# Patient Record
Sex: FEMALE | Race: WHITE | Employment: OTHER | URBAN - METROPOLITAN AREA
[De-identification: names, ages, dates, MRNs, and addresses within clinical notes are randomized per-mention and may not be internally consistent; named-entity substitution may affect disease eponyms.]

---

## 2017-02-04 ENCOUNTER — HOSPITAL ENCOUNTER (INPATIENT)
Age: 52
LOS: 2 days | Discharge: HOME | DRG: 140 | End: 2017-02-06
Attending: EMERGENCY MEDICINE | Admitting: INTERNAL MEDICINE
Payer: MEDICARE

## 2017-02-04 ENCOUNTER — HOSPITAL ENCOUNTER (EMERGENCY)
Dept: GENERAL RADIOLOGY | Age: 52
Discharge: HOME | DRG: 140 | End: 2017-02-04
Payer: MEDICARE

## 2017-02-04 DIAGNOSIS — J44.1 COPD WITH ACUTE EXACERBATION (HCC): Primary | ICD-10-CM

## 2017-02-04 DIAGNOSIS — E87.6 HYPOKALEMIA: ICD-10-CM

## 2017-02-04 LAB
ABSOLUTE EOS #: 0.1 K/UL (ref 0–0.4)
ABSOLUTE LYMPH #: 1.8 K/UL (ref 1–4.8)
ABSOLUTE MONO #: 0.4 K/UL (ref 0.1–1.2)
ANION GAP SERPL CALCULATED.3IONS-SCNC: 17 MMOL/L (ref 9–17)
BASOPHILS # BLD: 0 % (ref 0–2)
BASOPHILS ABSOLUTE: 0 K/UL (ref 0–0.2)
BUN BLDV-MCNC: 8 MG/DL (ref 6–20)
BUN/CREAT BLD: ABNORMAL (ref 9–20)
CALCIUM SERPL-MCNC: 8.9 MG/DL (ref 8.6–10.4)
CHLORIDE BLD-SCNC: 99 MMOL/L (ref 98–107)
CO2: 25 MMOL/L (ref 20–31)
CREAT SERPL-MCNC: 0.68 MG/DL (ref 0.5–0.9)
DIFFERENTIAL TYPE: NORMAL
EOSINOPHILS RELATIVE PERCENT: 2 % (ref 1–4)
GFR AFRICAN AMERICAN: >60 ML/MIN
GFR NON-AFRICAN AMERICAN: >60 ML/MIN
GFR SERPL CREATININE-BSD FRML MDRD: ABNORMAL ML/MIN/{1.73_M2}
GFR SERPL CREATININE-BSD FRML MDRD: ABNORMAL ML/MIN/{1.73_M2}
GLUCOSE BLD-MCNC: 156 MG/DL (ref 70–99)
HCT VFR BLD CALC: 39 % (ref 36–46)
HEMOGLOBIN: 13.4 G/DL (ref 12–16)
LYMPHOCYTES # BLD: 32 % (ref 24–44)
MCH RBC QN AUTO: 28.8 PG (ref 26–34)
MCHC RBC AUTO-ENTMCNC: 34.5 G/DL (ref 31–37)
MCV RBC AUTO: 83.4 FL (ref 80–100)
MONOCYTES # BLD: 8 % (ref 2–11)
PDW BLD-RTO: 14 % (ref 12.5–15.4)
PLATELET # BLD: 192 K/UL (ref 140–450)
PLATELET ESTIMATE: NORMAL
PMV BLD AUTO: 8.6 FL (ref 6–12)
POTASSIUM SERPL-SCNC: 2.7 MMOL/L (ref 3.7–5.3)
RBC # BLD: 4.68 M/UL (ref 4–5.2)
RBC # BLD: NORMAL 10*6/UL
SEG NEUTROPHILS: 58 % (ref 36–66)
SEGMENTED NEUTROPHILS ABSOLUTE COUNT: 3.3 K/UL (ref 1.8–7.7)
SODIUM BLD-SCNC: 141 MMOL/L (ref 135–144)
TROPONIN INTERP: NORMAL
TROPONIN T: <0.03 NG/ML
WBC # BLD: 5.6 K/UL (ref 3.5–11)
WBC # BLD: NORMAL 10*3/UL

## 2017-02-04 PROCEDURE — 2580000003 HC RX 258: Performed by: INTERNAL MEDICINE

## 2017-02-04 PROCEDURE — 99285 EMERGENCY DEPT VISIT HI MDM: CPT

## 2017-02-04 PROCEDURE — 6370000000 HC RX 637 (ALT 250 FOR IP): Performed by: STUDENT IN AN ORGANIZED HEALTH CARE EDUCATION/TRAINING PROGRAM

## 2017-02-04 PROCEDURE — 94640 AIRWAY INHALATION TREATMENT: CPT

## 2017-02-04 PROCEDURE — 6370000000 HC RX 637 (ALT 250 FOR IP): Performed by: EMERGENCY MEDICINE

## 2017-02-04 PROCEDURE — 87040 BLOOD CULTURE FOR BACTERIA: CPT

## 2017-02-04 PROCEDURE — 6360000002 HC RX W HCPCS: Performed by: STUDENT IN AN ORGANIZED HEALTH CARE EDUCATION/TRAINING PROGRAM

## 2017-02-04 PROCEDURE — 80048 BASIC METABOLIC PNL TOTAL CA: CPT

## 2017-02-04 PROCEDURE — 71010 XR CHEST PORTABLE: CPT

## 2017-02-04 PROCEDURE — 6360000002 HC RX W HCPCS: Performed by: INTERNAL MEDICINE

## 2017-02-04 PROCEDURE — 6370000000 HC RX 637 (ALT 250 FOR IP): Performed by: INTERNAL MEDICINE

## 2017-02-04 PROCEDURE — 84484 ASSAY OF TROPONIN QUANT: CPT

## 2017-02-04 PROCEDURE — 85025 COMPLETE CBC W/AUTO DIFF WBC: CPT

## 2017-02-04 PROCEDURE — 2060000000 HC ICU INTERMEDIATE R&B

## 2017-02-04 PROCEDURE — 36415 COLL VENOUS BLD VENIPUNCTURE: CPT

## 2017-02-04 PROCEDURE — 2580000003 HC RX 258: Performed by: STUDENT IN AN ORGANIZED HEALTH CARE EDUCATION/TRAINING PROGRAM

## 2017-02-04 PROCEDURE — 71010 XR CHEST PORTABLE: CPT | Performed by: RADIOLOGY

## 2017-02-04 PROCEDURE — 93005 ELECTROCARDIOGRAM TRACING: CPT

## 2017-02-04 PROCEDURE — 94645 CONT INHLJ TX EACH ADDL HOUR: CPT

## 2017-02-04 PROCEDURE — 94644 CONT INHLJ TX 1ST HOUR: CPT

## 2017-02-04 PROCEDURE — 94642 AEROSOL INHALATION TREATMENT: CPT

## 2017-02-04 RX ORDER — METHYLPREDNISOLONE SODIUM SUCCINATE 40 MG/ML
40 INJECTION, POWDER, LYOPHILIZED, FOR SOLUTION INTRAMUSCULAR; INTRAVENOUS EVERY 6 HOURS
Status: DISCONTINUED | OUTPATIENT
Start: 2017-02-04 | End: 2017-02-06 | Stop reason: HOSPADM

## 2017-02-04 RX ORDER — PREDNISONE 20 MG/1
40 TABLET ORAL ONCE
Status: COMPLETED | OUTPATIENT
Start: 2017-02-04 | End: 2017-02-04

## 2017-02-04 RX ORDER — PRAVASTATIN SODIUM 20 MG
40 TABLET ORAL DAILY
Status: DISCONTINUED | OUTPATIENT
Start: 2017-02-04 | End: 2017-02-06 | Stop reason: HOSPADM

## 2017-02-04 RX ORDER — LEVOTHYROXINE SODIUM 0.1 MG/1
100 TABLET ORAL DAILY
Status: DISCONTINUED | OUTPATIENT
Start: 2017-02-04 | End: 2017-02-06 | Stop reason: HOSPADM

## 2017-02-04 RX ORDER — LEVOFLOXACIN 5 MG/ML
500 INJECTION, SOLUTION INTRAVENOUS EVERY 24 HOURS
Status: DISCONTINUED | OUTPATIENT
Start: 2017-02-04 | End: 2017-02-04

## 2017-02-04 RX ORDER — POTASSIUM CHLORIDE 20 MEQ/1
40 TABLET, EXTENDED RELEASE ORAL 2 TIMES DAILY
Status: COMPLETED | OUTPATIENT
Start: 2017-02-04 | End: 2017-02-05

## 2017-02-04 RX ORDER — SODIUM CHLORIDE 9 MG/ML
INJECTION, SOLUTION INTRAVENOUS CONTINUOUS
Status: DISCONTINUED | OUTPATIENT
Start: 2017-02-04 | End: 2017-02-06 | Stop reason: HOSPADM

## 2017-02-04 RX ORDER — ATENOLOL 25 MG/1
25 TABLET ORAL DAILY
Status: DISCONTINUED | OUTPATIENT
Start: 2017-02-04 | End: 2017-02-06 | Stop reason: HOSPADM

## 2017-02-04 RX ORDER — IPRATROPIUM BROMIDE AND ALBUTEROL SULFATE 2.5; .5 MG/3ML; MG/3ML
1 SOLUTION RESPIRATORY (INHALATION) 4 TIMES DAILY
Status: DISCONTINUED | OUTPATIENT
Start: 2017-02-04 | End: 2017-02-06 | Stop reason: HOSPADM

## 2017-02-04 RX ORDER — HYDROCHLOROTHIAZIDE 25 MG/1
25 TABLET ORAL DAILY
Status: DISCONTINUED | OUTPATIENT
Start: 2017-02-04 | End: 2017-02-05

## 2017-02-04 RX ORDER — POTASSIUM CHLORIDE 20MEQ/15ML
20 LIQUID (ML) ORAL ONCE
Status: COMPLETED | OUTPATIENT
Start: 2017-02-04 | End: 2017-02-04

## 2017-02-04 RX ORDER — ONDANSETRON 2 MG/ML
4 INJECTION INTRAMUSCULAR; INTRAVENOUS EVERY 6 HOURS PRN
Status: DISCONTINUED | OUTPATIENT
Start: 2017-02-04 | End: 2017-02-06 | Stop reason: HOSPADM

## 2017-02-04 RX ORDER — ACETAMINOPHEN 325 MG/1
650 TABLET ORAL EVERY 4 HOURS PRN
Status: DISCONTINUED | OUTPATIENT
Start: 2017-02-04 | End: 2017-02-06 | Stop reason: HOSPADM

## 2017-02-04 RX ORDER — LEVOFLOXACIN 5 MG/ML
750 INJECTION, SOLUTION INTRAVENOUS EVERY 24 HOURS
Status: DISCONTINUED | OUTPATIENT
Start: 2017-02-04 | End: 2017-02-06 | Stop reason: HOSPADM

## 2017-02-04 RX ORDER — TRAZODONE HYDROCHLORIDE 100 MG/1
200 TABLET ORAL NIGHTLY
Status: DISCONTINUED | OUTPATIENT
Start: 2017-02-04 | End: 2017-02-06 | Stop reason: HOSPADM

## 2017-02-04 RX ORDER — SODIUM CHLORIDE 0.9 % (FLUSH) 0.9 %
10 SYRINGE (ML) INJECTION EVERY 12 HOURS SCHEDULED
Status: DISCONTINUED | OUTPATIENT
Start: 2017-02-04 | End: 2017-02-06 | Stop reason: HOSPADM

## 2017-02-04 RX ORDER — POTASSIUM CHLORIDE 7.45 MG/ML
10 INJECTION INTRAVENOUS ONCE
Status: DISCONTINUED | OUTPATIENT
Start: 2017-02-04 | End: 2017-02-04

## 2017-02-04 RX ORDER — ALLOPURINOL 100 MG/1
100 TABLET ORAL DAILY
Status: DISCONTINUED | OUTPATIENT
Start: 2017-02-04 | End: 2017-02-06 | Stop reason: HOSPADM

## 2017-02-04 RX ORDER — GABAPENTIN 100 MG/1
100 CAPSULE ORAL 3 TIMES DAILY
Status: DISCONTINUED | OUTPATIENT
Start: 2017-02-04 | End: 2017-02-06 | Stop reason: HOSPADM

## 2017-02-04 RX ORDER — OMEPRAZOLE 20 MG/1
40 CAPSULE, DELAYED RELEASE ORAL 2 TIMES DAILY
Status: DISCONTINUED | OUTPATIENT
Start: 2017-02-04 | End: 2017-02-06 | Stop reason: HOSPADM

## 2017-02-04 RX ORDER — ALBUTEROL SULFATE 2.5 MG/3ML
2.5 SOLUTION RESPIRATORY (INHALATION) EVERY 4 HOURS
Status: DISCONTINUED | OUTPATIENT
Start: 2017-02-05 | End: 2017-02-05

## 2017-02-04 RX ORDER — DICYCLOMINE HYDROCHLORIDE 10 MG/1
20 CAPSULE ORAL 3 TIMES DAILY PRN
Status: DISCONTINUED | OUTPATIENT
Start: 2017-02-04 | End: 2017-02-06 | Stop reason: HOSPADM

## 2017-02-04 RX ORDER — POTASSIUM CHLORIDE 40 MEQ/30ML
0.5 LIQUID ORAL ONCE
Status: DISCONTINUED | OUTPATIENT
Start: 2017-02-04 | End: 2017-02-04 | Stop reason: CLARIF

## 2017-02-04 RX ORDER — ALBUTEROL SULFATE 2.5 MG/3ML
5 SOLUTION RESPIRATORY (INHALATION)
Status: DISCONTINUED | OUTPATIENT
Start: 2017-02-04 | End: 2017-02-04

## 2017-02-04 RX ORDER — SODIUM CHLORIDE 0.9 % (FLUSH) 0.9 %
10 SYRINGE (ML) INJECTION PRN
Status: DISCONTINUED | OUTPATIENT
Start: 2017-02-04 | End: 2017-02-06 | Stop reason: HOSPADM

## 2017-02-04 RX ORDER — FLUOXETINE HYDROCHLORIDE 20 MG/1
60 CAPSULE ORAL DAILY
Status: DISCONTINUED | OUTPATIENT
Start: 2017-02-04 | End: 2017-02-06 | Stop reason: HOSPADM

## 2017-02-04 RX ADMIN — GABAPENTIN 100 MG: 100 CAPSULE ORAL at 21:00

## 2017-02-04 RX ADMIN — ATENOLOL 25 MG: 25 TABLET ORAL at 20:59

## 2017-02-04 RX ADMIN — TRAZODONE HYDROCHLORIDE 200 MG: 100 TABLET ORAL at 21:01

## 2017-02-04 RX ADMIN — ALBUTEROL SULFATE 5 MG: 5 SOLUTION RESPIRATORY (INHALATION) at 11:30

## 2017-02-04 RX ADMIN — ENOXAPARIN SODIUM 40 MG: 40 INJECTION SUBCUTANEOUS at 20:57

## 2017-02-04 RX ADMIN — ALBUTEROL SULFATE 15 MG/HR: 5 SOLUTION RESPIRATORY (INHALATION) at 12:34

## 2017-02-04 RX ADMIN — FLUOXETINE 60 MG: 20 CAPSULE ORAL at 21:00

## 2017-02-04 RX ADMIN — ALBUTEROL SULFATE 5 MG: 5 SOLUTION RESPIRATORY (INHALATION) at 11:14

## 2017-02-04 RX ADMIN — HYDROCHLOROTHIAZIDE 25 MG: 25 TABLET ORAL at 20:59

## 2017-02-04 RX ADMIN — SODIUM CHLORIDE: 9 INJECTION, SOLUTION INTRAVENOUS at 19:12

## 2017-02-04 RX ADMIN — METHYLPREDNISOLONE SODIUM SUCCINATE 40 MG: 40 INJECTION, POWDER, FOR SOLUTION INTRAMUSCULAR; INTRAVENOUS at 21:01

## 2017-02-04 RX ADMIN — LEVOFLOXACIN 750 MG: 5 INJECTION, SOLUTION INTRAVENOUS at 20:58

## 2017-02-04 RX ADMIN — PREDNISONE 40 MG: 20 TABLET ORAL at 12:06

## 2017-02-04 RX ADMIN — LEVOTHYROXINE SODIUM 100 MCG: 100 TABLET ORAL at 21:01

## 2017-02-04 RX ADMIN — IPRATROPIUM BROMIDE 0.5 MG: 0.5 SOLUTION RESPIRATORY (INHALATION) at 11:14

## 2017-02-04 RX ADMIN — OMEPRAZOLE 40 MG: 20 CAPSULE, DELAYED RELEASE ORAL at 20:59

## 2017-02-04 RX ADMIN — Medication 10 ML: at 21:12

## 2017-02-04 RX ADMIN — ACETAMINOPHEN 650 MG: 325 TABLET ORAL at 19:03

## 2017-02-04 RX ADMIN — POTASSIUM CHLORIDE 20 MEQ: 40 SOLUTION ORAL at 15:01

## 2017-02-04 RX ADMIN — POTASSIUM CHLORIDE 40 MEQ: 1500 TABLET, EXTENDED RELEASE ORAL at 21:00

## 2017-02-04 RX ADMIN — PRAVASTATIN SODIUM 40 MG: 20 TABLET ORAL at 21:00

## 2017-02-04 RX ADMIN — MAGNESIUM SULFATE HEPTAHYDRATE 2 G: 500 INJECTION, SOLUTION INTRAMUSCULAR; INTRAVENOUS at 14:05

## 2017-02-04 RX ADMIN — IPRATROPIUM BROMIDE AND ALBUTEROL SULFATE 1 AMPULE: .5; 3 SOLUTION RESPIRATORY (INHALATION) at 21:27

## 2017-02-04 ASSESSMENT — ENCOUNTER SYMPTOMS
CHEST TIGHTNESS: 1
COLOR CHANGE: 0
CONSTIPATION: 0
ABDOMINAL PAIN: 0
SHORTNESS OF BREATH: 1
DIARRHEA: 0
SORE THROAT: 0
WHEEZING: 1

## 2017-02-04 ASSESSMENT — PAIN DESCRIPTION - ONSET
ONSET: SUDDEN
ONSET: PROGRESSIVE

## 2017-02-04 ASSESSMENT — PAIN SCALES - GENERAL
PAINLEVEL_OUTOF10: 5

## 2017-02-04 ASSESSMENT — PAIN DESCRIPTION - LOCATION
LOCATION: GENERALIZED;JAW
LOCATION: HEAD

## 2017-02-04 ASSESSMENT — PAIN DESCRIPTION - DESCRIPTORS
DESCRIPTORS: HEADACHE
DESCRIPTORS: ACHING

## 2017-02-04 ASSESSMENT — PAIN DESCRIPTION - PAIN TYPE: TYPE: ACUTE PAIN

## 2017-02-04 ASSESSMENT — PAIN DESCRIPTION - FREQUENCY: FREQUENCY: CONTINUOUS

## 2017-02-04 ASSESSMENT — PAIN DESCRIPTION - PROGRESSION: CLINICAL_PROGRESSION: NOT CHANGED

## 2017-02-05 PROBLEM — E87.6 HYPOKALEMIA: Status: ACTIVE | Noted: 2017-02-05

## 2017-02-05 PROBLEM — J44.1 COPD EXACERBATION (HCC): Status: ACTIVE | Noted: 2017-02-05

## 2017-02-05 PROBLEM — I10 HTN (HYPERTENSION): Status: ACTIVE | Noted: 2017-02-05

## 2017-02-05 LAB
ANION GAP SERPL CALCULATED.3IONS-SCNC: 15 MMOL/L (ref 9–17)
BUN BLDV-MCNC: 10 MG/DL (ref 6–20)
BUN/CREAT BLD: ABNORMAL (ref 9–20)
CALCIUM SERPL-MCNC: 8.7 MG/DL (ref 8.6–10.4)
CHLORIDE BLD-SCNC: 97 MMOL/L (ref 98–107)
CO2: 25 MMOL/L (ref 20–31)
CREAT SERPL-MCNC: 0.62 MG/DL (ref 0.5–0.9)
GFR AFRICAN AMERICAN: >60 ML/MIN
GFR NON-AFRICAN AMERICAN: >60 ML/MIN
GFR SERPL CREATININE-BSD FRML MDRD: ABNORMAL ML/MIN/{1.73_M2}
GFR SERPL CREATININE-BSD FRML MDRD: ABNORMAL ML/MIN/{1.73_M2}
GLUCOSE BLD-MCNC: 195 MG/DL (ref 70–99)
HCT VFR BLD CALC: 36.4 % (ref 36–46)
HEMOGLOBIN: 12.4 G/DL (ref 12–16)
MAGNESIUM: 2.4 MG/DL (ref 1.6–2.6)
MCH RBC QN AUTO: 29 PG (ref 26–34)
MCHC RBC AUTO-ENTMCNC: 34.1 G/DL (ref 31–37)
MCV RBC AUTO: 84.8 FL (ref 80–100)
PDW BLD-RTO: 14.3 % (ref 12.5–15.4)
PLATELET # BLD: 230 K/UL (ref 140–450)
PMV BLD AUTO: 9 FL (ref 6–12)
POTASSIUM SERPL-SCNC: 3.7 MMOL/L (ref 3.7–5.3)
RBC # BLD: 4.29 M/UL (ref 4–5.2)
SODIUM BLD-SCNC: 137 MMOL/L (ref 135–144)
WBC # BLD: 9.6 K/UL (ref 3.5–11)

## 2017-02-05 PROCEDURE — G0008 ADMIN INFLUENZA VIRUS VAC: HCPCS | Performed by: INTERNAL MEDICINE

## 2017-02-05 PROCEDURE — 85027 COMPLETE CBC AUTOMATED: CPT

## 2017-02-05 PROCEDURE — 80048 BASIC METABOLIC PNL TOTAL CA: CPT

## 2017-02-05 PROCEDURE — 6360000002 HC RX W HCPCS: Performed by: INTERNAL MEDICINE

## 2017-02-05 PROCEDURE — 6370000000 HC RX 637 (ALT 250 FOR IP): Performed by: HOSPITALIST

## 2017-02-05 PROCEDURE — G8980 MOBILITY D/C STATUS: HCPCS

## 2017-02-05 PROCEDURE — 97116 GAIT TRAINING THERAPY: CPT

## 2017-02-05 PROCEDURE — 2580000003 HC RX 258: Performed by: INTERNAL MEDICINE

## 2017-02-05 PROCEDURE — 83735 ASSAY OF MAGNESIUM: CPT

## 2017-02-05 PROCEDURE — 2060000000 HC ICU INTERMEDIATE R&B

## 2017-02-05 PROCEDURE — 99222 1ST HOSP IP/OBS MODERATE 55: CPT | Performed by: INTERNAL MEDICINE

## 2017-02-05 PROCEDURE — G8979 MOBILITY GOAL STATUS: HCPCS

## 2017-02-05 PROCEDURE — G8978 MOBILITY CURRENT STATUS: HCPCS

## 2017-02-05 PROCEDURE — 97161 PT EVAL LOW COMPLEX 20 MIN: CPT

## 2017-02-05 PROCEDURE — 97530 THERAPEUTIC ACTIVITIES: CPT

## 2017-02-05 PROCEDURE — 6370000000 HC RX 637 (ALT 250 FOR IP): Performed by: INTERNAL MEDICINE

## 2017-02-05 PROCEDURE — 94640 AIRWAY INHALATION TREATMENT: CPT

## 2017-02-05 PROCEDURE — 94762 N-INVAS EAR/PLS OXIMTRY CONT: CPT

## 2017-02-05 PROCEDURE — 36415 COLL VENOUS BLD VENIPUNCTURE: CPT

## 2017-02-05 PROCEDURE — 90686 IIV4 VACC NO PRSV 0.5 ML IM: CPT | Performed by: INTERNAL MEDICINE

## 2017-02-05 PROCEDURE — 94642 AEROSOL INHALATION TREATMENT: CPT

## 2017-02-05 RX ORDER — NICOTINE 21 MG/24HR
1 PATCH, TRANSDERMAL 24 HOURS TRANSDERMAL DAILY
Status: DISCONTINUED | OUTPATIENT
Start: 2017-02-05 | End: 2017-02-06 | Stop reason: HOSPADM

## 2017-02-05 RX ORDER — DEXTROMETHORPHAN POLISTIREX 30 MG/5ML
30 SUSPENSION ORAL EVERY 12 HOURS SCHEDULED
Status: DISCONTINUED | OUTPATIENT
Start: 2017-02-05 | End: 2017-02-06 | Stop reason: HOSPADM

## 2017-02-05 RX ADMIN — GABAPENTIN 100 MG: 100 CAPSULE ORAL at 14:23

## 2017-02-05 RX ADMIN — ENOXAPARIN SODIUM 40 MG: 40 INJECTION SUBCUTANEOUS at 08:24

## 2017-02-05 RX ADMIN — SODIUM CHLORIDE: 9 INJECTION, SOLUTION INTRAVENOUS at 04:00

## 2017-02-05 RX ADMIN — TRAZODONE HYDROCHLORIDE 200 MG: 100 TABLET ORAL at 21:21

## 2017-02-05 RX ADMIN — ATENOLOL 25 MG: 25 TABLET ORAL at 08:25

## 2017-02-05 RX ADMIN — METHYLPREDNISOLONE SODIUM SUCCINATE 40 MG: 40 INJECTION, POWDER, FOR SOLUTION INTRAMUSCULAR; INTRAVENOUS at 03:59

## 2017-02-05 RX ADMIN — ALLOPURINOL 100 MG: 100 TABLET ORAL at 08:25

## 2017-02-05 RX ADMIN — INFLUENZA A VIRUS A/CALIFORNIA/7/2009 X-179A (H1N1) ANTIGEN (FORMALDEHYDE INACTIVATED), INFLUENZA A VIRUS A/HONG KONG/4801/2014 X-263B (H3N2) ANTIGEN (FORMALDEHYDE INACTIVATED), INFLUENZA B VIRUS B/PHUKET/3073/2013 ANTIGEN (FORMALDEHYDE INACTIVATED), AND INFLUENZA B VIRUS B/BRISBANE/60/2008 ANTIGEN (FORMALDEHYDE INACTIVATED) 0.5 ML: 15; 15; 15; 15 INJECTION, SUSPENSION INTRAMUSCULAR at 11:32

## 2017-02-05 RX ADMIN — METHYLPREDNISOLONE SODIUM SUCCINATE 40 MG: 40 INJECTION, POWDER, FOR SOLUTION INTRAMUSCULAR; INTRAVENOUS at 09:11

## 2017-02-05 RX ADMIN — GABAPENTIN 100 MG: 100 CAPSULE ORAL at 08:25

## 2017-02-05 RX ADMIN — METHYLPREDNISOLONE SODIUM SUCCINATE 40 MG: 40 INJECTION, POWDER, FOR SOLUTION INTRAMUSCULAR; INTRAVENOUS at 23:47

## 2017-02-05 RX ADMIN — ALBUTEROL SULFATE 2.5 MG: 2.5 SOLUTION RESPIRATORY (INHALATION) at 05:05

## 2017-02-05 RX ADMIN — FLUOXETINE 60 MG: 20 CAPSULE ORAL at 08:25

## 2017-02-05 RX ADMIN — HYDROCHLOROTHIAZIDE 25 MG: 25 TABLET ORAL at 08:25

## 2017-02-05 RX ADMIN — MAGNESIUM SULFATE IN DEXTROSE 1 G: 10 INJECTION, SOLUTION INTRAVENOUS at 09:11

## 2017-02-05 RX ADMIN — IPRATROPIUM BROMIDE AND ALBUTEROL SULFATE 1 AMPULE: .5; 3 SOLUTION RESPIRATORY (INHALATION) at 21:56

## 2017-02-05 RX ADMIN — IPRATROPIUM BROMIDE AND ALBUTEROL SULFATE 1 AMPULE: .5; 3 SOLUTION RESPIRATORY (INHALATION) at 12:52

## 2017-02-05 RX ADMIN — POTASSIUM CHLORIDE 40 MEQ: 1500 TABLET, EXTENDED RELEASE ORAL at 08:26

## 2017-02-05 RX ADMIN — LEVOFLOXACIN 750 MG: 5 INJECTION, SOLUTION INTRAVENOUS at 17:51

## 2017-02-05 RX ADMIN — LEVOTHYROXINE SODIUM 100 MCG: 100 TABLET ORAL at 08:25

## 2017-02-05 RX ADMIN — IPRATROPIUM BROMIDE AND ALBUTEROL SULFATE 1 AMPULE: .5; 3 SOLUTION RESPIRATORY (INHALATION) at 09:29

## 2017-02-05 RX ADMIN — PRAVASTATIN SODIUM 40 MG: 20 TABLET ORAL at 08:31

## 2017-02-05 RX ADMIN — GABAPENTIN 100 MG: 100 CAPSULE ORAL at 21:20

## 2017-02-05 RX ADMIN — IPRATROPIUM BROMIDE AND ALBUTEROL SULFATE 1 AMPULE: .5; 3 SOLUTION RESPIRATORY (INHALATION) at 17:23

## 2017-02-05 RX ADMIN — Medication 10 ML: at 21:22

## 2017-02-05 RX ADMIN — OMEPRAZOLE 40 MG: 20 CAPSULE, DELAYED RELEASE ORAL at 21:20

## 2017-02-05 RX ADMIN — METHYLPREDNISOLONE SODIUM SUCCINATE 40 MG: 40 INJECTION, POWDER, FOR SOLUTION INTRAMUSCULAR; INTRAVENOUS at 17:51

## 2017-02-05 RX ADMIN — OMEPRAZOLE 40 MG: 20 CAPSULE, DELAYED RELEASE ORAL at 08:25

## 2017-02-05 RX ADMIN — DICYCLOMINE HYDROCHLORIDE 20 MG: 10 CAPSULE ORAL at 21:21

## 2017-02-05 RX ADMIN — ACETAMINOPHEN 650 MG: 325 TABLET ORAL at 15:41

## 2017-02-05 ASSESSMENT — PAIN SCALES - GENERAL
PAINLEVEL_OUTOF10: 4
PAINLEVEL_OUTOF10: 7
PAINLEVEL_OUTOF10: 8

## 2017-02-06 VITALS
RESPIRATION RATE: 18 BRPM | TEMPERATURE: 97.3 F | DIASTOLIC BLOOD PRESSURE: 81 MMHG | HEART RATE: 68 BPM | WEIGHT: 200 LBS | OXYGEN SATURATION: 95 % | BODY MASS INDEX: 31.39 KG/M2 | HEIGHT: 67 IN | SYSTOLIC BLOOD PRESSURE: 139 MMHG

## 2017-02-06 PROCEDURE — 6360000002 HC RX W HCPCS: Performed by: INTERNAL MEDICINE

## 2017-02-06 PROCEDURE — 94640 AIRWAY INHALATION TREATMENT: CPT

## 2017-02-06 PROCEDURE — 94762 N-INVAS EAR/PLS OXIMTRY CONT: CPT

## 2017-02-06 PROCEDURE — 6370000000 HC RX 637 (ALT 250 FOR IP): Performed by: INTERNAL MEDICINE

## 2017-02-06 PROCEDURE — 96365 THER/PROPH/DIAG IV INF INIT: CPT

## 2017-02-06 PROCEDURE — 2580000003 HC RX 258: Performed by: INTERNAL MEDICINE

## 2017-02-06 PROCEDURE — 99238 HOSP IP/OBS DSCHRG MGMT 30/<: CPT | Performed by: INTERNAL MEDICINE

## 2017-02-06 PROCEDURE — 96366 THER/PROPH/DIAG IV INF ADDON: CPT

## 2017-02-06 PROCEDURE — 6370000000 HC RX 637 (ALT 250 FOR IP): Performed by: HOSPITALIST

## 2017-02-06 PROCEDURE — 6370000000 HC RX 637 (ALT 250 FOR IP): Performed by: STUDENT IN AN ORGANIZED HEALTH CARE EDUCATION/TRAINING PROGRAM

## 2017-02-06 PROCEDURE — 94620 HC 6-MINUTE WALK TEST/PULM STRESS TEST SIMPLE: CPT

## 2017-02-06 PROCEDURE — 99406 BEHAV CHNG SMOKING 3-10 MIN: CPT

## 2017-02-06 PROCEDURE — 6360000002 HC RX W HCPCS: Performed by: STUDENT IN AN ORGANIZED HEALTH CARE EDUCATION/TRAINING PROGRAM

## 2017-02-06 RX ORDER — IPRATROPIUM BROMIDE AND ALBUTEROL SULFATE 2.5; .5 MG/3ML; MG/3ML
3 SOLUTION RESPIRATORY (INHALATION) 4 TIMES DAILY
Qty: 360 ML | Refills: 3 | Status: ON HOLD | OUTPATIENT
Start: 2017-02-06 | End: 2021-09-19 | Stop reason: SDUPTHER

## 2017-02-06 RX ORDER — LEVOFLOXACIN 750 MG/1
750 TABLET ORAL DAILY
Qty: 5 TABLET | Refills: 0 | Status: SHIPPED | OUTPATIENT
Start: 2017-02-06 | End: 2017-02-11

## 2017-02-06 RX ORDER — TIZANIDINE 4 MG/1
4 TABLET ORAL EVERY 6 HOURS PRN
Status: DISCONTINUED | OUTPATIENT
Start: 2017-02-06 | End: 2017-02-06 | Stop reason: HOSPADM

## 2017-02-06 RX ORDER — PREDNISONE 20 MG/1
TABLET ORAL
Qty: 6 TABLET | Refills: 0 | Status: SHIPPED | OUTPATIENT
Start: 2017-02-06 | End: 2017-02-06 | Stop reason: HOSPADM

## 2017-02-06 RX ORDER — PREDNISONE 20 MG/1
40 TABLET ORAL DAILY
Qty: 6 TABLET | Refills: 0 | Status: SHIPPED | OUTPATIENT
Start: 2017-02-06 | End: 2017-02-06

## 2017-02-06 RX ORDER — PREGABALIN 75 MG/1
150 CAPSULE ORAL 2 TIMES DAILY
Status: DISCONTINUED | OUTPATIENT
Start: 2017-02-06 | End: 2017-02-06

## 2017-02-06 RX ORDER — DEXTROMETHORPHAN POLISTIREX 30 MG/5ML
30 SUSPENSION ORAL EVERY 12 HOURS SCHEDULED
Qty: 100 ML | Refills: 0 | Status: SHIPPED | OUTPATIENT
Start: 2017-02-06 | End: 2017-02-16

## 2017-02-06 RX ORDER — PREDNISONE 10 MG/1
TABLET ORAL
Qty: 30 TABLET | Refills: 0 | Status: SHIPPED | OUTPATIENT
Start: 2017-02-06 | End: 2019-02-28

## 2017-02-06 RX ADMIN — IPRATROPIUM BROMIDE AND ALBUTEROL SULFATE 1 AMPULE: .5; 3 SOLUTION RESPIRATORY (INHALATION) at 12:56

## 2017-02-06 RX ADMIN — Medication 10 ML: at 08:26

## 2017-02-06 RX ADMIN — METHYLPREDNISOLONE SODIUM SUCCINATE 40 MG: 40 INJECTION, POWDER, FOR SOLUTION INTRAMUSCULAR; INTRAVENOUS at 06:41

## 2017-02-06 RX ADMIN — ALBUTEROL SULFATE 2.5 MG: 5 SOLUTION RESPIRATORY (INHALATION) at 05:00

## 2017-02-06 RX ADMIN — LEVOTHYROXINE SODIUM 100 MCG: 100 TABLET ORAL at 06:41

## 2017-02-06 RX ADMIN — ACETAMINOPHEN 650 MG: 325 TABLET ORAL at 07:30

## 2017-02-06 RX ADMIN — ATENOLOL 25 MG: 25 TABLET ORAL at 08:25

## 2017-02-06 RX ADMIN — GABAPENTIN 100 MG: 100 CAPSULE ORAL at 08:25

## 2017-02-06 RX ADMIN — DICYCLOMINE HYDROCHLORIDE 20 MG: 10 CAPSULE ORAL at 08:25

## 2017-02-06 RX ADMIN — OMEPRAZOLE 40 MG: 20 CAPSULE, DELAYED RELEASE ORAL at 08:25

## 2017-02-06 RX ADMIN — FLUOXETINE 60 MG: 20 CAPSULE ORAL at 06:41

## 2017-02-06 RX ADMIN — PRAVASTATIN SODIUM 40 MG: 20 TABLET ORAL at 08:25

## 2017-02-06 RX ADMIN — ALLOPURINOL 100 MG: 100 TABLET ORAL at 08:25

## 2017-02-06 RX ADMIN — Medication 30 MG: at 08:25

## 2017-02-06 RX ADMIN — TIZANIDINE 4 MG: 4 TABLET ORAL at 12:09

## 2017-02-06 RX ADMIN — METHYLPREDNISOLONE SODIUM SUCCINATE 40 MG: 40 INJECTION, POWDER, FOR SOLUTION INTRAMUSCULAR; INTRAVENOUS at 12:09

## 2017-02-06 RX ADMIN — IPRATROPIUM BROMIDE AND ALBUTEROL SULFATE 1 AMPULE: .5; 3 SOLUTION RESPIRATORY (INHALATION) at 09:10

## 2017-02-06 RX ADMIN — ENOXAPARIN SODIUM 40 MG: 40 INJECTION SUBCUTANEOUS at 08:26

## 2017-02-06 ASSESSMENT — PAIN SCALES - GENERAL
PAINLEVEL_OUTOF10: 7
PAINLEVEL_OUTOF10: 0

## 2017-02-07 LAB
EKG ATRIAL RATE: 87 BPM
EKG P AXIS: 42 DEGREES
EKG P-R INTERVAL: 146 MS
EKG Q-T INTERVAL: 452 MS
EKG QRS DURATION: 88 MS
EKG QTC CALCULATION (BAZETT): 543 MS
EKG R AXIS: 65 DEGREES
EKG T AXIS: 37 DEGREES
EKG VENTRICULAR RATE: 87 BPM

## 2017-02-10 LAB
CULTURE: NORMAL
Lab: NORMAL
Lab: NORMAL
SPECIMEN DESCRIPTION: NORMAL
SPECIMEN DESCRIPTION: NORMAL
STATUS: NORMAL
STATUS: NORMAL

## 2017-03-08 ENCOUNTER — HOSPITAL ENCOUNTER (EMERGENCY)
Age: 52
Discharge: HOME | End: 2017-03-08
Attending: EMERGENCY MEDICINE
Payer: MEDICARE

## 2017-03-08 ENCOUNTER — APPOINTMENT (OUTPATIENT)
Dept: GENERAL RADIOLOGY | Age: 52
End: 2017-03-08
Payer: MEDICARE

## 2017-03-08 VITALS
RESPIRATION RATE: 18 BRPM | OXYGEN SATURATION: 94 % | WEIGHT: 195 LBS | HEART RATE: 68 BPM | HEIGHT: 66 IN | DIASTOLIC BLOOD PRESSURE: 76 MMHG | TEMPERATURE: 97.5 F | SYSTOLIC BLOOD PRESSURE: 128 MMHG | BODY MASS INDEX: 31.34 KG/M2

## 2017-03-08 DIAGNOSIS — R09.81 NASAL CONGESTION: ICD-10-CM

## 2017-03-08 DIAGNOSIS — R05.9 COUGH: Primary | ICD-10-CM

## 2017-03-08 PROCEDURE — 99285 EMERGENCY DEPT VISIT HI MDM: CPT

## 2017-03-08 PROCEDURE — 94664 DEMO&/EVAL PT USE INHALER: CPT

## 2017-03-08 PROCEDURE — 71020 XR CHEST STANDARD TWO VW: CPT

## 2017-03-08 PROCEDURE — 94640 AIRWAY INHALATION TREATMENT: CPT

## 2017-03-08 PROCEDURE — 6370000000 HC RX 637 (ALT 250 FOR IP): Performed by: EMERGENCY MEDICINE

## 2017-03-08 PROCEDURE — 6360000002 HC RX W HCPCS: Performed by: EMERGENCY MEDICINE

## 2017-03-08 RX ORDER — BENZONATATE 100 MG/1
100 CAPSULE ORAL ONCE
Status: COMPLETED | OUTPATIENT
Start: 2017-03-08 | End: 2017-03-08

## 2017-03-08 RX ORDER — PREDNISONE 20 MG/1
60 TABLET ORAL ONCE
Status: COMPLETED | OUTPATIENT
Start: 2017-03-08 | End: 2017-03-08

## 2017-03-08 RX ORDER — DOXYCYCLINE 100 MG/1
100 TABLET ORAL 2 TIMES DAILY
Qty: 20 TABLET | Refills: 0 | Status: SHIPPED | OUTPATIENT
Start: 2017-03-08 | End: 2017-03-18

## 2017-03-08 RX ORDER — BENZONATATE 100 MG/1
100 CAPSULE ORAL 3 TIMES DAILY PRN
Status: DISCONTINUED | OUTPATIENT
Start: 2017-03-08 | End: 2017-03-08

## 2017-03-08 RX ORDER — ALBUTEROL SULFATE 2.5 MG/3ML
5 SOLUTION RESPIRATORY (INHALATION)
Status: DISCONTINUED | OUTPATIENT
Start: 2017-03-08 | End: 2017-03-08 | Stop reason: HOSPADM

## 2017-03-08 RX ORDER — PREDNISONE 10 MG/1
TABLET ORAL
Qty: 20 TABLET | Refills: 0 | Status: SHIPPED | OUTPATIENT
Start: 2017-03-08 | End: 2017-03-18

## 2017-03-08 RX ADMIN — IPRATROPIUM BROMIDE 0.5 MG: 0.5 SOLUTION RESPIRATORY (INHALATION) at 13:34

## 2017-03-08 RX ADMIN — ALBUTEROL SULFATE 5 MG: 5 SOLUTION RESPIRATORY (INHALATION) at 13:34

## 2017-03-08 RX ADMIN — BENZONATATE 100 MG: 100 CAPSULE ORAL at 13:45

## 2017-03-08 RX ADMIN — PREDNISONE 60 MG: 20 TABLET ORAL at 15:36

## 2017-03-08 ASSESSMENT — ENCOUNTER SYMPTOMS
SINUS PRESSURE: 0
COUGH: 1
ABDOMINAL PAIN: 0
NAUSEA: 0
SHORTNESS OF BREATH: 1
BACK PAIN: 0
RHINORRHEA: 1
PHOTOPHOBIA: 0
TROUBLE SWALLOWING: 0
BLOOD IN STOOL: 0
SORE THROAT: 0
VOMITING: 0
CONSTIPATION: 0
DIARRHEA: 0

## 2017-03-08 ASSESSMENT — PAIN SCALES - GENERAL: PAINLEVEL_OUTOF10: 5

## 2017-03-08 ASSESSMENT — PAIN DESCRIPTION - LOCATION: LOCATION: HEAD

## 2017-03-08 ASSESSMENT — PAIN DESCRIPTION - DESCRIPTORS: DESCRIPTORS: DISCOMFORT;THROBBING;POUNDING

## 2017-05-09 ENCOUNTER — HOSPITAL ENCOUNTER (EMERGENCY)
Age: 52
Discharge: HOME | End: 2017-05-09
Attending: EMERGENCY MEDICINE
Payer: MEDICARE

## 2017-05-09 VITALS
WEIGHT: 182 LBS | HEART RATE: 65 BPM | BODY MASS INDEX: 29.25 KG/M2 | SYSTOLIC BLOOD PRESSURE: 119 MMHG | TEMPERATURE: 97.9 F | HEIGHT: 66 IN | RESPIRATION RATE: 16 BRPM | DIASTOLIC BLOOD PRESSURE: 74 MMHG | OXYGEN SATURATION: 96 %

## 2017-05-09 DIAGNOSIS — G89.29 CHRONIC LEFT HIP PAIN: Primary | ICD-10-CM

## 2017-05-09 DIAGNOSIS — M76.32 IT BAND SYNDROME, LEFT: ICD-10-CM

## 2017-05-09 DIAGNOSIS — M25.552 CHRONIC LEFT HIP PAIN: Primary | ICD-10-CM

## 2017-05-09 PROCEDURE — 6360000002 HC RX W HCPCS: Performed by: PHYSICIAN ASSISTANT

## 2017-05-09 PROCEDURE — G0381 LEV 2 HOSP TYPE B ED VISIT: HCPCS

## 2017-05-09 PROCEDURE — 96372 THER/PROPH/DIAG INJ SC/IM: CPT

## 2017-05-09 RX ORDER — DIAZEPAM 2 MG/1
2 TABLET ORAL EVERY 8 HOURS PRN
Qty: 15 TABLET | Refills: 0 | Status: SHIPPED | OUTPATIENT
Start: 2017-05-09 | End: 2019-02-28

## 2017-05-09 RX ORDER — KETOROLAC TROMETHAMINE 30 MG/ML
30 INJECTION, SOLUTION INTRAMUSCULAR; INTRAVENOUS ONCE
Status: COMPLETED | OUTPATIENT
Start: 2017-05-09 | End: 2017-05-09

## 2017-05-09 RX ADMIN — KETOROLAC TROMETHAMINE 30 MG: 30 INJECTION, SOLUTION INTRAMUSCULAR at 19:58

## 2017-05-09 ASSESSMENT — PAIN DESCRIPTION - LOCATION: LOCATION: HIP

## 2017-05-09 ASSESSMENT — ENCOUNTER SYMPTOMS
GASTROINTESTINAL NEGATIVE: 1
EYES NEGATIVE: 1
RESPIRATORY NEGATIVE: 1
ALLERGIC/IMMUNOLOGIC NEGATIVE: 1

## 2017-05-09 ASSESSMENT — PAIN SCALES - GENERAL
PAINLEVEL_OUTOF10: 8
PAINLEVEL_OUTOF10: 8

## 2017-05-09 ASSESSMENT — PAIN DESCRIPTION - ORIENTATION: ORIENTATION: LEFT

## 2017-06-22 ENCOUNTER — HOSPITAL ENCOUNTER (EMERGENCY)
Age: 52
Discharge: LEFT BEFORE DISCHARGE | End: 2017-06-22
Attending: EMERGENCY MEDICINE
Payer: MEDICARE

## 2017-06-22 VITALS
SYSTOLIC BLOOD PRESSURE: 108 MMHG | DIASTOLIC BLOOD PRESSURE: 71 MMHG | OXYGEN SATURATION: 96 % | TEMPERATURE: 97.3 F | RESPIRATION RATE: 21 BRPM | HEART RATE: 72 BPM

## 2017-06-22 DIAGNOSIS — Z53.20 LEFT BEFORE TREATMENT COMPLETED: Primary | ICD-10-CM

## 2017-06-22 PROCEDURE — 99283 EMERGENCY DEPT VISIT LOW MDM: CPT

## 2017-06-22 ASSESSMENT — PAIN DESCRIPTION - PAIN TYPE: TYPE: ACUTE PAIN

## 2017-06-22 ASSESSMENT — PAIN DESCRIPTION - LOCATION: LOCATION: ABDOMEN

## 2017-06-22 ASSESSMENT — PAIN SCALES - GENERAL: PAINLEVEL_OUTOF10: 7

## 2017-10-18 ENCOUNTER — HOSPITAL ENCOUNTER (EMERGENCY)
Age: 52
Discharge: HOME | End: 2017-10-18
Attending: EMERGENCY MEDICINE
Payer: MEDICARE

## 2017-10-18 VITALS
HEART RATE: 74 BPM | TEMPERATURE: 97.5 F | DIASTOLIC BLOOD PRESSURE: 97 MMHG | BODY MASS INDEX: 32.14 KG/M2 | SYSTOLIC BLOOD PRESSURE: 160 MMHG | HEIGHT: 66 IN | OXYGEN SATURATION: 98 % | RESPIRATION RATE: 18 BRPM | WEIGHT: 200 LBS

## 2017-10-18 DIAGNOSIS — M54.31 SCIATICA OF RIGHT SIDE: Primary | ICD-10-CM

## 2017-10-18 PROCEDURE — G0382 LEV 3 HOSP TYPE B ED VISIT: HCPCS

## 2017-10-18 PROCEDURE — 96372 THER/PROPH/DIAG INJ SC/IM: CPT

## 2017-10-18 PROCEDURE — 6370000000 HC RX 637 (ALT 250 FOR IP): Performed by: EMERGENCY MEDICINE

## 2017-10-18 PROCEDURE — 6360000002 HC RX W HCPCS: Performed by: EMERGENCY MEDICINE

## 2017-10-18 RX ORDER — OXYCODONE HYDROCHLORIDE AND ACETAMINOPHEN 5; 325 MG/1; MG/1
2 TABLET ORAL ONCE
Status: COMPLETED | OUTPATIENT
Start: 2017-10-18 | End: 2017-10-18

## 2017-10-18 RX ORDER — KETOROLAC TROMETHAMINE 30 MG/ML
30 INJECTION, SOLUTION INTRAMUSCULAR; INTRAVENOUS ONCE
Status: COMPLETED | OUTPATIENT
Start: 2017-10-18 | End: 2017-10-18

## 2017-10-18 RX ADMIN — OXYCODONE HYDROCHLORIDE AND ACETAMINOPHEN 2 TABLET: 5; 325 TABLET ORAL at 20:33

## 2017-10-18 RX ADMIN — KETOROLAC TROMETHAMINE 30 MG: 30 INJECTION, SOLUTION INTRAMUSCULAR at 20:59

## 2017-10-18 ASSESSMENT — ENCOUNTER SYMPTOMS
SHORTNESS OF BREATH: 0
RHINORRHEA: 0
BACK PAIN: 1
ABDOMINAL PAIN: 0

## 2017-10-18 ASSESSMENT — PAIN DESCRIPTION - LOCATION: LOCATION: BACK;LEG

## 2017-10-18 ASSESSMENT — PAIN DESCRIPTION - ORIENTATION: ORIENTATION: RIGHT

## 2017-10-18 ASSESSMENT — PAIN DESCRIPTION - DESCRIPTORS: DESCRIPTORS: DISCOMFORT;SHARP

## 2017-10-18 ASSESSMENT — PAIN SCALES - GENERAL
PAINLEVEL_OUTOF10: 9
PAINLEVEL_OUTOF10: 6
PAINLEVEL_OUTOF10: 9

## 2017-10-19 NOTE — ED PROVIDER NOTES
History    Not on file. Social History Main Topics    Smoking status: Current Every Day Smoker     Packs/day: 0.25     Years: 37.00     Types: Cigarettes    Smokeless tobacco: Not on file      Comment: has not smoked in the last 3 days    Alcohol use Yes      Comment: socially    Drug use: No    Sexual activity: Not on file     Other Topics Concern    Not on file     Social History Narrative    No narrative on file       Family History   Problem Relation Age of Onset    Diabetes Father     Hypertension Mother     Cancer Mother        Allergies:  Review of patient's allergies indicates no known allergies. Home Medications:  Prior to Admission medications    Medication Sig Start Date End Date Taking? Authorizing Provider   diazepam (VALIUM) 2 MG tablet Take 1 tablet by mouth every 8 hours as needed (Pain/Spasm) 5/9/17   Yue Starkey PA-C   predniSONE (DELTASONE) 10 MG tablet Taper Dose. Take the following in order until completion   1. 40mg PO qd for 3 days  2. 30mg PO qd for 3 days  3. 20mg PO qd for 3 days  4. 10mg PO qd for 3 days and then stop after thirdl 10mg dose. 2/6/17   Saba Rockwell,    ipratropium-albuterol (DUONEB) 0.5-2.5 (3) MG/3ML SOLN nebulizer solution Inhale 3 mLs into the lungs 4 times daily 2/6/17   Scott Layton MD   mometasone-formoterol [facility]) 200-5 MCG/ACT inhaler Inhale 2 puffs into the lungs every 12 hours 2/6/17   Scott Layton MD   ibuprofen (ADVIL;MOTRIN) 800 MG tablet Take 1 tablet by mouth every 8 hours as needed for Pain 10/2/16   Luis Julianne, DO   TiZANidine HCl (ZANAFLEX PO) Take by mouth    Historical Provider, MD   gabapentin (NEURONTIN) 100 MG capsule Take 100 mg by mouth 3 times daily    Historical Provider, MD   ondansetron (ZOFRAN) 4 MG tablet Take 1 tablet by mouth every 8 hours as needed for Nausea 3/2/16   Linsey Alas,    dicyclomine (BENTYL) 20 MG tablet Take 1 tablet by mouth 3 times daily as needed.  2/9/15   Jim Givens MD

## 2017-10-20 ENCOUNTER — HOSPITAL ENCOUNTER (EMERGENCY)
Age: 52
Discharge: HOME | End: 2017-10-20
Attending: EMERGENCY MEDICINE
Payer: MEDICARE

## 2017-10-20 VITALS
SYSTOLIC BLOOD PRESSURE: 179 MMHG | RESPIRATION RATE: 16 BRPM | HEART RATE: 65 BPM | WEIGHT: 200 LBS | BODY MASS INDEX: 32.14 KG/M2 | OXYGEN SATURATION: 99 % | DIASTOLIC BLOOD PRESSURE: 92 MMHG | TEMPERATURE: 98.4 F | HEIGHT: 66 IN

## 2017-10-20 DIAGNOSIS — M54.31 SCIATICA OF RIGHT SIDE: Primary | ICD-10-CM

## 2017-10-20 PROCEDURE — 6360000002 HC RX W HCPCS: Performed by: EMERGENCY MEDICINE

## 2017-10-20 PROCEDURE — 96372 THER/PROPH/DIAG INJ SC/IM: CPT

## 2017-10-20 PROCEDURE — 6370000000 HC RX 637 (ALT 250 FOR IP): Performed by: EMERGENCY MEDICINE

## 2017-10-20 PROCEDURE — 99283 EMERGENCY DEPT VISIT LOW MDM: CPT

## 2017-10-20 RX ORDER — NAPROXEN 250 MG/1
500 TABLET ORAL ONCE
Status: COMPLETED | OUTPATIENT
Start: 2017-10-20 | End: 2017-10-20

## 2017-10-20 RX ORDER — ORPHENADRINE CITRATE 30 MG/ML
60 INJECTION INTRAMUSCULAR; INTRAVENOUS ONCE
Status: COMPLETED | OUTPATIENT
Start: 2017-10-20 | End: 2017-10-20

## 2017-10-20 RX ADMIN — NAPROXEN 500 MG: 250 TABLET ORAL at 19:15

## 2017-10-20 RX ADMIN — ORPHENADRINE CITRATE 60 MG: 30 INJECTION INTRAMUSCULAR; INTRAVENOUS at 19:15

## 2017-10-20 ASSESSMENT — ENCOUNTER SYMPTOMS
BACK PAIN: 1
ABDOMINAL PAIN: 0
COUGH: 0
VOMITING: 0
NAUSEA: 0
EYE PAIN: 0
COLOR CHANGE: 0

## 2017-10-20 ASSESSMENT — PAIN DESCRIPTION - LOCATION: LOCATION: BACK;LEG

## 2017-10-20 ASSESSMENT — PAIN SCALES - GENERAL
PAINLEVEL_OUTOF10: 9
PAINLEVEL_OUTOF10: 8

## 2017-10-20 ASSESSMENT — PAIN DESCRIPTION - PAIN TYPE: TYPE: ACUTE PAIN
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
## 2017-10-20 NOTE — ED PROVIDER NOTES
[facility]     Emergency Department     Faculty Attestation    I performed a history and physical examination of the patient and discussed management with the resident. I have reviewed and agree with the residents findings including all diagnostic interpretations, and treatment plans as written. Any areas of disagreement are noted on the chart. I was personally present for the key portions of any procedures. I have documented in the chart those procedures where I was not present during the key portions. I have reviewed the emergency nurses triage note. I agree with the chief complaint, past medical history, past surgical history, allergies, medications, social and family history as documented unless otherwise noted below. Documentation of the HPI, Physical Exam and Medical Decision Making performed by roman is based on my personal performance of the HPI, PE and MDM. For Physician Assistant/ Nurse Practitioner cases/documentation I have personally evaluated this patient and have completed at least one if not all key elements of the E/M (history, physical exam, and MDM). Additional findings are as noted. Primary Care Physician: Ira Prince MD    History: This is a 47 y.o. female who presents to the Emergency Department with complaint of Back pain. The patient into the emergency room complaining of back pain. Patient has a history of chronic back pain. The patient denies any bowel or bladder dissection. The patient denies any numbness, tingling or weakness. Physical:   height is 5' 6\" (1.676 m) and weight is 200 lb (90.7 kg). Her oral temperature is 98.4 °F (36.9 °C). Her blood pressure is 179/92 (abnormal) and her pulse is 65. Her respiration is 16 and oxygen saturation is 99%. There is no midline thoracic or lumbar spinal tenderness. The patient has some tenderness to the right paraspinal lumbar sacral area with sciatica.  Patient is able to ambulate without any foot drop noted. Impression: Back pain    Plan: Analgesia, discharged      [address] [facility]  Reji Cohen MD, [facility],[address]  Attending Emergency Medicine Physician           Christina Guerrero MD  10/20/17 [facility]

## 2017-10-20 NOTE — ED PROVIDER NOTES
CONTINUE these medications which have NOT CHANGED    Details   diazepam (VALIUM) 2 MG tablet Take 1 tablet by mouth every 8 hours as needed (Pain/Spasm), Disp-15 tablet, R-0Print      predniSONE (DELTASONE) 10 MG tablet Taper Dose. Take the following in order until completion   1. 40mg PO qd for 3 days  2. 30mg PO qd for 3 days  3. 20mg PO qd for 3 days  4. 10mg PO qd for 3 days and then stop after thirdl 10mg dose., Disp-30 tablet, R-0, [place]      ipratropium-albuterol (DUONEB) 0.5-2.5 (3) MG/3ML SOLN nebulizer solution Inhale 3 mLs into the lungs 4 times daily, Disp-360 mL, R-3      mometasone-formoterol (DULERA) 200-5 MCG/ACT inhaler Inhale 2 puffs into the lungs every 12 hours, Disp-1 Inhaler, R-3      TiZANidine HCl (ZANAFLEX PO) Take by mouth      gabapentin (NEURONTIN) 100 MG capsule Take 100 mg by mouth 3 times daily      ondansetron (ZOFRAN) 4 MG tablet Take 1 tablet by mouth every 8 hours as needed for Nausea, Disp-10 tablet, R-0      dicyclomine (BENTYL) 20 MG tablet Take 1 tablet by mouth 3 times daily as needed. , Disp-20 tablet, R-0      acetaminophen (TYLENOL) 500 MG tablet Take 500 mg by mouth 2 times daily as needed for Pain. traZODone (DESYREL) 100 MG tablet Take 200 mg by mouth nightly. pravastatin (PRAVACHOL) 40 MG tablet Take 40 mg by mouth daily. ALLOPURINOL PO Take  by mouth. HYDROCHLOROTHIAZIDE PO Take 25 mg by mouth daily. FLUoxetine HCl (PROZAC PO) Take 60 mg by mouth Daily. OMEPRAZOLE PO Take 40 mg by mouth 2 times daily. Levothyroxine Sodium (LEVOTHROID PO) Take 112 mcg by mouth. ATENOLOL PO Take 25 mg by mouth daily. ALLERGIES     has No Known Allergies. FAMILY HISTORY     indicated that the status of her mother is unknown. She indicated that the status of her father is unknown.      family history includes Cancer in her mother; Diabetes in her father; Hypertension in her mother.       SOCIAL HISTORY      reports that she has been sciatica. She is treated with Norflex and Naprosyn here in the department. Patient stated Tylenol No. 3 helped her symptoms however I advised her that this is overall unsafe medication for back pain and she would be better served using anti-inflammatory medications such as diclofenac which she is now prescribed. She is instructed to stop all other nonsteroidal medications and use ice and stretching. She'll follow-up with a neurologist in 5 days. DIAGNOSTIC RESULTS     EKG: All EKG's are interpreted by the Emergency Department Physician who either signs or Co-signs this chart in the absence of a cardiologist.        RADIOLOGY:   I directly visualized the following  images and reviewed the radiologist interpretations:  No orders to display           ED BEDSIDE ULTRASOUND:       LABS:  Labs Reviewed - No data to display          EMERGENCY DEPARTMENT COURSE:   Vitals:    Vitals:    10/20/17 1835   BP: (!) 179/92   Pulse: 65   Resp: 16   Temp: 98.4 °F (36.9 °C)   TempSrc: Oral   SpO2: 99%   Weight: 200 lb (90.7 kg)   Height: 5' 6\" (1.676 m)         CRITICAL CARE:      CONSULTS:  None      PROCEDURES:  Procedures      FINAL IMPRESSION      1. Sciatica of right side            DISPOSITION/PLAN   DISPOSITION Decision to Discharge        PATIENT REFERRED TO:  No follow-up provider specified.     DISCHARGE MEDICATIONS:  Discharge Medication List as of 10/20/2017  7:26 PM      START taking these medications    Details   diclofenac (VOLTAREN) 50 MG EC tablet Take 1 tablet by mouth 2 times daily as needed for Pain, Disp-16 tablet, R-0Print             (Please note that portions of this note were completed with a voice recognition program.  Efforts were made to edit the dictations but occasionally words are mis-transcribed.)    Jairo Rabago  Emergency Medicine Resident              Deisi Naqvi MD  10/20/17 1084

## 2017-10-26 ENCOUNTER — HOSPITAL ENCOUNTER (EMERGENCY)
Age: 52
Discharge: HOME | End: 2017-10-26
Attending: EMERGENCY MEDICINE
Payer: MEDICARE

## 2017-10-26 VITALS
WEIGHT: 200 LBS | DIASTOLIC BLOOD PRESSURE: 99 MMHG | HEART RATE: 83 BPM | SYSTOLIC BLOOD PRESSURE: 157 MMHG | OXYGEN SATURATION: 98 % | BODY MASS INDEX: 32.28 KG/M2 | TEMPERATURE: 98.4 F

## 2017-10-26 DIAGNOSIS — M54.41 CHRONIC RIGHT-SIDED LOW BACK PAIN WITH RIGHT-SIDED SCIATICA: Primary | ICD-10-CM

## 2017-10-26 DIAGNOSIS — G89.29 CHRONIC RIGHT-SIDED LOW BACK PAIN WITH RIGHT-SIDED SCIATICA: Primary | ICD-10-CM

## 2017-10-26 PROCEDURE — 6370000000 HC RX 637 (ALT 250 FOR IP): Performed by: PHYSICIAN ASSISTANT

## 2017-10-26 PROCEDURE — 96372 THER/PROPH/DIAG INJ SC/IM: CPT

## 2017-10-26 PROCEDURE — 6360000002 HC RX W HCPCS: Performed by: PHYSICIAN ASSISTANT

## 2017-10-26 PROCEDURE — G0382 LEV 3 HOSP TYPE B ED VISIT: HCPCS

## 2017-10-26 RX ORDER — KETOROLAC TROMETHAMINE 30 MG/ML
30 INJECTION, SOLUTION INTRAMUSCULAR; INTRAVENOUS ONCE
Status: COMPLETED | OUTPATIENT
Start: 2017-10-26 | End: 2017-10-26

## 2017-10-26 RX ORDER — SUMATRIPTAN 100 MG/1
100 TABLET, FILM COATED ORAL
Status: ON HOLD | COMMUNITY
End: 2021-09-19 | Stop reason: HOSPADM

## 2017-10-26 RX ORDER — ACETAMINOPHEN 325 MG/1
325 TABLET ORAL EVERY 6 HOURS PRN
Qty: 20 TABLET | Refills: 3 | Status: SHIPPED | OUTPATIENT
Start: 2017-10-26 | End: 2019-02-28

## 2017-10-26 RX ORDER — FENTANYL CITRATE 50 UG/ML
50 INJECTION, SOLUTION INTRAMUSCULAR; INTRAVENOUS ONCE
Status: COMPLETED | OUTPATIENT
Start: 2017-10-26 | End: 2017-10-26

## 2017-10-26 RX ORDER — OXYCODONE HYDROCHLORIDE AND ACETAMINOPHEN 5; 325 MG/1; MG/1
2 TABLET ORAL ONCE
Status: COMPLETED | OUTPATIENT
Start: 2017-10-26 | End: 2017-10-26

## 2017-10-26 RX ADMIN — KETOROLAC TROMETHAMINE 30 MG: 30 INJECTION, SOLUTION INTRAMUSCULAR at 17:48

## 2017-10-26 RX ADMIN — OXYCODONE HYDROCHLORIDE AND ACETAMINOPHEN 2 TABLET: 5; 325 TABLET ORAL at 15:33

## 2017-10-26 RX ADMIN — FENTANYL CITRATE 50 MCG: 50 INJECTION, SOLUTION INTRAMUSCULAR; INTRAVENOUS at 17:06

## 2017-10-26 ASSESSMENT — ENCOUNTER SYMPTOMS
DIARRHEA: 0
VOMITING: 0
SHORTNESS OF BREATH: 0
ABDOMINAL PAIN: 0
COLOR CHANGE: 0
NAUSEA: 0
BACK PAIN: 1
COUGH: 0

## 2017-10-26 ASSESSMENT — PAIN DESCRIPTION - LOCATION
LOCATION_2: LEG
LOCATION: BACK

## 2017-10-26 ASSESSMENT — PAIN DESCRIPTION - ORIENTATION
ORIENTATION: LOWER
ORIENTATION_2: RIGHT

## 2017-10-26 ASSESSMENT — PAIN DESCRIPTION - INTENSITY: RATING_2: 10

## 2017-10-26 ASSESSMENT — PAIN SCALES - GENERAL
PAINLEVEL_OUTOF10: 9
PAINLEVEL_OUTOF10: 9
PAINLEVEL_OUTOF10: 8
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
## 2017-10-29 ENCOUNTER — HOSPITAL ENCOUNTER (EMERGENCY)
Age: 52
Discharge: HOME | End: 2017-10-29
Attending: EMERGENCY MEDICINE
Payer: MEDICARE

## 2017-10-29 VITALS
OXYGEN SATURATION: 98 % | RESPIRATION RATE: 22 BRPM | HEART RATE: 79 BPM | TEMPERATURE: 97.3 F | DIASTOLIC BLOOD PRESSURE: 94 MMHG | SYSTOLIC BLOOD PRESSURE: 165 MMHG

## 2017-10-29 DIAGNOSIS — M54.31 SCIATICA OF RIGHT SIDE: Primary | ICD-10-CM

## 2017-10-29 PROCEDURE — G0382 LEV 3 HOSP TYPE B ED VISIT: HCPCS

## 2017-10-29 PROCEDURE — 6360000002 HC RX W HCPCS: Performed by: EMERGENCY MEDICINE

## 2017-10-29 PROCEDURE — 96372 THER/PROPH/DIAG INJ SC/IM: CPT

## 2017-10-29 PROCEDURE — 6370000000 HC RX 637 (ALT 250 FOR IP): Performed by: EMERGENCY MEDICINE

## 2017-10-29 RX ORDER — CYCLOBENZAPRINE HCL 10 MG
10 TABLET ORAL ONCE
Status: COMPLETED | OUTPATIENT
Start: 2017-10-29 | End: 2017-10-29

## 2017-10-29 RX ORDER — CYCLOBENZAPRINE HCL 10 MG
10 TABLET ORAL 3 TIMES DAILY PRN
Qty: 10 TABLET | Refills: 0 | Status: SHIPPED | OUTPATIENT
Start: 2017-10-29 | End: 2018-05-09

## 2017-10-29 RX ORDER — KETOROLAC TROMETHAMINE 30 MG/ML
30 INJECTION, SOLUTION INTRAMUSCULAR; INTRAVENOUS ONCE
Status: COMPLETED | OUTPATIENT
Start: 2017-10-29 | End: 2017-10-29

## 2017-10-29 RX ADMIN — KETOROLAC TROMETHAMINE 30 MG: 30 INJECTION, SOLUTION INTRAMUSCULAR at 13:49

## 2017-10-29 RX ADMIN — CYCLOBENZAPRINE HYDROCHLORIDE 10 MG: 10 TABLET, FILM COATED ORAL at 13:49

## 2017-10-29 ASSESSMENT — PAIN SCALES - GENERAL
PAINLEVEL_OUTOF10: 10
PAINLEVEL_OUTOF10: 10

## 2017-10-29 ASSESSMENT — PAIN SCALES - WONG BAKER: WONGBAKER_NUMERICALRESPONSE: 10

## 2017-10-29 ASSESSMENT — ENCOUNTER SYMPTOMS
DIARRHEA: 0
EYE DISCHARGE: 0
NAUSEA: 0
SHORTNESS OF BREATH: 0
VOMITING: 0
SORE THROAT: 0
BACK PAIN: 1
EYE PAIN: 0
COUGH: 0
ABDOMINAL PAIN: 0

## 2017-10-29 ASSESSMENT — PAIN DESCRIPTION - PAIN TYPE: TYPE: ACUTE PAIN;CHRONIC PAIN

## 2017-10-29 ASSESSMENT — PAIN DESCRIPTION - DESCRIPTORS: DESCRIPTORS: CONSTANT;SHARP

## 2017-10-29 NOTE — ED NOTES
Pt observed ambulating to restroom with a steady gait, no distress noted.        Sneha Rudd, RN  10/29/17 7961

## 2017-10-29 NOTE — ED NOTES
Right sided back pain radiates down leg. Pt states sciatic nerve pain for along time and worse over 3 weeks. Pt was cleared at [address] [place] [address]. On arrival to ED pt in gown. Pt tearful at bedside. Pt states constant pain. Pt has been cleared in ED multiple times.       Vi Banks RN  10/29/17 3516

## 2017-10-29 NOTE — ED PROVIDER NOTES
[facility] ED     Emergency Department     Faculty Attestation        I performed a history and physical examination of the patient and discussed management with the resident. I reviewed the residents note and agree with the documented findings and plan of care. Any areas of disagreement are noted on the chart. I was personally present for the key portions of any procedures. I have documented in the chart those procedures where I was not present during the key portions. I have reviewed the emergency nurses triage note. I agree with the chief complaint, past medical history, past surgical history, allergies, medications, social and family history as documented unless otherwise noted below. For Physician Assistant/ Nurse Practitioner cases/documentation I have personally evaluated this patient and have completed at least one if not all key elements of the E/M (history, physical exam, and MDM). Additional findings are as noted. Vital Signs:BP: (!) 165/94  Pulse: 79  Resp: 22  Temp: 97.3 °F (36.3 °C) SpO2: 98 %  PCP:  No primary care provider on file. Pertinent Comments:       Patient presents with acute on chronic back pain without any new injury. Denies Numbness/tingling or weakness. No loss of b/b function. No perianal numbness. Physical Exam:  5/5 strength in Bilateral LE's, sensation to light touch intact, DTR's are 2+ and equal.  Downgoing Babinski's Bilaterally. Abd is soft/nt/nd/+ BS/No pulsatile masses palpated. Plan: Pain control with short course followup. Critical Care  None    Note, if the patient's blood pressure was elevated, and they have no history of hypertension, they were informed of the following: The patient may have Pre-hypertension/Hypertension: The patient has been informed that they may have pre-hypertension or Hypertension based on a blood pressure reading in the emergency department.  I recommend that the

## 2017-10-29 NOTE — ED PROVIDER NOTES
[address]  ED  Emergency Department Encounter  Emergency Medicine Resident     Pt Name: Samantha Thompson  MRN: [id number]  [place] 1965  Date of evaluation: 10/29/17  PCP:  No primary care provider on file. CHIEF COMPLAINT       Chief Complaint   Patient presents with    Back Pain     back pain for about 3 weeks. pt states [place]act[place] pain. radiates down right leg. pain getting worse. HISTORY OF PRESENT ILLNESS  (Location/Symptom, Timing/Onset, Context/Setting, Quality, Duration, Modifying Factors, Severity.)      Samantha Thompson is a 47 y.o. female who presents with Right-sided back pain radiating down right leg ×3 weeks. Patient states she's been seen in ED multiple times, has persistent sciatic nerve pain. Denies any recent trauma to the area, numbness, tingling or weakness to her leg, bowel or bladder incontinence. Patient states that she is taking ibuprofen at home, performing stretches, has follow-up appointment with her neurologist tomorrow. PAST MEDICAL / SURGICAL / SOCIAL / FAMILY HISTORY      has a past medical history of Acid reflux; Anxiety; Asthma; Bipolar 1 disorder ([address] Bowel obstruction; COPD (chronic obstructive pulmonary disease) ([facility] [address] Crohn disease ([facility] [address] Depression; Gout; Hypertension; and Hypothyroidism. has a past surgical history that includes Tonsillectomy and adenoidectomy; Tubal ligation; Abdomen surgery; Cholecystectomy; Bunionectomy (Left); Colonoscopy (04/30/2007); and Colonoscopy (10/12/2017). Social History     Social History    Marital status: Single     Spouse name: N/A    Number of children: N/A    Years of education: N/A     Occupational History    Not on file.      Social History Main Topics    Smoking status: Current Every Day Smoker     Packs/day: 0.50     Years: 37.00     Types: Cigarettes    Smokeless tobacco: Never Used      Comment: has not smoked in the last 3 days    Alcohol use Yes      Comment: socially    Drug Nausea 3/2/16   Linsey Alas DO   dicyclomine (BENTYL) 20 MG tablet Take 1 tablet by mouth 3 times daily as needed. 2/9/15   Yola Guevara MD   traZODone (DESYREL) 100 MG tablet Take 200 mg by mouth nightly. Historical Provider, MD   pravastatin (PRAVACHOL) 40 MG tablet Take 40 mg by mouth daily. Historical Provider, MD   ALLOPURINOL PO Take  by mouth. Historical Provider, MD   HYDROCHLOROTHIAZIDE PO Take 25 mg by mouth daily. Historical Provider, MD   FLUoxetine HCl (PROZAC PO) Take 60 mg by mouth Daily. Historical Provider, MD   OMEPRAZOLE PO Take 40 mg by mouth 2 times daily. Historical Provider, MD   Levothyroxine Sodium (LEVOTHROID PO) Take 112 mcg by mouth. Historical Provider, MD   ATENOLOL PO Take 25 mg by mouth daily. Historical Provider, MD       REVIEW OF SYSTEMS    (2-9 systems for level 4, 10 or more for level 5)      Review of Systems   Constitutional: Negative for chills, diaphoresis and fever. HENT: Negative for congestion and sore throat. Eyes: Negative for pain and discharge. Respiratory: Negative for cough and shortness of breath. Cardiovascular: Negative for chest pain and leg swelling. Gastrointestinal: Negative for abdominal pain, diarrhea, nausea and vomiting. Endocrine: Negative for polydipsia and polyuria. Genitourinary: Negative for dysuria and hematuria. Musculoskeletal: Positive for back pain. Negative for neck pain and neck stiffness. Skin: Negative for pallor and rash. Allergic/Immunologic: Negative for environmental allergies and food allergies. Neurological: Negative for numbness and headaches. Hematological: Negative for adenopathy. Does not bruise/bleed easily. Psychiatric/Behavioral: Negative for hallucinations and suicidal ideas.        PHYSICAL EXAM   (up to 7 for level 4, 8 or more for level 5)      INITIAL VITALS:   BP (!) 165/94   Pulse 79   Temp 97.3 °F (36.3 °C) (Oral)   Resp 22   SpO2 98%     Physical Exam Constitutional: She is oriented to person, place, and time. She appears well-developed and well-nourished. HENT:   Head: Normocephalic and atraumatic. Mouth/Throat: Oropharynx is clear and moist.   Eyes: Conjunctivae are normal. Pupils are equal, round, and reactive to light. Neck: Normal range of motion. Neck supple. Cardiovascular: Normal rate and regular rhythm. Exam reveals no gallop and no friction rub. No murmur heard. Pulmonary/Chest: Effort normal and breath sounds normal. No respiratory distress. She has no wheezes. She has no rales. Abdominal: Soft. Bowel sounds are normal. There is no tenderness. There is no rebound and no guarding. Musculoskeletal: She exhibits no edema or deformity. Patient ambulating with minor difficulty, but stable  Patient with tenderness to palpation of the piriformis muscle on the right, reproduces symptoms that radiate down right leg, no midline thoracic or lumbar tenderness, no step-offs or deformities  Positive straight leg test on the right   Neurological: She is alert and oriented to person, place, and time. She has normal reflexes. 5/5 strength and sensation intact to bilateral lower extremities   Skin: Skin is warm and dry. No rash noted. Psychiatric: She has a normal mood and affect. Thought content normal.   Nursing note and vitals reviewed. DIFFERENTIAL  DIAGNOSIS     PLAN (LABS / IMAGING / EKG):  No orders of the defined types were placed in this encounter.       MEDICATIONS ORDERED:  Orders Placed This Encounter   Medications    cyclobenzaprine (FLEXERIL) tablet 10 mg    ketorolac (TORADOL) injection 30 mg    cyclobenzaprine (FLEXERIL) 10 MG tablet     Sig: Take 1 tablet by mouth 3 times daily as needed for Muscle spasms     Dispense:  10 tablet     Refill:  0       DDX: Sciatic nerve pain, piriformis syndrome, lumbar strain, cord compression, lumbar fracture, herniated disc    DIAGNOSTIC RESULTS / EMERGENCY DEPARTMENT COURSE / MDM LABS:  No results found for this visit on 10/29/17. IMPRESSION: 67-year-old female complaining of right lower back pain that radiates down right leg, persistent ×3 weeks. Seen the ED multiple times for persistent pain, denies any recent trauma, bowel or bladder incontinence, IV drug use, leg weakness or numbness. Patient appears well on exam, positive straight leg test on the right, reproduces symptoms with palpation of the piriformis muscle. Consistent with sciatic nerve pain. RADIOLOGY:  None    EKG  None    All EKG's are interpreted by the Emergency Department Physician who either signs or Co-signs this chart in the absence of a cardiologist.    EMERGENCY DEPARTMENT COURSE:  Discussed with patient that she needs to follow-up with her primary care provider or neurologist in regards to pain management, patient understands  Discussed stretching exercises with patient, which she was not aware of, we'll print off exercises and discharge home with patient  1430: On reassessment, patient states pain is markedly improved, ambulated without difficulty, discussed discharge plan, follow-up plan and treatment plan with patient, she understands and agrees with plan    PROCEDURES:  None    CONSULTS:  None    CRITICAL CARE:  None    FINAL IMPRESSION      1. Sciatica of right side          DISPOSITION / PLAN     DISPOSITION Decision to Discharge    PATIENT REFERRED TO:  No follow-up provider specified.     DISCHARGE MEDICATIONS:  New Prescriptions    CYCLOBENZAPRINE (FLEXERIL) 10 MG TABLET    Take 1 tablet by mouth 3 times daily as needed for Muscle spasms       Gavino Pathak DO  Emergency Medicine Resident    (Please note that portions of this note were completed with a voice recognition program.  Efforts were made to edit the dictations but occasionally words are mis-transcribed.)       Gavino Pathak DO  Resident  10/29/17 1431

## 2017-11-09 ENCOUNTER — TELEPHONE (OUTPATIENT)
Dept: GASTROENTEROLOGY | Age: 52
End: 2017-11-09

## 2018-05-09 ENCOUNTER — HOSPITAL ENCOUNTER (EMERGENCY)
Age: 53
Discharge: HOME | End: 2018-05-09
Attending: EMERGENCY MEDICINE
Payer: MEDICARE

## 2018-05-09 VITALS
RESPIRATION RATE: 16 BRPM | HEART RATE: 78 BPM | WEIGHT: 194 LBS | BODY MASS INDEX: 31.18 KG/M2 | SYSTOLIC BLOOD PRESSURE: 101 MMHG | OXYGEN SATURATION: 97 % | TEMPERATURE: 97.3 F | HEIGHT: 66 IN | DIASTOLIC BLOOD PRESSURE: 62 MMHG

## 2018-05-09 DIAGNOSIS — I95.9 HYPOTENSION, UNSPECIFIED HYPOTENSION TYPE: ICD-10-CM

## 2018-05-09 DIAGNOSIS — M54.41 ACUTE RIGHT-SIDED LOW BACK PAIN WITH RIGHT-SIDED SCIATICA: Primary | ICD-10-CM

## 2018-05-09 PROCEDURE — 96372 THER/PROPH/DIAG INJ SC/IM: CPT

## 2018-05-09 PROCEDURE — 2580000003 HC RX 258: Performed by: EMERGENCY MEDICINE

## 2018-05-09 PROCEDURE — 6370000000 HC RX 637 (ALT 250 FOR IP): Performed by: EMERGENCY MEDICINE

## 2018-05-09 PROCEDURE — 99282 EMERGENCY DEPT VISIT SF MDM: CPT

## 2018-05-09 PROCEDURE — 6360000002 HC RX W HCPCS: Performed by: EMERGENCY MEDICINE

## 2018-05-09 RX ORDER — ORPHENADRINE CITRATE 30 MG/ML
60 INJECTION INTRAMUSCULAR; INTRAVENOUS ONCE
Status: COMPLETED | OUTPATIENT
Start: 2018-05-09 | End: 2018-05-09

## 2018-05-09 RX ORDER — TRAMADOL HYDROCHLORIDE 50 MG/1
50 TABLET ORAL ONCE
Status: COMPLETED | OUTPATIENT
Start: 2018-05-09 | End: 2018-05-09

## 2018-05-09 RX ORDER — 0.9 % SODIUM CHLORIDE 0.9 %
1000 INTRAVENOUS SOLUTION INTRAVENOUS ONCE
Status: COMPLETED | OUTPATIENT
Start: 2018-05-09 | End: 2018-05-09

## 2018-05-09 RX ORDER — IBUPROFEN 800 MG/1
800 TABLET ORAL EVERY 8 HOURS PRN
Qty: 30 TABLET | Refills: 0 | Status: SHIPPED | OUTPATIENT
Start: 2018-05-09 | End: 2019-02-28

## 2018-05-09 RX ORDER — CYCLOBENZAPRINE HCL 10 MG
10 TABLET ORAL 3 TIMES DAILY PRN
Qty: 10 TABLET | Refills: 0 | Status: ON HOLD | OUTPATIENT
Start: 2018-05-09 | End: 2020-12-28

## 2018-05-09 RX ADMIN — TRAMADOL HYDROCHLORIDE 50 MG: 50 TABLET, FILM COATED ORAL at 20:03

## 2018-05-09 RX ADMIN — SODIUM CHLORIDE 1000 ML: 9 INJECTION, SOLUTION INTRAVENOUS at 19:53

## 2018-05-09 RX ADMIN — ORPHENADRINE CITRATE 60 MG: 30 INJECTION INTRAMUSCULAR; INTRAVENOUS at 20:02

## 2018-05-09 ASSESSMENT — ENCOUNTER SYMPTOMS
SHORTNESS OF BREATH: 0
VOMITING: 0
CONSTIPATION: 0
SORE THROAT: 0
NAUSEA: 0
DIARRHEA: 0
CHEST TIGHTNESS: 0
ABDOMINAL PAIN: 0
BACK PAIN: 1

## 2018-05-09 ASSESSMENT — PAIN DESCRIPTION - FREQUENCY: FREQUENCY: CONTINUOUS

## 2018-05-09 ASSESSMENT — PAIN DESCRIPTION - LOCATION: LOCATION: BACK

## 2018-05-09 ASSESSMENT — PAIN DESCRIPTION - ORIENTATION: ORIENTATION: LOWER

## 2018-05-09 ASSESSMENT — PAIN DESCRIPTION - ONSET: ONSET: ON-GOING

## 2018-05-09 ASSESSMENT — PAIN SCALES - GENERAL
PAINLEVEL_OUTOF10: 10
PAINLEVEL_OUTOF10: 10

## 2018-05-09 ASSESSMENT — PAIN DESCRIPTION - DESCRIPTORS: DESCRIPTORS: ACHING;CONSTANT;SHARP;SHOOTING

## 2018-05-09 ASSESSMENT — PAIN DESCRIPTION - PAIN TYPE: TYPE: CHRONIC PAIN

## 2018-05-16 ENCOUNTER — HOSPITAL ENCOUNTER (EMERGENCY)
Age: 53
Discharge: HOME | End: 2018-05-16
Attending: EMERGENCY MEDICINE
Payer: MEDICARE

## 2018-05-16 VITALS
OXYGEN SATURATION: 97 % | HEART RATE: 81 BPM | HEIGHT: 65 IN | DIASTOLIC BLOOD PRESSURE: 76 MMHG | WEIGHT: 190 LBS | BODY MASS INDEX: 31.65 KG/M2 | TEMPERATURE: 98.7 F | RESPIRATION RATE: 16 BRPM | SYSTOLIC BLOOD PRESSURE: 119 MMHG

## 2018-05-16 VITALS
BODY MASS INDEX: 37.3 KG/M2 | SYSTOLIC BLOOD PRESSURE: 111 MMHG | HEIGHT: 60 IN | DIASTOLIC BLOOD PRESSURE: 70 MMHG | RESPIRATION RATE: 17 BRPM | HEART RATE: 66 BPM | OXYGEN SATURATION: 100 % | WEIGHT: 190 LBS | TEMPERATURE: 98.7 F

## 2018-05-16 DIAGNOSIS — G89.29 CHRONIC RIGHT-SIDED LOW BACK PAIN WITH RIGHT-SIDED SCIATICA: Primary | ICD-10-CM

## 2018-05-16 DIAGNOSIS — M54.41 CHRONIC RIGHT-SIDED LOW BACK PAIN WITH RIGHT-SIDED SCIATICA: Primary | ICD-10-CM

## 2018-05-16 DIAGNOSIS — M54.31 SCIATICA OF RIGHT SIDE: Primary | ICD-10-CM

## 2018-05-16 PROCEDURE — G0381 LEV 2 HOSP TYPE B ED VISIT: HCPCS

## 2018-05-16 PROCEDURE — 6370000000 HC RX 637 (ALT 250 FOR IP): Performed by: STUDENT IN AN ORGANIZED HEALTH CARE EDUCATION/TRAINING PROGRAM

## 2018-05-16 PROCEDURE — 96372 THER/PROPH/DIAG INJ SC/IM: CPT

## 2018-05-16 PROCEDURE — 6360000002 HC RX W HCPCS: Performed by: STUDENT IN AN ORGANIZED HEALTH CARE EDUCATION/TRAINING PROGRAM

## 2018-05-16 PROCEDURE — G0382 LEV 3 HOSP TYPE B ED VISIT: HCPCS

## 2018-05-16 RX ORDER — OXYCODONE HYDROCHLORIDE AND ACETAMINOPHEN 5; 325 MG/1; MG/1
2 TABLET ORAL ONCE
Status: COMPLETED | OUTPATIENT
Start: 2018-05-16 | End: 2018-05-16

## 2018-05-16 RX ORDER — ORPHENADRINE CITRATE 30 MG/ML
60 INJECTION INTRAMUSCULAR; INTRAVENOUS ONCE
Status: COMPLETED | OUTPATIENT
Start: 2018-05-16 | End: 2018-05-16

## 2018-05-16 RX ADMIN — OXYCODONE HYDROCHLORIDE AND ACETAMINOPHEN 2 TABLET: 5; 325 TABLET ORAL at 12:45

## 2018-05-16 RX ADMIN — ORPHENADRINE CITRATE 60 MG: 30 INJECTION INTRAMUSCULAR; INTRAVENOUS at 12:45

## 2018-05-16 ASSESSMENT — PAIN DESCRIPTION - LOCATION
LOCATION: BACK
LOCATION: BACK

## 2018-05-16 ASSESSMENT — PAIN DESCRIPTION - ONSET
ONSET: ON-GOING
ONSET: ON-GOING

## 2018-05-16 ASSESSMENT — PAIN DESCRIPTION - PAIN TYPE
TYPE: CHRONIC PAIN
TYPE: CHRONIC PAIN

## 2018-05-16 ASSESSMENT — PAIN SCALES - GENERAL
PAINLEVEL_OUTOF10: 10
PAINLEVEL_OUTOF10: 9
PAINLEVEL_OUTOF10: 10

## 2018-05-16 ASSESSMENT — PAIN DESCRIPTION - DESCRIPTORS
DESCRIPTORS: SHARP;SHOOTING;RADIATING
DESCRIPTORS: CONSTANT;SHARP;SHOOTING;RADIATING

## 2018-05-16 ASSESSMENT — PAIN DESCRIPTION - FREQUENCY
FREQUENCY: CONTINUOUS
FREQUENCY: CONTINUOUS

## 2018-05-16 ASSESSMENT — PAIN DESCRIPTION - ORIENTATION
ORIENTATION: LOWER
ORIENTATION: LOWER

## 2019-02-28 ENCOUNTER — APPOINTMENT (OUTPATIENT)
Dept: GENERAL RADIOLOGY | Age: 54
End: 2019-02-28
Payer: MEDICARE

## 2019-02-28 ENCOUNTER — APPOINTMENT (OUTPATIENT)
Dept: CT IMAGING | Age: 54
End: 2019-02-28
Payer: MEDICARE

## 2019-02-28 ENCOUNTER — HOSPITAL ENCOUNTER (EMERGENCY)
Age: 54
Discharge: HOME | End: 2019-02-28
Attending: EMERGENCY MEDICINE
Payer: MEDICARE

## 2019-02-28 VITALS
DIASTOLIC BLOOD PRESSURE: 92 MMHG | BODY MASS INDEX: 33.28 KG/M2 | RESPIRATION RATE: 17 BRPM | TEMPERATURE: 98.9 F | SYSTOLIC BLOOD PRESSURE: 128 MMHG | OXYGEN SATURATION: 97 % | HEART RATE: 92 BPM | WEIGHT: 200 LBS

## 2019-02-28 DIAGNOSIS — J44.1 COPD EXACERBATION (HCC): ICD-10-CM

## 2019-02-28 DIAGNOSIS — J18.9 PNEUMONIA DUE TO ORGANISM: Primary | ICD-10-CM

## 2019-02-28 LAB
ABSOLUTE EOS #: 0.19 K/UL (ref 0–0.4)
ABSOLUTE IMMATURE GRANULOCYTE: 0 K/UL (ref 0–0.3)
ABSOLUTE LYMPH #: 1.24 K/UL (ref 1–4.8)
ABSOLUTE MONO #: 0.25 K/UL (ref 0.1–0.8)
ANION GAP SERPL CALCULATED.3IONS-SCNC: 11 MMOL/L (ref 9–17)
BASOPHILS # BLD: 2 % (ref 0–2)
BASOPHILS ABSOLUTE: 0.12 K/UL (ref 0–0.2)
BUN BLDV-MCNC: 19 MG/DL (ref 6–20)
BUN/CREAT BLD: ABNORMAL (ref 9–20)
CALCIUM SERPL-MCNC: 8.5 MG/DL (ref 8.6–10.4)
CHLORIDE BLD-SCNC: 104 MMOL/L (ref 98–107)
CO2: 22 MMOL/L (ref 20–31)
CREAT SERPL-MCNC: 0.87 MG/DL (ref 0.5–0.9)
D-DIMER QUANTITATIVE: 0.56 MG/L FEU
DIFFERENTIAL TYPE: ABNORMAL
EOSINOPHILS RELATIVE PERCENT: 3 % (ref 1–4)
GFR AFRICAN AMERICAN: >60 ML/MIN
GFR NON-AFRICAN AMERICAN: >60 ML/MIN
GFR SERPL CREATININE-BSD FRML MDRD: ABNORMAL ML/MIN/{1.73_M2}
GFR SERPL CREATININE-BSD FRML MDRD: ABNORMAL ML/MIN/{1.73_M2}
GLUCOSE BLD-MCNC: 100 MG/DL (ref 70–99)
HCT VFR BLD CALC: 34.2 % (ref 36.3–47.1)
HEMOGLOBIN: 10.9 G/DL (ref 11.9–15.1)
IMMATURE GRANULOCYTES: 0 %
LYMPHOCYTES # BLD: 20 % (ref 24–44)
MCH RBC QN AUTO: 29.1 PG (ref 25.2–33.5)
MCHC RBC AUTO-ENTMCNC: 31.9 G/DL (ref 28.4–34.8)
MCV RBC AUTO: 91.2 FL (ref 82.6–102.9)
MONOCYTES # BLD: 4 % (ref 1–7)
MORPHOLOGY: NORMAL
NRBC AUTOMATED: 0 PER 100 WBC
PDW BLD-RTO: 13.6 % (ref 11.8–14.4)
PLATELET # BLD: 230 K/UL (ref 138–453)
PLATELET ESTIMATE: ABNORMAL
PMV BLD AUTO: 11.8 FL (ref 8.1–13.5)
POTASSIUM SERPL-SCNC: 3.9 MMOL/L (ref 3.7–5.3)
RBC # BLD: 3.75 M/UL (ref 3.95–5.11)
RBC # BLD: ABNORMAL 10*6/UL
SEG NEUTROPHILS: 71 % (ref 36–66)
SEGMENTED NEUTROPHILS ABSOLUTE COUNT: 4.4 K/UL (ref 1.8–7.7)
SODIUM BLD-SCNC: 137 MMOL/L (ref 135–144)
TROPONIN INTERP: NORMAL
TROPONIN T: NORMAL NG/ML
TROPONIN, HIGH SENSITIVITY: 9 NG/L (ref 0–14)
WBC # BLD: 6.2 K/UL (ref 3.5–11.3)
WBC # BLD: ABNORMAL 10*3/UL

## 2019-02-28 PROCEDURE — 6370000000 HC RX 637 (ALT 250 FOR IP): Performed by: EMERGENCY MEDICINE

## 2019-02-28 PROCEDURE — 85025 COMPLETE CBC W/AUTO DIFF WBC: CPT

## 2019-02-28 PROCEDURE — 71260 CT THORAX DX C+: CPT

## 2019-02-28 PROCEDURE — 84484 ASSAY OF TROPONIN QUANT: CPT

## 2019-02-28 PROCEDURE — 6360000004 HC RX CONTRAST MEDICATION: Performed by: EMERGENCY MEDICINE

## 2019-02-28 PROCEDURE — 99285 EMERGENCY DEPT VISIT HI MDM: CPT

## 2019-02-28 PROCEDURE — 71046 X-RAY EXAM CHEST 2 VIEWS: CPT

## 2019-02-28 PROCEDURE — 85379 FIBRIN DEGRADATION QUANT: CPT

## 2019-02-28 PROCEDURE — 93005 ELECTROCARDIOGRAM TRACING: CPT

## 2019-02-28 PROCEDURE — 94640 AIRWAY INHALATION TREATMENT: CPT

## 2019-02-28 PROCEDURE — 80048 BASIC METABOLIC PNL TOTAL CA: CPT

## 2019-02-28 PROCEDURE — 6360000002 HC RX W HCPCS: Performed by: EMERGENCY MEDICINE

## 2019-02-28 RX ORDER — TIZANIDINE 4 MG/1
4 TABLET ORAL EVERY 6 HOURS PRN
Status: ON HOLD | COMMUNITY
End: 2020-02-21 | Stop reason: HOSPADM

## 2019-02-28 RX ORDER — ALBUTEROL SULFATE 2.5 MG/3ML
5 SOLUTION RESPIRATORY (INHALATION)
Status: DISCONTINUED | OUTPATIENT
Start: 2019-02-28 | End: 2019-02-28 | Stop reason: HOSPADM

## 2019-02-28 RX ORDER — ALBUTEROL SULFATE 90 UG/1
2 AEROSOL, METERED RESPIRATORY (INHALATION)
Status: DISCONTINUED | OUTPATIENT
Start: 2019-02-28 | End: 2019-02-28 | Stop reason: HOSPADM

## 2019-02-28 RX ORDER — PREDNISONE 50 MG/1
50 TABLET ORAL DAILY
Qty: 4 TABLET | Refills: 0 | Status: ON HOLD | OUTPATIENT
Start: 2019-02-28 | End: 2020-02-21 | Stop reason: SDUPTHER

## 2019-02-28 RX ORDER — PREDNISONE 20 MG/1
60 TABLET ORAL ONCE
Status: COMPLETED | OUTPATIENT
Start: 2019-02-28 | End: 2019-02-28

## 2019-02-28 RX ORDER — ASPIRIN 81 MG/1
324 TABLET, CHEWABLE ORAL ONCE
Status: COMPLETED | OUTPATIENT
Start: 2019-02-28 | End: 2019-02-28

## 2019-02-28 RX ORDER — AZITHROMYCIN 250 MG/1
500 TABLET, FILM COATED ORAL ONCE
Status: COMPLETED | OUTPATIENT
Start: 2019-02-28 | End: 2019-02-28

## 2019-02-28 RX ORDER — BENZONATATE 100 MG/1
100 CAPSULE ORAL ONCE
Status: COMPLETED | OUTPATIENT
Start: 2019-02-28 | End: 2019-02-28

## 2019-02-28 RX ORDER — BENZONATATE 100 MG/1
100 CAPSULE ORAL 3 TIMES DAILY PRN
Qty: 30 CAPSULE | Refills: 0 | Status: SHIPPED | OUTPATIENT
Start: 2019-02-28 | End: 2019-03-07

## 2019-02-28 RX ORDER — AZITHROMYCIN 250 MG/1
250 TABLET, FILM COATED ORAL DAILY
Qty: 4 TABLET | Refills: 0 | Status: SHIPPED | OUTPATIENT
Start: 2019-02-28 | End: 2019-03-04

## 2019-02-28 RX ORDER — IPRATROPIUM BROMIDE AND ALBUTEROL SULFATE 2.5; .5 MG/3ML; MG/3ML
1 SOLUTION RESPIRATORY (INHALATION)
Status: DISCONTINUED | OUTPATIENT
Start: 2019-02-28 | End: 2019-02-28 | Stop reason: HOSPADM

## 2019-02-28 RX ADMIN — IPRATROPIUM BROMIDE 0.5 MG: 0.5 SOLUTION RESPIRATORY (INHALATION) at 14:28

## 2019-02-28 RX ADMIN — PREDNISONE 60 MG: 20 TABLET ORAL at 14:44

## 2019-02-28 RX ADMIN — ALBUTEROL SULFATE 5 MG: 2.5 SOLUTION RESPIRATORY (INHALATION) at 14:34

## 2019-02-28 RX ADMIN — AZITHROMYCIN 500 MG: 250 TABLET, FILM COATED ORAL at 15:33

## 2019-02-28 RX ADMIN — ASPIRIN 324 MG: 81 TABLET ORAL at 14:44

## 2019-02-28 RX ADMIN — IOVERSOL 75 ML: 741 INJECTION INTRA-ARTERIAL; INTRAVENOUS at 15:20

## 2019-02-28 RX ADMIN — BENZONATATE 100 MG: 100 CAPSULE ORAL at 15:33

## 2019-02-28 RX ADMIN — ALBUTEROL SULFATE 5 MG: 2.5 SOLUTION RESPIRATORY (INHALATION) at 14:27

## 2019-02-28 ASSESSMENT — ENCOUNTER SYMPTOMS
EYE PAIN: 0
RHINORRHEA: 0
COUGH: 1
SHORTNESS OF BREATH: 1
VOMITING: 0
ABDOMINAL PAIN: 0
COLOR CHANGE: 0
SORE THROAT: 0
NAUSEA: 0

## 2019-02-28 ASSESSMENT — PAIN DESCRIPTION - LOCATION: LOCATION: CHEST

## 2019-02-28 ASSESSMENT — PAIN SCALES - GENERAL: PAINLEVEL_OUTOF10: 6

## 2019-02-28 ASSESSMENT — PAIN DESCRIPTION - ORIENTATION: ORIENTATION: LEFT;LOWER

## 2019-03-03 LAB
EKG ATRIAL RATE: 86 BPM
EKG P AXIS: 42 DEGREES
EKG P-R INTERVAL: 150 MS
EKG Q-T INTERVAL: 408 MS
EKG QRS DURATION: 98 MS
EKG QTC CALCULATION (BAZETT): 488 MS
EKG R AXIS: 78 DEGREES
EKG T AXIS: 55 DEGREES
EKG VENTRICULAR RATE: 86 BPM

## 2019-03-30 ENCOUNTER — HOSPITAL ENCOUNTER (EMERGENCY)
Age: 54
Discharge: HOME | End: 2019-03-30
Attending: EMERGENCY MEDICINE
Payer: MEDICARE

## 2019-03-30 ENCOUNTER — APPOINTMENT (OUTPATIENT)
Dept: GENERAL RADIOLOGY | Age: 54
End: 2019-03-30
Payer: MEDICARE

## 2019-03-30 VITALS
DIASTOLIC BLOOD PRESSURE: 68 MMHG | RESPIRATION RATE: 16 BRPM | WEIGHT: 150 LBS | HEIGHT: 63 IN | OXYGEN SATURATION: 96 % | TEMPERATURE: 97.2 F | HEART RATE: 78 BPM | BODY MASS INDEX: 26.58 KG/M2 | SYSTOLIC BLOOD PRESSURE: 113 MMHG

## 2019-03-30 DIAGNOSIS — M25.552 LEFT HIP PAIN: Primary | ICD-10-CM

## 2019-03-30 PROCEDURE — 73502 X-RAY EXAM HIP UNI 2-3 VIEWS: CPT

## 2019-03-30 PROCEDURE — G0382 LEV 3 HOSP TYPE B ED VISIT: HCPCS

## 2019-03-30 PROCEDURE — 72100 X-RAY EXAM L-S SPINE 2/3 VWS: CPT

## 2019-03-30 ASSESSMENT — PAIN DESCRIPTION - DESCRIPTORS: DESCRIPTORS: THROBBING;STABBING;SHOOTING

## 2019-03-30 ASSESSMENT — ENCOUNTER SYMPTOMS
BACK PAIN: 0
SHORTNESS OF BREATH: 0
NAUSEA: 0
COUGH: 0
VOMITING: 0
CHEST TIGHTNESS: 0
DIARRHEA: 0
ABDOMINAL PAIN: 0

## 2019-03-30 ASSESSMENT — PAIN SCALES - GENERAL: PAINLEVEL_OUTOF10: 7

## 2019-03-30 ASSESSMENT — PAIN DESCRIPTION - FREQUENCY: FREQUENCY: CONTINUOUS

## 2019-03-30 ASSESSMENT — PAIN DESCRIPTION - LOCATION: LOCATION: PELVIS;HIP

## 2019-03-30 ASSESSMENT — PAIN DESCRIPTION - ORIENTATION: ORIENTATION: LEFT

## 2019-03-30 ASSESSMENT — PAIN DESCRIPTION - PAIN TYPE: TYPE: ACUTE PAIN

## 2019-03-30 NOTE — ED PROVIDER NOTES
file    Food insecurity:     Worry: Not on file     Inability: Not on file    Transportation needs:     Medical: Not on file     Non-medical: Not on file   Tobacco Use    Smoking status: Current Every Day Smoker     Packs/day: 0.50     Years: 37.00     Pack years: 18.50     Types: Cigarettes    Smokeless tobacco: Never Used    Tobacco comment: has not smoked in the last 3 days   Substance and Sexual Activity    Alcohol use: Yes     Comment: socially    Drug use: Yes     Frequency: 1.0 times per week     Types: Marijuana     Comment: last use 2 weeks ago    Sexual activity: Not on file   Lifestyle    Physical activity:     Days per week: Not on file     Minutes per session: Not on file    Stress: Not on file   Relationships    Social connections:     Talks on phone: Not on file     Gets together: Not on file     Attends Jainism service: Not on file     Active member of club or organization: Not on file     Attends meetings of clubs or organizations: Not on file     Relationship status: Not on file    Intimate partner violence:     Fear of current or ex partner: Not on file     Emotionally abused: Not on file     Physically abused: Not on file     Forced sexual activity: Not on file   Other Topics Concern    Not on file   Social History Narrative    Not on file       Patient advised to stop smoking or to avoid tobacco use. Family History   Problem Relation Age of Onset    Diabetes Father     Hypertension Mother     Cancer Mother         Allergies:  Patient has no known allergies. HomeMedications:  Prior to Admission medications    Medication Sig Start Date End Date Taking?  Authorizing Provider   tiZANidine (ZANAFLEX) 4 MG tablet Take 4 mg by mouth every 6 hours as needed    Historical Provider, MD   predniSONE (DELTASONE) 50 MG tablet Take 1 tablet by mouth daily 2/28/19   Gabriella Ray MD   cyclobenzaprine (FLEXERIL) 10 MG tablet Take 1 tablet by mouth 3 times daily as needed for Muscle spasms 5/9/18   Yaakov Adame MD   Topiramate (TOPAMAX PO) Take by mouth    Historical Provider, MD   SUMAtriptan (IMITREX) 100 MG tablet Take 100 mg by mouth once as needed for Migraine    Historical Provider, MD   ipratropium-albuterol (DUONEB) 0.5-2.5 (3) MG/3ML SOLN nebulizer solution Inhale 3 mLs into the lungs 4 times daily 2/6/17   Dexter Paulson MD   mometasone-formoterol [facility]) 200-5 MCG/ACT inhaler Inhale 2 puffs into the lungs every 12 hours 2/6/17   Dexter Paulson MD   gabapentin (NEURONTIN) 100 MG capsule Take 100 mg by mouth 3 times daily    Historical Provider, MD   traZODone (DESYREL) 100 MG tablet Take 200 mg by mouth nightly. Historical Provider, MD   pravastatin (PRAVACHOL) 40 MG tablet Take 40 mg by mouth daily. Historical Provider, MD   HYDROCHLOROTHIAZIDE PO Take 25 mg by mouth daily. Historical Provider, MD   FLUoxetine HCl (PROZAC PO) Take 60 mg by mouth Daily. Historical Provider, MD   OMEPRAZOLE PO Take 40 mg by mouth 2 times daily. Historical Provider, MD   Levothyroxine Sodium (LEVOTHROID PO) Take 112 mcg by mouth. Historical Provider, MD   ATENOLOL PO Take 25 mg by mouth daily. Historical Provider, MD       REVIEW OF SYSTEMS    (2-9 systems for level 4, 10 or more for level 5)      Review of Systems   Constitutional: Negative for chills and fever. Respiratory: Negative for cough, chest tightness and shortness of breath. Cardiovascular: Negative for chest pain and leg swelling. Gastrointestinal: Negative for abdominal pain, diarrhea, nausea and vomiting. Genitourinary: Negative for dysuria and hematuria. Musculoskeletal: Positive for arthralgias and gait problem. Negative for back pain. Skin: Negative for rash and wound. Allergic/Immunologic: Negative for food allergies and immunocompromised state. Neurological: Negative for dizziness, syncope, weakness and headaches. Psychiatric/Behavioral: Negative for suicidal ideas.  The patient is not encounter. DIAGNOSTIC RESULTS / EMERGENCY DEPARTMENT COURSE / MDM     LABS:  No results found for this visit on 03/30/19. IMPRESSION: 41-year-old female history of arthritis presenting to weeks after mechanical fall of left hip pain. On exam patient has stable vital signs. There is tenderness over the lateral aspect of the hip, tenderness with internal rotation. She also has some midline lumbar spine tenderness to palpation obvious step-offs or deformities no leg shortening or internal rotation. Impression is hip pain, back pain. Plan for plain films and reevaluation. RADIOLOGY:  Xr Lumbar Spine (2-3 Views)    Result Date: 3/30/2019  EXAMINATION: 3 XRAY VIEWS OF THE LUMBAR SPINE 3/30/2019 11:30 am COMPARISON: None. HISTORY: ORDERING SYSTEM PROVIDED HISTORY: back pain, fall TECHNOLOGIST PROVIDED HISTORY: back pain, fall FINDINGS: 5 lumbar type vertebral bodies are present. There is slight lumbar curvature to the right. The vertebral body heights are maintained. Multilevel moderately severe disc disease is present throughout the lumbar spine with advanced facet arthropathy in the lower lumbar spine. No listhesis. No acute osseous abnormality identified. Moderate stool burden throughout the visualized colon. Status post cholecystectomy. Multilevel degenerative change in the spine without acute osseous abnormality appreciated. Xr Hip Left (2-3 Views)    Result Date: 3/30/2019  EXAMINATION: 2 XRAY VIEWS OF THE LEFT HIP 3/30/2019 11:30 am COMPARISON: November 10, 2011 HISTORY: ORDERING SYSTEM PROVIDED HISTORY: left hip pain TECHNOLOGIST PROVIDED HISTORY: left hip pain FINDINGS: Mild joint space narrowing in the left hip. The sacroiliac joint and pubic symphysis appear maintained. No acute osseous abnormality identified. No acute osseous abnormality identified. Mild joint space narrowing in the left hip, progressed since 2011.          EKG  None    All EKG's are interpreted by the EmergencyDepartment Physician who either signs or Co-signs this chart in the absence of a cardiologist.    EMERGENCY DEPARTMENT COURSE:  Patient seen and evaluated by myself and attending. Imaging negative. Patient stated that she has muscle relaxers to take home and did not want any medications here. She was given follow-up information to follow up with Dr. Danica Mejia for orthopedic surgery. Did ask to return for continued or worsening symptoms. Patient was infiltrated with a steady gait with assistance of her cane which is her baseline. Discussed results and plan with patient and patient isagreeable with plan. Patient is requesting discharge. Patient was given written and verbal instructions prior to discharge. Did ask the patient to return to the emergency department if symptoms continue, worsen, or if the patient has any other concerns. Also asked the patient to make an appointment with PCP as soon as possible. Patient understood and agreed. Patient had no further questions. PROCEDURES:  None    CONSULTS:  None      FINAL IMPRESSION      1.  Left hip pain          DISPOSITION / PLAN     DISPOSITION Decision To Discharge 03/30/2019 11:54:48 AM      PATIENT REFERRED TO:  Ora Kay, [address]  [phone]    Schedule an appointment as soon as possible for a visit       [facility] ED  [address] [facility] [address]  [phone]    As needed, If symptoms persist or worsen    Nayan Urias MD  [place] [address]  [address] [phone]    Schedule an appointment as soon as possible for a visit   ORTHOPEDIC SURGERY      DISCHARGE MEDICATIONS:  Discharge Medication List as of 3/30/2019 11:55 AM          Deandra Mckoy DO  Emergency Medicine Resident    (Please note that portions of thisnote were completed with a voice recognition program.  Efforts were made to edit the dictations but occasionally words are mis-transcribed.)     Soheila Pandya, DO  03/30/19 1521

## 2019-06-14 ENCOUNTER — HOSPITAL ENCOUNTER (EMERGENCY)
Age: 54
Discharge: HOME | End: 2019-06-14
Attending: EMERGENCY MEDICINE
Payer: MEDICARE

## 2019-06-14 VITALS
BODY MASS INDEX: 26.57 KG/M2 | HEART RATE: 93 BPM | SYSTOLIC BLOOD PRESSURE: 119 MMHG | DIASTOLIC BLOOD PRESSURE: 87 MMHG | TEMPERATURE: 97.7 F | RESPIRATION RATE: 18 BRPM | OXYGEN SATURATION: 97 % | WEIGHT: 150 LBS

## 2019-06-14 DIAGNOSIS — K08.89 PAIN, DENTAL: Primary | ICD-10-CM

## 2019-06-14 PROCEDURE — 2500000003 HC RX 250 WO HCPCS: Performed by: EMERGENCY MEDICINE

## 2019-06-14 PROCEDURE — 99282 EMERGENCY DEPT VISIT SF MDM: CPT

## 2019-06-14 PROCEDURE — 64400 NJX AA&/STRD TRIGEMINAL NRV: CPT

## 2019-06-14 PROCEDURE — 6370000000 HC RX 637 (ALT 250 FOR IP): Performed by: EMERGENCY MEDICINE

## 2019-06-14 RX ORDER — BUPIVACAINE HYDROCHLORIDE 2.5 MG/ML
25 INJECTION, SOLUTION INFILTRATION; PERINEURAL ONCE
Status: COMPLETED | OUTPATIENT
Start: 2019-06-14 | End: 2019-06-14

## 2019-06-14 RX ORDER — CLINDAMYCIN HYDROCHLORIDE 300 MG/1
300 CAPSULE ORAL 3 TIMES DAILY
Qty: 21 CAPSULE | Refills: 0 | Status: SHIPPED | OUTPATIENT
Start: 2019-06-14 | End: 2019-06-21

## 2019-06-14 RX ORDER — CLINDAMYCIN HYDROCHLORIDE 150 MG/1
300 CAPSULE ORAL ONCE
Status: COMPLETED | OUTPATIENT
Start: 2019-06-14 | End: 2019-06-14

## 2019-06-14 RX ADMIN — CLINDAMYCIN HYDROCHLORIDE 300 MG: 150 CAPSULE ORAL at 13:39

## 2019-06-14 RX ADMIN — BUPIVACAINE HYDROCHLORIDE 62.5 MG: 2.5 INJECTION, SOLUTION INFILTRATION; PERINEURAL at 13:39

## 2019-06-14 ASSESSMENT — PAIN SCALES - GENERAL
PAINLEVEL_OUTOF10: 10
PAINLEVEL_OUTOF10: 10

## 2019-06-14 ASSESSMENT — PAIN DESCRIPTION - ORIENTATION: ORIENTATION: RIGHT;LOWER

## 2019-06-14 ASSESSMENT — ENCOUNTER SYMPTOMS
RHINORRHEA: 0
DIARRHEA: 0
ABDOMINAL PAIN: 0
COUGH: 0
NAUSEA: 0
CONSTIPATION: 0
VOMITING: 0
SHORTNESS OF BREATH: 0

## 2019-06-14 ASSESSMENT — PAIN DESCRIPTION - LOCATION: LOCATION: TEETH

## 2019-06-14 NOTE — ED PROVIDER NOTES
[address] ED  Emergency Department Encounter  EmergencyMedicine Resident     Pt Tonya Reynolds  MRN: [id number]  [place] 1965  Date of evaluation: 6/14/19  PCP:  Luiza Geiger MD    CHIEF COMPLAINT       Chief Complaint   Patient presents with    Dental Pain     riht lower molar, has own dentist with appointment, is here for pain control. tylenol and motrin not working        HISTORY OF PRESENT ILLNESS  (Location/Symptom, Timing/Onset, Context/Setting, Quality, Duration, Modifying Factors, Severity.)      Donna Lantigua is a 53 y.o. female who presents with right lower dental pain. Patient states this has been ongoing for over a month but got worse last few days. States that the pain is sharp and radiates into her jaw. States that she has seen the dentist and recommended she see an oral surgeon which she states she cannot get into until next week. Has been eating and drinking. Denies any fevers. States she has been taking Tylenol at home and has not helped. PAST MEDICAL / SURGICAL / SOCIAL / FAMILY HISTORY      has a past medical history of Acid reflux, Anxiety, Asthma, Bipolar 1 disorder ([address] Bowel obstruction ([address] COPD (chronic obstructive pulmonary disease) ([address] Crohn disease ([address] Depression, Gout, Hypertension, and Hypothyroidism. has a past surgical history that includes Tonsillectomy and adenoidectomy; Tubal ligation; Abdomen surgery; Cholecystectomy; Bunionectomy (Left); Colonoscopy (04/30/2007); and Colonoscopy (10/12/2017).     Social History     Socioeconomic History    Marital status: Single     Spouse name: Not on file    Number of children: Not on file    Years of education: Not on file    Highest education level: Not on file   Occupational History    Not on file   Social Needs    Financial resource strain: Not on file    Food insecurity:     Worry: Not on file     Inability: Not on file    Transportation needs:     Medical: Not on file     Non-medical: Not on file   Tobacco Use    Smoking status: Current Every Day Smoker     Packs/day: 0.50     Years: 37.00     Pack years: 18.50     Types: Cigarettes    Smokeless tobacco: Never Used    Tobacco comment: has not smoked in the last 3 days   Substance and Sexual Activity    Alcohol use: Yes     Comment: socially    Drug use: Yes     Frequency: 1.0 times per week     Types: Marijuana     Comment: last use 2 weeks ago    Sexual activity: Not on file   Lifestyle    Physical activity:     Days per week: Not on file     Minutes per session: Not on file    Stress: Not on file   Relationships    Social connections:     Talks on phone: Not on file     Gets together: Not on file     Attends Judaism service: Not on file     Active member of club or organization: Not on file     Attends meetings of clubs or organizations: Not on file     Relationship status: Not on file    Intimate partner violence:     Fear of current or ex partner: Not on file     Emotionally abused: Not on file     Physically abused: Not on file     Forced sexual activity: Not on file   Other Topics Concern    Not on file   Social History Narrative    Not on file       Family History   Problem Relation Age of Onset    Diabetes Father     Hypertension Mother     Cancer Mother        Allergies:  Patient has no known allergies. Home Medications:  Prior to Admission medications    Medication Sig Start Date End Date Taking?  Authorizing Provider   clindamycin (CLEOCIN) 300 MG capsule Take 1 capsule by mouth 3 times daily for 7 days 6/14/19 6/21/19 Yes Ranjeet Rivera DO   tiZANidine (ZANAFLEX) 4 MG tablet Take 4 mg by mouth every 6 hours as needed    Historical Provider, MD   predniSONE (DELTASONE) 50 MG tablet Take 1 tablet by mouth daily 2/28/19   Hetal Vaughn MD   cyclobenzaprine (FLEXERIL) 10 MG tablet Take 1 tablet by mouth 3 times daily as needed for Muscle spasms 5/9/18   Radha Nolen MD   Topiramate (TOPAMAX PO) Take by mouth    Historical Provider, MD   SUMAtriptan (IMITREX) 100 MG tablet Take 100 mg by mouth once as needed for Migraine    Historical Provider, MD   ipratropium-albuterol (DUONEB) 0.5-2.5 (3) MG/3ML SOLN nebulizer solution Inhale 3 mLs into the lungs 4 times daily 2/6/17   Elijah Kehr, MD   mometasone-formoterol [facility]) 200-5 MCG/ACT inhaler Inhale 2 puffs into the lungs every 12 hours 2/6/17   Elijah Kehr, MD   gabapentin (NEURONTIN) 100 MG capsule Take 100 mg by mouth 3 times daily    Historical Provider, MD   traZODone (DESYREL) 100 MG tablet Take 200 mg by mouth nightly. Historical Provider, MD   pravastatin (PRAVACHOL) 40 MG tablet Take 40 mg by mouth daily. Historical Provider, MD   HYDROCHLOROTHIAZIDE PO Take 25 mg by mouth daily. Historical Provider, MD   FLUoxetine HCl (PROZAC PO) Take 60 mg by mouth Daily. Historical Provider, MD   OMEPRAZOLE PO Take 40 mg by mouth 2 times daily. Historical Provider, MD   Levothyroxine Sodium (LEVOTHROID PO) Take 112 mcg by mouth. Historical Provider, MD   ATENOLOL PO Take 25 mg by mouth daily. Historical Provider, MD       REVIEW OF SYSTEMS    (2-9 systems for level 4, 10 or more for level 5)      Review of Systems   Constitutional: Negative for chills and fever. HENT: Positive for dental problem. Negative for congestion and rhinorrhea. Respiratory: Negative for cough and shortness of breath. Cardiovascular: Negative for chest pain and palpitations. Gastrointestinal: Negative for abdominal pain, constipation, diarrhea, nausea and vomiting. Genitourinary: Negative for difficulty urinating and dysuria. Musculoskeletal: Negative for neck pain and neck stiffness. Skin: Negative for rash and wound. Neurological: Negative for weakness and numbness.        PHYSICAL EXAM   (up to 7 for level 4, 8 or more for level 5)      INITIAL VITALS:   /87   Pulse 93   Temp 97.7 °F (36.5 °C) (Oral)   Resp 18   Wt 150 lb appreciable abscess. No trismus or malocclusion. No facial swelling. Impression is acute on chronic dental pain. I did inform patient that we cannot give her any narcotics. Patient then asked if we could numb her up. I did state that she cannot eat for over 6 hours if we do do a dental block and patient agrees. We will do inferior alveolar block. Patient to start on clindamycin. RADIOLOGY:  None    EKG  None    All EKG's are interpreted by the Emergency Department Physician who either signs or Co-signs this chart in the absence of a cardiologist.    PROCEDURES:  Dental block performed. 2 cc of 0.5% bupivacaine injected inferior alveolar block. Patient tolerated well. No immediate complications. CONSULTS:  None    CRITICAL CARE:  None    FINAL IMPRESSION      1. Pain, dental          DISPOSITION / PLAN     DISPOSITION Discharge - Pending Orders Complete 06/14/2019 01:31:41 PM      PATIENT REFERRED TO:  No follow-up provider specified.     DISCHARGE MEDICATIONS:  New Prescriptions    CLINDAMYCIN (CLEOCIN) 300 MG CAPSULE    Take 1 capsule by mouth 3 times daily for 7 days       Vanda Spencer DO  Emergency Medicine Resident    (Please note that portions of thisnote were completed with a voice recognition program.  Efforts were made to edit the dictations but occasionally words are mis-transcribed.)        Vanda Spencer DO  06/14/19 1553

## 2019-12-17 ENCOUNTER — APPOINTMENT (OUTPATIENT)
Dept: GENERAL RADIOLOGY | Age: 54
End: 2019-12-17
Payer: MEDICARE

## 2019-12-17 ENCOUNTER — HOSPITAL ENCOUNTER (EMERGENCY)
Age: 54
Discharge: HOME | End: 2019-12-17
Attending: EMERGENCY MEDICINE
Payer: MEDICARE

## 2019-12-17 VITALS
BODY MASS INDEX: 28.09 KG/M2 | RESPIRATION RATE: 16 BRPM | DIASTOLIC BLOOD PRESSURE: 85 MMHG | OXYGEN SATURATION: 99 % | SYSTOLIC BLOOD PRESSURE: 119 MMHG | WEIGHT: 179 LBS | TEMPERATURE: 98.1 F | HEART RATE: 68 BPM | HEIGHT: 67 IN

## 2019-12-17 DIAGNOSIS — M25.562 PAIN IN BOTH KNEES, UNSPECIFIED CHRONICITY: Primary | ICD-10-CM

## 2019-12-17 DIAGNOSIS — M25.561 PAIN IN BOTH KNEES, UNSPECIFIED CHRONICITY: Primary | ICD-10-CM

## 2019-12-17 PROCEDURE — 6360000002 HC RX W HCPCS: Performed by: STUDENT IN AN ORGANIZED HEALTH CARE EDUCATION/TRAINING PROGRAM

## 2019-12-17 PROCEDURE — 6370000000 HC RX 637 (ALT 250 FOR IP): Performed by: STUDENT IN AN ORGANIZED HEALTH CARE EDUCATION/TRAINING PROGRAM

## 2019-12-17 PROCEDURE — 73562 X-RAY EXAM OF KNEE 3: CPT

## 2019-12-17 PROCEDURE — 96372 THER/PROPH/DIAG INJ SC/IM: CPT

## 2019-12-17 PROCEDURE — 99284 EMERGENCY DEPT VISIT MOD MDM: CPT

## 2019-12-17 RX ORDER — PROMETHAZINE HYDROCHLORIDE 25 MG/ML
12.5 INJECTION, SOLUTION INTRAMUSCULAR; INTRAVENOUS ONCE
Status: COMPLETED | OUTPATIENT
Start: 2019-12-17 | End: 2019-12-17

## 2019-12-17 RX ORDER — ONDANSETRON 4 MG/1
4 TABLET, FILM COATED ORAL ONCE
Status: COMPLETED | OUTPATIENT
Start: 2019-12-17 | End: 2019-12-17

## 2019-12-17 RX ORDER — ACETAMINOPHEN 500 MG
1000 TABLET ORAL ONCE
Status: DISCONTINUED | OUTPATIENT
Start: 2019-12-17 | End: 2019-12-17 | Stop reason: HOSPADM

## 2019-12-17 RX ADMIN — PROMETHAZINE HYDROCHLORIDE 12.5 MG: 25 INJECTION INTRAMUSCULAR; INTRAVENOUS at 18:28

## 2019-12-17 RX ADMIN — ONDANSETRON HYDROCHLORIDE 4 MG: 4 TABLET, FILM COATED ORAL at 17:28

## 2019-12-17 ASSESSMENT — PAIN DESCRIPTION - DESCRIPTORS: DESCRIPTORS: THROBBING

## 2019-12-17 ASSESSMENT — PAIN DESCRIPTION - ORIENTATION: ORIENTATION: RIGHT

## 2019-12-17 ASSESSMENT — PAIN SCALES - GENERAL: PAINLEVEL_OUTOF10: 8

## 2019-12-17 ASSESSMENT — PAIN DESCRIPTION - LOCATION: LOCATION: HEAD;KNEE

## 2019-12-17 ASSESSMENT — PAIN DESCRIPTION - PAIN TYPE: TYPE: ACUTE PAIN

## 2019-12-18 ASSESSMENT — ENCOUNTER SYMPTOMS
NAUSEA: 1
VOMITING: 1
WHEEZING: 0
COUGH: 0
ABDOMINAL PAIN: 0
DIARRHEA: 0
CONSTIPATION: 0
SHORTNESS OF BREATH: 0

## 2020-02-14 ENCOUNTER — APPOINTMENT (OUTPATIENT)
Dept: GENERAL RADIOLOGY | Age: 55
DRG: 817 | End: 2020-02-14
Payer: MEDICARE

## 2020-02-14 ENCOUNTER — HOSPITAL ENCOUNTER (INPATIENT)
Age: 55
LOS: 7 days | Discharge: PSYCHIATRIC OR SUBSTANCE USE FACILITY | DRG: 817 | End: 2020-02-21
Attending: EMERGENCY MEDICINE | Admitting: INTERNAL MEDICINE
Payer: MEDICARE

## 2020-02-14 ENCOUNTER — APPOINTMENT (OUTPATIENT)
Dept: CT IMAGING | Age: 55
DRG: 817 | End: 2020-02-14
Payer: MEDICARE

## 2020-02-14 LAB
-: ABNORMAL
ABSOLUTE EOS #: 0.13 K/UL (ref 0–0.44)
ABSOLUTE IMMATURE GRANULOCYTE: 0.15 K/UL (ref 0–0.3)
ABSOLUTE LYMPH #: 1.36 K/UL (ref 1.1–3.7)
ABSOLUTE MONO #: 0.64 K/UL (ref 0.1–1.2)
ACETAMINOPHEN LEVEL: <5 UG/ML (ref 10–30)
ALBUMIN SERPL-MCNC: 3.8 G/DL (ref 3.5–5.2)
ALBUMIN/GLOBULIN RATIO: 1.5 (ref 1–2.5)
ALP BLD-CCNC: 103 U/L (ref 35–104)
ALT SERPL-CCNC: 21 U/L (ref 5–33)
AMMONIA: 35 UMOL/L (ref 11–51)
AMORPHOUS: ABNORMAL
AMPHETAMINE SCREEN URINE: NEGATIVE
ANION GAP SERPL CALCULATED.3IONS-SCNC: 18 MMOL/L (ref 9–17)
AST SERPL-CCNC: 28 U/L
BACTERIA: ABNORMAL
BARBITURATE SCREEN URINE: NEGATIVE
BASOPHILS # BLD: 0 % (ref 0–2)
BASOPHILS ABSOLUTE: 0.04 K/UL (ref 0–0.2)
BENZODIAZEPINE SCREEN, URINE: NEGATIVE
BILIRUB SERPL-MCNC: 0.21 MG/DL (ref 0.3–1.2)
BILIRUBIN DIRECT: <0.08 MG/DL
BILIRUBIN URINE: NEGATIVE
BILIRUBIN, INDIRECT: ABNORMAL MG/DL (ref 0–1)
BUN BLDV-MCNC: 7 MG/DL (ref 6–20)
BUN/CREAT BLD: ABNORMAL (ref 9–20)
BUPRENORPHINE URINE: ABNORMAL
CALCIUM SERPL-MCNC: 8.3 MG/DL (ref 8.6–10.4)
CANNABINOID SCREEN URINE: NEGATIVE
CASTS UA: ABNORMAL /LPF (ref 0–2)
CASTS UA: ABNORMAL /LPF (ref 0–2)
CHLORIDE BLD-SCNC: 86 MMOL/L (ref 98–107)
CO2: 26 MMOL/L (ref 20–31)
COCAINE METABOLITE, URINE: POSITIVE
COLOR: YELLOW
COMMENT UA: ABNORMAL
CREAT SERPL-MCNC: 0.7 MG/DL (ref 0.5–0.9)
CRYSTALS, UA: ABNORMAL /HPF
DIFFERENTIAL TYPE: ABNORMAL
EOSINOPHILS RELATIVE PERCENT: 1 % (ref 1–4)
EPITHELIAL CELLS UA: ABNORMAL /HPF (ref 0–5)
ETHANOL PERCENT: 0.05 %
ETHANOL: 54 MG/DL
GFR AFRICAN AMERICAN: >60 ML/MIN
GFR NON-AFRICAN AMERICAN: >60 ML/MIN
GFR SERPL CREATININE-BSD FRML MDRD: ABNORMAL ML/MIN/{1.73_M2}
GFR SERPL CREATININE-BSD FRML MDRD: ABNORMAL ML/MIN/{1.73_M2}
GLOBULIN: ABNORMAL G/DL (ref 1.5–3.8)
GLUCOSE BLD-MCNC: 147 MG/DL (ref 70–99)
GLUCOSE URINE: NEGATIVE
HCT VFR BLD CALC: 37.6 % (ref 36.3–47.1)
HEMOGLOBIN: 12.6 G/DL (ref 11.9–15.1)
IMMATURE GRANULOCYTES: 2 %
INR BLD: 0.9
KETONES, URINE: NEGATIVE
LACTIC ACID, WHOLE BLOOD: 4.4 MMOL/L (ref 0.7–2.1)
LEUKOCYTE ESTERASE, URINE: ABNORMAL
LYMPHOCYTES # BLD: 14 % (ref 24–43)
MCH RBC QN AUTO: 28.9 PG (ref 25.2–33.5)
MCHC RBC AUTO-ENTMCNC: 33.5 G/DL (ref 28.4–34.8)
MCV RBC AUTO: 86.2 FL (ref 82.6–102.9)
MDMA URINE: ABNORMAL
METHADONE SCREEN, URINE: NEGATIVE
METHAMPHETAMINE, URINE: ABNORMAL
MONOCYTES # BLD: 7 % (ref 3–12)
MUCUS: ABNORMAL
NITRITE, URINE: POSITIVE
NRBC AUTOMATED: 0 PER 100 WBC
OPIATES, URINE: NEGATIVE
OTHER OBSERVATIONS UA: ABNORMAL
OXYCODONE SCREEN URINE: NEGATIVE
PDW BLD-RTO: 13.8 % (ref 11.8–14.4)
PH UA: 6 (ref 5–8)
PHENCYCLIDINE, URINE: NEGATIVE
PLATELET # BLD: 284 K/UL (ref 138–453)
PLATELET ESTIMATE: ABNORMAL
PMV BLD AUTO: 10.6 FL (ref 8.1–13.5)
POTASSIUM SERPL-SCNC: 2.5 MMOL/L (ref 3.7–5.3)
PROPOXYPHENE, URINE: ABNORMAL
PROTEIN UA: NEGATIVE
PROTHROMBIN TIME: 9.6 SEC (ref 9–12)
RBC # BLD: 4.36 M/UL (ref 3.95–5.11)
RBC # BLD: ABNORMAL 10*6/UL
RBC UA: ABNORMAL /HPF (ref 0–2)
RENAL EPITHELIAL, UA: ABNORMAL /HPF
SALICYLATE LEVEL: <1 MG/DL (ref 3–10)
SEG NEUTROPHILS: 76 % (ref 36–65)
SEGMENTED NEUTROPHILS ABSOLUTE COUNT: 7.16 K/UL (ref 1.5–8.1)
SODIUM BLD-SCNC: 130 MMOL/L (ref 135–144)
SPECIFIC GRAVITY UA: 1.01 (ref 1–1.03)
TEST INFORMATION: ABNORMAL
TOTAL PROTEIN: 6.4 G/DL (ref 6.4–8.3)
TOXIC TRICYCLIC SC,BLOOD: NEGATIVE
TRICHOMONAS: ABNORMAL
TRICYCLIC ANTIDEPRESSANTS, UR: ABNORMAL
TROPONIN INTERP: ABNORMAL
TROPONIN INTERP: NORMAL
TROPONIN T: ABNORMAL NG/ML
TROPONIN T: NORMAL NG/ML
TROPONIN, HIGH SENSITIVITY: 14 NG/L (ref 0–14)
TROPONIN, HIGH SENSITIVITY: 19 NG/L (ref 0–14)
TURBIDITY: ABNORMAL
URINE HGB: NEGATIVE
UROBILINOGEN, URINE: NORMAL
WBC # BLD: 9.5 K/UL (ref 3.5–11.3)
WBC # BLD: ABNORMAL 10*3/UL
WBC UA: ABNORMAL /HPF (ref 0–5)
YEAST: ABNORMAL

## 2020-02-14 PROCEDURE — 85025 COMPLETE CBC W/AUTO DIFF WBC: CPT

## 2020-02-14 PROCEDURE — 83605 ASSAY OF LACTIC ACID: CPT

## 2020-02-14 PROCEDURE — 80076 HEPATIC FUNCTION PANEL: CPT

## 2020-02-14 PROCEDURE — 83930 ASSAY OF BLOOD OSMOLALITY: CPT

## 2020-02-14 PROCEDURE — 94640 AIRWAY INHALATION TREATMENT: CPT

## 2020-02-14 PROCEDURE — 6370000000 HC RX 637 (ALT 250 FOR IP): Performed by: NURSE PRACTITIONER

## 2020-02-14 PROCEDURE — 93005 ELECTROCARDIOGRAM TRACING: CPT | Performed by: NURSE PRACTITIONER

## 2020-02-14 PROCEDURE — 84484 ASSAY OF TROPONIN QUANT: CPT

## 2020-02-14 PROCEDURE — 6370000000 HC RX 637 (ALT 250 FOR IP): Performed by: STUDENT IN AN ORGANIZED HEALTH CARE EDUCATION/TRAINING PROGRAM

## 2020-02-14 PROCEDURE — 99222 1ST HOSP IP/OBS MODERATE 55: CPT | Performed by: NURSE PRACTITIONER

## 2020-02-14 PROCEDURE — 70450 CT HEAD/BRAIN W/O DYE: CPT

## 2020-02-14 PROCEDURE — 80048 BASIC METABOLIC PNL TOTAL CA: CPT

## 2020-02-14 PROCEDURE — 80307 DRUG TEST PRSMV CHEM ANLYZR: CPT

## 2020-02-14 PROCEDURE — 2700000000 HC OXYGEN THERAPY PER DAY

## 2020-02-14 PROCEDURE — 82140 ASSAY OF AMMONIA: CPT

## 2020-02-14 PROCEDURE — 71045 X-RAY EXAM CHEST 1 VIEW: CPT

## 2020-02-14 PROCEDURE — 2580000003 HC RX 258: Performed by: EMERGENCY MEDICINE

## 2020-02-14 PROCEDURE — 99285 EMERGENCY DEPT VISIT HI MDM: CPT

## 2020-02-14 PROCEDURE — 6360000002 HC RX W HCPCS: Performed by: STUDENT IN AN ORGANIZED HEALTH CARE EDUCATION/TRAINING PROGRAM

## 2020-02-14 PROCEDURE — 96372 THER/PROPH/DIAG INJ SC/IM: CPT

## 2020-02-14 PROCEDURE — 6360000002 HC RX W HCPCS: Performed by: EMERGENCY MEDICINE

## 2020-02-14 PROCEDURE — 2060000000 HC ICU INTERMEDIATE R&B

## 2020-02-14 PROCEDURE — 81001 URINALYSIS AUTO W/SCOPE: CPT

## 2020-02-14 PROCEDURE — G0480 DRUG TEST DEF 1-7 CLASSES: HCPCS

## 2020-02-14 PROCEDURE — 85610 PROTHROMBIN TIME: CPT

## 2020-02-14 PROCEDURE — 94761 N-INVAS EAR/PLS OXIMETRY MLT: CPT

## 2020-02-14 PROCEDURE — 96365 THER/PROPH/DIAG IV INF INIT: CPT

## 2020-02-14 RX ORDER — PRAVASTATIN SODIUM 20 MG
40 TABLET ORAL DAILY
Status: DISCONTINUED | OUTPATIENT
Start: 2020-02-15 | End: 2020-02-21 | Stop reason: HOSPADM

## 2020-02-14 RX ORDER — LIDOCAINE HYDROCHLORIDE 10 MG/ML
5 INJECTION, SOLUTION INFILTRATION; PERINEURAL ONCE
Status: DISCONTINUED | OUTPATIENT
Start: 2020-02-14 | End: 2020-02-16

## 2020-02-14 RX ORDER — BUDESONIDE AND FORMOTEROL FUMARATE DIHYDRATE 160; 4.5 UG/1; UG/1
2 AEROSOL RESPIRATORY (INHALATION) 2 TIMES DAILY
Status: DISCONTINUED | OUTPATIENT
Start: 2020-02-14 | End: 2020-02-21 | Stop reason: HOSPADM

## 2020-02-14 RX ORDER — SODIUM CHLORIDE 9 MG/ML
INJECTION, SOLUTION INTRAVENOUS CONTINUOUS
Status: DISCONTINUED | OUTPATIENT
Start: 2020-02-14 | End: 2020-02-15

## 2020-02-14 RX ORDER — ONDANSETRON 2 MG/ML
4 INJECTION INTRAMUSCULAR; INTRAVENOUS ONCE
Status: DISCONTINUED | OUTPATIENT
Start: 2020-02-14 | End: 2020-02-14

## 2020-02-14 RX ORDER — NICOTINE 21 MG/24HR
1 PATCH, TRANSDERMAL 24 HOURS TRANSDERMAL DAILY PRN
Status: DISCONTINUED | OUTPATIENT
Start: 2020-02-14 | End: 2020-02-21 | Stop reason: HOSPADM

## 2020-02-14 RX ORDER — POTASSIUM CHLORIDE 20 MEQ/1
40 TABLET, EXTENDED RELEASE ORAL ONCE
Status: COMPLETED | OUTPATIENT
Start: 2020-02-14 | End: 2020-02-14

## 2020-02-14 RX ORDER — HYDROCHLOROTHIAZIDE 25 MG/1
25 TABLET ORAL DAILY
Status: DISCONTINUED | OUTPATIENT
Start: 2020-02-15 | End: 2020-02-17

## 2020-02-14 RX ORDER — OMEPRAZOLE 20 MG/1
40 CAPSULE, DELAYED RELEASE ORAL 2 TIMES DAILY
Status: DISCONTINUED | OUTPATIENT
Start: 2020-02-14 | End: 2020-02-21 | Stop reason: HOSPADM

## 2020-02-14 RX ORDER — 0.9 % SODIUM CHLORIDE 0.9 %
1000 INTRAVENOUS SOLUTION INTRAVENOUS ONCE
Status: COMPLETED | OUTPATIENT
Start: 2020-02-14 | End: 2020-02-14

## 2020-02-14 RX ORDER — PROMETHAZINE HYDROCHLORIDE 25 MG/ML
25 INJECTION, SOLUTION INTRAMUSCULAR; INTRAVENOUS ONCE
Status: COMPLETED | OUTPATIENT
Start: 2020-02-14 | End: 2020-02-14

## 2020-02-14 RX ORDER — ONDANSETRON 2 MG/ML
4 INJECTION INTRAMUSCULAR; INTRAVENOUS EVERY 6 HOURS PRN
Status: DISCONTINUED | OUTPATIENT
Start: 2020-02-14 | End: 2020-02-15

## 2020-02-14 RX ORDER — FLUOXETINE HYDROCHLORIDE 20 MG/1
60 CAPSULE ORAL DAILY
Status: DISCONTINUED | OUTPATIENT
Start: 2020-02-15 | End: 2020-02-17

## 2020-02-14 RX ORDER — IPRATROPIUM BROMIDE AND ALBUTEROL SULFATE 2.5; .5 MG/3ML; MG/3ML
3 SOLUTION RESPIRATORY (INHALATION) 4 TIMES DAILY
Status: DISCONTINUED | OUTPATIENT
Start: 2020-02-14 | End: 2020-02-20

## 2020-02-14 RX ORDER — MAGNESIUM SULFATE 1 G/100ML
1 INJECTION INTRAVENOUS PRN
Status: DISCONTINUED | OUTPATIENT
Start: 2020-02-14 | End: 2020-02-21 | Stop reason: HOSPADM

## 2020-02-14 RX ORDER — ACETAMINOPHEN 325 MG/1
650 TABLET ORAL EVERY 4 HOURS PRN
Status: DISCONTINUED | OUTPATIENT
Start: 2020-02-14 | End: 2020-02-21 | Stop reason: HOSPADM

## 2020-02-14 RX ORDER — POTASSIUM CHLORIDE 7.45 MG/ML
40 INJECTION INTRAVENOUS ONCE
Status: COMPLETED | OUTPATIENT
Start: 2020-02-14 | End: 2020-02-15

## 2020-02-14 RX ORDER — SODIUM CHLORIDE 0.9 % (FLUSH) 0.9 %
10 SYRINGE (ML) INJECTION EVERY 12 HOURS SCHEDULED
Status: DISCONTINUED | OUTPATIENT
Start: 2020-02-14 | End: 2020-02-21 | Stop reason: HOSPADM

## 2020-02-14 RX ORDER — SODIUM CHLORIDE 0.9 % (FLUSH) 0.9 %
10 SYRINGE (ML) INJECTION PRN
Status: DISCONTINUED | OUTPATIENT
Start: 2020-02-14 | End: 2020-02-21 | Stop reason: HOSPADM

## 2020-02-14 RX ORDER — MAGNESIUM SULFATE 1 G/100ML
1 INJECTION INTRAVENOUS
Status: COMPLETED | OUTPATIENT
Start: 2020-02-14 | End: 2020-02-14

## 2020-02-14 RX ORDER — POTASSIUM CHLORIDE 20 MEQ/1
40 TABLET, EXTENDED RELEASE ORAL PRN
Status: DISCONTINUED | OUTPATIENT
Start: 2020-02-14 | End: 2020-02-21 | Stop reason: HOSPADM

## 2020-02-14 RX ORDER — POTASSIUM CHLORIDE 7.45 MG/ML
10 INJECTION INTRAVENOUS PRN
Status: DISCONTINUED | OUTPATIENT
Start: 2020-02-14 | End: 2020-02-21 | Stop reason: HOSPADM

## 2020-02-14 RX ORDER — ATENOLOL 25 MG/1
25 TABLET ORAL DAILY
Status: DISCONTINUED | OUTPATIENT
Start: 2020-02-15 | End: 2020-02-15

## 2020-02-14 RX ADMIN — POTASSIUM CHLORIDE 40 MEQ: 7.46 INJECTION, SOLUTION INTRAVENOUS at 20:43

## 2020-02-14 RX ADMIN — SODIUM CHLORIDE 1000 ML: 9 INJECTION, SOLUTION INTRAVENOUS at 18:38

## 2020-02-14 RX ADMIN — MAGNESIUM SULFATE HEPTAHYDRATE 1 G: 1 INJECTION, SOLUTION INTRAVENOUS at 18:35

## 2020-02-14 RX ADMIN — PROMETHAZINE HYDROCHLORIDE 25 MG: 25 INJECTION INTRAMUSCULAR; INTRAVENOUS at 20:43

## 2020-02-14 RX ADMIN — POTASSIUM CHLORIDE 40 MEQ: 1500 TABLET, EXTENDED RELEASE ORAL at 20:11

## 2020-02-14 RX ADMIN — MAGNESIUM SULFATE HEPTAHYDRATE 1 G: 1 INJECTION, SOLUTION INTRAVENOUS at 18:38

## 2020-02-14 RX ADMIN — IPRATROPIUM BROMIDE AND ALBUTEROL SULFATE 3 ML: .5; 3 SOLUTION RESPIRATORY (INHALATION) at 23:13

## 2020-02-14 RX ADMIN — BUDESONIDE AND FORMOTEROL FUMARATE DIHYDRATE 2 PUFF: 160; 4.5 AEROSOL RESPIRATORY (INHALATION) at 23:14

## 2020-02-14 ASSESSMENT — ENCOUNTER SYMPTOMS
ABDOMINAL PAIN: 0
CONSTIPATION: 0
WHEEZING: 0
VOICE CHANGE: 0
SHORTNESS OF BREATH: 0
NAUSEA: 0
SORE THROAT: 1
VOMITING: 0
DIARRHEA: 0
TROUBLE SWALLOWING: 0

## 2020-02-14 ASSESSMENT — PAIN SCALES - GENERAL
PAINLEVEL_OUTOF10: 8
PAINLEVEL_OUTOF10: 0

## 2020-02-14 ASSESSMENT — PAIN DESCRIPTION - DESCRIPTORS: DESCRIPTORS: ACHING

## 2020-02-14 ASSESSMENT — PAIN DESCRIPTION - LOCATION: LOCATION: CHEST

## 2020-02-14 ASSESSMENT — PAIN DESCRIPTION - PAIN TYPE: TYPE: ACUTE PAIN

## 2020-02-14 NOTE — ED PROVIDER NOTES
[facility]     Emergency Department     Faculty Note/ Attestation      Pt Name: Sinan Castañeda                                       MRN: [id number]  [place] 1965  Date of evaluation: 2/14/2020    Patients PCP:    Jillian Barr MD    Attestation  I performed a history and physical examination of the patient and discussed management with the resident. I reviewed the residents note and agree with the documented findings and plan of care. Any areas of disagreement are noted on the chart. I was personally present for the key portions of any procedures. I have documented in the chart those procedures where I was not present during the key portions. I have reviewed the emergency nurses triage note. I agree with the chief complaint, past medical history, past surgical history, allergies, medications, social and family history as documented unless otherwise noted below. For Physician Assistant/ Nurse Practitioner cases/documentation I have personally evaluated this patient and have completed at least one if not all key elements of the E/M (history, physical exam, and MDM). Additional findings are as noted. Initial Screens:        [place] Coma Scale  Eye Opening: Spontaneous  Best Verbal Response: Oriented  Best Motor Response: Obeys commands  [place] Coma Scale Score: 15    Vitals:    Vitals:    02/14/20 1800 02/14/20 1808 02/14/20 1837 02/14/20 1846   BP: 112/70   (!) 106/54   Pulse: 70   67   Resp: 16   16   Temp:  97.6 °F (36.4 °C)     TempSrc:  Axillary     SpO2: 92%   94%   Weight:   150 lb (68 kg)        CHIEF COMPLAINT       Chief Complaint   Patient presents with    Drug Overdose       Patient is a 69-year-old female 911 was called when she was unresponsive EMS arrived perform CPR placed an IO patient was given 6 mg of naloxone in which she regained consciousness.   Patient having chest pain midsternal however denying taking any medications however patient is very Trachea: No tracheal deviation. Pulmonary:      Effort: Pulmonary effort is normal. No respiratory distress. Breath sounds: No stridor. Musculoskeletal: Normal range of motion. Skin:     General: Skin is warm and dry. Neurological:      Coordination: Coordination normal.      Comments: Alert to location and person not situation   Psychiatric:         Behavior: Behavior normal.           Comments  Patient overdose is concerning patient has severely long prolonged QTC will need poison control discussion possible overdose include methadone tramadol other prolonged QT medications magnesium will be given    Does have signs of severe electrolyte disturbances will replace these emergently patient will need admission          Montez Kawasaki,, DO, RDMS.   Attending Emergency Physician         Montez Kawasaki, DO  02/14/20 2018

## 2020-02-14 NOTE — ED PROVIDER NOTES
History     Socioeconomic History    Marital status: Single     Spouse name: Not on file    Number of children: Not on file    Years of education: Not on file    Highest education level: Not on file   Occupational History    Not on file   Social Needs    Financial resource strain: Not on file    Food insecurity:     Worry: Not on file     Inability: Not on file    Transportation needs:     Medical: Not on file     Non-medical: Not on file   Tobacco Use    Smoking status: Current Every Day Smoker     Packs/day: 0.50     Years: 37.00     Pack years: 18.50     Types: Cigarettes    Smokeless tobacco: Never Used    Tobacco comment: has not smoked in the last 3 days   Substance and Sexual Activity    Alcohol use: Yes     Comment: socially    Drug use: Not Currently     Frequency: 1.0 times per week     Types: Marijuana    Sexual activity: Not on file   Lifestyle    Physical activity:     Days per week: Not on file     Minutes per session: Not on file    Stress: Not on file   Relationships    Social connections:     Talks on phone: Not on file     Gets together: Not on file     Attends Voodoo service: Not on file     Active member of club or organization: Not on file     Attends meetings of clubs or organizations: Not on file     Relationship status: Not on file    Intimate partner violence:     Fear of current or ex partner: Not on file     Emotionally abused: Not on file     Physically abused: Not on file     Forced sexual activity: Not on file   Other Topics Concern    Not on file   Social History Narrative    Not on file       Family History   Problem Relation Age of Onset    Diabetes Father     Hypertension Mother     Cancer Mother        Allergies:  Patient has no known allergies. Home Medications:  Prior to Admission medications    Medication Sig Start Date End Date Taking?  Authorizing Provider   tiZANidine (ZANAFLEX) 4 MG tablet Take 4 mg by mouth every 6 hours as needed    Historical myalgias. Left tibial IO pain   Skin: Negative for wound. Neurological: Positive for syncope and weakness. Psychiatric/Behavioral: Positive for confusion. PHYSICAL EXAM   (up to 7 for level 4, 8 or more for level 5)      INITIAL VITALS:   /80   Pulse 73   Temp 98 °F (36.7 °C) (Oral)   Resp 13   Ht 5' 3\" (1.6 m)   Wt 143 lb 4.8 oz (65 kg)   SpO2 97%   BMI 25.38 kg/m²     Physical Exam  Vitals signs and nursing note reviewed. Constitutional:       General: She is not in acute distress. Appearance: She is not toxic-appearing. HENT:      Head: Normocephalic and atraumatic. Right Ear: External ear normal.      Left Ear: External ear normal.      Nose: Nose normal.      Mouth/Throat:      Mouth: Mucous membranes are moist.      Pharynx: Oropharynx is clear. Eyes:      General:         Right eye: No discharge. Left eye: No discharge. Extraocular Movements: Extraocular movements intact. Conjunctiva/sclera: Conjunctivae normal.      Pupils: Pupils are equal, round, and reactive to light. Neck:      Musculoskeletal: Normal range of motion and neck supple. Cardiovascular:      Rate and Rhythm: Normal rate and regular rhythm. Pulses: Normal pulses. Heart sounds: Normal heart sounds. No murmur. Pulmonary:      Effort: Pulmonary effort is normal.      Breath sounds: Normal breath sounds. Chest:      Chest wall: Tenderness present. Abdominal:      General: Abdomen is flat. There is no distension. Tenderness: There is no abdominal tenderness. Musculoskeletal: Normal range of motion. General: Tenderness (Left tibial IO site) present. Skin:     General: Skin is warm. Capillary Refill: Capillary refill takes less than 2 seconds. Coloration: Skin is pale. Neurological:      General: No focal deficit present. Mental Status: She is alert and oriented to person, place, and time.          DIFFERENTIAL  DIAGNOSIS     PLAN tablet 40 mg    sodium chloride flush 0.9 % injection 10 mL    sodium chloride flush 0.9 % injection 10 mL    OR Linked Order Group     potassium chloride (KLOR-CON M) extended release tablet 40 mEq     potassium bicarb-citric acid (EFFER-K) effervescent tablet 40 mEq     potassium chloride 10 mEq/100 mL IVPB (Peripheral Line)    magnesium sulfate 1 g in dextrose 5% 100 mL IVPB    magnesium hydroxide (MILK OF MAGNESIA) 400 MG/5ML suspension 30 mL    ondansetron (ZOFRAN) injection 4 mg    nicotine (NICODERM CQ) 21 MG/24HR 1 patch    acetaminophen (TYLENOL) tablet 650 mg    0.9 % sodium chloride infusion    enoxaparin (LOVENOX) injection 40 mg       DDX: Drug overdose versus electrolyte abnormality versus drug side effect versus toxic ingestion versus underlying cardiac etiology versus other    DIAGNOSTIC RESULTS / EMERGENCY DEPARTMENT COURSE / MDM     LABS:  Results for orders placed or performed during the hospital encounter of 57/50/74   Basic Metabolic Panel   Result Value Ref Range    Glucose 147 (H) 70 - 99 mg/dL    BUN 7 6 - 20 mg/dL    CREATININE 0.70 0.50 - 0.90 mg/dL    Bun/Cre Ratio NOT REPORTED 9 - 20    Calcium 8.3 (L) 8.6 - 10.4 mg/dL    Sodium 130 (L) 135 - 144 mmol/L    Potassium 2.5 (LL) 3.7 - 5.3 mmol/L    Chloride 86 (L) 98 - 107 mmol/L    CO2 26 20 - 31 mmol/L    Anion Gap 18 (H) 9 - 17 mmol/L    GFR Non-African American >60 >60 mL/min    GFR African American >60 >60 mL/min    GFR Comment          GFR Staging NOT REPORTED    CBC Auto Differential   Result Value Ref Range    WBC 9.5 3.5 - 11.3 k/uL    RBC 4.36 3.95 - 5.11 m/uL    Hemoglobin 12.6 11.9 - 15.1 g/dL    Hematocrit 37.6 36.3 - 47.1 %    MCV 86.2 82.6 - 102.9 fL    MCH 28.9 25.2 - 33.5 pg    MCHC 33.5 28.4 - 34.8 g/dL    RDW 13.8 11.8 - 14.4 %    Platelets 549 691 - 657 k/uL    MPV 10.6 8.1 - 13.5 fL    NRBC Automated 0.0 0.0 per 100 WBC    Differential Type NOT REPORTED     Seg Neutrophils 76 (H) 36 - 65 %    Lymphocytes 14 Urine Hgb NEGATIVE NEGATIVE    pH, UA 6.0 5.0 - 8.0    Protein, UA NEGATIVE NEGATIVE    Urobilinogen, Urine Normal Normal    Nitrite, Urine POSITIVE (A) NEGATIVE    Leukocyte Esterase, Urine MODERATE (A) NEGATIVE    Urinalysis Comments NOT REPORTED    Urine Drug Screen   Result Value Ref Range    Amphetamine Screen, Ur NEGATIVE NEGATIVE    Barbiturate Screen, Ur NEGATIVE NEGATIVE    Benzodiazepine Screen, Urine NEGATIVE NEGATIVE    Cocaine Metabolite, Urine POSITIVE (A) NEGATIVE    Methadone Screen, Urine NEGATIVE NEGATIVE    Opiates, Urine NEGATIVE NEGATIVE    Phencyclidine, Urine NEGATIVE NEGATIVE    Propoxyphene, Urine NOT REPORTED NEGATIVE    Cannabinoid Scrn, Ur NEGATIVE NEGATIVE    Oxycodone Screen, Ur NEGATIVE NEGATIVE    Methamphetamine, Urine NOT REPORTED NEGATIVE    Tricyclic Antidepressants, Urine NOT REPORTED NEGATIVE    MDMA, Urine NOT REPORTED NEGATIVE    Buprenorphine Urine NOT REPORTED NEGATIVE    Test Information       Assay provides medical screening only. The absence of expected drug(s) and/or metabolite(s) may indicate diluted or adulterated urine, limitations of testing or timing of collection. Microscopic Urinalysis   Result Value Ref Range    -          WBC, UA 20 TO 50 0 - 5 /HPF    RBC, UA None 0 - 2 /HPF    Casts UA HYALINE 0 - 2 /LPF    Casts UA 5 TO 10 0 - 2 /LPF    Crystals UA NOT REPORTED None /HPF    Epithelial Cells UA 5 TO 10 0 - 5 /HPF    Renal Epithelial, Urine NOT REPORTED 0 /HPF    Bacteria, UA MANY (A) None    Mucus, UA 1+ (A) None    Trichomonas, UA NOT REPORTED None    Amorphous, UA NOT REPORTED None    Other Observations UA NOT REPORTED NOT REQ.     Yeast, UA NOT REPORTED None   EKG 12 Lead   Result Value Ref Range    Ventricular Rate 74 BPM    Atrial Rate 74 BPM    P-R Interval 154 ms    QRS Duration 94 ms    Q-T Interval 466 ms    QTc Calculation (Bazett) 517 ms    P Axis 43 degrees    R Axis 74 degrees    T Axis 60 degrees         RADIOLOGY:  Ct Head Wo Segments: no acute change  T Waves: no acute change  Q Waves: none    Clinical Impression: prolonged QTc interval at [place]      All EKG's are interpreted by the Emergency Department Physician who either signs or Co-signs this chart in the absence of a cardiologist.    EMERGENCY DEPARTMENT COURSE:  Patient found resting comfortably in bed, no acute distress, not ill or toxic appearing. Poor historian and does not participate in exam.  Patient redirects and avoids answering the question as far as what substances she has taken with her only answer being \"they are prescribed to me\". She does not elaborate further and declines taking any medications mentioned by name. Review of the PDMP shows multiple narcotic and sedative prescriptions including a current lorazepam, pregabalin, and gabapentin prescriptions which are filled regularly as well as a remote history of tramadol. High suspicion for ingestion of unreported substance especially given her regain of consciousness after Narcan. Especially high suspicion for methadone or tramadol given QTC of 688. Performed chest x-ray as she is status post compressions, extensive lab work-up including electrolytes, lactate, ammonia, CBC, tox screen, and drugs of abuse for work-up. 2 g magnesium IV for prolonged QTC. Crash cart to bedside for significance and concern given prolonged QTC. Will not obtain cardiology consult at this time as QTC prolongation is likely secondary to drug use, plan to consult if QTC does not improve after interventions and time. Lactic acid of 4.4 however there is no concern for infectious etiology, 1 L fluid bolus running. Labs notable for potassium of 2.5, replenished with 40 mEq p.o. and 40 mEq via IV. Admit to [facility] for hypokalemia and prolonged QTC. While awaiting bed daughter arrived with patient's medications from home. Notable for a 30-day lorazepam prescription that was filled 3 days ago and is now empty.   Daughter notes patient's preferred modality of getting high is a combination of benzos and tizanidine with and without trazodone. Patient remained stable while in the emergency department and subsequently transferred to [facility] stepdown bed. PROCEDURES:  None    CONSULTS:  IP CONSULT TO HOSPITALIST    CRITICAL CARE:  None    FINAL IMPRESSION      1.  Hypokalemia          DISPOSITION / PLAN     DISPOSITION admit to stepdown unit under [facility]      PATIENT REFERRED TO:  Rafa Dobbs, [address]  [phone]            DISCHARGE MEDICATIONS:  Current Discharge Medication List          Lady Barry MD  Emergency Medicine Resident    (Please note that portions of thisnote were completed with a voice recognition program.  Efforts were made to edit the dictations but occasionally words are mis-transcribed.)        Lady Barry MD  Resident  02/15/20 6477

## 2020-02-14 NOTE — ED NOTES
Pt brought in via [facility]. Pt found unresponsive by family member, called 46 and he began going cpr. First responders arrived pt with weak to no pulse, 2mg IN narcan given 1 round of cpr, I0 placed, pt given 2mg narcan via IO. Pt woke up became alert, spontaneous breathing. Upon arrival pt alert, complaints of chest pain, left leg pain, and throat pain. Pt denies taking any extra medications. Pt family member at bedside.       Nicko Herron RN  02/14/20 5908

## 2020-02-14 NOTE — ED NOTES
Bed: 15  Expected date:   Expected time:   Means of arrival:   Comments:  damon [address], RN  02/14/20 1800

## 2020-02-15 ENCOUNTER — APPOINTMENT (OUTPATIENT)
Dept: GENERAL RADIOLOGY | Age: 55
DRG: 817 | End: 2020-02-15
Payer: MEDICARE

## 2020-02-15 PROBLEM — F31.9 BIPOLAR DISORDER, UNSPECIFIED (HCC): Status: ACTIVE | Noted: 2020-02-15

## 2020-02-15 PROBLEM — J96.01 ACUTE RESPIRATORY FAILURE WITH HYPOXIA (HCC): Status: ACTIVE | Noted: 2020-02-15

## 2020-02-15 PROBLEM — T50.901A ACCIDENTAL DRUG OVERDOSE: Status: ACTIVE | Noted: 2020-02-15

## 2020-02-15 PROBLEM — F10.121 ALCOHOL INTOXICATION DELIRIUM (HCC): Status: ACTIVE | Noted: 2020-02-15

## 2020-02-15 PROBLEM — F14.10 COCAINE ABUSE (HCC): Status: ACTIVE | Noted: 2020-02-15

## 2020-02-15 PROBLEM — N30.00 ACUTE CYSTITIS WITHOUT HEMATURIA: Status: ACTIVE | Noted: 2020-02-15

## 2020-02-15 PROBLEM — R94.31 PROLONGED Q-T INTERVAL ON ECG: Status: ACTIVE | Noted: 2020-02-15

## 2020-02-15 PROBLEM — F19.10 POLYSUBSTANCE ABUSE (HCC): Status: ACTIVE | Noted: 2020-02-15

## 2020-02-15 LAB
-: NORMAL
ANION GAP SERPL CALCULATED.3IONS-SCNC: 19 MMOL/L (ref 9–17)
BUN BLDV-MCNC: 5 MG/DL (ref 6–20)
BUN/CREAT BLD: ABNORMAL (ref 9–20)
CALCIUM SERPL-MCNC: 8.1 MG/DL (ref 8.6–10.4)
CHLORIDE BLD-SCNC: 89 MMOL/L (ref 98–107)
CO2: 24 MMOL/L (ref 20–31)
CREAT SERPL-MCNC: 0.59 MG/DL (ref 0.5–0.9)
D-DIMER QUANTITATIVE: 2.63 MG/L FEU
EKG ATRIAL RATE: 70 BPM
EKG ATRIAL RATE: 71 BPM
EKG ATRIAL RATE: 74 BPM
EKG P AXIS: 43 DEGREES
EKG P AXIS: 44 DEGREES
EKG P AXIS: 48 DEGREES
EKG P-R INTERVAL: 154 MS
EKG P-R INTERVAL: 154 MS
EKG P-R INTERVAL: 166 MS
EKG Q-T INTERVAL: 466 MS
EKG Q-T INTERVAL: 486 MS
EKG Q-T INTERVAL: 634 MS
EKG QRS DURATION: 110 MS
EKG QRS DURATION: 94 MS
EKG QRS DURATION: 94 MS
EKG QTC CALCULATION (BAZETT): 517 MS
EKG QTC CALCULATION (BAZETT): 524 MS
EKG QTC CALCULATION (BAZETT): 688 MS
EKG R AXIS: 73 DEGREES
EKG R AXIS: 74 DEGREES
EKG R AXIS: 78 DEGREES
EKG T AXIS: 51 DEGREES
EKG T AXIS: 55 DEGREES
EKG T AXIS: 60 DEGREES
EKG VENTRICULAR RATE: 70 BPM
EKG VENTRICULAR RATE: 71 BPM
EKG VENTRICULAR RATE: 74 BPM
GFR AFRICAN AMERICAN: >60 ML/MIN
GFR NON-AFRICAN AMERICAN: >60 ML/MIN
GFR SERPL CREATININE-BSD FRML MDRD: ABNORMAL ML/MIN/{1.73_M2}
GFR SERPL CREATININE-BSD FRML MDRD: ABNORMAL ML/MIN/{1.73_M2}
GLUCOSE BLD-MCNC: 88 MG/DL (ref 70–99)
GLUCOSE BLD-MCNC: 97 MG/DL (ref 65–105)
HCT VFR BLD CALC: 35 % (ref 36.3–47.1)
HEMOGLOBIN: 11.3 G/DL (ref 11.9–15.1)
INR BLD: 1
LACTIC ACID, SEPSIS WHOLE BLOOD: 2.8 MMOL/L (ref 0.5–1.9)
LACTIC ACID, SEPSIS: ABNORMAL MMOL/L (ref 0.5–1.9)
MAGNESIUM: 2.5 MG/DL (ref 1.6–2.6)
MCH RBC QN AUTO: 28.2 PG (ref 25.2–33.5)
MCHC RBC AUTO-ENTMCNC: 32.3 G/DL (ref 28.4–34.8)
MCV RBC AUTO: 87.3 FL (ref 82.6–102.9)
MYOGLOBIN: 113 NG/ML (ref 25–58)
MYOGLOBIN: 155 NG/ML (ref 25–58)
MYOGLOBIN: 87 NG/ML (ref 25–58)
NRBC AUTOMATED: 0 PER 100 WBC
OSMOLALITY URINE: 415 MOSM/KG (ref 80–1300)
PDW BLD-RTO: 13.8 % (ref 11.8–14.4)
PLATELET # BLD: 267 K/UL (ref 138–453)
PMV BLD AUTO: 10.7 FL (ref 8.1–13.5)
POTASSIUM SERPL-SCNC: 3 MMOL/L (ref 3.7–5.3)
PROTHROMBIN TIME: 10.4 SEC (ref 9–12)
RBC # BLD: 4.01 M/UL (ref 3.95–5.11)
REASON FOR REJECTION: NORMAL
SERUM OSMOLALITY: 276 MOSM/KG (ref 275–295)
SODIUM BLD-SCNC: 132 MMOL/L (ref 135–144)
SODIUM BLD-SCNC: 132 MMOL/L (ref 135–144)
SODIUM BLD-SCNC: 133 MMOL/L (ref 135–144)
SODIUM BLD-SCNC: 133 MMOL/L (ref 135–144)
SODIUM,UR: 141 MMOL/L
TROPONIN INTERP: ABNORMAL
TROPONIN T: ABNORMAL NG/ML
TROPONIN, HIGH SENSITIVITY: 14 NG/L (ref 0–14)
TROPONIN, HIGH SENSITIVITY: 15 NG/L (ref 0–14)
TROPONIN, HIGH SENSITIVITY: 16 NG/L (ref 0–14)
WBC # BLD: 7.2 K/UL (ref 3.5–11.3)
ZZ NTE CLEAN UP: ORDERED TEST: NORMAL
ZZ NTE WITH NAME CLEAN UP: SPECIMEN SOURCE: NORMAL

## 2020-02-15 PROCEDURE — 2060000000 HC ICU INTERMEDIATE R&B

## 2020-02-15 PROCEDURE — 6360000002 HC RX W HCPCS: Performed by: NURSE PRACTITIONER

## 2020-02-15 PROCEDURE — 36415 COLL VENOUS BLD VENIPUNCTURE: CPT

## 2020-02-15 PROCEDURE — 93005 ELECTROCARDIOGRAM TRACING: CPT | Performed by: FAMILY MEDICINE

## 2020-02-15 PROCEDURE — 94640 AIRWAY INHALATION TREATMENT: CPT

## 2020-02-15 PROCEDURE — 2580000003 HC RX 258: Performed by: FAMILY MEDICINE

## 2020-02-15 PROCEDURE — 85610 PROTHROMBIN TIME: CPT

## 2020-02-15 PROCEDURE — 85379 FIBRIN DEGRADATION QUANT: CPT

## 2020-02-15 PROCEDURE — 6360000002 HC RX W HCPCS: Performed by: FAMILY MEDICINE

## 2020-02-15 PROCEDURE — 84484 ASSAY OF TROPONIN QUANT: CPT

## 2020-02-15 PROCEDURE — 97162 PT EVAL MOD COMPLEX 30 MIN: CPT

## 2020-02-15 PROCEDURE — 94760 N-INVAS EAR/PLS OXIMETRY 1: CPT

## 2020-02-15 PROCEDURE — 2700000000 HC OXYGEN THERAPY PER DAY

## 2020-02-15 PROCEDURE — 6370000000 HC RX 637 (ALT 250 FOR IP): Performed by: NURSE PRACTITIONER

## 2020-02-15 PROCEDURE — 80048 BASIC METABOLIC PNL TOTAL CA: CPT

## 2020-02-15 PROCEDURE — 84300 ASSAY OF URINE SODIUM: CPT

## 2020-02-15 PROCEDURE — 83874 ASSAY OF MYOGLOBIN: CPT

## 2020-02-15 PROCEDURE — 97542 WHEELCHAIR MNGMENT TRAINING: CPT

## 2020-02-15 PROCEDURE — 93005 ELECTROCARDIOGRAM TRACING: CPT | Performed by: EMERGENCY MEDICINE

## 2020-02-15 PROCEDURE — 99223 1ST HOSP IP/OBS HIGH 75: CPT | Performed by: FAMILY MEDICINE

## 2020-02-15 PROCEDURE — 83935 ASSAY OF URINE OSMOLALITY: CPT

## 2020-02-15 PROCEDURE — 85027 COMPLETE CBC AUTOMATED: CPT

## 2020-02-15 PROCEDURE — 84295 ASSAY OF SERUM SODIUM: CPT

## 2020-02-15 PROCEDURE — 2580000003 HC RX 258: Performed by: NURSE PRACTITIONER

## 2020-02-15 PROCEDURE — 82947 ASSAY GLUCOSE BLOOD QUANT: CPT

## 2020-02-15 PROCEDURE — 71045 X-RAY EXAM CHEST 1 VIEW: CPT

## 2020-02-15 PROCEDURE — 93005 ELECTROCARDIOGRAM TRACING: CPT | Performed by: NURSE PRACTITIONER

## 2020-02-15 PROCEDURE — 83605 ASSAY OF LACTIC ACID: CPT

## 2020-02-15 PROCEDURE — 83735 ASSAY OF MAGNESIUM: CPT

## 2020-02-15 RX ORDER — 0.9 % SODIUM CHLORIDE 0.9 %
500 INTRAVENOUS SOLUTION INTRAVENOUS ONCE
Status: COMPLETED | OUTPATIENT
Start: 2020-02-15 | End: 2020-02-15

## 2020-02-15 RX ORDER — MAGNESIUM SULFATE 1 G/100ML
1 INJECTION INTRAVENOUS ONCE
Status: COMPLETED | OUTPATIENT
Start: 2020-02-15 | End: 2020-02-15

## 2020-02-15 RX ORDER — KETOROLAC TROMETHAMINE 15 MG/ML
15 INJECTION, SOLUTION INTRAMUSCULAR; INTRAVENOUS ONCE
Status: COMPLETED | OUTPATIENT
Start: 2020-02-15 | End: 2020-02-15

## 2020-02-15 RX ADMIN — CEFTRIAXONE SODIUM 1 G: 1 INJECTION, POWDER, FOR SOLUTION INTRAMUSCULAR; INTRAVENOUS at 05:00

## 2020-02-15 RX ADMIN — IPRATROPIUM BROMIDE AND ALBUTEROL SULFATE 3 ML: .5; 3 SOLUTION RESPIRATORY (INHALATION) at 11:05

## 2020-02-15 RX ADMIN — SODIUM CHLORIDE, PRESERVATIVE FREE 10 ML: 5 INJECTION INTRAVENOUS at 08:27

## 2020-02-15 RX ADMIN — IPRATROPIUM BROMIDE AND ALBUTEROL SULFATE 3 ML: .5; 3 SOLUTION RESPIRATORY (INHALATION) at 20:10

## 2020-02-15 RX ADMIN — AMPICILLIN SODIUM AND SULBACTAM SODIUM 1.5 G: 1; .5 INJECTION, POWDER, FOR SOLUTION INTRAMUSCULAR; INTRAVENOUS at 19:41

## 2020-02-15 RX ADMIN — OMEPRAZOLE 40 MG: 20 CAPSULE, DELAYED RELEASE ORAL at 08:27

## 2020-02-15 RX ADMIN — SODIUM CHLORIDE: 9 INJECTION, SOLUTION INTRAVENOUS at 00:21

## 2020-02-15 RX ADMIN — ENOXAPARIN SODIUM 40 MG: 40 INJECTION SUBCUTANEOUS at 08:27

## 2020-02-15 RX ADMIN — IPRATROPIUM BROMIDE AND ALBUTEROL SULFATE 3 ML: .5; 3 SOLUTION RESPIRATORY (INHALATION) at 15:52

## 2020-02-15 RX ADMIN — POTASSIUM CHLORIDE 10 MEQ: 7.46 INJECTION, SOLUTION INTRAVENOUS at 16:58

## 2020-02-15 RX ADMIN — MAGNESIUM SULFATE HEPTAHYDRATE 1 G: 1 INJECTION, SOLUTION INTRAVENOUS at 16:55

## 2020-02-15 RX ADMIN — BUDESONIDE AND FORMOTEROL FUMARATE DIHYDRATE 2 PUFF: 160; 4.5 AEROSOL RESPIRATORY (INHALATION) at 07:37

## 2020-02-15 RX ADMIN — OMEPRAZOLE 40 MG: 20 CAPSULE, DELAYED RELEASE ORAL at 00:20

## 2020-02-15 RX ADMIN — OMEPRAZOLE 40 MG: 20 CAPSULE, DELAYED RELEASE ORAL at 22:51

## 2020-02-15 RX ADMIN — BUDESONIDE AND FORMOTEROL FUMARATE DIHYDRATE 2 PUFF: 160; 4.5 AEROSOL RESPIRATORY (INHALATION) at 20:10

## 2020-02-15 RX ADMIN — POTASSIUM CHLORIDE 10 MEQ: 7.46 INJECTION, SOLUTION INTRAVENOUS at 12:12

## 2020-02-15 RX ADMIN — POTASSIUM CHLORIDE 10 MEQ: 7.46 INJECTION, SOLUTION INTRAVENOUS at 08:33

## 2020-02-15 RX ADMIN — POTASSIUM CHLORIDE 10 MEQ: 7.46 INJECTION, SOLUTION INTRAVENOUS at 10:34

## 2020-02-15 RX ADMIN — ACETAMINOPHEN 650 MG: 325 TABLET ORAL at 19:52

## 2020-02-15 RX ADMIN — POTASSIUM CHLORIDE 10 MEQ: 7.46 INJECTION, SOLUTION INTRAVENOUS at 15:14

## 2020-02-15 RX ADMIN — KETOROLAC TROMETHAMINE 15 MG: 15 INJECTION, SOLUTION INTRAMUSCULAR; INTRAVENOUS at 22:51

## 2020-02-15 RX ADMIN — ACETAMINOPHEN 650 MG: 325 TABLET ORAL at 13:27

## 2020-02-15 RX ADMIN — PRAVASTATIN SODIUM 40 MG: 20 TABLET ORAL at 08:27

## 2020-02-15 RX ADMIN — SODIUM CHLORIDE 500 ML: 0.9 INJECTION, SOLUTION INTRAVENOUS at 15:30

## 2020-02-15 RX ADMIN — HYDROCHLOROTHIAZIDE 25 MG: 25 TABLET ORAL at 08:27

## 2020-02-15 RX ADMIN — SODIUM CHLORIDE, PRESERVATIVE FREE 10 ML: 5 INJECTION INTRAVENOUS at 00:22

## 2020-02-15 RX ADMIN — IPRATROPIUM BROMIDE AND ALBUTEROL SULFATE 3 ML: .5; 3 SOLUTION RESPIRATORY (INHALATION) at 07:37

## 2020-02-15 RX ADMIN — SODIUM CHLORIDE: 9 INJECTION, SOLUTION INTRAVENOUS at 08:33

## 2020-02-15 ASSESSMENT — PAIN SCALES - GENERAL
PAINLEVEL_OUTOF10: 0
PAINLEVEL_OUTOF10: 7
PAINLEVEL_OUTOF10: 8
PAINLEVEL_OUTOF10: 3
PAINLEVEL_OUTOF10: 4
PAINLEVEL_OUTOF10: 3

## 2020-02-15 ASSESSMENT — ENCOUNTER SYMPTOMS
COUGH: 0
VOMITING: 0
CHEST TIGHTNESS: 0
ABDOMINAL PAIN: 0
SHORTNESS OF BREATH: 0
CONSTIPATION: 0
WHEEZING: 0
DIARRHEA: 0
BLOOD IN STOOL: 0
NAUSEA: 0
RHINORRHEA: 0

## 2020-02-15 ASSESSMENT — PAIN DESCRIPTION - LOCATION: LOCATION: CHEST

## 2020-02-15 NOTE — ED NOTES
Lab called with critical potassium of 2.5  Dr. Venita Guajardo notified     Jonathon Mcdonadl, RN  02/14/20 1942

## 2020-02-15 NOTE — CARE COORDINATION
Met with pt after receiving a social work consult due to drug rehab. Pt was sitting up eating lunch and appeared to be alert and oriented. Pt's brother was in the room as well. Asked pt why she was here in the hospital and she said \"they said I OD'd\". Asked pt what drug she was using. She stated that it was cocaine. Pt states that only uses \"once in a while\". When asked what once in a while meant, she stated maybe once a month. She states that she doesn't remember using yesterday. Pt states that she has never been in drug rehab and states that she doesn't need it. Asked if she OD'd on purpose and she stated  \"I would never do that, I love life\". Pt refused drug/acohol resources. Pt's brother wanted to talk alone and he stated that she was at his house when she passed out. He called 911. He stated that he was not aware of her doing any drugs currently and stated that he knew that she had a problem years ago.

## 2020-02-15 NOTE — PROGRESS NOTES
Physical Therapy    Facility/Department: [address] STEPDOWN  Initial Assessment    NAME: Heaven Jaeger  : 1965  MRN: [id number]    Date of Service: 2/15/2020  Discharge Recommendations:  Patient would benefit from continued therapy after discharge      Assessment   Body structures, Functions, Activity limitations: Decreased functional mobility ; Decreased safe awareness;Decreased balance;Decreased strength  Assessment: The pt ambulated 20 ft with a RW x min assist but became somewhat agitated in the process. She could benefit from a continuation of PT following her DC  Prognosis: Good  Decision Making: Medium Complexity  PT Education: Goals;PT Role;Plan of Care  REQUIRES PT FOLLOW UP: Yes  Activity Tolerance  Activity Tolerance: Patient limited by fatigue   Patient Diagnosis(es): The encounter diagnosis was Hypokalemia. has a past medical history of Acid reflux, Anxiety, Asthma, Bipolar 1 disorder ([address] Bowel obstruction ([address] COPD (chronic obstructive pulmonary disease) ([facility] [address] Crohn disease ([facility] [address] Depression, Gout, Hypertension, and Hypothyroidism. has a past surgical history that includes Tonsillectomy and adenoidectomy; Tubal ligation; Abdomen surgery; Cholecystectomy; Bunionectomy (Left); Colonoscopy (2007); and Colonoscopy (10/12/2017). Restrictions  Position Activity Restriction  Other position/activity restrictions:  Up with assist  Vision/Hearing  Vision: Impaired  Vision Exceptions: Wears glasses for reading  Hearing: Within functional limits     Subjective  General  Patient assessed for rehabilitation services?: Yes  Response To Previous Treatment: Not applicable  Family / Caregiver Present: No  Follows Commands: Within Functional Limits  Pain Screening  Patient Currently in Pain: Yes  Pain Assessment  Pain Assessment: 0-10  Pain Level: 7  Pain Location: Chest  Vital Signs  Patient Currently in Pain: Yes     Orientation  Orientation  Overall Orientation Status: Within Functional Limits  Social/Functional History  Social/Functional History  Lives With: Alone  Type of Home: Apartment(2nd fl apt)  Home Layout: One level  Home Access: Stairs to enter with rails  Entrance Stairs - Number of Steps: 4  Entrance Stairs - Rails: Left  Bathroom Shower/Tub: Tub/Shower unit  Bathroom Toilet: Standard  Home Equipment: [employer] From: (daughter does the cooking, cleaning)  ADL Assistance: Needs assistance  Homemaking Assistance: Needs assistance  Homemaking Responsibilities: No  Ambulation Assistance: Independent  Transfer Assistance: Independent  Active : No  Patient's  Info: Daughter drives  Occupation: On disability  Leisure & Hobbies: Likes to [employer] pool  Cognition      Objective     AROM RLE (degrees)  RLE AROM: WNL  AROM LLE (degrees)  LLE AROM : WNL  AROM RUE (degrees)  RUE AROM : WNL  AROM LUE (degrees)  LUE AROM : WNL  Strength RLE  Strength RLE: WFL  Strength LLE  Strength LLE: WFL  Strength RUE  Strength RUE: WFL  Strength LUE  Strength LUE: WFL     Sensation  Overall Sensation Status: (Reports N/T in hands and feet)  Bed mobility  Supine to Sit: Minimal assistance  Sit to Supine: Minimal assistance  Scooting: Minimal assistance  Transfers  Sit to Stand: Minimal Assistance  Stand to sit: Minimal Assistance  Ambulation  Ambulation?: Yes  Ambulation 1  Surface: level tile  Device: Rolling Walker  Assistance: Minimal assistance  Distance: amb 20 ft with a RW x min assist     Balance  Posture: Good  Sitting - Static: Good  Sitting - Dynamic: Fair  Standing - Static: Poor  Standing - Dynamic: Poor      Plan   Plan  Times per week: 5-6x wk  Current Treatment Recommendations: Strengthening, Balance Training, Endurance Training, Gait Training, Functional Mobility Training, Stair training, Safety Education & Training  Safety Devices  Type of devices: Left in bed, Nurse notified, Call light within reach, Bed alarm in place, Patient at risk for falls    G-Code     OutComes Score AM-PAC Score  AM-PAC Inpatient Mobility Raw Score : 15 (02/15/20 1121)  AM-PAC Inpatient T-Scale Score : 39.45 (02/15/20 1121)  Mobility Inpatient CMS 0-100% Score: 57.7 (02/15/20 1121)  Mobility Inpatient CMS G-Code Modifier : CK (02/15/20 1121)     Goals  Short term goals  Time Frame for Short term goals: 10 visits  Short term goal 1: transfers with SBA  Short term goal 2: amb 150 ft with a RW x SBA  Short term goal 3: ascend/descend 4 steps with SBA  Short term goal 4: exercise program x SBA  Patient Goals   Patient goals : Return home    Therapy Time   Individual Concurrent Group Co-treatment   Time In 0930         Time Out 0955         Minutes 25             1 of 135 [address],

## 2020-02-15 NOTE — CARE COORDINATION
Case Management Initial Discharge Plan  Desmond Becerra,             Met with:patient to discuss discharge plans. Information verified: address, contacts, phone number, , insurance Yes  PCP: José Manuel Machado MD  Date of last visit: < year will call for follow up     Insurance Provider: yovani     Discharge Planning    Living Arrangements:  Alone, Family Members   Support Systems:  Family Members, Mor Oneil has multiple  stories  none stairs to climb to get into front door, elevator stairs to climb to reach second floor  Location of bedroom/bathroom in home main     Patient able to perform ADL's:Assisted    Current Services (outpatient & in home) none  DME equipment: cane  DME provider: na      Potential Assistance Needed:  Home Care    Patient agreeable to home care: Yes  Freedom of choice provided:  yes    Prior SNF/Rehab Placement and Facility: none  Agreeable to SNF/Rehab: Yes  [place] of choice provided: n/a   Evaluation: n/a    Expected Discharge date:     Patient expects to be discharged to:  home  Follow Up Appointment: Best Day/ Time:      Transportation provider: daughter will drive   Transportation arrangements needed for discharge: No   Readmission Risk              Risk of Unplanned Readmission:        23             Does patient have a readmission risk score greater than 14?: Yes  If yes, follow-up appointment must be made within 7 days of discharge. Goals of Care:       Discharge Plan: Plan to discharge to home with home care- [facility]. SW following for other DC options - however, patient refusing detox facility . Tele psych . [place] for  noted.   Established pcp, daughter will TP          Electronically signed by Carlos Luque RN on 2/15/20 at 1:15 PM

## 2020-02-15 NOTE — PROGRESS NOTES
[facility]      Daily Progress Note     Admit Date: 2/14/2020  Bed/Room No.  9861/0488-23  Admitting Physician : Jerri Tejeda MD  Code Status :[address] Day:  LOS: 1 day   Chief Complaint:     Chief Complaint   Patient presents with    Drug Overdose     Principal Problem:    Accidental drug overdose  Active Problems:    Hypothyroidism    Drug abuse ([facility]    Essential hypertension    Hypokalemia    Acute cystitis without hematuria    Prolonged Q-T interval on ECG    Bipolar disorder, unspecified ([facility]  Resolved Problems:    * No resolved hospital problems. *    Subjective : Interval History/Significant events :  02/15/20    Patient is c/o chest wall pain worse on deep breathing , cough and palpation . She had CPR at home . She os lethargic. Patient does not recall taking any extra medication but recalls drinking alcohol. No focal weakness. She is on oxygen by nasal canula . Patient denies any cough . She is thirsty . Vitals - Stable afebrile  Labs - Hyponatremia. Hypokalemia . Negative change in Trops. Nursing notes , Consults notes reviewed. Overnight events and updates discussed with Nursing staff . Background History:         Florencio Seth is 53 y.o. female  Who was admitted to the hospital on 2/14/2020 for treatment of Accidental drug overdose. Patient was found unresponsive by her family and called 911. CPR was given on scene by EMS as patient was not felt to have a pulse . Patient was given 2 mg Narcan, IO line was placed followed by 2 mg IO Narcan injection. Patient had successful RoSC. Patient was transported to emergency room and was alert awake on arrival.  Work-up in emergency room showed hyponatremia 130, hypokalemia 2.5, normal kidney function, lactic acid 4.4. Ethanol level was 0.054%. UA was positive for nitrites moderate leukoesterase.    Patient has underlying history of bipolar 1 disorder with depression, hypertension, Pulse Resp SpO2 Height Weight   02/15/20 0749 -- -- -- -- 15 95 % -- --   02/15/20 0741 -- -- -- -- 24 92 % -- --   02/15/20 0700 135/78 99.3 °F (37.4 °C) Oral 68 17 95 % -- --   02/15/20 0329 (!) 122/95 98.2 °F (36.8 °C) Oral 73 14 -- -- --   02/15/20 0015 118/80 98 °F (36.7 °C) Oral 73 13 -- 5' 3\" (1.6 m) 143 lb 4.8 oz (65 kg)   02/14/20 2314 -- -- -- -- 15 97 % -- --   02/14/20 2159 129/83 98 °F (36.7 °C) Oral 78 19 -- -- --   02/14/20 2047 (!) 123/91 -- -- 68 14 96 % -- --   02/14/20 2002 121/77 -- -- 65 14 95 % -- --   02/14/20 1915 106/72 -- -- 66 15 95 % -- --   02/14/20 1846 (!) 106/54 -- -- 67 16 94 % -- --   02/14/20 1837 -- -- -- -- -- -- -- 150 lb (68 kg)   02/14/20 1808 -- 97.6 °F (36.4 °C) Axillary -- -- -- -- --   02/14/20 1800 112/70 -- -- 70 16 92 % -- --     Intake / output   02/14 0701 - 02/15 0700  In: 2260 [P.O.:150; I.V.:1110]  Out: 700 [Urine:700]  Physical Exam:  Physical Exam  Vitals signs and nursing note reviewed. Constitutional:       General: She is not in acute distress. Appearance: She is ill-appearing. She is not diaphoretic. Interventions: Nasal cannula in place. HENT:      Head: Normocephalic and atraumatic. Nose:      Right Sinus: No maxillary sinus tenderness or frontal sinus tenderness. Left Sinus: No maxillary sinus tenderness or frontal sinus tenderness. Mouth/Throat:      Pharynx: No oropharyngeal exudate. Eyes:      General: No scleral icterus. Conjunctiva/sclera: Conjunctivae normal.      Pupils: Pupils are equal, round, and reactive to light. Neck:      Musculoskeletal: Full passive range of motion without pain and neck supple. Thyroid: No thyromegaly. Vascular: No JVD. Cardiovascular:      Rate and Rhythm: Normal rate and regular rhythm. Pulses:           Dorsalis pedis pulses are 2+ on the right side and 2+ on the left side. Heart sounds: Normal heart sounds. No murmur.    Pulmonary:      Effort: Pulmonary effort is normal.      Breath sounds: Normal breath sounds. No wheezing or rales. Abdominal:      Palpations: Abdomen is soft. There is no mass. Tenderness: There is no abdominal tenderness. Lymphadenopathy:      Head:      Right side of head: No submandibular adenopathy. Left side of head: No submandibular adenopathy. Cervical: No cervical adenopathy. Skin:     General: Skin is warm. Neurological:      Mental Status: She is alert and oriented to person, place, and time. Motor: No tremor. Psychiatric:         Behavior: Behavior is cooperative. Laboratory findings:    Recent Labs     02/14/20  1840 02/15/20  0644   WBC 9.5 7.2   HGB 12.6 11.3*   HCT 37.6 35.0*    267   INR 0.9 1.0     Recent Labs     02/14/20  1840 02/15/20  0006 02/15/20  0556   * 132* 132*   K 2.5*  --  3.0*   CL 86*  --  89*   CO2 26  --  24   GLUCOSE 147*  --  88   BUN 7  --  5*   CREATININE 0.70  --  0.59   MG  --   --  2.5   CALCIUM 8.3*  --  8.1*     Recent Labs     02/14/20  1840   PROT 6.4   LABALBU 3.8   AST 28   ALT 21   ALKPHOS 103   BILITOT 0.21*   BILIDIR <0.08   AMMONIA 35          Specific Gravity, UA   Date Value Ref Range Status   02/14/2020 1.012 1.005 - 1.030 Final     Protein, UA   Date Value Ref Range Status   02/14/2020 NEGATIVE NEGATIVE Final     RBC, UA   Date Value Ref Range Status   02/14/2020 None 0 - 2 /HPF Final     Bacteria, UA   Date Value Ref Range Status   02/14/2020 MANY (A) None Final     Nitrite, Urine   Date Value Ref Range Status   02/14/2020 POSITIVE (A) NEGATIVE Final     WBC, UA   Date Value Ref Range Status   02/14/2020 20 TO 50 0 - 5 /HPF Final     Leukocyte Esterase, Urine   Date Value Ref Range Status   02/14/2020 MODERATE (A) NEGATIVE Final       Imaging / Clinical Data :-   Ct Head Wo Contrast    Result Date: 2/14/2020  No acute intracranial abnormality. Xr Chest Portable    Result Date: 2/14/2020  1. No acute cardiopulmonary process.         Clinical Course : stable  Assessment and Plan  :        1. Drug overdose - unknown - Narcan used , negative Utox for opioids. showing cocaine. 2. Unresponsiveness - from intoxication . Check echo  . 3. Suicide attempt - Psychiatry consultation. 4. Bipolar disorder with depression  5. Hyponatremia - continue IVF. 6. Hypokalemia - replace K   7. Lactic acidosis - continue IV hydration . 8. Acute cystitis - empiric Rocephin. Repeat CXR       Continue to monitor vitals , Intake / output ,  Cell count , HGB , Kidney function, oxygenation  as indicated . Plan and updates discussed with patient ,  answers  explained to satisfaction.    Plan discussed with Staff dolores GUTIERREZ     (Please note that portions of this note were completed with a voice recognition program. Efforts were made to edit the dictations but occasionally words are mis-transcribed.)      Kathia Deleon MD  2/15/2020

## 2020-02-15 NOTE — ED NOTES
Report received from [address] [place] [address] [address]  All questions answered     Berry Morillo, RN  02/14/20 1502

## 2020-02-15 NOTE — PLAN OF CARE
Problem: Pain:  Goal: Pain level will decrease  Description  Pain level will decrease  Outcome: Met This Shift   Pt's pain assessed frequently with hourly rounding; assessed all pain characteristics including level, type, location, frequency, and onset. Pt medicated by RN per PRN orders. Non-pharmacologic interventions offered to pt as well. Pt states pain is tolerable at this time. Will continue to monitor. Problem: Falls - Risk of:  Goal: Will remain free from falls  Description  Will remain free from falls  Outcome: Met This Shift   Pt assessed as a fall risk this shift. Pt remains free from falls at this time. Fall precautions in place, including falling star sign on door and fall risk band on pt. Floor free from obstacles, and bed is locked and in lowest position. Adequate lighting provided. Call light within reach; pt encouraged to call before getting OOB for any need. Bed alarm activated. Will continue to monitor needs during hourly rounding. Problem: Injury - Risk of, Substance Overdose  Goal: Absence of physical injury  Description  Absence of physical injury     Outcome: Met This Shift   Pt. Came in to Ed due to possible overdose, pt. Receptive at this time to social work consult for drug rehab. Daughter also agrees this is a good idea. Daughter also agreed to dispose of unused medications properly while mother is in hospital. Education provided on how and where to do this properly.     Electronically signed by Ubaldo Hatch RN on 2/15/2020 at 3:24 AM

## 2020-02-15 NOTE — ED NOTES
Patient asleep, respirations even and non-labored  No other needs at this time  Will continue to follow plan of care  Remains on monitor     Chon Jones RN  02/14/20 2124

## 2020-02-16 LAB
ANION GAP SERPL CALCULATED.3IONS-SCNC: 11 MMOL/L (ref 9–17)
BUN BLDV-MCNC: 11 MG/DL (ref 6–20)
BUN/CREAT BLD: ABNORMAL (ref 9–20)
CALCIUM SERPL-MCNC: 8.3 MG/DL (ref 8.6–10.4)
CHLORIDE BLD-SCNC: 97 MMOL/L (ref 98–107)
CO2: 29 MMOL/L (ref 20–31)
CREAT SERPL-MCNC: 0.8 MG/DL (ref 0.5–0.9)
D-DIMER QUANTITATIVE: 1.19 MG/L FEU
GFR AFRICAN AMERICAN: >60 ML/MIN
GFR NON-AFRICAN AMERICAN: >60 ML/MIN
GFR SERPL CREATININE-BSD FRML MDRD: ABNORMAL ML/MIN/{1.73_M2}
GFR SERPL CREATININE-BSD FRML MDRD: ABNORMAL ML/MIN/{1.73_M2}
GLUCOSE BLD-MCNC: 114 MG/DL (ref 70–99)
HCT VFR BLD CALC: 34.3 % (ref 36.3–47.1)
HCT VFR BLD CALC: 36.6 % (ref 36.3–47.1)
HEMOGLOBIN: 11.3 G/DL (ref 11.9–15.1)
HEMOGLOBIN: 11.7 G/DL (ref 11.9–15.1)
LACTIC ACID, WHOLE BLOOD: 1.2 MMOL/L (ref 0.7–2.1)
MAGNESIUM: 2.2 MG/DL (ref 1.6–2.6)
MCH RBC QN AUTO: 28.3 PG (ref 25.2–33.5)
MCH RBC QN AUTO: 28.7 PG (ref 25.2–33.5)
MCHC RBC AUTO-ENTMCNC: 32 G/DL (ref 28.4–34.8)
MCHC RBC AUTO-ENTMCNC: 32.9 G/DL (ref 28.4–34.8)
MCV RBC AUTO: 87.1 FL (ref 82.6–102.9)
MCV RBC AUTO: 88.4 FL (ref 82.6–102.9)
NRBC AUTOMATED: 0 PER 100 WBC
NRBC AUTOMATED: 0 PER 100 WBC
PARTIAL THROMBOPLASTIN TIME: 19.1 SEC (ref 20.5–30.5)
PDW BLD-RTO: 14.1 % (ref 11.8–14.4)
PDW BLD-RTO: 14.2 % (ref 11.8–14.4)
PLATELET # BLD: 188 K/UL (ref 138–453)
PLATELET # BLD: 298 K/UL (ref 138–453)
PMV BLD AUTO: 11.1 FL (ref 8.1–13.5)
PMV BLD AUTO: 11.3 FL (ref 8.1–13.5)
POTASSIUM SERPL-SCNC: 3.7 MMOL/L (ref 3.7–5.3)
PROCALCITONIN: 0.09 NG/ML
RBC # BLD: 3.94 M/UL (ref 3.95–5.11)
RBC # BLD: 4.14 M/UL (ref 3.95–5.11)
SODIUM BLD-SCNC: 132 MMOL/L (ref 135–144)
SODIUM BLD-SCNC: 135 MMOL/L (ref 135–144)
SODIUM BLD-SCNC: 136 MMOL/L (ref 135–144)
SODIUM BLD-SCNC: 137 MMOL/L (ref 135–144)
WBC # BLD: 7 K/UL (ref 3.5–11.3)
WBC # BLD: 7.4 K/UL (ref 3.5–11.3)

## 2020-02-16 PROCEDURE — 84145 PROCALCITONIN (PCT): CPT

## 2020-02-16 PROCEDURE — 6370000000 HC RX 637 (ALT 250 FOR IP): Performed by: NURSE PRACTITIONER

## 2020-02-16 PROCEDURE — 85379 FIBRIN DEGRADATION QUANT: CPT

## 2020-02-16 PROCEDURE — 87040 BLOOD CULTURE FOR BACTERIA: CPT

## 2020-02-16 PROCEDURE — 94761 N-INVAS EAR/PLS OXIMETRY MLT: CPT

## 2020-02-16 PROCEDURE — 6360000002 HC RX W HCPCS: Performed by: FAMILY MEDICINE

## 2020-02-16 PROCEDURE — 84295 ASSAY OF SERUM SODIUM: CPT

## 2020-02-16 PROCEDURE — 2700000000 HC OXYGEN THERAPY PER DAY

## 2020-02-16 PROCEDURE — 83735 ASSAY OF MAGNESIUM: CPT

## 2020-02-16 PROCEDURE — 2580000003 HC RX 258: Performed by: FAMILY MEDICINE

## 2020-02-16 PROCEDURE — 94640 AIRWAY INHALATION TREATMENT: CPT

## 2020-02-16 PROCEDURE — 80048 BASIC METABOLIC PNL TOTAL CA: CPT

## 2020-02-16 PROCEDURE — 83605 ASSAY OF LACTIC ACID: CPT

## 2020-02-16 PROCEDURE — 99233 SBSQ HOSP IP/OBS HIGH 50: CPT | Performed by: FAMILY MEDICINE

## 2020-02-16 PROCEDURE — 6360000002 HC RX W HCPCS: Performed by: NURSE PRACTITIONER

## 2020-02-16 PROCEDURE — 36415 COLL VENOUS BLD VENIPUNCTURE: CPT

## 2020-02-16 PROCEDURE — 93005 ELECTROCARDIOGRAM TRACING: CPT | Performed by: FAMILY MEDICINE

## 2020-02-16 PROCEDURE — 6370000000 HC RX 637 (ALT 250 FOR IP): Performed by: FAMILY MEDICINE

## 2020-02-16 PROCEDURE — 2060000000 HC ICU INTERMEDIATE R&B

## 2020-02-16 PROCEDURE — 85027 COMPLETE CBC AUTOMATED: CPT

## 2020-02-16 PROCEDURE — 85730 THROMBOPLASTIN TIME PARTIAL: CPT

## 2020-02-16 RX ORDER — LORAZEPAM 2 MG/ML
0.5 INJECTION INTRAMUSCULAR ONCE
Status: COMPLETED | OUTPATIENT
Start: 2020-02-16 | End: 2020-02-16

## 2020-02-16 RX ORDER — LIDOCAINE 4 G/G
1 PATCH TOPICAL DAILY
Status: DISCONTINUED | OUTPATIENT
Start: 2020-02-16 | End: 2020-02-21 | Stop reason: HOSPADM

## 2020-02-16 RX ORDER — HEPARIN SODIUM 10000 [USP'U]/100ML
18 INJECTION, SOLUTION INTRAVENOUS CONTINUOUS
Status: DISCONTINUED | OUTPATIENT
Start: 2020-02-16 | End: 2020-02-17

## 2020-02-16 RX ORDER — HEPARIN SODIUM 1000 [USP'U]/ML
80 INJECTION, SOLUTION INTRAVENOUS; SUBCUTANEOUS ONCE
Status: COMPLETED | OUTPATIENT
Start: 2020-02-16 | End: 2020-02-16

## 2020-02-16 RX ORDER — TRAMADOL HYDROCHLORIDE 50 MG/1
50 TABLET ORAL EVERY 4 HOURS PRN
Status: DISCONTINUED | OUTPATIENT
Start: 2020-02-16 | End: 2020-02-21 | Stop reason: HOSPADM

## 2020-02-16 RX ORDER — HEPARIN SODIUM 1000 [USP'U]/ML
40 INJECTION, SOLUTION INTRAVENOUS; SUBCUTANEOUS PRN
Status: DISCONTINUED | OUTPATIENT
Start: 2020-02-16 | End: 2020-02-17

## 2020-02-16 RX ORDER — TRAMADOL HYDROCHLORIDE 50 MG/1
50 TABLET ORAL EVERY 4 HOURS PRN
Status: DISCONTINUED | OUTPATIENT
Start: 2020-02-16 | End: 2020-02-16

## 2020-02-16 RX ORDER — KETOROLAC TROMETHAMINE 30 MG/ML
30 INJECTION, SOLUTION INTRAMUSCULAR; INTRAVENOUS ONCE
Status: DISCONTINUED | OUTPATIENT
Start: 2020-02-16 | End: 2020-02-16

## 2020-02-16 RX ORDER — HEPARIN SODIUM 1000 [USP'U]/ML
80 INJECTION, SOLUTION INTRAVENOUS; SUBCUTANEOUS PRN
Status: DISCONTINUED | OUTPATIENT
Start: 2020-02-16 | End: 2020-02-17

## 2020-02-16 RX ADMIN — AMPICILLIN SODIUM AND SULBACTAM SODIUM 1.5 G: 1; .5 INJECTION, POWDER, FOR SOLUTION INTRAMUSCULAR; INTRAVENOUS at 02:22

## 2020-02-16 RX ADMIN — AMPICILLIN SODIUM AND SULBACTAM SODIUM 1.5 G: 1; .5 INJECTION, POWDER, FOR SOLUTION INTRAMUSCULAR; INTRAVENOUS at 09:47

## 2020-02-16 RX ADMIN — IPRATROPIUM BROMIDE AND ALBUTEROL SULFATE 3 ML: .5; 3 SOLUTION RESPIRATORY (INHALATION) at 11:01

## 2020-02-16 RX ADMIN — OMEPRAZOLE 40 MG: 20 CAPSULE, DELAYED RELEASE ORAL at 09:03

## 2020-02-16 RX ADMIN — ACETAMINOPHEN 650 MG: 325 TABLET ORAL at 22:03

## 2020-02-16 RX ADMIN — ENOXAPARIN SODIUM 40 MG: 40 INJECTION SUBCUTANEOUS at 09:03

## 2020-02-16 RX ADMIN — LORAZEPAM 0.5 MG: 2 INJECTION INTRAMUSCULAR; INTRAVENOUS at 20:07

## 2020-02-16 RX ADMIN — TRAMADOL HYDROCHLORIDE 50 MG: 50 TABLET, FILM COATED ORAL at 22:03

## 2020-02-16 RX ADMIN — HEPARIN SODIUM 5200 UNITS: 1000 INJECTION INTRAVENOUS; SUBCUTANEOUS at 19:10

## 2020-02-16 RX ADMIN — ACETAMINOPHEN 650 MG: 325 TABLET ORAL at 05:57

## 2020-02-16 RX ADMIN — AMPICILLIN AND SULBACTAM 1.5 G: 1; .5 INJECTION, POWDER, FOR SOLUTION INTRAMUSCULAR; INTRAVENOUS at 17:01

## 2020-02-16 RX ADMIN — PRAVASTATIN SODIUM 40 MG: 20 TABLET ORAL at 09:03

## 2020-02-16 RX ADMIN — IPRATROPIUM BROMIDE AND ALBUTEROL SULFATE 3 ML: .5; 3 SOLUTION RESPIRATORY (INHALATION) at 15:22

## 2020-02-16 RX ADMIN — AMPICILLIN AND SULBACTAM 1.5 G: 1; .5 INJECTION, POWDER, FOR SOLUTION INTRAMUSCULAR; INTRAVENOUS at 22:24

## 2020-02-16 RX ADMIN — MAGNESIUM HYDROXIDE 30 ML: 400 SUSPENSION ORAL at 09:12

## 2020-02-16 RX ADMIN — HEPARIN SODIUM AND DEXTROSE 18 UNITS/KG/HR: 10000; 5 INJECTION INTRAVENOUS at 19:10

## 2020-02-16 RX ADMIN — IPRATROPIUM BROMIDE AND ALBUTEROL SULFATE 3 ML: .5; 3 SOLUTION RESPIRATORY (INHALATION) at 07:23

## 2020-02-16 RX ADMIN — TRAMADOL HYDROCHLORIDE 50 MG: 50 TABLET, FILM COATED ORAL at 15:41

## 2020-02-16 RX ADMIN — BUDESONIDE AND FORMOTEROL FUMARATE DIHYDRATE 2 PUFF: 160; 4.5 AEROSOL RESPIRATORY (INHALATION) at 07:23

## 2020-02-16 RX ADMIN — TRAMADOL HYDROCHLORIDE 50 MG: 50 TABLET, FILM COATED ORAL at 09:12

## 2020-02-16 ASSESSMENT — ENCOUNTER SYMPTOMS
NAUSEA: 0
DIARRHEA: 0
ABDOMINAL PAIN: 0
CHEST TIGHTNESS: 0
CONSTIPATION: 0
VOMITING: 0
SHORTNESS OF BREATH: 0
COUGH: 0
RHINORRHEA: 0
BLOOD IN STOOL: 0
WHEEZING: 0

## 2020-02-16 ASSESSMENT — PAIN SCALES - GENERAL
PAINLEVEL_OUTOF10: 6
PAINLEVEL_OUTOF10: 6
PAINLEVEL_OUTOF10: 3
PAINLEVEL_OUTOF10: 6
PAINLEVEL_OUTOF10: 6

## 2020-02-16 NOTE — PLAN OF CARE
Problem: Pain:  Goal: Pain level will decrease  Description  Pain level will decrease  Outcome: Ongoing  Note:   Pain level assessment complete. Pt rated pain at 8/10. Pt educated on pain scale and control interventions. PRN pain medication given per pt request. Pt VU to call out c new onset of pain or unrelieved pain. Will continue to monitor. Problem: Falls - Risk of:  Goal: Absence of physical injury  Description  Absence of physical injury     Absence of physical injury  Outcome: Met This Shift  Note:   Fall risk precautions in place. Bed in lowest position with wheels locked, bed alarm in place and activated, orange blanket on bed, non-skid socks on pt, fall risk id on pt, call light in reach, pt encouraged to call before getting out of bed and for any other needs or c/o.

## 2020-02-16 NOTE — PROGRESS NOTES
leukoesterase. Patient has underlying history of bipolar 1 disorder with depression, hypertension, hypothyroidism, COPD, Crohn's disease, anxiety disorder. PMH:  Past Medical History:   Diagnosis Date    Acid reflux     Anxiety     Asthma     Bipolar 1 disorder (HCC)     Bowel obstruction (HCC)     COPD (chronic obstructive pulmonary disease) (HCC)     Crohn disease (HCC)     Depression     Gout     Hypertension     Hypothyroidism       Allergies: No Known Allergies   Medications :  ampicillin-sulbactam, 1.5 g, Intravenous, Q6H  lidocaine, 5 mL, Other, Once  FLUoxetine, 60 mg, Oral, Daily  [Held by provider] hydrochlorothiazide, 25 mg, Oral, Daily  ipratropium-albuterol, 3 mL, Inhalation, 4x daily  budesonide-formoterol, 2 puff, Inhalation, BID  omeprazole, 40 mg, Oral, BID  pravastatin, 40 mg, Oral, Daily  sodium chloride flush, 10 mL, Intravenous, 2 times per day  enoxaparin, 40 mg, Subcutaneous, Daily        Review of Systems   Review of Systems   Constitutional: Positive for activity change and fatigue. Negative for appetite change, fever and unexpected weight change. HENT: Negative for congestion, rhinorrhea and sneezing. Eyes: Negative for visual disturbance. Respiratory: Negative for cough, chest tightness, shortness of breath and wheezing. Cardiovascular: Positive for chest pain. Negative for palpitations. Gastrointestinal: Negative for abdominal pain, blood in stool, constipation, diarrhea, nausea and vomiting. Genitourinary: Negative for dysuria, enuresis, frequency and hematuria. Musculoskeletal: Negative for arthralgias and myalgias. Skin: Negative for rash. Neurological: Negative for dizziness, weakness, light-headedness and headaches. Hematological: Does not bruise/bleed easily. Psychiatric/Behavioral: Negative for decreased concentration, dysphoric mood and sleep disturbance.      Objective :      Current Vitals : Temp: 98.8 °F (37.1 °C),  Pulse: 80, Resp: 14, BP: 119/74, SpO2: 95 %  Last 24 Hrs Vitals   Patient Vitals for the past 24 hrs:   BP Temp Temp src Pulse Resp SpO2   02/16/20 0730 -- -- -- -- 14 95 %   02/16/20 0723 -- -- -- 80 20 94 %   02/16/20 0250 119/74 98.8 °F (37.1 °C) Oral 80 17 91 %   02/15/20 2300 115/81 98.3 °F (36.8 °C) Oral 79 22 (!) 89 %   02/15/20 2013 -- -- -- -- 12 96 %   02/15/20 1918 117/69 98 °F (36.7 °C) Oral 75 18 96 %   02/15/20 1554 -- -- -- -- 15 94 %   02/15/20 1506 (!) 90/53 99.1 °F (37.3 °C) Temporal 77 17 93 %   02/15/20 1239 (!) 95/57 99.2 °F (37.3 °C) Oral 85 18 94 %   02/15/20 1106 -- -- -- -- 17 94 %     Intake / output   02/15 0701 - 02/16 0700  In: 2350 [P.O.:850; I.V.:1500]  Out: 2100 [Urine:2100]  Physical Exam:  Physical Exam  Vitals signs and nursing note reviewed. Constitutional:       General: She is not in acute distress. Appearance: She is ill-appearing. She is not diaphoretic. Interventions: Nasal cannula in place. HENT:      Head: Normocephalic and atraumatic. Nose:      Right Sinus: No maxillary sinus tenderness or frontal sinus tenderness. Left Sinus: No maxillary sinus tenderness or frontal sinus tenderness. Mouth/Throat:      Pharynx: No oropharyngeal exudate. Eyes:      General: No scleral icterus. Conjunctiva/sclera: Conjunctivae normal.      Pupils: Pupils are equal, round, and reactive to light. Neck:      Musculoskeletal: Full passive range of motion without pain and neck supple. Thyroid: No thyromegaly. Vascular: No JVD. Cardiovascular:      Rate and Rhythm: Normal rate and regular rhythm. Pulses:           Dorsalis pedis pulses are 2+ on the right side and 2+ on the left side. Heart sounds: Normal heart sounds. No murmur. Pulmonary:      Effort: Pulmonary effort is normal.      Breath sounds: Normal breath sounds. No wheezing or rales. Abdominal:      Palpations: Abdomen is soft. There is no mass. Tenderness: There is no abdominal tenderness. Lymphadenopathy:      Head:      Right side of head: No submandibular adenopathy. Left side of head: No submandibular adenopathy. Cervical: No cervical adenopathy. Skin:     General: Skin is warm. Neurological:      Mental Status: She is alert and oriented to person, place, and time. Motor: No tremor. Psychiatric:         Behavior: Behavior is cooperative. Laboratory findings:    Recent Labs     02/14/20  1840 02/15/20  0644 02/16/20  0622   WBC 9.5 7.2 7.0   HGB 12.6 11.3* 11.3*   HCT 37.6 35.0* 34.3*    267 298   INR 0.9 1.0  --      Recent Labs     02/14/20  1840  02/15/20  0556  02/15/20  1612 02/16/20  0049 02/16/20  0622   *   < > 132*   < > 133* 132* 137   K 2.5*  --  3.0*  --   --   --  3.7   CL 86*  --  89*  --   --   --  97*   CO2 26  --  24  --   --   --  29   GLUCOSE 147*  --  88  --   --   --  114*   BUN 7  --  5*  --   --   --  11   CREATININE 0.70  --  0.59  --   --   --  0.80   MG  --   --  2.5  --   --   --  2.2   CALCIUM 8.3*  --  8.1*  --   --   --  8.3*    < > = values in this interval not displayed. Recent Labs     02/14/20  1840   PROT 6.4   LABALBU 3.8   AST 28   ALT 21   ALKPHOS 103   BILITOT 0.21*   BILIDIR <0.08   AMMONIA 35          Specific Gravity, UA   Date Value Ref Range Status   02/14/2020 1.012 1.005 - 1.030 Final     Protein, UA   Date Value Ref Range Status   02/14/2020 NEGATIVE NEGATIVE Final     RBC, UA   Date Value Ref Range Status   02/14/2020 None 0 - 2 /HPF Final     Bacteria, UA   Date Value Ref Range Status   02/14/2020 MANY (A) None Final     Nitrite, Urine   Date Value Ref Range Status   02/14/2020 POSITIVE (A) NEGATIVE Final     WBC, UA   Date Value Ref Range Status   02/14/2020 20 TO 50 0 - 5 /HPF Final     Leukocyte Esterase, Urine   Date Value Ref Range Status   02/14/2020 MODERATE (A) NEGATIVE Final       Imaging / Clinical Data :-   Ct Head Wo Contrast    Result Date: 2/14/2020  No acute intracranial abnormality.

## 2020-02-17 ENCOUNTER — APPOINTMENT (OUTPATIENT)
Dept: CT IMAGING | Age: 55
DRG: 817 | End: 2020-02-17
Payer: MEDICARE

## 2020-02-17 ENCOUNTER — APPOINTMENT (OUTPATIENT)
Dept: GENERAL RADIOLOGY | Age: 55
DRG: 817 | End: 2020-02-17
Payer: MEDICARE

## 2020-02-17 LAB
ANION GAP SERPL CALCULATED.3IONS-SCNC: 15 MMOL/L (ref 9–17)
BNP INTERPRETATION: ABNORMAL
BUN BLDV-MCNC: 8 MG/DL (ref 6–20)
BUN/CREAT BLD: ABNORMAL (ref 9–20)
CALCIUM SERPL-MCNC: 8.6 MG/DL (ref 8.6–10.4)
CHLORIDE BLD-SCNC: 98 MMOL/L (ref 98–107)
CO2: 25 MMOL/L (ref 20–31)
CREAT SERPL-MCNC: 0.5 MG/DL (ref 0.5–0.9)
EKG ATRIAL RATE: 74 BPM
EKG ATRIAL RATE: 76 BPM
EKG ATRIAL RATE: 77 BPM
EKG ATRIAL RATE: 82 BPM
EKG ATRIAL RATE: 82 BPM
EKG P AXIS: 14 DEGREES
EKG P AXIS: 43 DEGREES
EKG P AXIS: 51 DEGREES
EKG P AXIS: 55 DEGREES
EKG P AXIS: 57 DEGREES
EKG P-R INTERVAL: 134 MS
EKG P-R INTERVAL: 138 MS
EKG P-R INTERVAL: 140 MS
EKG P-R INTERVAL: 142 MS
EKG P-R INTERVAL: 158 MS
EKG Q-T INTERVAL: 434 MS
EKG Q-T INTERVAL: 446 MS
EKG Q-T INTERVAL: 490 MS
EKG Q-T INTERVAL: 494 MS
EKG Q-T INTERVAL: 532 MS
EKG QRS DURATION: 90 MS
EKG QRS DURATION: 90 MS
EKG QRS DURATION: 92 MS
EKG QRS DURATION: 96 MS
EKG QRS DURATION: 96 MS
EKG QTC CALCULATION (BAZETT): 504 MS
EKG QTC CALCULATION (BAZETT): 507 MS
EKG QTC CALCULATION (BAZETT): 551 MS
EKG QTC CALCULATION (BAZETT): 577 MS
EKG QTC CALCULATION (BAZETT): 590 MS
EKG R AXIS: 66 DEGREES
EKG R AXIS: 70 DEGREES
EKG R AXIS: 73 DEGREES
EKG R AXIS: 74 DEGREES
EKG R AXIS: 79 DEGREES
EKG T AXIS: 48 DEGREES
EKG T AXIS: 51 DEGREES
EKG T AXIS: 52 DEGREES
EKG T AXIS: 52 DEGREES
EKG T AXIS: 57 DEGREES
EKG VENTRICULAR RATE: 74 BPM
EKG VENTRICULAR RATE: 76 BPM
EKG VENTRICULAR RATE: 77 BPM
EKG VENTRICULAR RATE: 82 BPM
EKG VENTRICULAR RATE: 82 BPM
GFR AFRICAN AMERICAN: >60 ML/MIN
GFR NON-AFRICAN AMERICAN: >60 ML/MIN
GFR SERPL CREATININE-BSD FRML MDRD: ABNORMAL ML/MIN/{1.73_M2}
GFR SERPL CREATININE-BSD FRML MDRD: ABNORMAL ML/MIN/{1.73_M2}
GLUCOSE BLD-MCNC: 120 MG/DL (ref 70–99)
HCT VFR BLD CALC: 38.1 % (ref 36.3–47.1)
HEMOGLOBIN: 12.1 G/DL (ref 11.9–15.1)
LV EF: 65 %
LVEF MODALITY: NORMAL
MAGNESIUM: 2.3 MG/DL (ref 1.6–2.6)
MCH RBC QN AUTO: 27.9 PG (ref 25.2–33.5)
MCHC RBC AUTO-ENTMCNC: 31.8 G/DL (ref 28.4–34.8)
MCV RBC AUTO: 88 FL (ref 82.6–102.9)
NRBC AUTOMATED: 0 PER 100 WBC
PARTIAL THROMBOPLASTIN TIME: 35.4 SEC (ref 20.5–30.5)
PARTIAL THROMBOPLASTIN TIME: 40 SEC (ref 20.5–30.5)
PARTIAL THROMBOPLASTIN TIME: 44.5 SEC (ref 20.5–30.5)
PDW BLD-RTO: 14 % (ref 11.8–14.4)
PLATELET # BLD: 294 K/UL (ref 138–453)
PMV BLD AUTO: 10.4 FL (ref 8.1–13.5)
POTASSIUM SERPL-SCNC: 3.4 MMOL/L (ref 3.7–5.3)
PRO-BNP: 496 PG/ML
RBC # BLD: 4.33 M/UL (ref 3.95–5.11)
SODIUM BLD-SCNC: 134 MMOL/L (ref 135–144)
SODIUM BLD-SCNC: 138 MMOL/L (ref 135–144)
SODIUM BLD-SCNC: 140 MMOL/L (ref 135–144)
WBC # BLD: 9.2 K/UL (ref 3.5–11.3)

## 2020-02-17 PROCEDURE — 90792 PSYCH DIAG EVAL W/MED SRVCS: CPT | Performed by: NURSE PRACTITIONER

## 2020-02-17 PROCEDURE — 6360000002 HC RX W HCPCS: Performed by: NURSE PRACTITIONER

## 2020-02-17 PROCEDURE — 93010 ELECTROCARDIOGRAM REPORT: CPT | Performed by: INTERNAL MEDICINE

## 2020-02-17 PROCEDURE — 6360000004 HC RX CONTRAST MEDICATION: Performed by: FAMILY MEDICINE

## 2020-02-17 PROCEDURE — 99232 SBSQ HOSP IP/OBS MODERATE 35: CPT | Performed by: FAMILY MEDICINE

## 2020-02-17 PROCEDURE — 2580000003 HC RX 258: Performed by: FAMILY MEDICINE

## 2020-02-17 PROCEDURE — 83735 ASSAY OF MAGNESIUM: CPT

## 2020-02-17 PROCEDURE — 94761 N-INVAS EAR/PLS OXIMETRY MLT: CPT

## 2020-02-17 PROCEDURE — 93005 ELECTROCARDIOGRAM TRACING: CPT | Performed by: FAMILY MEDICINE

## 2020-02-17 PROCEDURE — 6370000000 HC RX 637 (ALT 250 FOR IP): Performed by: NURSE PRACTITIONER

## 2020-02-17 PROCEDURE — 85730 THROMBOPLASTIN TIME PARTIAL: CPT

## 2020-02-17 PROCEDURE — 97535 SELF CARE MNGMENT TRAINING: CPT

## 2020-02-17 PROCEDURE — 94640 AIRWAY INHALATION TREATMENT: CPT

## 2020-02-17 PROCEDURE — 85027 COMPLETE CBC AUTOMATED: CPT

## 2020-02-17 PROCEDURE — 83880 ASSAY OF NATRIURETIC PEPTIDE: CPT

## 2020-02-17 PROCEDURE — 80048 BASIC METABOLIC PNL TOTAL CA: CPT

## 2020-02-17 PROCEDURE — 97166 OT EVAL MOD COMPLEX 45 MIN: CPT

## 2020-02-17 PROCEDURE — 6360000002 HC RX W HCPCS: Performed by: FAMILY MEDICINE

## 2020-02-17 PROCEDURE — 6370000000 HC RX 637 (ALT 250 FOR IP): Performed by: FAMILY MEDICINE

## 2020-02-17 PROCEDURE — 2700000000 HC OXYGEN THERAPY PER DAY

## 2020-02-17 PROCEDURE — 36415 COLL VENOUS BLD VENIPUNCTURE: CPT

## 2020-02-17 PROCEDURE — 93306 TTE W/DOPPLER COMPLETE: CPT

## 2020-02-17 PROCEDURE — 97530 THERAPEUTIC ACTIVITIES: CPT

## 2020-02-17 PROCEDURE — 2580000003 HC RX 258: Performed by: NURSE PRACTITIONER

## 2020-02-17 PROCEDURE — 84295 ASSAY OF SERUM SODIUM: CPT

## 2020-02-17 PROCEDURE — 71260 CT THORAX DX C+: CPT

## 2020-02-17 PROCEDURE — 71045 X-RAY EXAM CHEST 1 VIEW: CPT

## 2020-02-17 PROCEDURE — 2060000000 HC ICU INTERMEDIATE R&B

## 2020-02-17 RX ORDER — KETOROLAC TROMETHAMINE 15 MG/ML
15 INJECTION, SOLUTION INTRAMUSCULAR; INTRAVENOUS ONCE
Status: COMPLETED | OUTPATIENT
Start: 2020-02-17 | End: 2020-02-17

## 2020-02-17 RX ORDER — LORAZEPAM 2 MG/ML
0.5 INJECTION INTRAMUSCULAR ONCE
Status: COMPLETED | OUTPATIENT
Start: 2020-02-17 | End: 2020-02-17

## 2020-02-17 RX ORDER — AMOXICILLIN AND CLAVULANATE POTASSIUM 875; 125 MG/1; MG/1
1 TABLET, FILM COATED ORAL EVERY 12 HOURS SCHEDULED
Status: DISCONTINUED | OUTPATIENT
Start: 2020-02-17 | End: 2020-02-21

## 2020-02-17 RX ORDER — BUPROPION HYDROCHLORIDE 100 MG/1
100 TABLET ORAL 2 TIMES DAILY
Status: DISCONTINUED | OUTPATIENT
Start: 2020-02-17 | End: 2020-02-21 | Stop reason: HOSPADM

## 2020-02-17 RX ORDER — LORAZEPAM 0.5 MG/1
0.5 TABLET ORAL EVERY 4 HOURS PRN
Status: DISCONTINUED | OUTPATIENT
Start: 2020-02-17 | End: 2020-02-21 | Stop reason: HOSPADM

## 2020-02-17 RX ORDER — LISINOPRIL 10 MG/1
10 TABLET ORAL DAILY
Status: DISCONTINUED | OUTPATIENT
Start: 2020-02-17 | End: 2020-02-21 | Stop reason: HOSPADM

## 2020-02-17 RX ORDER — GUAIFENESIN 600 MG/1
600 TABLET, EXTENDED RELEASE ORAL 2 TIMES DAILY
Status: DISCONTINUED | OUTPATIENT
Start: 2020-02-17 | End: 2020-02-21 | Stop reason: HOSPADM

## 2020-02-17 RX ADMIN — IPRATROPIUM BROMIDE AND ALBUTEROL SULFATE 3 ML: .5; 3 SOLUTION RESPIRATORY (INHALATION) at 09:42

## 2020-02-17 RX ADMIN — TRAMADOL HYDROCHLORIDE 50 MG: 50 TABLET, FILM COATED ORAL at 06:52

## 2020-02-17 RX ADMIN — LISINOPRIL 10 MG: 10 TABLET ORAL at 09:48

## 2020-02-17 RX ADMIN — FLUOXETINE HYDROCHLORIDE 60 MG: 20 CAPSULE ORAL at 06:56

## 2020-02-17 RX ADMIN — PRAVASTATIN SODIUM 40 MG: 20 TABLET ORAL at 09:51

## 2020-02-17 RX ADMIN — BUPROPION HYDROCHLORIDE 100 MG: 100 TABLET, FILM COATED ORAL at 21:04

## 2020-02-17 RX ADMIN — IOVERSOL 75 ML: 741 INJECTION INTRA-ARTERIAL; INTRAVENOUS at 08:31

## 2020-02-17 RX ADMIN — BUDESONIDE AND FORMOTEROL FUMARATE DIHYDRATE 2 PUFF: 160; 4.5 AEROSOL RESPIRATORY (INHALATION) at 20:33

## 2020-02-17 RX ADMIN — TRAMADOL HYDROCHLORIDE 50 MG: 50 TABLET, FILM COATED ORAL at 16:47

## 2020-02-17 RX ADMIN — BUDESONIDE AND FORMOTEROL FUMARATE DIHYDRATE 2 PUFF: 160; 4.5 AEROSOL RESPIRATORY (INHALATION) at 09:43

## 2020-02-17 RX ADMIN — AMOXICILLIN AND CLAVULANATE POTASSIUM 1 TABLET: 875; 125 TABLET, FILM COATED ORAL at 12:00

## 2020-02-17 RX ADMIN — OMEPRAZOLE 40 MG: 20 CAPSULE, DELAYED RELEASE ORAL at 09:48

## 2020-02-17 RX ADMIN — LORAZEPAM 0.5 MG: 2 INJECTION INTRAMUSCULAR; INTRAVENOUS at 01:32

## 2020-02-17 RX ADMIN — HEPARIN SODIUM AND DEXTROSE 20 UNITS/KG/HR: 10000; 5 INJECTION INTRAVENOUS at 05:32

## 2020-02-17 RX ADMIN — LORAZEPAM 0.5 MG: 0.5 TABLET ORAL at 23:53

## 2020-02-17 RX ADMIN — HEPARIN SODIUM 2600 UNITS: 1000 INJECTION INTRAVENOUS; SUBCUTANEOUS at 05:32

## 2020-02-17 RX ADMIN — IPRATROPIUM BROMIDE AND ALBUTEROL SULFATE 3 ML: .5; 3 SOLUTION RESPIRATORY (INHALATION) at 16:41

## 2020-02-17 RX ADMIN — GUAIFENESIN 600 MG: 600 TABLET, EXTENDED RELEASE ORAL at 21:04

## 2020-02-17 RX ADMIN — TRAMADOL HYDROCHLORIDE 50 MG: 50 TABLET, FILM COATED ORAL at 02:50

## 2020-02-17 RX ADMIN — KETOROLAC TROMETHAMINE 15 MG: 15 INJECTION, SOLUTION INTRAMUSCULAR; INTRAVENOUS at 13:13

## 2020-02-17 RX ADMIN — IPRATROPIUM BROMIDE AND ALBUTEROL SULFATE 3 ML: .5; 3 SOLUTION RESPIRATORY (INHALATION) at 12:40

## 2020-02-17 RX ADMIN — AMOXICILLIN AND CLAVULANATE POTASSIUM 1 TABLET: 875; 125 TABLET, FILM COATED ORAL at 21:04

## 2020-02-17 RX ADMIN — GUAIFENESIN 600 MG: 600 TABLET, EXTENDED RELEASE ORAL at 12:00

## 2020-02-17 RX ADMIN — POTASSIUM BICARBONATE 40 MEQ: 782 TABLET, EFFERVESCENT ORAL at 06:52

## 2020-02-17 RX ADMIN — AMPICILLIN AND SULBACTAM 1.5 G: 1; .5 INJECTION, POWDER, FOR SOLUTION INTRAMUSCULAR; INTRAVENOUS at 04:47

## 2020-02-17 RX ADMIN — IPRATROPIUM BROMIDE AND ALBUTEROL SULFATE 3 ML: .5; 3 SOLUTION RESPIRATORY (INHALATION) at 20:32

## 2020-02-17 RX ADMIN — SODIUM CHLORIDE, PRESERVATIVE FREE 10 ML: 5 INJECTION INTRAVENOUS at 21:05

## 2020-02-17 RX ADMIN — SODIUM CHLORIDE, PRESERVATIVE FREE 10 ML: 5 INJECTION INTRAVENOUS at 09:51

## 2020-02-17 RX ADMIN — OMEPRAZOLE 40 MG: 20 CAPSULE, DELAYED RELEASE ORAL at 21:04

## 2020-02-17 RX ADMIN — ENOXAPARIN SODIUM 40 MG: 40 INJECTION SUBCUTANEOUS at 12:00

## 2020-02-17 ASSESSMENT — PAIN DESCRIPTION - DESCRIPTORS
DESCRIPTORS: DISCOMFORT
DESCRIPTORS: DISCOMFORT

## 2020-02-17 ASSESSMENT — ENCOUNTER SYMPTOMS
NAUSEA: 0
DIARRHEA: 0
VOMITING: 0
CONSTIPATION: 0
COUGH: 0
WHEEZING: 0
CHEST TIGHTNESS: 0
RHINORRHEA: 0
BLOOD IN STOOL: 0
SHORTNESS OF BREATH: 0
ABDOMINAL PAIN: 0

## 2020-02-17 ASSESSMENT — PAIN DESCRIPTION - LOCATION
LOCATION: CHEST
LOCATION: CHEST

## 2020-02-17 ASSESSMENT — PAIN SCALES - GENERAL
PAINLEVEL_OUTOF10: 10
PAINLEVEL_OUTOF10: 10
PAINLEVEL_OUTOF10: 9
PAINLEVEL_OUTOF10: 9
PAINLEVEL_OUTOF10: 4
PAINLEVEL_OUTOF10: 9
PAINLEVEL_OUTOF10: 8

## 2020-02-17 ASSESSMENT — PAIN - FUNCTIONAL ASSESSMENT: PAIN_FUNCTIONAL_ASSESSMENT: ACTIVITIES ARE NOT PREVENTED

## 2020-02-17 ASSESSMENT — PAIN DESCRIPTION - PAIN TYPE
TYPE: ACUTE PAIN
TYPE: ACUTE PAIN

## 2020-02-17 NOTE — VIRTUAL HEALTH
Consults     Admission Date: 02/14/2020  Consultation Date: 02/17/2020    Consulting Physician: Franca  Consult Reason: Drug Overdose   Chief Complaint: I have bipolar disorder.       Patient is a 53year old female who presented to the hospital following an overdose. Family found her unresponsive and called 911. She was given naloxone 6mg and regained consciousness. She was described as confused. Methadone and Tramadol were suspected. A friend reported she was prescribed pain medications but patient denied this. ETOH detected, troponins, elevated, prolonged QT of 688 recorded, UTI, and electrolyte imbalances were noted. Urine drug screen negative for opioids however cocaine was present. Patient met with  on the 15th, denied suicidal ideations stating I love life.  Her family reported past substance use but was not aware of recent substance use per brothers report. RN is present in room during assessment and the patients daughter left the room for the interview. On examination today the patient states, You already know, why are you asking me these questions?  Patient is paranoid, and comments on distrust with video interview technology. Patient makes frequent contraindications. She states she has never had formal mental health treatment then states she has been in group therapy recently. She also tells me she hears voices that tell her, inappropriate things. When I ask for more details a few minutes later she states, [facility] do you know I hear voices?  She comments about recent social stressors as well including the loss of her boyfriend to suicide in May in addition to financial challenges. Patient hints at mood fluctuations from radhika to depression with, Well what do you think, you already know.  Patient shifts uncomfortable in bed and the more I press for details the more upset she becomes.      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 The notes indicate some alteration in mentation and delirium cannot be entirely ruled out. The patient is notably concrete as well. Patient is evasive, paranoid, and inconsistent in her reports. She hints at suicidal ideation and clearly reports auditory hallucinations that bother her. Plan:      1.) I recommend guard/patient sitter for 24 hours. Patient was upset following interview and could not contract for safety. Patient may attempt to up the ante. If patient can demonstrate a capacity to cope with unpleasant feelings/thoughts without trying to hurt herself may discontinue this order in 24 hours. 2.) Start Ativan 1mg TID, PRN, for agitation/anxiety. Patient regularly fills Ativan each month and is likely dependent. I am unsure how much the patient has been drinking as well nor can she be trusted with her reports at this time. Ativan will cover for potential withdrawal from alcohol. This will also help her cope with the anxieties of her compromised physical and mental states. 3.) Patient will require admission to psychiatry once medically cleared. Patient with suicidal ideations, AH, and concrete thought processing placing her at high risk of potential harm to self or others. Patient is not agreeable to voluntary admission and the involuntary process should be started.    4.) Will defer start of psychiatric medications to

## 2020-02-17 NOTE — PROGRESS NOTES
Smoking Cessation - topics covered   []  Health Risks  []  Benefits of Quitting   []  Smoking Cessation  []  Patient has no history of tobacco use per note in significant history. []  Patient is former smoker per note in significant history. Patient quit in   []  No need for tobacco cessation education. []  Booklet given  []  Patient verbalizes understanding. []  Patient denies need for tobacco cessation education. [x]  Unable to meet with patient today. Will follow up as able.   Rivera Naylor  2:38 PM

## 2020-02-17 NOTE — PROGRESS NOTES
Occupational Therapy   Occupational Therapy Initial Assessment  Date: 2020   Patient Name: Roger Alexis  MRN: [id number]     : 1965    Date of Service: 2020    Discharge Recommendations:    Further therapy recommended at discharge. Pt to require continued therapy at discharge to increase safety and independence with functional ADLs. Pt would be unsafe to return to living independently at this time d/t functional deficits. Assessment   Performance deficits / Impairments: Decreased functional mobility ; Decreased ADL status; Decreased safe awareness;Decreased cognition;Decreased endurance;Decreased balance;Decreased high-level IADLs;Decreased posture  Assessment: Pt agreeable to OT eval this PM. Pt demo intermittent confusion throughout session. Pt completed rolling in bed for brief change and bottom hygiene this date. Pt completed bed mobility with Min A. Pt completed functional transfers and functional mobility with use of RW and Min A throughout d/t pt unsteadiness. Pt retired to stretcher to be taken to echo. Pt to require continued OT services at this time to increase safety and improve ADL function. Prognosis: Good  Decision Making: Medium Complexity  Patient Education: Pt educated on OT role, OT POC, safety awareness, and importance of participation in therapy. Pt demo good return  REQUIRES OT FOLLOW UP: Yes  Activity Tolerance  Activity Tolerance: Patient Tolerated treatment well;Treatment limited secondary to decreased cognition  Safety Devices  Safety Devices in place: Yes  Type of devices: Gait belt(pt on stretcher heading to echo at end of session)  Restraints  Initially in place: No           Patient Diagnosis(es): The encounter diagnosis was Hypokalemia.      has a past medical history of Acid reflux, Anxiety, Asthma, Bipolar 1 disorder ([facility] Bowel obstruction ([facility] COPD (chronic obstructive pulmonary disease) ([facility] Crohn disease ([facility] Depression, Gout, Hypertension, and Hypothyroidism. has a past surgical history that includes Tonsillectomy and adenoidectomy; Tubal ligation; Abdomen surgery; Cholecystectomy; Bunionectomy (Left); Colonoscopy (04/30/2007); and Colonoscopy (10/12/2017). Restrictions  Restrictions/Precautions  Required Braces or Orthoses?: No  Position Activity Restriction  Other position/activity restrictions: Up with assist    Subjective   General  Patient assessed for rehabilitation services?: Yes  Family / Caregiver Present: No  General Comment  Comments: RN ok'd pt for therapy this PM. Pt agreeable to session with encouragement and cooperative throughout  Patient Currently in Pain: Yes  Pain Assessment  Pain Assessment: 0-10  Pain Level: 9  Pain Type: Acute pain  Pain Location: Chest  Pain Descriptors: Discomfort  Functional Pain Assessment: Activities are not prevented  Non-Pharmaceutical Pain Intervention(s): Ambulation/Increased Activity; Distraction; Therapeutic presence;Repositioned  Response to Pain Intervention: Patient Satisfied  Pre Treatment Pain Screening  Intervention List: Patient able to continue with treatment  O2 Device: Nasal cannula  O2 Flow Rate (L/min): 4 L/min    Social/Functional History  Social/Functional History  Lives With: Alone  Type of Home: Apartment  Home Layout: One level  Home Access: Stairs to enter with rails  Entrance Stairs - Number of Steps: 4  Entrance Stairs - Rails: Left  Bathroom Shower/Tub: Tub/Shower unit  Bathroom Toilet: Standard  Home Equipment: Cane(pt reports has straight cane and quad cane)  Receives Help From: Family(daughter)  ADL Assistance: Needs assistance  Homemaking Assistance: Needs assistance  Homemaking Responsibilities: Yes  Ambulation Assistance: Independent  Transfer Assistance: Independent  Active : No  Patient's  Info: Daughter drives  Occupation: On disability  Leisure & Hobbies: Likes to [employer] pool  Additional Comments: pt reports daughter assists with homemaking tasks and ADLs PRN       Objective   Vision: Impaired  Vision Exceptions: Wears glasses for reading  Hearing: Within functional limits    Orientation  Overall Orientation Status: Impaired(intermittent confusion throughout session)    Balance  Sitting Balance: Stand by assistance  Standing Balance: Contact guard assistance  Functional Mobility  Functional - Mobility Device: Rolling Walker  Activity: Other  Assist Level: Minimal assistance  Functional Mobility Comments: Pt completed functional mobility from bed > transport stretcher with Min A as pt is unsteady when upright     ADL  Feeding: Independent;Setup  Grooming: Independent;Setup  UE Bathing: Minimal assistance;Setup  LE Bathing: Setup; Moderate assistance  UE Dressing: Minimal assistance;Setup  LE Dressing: Setup; Moderate assistance  Toileting: Minimal assistance  Tone RUE  RUE Tone: Normotonic  Tone LUE  LUE Tone: Normotonic  Coordination  Movements Are Fluid And Coordinated: Yes    Bed mobility  Supine to Sit: Minimal assistance  Sit to Supine: Minimal assistance  Scooting: Minimal assistance  Comment: Min A for trunk progression; HOB elevated throughout  Transfers  Sit to stand: Contact guard assistance  Stand to sit: Contact guard assistance  Transfer Comments: with RW    Cognition  Overall Cognitive Status: Exceptions  Following Commands: Follows multistep commands with increased time; Follows multistep commands with repitition  Attention Span: Attends with cues to redirect  Safety Judgement: Decreased awareness of need for assistance;Decreased awareness of need for safety  Problem Solving: Assistance required to identify errors made;Assistance required to correct errors made  Insights: Decreased awareness of deficits  Initiation: Requires cues for some  Sequencing: Requires cues for some       Sensation  Overall Sensation Status: Impaired(pt states numbness/tingling in hands and feet)        LUE AROM (degrees)  LUE AROM : WFL  Left Hand AROM (degrees)  Left Hand AROM:

## 2020-02-17 NOTE — PLAN OF CARE
Problem: Pain:  Goal: Pain level will decrease  Description  Pain level will decrease  2/17/2020 0326 by Maritza Lambert RN  Outcome: Ongoing  2/16/2020 1657 by Lakshmi Elizabeth RN  Outcome: Met This Shift  Goal: Control of acute pain  Description  Control of acute pain  2/17/2020 0326 by Maritza Lambert RN  Outcome: Ongoing  2/16/2020 1657 by Lakshmi Elizabeth RN  Outcome: Met This Shift  Goal: Control of chronic pain  Description  Control of chronic pain  Outcome: Ongoing     Problem: Injury - Risk of, Substance Overdose  Goal: Absence of physical injury  Description  Absence of physical injury     Absence of physical injury  Outcome: Ongoing     Problem: Falls - Risk of:  Goal: Absence of physical injury  Description  Absence of physical injury     Absence of physical injury  Outcome: Ongoing  Goal: Will remain free from falls  Description  Will remain free from falls  Outcome: Ongoing     Problem: Musculor/Skeletal Functional Status  Goal: Highest potential functional level  Outcome: Ongoing

## 2020-02-17 NOTE — VIRTUAL HEALTH
Inpatient consult to Psychiatry  Consult performed by: SHRADDHA Ramirez CNP  Consult ordered by: Satish Bernstein MD  Reason for consult: Drug overdose        Patient Location:  [place] [address]    Provider Location ([facility]):   [place], [place]      Department of Psychiatry  Consult Service  Attending Physician Psychiatric Assessment      Thank you very much for allowing us to participate in the care of this patient. Reason for Consult:  Drug overdose      Unable to complete consult--pt is at testing. Thank you very much for allowing us to participate in the care of this patient. Time spent > 60 min. Physicians Signature:  Electronically signed by SHRADDHA Ramirez CNP on 2/17/2020 at 2:01 PM.    Dragon voice recognition software used in portions of this document. This virtual visit was conducted via interactive/real-time audio/video.

## 2020-02-17 NOTE — PLAN OF CARE
Problem: Pain:  Goal: Pain level will decrease  Description  Pain level will decrease  2/17/2020 1039 by Marlo Palmer RN  Outcome: Ongoing  2/17/2020 0326 by Freddy Rosales RN  Outcome: Ongoing  Goal: Control of acute pain  Description  Control of acute pain  2/17/2020 1039 by Marlo Palmer RN  Outcome: Ongoing  2/17/2020 0326 by Freddy Rosales RN  Outcome: Ongoing  Goal: Control of chronic pain  Description  Control of chronic pain  2/17/2020 1039 by Marlo Palmer RN  Outcome: Ongoing  2/17/2020 0326 by Freddy Rosales RN  Outcome: Ongoing     Problem: Injury - Risk of, Substance Overdose  Goal: Absence of physical injury  Description  Absence of physical injury     Absence of physical injury  2/17/2020 1039 by Marlo Palmer RN  Outcome: Ongoing  2/17/2020 0326 by Freddy Rosales RN  Outcome: Ongoing     Problem: Falls - Risk of:  Goal: Absence of physical injury  Description  Absence of physical injury     Absence of physical injury  2/17/2020 1039 by Marlo Palmer RN  Outcome: Ongoing  2/17/2020 0326 by Freddy Rosales RN  Outcome: Ongoing  Goal: Will remain free from falls  Description  Will remain free from falls  2/17/2020 1039 by Marlo Palmer RN  Outcome: Ongoing  2/17/2020 0326 by Freddy Rosales RN  Outcome: Ongoing     Problem: Musculor/Skeletal Functional Status  Goal: Highest potential functional level  2/17/2020 1039 by Marlo Palmer RN  Outcome: Ongoing  2/17/2020 0326 by Freddy Rosales RN  Outcome: Ongoing

## 2020-02-17 NOTE — CARE COORDINATION
Transitional plannign/ Spoke with pt, daughter and daughters ex boyfriend. Pt didn't seem to remember [address] and neither did daughter but all agreed home care would be a good idea since the daughter works varied hours and is afraid of a fall or pt needing assistance. Home care list given but will follow for possible I admission.  Fei still needs to evaluate

## 2020-02-17 NOTE — PROGRESS NOTES
Patient transported to [address] [place] [address] [address] room 2012. All questions were answered.

## 2020-02-17 NOTE — PROGRESS NOTES
upon PT arrival, cooperative throughout, demonstrating intermittent confusion throughout  Pain Screening  Patient Currently in Pain: Yes  Pain Assessment  Pain Assessment: 0-10  Pain Level: 9  Pain Type: Acute pain  Pain Location: Chest  Pain Descriptors: Discomfort  Non-Pharmaceutical Pain Intervention(s): Ambulation/Increased Activity  Response to Pain Intervention: Patient Satisfied  Multiple Pain Sites: No  Vital Signs  Patient Currently in Pain: Yes       Orientation  Orientation  Overall Orientation Status: Within Functional Limits  Cognition   Cognition  Overall Cognitive Status: Exceptions  Following Commands: Follows multistep commands with increased time; Follows multistep commands with repitition  Attention Span: Attends with cues to redirect  Safety Judgement: Decreased awareness of need for assistance;Decreased awareness of need for safety  Problem Solving: Assistance required to identify errors made;Assistance required to correct errors made  Insights: Decreased awareness of deficits  Initiation: Requires cues for some  Sequencing: Requires cues for some  Objective   Bed mobility  Rolling to Left: Minimal assistance  Rolling to Right: Minimal assistance  Supine to Sit: Minimal assistance  Sit to Supine: Minimal assistance  Scooting: Minimal assistance  Comment: Assistance provided for trunk progression,  Transfers  Sit to Stand: Minimal Assistance  Stand to sit: Minimal Assistance  Comment: STS performed with UE support on RW. Pt required VC's for hand placement throughotu functional mobility with fair return demo   Ambulation  Ambulation?: Yes  Ambulation 1  Surface: level tile  Device: Rolling Walker  Assistance: Contact guard assistance;Minimal assistance  Quality of Gait: Forward flexed posture; high reliance on RW; increased bilateral knee flexion  Gait Deviations: Slow [place];Decreased step length  Distance: 30ft  Comments: Ambulation distance limited due to pt leaving with in hospital transport. Pt reports \"I feel weak\". Stairs/Curb  Stairs?: No     Balance  Posture: Good  Sitting - Static: Good  Sitting - Dynamic: Fair;+  Standing - Static: Fair;-  Standing - Dynamic: Poor;+  Comments: Standing balance assessed w/ RW       Exercises deferred d/t pt leaving with in hospital transport.      Goals  Short term goals  Time Frame for Short term goals: 10 visits  Short term goal 1: transfers with SBA  Short term goal 2: amb 150 ft with a RW x SBA  Short term goal 3: ascend/descend 4 steps with SBA  Short term goal 4: exercise program x SBA  Patient Goals   Patient goals : Return home    Plan    Plan  Times per week: 5-6x wk  Current Treatment Recommendations: Strengthening, Balance Training, Endurance Training, Gait Training, Functional Mobility Training, Stair training, Safety Education & Training  Safety Devices  Type of devices: Left in bed, Patient at risk for falls, Nurse notified(Left with in hospital transport for testing)     Therapy Time   Individual Concurrent Group Co-treatment   Time In 1336         Time Out 1353         Minutes 17         Timed Code Treatment Minutes: 8 Minutes       Kelsie Pollard, PT

## 2020-02-17 NOTE — PROGRESS NOTES
[facility]      Daily Progress Note     Admit Date: 2/14/2020  Bed/Room No.  2012/2012-01  Admitting Physician : Josey Reddy MD  Code Status :[address] Day:  LOS: 3 days   Chief Complaint:     Chief Complaint   Patient presents with    Drug Overdose     Principal Problem:    Accidental drug overdose  Active Problems:    Hypothyroidism    Drug abuse ([address]    Essential hypertension    Hypokalemia    Acute cystitis without hematuria    Prolonged Q-T interval on ECG    Bipolar disorder, unspecified ([address]    Polysubstance abuse ([address]    Alcohol intoxication delirium ([address]    Cocaine abuse ([address]    Acute respiratory failure with hypoxia ([address]  Resolved Problems:    * No resolved hospital problems. *    Subjective : Interval History/Significant events :  02/17/20    Patient continues to c/o chest wall pain , worse on palpation. Tmax 100.1 last night . She continues to have cough with clear sputum. Patient is on supplemental oxygen. She is eating and drinking OK. She c/o chronic back pain. Vitals - Stable afebrile  Labs - Hypokalemia . Negative change in [place]. CT chest negative for PE     Nursing notes , Consults notes reviewed. Overnight events and updates discussed with Nursing staff . Background History:         Han Neves is 53 y.o. female  Who was admitted to the hospital on 2/14/2020 for treatment of Accidental drug overdose. Patient was found unresponsive by her family and called 911. CPR was given on scene by EMS as patient was not felt to have a pulse . Patient was given 2 mg Narcan, IO line was placed followed by 2 mg IO Narcan injection. Patient had successful RoSC. Patient was transported to emergency room and was alert awake on arrival.  Work-up in emergency room showed hyponatremia 130, hypokalemia 2.5, normal kidney function, lactic acid 4.4. Ethanol level was 0.054%. UA was positive for nitrites moderate leukoesterase.    Patient has submandibular adenopathy. Cervical: No cervical adenopathy. Skin:     General: Skin is warm. Neurological:      Mental Status: She is alert and oriented to person, place, and time. Motor: No tremor. Psychiatric:         Behavior: Behavior is cooperative. Laboratory findings:    Recent Labs     02/14/20  1840 02/15/20  0644 02/16/20  0622 02/16/20  1747 02/17/20  0400   WBC 9.5 7.2 7.0 7.4 9.2   HGB 12.6 11.3* 11.3* 11.7* 12.1   HCT 37.6 35.0* 34.3* 36.6 38.1    267 298 188 294   INR 0.9 1.0  --   --   --      Recent Labs     02/15/20  0556  02/16/20  0622  02/16/20 1747 02/17/20  0040 02/17/20  0400   *   < > 137   < > 135 134* 138   K 3.0*  --  3.7  --   --   --  3.4*   CL 89*  --  97*  --   --   --  98   CO2 24  --  29  --   --   --  25   GLUCOSE 88  --  114*  --   --   --  120*   BUN 5*  --  11  --   --   --  8   CREATININE 0.59  --  0.80  --   --   --  0.50   MG 2.5  --  2.2  --   --   --  2.3   CALCIUM 8.1*  --  8.3*  --   --   --  8.6    < > = values in this interval not displayed. Recent Labs     02/14/20  1840   PROT 6.4   LABALBU 3.8   AST 28   ALT 21   ALKPHOS 103   BILITOT 0.21*   BILIDIR <0.08   AMMONIA 35          Specific Gravity, UA   Date Value Ref Range Status   02/14/2020 1.012 1.005 - 1.030 Final     Protein, UA   Date Value Ref Range Status   02/14/2020 NEGATIVE NEGATIVE Final     RBC, UA   Date Value Ref Range Status   02/14/2020 None 0 - 2 /HPF Final     Bacteria, UA   Date Value Ref Range Status   02/14/2020 MANY (A) None Final     Nitrite, Urine   Date Value Ref Range Status   02/14/2020 POSITIVE (A) NEGATIVE Final     WBC, UA   Date Value Ref Range Status   02/14/2020 20 TO 50 0 - 5 /HPF Final     Leukocyte Esterase, Urine   Date Value Ref Range Status   02/14/2020 MODERATE (A) NEGATIVE Final       Imaging / Clinical Data :-   Ct Head Wo Contrast    Result Date: 2/14/2020  No acute intracranial abnormality.      Xr Chest Portable    Result Date: 2/14/2020  1. No acute cardiopulmonary process. Clinical Course : stable  Assessment and Plan  :        1. Drug overdose - unknown - Narcan used , negative Utox for opioids. showing cocaine and  Had alcohol intoxications . 2. Unresponsiveness - from alcohol  intoxication . Check echo and D dimer. 3. Suicide attempt - Psychiatry consultation. Will need inpatient admission to Psych for treatment of depression . 4. Intermittent fevers - secondary to  aspiration pneumonia . Augmentin for 7 days . 5. Bipolar disorder with depression  6. Hyponatremia - continue IVF. 7. Hypokalemia - replace K   8. Lactic acidosis - continue IV hydration . 9. Acute cystitis - continue antibiotics   10. Atelectasis - incentive spirometry   11. COPD - continue Symbicort - Duoneb   12. Smoker -         Monitor overnight and planning discharge next am to psych if stable . Continue to monitor vitals , Intake / output ,  Cell count , HGB , Kidney function, oxygenation  as indicated . Plan and updates discussed with patient ,  answers  explained to satisfaction.    Plan discussed with Staff  RN     (Please note that portions of this note were completed with a voice recognition program. Efforts were made to edit the dictations but occasionally words are mis-transcribed.)      Christina Martinez MD  2/17/2020

## 2020-02-18 LAB
ANION GAP SERPL CALCULATED.3IONS-SCNC: 14 MMOL/L (ref 9–17)
BUN BLDV-MCNC: 11 MG/DL (ref 6–20)
BUN/CREAT BLD: ABNORMAL (ref 9–20)
CALCIUM SERPL-MCNC: 8.7 MG/DL (ref 8.6–10.4)
CHLORIDE BLD-SCNC: 99 MMOL/L (ref 98–107)
CO2: 25 MMOL/L (ref 20–31)
CREAT SERPL-MCNC: 0.52 MG/DL (ref 0.5–0.9)
GFR AFRICAN AMERICAN: >60 ML/MIN
GFR NON-AFRICAN AMERICAN: >60 ML/MIN
GFR SERPL CREATININE-BSD FRML MDRD: ABNORMAL ML/MIN/{1.73_M2}
GFR SERPL CREATININE-BSD FRML MDRD: ABNORMAL ML/MIN/{1.73_M2}
GLUCOSE BLD-MCNC: 103 MG/DL (ref 70–99)
HCT VFR BLD CALC: 36.3 % (ref 36.3–47.1)
HEMOGLOBIN: 11.4 G/DL (ref 11.9–15.1)
MAGNESIUM: 2.4 MG/DL (ref 1.6–2.6)
MCH RBC QN AUTO: 27.9 PG (ref 25.2–33.5)
MCHC RBC AUTO-ENTMCNC: 31.4 G/DL (ref 28.4–34.8)
MCV RBC AUTO: 89 FL (ref 82.6–102.9)
NRBC AUTOMATED: 0 PER 100 WBC
PDW BLD-RTO: 14.1 % (ref 11.8–14.4)
PLATELET # BLD: 310 K/UL (ref 138–453)
PMV BLD AUTO: 10.3 FL (ref 8.1–13.5)
POTASSIUM SERPL-SCNC: 3.4 MMOL/L (ref 3.7–5.3)
RBC # BLD: 4.08 M/UL (ref 3.95–5.11)
SODIUM BLD-SCNC: 136 MMOL/L (ref 135–144)
SODIUM BLD-SCNC: 137 MMOL/L (ref 135–144)
SODIUM BLD-SCNC: 138 MMOL/L (ref 135–144)
SODIUM BLD-SCNC: 138 MMOL/L (ref 135–144)
WBC # BLD: 5.7 K/UL (ref 3.5–11.3)

## 2020-02-18 PROCEDURE — 2700000000 HC OXYGEN THERAPY PER DAY

## 2020-02-18 PROCEDURE — 84295 ASSAY OF SERUM SODIUM: CPT

## 2020-02-18 PROCEDURE — 94761 N-INVAS EAR/PLS OXIMETRY MLT: CPT

## 2020-02-18 PROCEDURE — 6360000002 HC RX W HCPCS: Performed by: FAMILY MEDICINE

## 2020-02-18 PROCEDURE — 36415 COLL VENOUS BLD VENIPUNCTURE: CPT

## 2020-02-18 PROCEDURE — 97110 THERAPEUTIC EXERCISES: CPT

## 2020-02-18 PROCEDURE — 80048 BASIC METABOLIC PNL TOTAL CA: CPT

## 2020-02-18 PROCEDURE — 85027 COMPLETE CBC AUTOMATED: CPT

## 2020-02-18 PROCEDURE — 2580000003 HC RX 258: Performed by: NURSE PRACTITIONER

## 2020-02-18 PROCEDURE — 2060000000 HC ICU INTERMEDIATE R&B

## 2020-02-18 PROCEDURE — 83735 ASSAY OF MAGNESIUM: CPT

## 2020-02-18 PROCEDURE — 6360000002 HC RX W HCPCS: Performed by: NURSE PRACTITIONER

## 2020-02-18 PROCEDURE — 6370000000 HC RX 637 (ALT 250 FOR IP): Performed by: NURSE PRACTITIONER

## 2020-02-18 PROCEDURE — 97535 SELF CARE MNGMENT TRAINING: CPT

## 2020-02-18 PROCEDURE — 94640 AIRWAY INHALATION TREATMENT: CPT

## 2020-02-18 PROCEDURE — 6370000000 HC RX 637 (ALT 250 FOR IP): Performed by: FAMILY MEDICINE

## 2020-02-18 PROCEDURE — 99232 SBSQ HOSP IP/OBS MODERATE 35: CPT | Performed by: INTERNAL MEDICINE

## 2020-02-18 PROCEDURE — 97116 GAIT TRAINING THERAPY: CPT

## 2020-02-18 RX ORDER — KETOROLAC TROMETHAMINE 15 MG/ML
15 INJECTION, SOLUTION INTRAMUSCULAR; INTRAVENOUS ONCE
Status: COMPLETED | OUTPATIENT
Start: 2020-02-18 | End: 2020-02-18

## 2020-02-18 RX ADMIN — SODIUM CHLORIDE, PRESERVATIVE FREE 10 ML: 5 INJECTION INTRAVENOUS at 09:22

## 2020-02-18 RX ADMIN — IPRATROPIUM BROMIDE AND ALBUTEROL SULFATE 3 ML: .5; 3 SOLUTION RESPIRATORY (INHALATION) at 11:13

## 2020-02-18 RX ADMIN — TRAMADOL HYDROCHLORIDE 50 MG: 50 TABLET, FILM COATED ORAL at 18:16

## 2020-02-18 RX ADMIN — LISINOPRIL 10 MG: 10 TABLET ORAL at 09:11

## 2020-02-18 RX ADMIN — ENOXAPARIN SODIUM 40 MG: 40 INJECTION SUBCUTANEOUS at 09:11

## 2020-02-18 RX ADMIN — AMOXICILLIN AND CLAVULANATE POTASSIUM 1 TABLET: 875; 125 TABLET, FILM COATED ORAL at 09:11

## 2020-02-18 RX ADMIN — BUPROPION HYDROCHLORIDE 100 MG: 100 TABLET, FILM COATED ORAL at 09:22

## 2020-02-18 RX ADMIN — IPRATROPIUM BROMIDE AND ALBUTEROL SULFATE 3 ML: .5; 3 SOLUTION RESPIRATORY (INHALATION) at 15:13

## 2020-02-18 RX ADMIN — OMEPRAZOLE 40 MG: 20 CAPSULE, DELAYED RELEASE ORAL at 09:11

## 2020-02-18 RX ADMIN — PRAVASTATIN SODIUM 40 MG: 20 TABLET ORAL at 09:11

## 2020-02-18 RX ADMIN — BUDESONIDE AND FORMOTEROL FUMARATE DIHYDRATE 2 PUFF: 160; 4.5 AEROSOL RESPIRATORY (INHALATION) at 20:15

## 2020-02-18 RX ADMIN — TRAMADOL HYDROCHLORIDE 50 MG: 50 TABLET, FILM COATED ORAL at 13:58

## 2020-02-18 RX ADMIN — GUAIFENESIN 600 MG: 600 TABLET, EXTENDED RELEASE ORAL at 09:11

## 2020-02-18 RX ADMIN — BUDESONIDE AND FORMOTEROL FUMARATE DIHYDRATE 2 PUFF: 160; 4.5 AEROSOL RESPIRATORY (INHALATION) at 07:39

## 2020-02-18 RX ADMIN — POTASSIUM CHLORIDE 40 MEQ: 1500 TABLET, EXTENDED RELEASE ORAL at 12:33

## 2020-02-18 RX ADMIN — SODIUM CHLORIDE, PRESERVATIVE FREE 10 ML: 5 INJECTION INTRAVENOUS at 21:01

## 2020-02-18 RX ADMIN — AMOXICILLIN AND CLAVULANATE POTASSIUM 1 TABLET: 875; 125 TABLET, FILM COATED ORAL at 21:01

## 2020-02-18 RX ADMIN — GUAIFENESIN 600 MG: 600 TABLET, EXTENDED RELEASE ORAL at 21:01

## 2020-02-18 RX ADMIN — Medication 10 ML: at 22:47

## 2020-02-18 RX ADMIN — KETOROLAC TROMETHAMINE 15 MG: 15 INJECTION, SOLUTION INTRAMUSCULAR; INTRAVENOUS at 22:47

## 2020-02-18 RX ADMIN — OMEPRAZOLE 40 MG: 20 CAPSULE, DELAYED RELEASE ORAL at 21:01

## 2020-02-18 RX ADMIN — BUPROPION HYDROCHLORIDE 100 MG: 100 TABLET, FILM COATED ORAL at 21:01

## 2020-02-18 RX ADMIN — TRAMADOL HYDROCHLORIDE 50 MG: 50 TABLET, FILM COATED ORAL at 04:26

## 2020-02-18 RX ADMIN — IPRATROPIUM BROMIDE AND ALBUTEROL SULFATE 3 ML: .5; 3 SOLUTION RESPIRATORY (INHALATION) at 20:15

## 2020-02-18 RX ADMIN — LORAZEPAM 0.5 MG: 0.5 TABLET ORAL at 20:43

## 2020-02-18 ASSESSMENT — PAIN SCALES - GENERAL
PAINLEVEL_OUTOF10: 8
PAINLEVEL_OUTOF10: 10
PAINLEVEL_OUTOF10: 10
PAINLEVEL_OUTOF10: 3
PAINLEVEL_OUTOF10: 3
PAINLEVEL_OUTOF10: 6
PAINLEVEL_OUTOF10: 6

## 2020-02-18 ASSESSMENT — PAIN DESCRIPTION - DESCRIPTORS
DESCRIPTORS: DISCOMFORT
DESCRIPTORS: DISCOMFORT

## 2020-02-18 ASSESSMENT — PAIN DESCRIPTION - LOCATION
LOCATION: CHEST
LOCATION: HIP
LOCATION: HIP

## 2020-02-18 ASSESSMENT — PAIN DESCRIPTION - PAIN TYPE
TYPE: ACUTE PAIN;CHRONIC PAIN
TYPE: CHRONIC PAIN
TYPE: ACUTE PAIN

## 2020-02-18 ASSESSMENT — PAIN DESCRIPTION - ONSET: ONSET: ON-GOING

## 2020-02-18 ASSESSMENT — PAIN - FUNCTIONAL ASSESSMENT: PAIN_FUNCTIONAL_ASSESSMENT: ACTIVITIES ARE NOT PREVENTED

## 2020-02-18 ASSESSMENT — PAIN DESCRIPTION - FREQUENCY
FREQUENCY: CONTINUOUS
FREQUENCY: INTERMITTENT
FREQUENCY: INTERMITTENT

## 2020-02-18 NOTE — PROGRESS NOTES
[facility]      Daily Progress Note     Admit Date: 2/14/2020  Bed/Room No.  2012/2012-01  Admitting Physician : Arnaldo Zamora MD  Code Status :[address] Day:  LOS: 4 days   Chief Complaint:     Chief Complaint   Patient presents with    Drug Overdose     Principal Problem:    Accidental drug overdose  Active Problems:    Hypothyroidism    Drug abuse ([address]    Essential hypertension    Hypokalemia    Acute cystitis without hematuria    Prolonged Q-T interval on ECG    Bipolar disorder, unspecified ([address]    Polysubstance abuse ([address]    Alcohol intoxication delirium ([address]    Cocaine abuse ([address]    Acute respiratory failure with hypoxia ([address]  Resolved Problems:    * No resolved hospital problems. *    Subjective : Interval History/Significant events :  02/18/20    Patient seen and examined  Patient feels weak still on oxygen  Patient denies fever chest pain    I am seeing the patient for aspiration  Background History:         Ayush Luna is 53 y.o. female  Who was admitted to the hospital on 2/14/2020 for treatment of Accidental drug overdose. Patient was found unresponsive by her family and called 911. CPR was given on scene by EMS as patient was not felt to have a pulse . Patient was given 2 mg Narcan, IO line was placed followed by 2 mg IO Narcan injection. Patient had successful RoSC. Patient was transported to emergency room and was alert awake on arrival.  Work-up in emergency room showed hyponatremia 130, hypokalemia 2.5, normal kidney function, lactic acid 4.4. Ethanol level was 0.054%. UA was positive for nitrites moderate leukoesterase. Patient has underlying history of bipolar 1 disorder with depression, hypertension, hypothyroidism, COPD, Crohn's disease, anxiety disorder.      PMH:  Past Medical History:   Diagnosis Date    Acid reflux     Anxiety     Asthma     Bipolar 1 disorder (HCC)     Bowel obstruction (HCC)     [facility]     Crohn disease ([facility]     Depression     Gout     Hypertension     Hypothyroidism       Allergies: No Known Allergies   Medications :  lisinopril, 10 mg, Oral, Daily  buPROPion, 100 mg, Oral, BID  enoxaparin, 40 mg, Subcutaneous, Daily  amoxicillin-clavulanate, 1 tablet, Oral, 2 times per day  guaiFENesin, 600 mg, Oral, BID  lidocaine, 1 patch, Transdermal, Daily  ipratropium-albuterol, 3 mL, Inhalation, 4x daily  budesonide-formoterol, 2 puff, Inhalation, BID  omeprazole, 40 mg, Oral, BID  pravastatin, 40 mg, Oral, Daily  sodium chloride flush, 10 mL, Intravenous, 2 times per day            Current Vitals : Temp: 98 °F (36.7 °C),  Pulse: 78, Resp: 16, BP: (!) 125/59, SpO2: 96 %  Last 24 Hrs Vitals   Patient Vitals for the past 24 hrs:   BP Temp Temp src Pulse Resp SpO2 Weight   02/18/20 1230 (!) 125/59 -- Oral 78 16 96 % --   02/18/20 1113 -- -- -- -- 16 -- --   02/18/20 0800 -- -- -- 72 -- -- --   02/18/20 0743 -- -- -- -- -- 95 % --   02/18/20 0720 (!) 144/74 98 °F (36.7 °C) Oral 70 16 94 % --   02/18/20 0532 -- -- -- -- -- -- 169 lb 1.5 oz (76.7 kg)   02/18/20 0424 135/76 98 °F (36.7 °C) Oral 75 16 96 % --   02/17/20 2312 133/84 98.1 °F (36.7 °C) Oral 77 15 -- --   02/17/20 2037 -- -- -- -- -- 99 % --   02/17/20 2036 -- -- -- -- -- 98 % --   02/17/20 2000 (!) 150/83 98.4 °F (36.9 °C) Oral -- 15 98 % --   02/17/20 1920 (!) 147/83 98.2 °F (36.8 °C) Oral -- 16 98 % --   02/17/20 1632 -- -- -- -- -- 92 % --   02/17/20 1526 (!) 144/80 -- -- 81 18 95 % --     Intake / output   02/17 0701 - 02/18 0700  In: 150 [P.O.:150]  Out: 200 [Urine:200]  Physical Exam:    Physical exam     Alert  Chest: Clear to auscultation bilateral  Abdomen: Nontender nondistended  CVS: S1-S2  Neurology: Moves extremity  Laboratory findings:    Recent Labs     02/16/20  1747 02/17/20  0400 02/18/20  0536   WBC 7.4 9.2 5.7   HGB 11.7* 12.1 11.4*   HCT 36.6 38.1 36.3    294 310     Recent Labs 02/16/20  0622  02/17/20  0400  02/18/20  0012 02/18/20  0536 02/18/20  1154      < > 138   < > 137 138 136   K 3.7  --  3.4*  --   --  3.4*  --    CL 97*  --  98  --   --  99  --    CO2 29  --  25  --   --  25  --    GLUCOSE 114*  --  120*  --   --  103*  --    BUN 11  --  8  --   --  11  --    CREATININE 0.80  --  0.50  --   --  0.52  --    MG 2.2  --  2.3  --   --  2.4  --    CALCIUM 8.3*  --  8.6  --   --  8.7  --     < > = values in this interval not displayed. No results for input(s): PROT, LABALBU, LABA1C, P9LTMME, A0QCNBR, FT4, TSH, AST, ALT, LDH, GGT, ALKPHOS, BILITOT, BILIDIR, AMMONIA, AMYLASE, LIPASE, LACTATE, CHOL, HDL, LDLCHOLESTEROL, CHOLHDLRATIO, TRIG, VLDL, BNP, TROPONINI, CKTOTAL, CKMB, CKMBINDEX, RF, GILBERT in the last 72 hours. Specific Gravity, UA   Date Value Ref Range Status   02/14/2020 1.012 1.005 - 1.030 Final     Protein, UA   Date Value Ref Range Status   02/14/2020 NEGATIVE NEGATIVE Final     RBC, UA   Date Value Ref Range Status   02/14/2020 None 0 - 2 /HPF Final     Bacteria, UA   Date Value Ref Range Status   02/14/2020 MANY (A) None Final     Nitrite, Urine   Date Value Ref Range Status   02/14/2020 POSITIVE (A) NEGATIVE Final     WBC, UA   Date Value Ref Range Status   02/14/2020 20 TO 50 0 - 5 /HPF Final     Leukocyte Esterase, Urine   Date Value Ref Range Status   02/14/2020 MODERATE (A) NEGATIVE Final       Imaging / Clinical Data :-   Ct Head Wo Contrast    Result Date: 2/14/2020  No acute intracranial abnormality. Xr Chest Portable    Result Date: 2/14/2020  1. No acute cardiopulmonary process. Clinical Course : stable  Assessment and Plan  :        1. Drug overdose - unknown - Narcan used , negative Utox for opioids. showing cocaine and  Had alcohol intoxications . 2. Unresponsiveness - from alcohol  intoxication . Santi Finley 3. Suicide attempt - Psychiatry consultation. Will need inpatient admission to Psych for treatment of depression .

## 2020-02-18 NOTE — PLAN OF CARE
Problem: Pain:  Goal: Pain level will decrease  Description  Pain level will decrease  Outcome: Ongoing  Goal: Control of acute pain  Description  Control of acute pain  Outcome: Ongoing  Goal: Control of chronic pain  Description  Control of chronic pain  Outcome: Ongoing     Problem: Injury - Risk of, Substance Overdose  Goal: Absence of physical injury  Description  Absence of physical injury     Absence of physical injury  Outcome: Ongoing     Problem: Falls - Risk of:  Goal: Absence of physical injury  Description  Absence of physical injury     Absence of physical injury  Outcome: Ongoing  Goal: Will remain free from falls  Description  Will remain free from falls  Outcome: Ongoing     Problem: Musculor/Skeletal Functional Status  Goal: Highest potential functional level  Outcome: Ongoing     Problem: Suicide risk  Goal: Provide patient with safe environment  Description  Provide patient with safe environment  Outcome: Ongoing

## 2020-02-18 NOTE — CARE COORDINATION
Transitional planning. Pt to go to [facility] when medically stable. Spoke wioth pt and she does not want to go to [facility]. She says shes fine and sees a psychiatrist here in [facility].  LOPEZ HOLCOMB and let him know

## 2020-02-18 NOTE — PROGRESS NOTES
Physical Therapy  Facility/Department: [facility]  Daily Treatment Note  NAME: Hayley Mendoza  : 1965  MRN: [id number]    Date of Service: 2020    Discharge Recommendations:  Patient would benefit from continued therapy after discharge   PT Equipment Recommendations  Equipment Needed: Yes  Walker: Rolling    Assessment   Body structures, Functions, Activity limitations: Decreased functional mobility ; Decreased safe awareness;Decreased balance;Decreased strength  Assessment: Pt able to amb 60ft with RW , limited by fatigue and decrease endurance. Pt would continue to benefit from PT to address deficit. Prognosis: Good  PT Education: Goals;Plan of Care;General Safety; Energy Conservation; Functional Mobility Training  REQUIRES PT FOLLOW UP: Yes  Activity Tolerance  Activity Tolerance: Patient limited by fatigue;Patient limited by endurance; Patient limited by pain     Patient Diagnosis(es): The encounter diagnosis was Hypokalemia. has a past medical history of Acid reflux, Anxiety, Asthma, Bipolar 1 disorder ([facility] Bowel obstruction ([address] COPD (chronic obstructive pulmonary disease) ([facility] Crohn disease ([facility] Depression, Gout, Hypertension, and Hypothyroidism. has a past surgical history that includes Tonsillectomy and adenoidectomy; Tubal ligation; Abdomen surgery; Cholecystectomy; Bunionectomy (Left); Colonoscopy (2007); and Colonoscopy (10/12/2017). Restrictions  Restrictions/Precautions  Required Braces or Orthoses?: No  Position Activity Restriction  Other position/activity restrictions: Up with assist  Subjective   General  Response To Previous Treatment: Patient with no complaints from previous session. Subjective  Subjective: RN nd pt agreeable to PT. Pt alert in bed upon arrival finishing up with RT. pleasant and cooperative throughout.   Pain Screening  Patient Currently in Pain: Yes  Pain Assessment  Pain Level: 8  Pain Type: Acute pain;Chronic pain  Pain Location: Hip  Pain Descriptors: Discomfort  Pain Frequency: Intermittent  Functional Pain Assessment: Activities are not prevented  Non-Pharmaceutical Pain Intervention(s): Rest;Repositioned; Ambulation/Increased Activity  Response to Pain Intervention: Patient Satisfied  Vital Signs  Patient Currently in Pain: Yes       Orientation  Orientation  Overall Orientation Status: Within Functional Limits  Cognition      Objective   Bed mobility  Rolling to Right: Minimal assistance  Supine to Sit: Contact guard assistance  Sit to Supine: (Pt retired to Chair.)  Scooting: Stand by assistance  Transfers  Sit to Stand: Contact guard assistance  Stand to sit: Contact guard assistance  Comment: Increase time to come to full standing. Pt reported pain in chest wall with push off from EOB  Ambulation  Ambulation?: Yes  Ambulation 1  Surface: level tile  Device: Rolling Walker  Assistance: Contact guard assistance  Quality of Gait: Forward flexed posture; high reliance on RW; increased bilateral knee flexion  Gait Deviations: Slow [place];Decreased step length  Distance: 60ft  Comments: Rest x2 d/t decrease endurance and fatigue     Balance  Sitting - Static: Good  Sitting - Dynamic: Fair;+  Standing - Static: Fair;-  Standing - Dynamic: Poor;+  Comments: Standing balance assessed w/ RW  Exercises  Comments: Seated LE exercise program: Long Arc Quads, hip abduction/adduction, heel/toe raises, and marches.  Reps: 10        Goals  Short term goals  Time Frame for Short term goals: 10 visits  Short term goal 1: transfers with SBA  Short term goal 2: amb 150 ft with a RW x SBA  Short term goal 3: ascend/descend 4 steps with SBA  Short term goal 4: exercise program x SBA  Patient Goals   Patient goals : Return home    Plan    Plan  Times per week: 5-6x wk  Current Treatment Recommendations: Strengthening, Balance Training, Endurance Training, Gait Training, Functional Mobility Training, Stair training, Safety Education & Training  Safety Devices  Type of devices: Left in bed, Patient at risk for falls, Nurse notified     Therapy Time   Individual Concurrent Group Co-treatment   Time In 1010 (Breathing Treatment with RT)       Time Out 1049         Minutes 39         Timed Code Treatment Minutes: Ayden Her PTA

## 2020-02-19 LAB
ANION GAP SERPL CALCULATED.3IONS-SCNC: 14 MMOL/L (ref 9–17)
BUN BLDV-MCNC: 14 MG/DL (ref 6–20)
BUN/CREAT BLD: ABNORMAL (ref 9–20)
CALCIUM SERPL-MCNC: 8.7 MG/DL (ref 8.6–10.4)
CHLORIDE BLD-SCNC: 102 MMOL/L (ref 98–107)
CO2: 24 MMOL/L (ref 20–31)
CREAT SERPL-MCNC: 0.62 MG/DL (ref 0.5–0.9)
DIRECT EXAM: NORMAL
GFR AFRICAN AMERICAN: >60 ML/MIN
GFR NON-AFRICAN AMERICAN: >60 ML/MIN
GFR SERPL CREATININE-BSD FRML MDRD: ABNORMAL ML/MIN/{1.73_M2}
GFR SERPL CREATININE-BSD FRML MDRD: ABNORMAL ML/MIN/{1.73_M2}
GLUCOSE BLD-MCNC: 125 MG/DL (ref 70–99)
HCT VFR BLD CALC: 34.5 % (ref 36.3–47.1)
HEMOGLOBIN: 11 G/DL (ref 11.9–15.1)
LACTOFERRIN, QUAL: ABNORMAL
Lab: NORMAL
Lab: NORMAL
MAGNESIUM: 2.2 MG/DL (ref 1.6–2.6)
MCH RBC QN AUTO: 28.1 PG (ref 25.2–33.5)
MCHC RBC AUTO-ENTMCNC: 31.9 G/DL (ref 28.4–34.8)
MCV RBC AUTO: 88.2 FL (ref 82.6–102.9)
MICRO OVA & PARASITES: NORMAL
NRBC AUTOMATED: 0 PER 100 WBC
PDW BLD-RTO: 14.2 % (ref 11.8–14.4)
PLATELET # BLD: 326 K/UL (ref 138–453)
PMV BLD AUTO: 10.4 FL (ref 8.1–13.5)
POTASSIUM SERPL-SCNC: 3.4 MMOL/L (ref 3.7–5.3)
RBC # BLD: 3.91 M/UL (ref 3.95–5.11)
SODIUM BLD-SCNC: 136 MMOL/L (ref 135–144)
SODIUM BLD-SCNC: 137 MMOL/L (ref 135–144)
SODIUM BLD-SCNC: 140 MMOL/L (ref 135–144)
SODIUM BLD-SCNC: 140 MMOL/L (ref 135–144)
SPECIMEN DESCRIPTION: NORMAL
SPECIMEN DESCRIPTION: NORMAL
WBC # BLD: 6.3 K/UL (ref 3.5–11.3)

## 2020-02-19 PROCEDURE — 85027 COMPLETE CBC AUTOMATED: CPT

## 2020-02-19 PROCEDURE — 87328 CRYPTOSPORIDIUM AG IA: CPT

## 2020-02-19 PROCEDURE — 2060000000 HC ICU INTERMEDIATE R&B

## 2020-02-19 PROCEDURE — 83630 LACTOFERRIN FECAL (QUAL): CPT

## 2020-02-19 PROCEDURE — 94640 AIRWAY INHALATION TREATMENT: CPT

## 2020-02-19 PROCEDURE — 6360000002 HC RX W HCPCS: Performed by: FAMILY MEDICINE

## 2020-02-19 PROCEDURE — 87329 GIARDIA AG IA: CPT

## 2020-02-19 PROCEDURE — 6370000000 HC RX 637 (ALT 250 FOR IP): Performed by: INTERNAL MEDICINE

## 2020-02-19 PROCEDURE — 2700000000 HC OXYGEN THERAPY PER DAY

## 2020-02-19 PROCEDURE — 6370000000 HC RX 637 (ALT 250 FOR IP): Performed by: NURSE PRACTITIONER

## 2020-02-19 PROCEDURE — 6370000000 HC RX 637 (ALT 250 FOR IP): Performed by: FAMILY MEDICINE

## 2020-02-19 PROCEDURE — 2580000003 HC RX 258: Performed by: NURSE PRACTITIONER

## 2020-02-19 PROCEDURE — 80048 BASIC METABOLIC PNL TOTAL CA: CPT

## 2020-02-19 PROCEDURE — 94761 N-INVAS EAR/PLS OXIMETRY MLT: CPT

## 2020-02-19 PROCEDURE — 99232 SBSQ HOSP IP/OBS MODERATE 35: CPT | Performed by: INTERNAL MEDICINE

## 2020-02-19 PROCEDURE — 83735 ASSAY OF MAGNESIUM: CPT

## 2020-02-19 PROCEDURE — 36415 COLL VENOUS BLD VENIPUNCTURE: CPT

## 2020-02-19 PROCEDURE — 84295 ASSAY OF SERUM SODIUM: CPT

## 2020-02-19 RX ORDER — ONDANSETRON 4 MG/1
4 TABLET, FILM COATED ORAL EVERY 6 HOURS PRN
Status: DISCONTINUED | OUTPATIENT
Start: 2020-02-19 | End: 2020-02-19

## 2020-02-19 RX ORDER — PROMETHAZINE HYDROCHLORIDE 25 MG/1
12.5 TABLET ORAL EVERY 6 HOURS PRN
Status: DISCONTINUED | OUTPATIENT
Start: 2020-02-19 | End: 2020-02-21 | Stop reason: HOSPADM

## 2020-02-19 RX ORDER — UREA 10 %
1 LOTION (ML) TOPICAL NIGHTLY PRN
Status: DISCONTINUED | OUTPATIENT
Start: 2020-02-19 | End: 2020-02-21 | Stop reason: HOSPADM

## 2020-02-19 RX ORDER — PREDNISONE 20 MG/1
40 TABLET ORAL ONCE
Status: COMPLETED | OUTPATIENT
Start: 2020-02-19 | End: 2020-02-19

## 2020-02-19 RX ADMIN — LISINOPRIL 10 MG: 10 TABLET ORAL at 08:58

## 2020-02-19 RX ADMIN — IPRATROPIUM BROMIDE AND ALBUTEROL SULFATE 3 ML: .5; 3 SOLUTION RESPIRATORY (INHALATION) at 07:54

## 2020-02-19 RX ADMIN — SODIUM CHLORIDE, PRESERVATIVE FREE 10 ML: 5 INJECTION INTRAVENOUS at 21:31

## 2020-02-19 RX ADMIN — ONDANSETRON HYDROCHLORIDE 4 MG: 4 TABLET, FILM COATED ORAL at 15:11

## 2020-02-19 RX ADMIN — IPRATROPIUM BROMIDE AND ALBUTEROL SULFATE 3 ML: .5; 3 SOLUTION RESPIRATORY (INHALATION) at 21:00

## 2020-02-19 RX ADMIN — TRAMADOL HYDROCHLORIDE 50 MG: 50 TABLET, FILM COATED ORAL at 02:12

## 2020-02-19 RX ADMIN — PREDNISONE 40 MG: 20 TABLET ORAL at 19:09

## 2020-02-19 RX ADMIN — GUAIFENESIN 600 MG: 600 TABLET, EXTENDED RELEASE ORAL at 08:58

## 2020-02-19 RX ADMIN — TRAMADOL HYDROCHLORIDE 50 MG: 50 TABLET, FILM COATED ORAL at 09:37

## 2020-02-19 RX ADMIN — TRAMADOL HYDROCHLORIDE 50 MG: 50 TABLET, FILM COATED ORAL at 15:17

## 2020-02-19 RX ADMIN — POTASSIUM CHLORIDE 40 MEQ: 1500 TABLET, EXTENDED RELEASE ORAL at 06:35

## 2020-02-19 RX ADMIN — PROMETHAZINE HYDROCHLORIDE 12.5 MG: 25 TABLET ORAL at 23:48

## 2020-02-19 RX ADMIN — ENOXAPARIN SODIUM 40 MG: 40 INJECTION SUBCUTANEOUS at 08:58

## 2020-02-19 RX ADMIN — BUDESONIDE AND FORMOTEROL FUMARATE DIHYDRATE 2 PUFF: 160; 4.5 AEROSOL RESPIRATORY (INHALATION) at 21:00

## 2020-02-19 RX ADMIN — BUDESONIDE AND FORMOTEROL FUMARATE DIHYDRATE 2 PUFF: 160; 4.5 AEROSOL RESPIRATORY (INHALATION) at 07:54

## 2020-02-19 RX ADMIN — AMOXICILLIN AND CLAVULANATE POTASSIUM 1 TABLET: 875; 125 TABLET, FILM COATED ORAL at 08:58

## 2020-02-19 RX ADMIN — IPRATROPIUM BROMIDE AND ALBUTEROL SULFATE 3 ML: .5; 3 SOLUTION RESPIRATORY (INHALATION) at 11:07

## 2020-02-19 RX ADMIN — Medication 1 MG: at 02:52

## 2020-02-19 RX ADMIN — TRAMADOL HYDROCHLORIDE 50 MG: 50 TABLET, FILM COATED ORAL at 21:30

## 2020-02-19 RX ADMIN — PRAVASTATIN SODIUM 40 MG: 20 TABLET ORAL at 08:58

## 2020-02-19 RX ADMIN — GUAIFENESIN 600 MG: 600 TABLET, EXTENDED RELEASE ORAL at 21:30

## 2020-02-19 RX ADMIN — OMEPRAZOLE 40 MG: 20 CAPSULE, DELAYED RELEASE ORAL at 08:58

## 2020-02-19 RX ADMIN — BUPROPION HYDROCHLORIDE 100 MG: 100 TABLET, FILM COATED ORAL at 21:32

## 2020-02-19 RX ADMIN — LORAZEPAM 0.5 MG: 0.5 TABLET ORAL at 02:12

## 2020-02-19 RX ADMIN — AMOXICILLIN AND CLAVULANATE POTASSIUM 1 TABLET: 875; 125 TABLET, FILM COATED ORAL at 21:30

## 2020-02-19 RX ADMIN — BUPROPION HYDROCHLORIDE 100 MG: 100 TABLET, FILM COATED ORAL at 08:58

## 2020-02-19 RX ADMIN — SODIUM CHLORIDE, PRESERVATIVE FREE 10 ML: 5 INJECTION INTRAVENOUS at 09:00

## 2020-02-19 RX ADMIN — OMEPRAZOLE 40 MG: 20 CAPSULE, DELAYED RELEASE ORAL at 21:30

## 2020-02-19 ASSESSMENT — PAIN SCALES - GENERAL
PAINLEVEL_OUTOF10: 6
PAINLEVEL_OUTOF10: 10
PAINLEVEL_OUTOF10: 6
PAINLEVEL_OUTOF10: 2
PAINLEVEL_OUTOF10: 4
PAINLEVEL_OUTOF10: 10

## 2020-02-19 NOTE — PLAN OF CARE
Problem: Pain:  Goal: Pain level will decrease  Description  Pain level will decrease  2/18/2020 2222 by Jonna Brown RN  Outcome: Ongoing  2/18/2020 1108 by Nila Fleischer, RN  Outcome: Ongoing  Goal: Control of acute pain  Description  Control of acute pain  2/18/2020 2222 by Jonna Brown RN  Outcome: Ongoing  2/18/2020 1108 by Nila Fleischer, RN  Outcome: Ongoing  Goal: Control of chronic pain  Description  Control of chronic pain  2/18/2020 2222 by Jonna Brown RN  Outcome: Ongoing  2/18/2020 1108 by Nila Fleischer, RN  Outcome: Ongoing     Problem: Injury - Risk of, Substance Overdose  Goal: Absence of physical injury  Description  Absence of physical injury     Absence of physical injury  2/18/2020 2222 by Jonna Brown RN  Outcome: Ongoing  2/18/2020 1108 by Nila Fleischer, RN  Outcome: Ongoing     Problem: Falls - Risk of:  Goal: Absence of physical injury  Description  Absence of physical injury     Absence of physical injury  2/18/2020 2222 by Jonna Brown RN  Outcome: Ongoing  2/18/2020 1108 by Nila Fleischer, RN  Outcome: Ongoing  Goal: Will remain free from falls  Description  Will remain free from falls  2/18/2020 2222 by Jonna Brown RN  Outcome: Ongoing  2/18/2020 1108 by Nila Fleischer, RN  Outcome: Ongoing     Problem: Musculor/Skeletal Functional Status  Goal: Highest potential functional level  2/18/2020 2222 by Jonna Brown RN  Outcome: Ongoing  2/18/2020 1108 by Nila Fleischer, RN  Outcome: Ongoing     Problem: Suicide risk  Goal: Provide patient with safe environment  Description  Provide patient with safe environment  2/18/2020 2222 by Jonna Brown RN  Outcome: Ongoing  2/18/2020 1108 by Nila Fleischer, RN  Outcome: Ongoing

## 2020-02-19 NOTE — PLAN OF CARE
Problem: Pain:  Goal: Pain level will decrease  Description  Pain level will decrease  2/19/2020 1128 by Danita Del Toro RN  Outcome: Ongoing  2/18/2020 2222 by Jonna Brown RN  Outcome: Ongoing  Goal: Control of acute pain  Description  Control of acute pain  2/19/2020 1128 by Danita Del Toro RN  Outcome: Ongoing  2/18/2020 2222 by Jonna Brown RN  Outcome: Ongoing  Goal: Control of chronic pain  Description  Control of chronic pain  2/19/2020 1128 by Danita Del Toro RN  Outcome: Ongoing  2/18/2020 2222 by Jonna Brown RN  Outcome: Ongoing     Problem: Injury - Risk of, Substance Overdose  Goal: Absence of physical injury  Description  Absence of physical injury     Absence of physical injury  2/19/2020 1128 by Danita Del Toro RN  Outcome: Ongoing  2/18/2020 2222 by Jonna Brown RN  Outcome: Ongoing     Problem: Falls - Risk of:  Goal: Absence of physical injury  Description  Absence of physical injury     Absence of physical injury  2/19/2020 1128 by Danita Del Toro RN  Outcome: Ongoing  2/18/2020 2222 by Jonna Brown RN  Outcome: Ongoing  Goal: Will remain free from falls  Description  Will remain free from falls  2/19/2020 1128 by Danita Del Toro RN  Outcome: Ongoing  2/18/2020 2222 by Jonna Brown RN  Outcome: Ongoing     Problem: Musculor/Skeletal Functional Status  Goal: Highest potential functional level  2/19/2020 1128 by Danita Del Toro RN  Outcome: Ongoing  2/18/2020 2222 by Jonna Brown RN  Outcome: Ongoing     Problem: Suicide risk  Goal: Provide patient with safe environment  Description  Provide patient with safe environment  2/19/2020 1128 by Danita Del Toro RN  Outcome: Ongoing  2/18/2020 2222 by Jonna Brown RN  Outcome: Ongoing     Problem: Cardiac:  Goal: Ability to maintain vital signs within normal range will improve  Description  Ability to maintain vital signs within normal range will improve  Outcome: Ongoing  Goal: Cardiovascular alteration will improve  Description  Cardiovascular alteration will improve  Outcome: Ongoing     Problem: Health Behavior:  Goal: Will modify at least one risk factor affecting health status  Description  Will modify at least one risk factor affecting health status  Outcome: Ongoing  Goal: Identification of resources available to assist in meeting health care needs will improve  Description  Identification of resources available to assist in meeting health care needs will improve  Outcome: Ongoing     Problem: Physical Regulation:  Goal: Complications related to the disease process, condition or treatment will be avoided or minimized  Description  Complications related to the disease process, condition or treatment will be avoided or minimized  Outcome: Ongoing  Goal: Ability to maintain clinical measurements within normal limits will improve  Description  Ability to maintain clinical measurements within normal limits will improve  Outcome: Ongoing  Goal: Will show no signs and symptoms of electrolyte imbalance  Description  Will show no signs and symptoms of electrolyte imbalance  Outcome: Ongoing     Problem: Fluid Volume:  Goal: Ability to achieve a balanced intake and output will improve  Description  Ability to achieve a balanced intake and output will improve  Outcome: Ongoing

## 2020-02-19 NOTE — PROGRESS NOTES
[facility]      Daily Progress Note     Admit Date: 2/14/2020  Bed/Room No.  2012/2012-01  Admitting Physician : Brook Alicea MD  Code Status :[address] Day:  LOS: 5 days   Chief Complaint:     Chief Complaint   Patient presents with    Drug Overdose     Principal Problem:    Accidental drug overdose  Active Problems:    Hypothyroidism    Drug abuse ([address]    Essential hypertension    Hypokalemia    Acute cystitis without hematuria    Prolonged Q-T interval on ECG    Bipolar disorder, unspecified ([address]    Polysubstance abuse ([address]    Alcohol intoxication delirium ([address]    Cocaine abuse ([address]    Acute respiratory failure with hypoxia ([address]  Resolved Problems:    * No resolved hospital problems. *    Subjective : Interval History/Significant events :  02/19/20    Patient seen and examined  Patient feels weak still on oxygen  Wean off oxygen  Patient also complained of diarrhea associated with abdominal cramps  Patient denies fever chest pain    I am seeing the patient for aspiration  Background History:         Esan Citizen is 53 y.o. female  Who was admitted to the hospital on 2/14/2020 for treatment of Accidental drug overdose. Patient was found unresponsive by her family and called 911. CPR was given on scene by EMS as patient was not felt to have a pulse . Patient was given 2 mg Narcan, IO line was placed followed by 2 mg IO Narcan injection. Patient had successful RoSC. Patient was transported to emergency room and was alert awake on arrival.  Work-up in emergency room showed hyponatremia 130, hypokalemia 2.5, normal kidney function, lactic acid 4.4. Ethanol level was 0.054%. UA was positive for nitrites moderate leukoesterase. Patient has underlying history of bipolar 1 disorder with depression, hypertension, hypothyroidism, COPD, Crohn's disease, anxiety disorder.      PMH:  Past Medical History:   Diagnosis Date    Acid reflux     Anxiety     Asthma     [facility] ([facility]     Bowel obstruction (HCC)     [facility]     Crohn disease ([facility]     Depression     Gout     Hypertension     Hypothyroidism       Allergies: No Known Allergies   Medications :  lisinopril, 10 mg, Oral, Daily  buPROPion, 100 mg, Oral, BID  enoxaparin, 40 mg, Subcutaneous, Daily  amoxicillin-clavulanate, 1 tablet, Oral, 2 times per day  guaiFENesin, 600 mg, Oral, BID  lidocaine, 1 patch, Transdermal, Daily  ipratropium-albuterol, 3 mL, Inhalation, 4x daily  budesonide-formoterol, 2 puff, Inhalation, BID  omeprazole, 40 mg, Oral, BID  pravastatin, 40 mg, Oral, Daily  sodium chloride flush, 10 mL, Intravenous, 2 times per day            Current Vitals : Temp: 97.6 °F (36.4 °C),  Pulse: 73, Resp: 17, BP: (!) 141/83, SpO2: 98 %  Last 24 Hrs Vitals   Patient Vitals for the past 24 hrs:   BP Temp Temp src Pulse Resp SpO2 Weight   02/19/20 1134 (!) 141/83 97.6 °F (36.4 °C) Oral 73 17 98 % --   02/19/20 1107 -- -- -- -- 14 98 % --   02/19/20 0832 134/81 -- -- 78 16 -- --   02/19/20 0758 -- -- -- -- -- 96 % --   02/19/20 0754 -- -- -- -- 15 97 % --   02/19/20 0615 -- -- -- -- -- -- 168 lb 12.8 oz (76.6 kg)   02/18/20 2350 (!) 142/82 98 °F (36.7 °C) Oral 75 16 98 % --   02/18/20 2016 -- -- -- -- -- 99 % --   02/18/20 2015 -- -- -- -- -- 96 % --   02/18/20 1926 (!) 141/74 97.9 °F (36.6 °C) Oral 86 16 94 % --   02/18/20 1513 -- -- -- -- 16 93 % --     Intake / output   02/18 0701 - 02/19 0700  In: 1240 [P.O.:1230;  I.V.:10]  Out: 125 [Urine:125]  Physical Exam:    Physical exam     Alert  Chest: Clear to auscultation bilateral  Abdomen: Nontender nondistended  CVS: S1-S2  Neurology: Moves extremity  Laboratory findings:    Recent Labs     02/17/20  0400 02/18/20  0536 02/19/20  0439   WBC 9.2 5.7 6.3   HGB 12.1 11.4* 11.0*   HCT 38.1 36.3 34.5*    310 326     Recent Labs     02/17/20  0400  02/18/20  0536  02/19/20  0027 02/19/20  0439 days .   5. Bipolar disorder with depression  6. Hyponatremia - continue IVF. 7. Hypokalemia - replace K   8. Lactic acidosis - continue IV hydration . 9. Acute cystitis - continue antibiotics   10. Atelectasis - incentive spirometry   11. COPD - continue Symbicort - Duoneb   12.  Diarrhea check stool WBC patient have history of Crohn's disease        Paul Noel MD  2/19/2020

## 2020-02-20 LAB
ANION GAP SERPL CALCULATED.3IONS-SCNC: 16 MMOL/L (ref 9–17)
BUN BLDV-MCNC: 9 MG/DL (ref 6–20)
BUN/CREAT BLD: ABNORMAL (ref 9–20)
C-REACTIVE PROTEIN: 41.2 MG/L (ref 0–5)
CALCIUM SERPL-MCNC: 9.2 MG/DL (ref 8.6–10.4)
CHLORIDE BLD-SCNC: 103 MMOL/L (ref 98–107)
CO2: 24 MMOL/L (ref 20–31)
CREAT SERPL-MCNC: 0.6 MG/DL (ref 0.5–0.9)
DIRECT EXAM: NORMAL
DIRECT EXAM: NORMAL
GFR AFRICAN AMERICAN: >60 ML/MIN
GFR NON-AFRICAN AMERICAN: >60 ML/MIN
GFR SERPL CREATININE-BSD FRML MDRD: ABNORMAL ML/MIN/{1.73_M2}
GFR SERPL CREATININE-BSD FRML MDRD: ABNORMAL ML/MIN/{1.73_M2}
GLUCOSE BLD-MCNC: 109 MG/DL (ref 70–99)
HCT VFR BLD CALC: 37.2 % (ref 36.3–47.1)
HEMOGLOBIN: 11.7 G/DL (ref 11.9–15.1)
Lab: NORMAL
MAGNESIUM: 2.1 MG/DL (ref 1.6–2.6)
MCH RBC QN AUTO: 28.1 PG (ref 25.2–33.5)
MCHC RBC AUTO-ENTMCNC: 31.5 G/DL (ref 28.4–34.8)
MCV RBC AUTO: 89.2 FL (ref 82.6–102.9)
NRBC AUTOMATED: 0 PER 100 WBC
PDW BLD-RTO: 14.3 % (ref 11.8–14.4)
PLATELET # BLD: 343 K/UL (ref 138–453)
PMV BLD AUTO: 10.2 FL (ref 8.1–13.5)
POTASSIUM SERPL-SCNC: 3.5 MMOL/L (ref 3.7–5.3)
RBC # BLD: 4.17 M/UL (ref 3.95–5.11)
SODIUM BLD-SCNC: 135 MMOL/L (ref 135–144)
SODIUM BLD-SCNC: 136 MMOL/L (ref 135–144)
SODIUM BLD-SCNC: 141 MMOL/L (ref 135–144)
SODIUM BLD-SCNC: 143 MMOL/L (ref 135–144)
SPECIMEN DESCRIPTION: NORMAL
WBC # BLD: 6.5 K/UL (ref 3.5–11.3)

## 2020-02-20 PROCEDURE — 6370000000 HC RX 637 (ALT 250 FOR IP): Performed by: NURSE PRACTITIONER

## 2020-02-20 PROCEDURE — 86140 C-REACTIVE PROTEIN: CPT

## 2020-02-20 PROCEDURE — 2580000003 HC RX 258: Performed by: NURSE PRACTITIONER

## 2020-02-20 PROCEDURE — 6370000000 HC RX 637 (ALT 250 FOR IP): Performed by: INTERNAL MEDICINE

## 2020-02-20 PROCEDURE — 97116 GAIT TRAINING THERAPY: CPT

## 2020-02-20 PROCEDURE — 94640 AIRWAY INHALATION TREATMENT: CPT

## 2020-02-20 PROCEDURE — 36415 COLL VENOUS BLD VENIPUNCTURE: CPT

## 2020-02-20 PROCEDURE — 80048 BASIC METABOLIC PNL TOTAL CA: CPT

## 2020-02-20 PROCEDURE — 99232 SBSQ HOSP IP/OBS MODERATE 35: CPT | Performed by: INTERNAL MEDICINE

## 2020-02-20 PROCEDURE — 6370000000 HC RX 637 (ALT 250 FOR IP): Performed by: FAMILY MEDICINE

## 2020-02-20 PROCEDURE — 85027 COMPLETE CBC AUTOMATED: CPT

## 2020-02-20 PROCEDURE — 6360000002 HC RX W HCPCS: Performed by: INTERNAL MEDICINE

## 2020-02-20 PROCEDURE — 83735 ASSAY OF MAGNESIUM: CPT

## 2020-02-20 PROCEDURE — 97110 THERAPEUTIC EXERCISES: CPT

## 2020-02-20 PROCEDURE — 2060000000 HC ICU INTERMEDIATE R&B

## 2020-02-20 PROCEDURE — 84295 ASSAY OF SERUM SODIUM: CPT

## 2020-02-20 PROCEDURE — 6360000002 HC RX W HCPCS: Performed by: FAMILY MEDICINE

## 2020-02-20 RX ORDER — METHYLPREDNISOLONE SODIUM SUCCINATE 40 MG/ML
40 INJECTION, POWDER, LYOPHILIZED, FOR SOLUTION INTRAMUSCULAR; INTRAVENOUS DAILY
Status: DISCONTINUED | OUTPATIENT
Start: 2020-02-20 | End: 2020-02-21 | Stop reason: HOSPADM

## 2020-02-20 RX ORDER — LOPERAMIDE HYDROCHLORIDE 2 MG/1
4 CAPSULE ORAL ONCE
Status: COMPLETED | OUTPATIENT
Start: 2020-02-20 | End: 2020-02-20

## 2020-02-20 RX ORDER — LOPERAMIDE HYDROCHLORIDE 2 MG/1
2 CAPSULE ORAL 4 TIMES DAILY PRN
Status: DISCONTINUED | OUTPATIENT
Start: 2020-02-20 | End: 2020-02-21 | Stop reason: HOSPADM

## 2020-02-20 RX ORDER — ALBUTEROL SULFATE 2.5 MG/3ML
2.5 SOLUTION RESPIRATORY (INHALATION)
Status: DISCONTINUED | OUTPATIENT
Start: 2020-02-20 | End: 2020-02-21 | Stop reason: HOSPADM

## 2020-02-20 RX ORDER — METRONIDAZOLE 500 MG/1
500 TABLET ORAL EVERY 8 HOURS SCHEDULED
Status: DISCONTINUED | OUTPATIENT
Start: 2020-02-20 | End: 2020-02-21 | Stop reason: HOSPADM

## 2020-02-20 RX ADMIN — Medication 1 MG: at 00:34

## 2020-02-20 RX ADMIN — TRAMADOL HYDROCHLORIDE 50 MG: 50 TABLET, FILM COATED ORAL at 20:41

## 2020-02-20 RX ADMIN — LORAZEPAM 0.5 MG: 0.5 TABLET ORAL at 20:41

## 2020-02-20 RX ADMIN — OMEPRAZOLE 40 MG: 20 CAPSULE, DELAYED RELEASE ORAL at 20:41

## 2020-02-20 RX ADMIN — GUAIFENESIN 600 MG: 600 TABLET, EXTENDED RELEASE ORAL at 20:40

## 2020-02-20 RX ADMIN — PROMETHAZINE HYDROCHLORIDE 12.5 MG: 25 TABLET ORAL at 16:07

## 2020-02-20 RX ADMIN — SODIUM CHLORIDE, PRESERVATIVE FREE 10 ML: 5 INJECTION INTRAVENOUS at 21:00

## 2020-02-20 RX ADMIN — AMOXICILLIN AND CLAVULANATE POTASSIUM 1 TABLET: 875; 125 TABLET, FILM COATED ORAL at 07:59

## 2020-02-20 RX ADMIN — LOPERAMIDE HYDROCHLORIDE 2 MG: 2 CAPSULE ORAL at 11:28

## 2020-02-20 RX ADMIN — TRAMADOL HYDROCHLORIDE 50 MG: 50 TABLET, FILM COATED ORAL at 10:48

## 2020-02-20 RX ADMIN — ENOXAPARIN SODIUM 40 MG: 40 INJECTION SUBCUTANEOUS at 07:59

## 2020-02-20 RX ADMIN — METHYLPREDNISOLONE SODIUM SUCCINATE 40 MG: 40 INJECTION, POWDER, FOR SOLUTION INTRAMUSCULAR; INTRAVENOUS at 11:29

## 2020-02-20 RX ADMIN — PRAVASTATIN SODIUM 40 MG: 20 TABLET ORAL at 08:00

## 2020-02-20 RX ADMIN — SODIUM CHLORIDE, PRESERVATIVE FREE 10 ML: 5 INJECTION INTRAVENOUS at 08:09

## 2020-02-20 RX ADMIN — METRONIDAZOLE 500 MG: 500 TABLET, FILM COATED ORAL at 16:07

## 2020-02-20 RX ADMIN — IPRATROPIUM BROMIDE AND ALBUTEROL SULFATE 3 ML: .5; 3 SOLUTION RESPIRATORY (INHALATION) at 11:36

## 2020-02-20 RX ADMIN — IPRATROPIUM BROMIDE AND ALBUTEROL SULFATE 3 ML: .5; 3 SOLUTION RESPIRATORY (INHALATION) at 15:37

## 2020-02-20 RX ADMIN — AMOXICILLIN AND CLAVULANATE POTASSIUM 1 TABLET: 875; 125 TABLET, FILM COATED ORAL at 20:40

## 2020-02-20 RX ADMIN — IPRATROPIUM BROMIDE AND ALBUTEROL SULFATE 3 ML: .5; 3 SOLUTION RESPIRATORY (INHALATION) at 07:58

## 2020-02-20 RX ADMIN — LOPERAMIDE HYDROCHLORIDE 4 MG: 2 CAPSULE ORAL at 13:38

## 2020-02-20 RX ADMIN — LISINOPRIL 10 MG: 10 TABLET ORAL at 08:00

## 2020-02-20 RX ADMIN — LORAZEPAM 0.5 MG: 0.5 TABLET ORAL at 13:28

## 2020-02-20 RX ADMIN — PROMETHAZINE HYDROCHLORIDE 12.5 MG: 25 TABLET ORAL at 23:06

## 2020-02-20 RX ADMIN — BUPROPION HYDROCHLORIDE 100 MG: 100 TABLET, FILM COATED ORAL at 20:42

## 2020-02-20 RX ADMIN — BUDESONIDE AND FORMOTEROL FUMARATE DIHYDRATE 2 PUFF: 160; 4.5 AEROSOL RESPIRATORY (INHALATION) at 07:58

## 2020-02-20 RX ADMIN — TRAMADOL HYDROCHLORIDE 50 MG: 50 TABLET, FILM COATED ORAL at 16:09

## 2020-02-20 RX ADMIN — TRAMADOL HYDROCHLORIDE 50 MG: 50 TABLET, FILM COATED ORAL at 04:38

## 2020-02-20 RX ADMIN — OMEPRAZOLE 40 MG: 20 CAPSULE, DELAYED RELEASE ORAL at 08:00

## 2020-02-20 RX ADMIN — POTASSIUM BICARBONATE 40 MEQ: 782 TABLET, EFFERVESCENT ORAL at 11:49

## 2020-02-20 RX ADMIN — METRONIDAZOLE 500 MG: 500 TABLET, FILM COATED ORAL at 23:04

## 2020-02-20 RX ADMIN — BUPROPION HYDROCHLORIDE 100 MG: 100 TABLET, FILM COATED ORAL at 07:58

## 2020-02-20 RX ADMIN — GUAIFENESIN 600 MG: 600 TABLET, EXTENDED RELEASE ORAL at 07:59

## 2020-02-20 ASSESSMENT — PAIN DESCRIPTION - PAIN TYPE: TYPE: ACUTE PAIN

## 2020-02-20 ASSESSMENT — PAIN DESCRIPTION - LOCATION: LOCATION: ABDOMEN

## 2020-02-20 ASSESSMENT — PAIN DESCRIPTION - DESCRIPTORS: DESCRIPTORS: ACHING

## 2020-02-20 ASSESSMENT — PAIN SCALES - GENERAL
PAINLEVEL_OUTOF10: 8
PAINLEVEL_OUTOF10: 6
PAINLEVEL_OUTOF10: 7
PAINLEVEL_OUTOF10: 8
PAINLEVEL_OUTOF10: 6

## 2020-02-20 ASSESSMENT — PAIN - FUNCTIONAL ASSESSMENT: PAIN_FUNCTIONAL_ASSESSMENT: ACTIVITIES ARE NOT PREVENTED

## 2020-02-20 NOTE — PROGRESS NOTES
disorder ([facility]     Bowel obstruction ([facility]     COPD (chronic obstructive pulmonary disease) (HCC)     Crohn disease ([facility]     Depression     Gout     Hypertension     Hypothyroidism       Allergies: No Known Allergies   Medications :  methylPREDNISolone, 40 mg, Intravenous, Daily  metroNIDAZOLE, 500 mg, Oral, 3 times per day  lisinopril, 10 mg, Oral, Daily  buPROPion, 100 mg, Oral, BID  enoxaparin, 40 mg, Subcutaneous, Daily  amoxicillin-clavulanate, 1 tablet, Oral, 2 times per day  guaiFENesin, 600 mg, Oral, BID  lidocaine, 1 patch, Transdermal, Daily  ipratropium-albuterol, 3 mL, Inhalation, 4x daily  budesonide-formoterol, 2 puff, Inhalation, BID  omeprazole, 40 mg, Oral, BID  pravastatin, 40 mg, Oral, Daily  sodium chloride flush, 10 mL, Intravenous, 2 times per day            Current Vitals : Temp: 98.2 °F (36.8 °C),  Pulse: 74, Resp: 18, BP: (!) 161/78, SpO2: 96 %  Last 24 Hrs Vitals   Patient Vitals for the past 24 hrs:   BP Temp Temp src Pulse Resp SpO2   02/20/20 0758 (!) 161/78 98.2 °F (36.8 °C) Oral 74 18 96 %   02/20/20 0417 (!) 146/95 -- -- 73 18 95 %   02/20/20 0011 (!) 166/84 98 °F (36.7 °C) Oral 81 16 98 %   02/19/20 2021 (!) 145/91 98.1 °F (36.7 °C) Oral 84 15 97 %   02/19/20 1520 135/86 98.2 °F (36.8 °C) Oral 68 14 99 %     Intake / output   02/19 0701 - 02/20 0700  In: 400 [P.O.:400]  Out: 450 [Urine:450]  Physical Exam:    Physical exam     Alert  Chest: Clear to auscultation bilateral  Abdomen: Nontender nondistended  CVS: S1-S2  Neurology: Moves extremity  Laboratory findings:    Recent Labs     02/18/20  0536 02/19/20  0439 02/20/20  0504   WBC 5.7 6.3 6.5   HGB 11.4* 11.0* 11.7*   HCT 36.3 34.5* 37.2    326 343     Recent Labs     02/18/20  0536  02/19/20  0439  02/19/20  1805 02/20/20  0135 02/20/20  0504      < > 140   < > 137 135 143   K 3.4*  --  3.4*  --   --   --  3.5*   CL 99  --  102  --   --   --  103   CO2 25  --  24  --   --   --  24   GLUCOSE 103*  --  125*  -- --   --  109*   BUN 11  --  14  --   --   --  9   CREATININE 0.52  --  0.62  --   --   --  0.60   MG 2.4  --  2.2  --   --   --  2.1   CALCIUM 8.7  --  8.7  --   --   --  9.2    < > = values in this interval not displayed. No results for input(s): PROT, LABALBU, LABA1C, F2CKNZY, H3HREAC, FT4, TSH, AST, ALT, LDH, GGT, ALKPHOS, BILITOT, BILIDIR, AMMONIA, AMYLASE, LIPASE, LACTATE, CHOL, HDL, LDLCHOLESTEROL, CHOLHDLRATIO, TRIG, VLDL, BNP, TROPONINI, CKTOTAL, CKMB, CKMBINDEX, RF, GILBERT in the last 72 hours. Specific Gravity, UA   Date Value Ref Range Status   02/14/2020 1.012 1.005 - 1.030 Final     Protein, UA   Date Value Ref Range Status   02/14/2020 NEGATIVE NEGATIVE Final     RBC, UA   Date Value Ref Range Status   02/14/2020 None 0 - 2 /HPF Final     Bacteria, UA   Date Value Ref Range Status   02/14/2020 MANY (A) None Final     Nitrite, Urine   Date Value Ref Range Status   02/14/2020 POSITIVE (A) NEGATIVE Final     WBC, UA   Date Value Ref Range Status   02/14/2020 20 TO 50 0 - 5 /HPF Final     Leukocyte Esterase, Urine   Date Value Ref Range Status   02/14/2020 MODERATE (A) NEGATIVE Final       Imaging / Clinical Data :-   Ct Head Wo Contrast    Result Date: 2/14/2020  No acute intracranial abnormality. Xr Chest Portable    Result Date: 2/14/2020  1. No acute cardiopulmonary process. Clinical Course : stable  Assessment and Plan  :        1. Drug overdose - unknown - Narcan used , negative Utox for opioids. showing cocaine and  Had alcohol intoxications . 2. Unresponsiveness - from alcohol  intoxication . Елена Harris 3. Suicide attempt - Psychiatry consultation. Will need inpatient admission to Psych for treatment of depression . 4. Intermittent fevers - secondary to  aspiration pneumonia . Augmentin for 7 days . 5. Bipolar disorder with depression  6. Hyponatremia - continue IVF. 7. Hypokalemia - replace K   8. Lactic acidosis - continue IV hydration . 9.  Acute cystitis - continue antibiotics   10. Atelectasis - incentive spirometry   11. COPD - continue Symbicort - Duoneb   12.  Acute Crohn exacerbation added Flagyl and IV steroid also added Imodium no evidence of C. difficile clinically        Forrest Lara MD  2/20/2020

## 2020-02-20 NOTE — PROGRESS NOTES
Physical Therapy  Facility/Department: [facility] 2  Daily Treatment Note  NAME: Americo Fowler  : 1965  MRN: [id number]    Date of Service: 2020    Discharge Recommendations:  Patient would benefit from continued therapy after discharge   PT Equipment Recommendations  Equipment Needed: Yes  Mobility Devices: Waller Form: Rolling    Assessment   Body structures, Functions, Activity limitations: Decreased functional mobility ; Decreased endurance;Decreased strength;Decreased balance  Assessment: Pt required CGA to ambulate with no device; unsteady and would benefit from use of AD to prevent falls. Prognosis: Good  PT Education: General Safety;Gait Training;Home Exercise Program  REQUIRES PT FOLLOW UP: Yes  Activity Tolerance  Activity Tolerance: Patient limited by endurance     Patient Diagnosis(es): The encounter diagnosis was Hypokalemia. has a past medical history of Acid reflux, Anxiety, Asthma, Bipolar 1 disorder ([facility] Bowel obstruction ([facility] COPD (chronic obstructive pulmonary disease) ([facility] Crohn disease ([facility] Depression, Gout, Hypertension, and Hypothyroidism. has a past surgical history that includes Tonsillectomy and adenoidectomy; Tubal ligation; Abdomen surgery; Cholecystectomy; Bunionectomy (Left); Colonoscopy (2007); and Colonoscopy (10/12/2017). Restrictions  Restrictions/Precautions  Required Braces or Orthoses?: No  Position Activity Restriction  Other position/activity restrictions: Up with assist  Subjective   General  Chart Reviewed: Yes  Response To Previous Treatment: Patient with no complaints from previous session. Family / Caregiver Present: Yes(sitter)  Subjective  Subjective: Pt alert in bed; reports abdominal discomfort due to frequent loose stools. Pt wanting to stay in room but agreeable to PT.    General Comment  Comments: Pt left in chair with BLEs elevated; call light in reach  Pain Screening  Patient Currently in Pain: Yes  Pain Assessment  Pain place, Left in chair, Gait belt, Patient at risk for falls, Sitter present, Nurse notified, Call light within reach  Restraints  Initially in place: No     Therapy Time   Individual Concurrent Group Co-treatment   Time In [facility]. 72         Minutes 30          treatment time: 30 minutes       [address], PTA

## 2020-02-21 ENCOUNTER — HOSPITAL ENCOUNTER (INPATIENT)
Age: 55
LOS: 4 days | Discharge: HOME | DRG: 753 | End: 2020-02-25
Attending: PSYCHIATRY & NEUROLOGY | Admitting: PSYCHIATRY & NEUROLOGY
Payer: MEDICARE

## 2020-02-21 VITALS
BODY MASS INDEX: 29.61 KG/M2 | HEART RATE: 76 BPM | DIASTOLIC BLOOD PRESSURE: 74 MMHG | SYSTOLIC BLOOD PRESSURE: 129 MMHG | WEIGHT: 167.11 LBS | OXYGEN SATURATION: 97 % | RESPIRATION RATE: 18 BRPM | HEIGHT: 63 IN | TEMPERATURE: 98.6 F

## 2020-02-21 PROBLEM — F31.9 BIPOLAR 1 DISORDER (HCC): Status: ACTIVE | Noted: 2020-02-21

## 2020-02-21 LAB
ANION GAP SERPL CALCULATED.3IONS-SCNC: 10 MMOL/L (ref 9–17)
BUN BLDV-MCNC: 14 MG/DL (ref 6–20)
BUN/CREAT BLD: ABNORMAL (ref 9–20)
CALCIUM SERPL-MCNC: 8.7 MG/DL (ref 8.6–10.4)
CHLORIDE BLD-SCNC: 103 MMOL/L (ref 98–107)
CO2: 22 MMOL/L (ref 20–31)
CREAT SERPL-MCNC: 0.74 MG/DL (ref 0.5–0.9)
GFR AFRICAN AMERICAN: >60 ML/MIN
GFR NON-AFRICAN AMERICAN: >60 ML/MIN
GFR SERPL CREATININE-BSD FRML MDRD: ABNORMAL ML/MIN/{1.73_M2}
GFR SERPL CREATININE-BSD FRML MDRD: ABNORMAL ML/MIN/{1.73_M2}
GLUCOSE BLD-MCNC: 104 MG/DL (ref 70–99)
HCT VFR BLD CALC: 37.1 % (ref 36.3–47.1)
HEMOGLOBIN: 11.2 G/DL (ref 11.9–15.1)
MAGNESIUM: 2 MG/DL (ref 1.6–2.6)
MCH RBC QN AUTO: 27.5 PG (ref 25.2–33.5)
MCHC RBC AUTO-ENTMCNC: 30.2 G/DL (ref 28.4–34.8)
MCV RBC AUTO: 90.9 FL (ref 82.6–102.9)
NRBC AUTOMATED: 0 PER 100 WBC
PDW BLD-RTO: 14.1 % (ref 11.8–14.4)
PLATELET # BLD: 358 K/UL (ref 138–453)
PMV BLD AUTO: 10.1 FL (ref 8.1–13.5)
POTASSIUM SERPL-SCNC: 3.8 MMOL/L (ref 3.7–5.3)
RBC # BLD: 4.08 M/UL (ref 3.95–5.11)
SODIUM BLD-SCNC: 135 MMOL/L (ref 135–144)
SODIUM BLD-SCNC: 141 MMOL/L (ref 135–144)
SODIUM BLD-SCNC: 141 MMOL/L (ref 135–144)
WBC # BLD: 9.2 K/UL (ref 3.5–11.3)

## 2020-02-21 PROCEDURE — 97116 GAIT TRAINING THERAPY: CPT

## 2020-02-21 PROCEDURE — 6370000000 HC RX 637 (ALT 250 FOR IP): Performed by: INTERNAL MEDICINE

## 2020-02-21 PROCEDURE — 97530 THERAPEUTIC ACTIVITIES: CPT

## 2020-02-21 PROCEDURE — 1240000000 HC EMOTIONAL WELLNESS R&B

## 2020-02-21 PROCEDURE — 36415 COLL VENOUS BLD VENIPUNCTURE: CPT

## 2020-02-21 PROCEDURE — 97535 SELF CARE MNGMENT TRAINING: CPT

## 2020-02-21 PROCEDURE — 99238 HOSP IP/OBS DSCHRG MGMT 30/<: CPT | Performed by: INTERNAL MEDICINE

## 2020-02-21 PROCEDURE — 84295 ASSAY OF SERUM SODIUM: CPT

## 2020-02-21 PROCEDURE — 80048 BASIC METABOLIC PNL TOTAL CA: CPT

## 2020-02-21 PROCEDURE — 6360000002 HC RX W HCPCS: Performed by: INTERNAL MEDICINE

## 2020-02-21 PROCEDURE — 6370000000 HC RX 637 (ALT 250 FOR IP): Performed by: NURSE PRACTITIONER

## 2020-02-21 PROCEDURE — 2580000003 HC RX 258: Performed by: NURSE PRACTITIONER

## 2020-02-21 PROCEDURE — 94640 AIRWAY INHALATION TREATMENT: CPT

## 2020-02-21 PROCEDURE — 85027 COMPLETE CBC AUTOMATED: CPT

## 2020-02-21 PROCEDURE — 6370000000 HC RX 637 (ALT 250 FOR IP): Performed by: FAMILY MEDICINE

## 2020-02-21 PROCEDURE — 6360000002 HC RX W HCPCS: Performed by: FAMILY MEDICINE

## 2020-02-21 PROCEDURE — 6370000000 HC RX 637 (ALT 250 FOR IP): Performed by: PSYCHIATRY & NEUROLOGY

## 2020-02-21 PROCEDURE — 83735 ASSAY OF MAGNESIUM: CPT

## 2020-02-21 RX ORDER — LOPERAMIDE HYDROCHLORIDE 2 MG/1
2 CAPSULE ORAL 3 TIMES DAILY PRN
Status: DISCONTINUED | OUTPATIENT
Start: 2020-02-21 | End: 2020-02-25 | Stop reason: HOSPADM

## 2020-02-21 RX ORDER — HYDROXYZINE HYDROCHLORIDE 25 MG/1
25 TABLET, FILM COATED ORAL 3 TIMES DAILY PRN
Status: DISCONTINUED | OUTPATIENT
Start: 2020-02-21 | End: 2020-02-25 | Stop reason: HOSPADM

## 2020-02-21 RX ORDER — METRONIDAZOLE 500 MG/1
500 TABLET ORAL EVERY 8 HOURS SCHEDULED
Qty: 15 TABLET | Refills: 0 | Status: ON HOLD | OUTPATIENT
Start: 2020-02-21 | End: 2020-02-25 | Stop reason: HOSPADM

## 2020-02-21 RX ORDER — MAGNESIUM HYDROXIDE/ALUMINUM HYDROXICE/SIMETHICONE 120; 1200; 1200 MG/30ML; MG/30ML; MG/30ML
20 SUSPENSION ORAL 3 TIMES DAILY PRN
Status: DISCONTINUED | OUTPATIENT
Start: 2020-02-21 | End: 2020-02-25 | Stop reason: HOSPADM

## 2020-02-21 RX ORDER — TRAZODONE HYDROCHLORIDE 150 MG/1
300 TABLET ORAL NIGHTLY PRN
Status: DISCONTINUED | OUTPATIENT
Start: 2020-02-21 | End: 2020-02-25 | Stop reason: HOSPADM

## 2020-02-21 RX ORDER — LOPERAMIDE HYDROCHLORIDE 2 MG/1
2 CAPSULE ORAL 4 TIMES DAILY PRN
Qty: 30 CAPSULE | Refills: 0 | Status: SHIPPED | OUTPATIENT
Start: 2020-02-21 | End: 2020-03-02

## 2020-02-21 RX ORDER — BENZTROPINE MESYLATE 1 MG/ML
2 INJECTION INTRAMUSCULAR; INTRAVENOUS 2 TIMES DAILY PRN
Status: DISCONTINUED | OUTPATIENT
Start: 2020-02-21 | End: 2020-02-25 | Stop reason: HOSPADM

## 2020-02-21 RX ORDER — LIDOCAINE 4 G/G
1 PATCH TOPICAL DAILY
Qty: 10 PATCH | Refills: 0 | Status: SHIPPED | OUTPATIENT
Start: 2020-02-22 | End: 2020-03-03

## 2020-02-21 RX ORDER — ACETAMINOPHEN 325 MG/1
650 TABLET ORAL EVERY 4 HOURS PRN
Status: DISCONTINUED | OUTPATIENT
Start: 2020-02-21 | End: 2020-02-25 | Stop reason: HOSPADM

## 2020-02-21 RX ORDER — PREDNISONE 50 MG/1
50 TABLET ORAL DAILY
Qty: 4 TABLET | Refills: 0 | Status: ON HOLD | OUTPATIENT
Start: 2020-02-21 | End: 2020-12-28

## 2020-02-21 RX ORDER — NICOTINE 21 MG/24HR
1 PATCH, TRANSDERMAL 24 HOURS TRANSDERMAL DAILY
Status: DISCONTINUED | OUTPATIENT
Start: 2020-02-22 | End: 2020-02-22 | Stop reason: HOSPADM

## 2020-02-21 RX ADMIN — ACETAMINOPHEN 650 MG: 325 TABLET ORAL at 09:45

## 2020-02-21 RX ADMIN — METRONIDAZOLE 500 MG: 500 TABLET, FILM COATED ORAL at 16:36

## 2020-02-21 RX ADMIN — OMEPRAZOLE 40 MG: 20 CAPSULE, DELAYED RELEASE ORAL at 09:47

## 2020-02-21 RX ADMIN — TRAMADOL HYDROCHLORIDE 50 MG: 50 TABLET, FILM COATED ORAL at 12:02

## 2020-02-21 RX ADMIN — Medication 1 MG: at 00:52

## 2020-02-21 RX ADMIN — BUDESONIDE AND FORMOTEROL FUMARATE DIHYDRATE 2 PUFF: 160; 4.5 AEROSOL RESPIRATORY (INHALATION) at 09:25

## 2020-02-21 RX ADMIN — ENOXAPARIN SODIUM 40 MG: 40 INJECTION SUBCUTANEOUS at 09:46

## 2020-02-21 RX ADMIN — LOPERAMIDE HYDROCHLORIDE 2 MG: 2 CAPSULE ORAL at 10:23

## 2020-02-21 RX ADMIN — LOPERAMIDE HYDROCHLORIDE 2 MG: 2 CAPSULE ORAL at 22:22

## 2020-02-21 RX ADMIN — TRAMADOL HYDROCHLORIDE 50 MG: 50 TABLET, FILM COATED ORAL at 16:36

## 2020-02-21 RX ADMIN — LORAZEPAM 0.5 MG: 0.5 TABLET ORAL at 09:45

## 2020-02-21 RX ADMIN — LISINOPRIL 10 MG: 10 TABLET ORAL at 09:45

## 2020-02-21 RX ADMIN — LORAZEPAM 0.5 MG: 0.5 TABLET ORAL at 16:37

## 2020-02-21 RX ADMIN — TRAZODONE HYDROCHLORIDE 300 MG: 150 TABLET ORAL at 22:22

## 2020-02-21 RX ADMIN — PRAVASTATIN SODIUM 40 MG: 20 TABLET ORAL at 09:45

## 2020-02-21 RX ADMIN — LOPERAMIDE HYDROCHLORIDE 2 MG: 2 CAPSULE ORAL at 08:54

## 2020-02-21 RX ADMIN — METRONIDAZOLE 500 MG: 500 TABLET, FILM COATED ORAL at 06:45

## 2020-02-21 RX ADMIN — GUAIFENESIN 600 MG: 600 TABLET, EXTENDED RELEASE ORAL at 09:47

## 2020-02-21 RX ADMIN — METHYLPREDNISOLONE SODIUM SUCCINATE 40 MG: 40 INJECTION, POWDER, FOR SOLUTION INTRAMUSCULAR; INTRAVENOUS at 09:46

## 2020-02-21 RX ADMIN — BUPROPION HYDROCHLORIDE 100 MG: 100 TABLET, FILM COATED ORAL at 09:46

## 2020-02-21 RX ADMIN — SODIUM CHLORIDE, PRESERVATIVE FREE 10 ML: 5 INJECTION INTRAVENOUS at 09:48

## 2020-02-21 RX ADMIN — TRAMADOL HYDROCHLORIDE 50 MG: 50 TABLET, FILM COATED ORAL at 00:52

## 2020-02-21 RX ADMIN — LOPERAMIDE HYDROCHLORIDE 2 MG: 2 CAPSULE ORAL at 18:26

## 2020-02-21 RX ADMIN — LOPERAMIDE HYDROCHLORIDE 2 MG: 2 CAPSULE ORAL at 19:38

## 2020-02-21 RX ADMIN — TRAMADOL HYDROCHLORIDE 50 MG: 50 TABLET, FILM COATED ORAL at 06:55

## 2020-02-21 ASSESSMENT — PAIN SCALES - GENERAL
PAINLEVEL_OUTOF10: 3
PAINLEVEL_OUTOF10: 6
PAINLEVEL_OUTOF10: 7
PAINLEVEL_OUTOF10: 7
PAINLEVEL_OUTOF10: 6

## 2020-02-21 ASSESSMENT — SLEEP AND FATIGUE QUESTIONNAIRES
SLEEP PATTERN: DIFFICULTY FALLING ASLEEP;DISTURBED/INTERRUPTED SLEEP;RESTLESSNESS
DIFFICULTY ARISING: NO
DO YOU USE A SLEEP AID: YES
AVERAGE NUMBER OF SLEEP HOURS: 5
DO YOU HAVE DIFFICULTY SLEEPING: YES
DIFFICULTY STAYING ASLEEP: YES
DIFFICULTY FALLING ASLEEP: YES
RESTFUL SLEEP: NO

## 2020-02-21 ASSESSMENT — LIFESTYLE VARIABLES: HISTORY_ALCOHOL_USE: NO

## 2020-02-21 ASSESSMENT — PAIN - FUNCTIONAL ASSESSMENT: PAIN_FUNCTIONAL_ASSESSMENT: 0-10

## 2020-02-21 NOTE — PROGRESS NOTES
Nutrition Assessment    Type and Reason for Visit: Initial(LOS Day 7)    Nutrition Recommendations:   -Continue general diet as tolerated   -Recommend ensure enlive supplements BID   -Will monitor po intake and weights     Nutrition Assessment:  Pt admitted d/t drug overdose. Pt stated that her appetite has been poor this week d/t diarrhea and having a decreased appetite. Pt stated UBW of 167 lbs and denies any recent wt loss. Will add supplements d/t poor po intake and monitor wt trends. Malnutrition Assessment:  · Malnutrition Status: At risk for malnutrition  · Context: Acute illness or injury  · Findings of the 6 clinical characteristics of malnutrition (Minimum of 2 out of 6 clinical characteristics is required to make the diagnosis of moderate or severe Protein Calorie Malnutrition based on AND/ASPEN Guidelines):  1. Energy Intake-Less than or equal to 50% of estimated energy requirement, Greater than or equal to 7 days    2. Weight Loss-No significant weight loss,    3. Fat Loss-No significant subcutaneous fat loss,    4. Muscle Loss-No significant muscle mass loss,    5. Fluid Accumulation-No significant fluid accumulation,    6.  Strength-Not measured    Nutrition Risk Level:  Moderate    Nutrient Needs:  · Estimated Daily Total Kcal: 1.2-1.3 ~>9397-0702 kcals/d   · Estimated Daily Protein (g): 1.2-1.4 gm/kg ~>62-73 gms/d     Nutrition Diagnosis:   · Problem: Inadequate oral intake  · Etiology: related to Diarrhea     Signs and symptoms:  as evidenced by Diet history of poor intake, Patient report of, Intake 0-25%, Intake 25-50%(Need for ONS )    Objective Information:  · Wound Type: (Abrasion )  · Current Nutrition Therapies:  · Oral Diet Orders: General   · Oral Diet intake: 1-25%, 26-50%  · Oral Nutrition Supplement (ONS) Orders: None  · Anthropometric Measures:  · Ht: 5' 3\" (160 cm)   · Current Body Wt: 167 lb (75.8 kg)  · Admission Body Wt: 168 lb (76.2 kg)  · Usual Body Wt: 167 lb (75.8

## 2020-02-21 NOTE — PLAN OF CARE
Problem: Pain:  Goal: Pain level will decrease  Description  Pain level will decrease  Outcome: Ongoing  Goal: Control of acute pain  Description  Control of acute pain  Outcome: Ongoing  Goal: Control of chronic pain  Description  Control of chronic pain  Outcome: Ongoing     Problem: Falls - Risk of:  Goal: Absence of physical injury  Description  Absence of physical injury     Absence of physical injury  Outcome: Ongoing  Goal: Will remain free from falls  Description  Will remain free from falls  Outcome: Ongoing     Problem: Musculor/Skeletal Functional Status  Goal: Highest potential functional level  Outcome: Ongoing     Problem: Suicide risk  Goal: Provide patient with safe environment  Description  Provide patient with safe environment  Outcome: Ongoing     Problem: Cardiac:  Goal: Ability to maintain vital signs within normal range will improve  Description  Ability to maintain vital signs within normal range will improve  Outcome: Ongoing  Goal: Cardiovascular alteration will improve  Description  Cardiovascular alteration will improve  Outcome: Ongoing     Problem: Health Behavior:  Goal: Will modify at least one risk factor affecting health status  Description  Will modify at least one risk factor affecting health status  Outcome: Ongoing  Goal: Identification of resources available to assist in meeting health care needs will improve  Description  Identification of resources available to assist in meeting health care needs will improve  Outcome: Ongoing     Problem: Physical Regulation:  Goal: Complications related to the disease process, condition or treatment will be avoided or minimized  Description  Complications related to the disease process, condition or treatment will be avoided or minimized  Outcome: Ongoing  Goal: Ability to maintain clinical measurements within normal limits will improve  Description  Ability to maintain clinical measurements within normal limits will improve  Outcome:

## 2020-02-21 NOTE — DISCHARGE SUMMARY
[facility]    Discharge Summary     Patient ID: Luis Forrest  :  1965   MRN: [id number]     ACCOUNT:  [de-identified]   Patient's PCP: Jasbir Sandoval MD  Admit Date: 2020   Discharge Date: 2020     Length of Stay: 7  Code Status:  Full Code  Admitting Physician: Kaitlyn King MD  Discharge Physician: Kaitlyn King MD     Active Discharge Diagnoses:     Hospital Problem Lists:  Principal Problem:    Accidental drug overdose  Active Problems:    Hypothyroidism    Drug abuse ([address]    Essential hypertension    Hypokalemia    Acute cystitis without hematuria    Prolonged Q-T interval on ECG    Bipolar disorder, unspecified ([address]    Polysubstance abuse ([address]    Alcohol intoxication delirium ([address]    Cocaine abuse ([address]    Acute respiratory failure with hypoxia ([address]  Resolved Problems:    * No resolved hospital problems. *      Admission Condition:  poor     Discharged Condition: good    Hospital Stay:     Hospital Course:  Luis Forrest is a 53 y.o. female who was admitted for the management of   Accidental drug overdose , presented to ER with Drug Overdose      1. Drug overdose - unknown - Narcan used , negative Utox for opioids. showing cocaine and  Had alcohol intoxications . 2. Unresponsiveness - from alcohol  intoxication . Jenparas Dedavid 3. Suicide attempt - Psychiatry consultation. Will need inpatient admission to Psych for treatment of depression . Medication to be adjusted by the psychiatrist in the accepting facility  4. Intermittent fevers - secondary to  aspiration pneumonia . DC Augmentin continue Flagyl. 5. Bipolar disorder with depression  6. Hyponatremia -resolved repeat CMP in 1 week  7. Hypokalemia -solved repeat CMP in 1 week  8. Lactic acidosis -resolved  9. Acute cystitis completed course of antibiotics  10. Atelectasis - incentive spirometry   11. COPD - continue Symbicort - Duoneb   12.  Acute Crohn exacerbation Flagyl Imodium steroid    Physical exam     Alert  Chest: Clear to auscultation bilateral  Abdomen: Nontender nondistended  CVS: S1-S2  Neurology: Moves extremity    Significant therapeutic interventions:     Significant Diagnostic Studies:   Labs / Micro:  CBC:   Lab Results   Component Value Date    WBC 9.2 02/21/2020    RBC 4.08 02/21/2020    RBC 4.50 05/28/2012    HGB 11.2 02/21/2020    HCT 37.1 02/21/2020    MCV 90.9 02/21/2020    MCH 27.5 02/21/2020    MCHC 30.2 02/21/2020    RDW 14.1 02/21/2020     02/21/2020     05/28/2012     BMP:    Lab Results   Component Value Date    GLUCOSE 104 02/21/2020    GLUCOSE 125 05/28/2012     02/21/2020    K 3.8 02/21/2020     02/21/2020    CO2 22 02/21/2020    ANIONGAP 10 02/21/2020    BUN 14 02/21/2020    CREATININE 0.74 02/21/2020    BUNCRER NOT REPORTED 02/21/2020    CALCIUM 8.7 02/21/2020    LABGLOM >60 02/21/2020    GFRAA >60 02/21/2020    GFR      02/21/2020    GFR NOT REPORTED 02/21/2020     HFP:    Lab Results   Component Value Date    PROT 6.4 02/14/2020     CMP:    Lab Results   Component Value Date    GLUCOSE 104 02/21/2020    GLUCOSE 125 05/28/2012     02/21/2020    K 3.8 02/21/2020     02/21/2020    CO2 22 02/21/2020    BUN 14 02/21/2020    CREATININE 0.74 02/21/2020    ANIONGAP 10 02/21/2020    ALKPHOS 103 02/14/2020    ALT 21 02/14/2020    AST 28 02/14/2020    BILITOT 0.21 02/14/2020    LABALBU 3.8 02/14/2020    LABALBU 4.4 05/28/2012    ALBUMIN 1.5 02/14/2020    LABGLOM >60 02/21/2020    GFRAA >60 02/21/2020    GFR      02/21/2020    GFR NOT REPORTED 02/21/2020    PROT 6.4 02/14/2020    CALCIUM 8.7 02/21/2020     PT/INR:    Lab Results   Component Value Date    PROTIME 10.4 02/15/2020    INR 1.0 02/15/2020     PTT:   Lab Results   Component Value Date    APTT 44.5 02/17/2020     FLP:  No results found for: CHOL, TRIG, HDL  U/A:    Lab Results   Component Value Date    COLORU YELLOW 02/14/2020 TURBIDITY CLOUDY 02/14/2020    SPECGRAV 1.012 02/14/2020    HGBUR NEGATIVE 02/14/2020    PHUR 6.0 02/14/2020    PROTEINU NEGATIVE 02/14/2020    GLUCOSEU NEGATIVE 02/14/2020    GLUCOSEU NEGATIVE 08/30/2011    KETUA NEGATIVE 02/14/2020    BILIRUBINUR NEGATIVE 02/14/2020    BILIRUBINUR NEGATIVE 08/30/2011    UROBILINOGEN Normal 02/14/2020    NITRU POSITIVE 02/14/2020    LEUKOCYTESUR MODERATE 02/14/2020     TSH:    Lab Results   Component Value Date    TSH 0.98 10/02/2016        Radiology:  Ct Head Wo Contrast    Result Date: 2/14/2020  No acute intracranial abnormality. Xr Chest Portable    Result Date: 2/17/2020  Minimal pulmonary edema. Xr Chest Portable    Result Date: 2/15/2020  1. Pulmonary vascular congestion with questionable perihilar edema, potentially congestive heart failure given suspected mild cardiomegaly. 2. Minimal left basilar airspace opacity likely due to atelectasis, although pneumonia and aspiration could appear similar. Xr Chest Portable    Result Date: 2/14/2020  1. No acute cardiopulmonary process. Ct Chest Pulmonary Embolism W Contrast    Result Date: 2/17/2020  1. No evidence of pulmonary embolism. 2.  Patchy opacities in the right upper lobe suggestive of inflammatory process or infection. 3.  Dependent subsegmental atelectasis and trace right pleural effusion. Consultations:    Consults:     Final Specialist Recommendations/Findings:   IP CONSULT TO HOSPITALIST  IP CONSULT TO SOCIAL WORK  IP CONSULT TO PSYCHIATRY      The patient was seen and examined on day of discharge and this discharge summary is in conjunction with any daily progress note from day of discharge.     Discharge plan:     Disposition: Psych unit    Physician Follow Up:     Madai Judd MD  [address]  [phone]    In 1 week         Requiring Further Evaluation/Follow Up POST HOSPITALIZATION/Incidental Findings:     Diet: cardiac diet    Activity: As

## 2020-02-21 NOTE — PROGRESS NOTES
No Rinse Shower Cap) seated in recliner. Balance  Sitting Balance: Independent  Standing Balance: Supervision  Standing Balance  Time: ~15 min   Activity: pt performed dynamic standing activity standing at recliner with table available for balance. Transfers  Sit to stand: Stand by assistance  Stand to sit: Stand by assistance   Cognition  Overall Cognitive Status: [facility]      Plan   Plan  Times per week: 3-5 x/wk  Current Treatment Recommendations: Balance Training, Functional Mobility Training, Endurance Training, Pain Management, Safety Education & Training, Patient/Caregiver Education & Training, Equipment Evaluation, Education, & procurement, Self-Care / ADL    Goals  Short term goals  Time Frame for Short term goals: By discharge, pt will:  Short term goal 1: Demo Mod I with use of LRD PRN for functional transfers and functional mobility  Short term goal 2: Demo I with setup for UB ADLs and grooming tasks  Short term goal 3: Demo CGA with setup for LB ADLs and toileting tasks  Short term goal 4: Demo I with safety awareness throughout therapy session with <4 VCs  Short term goal 5: Demo 10+ minutes of standing tolerance for increased independence and safety with functional ADLs       Therapy Time   Individual Concurrent Group Co-treatment   Time In 1059         Time Out 1131         Minutes 32          Time coded treatment minutes: 32    Pt in chair upon arrival. Pt pleasant and agreeable to therapy. See above for LOF. Pt retired to chair at end of session with sitter and daughter in room.      SINAN Pak/EM TAVERA/WESLEY

## 2020-02-21 NOTE — FLOWSHEET NOTE
Assessment:   made initial visit with 53 y.o. female admitted for accidental drug overdose. Upon entry, patient was sitting up in bed. Patient seemed hesitant about allowing  entrance, but did consent. Patient was evasive and not willing to open up. Patient was tearful. Patient's daughter came in toward the end of the visit. Intervention:   attempted to engage patient in conversation, but patient was not open. She did answer some of 's questions.  provided compassionate listening to allow patient opportunity to share, but she would not let herself. Finally, when  mentioned arvind, patient said her arvind was far different from his. Patient did share that she is a wick en and that she is happy in her arvind. Outcome:  Patient declined 's offer of prayer. Felix Guardado     02/17/20 1813   Encounter Summary   Services provided to: Patient   Referral/Consult From: [address] [facility] [employer]   Gilmar Roach)   Continue Visiting   (2/17/2020)   Complexity of Encounter Low   Length of Encounter 15 minutes   Spiritual Assessment Completed Yes   Routine   Type Initial   Assessment Approachable;Tearful; Anxious; Resentful   Intervention Active listening;Explored feelings, thoughts, concerns;Explored coping resources   Outcome Tearful;Did not respond
Patient re-scheduled to have psych consult today at [phone]
Connection/belonging;Comfort;Expressed gratitude; Shared life review

## 2020-02-21 NOTE — PROGRESS NOTES
[facility] [E87.6] . Vitals:    02/20/20 1547   BP:    Pulse:    Resp: 18   Temp: 98.5 °F (36.9 °C)   SpO2: 98%   . Patients home meds are   Prior to Admission medications    Medication Sig Start Date End Date Taking? Authorizing Provider   tiZANidine (ZANAFLEX) 4 MG tablet Take 4 mg by mouth every 6 hours as needed    Historical Provider, MD   predniSONE (DELTASONE) 50 MG tablet Take 1 tablet by mouth daily 2/28/19   Bee Jc MD   cyclobenzaprine (FLEXERIL) 10 MG tablet Take 1 tablet by mouth 3 times daily as needed for Muscle spasms 5/9/18   Anna Bhandari MD   Topiramate (TOPAMAX PO) Take by mouth    Historical Provider, MD   SUMAtriptan (IMITREX) 100 MG tablet Take 100 mg by mouth once as needed for Migraine    Historical Provider, MD   ipratropium-albuterol (DUONEB) 0.5-2.5 (3) MG/3ML SOLN nebulizer solution Inhale 3 mLs into the lungs 4 times daily 2/6/17   Devora Grey MD   mometasone-formoterol [facility]) 200-5 MCG/ACT inhaler Inhale 2 puffs into the lungs every 12 hours 2/6/17   Devora Grey MD   gabapentin (NEURONTIN) 100 MG capsule Take 100 mg by mouth 3 times daily    Historical Provider, MD   traZODone (DESYREL) 100 MG tablet Take 200 mg by mouth nightly. Historical Provider, MD   pravastatin (PRAVACHOL) 40 MG tablet Take 40 mg by mouth daily. Historical Provider, MD   HYDROCHLOROTHIAZIDE PO Take 25 mg by mouth daily. Historical Provider, MD   FLUoxetine HCl (PROZAC PO) Take 60 mg by mouth Daily. Historical Provider, MD   OMEPRAZOLE PO Take 40 mg by mouth 2 times daily. Historical Provider, MD   Levothyroxine Sodium (LEVOTHROID PO) Take 112 mcg by mouth. Historical Provider, MD   ATENOLOL PO Take 25 mg by mouth daily.     Historical Provider, MD     Assessment   COPD Asthma CHF  Uses home o2 when needed 3L (92%)  Smoker   No CPAP machine  Patient asking to be placed on PRN txs        RR 18  Breath Sounds: clear & dim      · Bronchodilator assessment at level  1  · Hyperinflation assessment at level   · Secretion Management assessment at level    ·   · [x]    Bronchodilator Assessment  BRONCHODILATOR ASSESSMENT SCORE  Score 0 1 2 3 4 5   Breath Sounds   []  Patient Baseline [x]  No Wheeze good aeration []  Faint, scattered wheezing, good aeration []  Expiratory Wheezing and or moderately diminished []  Insp/Exp wheeze and/or very diminished []  Insp/Exp and/ or marked distress   Respiratory Rate   []  Patient Baseline [x]  Less than 20 []  Less than 20 []  20-25 []  Greater than 25 []  Greater than 25   Peak flow % of Pred or PB [x]  NA   []  Greater than 90%  []  81-90% []  71-80% []  Less than or equal to 70%  or unable to perform []  Unable due to Respiratory Distress   Dyspnea re []  Patient Baseline [x]  No SOB []  No SOB []  SOB on exertion []  SOB min activity []  At rest/acute   e FEV% Predicted       [x]  NA []  Above 69%  []  Unable []  Above 60-69%  []  Unable []  Above 50-59%  []  Unable []  Above 35-49%  []  Unable []  Less than 35%  []  Unable                 []  Hyperinflation Assessment  Score 1 2 3   CXR and Breath Sounds   []  Clear []  No atelectasis  Basilar aeration []  Atelectasis or absent basilar breath sounds   Incentive Spirometry Volume  (Per IBW)   []  Greater than or equal to 15ml/Kg []  less than 15ml/Kg []  less than 15ml/Kg   Surgery within last 2 weeks []  None or general   []  Abdominal or thoracic surgery  []  Abdominal or thoracic   Chronic Pulmonary Historyre []  No []  Yes []  Yes     []  Secretion Management Assessment  Score 1 2 3   Bilateral Breath Sounds   []  Occasional Rhonchi []  Scattered Rhonchi []  Course Rhonchi and/or poor aeration   Sputum    []  Small amount of thin secretions []  Moderate amount of viscous secretions []  Copius, Viscious Yellow/ Secretions   CXR as reported by physician []  clear  []  Unavailable []  Infiltrates and/or consolidation  []  Unavailable 952   19 411 006 630 823 150 293 178 925 61 251 142 096 795 021 857 160 066 226   65 365 252 706 340 711 834 070 039 50 788 976 733 528 828 449 140 486 764   33 194 082 981 340 792 802 950 473 76 238 065 888 576 284 509 694 475 019   79 991 971 494 499 207 941 350 254 97 607 178 217 575 138 914 034 040 640   38 491 785 550 434 563 527 213 571 08 458 658 236 362 538 636 709 891 696   70 269 284 299 314 329 344 359 374 70 353 382 410 439 468 496 525 554 583   75 261 274 289 305 319 334 348 364 75 344 372 400 429 458 487 515 544 573   80 253 266 282 296 312 327 342 356 80 335 362 390 419 448 [phone]

## 2020-02-22 LAB
CULTURE: NORMAL
CULTURE: NORMAL
Lab: NORMAL
Lab: NORMAL
SPECIMEN DESCRIPTION: NORMAL
SPECIMEN DESCRIPTION: NORMAL

## 2020-02-22 PROCEDURE — 6370000000 HC RX 637 (ALT 250 FOR IP): Performed by: PSYCHIATRY & NEUROLOGY

## 2020-02-22 PROCEDURE — 6370000000 HC RX 637 (ALT 250 FOR IP): Performed by: NURSE PRACTITIONER

## 2020-02-22 PROCEDURE — 1240000000 HC EMOTIONAL WELLNESS R&B

## 2020-02-22 PROCEDURE — 90792 PSYCH DIAG EVAL W/MED SRVCS: CPT | Performed by: NURSE PRACTITIONER

## 2020-02-22 RX ORDER — FLUOXETINE HYDROCHLORIDE 20 MG/1
60 CAPSULE ORAL DAILY
Status: DISCONTINUED | OUTPATIENT
Start: 2020-02-22 | End: 2020-02-25 | Stop reason: HOSPADM

## 2020-02-22 RX ORDER — METRONIDAZOLE 500 MG/1
500 TABLET ORAL EVERY 8 HOURS SCHEDULED
Status: DISCONTINUED | OUTPATIENT
Start: 2020-02-22 | End: 2020-02-25 | Stop reason: HOSPADM

## 2020-02-22 RX ORDER — LISINOPRIL 20 MG/1
40 TABLET ORAL DAILY
Status: DISCONTINUED | OUTPATIENT
Start: 2020-02-22 | End: 2020-02-25 | Stop reason: HOSPADM

## 2020-02-22 RX ORDER — PRAVASTATIN SODIUM 40 MG
40 TABLET ORAL DAILY
Status: DISCONTINUED | OUTPATIENT
Start: 2020-02-22 | End: 2020-02-25 | Stop reason: HOSPADM

## 2020-02-22 RX ORDER — GABAPENTIN 400 MG/1
800 CAPSULE ORAL 3 TIMES DAILY
Status: DISCONTINUED | OUTPATIENT
Start: 2020-02-22 | End: 2020-02-25 | Stop reason: HOSPADM

## 2020-02-22 RX ORDER — LOPERAMIDE HYDROCHLORIDE 2 MG/1
2 CAPSULE ORAL 4 TIMES DAILY PRN
Status: DISCONTINUED | OUTPATIENT
Start: 2020-02-22 | End: 2020-02-25 | Stop reason: HOSPADM

## 2020-02-22 RX ORDER — LEVOTHYROXINE SODIUM 0.05 MG/1
50 TABLET ORAL DAILY
Status: DISCONTINUED | OUTPATIENT
Start: 2020-02-22 | End: 2020-02-25 | Stop reason: HOSPADM

## 2020-02-22 RX ORDER — TRAZODONE HYDROCHLORIDE 150 MG/1
300 TABLET ORAL NIGHTLY
Status: DISCONTINUED | OUTPATIENT
Start: 2020-02-22 | End: 2020-02-25 | Stop reason: HOSPADM

## 2020-02-22 RX ORDER — OXCARBAZEPINE 600 MG/1
600 TABLET, FILM COATED ORAL 2 TIMES DAILY
Status: ON HOLD | COMMUNITY
End: 2021-09-18

## 2020-02-22 RX ORDER — OXCARBAZEPINE 600 MG/1
600 TABLET, FILM COATED ORAL 2 TIMES DAILY
Status: DISCONTINUED | OUTPATIENT
Start: 2020-02-22 | End: 2020-02-25 | Stop reason: HOSPADM

## 2020-02-22 RX ORDER — SUMATRIPTAN 100 MG/1
100 TABLET, FILM COATED ORAL
Status: ACTIVE | OUTPATIENT
Start: 2020-02-22 | End: 2020-02-22

## 2020-02-22 RX ORDER — LISINOPRIL 40 MG/1
10 TABLET ORAL DAILY
Status: ON HOLD | COMMUNITY
End: 2021-09-19 | Stop reason: HOSPADM

## 2020-02-22 RX ORDER — PANTOPRAZOLE SODIUM 40 MG/1
40 TABLET, DELAYED RELEASE ORAL
Status: DISCONTINUED | OUTPATIENT
Start: 2020-02-23 | End: 2020-02-25 | Stop reason: HOSPADM

## 2020-02-22 RX ADMIN — PRAVASTATIN SODIUM 40 MG: 40 TABLET ORAL at 17:38

## 2020-02-22 RX ADMIN — FLUOXETINE HYDROCHLORIDE 60 MG: 20 CAPSULE ORAL at 17:38

## 2020-02-22 RX ADMIN — LOPERAMIDE HYDROCHLORIDE 2 MG: 2 CAPSULE ORAL at 08:47

## 2020-02-22 RX ADMIN — METRONIDAZOLE 500 MG: 500 TABLET ORAL at 20:50

## 2020-02-22 RX ADMIN — PREDNISONE 50 MG: 20 TABLET ORAL at 17:38

## 2020-02-22 RX ADMIN — TRAZODONE HYDROCHLORIDE 300 MG: 150 TABLET ORAL at 22:46

## 2020-02-22 RX ADMIN — LEVOTHYROXINE SODIUM 50 MCG: 50 TABLET ORAL at 17:37

## 2020-02-22 RX ADMIN — TOPIRAMATE 150 MG: 100 TABLET, FILM COATED ORAL at 20:50

## 2020-02-22 RX ADMIN — ACETAMINOPHEN 650 MG: 325 TABLET, FILM COATED ORAL at 15:23

## 2020-02-22 RX ADMIN — LOPERAMIDE HYDROCHLORIDE 2 MG: 2 CAPSULE ORAL at 18:59

## 2020-02-22 RX ADMIN — OXCARBAZEPINE 600 MG: 600 TABLET, FILM COATED ORAL at 20:50

## 2020-02-22 RX ADMIN — GABAPENTIN 800 MG: 400 CAPSULE ORAL at 20:50

## 2020-02-22 RX ADMIN — LOPERAMIDE HYDROCHLORIDE 2 MG: 2 CAPSULE ORAL at 21:29

## 2020-02-22 RX ADMIN — LISINOPRIL 40 MG: 20 TABLET ORAL at 17:37

## 2020-02-22 ASSESSMENT — PAIN SCALES - GENERAL
PAINLEVEL_OUTOF10: 2
PAINLEVEL_OUTOF10: 3

## 2020-02-22 ASSESSMENT — LIFESTYLE VARIABLES: HISTORY_ALCOHOL_USE: NO

## 2020-02-22 NOTE — GROUP NOTE
Group Therapy Note    Date: 2/22/2020    Group Start Time: 1600  Group End Time: 5711  Group Topic: Relaxation    [facility]  [facility], RN        Group Therapy Note    Attendees: 17/17           Status After Intervention:  Unchanged    Participation Level:  Active Listener and Interactive    Participation Quality: Appropriate      Speech:  normal      Thought Process/Content: Logical      Affective Functioning: Congruent      Mood: anxious      Level of consciousness:  Alert and Oriented x4      Response to Learning: Progressing to goal      Endings: None Reported    Modes of Intervention: Support and Socialization      Discipline Responsible: Registered Nurse      Signature:  Zandra Crowe RN

## 2020-02-22 NOTE — BH NOTE
Patient given tobacco quitline number 74034784068 at this time, refusing to call at this time, states \" I just dont want to quit now\"- patient given information as to the dangers of long term tobacco use. Continue to reinforce the importance of tobacco cessation.

## 2020-02-22 NOTE — BH NOTE
Date    Acid reflux     Anxiety     Asthma     Bipolar 1 disorder (HCC)     Bowel obstruction (HCC)     COPD (chronic obstructive pulmonary disease) (HCC)     Crohn disease (HCC)     Depression     Gout     Hypertension     Hypothyroidism       Past Surgical History:   Procedure Laterality Date    ABDOMEN SURGERY      BUNIONECTOMY Left     CHOLECYSTECTOMY      COLONOSCOPY  2007    no gross pathology seen in the colon and also 3-4 inches of the ileum    COLONOSCOPY  10/12/2017    normal    TONSILLECTOMY AND ADENOIDECTOMY      TUBAL LIGATION       Neurologic Exam    See H&P for further physical examination. SOCIAL HISTORY   Patient reports that she was born and raised in [place]. She states she graduated high school and worked various jobs in retail. Currently she has been living off of disability income. [facility], though reports she has \"40 fish\". Her significant partner of 15 years  by suicide 1 year ago. She has 1 adult daughter (age 25 and a 10year-old grandson. She reports that they are very close.   Social History     Socioeconomic History    Marital status: Single     Spouse name: Not on file    Number of children: Not on file    Years of education: Not on file    Highest education level: Not on file   Occupational History    Not on file   Social Needs    Financial resource strain: Not on file    Food insecurity:     Worry: Not on file     Inability: Not on file    Transportation needs:     Medical: Not on file     Non-medical: Not on file   Tobacco Use    Smoking status: Current Every Day Smoker     Packs/day: 0.50     Years: 37.00     Pack years: 18.50     Types: Cigarettes    Smokeless tobacco: Never Used    Tobacco comment: has not smoked in the last 3 days   Substance and Sexual Activity    Alcohol use: Yes     Comment: socially    Drug use: Not Currently     Frequency: 1.0 times per week     Types: Marijuana    Sexual activity: Not on file   Lifestyle    Physical activity:     Days per week: Not on file     Minutes per session: Not on file    Stress: Not on file   Relationships    Social connections:     Talks on phone: Not on file     Gets together: Not on file     Attends [employer] service: Not on file     Active member of club or organization: Not on file     Attends meetings of clubs or organizations: Not on file     Relationship status: Not on file    Intimate partner violence:     Fear of current or ex partner: Not on file     Emotionally abused: Not on file     Physically abused: Not on file     Forced sexual activity: Not on file   Other Topics Concern    Not on file   Social History Narrative    Not on file         Mental Status  Pt. was alert, fully oriented, and cooperative. Appearance and hygiene wereHospital attire, fair hygiene . Mood was euthymic. Affect was labile, inappropriately animated Thought process was tangential. Patient denied any hallucinations or paranoia. Patient denied suicidal ideations. Patient denied homicidal ideations . Patient's gross cognitive functions were intact. Insight and judgement were fair. Both recent and remote memory were Impaired; recent memory absent of episode.     Psychomotor status was without abnormality     Labs  Recent Results (from the past 72 hour(s))   SODIUM    Collection Time: 02/19/20  6:05 PM   Result Value Ref Range    Sodium 137 135 - 144 mmol/L   SODIUM    Collection Time: 02/20/20  1:35 AM   Result Value Ref Range    Sodium 135 135 - 144 mmol/L   CBC    Collection Time: 02/20/20  5:04 AM   Result Value Ref Range    WBC 6.5 3.5 - 11.3 k/uL    RBC 4.17 3.95 - 5.11 m/uL    Hemoglobin 11.7 (L) 11.9 - 15.1 g/dL    Hematocrit 37.2 36.3 - 47.1 %    MCV 89.2 82.6 - 102.9 fL    MCH 28.1 25.2 - 33.5 pg    MCHC 31.5 28.4 - 34.8 g/dL    RDW 14.3 11.8 - 14.4 %    Platelets 418 647 - 897 k/uL    MPV 10.2 8.1 - 13.5 fL    NRBC Automated 0.0 0.0 per 100 WBC   Basic Metabolic Panel Collection Time: 02/20/20  5:04 AM   Result Value Ref Range    Glucose 109 (H) 70 - 99 mg/dL    BUN 9 6 - 20 mg/dL    CREATININE 0.60 0.50 - 0.90 mg/dL    Bun/Cre Ratio NOT REPORTED 9 - 20    Calcium 9.2 8.6 - 10.4 mg/dL    Sodium 143 135 - 144 mmol/L    Potassium 3.5 (L) 3.7 - 5.3 mmol/L    Chloride 103 98 - 107 mmol/L    CO2 24 20 - 31 mmol/L    Anion Gap 16 9 - 17 mmol/L    GFR Non-African American >60 >60 mL/min    GFR African American >60 >60 mL/min    GFR Comment          GFR Staging NOT REPORTED    MAGNESIUM    Collection Time: 02/20/20  5:04 AM   Result Value Ref Range    Magnesium 2.1 1.6 - 2.6 mg/dL   C-Reactive Protein    Collection Time: 02/20/20  5:04 AM   Result Value Ref Range    CRP 41.2 (H) 0.0 - 5.0 mg/L   SODIUM    Collection Time: 02/20/20  1:00 PM   Result Value Ref Range    Sodium 136 135 - 144 mmol/L   SODIUM    Collection Time: 02/20/20  5:29 PM   Result Value Ref Range    Sodium 141 135 - 144 mmol/L   SODIUM    Collection Time: 02/21/20 12:17 AM   Result Value Ref Range    Sodium 141 135 - 144 mmol/L   CBC    Collection Time: 02/21/20  3:54 AM   Result Value Ref Range    WBC 9.2 3.5 - 11.3 k/uL    RBC 4.08 3.95 - 5.11 m/uL    Hemoglobin 11.2 (L) 11.9 - 15.1 g/dL    Hematocrit 37.1 36.3 - 47.1 %    MCV 90.9 82.6 - 102.9 fL    MCH 27.5 25.2 - 33.5 pg    MCHC 30.2 28.4 - 34.8 g/dL    RDW 14.1 11.8 - 14.4 %    Platelets 657 191 - 153 k/uL    MPV 10.1 8.1 - 13.5 fL    NRBC Automated 0.0 0.0 per 100 WBC   Basic Metabolic Panel    Collection Time: 02/21/20  3:54 AM   Result Value Ref Range    Glucose 104 (H) 70 - 99 mg/dL    BUN 14 6 - 20 mg/dL    CREATININE 0.74 0.50 - 0.90 mg/dL    Bun/Cre Ratio NOT REPORTED 9 - 20    Calcium 8.7 8.6 - 10.4 mg/dL    Sodium 135 135 - 144 mmol/L    Potassium 3.8 3.7 - 5.3 mmol/L    Chloride 103 98 - 107 mmol/L    CO2 22 20 - 31 mmol/L    Anion Gap 10 9 - 17 mmol/L    GFR Non-African American >60 >60 mL/min    GFR African American >60 >60 mL/min    GFR Comment GFR Staging NOT REPORTED    MAGNESIUM    Collection Time: 02/21/20  3:54 AM   Result Value Ref Range    Magnesium 2.0 1.6 - 2.6 mg/dL   SODIUM    Collection Time: 02/21/20 12:43 PM   Result Value Ref Range    Sodium 141 135 - 144 mmol/L         Diagnostic Impression  Active Problems:    Bipolar 1 disorder (HCC)  Resolved Problems:    * No resolved hospital problems. *          Medications    loperamide, melatonin ER, traZODone, acetaminophen, magnesium hydroxide, benztropine mesylate, hydrOXYzine, aluminum & magnesium hydroxide-simethicone    Treatment Plan:     Admit to inpatient psychiatric treatment   Supportive therapy with medication management. Reviewed risks and benefits as well as potential side effects with patient.  Therapeutic activities and groups   Follow up at [facility] after symptoms stabilized   Restart home medications at this time    Estimated length of stay: 3-5 days    SHRADDHA Nelson NP  Psychiatric Advanced Practice Nurse  [employer] voice recognition software used in portions of this document.  Occasionally words are mis-transcribed

## 2020-02-22 NOTE — BH NOTE
RN questioned patient on the whereabouts of her cell phone, and patient stated \"My daughter took it home. \" RN asked again to clarify if her daughter took her cell phone home and patient stated \"yes. \"

## 2020-02-22 NOTE — H&P
medications on file prior to encounter. Current Outpatient Medications on File Prior to Encounter   Medication Sig Dispense Refill    lisinopril (PRINIVIL;ZESTRIL) 40 MG tablet Take 40 mg by mouth daily      OXcarbazepine (TRILEPTAL) 600 MG tablet Take 600 mg by mouth 2 times daily      lidocaine 4 % external patch Place 1 patch onto the skin daily for 10 days 10 patch 0    loperamide (IMODIUM) 2 MG capsule Take 1 capsule by mouth 4 times daily as needed for Diarrhea 30 capsule 0    metroNIDAZOLE (FLAGYL) 500 MG tablet Take 1 tablet by mouth every 8 hours for 5 days 15 tablet 0    predniSONE (DELTASONE) 50 MG tablet Take 1 tablet by mouth daily 4 tablet 0    Topiramate (TOPAMAX PO) Take 150 mg by mouth 2 times daily       SUMAtriptan (IMITREX) 100 MG tablet Take 100 mg by mouth once as needed for Migraine      gabapentin (NEURONTIN) 100 MG capsule Take 800 mg by mouth 3 times daily.  traZODone (DESYREL) 100 MG tablet Take 150 mg by mouth nightly       pravastatin (PRAVACHOL) 40 MG tablet Take 40 mg by mouth daily.  FLUoxetine HCl (PROZAC PO) Take 20 mg by mouth Daily       Levothyroxine Sodium (LEVOTHROID PO) Take 50 mcg by mouth       cyclobenzaprine (FLEXERIL) 10 MG tablet Take 1 tablet by mouth 3 times daily as needed for Muscle spasms 10 tablet 0    ipratropium-albuterol (DUONEB) 0.5-2.5 (3) MG/3ML SOLN nebulizer solution Inhale 3 mLs into the lungs 4 times daily 360 mL 3    mometasone-formoterol (DULERA) 200-5 MCG/ACT inhaler Inhale 2 puffs into the lungs every 12 hours 1 Inhaler 3    OMEPRAZOLE PO Take 40 mg by mouth 2 times daily.  ATENOLOL PO Take 25 mg by mouth daily. General health:  Fairly good. No fever or chills. Skin:  No itching, redness or rash. Head, eyes, ears, nose, throat:  No headache, epistaxis, rhinorrhea, hearing loss or sore throat. Neck:  No pain, stiffness or masses.      Cardiovascular/Respiratory system:

## 2020-02-22 NOTE — GROUP NOTE
Group Therapy Note    Date: 2/22/2020    Group Start Time: 1330  Group End Time: 1589  Group Topic: Recreational    [facility] CHACHA RIVERA    Sergio Day    Group Therapy Note    Attendees: 6/17         Patient's Goal:  To increase interpersonal interaction    Notes:  Pt attended and participated in group. Status After Intervention:  Improved    Participation Level:  Active Listener and Interactive    Participation Quality: Appropriate, Attentive, Sharing and Supportive      Speech:  normal      Thought Process/Content: Logical  Linear      Affective Functioning: Congruent      Mood: euthymic      Level of consciousness:  Alert, Oriented x4 and Attentive      Response to Learning: Able to verbalize current knowledge/experience, Able to verbalize/acknowledge new learning, Able to retain information and Progressing to goal      Endings: None Reported    Modes of Intervention: Education, Support, Socialization, Exploration, Clarifying, Problem-solving, Activity, Media, Limit-setting and Reality-testing      Discipline Responsible: Psychoeducational Specialist      Signature:  Ney Robertson

## 2020-02-22 NOTE — PLAN OF CARE
[address] [place] [address]  Initial Interdisciplinary Treatment Plan NOTE    Original treatment plan Date & Time: 2/22/2020 0750    Admission Type:  Admission Type: Voluntary    Reason for admission:   Reason for Admission: Recent overdose admitted to [facility], poor sleep, relationship issues increased depression and anxiety    Estimated Length of Stay:  5-7days  Estimated Discharge Date:   to be determined by physician    PATIENT STRENGTHS:  Patient Strengths:Strengths: Communication, Connection to output provider, Positive Support  Patient Strengths and Limitations:Limitations: Difficulty problem solving/relies on others to help solve problems, Inappropriate/potentially harmful leisure interests, Multiple barriers to leisure interests, Lacks leisure interests  Addictive Behavior: Addictive Behavior  In the past 3 months, have you felt or has someone told you that you have a problem with:  : None  Do you have a history of Chemical Use?: No  Do you have a history of Alcohol Use?: No  Do you have a history of Street Drug Abuse?: No  Histroy of Prescripton Drug Abuse?: No  Medical Problems:  Past Medical History:   Diagnosis Date    Acid reflux     Anxiety     Asthma     Bipolar 1 disorder ([facility]     Bowel obstruction (HCC)     COPD (chronic obstructive pulmonary disease) ([facility]     Crohn disease ([facility]     Depression     Gout     Hypertension     Hypothyroidism      Status EXAM:Status and Exam  Normal: No  Facial Expression: Flat  Affect: Appropriate  Level of Consciousness: Alert  Mood:Normal: No  Mood: Anxious, Depressed  Motor Activity:Normal: Yes  Interview Behavior: Cooperative  Preception: [employer] to Person, [employer] to Place, [employer] to Situation, [employer] to Time  Attention:Normal: No  Attention: Distractible  Thought Processes: Circumstantial  Thought Content:Normal: No  Thought Content: Preoccupations, Poverty of Content  Hallucinations: None(denied)  Delusions: No  Memory:Normal: No  Memory: Poor Remote,

## 2020-02-22 NOTE — CARE COORDINATION
BHI Biopsychosocial Assessment     Current Level of Psychosocial Functioning      Independent: x  Dependent:    Minimal Assist:       Comments: Pt states she lives with in her own place and will return upon discharge. Psychosocial High Risk Factors (check all that apply)     Unable to obtain meds:    Chronic illness/pain:     Substance abuse:  x  Lack of Family Support:    Financial stress:  x  Isolation:    Inadequate Community Resources:    Suicide attempt(s):    Not taking medications:     Victim of crime:    Developmental Delay:     Unable to manage personal needs:   Age 72 or older:    Homeless:    No transportation:    Readmission within 30 days:    Unemployment:    Traumatic Event:      Comments:      Psychiatric Advanced Directives: None reported     Family to Involve in Treatment: Daughter and brother, EMILY on file     Sexual Orientation: Heterosexual     Patient Strengths: Legal income, stable housing, [facility] [employer], medication compliant, support system     Patient Barriers: AoD issues, legal issues, poor insight and judgement     Opiate Education: N/A     [facility]/mental health history: [facility]. Pt sees Sagrario Fletcher CNP     Plan of Care   medication management, group/individual therapies, family meetings, psycho -education, treatment team meetings to assist with stabilization     Initial Discharge Plan: To become stable on medications, return home, and follow up with Alvin [facility]oudhary [facility]. Clinical Summary:       Pt is a 53year old  female who presented to the ED at [facility]. V's with a drug overdose. Pt denies using opioids, despite Narcan being used to bring her back, and states she only used Cocaine three days ago. Pt states her appetite has been decreased and she has been sleeping less than normal. Pt states that she is medication compliant at this time. Pt states she is linked to [employer] on file, and does not have a CM.  Pt states that she lives

## 2020-02-22 NOTE — BH NOTE
appetite. Pt denied current/recent drinking, drug use however UA was +cocaine. Patient denied current tobacco use. Patient denied current/recent abuse. Patient stated her and her bf broke up 15 months ago and this has been causing issues for her. Patient is connected to [facility], and sees Tonja David as her . Patient has hx falls but reports steady on her feet (Lt hip fracture). Patient reports chronic loose stools r/t crohn's disease. Imodium was added. Patient reports supportive family (son, daughter, brother). Patient reported using [place], will need meds verified in AM.   Patient was dressed in hospital attire upon admit.       Lilly Araya RN

## 2020-02-22 NOTE — BH NOTE
RN reviewed Pm medications with patient, patient verbalized knowledge of said medications and was accepting of these.

## 2020-02-22 NOTE — GROUP NOTE
Group Therapy Note    Date: 2/22/2020    Group Start Time: 1000  Group End Time: 8615  Group Topic: Psychoeducation    [facility] THAO Mccall        Group Therapy Note    Attendees: 6/17    Patient's Goal:  Increase interpersonal relationships and express answer to questions in word game     Notes:  Patient shared experiences and listened as other's shared their experiences     Status After Intervention: Unchanged     Participation Level:  Active Listener and Interactive     Participation Quality: Appropriate, Attentive and Sharing     Speech:  Normal     Thought Process/Content: Logical     Affective Functioning: Congruent     Mood: Euthymic     Level of consciousness:  Alert, Oriented x4 and Attentive     Response to Learning: Able to verbalize current knowledge/experience, Able to verbalize/acknowledge new learning, Able to retain information and Capable of insight     Endings: None Reported     Modes of Intervention: Education, Socialization and Exploration     Discipline Responsible: /Counselor     Signature:  THAO Salguero

## 2020-02-23 PROCEDURE — 99232 SBSQ HOSP IP/OBS MODERATE 35: CPT | Performed by: NURSE PRACTITIONER

## 2020-02-23 PROCEDURE — 1240000000 HC EMOTIONAL WELLNESS R&B

## 2020-02-23 PROCEDURE — 6370000000 HC RX 637 (ALT 250 FOR IP): Performed by: NURSE PRACTITIONER

## 2020-02-23 PROCEDURE — 6370000000 HC RX 637 (ALT 250 FOR IP): Performed by: PSYCHIATRY & NEUROLOGY

## 2020-02-23 RX ORDER — PREGABALIN 150 MG/1
300 CAPSULE ORAL 2 TIMES DAILY
Status: DISCONTINUED | OUTPATIENT
Start: 2020-02-23 | End: 2020-02-25 | Stop reason: HOSPADM

## 2020-02-23 RX ORDER — TIZANIDINE 4 MG/1
4 TABLET ORAL 3 TIMES DAILY PRN
Status: DISCONTINUED | OUTPATIENT
Start: 2020-02-23 | End: 2020-02-25 | Stop reason: HOSPADM

## 2020-02-23 RX ADMIN — GABAPENTIN 800 MG: 400 CAPSULE ORAL at 15:18

## 2020-02-23 RX ADMIN — HYDROXYZINE HYDROCHLORIDE 25 MG: 25 TABLET, FILM COATED ORAL at 21:19

## 2020-02-23 RX ADMIN — PANTOPRAZOLE SODIUM 40 MG: 40 TABLET, DELAYED RELEASE ORAL at 06:10

## 2020-02-23 RX ADMIN — TOPIRAMATE 150 MG: 100 TABLET, FILM COATED ORAL at 07:42

## 2020-02-23 RX ADMIN — OXCARBAZEPINE 600 MG: 600 TABLET, FILM COATED ORAL at 21:19

## 2020-02-23 RX ADMIN — TRAZODONE HYDROCHLORIDE 300 MG: 150 TABLET ORAL at 22:04

## 2020-02-23 RX ADMIN — OXCARBAZEPINE 600 MG: 600 TABLET, FILM COATED ORAL at 07:42

## 2020-02-23 RX ADMIN — ACETAMINOPHEN 650 MG: 325 TABLET, FILM COATED ORAL at 06:17

## 2020-02-23 RX ADMIN — TOPIRAMATE 150 MG: 100 TABLET, FILM COATED ORAL at 21:20

## 2020-02-23 RX ADMIN — METRONIDAZOLE 500 MG: 500 TABLET ORAL at 06:09

## 2020-02-23 RX ADMIN — HYDROXYZINE HYDROCHLORIDE 25 MG: 25 TABLET, FILM COATED ORAL at 09:38

## 2020-02-23 RX ADMIN — PREGABALIN 300 MG: 150 CAPSULE ORAL at 13:33

## 2020-02-23 RX ADMIN — METRONIDAZOLE 500 MG: 500 TABLET ORAL at 21:18

## 2020-02-23 RX ADMIN — METRONIDAZOLE 500 MG: 500 TABLET ORAL at 15:18

## 2020-02-23 RX ADMIN — GABAPENTIN 800 MG: 400 CAPSULE ORAL at 07:42

## 2020-02-23 RX ADMIN — LEVOTHYROXINE SODIUM 50 MCG: 50 TABLET ORAL at 06:09

## 2020-02-23 RX ADMIN — LOPERAMIDE HYDROCHLORIDE 2 MG: 2 CAPSULE ORAL at 08:35

## 2020-02-23 RX ADMIN — FLUOXETINE HYDROCHLORIDE 60 MG: 20 CAPSULE ORAL at 06:09

## 2020-02-23 RX ADMIN — PRAVASTATIN SODIUM 40 MG: 40 TABLET ORAL at 07:42

## 2020-02-23 RX ADMIN — GABAPENTIN 800 MG: 400 CAPSULE ORAL at 21:17

## 2020-02-23 RX ADMIN — ACETAMINOPHEN 650 MG: 325 TABLET, FILM COATED ORAL at 22:06

## 2020-02-23 RX ADMIN — ACETAMINOPHEN 650 MG: 325 TABLET, FILM COATED ORAL at 11:10

## 2020-02-23 RX ADMIN — LISINOPRIL 40 MG: 20 TABLET ORAL at 07:42

## 2020-02-23 RX ADMIN — PREDNISONE 50 MG: 20 TABLET ORAL at 07:43

## 2020-02-23 RX ADMIN — PREGABALIN 300 MG: 150 CAPSULE ORAL at 17:55

## 2020-02-23 RX ADMIN — LOPERAMIDE HYDROCHLORIDE 2 MG: 2 CAPSULE ORAL at 17:57

## 2020-02-23 ASSESSMENT — PAIN SCALES - GENERAL
PAINLEVEL_OUTOF10: 9
PAINLEVEL_OUTOF10: 0
PAINLEVEL_OUTOF10: 3
PAINLEVEL_OUTOF10: 0
PAINLEVEL_OUTOF10: 0
PAINLEVEL_OUTOF10: 3

## 2020-02-23 NOTE — BH NOTE
Patient refused to attend 2000 wrapup group despite encouragement from staff, 1:1 talk time offered as alternative.

## 2020-02-23 NOTE — GROUP NOTE
Group Therapy Note    Date: 2/23/2020    Group Start Time: 1000  Group End Time: 1130  Group Topic: Psychoeducation    [facility] THAO Retana        Group Therapy Note    Attendees: 6/17    Patient's Goal:  Increase interpersonal relationships and express emotions adequately     Notes:  Patient shared experiences and offered support and insight     Status After Intervention: Unchanged     Participation Level:  Active Listener and Interactive     Participation Quality: Appropriate, Attentive and Sharing     Speech:  Normal     Thought Process/Content: Logical     Affective Functioning: Congruent     Mood: Euthymic     Level of consciousness:  Alert, Oriented x4 and Attentive     Response to Learning: Able to verbalize current knowledge/experience, Able to verbalize/acknowledge new learning, Able to retain information and Capable of insight     Endings: None Reported     Modes of Intervention: Education, Socialization and Exploration     Discipline Responsible: /Counselor     Signature:  THAO Villarreal

## 2020-02-23 NOTE — PLAN OF CARE
Problem: Altered Mood, Depressive Behavior:  Goal: Able to verbalize and/or display a decrease in depressive symptoms  Description  Able to verbalize and/or display a decrease in depressive symptoms  2/23/2020 1117 by Ana Torres  Outcome: Ongoing  Note:   Patient is accepting of 1:1 talk time with staff. Patient denies suicidal and homicidal ideation and auditory and visual hallucinations and verbally agrees to approach staff if thoughts of self harm arises. Patient is out and social in [place]. Patient states she is feeling ready for discharge. Patient is med compliant. Reassurance and support provided. Q15 min checks maintained. Patient remains safe at this time.

## 2020-02-23 NOTE — PLAN OF CARE
Problem: Altered Mood, Depressive Behavior:  Goal: Able to verbalize and/or display a decrease in depressive symptoms  Description  Able to verbalize and/or display a decrease in depressive symptoms  2/22/2020 2214 by Ronnie Butler RN  Outcome: Ongoing     Problem: Depressive Behavior With or Without Suicide Precautions:  Goal: Absence of self-harm  Description  Absence of self-harm  Outcome: Ongoing     Patient denies depression, anxiety, suicidal ideations, homicidal ideations, and auditory/visually hallucinations. Patient is bright and social with peers and staff. Patient denies current thoughts of suicide and agrees to seek staff if this changes. Patient remains free from self harm thus far this shift. Q 15 minute changes maintained for patient safety.

## 2020-02-23 NOTE — PROGRESS NOTES
mmol/L    Chloride 103 98 - 107 mmol/L    CO2 22 20 - 31 mmol/L    Anion Gap 10 9 - 17 mmol/L    GFR Non-African American >60 >60 mL/min    GFR African American >60 >60 mL/min    GFR Comment          GFR Staging NOT REPORTED    MAGNESIUM    Collection Time: 02/21/20  3:54 AM   Result Value Ref Range    Magnesium 2.0 1.6 - 2.6 mg/dL   SODIUM    Collection Time: 02/21/20 12:43 PM   Result Value Ref Range    Sodium 141 135 - 144 mmol/L       Medications  Current Facility-Administered Medications   Medication Dose Route Frequency Provider Last Rate Last Dose    FLUoxetine (PROZAC) capsule 60 mg  60 mg Oral Daily [facility], APRN - NP   60 mg at 02/23/20 8877    levothyroxine (SYNTHROID) tablet 50 mcg  50 mcg Oral Daily [facility], APRN - NP   50 mcg at 02/23/20 1211    lisinopril (PRINIVIL;ZESTRIL) tablet 40 mg  40 mg Oral Daily [facility], APRN - NP   40 mg at 02/23/20 0011    loperamide (IMODIUM) capsule 2 mg  2 mg Oral 4x Daily PRN [facility], APRN - NP        metroNIDAZOLE (FLAGYL) tablet 500 mg  500 mg Oral 3 times per day [facility], APRN - NP   500 mg at 02/23/20 2128    OXcarbazepine (TRILEPTAL) tablet 600 mg  600 mg Oral BID [facility], APRN - NP   600 mg at 02/23/20 9175    pravastatin (PRAVACHOL) tablet 40 mg  40 mg Oral Daily [facility], APRN - NP   40 mg at 02/23/20 0737    predniSONE (DELTASONE) tablet 50 mg  50 mg Oral Daily [facility], APRN - NP   50 mg at 02/23/20 0223    topiramate (TOPAMAX) tablet 150 mg  150 mg Oral BID [facility], APRN - NP   150 mg at 02/23/20 6419    traZODone (DESYREL) tablet 300 mg  300 mg Oral Nightly [facility], APRN - NP   300 mg at 02/22/20 2246    gabapentin (NEURONTIN) capsule 800 mg  800 mg Oral TID [facility], APRN - NP   800 mg at 02/23/20 0742    pantoprazole (PROTONIX) tablet 40 mg  40 mg Oral QAM [facility] Godfrey Sour, APRN - CNP   40 mg at 02/23/20 5920    loperamide (IMODIUM) capsule 2 mg  2 mg Oral TID PRN Joe Otero

## 2020-02-23 NOTE — GROUP NOTE
Group Therapy Note    Date: 2/23/2020    Group Start Time: 3922  Group End Time: 0930  Group Topic: Community Meeting    KATHARINE John Ayden, [address]        Group Therapy Note    Attendees: 6         Patient's Goal:  To increase interpersonal skills     Notes:  Patient attended group and participated     Status After Intervention:  Improved    Participation Level:  Active Listener and Interactive    Participation Quality: Appropriate, Attentive and Sharing      Speech:  normal      Thought Process/Content: Logical      Affective Functioning: Congruent      Mood: euthymic      Level of consciousness:  Alert and Oriented x4      Response to Learning: Progressing to goal      Endings: None Reported    Modes of Intervention: Education, Support and Socialization      Discipline Responsible: Psychoeducational Specialist      Signature:  Efren Cole

## 2020-02-23 NOTE — PLAN OF CARE
[address] [place] [address]  Day 3 Interdisciplinary Treatment Plan NOTE    Review Date & Time: [id number]    Admission Type:   Admission Type: Voluntary    Reason for admission:  Reason for Admission: Recent overdose admitted to [facility], poor sleep, relationship issues increased depression and anxiety  Estimated Length of Stay: 5-7 days  Estimated Discharge Date Update: to be determined by physician    PATIENT STRENGTHS:  Patient Strengths Strengths: Communication, Connection to output provider, Positive Support  Patient Strengths and Limitations:Limitations: Inappropriate/potentially harmful leisure interests, Multiple barriers to leisure interests, Difficulty problem solving/relies on others to help solve problems  Addictive Behavior:Addictive Behavior  In the past 3 months, have you felt or has someone told you that you have a problem with:  : None  Do you have a history of Chemical Use?: No  Do you have a history of Alcohol Use?: No  Do you have a history of Street Drug Abuse?: Yes  Histroy of Prescripton Drug Abuse?: No  Medical Problems:  Past Medical History:   Diagnosis Date    Acid reflux     Anxiety     Asthma     Bipolar 1 disorder (HCC)     Bowel obstruction (HCC)     COPD (chronic obstructive pulmonary disease) ([facility]     Crohn disease ([facility]     Depression     Gout     Hypertension     Hypothyroidism        Risk:  Fall RiskTotal: 71  Quique Scale Quique Scale Score: 21  BVC Total: 0  Change in scores no Changes to plan of Care no    Status EXAM:   Status and Exam  Normal: Yes  Facial Expression: Brightened  Affect: Appropriate  Level of Consciousness: Alert  Mood:Normal: Yes  Mood: Other (Comment)(Pt denies all moods listed, calm)  Motor Activity:Normal: Yes  Interview Behavior: Cooperative  Preception: [place] to Person, [place] to Time, [place] to Place, [place] to Situation  Attention:Normal: Yes  Attention: Others(See comment), Distractible, Unable to Concentrate(no issues with attention)  Thought Processes: Circumstantial  Thought Content:Normal: Yes  Thought Content: Poverty of Content, Preoccupations  Hallucinations: None  Delusions: No  Memory:Normal: No  Memory: Poor Remote  Insight and Judgment: No  Insight and Judgment: Poor Judgment, Poor Insight  Present Suicidal Ideation: No  Present Homicidal Ideation: No    Daily Assessment Last Entry:   Daily Sleep (WDL): Within Defined Limits         Patient Currently in Pain: Yes(migraine pain, rates a 7)  Daily Nutrition (WDL): Within Defined Limits    Patient Monitoring:  Frequency of Checks: 4 times per hour, close    Psychiatric Symptoms:   Depression Symptoms  Depression Symptoms: No problems reported or observed. Anxiety Symptoms  Anxiety Symptoms: No problems reported or observed. Mariam Symptoms  Mariam Symptoms: No problems reported or observed. Psychosis Symptoms  Delusion Type: No problems reported or observed. Suicide Risk CSSR-S:  1) Within the past month, have you wished you were dead or wished you could go to sleep and not wake up? : No(denied upon admit; stated she never was suicidal)  2) Have you actually had any thoughts of killing yourself? : No  6) Have you ever done anything, started to do anything, or prepared to do anything to end your life?: No  Change in Result no Change in Plan of care no      EDUCATION:   EDUCATION:   Learner Progress Toward Treatment Goals: Reviewed results and recommendations of this team, Reviewed group plan and strategies, Reviewed signs, symptoms and risk of self harm and violent behavior, Reviewed goals and plan of care    Method:small group, individual verbal education    Outcome:verbalized by patient, but needs reinforcement to obtain goals    PATIENT GOALS:  Short term: To decrease stomach pain  Long term:  To obtain stable housing and follow up with [facility]    PLAN/TREATMENT RECOMMENDATIONS UPDATE: continue with group therapies, increased socialization, continue planning for after discharge goals, continue with medication compliance    SHORT-TERM GOALS UPDATE:   Time frame for Short-Term Goals: 5-7 days    LONG-TERM GOALS UPDATE:   Time frame for Long-Term Goals: 6 months  Members Present in Team Meeting: See Signature [address], [place]

## 2020-02-24 VITALS
RESPIRATION RATE: 14 BRPM | BODY MASS INDEX: 26.68 KG/M2 | WEIGHT: 166 LBS | TEMPERATURE: 98 F | DIASTOLIC BLOOD PRESSURE: 69 MMHG | HEART RATE: 62 BPM | SYSTOLIC BLOOD PRESSURE: 106 MMHG | HEIGHT: 66 IN | OXYGEN SATURATION: 99 %

## 2020-02-24 PROBLEM — N18.30 CHRONIC RENAL INSUFFICIENCY, STAGE III (MODERATE) (HCC): Status: ACTIVE | Noted: 2020-02-24

## 2020-02-24 PROBLEM — R19.7 DIARRHEA: Status: ACTIVE | Noted: 2020-02-24

## 2020-02-24 PROBLEM — J42 CHRONIC BRONCHITIS (HCC): Status: ACTIVE | Noted: 2017-01-31

## 2020-02-24 LAB
ABSOLUTE EOS #: 0 K/UL (ref 0–0.4)
ABSOLUTE IMMATURE GRANULOCYTE: ABNORMAL K/UL (ref 0–0.3)
ABSOLUTE LYMPH #: 0.9 K/UL (ref 1–4.8)
ABSOLUTE MONO #: 0.4 K/UL (ref 0.1–1.3)
ANION GAP SERPL CALCULATED.3IONS-SCNC: 12 MMOL/L (ref 9–17)
BASOPHILS # BLD: 0 % (ref 0–2)
BASOPHILS ABSOLUTE: 0 K/UL (ref 0–0.2)
BUN BLDV-MCNC: 20 MG/DL (ref 6–20)
BUN/CREAT BLD: ABNORMAL (ref 9–20)
CALCIUM SERPL-MCNC: 8.8 MG/DL (ref 8.6–10.4)
CHLORIDE BLD-SCNC: 101 MMOL/L (ref 98–107)
CO2: 21 MMOL/L (ref 20–31)
CREAT SERPL-MCNC: 0.84 MG/DL (ref 0.5–0.9)
DIFFERENTIAL TYPE: ABNORMAL
EOSINOPHILS RELATIVE PERCENT: 0 % (ref 0–4)
GFR AFRICAN AMERICAN: >60 ML/MIN
GFR NON-AFRICAN AMERICAN: >60 ML/MIN
GFR SERPL CREATININE-BSD FRML MDRD: ABNORMAL ML/MIN/{1.73_M2}
GFR SERPL CREATININE-BSD FRML MDRD: ABNORMAL ML/MIN/{1.73_M2}
GLUCOSE BLD-MCNC: 124 MG/DL (ref 70–99)
HCT VFR BLD CALC: 36.6 % (ref 36–46)
HEMOGLOBIN: 11.8 G/DL (ref 12–16)
IMMATURE GRANULOCYTES: ABNORMAL %
LYMPHOCYTES # BLD: 9 % (ref 24–44)
MAGNESIUM: 1.9 MG/DL (ref 1.6–2.6)
MCH RBC QN AUTO: 27.9 PG (ref 26–34)
MCHC RBC AUTO-ENTMCNC: 32.2 G/DL (ref 31–37)
MCV RBC AUTO: 86.5 FL (ref 80–100)
MONOCYTES # BLD: 4 % (ref 1–7)
NRBC AUTOMATED: ABNORMAL PER 100 WBC
PDW BLD-RTO: 15.4 % (ref 11.5–14.9)
PLATELET # BLD: 312 K/UL (ref 150–450)
PLATELET ESTIMATE: ABNORMAL
PMV BLD AUTO: 8.4 FL (ref 6–12)
POTASSIUM SERPL-SCNC: 3.5 MMOL/L (ref 3.7–5.3)
RBC # BLD: 4.23 M/UL (ref 4–5.2)
RBC # BLD: ABNORMAL 10*6/UL
SEG NEUTROPHILS: 87 % (ref 36–66)
SEGMENTED NEUTROPHILS ABSOLUTE COUNT: 9.1 K/UL (ref 1.3–9.1)
SODIUM BLD-SCNC: 134 MMOL/L (ref 135–144)
WBC # BLD: 10.5 K/UL (ref 3.5–11)
WBC # BLD: ABNORMAL 10*3/UL

## 2020-02-24 PROCEDURE — 6370000000 HC RX 637 (ALT 250 FOR IP): Performed by: FAMILY MEDICINE

## 2020-02-24 PROCEDURE — 6370000000 HC RX 637 (ALT 250 FOR IP): Performed by: NURSE PRACTITIONER

## 2020-02-24 PROCEDURE — 6370000000 HC RX 637 (ALT 250 FOR IP): Performed by: PSYCHIATRY & NEUROLOGY

## 2020-02-24 PROCEDURE — 80048 BASIC METABOLIC PNL TOTAL CA: CPT

## 2020-02-24 PROCEDURE — 1240000000 HC EMOTIONAL WELLNESS R&B

## 2020-02-24 PROCEDURE — 83735 ASSAY OF MAGNESIUM: CPT

## 2020-02-24 PROCEDURE — 85025 COMPLETE CBC W/AUTO DIFF WBC: CPT

## 2020-02-24 PROCEDURE — 99232 SBSQ HOSP IP/OBS MODERATE 35: CPT | Performed by: NURSE PRACTITIONER

## 2020-02-24 PROCEDURE — 36415 COLL VENOUS BLD VENIPUNCTURE: CPT

## 2020-02-24 RX ORDER — CHOLESTYRAMINE LIGHT 4 G/5.7G
4 POWDER, FOR SUSPENSION ORAL 3 TIMES DAILY
Status: DISCONTINUED | OUTPATIENT
Start: 2020-02-24 | End: 2020-02-25 | Stop reason: HOSPADM

## 2020-02-24 RX ADMIN — OXCARBAZEPINE 600 MG: 600 TABLET, FILM COATED ORAL at 20:43

## 2020-02-24 RX ADMIN — GABAPENTIN 800 MG: 400 CAPSULE ORAL at 21:25

## 2020-02-24 RX ADMIN — GABAPENTIN 800 MG: 400 CAPSULE ORAL at 08:04

## 2020-02-24 RX ADMIN — TOPIRAMATE 150 MG: 100 TABLET, FILM COATED ORAL at 20:43

## 2020-02-24 RX ADMIN — HYDROXYZINE HYDROCHLORIDE 25 MG: 25 TABLET, FILM COATED ORAL at 19:57

## 2020-02-24 RX ADMIN — LOPERAMIDE HYDROCHLORIDE 2 MG: 2 CAPSULE ORAL at 19:04

## 2020-02-24 RX ADMIN — PANTOPRAZOLE SODIUM 40 MG: 40 TABLET, DELAYED RELEASE ORAL at 06:12

## 2020-02-24 RX ADMIN — LEVOTHYROXINE SODIUM 50 MCG: 50 TABLET ORAL at 06:12

## 2020-02-24 RX ADMIN — METRONIDAZOLE 500 MG: 500 TABLET ORAL at 06:12

## 2020-02-24 RX ADMIN — TRAZODONE HYDROCHLORIDE 300 MG: 150 TABLET ORAL at 21:51

## 2020-02-24 RX ADMIN — LISINOPRIL 40 MG: 20 TABLET ORAL at 08:05

## 2020-02-24 RX ADMIN — ACETAMINOPHEN 650 MG: 325 TABLET, FILM COATED ORAL at 06:12

## 2020-02-24 RX ADMIN — METRONIDAZOLE 500 MG: 500 TABLET ORAL at 21:25

## 2020-02-24 RX ADMIN — CHOLESTYRAMINE 4 G: 4 POWDER, FOR SUSPENSION ORAL at 15:39

## 2020-02-24 RX ADMIN — CHOLESTYRAMINE 4 G: 4 POWDER, FOR SUSPENSION ORAL at 20:43

## 2020-02-24 RX ADMIN — PREGABALIN 300 MG: 150 CAPSULE ORAL at 11:06

## 2020-02-24 RX ADMIN — METRONIDAZOLE 500 MG: 500 TABLET ORAL at 14:20

## 2020-02-24 RX ADMIN — HYDROXYZINE HYDROCHLORIDE 25 MG: 25 TABLET, FILM COATED ORAL at 09:13

## 2020-02-24 RX ADMIN — PRAVASTATIN SODIUM 40 MG: 40 TABLET ORAL at 08:05

## 2020-02-24 RX ADMIN — PREGABALIN 300 MG: 150 CAPSULE ORAL at 17:09

## 2020-02-24 RX ADMIN — TOPIRAMATE 150 MG: 100 TABLET, FILM COATED ORAL at 08:04

## 2020-02-24 RX ADMIN — PREDNISONE 50 MG: 20 TABLET ORAL at 08:04

## 2020-02-24 RX ADMIN — LOPERAMIDE HYDROCHLORIDE 2 MG: 2 CAPSULE ORAL at 06:42

## 2020-02-24 RX ADMIN — OXCARBAZEPINE 600 MG: 600 TABLET, FILM COATED ORAL at 08:05

## 2020-02-24 RX ADMIN — FLUOXETINE HYDROCHLORIDE 60 MG: 20 CAPSULE ORAL at 06:12

## 2020-02-24 RX ADMIN — GABAPENTIN 800 MG: 400 CAPSULE ORAL at 14:20

## 2020-02-24 ASSESSMENT — ENCOUNTER SYMPTOMS
RECTAL PAIN: 0
ANAL BLEEDING: 0
CHEST TIGHTNESS: 0
EYE DISCHARGE: 0
STRIDOR: 0
DIARRHEA: 1
APNEA: 0
NAUSEA: 0
BLOOD IN STOOL: 0
VOMITING: 0
ABDOMINAL PAIN: 1
SORE THROAT: 0
EYE PAIN: 0

## 2020-02-24 ASSESSMENT — PAIN SCALES - GENERAL
PAINLEVEL_OUTOF10: 7
PAINLEVEL_OUTOF10: 0
PAINLEVEL_OUTOF10: 3

## 2020-02-24 NOTE — CONSULTS
metroNIDAZOLE (FLAGYL) 500 MG tablet Take 1 tablet by mouth every 8 hours for 5 days 2/21/20 2/26/20 Yes Forrest Lara MD   predniSONE (DELTASONE) 50 MG tablet Take 1 tablet by mouth daily 2/21/20  Yes Forrest Lara MD   Topiramate (TOPAMAX PO) Take 150 mg by mouth 2 times daily    Yes Historical Provider, MD   SUMAtriptan (IMITREX) 100 MG tablet Take 100 mg by mouth once as needed for Migraine   Yes Historical Provider, MD   gabapentin (NEURONTIN) 100 MG capsule Take 800 mg by mouth 3 times daily. Yes Historical Provider, MD   traZODone (DESYREL) 100 MG tablet Take 150 mg by mouth nightly    Yes Historical Provider, MD   pravastatin (PRAVACHOL) 40 MG tablet Take 40 mg by mouth daily. Yes Historical Provider, MD   FLUoxetine HCl (PROZAC PO) Take 20 mg by mouth Daily    Yes Historical Provider, MD   Levothyroxine Sodium (LEVOTHROID PO) Take 50 mcg by mouth    Yes Historical Provider, MD   cyclobenzaprine (FLEXERIL) 10 MG tablet Take 1 tablet by mouth 3 times daily as needed for Muscle spasms 5/9/18   Nereydatodonal Calloway MD   ipratropium-albuterol (DUONEB) 0.5-2.5 (3) MG/3ML SOLN nebulizer solution Inhale 3 mLs into the lungs 4 times daily 2/6/17   Christina Hendrix MD   mometasone-formoterol [facility]) 200-5 MCG/ACT inhaler Inhale 2 puffs into the lungs every 12 hours 2/6/17   Christina Hendrix MD   OMEPRAZOLE PO Take 40 mg by mouth 2 times daily. Historical Provider, MD   ATENOLOL PO Take 25 mg by mouth daily. Historical Provider, MD        Allergies:       Tape Ge Black tape]    Social History:     Tobacco:    reports that she has been smoking cigarettes. She has a 18.50 pack-year smoking history. She has never used smokeless tobacco.  Alcohol:      reports current alcohol use. Drug Use:  reports previous drug use. Frequency: 1.00 time per week. Drug: Marijuana.     Family History:     Family History   Problem Relation Age of Onset    Diabetes Father     Hypertension Mother     Cancer Mother Review of Systems:     Positive and Negative as described in HPI     all 10 systems are reviewed and negative except as noted    Review of Systems   Constitutional: Negative for appetite change and fatigue. HENT: Negative for congestion, postnasal drip and sore throat. Eyes: Negative for pain and discharge. Respiratory: Negative for apnea, chest tightness and stridor. Cardiovascular: Negative for palpitations. Gastrointestinal: Positive for abdominal pain and diarrhea. Negative for anal bleeding, blood in stool, nausea, rectal pain and vomiting. Endocrine: Negative for polydipsia and polyphagia. Genitourinary: Negative for decreased urine volume, flank pain and hematuria. Musculoskeletal: Negative for joint swelling and neck stiffness. Skin: Negative for rash. Allergic/Immunologic: Negative for food allergies. Neurological: Negative for seizures, facial asymmetry and speech difficulty. Hematological: Does not bruise/bleed easily. Code Status:  Full Code    Physical Exam:     Vitals:  /71   Pulse 75   Temp 97.7 °F (36.5 °C) (Oral)   Resp 18   Ht 5' 6\" (1.676 m)   Wt 166 lb (75.3 kg)   SpO2 99%   BMI 26.79 kg/m²   Temp (24hrs), Av.9 °F (36.6 °C), Min:97.7 °F (36.5 °C), Max:98 °F (36.7 °C)      Physical Exam  Vitals signs reviewed. Constitutional:       Appearance: She is well-developed. HENT:      Head: Normocephalic and atraumatic. Nose: Nose normal.      Mouth/Throat:      Pharynx: Oropharynx is clear. Eyes:      General: Lids are normal.         Right eye: No discharge. Left eye: No discharge. Neck:      Trachea: No tracheal deviation. Cardiovascular:      Rate and Rhythm: Normal rate and regular rhythm. Heart sounds: Normal heart sounds, S1 normal and S2 normal.   Pulmonary:      Effort: Pulmonary effort is normal. No respiratory distress. Breath sounds: Normal breath sounds.    Abdominal:      General: Bowel sounds are normal. There is no distension. Palpations: Abdomen is soft. Tenderness: There is abdominal tenderness (llq). There is no guarding. Musculoskeletal:         General: No tenderness or deformity. Lymphadenopathy:      Cervical: No cervical adenopathy. Upper Body:      Right upper body: No supraclavicular or epitrochlear adenopathy. Left upper body: No supraclavicular or epitrochlear adenopathy. Skin:     General: Skin is warm and dry. Capillary Refill: Capillary refill takes less than 2 seconds. Neurological:      Mental Status: She is alert. Mental status is at baseline. Motor: No abnormal muscle tone or seizure activity. Psychiatric:         Speech: Speech normal.             Data:     No results found for this or any previous visit (from the past 24 hour(s)). Assesment:     Primary Problem  Bipolar 1 disorder (HCC)    Principal Problem:    Bipolar 1 disorder (HCC)  Active Problems:    Hypothyroidism    Gastroesophageal reflux disease without esophagitis    Essential hypertension    Diarrhea    Chronic bronchitis (HCC)    Chronic renal insufficiency, stage III (moderate) (HCC)  Resolved Problems:    * No resolved hospital problems. *      Plan:     1. Restart home medications  2. H/o crohn's disease per patient  3. Start cholestyramine for diarrhea  4. Continue prednisone  5. PPI  6.  pain control  7. Check cbc/bmp/mg         thanks for the consult.   Electronically signed by Nidhi Nicole MD     Copy sent to Dr. Batsheva Dailey MD

## 2020-02-24 NOTE — GROUP NOTE
Group Therapy Note    Date: 2/24/2020    Group Start Time: 0845  Group End Time: 1439  Group Topic: Community Meeting    [facility]    [facility], [address]    Patient's Goal:  To increase interpersonal interaction. Notes:  Pt attended and participated in group. Status After Intervention:  Improved    Participation Level:  Active Listener and Interactive    Participation Quality: Appropriate, Attentive and Sharing      Speech:  normal      Thought Process/Content: Logical      Affective Functioning: Congruent      Mood: euthymic      Level of consciousness:  Alert and Attentive      Response to Learning: Able to verbalize current knowledge/experience, Able to retain information and Progressing to goal      Endings: None Reported    Modes of Intervention: Education, Support, Socialization, Exploration, Problem-solving and Reality-testing      Discipline Responsible: Psychoeducational Specialist      Signature:  Rosemarie Norton

## 2020-02-24 NOTE — GROUP NOTE
Group Therapy Note    Date: 2/24/2020    Group Start Time: 1000  Group End Time: 5948  Group Topic: Group Therapy    [facility]    CHELSEA Nagy, ADANW        Group Therapy Note    Attendees: 6/17         Patient's Goal:  Increase interpersonal relationship skills    Notes:  Patient was an active participant in group discussion     Status After Intervention:  Unchanged    Participation Level:  Active Listener and Interactive    Participation Quality: Appropriate, Attentive, Sharing and Supportive      Speech:  normal      Thought Process/Content: Logical      Affective Functioning: Congruent      Mood: anxious      Level of consciousness:  Alert, Oriented x4 and Attentive      Response to Learning: Progressing to goal      Endings: None Reported    Modes of Intervention: Support, Socialization, Exploration and Clarifying      Discipline Responsible: /Counselor      Signature:  CHELSEA Nagy, THAO

## 2020-02-24 NOTE — PROGRESS NOTES
Pharmacy Med Education Group Note    Date: 2/24/20  Start Time: 1330  End Time: 1400    Number Participants in Group:  7    Goal:  Patient will demonstrate an understanding of the medications intended purpose and possible adverse effects  Topic: [place] for Pharmacy Med Ed Group    Discipline Responsible:     OT  AT  [facility].  RT     X Other       Participation Level:     None  Minimal      X Active Listener    X Interactive    Monopolizing         Participation Quality:    X Appropriate  Inappropriate     X       Attentive        Intrusive          Sharing        Resistant          Supportive        Lethargic       Affective:     X Congruent  Incongruent  Blunted  Flat    Constricted  Anxious  Elated  Angry    Labile  Depressed  Other         Cognitive:    X Alert  Oriented PPTP     Concentration   X G  F  P   Attention Span   X G  F  P   Short-Term Memory   X G  F  P   Long-Term Memory  G  F  P   ProblemSolving/  Decision Making  G  F  P   Ability to Process  Information   X G  F  P      Contributing Factors             Delusional             Hallucinating             Flight of Ideas             Other:       Modes of Intervention:    X Education   X Support  Exploration    Clarifying  Problem Solving  Confrontation    Socialization  Limit Setting  Reality Testing    Activity  Movement  Media    Other:            Response to Learning:    X Able to verbalize current knowledge/experience    Able to verbalize/acknowledge new learning    Able to retain information    Capable of insight    Able to change behavior    Progressing to goal    Other:        Comments:     Kenna Wei PharmD, BCPS  2/24/2020 2:07 PM

## 2020-02-24 NOTE — GROUP NOTE
Group Therapy Note    Date: 2/24/2020    Group Start Time: 1100  Group End Time: 1130  Group Topic: Psychoeducation    KATHARINE RIVERA    Marylu Syeda [place], [place]    Patient's Goal:  To increase interpersonal interaction. Notes:  Pt attended and participated in group. Status After Intervention:  Improved    Participation Level:  Active Listener and Interactive    Participation Quality: Appropriate, Attentive and Sharing      Speech:  normal      Thought Process/Content: Logical      Affective Functioning: Congruent      Mood: euthymic      Level of consciousness:  Alert and Attentive      Response to Learning: Able to verbalize current knowledge/experience, Able to verbalize/acknowledge new learning and Progressing to goal      Endings: None Reported    Modes of Intervention: Education, Clarifying, Problem-solving, Activity, Media and Reality-testing      Discipline Responsible: Psychoeducational Specialist      Signature:  Andrew Clayton

## 2020-02-24 NOTE — PLAN OF CARE
Problem: Altered Mood, Depressive Behavior:  Goal: Able to verbalize and/or display a decrease in depressive symptoms  Description  Able to verbalize and/or display a decrease in depressive symptoms  2/24/2020 1301 by Duane Brighter, RN  Outcome: Ongoing    Pt is alert and oriented. Pt denies nay depression or anxiety at this time. Pt reports feeling ready for discharge and has a brightened affect while out in the milieu. Attends groups, medication compliant and cooperative with treatment. Q15 minute checks maintained     Problem: Depressive Behavior With or Without Suicide Precautions:  Goal: Ability to disclose and discuss suicidal ideas will improve  Description  Ability to disclose and discuss suicidal ideas will improve  2/24/2020 1301 by Duane Brighter, RN  Outcome: Ongoing    Pt is alert and oriented. Pt currently denies any suicidal or homicidal ideations. Pt is out in the milieu, social with peers and attends groups. Pt has a brightened affect and a good outlook regarding discharge.  Q15 minute checks maintained

## 2020-02-24 NOTE — PLAN OF CARE
Problem: Altered Mood, Depressive Behavior:  Goal: Able to verbalize and/or display a decrease in depressive symptoms  Description  Able to verbalize and/or display a decrease in depressive symptoms  2/23/2020 2334 by Lynsey Keating RN  Outcome: Ongoing     Problem: Depressive Behavior With or Without Suicide Precautions:  Goal: Ability to disclose and discuss suicidal ideas will improve  Description  Ability to disclose and discuss suicidal ideas will improve  Outcome: Ongoing     Patient denies suicidal ideations, homicidal ideations, auditory/visual hallucinations. Patient is bright and social with peers and writing. Patient is hopeful for discharge and looks forward to spending time with family. Q 15 minute checks maintained.

## 2020-02-24 NOTE — PROGRESS NOTES
- 20 mg/dL    CREATININE 0.84 0.50 - 0.90 mg/dL    Bun/Cre Ratio NOT REPORTED 9 - 20    Calcium 8.8 8.6 - 10.4 mg/dL    Sodium 134 (L) 135 - 144 mmol/L    Potassium 3.5 (L) 3.7 - 5.3 mmol/L    Chloride 101 98 - 107 mmol/L    CO2 21 20 - 31 mmol/L    Anion Gap 12 9 - 17 mmol/L    GFR Non-African American >60 >60 mL/min    GFR African American >60 >60 mL/min    GFR Comment          GFR Staging NOT REPORTED    CBC WITH AUTO DIFFERENTIAL    Collection Time: 02/24/20 11:45 AM   Result Value Ref Range    WBC 10.5 3.5 - 11.0 k/uL    RBC 4.23 4.0 - 5.2 m/uL    Hemoglobin 11.8 (L) 12.0 - 16.0 g/dL    Hematocrit 36.6 36 - 46 %    MCV 86.5 80 - 100 fL    MCH 27.9 26 - 34 pg    MCHC 32.2 31 - 37 g/dL    RDW 15.4 (H) 11.5 - 14.9 %    Platelets 040 779 - 419 k/uL    MPV 8.4 6.0 - 12.0 fL    NRBC Automated NOT REPORTED per 100 WBC    Differential Type NOT REPORTED     Seg Neutrophils 87 (H) 36 - 66 %    Lymphocytes 9 (L) 24 - 44 %    Monocytes 4 1 - 7 %    Eosinophils % 0 0 - 4 %    Basophils 0 0 - 2 %    Immature Granulocytes NOT REPORTED 0 %    Segs Absolute 9.10 1.3 - 9.1 k/uL    Absolute Lymph # 0.90 (L) 1.0 - 4.8 k/uL    Absolute Mono # 0.40 0.1 - 1.3 k/uL    Absolute Eos # 0.00 0.0 - 0.4 k/uL    Basophils Absolute 0.00 0.0 - 0.2 k/uL    Absolute Immature Granulocyte NOT REPORTED 0.00 - 0.30 k/uL    WBC Morphology NOT REPORTED     RBC Morphology NOT REPORTED     Platelet Estimate NOT REPORTED    Magnesium    Collection Time: 02/24/20 11:45 AM   Result Value Ref Range    Magnesium 1.9 1.6 - 2.6 mg/dL       Medications  Current Facility-Administered Medications   Medication Dose Route Frequency Provider Last Rate Last Dose    cholestyramine light packet 4 g  4 g Oral TID Barbie Jules MD   4 g at 02/24/20 1539    tiZANidine (ZANAFLEX) tablet 4 mg  4 mg Oral TID PRN Vivek Harness, APRN - NP        pregabalin (LYRICA) capsule 300 mg  300 mg Oral BID Attica Harness, APRN - NP   300 mg at 02/24/20 1709    FLUoxetine (PROZAC) Nyasia Pacheco MD   25 mg at 02/24/20 0913    aluminum & magnesium hydroxide-simethicone (MAALOX) 200-200-20 MG/5ML suspension 20 mL  20 mL Oral TID PRN Nyasia Pacheco MD             cholestyramine light  4 g Oral TID    pregabalin  300 mg Oral BID    FLUoxetine  60 mg Oral Daily    levothyroxine  50 mcg Oral Daily    lisinopril  40 mg Oral Daily    metroNIDAZOLE  500 mg Oral 3 times per day    OXcarbazepine  600 mg Oral BID    pravastatin  40 mg Oral Daily    predniSONE  50 mg Oral Daily    topiramate  150 mg Oral BID    traZODone  300 mg Oral Nightly    gabapentin  800 mg Oral TID    pantoprazole  40 mg Oral QAM AC       ASSESSMENT  Bipolar 1 disorder (HCC)     Patient's Response to Treatment: positive     PLAN    · Admit to inpatient psychiatric treatment  · Supportive therapy with medication management. Reviewed risks and benefits as well as potential side effects with patient. · Therapeutic activities and groups  · Follow up at [facility] after symptoms stabilized  · Restart home medications at this time  · Planned discharge tomorrow if patient continues to improve.      Estimated length of stay: additional 1-2 days    Electronically signed by SHRADDHA Gates CNP on 2/24/2020 at 5:20 PM.    Dragon voice recognition software used in portions of this document.

## 2020-02-25 PROCEDURE — 6370000000 HC RX 637 (ALT 250 FOR IP): Performed by: NURSE PRACTITIONER

## 2020-02-25 PROCEDURE — 6370000000 HC RX 637 (ALT 250 FOR IP): Performed by: PSYCHIATRY & NEUROLOGY

## 2020-02-25 PROCEDURE — 99239 HOSP IP/OBS DSCHRG MGMT >30: CPT | Performed by: NURSE PRACTITIONER

## 2020-02-25 PROCEDURE — 6370000000 HC RX 637 (ALT 250 FOR IP): Performed by: FAMILY MEDICINE

## 2020-02-25 PROCEDURE — 5130000000 HC BRIDGE APPOINTMENT

## 2020-02-25 RX ORDER — CHOLESTYRAMINE LIGHT 4 G/5.7G
4 POWDER, FOR SUSPENSION ORAL 3 TIMES DAILY
Qty: 42 PACKET | Refills: 0 | Status: SHIPPED | OUTPATIENT
Start: 2020-02-25 | End: 2021-09-03

## 2020-02-25 RX ORDER — HYDROXYZINE HYDROCHLORIDE 25 MG/1
25 TABLET, FILM COATED ORAL 3 TIMES DAILY PRN
Qty: 42 TABLET | Refills: 0 | Status: SHIPPED | OUTPATIENT
Start: 2020-02-25 | End: 2020-03-10

## 2020-02-25 RX ORDER — FLUOXETINE HYDROCHLORIDE 20 MG/1
60 CAPSULE ORAL DAILY
Qty: 42 CAPSULE | Refills: 0 | Status: ON HOLD | OUTPATIENT
Start: 2020-02-26 | End: 2021-09-18

## 2020-02-25 RX ORDER — PREGABALIN 300 MG/1
300 CAPSULE ORAL 2 TIMES DAILY
Qty: 28 CAPSULE | Refills: 0 | Status: ON HOLD | OUTPATIENT
Start: 2020-02-25 | End: 2021-09-19 | Stop reason: HOSPADM

## 2020-02-25 RX ORDER — TRAZODONE HYDROCHLORIDE 300 MG/1
300 TABLET ORAL NIGHTLY
Qty: 14 TABLET | Refills: 0 | Status: SHIPPED | OUTPATIENT
Start: 2020-02-25 | End: 2021-09-03

## 2020-02-25 RX ADMIN — LEVOTHYROXINE SODIUM 50 MCG: 50 TABLET ORAL at 05:30

## 2020-02-25 RX ADMIN — GABAPENTIN 800 MG: 400 CAPSULE ORAL at 09:05

## 2020-02-25 RX ADMIN — PREGABALIN 300 MG: 150 CAPSULE ORAL at 11:30

## 2020-02-25 RX ADMIN — FLUOXETINE HYDROCHLORIDE 60 MG: 20 CAPSULE ORAL at 05:30

## 2020-02-25 RX ADMIN — PANTOPRAZOLE SODIUM 40 MG: 40 TABLET, DELAYED RELEASE ORAL at 05:30

## 2020-02-25 RX ADMIN — METRONIDAZOLE 500 MG: 500 TABLET ORAL at 05:30

## 2020-02-25 RX ADMIN — CHOLESTYRAMINE 4 G: 4 POWDER, FOR SUSPENSION ORAL at 09:07

## 2020-02-25 RX ADMIN — ACETAMINOPHEN 650 MG: 325 TABLET, FILM COATED ORAL at 05:27

## 2020-02-25 RX ADMIN — OXCARBAZEPINE 600 MG: 600 TABLET, FILM COATED ORAL at 09:05

## 2020-02-25 RX ADMIN — PRAVASTATIN SODIUM 40 MG: 40 TABLET ORAL at 09:05

## 2020-02-25 RX ADMIN — PREDNISONE 50 MG: 20 TABLET ORAL at 09:05

## 2020-02-25 RX ADMIN — HYDROXYZINE HYDROCHLORIDE 25 MG: 25 TABLET, FILM COATED ORAL at 09:46

## 2020-02-25 RX ADMIN — TOPIRAMATE 150 MG: 100 TABLET, FILM COATED ORAL at 09:04

## 2020-02-25 RX ADMIN — ACETAMINOPHEN 650 MG: 325 TABLET, FILM COATED ORAL at 11:35

## 2020-02-25 ASSESSMENT — ENCOUNTER SYMPTOMS
DIARRHEA: 0
STRIDOR: 0
APNEA: 0
CHEST TIGHTNESS: 0
RECTAL PAIN: 0
EYE DISCHARGE: 0
SORE THROAT: 0
EYE PAIN: 0
ANAL BLEEDING: 0

## 2020-02-25 ASSESSMENT — PAIN SCALES - GENERAL
PAINLEVEL_OUTOF10: 4
PAINLEVEL_OUTOF10: 0
PAINLEVEL_OUTOF10: 0

## 2020-02-25 NOTE — PROGRESS NOTES
Progress Note    2/25/2020   11:17 AM    Name:  Erika Aviles  MRN:    [id number]     Acct:     [de-identified]   Room:  0209/0209[facility] Day: 4     Admit Date: 2/21/2020  8:15 PM  PCP: Ana Springer MD    Subjective:     C/C: diarrhea    Interval History: Status: improved. Diarrhea is better    ROS:   all 10 systems reviewed and are negative except as noted    Review of Systems   Constitutional: Negative for appetite change and fatigue. HENT: Negative for congestion, postnasal drip and sore throat. Eyes: Negative for pain and discharge. Respiratory: Negative for apnea, chest tightness and stridor. Cardiovascular: Negative for palpitations. Gastrointestinal: Negative for anal bleeding, diarrhea and rectal pain. Endocrine: Negative for polydipsia and polyphagia. Genitourinary: Negative for decreased urine volume, flank pain and hematuria. Musculoskeletal: Negative for joint swelling and neck stiffness. Skin: Negative for rash. Allergic/Immunologic: Negative for food allergies. Neurological: Negative for seizures, facial asymmetry and speech difficulty. Hematological: Does not bruise/bleed easily. Medications: Allergies:    Allergies   Allergen Reactions    Tape Sonia Hennessy]        Current Meds: cholestyramine light packet 4 g, TID  tiZANidine (ZANAFLEX) tablet 4 mg, TID PRN  pregabalin (LYRICA) capsule 300 mg, BID  FLUoxetine (PROZAC) capsule 60 mg, Daily  levothyroxine (SYNTHROID) tablet 50 mcg, Daily  lisinopril (PRINIVIL;ZESTRIL) tablet 40 mg, Daily  loperamide (IMODIUM) capsule 2 mg, 4x Daily PRN  metroNIDAZOLE (FLAGYL) tablet 500 mg, 3 times per day  OXcarbazepine (TRILEPTAL) tablet 600 mg, BID  pravastatin (PRAVACHOL) tablet 40 mg, Daily  predniSONE (DELTASONE) tablet 50 mg, Daily  topiramate (TOPAMAX) tablet 150 mg, BID  traZODone (DESYREL) tablet 300 mg, Nightly  gabapentin (NEURONTIN) capsule 800 mg, TID  pantoprazole (PROTONIX) tablet 40 mg, QAM AC  loperamide (IMODIUM) capsule 2 mg, TID PRN  melatonin ER tablet 5 mg, Nightly PRN  traZODone (DESYREL) tablet 300 mg, Nightly PRN  acetaminophen (TYLENOL) tablet 650 mg, Q4H PRN  magnesium hydroxide (MILK OF MAGNESIA) 400 MG/5ML suspension 30 mL, Nightly PRN  benztropine mesylate (COGENTIN) injection 2 mg, BID PRN  hydrOXYzine (ATARAX) tablet 25 mg, TID PRN  aluminum & magnesium hydroxide-simethicone (MAALOX) 200-200-20 MG/5ML suspension 20 mL, TID PRN        Data:     Code Status:  Full Code    Family History   Problem Relation Age of Onset    Diabetes Father     Hypertension Mother     Cancer Mother        Social History     Socioeconomic History    Marital status: Single     Spouse name: Not on file    Number of children: Not on file    Years of education: Not on file    Highest education level: Not on file   Occupational History    Not on file   Social Needs    Financial resource strain: Not on file    Food insecurity:     Worry: Not on file     Inability: Not on file    Transportation needs:     Medical: Not on file     Non-medical: Not on file   Tobacco Use    Smoking status: Current Every Day Smoker     Packs/day: 0.50     Years: 37.00     Pack years: 18.50     Types: Cigarettes    Smokeless tobacco: Never Used    Tobacco comment: has not smoked in the last 3 days   Substance and Sexual Activity    Alcohol use: Yes     Comment: socially    Drug use: Not Currently     Frequency: 1.0 times per week     Types: Marijuana    Sexual activity: Not on file   Lifestyle    Physical activity:     Days per week: Not on file     Minutes per session: Not on file    Stress: Not on file   Relationships    Social connections:     Talks on phone: Not on file     Gets together: Not on file     Attends Congregation service: Not on file     Active member of club or organization: Not on file     Attends meetings of clubs or organizations: Not on file     Relationship status: Not on file    Intimate partner violence: Fear of current or ex partner: Not on file     Emotionally abused: Not on file     Physically abused: Not on file     Forced sexual activity: Not on file   Other Topics Concern    Not on file   Social History Narrative    Not on file       I/O (24Hr): No intake or output data in the 24 hours ending 02/25/20 1117  Radiology:  No results found.     Labs:  Recent Results (from the past 24 hour(s))   Basic Metabolic Panel w/ Reflex to MG    Collection Time: 02/24/20 11:45 AM   Result Value Ref Range    Glucose 124 (H) 70 - 99 mg/dL    BUN 20 6 - 20 mg/dL    CREATININE 0.84 0.50 - 0.90 mg/dL    Bun/Cre Ratio NOT REPORTED 9 - 20    Calcium 8.8 8.6 - 10.4 mg/dL    Sodium 134 (L) 135 - 144 mmol/L    Potassium 3.5 (L) 3.7 - 5.3 mmol/L    Chloride 101 98 - 107 mmol/L    CO2 21 20 - 31 mmol/L    Anion Gap 12 9 - 17 mmol/L    GFR Non-African American >60 >60 mL/min    GFR African American >60 >60 mL/min    GFR Comment          GFR Staging NOT REPORTED    CBC WITH AUTO DIFFERENTIAL    Collection Time: 02/24/20 11:45 AM   Result Value Ref Range    WBC 10.5 3.5 - 11.0 k/uL    RBC 4.23 4.0 - 5.2 m/uL    Hemoglobin 11.8 (L) 12.0 - 16.0 g/dL    Hematocrit 36.6 36 - 46 %    MCV 86.5 80 - 100 fL    MCH 27.9 26 - 34 pg    MCHC 32.2 31 - 37 g/dL    RDW 15.4 (H) 11.5 - 14.9 %    Platelets 987 553 - 730 k/uL    MPV 8.4 6.0 - 12.0 fL    NRBC Automated NOT REPORTED per 100 WBC    Differential Type NOT REPORTED     Seg Neutrophils 87 (H) 36 - 66 %    Lymphocytes 9 (L) 24 - 44 %    Monocytes 4 1 - 7 %    Eosinophils % 0 0 - 4 %    Basophils 0 0 - 2 %    Immature Granulocytes NOT REPORTED 0 %    Segs Absolute 9.10 1.3 - 9.1 k/uL    Absolute Lymph # 0.90 (L) 1.0 - 4.8 k/uL    Absolute Mono # 0.40 0.1 - 1.3 k/uL    Absolute Eos # 0.00 0.0 - 0.4 k/uL    Basophils Absolute 0.00 0.0 - 0.2 k/uL    Absolute Immature Granulocyte NOT REPORTED 0.00 - 0.30 k/uL    WBC Morphology NOT REPORTED     RBC Morphology NOT REPORTED     Platelet Estimate NOT REPORTED    Magnesium    Collection Time: 20 11:45 AM   Result Value Ref Range    Magnesium 1.9 1.6 - 2.6 mg/dL       Physical Examination:        Vitals:  /69   Pulse 62   Temp 98 °F (36.7 °C) (Oral)   Resp 14   Ht 5' 6\" (1.676 m)   Wt 166 lb (75.3 kg)   SpO2 99%   BMI 26.79 kg/m²   Temp (24hrs), Av °F (36.7 °C), Min:98 °F (36.7 °C), Max:98 °F (36.7 °C)    No results for input(s): POCGLU in the last 72 hours. Physical Exam  Vitals signs reviewed. Constitutional:       Appearance: She is well-developed. HENT:      Head: Normocephalic and atraumatic. Right Ear: External ear normal.      Left Ear: External ear normal.      Nose: Nose normal.      Mouth/Throat:      Pharynx: Oropharynx is clear. Eyes:      General: Lids are normal.      Conjunctiva/sclera: Conjunctivae normal.   Neck:      Trachea: No tracheal deviation. Cardiovascular:      Rate and Rhythm: Normal rate and regular rhythm. Heart sounds: Normal heart sounds, S1 normal and S2 normal.   Pulmonary:      Effort: Pulmonary effort is normal. No respiratory distress. Breath sounds: Normal breath sounds. Abdominal:      General: Bowel sounds are normal. There is no distension. Palpations: Abdomen is soft. Tenderness: There is no abdominal tenderness. There is no guarding. Musculoskeletal:         General: No tenderness or deformity. Lymphadenopathy:      Cervical: No cervical adenopathy. Upper Body:      Right upper body: No supraclavicular or epitrochlear adenopathy. Left upper body: No supraclavicular or epitrochlear adenopathy. Skin:     General: Skin is warm and dry. Capillary Refill: Capillary refill takes less than 2 seconds. Neurological:      Mental Status: She is alert. Mental status is at baseline. Motor: No abnormal muscle tone or seizure activity.    Psychiatric:         Speech: Speech normal.         Assessment:        Primary Problem  Bipolar 1 disorder [facility])     Principal Problem:    Bipolar 1 disorder ([facility] [address]  Active Problems:    Hypothyroidism    Gastroesophageal reflux disease without esophagitis    Essential hypertension    Diarrhea    Chronic bronchitis (HCC)    Chronic renal insufficiency, stage III (moderate) ([facility])  Resolved Problems:    * No resolved hospital problems. *      Past Medical History:   Diagnosis Date    Acid reflux     Anxiety     Asthma     Bipolar 1 disorder (HCC)     Bowel obstruction (HCC)     COPD (chronic obstructive pulmonary disease) (HCC)     Crohn disease (HCC)     Depression     Gout     Hypertension     Hypothyroidism         Plan:        1. Continue cholestyramine  2. Continue prednisone  3.  F/U with GI as outpatient      Electronically signed by Yoandy Ramos MD

## 2020-02-25 NOTE — CARE COORDINATION
Name: Richard Dent    : 1965    Discharge Date: 2020    Primary Auth/Cert #: [id number]    Discharge Medications:      Medication List      START taking these medications    cholestyramine light 4 g packet  Take 1 packet by mouth 3 times daily  Notes to patient:  Cholesterol      hydrOXYzine 25 MG tablet  Commonly known as:  ATARAX  Take 1 tablet by mouth 3 times daily as needed for Anxiety  Notes to patient:  Anxiety      pregabalin 300 MG capsule  Commonly known as:  LYRICA  Take 1 capsule by mouth 2 times daily for 14 days.   Notes to patient:  Nerve pain         CHANGE how you take these medications    FLUoxetine 20 MG capsule  Commonly known as:  PROzac  Take 3 capsules by mouth Daily  Start taking on:  2020  What changed:    · medication strength  · how much to take  Notes to patient:  mood     traZODone 300 MG tablet  Commonly known as:  DESYREL  Take 1 tablet by mouth nightly  What changed:    · medication strength  · how much to take  Notes to patient:  sleep        CONTINUE taking these medications    ATENOLOL PO  Notes to patient:  Blood pressure     cyclobenzaprine 10 MG tablet  Commonly known as:  FLEXERIL  Take 1 tablet by mouth 3 times daily as needed for Muscle spasms  Notes to patient:  Muscle relaxant      gabapentin 100 MG capsule  Commonly known as:  NEURONTIN  Notes to patient:  Nerve pain      ipratropium-albuterol 0.5-2.5 (3) MG/3ML Soln nebulizer solution  Commonly known as:  DUONEB  Inhale 3 mLs into the lungs 4 times daily  Notes to patient:  Wheezing      LEVOTHROID PO  Notes to patient:  Thyroid      lidocaine 4 % external patch  Place 1 patch onto the skin daily for 10 days  Notes to patient:  Pain      lisinopril 40 MG tablet  Commonly known as:  PRINIVIL;ZESTRIL  Notes to patient:  Blood pressure      loperamide 2 MG capsule  Commonly known as:  IMODIUM  Take 1 capsule by mouth 4 times daily as needed for Diarrhea  Notes to patient:  Diarrhea mometasone-formoterol 200-5 MCG/ACT inhaler  Commonly known as:  Dulera  Inhale 2 puffs into the lungs every 12 hours  Notes to patient:  Wheezing      OMEPRAZOLE PO  Notes to patient:  Acid reflux      OXcarbazepine 600 MG tablet  Commonly known as:  TRILEPTAL  Notes to patient:  Seizures/thoughts      pravastatin 40 MG tablet  Commonly known as:  PRAVACHOL  Notes to patient:  Cholesterol      predniSONE 50 MG tablet  Commonly known as:  DELTASONE  Take 1 tablet by mouth daily  Notes to patient:  Steroid      SUMAtriptan 100 MG tablet  Commonly known as:  IMITREX  Notes to patient:  Migraine      TOPAMAX PO  Notes to patient:  Seizure/ mood         STOP taking these medications    metroNIDAZOLE 500 MG tablet  Commonly known as:  FLAGYL           Where to Get Your Medications      These medications were sent to [address] [place] [address], [place] [address]    Phone:  [phone]   · cholestyramine light 4 g packet  · FLUoxetine 20 MG capsule  · hydrOXYzine 25 MG tablet  · traZODone 300 MG tablet     You can get these medications from any pharmacy    Bring a paper prescription for each of these medications  · pregabalin 300 MG capsule         Follow Up Appointment: Crow [place], [address]  [phone]      As needed    Enrique Stoll  [address] [facility] [address]  [facility] [address] [phone]    Go on 3/4/2020  10:30am

## 2020-02-25 NOTE — GROUP NOTE
Group Therapy Note    Date: 2/24/2020    Group Start Time: 2045  Group End Time: 2055  Group Topic: Relaxation    [facility]    Sharonda Pathak RN      Group Therapy Note    Attendees: 7/13       Patient's Goal:  To acquire coping skills by developing relaxation techniques    Notes:  Pt attended and actively participated in the Relaxation group this evening.     Status After Intervention:  Improved    Participation Level: Interactive    Participation Quality: Appropriate and Attentive    Speech:  normal    Thought Process/Content: Logical    Affective Functioning: Congruent    Mood: calm and attempting to relax    Level of consciousness:  Alert and Oriented x4    Response to Learning: Progressing to goal    Endings: None Reported    Modes of Intervention: Education, Support, and Exploration    Discipline Responsible: Registered Nurse        Signature:  Sharonda Pathak RN

## 2020-02-25 NOTE — PLAN OF CARE
Problem: Altered Mood, Depressive Behavior:  Goal: Able to verbalize and/or display a decrease in depressive symptoms  Description  Able to verbalize and/or display a decrease in depressive symptoms  2/24/2020 2105 by Emerson Hernandez RN  Outcome: Met This Shift  Note:   Patient pleasant and cooperative, out and social with peers, expressed readiness for discharge, patient is currently medication compliant and behaviorally controlled    Problem: Falls - Risk of:  Goal: Will remain free from falls  Description  Will remain free from falls  Outcome: Ongoing  Note:   Pt remains free of falls and verbalizes understanding of individual fall risks. Pt wearing non skid footwear and encouraged to seek out staff for any assistance needed. Problem: Depressive Behavior With or Without Suicide Precautions:  Goal: Ability to disclose and discuss suicidal ideas will improve  Description  Ability to disclose and discuss suicidal ideas will improve  2/24/2020 2105 by Emerson Hernandez RN  Outcome: Ongoing  Note:    Pt denies suicidal ideations at this time. Pt agreed to seek staff at anytime he/she felt like any urges to harm self would arise. Safety checks maintained [id number].   \

## 2020-02-25 NOTE — BH NOTE
[address] [facility]  Discharge Note    Pt discharged with followings belongings:   Dentures: Uppers  Vision - Corrective Lenses: Glasses  Hearing Aid: None  Jewelry: None  Body Piercings Removed: N/A  Clothing: None  Were All Patient Medications Collected?: Not Applicable  Other Valuables: None   Valuables sent home with patient. Valuables retrieved from safe, Security envelope number:  n/a and returned to patient. Patient education on aftercare instructions: done  Information faxed to shruti by rn Patient verbalize understanding of AVS:  yes.     Patient discharged with daughter to private residence  Status EXAM upon discharge:  Status and Exam  Normal: Yes  Facial Expression: Brightened  Affect: Appropriate  Level of Consciousness: Alert  Mood:Normal: Yes  Mood: Other (Comment)(Pt denies all moods listed, calm)  Motor Activity:Normal: Yes  Interview Behavior: Cooperative  Preception: [place] to Person, [place] to Time, [place] to Place, [place] to Situation  Attention:Normal: Yes  Attention: Others(See comment), Distractible, Unable to Concentrate(no issues with attention)  Thought Processes: Circumstantial  Thought Content:Normal: Yes  Thought Content: Poverty of Content, Preoccupations  Hallucinations: None  Delusions: No  Memory:Normal: Yes  Memory: Poor Recent  Insight and Judgment: No  Insight and Judgment: Poor Judgment  Present Suicidal Ideation: No  Present Homicidal Ideation: No      Metabolic Screening:    No results found for: LABA1C    No results found for: CHOL  No results found for: TRIG  No results found for: HDL  No components found for: LDLCAL  No results found for: Risa Sanchez RN

## 2020-02-25 NOTE — GROUP NOTE
Group Therapy Note    Attendees: 7/13       Patient's Goal:    1. To be able to reflect on daily unit activities/experiences. 2.  To review accomplished daily goals and be encouraged to set new goals for the next day. 3.  To demonstrate increased interpersonal interaction. Notes:  Pt attended and actively participated in Wrap-Up group this evening. Status After Intervention:  Improved     Participation Level:  Active Listener and Interactive     Participation Quality: Appropriate, Attentive and Sharing     Speech:  normal     Thought Process/Content: Logical     Affective Functioning: Congruent     Mood: euthymic     Level of consciousness:  Alert, Oriented x4 and Attentive     Response to Learning: Able to verbalize current knowledge/experience, Able to verbalize/acknowledge new learning, Able to retain information and Capable of insight     Endings: None Reported     Modes of Intervention: Education, Support and Socialization     Discipline Responsible: Registered Nurse        Signature:  Elroy Coffman RN

## 2020-02-25 NOTE — GROUP NOTE
Group Therapy Note    Date: 2/25/2020    Group Start Time: 7009  Group End Time: 0900  Group Topic: Community Meeting    KATHARINE RIVERA    Sandra Barreto        Group Therapy Note    Attendees: 3/14         Patient's Goal:  To increase interpersonal interaction     Notes:      Status After Intervention:  Improved    Participation Level:  Active Listener and Interactive    Participation Quality: Appropriate, Attentive and Sharing      Speech:  normal      Thought Process/Content: Logical  Linear      Affective Functioning: Congruent      Mood: euthymic      Level of consciousness:  Alert, Oriented x4 and Attentive      Response to Learning: Able to verbalize current knowledge/experience, Able to verbalize/acknowledge new learning, Able to retain information and Progressing to goal      Endings: None Reported    Modes of Intervention: Education, Support, Socialization, Exploration, Clarifying and Problem-solving      Discipline Responsible: Psychoeducational Specialist      Signature:  Sandra Barreto

## 2020-02-25 NOTE — DISCHARGE SUMMARY
patch, Refills: 0      loperamide (IMODIUM) 2 MG capsule Take 1 capsule by mouth 4 times daily as needed for Diarrhea  Qty: 30 capsule, Refills: 0      predniSONE (DELTASONE) 50 MG tablet Take 1 tablet by mouth daily  Qty: 4 tablet, Refills: 0      Topiramate (TOPAMAX PO) Take 150 mg by mouth 2 times daily       SUMAtriptan (IMITREX) 100 MG tablet Take 100 mg by mouth once as needed for Migraine      gabapentin (NEURONTIN) 100 MG capsule Take 800 mg by mouth 3 times daily. pravastatin (PRAVACHOL) 40 MG tablet Take 40 mg by mouth daily. Levothyroxine Sodium (LEVOTHROID PO) Take 50 mcg by mouth       cyclobenzaprine (FLEXERIL) 10 MG tablet Take 1 tablet by mouth 3 times daily as needed for Muscle spasms  Qty: 10 tablet, Refills: 0      ipratropium-albuterol (DUONEB) 0.5-2.5 (3) MG/3ML SOLN nebulizer solution Inhale 3 mLs into the lungs 4 times daily  Qty: 360 mL, Refills: 3      mometasone-formoterol (DULERA) 200-5 MCG/ACT inhaler Inhale 2 puffs into the lungs every 12 hours  Qty: 1 Inhaler, Refills: 3      OMEPRAZOLE PO Take 40 mg by mouth 2 times daily. ATENOLOL PO Take 25 mg by mouth daily.          STOP taking these medications       metroNIDAZOLE (FLAGYL) 500 MG tablet Comments:   Reason for Stopping:                Core Measures Statement  Not applicable      Discharge Exam:  Level of consciousness:  Within normal limits  Appearance: Street clothes, seated, with good grooming  Behavior/Motor: No abnormalities noted  Attitude toward examiner:  Cooperative, attentive, good eye contact  Speech:  spontaneous, normal rate, normal volume and well articulated  Mood:  euthymic  Affect:  mood congruent  Thought processes:  linear, goal directed, and coherent  Thought content:  denies homicidal ideation  Suicidal Ideation:  denies suicidal ideation  Delusions:  no evidence of delusions  Perceptual Disturbance:  denies any perceptual disturbance  Cognition:  Intact  Memory: age appropriate  Insight & Judgement: fair  Medication side effects:  denies     Disposition: home    Patient Instructions: Activity: activity as tolerated    Follow-up as scheduled with [facility]. Time Spent: 35 minutes    Engagement: Patient displayed a good level of engagement with the treatments offered during this admission. Discharge planning, findings, and recommendations were discussed with patient and treatment team.     Signed:  Mark Otero   2/25/2020  10:38 AM    Dragon voice recognition software used in portions of this document.

## 2020-08-12 ENCOUNTER — APPOINTMENT (OUTPATIENT)
Dept: GENERAL RADIOLOGY | Age: 55
End: 2020-08-12
Payer: MEDICARE

## 2020-08-12 ENCOUNTER — HOSPITAL ENCOUNTER (EMERGENCY)
Age: 55
Discharge: HOME | End: 2020-08-12
Attending: EMERGENCY MEDICINE
Payer: MEDICARE

## 2020-08-12 VITALS
WEIGHT: 179 LBS | HEART RATE: 74 BPM | RESPIRATION RATE: 16 BRPM | TEMPERATURE: 98.4 F | HEIGHT: 67 IN | BODY MASS INDEX: 28.09 KG/M2 | SYSTOLIC BLOOD PRESSURE: 142 MMHG | DIASTOLIC BLOOD PRESSURE: 78 MMHG | OXYGEN SATURATION: 97 %

## 2020-08-12 LAB
ABSOLUTE EOS #: 0.05 K/UL (ref 0–0.44)
ABSOLUTE IMMATURE GRANULOCYTE: 0.05 K/UL (ref 0–0.3)
ABSOLUTE LYMPH #: 0.81 K/UL (ref 1.1–3.7)
ABSOLUTE MONO #: 0.89 K/UL (ref 0.1–1.2)
ANION GAP SERPL CALCULATED.3IONS-SCNC: 12 MMOL/L (ref 9–17)
BASOPHILS # BLD: 0 % (ref 0–2)
BASOPHILS ABSOLUTE: 0.03 K/UL (ref 0–0.2)
BNP INTERPRETATION: NORMAL
BUN BLDV-MCNC: 16 MG/DL (ref 6–20)
BUN/CREAT BLD: 18 (ref 9–20)
CALCIUM SERPL-MCNC: 9.3 MG/DL (ref 8.6–10.4)
CHLORIDE BLD-SCNC: 104 MMOL/L (ref 98–107)
CO2: 24 MMOL/L (ref 20–31)
CREAT SERPL-MCNC: 0.89 MG/DL (ref 0.5–0.9)
DIFFERENTIAL TYPE: ABNORMAL
EOSINOPHILS RELATIVE PERCENT: 1 % (ref 1–4)
GFR AFRICAN AMERICAN: >60 ML/MIN
GFR NON-AFRICAN AMERICAN: >60 ML/MIN
GFR SERPL CREATININE-BSD FRML MDRD: ABNORMAL ML/MIN/{1.73_M2}
GFR SERPL CREATININE-BSD FRML MDRD: ABNORMAL ML/MIN/{1.73_M2}
GLUCOSE BLD-MCNC: 97 MG/DL (ref 70–99)
HCT VFR BLD CALC: 42.4 % (ref 36.3–47.1)
HEMOGLOBIN: 13.5 G/DL (ref 11.9–15.1)
IMMATURE GRANULOCYTES: 1 %
LACTIC ACID: 1.4 MMOL/L (ref 0.5–2.2)
LYMPHOCYTES # BLD: 9 % (ref 24–43)
MAGNESIUM: 2 MG/DL (ref 1.6–2.6)
MCH RBC QN AUTO: 26.8 PG (ref 25.2–33.5)
MCHC RBC AUTO-ENTMCNC: 31.8 G/DL (ref 28.4–34.8)
MCV RBC AUTO: 84.3 FL (ref 82.6–102.9)
MONOCYTES # BLD: 10 % (ref 3–12)
NRBC AUTOMATED: 0 PER 100 WBC
PDW BLD-RTO: 14.6 % (ref 11.8–14.4)
PLATELET # BLD: 194 K/UL (ref 138–453)
PLATELET ESTIMATE: ABNORMAL
PMV BLD AUTO: 11.2 FL (ref 8.1–13.5)
POTASSIUM SERPL-SCNC: 3.4 MMOL/L (ref 3.7–5.3)
PRO-BNP: 50 PG/ML
RBC # BLD: 5.03 M/UL (ref 3.95–5.11)
RBC # BLD: ABNORMAL 10*6/UL
SEG NEUTROPHILS: 79 % (ref 36–65)
SEGMENTED NEUTROPHILS ABSOLUTE COUNT: 6.87 K/UL (ref 1.5–8.1)
SODIUM BLD-SCNC: 140 MMOL/L (ref 135–144)
TROPONIN INTERP: NORMAL
TROPONIN T: NORMAL NG/ML
TROPONIN, HIGH SENSITIVITY: 11 NG/L (ref 0–14)
WBC # BLD: 8.7 K/UL (ref 3.5–11.3)
WBC # BLD: ABNORMAL 10*3/UL

## 2020-08-12 PROCEDURE — 93005 ELECTROCARDIOGRAM TRACING: CPT | Performed by: EMERGENCY MEDICINE

## 2020-08-12 PROCEDURE — 99283 EMERGENCY DEPT VISIT LOW MDM: CPT

## 2020-08-12 PROCEDURE — 85025 COMPLETE CBC W/AUTO DIFF WBC: CPT

## 2020-08-12 PROCEDURE — 71046 X-RAY EXAM CHEST 2 VIEWS: CPT

## 2020-08-12 PROCEDURE — 83880 ASSAY OF NATRIURETIC PEPTIDE: CPT

## 2020-08-12 PROCEDURE — 80048 BASIC METABOLIC PNL TOTAL CA: CPT

## 2020-08-12 PROCEDURE — 6370000000 HC RX 637 (ALT 250 FOR IP): Performed by: EMERGENCY MEDICINE

## 2020-08-12 PROCEDURE — 83605 ASSAY OF LACTIC ACID: CPT

## 2020-08-12 PROCEDURE — 84484 ASSAY OF TROPONIN QUANT: CPT

## 2020-08-12 PROCEDURE — 83735 ASSAY OF MAGNESIUM: CPT

## 2020-08-12 RX ORDER — BENZONATATE 100 MG/1
100 CAPSULE ORAL 3 TIMES DAILY PRN
Qty: 30 CAPSULE | Refills: 0 | Status: SHIPPED | OUTPATIENT
Start: 2020-08-12 | End: 2020-08-19

## 2020-08-12 RX ORDER — ACETAMINOPHEN 325 MG/1
650 TABLET ORAL ONCE
Status: COMPLETED | OUTPATIENT
Start: 2020-08-12 | End: 2020-08-12

## 2020-08-12 RX ORDER — BENZONATATE 100 MG/1
100 CAPSULE ORAL ONCE
Status: COMPLETED | OUTPATIENT
Start: 2020-08-12 | End: 2020-08-12

## 2020-08-12 RX ORDER — AZITHROMYCIN 250 MG/1
500 TABLET, FILM COATED ORAL ONCE
Status: COMPLETED | OUTPATIENT
Start: 2020-08-12 | End: 2020-08-12

## 2020-08-12 RX ORDER — AZITHROMYCIN 250 MG/1
250 TABLET, FILM COATED ORAL DAILY
Qty: 4 TABLET | Refills: 0 | Status: SHIPPED | OUTPATIENT
Start: 2020-08-12 | End: 2020-08-16

## 2020-08-12 RX ADMIN — ACETAMINOPHEN 650 MG: 325 TABLET ORAL at 15:22

## 2020-08-12 RX ADMIN — AZITHROMYCIN MONOHYDRATE 500 MG: 250 TABLET ORAL at 16:49

## 2020-08-12 RX ADMIN — BENZONATATE 100 MG: 100 CAPSULE ORAL at 15:22

## 2020-08-12 ASSESSMENT — PAIN SCALES - GENERAL
PAINLEVEL_OUTOF10: 5
PAINLEVEL_OUTOF10: 5
PAINLEVEL_OUTOF10: 4

## 2020-08-12 ASSESSMENT — ENCOUNTER SYMPTOMS
SHORTNESS OF BREATH: 1
TROUBLE SWALLOWING: 0
COUGH: 1
ABDOMINAL PAIN: 1
VOMITING: 0

## 2020-08-12 ASSESSMENT — PAIN DESCRIPTION - LOCATION: LOCATION: ABDOMEN

## 2020-08-12 ASSESSMENT — PAIN DESCRIPTION - DESCRIPTORS: DESCRIPTORS: STABBING

## 2020-08-12 NOTE — ED PROVIDER NOTES
EMERGENCY DEPARTMENT ENCOUNTER    Pt Name: Rowdy Bartlett  MRN: [id number]  [place] 1965  Date of evaluation: 8/12/20  CHIEF COMPLAINT       Chief Complaint   Patient presents with    Cough     HISTORY OF PRESENT ILLNESS   Patient is a 59-year-old female here with cough and shortness of breath. She has history of asthma COPD Crohn's disease hypertension. She states she is had a cough for the past 3 days it is now productive of yellow sputum and chills and myalgias. States she has mild headache not sudden onset not the worst of her life no neck stiffness. No fevers no vomiting, states she normally has loose stools no dark or bloody stools. Denies history of PE DVT recent surgeries leg swelling hemoptysis. States she has been eating and drinking well but feels dehydrated and generalized weakness and feels like she has pneumonia like she has had in the past.  Denies chest pain. Complaining of mild upper abdominal pain. REVIEW OF SYSTEMS     Review of Systems   Constitutional: Positive for chills and fatigue. Negative for fever. HENT: Negative for trouble swallowing. Eyes: Negative for visual disturbance. Respiratory: Positive for cough and shortness of breath. Cardiovascular: Negative for chest pain, palpitations and leg swelling. Gastrointestinal: Positive for abdominal pain. Negative for vomiting. Genitourinary: Negative for difficulty urinating. Musculoskeletal: Negative for neck pain. Skin: Negative for rash. Neurological: Positive for weakness. Negative for dizziness and headaches. Psychiatric/Behavioral: Negative for confusion.      PASTMEDICAL HISTORY     Past Medical History:   Diagnosis Date    Acid reflux     Anxiety     Asthma     Bipolar 1 disorder (HCC)     Bowel obstruction (HCC)     COPD (chronic obstructive pulmonary disease) (HCC)     Crohn disease (HCC)     Depression     Gout     Hypertension     Hypothyroidism      SURGICAL HISTORY       Past Surgical History:   Procedure Laterality Date    ABDOMEN SURGERY      BUNIONECTOMY Left     CHOLECYSTECTOMY      COLONOSCOPY  04/30/2007    no gross pathology seen in the colon and also 3-4 inches of the ileum    COLONOSCOPY  10/12/2017    normal    TONSILLECTOMY AND ADENOIDECTOMY      TUBAL LIGATION       CURRENT MEDICATIONS       Discharge Medication List as of 8/12/2020  4:44 PM      CONTINUE these medications which have NOT CHANGED    Details   pregabalin (LYRICA) 300 MG capsule Take 1 capsule by mouth 2 times daily for 14 days. , Disp-28 capsule, R-0Normal      FLUoxetine (PROZAC) 20 MG capsule Take 3 capsules by mouth Daily, Disp-42 capsule, R-0Normal      traZODone (DESYREL) 300 MG tablet Take 1 tablet by mouth nightly, Disp-14 tablet, R-0Normal      cholestyramine light 4 g packet Take 1 packet by mouth 3 times daily, Disp-42 packet, R-0Normal      lisinopril (PRINIVIL;ZESTRIL) 40 MG tablet Take 40 mg by mouth dailyHistorical Med      OXcarbazepine (TRILEPTAL) 600 MG tablet Take 600 mg by mouth 2 times dailyHistorical Med      predniSONE (DELTASONE) 50 MG tablet Take 1 tablet by mouth daily, Disp-4 tablet, R-0Normal      cyclobenzaprine (FLEXERIL) 10 MG tablet Take 1 tablet by mouth 3 times daily as needed for Muscle spasms, Disp-10 tablet, R-0Print      Topiramate (TOPAMAX PO) Take 150 mg by mouth 2 times daily Historical Med      SUMAtriptan (IMITREX) 100 MG tablet Take 100 mg by mouth once as needed for MigraineHistorical Med      ipratropium-albuterol (DUONEB) 0.5-2.5 (3) MG/3ML SOLN nebulizer solution Inhale 3 mLs into the lungs 4 times daily, Disp-360 mL, R-3      mometasone-formoterol (DULERA) 200-5 MCG/ACT inhaler Inhale 2 puffs into the lungs every 12 hours, Disp-1 Inhaler, R-3      gabapentin (NEURONTIN) 100 MG capsule Take 800 mg by mouth 3 times daily. Historical Med      pravastatin (PRAVACHOL) 40 MG tablet Take 40 mg by mouth daily. OMEPRAZOLE PO Take 40 mg by mouth 2 times daily. Levothyroxine Sodium (LEVOTHROID PO) Take 50 mcg by mouth Historical Med      ATENOLOL PO Take 25 mg by mouth daily. ALLERGIES     is allergic to penicillins; seasonal; and tape [adhesive tape]. FAMILY HISTORY     She indicated that her mother is . She indicated that her father is . SOCIAL HISTORY       Social History     Tobacco Use    Smoking status: Current Every Day Smoker     Packs/day: 0.50     Years: 37.00     Pack years: 18.50     Types: Cigarettes    Smokeless tobacco: Never Used    Tobacco comment: has not smoked in the last 3 days   Substance Use Topics    Alcohol use: Yes     Comment: socially    Drug use: Not Currently     Frequency: 1.0 times per week     Types: Marijuana     PHYSICAL EXAM     INITIAL VITALS: BP (!) 142/78   Pulse 74   Temp 98.4 °F (36.9 °C) (Oral)   Resp 16   Ht 5' 7\" (1.702 m)   Wt 179 lb (81.2 kg)   SpO2 97%   BMI 28.04 kg/m²    Physical Exam  Vitals signs and nursing note reviewed. Constitutional:       General: She is not in acute distress. Appearance: She is ill-appearing. She is not toxic-appearing or diaphoretic. HENT:      Head: Normocephalic and atraumatic. Mouth/Throat:      Mouth: Mucous membranes are moist.      Pharynx: Oropharynx is clear. Eyes:      General: No scleral icterus. Extraocular Movements: Extraocular movements intact. Pupils: Pupils are equal, round, and reactive to light. Neck:      Musculoskeletal: Normal range of motion and neck supple. No neck rigidity. Cardiovascular:      Rate and Rhythm: Normal rate and regular rhythm. Pulses: Normal pulses. Heart sounds: No murmur. No friction rub. No gallop. Pulmonary:      Effort: Pulmonary effort is normal. No respiratory distress. Breath sounds: Wheezing (mild scattered) present. No rales. Abdominal:      General: There is no distension. Palpations: Abdomen is soft. There is no mass or pulsatile mass. Tenderness: There is no abdominal tenderness. There is no guarding or rebound. Hernia: No hernia is present. Musculoskeletal: Normal range of motion. General: No deformity. Right lower leg: No edema. Left lower leg: No edema. Comments: No calf tenderness or asymmetry   Skin:     General: Skin is warm and dry. Capillary Refill: Capillary refill takes less than 2 seconds. Findings: No rash. Neurological:      General: No focal deficit present. Mental Status: She is alert and oriented to person, place, and time. Psychiatric:         Thought Content: Thought content normal.         MEDICAL DECISION MAKING:          Please see ED Course below for MDM/ED course. DDx: Pneumonia, viral infection, CHF, PE    All patient's question's and concerns were answered prior to disposition and patient and/or family expressed understanding and agreement of treatment plan. NIH STROKE SCALE:            PROCEDURES:    Procedures    DIAGNOSTIC RESULTS   EKG:All EKG's are interpreted by the Emergency Department Physician who either signs or Co-signs this chart in the absence of a cardiologist.    EKG Interpretation    Interpreted by me    Rhythm: normal sinus   Rate: normal  Axis: normal  Ectopy: none  Conduction:   Nonspecific ST T wave changes  Q Waves: Inferior leads, inferior infarct age undetermined    Clinical Impression: Abnormal EKG; when compared to prior on 2/17/2020 patient did have QTC that was longer than a 504 and did have Q waves in inferior leads, no significant changes    RADIOLOGY:All plain film, CT, MRI, and formal ultrasound images (except ED bedside ultrasound) are read by the radiologist, see reports below, unless otherwisenoted in MDM or here. XR CHEST (2 VW)   Final Result   No acute cardiopulmonary process. LABS: All lab results were reviewed by myself, and all abnormals are listed below.   Labs Reviewed   CBC WITH AUTO DIFFERENTIAL - (TESSALON) capsule 100 mg    azithromycin (ZITHROMAX) tablet 500 mg    benzonatate (TESSALON PERLES) 100 MG capsule     Sig: Take 1 capsule by mouth 3 times daily as needed for Cough     Dispense:  30 capsule     Refill:  0    azithromycin (ZITHROMAX) 250 MG tablet     Sig: Take 1 tablet by mouth daily for 4 days     Dispense:  4 tablet     Refill:  0     CONSULTS:  None    FINAL IMPRESSION      1. Cough          DISPOSITION/PLAN   DISPOSITION  Decision to discharge      PATIENT REFERRED TO:  Radha Springer, [address]  [phone]    Schedule an appointment as soon as possible for a visit       [facility] ED  [address]  [phone]    If symptoms worsen    DISCHARGE MEDICATIONS:  Discharge Medication List as of 8/12/2020  4:44 PM      START taking these medications    Details   benzonatate (TESSALON PERLES) 100 MG capsule Take 1 capsule by mouth 3 times daily as needed for Cough, Disp-30 capsule,R-0Print      azithromycin (ZITHROMAX) 250 MG tablet Take 1 tablet by mouth daily for 4 days, Disp-4 tablet,R-0Print           Kathy Chopra MD  Attending Emergency Physician    This note was created with the assistance of a speech-recognition program. While intending to generate a document that actually reflects the content of the visit, no guarantees can be provided that every mistake has been identified and corrected by editing.                    Kathy Chopra MD  08/12/20 [id number]

## 2020-08-13 ENCOUNTER — CARE COORDINATION (OUTPATIENT)
Dept: CARE COORDINATION | Age: 55
End: 2020-08-13

## 2020-08-13 LAB
EKG ATRIAL RATE: 74 BPM
EKG P AXIS: 62 DEGREES
EKG P-R INTERVAL: 150 MS
EKG Q-T INTERVAL: 426 MS
EKG QRS DURATION: 90 MS
EKG QTC CALCULATION (BAZETT): 472 MS
EKG R AXIS: 78 DEGREES
EKG T AXIS: 58 DEGREES
EKG VENTRICULAR RATE: 74 BPM

## 2020-08-13 PROCEDURE — 93010 ELECTROCARDIOGRAM REPORT: CPT | Performed by: INTERNAL MEDICINE

## 2020-08-13 NOTE — CARE COORDINATION
Patient contacted regarding recent discharge and COVID-19 risk. Discussed COVID-19 related testing which was not done at this time. Test results were not done. Patient informed of results, if available? [facility] Transition Nurse/ Ambulatory Care Manager contacted the patient by telephone to perform post discharge assessment. Verified name and  with patient as identifiers. Patient has following risk factors of: COPD. CTN/ACM reviewed discharge instructions, medical action plan and red flags related to discharge diagnosis. Reviewed and educated them on any new and changed medications related to discharge diagnosis. Advised obtaining a 90-day supply of all daily and as-needed medications. Education provided regarding infection prevention, and signs and symptoms of COVID-19 and when to seek medical attention with patient who verbalized understanding. Discussed exposure protocols and quarantine from [address] you at higher risk for severe illness  and given an opportunity for questions and concerns. The patient agrees to contact the COVID-19 hotline [phone] or PCP office for questions related to their healthcare. CTN/ACM provided contact information for future reference. From CDC: Are you at higher risk for severe illness?  Wash your hands often.  Avoid close contact (6 feet, which is about two arm lengths) with people who are sick.  Put distance between yourself and other people if COVID-19 is spreading in your community.  Clean and disinfect frequently touched surfaces.  Avoid all cruise travel and non-essential air travel.  Call your healthcare professional if you have concerns about COVID-19 and your underlying condition or if you are sick. For more information on steps you can take to protect yourself, see CDC's How to [place] for follow-up call in 7-14 days based on severity of symptoms and risk factors. ED visit for cough.  Also had chills, headache, dyspnea and wheezing. She's feeling a little better today. Taking the Z ramon and Tessalon perles prescribed. She will call her PCP and pulmonologist today to get an appt, declined my offer to call. Next pulm appt 9/2/20. She wears oxygen PRN, has a nebulizer. Tested negative for COVID on 7/25/20 at [facility]. She is taking COVID precautions, knows she's high risk for severe illness if she contracted it.

## 2020-08-27 ENCOUNTER — CARE COORDINATION (OUTPATIENT)
Dept: CARE COORDINATION | Age: 55
End: 2020-08-27

## 2020-08-27 NOTE — CARE COORDINATION
You Patient resolved from the Care Transitions episode on 8/27/20  Discussed COVID-19 related testing which was not done at this time. Test results were not done. Patient informed of results, if available? NA    Patient/family has been provided the following resources and education related to COVID-19:                         Signs, symptoms and red flags related to COVID-19            [facility] exposure and quarantine guidelines            [facility] exposure contact - [phone]            Contact for their local Department of Health                 Patient currently reports that the following symptoms have improved:  wheezing, headache, cough, shortness of breath, chills or shaking and no new/worsening symptoms. She is feeling better, breathing is better, still has a cough but not as much. Has PCP appt today and will see pulmonologist next week. No further outreach scheduled with this CTN/ACM. Episode of Care resolved. Patient has this CTN/ACM contact information if future needs arise.

## 2020-12-27 ENCOUNTER — HOSPITAL ENCOUNTER (INPATIENT)
Age: 55
LOS: 2 days | Discharge: HOME | DRG: 249 | End: 2020-12-29
Attending: EMERGENCY MEDICINE | Admitting: INTERNAL MEDICINE
Payer: MEDICARE

## 2020-12-27 ENCOUNTER — APPOINTMENT (OUTPATIENT)
Dept: CT IMAGING | Age: 55
DRG: 249 | End: 2020-12-27
Payer: MEDICARE

## 2020-12-27 PROBLEM — K52.9 COLITIS: Status: ACTIVE | Noted: 2020-12-27

## 2020-12-27 PROBLEM — J44.9 COPD (CHRONIC OBSTRUCTIVE PULMONARY DISEASE) (HCC): Status: ACTIVE | Noted: 2017-02-05

## 2020-12-27 LAB
ABSOLUTE EOS #: 0.46 K/UL (ref 0–0.44)
ABSOLUTE IMMATURE GRANULOCYTE: 0.05 K/UL (ref 0–0.3)
ABSOLUTE LYMPH #: 2.18 K/UL (ref 1.1–3.7)
ABSOLUTE MONO #: 0.81 K/UL (ref 0.1–1.2)
ALBUMIN SERPL-MCNC: 3.7 G/DL (ref 3.5–5.2)
ALBUMIN/GLOBULIN RATIO: ABNORMAL (ref 1–2.5)
ALP BLD-CCNC: 100 U/L (ref 35–104)
ALT SERPL-CCNC: 9 U/L (ref 5–33)
AMYLASE: 30 U/L (ref 28–100)
ANION GAP SERPL CALCULATED.3IONS-SCNC: 13 MMOL/L (ref 9–17)
AST SERPL-CCNC: 10 U/L
BASOPHILS # BLD: 1 % (ref 0–2)
BASOPHILS ABSOLUTE: 0.07 K/UL (ref 0–0.2)
BILIRUB SERPL-MCNC: 0.52 MG/DL (ref 0.3–1.2)
BILIRUBIN DIRECT: 0.15 MG/DL
BILIRUBIN, INDIRECT: 0.37 MG/DL (ref 0–1)
BILIRUBIN, POC: NEGATIVE
BLOOD URINE, POC: NORMAL
BUN BLDV-MCNC: 7 MG/DL (ref 6–20)
BUN/CREAT BLD: 8 (ref 9–20)
CALCIUM SERPL-MCNC: 8.5 MG/DL (ref 8.6–10.4)
CHLORIDE BLD-SCNC: 102 MMOL/L (ref 98–107)
CHP ED QC CHECK: NORMAL
CLARITY, POC: NORMAL
CO2: 23 MMOL/L (ref 20–31)
COLOR, POC: YELLOW
CREAT SERPL-MCNC: 0.87 MG/DL (ref 0.5–0.9)
DIFFERENTIAL TYPE: ABNORMAL
EOSINOPHILS RELATIVE PERCENT: 4 % (ref 1–4)
GFR AFRICAN AMERICAN: >60 ML/MIN
GFR NON-AFRICAN AMERICAN: >60 ML/MIN
GFR SERPL CREATININE-BSD FRML MDRD: ABNORMAL ML/MIN/{1.73_M2}
GFR SERPL CREATININE-BSD FRML MDRD: ABNORMAL ML/MIN/{1.73_M2}
GLOBULIN: ABNORMAL G/DL (ref 1.5–3.8)
GLUCOSE BLD-MCNC: 133 MG/DL (ref 70–99)
GLUCOSE URINE, POC: NEGATIVE
HCT VFR BLD CALC: 37.7 % (ref 36.3–47.1)
HEMOGLOBIN: 12.1 G/DL (ref 11.9–15.1)
IMMATURE GRANULOCYTES: 0 %
KETONES, POC: NEGATIVE
LEUKOCYTE EST, POC: NEGATIVE
LIPASE: 46 U/L (ref 13–60)
LYMPHOCYTES # BLD: 17 % (ref 24–43)
MCH RBC QN AUTO: 27.8 PG (ref 25.2–33.5)
MCHC RBC AUTO-ENTMCNC: 32.1 G/DL (ref 28.4–34.8)
MCV RBC AUTO: 86.5 FL (ref 82.6–102.9)
MONOCYTES # BLD: 7 % (ref 3–12)
NITRITE, POC: NEGATIVE
NRBC AUTOMATED: 0 PER 100 WBC
PDW BLD-RTO: 15.8 % (ref 11.8–14.4)
PH, POC: 7
PLATELET # BLD: 250 K/UL (ref 138–453)
PLATELET ESTIMATE: ABNORMAL
PMV BLD AUTO: 10.4 FL (ref 8.1–13.5)
POTASSIUM SERPL-SCNC: 3.2 MMOL/L (ref 3.7–5.3)
POTASSIUM SERPL-SCNC: 4 MMOL/L (ref 3.7–5.3)
PROTEIN, POC: NEGATIVE
RBC # BLD: 4.36 M/UL (ref 3.95–5.11)
RBC # BLD: ABNORMAL 10*6/UL
SEG NEUTROPHILS: 71 % (ref 36–65)
SEGMENTED NEUTROPHILS ABSOLUTE COUNT: 8.94 K/UL (ref 1.5–8.1)
SODIUM BLD-SCNC: 138 MMOL/L (ref 135–144)
SPECIFIC GRAVITY, POC: 1.01
TOTAL PROTEIN: 6.3 G/DL (ref 6.4–8.3)
UROBILINOGEN, POC: 0.2
WBC # BLD: 12.5 K/UL (ref 3.5–11.3)
WBC # BLD: ABNORMAL 10*3/UL

## 2020-12-27 PROCEDURE — 6360000002 HC RX W HCPCS: Performed by: NURSE PRACTITIONER

## 2020-12-27 PROCEDURE — 2580000003 HC RX 258: Performed by: EMERGENCY MEDICINE

## 2020-12-27 PROCEDURE — 94640 AIRWAY INHALATION TREATMENT: CPT

## 2020-12-27 PROCEDURE — 99221 1ST HOSP IP/OBS SF/LOW 40: CPT | Performed by: NURSE PRACTITIONER

## 2020-12-27 PROCEDURE — 2580000003 HC RX 258: Performed by: NURSE PRACTITIONER

## 2020-12-27 PROCEDURE — 81003 URINALYSIS AUTO W/O SCOPE: CPT

## 2020-12-27 PROCEDURE — 99283 EMERGENCY DEPT VISIT LOW MDM: CPT

## 2020-12-27 PROCEDURE — 96375 TX/PRO/DX INJ NEW DRUG ADDON: CPT

## 2020-12-27 PROCEDURE — 85025 COMPLETE CBC W/AUTO DIFF WBC: CPT

## 2020-12-27 PROCEDURE — 80076 HEPATIC FUNCTION PANEL: CPT

## 2020-12-27 PROCEDURE — 6370000000 HC RX 637 (ALT 250 FOR IP): Performed by: NURSE PRACTITIONER

## 2020-12-27 PROCEDURE — 96374 THER/PROPH/DIAG INJ IV PUSH: CPT

## 2020-12-27 PROCEDURE — 74177 CT ABD & PELVIS W/CONTRAST: CPT

## 2020-12-27 PROCEDURE — 83690 ASSAY OF LIPASE: CPT

## 2020-12-27 PROCEDURE — 80048 BASIC METABOLIC PNL TOTAL CA: CPT

## 2020-12-27 PROCEDURE — 82150 ASSAY OF AMYLASE: CPT

## 2020-12-27 PROCEDURE — 2500000003 HC RX 250 WO HCPCS: Performed by: EMERGENCY MEDICINE

## 2020-12-27 PROCEDURE — 36415 COLL VENOUS BLD VENIPUNCTURE: CPT

## 2020-12-27 PROCEDURE — 1200000000 HC SEMI PRIVATE

## 2020-12-27 PROCEDURE — 2500000003 HC RX 250 WO HCPCS: Performed by: NURSE PRACTITIONER

## 2020-12-27 PROCEDURE — 6360000002 HC RX W HCPCS: Performed by: EMERGENCY MEDICINE

## 2020-12-27 PROCEDURE — 84132 ASSAY OF SERUM POTASSIUM: CPT

## 2020-12-27 PROCEDURE — 6360000004 HC RX CONTRAST MEDICATION: Performed by: EMERGENCY MEDICINE

## 2020-12-27 RX ORDER — ONDANSETRON 2 MG/ML
4 INJECTION INTRAMUSCULAR; INTRAVENOUS ONCE
Status: COMPLETED | OUTPATIENT
Start: 2020-12-27 | End: 2020-12-27

## 2020-12-27 RX ORDER — ONDANSETRON 2 MG/ML
4 INJECTION INTRAMUSCULAR; INTRAVENOUS EVERY 6 HOURS PRN
Status: DISCONTINUED | OUTPATIENT
Start: 2020-12-27 | End: 2020-12-29 | Stop reason: HOSPADM

## 2020-12-27 RX ORDER — CIPROFLOXACIN 2 MG/ML
400 INJECTION, SOLUTION INTRAVENOUS ONCE
Status: COMPLETED | OUTPATIENT
Start: 2020-12-27 | End: 2020-12-27

## 2020-12-27 RX ORDER — POTASSIUM CHLORIDE 7.45 MG/ML
10 INJECTION INTRAVENOUS PRN
Status: DISCONTINUED | OUTPATIENT
Start: 2020-12-27 | End: 2020-12-29 | Stop reason: HOSPADM

## 2020-12-27 RX ORDER — POLYETHYLENE GLYCOL 3350 17 G/17G
17 POWDER, FOR SOLUTION ORAL DAILY PRN
Status: DISCONTINUED | OUTPATIENT
Start: 2020-12-27 | End: 2020-12-29 | Stop reason: HOSPADM

## 2020-12-27 RX ORDER — PROMETHAZINE HYDROCHLORIDE 12.5 MG/1
12.5 TABLET ORAL EVERY 6 HOURS PRN
Status: DISCONTINUED | OUTPATIENT
Start: 2020-12-27 | End: 2020-12-29 | Stop reason: HOSPADM

## 2020-12-27 RX ORDER — ALBUTEROL SULFATE 90 UG/1
2 AEROSOL, METERED RESPIRATORY (INHALATION) EVERY 6 HOURS PRN
Status: DISCONTINUED | OUTPATIENT
Start: 2020-12-27 | End: 2020-12-29 | Stop reason: HOSPADM

## 2020-12-27 RX ORDER — MORPHINE SULFATE 4 MG/ML
4 INJECTION, SOLUTION INTRAMUSCULAR; INTRAVENOUS ONCE
Status: COMPLETED | OUTPATIENT
Start: 2020-12-27 | End: 2020-12-27

## 2020-12-27 RX ORDER — SODIUM CHLORIDE 9 MG/ML
INJECTION, SOLUTION INTRAVENOUS CONTINUOUS
Status: DISCONTINUED | OUTPATIENT
Start: 2020-12-27 | End: 2020-12-27

## 2020-12-27 RX ORDER — MAGNESIUM SULFATE 1 G/100ML
1 INJECTION INTRAVENOUS PRN
Status: DISCONTINUED | OUTPATIENT
Start: 2020-12-27 | End: 2020-12-29 | Stop reason: HOSPADM

## 2020-12-27 RX ORDER — SODIUM CHLORIDE 0.9 % (FLUSH) 0.9 %
10 SYRINGE (ML) INJECTION PRN
Status: DISCONTINUED | OUTPATIENT
Start: 2020-12-27 | End: 2020-12-27 | Stop reason: SDUPTHER

## 2020-12-27 RX ORDER — DEXTROSE, SODIUM CHLORIDE, AND POTASSIUM CHLORIDE 5; .45; .15 G/100ML; G/100ML; G/100ML
INJECTION INTRAVENOUS CONTINUOUS
Status: DISCONTINUED | OUTPATIENT
Start: 2020-12-27 | End: 2020-12-29 | Stop reason: HOSPADM

## 2020-12-27 RX ORDER — IPRATROPIUM BROMIDE AND ALBUTEROL SULFATE 2.5; .5 MG/3ML; MG/3ML
3 SOLUTION RESPIRATORY (INHALATION) 4 TIMES DAILY
Status: DISCONTINUED | OUTPATIENT
Start: 2020-12-27 | End: 2020-12-27

## 2020-12-27 RX ORDER — SODIUM CHLORIDE 0.9 % (FLUSH) 0.9 %
10 SYRINGE (ML) INJECTION PRN
Status: DISCONTINUED | OUTPATIENT
Start: 2020-12-27 | End: 2020-12-29 | Stop reason: HOSPADM

## 2020-12-27 RX ORDER — ACETAMINOPHEN 650 MG/1
650 SUPPOSITORY RECTAL EVERY 6 HOURS PRN
Status: DISCONTINUED | OUTPATIENT
Start: 2020-12-27 | End: 2020-12-29 | Stop reason: HOSPADM

## 2020-12-27 RX ORDER — MORPHINE SULFATE 4 MG/ML
4 INJECTION, SOLUTION INTRAMUSCULAR; INTRAVENOUS
Status: DISCONTINUED | OUTPATIENT
Start: 2020-12-27 | End: 2020-12-28

## 2020-12-27 RX ORDER — 0.9 % SODIUM CHLORIDE 0.9 %
80 INTRAVENOUS SOLUTION INTRAVENOUS ONCE
Status: COMPLETED | OUTPATIENT
Start: 2020-12-27 | End: 2020-12-27

## 2020-12-27 RX ORDER — ACETAMINOPHEN 325 MG/1
650 TABLET ORAL EVERY 6 HOURS PRN
Status: DISCONTINUED | OUTPATIENT
Start: 2020-12-27 | End: 2020-12-29 | Stop reason: HOSPADM

## 2020-12-27 RX ORDER — BUDESONIDE AND FORMOTEROL FUMARATE DIHYDRATE 160; 4.5 UG/1; UG/1
2 AEROSOL RESPIRATORY (INHALATION) 2 TIMES DAILY
Status: DISCONTINUED | OUTPATIENT
Start: 2020-12-27 | End: 2020-12-29 | Stop reason: HOSPADM

## 2020-12-27 RX ORDER — CIPROFLOXACIN 2 MG/ML
400 INJECTION, SOLUTION INTRAVENOUS EVERY 12 HOURS
Status: DISCONTINUED | OUTPATIENT
Start: 2020-12-27 | End: 2020-12-29 | Stop reason: HOSPADM

## 2020-12-27 RX ORDER — NICOTINE 21 MG/24HR
1 PATCH, TRANSDERMAL 24 HOURS TRANSDERMAL DAILY PRN
Status: DISCONTINUED | OUTPATIENT
Start: 2020-12-27 | End: 2020-12-29 | Stop reason: HOSPADM

## 2020-12-27 RX ORDER — MORPHINE SULFATE 2 MG/ML
2 INJECTION, SOLUTION INTRAMUSCULAR; INTRAVENOUS
Status: DISCONTINUED | OUTPATIENT
Start: 2020-12-27 | End: 2020-12-28

## 2020-12-27 RX ORDER — SODIUM CHLORIDE 0.9 % (FLUSH) 0.9 %
10 SYRINGE (ML) INJECTION EVERY 12 HOURS SCHEDULED
Status: DISCONTINUED | OUTPATIENT
Start: 2020-12-27 | End: 2020-12-29 | Stop reason: HOSPADM

## 2020-12-27 RX ORDER — POTASSIUM CHLORIDE 20 MEQ/1
40 TABLET, EXTENDED RELEASE ORAL PRN
Status: DISCONTINUED | OUTPATIENT
Start: 2020-12-27 | End: 2020-12-29 | Stop reason: HOSPADM

## 2020-12-27 RX ADMIN — Medication 10 ML: at 19:44

## 2020-12-27 RX ADMIN — ENOXAPARIN SODIUM 40 MG: 100 INJECTION SUBCUTANEOUS at 09:06

## 2020-12-27 RX ADMIN — POTASSIUM CHLORIDE 10 MEQ: 7.46 INJECTION, SOLUTION INTRAVENOUS at 10:38

## 2020-12-27 RX ADMIN — MORPHINE SULFATE 4 MG: 4 INJECTION, SOLUTION INTRAMUSCULAR; INTRAVENOUS at 11:13

## 2020-12-27 RX ADMIN — ONDANSETRON 4 MG: 2 INJECTION INTRAMUSCULAR; INTRAVENOUS at 11:06

## 2020-12-27 RX ADMIN — MORPHINE SULFATE 4 MG: 4 INJECTION, SOLUTION INTRAMUSCULAR; INTRAVENOUS at 16:34

## 2020-12-27 RX ADMIN — SODIUM CHLORIDE 80 ML: 9 INJECTION, SOLUTION INTRAVENOUS at 01:46

## 2020-12-27 RX ADMIN — MORPHINE SULFATE 4 MG: 4 INJECTION, SOLUTION INTRAMUSCULAR; INTRAVENOUS at 13:45

## 2020-12-27 RX ADMIN — SODIUM CHLORIDE: 9 INJECTION, SOLUTION INTRAVENOUS at 01:30

## 2020-12-27 RX ADMIN — MORPHINE SULFATE 2 MG: 2 INJECTION, SOLUTION INTRAMUSCULAR; INTRAVENOUS at 09:06

## 2020-12-27 RX ADMIN — BUDESONIDE AND FORMOTEROL FUMARATE DIHYDRATE 2 PUFF: 160; 4.5 AEROSOL RESPIRATORY (INHALATION) at 20:02

## 2020-12-27 RX ADMIN — CIPROFLOXACIN 400 MG: 2 INJECTION, SOLUTION INTRAVENOUS at 05:21

## 2020-12-27 RX ADMIN — FAMOTIDINE 20 MG: 10 INJECTION, SOLUTION INTRAVENOUS at 19:43

## 2020-12-27 RX ADMIN — POTASSIUM CHLORIDE 10 MEQ: 7.46 INJECTION, SOLUTION INTRAVENOUS at 13:45

## 2020-12-27 RX ADMIN — FAMOTIDINE 20 MG: 10 INJECTION, SOLUTION INTRAVENOUS at 09:06

## 2020-12-27 RX ADMIN — ONDANSETRON 4 MG: 2 INJECTION INTRAMUSCULAR; INTRAVENOUS at 01:30

## 2020-12-27 RX ADMIN — MORPHINE SULFATE 4 MG: 4 INJECTION, SOLUTION INTRAMUSCULAR; INTRAVENOUS at 01:30

## 2020-12-27 RX ADMIN — METRONIDAZOLE 500 MG: 500 INJECTION, SOLUTION INTRAVENOUS at 03:17

## 2020-12-27 RX ADMIN — POTASSIUM CHLORIDE 10 MEQ: 7.46 INJECTION, SOLUTION INTRAVENOUS at 12:14

## 2020-12-27 RX ADMIN — BUDESONIDE AND FORMOTEROL FUMARATE DIHYDRATE 2 PUFF: 160; 4.5 AEROSOL RESPIRATORY (INHALATION) at 08:44

## 2020-12-27 RX ADMIN — IOPAMIDOL 75 ML: 755 INJECTION, SOLUTION INTRAVENOUS at 01:46

## 2020-12-27 RX ADMIN — MORPHINE SULFATE 4 MG: 4 INJECTION, SOLUTION INTRAMUSCULAR; INTRAVENOUS at 19:43

## 2020-12-27 RX ADMIN — POTASSIUM CHLORIDE 10 MEQ: 7.46 INJECTION, SOLUTION INTRAVENOUS at 09:06

## 2020-12-27 RX ADMIN — MORPHINE SULFATE 4 MG: 4 INJECTION, SOLUTION INTRAMUSCULAR; INTRAVENOUS at 02:50

## 2020-12-27 RX ADMIN — POTASSIUM CHLORIDE, DEXTROSE MONOHYDRATE AND SODIUM CHLORIDE: 150; 5; 450 INJECTION, SOLUTION INTRAVENOUS at 05:20

## 2020-12-27 RX ADMIN — METRONIDAZOLE 500 MG: 500 INJECTION, SOLUTION INTRAVENOUS at 19:42

## 2020-12-27 RX ADMIN — CIPROFLOXACIN 400 MG: 2 INJECTION, SOLUTION INTRAVENOUS at 16:30

## 2020-12-27 RX ADMIN — METRONIDAZOLE 500 MG: 500 INJECTION, SOLUTION INTRAVENOUS at 11:06

## 2020-12-27 RX ADMIN — POTASSIUM CHLORIDE, DEXTROSE MONOHYDRATE AND SODIUM CHLORIDE: 150; 5; 450 INJECTION, SOLUTION INTRAVENOUS at 22:19

## 2020-12-27 RX ADMIN — Medication 10 ML: at 01:46

## 2020-12-27 RX ADMIN — MORPHINE SULFATE 4 MG: 4 INJECTION, SOLUTION INTRAMUSCULAR; INTRAVENOUS at 22:19

## 2020-12-27 SDOH — HEALTH STABILITY: MENTAL HEALTH: HOW MANY STANDARD DRINKS CONTAINING ALCOHOL DO YOU HAVE ON A TYPICAL DAY?: 3 OR 4

## 2020-12-27 SDOH — HEALTH STABILITY: MENTAL HEALTH: HOW OFTEN DO YOU HAVE A DRINK CONTAINING ALCOHOL?: 2-4 TIMES A MONTH

## 2020-12-27 ASSESSMENT — PAIN DESCRIPTION - PAIN TYPE
TYPE: ACUTE PAIN

## 2020-12-27 ASSESSMENT — ENCOUNTER SYMPTOMS
DIARRHEA: 0
CONSTIPATION: 1
STRIDOR: 0
VOMITING: 1
FACIAL SWELLING: 0
WHEEZING: 0
COUGH: 0
BLOOD IN STOOL: 0
SHORTNESS OF BREATH: 0
VOMITING: 0
CONSTIPATION: 0
EYE REDNESS: 0
COLOR CHANGE: 0
ABDOMINAL PAIN: 1
NAUSEA: 1
NAUSEA: 0
EYE DISCHARGE: 0

## 2020-12-27 ASSESSMENT — PAIN DESCRIPTION - FREQUENCY
FREQUENCY: CONTINUOUS

## 2020-12-27 ASSESSMENT — PAIN SCALES - GENERAL
PAINLEVEL_OUTOF10: 8
PAINLEVEL_OUTOF10: 6
PAINLEVEL_OUTOF10: 7
PAINLEVEL_OUTOF10: 6
PAINLEVEL_OUTOF10: 6
PAINLEVEL_OUTOF10: 8
PAINLEVEL_OUTOF10: 4
PAINLEVEL_OUTOF10: 8
PAINLEVEL_OUTOF10: 7
PAINLEVEL_OUTOF10: 4
PAINLEVEL_OUTOF10: 7
PAINLEVEL_OUTOF10: 6
PAINLEVEL_OUTOF10: 7
PAINLEVEL_OUTOF10: 3
PAINLEVEL_OUTOF10: 8
PAINLEVEL_OUTOF10: 4
PAINLEVEL_OUTOF10: 4

## 2020-12-27 ASSESSMENT — PAIN DESCRIPTION - ORIENTATION
ORIENTATION: RIGHT;LEFT;LOWER;UPPER
ORIENTATION: RIGHT;LEFT;LOWER;UPPER
ORIENTATION: RIGHT;LEFT;ANTERIOR;LOWER;UPPER
ORIENTATION: RIGHT;LEFT;ANTERIOR;LOWER;UPPER
ORIENTATION: RIGHT;LEFT;LOWER;UPPER

## 2020-12-27 ASSESSMENT — PAIN DESCRIPTION - LOCATION
LOCATION: ABDOMEN

## 2020-12-27 ASSESSMENT — PAIN DESCRIPTION - DESCRIPTORS
DESCRIPTORS: ACHING

## 2020-12-27 NOTE — PROGRESS NOTES
Pt admitted to PCU assigned room 1023. A/ox4. Ambulated to BR independently. Tele placed. Database and assessment initiated. Oriented to room and call light.

## 2020-12-27 NOTE — H&P
[facility]  Office: [address], DO, Kristofer Cordoba, DO, Juan Mendoza, DO, Nikki Mahoney Michelle, DO, Pan Meredith MD, Rolando Jackson MD, Mark Syed MD, Alta Gill MD, Walter Morgan MD, Beryle Marseille, MD, Ciro Pallas, MD, Joseph Glez MD, Jeri Gallegos MD, Horacio Springer, DO, Carolyn Francis MD, Mindi Saini MD, Flori Moreland DO, Jose Gomes MD,  Carmen Vick DO, Rob Youssef MD, Matilda Diaz MD, Verenice Huang, [facility], [facility] Dominick, [facility], Belinda Coates, CNP, Bartolo Huerta, CNS, Luis Coe, CNP, Padmini Edward, CNP, Peggy Hughes, CNP, Garrett Alonzo, CNP, Chaz Rand, CNP, Veronica Hoffmann PA-C, Mortimer Guardian, [facility], Sammy Pratt, CNP, Lavonne Frankel, CNP, Lux Urbina, CNP, Meri Gant, CNP, Bess Turner, [address]    HISTORY AND PHYSICAL EXAMINATION            Date:   12/27/2020  Patient name:  Joie Neri  Date of admission:  12/27/2020  1:12 AM  MRN:   [id number]  Account:  [de-identified]  YOB: 1965  PCP:    Shannan Dutta MD  Room:   0004/4578-26  Code Status:    Full Code    Chief Complaint:     Chief Complaint   Patient presents with    Abdominal Pain     started x4 days ago    Nausea     x1 episode of emesis yesterday       History Obtained From:     patient    History of Present Illness:     Joie Neri is a 63 y.o. Non-/non  female who presents with Abdominal Pain (started x4 days ago) and Nausea (x1 episode of emesis yesterday)   and is admitted to the hospital for the management of Colitis. Patient presented to the emergency department with complaints of generalized abdominal pain that she has been having for about 4 days. She describes her pain is generalized across her whole abdomen that is continuous and feels like contractions. She reports a history of' bowel obstruction and this pain seems similar.   She reports one episode of emesis yesterday but nothing today. She also reports a history of Crohn's and normally has very loose stool but over the last few days has not had a stool. CT of the abdomen shows infectious or inflammatory colitis. She is afebrile. White count 12.5. She states after dose or 2 of pain medicine she feels a lot better. Totally to start clear liquids at dinner time if she remains nausea/vomiting free but to take it easy. For now we will continue IV hydration, Cipro and Flagyl. Past Medical History:     Past Medical History:   Diagnosis Date    Acid reflux     Anxiety     Asthma     Bipolar 1 disorder (HCC)     Bowel obstruction (HCC)     [facility])     Crohn disease ([facility] [address]     Depression     Gout     Hypertension     Hypothyroidism         Past Surgical History:     Past Surgical History:   Procedure Laterality Date    ABDOMEN SURGERY      BUNIONECTOMY Left     CHOLECYSTECTOMY      COLONOSCOPY  04/30/2007    no gross pathology seen in the colon and also 3-4 inches of the ileum    COLONOSCOPY  10/12/2017    normal    TONSILLECTOMY AND ADENOIDECTOMY      TUBAL LIGATION          Medications Prior to Admission:     Prior to Admission medications    Medication Sig Start Date End Date Taking? Authorizing Provider   pregabalin (LYRICA) 300 MG capsule Take 1 capsule by mouth 2 times daily for 14 days.  2/25/20 12/27/20 Yes SHRADDHA Trivedi CNP   FLUoxetine (PROZAC) 20 MG capsule Take 3 capsules by mouth Daily 2/26/20  Yes SHRADDHA Trivedi CNP   traZODone (DESYREL) 300 MG tablet Take 1 tablet by mouth nightly 2/25/20  Yes SHRADDHA Trivedi CNP   cholestyramine light 4 g packet Take 1 packet by mouth 3 times daily 2/25/20  Yes SHRADDHA Trivedi CNP   lisinopril (PRINIVIL;ZESTRIL) 40 MG tablet Take 40 mg by mouth daily   Yes Historical Provider, MD   OXcarbazepine (TRILEPTAL) 600 MG tablet Take 600 mg by mouth 2 times daily   Yes Historical Provider, MD   Topiramate (TOPAMAX PO) Take 150 mg by mouth 2 times daily    Yes Historical Provider, MD   ipratropium-albuterol (DUONEB) 0.5-2.5 (3) MG/3ML SOLN nebulizer solution Inhale 3 mLs into the lungs 4 times daily 2/6/17  Yes Aguilar Wallace MD   mometasone-formoterol [facility]) 200-5 MCG/ACT inhaler Inhale 2 puffs into the lungs every 12 hours 2/6/17  Yes Aguilar Wallace MD   gabapentin (NEURONTIN) 100 MG capsule Take 800 mg by mouth 3 times daily. Yes Historical Provider, MD   pravastatin (PRAVACHOL) 40 MG tablet Take 40 mg by mouth daily. Yes Historical Provider, MD   OMEPRAZOLE PO Take 40 mg by mouth 2 times daily. Yes Historical Provider, MD   Levothyroxine Sodium (LEVOTHROID PO) Take 50 mcg by mouth    Yes Historical Provider, MD   ATENOLOL PO Take 25 mg by mouth daily. Yes Historical Provider, MD   predniSONE (DELTASONE) 50 MG tablet Take 1 tablet by mouth daily 2/21/20   Angelina Ford MD   cyclobenzaprine (FLEXERIL) 10 MG tablet Take 1 tablet by mouth 3 times daily as needed for Muscle spasms 5/9/18   John Tee MD   SUMAtriptan (IMITREX) 100 MG tablet Take 100 mg by mouth once as needed for Migraine    Historical Provider, MD        Allergies:     Penicillins, Seasonal, and Tape [adhesive tape]    Social History:     Tobacco:    reports that she has been smoking cigarettes. She has a 18.50 pack-year smoking history. She has never used smokeless tobacco.  Alcohol:      reports current alcohol use. Drug Use:  reports current drug use. Frequency: 1.00 time per week. Drug: Marijuana. Family History:     Family History   Problem Relation Age of Onset    Diabetes Father     Lung Cancer Father     Hypertension Mother     Cancer Mother     Crohn's Disease Brother     Heart Disease Brother        Review of Systems:     Positive and Negative as described in HPI. Review of Systems   Constitutional: Positive for appetite change. Negative for chills, diaphoresis and fever. the past 24 hour(s))   Amylase    Collection Time: 12/27/20  1:28 AM   Result Value Ref Range    Amylase 30 28 - 100 U/L   Basic Metabolic Panel    Collection Time: 12/27/20  1:28 AM   Result Value Ref Range    Glucose 133 (H) 70 - 99 mg/dL    BUN 7 6 - 20 mg/dL    CREATININE 0.87 0.50 - 0.90 mg/dL    Bun/Cre Ratio 8 (L) 9 - 20    Calcium 8.5 (L) 8.6 - 10.4 mg/dL    Sodium 138 135 - 144 mmol/L    Potassium 3.2 (L) 3.7 - 5.3 mmol/L    Chloride 102 98 - 107 mmol/L    CO2 23 20 - 31 mmol/L    Anion Gap 13 9 - 17 mmol/L    GFR Non-African American >60 >60 mL/min    GFR African American >60 >60 mL/min    GFR Comment          GFR Staging NOT REPORTED    CBC Auto Differential    Collection Time: 12/27/20  1:28 AM   Result Value Ref Range    WBC 12.5 (H) 3.5 - 11.3 k/uL    RBC 4.36 3.95 - 5.11 m/uL    Hemoglobin 12.1 11.9 - 15.1 g/dL    Hematocrit 37.7 36.3 - 47.1 %    MCV 86.5 82.6 - 102.9 fL    MCH 27.8 25.2 - 33.5 pg    MCHC 32.1 28.4 - 34.8 g/dL    RDW 15.8 (H) 11.8 - 14.4 %    Platelets 357 192 - 846 k/uL    MPV 10.4 8.1 - 13.5 fL    NRBC Automated 0.0 0.0 per 100 WBC    Differential Type NOT REPORTED     Seg Neutrophils 71 (H) 36 - 65 %    Lymphocytes 17 (L) 24 - 43 %    Monocytes 7 3 - 12 %    Eosinophils % 4 1 - 4 %    Basophils 1 0 - 2 %    Immature Granulocytes 0 0 %    Segs Absolute 8.94 (H) 1.50 - 8.10 k/uL    Absolute Lymph # 2.18 1.10 - 3.70 k/uL    Absolute Mono # 0.81 0.10 - 1.20 k/uL    Absolute Eos # 0.46 (H) 0.00 - 0.44 k/uL    Basophils Absolute 0.07 0.00 - 0.20 k/uL    Absolute Immature Granulocyte 0.05 0.00 - 0.30 k/uL    WBC Morphology NOT REPORTED     RBC Morphology ANISOCYTOSIS PRESENT     Platelet Estimate NOT REPORTED    Hepatic Function Panel    Collection Time: 12/27/20  1:28 AM   Result Value Ref Range    Alb 3.7 3.5 - 5.2 g/dL    Alkaline Phosphatase 100 35 - 104 U/L    ALT 9 5 - 33 U/L    AST 10 <32 U/L    Total Bilirubin 0.52 0.3 - 1.2 mg/dL    Bilirubin, Direct 0.15 <0.31 mg/dL Bilirubin, Indirect 0.37 0.00 - 1.00 mg/dL    Total Protein 6.3 (L) 6.4 - 8.3 g/dL    Globulin NOT REPORTED 1.5 - 3.8 g/dL    Albumin/Globulin Ratio NOT REPORTED 1.0 - 2.5   Lipase    Collection Time: 12/27/20  1:28 AM   Result Value Ref Range    Lipase 46 13 - 60 U/L   POCT Urinalysis no Micro    Collection Time: 12/27/20  2:37 AM   Result Value Ref Range    Color, UA yellow     Clarity, UA 1+     Glucose, UA POC negative     Bilirubin, UA negative     Ketones, UA negative     Spec Grav, UA 1.010     pH, UA 7.0     Protein, UA POC negative     Urobilinogen, UA 0.2     Nitrite, UA negative     Leukocytes, UA negative     Blood, UA POC moderate     QC OK? ok        Imaging/Diagnostics:  Ct Abdomen Pelvis W Iv Contrast    Result Date: 12/27/2020  Wall thickening and surrounding inflammatory changes involving the cecum and ascending colon suggestive of developing infectious or inflammatory colitis. No evidence of bowel obstruction. Unremarkable appearance of the appendix. Assessment :      Hospital Problems           Last Modified POA    * (Principal) Colitis 12/27/2020 Yes    Hypothyroidism 12/27/2020 Yes    Gastroesophageal reflux disease without esophagitis 12/27/2020 Yes    Drug abuse ([facility] [address] (Chronic) 12/27/2020 Yes    COPD (chronic obstructive pulmonary disease) ([facility] [address] 12/27/2020 Yes    Hypertension 12/27/2020 Yes    Hypokalemia 12/27/2020 Yes    Anxiety 12/27/2020 Yes          Plan:     Patient status inpatient in the  Med/Surge    Colitis; n.p.o. for now, advance to clear liquids tonight at dinner. Continue Cipro and Flagyl IV. PPI, IVF. Resume home medications when tolerating p.o. intake.   Pain medications and antiemetics as needed    Encourage smoking cessation and nicotine patch available if needed    Monitor and replace electrolytes as needed    Repeat amylase, lipase CBC and CMP in the morning    DVT prophylaxis    Resume home respiratory medications    Monitor vitals and I&O    Consultations:   MICHELA CONSULT TO HOSPITALIST     Patient is admitted as inpatient status because of co-morbidities listed above, severity of signs and symptoms as outlined, requirement for current medical therapies and most importantly because of direct risk to patient if care not provided in a hospital setting. Expected length of stay > 48 hours.     SHRADDHA Robertson CNP  12/27/2020  12:26 PM    Copy sent to Dr. Carmita Restrepo MD

## 2020-12-27 NOTE — ED PROVIDER NOTES
[address] [place] [address] ED  EMERGENCY DEPARTMENT ENCOUNTER      Pt Name: Dolores Camara  MRN: [id number]  [place] 1965  Date of evaluation: 12/27/2020  Provider: Sylvester Glover MD    CHIEF COMPLAINT       Chief Complaint   Patient presents with    Abdominal Pain     started x4 days ago    Nausea     x1 episode of emesis yesterday         HISTORY OF PRESENT ILLNESS  (Location/Symptom, Timing/Onset, Context/Setting, Quality, Duration, Modifying Factors, Severity.)   Dolores Camara is a 63 y.o. female who presents to the emergency department for abdominal pain. It involves her entire abdomen and it started 4 days ago and its continuous. She rates it as an 8 and she is concerned about a possible bowel blockage. She is had 1 before, about 8 years ago and had to have surgery. She vomited yesterday but not today. She had some very loose stool, minimal amount and she states she is not passing gas. Nursing Notes were reviewed. ALLERGIES     Penicillins, Seasonal, and Tape [adhesive tape]    CURRENT MEDICATIONS       Previous Medications    ATENOLOL PO    Take 25 mg by mouth daily. CHOLESTYRAMINE LIGHT 4 G PACKET    Take 1 packet by mouth 3 times daily    CYCLOBENZAPRINE (FLEXERIL) 10 MG TABLET    Take 1 tablet by mouth 3 times daily as needed for Muscle spasms    FLUOXETINE (PROZAC) 20 MG CAPSULE    Take 3 capsules by mouth Daily    GABAPENTIN (NEURONTIN) 100 MG CAPSULE    Take 800 mg by mouth 3 times daily. IPRATROPIUM-ALBUTEROL (DUONEB) 0.5-2.5 (3) MG/3ML SOLN NEBULIZER SOLUTION    Inhale 3 mLs into the lungs 4 times daily    LEVOTHYROXINE SODIUM (LEVOTHROID PO)    Take 50 mcg by mouth     LISINOPRIL (PRINIVIL;ZESTRIL) 40 MG TABLET    Take 40 mg by mouth daily    MOMETASONE-FORMOTEROL (DULERA) 200-5 MCG/ACT INHALER    Inhale 2 puffs into the lungs every 12 hours    OMEPRAZOLE PO    Take 40 mg by mouth 2 times daily.     OXCARBAZEPINE (TRILEPTAL) 600 MG TABLET    Take 600 mg by mouth 2 times daily    PRAVASTATIN (PRAVACHOL) 40 MG TABLET    Take 40 mg by mouth daily. PREDNISONE (DELTASONE) 50 MG TABLET    Take 1 tablet by mouth daily    PREGABALIN (LYRICA) 300 MG CAPSULE    Take 1 capsule by mouth 2 times daily for 14 days. SUMATRIPTAN (IMITREX) 100 MG TABLET    Take 100 mg by mouth once as needed for Migraine    TOPIRAMATE (TOPAMAX PO)    Take 150 mg by mouth 2 times daily     TRAZODONE (DESYREL) 300 MG TABLET    Take 1 tablet by mouth nightly       PAST MEDICAL HISTORY         Diagnosis Date    Acid reflux     Anxiety     Asthma     Bipolar 1 disorder (HCC)     Bowel obstruction (HCC)     COPD (chronic obstructive pulmonary disease) (HCC)     [facility]hn disease ([facility] [address]     Depression     Gout     Hypertension     Hypothyroidism        SURGICAL HISTORY           Procedure Laterality Date    ABDOMEN SURGERY      BUNIONECTOMY Left     CHOLECYSTECTOMY      COLONOSCOPY  2007    no gross pathology seen in the colon and also 3-4 inches of the ileum    COLONOSCOPY  10/12/2017    normal    TONSILLECTOMY AND ADENOIDECTOMY      TUBAL LIGATION           FAMILY HISTORY           Problem Relation Age of Onset    Diabetes Father     Hypertension Mother     Cancer Mother      Family Status   Relation Name Status    Father      Mother          SOCIAL HISTORY      reports that she has been smoking cigarettes. She has a 18.50 pack-year smoking history. She has never used smokeless tobacco. She reports current alcohol use. She reports previous drug use. Frequency: 1.00 time per week. Drug: Marijuana. REVIEW OF SYSTEMS    (2-9 systems for level 4, 10 or more for level 5)     Review of Systems   Constitutional: Negative for chills, fatigue and fever. HENT: Negative for congestion, ear discharge and facial swelling. Eyes: Negative for discharge and redness. Respiratory: Negative for cough and shortness of breath. Cardiovascular: Negative for chest pain. Gastrointestinal: Positive for abdominal pain, nausea and vomiting. Negative for constipation. Genitourinary: Negative for dysuria and hematuria. Musculoskeletal: Negative for arthralgias. Skin: Negative for color change and rash. Neurological: Negative for syncope, numbness and headaches. Hematological: Negative for adenopathy. Psychiatric/Behavioral: Negative for confusion. The patient is not nervous/anxious. Except as noted above the remainder of the review of systems was reviewed and negative. PHYSICAL EXAM    (up to 7 for level 4, 8 or more for level 5)     Vitals:    12/27/20 0106 12/27/20 0108   BP:  120/68   Pulse: 90    Resp: 16    Temp: 98.3 °F (36.8 °C)    TempSrc: Oral    SpO2: 96%    Weight: 180 lb (81.6 kg)    Height: 5' 6\" (1.676 m)        Physical Exam  Constitutional:       General: She is not in acute distress. Appearance: She is well-developed. She is not diaphoretic. HENT:      Head: Normocephalic and atraumatic. Eyes:      General: No scleral icterus. Right eye: No discharge. Left eye: No discharge. Neck:      Musculoskeletal: Neck supple. Cardiovascular:      Rate and Rhythm: Normal rate and regular rhythm. Pulmonary:      Effort: Pulmonary effort is normal. No respiratory distress. Breath sounds: Normal breath sounds. No stridor. No wheezing or rales. Abdominal:      Palpations: Abdomen is soft. There is no mass. Tenderness: There is abdominal tenderness. Musculoskeletal: Normal range of motion. Lymphadenopathy:      Cervical: No cervical adenopathy. Skin:     General: Skin is warm and dry. Findings: No erythema or rash. Neurological:      Mental Status: She is alert and oriented to person, place, and time.    Psychiatric:         Behavior: Behavior normal.             DIAGNOSTIC RESULTS     EKG: All EKG's are interpreted by the Emergency Department Physician who either signs or Co-signs this chart in the absence of a cardiologist.    RADIOLOGY:   Non-plain film images such as CT, Ultrasound and MRI are read by the radiologist. Isadora Araujo radiographic images are visualized and preliminarily interpreted by the emergency physician with the below findings:    Interpretation per the Radiologist below, if available at the time of this note:    Ct Abdomen Pelvis W Iv Contrast    Result Date: 12/27/2020  EXAMINATION: CT OF THE ABDOMEN AND PELVIS WITH CONTRAST 12/27/2020 1:35 am TECHNIQUE: CT of the abdomen and pelvis was performed with the administration of intravenous contrast. Multiplanar reformatted images are provided for review. Dose modulation, iterative reconstruction, and/or weight based adjustment of the mA/kV was utilized to reduce the radiation dose to as low as reasonably achievable. COMPARISON: 03/02/2016 HISTORY: ORDERING SYSTEM PROVIDED HISTORY: Pain TECHNOLOGIST PROVIDED HISTORY: IV Only Contrast Pain Reason for Exam: Pt c/o all over abdominal pain that started 4 days ago and its continuous She is concerned about a possible bowel blockage. She is had 1 before, about 8 years ago and had to have surgery. She vomited yesterday. Acuity: Acute Type of Exam: Initial Relevant Medical/Surgical History: Hx of Crohn's disease, COPD, bowel obstruction surgery, cholecystectomy, tubal ligation, kidney stones, fatty liver FINDINGS: Lower Chest: Lung bases are clear. Organs: Cholecystectomy. Otherwise the liver, the spleen, adrenal glands, kidneys, and pancreas unremarkable. GI/Bowel: There is no evidence of bowel obstruction. Postsurgical change identified involving bowel loops within the lower midline identified with anastomotic segments noted. There is moderate mural thickening identified involving the cecum and ascending colon. The appendix is normal.  There is mild pericolonic fat stranding identified involving the thickening ascending colon. Colonic diverticulosis noted distally. Pelvis: Bladder is collapsed.   Uterus unremarkable. No suspicious adnexal mass. Peritoneum/Retroperitoneum: No free air or free fluid. Aorta remarkable caliber. Bones/Soft Tissues: Degenerate changes bilateral hips left greater than right. Degenerate changes lumbar spine. Slight dextrocurvature. Wall thickening and surrounding inflammatory changes involving the cecum and ascending colon suggestive of developing infectious or inflammatory colitis. No evidence of bowel obstruction. Unremarkable appearance of the appendix. LABS:  Labs Reviewed   BASIC METABOLIC PANEL - Abnormal; Notable for the following components:       Result Value    Glucose 133 (*)     Bun/Cre Ratio 8 (*)     Calcium 8.5 (*)     Potassium 3.2 (*)     All other components within normal limits   CBC WITH AUTO DIFFERENTIAL - Abnormal; Notable for the following components:    WBC 12.5 (*)     RDW 15.8 (*)     Seg Neutrophils 71 (*)     Lymphocytes 17 (*)     Segs Absolute 8.94 (*)     Absolute Eos # 0.46 (*)     All other components within normal limits   HEPATIC FUNCTION PANEL - Abnormal; Notable for the following components: Total Protein 6.3 (*)     All other components within normal limits   POCT URINALYSIS DIPSTICK - Normal   AMYLASE   LIPASE       All other labs were within normal range or not returned as of this dictation.     EMERGENCY DEPARTMENT COURSE and DIFFERENTIAL DIAGNOSIS/MDM:   Vitals:    Vitals:    12/27/20 0106 12/27/20 0108   BP:  120/68   Pulse: 90    Resp: 16    Temp: 98.3 °F (36.8 °C)    TempSrc: Oral    SpO2: 96%    Weight: 180 lb (81.6 kg)    Height: 5' 6\" (1.676 m)        Orders Placed This Encounter   Medications    morphine sulfate (PF) injection 4 mg    ondansetron (ZOFRAN) injection 4 mg    0.9 % sodium chloride infusion    0.9 % sodium chloride bolus    sodium chloride flush 0.9 % injection 10 mL    iopamidol (ISOVUE-370) 76 % injection 75 mL    ciprofloxacin (CIPRO) IVPB 400 mg     Order Specific Question:   Antimicrobial Indications Answer:   Intra-Abdominal Infection    metronidazole (FLAGYL) 500 mg in NaCl 100 mL IVPB premix     Order Specific Question:   Antimicrobial Indications     Answer:   Intra-Abdominal Infection    morphine sulfate (PF) injection 4 mg       Medical Decision Making: The patient is found to have colitis. She was given Flagyl and Cipro and she is being admitted. She has required 2 doses of pain medication. Treatment diagnosis and disposition were discussed with the patient. CONSULTS:  IP CONSULT TO HOSPITALIST    PROCEDURES:  None    FINAL IMPRESSION      1. Colitis          DISPOSITION/PLAN   DISPOSITION Decision To Admit 12/27/2020 02:41:33 AM      PATIENT REFERRED TO:   No follow-up provider specified.     DISCHARGE MEDICATIONS:     New Prescriptions    No medications on file         (Please note that portions of this note were completed with a voice recognition program.  Efforts were made to edit the dictations but occasionally words are mis-transcribed.)    Junaid Young MD  Attending Emergency Physician           Junaid Young MD  12/27/20 [id number]

## 2020-12-28 ENCOUNTER — APPOINTMENT (OUTPATIENT)
Dept: INTERVENTIONAL RADIOLOGY/VASCULAR | Age: 55
DRG: 249 | End: 2020-12-28
Payer: MEDICARE

## 2020-12-28 ENCOUNTER — APPOINTMENT (OUTPATIENT)
Dept: GENERAL RADIOLOGY | Age: 55
DRG: 249 | End: 2020-12-28
Payer: MEDICARE

## 2020-12-28 LAB
ALBUMIN SERPL-MCNC: 3 G/DL (ref 3.5–5.2)
ALBUMIN/GLOBULIN RATIO: ABNORMAL (ref 1–2.5)
ALP BLD-CCNC: 94 U/L (ref 35–104)
ALT SERPL-CCNC: 8 U/L (ref 5–33)
AMYLASE: 27 U/L (ref 28–100)
ANION GAP SERPL CALCULATED.3IONS-SCNC: 8 MMOL/L (ref 9–17)
AST SERPL-CCNC: 10 U/L
BILIRUB SERPL-MCNC: 0.27 MG/DL (ref 0.3–1.2)
BUN BLDV-MCNC: 3 MG/DL (ref 6–20)
BUN/CREAT BLD: 4 (ref 9–20)
CALCIUM SERPL-MCNC: 8.3 MG/DL (ref 8.6–10.4)
CHLORIDE BLD-SCNC: 107 MMOL/L (ref 98–107)
CO2: 22 MMOL/L (ref 20–31)
CREAT SERPL-MCNC: 0.69 MG/DL (ref 0.5–0.9)
GFR AFRICAN AMERICAN: >60 ML/MIN
GFR NON-AFRICAN AMERICAN: >60 ML/MIN
GFR SERPL CREATININE-BSD FRML MDRD: ABNORMAL ML/MIN/{1.73_M2}
GFR SERPL CREATININE-BSD FRML MDRD: ABNORMAL ML/MIN/{1.73_M2}
GLUCOSE BLD-MCNC: 100 MG/DL (ref 70–99)
HCT VFR BLD CALC: 33.2 % (ref 36.3–47.1)
HEMOGLOBIN: 10.2 G/DL (ref 11.9–15.1)
LIPASE: 53 U/L (ref 13–60)
MAGNESIUM: 1.7 MG/DL (ref 1.6–2.6)
MCH RBC QN AUTO: 27.3 PG (ref 25.2–33.5)
MCHC RBC AUTO-ENTMCNC: 30.7 G/DL (ref 28.4–34.8)
MCV RBC AUTO: 89 FL (ref 82.6–102.9)
NRBC AUTOMATED: 0 PER 100 WBC
PDW BLD-RTO: 16 % (ref 11.8–14.4)
PHOSPHORUS: 3.3 MG/DL (ref 2.6–4.5)
PLATELET # BLD: 218 K/UL (ref 138–453)
PMV BLD AUTO: 10.9 FL (ref 8.1–13.5)
POTASSIUM SERPL-SCNC: 4.2 MMOL/L (ref 3.7–5.3)
RBC # BLD: 3.73 M/UL (ref 3.95–5.11)
SODIUM BLD-SCNC: 137 MMOL/L (ref 135–144)
TOTAL PROTEIN: 5.6 G/DL (ref 6.4–8.3)
WBC # BLD: 9.9 K/UL (ref 3.5–11.3)

## 2020-12-28 PROCEDURE — 36415 COLL VENOUS BLD VENIPUNCTURE: CPT

## 2020-12-28 PROCEDURE — 1200000000 HC SEMI PRIVATE

## 2020-12-28 PROCEDURE — 84100 ASSAY OF PHOSPHORUS: CPT

## 2020-12-28 PROCEDURE — 80053 COMPREHEN METABOLIC PANEL: CPT

## 2020-12-28 PROCEDURE — 6360000002 HC RX W HCPCS: Performed by: INTERNAL MEDICINE

## 2020-12-28 PROCEDURE — 6360000002 HC RX W HCPCS: Performed by: NURSE PRACTITIONER

## 2020-12-28 PROCEDURE — 2500000003 HC RX 250 WO HCPCS: Performed by: NURSE PRACTITIONER

## 2020-12-28 PROCEDURE — 2709999900 IR ULTRASOUND GUIDANCE VASCULAR ACCESS

## 2020-12-28 PROCEDURE — 6370000000 HC RX 637 (ALT 250 FOR IP): Performed by: NURSE PRACTITIONER

## 2020-12-28 PROCEDURE — 85027 COMPLETE CBC AUTOMATED: CPT

## 2020-12-28 PROCEDURE — 82150 ASSAY OF AMYLASE: CPT

## 2020-12-28 PROCEDURE — 83735 ASSAY OF MAGNESIUM: CPT

## 2020-12-28 PROCEDURE — 99232 SBSQ HOSP IP/OBS MODERATE 35: CPT | Performed by: INTERNAL MEDICINE

## 2020-12-28 PROCEDURE — 83690 ASSAY OF LIPASE: CPT

## 2020-12-28 PROCEDURE — 94640 AIRWAY INHALATION TREATMENT: CPT

## 2020-12-28 PROCEDURE — 73502 X-RAY EXAM HIP UNI 2-3 VIEWS: CPT

## 2020-12-28 RX ORDER — MORPHINE SULFATE 4 MG/ML
4 INJECTION, SOLUTION INTRAMUSCULAR; INTRAVENOUS
Status: DISCONTINUED | OUTPATIENT
Start: 2020-12-28 | End: 2020-12-29 | Stop reason: HOSPADM

## 2020-12-28 RX ORDER — MORPHINE SULFATE 2 MG/ML
1 INJECTION, SOLUTION INTRAMUSCULAR; INTRAVENOUS EVERY 4 HOURS PRN
Status: DISCONTINUED | OUTPATIENT
Start: 2020-12-28 | End: 2020-12-29 | Stop reason: HOSPADM

## 2020-12-28 RX ADMIN — METRONIDAZOLE 500 MG: 500 INJECTION, SOLUTION INTRAVENOUS at 04:10

## 2020-12-28 RX ADMIN — MORPHINE SULFATE 4 MG: 4 INJECTION, SOLUTION INTRAMUSCULAR; INTRAVENOUS at 11:09

## 2020-12-28 RX ADMIN — ENOXAPARIN SODIUM 40 MG: 100 INJECTION SUBCUTANEOUS at 07:56

## 2020-12-28 RX ADMIN — BUDESONIDE AND FORMOTEROL FUMARATE DIHYDRATE 2 PUFF: 160; 4.5 AEROSOL RESPIRATORY (INHALATION) at 20:43

## 2020-12-28 RX ADMIN — CIPROFLOXACIN 400 MG: 2 INJECTION, SOLUTION INTRAVENOUS at 16:31

## 2020-12-28 RX ADMIN — MORPHINE SULFATE 4 MG: 4 INJECTION, SOLUTION INTRAMUSCULAR; INTRAVENOUS at 13:55

## 2020-12-28 RX ADMIN — BUDESONIDE AND FORMOTEROL FUMARATE DIHYDRATE 2 PUFF: 160; 4.5 AEROSOL RESPIRATORY (INHALATION) at 09:39

## 2020-12-28 RX ADMIN — METRONIDAZOLE 500 MG: 500 INJECTION, SOLUTION INTRAVENOUS at 17:49

## 2020-12-28 RX ADMIN — MORPHINE SULFATE 4 MG: 4 INJECTION, SOLUTION INTRAMUSCULAR; INTRAVENOUS at 01:36

## 2020-12-28 RX ADMIN — FAMOTIDINE 20 MG: 10 INJECTION, SOLUTION INTRAVENOUS at 19:42

## 2020-12-28 RX ADMIN — Medication 1 MG: at 19:42

## 2020-12-28 RX ADMIN — METRONIDAZOLE 500 MG: 500 INJECTION, SOLUTION INTRAVENOUS at 11:09

## 2020-12-28 RX ADMIN — POTASSIUM CHLORIDE, DEXTROSE MONOHYDRATE AND SODIUM CHLORIDE: 150; 5; 450 INJECTION, SOLUTION INTRAVENOUS at 14:01

## 2020-12-28 ASSESSMENT — PAIN SCALES - GENERAL
PAINLEVEL_OUTOF10: 10
PAINLEVEL_OUTOF10: 5
PAINLEVEL_OUTOF10: 5
PAINLEVEL_OUTOF10: 8
PAINLEVEL_OUTOF10: 8
PAINLEVEL_OUTOF10: 7
PAINLEVEL_OUTOF10: 8
PAINLEVEL_OUTOF10: 6
PAINLEVEL_OUTOF10: 4
PAINLEVEL_OUTOF10: 4

## 2020-12-28 ASSESSMENT — PAIN DESCRIPTION - FREQUENCY: FREQUENCY: INTERMITTENT

## 2020-12-28 ASSESSMENT — PAIN DESCRIPTION - LOCATION: LOCATION: ANKLE

## 2020-12-28 ASSESSMENT — PAIN DESCRIPTION - DESCRIPTORS: DESCRIPTORS: ACHING;DISCOMFORT

## 2020-12-28 ASSESSMENT — PAIN DESCRIPTION - ONSET: ONSET: ON-GOING

## 2020-12-28 ASSESSMENT — PAIN DESCRIPTION - ORIENTATION: ORIENTATION: RIGHT;LEFT;ANTERIOR

## 2020-12-28 ASSESSMENT — PAIN DESCRIPTION - PAIN TYPE: TYPE: ACUTE PAIN

## 2020-12-28 NOTE — FLOWSHEET NOTE
Patient was on phone as Sylvie Bloch entered the room (no family members present). Writer inquired if patient was doing well after falling in the hallway; patient said she was sore but making it. Writer excused himself to allow the patient to finish her phone conversation. Writer will pray for continued healing and comfort during her stay. Spiritual care will follow up as needed or requested. 12/28/20 1604   Encounter Summary   Services provided to: Patient   Referral/Consult From: Erlinda   Continue Visiting   (12/28/2020)   Complexity of Encounter Low   Length of Encounter 15 minutes   Routine   Type Initial   Assessment Calm; Approachable   Intervention Active listening   Outcome Expressed gratitude

## 2020-12-28 NOTE — PROGRESS NOTES
Tele-sitter placed in pt's room because she laughed but stated \"I might try to go outside again and smoke. \" Lito Gomes saw when patient was going through her purse a pill bottle. Writer told patient that any home medications need to be locked up. Patient agreeable. Patient's bottle of Lyirica locked up in nurse  and 28 pills counted. Shashank Salem manager witnessed pills being locked up.

## 2020-12-28 NOTE — PROGRESS NOTES
Patient seems a little \"loopy\" after taking her home dose of Lyrica, Daughter called and states patient gets like that at home too. Will continue to monitor patient. Dr. Lucian Melendez notified.

## 2020-12-28 NOTE — PLAN OF CARE
Problem: Pain:  Goal: Control of acute pain  Description: Control of acute pain. Pain assessment completed. Patient demonstrates understanding of pain rating scale and interventions. At this time patient states pain is controlled    Will continue to monitor and reassess with each rounding and PRN.     Outcome: Ongoing     Problem: Fluid Volume:  Goal: Ability to achieve a balanced intake and output will improve  Description: Ability to achieve a balanced intake and output will improve  Outcome: Ongoing     Problem: Physical Regulation:  Goal: Ability to maintain clinical measurements within normal limits will improve  Description: Ability to maintain clinical measurements within normal limits will improve  Outcome: Ongoing     Problem: Physical Regulation:  Goal: Will show no signs and symptoms of electrolyte imbalance  Description: Will show no signs and symptoms of electrolyte imbalance  Outcome: Ongoing

## 2020-12-28 NOTE — PROGRESS NOTES
Writer was called and notified patient on floor in hallway. Patient was trying to sneak out to \"smoke a cigarette. \" Patient apologized for attempting to leave floor. Patient states she \"tripped\" and fell to her hands. Patient does c/o left hip pain, but that is not new. Patient denies hitting head. Patient assisted from floor to wheelchair with physical therapist and 2 R.N.'s  Full ROM to BUE noted. No bruising or swelling noted anywhere new at this time. Will continue to assess q 2 hours. Neuro checks WNL's. Dr. Luz Rodriguez notified. (see orders) Lead nurse [facility] notified. House supervisor notified. Manager Abraham Bacon notified.

## 2020-12-28 NOTE — PLAN OF CARE
Problem: Falls - Risk of:  Goal: Will remain free from falls  Description: Will remain free from falls  Outcome: Ongoing   Pt fall risk, fall band present, falling star, safety alarm activated and in use as needed. Hourly rounding performed. Pt encouraged to use call light. See Bryn Medel fall risk assessment.

## 2020-12-28 NOTE — PROGRESS NOTES
Diabetes Father     Lung Cancer Father     Hypertension Mother     Cancer Mother     Crohn's Disease Brother     Heart Disease Brother        Vitals:  BP (!) 132/59   Pulse 78   Temp 98.4 °F (36.9 °C) (Oral)   Resp 16   Ht 5' 6\" (1.676 m)   Wt 180 lb (81.6 kg)   SpO2 98%   BMI 29.05 kg/m²   Temp (24hrs), Av °F (36.7 °C), Min:97.3 °F (36.3 °C), Max:98.5 °F (36.9 °C)    No results for input(s): POCGLU in the last 72 hours. I/O (24Hr):     Intake/Output Summary (Last 24 hours) at 2020 1440  Last data filed at 2020 1839  Gross per 24 hour   Intake 2229 ml   Output --   Net 2229 ml       Labs:  Hematology:  Recent Labs     20  0128 20   WBC 12.5* 9.9   RBC 4.36 3.73*   HGB 12.1 10.2*   HCT 37.7 33.2*   MCV 86.5 89.0   MCH 27.8 27.3   MCHC 32.1 30.7   RDW 15.8* 16.0*    218   MPV 10.4 10.9     Chemistry:  Recent Labs     20  0128 20  1656 20  0614     --  137   K 3.2* 4.0 4.2     --  107   CO2 23  --  22   GLUCOSE 133*  --  100*   BUN 7  --  3*   CREATININE 0.87  --  0.69   MG  --   --  1.7   ANIONGAP 13  --  8*   LABGLOM >60  --  >60   GFRAA >60  --  >60   CALCIUM 8.5*  --  8.3*   PHOS  --   --  3.3     Recent Labs     20  0128 20   PROT 6.3* 5.6*   LABALBU 3.7 3.0*   AST 10 10   ALT 9 8   ALKPHOS 100 94   BILITOT 0.52 0.27*   BILIDIR 0.15  --    AMYLASE 30 27*   LIPASE 46 53     ABG:No results found for: POCPH, PHART, PH, POCPCO2, MVU9NNW, PCO2, POCPO2, PO2ART, PO2, POCHCO3, PRG7OKZ, HCO3, NBEA, PBEA, BEART, BE, THGBART, THB, VBH7YQD, MREJ6KGQ, V4PJMOYZ, O2SAT, FIO2  Lab Results   Component Value Date/Time    SPECIAL NOT REPORTED 2020 02:24 PM     Lab Results   Component Value Date/Time    CULTURE NO GROWTH 6 DAYS 2020 06:02 PM       Radiology:  Ct Abdomen Pelvis W Iv Contrast    Result Date: 2020  Wall thickening and surrounding inflammatory changes involving the cecum and ascending colon suggestive of developing infectious or inflammatory colitis. No evidence of bowel obstruction. Unremarkable appearance of the appendix.        Physical Examination:        General appearance:  alert, cooperative and no distress  Mental Status:  oriented to person, place and time and normal affect  Lungs:  clear to auscultation bilaterally, normal effort  Heart:  regular rate and rhythm, no murmur  Abdomen:  soft, nontender, nondistended, normal bowel sounds, no masses, hepatomegaly, splenomegaly  Extremities:  no edema, redness, tenderness in the calves  Skin:  no gross lesions, rashes, induration    Assessment:        Hospital Problems           Last Modified POA    * (Principal) Colitis 12/27/2020 Yes    Hypothyroidism 12/27/2020 Yes    Gastroesophageal reflux disease without esophagitis 12/27/2020 Yes    Drug abuse ([facility] [address] (Chronic) 12/27/2020 Yes    COPD (chronic obstructive pulmonary disease) ([facility] [address] 12/27/2020 Yes    Hypertension 12/27/2020 Yes    Hypokalemia 12/27/2020 Yes    Anxiety 12/27/2020 Yes          Plan:        Acute colitis  -On IV antibiotics and IV fluids  -Advance diet as tolerated    Tobacco abuse  -Counseled regarding smoking cessation    COPD  -Continue Symbicort    GERD  -Continue Pepcid    DVT prophylaxis    Ashvin Wilson MD  12/28/2020  2:40 PM

## 2020-12-29 VITALS
RESPIRATION RATE: 18 BRPM | HEART RATE: 75 BPM | SYSTOLIC BLOOD PRESSURE: 113 MMHG | WEIGHT: 180 LBS | BODY MASS INDEX: 28.93 KG/M2 | OXYGEN SATURATION: 96 % | TEMPERATURE: 98.1 F | DIASTOLIC BLOOD PRESSURE: 73 MMHG | HEIGHT: 66 IN

## 2020-12-29 PROBLEM — E87.6 HYPOKALEMIA: Status: RESOLVED | Noted: 2017-02-05 | Resolved: 2020-12-29

## 2020-12-29 PROCEDURE — 99239 HOSP IP/OBS DSCHRG MGMT >30: CPT | Performed by: NURSE PRACTITIONER

## 2020-12-29 PROCEDURE — 6360000002 HC RX W HCPCS: Performed by: INTERNAL MEDICINE

## 2020-12-29 PROCEDURE — 2580000003 HC RX 258: Performed by: NURSE PRACTITIONER

## 2020-12-29 PROCEDURE — 6360000002 HC RX W HCPCS: Performed by: NURSE PRACTITIONER

## 2020-12-29 PROCEDURE — 94640 AIRWAY INHALATION TREATMENT: CPT

## 2020-12-29 PROCEDURE — APPSS45 APP SPLIT SHARED TIME 31-45 MINUTES: Performed by: NURSE PRACTITIONER

## 2020-12-29 PROCEDURE — 2500000003 HC RX 250 WO HCPCS: Performed by: NURSE PRACTITIONER

## 2020-12-29 RX ORDER — LOPERAMIDE HYDROCHLORIDE 2 MG/1
2 CAPSULE ORAL 4 TIMES DAILY PRN
Status: ON HOLD | COMMUNITY
End: 2021-10-20 | Stop reason: HOSPADM

## 2020-12-29 RX ORDER — BUDESONIDE AND FORMOTEROL FUMARATE DIHYDRATE 160; 4.5 UG/1; UG/1
2 AEROSOL RESPIRATORY (INHALATION) 2 TIMES DAILY
Status: ON HOLD | COMMUNITY
End: 2021-09-19 | Stop reason: SDUPTHER

## 2020-12-29 RX ORDER — OLOPATADINE HYDROCHLORIDE 1 MG/ML
1 SOLUTION/ DROPS OPHTHALMIC 2 TIMES DAILY
Status: ON HOLD | COMMUNITY
End: 2021-09-18

## 2020-12-29 RX ORDER — LIDOCAINE 50 MG/G
1 PATCH TOPICAL DAILY PRN
COMMUNITY

## 2020-12-29 RX ORDER — CIPROFLOXACIN 500 MG/1
500 TABLET, FILM COATED ORAL 2 TIMES DAILY
Qty: 20 TABLET | Refills: 0 | Status: SHIPPED | OUTPATIENT
Start: 2020-12-29 | End: 2021-01-08

## 2020-12-29 RX ORDER — METRONIDAZOLE 500 MG/1
500 TABLET ORAL 2 TIMES DAILY
Qty: 14 TABLET | Refills: 0 | Status: SHIPPED | OUTPATIENT
Start: 2020-12-29 | End: 2021-01-05

## 2020-12-29 RX ORDER — HYDROXYZINE HYDROCHLORIDE 25 MG/1
25-50 TABLET, FILM COATED ORAL DAILY PRN
Status: ON HOLD | COMMUNITY
End: 2021-09-19 | Stop reason: HOSPADM

## 2020-12-29 RX ADMIN — Medication 1 MG: at 11:19

## 2020-12-29 RX ADMIN — Medication 10 ML: at 08:19

## 2020-12-29 RX ADMIN — CIPROFLOXACIN 400 MG: 2 INJECTION, SOLUTION INTRAVENOUS at 06:20

## 2020-12-29 RX ADMIN — Medication 1 MG: at 00:06

## 2020-12-29 RX ADMIN — Medication 1 MG: at 04:33

## 2020-12-29 RX ADMIN — BUDESONIDE AND FORMOTEROL FUMARATE DIHYDRATE 2 PUFF: 160; 4.5 AEROSOL RESPIRATORY (INHALATION) at 08:12

## 2020-12-29 RX ADMIN — FAMOTIDINE 20 MG: 10 INJECTION, SOLUTION INTRAVENOUS at 08:18

## 2020-12-29 RX ADMIN — METRONIDAZOLE 500 MG: 500 INJECTION, SOLUTION INTRAVENOUS at 04:32

## 2020-12-29 RX ADMIN — METRONIDAZOLE 500 MG: 500 INJECTION, SOLUTION INTRAVENOUS at 11:19

## 2020-12-29 RX ADMIN — ENOXAPARIN SODIUM 40 MG: 100 INJECTION SUBCUTANEOUS at 08:18

## 2020-12-29 RX ADMIN — POTASSIUM CHLORIDE, DEXTROSE MONOHYDRATE AND SODIUM CHLORIDE: 150; 5; 450 INJECTION, SOLUTION INTRAVENOUS at 00:10

## 2020-12-29 ASSESSMENT — PAIN SCALES - GENERAL
PAINLEVEL_OUTOF10: 6
PAINLEVEL_OUTOF10: 6
PAINLEVEL_OUTOF10: 10
PAINLEVEL_OUTOF10: 6
PAINLEVEL_OUTOF10: 6
PAINLEVEL_OUTOF10: 10
PAINLEVEL_OUTOF10: 3

## 2020-12-29 ASSESSMENT — PAIN DESCRIPTION - PAIN TYPE: TYPE: ACUTE PAIN

## 2020-12-29 ASSESSMENT — PAIN DESCRIPTION - ORIENTATION: ORIENTATION: RIGHT;LEFT;ANTERIOR

## 2020-12-29 ASSESSMENT — PAIN DESCRIPTION - LOCATION: LOCATION: ANKLE

## 2020-12-29 NOTE — PROGRESS NOTES
Nutrition Assessment     Type and Reason for Visit: Consult(diet education)    Nutrition Recommendations/Plan:   1. Continue Low Fiber diet  2. Provided Low Fiber Nutrition Therapy diet education with handout    Nutrition Assessment:  Patient admission is related to colitis. Patient diet advanced to low fiber and tolerated well. Patient provided Low Fiber Nutrition Therapy handout and education. No weight loss noted. Patient is at low nutrition risk. Continue current diet. Monitor p.o intakes and diet tolerance. Malnutrition Assessment:  Malnutrition Status: No malnutrition    Nutrition Related Findings: NO edema      Current Nutrition Therapies:    DIET LOW FIBER;     Anthropometric Measures:  · Height: 5' 6\" (167.6 cm)  · Current Body Wt: 180 lb (81.6 kg)   · BMI: 29.1    Nutrition Diagnosis:   · Food & Nutrition-related knowledge deficit related to food and nutrition related knowledge deficit as evidenced by (partial knowledge on low fiber diet)      Nutrition Interventions:   Food and/or Nutrient Delivery:  Continue Current Diet  Nutrition Education/Counseling:  Education completed   Coordination of Nutrition Care:  Continue to monitor while inpatient    Nutrition Monitoring and Evaluation:   Food/Nutrient Intake Outcomes:  Food and Nutrient Intake  Physical Signs/Symptoms Outcomes:  Biochemical Data, Weight, GI Status     Discharge Planning:    Continue current diet           Remy PETERSON, RDN, LDN  Lead Clinical Dietitian  RD Office Phone [phone]

## 2020-12-29 NOTE — DISCHARGE INSTR - DIET

## 2020-12-29 NOTE — DISCHARGE SUMMARY
[facility]  Office: Chetna Steven, DO, Nicolás Moore, DO, True Gatica, DO, Jose Martinez, DO, Josué Sahu MD, Simón Cassidy MD, Agnes Raya MD, Frantz Nowak MD, John Dan MD, Angel Chahal MD, Nga Tapia MD, Abel Biswas MD, Mbonu Fleet Cowden, MD, Krysten Bai DO, Prema He MD, Asia Rosas MD, Adriana Ring DO, Osvaldo Byrne MD,  Rashid Cortes DO, Jacinta Pickett MD, Jaren Moeller MD, Jolene Leos, [facility], [facility], CNP, Cynthia Adams, CNP, Nereida Davis, [facility], Philip Pastor, CNP, Ada Duque, CNP, Hugh Ibarra, CNP, Lito Delgado, CNP, Afua Trevino, CNP, Lona Garcia PA-C, Christine Mosley, [facility], Neelima Gaytan, CNP, Jamarcus Shelton, CNP, Peggy Kat, CNP, Zohra Wallace, CNP, [place] July, [facility]    Discharge Summary     Patient ID: Dolores Camara  :  1965   MRN: [id number]     ACCOUNT:  [de-identified]   Patient's PCP: Ruddy Moody MD  Admit Date: 2020   Discharge Date: 2020     Length of Stay: 2  Code Status:  Full Code  Admitting Physician: Jaren Moeller MD  Discharge Physician: SHRADDHA Herzog NP     Active Discharge Diagnoses:     Hospital Problem Lists:  Principal Problem:    Colitis  Active Problems:    Hypothyroidism    Gastroesophageal reflux disease without esophagitis    Drug abuse ([facility]    COPD (chronic obstructive pulmonary disease) ([facility]    Hypertension    Hypokalemia    Anxiety  Resolved Problems:    * No resolved hospital problems.  *      Admission Condition:  fair     Discharged Condition: fair    Hospital Stay:     Hospital Course:  Dolores Camara is a 63 y.o. female who was admitted for the management of  Colitis , presented to ER with Abdominal Pain (started x4 days ago) and Nausea (x1 episode of emesis yesterday)        - This is a 79-year-old female with a history of GERD, anxiety, asthma, bipolar disorder, bowel obstruction, Crohn's disease, hypertension who came in with complaints of abdominal pain has been going on for about 4 days. CT abdomen showed infectious or inflammatory colitis. Her WBC count was 12.5. Patient was started on Cipro Flagyl and IV hydration. 12/28 -patient apparently fell. Assessment negative, no head strike, neurology exam normal    12/29 -patient reports far less abdominal discomfort today for less loose stool and diarrhea. Patient reports she feels significantly better however she does remain with loose stool several times a day. Patient reports that she has been told in the past she has Crohn's but she does not follow with a gastroenterologist.  Patient will be discharged on Cipro and Flagyl and directed to GI for continued evaluation and treatment. Options for care are discussed with patient and she is adamant that she would like to do minimal treatment in the hospital and return to home as soon as medically safe to do so.       Significant therapeutic interventions: IV antibiotics, IV hydration, monitor and replace electrolytes, arrange for follow-up with GI      Significant Diagnostic Studies:   Labs / Micro:  CBC:   Lab Results   Component Value Date    WBC 9.9 12/28/2020    RBC 3.73 12/28/2020    RBC 4.50 05/28/2012    HGB 10.2 12/28/2020    HCT 33.2 12/28/2020    MCV 89.0 12/28/2020    MCH 27.3 12/28/2020    MCHC 30.7 12/28/2020    RDW 16.0 12/28/2020     12/28/2020     05/28/2012     BMP:    Lab Results   Component Value Date    GLUCOSE 100 12/28/2020    GLUCOSE 125 05/28/2012     12/28/2020    K 4.2 12/28/2020     12/28/2020    CO2 22 12/28/2020    ANIONGAP 8 12/28/2020    BUN 3 12/28/2020    CREATININE 0.69 12/28/2020    BUNCRER 4 12/28/2020    CALCIUM 8.3 12/28/2020    LABGLOM >60 12/28/2020    GFRAA >60 12/28/2020    GFR      12/28/2020    GFR NOT REPORTED 12/28/2020     U/A:    Lab Results   Component Value Date    COLORU yellow 12/27/2020 COLORU YELLOW 02/14/2020    TURBIDITY CLOUDY 02/14/2020    SPECGRAV 1.010 12/27/2020    SPECGRAV 1.012 02/14/2020    HGBUR NEGATIVE 02/14/2020    PHUR 7.0 12/27/2020    PHUR 6.0 02/14/2020    PROTEINU negative 12/27/2020    PROTEINU NEGATIVE 02/14/2020    GLUCOSEU negative 12/27/2020    GLUCOSEU NEGATIVE 02/14/2020    GLUCOSEU NEGATIVE 08/30/2011    KETUA negative 12/27/2020    KETUA NEGATIVE 02/14/2020    BILIRUBINUR negative 12/27/2020    BILIRUBINUR NEGATIVE 08/30/2011    UROBILINOGEN Normal 02/14/2020    NITRU POSITIVE 02/14/2020    LEUKOCYTESUR negative 12/27/2020    LEUKOCYTESUR MODERATE 02/14/2020        Radiology:  Xr Hip Left (2-3 Views)    Result Date: 12/28/2020  Progressive osteoarthritis of the left hip No acute bony abnormality     Ct Abdomen Pelvis W Iv Contrast    Result Date: 12/27/2020  Wall thickening and surrounding inflammatory changes involving the cecum and ascending colon suggestive of developing infectious or inflammatory colitis. No evidence of bowel obstruction. Unremarkable appearance of the appendix. Consultations:    Consults:     Final Specialist Recommendations/Findings:   IP CONSULT TO HOSPITALIST  IP CONSULT TO DIETITIAN      The patient was seen and examined on day of discharge and this discharge summary is in conjunction with any daily progress note from day of discharge. Discharge plan:     Disposition: Home    Physician Follow Up:     Car Hadley MD  [address] [place] [id number]  [phone]    Schedule an appointment as soon as possible for a visit in 2 weeks  Crohn's ?         Requiring Further Evaluation/Follow Up POST HOSPITALIZATION/Incidental Findings: Recurrent colitis with multiple evaluations and diagnosis of Crohn's without outpatient management    Diet: As tolerated    Activity: As tolerated    Instructions to Patient: Take the antibiotics all the way through, please stop smoking, follow-up with a gastroenterologist as soon as you can to be fully evaluated and treated for your possible colitis versus Crohn's. Discharge Medications:      Medication List      START taking these medications    ciprofloxacin 500 MG tablet  Commonly known as: CIPRO  Take 1 tablet by mouth 2 times daily for 10 days     metroNIDAZOLE 500 MG tablet  Commonly known as: FLAGYL  Take 1 tablet by mouth 2 times daily for 7 days        CONTINUE taking these medications    ATENOLOL PO     cholestyramine light 4 g packet  Take 1 packet by mouth 3 times daily     FLUoxetine 20 MG capsule  Commonly known as: PROzac  Take 3 capsules by mouth Daily     gabapentin 100 MG capsule  Commonly known as: NEURONTIN     ipratropium-albuterol 0.5-2.5 (3) MG/3ML Soln nebulizer solution  Commonly known as: DUONEB  Inhale 3 mLs into the lungs 4 times daily     LEVOTHROID PO     lisinopril 40 MG tablet  Commonly known as: PRINIVIL;ZESTRIL     mometasone-formoterol 200-5 MCG/ACT inhaler  Commonly known as: Dulera  Inhale 2 puffs into the lungs every 12 hours     OMEPRAZOLE PO     OXcarbazepine 600 MG tablet  Commonly known as: TRILEPTAL     pravastatin 40 MG tablet  Commonly known as: PRAVACHOL     pregabalin 300 MG capsule  Commonly known as: LYRICA  Take 1 capsule by mouth 2 times daily for 14 days. SUMAtriptan 100 MG tablet  Commonly known as: IMITREX     TOPAMAX PO     traZODone 300 MG tablet  Commonly known as: DESYREL  Take 1 tablet by mouth nightly           Where to Get Your Medications      These medications were sent to [address] [place] [address] [address] [place]  [address]    Phone: [phone]   · ciprofloxacin 500 MG tablet  · metroNIDAZOLE 500 MG tablet         No discharge procedures on file. Time Spent on discharge is  39 mins in patient examination, evaluation, counseling as well as medication reconciliation, prescriptions for required medications, discharge plan and follow up.     Electronically signed by SHRADDHA Quick - RAEGAN  12/29/2020  11:26 AM      Thank you Dr. Arnulfo Brown MD for the opportunity to be involved in this patient's care.

## 2020-12-29 NOTE — PLAN OF CARE
Problem: Falls - Risk of:  Goal: Will remain free from falls  Description: Will remain free from falls  Outcome: Ongoing   Pt fall risk, fall band present, falling star, safety alarm activated and in use as needed. Hourly rounding performed. Pt encouraged to use call light. See Kendell Meade fall risk assessment. Tele-sitter in place.

## 2020-12-29 NOTE — PROGRESS NOTES
Discharge teaching and instructions completed with patient using teachback method. AVS reviewed. Patient voiced understanding regarding prescriptions, follow up appointments, and care of self at home. After discharge instructions reviewed, patient's daughter was at bedside and went silent for a few seconds and closed her eyes, slight jerking movement noted to her arms, but remained sitting in chair. Patient's daughter then opened eyes and starting talking. Patient states her daughter does that \"All the time\" and has history of seizures. Writer asked patient's daughter if she would go to E.R. but she adamantly refused, and states \" I'm totally fine. \" Writer offered to call a cab for transportation home atleast and have hospital pay for it but again patient and daughter both refused. Both of them very eager to go. Spoke to [employer] and he states there's nothing I can do to stop them from leaving. Patient's daughter is alert and oriented and ambulating around room with no issues. Both agree they are okay to leave.

## 2020-12-29 NOTE — PROGRESS NOTES
[facility]  Office: [address], DO, Jonna Glover, DO, Usman Mireya, DO, Sherri Valenzuela Blood, DO, Rudy Díaz MD, Andrei Mccord MD, Tabitha Medrano MD, John Kinsey MD, Sushila Thomas MD, Naomi Zhang MD, Charlene Jamison MD, Lizzette Cronin MD, Jeri Boateng MD, Sharad Mendiola, DO, Jaciel Roe MD, Almsa Fischer MD, Korin Clancy, DO, Donna Bo MD,  Sandra Espinal, DO, Layla Olson MD, Barrett Presley MD, Mary Jane Rowe, [facility], [facility], CNP, Sherrill Starkey, CNP, Sparkle Ramirez, CNS, Benigno Betts, CNP, Ramona Sheffield, CNP, Alisson Sweeney, CNP, Kwesi Loving, CNP, Migdalia Steen, CNP, Catracho Geiger PA-C, Hadley Solomon, [facility], Nitin Stuart, CNP, Maribel Beckett, CNP, Juno August, CNP, Sharon Kenyon, [facility], Jeannie Dent, [facility]    Progress Note    12/29/2020    11:12 AM    Name:   Allie Khoury  MRN:     [id number]     Acct:      [de-identified]   Room:   [address] [place] [address] Day:  2  Admit Date:  12/27/2020  1:12 AM    PCP:   Carolene Dubin, MD  Code Status:  Full Code    Subjective:     C/C:   Chief Complaint   Patient presents with    Abdominal Pain     started x4 days ago    Nausea     x1 episode of emesis yesterday     Interval History Status: improved. Patient reports far less abdominal discomfort today for less loose stool and diarrhea. Patient reports she feels significantly better however she does remain with loose stool several times a day. Patient reports that she has been told in the past she has Crohn's but she does not follow with a gastroenterologist.  Patient will be discharged on Cipro and Flagyl and directed to GI for continued evaluation and treatment. Options for care are discussed with patient and she is adamant that she would like to do minimal treatment in the hospital and return to home as soon as medically safe to do so.     Brief History:        12/27 - This is a 62-year-old female with a history of GERD, anxiety, asthma, bipolar disorder, bowel obstruction, Crohn's disease, hypertension who came in with complaints of abdominal pain has been going on for about 4 days. CT abdomen showed infectious or inflammatory colitis. Her WBC count was 12.5. Patient was started on Cipro Flagyl and IV hydration. 12/28 -patient apparently fell. Assessment negative, no head strike, neurology exam normal    12/29 -patient reports far less abdominal discomfort today for less loose stool and diarrhea. Patient reports she feels significantly better however she does remain with loose stool several times a day. Patient reports that she has been told in the past she has Crohn's but she does not follow with a gastroenterologist.  Patient will be discharged on Cipro and Flagyl and directed to GI for continued evaluation and treatment. Options for care are discussed with patient and she is adamant that she would like to do minimal treatment in the hospital and return to home as soon as medically safe to do so. Review of Systems:     Constitutional:  negative for chills, fevers, sweats  Respiratory:  negative for cough, dyspnea on exertion, shortness of breath, wheezing  Cardiovascular:  negative for chest pain, chest pressure/discomfort, lower extremity edema, palpitations  Gastrointestinal:  negative for abdominal pain, constipation, nausea, vomiting. Continues to endorse loose stool several times a day. Dramatic improvement since admission. Neurological:  negative for dizziness, headache    Medications: Allergies:     Allergies   Allergen Reactions    Penicillins     Seasonal     Tape Brien Fried Tape]        Current Meds:   Scheduled Meds:    sodium chloride flush  10 mL Intravenous 2 times per day    famotidine (PEPCID) injection  20 mg Intravenous BID    enoxaparin  40 mg Subcutaneous Daily    ciprofloxacin  400 mg Intravenous Q12H    metroNIDAZOLE  500 mg Intravenous Q8H  budesonide-formoterol  2 puff Inhalation BID    influenza virus vaccine  0.5 mL Intramuscular Prior to discharge     Continuous Infusions:    dextrose 5% and 0.45% NaCl with KCl 20 mEq 125 mL/hr at 20 0010     PRN Meds: morphine **OR** morphine, sodium chloride flush, potassium chloride **OR** potassium alternative oral replacement **OR** potassium chloride, magnesium sulfate, nicotine, promethazine **OR** ondansetron, acetaminophen **OR** acetaminophen, polyethylene glycol, albuterol sulfate HFA    Data:     Past Medical History:   has a past medical history of Acid reflux, Anxiety, Asthma, Bipolar 1 disorder ([facility] [address] [address] Bowel obstruction ([facility] [address] COPD (chronic obstructive pulmonary disease) ([facility] [address] Crohn disease ([facility] Depression, Gout, Hypertension, and Hypothyroidism. Social History:   reports that she has been smoking cigarettes. She has a 18.50 pack-year smoking history. She has never used smokeless tobacco. She reports current alcohol use. She reports current drug use. Frequency: 1.00 time per week. Drug: Marijuana. Family History:   Family History   Problem Relation Age of Onset    Diabetes Father     Lung Cancer Father     Hypertension Mother     Cancer Mother     Crohn's Disease Brother     Heart Disease Brother        Vitals:  BP (!) 119/58   Pulse 80   Temp 98.1 °F (36.7 °C) (Oral)   Resp 18   Ht 5' 6\" (1.676 m)   Wt 180 lb (81.6 kg)   SpO2 96%   BMI 29.05 kg/m²   Temp (24hrs), Av.3 °F (36.8 °C), Min:97.9 °F (36.6 °C), Max:98.8 °F (37.1 °C)    No results for input(s): POCGLU in the last 72 hours. I/O (24Hr):     Intake/Output Summary (Last 24 hours) at 2020 1112  Last data filed at 2020 0948  Gross per 24 hour   Intake 1496 ml   Output --   Net 1496 ml       Labs:  Hematology:  Recent Labs     20  0128 20  0614   WBC 12.5* 9.9   RBC 4.36 3.73*   HGB 12.1 10.2*   HCT 37.7 33.2*   MCV 86.5 89.0   MCH 27.8 27.3   MCHC 32.1 30.7   RDW 15.8* 16.0*   PLT Hypothyroidism 12/27/2020 Yes    Gastroesophageal reflux disease without esophagitis 12/27/2020 Yes    [facility] (Chronic) 12/27/2020 Yes    COPD (chronic obstructive pulmonary disease) ([facility] 12/27/2020 Yes    Hypertension 12/27/2020 Yes    Hypokalemia 12/27/2020 Yes    Anxiety 12/27/2020 Yes          Plan:        1. Transition to oral antibiotics, advance diet, going well  2. Continue abstinence from tobacco  3. Continue scheduled and as needed breathing treatments for COPD  4. Continue PPI  5. Neuro exam normal, no indication for head or neck trauma, options for care discussed and patient is comfortable with no additional imaging.     SHRADDHA Colon NP  12/29/2020  11:12 AM

## 2021-01-07 NOTE — H&P
Spoke with patient she is scheduled to come into the office today 01/07/2021 with doctor Garcia for her possible UTI    prolonged QT interval at 688. Past Medical History:     Past Medical History:   Diagnosis Date    Acid reflux     Anxiety     Asthma     Bipolar 1 disorder (HCC)     Bowel obstruction (HCC)     [facility]) [facility]     Crohn disease ([facility]     Depression     Gout     Hypertension     Hypothyroidism         Past Surgical History:     Past Surgical History:   Procedure Laterality Date    ABDOMEN SURGERY      BUNIONECTOMY Left     CHOLECYSTECTOMY      COLONOSCOPY  04/30/2007    no gross pathology seen in the colon and also 3-4 inches of the ileum    COLONOSCOPY  10/12/2017    normal    TONSILLECTOMY AND ADENOIDECTOMY      TUBAL LIGATION          Medications Prior to Admission:     Prior to Admission medications    Medication Sig Start Date End Date Taking? Authorizing Provider   tiZANidine (ZANAFLEX) 4 MG tablet Take 4 mg by mouth every 6 hours as needed    Historical Provider, MD   predniSONE (DELTASONE) 50 MG tablet Take 1 tablet by mouth daily 2/28/19   Terry Bhatt MD   cyclobenzaprine (FLEXERIL) 10 MG tablet Take 1 tablet by mouth 3 times daily as needed for Muscle spasms 5/9/18   Elizabeth Burger MD   Topiramate (TOPAMAX PO) Take by mouth    Historical Provider, MD   SUMAtriptan (IMITREX) 100 MG tablet Take 100 mg by mouth once as needed for Migraine    Historical Provider, MD   ipratropium-albuterol (DUONEB) 0.5-2.5 (3) MG/3ML SOLN nebulizer solution Inhale 3 mLs into the lungs 4 times daily 2/6/17   Shayy Zhu MD   mometasone-formoterol [facility]) 200-5 MCG/ACT inhaler Inhale 2 puffs into the lungs every 12 hours 2/6/17   Shayy Zhu MD   gabapentin (NEURONTIN) 100 MG capsule Take 100 mg by mouth 3 times daily    Historical Provider, MD   traZODone (DESYREL) 100 MG tablet Take 200 mg by mouth nightly. Historical Provider, MD   pravastatin (PRAVACHOL) 40 MG tablet Take 40 mg by mouth daily.     Historical Provider, MD   HYDROCHLOROTHIAZIDE PO Take 25 mg by Normal extraocular motion. Conjunctiva/sclera: Conjunctivae normal.      Right eye: Right conjunctiva is not injected. Left eye: Left conjunctiva is not injected. Pupils: Pupils are equal, round, and reactive to light. Pupils are equal.      Right eye: Pupil is reactive. Left eye: Pupil is reactive. Comments: Mydriasis    Neck:      Musculoskeletal: Neck supple. Thyroid: No thyromegaly. Vascular: No carotid bruit or JVD. Trachea: Trachea and phonation normal. No tracheal deviation. Cardiovascular:      Rate and Rhythm: Normal rate and regular rhythm. Pulses: Normal pulses. Heart sounds: Normal heart sounds. No murmur. Comments: Prolonged QT  Pulmonary:      Effort: Pulmonary effort is normal. No respiratory distress. Breath sounds: Normal breath sounds. No stridor. No decreased breath sounds. Abdominal:      General: Bowel sounds are normal. There is no distension. Palpations: Abdomen is soft. There is no mass. Tenderness: There is no abdominal tenderness. There is no guarding. Musculoskeletal:         General: No tenderness. Skin:     General: Skin is warm and dry. Findings: No erythema, lesion or rash. Neurological:      Mental Status: She is lethargic, disoriented and confused. GCS: GCS eye subscore is 2. GCS verbal subscore is 3. GCS motor subscore is 6. Cranial Nerves: No cranial nerve deficit. Motor: Motor function is intact.    Psychiatric:      Comments: Unable to assess secondary to mental status         Investigations:      Laboratory Testing:  Recent Results (from the past 24 hour(s))   Basic Metabolic Panel    Collection Time: 02/14/20  6:40 PM   Result Value Ref Range    Glucose 147 (H) 70 - 99 mg/dL    BUN 7 6 - 20 mg/dL    CREATININE 0.70 0.50 - 0.90 mg/dL    Bun/Cre Ratio NOT REPORTED 9 - 20    Calcium 8.3 (L) 8.6 - 10.4 mg/dL    Sodium 130 (L) 135 - 144 mmol/L    Potassium 2.5 (LL) 3.7 - 5.3 mmol/L Chloride 86 (L) 98 - 107 mmol/L    CO2 26 20 - 31 mmol/L    Anion Gap 18 (H) 9 - 17 mmol/L    GFR Non-African American >60 >60 mL/min    GFR African American >60 >60 mL/min    GFR Comment          GFR Staging NOT REPORTED    CBC Auto Differential    Collection Time: 02/14/20  6:40 PM   Result Value Ref Range    WBC 9.5 3.5 - 11.3 k/uL    RBC 4.36 3.95 - 5.11 m/uL    Hemoglobin 12.6 11.9 - 15.1 g/dL    Hematocrit 37.6 36.3 - 47.1 %    MCV 86.2 82.6 - 102.9 fL    MCH 28.9 25.2 - 33.5 pg    MCHC 33.5 28.4 - 34.8 g/dL    RDW 13.8 11.8 - 14.4 %    Platelets 575 890 - 834 k/uL    MPV 10.6 8.1 - 13.5 fL    NRBC Automated 0.0 0.0 per 100 WBC    Differential Type NOT REPORTED     Seg Neutrophils 76 (H) 36 - 65 %    Lymphocytes 14 (L) 24 - 43 %    Monocytes 7 3 - 12 %    Eosinophils % 1 1 - 4 %    Basophils 0 0 - 2 %    Immature Granulocytes 2 (H) 0 %    Segs Absolute 7.16 1.50 - 8.10 k/uL    Absolute Lymph # 1.36 1.10 - 3.70 k/uL    Absolute Mono # 0.64 0.10 - 1.20 k/uL    Absolute Eos # 0.13 0.00 - 0.44 k/uL    Basophils Absolute 0.04 0.00 - 0.20 k/uL    Absolute Immature Granulocyte 0.15 0.00 - 0.30 k/uL    WBC Morphology NOT REPORTED     RBC Morphology NOT REPORTED     Platelet Estimate NOT REPORTED    Lactic Acid, Whole Blood    Collection Time: 02/14/20  6:40 PM   Result Value Ref Range    Lactic Acid, Whole Blood 4.4 (H) 0.7 - 2.1 mmol/L   Hepatic Function Panel    Collection Time: 02/14/20  6:40 PM   Result Value Ref Range    Alb 3.8 3.5 - 5.2 g/dL    Alkaline Phosphatase 103 35 - 104 U/L    ALT 21 5 - 33 U/L    AST 28 <32 U/L    Total Bilirubin 0.21 (L) 0.3 - 1.2 mg/dL    Bilirubin, Direct <0.08 <0.31 mg/dL    Bilirubin, Indirect CANNOT BE CALCULATED 0.00 - 1.00 mg/dL    Total Protein 6.4 6.4 - 8.3 g/dL    Globulin NOT REPORTED 1.5 - 3.8 g/dL    Albumin/Globulin Ratio 1.5 1.0 - 2.5   Protime-INR    Collection Time: 02/14/20  6:40 PM   Result Value Ref Range    Protime 9.6 9.0 - 12.0 sec    INR 0.9    Ammonia

## 2021-07-17 ENCOUNTER — APPOINTMENT (OUTPATIENT)
Dept: GENERAL RADIOLOGY | Age: 56
End: 2021-07-17
Payer: MEDICARE

## 2021-07-17 ENCOUNTER — HOSPITAL ENCOUNTER (EMERGENCY)
Age: 56
Discharge: HOME | End: 2021-07-17
Attending: EMERGENCY MEDICINE
Payer: MEDICARE

## 2021-07-17 VITALS
HEIGHT: 66 IN | OXYGEN SATURATION: 98 % | BODY MASS INDEX: 29.89 KG/M2 | DIASTOLIC BLOOD PRESSURE: 86 MMHG | TEMPERATURE: 96.9 F | SYSTOLIC BLOOD PRESSURE: 158 MMHG | WEIGHT: 186 LBS | HEART RATE: 76 BPM | RESPIRATION RATE: 18 BRPM

## 2021-07-17 DIAGNOSIS — M16.12 PRIMARY OSTEOARTHRITIS OF LEFT HIP: Primary | ICD-10-CM

## 2021-07-17 PROCEDURE — 73502 X-RAY EXAM HIP UNI 2-3 VIEWS: CPT

## 2021-07-17 PROCEDURE — 99283 EMERGENCY DEPT VISIT LOW MDM: CPT

## 2021-07-17 PROCEDURE — 6370000000 HC RX 637 (ALT 250 FOR IP): Performed by: STUDENT IN AN ORGANIZED HEALTH CARE EDUCATION/TRAINING PROGRAM

## 2021-07-17 RX ORDER — FENTANYL CITRATE 50 UG/ML
25 INJECTION, SOLUTION INTRAMUSCULAR; INTRAVENOUS ONCE
Status: DISCONTINUED | OUTPATIENT
Start: 2021-07-17 | End: 2021-07-17

## 2021-07-17 RX ORDER — ONDANSETRON 4 MG/1
4 TABLET, ORALLY DISINTEGRATING ORAL ONCE
Status: COMPLETED | OUTPATIENT
Start: 2021-07-17 | End: 2021-07-17

## 2021-07-17 RX ORDER — ACETAMINOPHEN 500 MG
1000 TABLET ORAL 3 TIMES DAILY
Qty: 15 TABLET | Refills: 1 | Status: ON HOLD | OUTPATIENT
Start: 2021-07-17 | End: 2022-10-25 | Stop reason: HOSPADM

## 2021-07-17 RX ORDER — OXYCODONE HYDROCHLORIDE AND ACETAMINOPHEN 5; 325 MG/1; MG/1
1 TABLET ORAL ONCE
Status: COMPLETED | OUTPATIENT
Start: 2021-07-17 | End: 2021-07-17

## 2021-07-17 RX ORDER — ONDANSETRON 2 MG/ML
4 INJECTION INTRAMUSCULAR; INTRAVENOUS ONCE
Status: DISCONTINUED | OUTPATIENT
Start: 2021-07-17 | End: 2021-07-17

## 2021-07-17 RX ADMIN — OXYCODONE HYDROCHLORIDE AND ACETAMINOPHEN 1 TABLET: 5; 325 TABLET ORAL at 14:50

## 2021-07-17 RX ADMIN — ONDANSETRON 4 MG: 4 TABLET, ORALLY DISINTEGRATING ORAL at 14:50

## 2021-07-17 ASSESSMENT — ENCOUNTER SYMPTOMS
ABDOMINAL PAIN: 0
SHORTNESS OF BREATH: 0
BACK PAIN: 1
COLOR CHANGE: 0

## 2021-07-17 ASSESSMENT — PAIN SCALES - GENERAL
PAINLEVEL_OUTOF10: 8
PAINLEVEL_OUTOF10: 8

## 2021-07-17 ASSESSMENT — PAIN DESCRIPTION - LOCATION: LOCATION: HIP

## 2021-07-17 ASSESSMENT — PAIN DESCRIPTION - ORIENTATION: ORIENTATION: LEFT

## 2021-07-17 ASSESSMENT — PAIN DESCRIPTION - PAIN TYPE: TYPE: CHRONIC PAIN

## 2021-07-17 NOTE — ED PROVIDER NOTES
[facility] ED  Emergency Department Encounter  EmergencyMedicine Resident     Pt Anais Lane  MRN: [id number]  [place] 1965  Date of evaluation: 7/17/21  PCP:  Sandi Hayes MD    This patient was evaluated in the Emergency Department for symptoms described in the history of present illness. The patient was evaluated in the context of the global COVID-19 pandemic, which necessitated consideration that the patient might be at risk for infection with the SARS-CoV-2 virus that causes COVID-19. Institutional protocols and algorithms that pertain to the evaluation of patients at risk for COVID-19 are in a state of rapid change based on information released by regulatory bodies including the CDC and federal and state organizations. These policies and algorithms were followed during the patient's care in the ED. CHIEF COMPLAINT       Chief Complaint   Patient presents with    Hip Pain     left hip pain chronic. has appointment with Dr. Dominik Urena on tuesday but is in pain and unable to sleep     HISTORY OF PRESENT ILLNESS  (Location/Symptom, Timing/Onset, Context/Setting, Quality, Duration, Modifying Factors, Severity.)      Teodoro Hagen is a 61 y.o. female who presents with left hip pain since falling out of bed this morning. Pain starts deep in the left hip and radiates to her groin and leg. It is constant and severe. She reports severe pain after the fall, subjective weakness, patient able to ambulate with cane, and worsening of pain with ambulation. She has a history of progressive osteoarthritis of the left hip, last XR on 12/28/20. She states has been told that she may need a hip replacement. She reports that she has an appointment with orthopedic surgery coming up later next week to discuss surgical options.     PAST MEDICAL / SURGICAL / SOCIAL / FAMILY HISTORY      has a past medical history of Acid reflux, Anxiety, Asthma, Bipolar 1 disorder ([address] Bowel obstruction ([facility] [address] COPD (chronic obstructive pulmonary disease) ([facility] [address] Cro[facility] disease ([facility] Depression, Gout, Hypertension, and Hypothyroidism. has a past surgical history that includes Tonsillectomy and adenoidectomy; Tubal ligation; Abdomen surgery; Cholecystectomy; Bunionectomy (Left); Colonoscopy (04/30/2007); and Colonoscopy (10/12/2017). Social History     Socioeconomic History    Marital status: Single     Spouse name: Not on file    Number of children: Not on file    Years of education: Not on file    Highest education level: Not on file   Occupational History    Not on file   Tobacco Use    Smoking status: Current Every Day Smoker     Packs/day: 0.50     Years: 37.00     Pack years: 18.50     Types: Cigarettes    Smokeless tobacco: Never Used    Tobacco comment: has not smoked in the last 3 days   Vaping Use    Vaping Use: Never used   Substance and Sexual Activity    Alcohol use: Yes     Comment: socially    Drug use: Yes     Frequency: 1.0 times per week     Types: Marijuana     Comment: h/o of substance abuse but denies drug use    Sexual activity: Not on file   Other Topics Concern    Not on file   Social History Narrative    Not on file     Social Determinants of Health     Financial Resource Strain:     Difficulty of Paying Living Expenses:    Food Insecurity:     Worried About Running Out of Food in the Last Year:     Ran Out of Food in the Last Year:    Transportation Needs:     Lack of Transportation (Medical):      Lack of Transportation (Non-Medical):    Physical Activity:     Days of Exercise per Week:     Minutes of Exercise per Session:    Stress:     Feeling of Stress :    Social Connections:     Frequency of Communication with Friends and Family:     Frequency of Social Gatherings with Friends and Family:     Attends Anabaptism Services:     Active Member of Clubs or Organizations:     Attends Club or Organization Meetings:     Marital Status:    Intimate Partner Violence:     Fear of Current or Ex-Partner:     Emotionally Abused:     Physically Abused:     Sexually Abused:        Family History   Problem Relation Age of Onset    Diabetes Father     Lung Cancer Father     Hypertension Mother     Cancer Mother     Crohn's Disease Brother     Heart Disease Brother        Allergies:  Penicillins, Seasonal, and Tape [adhesive tape]    Home Medications:  Prior to Admission medications    Medication Sig Start Date End Date Taking? Authorizing Provider   acetaminophen (TYLENOL) 500 MG tablet Take 2 tablets by mouth 3 times daily 7/17/21  Yes Marce Kim,    budesonide-formoterol (SYMBICORT) 160-4.5 MCG/ACT AERO Inhale 2 puffs into the lungs 2 times daily    Historical Provider, MD   loperamide (IMODIUM) 2 MG capsule Take 2 mg by mouth 4 times daily as needed for Diarrhea    Historical Provider, MD   hydrOXYzine (ATARAX) 25 MG tablet Take 25-50 mg by mouth daily as needed for Anxiety    Historical Provider, MD   olopatadine (PATANOL) 0.1 % ophthalmic solution Place 1 drop into both eyes 2 times daily    Historical Provider, MD   Polyvinyl Alcohol-Povidone (REFRESH OP) Place 1 drop into both eyes 3 times daily    Historical Provider, MD   lidocaine (LIDODERM) 5 % Place 1 patch onto the skin daily 12 hours on, 12 hours off. Historical Provider, MD   pregabalin (LYRICA) 300 MG capsule Take 1 capsule by mouth 2 times daily for 14 days.  2/25/20 12/27/20  Erman Litten, APRN - CNP   FLUoxetine (PROZAC) 20 MG capsule Take 3 capsules by mouth Daily 2/26/20   Erman Litten, APRN - CNP   traZODone (DESYREL) 300 MG tablet Take 1 tablet by mouth nightly 2/25/20   Erman Litten, APRN - CNP   cholestyramine light 4 g packet Take 1 packet by mouth 3 times daily 2/25/20   Erman Litten, APRN - CNP   lisinopril (PRINIVIL;ZESTRIL) 40 MG tablet Take 40 mg by mouth daily    Historical Provider, MD   OXcarbazepine (TRILEPTAL) 600 MG tablet Take 600 mg by mouth 2 times daily    Historical Provider, MD   Topiramate (TOPAMAX PO) Take 150 mg by mouth 2 times daily     Historical Provider, MD   SUMAtriptan (IMITREX) 100 MG tablet Take 100 mg by mouth once as needed for Migraine    Historical Provider, MD   ipratropium-albuterol (DUONEB) 0.5-2.5 (3) MG/3ML SOLN nebulizer solution Inhale 3 mLs into the lungs 4 times daily 2/6/17   Josefina Casillas MD   gabapentin (NEURONTIN) 100 MG capsule Take 800 mg by mouth 3 times daily. Historical Provider, MD   pravastatin (PRAVACHOL) 40 MG tablet Take 40 mg by mouth daily. Historical Provider, MD   OMEPRAZOLE PO Take 40 mg by mouth 2 times daily. Historical Provider, MD   Levothyroxine Sodium (LEVOTHROID PO) Take 50 mcg by mouth     Historical Provider, MD   ATENOLOL PO Take 25 mg by mouth daily. Historical Provider, MD       REVIEW OF SYSTEMS    (2-9 systems for level 4, 10 or more for level 5)      Review of Systems   Constitutional: Negative for fever. Respiratory: Negative for shortness of breath. Cardiovascular: Negative for chest pain. Gastrointestinal: Negative for abdominal pain. Musculoskeletal: Positive for arthralgias, back pain, gait problem and joint swelling. Skin: Negative for color change. Neurological: Negative for headaches. PHYSICAL EXAM   (up to 7 for level 4, 8 or more for level 5)      INITIAL VITALS:   BP (!) 158/86   Pulse 76   Temp 96.9 °F (36.1 °C) (Tympanic)   Resp 18   Ht 5' 6\" (1.676 m)   Wt 186 lb (84.4 kg)   SpO2 98%   BMI 30.02 kg/m²     Physical Exam  Constitutional:       General: She is not in acute distress. HENT:      Head: Normocephalic and atraumatic. Eyes:      General: No scleral icterus. Cardiovascular:      Rate and Rhythm: Normal rate. Pulmonary:      Effort: Pulmonary effort is normal.   Abdominal:      General: Abdomen is flat. There is no distension. Musculoskeletal:      Cervical back: Neck supple.       Comments: Exquisitely tender to palpation over left hip to left greater trochanter. No bruising. No pain with plantar or dorsi flexion, tender to palpation with lateral and medial rotation of lower leg. Ambulatory with cane. Skin:     Capillary Refill: Capillary refill takes less than 2 seconds. Neurological:      Mental Status: She is alert. Sensory: No sensory deficit. Motor: No weakness. Psychiatric:         Mood and Affect: Mood normal.         DIFFERENTIAL  DIAGNOSIS     PLAN (Gianni Hazard / [place] [facility] / EKG):  Orders Placed This Encounter   Procedures    XR HIP 2-3 VW W PELVIS LEFT       MEDICATIONS ORDERED:  Orders Placed This Encounter   Medications    DISCONTD: fentaNYL (SUBLIMAZE) injection 25 mcg    DISCONTD: ondansetron (ZOFRAN) injection 4 mg    oxyCODONE-acetaminophen (PERCOCET) 5-325 MG per tablet 1 tablet    ondansetron (ZOFRAN-ODT) disintegrating tablet 4 mg    acetaminophen (TYLENOL) 500 MG tablet     Sig: Take 2 tablets by mouth 3 times daily     Dispense:  15 tablet     Refill:  1       DDX: osteoarthritis, hip dislocation, pelvis fracture, femur fracture, muscular strain    DIAGNOSTIC RESULTS / EMERGENCY DEPARTMENT COURSE / MDM     RADIOLOGY: XR hip, pelvis 7/17/21  Impression   No acute osseous abnormality.       There are significant degenerative changes, left greater than right, in the   hip joints bilaterally.         EMERGENCY DEPARTMENT COURSE:  Patient presenting with acute on chronic left hip pain after fall from bed this AM. She is tender to palpation over left hip and reports radiation to groin. XR hip and pelvis demonstrated significant degenerative changes. Discussed with patient use of anti-inflammatory medications, heat, and follow up with orthopedic surgeon for more definitive management. Patient is amendable to this plan and discharged. FINAL IMPRESSION      1.  Primary osteoarthritis of left hip          DISPOSITION / PLAN     DISPOSITION  - home      PATIENT REFERRED TO:  Corina Raymundo [facility] [address]  [place] [address]  [phone]    Schedule an appointment as soon as possible for a visit   If symptoms worsen, go to upcoming appointment with orthopedic surgery      DISCHARGE MEDICATIONS:  New Prescriptions    ACETAMINOPHEN (TYLENOL) 500 MG TABLET    Take 2 tablets by mouth 3 times daily       Alber Gallegos DO  Emergency Medicine Resident    (Please note that portions of thisnote were completed with a voice recognition program.  Efforts were made to edit the dictations but occasionally words are mis-transcribed.)       Ruben Manriquez DO  Resident  07/17/21 2045

## 2021-07-17 NOTE — ED PROVIDER NOTES
[facility]     Emergency Department     Faculty Attestation    I performed a history and physical examination of the patient and discussed management with the resident. I reviewed the resident´s note and agree with the documented findings and plan of care. Any areas of disagreement are noted on the chart. I was personally present for the key portions of any procedures. I have documented in the chart those procedures where I was not present during the key portions. I have reviewed the emergency nurses triage note. I agree with the chief complaint, past medical history, past surgical history, allergies, medications, social and family history as documented unless otherwise noted below. For Physician Assistant/ Nurse Practitioner cases/documentation I have personally evaluated this patient and have completed at least one if not all key elements of the E/M (history, physical exam, and MDM). Additional findings are as noted. Patient has osteoarthritis of the left hip, today fell out of bed onto her left hip and now is having increasing pain, patient was able to ambulate to her room, pain lateral left hip, no midline spine pain, peripheral neurovascular intact.      Vladimir Carreon MD  07/17/21 [id number]

## 2021-07-20 ENCOUNTER — OFFICE VISIT (OUTPATIENT)
Dept: ORTHOPEDIC SURGERY | Age: 56
End: 2021-07-20
Payer: MEDICARE

## 2021-07-20 VITALS — HEIGHT: 66 IN | BODY MASS INDEX: 29.89 KG/M2 | WEIGHT: 186 LBS

## 2021-07-20 DIAGNOSIS — M16.32 OSTEOARTHRITIS RESULTING FROM LEFT HIP DYSPLASIA: Primary | ICD-10-CM

## 2021-07-20 PROCEDURE — G8417 CALC BMI ABV UP PARAM F/U: HCPCS | Performed by: ORTHOPAEDIC SURGERY

## 2021-07-20 PROCEDURE — G8427 DOCREV CUR MEDS BY ELIG CLIN: HCPCS | Performed by: ORTHOPAEDIC SURGERY

## 2021-07-20 PROCEDURE — 3017F COLORECTAL CA SCREEN DOC REV: CPT | Performed by: ORTHOPAEDIC SURGERY

## 2021-07-20 PROCEDURE — 4004F PT TOBACCO SCREEN RCVD TLK: CPT | Performed by: ORTHOPAEDIC SURGERY

## 2021-07-20 PROCEDURE — 99203 OFFICE O/P NEW LOW 30 MIN: CPT | Performed by: ORTHOPAEDIC SURGERY

## 2021-07-20 RX ORDER — HYDROCODONE BITARTRATE AND ACETAMINOPHEN 5; 325 MG/1; MG/1
1 TABLET ORAL EVERY 6 HOURS PRN
Qty: 28 TABLET | Refills: 0 | Status: SHIPPED | OUTPATIENT
Start: 2021-07-20 | End: 2021-07-26

## 2021-07-20 NOTE — PROGRESS NOTES
This very pleasant 76-year-old lady is seen here because of pain in her left hip. Patient says that she has seen 2 other orthopedists in the town. The pain she describes is felt in the groin and radiates distally towards the knee. It is also described over the greater trochanter. The pain is worse with ambulation but also she has pain at night. There is no numbness or tingling. Patient uses a cane for ambulation. Examination: She walks with a significant limp. All motions in the hip very painful and there is marked restriction of the motion. She is short on that side. X-rays: I reviewed her x-rays and the CT scans from before and it shows significant osteoarthritis of the hip with dysplasia. This is what I had told the patient and reviewed the x-rays and CT scan with her. However when I was dictating I found she had x-rays carried out in 2011 for pain in the knee and the hip and they were normal.  She had another set of x-rays carried out 2016 which showed some progressive degenerative changes. But the acetabulum looked normal.  The CT scan of the abdomen pelvis does show that there is some erosive changes in the acetabulum. Diagnosis: Severe primary osteoarthritis of the left hip. Treatment we will arrange for her to have corticosteroid injection to help with her pain. And we will also schedule her for hip replacement. Patient has been afraid of asking for any pain medication and has just been taking Tylenol. I have given her a prescription for Norco.    I did call the patient up and explained to her that it is not dysplastic but progressive severe osteoarthritis of the hip.

## 2021-07-22 DIAGNOSIS — M16.32 OSTEOARTHRITIS RESULTING FROM LEFT HIP DYSPLASIA: Primary | ICD-10-CM

## 2021-07-23 ENCOUNTER — HOSPITAL ENCOUNTER (OUTPATIENT)
Dept: GENERAL RADIOLOGY | Age: 56
Discharge: HOME | End: 2021-07-25
Payer: MEDICARE

## 2021-07-23 DIAGNOSIS — M16.32 OSTEOARTHRITIS RESULTING FROM LEFT HIP DYSPLASIA: ICD-10-CM

## 2021-07-23 DIAGNOSIS — M16.12 PRIMARY OSTEOARTHRITIS OF LEFT HIP: ICD-10-CM

## 2021-07-23 PROCEDURE — 77002 NEEDLE LOCALIZATION BY XRAY: CPT | Performed by: ORTHOPAEDIC SURGERY

## 2021-07-23 PROCEDURE — 6360000002 HC RX W HCPCS: Performed by: ORTHOPAEDIC SURGERY

## 2021-07-23 PROCEDURE — 2500000003 HC RX 250 WO HCPCS: Performed by: ORTHOPAEDIC SURGERY

## 2021-07-23 PROCEDURE — 20610 DRAIN/INJ JOINT/BURSA W/O US: CPT | Performed by: ORTHOPAEDIC SURGERY

## 2021-07-23 PROCEDURE — 77002 NEEDLE LOCALIZATION BY XRAY: CPT

## 2021-07-23 PROCEDURE — 20610 DRAIN/INJ JOINT/BURSA W/O US: CPT

## 2021-07-23 RX ORDER — METHYLPREDNISOLONE ACETATE 40 MG/ML
40 INJECTION, SUSPENSION INTRA-ARTICULAR; INTRALESIONAL; INTRAMUSCULAR; SOFT TISSUE ONCE
Status: COMPLETED | OUTPATIENT
Start: 2021-07-23 | End: 2021-07-23

## 2021-07-23 RX ORDER — LIDOCAINE HYDROCHLORIDE 10 MG/ML
20 INJECTION, SOLUTION INFILTRATION; PERINEURAL ONCE
Status: COMPLETED | OUTPATIENT
Start: 2021-07-23 | End: 2021-07-23

## 2021-07-23 RX ADMIN — LIDOCAINE HYDROCHLORIDE 15 ML: 10 INJECTION, SOLUTION INFILTRATION; PERINEURAL at 12:29

## 2021-07-23 RX ADMIN — METHYLPREDNISOLONE ACETATE 40 MG: 40 INJECTION, SUSPENSION INTRA-ARTICULAR; INTRALESIONAL; INTRAMUSCULAR; SOFT TISSUE at 12:31

## 2021-07-23 ASSESSMENT — PAIN SCALES - GENERAL: PAINLEVEL_OUTOF10: 8

## 2021-07-25 NOTE — PROCEDURES
[address]                  [facility] [address]                                 PROCEDURE NOTE    PATIENT NAME: BASIM MCMULLEN                 :        1965  MED REC NO:   [id number]                             ROOM:  ACCOUNT NO:   [de-identified]                           ADMIT DATE: 2021  PROVIDER:     Shai Irby    DATE OF PROCEDURE:  2021    NON-OPERATING ROOM PROCEDURE    SURGEON:  Shai Irby MD    PROCEDURE:  Fluoroscopy. DESCRIPTION OF PROCEDURE:  The patient had been placed on the x-ray  table. Fluoroscopic view was used to identify the femoral neck on the  skin and then further fluoroscopic pictures were taken to identify the  position of the needle as it entered the joint. The needle was right over the femoral neck. Without the use of fluoroscopy, it was again extremely difficult to make  sure that the injection was intra-articular.         Jeyson Khoury    D: 2021 19:40:32       T: 2021 0:46:04     [email or web address]  Job#: [id number]     Doc#: [id number]    CC:

## 2021-07-26 DIAGNOSIS — M16.32 OSTEOARTHRITIS RESULTING FROM LEFT HIP DYSPLASIA: ICD-10-CM

## 2021-07-26 RX ORDER — HYDROCODONE BITARTRATE AND ACETAMINOPHEN 5; 325 MG/1; MG/1
1 TABLET ORAL EVERY 6 HOURS PRN
Qty: 28 TABLET | Refills: 0 | Status: SHIPPED | OUTPATIENT
Start: 2021-07-26 | End: 2021-08-02

## 2021-07-26 NOTE — PROCEDURES
[address] [facility] [address]                                 PROCEDURE NOTE    PATIENT NAME: Melody KAUFFMAN                 :        1965  MED REC NO:   [id number]                             ROOM:  ACCOUNT NO:   [de-identified]                           ADMIT DATE: 2021  PROVIDER:     Bridger Marquez    DATE OF PROCEDURE:  2021    NON-OPERATING ROOM PROCEDURE    SURGEON:  Bridger Marquez MD    PROCEDURE:  Aspiration injection, left hip. HISTORY:  This patient has been followed up in the office with severe  pain from primary osteoarthritis of the left hip and therefore it was  discussed with her, she would require aspiration injection for temporary  relief of pain before scheduling her for hip arthroplasty. DESCRIPTION OF PROCEDURE:  The patient was placed on the x-ray table. The abdomen was taped out of the way. The femoral neck was identified  using a metallic marker. The area was then prepped with Betadine and  then alcohol. A 1% plain lidocaine was injected intracutaneous,  subcutaneous, intramuscular up to the capsule. Then, the needle was  switched over to a gauge-20 spinal needle and was introduced into the  joint. Then, about 7 mL of 1% plain lidocaine and 40 mg of Depo-Medrol  was injected into the joint after the aspiration. The needle was  maintained in position for a short period and it was withdrawn and  manual pressure was maintained while the patient exercised the hip  joint. Pressure dressing was applied. The patient was then asked to walk in the x-ray department and she had  no pain at all and walked even without her cane. She was discharged  home with no complication.         Tom Ruiz    D: 2021 19:37:41       T: 2021 23:52:28     [email or web address]  Job#: [id number]     Doc#: [id number]

## 2021-08-02 DIAGNOSIS — M16.32 OSTEOARTHRITIS RESULTING FROM LEFT HIP DYSPLASIA: ICD-10-CM

## 2021-08-02 RX ORDER — HYDROCODONE BITARTRATE AND ACETAMINOPHEN 5; 325 MG/1; MG/1
1 TABLET ORAL EVERY 6 HOURS PRN
Qty: 28 TABLET | Refills: 0 | Status: SHIPPED | OUTPATIENT
Start: 2021-08-02 | End: 2021-08-10

## 2021-08-10 DIAGNOSIS — M16.32 OSTEOARTHRITIS RESULTING FROM LEFT HIP DYSPLASIA: ICD-10-CM

## 2021-08-10 RX ORDER — HYDROCODONE BITARTRATE AND ACETAMINOPHEN 5; 325 MG/1; MG/1
1 TABLET ORAL EVERY 6 HOURS PRN
Qty: 28 TABLET | Refills: 0 | Status: SHIPPED | OUTPATIENT
Start: 2021-08-10 | End: 2021-08-17

## 2021-08-12 DIAGNOSIS — Z01.818 PRE-OP TESTING: Primary | ICD-10-CM

## 2021-08-17 DIAGNOSIS — M16.32 OSTEOARTHRITIS RESULTING FROM LEFT HIP DYSPLASIA: ICD-10-CM

## 2021-08-17 RX ORDER — HYDROCODONE BITARTRATE AND ACETAMINOPHEN 5; 325 MG/1; MG/1
TABLET ORAL
Qty: 56 TABLET | Refills: 0 | Status: SHIPPED | OUTPATIENT
Start: 2021-08-17 | End: 2021-08-30

## 2021-08-30 DIAGNOSIS — M16.32 OSTEOARTHRITIS RESULTING FROM LEFT HIP DYSPLASIA: ICD-10-CM

## 2021-08-30 RX ORDER — HYDROCODONE BITARTRATE AND ACETAMINOPHEN 5; 325 MG/1; MG/1
1 TABLET ORAL EVERY 6 HOURS PRN
Qty: 56 TABLET | Refills: 0 | Status: SHIPPED | OUTPATIENT
Start: 2021-08-30 | End: 2021-09-13

## 2021-09-01 RX ORDER — SODIUM CHLORIDE, SODIUM LACTATE, POTASSIUM CHLORIDE, CALCIUM CHLORIDE 600; 310; 30; 20 MG/100ML; MG/100ML; MG/100ML; MG/100ML
1000 INJECTION, SOLUTION INTRAVENOUS CONTINUOUS
Status: CANCELLED | OUTPATIENT
Start: 2021-09-01

## 2021-09-03 ENCOUNTER — HOSPITAL ENCOUNTER (OUTPATIENT)
Dept: PREADMISSION TESTING | Age: 56
Discharge: HOME | End: 2021-09-07
Payer: MEDICARE

## 2021-09-03 ENCOUNTER — HOSPITAL ENCOUNTER (OUTPATIENT)
Dept: GENERAL RADIOLOGY | Age: 56
Discharge: HOME | End: 2021-09-05
Payer: MEDICARE

## 2021-09-03 VITALS
DIASTOLIC BLOOD PRESSURE: 58 MMHG | HEIGHT: 67 IN | TEMPERATURE: 98.7 F | SYSTOLIC BLOOD PRESSURE: 97 MMHG | OXYGEN SATURATION: 91 % | RESPIRATION RATE: 20 BRPM | HEART RATE: 75 BPM | BODY MASS INDEX: 26.21 KG/M2 | WEIGHT: 167 LBS

## 2021-09-03 DIAGNOSIS — Z01.818 PRE-OP TESTING: ICD-10-CM

## 2021-09-03 LAB
-: ABNORMAL
ALBUMIN SERPL-MCNC: 4.1 G/DL (ref 3.5–5.2)
ALBUMIN/GLOBULIN RATIO: 1.4 (ref 1–2.5)
ALP BLD-CCNC: 108 U/L (ref 35–104)
ALT SERPL-CCNC: 10 U/L (ref 5–33)
AMORPHOUS: ABNORMAL
ANION GAP SERPL CALCULATED.3IONS-SCNC: 12 MMOL/L (ref 9–17)
AST SERPL-CCNC: 14 U/L
BACTERIA: ABNORMAL
BILIRUB SERPL-MCNC: 0.43 MG/DL (ref 0.3–1.2)
BILIRUBIN URINE: ABNORMAL
BUN BLDV-MCNC: 8 MG/DL (ref 6–20)
BUN/CREAT BLD: ABNORMAL (ref 9–20)
CALCIUM SERPL-MCNC: 9 MG/DL (ref 8.6–10.4)
CASTS UA: ABNORMAL /LPF (ref 0–2)
CASTS UA: ABNORMAL /LPF (ref 0–2)
CHLORIDE BLD-SCNC: 102 MMOL/L (ref 98–107)
CO2: 29 MMOL/L (ref 20–31)
COLOR: ABNORMAL
COMMENT UA: ABNORMAL
CREAT SERPL-MCNC: 0.85 MG/DL (ref 0.5–0.9)
CRYSTALS, UA: ABNORMAL /HPF
EPITHELIAL CELLS UA: ABNORMAL /HPF (ref 0–5)
GFR AFRICAN AMERICAN: >60 ML/MIN
GFR NON-AFRICAN AMERICAN: >60 ML/MIN
GFR SERPL CREATININE-BSD FRML MDRD: ABNORMAL ML/MIN/{1.73_M2}
GFR SERPL CREATININE-BSD FRML MDRD: ABNORMAL ML/MIN/{1.73_M2}
GLUCOSE BLD-MCNC: 110 MG/DL (ref 70–99)
GLUCOSE URINE: NEGATIVE
HCT VFR BLD CALC: 40.4 % (ref 36.3–47.1)
HEMOGLOBIN: 13.1 G/DL (ref 11.9–15.1)
KETONES, URINE: ABNORMAL
LEUKOCYTE ESTERASE, URINE: NEGATIVE
MCH RBC QN AUTO: 27.6 PG (ref 25.2–33.5)
MCHC RBC AUTO-ENTMCNC: 32.4 G/DL (ref 28.4–34.8)
MCV RBC AUTO: 85.2 FL (ref 82.6–102.9)
MUCUS: ABNORMAL
NITRITE, URINE: NEGATIVE
NRBC AUTOMATED: 0 PER 100 WBC
OTHER OBSERVATIONS UA: ABNORMAL
PDW BLD-RTO: 18.2 % (ref 11.8–14.4)
PH UA: 6 (ref 5–8)
PLATELET # BLD: 395 K/UL (ref 138–453)
PMV BLD AUTO: 10.4 FL (ref 8.1–13.5)
POTASSIUM SERPL-SCNC: 2.9 MMOL/L (ref 3.7–5.3)
PROTEIN UA: ABNORMAL
RBC # BLD: 4.74 M/UL (ref 3.95–5.11)
RBC UA: ABNORMAL /HPF (ref 0–2)
RENAL EPITHELIAL, UA: ABNORMAL /HPF
SODIUM BLD-SCNC: 143 MMOL/L (ref 135–144)
SPECIFIC GRAVITY UA: 1.04 (ref 1–1.03)
TOTAL PROTEIN: 7 G/DL (ref 6.4–8.3)
TRICHOMONAS: ABNORMAL
TURBIDITY: CLEAR
URINE HGB: NEGATIVE
UROBILINOGEN, URINE: NORMAL
WBC # BLD: 6.6 K/UL (ref 3.5–11.3)
WBC UA: ABNORMAL /HPF (ref 0–5)
YEAST: ABNORMAL

## 2021-09-03 PROCEDURE — 85027 COMPLETE CBC AUTOMATED: CPT

## 2021-09-03 PROCEDURE — 71046 X-RAY EXAM CHEST 2 VIEWS: CPT

## 2021-09-03 PROCEDURE — 81001 URINALYSIS AUTO W/SCOPE: CPT

## 2021-09-03 PROCEDURE — 87641 MR-STAPH DNA AMP PROBE: CPT

## 2021-09-03 PROCEDURE — 93005 ELECTROCARDIOGRAM TRACING: CPT

## 2021-09-03 PROCEDURE — 36415 COLL VENOUS BLD VENIPUNCTURE: CPT

## 2021-09-03 PROCEDURE — 80053 COMPREHEN METABOLIC PANEL: CPT

## 2021-09-03 RX ORDER — TIZANIDINE 4 MG/1
4 TABLET ORAL 3 TIMES DAILY
Status: ON HOLD | COMMUNITY
End: 2021-09-18

## 2021-09-03 ASSESSMENT — PAIN DESCRIPTION - ORIENTATION: ORIENTATION: LEFT

## 2021-09-03 ASSESSMENT — PAIN DESCRIPTION - LOCATION: LOCATION: HIP;GROIN

## 2021-09-03 ASSESSMENT — PAIN DESCRIPTION - PAIN TYPE: TYPE: CHRONIC PAIN

## 2021-09-03 ASSESSMENT — PAIN DESCRIPTION - FREQUENCY: FREQUENCY: CONTINUOUS

## 2021-09-03 ASSESSMENT — PAIN SCALES - GENERAL: PAINLEVEL_OUTOF10: 7

## 2021-09-03 NOTE — H&P
History and Physical    Pt Name: Paul Reyez  MRN: [id number]  YOB: 1965  Date of evaluation: 9/3/2021  Primary Care Physician: Deondre Turnre MD    SUBJECTIVE:   History of Chief Complaint:    Paul Reyez is a 61 y.o. female who presents for PAT appointment. Patient complains of pain to her left hip for over 3 years. Patient denies any injury to the hip but has a history of osteoarthritis. She states her pain worsens when she walks and in the morning. She states her left hip pain feels like a \"grinding and popping. \" She tried pain management for pain relief without success. Patient states her right hip is asymptomatic. Patient has been scheduled for HIP TOTAL ARTHROPLASTY, DEPUY, FASCIA CASSYIACA, FLAT JULI, SUPINE, C-ARM - Left  Allergies  is allergic to azithromycin, methylprednisolone, penicillins, and tape [adhesive tape]. Medications  Prior to Admission medications    Medication Sig Start Date End Date Taking? Authorizing Provider   tiZANidine (ZANAFLEX) 4 MG tablet Take 4 mg by mouth three times daily   Yes Historical Provider, MD   HYDROcodone-acetaminophen (NORCO) 5-325 MG per tablet Take 1 tablet by mouth every 6 hours as needed for Pain for up to 14 days.  8/30/21 9/13/21 Yes Tia Hughes MD   acetaminophen (TYLENOL) 500 MG tablet Take 2 tablets by mouth 3 times daily  Patient taking differently: Take 1,000 mg by mouth 3 times daily as needed  7/17/21  Yes Marce Kim,    budesonide-formoterol (SYMBICORT) 160-4.5 MCG/ACT AERO Inhale 2 puffs into the lungs 2 times daily   Yes Historical Provider, MD   loperamide (IMODIUM) 2 MG capsule Take 2 mg by mouth 4 times daily as needed for Diarrhea   Yes Historical Provider, MD   hydrOXYzine (ATARAX) 25 MG tablet Take 25-50 mg by mouth daily as needed for Anxiety   Yes Historical Provider, MD   olopatadine (PATANOL) 0.1 % ophthalmic solution Place 1 drop into both eyes 2 times daily   Yes Historical Provider, MD   Polyvinyl Alcohol-Povidone (REFRESH OP) Place 1 drop into both eyes 3 times daily   Yes Historical Provider, MD   lidocaine (LIDODERM) 5 % Place 1 patch onto the skin daily 12 hours on, 12 hours off. Yes Historical Provider, MD   pregabalin (LYRICA) 300 MG capsule Take 1 capsule by mouth 2 times daily for 14 days. 2/25/20 9/3/21 Yes Valri Marker, APRN - CNP   FLUoxetine (PROZAC) 20 MG capsule Take 3 capsules by mouth Daily 2/26/20  Yes Valri Marker, APRN - CNP   lisinopril (PRINIVIL;ZESTRIL) 40 MG tablet Take 40 mg by mouth daily   Yes Historical Provider, MD   OXcarbazepine (TRILEPTAL) 600 MG tablet Take 600 mg by mouth 2 times daily   Yes Historical Provider, MD   Topiramate (TOPAMAX PO) Take 150 mg by mouth 2 times daily    Yes Historical Provider, MD   SUMAtriptan (IMITREX) 100 MG tablet Take 100 mg by mouth once as needed for Migraine   Yes Historical Provider, MD   ipratropium-albuterol (DUONEB) 0.5-2.5 (3) MG/3ML SOLN nebulizer solution Inhale 3 mLs into the lungs 4 times daily  Patient taking differently: Inhale 3 mLs into the lungs every 6 hours as needed  2/6/17  Yes Enrique Ocampo MD   gabapentin (NEURONTIN) 100 MG capsule Take 800 mg by mouth 3 times daily. Yes Historical Provider, MD   pravastatin (PRAVACHOL) 40 MG tablet Take 40 mg by mouth daily. Yes Historical Provider, MD   OMEPRAZOLE PO Take 40 mg by mouth 2 times daily. Yes Historical Provider, MD   Levothyroxine Sodium (LEVOTHROID PO) Take 50 mcg by mouth daily    Yes Historical Provider, MD   ATENOLOL PO Take 25 mg by mouth daily.    Yes Historical Provider, MD     Past Medical History    has a past medical history of Acid reflux, Anxiety, Arthritis, Asthma, Bipolar 1 disorder ([facility] Bowel obstruction ([facility] COPD (chronic obstructive pulmonary disease) ([facility] Crohn disease ([facility] Depression, Dry eye, Fibromyalgia, Gout, Headache, Hyperlipidemia, Hypertension, Hypothyroidism, Kidney stones, Muscle spasm, Neuropathy, Pain, Personal history of other medical treatment, Wears partial dentures, and Wellness examination. Past Surgical History   has a past surgical history that includes Tonsillectomy and adenoidectomy; Tubal ligation; Cholecystectomy; Bunionectomy (Left); Colonoscopy (2007); Colonoscopy (10/12/2017); and Abdomen surgery. Social History   reports that she has been smoking cigarettes. She has a 18.50 pack-year smoking history. She quit smokeless tobacco use about 20 years ago. Her smokeless tobacco use included chew. reports previous alcohol use. reports current drug use. Drug: Marijuana. Marital Status single  Children 1  Occupation does not work  Family History  Family Status   Relation Name Status    Father      Mother     [facility]     family history includes Cancer in her mother; Crohn's Disease in her brother; Diabetes in her father; Heart Disease in her brother; High Blood Pressure in her mother; Hypertension in her mother; Berneda Civatte in her father. Review of Systems:  CONSTITUTIONAL:   negative for fevers, chills, fatigue and malaise    EYES:   negative for double vision, blurred vision and photophobia    HEENT:   negative for tinnitus, epistaxis and sore throat     RESPIRATORY:   negative for cough, shortness of breath, wheezing     CARDIOVASCULAR:   negative for chest pain, palpitations, syncope, edema     GASTROINTESTINAL:   negative for nausea, vomiting     GENITOURINARY:   negative for incontinence    MUSCULOSKELETAL:   negative for neck or back pain, reports left hip pain   NEUROLOGICAL:   Negative for weakness and tingling  negative for headaches and dizziness     PSYCHIATRIC:   negative for anxiety       OBJECTIVE:   VITALS:  height is 5' 6.75\" (1.695 m) and weight is 167 lb (75.8 kg). Her temporal temperature is 98.7 °F (37.1 °C). Her blood pressure is 97/58 (abnormal) and her pulse is 75. Her respiration is 20 and oxygen saturation is 91%.    CONSTITUTIONAL:alert & oriented x 3, no acute distress. Calm and pleasant. SKIN:  Warm and dry, no rashes to exposed areas of skin. HEAD:  Normocephalic, atraumatic. EYES: PERRL. EOMs intact. EARS:  Intact and equal bilaterally. No edema or thickening, without lumps, lesions, or discharge. Hearing grossly WNL. NOSE:  Nares patent. No rhinorrhea   MOUTH/THROAT:  Mucous membranes pink and moist, uvula midline, lower teeth appear to be intact; edentulous upper teeth. NECK:supple, no lymphadenopathy  LUNGS: Respirations even and non-labored. Clear to auscultation bilaterally, no wheezes, rales, or rhonchi. CARDIOVASCULAR: Regular rate and rhythm, no murmurs/rubs/gallops   ABDOMEN: soft, non-tender, non-distended, bowel sounds active x 4   EXTREMITIES: No edema to bilateral lower extremities. No varicosities to bilateral lower extremities. NEUROLOGIC: CN II-XII are grossly intact. Gait is antalgic; walks with a cane. Testing:   EK/3/21  Labs pending: drawn 9/3/2021   Chest XRay:  9/3/21  MRSA nasal swab: 9/3/21  IMPRESSIONS:   Osteoarthritis of Left Hip.   PLANS:   HIP TOTAL ARTHROPLASTY, DEPUY, FASCIA ILLIACA, FLAT JULI, SUPINE, C-ARM - Left    SHRADDHA Castellanos - CNP  Electronically signed 9/3/2021 at 3:49 PM

## 2021-09-04 LAB
EKG ATRIAL RATE: 63 BPM
EKG P AXIS: 72 DEGREES
EKG P-R INTERVAL: 148 MS
EKG Q-T INTERVAL: 610 MS
EKG QRS DURATION: 90 MS
EKG QTC CALCULATION (BAZETT): 624 MS
EKG R AXIS: 82 DEGREES
EKG T AXIS: 67 DEGREES
EKG VENTRICULAR RATE: 63 BPM
MRSA, DNA, NASAL: NORMAL
SPECIMEN DESCRIPTION: NORMAL

## 2021-09-04 PROCEDURE — 93010 ELECTROCARDIOGRAM REPORT: CPT | Performed by: INTERNAL MEDICINE

## 2021-09-06 ENCOUNTER — CLINICAL DOCUMENTATION (OUTPATIENT)
Dept: ORTHOPEDIC SURGERY | Age: 56
End: 2021-09-06

## 2021-09-07 DIAGNOSIS — E87.6 HYPOKALEMIA: Primary | ICD-10-CM

## 2021-09-08 ENCOUNTER — TELEPHONE (OUTPATIENT)
Dept: ORTHOPEDIC SURGERY | Age: 56
End: 2021-09-08

## 2021-09-08 NOTE — TELEPHONE ENCOUNTER
We are canceling surgery for 9/17/21 Left total hip arthroplasty, patient has bed bugs and Dr. Yamilka Youssef wants to wait till she does not have them anymore. Will reschedule when she no longer has them.

## 2021-09-13 DIAGNOSIS — M16.32 OSTEOARTHRITIS RESULTING FROM LEFT HIP DYSPLASIA: ICD-10-CM

## 2021-09-13 RX ORDER — HYDROCODONE BITARTRATE AND ACETAMINOPHEN 5; 325 MG/1; MG/1
TABLET ORAL
Qty: 56 TABLET | Refills: 0 | Status: ON HOLD | OUTPATIENT
Start: 2021-09-13 | End: 2021-09-19 | Stop reason: HOSPADM

## 2021-09-17 ENCOUNTER — APPOINTMENT (OUTPATIENT)
Dept: GENERAL RADIOLOGY | Age: 56
DRG: 133 | End: 2021-09-17
Payer: MEDICARE

## 2021-09-17 ENCOUNTER — HOSPITAL ENCOUNTER (INPATIENT)
Age: 56
LOS: 2 days | Discharge: HOME | DRG: 133 | End: 2021-09-19
Attending: EMERGENCY MEDICINE | Admitting: INTERNAL MEDICINE
Payer: MEDICARE

## 2021-09-17 DIAGNOSIS — J18.9 PNEUMONIA OF BOTH LOWER LOBES DUE TO INFECTIOUS ORGANISM: Primary | ICD-10-CM

## 2021-09-17 DIAGNOSIS — J44.1 CHRONIC OBSTRUCTIVE PULMONARY DISEASE WITH ACUTE EXACERBATION (HCC): ICD-10-CM

## 2021-09-17 PROBLEM — R06.02 SHORTNESS OF BREATH: Status: ACTIVE | Noted: 2021-09-17

## 2021-09-17 PROBLEM — R79.89 ELEVATED BRAIN NATRIURETIC PEPTIDE (BNP) LEVEL: Status: ACTIVE | Noted: 2021-09-17

## 2021-09-17 LAB
-: NORMAL
ABSOLUTE EOS #: 0.11 K/UL (ref 0–0.44)
ABSOLUTE IMMATURE GRANULOCYTE: <0.03 K/UL (ref 0–0.3)
ABSOLUTE LYMPH #: 0.79 K/UL (ref 1.1–3.7)
ABSOLUTE MONO #: 0.5 K/UL (ref 0.1–1.2)
ADENOVIRUS PCR: NOT DETECTED
ALBUMIN SERPL-MCNC: 3.4 G/DL (ref 3.5–5.2)
ALBUMIN/GLOBULIN RATIO: 1.2 (ref 1–2.5)
ALLEN TEST: ABNORMAL
ALP BLD-CCNC: 122 U/L (ref 35–104)
ALT SERPL-CCNC: 61 U/L (ref 5–33)
ANION GAP SERPL CALCULATED.3IONS-SCNC: 11 MMOL/L (ref 9–17)
AST SERPL-CCNC: 81 U/L
BASOPHILS # BLD: 1 % (ref 0–2)
BASOPHILS ABSOLUTE: 0.05 K/UL (ref 0–0.2)
BILIRUB SERPL-MCNC: 0.93 MG/DL (ref 0.3–1.2)
BNP INTERPRETATION: ABNORMAL
BORDETELLA PARAPERTUSSIS: NOT DETECTED
BORDETELLA PERTUSSIS PCR: NOT DETECTED
BUN BLDV-MCNC: 5 MG/DL (ref 6–20)
BUN/CREAT BLD: ABNORMAL (ref 9–20)
C-REACTIVE PROTEIN: 81 MG/L (ref 0–5)
CALCIUM SERPL-MCNC: 8.4 MG/DL (ref 8.6–10.4)
CARBOXYHEMOGLOBIN: 3.4 % (ref 0–5)
CHLAMYDIA PNEUMONIAE BY PCR: NOT DETECTED
CHLORIDE BLD-SCNC: 101 MMOL/L (ref 98–107)
CO2: 26 MMOL/L (ref 20–31)
CORONAVIRUS 229E PCR: NOT DETECTED
CORONAVIRUS HKU1 PCR: NOT DETECTED
CORONAVIRUS NL63 PCR: NOT DETECTED
CORONAVIRUS OC43 PCR: NOT DETECTED
CREAT SERPL-MCNC: 0.45 MG/DL (ref 0.5–0.9)
D-DIMER QUANTITATIVE: 0.71 MG/L FEU
DIFFERENTIAL TYPE: ABNORMAL
EOSINOPHILS RELATIVE PERCENT: 2 % (ref 1–4)
FERRITIN: 155 UG/L (ref 13–150)
FIBRINOGEN: 533 MG/DL (ref 140–420)
FIO2: ABNORMAL
GFR AFRICAN AMERICAN: >60 ML/MIN
GFR NON-AFRICAN AMERICAN: >60 ML/MIN
GFR SERPL CREATININE-BSD FRML MDRD: ABNORMAL ML/MIN/{1.73_M2}
GFR SERPL CREATININE-BSD FRML MDRD: ABNORMAL ML/MIN/{1.73_M2}
GLUCOSE BLD-MCNC: 101 MG/DL (ref 70–99)
HCO3 VENOUS: 27.8 MMOL/L (ref 24–30)
HCT VFR BLD CALC: 37.5 % (ref 36.3–47.1)
HEMOGLOBIN: 11.9 G/DL (ref 11.9–15.1)
HUMAN METAPNEUMOVIRUS PCR: NOT DETECTED
IMMATURE GRANULOCYTES: 0 %
INFLUENZA A BY PCR: NOT DETECTED
INFLUENZA A H1 (2009) PCR: NORMAL
INFLUENZA A H1 PCR: NORMAL
INFLUENZA A H3 PCR: NORMAL
INFLUENZA B BY PCR: NOT DETECTED
LIPASE: 16 U/L (ref 13–60)
LYMPHOCYTES # BLD: 13 % (ref 24–43)
MAGNESIUM: 1.8 MG/DL (ref 1.6–2.6)
MCH RBC QN AUTO: 27.3 PG (ref 25.2–33.5)
MCHC RBC AUTO-ENTMCNC: 31.7 G/DL (ref 28.4–34.8)
MCV RBC AUTO: 86 FL (ref 82.6–102.9)
METHEMOGLOBIN: ABNORMAL % (ref 0–1.5)
MODE: ABNORMAL
MONOCYTES # BLD: 8 % (ref 3–12)
MYCOPLASMA PNEUMONIAE PCR: NOT DETECTED
NEGATIVE BASE EXCESS, VEN: ABNORMAL MMOL/L (ref 0–2)
NOTIFICATION TIME: ABNORMAL
NOTIFICATION: ABNORMAL
NRBC AUTOMATED: 0 PER 100 WBC
O2 DEVICE/FLOW/%: ABNORMAL
O2 SAT, VEN: 98.9 % (ref 60–85)
OXYHEMOGLOBIN: ABNORMAL % (ref 95–98)
PARAINFLUENZA 1 PCR: NOT DETECTED
PARAINFLUENZA 2 PCR: NOT DETECTED
PARAINFLUENZA 3 PCR: NOT DETECTED
PARAINFLUENZA 4 PCR: NOT DETECTED
PATIENT TEMP: 37
PCO2, VEN, TEMP ADJ: ABNORMAL MMHG (ref 39–55)
PCO2, VEN: 40.8 (ref 39–55)
PDW BLD-RTO: 17.5 % (ref 11.8–14.4)
PEEP/CPAP: ABNORMAL
PH VENOUS: 7.45 (ref 7.32–7.42)
PH, VEN, TEMP ADJ: ABNORMAL (ref 7.32–7.42)
PLATELET # BLD: 232 K/UL (ref 138–453)
PLATELET ESTIMATE: ABNORMAL
PMV BLD AUTO: 11.9 FL (ref 8.1–13.5)
PO2, VEN, TEMP ADJ: ABNORMAL MMHG (ref 30–50)
PO2, VEN: 122 (ref 30–50)
POSITIVE BASE EXCESS, VEN: 3.9 MMOL/L (ref 0–2)
POTASSIUM SERPL-SCNC: 3.3 MMOL/L (ref 3.7–5.3)
PRO-BNP: 1871 PG/ML
PSV: ABNORMAL
PT. POSITION: ABNORMAL
RBC # BLD: 4.36 M/UL (ref 3.95–5.11)
RBC # BLD: ABNORMAL 10*6/UL
REASON FOR REJECTION: NORMAL
RESP SYNCYTIAL VIRUS PCR: NOT DETECTED
RESPIRATORY RATE: ABNORMAL
RHINO/ENTEROVIRUS PCR: NOT DETECTED
SAMPLE SITE: ABNORMAL
SARS-COV-2, PCR: NOT DETECTED
SARS-COV-2, RAPID: NOT DETECTED
SEDIMENTATION RATE, ERYTHROCYTE: 30 MM (ref 0–30)
SEG NEUTROPHILS: 76 % (ref 36–65)
SEGMENTED NEUTROPHILS ABSOLUTE COUNT: 4.75 K/UL (ref 1.5–8.1)
SET RATE: ABNORMAL
SODIUM BLD-SCNC: 138 MMOL/L (ref 135–144)
SPECIMEN DESCRIPTION: NORMAL
SPECIMEN DESCRIPTION: NORMAL
TEXT FOR RESPIRATORY: ABNORMAL
TOTAL HB: ABNORMAL G/DL (ref 12–16)
TOTAL PROTEIN: 6.2 G/DL (ref 6.4–8.3)
TOTAL RATE: ABNORMAL
TROPONIN INTERP: NORMAL
TROPONIN T: NORMAL NG/ML
TROPONIN, HIGH SENSITIVITY: 14 NG/L (ref 0–14)
VT: ABNORMAL
WBC # BLD: 6.2 K/UL (ref 3.5–11.3)
WBC # BLD: ABNORMAL 10*3/UL
ZZ NTE CLEAN UP: ORDERED TEST: NORMAL
ZZ NTE WITH NAME CLEAN UP: SPECIMEN SOURCE: NORMAL

## 2021-09-17 PROCEDURE — 71045 X-RAY EXAM CHEST 1 VIEW: CPT

## 2021-09-17 PROCEDURE — 82728 ASSAY OF FERRITIN: CPT

## 2021-09-17 PROCEDURE — 2060000000 HC ICU INTERMEDIATE R&B

## 2021-09-17 PROCEDURE — 85025 COMPLETE CBC W/AUTO DIFF WBC: CPT

## 2021-09-17 PROCEDURE — 99222 1ST HOSP IP/OBS MODERATE 55: CPT | Performed by: NURSE PRACTITIONER

## 2021-09-17 PROCEDURE — 0202U NFCT DS 22 TRGT SARS-COV-2: CPT

## 2021-09-17 PROCEDURE — 85652 RBC SED RATE AUTOMATED: CPT

## 2021-09-17 PROCEDURE — 6370000000 HC RX 637 (ALT 250 FOR IP): Performed by: STUDENT IN AN ORGANIZED HEALTH CARE EDUCATION/TRAINING PROGRAM

## 2021-09-17 PROCEDURE — 83690 ASSAY OF LIPASE: CPT

## 2021-09-17 PROCEDURE — 87635 SARS-COV-2 COVID-19 AMP PRB: CPT

## 2021-09-17 PROCEDURE — 96374 THER/PROPH/DIAG INJ IV PUSH: CPT

## 2021-09-17 PROCEDURE — 87205 SMEAR GRAM STAIN: CPT

## 2021-09-17 PROCEDURE — 85379 FIBRIN DEGRADATION QUANT: CPT

## 2021-09-17 PROCEDURE — 82805 BLOOD GASES W/O2 SATURATION: CPT

## 2021-09-17 PROCEDURE — 80053 COMPREHEN METABOLIC PANEL: CPT

## 2021-09-17 PROCEDURE — 36415 COLL VENOUS BLD VENIPUNCTURE: CPT

## 2021-09-17 PROCEDURE — 87070 CULTURE OTHR SPECIMN AEROBIC: CPT

## 2021-09-17 PROCEDURE — 93005 ELECTROCARDIOGRAM TRACING: CPT | Performed by: STUDENT IN AN ORGANIZED HEALTH CARE EDUCATION/TRAINING PROGRAM

## 2021-09-17 PROCEDURE — 83735 ASSAY OF MAGNESIUM: CPT

## 2021-09-17 PROCEDURE — 86140 C-REACTIVE PROTEIN: CPT

## 2021-09-17 PROCEDURE — 85384 FIBRINOGEN ACTIVITY: CPT

## 2021-09-17 PROCEDURE — 6360000002 HC RX W HCPCS: Performed by: STUDENT IN AN ORGANIZED HEALTH CARE EDUCATION/TRAINING PROGRAM

## 2021-09-17 PROCEDURE — 99284 EMERGENCY DEPT VISIT MOD MDM: CPT

## 2021-09-17 PROCEDURE — 84484 ASSAY OF TROPONIN QUANT: CPT

## 2021-09-17 PROCEDURE — 83880 ASSAY OF NATRIURETIC PEPTIDE: CPT

## 2021-09-17 RX ORDER — LISINOPRIL 20 MG/1
40 TABLET ORAL DAILY
Status: DISCONTINUED | OUTPATIENT
Start: 2021-09-18 | End: 2021-09-18

## 2021-09-17 RX ORDER — FUROSEMIDE 10 MG/ML
20 INJECTION INTRAMUSCULAR; INTRAVENOUS ONCE
Status: COMPLETED | OUTPATIENT
Start: 2021-09-17 | End: 2021-09-18

## 2021-09-17 RX ORDER — SODIUM CHLORIDE 0.9 % (FLUSH) 0.9 %
5-40 SYRINGE (ML) INJECTION EVERY 12 HOURS SCHEDULED
Status: DISCONTINUED | OUTPATIENT
Start: 2021-09-17 | End: 2021-09-19 | Stop reason: HOSPADM

## 2021-09-17 RX ORDER — DOXYCYCLINE HYCLATE 100 MG
100 TABLET ORAL EVERY 12 HOURS
Status: DISCONTINUED | OUTPATIENT
Start: 2021-09-17 | End: 2021-09-19 | Stop reason: HOSPADM

## 2021-09-17 RX ORDER — SODIUM CHLORIDE 9 MG/ML
25 INJECTION, SOLUTION INTRAVENOUS PRN
Status: DISCONTINUED | OUTPATIENT
Start: 2021-09-17 | End: 2021-09-19 | Stop reason: HOSPADM

## 2021-09-17 RX ORDER — PREGABALIN 100 MG/1
300 CAPSULE ORAL 2 TIMES DAILY
Status: DISCONTINUED | OUTPATIENT
Start: 2021-09-17 | End: 2021-09-19

## 2021-09-17 RX ORDER — POTASSIUM CHLORIDE 20 MEQ/1
20 TABLET, EXTENDED RELEASE ORAL 2 TIMES DAILY WITH MEALS
Status: DISCONTINUED | OUTPATIENT
Start: 2021-09-17 | End: 2021-09-18

## 2021-09-17 RX ORDER — FLUOXETINE HYDROCHLORIDE 20 MG/1
60 CAPSULE ORAL DAILY
Status: DISCONTINUED | OUTPATIENT
Start: 2021-09-18 | End: 2021-09-18

## 2021-09-17 RX ORDER — IPRATROPIUM BROMIDE AND ALBUTEROL SULFATE 2.5; .5 MG/3ML; MG/3ML
1 SOLUTION RESPIRATORY (INHALATION)
Status: DISCONTINUED | OUTPATIENT
Start: 2021-09-18 | End: 2021-09-19 | Stop reason: HOSPADM

## 2021-09-17 RX ORDER — HYDROXYZINE HYDROCHLORIDE 25 MG/1
25 TABLET, FILM COATED ORAL DAILY PRN
Status: DISCONTINUED | OUTPATIENT
Start: 2021-09-17 | End: 2021-09-18

## 2021-09-17 RX ORDER — ALBUTEROL SULFATE 2.5 MG/3ML
2.5 SOLUTION RESPIRATORY (INHALATION) EVERY 4 HOURS PRN
Status: DISCONTINUED | OUTPATIENT
Start: 2021-09-17 | End: 2021-09-19 | Stop reason: HOSPADM

## 2021-09-17 RX ORDER — BUDESONIDE AND FORMOTEROL FUMARATE DIHYDRATE 160; 4.5 UG/1; UG/1
2 AEROSOL RESPIRATORY (INHALATION) 2 TIMES DAILY
Status: DISCONTINUED | OUTPATIENT
Start: 2021-09-17 | End: 2021-09-19 | Stop reason: HOSPADM

## 2021-09-17 RX ORDER — ACETAMINOPHEN 325 MG/1
650 TABLET ORAL EVERY 6 HOURS PRN
Status: DISCONTINUED | OUTPATIENT
Start: 2021-09-17 | End: 2021-09-19 | Stop reason: HOSPADM

## 2021-09-17 RX ORDER — SODIUM CHLORIDE 0.9 % (FLUSH) 0.9 %
5-40 SYRINGE (ML) INJECTION PRN
Status: DISCONTINUED | OUTPATIENT
Start: 2021-09-17 | End: 2021-09-19 | Stop reason: HOSPADM

## 2021-09-17 RX ORDER — ONDANSETRON 2 MG/ML
4 INJECTION INTRAMUSCULAR; INTRAVENOUS ONCE
Status: COMPLETED | OUTPATIENT
Start: 2021-09-17 | End: 2021-09-17

## 2021-09-17 RX ORDER — PRAVASTATIN SODIUM 20 MG
40 TABLET ORAL DAILY
Status: DISCONTINUED | OUTPATIENT
Start: 2021-09-18 | End: 2021-09-19 | Stop reason: HOSPADM

## 2021-09-17 RX ORDER — FUROSEMIDE 10 MG/ML
20 INJECTION INTRAMUSCULAR; INTRAVENOUS ONCE
Status: COMPLETED | OUTPATIENT
Start: 2021-09-17 | End: 2021-09-17

## 2021-09-17 RX ORDER — FUROSEMIDE 10 MG/ML
20 INJECTION INTRAMUSCULAR; INTRAVENOUS DAILY
Status: DISCONTINUED | OUTPATIENT
Start: 2021-09-18 | End: 2021-09-18

## 2021-09-17 RX ORDER — OXCARBAZEPINE 300 MG/1
600 TABLET, FILM COATED ORAL 2 TIMES DAILY
Status: DISCONTINUED | OUTPATIENT
Start: 2021-09-17 | End: 2021-09-18

## 2021-09-17 RX ORDER — ACETAMINOPHEN 650 MG/1
650 SUPPOSITORY RECTAL EVERY 6 HOURS PRN
Status: DISCONTINUED | OUTPATIENT
Start: 2021-09-17 | End: 2021-09-19 | Stop reason: HOSPADM

## 2021-09-17 RX ADMIN — ONDANSETRON 4 MG: 2 INJECTION INTRAMUSCULAR; INTRAVENOUS at 14:17

## 2021-09-17 RX ADMIN — FUROSEMIDE 20 MG: 10 INJECTION, SOLUTION INTRAMUSCULAR; INTRAVENOUS at 15:51

## 2021-09-17 RX ADMIN — POTASSIUM CHLORIDE 20 MEQ: 1500 TABLET, EXTENDED RELEASE ORAL at 16:49

## 2021-09-17 ASSESSMENT — ENCOUNTER SYMPTOMS
ABDOMINAL PAIN: 0
RHINORRHEA: 0
WHEEZING: 0
DIARRHEA: 1
SHORTNESS OF BREATH: 1
VOMITING: 0
EYE PAIN: 0
DIARRHEA: 0
BLOOD IN STOOL: 0
CHEST TIGHTNESS: 1
VOMITING: 1
STRIDOR: 0
NAUSEA: 1
COUGH: 1
CONSTIPATION: 0
SORE THROAT: 0

## 2021-09-17 ASSESSMENT — PAIN DESCRIPTION - PAIN TYPE: TYPE: CHRONIC PAIN

## 2021-09-17 ASSESSMENT — PAIN DESCRIPTION - LOCATION: LOCATION: HIP

## 2021-09-17 ASSESSMENT — PAIN SCALES - GENERAL: PAINLEVEL_OUTOF10: 4

## 2021-09-17 ASSESSMENT — PAIN DESCRIPTION - ORIENTATION: ORIENTATION: LEFT

## 2021-09-17 NOTE — ED PROVIDER NOTES
[facility] ED  eMERGENCY dEPARTMENT eNCOUnter   Attending Attestation     Pt Name: Yan Park  MRN: [id number]  [place] 1965  Date of evaluation: 9/17/21       Yan Park is a 61 y.o. female who presents with Cough, Shortness of Breath, and Nausea      History: Patient presents with cough shortness of breath and nausea. Patient has no other complaints this time. Patient is says she is vaccinated for Covid. Exam: Heart rate and rhythm are regular. Patient's lungs are tight with wheezing bilaterally. Abdomen is soft, nontender. Patient is awake, alert, acting appropriately. Patient has bilateral lower extremity edema. Plan for symptomatic treatment, ACS work-up including CHF work-up, Covid test, likely admission. Patient will require respiratory treatment as well. I performed a history and physical examination of the patient and discussed management with the resident. I reviewed the residents note and agree with the documented findings and plan of care. Any areas of disagreement are noted on the chart. I was personally present for the key portions of any procedures. I have documented in the chart those procedures where I was not present during the key portions. I have personally reviewed all images and agree with the resident's interpretation. I have reviewed the emergency nurses triage note. I agree with the chief complaint, past medical history, past surgical history, allergies, medications, social and family history as documented unless otherwise noted below. Documentation of the HPI, Physical Exam and Medical Decision Making performed by medical students or scribes is based on my personal performance of the HPI, PE and MDM. For Phys Assistant/ Nurse Practitioner cases/documentation I have had a face to face evaluation of this patient and have completed at least one if not all key elements of the E/M (history, physical exam, and MDM).  Additional findings are as noted.    For APC cases I have personally evaluated and examined the patient in conjunction with the APC and agree with the treatment plan and disposition of the patient as recorded by the APC.     Keshav Pfeiffer MD  Attending Emergency  Physician       Andrew Grey MD  09/17/21 9539

## 2021-09-17 NOTE — ED NOTES
The following labs were labeled with patient stickers & tubed to lab;    []Lavender   []On Ice  []Blue  []Green/ Yellow  []Green/ Black []On Ice  []Pink  []Red  []Yellow    [x]COVID-19 Swab []Rapid    []Urine Sample  []Pelvic Cultures    []Blood Cultures       Jeniffer Guerrero LPN  99/34/66 [id number]

## 2021-09-17 NOTE — Clinical Note
Patient Class: Inpatient [101]   REQUIRED: Diagnosis: Shortness of breath [786.05. ICD-9-CM]   Estimated Length of Stay: Estimated stay of more than 2 midnights   Admitting Provider: Charles Boyle [[id number]]   Telemetry/Cardiac Monitoring Required?: Yes

## 2021-09-17 NOTE — ED NOTES
Pt incontinent of urine and stool, bed and floor soiled. Pt cleaned up, placed in a new gown and a brief with a full bed change by writer and martha Pandey. Pt A&O x 4, on continuous monitor, does not appear in acute distress, RR even and unlabored, resting comfortably on stretcher with eyes open and call light in reach. Writer will continue to monitor pt closely.        Jeniffer Guerrero, JUAN JOSE  98/83/98 [id number]

## 2021-09-17 NOTE — ED PROVIDER NOTES
[facility] ED  Emergency Department Encounter  Emergency Medicine Resident     Pt Name: Tonia Hernandez  MRN: [id number]  [place] 1965  Date of evaluation: 9/17/21  PCP:  Hank Hernandez MD    [address] [place] [address]       Chief Complaint   Patient presents with    Cough    Shortness of Breath    Nausea       HISTORY OFPRESENT ILLNESS  (Location/Symptom, Timing/Onset, Context/Setting, Quality, Duration, Modifying Heidi Luke.)      Tonia Hernandez is a 61 y.o. female with past medical history significant for CHF, COPD, hypothyroidism, with acute worsening of shortness of breath over the past 1 week. Patient states that this started 1 week ago and has been coming increasingly short of breath over the past couple days. Patient has been using her home oxygen which is supposed to be 2-3 L as needed. Patient states that she has been requiring her home oxygen all the time. States that she has been having a cough which is nonproductive. Denies any current chest pain. Although does continue to feel short of breath. Has noted that she has been increasing in extremity edema over the past few days as well. Patient has had associated nausea and vomiting has not been able to tolerate oral intake. Patient denies any blood in her sputum, vomit or stool. Patient denies any recent antibiotics. Patient is vaccinated against COVID-19. Denies any pain she states that she generally feels unwell. PAST MEDICAL / SURGICAL / SOCIAL / FAMILY HISTORY      has a past medical history of Acid reflux, Anxiety, Arthritis, Asthma, Bipolar 1 disorder ([address] Bowel obstruction ([address] COPD (chronic obstructive pulmonary disease) ([address] Crohn disease ([address] Depression, Dry eye, Fibromyalgia, Gout, Headache, Hyperlipidemia, Hypertension, Hypothyroidism, Kidney stones, Muscle spasm, Neuropathy, Pain, Personal history of other medical treatment, Wears partial dentures, and Wellness examination.      has a past surgical history that includes Tonsillectomy and adenoidectomy; Tubal ligation; Cholecystectomy; Bunionectomy (Left); Colonoscopy (04/30/2007); Colonoscopy (10/12/2017); and Abdomen surgery. Social:  reports that she has been smoking cigarettes. She has a 18.50 pack-year smoking history. She quit smokeless tobacco use about 20 years ago. Her smokeless tobacco use included chew. She reports previous alcohol use. She reports current drug use. Drug: Marijuana. Family Hx:   Family History   Problem Relation Age of Onset    Diabetes Father     Lung Cancer Father     Hypertension Mother     Cancer Mother     High Blood Pressure Mother     Crohn's Disease Brother     Heart Disease Brother         Allergies:  Azithromycin, Methylprednisolone, Penicillins, and Tape [adhesive tape]    Home Medications:  Prior to Admission medications    Medication Sig Start Date End Date Taking? Authorizing Provider   HYDROcodone-acetaminophen (NORCO) 5-325 MG per tablet TAKE 1 TABLET BY MOUTH EVERY 6 HOURS AS NEEDED FOR PAIN FOR UP TO 14 DAYS. ** REDUCE DOSES TAKEN AS PAIN BECOMES MANAGEABLE** 9/13/21 9/27/21  Tia Hughes MD   tiZANidine (ZANAFLEX) 4 MG tablet Take 4 mg by mouth three times daily    Historical Provider, MD   acetaminophen (TYLENOL) 500 MG tablet Take 2 tablets by mouth 3 times daily  Patient taking differently: Take 1,000 mg by mouth 3 times daily as needed  7/17/21   Miguelina Kim,    budesonide-formoterol (SYMBICORT) 160-4.5 MCG/ACT AERO Inhale 2 puffs into the lungs 2 times daily    Historical Provider, MD   loperamide (IMODIUM) 2 MG capsule Take 2 mg by mouth 4 times daily as needed for Diarrhea    Historical Provider, MD   hydrOXYzine (ATARAX) 25 MG tablet Take 25-50 mg by mouth daily as needed for Anxiety    Historical Provider, MD   olopatadine (PATANOL) 0.1 % ophthalmic solution Place 1 drop into both eyes 2 times daily    Historical Provider, MD   Polyvinyl Alcohol-Povidone (REFRESH OP) Place 1 Gastrointestinal: Positive for nausea and vomiting. Negative for abdominal pain, constipation and diarrhea. Genitourinary: Negative for difficulty urinating and frequency. Musculoskeletal: Positive for arthralgias and myalgias. Skin: Negative for rash and wound. Neurological: Positive for headaches. Negative for dizziness, syncope and light-headedness. PHYSICAL EXAM   (up to 7 for level 4, 8 or more forlevel 5)      INITIAL VITALS:   Vitals:    09/17/21 1500   BP: 139/81   Pulse: 71   Resp: 16   Temp:    SpO2: 97%        Physical Exam  Vitals and nursing note reviewed. Constitutional:       General: She is not in acute distress. Appearance: Normal appearance. She is ill-appearing. She is not toxic-appearing or diaphoretic. HENT:      Head: Normocephalic and atraumatic. Nose: Nose normal.      Mouth/Throat:      Mouth: Mucous membranes are moist.      Pharynx: Oropharynx is clear. Eyes:      General:         Right eye: No discharge. Left eye: No discharge. Extraocular Movements: Extraocular movements intact. Pupils: Pupils are equal, round, and reactive to light. Cardiovascular:      Rate and Rhythm: Normal rate and regular rhythm. Pulses: Normal pulses. Heart sounds: Normal heart sounds. Pulmonary:      Effort: Pulmonary effort is normal. No respiratory distress. Breath sounds: Examination of the right-upper field reveals wheezing. Examination of the left-upper field reveals wheezing. Examination of the right-middle field reveals wheezing and rhonchi. Examination of the left-middle field reveals wheezing and rhonchi. Examination of the right-lower field reveals wheezing and rhonchi. Examination of the left-lower field reveals wheezing and rhonchi. Wheezing and rhonchi present. Chest:      Chest wall: No tenderness, crepitus or edema. Abdominal:      General: There is no distension. Palpations: Abdomen is soft. Tenderness:  There is no abdominal tenderness. There is no guarding or rebound. Musculoskeletal:      Right lower leg: Edema (1+ pitting) present. Left lower leg: Edema (1+ pitting) present. Skin:     General: Skin is warm and dry. Neurological:      General: No focal deficit present. Mental Status: She is alert and oriented to person, place, and time. Psychiatric:         Mood and Affect: Mood normal.         Thought Content: Thought content normal.         DIFFERENTIAL  DIAGNOSIS       Initial MDM/Plan: 61 y.o. female who presents with acute worsening shortness of breath and cough. Initial examination patient is resting in the bed is tearful and appears to feel unwell, tachypneic. On supplemental oxygen of 3 L nasal cannula saturating 97%. Vital signs otherwise are within normal limits. Plan for work-up with working diagnosis of COPD versus Covid versus CHF exacerbation. Nellie Grewali work-up remarkable for BNP elevated at 1800 with baseline BNP being in the 400s 1 year ago. Patient does have rales bilaterally. Lasix 20 mg provided. Chest x-ray is reading bilateral infiltrates. Patient is vaccinated and rapid swab is negative although high suspicion for Covid versus other viral infection. Will order respiratory virus panel for PCR testing. Having diffuse watery diarrhea suspected C. difficile per nursing staff. C. difficile toxin ordered. EMERGENCY DEPARTMENT COURSE:  ED Course as of Sep 17 1618   Fri Sep 17, 2021   1518 IMPRESSION:  Interval development of patchy bilateral interstitial and airspace  infiltrates compatible with bilateral pneumonia, possibly related to COVID   XR CHEST PORTABLE [MA]   1518 XR CHEST PORTABLE [MA]   1518 Negative   COVID-19, Rapid:    Specimen Description . NASOPHARYNGEAL SWAB   SARS-CoV-2, Rapid Not Detected [MA]      ED Course User Index  [MA] Katlyn Morrison,         We will provide Decadron IV 6 mg.   Review of medication list patient is allergic to methylprednisolone will not give steroids at that time. Patient admitted to Intermed team pending final respiratory virus panel. Boarding in the emergency department awaiting bed placement, awaiting admission order. Signed out to , admission orders and awaiting bed placement. DIAGNOSTIC RESULTS / EMERGENCYDEPARTMENT COURSE / MDM     LABS:  Labs Reviewed   CBC WITH AUTO DIFFERENTIAL - Abnormal; Notable for the following components:       Result Value    RDW 17.5 (*)     Seg Neutrophils 76 (*)     Lymphocytes 13 (*)     Absolute Lymph # 0.79 (*)     All other components within normal limits   COMPREHENSIVE METABOLIC PANEL W/ REFLEX TO MG FOR LOW K - Abnormal; Notable for the following components:    Glucose 101 (*)     BUN 5 (*)     CREATININE 0.45 (*)     Calcium 8.4 (*)     Potassium 3.3 (*)     Alkaline Phosphatase 122 (*)     ALT 61 (*)     AST 81 (*)     Total Protein 6.2 (*)     Albumin 3.4 (*)     All other components within normal limits   BRAIN NATRIURETIC PEPTIDE - Abnormal; Notable for the following components:    Pro-BNP 1,871 (*)     All other components within normal limits   BLOOD GAS, VENOUS - Abnormal; Notable for the following components:    pH, Jose F 7.448 (*)     pO2, Jose F 122.0 (*)     Positive Base Excess, Jose F 3.9 (*)     O2 Sat, Jose F 98.9 (*)     All other components within normal limits   COVID-19, RAPID   RESPIRATORY PANEL, MOLECULAR, WITH COVID-19   C DIFF TOXIN/ANTIGEN   TROPONIN   LIPASE   MAGNESIUM   FERRITIN   C-REACTIVE PROTEIN   SEDIMENTATION RATE   FIBRINOGEN         RADIOLOGY:  XR CHEST PORTABLE    Result Date: 9/17/2021  EXAMINATION: ONE XRAY VIEW OF THE CHEST 9/17/2021 10:17 am COMPARISON: 09/03/2021 HISTORY: ORDERING SYSTEM PROVIDED HISTORY: chest pain, shortness of breath TECHNOLOGIST PROVIDED HISTORY: chest pain, shortness of breath FINDINGS: Interval development of patchy bilateral interstitial and airspace infiltrates. .The cardiac size is normal. No  pleural effusions are seen.

## 2021-09-17 NOTE — ED NOTES
Pt presents to ED c/o nausea, cough, and SOB. Pt denies CP, abdominal pain, fever or chills. Pt A&O x 4, does not appear in acute distress, RR even and labored with audible wheezes, resting comfortably on stretcher with eyes closed and call light in reach. Pt has a significant medical hx of HTN and emphysema and states she wears O2 at home prn. Pt placed on O2 2L per nasal cannula. Pt placed in a gown, on continuous monitor with alarms set. Dr. Whitley Knapp at bedside to assess pt. Writer will continue to monitor pt closely.  Initial assessment performed by physician, Federico Moses will carry out initial orders/tasks and reassess pt.       Sonam Carrasquillo, PAULN  93/43/05 6692

## 2021-09-17 NOTE — ED NOTES
Pt A&O x 4, on continuous monitor, does not appear in acute distress, RR even and unlabored, resting comfortably on stretcher with eyes closed and call light in reach. Writer will continue to monitor pt closely.        Jorge A Oropeza, PAULN  99/64/44 0935

## 2021-09-17 NOTE — ED NOTES
Pt is a&o x4 in NAD with all vitals stable. Pt has both side rails up and call light within reach. Pt denies any needs at this time.    PT LIGHT TURNED OFF AND HEAD LAID DOWN PER PT REQUEST SO PT COULD REST     Claudia Garcia RN  09/17/21 1936

## 2021-09-17 NOTE — ED NOTES
The following labs were labeled with patient stickers & tubed to lab;    []Lavender   []On Ice  []Blue  []Green/ Yellow  []Green/ Black []On Ice  []Pink  []Red  []Yellow    [x]COVID-19 Swab [x]Rapid    []Urine Sample  []Pelvic Cultures    []Blood Cultures       Dio Price LPN  74/47/81 4018

## 2021-09-17 NOTE — ED PROVIDER NOTES
[facility] ED  Emergency Department  Emergency Medicine Resident Sign-out     Care of Trenton Otoole was assumed from Dr. Jesus Timmons and is being seen for Cough, Shortness of Breath, and Nausea  . The patient's initial evaluation and plan have been discussed with the prior provider who initially evaluated the patient.      EMERGENCY DEPARTMENT COURSE / MEDICAL DECISION MAKING:       MEDICATIONS GIVEN:  Orders Placed This Encounter   Medications    ondansetron (ZOFRAN) injection 4 mg    furosemide (LASIX) injection 20 mg    potassium chloride (KLOR-CON M) extended release tablet 20 mEq       LABS / RADIOLOGY:     Labs Reviewed   CBC WITH AUTO DIFFERENTIAL - Abnormal; Notable for the following components:       Result Value    RDW 17.5 (*)     Seg Neutrophils 76 (*)     Lymphocytes 13 (*)     Absolute Lymph # 0.79 (*)     All other components within normal limits   COMPREHENSIVE METABOLIC PANEL W/ REFLEX TO MG FOR LOW K - Abnormal; Notable for the following components:    Glucose 101 (*)     BUN 5 (*)     CREATININE 0.45 (*)     Calcium 8.4 (*)     Potassium 3.3 (*)     Alkaline Phosphatase 122 (*)     ALT 61 (*)     AST 81 (*)     Total Protein 6.2 (*)     Albumin 3.4 (*)     All other components within normal limits   BRAIN NATRIURETIC PEPTIDE - Abnormal; Notable for the following components:    Pro-BNP 1,871 (*)     All other components within normal limits   BLOOD GAS, VENOUS - Abnormal; Notable for the following components:    pH, Jose F 7.448 (*)     pO2, Jose F 122.0 (*)     Positive Base Excess, Jose F 3.9 (*)     O2 Sat, Jose F 98.9 (*)     All other components within normal limits   COVID-19, RAPID   RESPIRATORY PANEL, MOLECULAR, WITH COVID-19   C DIFF TOXIN/ANTIGEN   TROPONIN   LIPASE   MAGNESIUM   FERRITIN   C-REACTIVE PROTEIN   SEDIMENTATION RATE   FIBRINOGEN       XR CHEST (2 VW)    Result Date: 9/3/2021  EXAMINATION: TWO XRAY VIEWS OF THE CHEST 9/3/2021 3:32 pm COMPARISON: 08/12/2020 DISPOSITION:  []  Discharge   []  Transfer -    [x]  Admission -  Intermed   []  Against Medical Advice   []  Eloped   FOLLOW-UP: No follow-up provider specified.    DISCHARGE MEDICATIONS: New Prescriptions    No medications on file           Meche Lagunas DO  Emergency Medicine Resident  [address], [address] [place] [address]  Resident  09/18/21 0001

## 2021-09-17 NOTE — ED NOTES
Pt A&O x 4, on continuous monitor, does not appear in acute distress, RR even and unlabored, resting comfortably on stretcher with eyes closed and call light in reach. Writer will continue to monitor pt closely.        Ernst Swenson LPN  47/91/86 3527

## 2021-09-18 ENCOUNTER — APPOINTMENT (OUTPATIENT)
Dept: GENERAL RADIOLOGY | Age: 56
DRG: 133 | End: 2021-09-18
Payer: MEDICARE

## 2021-09-18 LAB
AMPHETAMINE SCREEN URINE: NEGATIVE
ANION GAP SERPL CALCULATED.3IONS-SCNC: 10 MMOL/L (ref 9–17)
BARBITURATE SCREEN URINE: NEGATIVE
BENZODIAZEPINE SCREEN, URINE: NEGATIVE
BNP INTERPRETATION: ABNORMAL
BUN BLDV-MCNC: 6 MG/DL (ref 6–20)
BUN/CREAT BLD: ABNORMAL (ref 9–20)
BUPRENORPHINE URINE: ABNORMAL
CALCIUM SERPL-MCNC: 8.1 MG/DL (ref 8.6–10.4)
CANNABINOID SCREEN URINE: NEGATIVE
CHLORIDE BLD-SCNC: 96 MMOL/L (ref 98–107)
CO2: 32 MMOL/L (ref 20–31)
COCAINE METABOLITE, URINE: NEGATIVE
CREAT SERPL-MCNC: 0.48 MG/DL (ref 0.5–0.9)
DIRECT EXAM: NORMAL
EKG ATRIAL RATE: 72 BPM
EKG P AXIS: 55 DEGREES
EKG P-R INTERVAL: 142 MS
EKG Q-T INTERVAL: 482 MS
EKG QRS DURATION: 88 MS
EKG QTC CALCULATION (BAZETT): 527 MS
EKG R AXIS: 81 DEGREES
EKG T AXIS: 62 DEGREES
EKG VENTRICULAR RATE: 72 BPM
ETHANOL PERCENT: <0.01 %
ETHANOL: <10 MG/DL
GFR AFRICAN AMERICAN: >60 ML/MIN
GFR NON-AFRICAN AMERICAN: >60 ML/MIN
GFR SERPL CREATININE-BSD FRML MDRD: ABNORMAL ML/MIN/{1.73_M2}
GFR SERPL CREATININE-BSD FRML MDRD: ABNORMAL ML/MIN/{1.73_M2}
GLUCOSE BLD-MCNC: 85 MG/DL (ref 70–99)
HCT VFR BLD CALC: 35.1 % (ref 36.3–47.1)
HEMOGLOBIN: 11 G/DL (ref 11.9–15.1)
Lab: NORMAL
MAGNESIUM: 1.7 MG/DL (ref 1.6–2.6)
MCH RBC QN AUTO: 27.3 PG (ref 25.2–33.5)
MCHC RBC AUTO-ENTMCNC: 31.3 G/DL (ref 28.4–34.8)
MCV RBC AUTO: 87.1 FL (ref 82.6–102.9)
MDMA URINE: ABNORMAL
METHADONE SCREEN, URINE: NEGATIVE
METHAMPHETAMINE, URINE: ABNORMAL
NRBC AUTOMATED: 0 PER 100 WBC
OPIATES, URINE: POSITIVE
OXYCODONE SCREEN URINE: NEGATIVE
PDW BLD-RTO: 17.5 % (ref 11.8–14.4)
PHENCYCLIDINE, URINE: NEGATIVE
PLATELET # BLD: 226 K/UL (ref 138–453)
PMV BLD AUTO: 11 FL (ref 8.1–13.5)
POTASSIUM SERPL-SCNC: 2.6 MMOL/L (ref 3.7–5.3)
POTASSIUM SERPL-SCNC: 2.7 MMOL/L (ref 3.7–5.3)
POTASSIUM SERPL-SCNC: 3.2 MMOL/L (ref 3.7–5.3)
PRO-BNP: 1462 PG/ML
PROPOXYPHENE, URINE: ABNORMAL
RBC # BLD: 4.03 M/UL (ref 3.95–5.11)
SODIUM BLD-SCNC: 138 MMOL/L (ref 135–144)
SPECIMEN DESCRIPTION: NORMAL
TEST INFORMATION: ABNORMAL
TRICYCLIC ANTIDEPRESSANTS, UR: ABNORMAL
TSH SERPL DL<=0.05 MIU/L-ACNC: 0.3 MIU/L (ref 0.3–5)
WBC # BLD: 5.3 K/UL (ref 3.5–11.3)

## 2021-09-18 PROCEDURE — 84443 ASSAY THYROID STIM HORMONE: CPT

## 2021-09-18 PROCEDURE — 6370000000 HC RX 637 (ALT 250 FOR IP): Performed by: NURSE PRACTITIONER

## 2021-09-18 PROCEDURE — 6360000002 HC RX W HCPCS: Performed by: NURSE PRACTITIONER

## 2021-09-18 PROCEDURE — 97535 SELF CARE MNGMENT TRAINING: CPT

## 2021-09-18 PROCEDURE — 6360000002 HC RX W HCPCS: Performed by: INTERNAL MEDICINE

## 2021-09-18 PROCEDURE — 83880 ASSAY OF NATRIURETIC PEPTIDE: CPT

## 2021-09-18 PROCEDURE — 80307 DRUG TEST PRSMV CHEM ANLYZR: CPT

## 2021-09-18 PROCEDURE — 97530 THERAPEUTIC ACTIVITIES: CPT

## 2021-09-18 PROCEDURE — 2580000003 HC RX 258: Performed by: INTERNAL MEDICINE

## 2021-09-18 PROCEDURE — 6370000000 HC RX 637 (ALT 250 FOR IP): Performed by: INTERNAL MEDICINE

## 2021-09-18 PROCEDURE — 83735 ASSAY OF MAGNESIUM: CPT

## 2021-09-18 PROCEDURE — 99254 IP/OBS CNSLTJ NEW/EST MOD 60: CPT | Performed by: INTERNAL MEDICINE

## 2021-09-18 PROCEDURE — 80048 BASIC METABOLIC PNL TOTAL CA: CPT

## 2021-09-18 PROCEDURE — 2700000000 HC OXYGEN THERAPY PER DAY

## 2021-09-18 PROCEDURE — 94761 N-INVAS EAR/PLS OXIMETRY MLT: CPT

## 2021-09-18 PROCEDURE — 99233 SBSQ HOSP IP/OBS HIGH 50: CPT | Performed by: INTERNAL MEDICINE

## 2021-09-18 PROCEDURE — 2580000003 HC RX 258: Performed by: NURSE PRACTITIONER

## 2021-09-18 PROCEDURE — 93010 ELECTROCARDIOGRAM REPORT: CPT | Performed by: INTERNAL MEDICINE

## 2021-09-18 PROCEDURE — 97166 OT EVAL MOD COMPLEX 45 MIN: CPT

## 2021-09-18 PROCEDURE — 71045 X-RAY EXAM CHEST 1 VIEW: CPT

## 2021-09-18 PROCEDURE — 86317 IMMUNOASSAY INFECTIOUS AGENT: CPT

## 2021-09-18 PROCEDURE — G0480 DRUG TEST DEF 1-7 CLASSES: HCPCS

## 2021-09-18 PROCEDURE — 2060000000 HC ICU INTERMEDIATE R&B

## 2021-09-18 PROCEDURE — 94640 AIRWAY INHALATION TREATMENT: CPT

## 2021-09-18 PROCEDURE — 84132 ASSAY OF SERUM POTASSIUM: CPT

## 2021-09-18 PROCEDURE — 80074 ACUTE HEPATITIS PANEL: CPT

## 2021-09-18 PROCEDURE — 85027 COMPLETE CBC AUTOMATED: CPT

## 2021-09-18 PROCEDURE — 86704 HEP B CORE ANTIBODY TOTAL: CPT

## 2021-09-18 PROCEDURE — 36415 COLL VENOUS BLD VENIPUNCTURE: CPT

## 2021-09-18 RX ORDER — OMEPRAZOLE 20 MG/1
40 CAPSULE, DELAYED RELEASE ORAL 2 TIMES DAILY
Status: DISCONTINUED | OUTPATIENT
Start: 2021-09-18 | End: 2021-09-19 | Stop reason: HOSPADM

## 2021-09-18 RX ORDER — POTASSIUM CHLORIDE 20 MEQ/1
40 TABLET, EXTENDED RELEASE ORAL ONCE
Status: COMPLETED | OUTPATIENT
Start: 2021-09-18 | End: 2021-09-18

## 2021-09-18 RX ORDER — POTASSIUM CHLORIDE 7.45 MG/ML
10 INJECTION INTRAVENOUS PRN
Status: DISCONTINUED | OUTPATIENT
Start: 2021-09-18 | End: 2021-09-19 | Stop reason: HOSPADM

## 2021-09-18 RX ORDER — MAGNESIUM SULFATE 1 G/100ML
1000 INJECTION INTRAVENOUS
Status: COMPLETED | OUTPATIENT
Start: 2021-09-18 | End: 2021-09-18

## 2021-09-18 RX ORDER — HYDROCODONE BITARTRATE AND ACETAMINOPHEN 5; 325 MG/1; MG/1
1 TABLET ORAL EVERY 6 HOURS PRN
Status: DISCONTINUED | OUTPATIENT
Start: 2021-09-18 | End: 2021-09-19 | Stop reason: HOSPADM

## 2021-09-18 RX ORDER — POTASSIUM CHLORIDE 20MEQ/15ML
60 LIQUID (ML) ORAL DAILY
Status: DISCONTINUED | OUTPATIENT
Start: 2021-09-18 | End: 2021-09-19 | Stop reason: HOSPADM

## 2021-09-18 RX ORDER — FUROSEMIDE 20 MG/1
20 TABLET ORAL DAILY
Status: DISCONTINUED | OUTPATIENT
Start: 2021-09-19 | End: 2021-09-19 | Stop reason: HOSPADM

## 2021-09-18 RX ORDER — TRAZODONE HYDROCHLORIDE 150 MG/1
300 TABLET ORAL NIGHTLY
Status: ON HOLD | COMMUNITY
End: 2021-09-19 | Stop reason: HOSPADM

## 2021-09-18 RX ORDER — 0.9 % SODIUM CHLORIDE 0.9 %
250 INTRAVENOUS SOLUTION INTRAVENOUS ONCE
Status: COMPLETED | OUTPATIENT
Start: 2021-09-18 | End: 2021-09-18

## 2021-09-18 RX ORDER — LEVOTHYROXINE SODIUM 0.05 MG/1
50 TABLET ORAL DAILY
Status: DISCONTINUED | OUTPATIENT
Start: 2021-09-18 | End: 2021-09-19 | Stop reason: HOSPADM

## 2021-09-18 RX ORDER — ONDANSETRON 2 MG/ML
4 INJECTION INTRAMUSCULAR; INTRAVENOUS EVERY 6 HOURS PRN
Status: DISCONTINUED | OUTPATIENT
Start: 2021-09-18 | End: 2021-09-19 | Stop reason: HOSPADM

## 2021-09-18 RX ORDER — TOPIRAMATE 50 MG/1
150 TABLET, FILM COATED ORAL 2 TIMES DAILY
Status: DISCONTINUED | OUTPATIENT
Start: 2021-09-18 | End: 2021-09-19 | Stop reason: HOSPADM

## 2021-09-18 RX ORDER — ATENOLOL 25 MG/1
25 TABLET ORAL DAILY
Status: DISCONTINUED | OUTPATIENT
Start: 2021-09-18 | End: 2021-09-18

## 2021-09-18 RX ORDER — POTASSIUM CHLORIDE 20 MEQ/1
40 TABLET, EXTENDED RELEASE ORAL PRN
Status: DISCONTINUED | OUTPATIENT
Start: 2021-09-18 | End: 2021-09-19 | Stop reason: HOSPADM

## 2021-09-18 RX ORDER — POTASSIUM CHLORIDE 20 MEQ/1
40 TABLET, EXTENDED RELEASE ORAL EVERY 4 HOURS
Status: DISCONTINUED | OUTPATIENT
Start: 2021-09-18 | End: 2021-09-18

## 2021-09-18 RX ORDER — FUROSEMIDE 20 MG/1
20 TABLET ORAL DAILY
Status: ON HOLD | COMMUNITY
End: 2021-09-19 | Stop reason: SDUPTHER

## 2021-09-18 RX ORDER — 0.9 % SODIUM CHLORIDE 0.9 %
500 INTRAVENOUS SOLUTION INTRAVENOUS ONCE
Status: COMPLETED | OUTPATIENT
Start: 2021-09-18 | End: 2021-09-18

## 2021-09-18 RX ORDER — MIDODRINE HYDROCHLORIDE 2.5 MG/1
2.5 TABLET ORAL
Status: DISCONTINUED | OUTPATIENT
Start: 2021-09-18 | End: 2021-09-19 | Stop reason: HOSPADM

## 2021-09-18 RX ORDER — NICOTINE 21 MG/24HR
1 PATCH, TRANSDERMAL 24 HOURS TRANSDERMAL DAILY
Status: DISCONTINUED | OUTPATIENT
Start: 2021-09-19 | End: 2021-09-19 | Stop reason: HOSPADM

## 2021-09-18 RX ORDER — LOPERAMIDE HYDROCHLORIDE 2 MG/1
2 CAPSULE ORAL 4 TIMES DAILY PRN
Status: DISCONTINUED | OUTPATIENT
Start: 2021-09-18 | End: 2021-09-19 | Stop reason: HOSPADM

## 2021-09-18 RX ORDER — TIZANIDINE 4 MG/1
4 TABLET ORAL 3 TIMES DAILY
Status: DISCONTINUED | OUTPATIENT
Start: 2021-09-18 | End: 2021-09-18

## 2021-09-18 RX ORDER — SUMATRIPTAN 50 MG/1
100 TABLET, FILM COATED ORAL
Status: DISPENSED | OUTPATIENT
Start: 2021-09-18 | End: 2021-09-18

## 2021-09-18 RX ORDER — LISINOPRIL 10 MG/1
10 TABLET ORAL DAILY
Status: DISCONTINUED | OUTPATIENT
Start: 2021-09-19 | End: 2021-09-19

## 2021-09-18 RX ADMIN — OMEPRAZOLE 40 MG: 20 CAPSULE, DELAYED RELEASE ORAL at 20:43

## 2021-09-18 RX ADMIN — OXCARBAZEPINE 600 MG: 300 TABLET, FILM COATED ORAL at 00:46

## 2021-09-18 RX ADMIN — POTASSIUM CHLORIDE 40 MEQ: 1500 TABLET, EXTENDED RELEASE ORAL at 08:53

## 2021-09-18 RX ADMIN — ONDANSETRON 4 MG: 2 INJECTION INTRAMUSCULAR; INTRAVENOUS at 15:25

## 2021-09-18 RX ADMIN — MIDODRINE HYDROCHLORIDE 2.5 MG: 2.5 TABLET ORAL at 17:56

## 2021-09-18 RX ADMIN — DOXYCYCLINE HYCLATE 100 MG: 100 TABLET, COATED ORAL at 23:32

## 2021-09-18 RX ADMIN — LEVOTHYROXINE SODIUM 50 MCG: 50 TABLET ORAL at 08:43

## 2021-09-18 RX ADMIN — ENOXAPARIN SODIUM 40 MG: 40 INJECTION SUBCUTANEOUS at 08:43

## 2021-09-18 RX ADMIN — MIDODRINE HYDROCHLORIDE 2.5 MG: 2.5 TABLET ORAL at 12:23

## 2021-09-18 RX ADMIN — MAGNESIUM SULFATE HEPTAHYDRATE 1000 MG: 1 INJECTION, SOLUTION INTRAVENOUS at 10:33

## 2021-09-18 RX ADMIN — POTASSIUM CHLORIDE 40 MEQ: 1500 TABLET, EXTENDED RELEASE ORAL at 20:48

## 2021-09-18 RX ADMIN — SODIUM CHLORIDE, PRESERVATIVE FREE 10 ML: 5 INJECTION INTRAVENOUS at 20:44

## 2021-09-18 RX ADMIN — TOPIRAMATE 150 MG: 50 TABLET, FILM COATED ORAL at 20:43

## 2021-09-18 RX ADMIN — SODIUM CHLORIDE 250 ML: 9 INJECTION, SOLUTION INTRAVENOUS at 10:24

## 2021-09-18 RX ADMIN — SODIUM CHLORIDE 500 ML: 9 INJECTION, SOLUTION INTRAVENOUS at 11:22

## 2021-09-18 RX ADMIN — MAGNESIUM SULFATE HEPTAHYDRATE 1000 MG: 1 INJECTION, SOLUTION INTRAVENOUS at 08:44

## 2021-09-18 RX ADMIN — PREGABALIN 300 MG: 100 CAPSULE ORAL at 00:46

## 2021-09-18 RX ADMIN — PRAVASTATIN SODIUM 40 MG: 20 TABLET ORAL at 08:43

## 2021-09-18 RX ADMIN — FUROSEMIDE 20 MG: 10 INJECTION, SOLUTION INTRAMUSCULAR; INTRAVENOUS at 00:47

## 2021-09-18 RX ADMIN — TOPIRAMATE 150 MG: 50 TABLET, FILM COATED ORAL at 12:25

## 2021-09-18 RX ADMIN — ONDANSETRON 4 MG: 2 INJECTION INTRAMUSCULAR; INTRAVENOUS at 01:52

## 2021-09-18 RX ADMIN — POTASSIUM CHLORIDE 60 MEQ: 40 SOLUTION ORAL at 15:25

## 2021-09-18 RX ADMIN — ONDANSETRON 4 MG: 2 INJECTION INTRAMUSCULAR; INTRAVENOUS at 22:48

## 2021-09-18 RX ADMIN — PREGABALIN 300 MG: 100 CAPSULE ORAL at 08:43

## 2021-09-18 RX ADMIN — TIZANIDINE 4 MG: 4 TABLET ORAL at 08:43

## 2021-09-18 RX ADMIN — ONDANSETRON 4 MG: 2 INJECTION INTRAMUSCULAR; INTRAVENOUS at 09:03

## 2021-09-18 RX ADMIN — LISINOPRIL 40 MG: 20 TABLET ORAL at 08:43

## 2021-09-18 RX ADMIN — DOXYCYCLINE HYCLATE 100 MG: 100 TABLET, COATED ORAL at 00:46

## 2021-09-18 RX ADMIN — SODIUM CHLORIDE, PRESERVATIVE FREE 5 ML: 5 INJECTION INTRAVENOUS at 00:48

## 2021-09-18 RX ADMIN — IPRATROPIUM BROMIDE AND ALBUTEROL SULFATE 1 AMPULE: .5; 3 SOLUTION RESPIRATORY (INHALATION) at 16:30

## 2021-09-18 RX ADMIN — OXCARBAZEPINE 600 MG: 300 TABLET, FILM COATED ORAL at 08:43

## 2021-09-18 RX ADMIN — POTASSIUM CHLORIDE 10 MEQ: 7.46 INJECTION, SOLUTION INTRAVENOUS at 07:26

## 2021-09-18 RX ADMIN — IPRATROPIUM BROMIDE AND ALBUTEROL SULFATE 1 AMPULE: .5; 3 SOLUTION RESPIRATORY (INHALATION) at 13:01

## 2021-09-18 RX ADMIN — DOXYCYCLINE HYCLATE 100 MG: 100 TABLET, COATED ORAL at 12:23

## 2021-09-18 RX ADMIN — FLUOXETINE HYDROCHLORIDE 60 MG: 20 CAPSULE ORAL at 06:01

## 2021-09-18 RX ADMIN — IPRATROPIUM BROMIDE AND ALBUTEROL SULFATE 1 AMPULE: .5; 3 SOLUTION RESPIRATORY (INHALATION) at 09:21

## 2021-09-18 RX ADMIN — HYDROCODONE BITARTRATE AND ACETAMINOPHEN 1 TABLET: 5; 325 TABLET ORAL at 20:44

## 2021-09-18 RX ADMIN — HYDROXYZINE HYDROCHLORIDE 25 MG: 25 TABLET ORAL at 01:56

## 2021-09-18 ASSESSMENT — PAIN SCALES - GENERAL
PAINLEVEL_OUTOF10: 5
PAINLEVEL_OUTOF10: 7
PAINLEVEL_OUTOF10: 5
PAINLEVEL_OUTOF10: 2
PAINLEVEL_OUTOF10: 5

## 2021-09-18 ASSESSMENT — PAIN DESCRIPTION - DESCRIPTORS
DESCRIPTORS: ACHING;DISCOMFORT
DESCRIPTORS: ACHING;DISCOMFORT

## 2021-09-18 ASSESSMENT — PAIN - FUNCTIONAL ASSESSMENT
PAIN_FUNCTIONAL_ASSESSMENT: ACTIVITIES ARE NOT PREVENTED
PAIN_FUNCTIONAL_ASSESSMENT: ACTIVITIES ARE NOT PREVENTED

## 2021-09-18 ASSESSMENT — PAIN DESCRIPTION - LOCATION
LOCATION: ABDOMEN
LOCATION: HIP
LOCATION: HIP
LOCATION: ABDOMEN

## 2021-09-18 ASSESSMENT — PAIN DESCRIPTION - PAIN TYPE
TYPE: CHRONIC PAIN
TYPE: ACUTE PAIN
TYPE: ACUTE PAIN
TYPE: CHRONIC PAIN

## 2021-09-18 ASSESSMENT — PAIN DESCRIPTION - ORIENTATION
ORIENTATION: LEFT
ORIENTATION: LEFT

## 2021-09-18 NOTE — PROGRESS NOTES
Physical Therapy         Physical Therapy Cancel Note      DATE: 2021    NAME: Mike Lemon  MRN: [id number]   : 1965      Patient not seen this date for Physical Therapy due to:    Patient is hypotensive 80/50 in supine. Will postpone  mobility for now and recheck at a later time.       Electronically signed by Cleopatra Berry PT on 2021 at 11:16 AM

## 2021-09-18 NOTE — PLAN OF CARE
Problem: Pain:  Goal: Pain level will decrease  Description: Pain level will decrease  Outcome: Met This Shift  Goal: Control of acute pain  Description: Control of acute pain  Outcome: Met This Shift  Goal: Control of chronic pain  Description: Control of chronic pain  Outcome: Met This Shift     Problem: Falls - Risk of:  Goal: Will remain free from falls  Description: Will remain free from falls  Outcome: Met This Shift  Goal: Absence of physical injury  Description: Absence of physical injury  Outcome: Met This Shift     Problem: OXYGENATION/RESPIRATORY FUNCTION  Goal: Patient will maintain patent airway  Outcome: Met This Shift  Goal: Patient will achieve/maintain normal respiratory rate/effort  Description: Respiratory rate and effort will be within normal limits for the patient  Outcome: Met This Shift     Problem: HEMODYNAMIC STATUS  Goal: Patient has stable vital signs and fluid balance  Outcome: Met This Shift     Problem: FLUID AND ELECTROLYTE IMBALANCE  Goal: Fluid and electrolyte balance are achieved/maintained  Outcome: Met This Shift     Problem: ACTIVITY INTOLERANCE/IMPAIRED MOBILITY  Goal: Mobility/activity is maintained at optimum level for patient  Outcome: Met This Shift

## 2021-09-18 NOTE — PROGRESS NOTES
[facility]  Office: [address], DO, Courtney Simple, DO, Ezequiel Hedge, DO, Roxane Bui Blood, DO, Christian Angel MD, Melida Vanessa MD, Reynaldo Conn MD, Lola Issa MD, Geraldo Allison MD, Bridgette Daniels MD, Shannan Murdock MD, Luke Koenig, DO, Mary Davidson, DO, Ye Escobedo MD,  Barb Hughes, DO, Loulou Jasso MD, Ben Camarillo MD, Rudy Salazar MD, Bay Robbins MD, , Cristofer Fuentes MD, Jonny Young MD, Miya Baird MD, Usha Herbert, [facility], [facility], CNP, Rosa Hopkins, CNP, Ned Streeter, CNS, Sadiq Perales, CNP, Karen Duque, CNP, Bradley Marquez, CNP, Sabino Rutherford, CNP, Lloyd Alexis, CNP, Rachna Daniel PA-C, Ethan Mcelroy, [facility], Silvia Alberto, CNP, Krupa Iniguez, CNP, Richard Rush, CNP, Steph Gutierrez, CNP, Soraya Tom, CNP, Pastor Mcfarlane, CNP, Nathalie Hagen, CNP, Shereen Chan, [address] [place] [address]    Progress Note    9/18/2021    7:36 AM    Name:   Odalis Chauhan  MRN:     [id number]     Acct:      [de-identified]   Room:   [address] [place] [address] Day:  1  Admit Date:  9/17/2021 12:05 PM    PCP:   Calixto Calvin MD  Code Status:  Full Code    Subjective:     C/C:   Chief Complaint   Patient presents with    Cough    Shortness of Breath    Nausea     Interval History Status: Worse    Patient seen and examined at bedside today. Patient appears very lethargic/weak. She is hypotensive the blood pressure in the 80s. Patient is able to wake up and respond to commands and is oriented to person place and time. Denies any chest pain, nausea, vomiting, difficulty breathing. Brief History: This is a 70-year-old female who initially presented to our hospital with complaints of shortness of breath. Patient has a history of HFpEF, COPD and chronic respiratory failure on 2 L of oxygen at home.  On exam, patient was found to be tachypneic and chest x-ray showed lateral infiltrates with crackles a bilaterally. BNP was elevated 1871. Covid was negative. Patient was admitted and treated with DuoNebs, doxycycline for the COPD /pneumonia and given 20 of IV Lasix for diuresis with improvement of symptoms. Patient hospital stay was complicated after developing hypotension. Patient does have history of polysubstance abuse. Maps reviewed, patient has multiple prescriptions of Lyrica/gabapentin/other sedatives prescribed to her which is what is most likely contributing to her hypotension/altered mental status. Med rec was done, antihypertensives were discontinued. Patient was given bolus of IV fluid and started on midodrine. Review of Systems:     Constitutional:  negative for chills, fevers, sweats says she feels \" drunk\"  Respiratory:  negative for cough,admits to  dyspnea on exertion, shortness of breath, and wheezing  Cardiovascular:  negative for chest pain, chest pressure/discomfort, lower extremity edema, palpitations  Gastrointestinal:  negative for abdominal pain, constipation, diarrhea, nausea, vomiting  Neurological:  negative for dizziness, headache    Medications: Allergies:     Allergies   Allergen Reactions    Azithromycin Other (See Comments)     'It doesn't work\"    Methylprednisolone      Elevates blood pressure    Penicillins Swelling     throat    Tape [place] Tape] Other (See Comments)     \" Tears my skin off\"       Current Meds:   Scheduled Meds:    potassium chloride  40 mEq Oral Q4H    sodium chloride flush  5-40 mL IntraVENous 2 times per day    enoxaparin  40 mg SubCUTAneous Daily    ipratropium-albuterol  1 ampule Inhalation Q4H WA    doxycycline hyclate  100 mg Oral Q12H    furosemide  20 mg IntraVENous Daily    budesonide-formoterol  2 puff Inhalation BID    FLUoxetine  60 mg Oral Daily    lisinopril  40 mg Oral Daily    OXcarbazepine  600 mg Oral BID    pravastatin  40 mg Oral Daily    pregabalin  300 mg Oral BID     Continuous Infusions:    sodium chloride PRN Meds: ondansetron, potassium chloride, sodium chloride flush, sodium chloride, acetaminophen **OR** acetaminophen, albuterol, hydrOXYzine    Data:     Past Medical History:   has a past medical history of Acid reflux, Anxiety, Arthritis, Asthma, Bipolar 1 disorder ([facility] [facility] COPD (chronic obstructive pulmonary disease) ([facility] Crohn disease ([facility] Depression, Dry eye, Fibromyalgia, Gout, Headache, Hyperlipidemia, Hypertension, Hypothyroidism, Kidney stones, Muscle spasm, Neuropathy, Pain, Personal history of other medical treatment, Wears partial dentures, and Wellness examination. Social History:   reports that she has been smoking cigarettes. She has a 18.50 pack-year smoking history. She quit smokeless tobacco use about 20 years ago. Her smokeless tobacco use included chew. She reports previous alcohol use. She reports current drug use. Drug: Marijuana. Family History:   Family History   Problem Relation Age of Onset    Diabetes Father     Lung Cancer Father     Hypertension Mother     Cancer Mother     High Blood Pressure Mother     Crohn's Disease Brother     Heart Disease Brother        Vitals:  /76   Pulse 60   Temp 98.4 °F (36.9 °C) (Axillary)   Resp 13   Ht 5' 6\" (1.676 m)   Wt 147 lb 4.3 oz (66.8 kg)   SpO2 92%   BMI 23.77 kg/m²   Temp (24hrs), Av.2 °F (36.8 °C), Min:97.5 °F (36.4 °C), Max:98.7 °F (37.1 °C)    No results for input(s): POCGLU in the last 72 hours. I/O (24Hr):     Intake/Output Summary (Last 24 hours) at 2021 0736  Last data filed at 2021 0434  Gross per 24 hour   Intake 455 ml   Output --   Net 455 ml       Labs:  Hematology:  Recent Labs     21  1339 21  1638 21  1732 21  0600   WBC 6.2  --   --  5.3   RBC 4.36  --   --  4.03   HGB 11.9  --   --  11.0*   HCT 37.5  --   --  35.1*   MCV 86.0  --   --  87.1   MCH 27.3  --   --  27.3   MCHC 31.7  --   --  31.3   RDW 17.5*  --   --  17.5*    --   --  226   MPV 11.9  --   --  11.0   SEDRATE  --   --  30  --    CRP  --  81.0*  --   --    DDIMER  --   --  0.71  --      Chemistry:  Recent Labs     09/17/21  1339 09/18/21  0600    138   K 3.3* 2.7*    96*   CO2 26 32*   GLUCOSE 101* 85   BUN 5* 6   CREATININE 0.45* 0.48*   MG 1.8 1.7   ANIONGAP 11 10   LABGLOM >60 >60   GFRAA >60 >60   CALCIUM 8.4* 8.1*   PROBNP 1,871*  --    TROPHS 14  --      Recent Labs     09/17/21  1339   PROT 6.2*   LABALBU 3.4*   AST 81*   ALT 61*   ALKPHOS 122*   BILITOT 0.93   LIPASE 16     ABG:  Lab Results   Component Value Date    FIO2 INFORMATION NOT PROVIDED 09/17/2021     Lab Results   Component Value Date/Time    SPECIAL NOT REPORTED 02/19/2020 02:24 PM     Lab Results   Component Value Date/Time    CULTURE NO GROWTH 6 DAYS 02/16/2020 06:02 PM       Radiology:  XR CHEST PORTABLE    Result Date: 9/17/2021  Interval development of patchy bilateral interstitial and airspace infiltrates compatible with bilateral pneumonia, possibly related to COVID. Bayron Palmer Physical Examination:        General appearance: Lethargic, acutely ill appearing female in bed, hypotensive.    Mental Status:  oriented to person, place and time and depressed affect  Lungs:  course to auscultation bilaterally with inspiratory wheezing, normal effort  Heart:  regular rate and rhythm, no murmur  Abdomen:  soft, nontender, nondistended, normal bowel sounds, no masses, hepatomegaly, splenomegaly  Extremities:  no edema, redness, tenderness in the calves  Skin:  no gross lesions, rashes, induration    Assessment:        Hospital Problems         Last Modified POA    * (Principal) Acute respiratory failure with hypoxia ([address] 9/17/2021 Yes    Chronic obstructive pulmonary disease with acute exacerbation ([address] 9/17/2021 Yes    Hypokalemia 9/17/2021 Yes    Bipolar disorder, unspecified ([address] 9/17/2021 Yes    Diarrhea 9/17/2021 Yes    Chronic renal insufficiency, stage III (moderate) ([address] 9/17/2021 Yes Shortness of breath 9/17/2021 Yes    Elevated brain natriuretic peptide (BNP) level 9/17/2021 Yes          Plan:        1. AMS from Toxic metabolic encephalopathy 2/2 Med effect and hypotension-new: see #2. Care everywhere reviewed, pt hospitalized in 2020 for similar issues. Per Maps pt did have lyrica, gabapentin, and other sedatives prescribed to her. Will hold all medications. Check urine drug screen and ethanol level. Adjust antihypertensive regimen. 2. Acute Hypotension-worse: Likely from med effect. Pt was given Atenolol, 40 mg of lasix, Trileptal, Lyrica and Zanaflex. Pts pharmacy was called, she does not take Trileptal, zanaflex or Atenolol. She is only on 10 of lisinopril. Will hold antihypertensives, adjust medication and give small bolus of normal saline in addition to starting midodrine which can be discontinued after BP improves. 3. Acute on chronic Hypoxic respiratory failure due to Community acquired pnuemonia/CHF exacerbation: CXR showing lateral infiltrates. On admission pt tachypneic/tachycardic with increasing O2 requirements compared to 3L . Start Doxycycline, Duo'nebs. Received one dose of IV lasix with improvement. Will transition to PO. No wheezing on exam, cough or sputum production so will hold steroids. Goal SPO2 88-92%  4. Acute Hypokalemia: replace potassium  5. Acute Hypomagnesemia: replace magnesium  6. Acute/chronic diarrhea with history of Chron's and Cdiff: Given new onset diarrhea, check cdiff. 7. History of cocaine use: check UDS  8. Bipolar disorder: Previously admitted to [facility] in 2/2020. Pt non compliant with home medications. Will benefit from psych consult once mentation improves to get recommendations. 9. Labs, imaging, ECG reviewed  10. PT/OT  11.  Discharge planning     Beula Held, DO  9/18/2021  7:36 AM

## 2021-09-18 NOTE — H&P
[facility]  Office: [address], DO, Arnaldo Dany, DO, Rico Chet, DO, Alfonso Cohen Blood, DO, Keira Díaz MD, Won Watters MD, Dontrell Herrera MD, Jacky Longo MD, Blair Ramos MD, Ricardo Higuera MD, Lorene Dugan MD, Pauline Meade, DO, Isatu Ramos, DO, Onesimo Pappas MD,  Sara Molina DO, Kiara Lincoln MD, Jose Acevedo MD, Bronson Oliveira MD, Robel Berry MD, , Suraj White MD, Nena Meek MD, Barrett Mckeon MD, Shubham Allison, [facility], [facility], CNP, Sherlyn Forrest, CNP, Alexander Acevedo, CNS, Deonna King, CNP, Rosa Mclaughlin, CNP, Abner Barraza, CNP, Solis Comer, CNP, Juno Hyatt, CNP, Catheryn Leventhal, PA-C, Kerri Parham, [facility], Sukumar Cardona, CNP, Emmanuelle Kenyon, CNP, Craig Kimbrough, CNP, Tash Pacheco, CNP, Sadia Poe, CNP, Durga Loving, CNP, Wanda Murphy, CNP, Davis Yoel, CNP         [address]    HISTORY AND PHYSICAL EXAMINATION            Date:   9/17/2021  Patient name:  Mike Lemon  Date of admission:  9/17/2021 12:05 PM  MRN:   [id number]  Account:  [de-identified]  YOB: 1965  PCP:    Jessica Duggan MD  Room:   47PED/PED  Code Status:    Prior    Chief Complaint:     Chief Complaint   Patient presents with    Cough    Shortness of Breath    Nausea       History Obtained From:     patient, electronic medical record    History of Present Illness:     Mike Lemon is a 61 y.o. Non- / non  female for CHF, COPD, hypothyroidism who presents with Cough, Shortness of Breath, and Nausea   and is admitted to the hospital for the management of Acute respiratory failure with hypoxia (HCC)/COPD exacerbation. Patient presents the emergency room for the concern of shortness of breath.   Patient states that the shortness of breath is worsening over the past week and gradually worsening over the past couple days requiring her to increase her supplemental oxygen from her baseline of 2 to 3 L. Patient verbalized a positive cough with out sputum, negative for hemoptysis, fevers or chills. Patient does admit to nausea and diarrhea, but does have underlying Crohn's disease. Upon arrival to the emergency room patient was noted to be tachypneic and saturating 97% on supplemental oxygen at 3 L/min nasal cannula. Chest x-ray did show lateral infiltrates with lung sounds revealing crackles/rales bilaterally. She was given 20 mg of IV Lasix and was worked up for [address] with a negative rapid and PCR. Past Medical History:     Past Medical History:   Diagnosis Date    Acid reflux     Anxiety     Arthritis     Left hip    Asthma     Bipolar 1 disorder (HCC)     Bowel obstruction (HCC)     COPD (chronic obstructive pulmonary disease) ([facility])     O2 3L PRN/ Dr. Aman Wilder [facility]/last seen 2020/appt.9-7-21 for Clearance    Crohn disease ([facility] [address]     Depression     Dry eye     Fibromyalgia     Gout     Headache     Hyperlipidemia     Hypertension     Hypothyroidism     Kidney stones     Muscle spasm     Neuropathy     Pain     left hip    Personal history of other medical treatment     Pt. seen by Dr. Delio Calvillo for clearance for this OR Left hip/ Normally pt. doesn't follow with Cardiology. [facility]    Wears partial dentures     upper    Wellness examination     PCP Tristian Noland MD/ genna/last seen 8-24-21        Past Surgical History:     Past Surgical History:   Procedure Laterality Date    ABDOMEN SURGERY      bowel obstruction OR    BUNIONECTOMY Left     CHOLECYSTECTOMY      COLONOSCOPY  04/30/2007    no gross pathology seen in the colon and also 3-4 inches of the ileum    COLONOSCOPY  10/12/2017    normal    TONSILLECTOMY AND ADENOIDECTOMY      TUBAL LIGATION          Medications Prior to Admission:     Prior to Admission medications    Medication Sig Start Date End Date Taking?  Authorizing Provider HYDROcodone-acetaminophen (NORCO) 5-325 MG per tablet TAKE 1 TABLET BY MOUTH EVERY 6 HOURS AS NEEDED FOR PAIN FOR UP TO 14 DAYS. ** REDUCE DOSES TAKEN AS PAIN BECOMES MANAGEABLE** 9/13/21 9/27/21  Jose G Helms MD   tiZANidine (ZANAFLEX) 4 MG tablet Take 4 mg by mouth three times daily    Historical Provider, MD   acetaminophen (TYLENOL) 500 MG tablet Take 2 tablets by mouth 3 times daily  Patient taking differently: Take 1,000 mg by mouth 3 times daily as needed  7/17/21   Nilda Kim,    budesonide-formoterol (SYMBICORT) 160-4.5 MCG/ACT AERO Inhale 2 puffs into the lungs 2 times daily    Historical Provider, MD   loperamide (IMODIUM) 2 MG capsule Take 2 mg by mouth 4 times daily as needed for Diarrhea    Historical Provider, MD   hydrOXYzine (ATARAX) 25 MG tablet Take 25-50 mg by mouth daily as needed for Anxiety    Historical Provider, MD   olopatadine (PATANOL) 0.1 % ophthalmic solution Place 1 drop into both eyes 2 times daily    Historical Provider, MD   Polyvinyl Alcohol-Povidone (REFRESH OP) Place 1 drop into both eyes 3 times daily    Historical Provider, MD   lidocaine (LIDODERM) 5 % Place 1 patch onto the skin daily 12 hours on, 12 hours off. Historical Provider, MD   pregabalin (LYRICA) 300 MG capsule Take 1 capsule by mouth 2 times daily for 14 days.  2/25/20 9/3/21  SHRADDHA Flores CNP   FLUoxetine (PROZAC) 20 MG capsule Take 3 capsules by mouth Daily 2/26/20   SHRADDHA Flores CNP   lisinopril (PRINIVIL;ZESTRIL) 40 MG tablet Take 40 mg by mouth daily    Historical Provider, MD   OXcarbazepine (TRILEPTAL) 600 MG tablet Take 600 mg by mouth 2 times daily    Historical Provider, MD   Topiramate (TOPAMAX PO) Take 150 mg by mouth 2 times daily     Historical Provider, MD   SUMAtriptan (IMITREX) 100 MG tablet Take 100 mg by mouth once as needed for Migraine    Historical Provider, MD   ipratropium-albuterol (DUONEB) 0.5-2.5 (3) MG/3ML SOLN nebulizer solution Inhale 3 mLs into the lungs 4 times daily  Patient taking differently: Inhale 3 mLs into the lungs every 6 hours as needed  2/6/17   Leonardo Ruiz MD   gabapentin (NEURONTIN) 100 MG capsule Take 800 mg by mouth 3 times daily. Historical Provider, MD   pravastatin (PRAVACHOL) 40 MG tablet Take 40 mg by mouth daily. Historical Provider, MD   OMEPRAZOLE PO Take 40 mg by mouth 2 times daily. Historical Provider, MD   Levothyroxine Sodium (LEVOTHROID PO) Take 50 mcg by mouth daily     Historical Provider, MD   ATENOLOL PO Take 25 mg by mouth daily. Historical Provider, MD        Allergies:     Azithromycin, Methylprednisolone, Penicillins, and Tape [adhesive tape]    Social History:     Tobacco:    reports that she has been smoking cigarettes. She has a 18.50 pack-year smoking history. She quit smokeless tobacco use about 20 years ago. Her smokeless tobacco use included chew. Alcohol:      reports previous alcohol use. Drug Use:  reports current drug use. Drug: Marijuana. Family History:     Family History   Problem Relation Age of Onset    Diabetes Father     Lung Cancer Father     Hypertension Mother     Cancer Mother     High Blood Pressure Mother     Crohn's Disease Brother     Heart Disease Brother        Review of Systems:     Positive and Negative as described in HPI. Review of Systems   Constitutional: Negative for chills, diaphoresis and fever. HENT: Negative for congestion and hearing loss. Respiratory: Positive for cough and shortness of breath. Negative for wheezing and stridor. Cardiovascular: Positive for leg swelling. Negative for chest pain and palpitations. Gastrointestinal: Positive for diarrhea and nausea. Negative for abdominal pain, blood in stool, constipation and vomiting. Genitourinary: Negative for dysuria and frequency. Musculoskeletal: Negative for myalgias. Skin: Negative for rash. Neurological: Negative for dizziness, seizures and headaches.    Psychiatric/Behavioral: The patient is not nervous/anxious. Physical Exam:   /78   Pulse 72   Temp 97.5 °F (36.4 °C) (Oral)   Resp 14   Ht 5' 6\" (1.676 m)   Wt 165 lb (74.8 kg)   SpO2 95%   BMI 26.63 kg/m²   Temp (24hrs), Av.5 °F (36.4 °C), Min:97.5 °F (36.4 °C), Max:97.5 °F (36.4 °C)    No results for input(s): POCGLU in the last 72 hours. No intake or output data in the 24 hours ending 21    Physical Exam  Vitals and nursing note reviewed. Constitutional:       General: She is not in acute distress. Appearance: She is well-developed. She is not ill-appearing or diaphoretic. Comments: Chronically ill appearing    HENT:      Head: Normocephalic and atraumatic. Right Ear: Hearing normal.      Left Ear: Hearing normal.      Nose: Nose normal. No rhinorrhea. Mouth/Throat:      Dentition: Abnormal dentition (eudentous ). Does not have dentures. Eyes:      General: Lids are normal.      Extraocular Movements:      Right eye: Normal extraocular motion. Left eye: Normal extraocular motion. Conjunctiva/sclera: Conjunctivae normal.      Right eye: Right conjunctiva is not injected. Left eye: Left conjunctiva is not injected. Pupils: Pupils are equal, round, and reactive to light. Pupils are equal.      Right eye: Pupil is reactive. Left eye: Pupil is reactive. Neck:      Thyroid: No thyromegaly. Vascular: No carotid bruit. Trachea: Trachea and phonation normal. No tracheal deviation. Cardiovascular:      Rate and Rhythm: Normal rate and regular rhythm. Pulses: Normal pulses. Heart sounds: Normal heart sounds. No murmur heard. Pulmonary:      Effort: Pulmonary effort is normal. No tachypnea or respiratory distress. Breath sounds: No stridor. Rhonchi and rales present. No decreased breath sounds. Comments: Supplemental oxygen 2 lpm nc   Abdominal:      General: Bowel sounds are normal. There is no distension.       Palpations: Abdomen is soft. There is no mass. Tenderness: There is no abdominal tenderness. There is no guarding. Musculoskeletal:         General: No tenderness. Cervical back: Neck supple. Right lower le+ Edema present. Left lower le+ Edema present. Skin:     General: Skin is warm and dry. Findings: No erythema, lesion or rash. Neurological:      General: No focal deficit present. Mental Status: She is alert and oriented to person, place, and time. She is not disoriented. GCS: GCS eye subscore is 4. GCS verbal subscore is 5. GCS motor subscore is 6. Cranial Nerves: No cranial nerve deficit. Sensory: No sensory deficit. Psychiatric:         Speech: Speech normal.         Behavior: Behavior normal. Behavior is cooperative.          Investigations:      Laboratory Testing:  Recent Results (from the past 24 hour(s))   EKG 12 Lead    Collection Time: 21 12:46 PM   Result Value Ref Range    Ventricular Rate 72 BPM    Atrial Rate 72 BPM    P-R Interval 142 ms    QRS Duration 88 ms    Q-T Interval 482 ms    QTc Calculation (Bazett) 527 ms    P Axis 55 degrees    R Axis 81 degrees    T Axis 62 degrees   CBC Auto Differential    Collection Time: 21  1:39 PM   Result Value Ref Range    WBC 6.2 3.5 - 11.3 k/uL    RBC 4.36 3.95 - 5.11 m/uL    Hemoglobin 11.9 11.9 - 15.1 g/dL    Hematocrit 37.5 36.3 - 47.1 %    MCV 86.0 82.6 - 102.9 fL    MCH 27.3 25.2 - 33.5 pg    MCHC 31.7 28.4 - 34.8 g/dL    RDW 17.5 (H) 11.8 - 14.4 %    Platelets 622 911 - 646 k/uL    MPV 11.9 8.1 - 13.5 fL    NRBC Automated 0.0 0.0 per 100 WBC    Differential Type NOT REPORTED     Seg Neutrophils 76 (H) 36 - 65 %    Lymphocytes 13 (L) 24 - 43 %    Monocytes 8 3 - 12 %    Eosinophils % 2 1 - 4 %    Basophils 1 0 - 2 %    Immature Granulocytes 0 0 %    Segs Absolute 4.75 1.50 - 8.10 k/uL    Absolute Lymph # 0.79 (L) 1.10 - 3.70 k/uL    Absolute Mono # 0.50 0.10 - 1.20 k/uL    Absolute Eos # 0.11 0.00 - 0.44 k/uL    Basophils Absolute 0.05 0.00 - 0.20 k/uL    Absolute Immature Granulocyte <0.03 0.00 - 0.30 k/uL    WBC Morphology NOT REPORTED     RBC Morphology ANISOCYTOSIS PRESENT     Platelet Estimate NOT REPORTED    Comprehensive Metabolic Panel w/ Reflex to MG    Collection Time: 09/17/21  1:39 PM   Result Value Ref Range    Glucose 101 (H) 70 - 99 mg/dL    BUN 5 (L) 6 - 20 mg/dL    CREATININE 0.45 (L) 0.50 - 0.90 mg/dL    Bun/Cre Ratio NOT REPORTED 9 - 20    Calcium 8.4 (L) 8.6 - 10.4 mg/dL    Sodium 138 135 - 144 mmol/L    Potassium 3.3 (L) 3.7 - 5.3 mmol/L    Chloride 101 98 - 107 mmol/L    CO2 26 20 - 31 mmol/L    Anion Gap 11 9 - 17 mmol/L    Alkaline Phosphatase 122 (H) 35 - 104 U/L    ALT 61 (H) 5 - 33 U/L    AST 81 (H) <32 U/L    Total Bilirubin 0.93 0.3 - 1.2 mg/dL    Total Protein 6.2 (L) 6.4 - 8.3 g/dL    Albumin 3.4 (L) 3.5 - 5.2 g/dL    Albumin/Globulin Ratio 1.2 1.0 - 2.5    GFR Non-African American >60 >60 mL/min    GFR African American >60 >60 mL/min    GFR Comment          GFR Staging NOT REPORTED    Troponin    Collection Time: 09/17/21  1:39 PM   Result Value Ref Range    Troponin, High Sensitivity 14 0 - 14 ng/L    Troponin T NOT REPORTED <0.03 ng/mL    Troponin Interp NOT REPORTED    Brain Natriuretic Peptide    Collection Time: 09/17/21  1:39 PM   Result Value Ref Range    Pro-BNP 1,871 (H) <300 pg/mL    BNP Interpretation Pro-BNP Reference Range:    Lipase    Collection Time: 09/17/21  1:39 PM   Result Value Ref Range    Lipase 16 13 - 60 U/L   Blood Gas, Venous    Collection Time: 09/17/21  1:39 PM   Result Value Ref Range    pH, Jose F 7.448 (H) 7.320 - 7.420    pCO2, Jose F 40.8 39 - 55    pO2, Jose F 122.0 (H) 30 - 50    HCO3, Venous 27.8 24 - 30 mmol/L    Positive Base Excess, Jose F 3.9 (H) 0.0 - 2.0 mmol/L    Negative Base Excess, Jose F NOT REPORTED 0.0 - 2.0 mmol/L    O2 Sat, Jose F 98.9 (H) 60.0 - 85.0 %    Total Hb NOT REPORTED 12.0 - 16.0 g/dl    Oxyhemoglobin NOT REPORTED 95.0 - 98.0 % Carboxyhemoglobin 3.4 0 - 5 %    Methemoglobin NOT REPORTED 0.0 - 1.5 %    Pt Temp 37.0     pH, Jose F, Temp Adj NOT REPORTED 7.320 - 7.420    pCO2, Jose F, Temp Adj NOT REPORTED 39 - 55 mmHg    pO2, Jose F, Temp Adj NOT REPORTED 30 - 50 mmHg    O2 Device/Flow/% NOT REPORTED     Respiratory Rate NOT REPORTED     Juma Test NOT REPORTED     Sample Site NOT REPORTED     Pt. Position NOT REPORTED     Mode NOT REPORTED     Set Rate NOT REPORTED     Total Rate NOT REPORTED     VT NOT REPORTED     FIO2 INFORMATION NOT PROVIDED     Peep/Cpap NOT REPORTED     PSV NOT REPORTED     Text for Respiratory NOT REPORTED     NOTIFICATION NOT REPORTED     NOTIFICATION TIME NOT REPORTED    Magnesium    Collection Time: 09/17/21  1:39 PM   Result Value Ref Range    Magnesium 1.8 1.6 - 2.6 mg/dL   COVID-19, Rapid    Collection Time: 09/17/21  2:26 PM    Specimen: Nasopharyngeal Swab   Result Value Ref Range    Specimen Description . NASOPHARYNGEAL SWAB     SARS-CoV-2, Rapid Not Detected Not Detected   FERRITIN    Collection Time: 09/17/21  4:38 PM   Result Value Ref Range    Ferritin 155 (H) 13 - 150 ug/L   C-Reactive Protein    Collection Time: 09/17/21  4:38 PM   Result Value Ref Range    CRP 81.0 (H) 0.0 - 5.0 mg/L   SPECIMEN REJECTION    Collection Time: 09/17/21  4:38 PM   Result Value Ref Range    Specimen Source . BLOOD     Ordered Test SED,FIB     Reason for Rejection Unable to perform testing: Specimen clotted. - NOT REPORTED    Fibrinogen    Collection Time: 09/17/21  5:32 PM   Result Value Ref Range    Fibrinogen 533 (H) 140 - 420 mg/dL   Sedimentation Rate    Collection Time: 09/17/21  5:32 PM   Result Value Ref Range    Sed Rate 30 0 - 30 mm   Respiratory Panel, Molecular, with COVID-19 (Restricted: peds pts or suitable admitted adults)    Collection Time: 09/17/21  6:04 PM    Specimen: Nasopharyngeal Swab   Result Value Ref Range    Specimen Description . NASOPHARYNGEAL SWAB     Adenovirus PCR Not Detected Not Detected Coronavirus 229E PCR Not Detected Not Detected    Coronavirus HKU1 PCR Not Detected Not Detected    Coronavirus NL63 PCR Not Detected Not Detected    Coronavirus OC43 PCR Not Detected Not Detected    SARS-CoV-2, PCR Not Detected Not Detected    Human Metapneumovirus PCR Not Detected Not Detected    Rhino/Enterovirus PCR Not Detected Not Detected    Influenza A by PCR Not Detected Not Detected    Influenza A H1 PCR NOT REPORTED Not Detected    Influenza A H1 (2009) PCR NOT REPORTED Not Detected    Influenza A H3 PCR NOT REPORTED Not Detected    Influenza B by PCR Not Detected Not Detected    Parainfluenza 1 PCR Not Detected Not Detected    Parainfluenza 2 PCR Not Detected Not Detected    Parainfluenza 3 PCR Not Detected Not Detected    Parainfluenza 4 PCR Not Detected Not Detected    Resp Syncytial Virus PCR Not Detected Not Detected    Bordetella Parapertussis Not Detected Not Detected    B Pertussis by PCR Not Detected Not Detected    Chlamydia pneumoniae By PCR Not Detected Not Detected    Mycoplasma pneumo by PCR Not Detected Not Detected       Imaging/Diagnostics:  XR CHEST PORTABLE    Result Date: 9/17/2021  Interval development of patchy bilateral interstitial and airspace infiltrates compatible with bilateral pneumonia, possibly related to COVID. Leandra Pierre Assessment :      Hospital Problems         Last Modified POA    * (Principal) Acute respiratory failure with hypoxia ([address] 9/17/2021 Yes    Chronic obstructive pulmonary disease with acute exacerbation ([address] 9/17/2021 Yes    Elevated brain natriuretic peptide (BNP) level 9/17/2021 Yes    Hypokalemia 9/17/2021 Yes    Bipolar disorder, unspecified ([address] 9/17/2021 Yes    Diarrhea 9/17/2021 Yes    Chronic renal insufficiency, stage III (moderate) ([address] 9/17/2021 Yes          Plan:     Patient status inpatient in the Progressive Unit/Step down    1. Supportive treatment with supplemental oxygen and aggressive pulmonary hygiene, check ddimer. cta if elevated. 2. Will initiate doxycycline as she is allergic to azithromycin as well as has prolonged QT in her previous history. 3. Initiate lasix 20 mg IV once now and then daily in the am to review response. Monitor I/o daily weights  4. Replacement of potassium and continue to monitor, utilize the potasium sliding scale. 5. Resume home psych medications  6. Patient has history of chrons disease, diarrhea is common for her. Unclear why cdiff was ordered in [place]   7. Monitor renal function, avoid nephrotoxins   8. GI prophylaxis  9. DVT proph      Consultations:   IP CONSULT TO HOSPITALIST     Patient is admitted as inpatient status because of co-morbidities listed above, severity of signs and symptoms as outlined, requirement for current medical therapies and most importantly because of direct risk to patient if care not provided in a hospital setting. Expected length of stay > 48 hours.     SHRADDHA Castro CNP  9/17/2021  8:26 PM    Copy sent to Dr. Sandi Hayes MD

## 2021-09-18 NOTE — CONSULTS
[facility] CONSULT    Patient:   Roberto Monahan   :    1965   Facility:   [facility]   Date:    2021  Admission Dx:  Shortness of breath [R06.02]  Pneumonia of both lower lobes due to infectious organism [J18.9]  Requesting physician: Katherine Alonso DO  Reason for consult:  Diarrhea, history of Crohn's disease and C. difficile   CC : \"Shortness of breath and diarrhea\"    SUBJECTIVE     HISTORY OF PRESENT ILLNESS  This is a 61 y.o.   female who was admitted 2021 with Shortness of breath [R06.02]  Pneumonia of both lower lobes due to infectious organism [J18.9]. We have been asked to see the patient in consultation by Katherine Alonso DO for diarrhea, history of Crohn's disease and C. Difficile. 48-year-old female with a history of COPD, bipolar disorder, bowel obstruction, anxiety, polysubstance use/abuse, and Crohn's disease, bowel obstruction with partial small intestinal resection , presented to the hospital with reports of shortness of breath and diarrhea. Patient was found to have elevated BNP with negative Covid. GI was asked to see patient for diarrhea and history of Crohn's disease. Patient reports she was diagnosed with Crohn's disease  by colonoscopy. She reports she was diagnosed with C. difficile infection at approximately the same time and treatment for IBD was deferred and she has not been following with GI. Patient reports she has been having approximately 15 stools per day for which she takes Imodium to manage diarrhea. She reports urgency, incontinence as well as nocturnal symptoms. She reports an associated decreased appetite as well as intermittent nausea. She reports associated 15 pound unintentional weight loss over the past 6 months. She denies any hematochezia.     Records show she underwent colonoscopy 10/12/2017 by Dr. Eleuterio Martínez which showed Depression     Dry eye     Fibromyalgia     Gout     Headache     Hyperlipidemia     Hypertension     Hypothyroidism     Kidney stones     Muscle spasm     Neuropathy     Pain     left hip    Personal history of other medical treatment     Pt. seen by Dr. Mia Garcia for clearance for this OR Left hip/ Normally pt. doesn't follow with Cardiology. [facility]    Wears partial dentures     upper    Wellness examination     PCP Roxanne Blancas MD/ genna/last seen 8-24-21     Past Surgical History:   Procedure Laterality Date    ABDOMEN SURGERY      bowel obstruction OR    BUNIONECTOMY Left     CHOLECYSTECTOMY      COLONOSCOPY  04/30/2007    no gross pathology seen in the colon and also 3-4 inches of the ileum    COLONOSCOPY  10/12/2017    normal    TONSILLECTOMY AND ADENOIDECTOMY      TUBAL LIGATION         ALLERGIES:  Allergies   Allergen Reactions    Azithromycin Other (See Comments)     'It doesn't work\"    Methylprednisolone      Elevates blood pressure    Penicillins Swelling     throat    Tape [Adhesive Tape] Other (See Comments)     \" Tears my skin off\"       HOME MEDICATIONS:  Prior to Admission medications    Medication Sig Start Date End Date Taking? Authorizing Provider   traZODone (DESYREL) 150 MG tablet Take 300 mg by mouth nightly   Yes Historical Provider, MD   furosemide (LASIX) 20 MG tablet Take 20 mg by mouth daily   Yes Historical Provider, MD   HYDROcodone-acetaminophen (NORCO) 5-325 MG per tablet TAKE 1 TABLET BY MOUTH EVERY 6 HOURS AS NEEDED FOR PAIN FOR UP TO 14 DAYS. ** REDUCE DOSES TAKEN AS PAIN BECOMES MANAGEABLE** 9/13/21 9/27/21 Yes Vipin Rowell MD   pregabalin (LYRICA) 300 MG capsule Take 1 capsule by mouth 2 times daily for 14 days. 2/25/20 9/18/21 Yes Erman Litten, APRN - CNP   lisinopril (PRINIVIL;ZESTRIL) 40 MG tablet Take 10 mg by mouth daily    Yes Historical Provider, MD   gabapentin (NEURONTIN) 100 MG capsule Take 800 mg by mouth 3 times daily.     Yes Historical Provider, MD   acetaminophen (TYLENOL) 500 MG tablet Take 2 tablets by mouth 3 times daily  Patient taking differently: Take 1,000 mg by mouth 3 times daily as needed  7/17/21   Demar Kim DO   budesonide-formoterol (SYMBICORT) 160-4.5 MCG/ACT AERO Inhale 2 puffs into the lungs 2 times daily    Historical Provider, MD   loperamide (IMODIUM) 2 MG capsule Take 2 mg by mouth 4 times daily as needed for Diarrhea    Historical Provider, MD   hydrOXYzine (ATARAX) 25 MG tablet Take 25-50 mg by mouth daily as needed for Anxiety    Historical Provider, MD   Polyvinyl Alcohol-Povidone (REFRESH OP) Place 1 drop into both eyes 3 times daily    Historical Provider, MD   lidocaine (LIDODERM) 5 % Place 1 patch onto the skin daily 12 hours on, 12 hours off.     Historical Provider, MD   SUMAtriptan (IMITREX) 100 MG tablet Take 100 mg by mouth once as needed for Migraine    Historical Provider, MD   ipratropium-albuterol (DUONEB) 0.5-2.5 (3) MG/3ML SOLN nebulizer solution Inhale 3 mLs into the lungs 4 times daily  Patient taking differently: Inhale 3 mLs into the lungs every 6 hours as needed  2/6/17   [place] Perez MD       CURRENT MEDICATIONS:  Scheduled Meds:   omeprazole  40 mg Oral BID    topiramate  150 mg Oral BID    levothyroxine  50 mcg Oral Daily    [START ON 9/19/2021] furosemide  20 mg Oral Daily    [START ON 9/19/2021] lisinopril  10 mg Oral Daily    midodrine  2.5 mg Oral TID     potassium chloride  60 mEq Oral Daily    sodium chloride flush  5-40 mL IntraVENous 2 times per day    enoxaparin  40 mg SubCUTAneous Daily    ipratropium-albuterol  1 ampule Inhalation Q4H WA    doxycycline hyclate  100 mg Oral Q12H    budesonide-formoterol  2 puff Inhalation BID    pravastatin  40 mg Oral Daily    [Held by provider] pregabalin  300 mg Oral BID     Continuous Infusions:   sodium chloride       PRN Meds:ondansetron, potassium chloride, SUMAtriptan, loperamide, potassium chloride **OR** potassium alternative oral replacement **OR** potassium chloride, sodium chloride flush, sodium chloride, acetaminophen **OR** acetaminophen, albuterol    SOCIAL HISTORY:     Tobacco:   reports that she has been smoking cigarettes. She has a 18.50 pack-year smoking history. She quit smokeless tobacco use about 20 years ago. Her smokeless tobacco use included chew. Alcohol:   reports previous alcohol use. Illicit drugs:  reports current drug use. Drug: Marijuana. FAMILY HISTORY:     Family History   Problem Relation Age of Onset    Diabetes Father     Lung Cancer Father     Hypertension Mother     Cancer Mother     High Blood Pressure Mother     Crohn's Disease Brother     Heart Disease Brother        REVIEW OF SYSTEMS:    Constitutional: No fever, no chills, no lethargy, no weakness. HEENT:  No headache, otalgia, itchy eyes, nasal discharge or sore throat. Cardiac:  No chest pain, + dyspnea  Chest:   No cough, phlegm or wheezing. Abdomen:  No abdominal pain, + nausea, no vomiting, + diarrhea. Neuro:  No focal weakness, abnormal movements or seizure like activity. Skin:   No rashes, no itching. :   No hematuria, no pyuria, no dysuria, no flank pain. Extremities:  No swelling or joint pains. ROS was otherwise negative except as mentioned in the [address] PHYSICAL EXAM:    BP (!) 83/50   Pulse 55   Temp 97.6 °F (36.4 °C) (Oral)   Resp 18   Ht 5' 6\" (1.676 m)   Wt 147 lb 4.3 oz (66.8 kg)   SpO2 94%   BMI 23.77 kg/m²     GENERAL:   Well developed, Well nourished, No apparent distress  HEAD:   Normocephalic, Atraumatic  EENT:   EOMI, Sclera not icteric, Oropharynx moist   NECK:   Supple, Trachea midline  LUNGS:  CTA Bilaterally  HEART:  RRR, No murmur  ABDOMEN:   Soft, Nontender, Nondistended, BS WNL  EXT:   No clubbing. No cyanosis. No edema. SKIN:   No rashes. No jaundice. No stigmata of liver disease.     MUSC/SKEL:   Adequate muscle bulk for patient's age, No significant synovitis, No deformities  NEURO:  A&O x Three, CN II- XII grossly intact      LABS AND IMAGING:     CBC  Recent Labs     09/17/21  1339 09/18/21  0600   WBC 6.2 5.3   HGB 11.9 11.0*   HCT 37.5 35.1*   MCV 86.0 87.1   MCH 27.3 27.3   MCHC 31.7 31.3    226       IMMATURE PLTs  No results found for: PLTFLUORE    BMP  Recent Labs     09/17/21  1339 09/18/21  0600    138   K 3.3* 2.7*    96*   CO2 26 32*   BUN 5* 6   CREATININE 0.45* 0.48*   GLUCOSE 101* 85   CALCIUM 8.4* 8.1*       LFTS  Recent Labs     09/17/21  1339   ALKPHOS 122*   ALT 61*   AST 81*   PROT 6.2*   BILITOT 0.93   LABALBU 3.4*       AMYLASE/LIPASE/AMMONIA  Recent Labs     09/17/21  1339   LIPASE 16       PT/INR  No results for input(s): PROTIME, INR in the last 72 hours. ANEMIA STUDIES  Recent Labs     09/17/21  1638   FERRITIN 155*       IMAGING  XR CHEST (2 VW)    Result Date: 9/3/2021  EXAMINATION: TWO XRAY VIEWS OF THE CHEST 9/3/2021 3:32 pm COMPARISON: 08/12/2020 HISTORY: ORDERING SYSTEM PROVIDED HISTORY: Pre-op testing Reason for Exam: pre  op hip, no chest complaints FINDINGS: The lungs are without acute focal process. No effusion or pneumothorax. The cardiomediastinal silhouette is normal.  The osseous structures are intact without acute process. Stable chest without acute process. XR CHEST PORTABLE    Result Date: 9/18/2021  EXAMINATION: ONE XRAY VIEW OF THE CHEST 9/18/2021 7:19 am COMPARISON: 09/17/2021 HISTORY: ORDERING SYSTEM PROVIDED HISTORY: AECOPD TECHNOLOGIST PROVIDED HISTORY: AECOPD Reason for Exam: port upright FINDINGS: The heart size is upper normal.  There is no pneumothorax. Areas of focal consolidation bilaterally have slightly improved since the previous exam.  A pleural effusion is not identified.      Areas of focal consolidation bilaterally have improved since the previous day's exam.     XR CHEST PORTABLE    Result Date: 9/17/2021  EXAMINATION: ONE XRAY VIEW OF THE CHEST 9/17/2021 10:17 am COMPARISON: 09/03/2021 HISTORY: ORDERING SYSTEM PROVIDED HISTORY: chest pain, shortness of breath TECHNOLOGIST PROVIDED HISTORY: chest pain, shortness of breath FINDINGS: Interval development of patchy bilateral interstitial and airspace infiltrates. .The cardiac size is normal. No  pleural effusions are seen. Pulmonary vascularity appears normal. There is mild ectasia of the thoracic aorta. .No acute bony abnormalities. The hilar structures are normal.     Interval development of patchy bilateral interstitial and airspace infiltrates compatible with bilateral pneumonia, possibly related to COVID. [facility] IM[facility]ION:     1. Crohn's disease -patient reports being diagnosed with Crohn's 2012. Colonoscopy 2017 nondiagnostic for Crohn's disease however, CT imaging 2017 suggestive of small bowel disease and CT abdomen pelvis 12/2020 showing wall thickening and inflammatory changes of the cecum and ascending colon suggestive of infectious or inflammatory colitis. - Sed rate 30, CRP 81  2. Diarrhea -likely secondary to untreated Crohn's disease. Will need to rule out infectious etiology  3. Elevated LFTs -unclear etiology - ? Fatty liver      Old records, labs and imaging reviewed as above. PLAN   1. Patient will need eventual treatment for Crohn's disease. She will need comprehensive work-up prior to starting Biologics in O/P setting. Will start with initial lab work. 2.  Obtain GI molecular panel and Calprotectin   3. Await stool for C. Difficile. 4.  Acute hepatitis panel, Hep B serology  5. Quantiferon gold  6. Liver ultrasound     This plan was formulated in collaboration with Dr. Agnes Carroll   Thank you for allowing us to participate in the care of your patient. Electronically signed by: SHRADDHA Mcgraw CNP  on 9/18/2021 at 1:33 PM     Please note that this note was generated using a voice recognition dictation software.   Although every effort was made to ensure the accuracy of this automated transcription, some errors in transcription may have occurred.

## 2021-09-18 NOTE — PROGRESS NOTES
Occupational Therapy   Occupational Therapy Initial Assessment  Date: 2021   Patient Name: Christal Opitz  MRN: [id number]     : 1965     Chief Complaint   Patient presents with    Cough    Shortness of Breath    Nausea       Date of Service: 2021    Discharge Recommendations:  Patient would benefit from continued therapy after discharge  OT Equipment Recommendations  Equipment Needed: Yes  Mobility Devices: Thang Gambler; ADL Assistive Devices  Walker: Rolling  ADL Assistive Devices: Sock-Aid Hard    Assessment   Performance deficits / Impairments: Decreased functional mobility ; Decreased ADL status; Decreased safe awareness;Decreased cognition;Decreased endurance;Decreased balance;Decreased high-level IADLs  Assessment: Pt agreeable to OT eval this date. Pt completed bed mobility requiring Min A for BLE and trunk progression. Pt completed functional transfers with CGA and functional mobility requiring Min A and cane. Pt demonstrating significant balance deficits this date. Pt requires Min A for LB ADLs at this time d/t decreased sitting balance and limited reach d/t hip pain. Pt will require continued OT services to address functional deficits listed in order to regain functional independence. Prognosis: Good  Decision Making: Medium Complexity  Patient Education: Pt ed on OT role, OT POC, safety awareness, transfer/functional mobility training, DME use, techniques to diminish dizziness, and importance of continued OT. Pt verbalized good understanding  REQUIRES OT FOLLOW UP: Yes  Activity Tolerance  Activity Tolerance: Patient Tolerated treatment well  Safety Devices  Safety Devices in place: Yes  Type of devices: Nurse notified; Left in bed;Gait belt;Call light within reach; Bed alarm in place  Restraints  Initially in place: No           Patient Diagnosis(es): The encounter diagnosis was Pneumonia of both lower lobes due to infectious organism.      has a past medical history of Acid reflux, Anxiety, Arthritis, Asthma, Bipolar 1 disorder ([facility] [address] Bowel obstruction ([facility] [address] COPD (chronic obstructive pulmonary disease) ([facility] [address] Crohn disease ([facility] [address] Depression, Dry eye, Fibromyalgia, Gout, Headache, Hyperlipidemia, Hypertension, Hypothyroidism, Kidney stones, Muscle spasm, Neuropathy, Pain, Personal history of other medical treatment, Wears partial dentures, and Wellness examination. has a past surgical history that includes Tonsillectomy and adenoidectomy; Tubal ligation; Cholecystectomy; Bunionectomy (Left); Colonoscopy (04/30/2007); Colonoscopy (10/12/2017); and Abdomen surgery. Restrictions  Restrictions/Precautions  Restrictions/Precautions: Fall Risk, Up as Tolerated  Required Braces or Orthoses?: No  Position Activity Restriction  Other position/activity restrictions: maintain O2 >92%    Subjective   General  Patient assessed for rehabilitation services?: Yes  Family / Caregiver Present: No  General Comment  Comments: RN ok'd pt for therapy this date. Pt agreeable to session and pleasant/cooperative throughout  Patient Currently in Pain: Yes  Pain Assessment  Pain Assessment: 0-10  Pain Level: 5  Pain Type: Acute pain  Pain Location: Abdomen  Pain Descriptors: Aching;Discomfort  Functional Pain Assessment: Activities are not prevented  Non-Pharmaceutical Pain Intervention(s): Ambulation/Increased Activity; Distraction  Response to Pain Intervention: Patient Satisfied  Pre Treatment Pain Screening  Intervention List: Patient able to continue with treatment  Vital Signs  Patient Currently in Pain: Yes  Oxygen Therapy  O2 Device: Nasal cannula  O2 Flow Rate (L/min): 4 L/min     Social/Functional History  Social/Functional History  Lives With: Other (comment) (Roommate)  Type of Home: Apartment  Home Layout: One level, Laundry in basement  Home Access: Stairs to enter with rails  Entrance Stairs - Number of Steps: 6  Entrance Stairs - Rails: Both  Bathroom Shower/Tub: Tub/Shower unit  Bathroom Toilet: Handicap height  Bathroom Equipment: Grab bars in shower, Shower chair, Grab bars around toilet  Home Equipment: Linzie Lipoma, 4 wheeled walker (typical use of hurricane)  ADL Assistance: [facility] [address]: [address] [facility] Responsibilities: Yes (boyfriend assists with laundry, located in basement)  Ambulation Assistance: Independent  Transfer Assistance: Independent  Active : No  Patient's  Info: roommate  Occupation: On disability  Leisure & Hobbies: watch tv  Additional Comments: Pt reports roommate is supportive and could provide 24/7 assistance if needed       Objective   Vision: Impaired  Vision Exceptions: Wears glasses for reading  Hearing: Within functional limits         Balance  Sitting Balance: Stand by assistance  Standing Balance: Minimal assistance  Standing Balance  Time: ~2 min  Activity: standing EOB and sinkside in room  Comment: mild anterior-posterior swaying noted requiring Min A for balance, with cane  Functional Mobility  Functional - Mobility Device: Cane  Activity: Other  Assist Level: Minimal assistance  Functional Mobility Comments: Pt completed functional mobility from EOB <> sink located within room requiring Min A and personal str cane; significant balance deficits noted. Pt reports feelind lightheaded throughout.  Pt ed on pursed lip breathing, fair return noted     ADL  Feeding: Modified independent ;Setup  Grooming: Modified independent ;Setup  UE Bathing: Contact guard assistance;Setup  LE Bathing: Minimal assistance;Setup  UE Dressing: Contact guard assistance;Setup  LE Dressing: Minimal assistance;Setup (pt requires assistance donning L sock d/t pain in hip and pt attempted figure 4 sitting position however unable to maintain; postural support provided for pt to independently don R sock)  Toileting: Minimal assistance;Setup  Tone RUE  RUE Tone: Normotonic  Tone LUE  LUE Tone: Normotonic  Coordination  Movements Are Fluid And Coordinated: Yes    Bed mobility  Supine to Sit: Minimal assistance  Sit to Supine: Minimal assistance  Scooting: Minimal assistance  Comment: Min A for trunk/BLE progression; HOB elevated  Transfers  Sit to stand: Contact guard assistance  Stand to sit: Contact guard assistance  Transfer Comments: with personal cane     Cognition  Overall Cognitive Status: Exceptions  Following Commands: Follows one step commands with increased time; Follows one step commands with repetition  Attention Span: Attends with cues to redirect  Safety Judgement: Decreased awareness of need for assistance;Decreased awareness of need for safety  Problem Solving: Assistance required to identify errors made;Assistance required to correct errors made  Insights: Decreased awareness of deficits  Initiation: Requires cues for some  Sequencing: Requires cues for some        Sensation  Overall Sensation Status: WFL (pt denies numbness/tingling)        LUE AROM (degrees)  LUE AROM : WFL  Left Hand AROM (degrees)  Left Hand AROM: WFL  RUE AROM (degrees)  RUE AROM : WFL  Right Hand AROM (degrees)  Right Hand AROM: WFL  LUE Strength  Gross LUE Strength: WFL  L Hand General: 4/5  LUE Strength Comment: grossly 4/5  RUE Strength  Gross RUE Strength: WFL  R Hand General: 4/5  RUE Strength Comment: grossly 4/5                   Plan   Plan  Times per week: 3-5 x/wk  Current Treatment Recommendations: Balance Training, Functional Mobility Training, Endurance Training, Cognitive Reorientation, Pain Management, Safety Education & Training, Patient/Caregiver Education & Training, Self-Care / ADL, Equipment Evaluation, Education, & procurement, Home Management Training    AM-PAC Score  AM-[facility] Inpatient Daily Activity Raw Score: 20 (09/18/21 1412)  AM-PAC Inpatient ADL T-Scale Score : 42.03 (09/18/21 1412)  ADL Inpatient CMS 0-100% Score: 38.32 (09/18/21 1412)  ADL Inpatient CMS G-Code Modifier : Leander Norman (09/18/21 1412)    Goals  Short term goals  Time Frame for Short term goals:  By discharge, pt will:  Short term goal 1: Demo SBA for functional transfers and functional mobility with use of LRD  Short term goal 2: Demo I with UB ADLs and grooming tasks  Short term goal 3: Demo Mod I with setup and use of AE PRN for LB ADLs and toileting tasks  Short term goal 4: Demo 10+ min dynamic standing and reaching task to increase standing balance for performance of ADLs/IADLs  Short term goal 5: Demo good safety awareness throughout therapy session with <2 VCs       Therapy Time   Individual Concurrent Group Co-treatment   Time In 0859         Time Out 0927         Minutes 28         Timed Code Treatment Minutes: 25 Minutes       Malathi Espinosa, OTR/L

## 2021-09-19 ENCOUNTER — APPOINTMENT (OUTPATIENT)
Dept: ULTRASOUND IMAGING | Age: 56
DRG: 133 | End: 2021-09-19
Payer: MEDICARE

## 2021-09-19 VITALS
HEIGHT: 66 IN | OXYGEN SATURATION: 95 % | RESPIRATION RATE: 13 BRPM | TEMPERATURE: 98.1 F | HEART RATE: 70 BPM | WEIGHT: 149.91 LBS | SYSTOLIC BLOOD PRESSURE: 107 MMHG | BODY MASS INDEX: 24.09 KG/M2 | DIASTOLIC BLOOD PRESSURE: 75 MMHG

## 2021-09-19 LAB
ABSOLUTE EOS #: 0.16 K/UL (ref 0–0.44)
ABSOLUTE IMMATURE GRANULOCYTE: <0.03 K/UL (ref 0–0.3)
ABSOLUTE LYMPH #: 1.46 K/UL (ref 1.1–3.7)
ABSOLUTE MONO #: 0.57 K/UL (ref 0.1–1.2)
ALBUMIN SERPL-MCNC: 3 G/DL (ref 3.5–5.2)
ANION GAP SERPL CALCULATED.3IONS-SCNC: 8 MMOL/L (ref 9–17)
BASOPHILS # BLD: 0 % (ref 0–2)
BASOPHILS ABSOLUTE: <0.03 K/UL (ref 0–0.2)
BUN BLDV-MCNC: 12 MG/DL (ref 6–20)
BUN/CREAT BLD: ABNORMAL (ref 9–20)
CALCIUM SERPL-MCNC: 8 MG/DL (ref 8.6–10.4)
CHLORIDE BLD-SCNC: 101 MMOL/L (ref 98–107)
CO2: 27 MMOL/L (ref 20–31)
CREAT SERPL-MCNC: 0.7 MG/DL (ref 0.5–0.9)
CULTURE: ABNORMAL
DIFFERENTIAL TYPE: ABNORMAL
DIRECT EXAM: ABNORMAL
EOSINOPHILS RELATIVE PERCENT: 3 % (ref 1–4)
GFR AFRICAN AMERICAN: >60 ML/MIN
GFR NON-AFRICAN AMERICAN: >60 ML/MIN
GFR SERPL CREATININE-BSD FRML MDRD: ABNORMAL ML/MIN/{1.73_M2}
GFR SERPL CREATININE-BSD FRML MDRD: ABNORMAL ML/MIN/{1.73_M2}
GLUCOSE BLD-MCNC: 91 MG/DL (ref 70–99)
HAV IGM SER IA-ACNC: NONREACTIVE
HBV SURFACE AB TITR SER: <3.5 MIU/ML
HCT VFR BLD CALC: 32.2 % (ref 36.3–47.1)
HEMOGLOBIN: 10 G/DL (ref 11.9–15.1)
HEPATITIS B CORE IGM ANTIBODY: NONREACTIVE
HEPATITIS B SURFACE ANTIGEN: NONREACTIVE
HEPATITIS C ANTIBODY: NONREACTIVE
IMMATURE GRANULOCYTES: 0 %
LYMPHOCYTES # BLD: 29 % (ref 24–43)
Lab: ABNORMAL
MAGNESIUM: 2 MG/DL (ref 1.6–2.6)
MCH RBC QN AUTO: 27.3 PG (ref 25.2–33.5)
MCHC RBC AUTO-ENTMCNC: 31.1 G/DL (ref 28.4–34.8)
MCV RBC AUTO: 88 FL (ref 82.6–102.9)
MONOCYTES # BLD: 11 % (ref 3–12)
NRBC AUTOMATED: 0 PER 100 WBC
PDW BLD-RTO: 17.9 % (ref 11.8–14.4)
PHOSPHORUS: 2.6 MG/DL (ref 2.6–4.5)
PLATELET # BLD: 218 K/UL (ref 138–453)
PLATELET ESTIMATE: ABNORMAL
PMV BLD AUTO: 11.5 FL (ref 8.1–13.5)
POTASSIUM SERPL-SCNC: 3.7 MMOL/L (ref 3.7–5.3)
RBC # BLD: 3.66 M/UL (ref 3.95–5.11)
RBC # BLD: ABNORMAL 10*6/UL
SEG NEUTROPHILS: 57 % (ref 36–65)
SEGMENTED NEUTROPHILS ABSOLUTE COUNT: 2.85 K/UL (ref 1.5–8.1)
SODIUM BLD-SCNC: 136 MMOL/L (ref 135–144)
SPECIMEN DESCRIPTION: ABNORMAL
WBC # BLD: 5.1 K/UL (ref 3.5–11.3)
WBC # BLD: ABNORMAL 10*3/UL

## 2021-09-19 PROCEDURE — 6370000000 HC RX 637 (ALT 250 FOR IP): Performed by: INTERNAL MEDICINE

## 2021-09-19 PROCEDURE — 83993 ASSAY FOR CALPROTECTIN FECAL: CPT

## 2021-09-19 PROCEDURE — 97116 GAIT TRAINING THERAPY: CPT

## 2021-09-19 PROCEDURE — 86480 TB TEST CELL IMMUN MEASURE: CPT

## 2021-09-19 PROCEDURE — 6360000002 HC RX W HCPCS: Performed by: NURSE PRACTITIONER

## 2021-09-19 PROCEDURE — 76705 ECHO EXAM OF ABDOMEN: CPT

## 2021-09-19 PROCEDURE — APPSS30 APP SPLIT SHARED TIME 16-30 MINUTES: Performed by: INTERNAL MEDICINE

## 2021-09-19 PROCEDURE — 85025 COMPLETE CBC W/AUTO DIFF WBC: CPT

## 2021-09-19 PROCEDURE — 97162 PT EVAL MOD COMPLEX 30 MIN: CPT

## 2021-09-19 PROCEDURE — 99232 SBSQ HOSP IP/OBS MODERATE 35: CPT | Performed by: INTERNAL MEDICINE

## 2021-09-19 PROCEDURE — 94640 AIRWAY INHALATION TREATMENT: CPT

## 2021-09-19 PROCEDURE — 2700000000 HC OXYGEN THERAPY PER DAY

## 2021-09-19 PROCEDURE — 6370000000 HC RX 637 (ALT 250 FOR IP): Performed by: NURSE PRACTITIONER

## 2021-09-19 PROCEDURE — 2580000003 HC RX 258: Performed by: NURSE PRACTITIONER

## 2021-09-19 PROCEDURE — 94761 N-INVAS EAR/PLS OXIMETRY MLT: CPT

## 2021-09-19 PROCEDURE — 83735 ASSAY OF MAGNESIUM: CPT

## 2021-09-19 PROCEDURE — 36415 COLL VENOUS BLD VENIPUNCTURE: CPT

## 2021-09-19 PROCEDURE — 87506 IADNA-DNA/RNA PROBE TQ 6-11: CPT

## 2021-09-19 PROCEDURE — 99239 HOSP IP/OBS DSCHRG MGMT >30: CPT | Performed by: FAMILY MEDICINE

## 2021-09-19 PROCEDURE — 97530 THERAPEUTIC ACTIVITIES: CPT

## 2021-09-19 PROCEDURE — 80069 RENAL FUNCTION PANEL: CPT

## 2021-09-19 RX ORDER — FUROSEMIDE 20 MG/1
20 TABLET ORAL DAILY
Qty: 60 TABLET | Refills: 3 | Status: ON HOLD | OUTPATIENT
Start: 2021-09-19 | End: 2021-10-24 | Stop reason: HOSPADM

## 2021-09-19 RX ORDER — NICOTINE 21 MG/24HR
1 PATCH, TRANSDERMAL 24 HOURS TRANSDERMAL DAILY
Qty: 30 PATCH | Refills: 3 | Status: ON HOLD | OUTPATIENT
Start: 2021-09-20 | End: 2022-08-23 | Stop reason: HOSPADM

## 2021-09-19 RX ORDER — LISINOPRIL 5 MG/1
5 TABLET ORAL DAILY
Qty: 30 TABLET | Refills: 3 | Status: ON HOLD | OUTPATIENT
Start: 2021-09-20

## 2021-09-19 RX ORDER — DOXYCYCLINE HYCLATE 100 MG
100 TABLET ORAL EVERY 12 HOURS
Qty: 6 TABLET | Refills: 0 | Status: SHIPPED | OUTPATIENT
Start: 2021-09-19 | End: 2021-09-19

## 2021-09-19 RX ORDER — IPRATROPIUM BROMIDE AND ALBUTEROL SULFATE 2.5; .5 MG/3ML; MG/3ML
3 SOLUTION RESPIRATORY (INHALATION) EVERY 6 HOURS PRN
Qty: 1 EACH | Refills: 0 | Status: ON HOLD | OUTPATIENT
Start: 2021-09-19 | End: 2021-10-24 | Stop reason: HOSPADM

## 2021-09-19 RX ORDER — LISINOPRIL 5 MG/1
5 TABLET ORAL DAILY
Status: DISCONTINUED | OUTPATIENT
Start: 2021-09-20 | End: 2021-09-19 | Stop reason: HOSPADM

## 2021-09-19 RX ORDER — BUDESONIDE AND FORMOTEROL FUMARATE DIHYDRATE 160; 4.5 UG/1; UG/1
2 AEROSOL RESPIRATORY (INHALATION) 2 TIMES DAILY
Qty: 10.2 G | Refills: 3 | Status: ON HOLD | OUTPATIENT
Start: 2021-09-19

## 2021-09-19 RX ORDER — DOXYCYCLINE HYCLATE 100 MG
100 TABLET ORAL EVERY 12 HOURS
Qty: 10 TABLET | Refills: 0 | Status: SHIPPED | OUTPATIENT
Start: 2021-09-19 | End: 2021-09-24

## 2021-09-19 RX ADMIN — ONDANSETRON 4 MG: 2 INJECTION INTRAMUSCULAR; INTRAVENOUS at 13:54

## 2021-09-19 RX ADMIN — SODIUM CHLORIDE, PRESERVATIVE FREE 10 ML: 5 INJECTION INTRAVENOUS at 10:17

## 2021-09-19 RX ADMIN — ENOXAPARIN SODIUM 40 MG: 40 INJECTION SUBCUTANEOUS at 10:14

## 2021-09-19 RX ADMIN — HYDROCODONE BITARTRATE AND ACETAMINOPHEN 1 TABLET: 5; 325 TABLET ORAL at 03:16

## 2021-09-19 RX ADMIN — DOXYCYCLINE HYCLATE 100 MG: 100 TABLET, COATED ORAL at 12:31

## 2021-09-19 RX ADMIN — TOPIRAMATE 150 MG: 50 TABLET, FILM COATED ORAL at 10:14

## 2021-09-19 RX ADMIN — LEVOTHYROXINE SODIUM 50 MCG: 50 TABLET ORAL at 06:15

## 2021-09-19 RX ADMIN — HYDROCODONE BITARTRATE AND ACETAMINOPHEN 1 TABLET: 5; 325 TABLET ORAL at 09:06

## 2021-09-19 RX ADMIN — ACETAMINOPHEN 650 MG: 325 TABLET ORAL at 12:32

## 2021-09-19 RX ADMIN — POTASSIUM CHLORIDE 60 MEQ: 40 SOLUTION ORAL at 10:16

## 2021-09-19 RX ADMIN — PRAVASTATIN SODIUM 40 MG: 20 TABLET ORAL at 10:16

## 2021-09-19 RX ADMIN — IPRATROPIUM BROMIDE AND ALBUTEROL SULFATE 1 AMPULE: .5; 3 SOLUTION RESPIRATORY (INHALATION) at 08:35

## 2021-09-19 RX ADMIN — OMEPRAZOLE 40 MG: 20 CAPSULE, DELAYED RELEASE ORAL at 10:15

## 2021-09-19 RX ADMIN — FUROSEMIDE 20 MG: 20 TABLET ORAL at 10:18

## 2021-09-19 RX ADMIN — BUDESONIDE AND FORMOTEROL FUMARATE DIHYDRATE 2 PUFF: 160; 4.5 AEROSOL RESPIRATORY (INHALATION) at 08:36

## 2021-09-19 RX ADMIN — ONDANSETRON 4 MG: 2 INJECTION INTRAMUSCULAR; INTRAVENOUS at 05:13

## 2021-09-19 ASSESSMENT — ENCOUNTER SYMPTOMS
DIARRHEA: 0
RHINORRHEA: 0
WHEEZING: 0
CONSTIPATION: 0
VOMITING: 0
ABDOMINAL PAIN: 0
NAUSEA: 0
COUGH: 0
BLOOD IN STOOL: 0
CHEST TIGHTNESS: 0
SHORTNESS OF BREATH: 1

## 2021-09-19 ASSESSMENT — PAIN DESCRIPTION - ORIENTATION
ORIENTATION: LEFT
ORIENTATION: LEFT

## 2021-09-19 ASSESSMENT — PAIN DESCRIPTION - PAIN TYPE
TYPE: CHRONIC PAIN
TYPE: CHRONIC PAIN

## 2021-09-19 ASSESSMENT — PAIN SCALES - GENERAL
PAINLEVEL_OUTOF10: 7
PAINLEVEL_OUTOF10: 7
PAINLEVEL_OUTOF10: 8

## 2021-09-19 ASSESSMENT — PAIN DESCRIPTION - LOCATION
LOCATION: HIP
LOCATION: HIP

## 2021-09-19 NOTE — PLAN OF CARE
Problem: Pain:  Goal: Pain level will decrease  Description: Pain level will decrease  Outcome: Met This Shift     Problem: Pain:  Goal: Control of acute pain  Description: Control of acute pain  Outcome: Met This Shift     Problem: Pain:  Goal: Control of chronic pain  Description: Control of chronic pain  9/19/2021 1536 by Janki Cespedes RN  Outcome: Met This Shift     Problem: Falls - Risk of:  Goal: Will remain free from falls  Description: Will remain free from falls  9/19/2021 1536 by Janki Cespedes RN  Outcome: Met This Shift     Problem: Falls - Risk of:  Goal: Absence of physical injury  Description: Absence of physical injury  Outcome: Met This Shift     Problem: OXYGENATION/RESPIRATORY FUNCTION  Goal: Patient will maintain patent airway  9/19/2021 1536 by Janki Cespedes RN  Outcome: Met This Shift     Problem: OXYGENATION/RESPIRATORY FUNCTION  Goal: Patient will achieve/maintain normal respiratory rate/effort  Description: Respiratory rate and effort will be within normal limits for the patient  Outcome: Met This Shift     Problem: HEMODYNAMIC STATUS  Goal: Patient has stable vital signs and fluid balance  Outcome: Met This Shift     Problem: FLUID AND ELECTROLYTE IMBALANCE  Goal: Fluid and electrolyte balance are achieved/maintained  Outcome: Met This Shift     Problem: ACTIVITY INTOLERANCE/IMPAIRED MOBILITY  Goal: Mobility/activity is maintained at optimum level for patient  9/19/2021 1536 by Janki Cespedes RN  Outcome: Met This Shift

## 2021-09-19 NOTE — PROGRESS NOTES
Swelling     throat    Tape Sandra Mean Tape] Other (See Comments)     \" Tears my skin off\"      Medications :  omeprazole, 40 mg, Oral, BID  topiramate, 150 mg, Oral, BID  levothyroxine, 50 mcg, Oral, Daily  furosemide, 20 mg, Oral, Daily  lisinopril, 10 mg, Oral, Daily  midodrine, 2.5 mg, Oral, TID WC  potassium chloride, 60 mEq, Oral, Daily  nicotine, 1 patch, TransDERmal, Daily  sodium chloride flush, 5-40 mL, IntraVENous, 2 times per day  enoxaparin, 40 mg, SubCUTAneous, Daily  ipratropium-albuterol, 1 ampule, Inhalation, Q4H WA  doxycycline hyclate, 100 mg, Oral, Q12H  budesonide-formoterol, 2 puff, Inhalation, BID  pravastatin, 40 mg, Oral, Daily  [Held by provider] pregabalin, 300 mg, Oral, BID        Review of Systems   Review of Systems   Constitutional: Negative for appetite change, fatigue, fever and unexpected weight change. HENT: Negative for congestion, rhinorrhea and sneezing. Eyes: Negative for visual disturbance. Respiratory: Positive for shortness of breath. Negative for cough, chest tightness and wheezing. Cardiovascular: Negative for chest pain and palpitations. Gastrointestinal: Negative for abdominal pain, blood in stool, constipation, diarrhea, nausea and vomiting. Genitourinary: Negative for dysuria, enuresis, frequency and hematuria. Musculoskeletal: Negative for arthralgias and myalgias. Skin: Negative for rash. Neurological: Negative for dizziness, weakness, light-headedness and headaches. Hematological: Does not bruise/bleed easily. Psychiatric/Behavioral: Negative for dysphoric mood and sleep disturbance.      Objective :      Current Vitals : Temp: 97.9 °F (36.6 °C),  Pulse: 59, Resp: 13, BP: 108/70, SpO2: 95 %  Last 24 Hrs Vitals   Patient Vitals for the past 24 hrs:   BP Temp Temp src Pulse Resp SpO2 Weight   09/19/21 0726 108/70 97.9 °F (36.6 °C) Axillary 59 13 95 % --   09/19/21 0600 (!) 90/57 -- -- 62 -- -- 149 lb 14.6 oz (68 kg)   09/19/21 0500 103/64 -- -- 59 -- -- --   09/19/21 0400 (!) 97/57 -- -- 56 -- -- --   09/19/21 0330 106/65 -- -- 55 -- -- --   09/19/21 0315 129/73 98.6 °F (37 °C) Oral 67 25 99 % --   09/19/21 0130 90/65 -- -- 56 -- -- --   09/19/21 0030 107/81 -- -- 78 -- -- --   09/18/21 2330 114/64 98.4 °F (36.9 °C) Oral 71 20 94 % --   09/18/21 2230 (!) 90/53 -- -- 63 -- -- --   09/18/21 2200 (!) 89/57 -- -- 64 -- -- --   09/18/21 2130 (!) 93/56 -- -- 62 -- -- --   09/18/21 2100 98/61 -- -- 69 -- -- --   09/18/21 2030 (!) 89/52 -- -- 65 -- -- --   09/18/21 2000 (!) 87/56 98.6 °F (37 °C) Oral 61 23 96 % --   09/18/21 1509 (!) 88/41 98.1 °F (36.7 °C) Oral 61 20 95 % --   09/18/21 1141 (!) 83/50 97.6 °F (36.4 °C) Oral 55 18 94 % --   09/18/21 1126 (!) 82/45 -- -- 56 16 96 % --   09/18/21 1115 (!) 82/45 -- -- -- -- -- --   09/18/21 0736 117/75 98.3 °F (36.8 °C) Oral 62 14 92 % --     Intake / output   09/18 0701 - 09/19 0700  In: 2377 [P.O.:1100; I.V.:1277]  Out: 375 [Urine:375]  Physical Exam:  Physical Exam  Vitals and nursing note reviewed. Constitutional:       General: She is not in acute distress. Appearance: She is not diaphoretic. Interventions: Nasal cannula in place. HENT:      Head: Normocephalic and atraumatic. Nose:      Right Sinus: No maxillary sinus tenderness or frontal sinus tenderness. Left Sinus: No maxillary sinus tenderness or frontal sinus tenderness. Mouth/Throat:      Pharynx: No oropharyngeal exudate. Eyes:      General: No scleral icterus. Conjunctiva/sclera: Conjunctivae normal.      Pupils: Pupils are equal, round, and reactive to light. Neck:      Thyroid: No thyromegaly. Vascular: No JVD. Cardiovascular:      Rate and Rhythm: Normal rate and regular rhythm. Pulses:           Dorsalis pedis pulses are 2+ on the right side and 2+ on the left side. Heart sounds: Normal heart sounds. No murmur heard.      Pulmonary:      Effort: Pulmonary effort is normal.      Breath sounds: Wheezing present. No rales. Abdominal:      Palpations: Abdomen is soft. There is no mass. Tenderness: There is no abdominal tenderness. Musculoskeletal:      Cervical back: Full passive range of motion without pain and neck supple. Lymphadenopathy:      Head:      Right side of head: No submandibular adenopathy. Left side of head: No submandibular adenopathy. Cervical: No cervical adenopathy. Skin:     General: Skin is warm. Neurological:      Mental Status: She is alert and oriented to person, place, and time. Motor: No tremor. Psychiatric:         Behavior: Behavior is cooperative. Laboratory findings:    Recent Labs     09/17/21 1339 09/17/21  1732 09/18/21 0600 09/19/21  0614   WBC 6.2  --  5.3 5.1   HGB 11.9  --  11.0* 10.0*   HCT 37.5  --  35.1* 32.2*     --  226 218   SEDRATE  --  30  --   --      Recent Labs     09/17/21 1339 09/17/21 1339 09/18/21 0600 09/18/21 0600 09/18/21  1411 09/18/21  1922 09/19/21  0614     --  138  --   --   --  136   K 3.3*   < > 2.7*   < > 2.6* 3.2* 3.7     --  96*  --   --   --  101   CO2 26  --  32*  --   --   --  27   GLUCOSE 101*  --  85  --   --   --  91   BUN 5*  --  6  --   --   --  12   CREATININE 0.45*  --  0.48*  --   --   --  0.70   MG 1.8  --  1.7  --   --   --  2.0   CALCIUM 8.4*  --  8.1*  --   --   --  8.0*   PHOS  --   --   --   --   --   --  2.6    < > = values in this interval not displayed.      Recent Labs     09/17/21 1339 09/18/21 0600 09/19/21  0614   PROT 6.2*  --   --    LABALBU 3.4*  --  3.0*   TSH  --  0.30  --    AST 81*  --   --    ALT 61*  --   --    ALKPHOS 122*  --   --    BILITOT 0.93  --   --    LIPASE 16  --   --           Specific Gravity, UA   Date Value Ref Range Status   09/03/2021 1.040 (H) 1.005 - 1.030 Final     Protein, UA   Date Value Ref Range Status   09/03/2021 1+ (A) NEGATIVE Final     RBC, UA   Date Value Ref Range Status   09/03/2021 0 TO 2 0 - 2 /HPF Final Blood, UA POC   Date Value Ref Range Status   12/27/2020 moderate  Final     Bacteria, UA   Date Value Ref Range Status   09/03/2021 FEW (A) None Final     Nitrite, Urine   Date Value Ref Range Status   09/03/2021 NEGATIVE NEGATIVE Final     WBC, UA   Date Value Ref Range Status   09/03/2021 0 TO 2 0 - 5 /HPF Final     Leukocyte Esterase, Urine   Date Value Ref Range Status   09/03/2021 NEGATIVE NEGATIVE Final       Imaging / Clinical Data :-   XR CHEST PORTABLE    Result Date: 9/18/2021  Areas of focal consolidation bilaterally have improved since the previous day's exam.     XR CHEST PORTABLE    Result Date: 9/17/2021  Interval development of patchy bilateral interstitial and airspace infiltrates compatible with bilateral pneumonia, possibly related to LindseyFaith Garrett Clinical Course : gradually improving  Assessment and Plan  :        1. Altered mental status from encephalopathy due to hypotension and toxic metabolic encephalopathy from polypharmacy. Patient's Lyrica, gabapentin, Trileptal, Zanaflex, sedatives held. 2. Hypotension -secondary to medications - medications held. 3. Acute on chronic respiratory failure due to community-acquired pneumonia and acute decompensation HFpEF. - patient on doxycycline. 4. Hypokalemia - secondary to lasix. 5. Diarrhea - imodium   6. Bipolar disorder - follow psychiatry as outpatient . Discharge home and follow outpatient . Recommend complete abstinence of smoking. Plan and updates discussed with patient ,  answers  explained to satisfaction.    Plan discussed with Staff Siddharth Andres RN     (Please note that portions of this note were completed with a voice recognition program. Efforts were made to edit the dictations but occasionally words are mis-transcribed.)      Sanford Gardner MD  9/19/2021

## 2021-09-19 NOTE — PLAN OF CARE
Problem: Pain:  Goal: Control of chronic pain  Description: Control of chronic pain  9/19/2021 0505 by Royal Michael RN  Outcome: Ongoing     Problem: Falls - Risk of:  Goal: Will remain free from falls  Description: Will remain free from falls  9/19/2021 0505 by Royal Michael RN  Outcome: Ongoing     Problem: OXYGENATION/RESPIRATORY FUNCTION  Goal: Patient will maintain patent airway  9/19/2021 0505 by Royal Michael RN  Outcome: Ongoing     Problem: ACTIVITY INTOLERANCE/IMPAIRED MOBILITY  Goal: Mobility/activity is maintained at optimum level for patient  9/19/2021 0505 by Royal Michael RN  Outcome: Ongoing

## 2021-09-19 NOTE — PROGRESS NOTES
[facility] Progress Note    Jose Roberto Medina is a 61 y.o. female patient. Hospitalization Day:2      Chief consult reason: Diarrhea, history of Crohn's disease and C. Difficile. Subjective: Patient seen and examined. Patient reports she has not had any stool yesterday or today,  since admission to the hospital.  Denies any abdominal pain, nausea or vomiting. Shortness of breath improved      Objective:   VITALS:  BP (!) 90/57   Pulse 62   Temp 98.6 °F (37 °C) (Oral)   Resp 25   Ht 5' 6\" (1.676 m)   Wt 149 lb 14.6 oz (68 kg)   SpO2 99%   BMI 24.20 kg/m²   TEMPERATURE:  Current - Temp: 98.6 °F (37 °C);  Max - Temp  Av.3 °F (36.8 °C)  Min: 97.6 °F (36.4 °C)  Max: 98.6 °F (37 °C)    Physical Assessment:  General appearance:  alert, cooperative and no distress  Mental Status:  oriented to person, place and time and normal affect  Lungs:  clear to auscultation bilaterally, normal effort  Heart:  regular rate and rhythm, no murmur  Abdomen:  soft, nontender, nondistended, normal bowel sounds  Extremities:  no edema, no redness, No clubbing  Skin:  warm, dry, no gross lesions or rashes    CURRENT MEDICATIONS:  Scheduled Meds:   omeprazole  40 mg Oral BID    topiramate  150 mg Oral BID    levothyroxine  50 mcg Oral Daily    furosemide  20 mg Oral Daily    lisinopril  10 mg Oral Daily    midodrine  2.5 mg Oral TID WC    potassium chloride  60 mEq Oral Daily    nicotine  1 patch TransDERmal Daily    sodium chloride flush  5-40 mL IntraVENous 2 times per day    enoxaparin  40 mg SubCUTAneous Daily    ipratropium-albuterol  1 ampule Inhalation Q4H WA    doxycycline hyclate  100 mg Oral Q12H    budesonide-formoterol  2 puff Inhalation BID    pravastatin  40 mg Oral Daily    [Held by provider] pregabalin  300 mg Oral BID     Continuous Infusions:   sodium chloride       PRN Meds:ondansetron, potassium chloride, loperamide, potassium chloride **OR** potassium for: LIVER-KIDNEYMICROSOMALAB    GILBERT  No results found for: GILBERT    AMA  No results found for: MITOAB    ASMA  No results found for: SMOOTHMUSCAB    Ceruloplasmin  No results found for: CERULOPLSM    Celiac panel  No results found for: TISSTRNTIIGG, TTGIGA, IGA    IgG  No results found for: IGG    IgM  No results found for: IGM    GGT   No results found for: LABGGT    PT/INR  No results for input(s): PROTIME, INR in the last 72 hours. Cancer Markers:  CEA:  No results for input(s): CEA in the last 72 hours. Ca 125:  No results for input(s):  in the last 72 hours. Ca 19-9:   No results for input(s):  in the last 72 hours. AFP: No results for input(s): AFP in the last 72 hours. Lactic acid:No results for input(s): LACTACIDWB in the last 72 hours. Radiology Review:    XR CHEST (2 VW)    Result Date: 9/3/2021  EXAMINATION: TWO XRAY VIEWS OF THE CHEST 9/3/2021 3:32 pm COMPARISON: 08/12/2020 HISTORY: ORDERING SYSTEM PROVIDED HISTORY: Pre-op testing Reason for Exam: pre  op hip, no chest complaints FINDINGS: The lungs are without acute focal process. No effusion or pneumothorax. The cardiomediastinal silhouette is normal.  The osseous structures are intact without acute process. Stable chest without acute process. XR CHEST PORTABLE    Result Date: 9/18/2021  EXAMINATION: ONE XRAY VIEW OF THE CHEST 9/18/2021 7:19 am COMPARISON: 09/17/2021 HISTORY: ORDERING SYSTEM PROVIDED HISTORY: AECOPD TECHNOLOGIST PROVIDED HISTORY: AECOPD Reason for Exam: port upright FINDINGS: The heart size is upper normal.  There is no pneumothorax. Areas of focal consolidation bilaterally have slightly improved since the previous exam.  A pleural effusion is not identified.      Areas of focal consolidation bilaterally have improved since the previous day's exam.     XR CHEST PORTABLE    Result Date: 9/17/2021  EXAMINATION: ONE XRAY VIEW OF THE CHEST 9/17/2021 10:17 am COMPARISON: 09/03/2021 HISTORY: [address] PROVIDED HISTORY: chest pain, shortness of breath TECHNOLOGIST PROVIDED HISTORY: chest pain, shortness of breath FINDINGS: Interval development of patchy bilateral interstitial and airspace infiltrates. .The cardiac size is normal. No  pleural effusions are seen. Pulmonary vascularity appears normal. There is mild ectasia of the thoracic aorta. .No acute bony abnormalities. The hilar structures are normal.     Interval development of patchy bilateral interstitial and airspace infiltrates compatible with bilateral pneumonia, possibly related to COVID. [facility]     Principal Problem:    Acute respiratory failure with hypoxia (HCC)  Active Problems:    Chronic obstructive pulmonary disease with acute exacerbation (HCC)    Hypokalemia    Bipolar disorder, unspecified (HCC)    Diarrhea    Chronic renal insufficiency, stage III (moderate) (HCC)    Shortness of breath    Elevated brain natriuretic peptide (BNP) level  Resolved Problems:    * No resolved hospital problems. *       GI Assessment:    1. Crohn's disease -patient reports being diagnosed with Crohn's 2012. Colonoscopy 2017 nondiagnostic for Crohn's disease however, CT imaging 2017 suggestive of small bowel disease and CT abdomen pelvis 12/2020 showing wall thickening and inflammatory changes of the cecum and ascending colon suggestive of infectious or inflammatory colitis. - Sed rate 30, CRP 81  2. Diarrhea - likely secondary to untreated Crohn's disease. Will need to rule out infectious etiology -   3. Elevated LFTs -suspect secondary to fatty liver as noted on ultrasound. Acute hepatitis panel nonreactive      Plan of care:  1. Patient will need eventual treatment for Crohn's disease. She will need comprehensive work-up prior to starting Biolog[employer] in O/P setting. Will start with initial lab work. 2.  Obtain stool studies as ordered if patient has any further diarrhea  3. Patient needs to follow-up with GI in outpatient setting.   Patient is aware and agreeable  4. Ok to be discharged from GI perspective. GI will sign off.        Please feel free to contact me with any questions or concerns. Thank you for allowing me to participate in the care of your patient. SHRADDHA Mariee CNP on 9/19/2021 at 6:47 AM   [facility]    Please note that this note was generated using a voice recognition dictation software. Although every effort was made to ensure the accuracy of this automated transcription, some errors in transcription may have occurred.

## 2021-09-19 NOTE — PROGRESS NOTES
Yes  Activity Tolerance  Activity Tolerance: Patient limited by pain; Patient limited by fatigue;Patient limited by endurance       Patient Diagnosis(es): The primary encounter diagnosis was Pneumonia of both lower lobes due to infectious organism. A diagnosis of Chronic obstructive pulmonary disease with acute exacerbation ([facility] [address] was also pertinent to this visit. has a past medical history of Acid reflux, Anxiety, Arthritis, Asthma, Bipolar 1 disorder ([address] Bowel obstruction ([facility] [address] COPD (chronic obstructive pulmonary disease) ([facility] [address] Crohn disease ([facility] [address] Depression, Dry eye, Fibromyalgia, Gout, Headache, Hyperlipidemia, Hypertension, Hypothyroidism, Kidney stones, Muscle spasm, Neuropathy, Pain, Personal history of other medical treatment, Wears partial dentures, and Wellness examination. has a past surgical history that includes Tonsillectomy and adenoidectomy; Tubal ligation; Cholecystectomy; Bunionectomy (Left); Colonoscopy (04/30/2007); Colonoscopy (10/12/2017); and Abdomen surgery.     Restrictions  Restrictions/Precautions  Restrictions/Precautions: Fall Risk, Up as Tolerated  Required Braces or Orthoses?: No  Position Activity Restriction  Other position/activity restrictions: maintain O2 >92%        Subjective  General  Chart Reviewed: Yes  Patient assessed for rehabilitation services?: Yes  Family / Caregiver Present: No  Follows Commands: Within Functional Limits  Pain Screening  Patient Currently in Pain: Yes  Pain Assessment  Pain Assessment: 0-10  Pain Type: Chronic pain  Pain Location: Hip  Pain Orientation: Left  Vital Signs  Pulse: 70  Patient Currently in Pain: Yes  Oxygen Therapy  SpO2: 95 %  O2 Device: Nasal cannula  O2 Flow Rate (L/min): 2 L/min       Orientation  Orientation  Overall Orientation Status: Within Functional Limits  Social/Functional History  Social/Functional History  Lives With: Other (comment) (roommate)  Type of Home: Apartment  Home Layout: One level, Laundry in basement  Home Access: Stairs to enter with rails  Entrance Stairs - Number of Steps: 6  Entrance Stairs - Rails: Both  Bathroom Shower/Tub: Tub/Shower unit  Bathroom Toilet: Handicap height  Bathroom Equipment: Grab bars in shower, Shower chair, Grab bars around toilet  Home Equipment: [address], 4 wheeled walker  ADL Assistance: [address]: Independent  Homemaking Responsibilities: Yes (boyfriend assists with laundry, located in basement)  Ambulation Assistance: Independent  Transfer Assistance: Independent  Active : No  Patient's  Info: roommate  Occupation: On disability  Leisure & Hobbies: watch tv  Additional Comments: Unable to describe how far she normally ambulates. Had difficulty remembering if her walker has wheels. She was able to clarify that it has brakes and a seat which indicates that it is a rollator (with wheels)  Cognition   Cognition  Overall Cognitive Status: Exceptions  Following Commands: Follows one step commands consistently  Attention Span: Appears intact  Memory: Appears intact  Safety Judgement: Decreased awareness of need for assistance;Decreased awareness of need for safety  Problem Solving: Assistance required to identify errors made;Assistance required to correct errors made  Insights: Decreased awareness of deficits  Initiation: Does not require cues  Sequencing: Requires cues for some    Objective     Observation/Palpation  Observation: Poor stability while standing on left LE due to pain and weakness L hip. She reaches out with left hand for support (right hand on small base quad cane) while denying that she needs the walker so she can support with both hands       Strength RLE  Comment: Weak in LEs with quick fatigue. Only able to amb 30'.      Sensation  Overall Sensation Status: WFL  Bed mobility  Supine to Sit: Minimal assistance  Transfers  Sit to Stand: Contact guard assistance  Stand to sit: Minimal Assistance  Ambulation  Ambulation?: Yes  Ambulation 1  Surface: level tile  Device: Small Javan Chanel  Other Apparatus: O2  Assistance: Minimal assistance  Quality of Gait: Left hip is weak and unstable to support her with only her quad cane. Gait Deviations: Slow Meseret;Decreased step height;Decreased step length  Distance: 30'  Comments: Poor safety awareness. Attempted to lean on wheeled oxygen tank carrier while turning around in front of chair. Balance  Sitting - Static: Good  Sitting - Dynamic: Good  Standing - Static: Fair;-  Standing - Dynamic: Poor;+  Comments: Unable to stand with no UE support. Plan   Plan  Times per week: 5-6x/wk  Current Treatment Recommendations: Strengthening, Patient/Caregiver Education & Training, Endurance Training, Functional Mobility Training, Transfer Training, Gait Training, Stair training, Pain Management, Safety Education & Training, Equipment Evaluation, Education, & procurement, Home Exercise Program  Safety Devices  Type of devices: All fall risk precautions in place, Call light within reach, Gait belt, Nurse notified, Left in chair                        AM-PAC Score  AM-PAC Inpatient Mobility Raw Score : 18 (09/19/21 1232)  AM-PAC Inpatient T-Scale Score : 43.63 (09/19/21 1232)  Mobility Inpatient CMS 0-100% Score: 46.58 (09/19/21 1232)  Mobility Inpatient CMS G-Code Modifier : CK (09/19/21 [facility])          Goals  Short term goals  Time Frame for Short term goals: 14 visits  Short term goal 1: Supine to/from sit with SBA. Short term goal 2: Sit to/from stand with SBA. Short term goal 3: Ambulate 150' with wheeled walker SBA. Short term goal 4: Negotiate up and down 6 steps with CGA with one railing.        Therapy Time   Individual Concurrent Group Co-treatment   Time In 1140         Time Out 1215         Minutes 35         Timed Code Treatment Minutes: 22 Minutes       Argenis Rubin PT

## 2021-09-19 NOTE — PLAN OF CARE
BRONCHOSPASM/BRONCHOCONSTRICTION   [x]        IMPROVE AERATION/BREATH SOUNDS  [x]   ADMINISTER BRONCHODILATOR THERAPY AS APPROPRIATE  [x]   ASSESS BREATH SOUNDS  [x]   PATIENT EDUCATION AS NEEDED     PROVIDE ADEQUATE OXYGENATION WITH ACCEPTABLE SP02/ABG'S  [x]  IDENTIFY APPROPRIATE OXYGEN THERAPY  [x]   MONITOR SP02/ABG'S AS NEEDED   [x]   PATIENT EDUCATION AS NEEDED

## 2021-09-19 NOTE — CARE COORDINATION
Case Management Initial Discharge Plan  Cheikh Morales,             Met with:patient to discuss discharge plans. Information verified: address, contacts, phone number, , insurance Yes  Insurance Provider: [employer]    Emergency Contact/Next of Kin name & number: Maikel Espinosa, daughter at [phone]  Who are involved in patient's support system? Ganesh and Nhan Lizama, friend that lives with her    PCP: Rc Maria MD  Date of last visit: couple months ago      Discharge Planning    Living Arrangements:  Friends     Home has 2 stories  5 stairs to climb to get into front door, n/astairs to climb to reach second floor  Location of bedroom/bathroom in home main    Patient able to perform ADL's:Independent    Current Services (outpatient & in home) none  DME equipment: walker with wheels and cane  DME provider: unknown    Is patient receiving oral anticoagulation therapy? No    If indicated:   Physician managing anticoagulation treatment: n/a  Where does patient obtain lab work for ATC treatment? n/a      Potential Assistance Needed:  N/A    Patient agreeable to home care: Yes  Freedom of choice provided:  no    Prior SNF/Rehab Placement and Facility: none  Agreeable to SNF/Rehab: No  Reading of choice provided: n/a     Evaluation: no    Expected Discharge date:  21    Patient expects to be discharged to: If home: is the family and/or caregiver wiling & able to provide support at home? yes  Who will be providing this support? Melida Quintana    Follow Up Appointment: Best Day/ Time: Monday AM    Transportation provider: Nhan Lizama, her friend  Transportation arrangements needed for discharge: No    Readmission Risk              Risk of Unplanned Readmission:  26             Does patient have a readmission risk score greater than 14?: Yes  If yes, follow-up appointment must be made within 7 days of discharge.      Goals of Care:       Educated Pt on transitional options, provided freedom of choice and are agreeable with plan      Discharge Plan: Home independently with friend.            Electronically signed by Monse Sanabria RN on 9/19/21 at 2:28 PM EDT

## 2021-09-19 NOTE — PROGRESS NOTES
Writer went over discharge paperwork with patient, all questions answered. Patient wheeled down to main lobby for discharge home with family.

## 2021-09-20 LAB
CAMPYLOBACTER PCR: NORMAL
E COLI ENTEROTOXIGENIC PCR: NORMAL
PLESIOMONAS SHIGELLOIDES PCR: NORMAL
SALMONELLA PCR: NORMAL
SHIGATOXIN GENE PCR: NORMAL
SHIGELLA SP PCR: NORMAL
SPECIMEN DESCRIPTION: NORMAL
VIBRIO PCR: NORMAL
YERSINIA ENTEROCOLITICA PCR: NORMAL

## 2021-09-21 NOTE — DISCHARGE SUMMARY
[facility]  Office: [phone]  Tabitha Martinez DO, Kathi Farley MD, Adama Pearson MD, Gamaliel Salinas MD, Sav Mora MD, Gina Chamorro MD, Henny Hanson MD, Loni Ortega MD, Emily Todd MD, Mbonu Dusty Babinski MD, Maciej Holly DO, Awilda Herron MD, Prema Fleming MD, Andria Rubi DO, Melany Hernandez MD,  Alden Dyer DO, Jennifer Ramírez MD, Mony Cardoza MD, Rhoda Browne CNP, [facility] DelGrosso CNP, Letty Mary Lou CNP, Carlos Herrera CNS, Kyaw Dupont CNP, Kenton Meade CNP, Adrian Polanco CNP, Nava Bee CNP, Reynaldo Rasheed CNP, Julio Hopper PA-C, Ariella Min CNP, Andrews Ny CNP, Rosas Pal CNP, Klarissa Benson CNP, Varinder Schwab CNP, Ifeoma Olivo, [address] [place] [address]      Discharge Summary     Patient ID: Archana Vega  :  1965   MRN: [id number]     ACCOUNT:  [de-identified]   Patient Location : [facility]  Patient's PCP: Nettie Ryder MD  Admit Date: 2021   Discharge Date: 2021     Length of Stay: 2  Code Status:  Prior  Admitting Physician: Tre Ellington DO  Discharge Physician: Gamaliel Salinas MD     Active Discharge Diagnosis :     Primary Problem  Acute respiratory failure with hypoxia [facility])      [facility] Problems    Diagnosis Date Noted    Pneumonia of both lower lobes due to infectious organism [J18.9]     Elevated brain natriuretic peptide (BNP) level [R79.89] 2021    Shortness of breath [R06.02] 2021    Diarrhea [R19.7] 2020    Chronic renal insufficiency, stage III (moderate) ([facility] [N18.30] 2020    Acute respiratory failure with hypoxia ([facility] [J96.01] 02/15/2020    Bipolar disorder, unspecified ([facility] [F31.9] 02/15/2020    [facility] obstructive pulmonary disease with acute exacerbation [facility] [J44.1] 2017    Hypokalemia [E87.6] 2017       Admission Condition:  fair     Discharged Condition: stable    Hospital Stay:     Hospital Course:  Kelsie Dihel is a 61 y.o. female who was admitted for the management of   Acute respiratory failure with hypoxia ([facility] , presented to ER with Cough, Shortness of Breath, and Nausea  Patient presented to hospital with dyspnea with cough and yellow expectoration. She  has underlying history of chronic respiratory failure on home oxygen 2 L/min. Initial evaluation showed elevated pro BNP 1871, bilateral infiltrates on chest x-ray. Covid test was negative. Patient was given bronchodilators without any improvement. she was treated with antibiotics and  IV Lasix and helped her symptoms. Patient also developed hypotension with treatment with mental status changes. she was given  multiple medications including Lyrica, gabapentin, sedatives that was on her home med list however patient had not taken these medications for longtime. Medications were stopped and Patient also started on midodrine. Significant therapeutic interventions:   1. Altered mental status from encephalopathy due to hypotension and toxic metabolic encephalopathy from polypharmacy. Patient's Lyrica, gabapentin, Trileptal, Zanaflex, sedatives held. 2. Hypotension -secondary to medications - medications held. 3. Acute on chronic respiratory failure due to community-acquired pneumonia and acute decompensation HFpEF. - patient on doxycycline. 4. Hypokalemia - secondary to lasix. 5. Diarrhea - imodium   6. Bipolar disorder - follow psychiatry as outpatient . Significant Diagnostic Studies:   Labs / Micro:/Radiology  Recent Labs     09/19/21  0614 09/18/21  0600 09/17/21  1339   WBC 5.1 5.3 6.2   HGB 10.0* 11.0* 11.9   HCT 32.2* 35.1* 37.5   MCV 88.0 87.1 86.0    226 232     Labs Renal Latest Ref Rng & Units 9/19/2021 9/18/2021 9/18/2021 9/18/2021 9/17/2021   BUN 6 - 20 mg/dL 12 - - 6 5(L)   Cr 0.50 - 0.90 mg/dL 0.70 - - 0.48(L) 0.45(L)   K 3.7 - 5.3 mmol/L 3.7 3. 2(L) 2. 6(LL) 2.7(LL) 3. 3(L)   Na 135 - 144 mmol/L 136 - - 138 138     Lab Results   Component Value Date    ALT 61 (H) 09/17/2021    AST 81 (H) 09/17/2021    ALKPHOS 122 (H) 09/17/2021    BILITOT 0.93 09/17/2021     Lab Results   Component Value Date    TSH 0.30 09/18/2021     Lab Results   Component Value Date    HEPAIGM NONREACTIVE 09/18/2021    HEPBIGM NONREACTIVE 09/18/2021    HEPCAB NONREACTIVE 09/18/2021     Lab Results   Component Value Date    COLORU DARK YELLOW 09/03/2021    NITRU NEGATIVE 09/03/2021    GLUCOSEU NEGATIVE 09/03/2021    GLUCOSEU NEGATIVE 08/30/2011    KETUA TRACE 09/03/2021    UROBILINOGEN Normal 09/03/2021    BILIRUBINUR NEGATIVE  Verified by ictotest. 09/03/2021    BILIRUBINUR negative 12/27/2020    BILIRUBINUR NEGATIVE 08/30/2011     No results found for: LABA1C  No results found for: EAG  Lab Results   Component Value Date    INR 1.0 02/15/2020    INR 0.9 02/14/2020    INR 1.0 02/09/2015    PROTIME 10.4 02/15/2020    PROTIME 9.6 02/14/2020    PROTIME 10.6 02/09/2015       US LIVER    Result Date: 9/20/2021  Mild coarsening of the hepatic echotexture. Mild fatty infiltration of the liver. No focal abnormality identified. Status post cholecystectomy. XR CHEST PORTABLE    Result Date: 9/19/2021  Areas of focal consolidation bilaterally have improved since the previous day's exam.     XR CHEST PORTABLE    Result Date: 9/17/2021  Interval development of patchy bilateral interstitial and airspace infiltrates compatible with bilateral pneumonia, possibly related to COVID. Hector Ra Consultations:    Consults:     Final Specialist Recommendations/Findings:   IP CONSULT TO HOSPITALIST  IP CONSULT TO GI      The patient was seen and examined on day of discharge and this discharge summary is in conjunction with any daily progress note from day of discharge.     Discharge plan:     Disposition: Home    Physician Follow Up:     Cecile Vila MD  [address] [place] [id number]  [phone]    Schedule an appointment as soon as possible for a visit  Hospital follow-up. Crohn's disease management    Allyssa Sampson MD  [facility]  [phone]    In 1 week  post hospital visit       Requiring Further Evaluation/Follow Up POST HOSPITALIZATION/Incidental Findings: follow up with GI and PCP     Diet: cardiac diet    Activity: As tolerated. Instructions to Patient: low salt diet . Complete smoking abstinence .      Discharge Medications:      Medication List      START taking these medications    doxycycline hyclate 100 MG tablet  Commonly known as: VIBRA-TABS  Take 1 tablet by mouth every 12 hours for 10 doses     nicotine 21 MG/24HR  Commonly known as: NICODERM CQ  Place 1 patch onto the skin daily        CHANGE how you take these medications    acetaminophen 500 MG tablet  Commonly known as: TYLENOL  Take 2 tablets by mouth 3 times daily  What changed:   · when to take this  · reasons to take this     ipratropium-albuterol 0.5-2.5 (3) MG/3ML Soln nebulizer solution  Commonly known as: DUONEB  Inhale 3 mLs into the lungs every 6 hours as needed for Shortness of Breath  What changed:   · when to take this  · reasons to take this     lisinopril 5 MG tablet  Commonly known as: PRINIVIL;ZESTRIL  Take 1 tablet by mouth daily  What changed:   · medication strength  · how much to take        CONTINUE taking these medications    budesonide-formoterol 160-4.5 MCG/ACT Aero  Commonly known as: Symbicort  Inhale 2 puffs into the lungs 2 times daily     furosemide 20 MG tablet  Commonly known as: LASIX  Take 1 tablet by mouth daily     lidocaine 5 %  Commonly known as: LIDODERM     loperamide 2 MG capsule  Commonly known as: IMODIUM     REFRESH OP        STOP taking these medications    gabapentin 100 MG capsule  Commonly known as: NEURONTIN     HYDROcodone-acetaminophen 5-325 MG per tablet  Commonly known as: NORCO     hydrOXYzine 25 MG tablet  Commonly known as: ATARAX     olopatadine 0.1 % ophthalmic solution  Commonly known as: PATANOL     pregabalin 300 MG capsule  Commonly known as: LYRICA     SUMAtriptan 100 MG tablet  Commonly known as: IMITREX     traZODone 150 MG tablet  Commonly known as: DESYREL           Where to Get Your Medications      These medications were sent to [address] [place] [address], [address] [address] [address]  [address] [facility] [address]    Phone: [phone]   · budesonide-formoterol 160-4.5 MCG/ACT Aero  · doxycycline hyclate 100 MG tablet  · furosemide 20 MG tablet  · ipratropium-albuterol 0.5-2.5 (3) MG/3ML Soln nebulizer solution  · lisinopril 5 MG tablet  · nicotine 21 MG/24HR         Time Spent on discharge is  31 mins in patient examination, evaluation, counseling as well as medication reconciliation, prescriptions for required medications, discharge plan and follow up. Electronically signed by   Bakari Moser MD  9/21/2021        Thank you Dr. James Ag MD for the opportunity to be involved in this patient's care.

## 2021-09-22 LAB
HEPATITIS B CORE TOTAL ANTIBODY: NONREACTIVE
QUANTI TB GOLD PLUS: NEGATIVE
QUANTI TB1 MINUS NIL: 0 IU/ML (ref 0–0.34)
QUANTI TB2 MINUS NIL: 0.01 IU/ML (ref 0–0.34)
QUANTIFERON MITOGEN: 9.51 IU/ML
QUANTIFERON NIL: 0.01 IU/ML

## 2021-09-23 ENCOUNTER — TELEPHONE (OUTPATIENT)
Dept: GASTROENTEROLOGY | Age: 56
End: 2021-09-23

## 2021-09-23 LAB — CALPROTECTIN, FECAL: 48 UG/G

## 2021-09-23 NOTE — TELEPHONE ENCOUNTER
Called pt to schedule appt per [facility]  request, had to LVM. Pt is established with Dr. Francesco Uriarte and will need to be scheduled with him.

## 2021-09-27 DIAGNOSIS — M16.32 OSTEOARTHRITIS RESULTING FROM LEFT HIP DYSPLASIA: ICD-10-CM

## 2021-09-27 RX ORDER — HYDROCODONE BITARTRATE AND ACETAMINOPHEN 5; 325 MG/1; MG/1
TABLET ORAL
Qty: 56 TABLET | Refills: 0 | OUTPATIENT
Start: 2021-09-27 | End: 2021-10-11

## 2021-09-29 DIAGNOSIS — M16.32 OSTEOARTHRITIS RESULTING FROM LEFT HIP DYSPLASIA: ICD-10-CM

## 2021-09-29 RX ORDER — HYDROCODONE BITARTRATE AND ACETAMINOPHEN 5; 325 MG/1; MG/1
1 TABLET ORAL EVERY 6 HOURS PRN
Qty: 56 TABLET | Refills: 0 | Status: SHIPPED | OUTPATIENT
Start: 2021-09-29 | End: 2021-10-11

## 2021-09-29 NOTE — TELEPHONE ENCOUNTER
Asking for refill on [place]. Scheduled hip surgery for 9/17/2021 was canceled. Patient had pneumonia bilat lower lobes. Last appointment 7/20/2021. No follow up in office has been made. Please Advise. Med pended.

## 2021-10-15 ENCOUNTER — APPOINTMENT (OUTPATIENT)
Dept: CT IMAGING | Age: 56
DRG: 422 | End: 2021-10-15
Payer: MEDICARE

## 2021-10-15 ENCOUNTER — HOSPITAL ENCOUNTER (INPATIENT)
Age: 56
LOS: 4 days | Discharge: HOME | DRG: 422 | End: 2021-10-20
Attending: EMERGENCY MEDICINE | Admitting: EMERGENCY MEDICINE
Payer: MEDICARE

## 2021-10-15 DIAGNOSIS — R11.2 INTRACTABLE VOMITING WITH NAUSEA, UNSPECIFIED VOMITING TYPE: Primary | ICD-10-CM

## 2021-10-15 LAB
-: ABNORMAL
ABSOLUTE EOS #: 0.12 K/UL (ref 0–0.44)
ABSOLUTE IMMATURE GRANULOCYTE: <0.03 K/UL (ref 0–0.3)
ABSOLUTE LYMPH #: 1.36 K/UL (ref 1.1–3.7)
ABSOLUTE MONO #: 0.3 K/UL (ref 0.1–1.2)
ALBUMIN SERPL-MCNC: 4.2 G/DL (ref 3.5–5.2)
ALBUMIN/GLOBULIN RATIO: 1.2 (ref 1–2.5)
ALP BLD-CCNC: 114 U/L (ref 35–104)
ALT SERPL-CCNC: 12 U/L (ref 5–33)
AMORPHOUS: ABNORMAL
ANION GAP SERPL CALCULATED.3IONS-SCNC: 17 MMOL/L (ref 9–17)
AST SERPL-CCNC: 16 U/L
BACTERIA: ABNORMAL
BASOPHILS # BLD: 1 % (ref 0–2)
BASOPHILS ABSOLUTE: 0.05 K/UL (ref 0–0.2)
BILIRUB SERPL-MCNC: 0.94 MG/DL (ref 0.3–1.2)
BILIRUBIN URINE: NEGATIVE
BUN BLDV-MCNC: 7 MG/DL (ref 6–20)
BUN/CREAT BLD: ABNORMAL (ref 9–20)
CALCIUM SERPL-MCNC: 9.6 MG/DL (ref 8.6–10.4)
CASTS UA: ABNORMAL /LPF (ref 0–8)
CHLORIDE BLD-SCNC: 97 MMOL/L (ref 98–107)
CO2: 24 MMOL/L (ref 20–31)
COLOR: YELLOW
CREAT SERPL-MCNC: 0.56 MG/DL (ref 0.5–0.9)
CRYSTALS, UA: ABNORMAL /HPF
DIFFERENTIAL TYPE: ABNORMAL
EOSINOPHILS RELATIVE PERCENT: 2 % (ref 1–4)
EPITHELIAL CELLS UA: ABNORMAL /HPF (ref 0–5)
GFR AFRICAN AMERICAN: >60 ML/MIN
GFR NON-AFRICAN AMERICAN: >60 ML/MIN
GFR SERPL CREATININE-BSD FRML MDRD: ABNORMAL ML/MIN/{1.73_M2}
GFR SERPL CREATININE-BSD FRML MDRD: ABNORMAL ML/MIN/{1.73_M2}
GLUCOSE BLD-MCNC: 127 MG/DL (ref 70–99)
GLUCOSE URINE: NEGATIVE
HCT VFR BLD CALC: 47.6 % (ref 36.3–47.1)
HEMOGLOBIN: 15.7 G/DL (ref 11.9–15.1)
IMMATURE GRANULOCYTES: 0 %
KETONES, URINE: ABNORMAL
LACTIC ACID, SEPSIS WHOLE BLOOD: 2 MMOL/L (ref 0.5–1.9)
LACTIC ACID, SEPSIS WHOLE BLOOD: 2.1 MMOL/L (ref 0.5–1.9)
LACTIC ACID, SEPSIS: ABNORMAL MMOL/L (ref 0.5–1.9)
LACTIC ACID, SEPSIS: ABNORMAL MMOL/L (ref 0.5–1.9)
LEUKOCYTE ESTERASE, URINE: NEGATIVE
LIPASE: 34 U/L (ref 13–60)
LYMPHOCYTES # BLD: 26 % (ref 24–43)
MAGNESIUM: 1.9 MG/DL (ref 1.6–2.6)
MCH RBC QN AUTO: 27.8 PG (ref 25.2–33.5)
MCHC RBC AUTO-ENTMCNC: 33 G/DL (ref 28.4–34.8)
MCV RBC AUTO: 84.4 FL (ref 82.6–102.9)
MONOCYTES # BLD: 6 % (ref 3–12)
MUCUS: ABNORMAL
NITRITE, URINE: NEGATIVE
NRBC AUTOMATED: 0 PER 100 WBC
OTHER OBSERVATIONS UA: ABNORMAL
PDW BLD-RTO: 15.7 % (ref 11.8–14.4)
PH UA: 8 (ref 5–8)
PLATELET # BLD: 280 K/UL (ref 138–453)
PLATELET ESTIMATE: ABNORMAL
PMV BLD AUTO: 11.4 FL (ref 8.1–13.5)
POTASSIUM SERPL-SCNC: 3.1 MMOL/L (ref 3.7–5.3)
PROTEIN UA: NEGATIVE
RBC # BLD: 5.64 M/UL (ref 3.95–5.11)
RBC # BLD: ABNORMAL 10*6/UL
RBC UA: ABNORMAL /HPF (ref 0–4)
RENAL EPITHELIAL, UA: ABNORMAL /HPF
SEG NEUTROPHILS: 65 % (ref 36–65)
SEGMENTED NEUTROPHILS ABSOLUTE COUNT: 3.32 K/UL (ref 1.5–8.1)
SODIUM BLD-SCNC: 138 MMOL/L (ref 135–144)
SPECIFIC GRAVITY UA: 1.01 (ref 1–1.03)
TOTAL PROTEIN: 7.7 G/DL (ref 6.4–8.3)
TRICHOMONAS: ABNORMAL
TROPONIN INTERP: NORMAL
TROPONIN T: NORMAL NG/ML
TROPONIN, HIGH SENSITIVITY: 11 NG/L (ref 0–14)
TURBIDITY: CLEAR
URINE HGB: NEGATIVE
UROBILINOGEN, URINE: NORMAL
WBC # BLD: 5.2 K/UL (ref 3.5–11.3)
WBC # BLD: ABNORMAL 10*3/UL
WBC UA: ABNORMAL /HPF (ref 0–5)
YEAST: ABNORMAL

## 2021-10-15 PROCEDURE — 93005 ELECTROCARDIOGRAM TRACING: CPT | Performed by: STUDENT IN AN ORGANIZED HEALTH CARE EDUCATION/TRAINING PROGRAM

## 2021-10-15 PROCEDURE — 83690 ASSAY OF LIPASE: CPT

## 2021-10-15 PROCEDURE — 96367 TX/PROPH/DG ADDL SEQ IV INF: CPT

## 2021-10-15 PROCEDURE — 84484 ASSAY OF TROPONIN QUANT: CPT

## 2021-10-15 PROCEDURE — 74177 CT ABD & PELVIS W/CONTRAST: CPT

## 2021-10-15 PROCEDURE — 2580000003 HC RX 258: Performed by: EMERGENCY MEDICINE

## 2021-10-15 PROCEDURE — 99283 EMERGENCY DEPT VISIT LOW MDM: CPT

## 2021-10-15 PROCEDURE — 93005 ELECTROCARDIOGRAM TRACING: CPT | Performed by: PEDIATRICS

## 2021-10-15 PROCEDURE — G0378 HOSPITAL OBSERVATION PER HR: HCPCS

## 2021-10-15 PROCEDURE — 83605 ASSAY OF LACTIC ACID: CPT

## 2021-10-15 PROCEDURE — 80053 COMPREHEN METABOLIC PANEL: CPT

## 2021-10-15 PROCEDURE — 83735 ASSAY OF MAGNESIUM: CPT

## 2021-10-15 PROCEDURE — 81001 URINALYSIS AUTO W/SCOPE: CPT

## 2021-10-15 PROCEDURE — 96375 TX/PRO/DX INJ NEW DRUG ADDON: CPT

## 2021-10-15 PROCEDURE — 6360000002 HC RX W HCPCS: Performed by: STUDENT IN AN ORGANIZED HEALTH CARE EDUCATION/TRAINING PROGRAM

## 2021-10-15 PROCEDURE — 85025 COMPLETE CBC W/AUTO DIFF WBC: CPT

## 2021-10-15 PROCEDURE — 96365 THER/PROPH/DIAG IV INF INIT: CPT

## 2021-10-15 PROCEDURE — 6360000002 HC RX W HCPCS: Performed by: EMERGENCY MEDICINE

## 2021-10-15 PROCEDURE — 6360000004 HC RX CONTRAST MEDICATION: Performed by: STUDENT IN AN ORGANIZED HEALTH CARE EDUCATION/TRAINING PROGRAM

## 2021-10-15 RX ORDER — POTASSIUM CHLORIDE 7.45 MG/ML
10 INJECTION INTRAVENOUS ONCE
Status: COMPLETED | OUTPATIENT
Start: 2021-10-15 | End: 2021-10-16

## 2021-10-15 RX ORDER — MAGNESIUM SULFATE IN WATER 40 MG/ML
2000 INJECTION, SOLUTION INTRAVENOUS ONCE
Status: COMPLETED | OUTPATIENT
Start: 2021-10-15 | End: 2021-10-15

## 2021-10-15 RX ORDER — 0.9 % SODIUM CHLORIDE 0.9 %
1000 INTRAVENOUS SOLUTION INTRAVENOUS ONCE
Status: COMPLETED | OUTPATIENT
Start: 2021-10-15 | End: 2021-10-16

## 2021-10-15 RX ORDER — ONDANSETRON 2 MG/ML
4 INJECTION INTRAMUSCULAR; INTRAVENOUS ONCE
Status: COMPLETED | OUTPATIENT
Start: 2021-10-15 | End: 2021-10-15

## 2021-10-15 RX ORDER — PROCHLORPERAZINE EDISYLATE 5 MG/ML
10 INJECTION INTRAMUSCULAR; INTRAVENOUS ONCE
Status: COMPLETED | OUTPATIENT
Start: 2021-10-15 | End: 2021-10-15

## 2021-10-15 RX ADMIN — SODIUM CHLORIDE 1000 ML: 9 INJECTION, SOLUTION INTRAVENOUS at 23:42

## 2021-10-15 RX ADMIN — IOPAMIDOL 75 ML: 755 INJECTION, SOLUTION INTRAVENOUS at 21:30

## 2021-10-15 RX ADMIN — POTASSIUM CHLORIDE 10 MEQ: 7.46 INJECTION, SOLUTION INTRAVENOUS at 23:42

## 2021-10-15 RX ADMIN — PROCHLORPERAZINE EDISYLATE 10 MG: 5 INJECTION INTRAMUSCULAR; INTRAVENOUS at 18:38

## 2021-10-15 RX ADMIN — MAGNESIUM SULFATE 2000 MG: 2 INJECTION INTRAVENOUS at 18:39

## 2021-10-15 RX ADMIN — ONDANSETRON 4 MG: 2 INJECTION INTRAMUSCULAR; INTRAVENOUS at 20:03

## 2021-10-15 ASSESSMENT — ENCOUNTER SYMPTOMS
COUGH: 0
DIARRHEA: 1
SHORTNESS OF BREATH: 0

## 2021-10-15 ASSESSMENT — PAIN SCALES - GENERAL: PAINLEVEL_OUTOF10: 7

## 2021-10-15 NOTE — ED PROVIDER NOTES
[facility] ED  EMERGENCY DEPARTMENT ENCOUNTER      Pt Name: Rodrigue Gomez  MRN: [id number]  [place] 1965  Date of evaluation: 10/15/2021  Provider: Adam Bazzi MD    CHIEF COMPLAINT      nausea/vomiting       HISTORY OF PRESENT ILLNESS   (Location/Symptom, Timing/Onset, Context/Setting, Quality, Duration, Modifying Factors, Severity)  Note limiting factors. Rodrigue Gomez is a 61 y.o. female with past medical history of substance abuse, bowel obstruction/intestinal resection 2011, questionable Crohn, recently admitted for pneumonia. Patient with above past medical history presented with abdominal pain and nausea/vomiting from the morning. Patient was very agitated and difficult to take and said she is in pain. Abdomen was tender on palpation. EKG was showing prolonged QT with monitor showing ventricular bigeminy. Patient denies any fever, shortness of breath, chest pain. Patient does have history of diarrhea during previous admission for which she was prescribed Imodium patient has been taking Imodium. Work-up was negative for QuantiFERON TB, hepatitis B, hypothyroidism, Covid,      CBC unremarkable, CMP showing hypokalemia likely due to vomiting, lactate 2.1 likely due to vomiting/dehydration, tropes and lipase were negative    2 gm of magnesium was replaced. IV COMPAZINE GIVEN   CT imaging ordered. Will follow    REVIEW OF SYSTEMS    (2-9 systems for level 4, 10 or more for level 5)     Review of Systems   Constitutional: Negative for fever. Respiratory: Negative for cough and shortness of breath. Cardiovascular: Negative for chest pain and leg swelling. Gastrointestinal: Positive for diarrhea. Psychiatric/Behavioral: Positive for agitation. Except as noted above the remainder of the review of systems was reviewed and negative.        PAST MEDICAL HISTORY     Past Medical History:   Diagnosis Date    Acid reflux     Anxiety     Arthritis Left hip    Asthma     Bipolar 1 disorder (HCC)     Bowel obstruction (HCC)     COPD (chronic obstructive pulmonary disease) (HCC)     O2 3L PRN/ Dr. Melva Jones [facility]/last seen 2020/appt.9-7-21 for Clearance    Crohn disease ([address]     Depression     Dry eye     Fibromyalgia     Gout     Headache     Hyperlipidemia     Hypertension     Hypothyroidism     Kidney stones     Muscle spasm     Neuropathy     Pain     left hip    Personal history of other medical treatment     Pt. seen by Dr. Dennis Flores for clearance for this OR Left hip/ [facility]    Wears partial dentures     upper    Wellness examination     PCP Issa Mcleod MD/ [place]/last seen 8-24-21         SURGICAL HISTORY       Past Surgical History:   Procedure Laterality Date    ABDOMEN SURGERY      bowel obstruction OR    BUNIONECTOMY Left     CHOLECYSTECTOMY      COLONOSCOPY  04/30/2007    no gross pathology seen in the colon and also 3-4 inches of the ileum    COLONOSCOPY  10/12/2017    normal    TONSILLECTOMY AND ADENOIDECTOMY      TUBAL LIGATION           CURRENT MEDICATIONS       Previous Medications    ACETAMINOPHEN (TYLENOL) 500 MG TABLET    Take 2 tablets by mouth 3 times daily    BUDESONIDE-FORMOTEROL (SYMBICORT) 160-4.5 MCG/ACT AERO    Inhale 2 puffs into the lungs 2 times daily    FUROSEMIDE (LASIX) 20 MG TABLET    Take 1 tablet by mouth daily    HYDROCODONE-ACETAMINOPHEN (NORCO) 5-325 MG PER TABLET    Take 1 tablet by mouth every 6 hours as needed for Pain for up to 14 days. IPRATROPIUM-ALBUTEROL (DUONEB) 0.5-2.5 (3) MG/3ML SOLN NEBULIZER SOLUTION    Inhale 3 mLs into the lungs every 6 hours as needed for Shortness of Breath    LIDOCAINE (LIDODERM) 5 %    Place 1 patch onto the skin daily 12 hours on, 12 hours off.     LISINOPRIL (PRINIVIL;ZESTRIL) 5 MG TABLET    Take 1 tablet by mouth daily    LOPERAMIDE (IMODIUM) 2 MG CAPSULE    Take 2 mg by mouth 4 times daily as needed for Diarrhea    NICOTINE (NICODERM CQ) 21 MG/24HR    Place 1 patch onto the skin daily    POLYVINYL ALCOHOL-POVIDONE (REFRESH OP)    Place 1 drop into both eyes 3 times daily       ALLERGIES     Azithromycin, Methylprednisolone, Penicillins, and Tape [adhesive tape]    FAMILY HISTORY       Family History   Problem Relation Age of Onset    Diabetes Father     Lung Cancer Father     Hypertension Mother     Cancer Mother     High Blood Pressure Mother     Crohn's Disease Brother     Heart Disease Brother           SOCIAL HISTORY       Social History     Socioeconomic History    Marital status: Single     Spouse name: Not on file    Number of children: Not on file    Years of education: Not on file    Highest education level: Not on file   Occupational History    Not on file   Tobacco Use    Smoking status: Current Every Day Smoker     Packs/day: 0.50     Years: 37.00     Pack years: 18.50     Types: Cigarettes    Smokeless tobacco: Former User     Types: Chew     Quit date: 9/3/2001   Vaping Use    Vaping Use: Former    Quit date: 9/3/2019    Substances: Nicotine   Substance and Sexual Activity    Alcohol use: Not Currently    Drug use: Yes     Types: Marijuana     Comment: h/o of substance abuse but denies drug use    Sexual activity: Not on file   Other Topics Concern    Not on file   Social History Narrative    Not on file     Social Determinants of Health     Financial Resource Strain:     Difficulty of Paying Living Expenses:    Food Insecurity:     Worried About [address] [employer] in the Last Year:     [facility] in the Last Year:    Transportation Needs:     Lack of Transportation (Medical):      Lack of Transportation (Non-Medical):    Physical Activity:     Days of Exercise per Week:     Minutes of Exercise per Session:    Stress:     Feeling of Stress :    Social Connections:     Frequency of Communication with Friends and Family:     Frequency of Social Gatherings with Friends and Family:     Attends Caodaism Services:     Active Member of Clubs or Organizations:     Attends Club or Organization Meetings:     Marital Status:    Intimate Partner Violence:     Fear of Current or Ex-Partner:     Emotionally Abused:     Physically Abused:     Sexually Abused:        SCREENINGS                        PHYSICAL EXAM    (up to 7 for level 4, 8 or more for level 5)     ED Triage Vitals [10/15/21 1741]   BP Temp Temp src Pulse Resp SpO2 Height Weight   -- 97.7 °F (36.5 °C) -- 69 22 97 % 5' 6\" (1.676 m) 160 lb (72.6 kg)       Physical Exam  Constitutional:       Appearance: Normal appearance. HENT:      Head: Normocephalic and atraumatic. Eyes:      Extraocular Movements: Extraocular movements intact. Cardiovascular:      Rate and Rhythm: Normal rate. Pulmonary:      Effort: No respiratory distress. Breath sounds: No wheezing. Abdominal:      Tenderness: There is abdominal tenderness. There is guarding. Neurological:      General: No focal deficit present. Mental Status: She is oriented to person, place, and time.    Psychiatric:         Mood and Affect: Mood normal.         Behavior: Behavior normal.         DIAGNOSTIC RESULTS     EKG:   EKG SHOWING PROLONG QT .  VENTRICULAR BIGEMINY     RADIOLOGY:   Ct IMAGING ORDERED     CT ABDOMEN W CONTRAST Additional Contrast? None    (Results Pending)         ED BEDSIDE ULTRASOUND:   Performed by ED Physician - none    LABS:  Labs Reviewed   CBC WITH AUTO DIFFERENTIAL - Abnormal; Notable for the following components:       Result Value    RBC 5.64 (*)     Hemoglobin 15.7 (*)     Hematocrit 47.6 (*)     RDW 15.7 (*)     All other components within normal limits   LACTATE, SEPSIS - Abnormal; Notable for the following components:    Lactic Acid, Sepsis, Whole Blood 2.1 (*)     All other components within normal limits   TROPONIN   COMPREHENSIVE METABOLIC PANEL   LIPASE   LACTATE, SEPSIS   URINALYSIS WITH MICROSCOPIC       All other labs were within normal range or not returned as of this dictation. EMERGENCY DEPARTMENT COURSE and DIFFERENTIAL DIAGNOSIS/MDM:   Vitals:    Vitals:    10/15/21 1741   Pulse: 69   Resp: 22   Temp: 97.7 °F (36.5 °C)   SpO2: 97%   Weight: 160 lb (72.6 kg)   Height: 5' 6\" (1.676 m)           REASSESSMENT          CRITICAL CARE TIME   Total Critical Care time was 10-15 MINUTES  minutes, excluding separately reportable procedures. There was a high probability of clinically significant/life threatening deterioration in the patient's condition which required my urgent intervention. CONSULTS:  None    PROCEDURES:  Unless otherwise noted below, none         FINAL IMPRESSION    No diagnosis found. DISPOSITION/PLAN   DISPOSITION        PATIENT REFERRED TO:  No follow-up provider specified. DISCHARGE MEDICATIONS:  New Prescriptions    No medications on file     Controlled Substances Monitoring:     RX Monitoring 10/18/2017   Attestation The Prescription Monitoring Report for this patient was reviewed today.        (Please note that portions of this note were completed with a voice recognition program.  Efforts were made to edit the dictations but occasionally words are mis-transcribed.)    Edmar Jones MD  Internal Medicine Resident, [address] [place] [address]  10/15/2021,8:06 PM

## 2021-10-15 NOTE — ED PROVIDER NOTES
[facility] [address] ED     Emergency Department     Faculty Attestation    I performed a history and physical examination of the patient and discussed management with the resident. I reviewed the residents note and agree with the documented findings and plan of care. Any areas of disagreement are noted on the chart. I was personally present for the key portions of any procedures. I have documented in the chart those procedures where I was not present during the key portions. I have reviewed the emergency nurses triage note. I agree with the chief complaint, past medical history, past surgical history, allergies, medications, social and family history as documented unless otherwise noted below. For Physician Assistant/ Nurse Practitioner cases/documentation I have personally evaluated this patient and have completed at least one if not all key elements of the E/M (history, physical exam, and MDM). Additional findings are as noted. Patient presents with abdominal pain, nausea and vomiting that she says started today. She says she has been unable to keep anything down all day today. She says the abdominal pain is located in her upper abdomen. Patient says she has had a bowel obstruction in the past.  She denies fever, chest pain, shortness of breath. She denies diarrhea or constipation. On exam, patient is lying in the bed and is actively vomiting. She appears uncomfortable. Lungs are clear to auscultation bilaterally and heart sounds are normal.  Abdomen is soft with moderate epigastric tenderness. No rebound or guarding is present. Patient was recently admitted and treated for pneumonia. She says she has been off of antibiotics. We will get labs and CT scan of the abdomen. Will treat patient's pain and nausea and reassess.     EKG Interpretation    Interpreted by emergency department physician    Rhythm: normal sinus   Rate: normal  Axis: normal  Ectopy: none  Conduction: normal  ST Segments: nonspecific changes  T Waves: non specific changes  Q Waves: nonspecific    Clinical Impression: non-specific EKG, difficult to interpret due to artifact as pt unable to stay still due to active vomiting    MD Olga Pittman MD  Attending Emergency  Physician              January Zhang MD  10/15/21 5348

## 2021-10-15 NOTE — Clinical Note
Patient Class: Observation [104]   REQUIRED: Diagnosis: Nausea & vomiting [430187]   Estimated Length of Stay: Estimated stay of less than 2 midnights   Admitting Provider: Myla Bedoya [[id number]]   Telemetry/Cardiac Monitoring Required?: Yes

## 2021-10-15 NOTE — ED NOTES
Bed: 08  Expected date:   Expected time:   Means of arrival:   Comments:     Ela Corona RN  10/15/21 0922

## 2021-10-16 ENCOUNTER — APPOINTMENT (OUTPATIENT)
Dept: GENERAL RADIOLOGY | Age: 56
DRG: 422 | End: 2021-10-16
Payer: MEDICARE

## 2021-10-16 ENCOUNTER — APPOINTMENT (OUTPATIENT)
Dept: ULTRASOUND IMAGING | Age: 56
DRG: 422 | End: 2021-10-16
Payer: MEDICARE

## 2021-10-16 LAB
-: ABNORMAL
-: NORMAL
ABSOLUTE EOS #: <0.03 K/UL (ref 0–0.44)
ABSOLUTE IMMATURE GRANULOCYTE: 0.04 K/UL (ref 0–0.3)
ABSOLUTE LYMPH #: 1 K/UL (ref 1.1–3.7)
ABSOLUTE MONO #: 1.03 K/UL (ref 0.1–1.2)
AMORPHOUS: ABNORMAL
AMPHETAMINE SCREEN URINE: NEGATIVE
ANION GAP SERPL CALCULATED.3IONS-SCNC: 14 MMOL/L (ref 9–17)
ANION GAP SERPL CALCULATED.3IONS-SCNC: 26 MMOL/L (ref 9–17)
ANION GAP: 14 MMOL/L (ref 7–16)
BACTERIA: ABNORMAL
BARBITURATE SCREEN URINE: NEGATIVE
BASOPHILS # BLD: 0 % (ref 0–2)
BASOPHILS ABSOLUTE: 0.03 K/UL (ref 0–0.2)
BENZODIAZEPINE SCREEN, URINE: NEGATIVE
BILIRUBIN URINE: NEGATIVE
BUN BLDV-MCNC: 6 MG/DL (ref 6–20)
BUN BLDV-MCNC: 6 MG/DL (ref 6–20)
BUN/CREAT BLD: ABNORMAL (ref 9–20)
BUN/CREAT BLD: ABNORMAL (ref 9–20)
BUPRENORPHINE URINE: ABNORMAL
C-REACTIVE PROTEIN: <3 MG/L (ref 0–5)
CALCIUM SERPL-MCNC: 10 MG/DL (ref 8.6–10.4)
CALCIUM SERPL-MCNC: 8 MG/DL (ref 8.6–10.4)
CANNABINOID SCREEN URINE: NEGATIVE
CASTS UA: ABNORMAL /LPF (ref 0–2)
CHLORIDE BLD-SCNC: 100 MMOL/L (ref 98–107)
CHLORIDE BLD-SCNC: 92 MMOL/L (ref 98–107)
CO2: 18 MMOL/L (ref 20–31)
CO2: 20 MMOL/L (ref 20–31)
COCAINE METABOLITE, URINE: POSITIVE
COLOR: YELLOW
COMMENT UA: ABNORMAL
CREAT SERPL-MCNC: 0.43 MG/DL (ref 0.5–0.9)
CREAT SERPL-MCNC: 0.5 MG/DL (ref 0.5–0.9)
CRYSTALS, UA: ABNORMAL /HPF
DIFFERENTIAL TYPE: ABNORMAL
EKG ATRIAL RATE: 69 BPM
EKG ATRIAL RATE: 71 BPM
EKG P AXIS: 56 DEGREES
EKG P AXIS: 60 DEGREES
EKG P-R INTERVAL: 114 MS
EKG P-R INTERVAL: 118 MS
EKG Q-T INTERVAL: 602 MS
EKG Q-T INTERVAL: 648 MS
EKG QRS DURATION: 84 MS
EKG QRS DURATION: 86 MS
EKG QTC CALCULATION (BAZETT): 654 MS
EKG QTC CALCULATION (BAZETT): 694 MS
EKG R AXIS: 79 DEGREES
EKG R AXIS: 86 DEGREES
EKG T AXIS: 43 DEGREES
EKG T AXIS: 68 DEGREES
EKG VENTRICULAR RATE: 69 BPM
EKG VENTRICULAR RATE: 71 BPM
EOSINOPHILS RELATIVE PERCENT: 0 % (ref 1–4)
EPITHELIAL CELLS UA: ABNORMAL /HPF (ref 0–5)
ETHANOL PERCENT: <0.01 %
ETHANOL: <10 MG/DL
GFR AFRICAN AMERICAN: >60 ML/MIN
GFR AFRICAN AMERICAN: >60 ML/MIN
GFR NON-AFRICAN AMERICAN: >60 ML/MIN
GFR SERPL CREATININE-BSD FRML MDRD: >60 ML/MIN
GFR SERPL CREATININE-BSD FRML MDRD: ABNORMAL ML/MIN/{1.73_M2}
GLUCOSE BLD-MCNC: 147 MG/DL (ref 70–99)
GLUCOSE BLD-MCNC: 169 MG/DL (ref 70–99)
GLUCOSE BLD-MCNC: 179 MG/DL (ref 74–100)
GLUCOSE URINE: NEGATIVE
HCT VFR BLD CALC: 46.8 % (ref 36.3–47.1)
HEMOGLOBIN: 15.8 G/DL (ref 11.9–15.1)
IMMATURE GRANULOCYTES: 0 %
KETONES, URINE: ABNORMAL
LEUKOCYTE ESTERASE, URINE: NEGATIVE
LYMPHOCYTES # BLD: 9 % (ref 24–43)
MAGNESIUM: 1.9 MG/DL (ref 1.6–2.6)
MAGNESIUM: 1.9 MG/DL (ref 1.6–2.6)
MAGNESIUM: 2 MG/DL (ref 1.6–2.6)
MCH RBC QN AUTO: 27.7 PG (ref 25.2–33.5)
MCHC RBC AUTO-ENTMCNC: 33.8 G/DL (ref 28.4–34.8)
MCV RBC AUTO: 82.1 FL (ref 82.6–102.9)
MDMA URINE: ABNORMAL
METHADONE SCREEN, URINE: NEGATIVE
METHAMPHETAMINE, URINE: ABNORMAL
MONOCYTES # BLD: 9 % (ref 3–12)
MUCUS: ABNORMAL
NITRITE, URINE: NEGATIVE
NRBC AUTOMATED: 0 PER 100 WBC
OPIATES, URINE: NEGATIVE
OTHER OBSERVATIONS UA: ABNORMAL
OXYCODONE SCREEN URINE: NEGATIVE
PDW BLD-RTO: 16.5 % (ref 11.8–14.4)
PH UA: 7.5 (ref 5–8)
PHENCYCLIDINE, URINE: NEGATIVE
PLATELET # BLD: 349 K/UL (ref 138–453)
PLATELET ESTIMATE: ABNORMAL
PMV BLD AUTO: 12.8 FL (ref 8.1–13.5)
POC BUN: 5 MG/DL (ref 8–26)
POC CHLORIDE: 99 MMOL/L (ref 98–107)
POC CREATININE: 0.4 MG/DL (ref 0.51–1.19)
POC HEMATOCRIT: 48 % (ref 36–46)
POC HEMOGLOBIN: 16.4 G/DL (ref 12–16)
POC IONIZED CALCIUM: 1.1 MMOL/L (ref 1.15–1.33)
POC POTASSIUM: 2.8 MMOL/L (ref 3.5–4.5)
POC SODIUM: 138 MMOL/L (ref 138–146)
POC TCO2: 26 MMOL/L (ref 22–30)
POTASSIUM SERPL-SCNC: 2.8 MMOL/L (ref 3.7–5.3)
POTASSIUM SERPL-SCNC: 3 MMOL/L (ref 3.7–5.3)
POTASSIUM SERPL-SCNC: 3.5 MMOL/L (ref 3.7–5.3)
POTASSIUM SERPL-SCNC: 3.6 MMOL/L (ref 3.7–5.3)
PROPOXYPHENE, URINE: ABNORMAL
PROTEIN UA: ABNORMAL
RBC # BLD: 5.7 M/UL (ref 3.95–5.11)
RBC # BLD: ABNORMAL 10*6/UL
RBC UA: ABNORMAL /HPF (ref 0–2)
REASON FOR REJECTION: NORMAL
RENAL EPITHELIAL, UA: ABNORMAL /HPF
SARS-COV-2, RAPID: NOT DETECTED
SEDIMENTATION RATE, ERYTHROCYTE: 37 MM (ref 0–30)
SEG NEUTROPHILS: 82 % (ref 36–65)
SEGMENTED NEUTROPHILS ABSOLUTE COUNT: 9.66 K/UL (ref 1.5–8.1)
SODIUM BLD-SCNC: 134 MMOL/L (ref 135–144)
SODIUM BLD-SCNC: 136 MMOL/L (ref 135–144)
SPECIFIC GRAVITY UA: 1.02 (ref 1–1.03)
SPECIMEN DESCRIPTION: NORMAL
TEST INFORMATION: ABNORMAL
TRICHOMONAS: ABNORMAL
TRICYCLIC ANTIDEPRESSANTS, UR: ABNORMAL
TSH SERPL DL<=0.05 MIU/L-ACNC: 1.01 MIU/L (ref 0.3–5)
TURBIDITY: CLEAR
URINE HGB: ABNORMAL
UROBILINOGEN, URINE: NORMAL
WBC # BLD: 11.8 K/UL (ref 3.5–11.3)
WBC # BLD: ABNORMAL 10*3/UL
WBC UA: ABNORMAL /HPF (ref 0–5)
YEAST: ABNORMAL
ZZ NTE CLEAN UP: ORDERED TEST: NORMAL
ZZ NTE WITH NAME CLEAN UP: SPECIMEN SOURCE: NORMAL

## 2021-10-16 PROCEDURE — 96368 THER/DIAG CONCURRENT INF: CPT

## 2021-10-16 PROCEDURE — 87040 BLOOD CULTURE FOR BACTERIA: CPT

## 2021-10-16 PROCEDURE — 84443 ASSAY THYROID STIM HORMONE: CPT

## 2021-10-16 PROCEDURE — 99232 SBSQ HOSP IP/OBS MODERATE 35: CPT | Performed by: INTERNAL MEDICINE

## 2021-10-16 PROCEDURE — 82947 ASSAY GLUCOSE BLOOD QUANT: CPT

## 2021-10-16 PROCEDURE — 6360000002 HC RX W HCPCS: Performed by: STUDENT IN AN ORGANIZED HEALTH CARE EDUCATION/TRAINING PROGRAM

## 2021-10-16 PROCEDURE — 36415 COLL VENOUS BLD VENIPUNCTURE: CPT

## 2021-10-16 PROCEDURE — 74018 RADEX ABDOMEN 1 VIEW: CPT

## 2021-10-16 PROCEDURE — 85014 HEMATOCRIT: CPT

## 2021-10-16 PROCEDURE — 80051 ELECTROLYTE PANEL: CPT

## 2021-10-16 PROCEDURE — 85652 RBC SED RATE AUTOMATED: CPT

## 2021-10-16 PROCEDURE — 96372 THER/PROPH/DIAG INJ SC/IM: CPT

## 2021-10-16 PROCEDURE — G0378 HOSPITAL OBSERVATION PER HR: HCPCS

## 2021-10-16 PROCEDURE — 36556 INSERT NON-TUNNEL CV CATH: CPT

## 2021-10-16 PROCEDURE — 96367 TX/PROPH/DG ADDL SEQ IV INF: CPT

## 2021-10-16 PROCEDURE — 6370000000 HC RX 637 (ALT 250 FOR IP): Performed by: PEDIATRICS

## 2021-10-16 PROCEDURE — G0480 DRUG TEST DEF 1-7 CLASSES: HCPCS

## 2021-10-16 PROCEDURE — 2580000003 HC RX 258: Performed by: PEDIATRICS

## 2021-10-16 PROCEDURE — 96376 TX/PRO/DX INJ SAME DRUG ADON: CPT

## 2021-10-16 PROCEDURE — 86140 C-REACTIVE PROTEIN: CPT

## 2021-10-16 PROCEDURE — 99223 1ST HOSP IP/OBS HIGH 75: CPT | Performed by: STUDENT IN AN ORGANIZED HEALTH CARE EDUCATION/TRAINING PROGRAM

## 2021-10-16 PROCEDURE — 2580000003 HC RX 258

## 2021-10-16 PROCEDURE — 6370000000 HC RX 637 (ALT 250 FOR IP): Performed by: STUDENT IN AN ORGANIZED HEALTH CARE EDUCATION/TRAINING PROGRAM

## 2021-10-16 PROCEDURE — 6360000002 HC RX W HCPCS

## 2021-10-16 PROCEDURE — 80307 DRUG TEST PRSMV CHEM ANLYZR: CPT

## 2021-10-16 PROCEDURE — 94761 N-INVAS EAR/PLS OXIMETRY MLT: CPT

## 2021-10-16 PROCEDURE — 80048 BASIC METABOLIC PNL TOTAL CA: CPT

## 2021-10-16 PROCEDURE — 96361 HYDRATE IV INFUSION ADD-ON: CPT

## 2021-10-16 PROCEDURE — 2060000000 HC ICU INTERMEDIATE R&B

## 2021-10-16 PROCEDURE — 85025 COMPLETE CBC W/AUTO DIFF WBC: CPT

## 2021-10-16 PROCEDURE — 96375 TX/PRO/DX INJ NEW DRUG ADDON: CPT

## 2021-10-16 PROCEDURE — 71045 X-RAY EXAM CHEST 1 VIEW: CPT

## 2021-10-16 PROCEDURE — 81001 URINALYSIS AUTO W/SCOPE: CPT

## 2021-10-16 PROCEDURE — 6360000002 HC RX W HCPCS: Performed by: PEDIATRICS

## 2021-10-16 PROCEDURE — 96366 THER/PROPH/DIAG IV INF ADDON: CPT

## 2021-10-16 PROCEDURE — 06HY33Z INSERTION OF INFUSION DEVICE INTO LOWER VEIN, PERCUTANEOUS APPROACH: ICD-10-PCS | Performed by: STUDENT IN AN ORGANIZED HEALTH CARE EDUCATION/TRAINING PROGRAM

## 2021-10-16 PROCEDURE — 2580000003 HC RX 258: Performed by: STUDENT IN AN ORGANIZED HEALTH CARE EDUCATION/TRAINING PROGRAM

## 2021-10-16 PROCEDURE — 87635 SARS-COV-2 COVID-19 AMP PRB: CPT

## 2021-10-16 PROCEDURE — 6370000000 HC RX 637 (ALT 250 FOR IP): Performed by: NURSE PRACTITIONER

## 2021-10-16 PROCEDURE — 83735 ASSAY OF MAGNESIUM: CPT

## 2021-10-16 PROCEDURE — 82565 ASSAY OF CREATININE: CPT

## 2021-10-16 PROCEDURE — 2700000000 HC OXYGEN THERAPY PER DAY

## 2021-10-16 PROCEDURE — 84132 ASSAY OF SERUM POTASSIUM: CPT

## 2021-10-16 PROCEDURE — 84520 ASSAY OF UREA NITROGEN: CPT

## 2021-10-16 PROCEDURE — 76775 US EXAM ABDO BACK WALL LIM: CPT

## 2021-10-16 PROCEDURE — G0378 HOSPITAL OBSERVATION PER HR: HCPCS | Performed by: STUDENT IN AN ORGANIZED HEALTH CARE EDUCATION/TRAINING PROGRAM

## 2021-10-16 PROCEDURE — 2500000003 HC RX 250 WO HCPCS: Performed by: STUDENT IN AN ORGANIZED HEALTH CARE EDUCATION/TRAINING PROGRAM

## 2021-10-16 PROCEDURE — 93005 ELECTROCARDIOGRAM TRACING: CPT | Performed by: STUDENT IN AN ORGANIZED HEALTH CARE EDUCATION/TRAINING PROGRAM

## 2021-10-16 PROCEDURE — 82330 ASSAY OF CALCIUM: CPT

## 2021-10-16 RX ORDER — SODIUM CHLORIDE 0.9 % (FLUSH) 0.9 %
5-40 SYRINGE (ML) INJECTION PRN
Status: DISCONTINUED | OUTPATIENT
Start: 2021-10-16 | End: 2021-10-20 | Stop reason: HOSPADM

## 2021-10-16 RX ORDER — SCOLOPAMINE TRANSDERMAL SYSTEM 1 MG/1
1 PATCH, EXTENDED RELEASE TRANSDERMAL
Status: DISCONTINUED | OUTPATIENT
Start: 2021-10-16 | End: 2021-10-20 | Stop reason: HOSPADM

## 2021-10-16 RX ORDER — SODIUM CHLORIDE 9 MG/ML
25 INJECTION, SOLUTION INTRAVENOUS PRN
Status: DISCONTINUED | OUTPATIENT
Start: 2021-10-16 | End: 2021-10-20 | Stop reason: HOSPADM

## 2021-10-16 RX ORDER — PROCHLORPERAZINE EDISYLATE 5 MG/ML
10 INJECTION INTRAMUSCULAR; INTRAVENOUS EVERY 6 HOURS PRN
Status: DISCONTINUED | OUTPATIENT
Start: 2021-10-16 | End: 2021-10-16

## 2021-10-16 RX ORDER — POTASSIUM CHLORIDE 7.45 MG/ML
10 INJECTION INTRAVENOUS
Status: ACTIVE | OUTPATIENT
Start: 2021-10-16 | End: 2021-10-16

## 2021-10-16 RX ORDER — MORPHINE SULFATE 2 MG/ML
2 INJECTION, SOLUTION INTRAMUSCULAR; INTRAVENOUS
Status: DISCONTINUED | OUTPATIENT
Start: 2021-10-16 | End: 2021-10-18

## 2021-10-16 RX ORDER — CIPROFLOXACIN 2 MG/ML
400 INJECTION, SOLUTION INTRAVENOUS EVERY 12 HOURS
Status: DISCONTINUED | OUTPATIENT
Start: 2021-10-16 | End: 2021-10-16

## 2021-10-16 RX ORDER — POLYETHYLENE GLYCOL 3350 17 G/17G
17 POWDER, FOR SOLUTION ORAL DAILY PRN
Status: DISCONTINUED | OUTPATIENT
Start: 2021-10-16 | End: 2021-10-20 | Stop reason: HOSPADM

## 2021-10-16 RX ORDER — MAGNESIUM SULFATE IN WATER 40 MG/ML
2000 INJECTION, SOLUTION INTRAVENOUS ONCE
Status: COMPLETED | OUTPATIENT
Start: 2021-10-16 | End: 2021-10-16

## 2021-10-16 RX ORDER — MORPHINE SULFATE 2 MG/ML
INJECTION, SOLUTION INTRAMUSCULAR; INTRAVENOUS
Status: COMPLETED
Start: 2021-10-16 | End: 2021-10-16

## 2021-10-16 RX ORDER — MORPHINE SULFATE 2 MG/ML
1 INJECTION, SOLUTION INTRAMUSCULAR; INTRAVENOUS ONCE
Status: COMPLETED | OUTPATIENT
Start: 2021-10-16 | End: 2021-10-16

## 2021-10-16 RX ORDER — MORPHINE SULFATE 4 MG/ML
INJECTION, SOLUTION INTRAMUSCULAR; INTRAVENOUS
Status: COMPLETED
Start: 2021-10-16 | End: 2021-10-16

## 2021-10-16 RX ORDER — POTASSIUM CHLORIDE 7.45 MG/ML
10 INJECTION INTRAVENOUS PRN
Status: DISCONTINUED | OUTPATIENT
Start: 2021-10-16 | End: 2021-10-17

## 2021-10-16 RX ORDER — POTASSIUM CHLORIDE 29.8 MG/ML
20 INJECTION INTRAVENOUS ONCE
Status: COMPLETED | OUTPATIENT
Start: 2021-10-16 | End: 2021-10-16

## 2021-10-16 RX ORDER — POTASSIUM CHLORIDE 29.8 MG/ML
INJECTION INTRAVENOUS
Status: COMPLETED
Start: 2021-10-16 | End: 2021-10-16

## 2021-10-16 RX ORDER — MORPHINE SULFATE 4 MG/ML
4 INJECTION, SOLUTION INTRAMUSCULAR; INTRAVENOUS
Status: DISCONTINUED | OUTPATIENT
Start: 2021-10-16 | End: 2021-10-18

## 2021-10-16 RX ORDER — SODIUM CHLORIDE 9 MG/ML
INJECTION, SOLUTION INTRAVENOUS CONTINUOUS
Status: DISCONTINUED | OUTPATIENT
Start: 2021-10-16 | End: 2021-10-17

## 2021-10-16 RX ORDER — SODIUM CHLORIDE, SODIUM LACTATE, POTASSIUM CHLORIDE, AND CALCIUM CHLORIDE .6; .31; .03; .02 G/100ML; G/100ML; G/100ML; G/100ML
1000 INJECTION, SOLUTION INTRAVENOUS ONCE
Status: COMPLETED | OUTPATIENT
Start: 2021-10-16 | End: 2021-10-16

## 2021-10-16 RX ORDER — SODIUM CHLORIDE 0.9 % (FLUSH) 0.9 %
5-40 SYRINGE (ML) INJECTION EVERY 12 HOURS SCHEDULED
Status: DISCONTINUED | OUTPATIENT
Start: 2021-10-16 | End: 2021-10-20 | Stop reason: HOSPADM

## 2021-10-16 RX ORDER — POTASSIUM CHLORIDE 7.45 MG/ML
40 INJECTION INTRAVENOUS ONCE
Status: COMPLETED | OUTPATIENT
Start: 2021-10-16 | End: 2021-10-16

## 2021-10-16 RX ORDER — ONDANSETRON 2 MG/ML
4 INJECTION INTRAMUSCULAR; INTRAVENOUS EVERY 6 HOURS PRN
Status: DISCONTINUED | OUTPATIENT
Start: 2021-10-16 | End: 2021-10-16

## 2021-10-16 RX ORDER — ACETAMINOPHEN 650 MG/1
650 SUPPOSITORY RECTAL EVERY 6 HOURS PRN
Status: DISCONTINUED | OUTPATIENT
Start: 2021-10-16 | End: 2021-10-20 | Stop reason: HOSPADM

## 2021-10-16 RX ORDER — POTASSIUM CHLORIDE 29.8 MG/ML
20 INJECTION INTRAVENOUS ONCE
Status: DISCONTINUED | OUTPATIENT
Start: 2021-10-16 | End: 2021-10-16

## 2021-10-16 RX ORDER — ACETAMINOPHEN 325 MG/1
650 TABLET ORAL EVERY 6 HOURS PRN
Status: DISCONTINUED | OUTPATIENT
Start: 2021-10-16 | End: 2021-10-20 | Stop reason: HOSPADM

## 2021-10-16 RX ORDER — MORPHINE SULFATE 2 MG/ML
2 INJECTION, SOLUTION INTRAMUSCULAR; INTRAVENOUS ONCE
Status: DISCONTINUED | OUTPATIENT
Start: 2021-10-16 | End: 2021-10-16

## 2021-10-16 RX ORDER — CALCIUM GLUCONATE 20 MG/ML
1000 INJECTION, SOLUTION INTRAVENOUS ONCE
Status: COMPLETED | OUTPATIENT
Start: 2021-10-16 | End: 2021-10-16

## 2021-10-16 RX ORDER — POTASSIUM CHLORIDE 7.45 MG/ML
20 INJECTION INTRAVENOUS ONCE
Status: DISCONTINUED | OUTPATIENT
Start: 2021-10-16 | End: 2021-10-16

## 2021-10-16 RX ORDER — ONDANSETRON 4 MG/1
4 TABLET, ORALLY DISINTEGRATING ORAL EVERY 8 HOURS PRN
Status: DISCONTINUED | OUTPATIENT
Start: 2021-10-16 | End: 2021-10-17

## 2021-10-16 RX ORDER — POTASSIUM CHLORIDE 20 MEQ/1
40 TABLET, EXTENDED RELEASE ORAL PRN
Status: DISCONTINUED | OUTPATIENT
Start: 2021-10-16 | End: 2021-10-20 | Stop reason: HOSPADM

## 2021-10-16 RX ADMIN — MORPHINE SULFATE 1 MG: 2 INJECTION, SOLUTION INTRAMUSCULAR; INTRAVENOUS at 13:05

## 2021-10-16 RX ADMIN — ONDANSETRON 4 MG: 2 INJECTION INTRAMUSCULAR; INTRAVENOUS at 08:21

## 2021-10-16 RX ADMIN — POTASSIUM CHLORIDE 10 MEQ: 10 INJECTION, SOLUTION INTRAVENOUS at 14:10

## 2021-10-16 RX ADMIN — MORPHINE SULFATE 4 MG: 4 INJECTION INTRAVENOUS at 14:08

## 2021-10-16 RX ADMIN — POTASSIUM CHLORIDE 20 MEQ: 29.8 INJECTION, SOLUTION INTRAVENOUS at 18:20

## 2021-10-16 RX ADMIN — CEFEPIME 2000 MG: 2 INJECTION, POWDER, FOR SOLUTION INTRAVENOUS at 17:29

## 2021-10-16 RX ADMIN — ONDANSETRON 4 MG: 4 TABLET, ORALLY DISINTEGRATING ORAL at 00:15

## 2021-10-16 RX ADMIN — MAGNESIUM SULFATE 2000 MG: 2 INJECTION INTRAVENOUS at 11:27

## 2021-10-16 RX ADMIN — CALCIUM GLUCONATE 1000 MG: 20 INJECTION, SOLUTION INTRAVENOUS at 11:20

## 2021-10-16 RX ADMIN — SODIUM CHLORIDE: 9 INJECTION, SOLUTION INTRAVENOUS at 10:46

## 2021-10-16 RX ADMIN — SODIUM CHLORIDE, POTASSIUM CHLORIDE, SODIUM LACTATE AND CALCIUM CHLORIDE 1000 ML: 600; 310; 30; 20 INJECTION, SOLUTION INTRAVENOUS at 13:02

## 2021-10-16 RX ADMIN — POTASSIUM CHLORIDE 40 MEQ: 7.46 INJECTION, SOLUTION INTRAVENOUS at 11:34

## 2021-10-16 RX ADMIN — POTASSIUM CHLORIDE 20 MEQ: 400 INJECTION, SOLUTION INTRAVENOUS at 13:05

## 2021-10-16 RX ADMIN — METRONIDAZOLE 500 MG: 500 INJECTION, SOLUTION INTRAVENOUS at 14:49

## 2021-10-16 RX ADMIN — ONDANSETRON 4 MG: 4 TABLET, ORALLY DISINTEGRATING ORAL at 21:33

## 2021-10-16 RX ADMIN — POTASSIUM CHLORIDE 20 MEQ: 29.8 INJECTION INTRAVENOUS at 13:05

## 2021-10-16 RX ADMIN — CIPROFLOXACIN 400 MG: 2 INJECTION, SOLUTION INTRAVENOUS at 14:49

## 2021-10-16 RX ADMIN — ACETAMINOPHEN 650 MG: 650 SUPPOSITORY RECTAL at 22:50

## 2021-10-16 RX ADMIN — MORPHINE SULFATE 4 MG: 4 INJECTION INTRAVENOUS at 22:39

## 2021-10-16 RX ADMIN — MORPHINE SULFATE 2 MG: 2 INJECTION, SOLUTION INTRAMUSCULAR; INTRAVENOUS at 18:21

## 2021-10-16 ASSESSMENT — PAIN SCALES - GENERAL
PAINLEVEL_OUTOF10: 10
PAINLEVEL_OUTOF10: 10
PAINLEVEL_OUTOF10: 8
PAINLEVEL_OUTOF10: 0
PAINLEVEL_OUTOF10: 8
PAINLEVEL_OUTOF10: 0
PAINLEVEL_OUTOF10: 8

## 2021-10-16 NOTE — FLOWSHEET NOTE
SPIRITUAL CARE DEPARTMENT - [address] [address]  PROGRESS NOTE    Shift date: 10/16/21  Shift day: Saturday   Shift # 1    Room # 0416/8852-18   Name: Roberto Monahan            Age: 61 y.o. Gender: female          Anglican: Unknown  Place of Protestant: Unknown    Referral: Rapid Response    Admit Date & Time: 10/15/2021  5:54 PM    PATIENT/EVENT DESCRIPTION:  Roberto Monahan is a 61 y.o. female in room 349. Responded to a Rapid Response. Pt had nausea and was vomiting. Pt also had Ventricular tachycardia (V Tach). SPIRITUAL ASSESSMENT/INTERVENTION:   responded to perfect serve. Upon arrival was notified by medical that Pt experienced nausea and vomiting. Nurse informed me that Pt had V Tach. Thus Rapid Response was called. Medical was assessing Pt and providing health care to Pt. Mishel Quijano will return to Pt and see if any improvement. SPIRITUAL CARE FOLLOW-UP PLAN:  Chaplains will remain available to offer spiritual and emotional support as needed. Electronically signed by Keyonna Lomeli Resident, on 10/16/2021 at 10:01 AM.  [address] [employer]  [phone]       10/16/21 8269   Encounter Summary   Services provided to: Patient   Referral/Consult From: Multi-disciplinary team   Support System Unknown   Continue Visiting   (10/16/21)   Complexity of Encounter Moderate   Length of Encounter 30 minutes   Routine   Type Initial   Assessment Anxious; Helplessness   Intervention Sustaining presence/ Ministry of presence   Outcome   (Anxious)

## 2021-10-16 NOTE — PROGRESS NOTES
Transfer note:  Patient transferred from obs unit rm 349 to CAR-1 room 1011    Rapid response called at 9:45 due to patient in v-tach, torsades, diaphoretic and emesis noticed on telemetry monitor  RN at bedside getting EKG at time of telemetry alarm  On EKG same rhythm  Pulses do not match EKG rate, patient in bigeminy  Code cart pulled to room by writer  Rapid response called  Life pack placed  RR team to bedside. Patient BP stable, placed on 3L O2 via NC and 97% RA. Labs obtained including cultures now. 2nd IV placed. IV mag sulf hung with second bolus and calcium gluco ordered    Patient here for nausea and vomiting last night seen in the ED. Found to be with prologed QT in ED/vtach/bigeminy. Continued emesis, diaphoresis, ill feeling, and appears ill/toxic. Transfer to IM service now.   Bel Todd MD  10/16/2021

## 2021-10-16 NOTE — PROGRESS NOTES
Patient's latest K 2.8. Has received 40 IV thus far. Per nursing, finishing up a bag of 20 meq now and has 10 meq x 2 also ordered to follow the bag of 20. Nursing to inform primary team when all potassium done infusing for stat K check and further administration PRN.     Tabitha Landry MD  PGY-2, Department of Internal Medicine  [address], [place]

## 2021-10-16 NOTE — CARE COORDINATION
10/16/21 1746   Readmission Assessment   Number of Days since last admission? 8-30 days   Previous Disposition Home with Family   Who is being Interviewed Patient   What was the patient's/caregiver's perception as to why they think they needed to return back to the hospital? Other (Comment)  (abd pain, vomiting)   Did you visit your Primary Care Physician after you left the hospital, before you returned this time? No   Why weren't you able to visit your PCP? Could not get an appointment   Did you see a specialist, such as Cardiac, Pulmonary, Orthopedic Physician, etc. after you left the hospital? No   Who advised the patient to return to the hospital? Self-referral   Does the patient report anything that got in the way of taking their medications? No   In our efforts to provide the best possible care to you and others like you, can you think of anything that we could have done to help you after you left the hospital the first time, so that you might not have needed to return so soon?  Other (Comment)  (nothing)

## 2021-10-16 NOTE — H&P
Attending Physician Statement:    I have discussed the care of  Aurea Sotelo , including pertinent history and exam findings, with the Cardiology fellow/resident. I have seen and examined the patient and the key elements of all parts of the encounter have been performed by me. I agree with the assessment, plan and orders as documented by the fellow/resident, after I modified exam findings and plan of treatments, and the final version is my approved version of the assessment. Additional Comments: [facility] Cardiology    Consult / H&P               Today's Date: 10/16/2021  Patient Name: Aurea Sotelo  Date of admission: 10/15/2021  5:54 PM  Patient's age: 61 y.o., 1965  Admission Dx: Nausea & vomiting [R11.2]  Intractable vomiting with nausea, unspecified vomiting type [R11.2]    Reason for Consult:  Cardiac evaluation    Requesting Physician: Eusebio Souza MD    CHIEF COMPLAINT:  Nausea, vomitting    History Obtained From:  patient, electronic medical record    HISTORY OF PRESENT ILLNESS:      The patient is a 61 y.o. female with a past medical history of hypothyroidism, COPD initially presented with nausea, vomiting and shortness of breath. As per EMR, patient had experiencing worsening shortness of breath requiring increased amount of supplemental oxygen from her baseline also associated with cough. In the ER, patient was tachypneic, chest x-ray showed some infiltrates and she was given IV Lasix 20 mg. Covid was negative. This morning rapid response was called as patient was found to be in V. Tach/torsades. Was diaphoretic. Patient was found to have prolonged QTc in 700s. Pertinent labs K3.1, MG 1.9, troponin 11. EKG consistent with normal sinus rhythm with PVCs and pattern of bigeminy with prolonged QTC in 690s  CT abdomen pelvis suggestive of ileitis/chronic colitis with concerns of pyelonephritis.     Patient has received 2 g of magnesium sulfate since admission and 10 AM EQ potassium chloride. Patient is not able to tolerate IV potassium chloride because of burning at the incision site and neither is able to tolerate p.o. because of nausea/emesis. Also received Zofran which can further prolong the QTC. Past Medical History:   has a past medical history of Acid reflux, Anxiety, Arthritis, Asthma, Bipolar 1 disorder ([address] Bowel obstruction ([address] COPD (chronic obstructive pulmonary disease) ([address] Crohn disease ([address] Depression, Dry eye, Fibromyalgia, Gout, Headache, Hyperlipidemia, Hypertension, Hypothyroidism, Kidney stones, Muscle spasm, Neuropathy, Pain, Personal history of other medical treatment, Wears partial dentures, and Wellness examination. Past Surgical History:   has a past surgical history that includes Tonsillectomy and adenoidectomy; Tubal ligation; Cholecystectomy; Bunionectomy (Left); Colonoscopy (04/30/2007); Colonoscopy (10/12/2017); and Abdomen surgery. Home Medications:    Prior to Admission medications    Medication Sig Start Date End Date Taking? Authorizing Provider   HYDROcodone-acetaminophen (NORCO) 5-325 MG per tablet Take 1 tablet by mouth every 6 hours as needed for Pain for up to 14 days. 10/13/21 10/27/21 Yes Harman Romero MD   furosemide (LASIX) 20 MG tablet Take 1 tablet by mouth daily 9/19/21  Yes Gladys Scales MD   budesonide-formoterol [facility]) 160-4.5 MCG/ACT AERO Inhale 2 puffs into the lungs 2 times daily 9/19/21  Yes Gladys Scales MD   ipratropium-albuterol (DUONEB) 0.5-2.5 (3) MG/3ML SOLN nebulizer solution Inhale 3 mLs into the lungs every 6 hours as needed for Shortness of Breath 9/19/21 10/19/21 Yes Gladys Scales MD   acetaminophen (TYLENOL) 500 MG tablet Take 2 tablets by mouth 3 times daily  Patient taking differently: Take 1,000 mg by mouth 3 times daily as needed  7/17/21  Yes Marce Kim, DO   lidocaine (LIDODERM) 5 % Place 1 patch onto the skin daily 12 hours on, 12 hours off.    Yes Historical Provider, MD   lisinopril (PRINIVIL;ZESTRIL) 5 MG tablet Take 1 tablet by mouth daily 9/20/21   Rere Watts MD   nicotine (NICODERM CQ) 21 MG/24HR Place 1 patch onto the skin daily 9/20/21   Rere Wtats MD   loperamide (IMODIUM) 2 MG capsule Take 2 mg by mouth 4 times daily as needed for Diarrhea    Historical Provider, MD   Polyvinyl Alcohol-Povidone (REFRESH OP) Place 1 drop into both eyes 3 times daily    Historical Provider, MD      Current Facility-Administered Medications: ondansetron (ZOFRAN-ODT) disintegrating tablet 4 mg, 4 mg, Oral, Q8H PRN  0.9 % sodium chloride infusion, , IntraVENous, Continuous  sodium chloride flush 0.9 % injection 5-40 mL, 5-40 mL, IntraVENous, 2 times per day  sodium chloride flush 0.9 % injection 5-40 mL, 5-40 mL, IntraVENous, PRN  0.9 % sodium chloride infusion, 25 mL, IntraVENous, PRN  potassium chloride (KLOR-CON M) extended release tablet 40 mEq, 40 mEq, Oral, PRN **OR** potassium bicarb-citric acid (EFFER-K) effervescent tablet 40 mEq, 40 mEq, Oral, PRN **OR** potassium chloride 10 mEq/100 mL IVPB (Peripheral Line), 10 mEq, IntraVENous, PRN  enoxaparin (LOVENOX) injection 40 mg, 40 mg, SubCUTAneous, Daily  polyethylene glycol (GLYCOLAX) packet 17 g, 17 g, Oral, Daily PRN  famotidine (PEPCID) injection 20 mg, 20 mg, IntraVENous, BID  acetaminophen (TYLENOL) tablet 650 mg, 650 mg, Oral, Q6H PRN **OR** acetaminophen (TYLENOL) suppository 650 mg, 650 mg, Rectal, Q6H PRN  magnesium sulfate 2000 mg in 50 mL IVPB premix, 2,000 mg, IntraVENous, Once  prochlorperazine (COMPAZINE) injection 10 mg, 10 mg, IntraVENous, Q6H PRN  calcium gluconate 1000 mg in sodium chloride 50 mL, 1,000 mg, IntraVENous, Once  potassium chloride 10 mEq/100 mL IVPB (Peripheral Line), 40 mEq, IntraVENous, Once    Allergies:  Azithromycin, Methylprednisolone, Penicillins, and Tape [adhesive tape]    Social History:   reports that she has been smoking cigarettes.  She has a 18.50 pack-year smoking history. She quit smokeless tobacco use about 20 years ago. Her smokeless tobacco use included chew. She reports previous alcohol use. She reports current drug use. Drug: Marijuana. Family History: family history includes Cancer in her mother; Crohn's Disease in her brother; Diabetes in her father; Heart Disease in her brother; High Blood Pressure in her mother; Hypertension in her mother; [place] in her father. No h/o sudden cardiac death. REVIEW OF SYSTEMS:    · Constitutional: there has been no unanticipated weight loss. There's been No change in energy level, No change in activity level. · Eyes: No visual changes or diplopia. No scleral icterus. · ENT: No Headaches  · Cardiovascular: No cardiac history  · Respiratory: No previous pulmonary problems, No cough  · Gastrointestinal: No abdominal pain. No change in bowel or bladder habits. · Genitourinary: No dysuria, trouble voiding, or hematuria. · Musculoskeletal:  No gait disturbance, No weakness or joint complaints. · Integumentary: No rash or pruritis. · Neurological: No headache, diplopia, change in muscle strength, numbness or tingling. No change in gait, balance, coordination, mood, affect, memory, mentation, behavior. · Psychiatric: No anxiety, or depression. · Endocrine: No temperature intolerance. No excessive thirst, fluid intake, or urination. No tremor. · Hematologic/Lymphatic: No abnormal bruising or bleeding, blood clots or swollen lymph nodes. · Allergic/Immunologic: No nasal congestion or hives. PHYSICAL EXAM:      BP (!) 191/87   Pulse 83   Temp 97.2 °F (36.2 °C) (Oral)   Resp 18   Ht 5' 6\" (1.676 m)   Wt 160 lb (72.6 kg)   SpO2 96%   BMI 25.82 kg/m²    Constitutional and General Appearance: alert, cooperative, in distress and appears stated age  HEENT: PERRL, no cervical lymphadenopathy. No masses palpable. Normal oral mucosa  Respiratory:  · Resp Auscultation: Good respiratory effort.  No for increased work of breathing. On auscultation: clear to auscultation bilaterally  Cardiovascular:  ·  regular S1 and S2. Abdomen:   · No masses or tenderness  · Bowel sounds present  Extremities:  ·  No Cyanosis or Clubbing  ·  Lower extremity edema: No  ·  Skin: Warm and dry  Neurological:  · Alert and oriented. · Moves all extremities well  · No abnormalities of mood, affect, memory, mentation, or behavior are noted    DATA:    Diagnostics:      EKG:   normal sinus rhythm, frequent PVC's noted, prolonged QT interval.    ECHO:   not obtained. 2/2020 normal left ventricle size, wall thickness and function with an estimated EF  > 65%. Mild to moderate left ventricular hypertrophy. No significant valvular regurgitation or stenosis seen.       Stress Test:   not obtained. Cardiac Angiography:   not obtained. Labs:     CBC:   Recent Labs     10/15/21  1818   WBC 5.2   HGB 15.7*   HCT 47.6*        BMP:   Recent Labs     10/15/21  1818      K 3.1*   CO2 24   BUN 7   CREATININE 0.56   LABGLOM >60   GLUCOSE 127*     BNP: No results for input(s): BNP in the last 72 hours. PT/INR: No results for input(s): PROTIME, INR in the last 72 hours. APTT:No results for input(s): APTT in the last 72 hours. CARDIAC ENZYMES:No results for input(s): CKTOTAL, CKMB, CKMBINDEX, TROPONINI in the last 72 hours. FASTING LIPID PANEL:No results found for: HDL, LDLDIRECT, LDLCALC, TRIG  LIVER PROFILE:  Recent Labs     10/15/21  1818   AST 16   ALT 12   LABALBU 4.2       IMPRESSION:    1. V. tach/torsade likely secondary to hypokalemia/hypomagnesemia from ongoing emesis  2. Prolonged QTC   3. Dehydration  4. chronic colitis/ileitis  5. Hypothyroidism  6. Hypokalemia  7. COPD  8. Crohn's disease  9.  Lactic acidosis    Patient Active Problem List   Diagnosis    Hypothyroidism    Gastroesophageal reflux disease without esophagitis    Gastroenteritis    [facility]    Pancreatitis, acute    Drug abuse ([facility]    Chronic obstructive pulmonary disease with acute exacerbation (HCC)    Hypertension    Hypokalemia    Hypothyroidism due to acquired atrophy of thyroid    [facility]    Acute cystitis without hematuria    Accidental drug overdose    Prolonged Q-T interval on ECG    Bipolar disorder, unspecified ([facility]    Polysubstance abuse ([facility]    Alcohol intoxication delirium ([facility]    [facility]    Acute respiratory failure with hypoxia (HCC)    Bipolar 1 disorder (HCC)    Diarrhea    Chronic bronchitis (HCC)    Chronic renal insufficiency, stage III (moderate) (HCC)    Colitis    Anxiety    Osteoarthritis resulting from left hip dysplasia    Shortness of breath    Elevated brain natriuretic peptide (BNP) level    Pneumonia of both lower lobes due to infectious organism    Nausea & vomiting       RECOMMENDATIONS:  1. Replace potassium IV to keep K>4 and Mg>2  2. Will repeat EKG  3. Avoid any QTC prolonging medications: compazine, pepcid, zofran and antipsychotics  4. We will aggressively hydrate the patient. 5. Might need GI consult for for chronic colitis/ongoing emesis  6. Check TSH    Will discuss with rounding attending Dr. Ken Olivo for final recommendations.     More Martinez MD  Cardiology Service  Internal Medicine Residency Program, PGY-3  [facility]

## 2021-10-16 NOTE — ED NOTES
PT helped to and from restroom via wheelchair. Positioned for comfort in bed. Pt endorses nausea but no other complaints at this time.       Graham Gutierrez RN  10/16/21 6334

## 2021-10-16 NOTE — CARE COORDINATION
Transition planning   Pt very uncomfortable( vomiting) and unable to answer questions for intake assessment - will check back

## 2021-10-16 NOTE — CONSULTS
[facility] ER CONSULT    Patient:   Florinda Hatchet   :    1965   Facility:   [facility]   Date:    10/16/2021  Admission Dx:  Nausea & vomiting [R11.2]  Intractable vomiting with nausea, unspecified vomiting type [R11.2]  Requesting physician: Paco Evans MD  Reason for Consult: Intractable vomiting, nausea, history of Crohn's and ileitis on CT  Chief Complaint : Nausea, vomiting    SUBJECTIVE     HISTORY OF PRESENT ILLNESS  Florinda Hatchet is a 61 y.o. female with a past medical history of Crohn's disease,  Hypothyroidism, COPD, bipolar disorder, CAD, polysubstance use/abuse who presented with nausea and vomiting and shortness of breath. Patient is a poor historian and all history was obtained from chart review. As per EMR, patient presented with epigastric abdominal pain, nausea and vomiting for the past 1 day. She has been experiencing worsening shortness of breath and has been requiring increased amount of supplemental oxygen from her baseline. She denies fever, cough, diarrhea, chest pain, palpitations. She was treated with 1 L normal saline bolus, Zofran, 10 mEq potassium, prochlorperazine 10 mg and she was admitted. Her symptoms of nausea, vomiting persisted and around 10 AM this morning a rapid response was called as patient was found to be nonsustained V. Tach. Patient was diaphoretic and was found and had prolonged QT in 700s. She was alert and oriented, retching, with intractable vomiting with /98, pulse 79, respiratory 24, strip showed nonsustained SVT. She was administered 2 g of magnesium, 1 g calcium gluconate transferred to stepdown for continuation of care. CT abdomen pelvis showed (10/15/2021)  New terminal ileal wall thickening which may relate to ileitis.  No bowel   obstruction. 2. Stable ascending colonic wall thickening and luminal narrowing which may   relate to sequela of chronic colitis. 3. No evidence of perforation or abscess. 4. Nonspecific bilateral retroperitoneal perinephric stranding more prominent   on the left.  Pyelonephritis cannot be excluded.  No hydronephrosis or   abscess. CT abdomen and pelvis with contrast on 11/5/2017 showed parenchymal abnormalities involving the small bowel with possible stricturing involving loops of small bowel and perirectal fluid collection possible abscess. She has been following with [facility] general surgery for perirectal abscess. She had a fistulotomy on 1/4/2021 with Dr. Hugo Lobo. Previous GI history:       COLONOSCOPY   Attending MD: Savita Flores MD   Procedure:           Colonoscopy   Indications:         Generalized abdominal pain   Providers:           Savita Flores MD      Findings:        The terminal ileum appeared normal. Biopsies were taken with a cold        forceps for histology.        The colon (entire examined portion) appeared normal.   Impression:          - Preparation of the colon was inadequate.                        - The examined portion of the ileum was normal. Biopsied.                        - The entire examined colon is normal.   Recommendation:      - [facility].                        - IV Solumedrol trial.                        -MR enetrography if Sx do not improve on IV steroids.      Final Pathologic Diagnosis   Terminal ileum, biopsy:        Small bowel mucosa with no significant histologic abnormalities.         No chronic or active inflammation or granulomas identified.            **Report Electronically Signed Out**   ko/10/13/2017 Esteban Barrios MD      OBJECTIVE:     PAST MEDICAL/SURGICAL HISTORY  Past Medical History:   Diagnosis Date    Acid reflux     Anxiety     Arthritis     Left hip    Asthma     Bipolar 1 disorder (HCC)     Bowel obstruction (HCC)     COPD (chronic obstructive pulmonary disease) (HCC)     O2 3L PRN/ Dr. Elva Basilio clinic/last seen 2020/appt.9-7-21 for Clearance    Crohn disease ([address]     Depression     Dry eye     Fibromyalgia     Gout     Headache     Hyperlipidemia     Hypertension     Hypothyroidism     Kidney stones     Muscle spasm     Neuropathy     Pain     left hip    Personal history of other medical treatment     Pt. seen by Dr. Ania Koehler for clearance for this OR Left hip/ Normally pt. doesn't follow with Cardiology. [facility]    Wears partial dentures     upper    Wellness examination     PCP Camryn Dennison MD/ [facility]/last seen 8-24-21     Past Surgical History:   Procedure Laterality Date    ABDOMEN SURGERY      bowel obstruction OR    BUNIONECTOMY Left     CHOLECYSTECTOMY      COLONOSCOPY  04/30/2007    no gross pathology seen in the colon and also 3-4 inches of the ileum    COLONOSCOPY  10/12/2017    normal    TONSILLECTOMY AND ADENOIDECTOMY      TUBAL LIGATION         ALLERGIES:  Allergies   Allergen Reactions    Azithromycin Other (See Comments)     'It doesn't work\"    Methylprednisolone      Elevates blood pressure    Penicillins Swelling     throat    Tape [Adhesive Tape] Other (See Comments)     \" Tears my skin off\"       HOME MEDICATIONS:  Prior to Admission medications    Medication Sig Start Date End Date Taking? Authorizing Provider   HYDROcodone-acetaminophen (NORCO) 5-325 MG per tablet Take 1 tablet by mouth every 6 hours as needed for Pain for up to 14 days.  10/13/21 10/27/21 Yes Radha Lewis MD   furosemide (LASIX) 20 MG tablet Take 1 tablet by mouth daily 9/19/21  Yes Chaparro Corley MD   budesonide-formoterol [facility]) 160-4.5 MCG/ACT AERO Inhale 2 puffs into the lungs 2 times daily 9/19/21  Yes Chaparro Corley MD   ipratropium-albuterol (DUONEB) 0.5-2.5 (3) MG/3ML SOLN nebulizer solution Inhale 3 mLs into the lungs every 6 hours as needed for Shortness of Breath 9/19/21 10/19/21 Yes Chaparro Corley MD   acetaminophen (TYLENOL) 500 MG tablet Take 2 tablets by mouth 3 times daily  Patient taking differently: Take 1,000 mg by mouth 3 times daily as needed  7/17/21  Yes Marce Kim,    lidocaine (LIDODERM) 5 % Place 1 patch onto the skin daily 12 hours on, 12 hours off. Yes Historical Provider, MD   lisinopril (PRINIVIL;ZESTRIL) 5 MG tablet Take 1 tablet by mouth daily 9/20/21   Gamaliel Salinas MD   nicotine (NICODERM CQ) 21 MG/24HR Place 1 patch onto the skin daily 9/20/21   Gamaliel Salinas MD   loperamide (IMODIUM) 2 MG capsule Take 2 mg by mouth 4 times daily as needed for Diarrhea    Historical Provider, MD   Polyvinyl Alcohol-Povidone (REFRESH OP) Place 1 drop into both eyes 3 times daily    Historical Provider, MD       CURRENT MEDICATIONS:  Scheduled Meds:   sodium chloride flush  5-40 mL IntraVENous 2 times per day    enoxaparin  40 mg SubCUTAneous Daily    magnesium sulfate  2,000 mg IntraVENous Once    potassium chloride  40 mEq IntraVENous Once    morphine        lactated ringers bolus  1,000 mL IntraVENous Once    potassium chloride  20 mEq IntraVENous Once    potassium chloride         Continuous Infusions:   sodium chloride 125 mL/hr at 10/16/21 1046    sodium chloride       PRN Meds:ondansetron, sodium chloride flush, sodium chloride, potassium chloride **OR** potassium alternative oral replacement **OR** potassium chloride, polyethylene glycol, acetaminophen **OR** acetaminophen    SOCIAL HISTORY:     Tobacco:   reports that she has been smoking cigarettes. She has a 18.50 pack-year smoking history. She quit smokeless tobacco use about 20 years ago. Her smokeless tobacco use included chew. Alcohol:   reports previous alcohol use. Illicit drugs:  reports current drug use. Drug: Marijuana.     FAMILY HISTORY:     Family History   Problem Relation Age of Onset    Diabetes Father     Lung Cancer Father     Hypertension Mother     Cancer Mother     High Blood Pressure Mother     Crohn's Disease Brother     Heart Disease Brother        REVIEW OF SYSTEMS:    Review of Systems: Cannot be obtained because patient was extremely restless and uncooperative. PHYSICAL EXAM:    BP (!) 140/129   Pulse 89   Temp 98.2 °F (36.8 °C)   Resp 22   Ht 5' 6\" (1.676 m)   Wt 160 lb (72.6 kg)   SpO2 100%   BMI 25.82 kg/m²     Physical Exam -  Constitutional:   Patient restless and did not want to answer questions. Lungs:  Bilateral air entry present, lung fields clear. Normal effort. Heart:  Regular rate and rhythm, no murmur. Abdomen:  Soft, tenderness in the epigastric region and diffuse tenderness all over the abdomen. nondistended, normal bowel sounds. Extremities:  No edema, redness, tenderness in the calves. Skin:  Warm, dry, no gross lesions or rashes. LABS AND IMAGING:     CBC  Recent Labs     10/15/21  1818 10/16/21  1030   WBC 5.2 11.8*   HGB 15.7* 15.8*   HCT 47.6* 46.8   MCV 84.4 82.1*   MCH 27.8 27.7   MCHC 33.0 33.8    349       BMP  Recent Labs     10/15/21  1818      K 3.1*   CL 97*   CO2 24   BUN 7   CREATININE 0.56   GLUCOSE 127*   CALCIUM 9.6       LFTS  Recent Labs     10/15/21  1818   ALKPHOS 114*   ALT 12   AST 16   PROT 7.7   BILITOT 0.94   LABALBU 4.2       IMAGING  CT ABDOMEN PELVIS W IV CONTRAST Additional Contrast? None    Result Date: 10/15/2021  1. New terminal ileal wall thickening which may relate to ileitis. No bowel obstruction. 2. Stable ascending colonic wall thickening and luminal narrowing which may relate to sequela of chronic colitis. 3. No evidence of perforation or abscess. 4. Nonspecific bilateral retroperitoneal perinephric stranding more prominent on the left. Pyelonephritis cannot be excluded. No hydronephrosis or abscess. IMPRESSION:   · Nausea and vomiting-etiology unclear -CT abdomen and pelvis shows no bowel obstruction - consider other causes including possible pyelonephritis. · Crohn's disease.-Patient reports being diagnosed with Crohn's in 2012 per EMR.   Colonoscopy 2017 nondiagnostic for Crohn's disease however, CT imaging 2017 suggestive of small bowel disease and CT abdomen pelvis 1220 showed wall thickening and inflammatory changes of the cecum and ascending colon suggestive of infectious or inflammatory colitis. · Prolonged QT with SVT. · Hypokalemia. · Hypertension. · COPD. · Bipolar/depression    Old records, labs and imaging reviewed. PLAN     · IV hydration per primary services. · Stool calprotectin, C. Diff, GI stool panel ordered. · Consider scopolamine patch for nausea/vomiting. · Recommend further work-up for nausea and vomiting per primary team.  · Patient will need eventual repeat colonoscopy VS treatment for Crohn's disease in the outpatient setting. Will discuss the case with Dr. Brandon Jefferson.  Thank you for this interesting consult. We will continue to follow.     Tone Trimble MD  PGY-1, Internal Medicine Resident  [address]  10/16/2021 12:57 PM

## 2021-10-16 NOTE — ED NOTES
Report given to [facility] RN Questions answered.       James Castillo RN  10/16/21 9027

## 2021-10-16 NOTE — H&P
[facility]     Department of Internal Medicine - Staff Internal Medicine Teaching Service          ADMISSION NOTE/HISTORY AND PHYSICAL EXAMINATION   Date: 10/16/2021  Patient Name: Florinda Hatchet  Date of admission: 10/15/2021  5:54 PM  YOB: 1965  PCP: Bryan Guerin MD  History Obtained From:  patient, electronic medical record    CHIEF COMPLAINT     Chief complaint: Nausea and vomiting    HISTORY OF PRESENTING ILLNESS     The patient is a pleasant 61 y.o. female presents with a chief complaint of Nausea and vomiting. She has a PMH of Crohn's disease, bowel obstruction for which she had small bowel resection. Colonoscopy of 2017 was grossly normal. HTN on lisinopril, Hypothyroidism on levothyroxine, COPD on albuterol and symbicort inhalers, and Home Oxygen, drug intoxication. Presented to the with epigastric abdo pain, nausea and vomiting since 1 day. No diarrhea, fever, cough, dyspnea, chest pain, palpitations. She was afebrile, /75, saturating on room air, K+ 3.1, lactic acid 2.0-->2.7, glycemia 127, trops 11, urine ketone pos, EKG revealed QTc 654. CT abd revealed 1. New terminal ileal wall thickening which may relate to ileitis. No bowel obstruction. 2. Stable ascending colonic wall thickening and luminal narrowing which may relate to sequela of chronic colitis. Administered 1L NS bolus, zofran, 10meq K+, proclorperazine 10mg and she was admitted to observation. Her symptoms persisted and around 10:00am today a rapid response was called on her. She was alert and oriented, retching, with intractable vomiting /98, pulse 79, resp 24, strips showed non-sustained SVT. A proper ECG could mot be obtained as she was restless. She was administered 2g Mg, 1g Calcium gluconate and transferred to step down for continuation of care.    Cardiology consulted; recommend stop all QT prolonging medications (zofran, pebcid, chlorpromazine and urgent administration of IV potassium through central line. GI consulted     Review of Systems:  General ROS: Completed and except as mentioned above were negative   HEENT ROS: Completed and except as mentioned above were negative   Allergy and Immunology ROS:  Completed and except as mentioned above were negative  Hematological and Lymphatic ROS:  Completed and except as mentioned above were negative  Respiratory ROS:  Completed and except as mentioned above were negative  Cardiovascular ROS:  Completed and except as mentioned above were negative  Gastrointestinal ROS: Completed and except as mentioned above were negative  Genito-Urinary ROS:  Completed and except as mentioned above were negative  Musculoskeletal ROS:  Completed and except as mentioned above were negative  Neurological ROS:  Completed and except as mentioned above were negative  Skin & Dermatological ROS:  Completed and except as mentioned above were negative  Psychological ROS:  Completed and except as mentioned above were negative    PAST MEDICAL HISTORY     Past Medical History:   Diagnosis Date    Acid reflux     Anxiety     Arthritis     Left hip    Asthma     B[facility] 1 [facility] [address] [address]     Bowel obstruction ([address]     COPD [facility]chronic obstructive pulmonary disease) ([facility])     O2 3L PRN/ Dr. [facility]/last seen 2020/appt.9-7-21 for Clearance    Crohn disease ([address]     Depression     Dry eye     Fibromyalgia     Gout     Headache     Hyperlipidemia     Hypertension     Hypothyroidism     Kidney stones     Muscle spasm     Neuropathy     Pain     left hip    Personal history of other medical treatment     Pt. seen by Dr. Virginia Bello for clearance for this OR Left hip/ Normally pt. doesn't follow with Cardiology.  [facility] clinic    Wears partial dentures     upper    Wellness examination     PCP Julita Camacho MD/ genna/last seen 8-24-21       PAST SURGICAL HISTORY     Past Surgical History:   Procedure Laterality Date  ABDOMEN SURGERY      bowel obstruction OR    BUNIONECTOMY Left     CHOLECYSTECTOMY      COLONOSCOPY  04/30/2007    no gross pathology seen in the colon and also 3-4 inches of the ileum    COLONOSCOPY  10/12/2017    normal    TONSILLECTOMY AND ADENOIDECTOMY      TUBAL LIGATION         ALLERGIES     Azithromycin, Methylprednisolone, Penicillins, and Tape [adhesive tape]    MEDICATIONS PRIOR TO ADMISSION     Prior to Admission medications    Medication Sig Start Date End Date Taking? Authorizing Provider   HYDROcodone-acetaminophen (NORCO) 5-325 MG per tablet Take 1 tablet by mouth every 6 hours as needed for Pain for up to 14 days. 10/13/21 10/27/21 Yes Radha Lewis MD   furosemide (LASIX) 20 MG tablet Take 1 tablet by mouth daily 9/19/21  Yes Chaparro Corley MD   budesonide-formoterol [facility]) 160-4.5 MCG/ACT AERO Inhale 2 puffs into the lungs 2 times daily 9/19/21  Yes Chaparro Corley MD   ipratropium-albuterol (DUONEB) 0.5-2.5 (3) MG/3ML SOLN nebulizer solution Inhale 3 mLs into the lungs every 6 hours as needed for Shortness of Breath 9/19/21 10/19/21 Yes Chaparro Corley MD   acetaminophen (TYLENOL) 500 MG tablet Take 2 tablets by mouth 3 times daily  Patient taking differently: Take 1,000 mg by mouth 3 times daily as needed  7/17/21  Yes Marce Kim,    lidocaine (LIDODERM) 5 % Place 1 patch onto the skin daily 12 hours on, 12 hours off.    Yes Historical Provider, MD   lisinopril (PRINIVIL;ZESTRIL) 5 MG tablet Take 1 tablet by mouth daily 9/20/21   Chaparro Corley MD   nicotine (NICODERM CQ) 21 MG/24HR Place 1 patch onto the skin daily 9/20/21   Chaparro Corley MD   loperamide (IMODIUM) 2 MG capsule Take 2 mg by mouth 4 times daily as needed for Diarrhea    Historical Provider, MD   Polyvinyl Alcohol-Povidone (REFRESH OP) Place 1 drop into both eyes 3 times daily    Historical Provider, MD       SOCIAL HISTORY     Tobacco: 1/2 pack daily  Alcohol: denies  Illicits: denies  Occupation:     FAMILY HISTORY Family History   Problem Relation Age of Onset    Diabetes Father     Lung Cancer Father     Hypertension Mother     Cancer Mother     High Blood Pressure Mother     Crohn's Disease Brother     Heart Disease Brother        PHYSICAL EXAM     Vitals: BP (!) 138/109   Pulse 76   Temp 98.2 °F (36.8 °C)   Resp 22   Ht 5' 6\" (1.676 m)   Wt 160 lb (72.6 kg)   SpO2 98%   BMI 25.82 kg/m²   Tmax: Temp (24hrs), Av.7 °F (36.5 °C), Min:97.2 °F (36.2 °C), Max:98.2 °F (36.8 °C)    Last Body weight:   Wt Readings from Last 3 Encounters:   10/15/21 160 lb (72.6 kg)   21 149 lb 14.6 oz (68 kg)   21 167 lb (75.8 kg)     Body Mass Index : Body mass index is 25.82 kg/m². PHYSICAL EXAMINATION:  Constitutional: This is a well developed, well nourished, 25-29.9 - Overweight 61y.o. year old female who is alert, oriented, cooperative and in distress from abdominal pain and intractable vomiting. Head:normocephalic and atraumatic. EENT:  PERRLA. No conjunctival injections. Septum was midline, mucosa was without erythema, exudates or cobblestoning. No thrush was noted. Neck: Supple without thyromegaly. No elevated JVP. Trachea was midline. Respiratory: On NC 4 L. Chest was symmetrical without dullness to percussion. Breath sounds bilaterally were clear to auscultation. There were no wheezes, rhonchi or rales. There is no intercostal retraction or use of accessory muscles. No egophony noted. Cardiovascular: Regular without murmur, clicks, gallops or rubs. Abdomen: Tender epigastric area. Slightly rounded and soft without organomegaly. No rebound, rigidity or guarding was appreciated. Lymphatic: No lymphadenopathy. Musculoskeletal: Normal curvature of the spine. No gross muscle weakness. Extremities:  No lower extremity edema, ulcerations, tenderness, varicosities or erythema. Muscle size, tone and strength are normal.  No involuntary movements are noted. Skin:  Warm and dry. Good color, turgor and pigmentation. No lesions or scars.   No cyanosis or clubbing  Neurological/Psychiatric: The patient's general behavior, level of consciousness, thought content and emotional status is normal.          INVESTIGATIONS     Laboratory Testing:     Recent Results (from the past 24 hour(s))   EKG 12 Lead    Collection Time: 10/15/21  5:38 PM   Result Value Ref Range    Ventricular Rate 69 BPM    Atrial Rate 69 BPM    P-R Interval 118 ms    QRS Duration 86 ms    Q-T Interval 648 ms    QTc Calculation (Bazett) 694 ms    P Axis 56 degrees    R Axis 86 degrees    T Axis 43 degrees   Troponin    Collection Time: 10/15/21  6:18 PM   Result Value Ref Range    Troponin, High Sensitivity 11 0 - 14 ng/L    Troponin T NOT REPORTED <0.03 ng/mL    Troponin Interp NOT REPORTED    CBC WITH AUTO DIFFERENTIAL    Collection Time: 10/15/21  6:18 PM   Result Value Ref Range    WBC 5.2 3.5 - 11.3 k/uL    RBC 5.64 (H) 3.95 - 5.11 m/uL    Hemoglobin 15.7 (H) 11.9 - 15.1 g/dL    Hematocrit 47.6 (H) 36.3 - 47.1 %    MCV 84.4 82.6 - 102.9 fL    MCH 27.8 25.2 - 33.5 pg    MCHC 33.0 28.4 - 34.8 g/dL    RDW 15.7 (H) 11.8 - 14.4 %    Platelets 765 717 - 136 k/uL    MPV 11.4 8.1 - 13.5 fL    NRBC Automated 0.0 0.0 per 100 WBC    Differential Type NOT REPORTED     Seg Neutrophils 65 36 - 65 %    Lymphocytes 26 24 - 43 %    Monocytes 6 3 - 12 %    Eosinophils % 2 1 - 4 %    Basophils 1 0 - 2 %    Immature Granulocytes 0 0 %    Segs Absolute 3.32 1.50 - 8.10 k/uL    Absolute Lymph # 1.36 1.10 - 3.70 k/uL    Absolute Mono # 0.30 0.10 - 1.20 k/uL    Absolute Eos # 0.12 0.00 - 0.44 k/uL    Basophils Absolute 0.05 0.00 - 0.20 k/uL    Absolute Immature Granulocyte <0.03 0.00 - 0.30 k/uL    WBC Morphology NOT REPORTED     RBC Morphology ANISOCYTOSIS PRESENT     Platelet Estimate NOT REPORTED    COMPREHENSIVE METABOLIC PANEL    Collection Time: 10/15/21  6:18 PM   Result Value Ref Range    Glucose 127 (H) 70 - 99 mg/dL    BUN 7 6 - 20 mg/dL CREATININE 0.56 0.50 - 0.90 mg/dL    Bun/Cre Ratio NOT REPORTED 9 - 20    Calcium 9.6 8.6 - 10.4 mg/dL    Sodium 138 135 - 144 mmol/L    Potassium 3.1 (L) 3.7 - 5.3 mmol/L    Chloride 97 (L) 98 - 107 mmol/L    CO2 24 20 - 31 mmol/L    Anion Gap 17 9 - 17 mmol/L    Alkaline Phosphatase 114 (H) 35 - 104 U/L    ALT 12 5 - 33 U/L    AST 16 <32 U/L    Total Bilirubin 0.94 0.3 - 1.2 mg/dL    Total Protein 7.7 6.4 - 8.3 g/dL    Albumin 4.2 3.5 - 5.2 g/dL    Albumin/Globulin Ratio 1.2 1.0 - 2.5    GFR Non-African American >60 >60 mL/min    GFR African American >60 >60 mL/min    GFR Comment          GFR Staging NOT REPORTED    LIPASE    Collection Time: 10/15/21  6:18 PM   Result Value Ref Range    Lipase 34 13 - 60 U/L   Lactate, Sepsis    Collection Time: 10/15/21  6:18 PM   Result Value Ref Range    Lactic Acid, Sepsis NOT REPORTED 0.5 - 1.9 mmol/L    Lactic Acid, Sepsis, Whole Blood 2.1 (H) 0.5 - 1.9 mmol/L   Magnesium    Collection Time: 10/15/21  6:18 PM   Result Value Ref Range    Magnesium 1.9 1.6 - 2.6 mg/dL   EKG 12 Lead    Collection Time: 10/15/21  6:25 PM   Result Value Ref Range    Ventricular Rate 71 BPM    Atrial Rate 71 BPM    P-R Interval 114 ms    QRS Duration 84 ms    Q-T Interval 602 ms    QTc Calculation (Bazett) 654 ms    P Axis 60 degrees    R Axis 79 degrees    T Axis 68 degrees   URINALYSIS WITH MICROSCOPIC    Collection Time: 10/15/21  7:40 PM   Result Value Ref Range    Color, UA Yellow Yellow    Turbidity UA Clear Clear    Glucose, Ur NEGATIVE NEGATIVE    Bilirubin Urine NEGATIVE NEGATIVE    Ketones, Urine MODERATE (A) NEGATIVE    Specific Gravity, UA 1.010 1.005 - 1.030    Urine Hgb NEGATIVE NEGATIVE    pH, UA 8.0 5.0 - 8.0    Protein, UA NEGATIVE NEGATIVE    Urobilinogen, Urine Normal Normal    Nitrite, Urine NEGATIVE NEGATIVE    Leukocyte Esterase, Urine NEGATIVE NEGATIVE    -          WBC, UA None 0 - 5 /HPF    RBC, UA None 0 - 4 /HPF    Casts UA NOT REPORTED 0 - 8 /LPF    Crystals, UA NOT REPORTED None /HPF    Epithelial Cells UA 0 TO 2 0 - 5 /HPF    Renal Epithelial, UA NOT REPORTED 0 /HPF    Bacteria, UA NOT REPORTED None    Mucus, UA NOT REPORTED None    Trichomonas, UA NOT REPORTED None    Amorphous, UA NOT REPORTED None    Other Observations UA NOT REPORTED NOT REQ. Yeast, UA NOT REPORTED None   Lactate, Sepsis    Collection Time: 10/15/21  8:09 PM   Result Value Ref Range    Lactic Acid, Sepsis NOT REPORTED 0.5 - 1.9 mmol/L    Lactic Acid, Sepsis, Whole Blood 2.0 (H) 0.5 - 1.9 mmol/L   COVID-19, Rapid    Collection Time: 10/16/21 12:12 AM    Specimen: Nasopharyngeal Swab   Result Value Ref Range    Specimen Description . NASOPHARYNGEAL SWAB     SARS-CoV-2, Rapid Not Detected Not Detected   Ethanol    Collection Time: 10/16/21 10:08 AM   Result Value Ref Range    Ethanol <10 <10 mg/dL    Ethanol percent <0.010 <0.010 %   EKG 12 Lead    Collection Time: 10/16/21 10:45 AM   Result Value Ref Range    Ventricular Rate 76 BPM    Atrial Rate 76 BPM    P-R Interval 134 ms    QRS Duration 100 ms    Q-T Interval 640 ms    QTc Calculation (Bazett) 720 ms    P Axis 66 degrees    R Axis 82 degrees    T Axis 72 degrees       Imaging:   CT ABDOMEN PELVIS W IV CONTRAST Additional Contrast? None    Result Date: 10/15/2021  1. New terminal ileal wall thickening which may relate to ileitis. No bowel obstruction. 2. Stable ascending colonic wall thickening and luminal narrowing which may relate to sequela of chronic colitis. 3. No evidence of perforation or abscess. 4. Nonspecific bilateral retroperitoneal perinephric stranding more prominent on the left. Pyelonephritis cannot be excluded. No hydronephrosis or abscess.        ASSESSMENT & PLAN     Principal Problem:    Nausea & vomiting  Active Problems:    Gastroesophageal reflux disease without esophagitis    COPD (chronic obstructive pulmonary disease) (HCC)    Hypertension    Hypokalemia    Hypothyroidism due to acquired atrophy of thyroid    Prolonged Q-T interval on ECG    Bipolar disorder, unspecified ([facility] [address]  Resolved Problems:    * No resolved hospital problems. *    ASSESSMENT / PLAN:     IMPRESSION  This is a 61 y.o. female who presented with intractable vomiting  and found to have prolonged QT, non-sustained SVT and hypokalemia. Patient admitted to inpatient status for K+ replacement, and management    Active Problems:    Nausea & vomiting  Hx Crohns. Hx bowel resection; not sure if for Crohns or intestinal obstruction  Hx cholectomy  CT abd concerning for ileitis and sequela of chronic colitis  GI consulted  Ringer lactate bolus 1000ml  Continue NS 125ml/hour  Refrain from Zofran, Reglan due to prolonged QT    Hypokalemia  Due to vomiting  Replace potassium  Monitor K+ every 6 hours    Prolonged QT with SVT  Cardiology consulted  Received IV Mg 2g and calcium gluconate  Replace potassium  Refrain from QT prolonging medications    COPD  Oxygen as needed  On albuterol prn    Bipolar/depression  Hold medications due to prolonged QT      DVT ppx: Enoxaparin  GI ppx: None due to prolonged QT    PT/OT/SW: consulted  Discharge Planning: Case management    Tomy Weems MD  Internal Medicine Resident, PGY-1  [facility]  10/16/2021, 11:04 AM

## 2021-10-16 NOTE — CARE COORDINATION
Case Management Initial Discharge Plan  Gabo Santoro,             Met with:patient to discuss discharge plans. Information verified: address, contacts, phone number, , insurance Yes  Insurance Provider: [place] [employer]    Emergency Contact/Next of Kin name & number: Natalie Jordan (daughter) [phone]  Who are involved in patient's support system? Family, s.o. PCP: Arnulfo Brown MD  Date of last visit: couple months      Discharge Planning    Living Arrangements:  Spouse/Significant Other     Home has 1 stories  couple stairs to climb to get into front door, stairs to climb to reach second floor  Location of bedroom/bathroom in home     Patient able to perform ADL's:Independent    Current Services (outpatient & in home)   DME equipment: cane  DME provider:     Is patient receiving oral anticoagulation therapy? No    If indicated:   Physician managing anticoagulation treatment:   Where does patient obtain lab work for ATC treatment? Potential Assistance Needed:  N/A    Patient agreeable to home care: No  [place] of choice provided:  n/a    Prior SNF/Rehab Placement and Facility:   Agreeable to SNF/Rehab: No  [place] of choice provided: n/a     Evaluation: no    Expected Discharge date:       Patient expects to be discharged to: If home: is the family and/or caregiver wiling & able to provide support at home? N/a, independent  Who will be providing this support? Follow Up Appointment: Best Day/ Time:      Transportation provider: girlfriengennaro  Transportation arrangements needed for discharge: No    Readmission Risk              Risk of Unplanned Readmission:  0             Does patient have a readmission risk score greater than 14?: No  If yes, follow-up appointment must be made within 7 days of discharge.      Goals of Care: comfort      Educated patient on transitional options, provided freedom of choice and are agreeable with plan      Discharge Plan: home independently          Electronically signed by Brien Carlton RN on 10/16/21 at 4:39 PM EDT

## 2021-10-16 NOTE — PROGRESS NOTES
CDU Daily Progress Note  Attending Physician       Pt Name: Venus Cox  MRN: [id number]  [place] 1965  Date of evaluation: 10/16/21    I performed a history and physical examination of the patient and discussed management with the resident. I reviewed the residents note and agree with the documented findings and plan of care. Any areas of disagreement are noted on the chart. I was personally present for the key portions of any procedures. I have documented in the chart those procedures where I was not present during the key portions. I have reviewed the emergency nurses triage note. I agree with the chief complaint, past medical history, past surgical history, allergies, medications, social and family history as documented unless otherwise noted below. Documentation of the HPI, Physical Exam and Medical Decision Making performed by medical students or scribes is based on my personal performance of the HPI, PE and MDM. For Physician Assistant/ Nurse Practitioner cases/documentation I have personally evaluated this patient and have completed at least one if not all key elements of the E/M (history, physical exam, and MDM). Additional findings are as noted. The Family History, Social History and Review of Systems are unchanged from the previous day. No significant events overnight. I entered the room to see the patient pulling her pants up. I asked her if she needed help. She relates that she had just urinated in the garbage can. She also relates that she is still feeling nauseous. I have helped her back into bed and covered her with a blanket. I did go well and discussed this with staff. I noticed that no blood alcohol level has been drawn. I did discuss this with my resident and we will be checking this as well today in addition to trying to get her nausea and vomiting under control.     Scarlet Yuan MD,  MD  Attending Physician  Critical Decision Unit

## 2021-10-16 NOTE — ED NOTES
PT vomited and urinated on floor. PT instructed to hit call light if experiencing need to vomit or use the restroom. PT, floor and bedding cleaned up. PT given warm blankets.       Emili Grewal RN  10/16/21 2038

## 2021-10-16 NOTE — H&P
[facility]  CDU / OBSERVATION eNCOUnter  Resident Note     Pt Name: Analia Simmons  MRN: [id number]  [place] 1965  Date of evaluation: 10/16/21  Patient's PCP is :  Celina Stone MD    CHIEF COMPLAINT       Chief Complaint   Patient presents with    Emesis    Nausea         HISTORY OF PRESENT ILLNESS    Analia Simmons is a 61 y.o. female who presents nausea and vomiting and altered mentation. Patient very poor historian during encounter. Limited history obtained due to acuity of condition. Diaphoretic, pale, and emesis during encounter. Patient has no memory of how she got up to the floor. She does not know how she got to the ED. She denies alcohol or drug use. States she lives with her friend, and has her own room, and has a child that lives in [place]". Location/Symptom: unknown  Timing/Onset: unknown  Provocation: unknown  Quality: unknown  Radiation: unknown  Severity: unknown  Timing/Duration: unknown  Modifying Factors: unknown    REVIEW OF SYSTEMS       Unable to be obtained due to acuity of condition  + nausea + vomiting NBNB +ill feeling +palpitations    (PQRS) Advance directives on face sheet per hospital policy. No change unless specifically mentioned in chart    PAST MEDICAL HISTORY    has a past medical history of Acid reflux, Anxiety, Arthritis, Asthma, Bipolar 1 disorder ([address] Bowel obstruction ([address] COPD (chronic obstructive pulmonary disease) ([address] Crohn disease ([address] Depression, Dry eye, Fibromyalgia, Gout, Headache, Hyperlipidemia, Hypertension, Hypothyroidism, Kidney stones, Muscle spasm, Neuropathy, Pain, Personal history of other medical treatment, Wears partial dentures, and Wellness examination. I have reviewed the past medical history with the patient and it is pertinent to this complaint. SURGICAL HISTORY      has a past surgical history that includes Tonsillectomy and adenoidectomy; Tubal ligation;  Cholecystectomy; Bunionectomy (Left); Colonoscopy (2007); Colonoscopy (10/12/2017); and Abdomen surgery. I have reviewed and agree with Surgical History entered and it is pertinent to this complaint. CURRENT MEDICATIONS     ondansetron (ZOFRAN-ODT) disintegrating tablet 4 mg, Q8H PRN  ondansetron (ZOFRAN) injection 4 mg, Q6H PRN  0.9 % sodium chloride infusion, Continuous  sodium chloride flush 0.9 % injection 5-40 mL, 2 times per day  sodium chloride flush 0.9 % injection 5-40 mL, PRN  0.9 % sodium chloride infusion, PRN  potassium chloride (KLOR-CON M) extended release tablet 40 mEq, PRN   Or  potassium bicarb-citric acid (EFFER-K) effervescent tablet 40 mEq, PRN   Or  potassium chloride 10 mEq/100 mL IVPB (Peripheral Line), PRN  enoxaparin (LOVENOX) injection 40 mg, Daily  polyethylene glycol (GLYCOLAX) packet 17 g, Daily PRN  famotidine (PEPCID) injection 20 mg, BID  acetaminophen (TYLENOL) tablet 650 mg, Q6H PRN   Or  acetaminophen (TYLENOL) suppository 650 mg, Q6H PRN        All medication charted and reviewed. ALLERGIES     is allergic to azithromycin, methylprednisolone, penicillins, and tape [adhesive tape]. FAMILY HISTORY     She indicated that her mother is . She indicated that her father is . She indicated that her brother is alive. family history includes Cancer in her mother; Crohn's Disease in her brother; Diabetes in her father; Heart Disease in her brother; High Blood Pressure in her mother; Hypertension in her mother; Lavetta Rancher in her father. The patient denies any pertinent family history. I have reviewed and agree with the family history entered. I have reviewed the Family History and it is not significant to the case    SOCIAL HISTORY      reports that she has been smoking cigarettes. She has a 18.50 pack-year smoking history. She quit smokeless tobacco use about 20 years ago. Her smokeless tobacco use included chew. She reports previous alcohol use. She reports current drug use.  Drug: Marijuana. I have reviewed and agree with all Social.  There are concerns for substance abuse/use. PHYSICAL EXAM     INITIAL VITALS:  height is 5' 6\" (1.676 m) and weight is 160 lb (72.6 kg). Her oral temperature is 97.2 °F (36.2 °C). Her blood pressure is 160/94 (abnormal) and her pulse is 83. Her respiration is 18 and oxygen saturation is 96%. CONSTITUTIONAL: AOx1, apparent distress, appears acutely ill and toxic    HEAD: normocephalic, atraumatic   EYES: PERRLA, nonicteric, noninjected   ENT: moist mucous membranes, uvula midline, tongue normal, no teeth   NECK: supple, symmetric   BACK: Did not perform exam of back   LUNGS: clear to auscultation bilaterally   CARDIOVASCULAR: Pulses do not match telemetry or life pack, abnormal heart tones, pale diaphroetic   ABDOMEN: soft, tender, non-distended , active bowel sounds   NEUROLOGIC:  MAEx4, no focal motor deficits   MUSCULOSKELETAL: no clubbing, edema   SKIN: no rash or wounds       DIFFERENTIAL DIAGNOSIS/MDM:   AMS:  DDX: Evaluate for ingestion, infectious, trauma, seizure, AMS, electrolytes, encephalopathy, insulin, opiates, uremia, toxins, tumor, thyrotoxicosis, psychiatric, sepsis and stroke.     Sepsis:  DDX: evaluate for intra-abdominal (pancreatitis, cholecystitis, cholangitis, pyelonephritis, urosepsis), CNS (Meningitis), pulmonary (pneumonia), cellulitis, additional infection, port/ catheter sites   SIRS   Temp >38 or <36   HR >90  RR >20  WBC >43857 or <4000 or bands >10%),   Severe sepsis (tissue hypoperfusion - AOF)  Septic shock (hypoperfusion despite appropriate fluid resuscitation) SBP <90 or MAP <65    Chest Pain:  DDX: Emergent: ACS/NSTEMI/STEMI/angina, arrhythmia, trauma, aortic dissection,  PE, PNA, pneumothroax, esophageal rupture, tamponade, Cocaine use  Nonemergent: pneumonia, pericarditis, GERD, MSK, Endocarditis, anxiety  Evaluated for: diaphoresis, present chest pain, tachypnea, BP both arms, heart sounds, JVD, tender chest wall, wheezing    Abdominal Pain:  DDX: GERD, PUD, pancreatitis, cholecystitis, GB colic, cholangitis, Zjui-Wkie-Xhtjes, ACS/ MI, pneumonia, SBO, DKA, AAA, mesenteric ischemia, perforated viscous, acute gastroenteritis, NSAP, pyelonephritis, kidney stone, appendicitis, hernia, D-TICS, testicular torsion, ectopic, ovarian torsion, ovarian cyst, PID, Mittelschmerz, period/ fibroid, UTI, constipation, epididymitis/ orchitis  Ransons criteria: WBC>16, age >49, glucose>200, AST>80, LDH>350  Evaluate for: EtOH abuse, ACS risk factors, point tenderness, rebound, guardingm Smith sign, GB US (stone, sono Smith, wall thick>3mm, CBD>6mm)    Douglas Score: (appendicitis)  1. Abdominal pain (RLQ)     2  2. Anorexia (loss of appetite) or ketones in the urine  1  3. Nausea or vomiting      1  4. Migration to R iliac fossa     1  5. Rebound tenderness     1  6. Fever of 37.3 °C/ 99.1 F +     1  7. Leukocytosis > 23465 WBC    2  8. Neutrophilia, or an increase in % of neutrophils in WBC 1  Total:  /10    (<3 no CT, 4-6 CT, 7-8 Surgery consult, 5-6 is consistent with diagnosis of acute appendicitis, 7-8 indicates a probable appendicitis, 9-10 indicates a very probable acute appendicitis)    BISAP Score: (pancreatitis)  BUN >25           1  Impaired mental status        1  SIRS criteria (HR >90, T 100.4, 36, RR >20/ CO2 <32, WBC >12, <4)  1  Age >60          1   Pleural Effusion          1  Total:     (Patients with a BISAP Score >0 had an increasing risk of mortality, with mortality increasing significantly with a score of 3 or greater. A score of 5 had a mortality rate of 22%.)  Lightheaded/ Dizzy:  DDX:   Vertigo: Peripheral (BPPV, labyrinthitis, Meniere's) Central: (MS, Acoustic neuroma, carotid artery dissection).   Lightheaded: Serious (cardiac valve/ arrhytmia, anemia, low glucose, infection), Common (orthostatic, nonspecific)    Vomiting:  DDX: SBO, DKA, Abdominal (gastroenteritis, appendicitis, gallbladder, pancreatitis, PUD, perforation), ICH, meningitis, vertigo, hyperemesis, abnormal lytes, EtOH intoxication/ tox, post-tussive, ACS/ MI.      DIAGNOSTIC RESULTS     EKG: All EKG's are interpreted by the Observation Physician who either signs or Co-signs this chart in the absence of a cardiologist.  Vtach/bigeminy - prolonged QT >600    RADIOLOGY:   I directly visualized the following  images and reviewed the radiologist interpretations:    CT ABDOMEN PELVIS W IV CONTRAST Additional Contrast? None    Result Date: 10/15/2021  EXAMINATION: CT OF THE ABDOMEN AND PELVIS WITH CONTRAST 10/15/2021 7:41 pm TECHNIQUE: CT of the abdomen and pelvis was performed with the administration of intravenous contrast. Multiplanar reformatted images are provided for review. Dose modulation, iterative reconstruction, and/or weight based adjustment of the mA/kV was utilized to reduce the radiation dose to as low as reasonably achievable. COMPARISON: CT abdomen/pelvis 2020 HISTORY: ORDERING SYSTEM PROVIDED HISTORY: abdominal pain TECHNOLOGIST PROVIDED HISTORY: abdominal pain Reason for Exam: abdominal pain Acuity: Unknown Type of Exam: Unknown FINDINGS: Thorax base:  No acute abnormality. Abdomen:  Prior cholecystectomy. The liver, common bile duct, pancreas, spleen, and adrenals demonstrate no acute abnormality. The bilateral kidneys demonstrate no hydronephrosis. Normal symmetric parenchymal enhancement. There is mild bilateral asymmetric perinephric stranding more prominent on the left. No retroperitoneal hematoma or loculated fluid collection. Prior partial small bowel resection with stable postsurgical focally dilated loop and adjacent bowel adhesions. There is mild circumferential thickening of the terminal ileum. The ascending colon demonstrates stable circumferential wall thickening and luminal narrowing with interval improvement of pericolonic fat infiltration seen on the prior exam.  The descending colon is decompressed. No ascites or free air. No abscess. Pelvis:  Mild ascites. No abscess. The bladder, uterus, and rectum are normal. Musculoskeletal structures:  Stable wide-mouth fat containing midline ventral supraumbilical hernia. Stable fat containing wide-mouth umbilical hernia. Normal bone mineral density. Normal lumbar spine alignment with multilevel degenerative disc and joint disease. Stable moderate right hip arthropathy with mild effusion. Stable severe left hip arthropathy with superolateral grade 1 subluxation and progressive moderate effusion. 1. New terminal ileal wall thickening which may relate to ileitis. No bowel obstruction. 2. Stable ascending colonic wall thickening and luminal narrowing which may relate to sequela of chronic colitis. 3. No evidence of perforation or abscess. 4. Nonspecific bilateral retroperitoneal perinephric stranding more prominent on the left. Pyelonephritis cannot be excluded. No hydronephrosis or abscess. LABS:  I have reviewed and interpreted all available lab results.   Labs Reviewed   CBC WITH AUTO DIFFERENTIAL - Abnormal; Notable for the following components:       Result Value    RBC 5.64 (*)     Hemoglobin 15.7 (*)     Hematocrit 47.6 (*)     RDW 15.7 (*)     All other components within normal limits   COMPREHENSIVE METABOLIC PANEL - Abnormal; Notable for the following components:    Glucose 127 (*)     Potassium 3.1 (*)     Chloride 97 (*)     Alkaline Phosphatase 114 (*)     All other components within normal limits   LACTATE, SEPSIS - Abnormal; Notable for the following components:    Lactic Acid, Sepsis, Whole Blood 2.1 (*)     All other components within normal limits   LACTATE, SEPSIS - Abnormal; Notable for the following components:    Lactic Acid, Sepsis, Whole Blood 2.0 (*)     All other components within normal limits   URINALYSIS WITH MICROSCOPIC - Abnormal; Notable for the following components:    Ketones, Urine MODERATE (*)     All other components within normal limits COVID-19, RAPID   TROPONIN   LIPASE   MAGNESIUM         CDU IMPRESSION / PLAN      Florinda Hatchet is a 61 y.o. female with pmhx of prolonged QT, chrons, pancreatitis, polysubstance abuse, stage 3 renal insuff, anxiety, bipolar type 1 - who presents with acute nausea/vomiting, found to have prolonged QT in the ED - mag sulf x1 provided in ED. Patient transferred to IM service after rapid response called. Prolonged QT - history of, and present in the ED - received mag sul 2g:  - repeat EKG  - repeat mag    Altered:  Alcohol level  tori urine    Emesis:  IVF  Compazine    Abdominal pain:  Consider surgical consult if unimproved - having rebound tenderness today        · Continue home medications and pain control  · Monitor vitals, labs, and imaging  · DISPO: pending consults and clinical improvement    CONSULTS:    IP CONSULT TO CARDIOLOGY    PROCEDURES:  Not indicated       PATIENT REFERRED TO:    No follow-up provider specified. --  Nikia Goodson MD   Emergency Medicine Resident     This dictation was generated by voice recognition computer software. Although all attempts are made to edit the dictation for accuracy, there may be errors in the transcription that are not intended.

## 2021-10-16 NOTE — ED NOTES
Entered pt's room to find urine on the floor. Call light was still in reach. PT is A&Ox4, reinforced that if she needs to urinate she should call staff to be taken to the restroom. PT verbalized understanding.       Alray Collet, RN  10/16/21 [id number]

## 2021-10-16 NOTE — CONSULTS
Attending Physician Statement:    I have discussed the care of  Roberto Monahan , including pertinent history and exam findings, with the Cardiology fellow/resident. I have seen and examined the patient and the key elements of all parts of the encounter have been performed by me. I agree with the assessment, plan and orders as documented by the fellow/resident, after I modified exam findings and plan of treatments, and the final version is my approved version of the assessment. Additional Comments: NSVT. Prolonged . DC QT prolonging agents. DC zofran, compazine, famotidine. Replace K. She keeps on vomiting. Recommend central line for IV replacement of K and IVF. Hal Alberto MD           [facility]    Consult / H&P               Today's Date: 10/16/2021  Patient Name: Roberto Monahan  Date of admission: 10/15/2021  5:54 PM  Patient's age: 61 y.o., 1965  Admission Dx: Nausea & vomiting [R11.2]  Intractable vomiting with nausea, unspecified vomiting type [R11.2]    Reason for Consult:  Cardiac evaluation    Requesting Physician: Mary Negron MD    CHIEF COMPLAINT:  Nausea, vomitting    History Obtained From:  patient, electronic medical record    HISTORY OF PRESENT ILLNESS:      The patient is a 61 y.o. female with a past medical history of hypothyroidism, COPD initially presented with nausea, vomiting and shortness of breath. As per EMR, patient had experiencing worsening shortness of breath requiring increased amount of supplemental oxygen from her baseline also associated with cough. In the ER, patient was tachypneic, chest x-ray showed some infiltrates and she was given IV Lasix 20 mg. Covid was negative. This morning rapid response was called as patient was found to be in Non sustained V. Tach. Was diaphoretic. Patient was found to have prolonged QTc in 700s. Pertinent labs K3.1, MG 1.9, troponin 11.   EKG consistent with normal sinus rhythm with PVCs and pattern of bigeminy with prolonged QTC in 690s  CT abdomen pelvis suggestive of ileitis/chronic colitis with concerns of pyelonephritis. Patient has received 2 g of magnesium sulfate since admission and 10 AM EQ potassium chloride. Patient is not able to tolerate IV potassium chloride because of burning at the incision site and neither is able to tolerate p.o. because of nausea/emesis. Also received Zofran which can further prolong the QTC. Past Medical History:   has a past medical history of Acid reflux, Anxiety, Arthritis, Asthma, Bipolar 1 disorder ([address] Bowel obstruction ([address] COPD (chronic obstructive pulmonary disease) ([address] Crohn disease ([address] Depression, Dry eye, Fibromyalgia, Gout, Headache, Hyperlipidemia, Hypertension, Hypothyroidism, Kidney stones, Muscle spasm, Neuropathy, Pain, Personal history of other medical treatment, Wears partial dentures, and Wellness examination. Past Surgical History:   has a past surgical history that includes Tonsillectomy and adenoidectomy; Tubal ligation; Cholecystectomy; Bunionectomy (Left); Colonoscopy (04/30/2007); Colonoscopy (10/12/2017); and Abdomen surgery. Home Medications:    Prior to Admission medications    Medication Sig Start Date End Date Taking? Authorizing Provider   HYDROcodone-acetaminophen (NORCO) 5-325 MG per tablet Take 1 tablet by mouth every 6 hours as needed for Pain for up to 14 days.  10/13/21 10/27/21 Yes Kira Bedoya MD   furosemide (LASIX) 20 MG tablet Take 1 tablet by mouth daily 9/19/21  Yes Bakari Moser MD   budesonide-formoterol [facility]) 160-4.5 MCG/ACT AERO Inhale 2 puffs into the lungs 2 times daily 9/19/21  Yes Bakari Moser MD   ipratropium-albuterol (DUONEB) 0.5-2.5 (3) MG/3ML SOLN nebulizer solution Inhale 3 mLs into the lungs every 6 hours as needed for Shortness of Breath 9/19/21 10/19/21 Yes Bakari Moser MD   acetaminophen (TYLENOL) 500 MG tablet Take 2 tablets by mouth 3 times daily  Patient taking differently: Take 1,000 mg by mouth 3 times daily as needed  7/17/21  Yes Marce Kim,    lidocaine (LIDODERM) 5 % Place 1 patch onto the skin daily 12 hours on, 12 hours off.    Yes Historical Provider, MD   lisinopril (PRINIVIL;ZESTRIL) 5 MG tablet Take 1 tablet by mouth daily 9/20/21   Agnes Raya MD   nicotine (NICODERM CQ) 21 MG/24HR Place 1 patch onto the skin daily 9/20/21   Agnes Raya MD   loperamide (IMODIUM) 2 MG capsule Take 2 mg by mouth 4 times daily as needed for Diarrhea    Historical Provider, MD   Polyvinyl Alcohol-Povidone (REFRESH OP) Place 1 drop into both eyes 3 times daily    Historical Provider, MD      Current Facility-Administered Medications: ondansetron (ZOFRAN-ODT) disintegrating tablet 4 mg, 4 mg, Oral, Q8H PRN  0.9 % sodium chloride infusion, , IntraVENous, Continuous  sodium chloride flush 0.9 % injection 5-40 mL, 5-40 mL, IntraVENous, 2 times per day  sodium chloride flush 0.9 % injection 5-40 mL, 5-40 mL, IntraVENous, PRN  0.9 % sodium chloride infusion, 25 mL, IntraVENous, PRN  potassium chloride (KLOR-CON M) extended release tablet 40 mEq, 40 mEq, Oral, PRN **OR** potassium bicarb-citric acid (EFFER-K) effervescent tablet 40 mEq, 40 mEq, Oral, PRN **OR** potassium chloride 10 mEq/100 mL IVPB (Peripheral Line), 10 mEq, IntraVENous, PRN  enoxaparin (LOVENOX) injection 40 mg, 40 mg, SubCUTAneous, Daily  polyethylene glycol (GLYCOLAX) packet 17 g, 17 g, Oral, Daily PRN  acetaminophen (TYLENOL) tablet 650 mg, 650 mg, Oral, Q6H PRN **OR** acetaminophen (TYLENOL) suppository 650 mg, 650 mg, Rectal, Q6H PRN  magnesium sulfate 2000 mg in 50 mL IVPB premix, 2,000 mg, IntraVENous, Once  calcium gluconate 1000 mg in sodium chloride 50 mL, 1,000 mg, IntraVENous, Once  potassium chloride 10 mEq/100 mL IVPB (Peripheral Line), 40 mEq, IntraVENous, Once    Allergies:  Azithromycin, Methylprednisolone, Penicillins, and Tape [adhesive tape]    Social History:   reports that she has been smoking cigarettes. She has a 18.50 pack-year smoking history. She quit smokeless tobacco use about 20 years ago. Her smokeless tobacco use included chew. She reports previous alcohol use. She reports current drug use. Drug: Marijuana. Family History: family history includes Cancer in her mother; Crohn's Disease in her brother; Diabetes in her father; Heart Disease in her brother; High Blood Pressure in her mother; Hypertension in her mother; [place] in her father. No h/o sudden cardiac death. REVIEW OF SYSTEMS:    · Constitutional: there has been no unanticipated weight loss. There's been No change in energy level, No change in activity level. · Eyes: No visual changes or diplopia. No scleral icterus. · ENT: No Headaches  · Cardiovascular: No cardiac history  · Respiratory: No previous pulmonary problems, No cough  · Gastrointestinal: No abdominal pain. No change in bowel or bladder habits. · Genitourinary: No dysuria, trouble voiding, or hematuria. · Musculoskeletal:  No gait disturbance, No weakness or joint complaints. · Integumentary: No rash or pruritis. · Neurological: No headache, diplopia, change in muscle strength, numbness or tingling. No change in gait, balance, coordination, mood, affect, memory, mentation, behavior. · Psychiatric: No anxiety, or depression. · Endocrine: No temperature intolerance. No excessive thirst, fluid intake, or urination. No tremor. · Hematologic/Lymphatic: No abnormal bruising or bleeding, blood clots or swollen lymph nodes. · Allergic/Immunologic: No nasal congestion or hives. PHYSICAL EXAM:      BP (!) 138/109   Pulse 83   Temp 98.2 °F (36.8 °C)   Resp 20   Ht 5' 6\" (1.676 m)   Wt 160 lb (72.6 kg)   SpO2 99%   BMI 25.82 kg/m²    Constitutional and General Appearance: alert, cooperative, in distress and appears stated age  HEENT: PERRL, no cervical lymphadenopathy. No masses palpable.  Normal oral mucosa  Respiratory:  · Resp Auscultation: Good respiratory effort. No for increased work of breathing. On auscultation: clear to auscultation bilaterally  Cardiovascular:  ·  regular S1 and S2. Abdomen:   · No masses or tenderness  · Bowel sounds present  Extremities:  ·  No Cyanosis or Clubbing  ·  Lower extremity edema: No  ·  Skin: Warm and dry  Neurological:  · Alert and oriented. · Moves all extremities well  · No abnormalities of mood, affect, memory, mentation, or behavior are noted    DATA:    Diagnostics:      EKG:   normal sinus rhythm, frequent PVC's noted, prolonged QT interval.    ECHO:   not obtained. 2/2020 normal left ventricle size, wall thickness and function with an estimated EF  > 65%. Mild to moderate left ventricular hypertrophy. No significant valvular regurgitation or stenosis seen.       Stress Test:   not obtained. Cardiac Angiography:   not obtained. Labs:     CBC:   Recent Labs     10/15/21  1818   WBC 5.2   HGB 15.7*   HCT 47.6*        BMP:   Recent Labs     10/15/21  1818      K 3.1*   CO2 24   BUN 7   CREATININE 0.56   LABGLOM >60   GLUCOSE 127*     BNP: No results for input(s): BNP in the last 72 hours. PT/INR: No results for input(s): PROTIME, INR in the last 72 hours. APTT:No results for input(s): APTT in the last 72 hours. CARDIAC ENZYMES:No results for input(s): CKTOTAL, CKMB, CKMBINDEX, TROPONINI in the last 72 hours. FASTING LIPID PANEL:No results found for: HDL, LDLDIRECT, LDLCALC, TRIG  LIVER PROFILE:  Recent Labs     10/15/21  1818   AST 16   ALT 12   LABALBU 4.2       IMPRESSION:    1. Non sustained V. tach likely secondary to hypokalemia/hypomagnesemia from ongoing emesis  2. Prolonged QTC   3. Dehydration  4. chronic colitis/ileitis  5. Hypothyroidism  6. Hypokalemia  7. COPD  8. Crohn's disease  9.  Lactic acidosis    Patient Active Problem List   Diagnosis    Hypothyroidism    Gastroesophageal reflux disease without esophagitis    Gastroenteritis    [facility]    Pancreatitis, acute    Drug abuse [facility]    COPD (chronic obstructive pulmonary disease) ([facility]    Hypertension    Hypokalemia    Hypothyroidism due to acquired atrophy of thyroid    Crohn disease ([facility]    Acute cystitis without hematuria    Accidental drug overdose    Prolonged Q-T interval on ECG    [facility], [facility]    Polysubstance abuse ([facility]    Alcohol intoxication delirium ([facility]    Cocaine abuse ([facility]    Acute respiratory failure with hypoxia (HCC)    Bipolar 1 disorder (HCC)    Diarrhea    Chronic bronchitis (HCC)    Chronic renal insufficiency, stage III (moderate) (HCC)    Colitis    Anxiety    Osteoarthritis resulting from left hip dysplasia    Shortness of breath    Elevated brain natriuretic peptide (BNP) level    Pneumonia of both lower lobes due to infectious organism    Nausea & vomiting       RECOMMENDATIONS:  1. Replace potassium IV to keep K>4 and Mg>2  2. Will repeat EKG  3. Avoid any QTC prolonging medications: compazine, pepcid, zofran and antipsychotics  4. We will aggressively hydrate the patient. 5. Might need GI consult for for chronic colitis/ongoing emesis  6. Check TSH    Will discuss with rounding attending Dr. Benito Masters for final recommendations.     Mahogany Bills MD  Cardiology Service  Internal Medicine Residency Program, PGY-3  [facility]

## 2021-10-16 NOTE — PLAN OF CARE
Problem: Anxiety:  Goal: Level of anxiety will decrease  Description: Level of anxiety will decrease  Outcome: Ongoing     Problem:  Activity:  Goal: Risk for activity intolerance will decrease  Description: Risk for activity intolerance will decrease  Outcome: Ongoing     Problem: Fluid Volume:  Goal: Maintenance of adequate hydration will improve  Description: Maintenance of adequate hydration will improve  Outcome: Ongoing     Problem: Health Behavior:  Goal: Ability to state signs and symptoms to report to health care provider will improve  Description: Ability to state signs and symptoms to report to health care provider will improve  Outcome: Ongoing     Problem: Mental Status - Impaired:  Goal: Mental status will improve  Description: Mental status will improve  Outcome: Ongoing

## 2021-10-16 NOTE — PROCEDURES
PROCEDURE NOTE - CENTRAL VENOUS LINE PLACEMENT    PATIENT NAME: Kelsie Diehl  MEDICAL RECORD NO. [id number]  DATE: 10/16/2021  ATTENDING PHYSICIAN: Dr. Logan Alcocer DIAGNOSIS:  Need for IV access  POSTOPERATIVE DIAGNOSIS:  Same  PROCEDURE PERFORMED:  Right Femoral Vein Central Line Insertion  PERFORMING PHYSICIAN: Brian Carmichael MD  ANESTHESIA:  Local utilizing 1% lidocaine  ESTIMATED BLOOD LOSS:  Less than 25 ml  COMPLICATIONS:  None immediately appreciated. Dr. Melly Rutherford present assisting w/ procedure. DISCUSSION:  Kelsie Diehl is a 61y.o.-year-old female who requires central IV access. The history and physical examination were reviewed and confirmed. The diagnoses, proposed procedure, risks, possible complications, benefits and alternatives were discussed with the patient or family. She was given the opportunity to ask questions, and once answered, informed consent was obtained. The patient was then prepared for the procedure. PROCEDURE:  A timeout was initiated by the bedside nurse and was confirmed by those present. The patient was placed in a supine position. The skin overlying the Right Femoral Vein was prepped with chlorhexadine and draped in sterile fashion. The skin was infiltrated with local anesthetic. The vessel and surrounding anatomy was visualized using ultrasound. Through the anesthetized region, the introducer needle was inserted into the femoral vein returning dark red non pulsatile blood. A guidewire was placed through the center of the needle with no resistance. Ultrasound confirmed presence of wire in the vein. A small incision made in the skin with a #11 scalpel blade. The dilator was inserted into the skin and vein over guidewire using Seldinger technique. The dilator was then removed and the catheter was placed in the vein over the guidewire using Seldinger technique.  The guidewire was then removed and all ports aspirated and flushed appropriately. The catheter then secured using silk suture and a temporary sterile dressing was applied. No immediate complication was evident. All sponge, instrument and needle counts were correct at the completion of the procedure. The patient tolerated the procedure well with no immediate complication evident.         Johnny March MD  PGY-2, Department of Internal Medicine  [place], [place]

## 2021-10-17 LAB
ABSOLUTE EOS #: <0.03 K/UL (ref 0–0.44)
ABSOLUTE IMMATURE GRANULOCYTE: 0.05 K/UL (ref 0–0.3)
ABSOLUTE LYMPH #: 1.25 K/UL (ref 1.1–3.7)
ABSOLUTE MONO #: 1.05 K/UL (ref 0.1–1.2)
ANION GAP SERPL CALCULATED.3IONS-SCNC: 13 MMOL/L (ref 9–17)
ANION GAP SERPL CALCULATED.3IONS-SCNC: 13 MMOL/L (ref 9–17)
BASOPHILS # BLD: 0 % (ref 0–2)
BASOPHILS ABSOLUTE: 0.03 K/UL (ref 0–0.2)
BUN BLDV-MCNC: 5 MG/DL (ref 6–20)
BUN/CREAT BLD: ABNORMAL (ref 9–20)
CALCIUM SERPL-MCNC: 8.5 MG/DL (ref 8.6–10.4)
CHLORIDE BLD-SCNC: 97 MMOL/L (ref 98–107)
CHLORIDE BLD-SCNC: 98 MMOL/L (ref 98–107)
CO2: 18 MMOL/L (ref 20–31)
CO2: 19 MMOL/L (ref 20–31)
CREAT SERPL-MCNC: 0.44 MG/DL (ref 0.5–0.9)
DIFFERENTIAL TYPE: ABNORMAL
EOSINOPHILS RELATIVE PERCENT: 0 % (ref 1–4)
GFR AFRICAN AMERICAN: >60 ML/MIN
GFR NON-AFRICAN AMERICAN: >60 ML/MIN
GFR SERPL CREATININE-BSD FRML MDRD: ABNORMAL ML/MIN/{1.73_M2}
GFR SERPL CREATININE-BSD FRML MDRD: ABNORMAL ML/MIN/{1.73_M2}
GLUCOSE BLD-MCNC: 119 MG/DL (ref 70–99)
HCT VFR BLD CALC: 44.8 % (ref 36.3–47.1)
HEMOGLOBIN: 14 G/DL (ref 11.9–15.1)
IMMATURE GRANULOCYTES: 0 %
LACTIC ACID, WHOLE BLOOD: 1.6 MMOL/L (ref 0.7–2.1)
LACTIC ACID: NORMAL MMOL/L
LYMPHOCYTES # BLD: 9 % (ref 24–43)
MAGNESIUM: 2.1 MG/DL (ref 1.6–2.6)
MCH RBC QN AUTO: 27.5 PG (ref 25.2–33.5)
MCHC RBC AUTO-ENTMCNC: 31.3 G/DL (ref 28.4–34.8)
MCV RBC AUTO: 88 FL (ref 82.6–102.9)
MONOCYTES # BLD: 8 % (ref 3–12)
NRBC AUTOMATED: 0 PER 100 WBC
PDW BLD-RTO: 15.9 % (ref 11.8–14.4)
PLATELET # BLD: 292 K/UL (ref 138–453)
PLATELET ESTIMATE: ABNORMAL
PMV BLD AUTO: 11.2 FL (ref 8.1–13.5)
POTASSIUM SERPL-SCNC: 3.3 MMOL/L (ref 3.7–5.3)
POTASSIUM SERPL-SCNC: 3.4 MMOL/L (ref 3.7–5.3)
POTASSIUM SERPL-SCNC: 4.2 MMOL/L (ref 3.7–5.3)
RBC # BLD: 5.09 M/UL (ref 3.95–5.11)
RBC # BLD: ABNORMAL 10*6/UL
SEG NEUTROPHILS: 82 % (ref 36–65)
SEGMENTED NEUTROPHILS ABSOLUTE COUNT: 10.94 K/UL (ref 1.5–8.1)
SODIUM BLD-SCNC: 128 MMOL/L (ref 135–144)
SODIUM BLD-SCNC: 130 MMOL/L (ref 135–144)
WBC # BLD: 13.3 K/UL (ref 3.5–11.3)
WBC # BLD: ABNORMAL 10*3/UL

## 2021-10-17 PROCEDURE — 96372 THER/PROPH/DIAG INJ SC/IM: CPT

## 2021-10-17 PROCEDURE — 85025 COMPLETE CBC W/AUTO DIFF WBC: CPT

## 2021-10-17 PROCEDURE — 94761 N-INVAS EAR/PLS OXIMETRY MLT: CPT

## 2021-10-17 PROCEDURE — 83605 ASSAY OF LACTIC ACID: CPT

## 2021-10-17 PROCEDURE — 99232 SBSQ HOSP IP/OBS MODERATE 35: CPT | Performed by: INTERNAL MEDICINE

## 2021-10-17 PROCEDURE — 6370000000 HC RX 637 (ALT 250 FOR IP): Performed by: STUDENT IN AN ORGANIZED HEALTH CARE EDUCATION/TRAINING PROGRAM

## 2021-10-17 PROCEDURE — 96366 THER/PROPH/DIAG IV INF ADDON: CPT

## 2021-10-17 PROCEDURE — 80051 ELECTROLYTE PANEL: CPT

## 2021-10-17 PROCEDURE — 2060000000 HC ICU INTERMEDIATE R&B

## 2021-10-17 PROCEDURE — 96375 TX/PRO/DX INJ NEW DRUG ADDON: CPT

## 2021-10-17 PROCEDURE — 80048 BASIC METABOLIC PNL TOTAL CA: CPT

## 2021-10-17 PROCEDURE — 6360000002 HC RX W HCPCS: Performed by: PEDIATRICS

## 2021-10-17 PROCEDURE — 2580000003 HC RX 258: Performed by: STUDENT IN AN ORGANIZED HEALTH CARE EDUCATION/TRAINING PROGRAM

## 2021-10-17 PROCEDURE — G0378 HOSPITAL OBSERVATION PER HR: HCPCS

## 2021-10-17 PROCEDURE — 83735 ASSAY OF MAGNESIUM: CPT

## 2021-10-17 PROCEDURE — 6360000002 HC RX W HCPCS: Performed by: STUDENT IN AN ORGANIZED HEALTH CARE EDUCATION/TRAINING PROGRAM

## 2021-10-17 PROCEDURE — 94640 AIRWAY INHALATION TREATMENT: CPT

## 2021-10-17 PROCEDURE — 93005 ELECTROCARDIOGRAM TRACING: CPT | Performed by: STUDENT IN AN ORGANIZED HEALTH CARE EDUCATION/TRAINING PROGRAM

## 2021-10-17 PROCEDURE — 2580000003 HC RX 258: Performed by: PEDIATRICS

## 2021-10-17 PROCEDURE — 6360000002 HC RX W HCPCS: Performed by: NURSE PRACTITIONER

## 2021-10-17 PROCEDURE — 99232 SBSQ HOSP IP/OBS MODERATE 35: CPT | Performed by: STUDENT IN AN ORGANIZED HEALTH CARE EDUCATION/TRAINING PROGRAM

## 2021-10-17 PROCEDURE — 96376 TX/PRO/DX INJ SAME DRUG ADON: CPT

## 2021-10-17 PROCEDURE — 84132 ASSAY OF SERUM POTASSIUM: CPT

## 2021-10-17 PROCEDURE — 36415 COLL VENOUS BLD VENIPUNCTURE: CPT

## 2021-10-17 PROCEDURE — 94664 DEMO&/EVAL PT USE INHALER: CPT

## 2021-10-17 RX ORDER — BUDESONIDE AND FORMOTEROL FUMARATE DIHYDRATE 160; 4.5 UG/1; UG/1
2 AEROSOL RESPIRATORY (INHALATION) 2 TIMES DAILY
Status: DISCONTINUED | OUTPATIENT
Start: 2021-10-17 | End: 2021-10-20 | Stop reason: HOSPADM

## 2021-10-17 RX ORDER — PROCHLORPERAZINE EDISYLATE 5 MG/ML
10 INJECTION INTRAMUSCULAR; INTRAVENOUS ONCE
Status: COMPLETED | OUTPATIENT
Start: 2021-10-17 | End: 2021-10-17

## 2021-10-17 RX ORDER — POTASSIUM CHLORIDE 29.8 MG/ML
20 INJECTION INTRAVENOUS PRN
Status: DISCONTINUED | OUTPATIENT
Start: 2021-10-17 | End: 2021-10-20 | Stop reason: HOSPADM

## 2021-10-17 RX ORDER — HYDRALAZINE HYDROCHLORIDE 20 MG/ML
5 INJECTION INTRAMUSCULAR; INTRAVENOUS EVERY 6 HOURS PRN
Status: DISCONTINUED | OUTPATIENT
Start: 2021-10-17 | End: 2021-10-17

## 2021-10-17 RX ORDER — SODIUM CHLORIDE, SODIUM LACTATE, POTASSIUM CHLORIDE, CALCIUM CHLORIDE 600; 310; 30; 20 MG/100ML; MG/100ML; MG/100ML; MG/100ML
INJECTION, SOLUTION INTRAVENOUS CONTINUOUS
Status: DISCONTINUED | OUTPATIENT
Start: 2021-10-17 | End: 2021-10-19

## 2021-10-17 RX ORDER — POTASSIUM CHLORIDE 7.45 MG/ML
40 INJECTION INTRAVENOUS ONCE
Status: DISCONTINUED | OUTPATIENT
Start: 2021-10-17 | End: 2021-10-18

## 2021-10-17 RX ORDER — HYDRALAZINE HYDROCHLORIDE 20 MG/ML
20 INJECTION INTRAMUSCULAR; INTRAVENOUS EVERY 6 HOURS PRN
Status: DISCONTINUED | OUTPATIENT
Start: 2021-10-17 | End: 2021-10-20 | Stop reason: HOSPADM

## 2021-10-17 RX ORDER — IPRATROPIUM BROMIDE AND ALBUTEROL SULFATE 2.5; .5 MG/3ML; MG/3ML
3 SOLUTION RESPIRATORY (INHALATION) EVERY 6 HOURS PRN
Status: DISCONTINUED | OUTPATIENT
Start: 2021-10-17 | End: 2021-10-20 | Stop reason: HOSPADM

## 2021-10-17 RX ORDER — POTASSIUM CHLORIDE 29.8 MG/ML
INJECTION INTRAVENOUS
Status: DISCONTINUED
Start: 2021-10-17 | End: 2021-10-17 | Stop reason: WASHOUT

## 2021-10-17 RX ORDER — NICOTINE 21 MG/24HR
1 PATCH, TRANSDERMAL 24 HOURS TRANSDERMAL DAILY
Status: DISCONTINUED | OUTPATIENT
Start: 2021-10-17 | End: 2021-10-20 | Stop reason: HOSPADM

## 2021-10-17 RX ORDER — 0.9 % SODIUM CHLORIDE 0.9 %
1000 INTRAVENOUS SOLUTION INTRAVENOUS ONCE
Status: COMPLETED | OUTPATIENT
Start: 2021-10-17 | End: 2021-10-17

## 2021-10-17 RX ADMIN — MORPHINE SULFATE 4 MG: 4 INJECTION INTRAVENOUS at 13:21

## 2021-10-17 RX ADMIN — MORPHINE SULFATE 4 MG: 4 INJECTION INTRAVENOUS at 21:27

## 2021-10-17 RX ADMIN — MORPHINE SULFATE 4 MG: 4 INJECTION INTRAVENOUS at 04:19

## 2021-10-17 RX ADMIN — HYDRALAZINE HYDROCHLORIDE 5 MG: 20 INJECTION INTRAMUSCULAR; INTRAVENOUS at 09:59

## 2021-10-17 RX ADMIN — SODIUM CHLORIDE, PRESERVATIVE FREE 10 ML: 5 INJECTION INTRAVENOUS at 20:30

## 2021-10-17 RX ADMIN — ENOXAPARIN SODIUM 40 MG: 40 INJECTION SUBCUTANEOUS at 09:58

## 2021-10-17 RX ADMIN — SODIUM CHLORIDE 1000 ML: 9 INJECTION, SOLUTION INTRAVENOUS at 08:36

## 2021-10-17 RX ADMIN — PROCHLORPERAZINE EDISYLATE 10 MG: 5 INJECTION INTRAMUSCULAR; INTRAVENOUS at 21:58

## 2021-10-17 RX ADMIN — HYDRALAZINE HYDROCHLORIDE 20 MG: 20 INJECTION INTRAMUSCULAR; INTRAVENOUS at 20:26

## 2021-10-17 RX ADMIN — CEFEPIME 2000 MG: 2 INJECTION, POWDER, FOR SOLUTION INTRAVENOUS at 17:03

## 2021-10-17 RX ADMIN — HYDRALAZINE HYDROCHLORIDE 5 MG: 20 INJECTION INTRAMUSCULAR; INTRAVENOUS at 15:32

## 2021-10-17 RX ADMIN — CEFEPIME 2000 MG: 2 INJECTION, POWDER, FOR SOLUTION INTRAVENOUS at 04:18

## 2021-10-17 RX ADMIN — BUDESONIDE AND FORMOTEROL FUMARATE DIHYDRATE 2 PUFF: 160; 4.5 AEROSOL RESPIRATORY (INHALATION) at 20:40

## 2021-10-17 RX ADMIN — SODIUM CHLORIDE, PRESERVATIVE FREE 10 ML: 5 INJECTION INTRAVENOUS at 10:00

## 2021-10-17 RX ADMIN — MORPHINE SULFATE 2 MG: 2 INJECTION, SOLUTION INTRAMUSCULAR; INTRAVENOUS at 10:39

## 2021-10-17 RX ADMIN — MORPHINE SULFATE 4 MG: 4 INJECTION INTRAVENOUS at 15:31

## 2021-10-17 RX ADMIN — POTASSIUM CHLORIDE 20 MEQ: 400 INJECTION, SOLUTION INTRAVENOUS at 08:37

## 2021-10-17 RX ADMIN — MORPHINE SULFATE 4 MG: 4 INJECTION INTRAVENOUS at 06:27

## 2021-10-17 RX ADMIN — MORPHINE SULFATE 4 MG: 4 INJECTION INTRAVENOUS at 19:29

## 2021-10-17 RX ADMIN — POTASSIUM CHLORIDE 20 MEQ: 400 INJECTION, SOLUTION INTRAVENOUS at 11:59

## 2021-10-17 RX ADMIN — POTASSIUM CHLORIDE 10 MEQ: 10 INJECTION, SOLUTION INTRAVENOUS at 06:00

## 2021-10-17 RX ADMIN — POTASSIUM CHLORIDE 20 MEQ: 400 INJECTION, SOLUTION INTRAVENOUS at 13:29

## 2021-10-17 RX ADMIN — MORPHINE SULFATE 4 MG: 4 INJECTION INTRAVENOUS at 00:45

## 2021-10-17 RX ADMIN — POTASSIUM CHLORIDE 10 MEQ: 10 INJECTION, SOLUTION INTRAVENOUS at 04:59

## 2021-10-17 RX ADMIN — SODIUM CHLORIDE, POTASSIUM CHLORIDE, SODIUM LACTATE AND CALCIUM CHLORIDE: 600; 310; 30; 20 INJECTION, SOLUTION INTRAVENOUS at 09:55

## 2021-10-17 RX ADMIN — ONDANSETRON 4 MG: 4 TABLET, ORALLY DISINTEGRATING ORAL at 06:28

## 2021-10-17 ASSESSMENT — ENCOUNTER SYMPTOMS
WHEEZING: 0
ABDOMINAL PAIN: 1
RHINORRHEA: 0
SHORTNESS OF BREATH: 0
BACK PAIN: 0
VOMITING: 1
COUGH: 1
CHOKING: 0
BLOOD IN STOOL: 0
NAUSEA: 1
SORE THROAT: 0
DIARRHEA: 0

## 2021-10-17 ASSESSMENT — PAIN DESCRIPTION - PAIN TYPE
TYPE: ACUTE PAIN
TYPE: ACUTE PAIN

## 2021-10-17 ASSESSMENT — PAIN SCALES - GENERAL
PAINLEVEL_OUTOF10: 8
PAINLEVEL_OUTOF10: 0
PAINLEVEL_OUTOF10: 7
PAINLEVEL_OUTOF10: 8
PAINLEVEL_OUTOF10: 9
PAINLEVEL_OUTOF10: 0
PAINLEVEL_OUTOF10: 0
PAINLEVEL_OUTOF10: 8
PAINLEVEL_OUTOF10: 10
PAINLEVEL_OUTOF10: 7
PAINLEVEL_OUTOF10: 8
PAINLEVEL_OUTOF10: 0

## 2021-10-17 ASSESSMENT — PAIN DESCRIPTION - ORIENTATION
ORIENTATION: MID;LOWER
ORIENTATION: MID;LOWER

## 2021-10-17 ASSESSMENT — PAIN DESCRIPTION - LOCATION
LOCATION: ABDOMEN
LOCATION: ABDOMEN

## 2021-10-17 NOTE — PROGRESS NOTES
[facility]   Progress Note                 Date:   10/17/2021  Patient name:  Mitesh Notice  Date of admission:  10/15/2021  5:54 PM  MRN:   [id number]  YOB: 1965  PCP:    Jennifer Galarza MD    Reason for Admission: Nausea & vomiting [R11.2]  Intractable vomiting with nausea, unspecified vomiting type [R11.2]      Subjective:       Clinical Changes / Abnormalities:     Patient seen and examined. Patient got central line yesterday. K is still 3.3   Continues to have nausea and vomiting. Started on scopalamine by primary      I/O last 3 completed shifts: In: 1220 [I.V.:2639; IV Piggyback:1100]  Out: 900 [Urine:900]  I/O this shift:  In: -   Out: 1200 [Urine:1200]            Medications:   Scheduled Meds:    sodium chloride flush  5-40 mL IntraVENous 2 times per day    enoxaparin  40 mg SubCUTAneous Daily    cefepime  2,000 mg IntraVENous Q12H    scopolamine  1 patch TransDERmal Q72H     Continuous Infusions:    sodium chloride 125 mL/hr at 10/16/21 1046    sodium chloride       CBC:   Recent Labs     10/15/21  1818 10/16/21  1030 10/17/21  0351   WBC 5.2 11.8* 13.3*   HGB 15.7* 15.8* 14.0    349 292     BMP:    Recent Labs     10/16/21  1008 10/16/21  1008 10/16/21  1250 10/16/21  1338 10/16/21  1338 10/16/21  1653 10/16/21  2143 10/17/21  0359     --   --  134*  --   --   --  128*   K 3.0*   < >  --  2.8*   < > 3.5* 3.6* 3.3*   CL 92*  --   --  100  --   --   --  97*   CO2 18*  --   --  20  --   --   --  18*   BUN 6  --   --  6  --   --   --  5*   CREATININE 0.50  --  0.40* 0.43*  --   --   --  0.44*   GLUCOSE 169*  --   --  147*  --   --   --  119*    < > = values in this interval not displayed. Hepatic:   Recent Labs     10/15/21  1818   AST 16   ALT 12   BILITOT 0.94   ALKPHOS 114*     Troponin:   Recent Labs     10/15/21  1818   TROPHS 11     BNP: No results for input(s): BNP in the last 72 hours.   Lipids: No results for input(s): CHOL, HDL in the last 72 hours. Invalid input(s): LDLCALCU  INR: No results for input(s): INR in the last 72 hours. Objective:   Vitals: BP (!) 178/82   Pulse 79   Temp 99.8 °F (37.7 °C)   Resp 16   Ht 5' 6\" (1.676 m)   Wt 160 lb (72.6 kg)   SpO2 95%   BMI 25.82 kg/m²   General appearance: Alert. No acute distress. HEENT: Head: Normal, normocephalic, atraumatic. Neck: no JVD, trachea midline  Lungs: Clear to auscultation bilaterally, no wheeze or crackles  Heart: Regular rate and rhythm, S1, S2 normal, no murmur  Extremities: No edema  Neurologic: No focal neurologic deficits        Assessment:   1. Non sustained V. tach likely secondary to hypokalemia from ongoing emesis  2. Prolonged QTC   3. Dehydration  4. chronic colitis/ileitis  5. Hypothyroidism  6. Hypokalemia  7. COPD  8. Crohn's disease  9. Lactic acidosis        Plan / Recommendations:   1. Replace potassium IV to keep K>4 and Mg>2  2. Repeat EKG today  3. Avoid any QTC prolonging medications: compazine, pepcid, zofran and antipsychotics  4. aggressively hydrate the patient. 5. GI consulted for for chronic colitis/ongoing emesis  6. TSH normal        Thank you for allowing us to participate in [address]. Will follow with you. Electronically signed on 10/17/21 at 6:47 AM by:    Lacey Biswas MD, MD   Fellow, [facility]     I performed a history and physical examination of the patient and discussed management with the resident. I reviewed the residents note and agree with the documented findings and plan of care. Any areas of disagreement are noted on the chart. I was personally present for the key portions of any procedures. I have documented in the chart those procedures where I was not present during the key portions. I have personally evaluated this patient and have completed at least one if not all key elements of the E/M (history, physical exam, and MDM).  Additional findings are as noted.    Keep replacing K. QTC today 546. Daily ECG.     Oscar Jean MD

## 2021-10-17 NOTE — PROGRESS NOTES
RAEGAN Fernandez notified of pts continued nausea despite PO zofran. Scopolamine patch ordered.  Will continue to monitor

## 2021-10-17 NOTE — PROGRESS NOTES
Notified internal medicine resident of pts continued nausea and vomiting and elevated BP. Resident will be coming bedside to assess.  Will continue to monitor

## 2021-10-17 NOTE — PROGRESS NOTES
THE [facility] AT [facility] Progress Note    Angel Boas is a 61 y.o. female patient. Hospitalization Day:1      Consult reason: Intractable nausea, vomiting, history of Crohn's and ileitis on CT  Subjective:   Pt was seen and examined at bedside. Resting comfortably in the bed. Vitals stable. Labs reviewed. Urine output appropriate  No acute events overnight. Patient complains of nausea and vomiting. Patient states that the nausea and vomiting has improved compared to yesterday but she still feels nauseous. Review of Systems   Constitutional: Negative for appetite change, chills, fatigue, fever and unexpected weight change. HENT: Negative for ear pain, postnasal drip, rhinorrhea and sore throat. Respiratory: Positive for cough. Negative for choking, shortness of breath and wheezing. Cardiovascular: Negative for chest pain and leg swelling. Gastrointestinal: Positive for abdominal pain, nausea and vomiting. Negative for blood in stool and diarrhea. Musculoskeletal: Negative for back pain and joint swelling. Neurological: Positive for headaches. Negative for syncope and light-headedness. Psychiatric/Behavioral: The patient is nervous/anxious. VITALS:  BP (!) 178/82   Pulse 88   Temp 98.2 °F (36.8 °C) (Oral)   Resp 16   Ht 5' 6\" (1.676 m)   Wt 160 lb (72.6 kg)   SpO2 96%   BMI 25.82 kg/m²   TEMPERATURE:  Current - Temp: 98.2 °F (36.8 °C); Max - Temp  Av °F (37.2 °C)  Min: 98.2 °F (36.8 °C)  Max: 100.5 °F (38.1 °C)    Physical Assessment:  General appearance:  alert, cooperative and no distress  Mental Status:  oriented to person, place and time and normal affect  Lungs:  clear to auscultation bilaterally, normal effort  Heart:  regular rate and rhythm, no murmur  Abdomen:  Soft, diffuse tenderness all over the abdomen.   Extremities:  no edema, redness, tenderness in the calves  Skin:  warm, dry, no gross lesions or rashes    Data Review:  LABS and IMAGING: CBC  Recent Labs     10/15/21  1818 10/16/21  1030 10/17/21  0351   WBC 5.2 11.8* 13.3*   HGB 15.7* 15.8* 14.0   HCT 47.6* 46.8 44.8   MCV 84.4 82.1* 88.0   MCHC 33.0 33.8 31.3   RDW 15.7* 16.5* 15.9*    349 292       Immature PLTs   No results found for: PLTFLUORE    ANEMIA STUDIES  No results for input(s): LABIRON, TIBC, IRON, FERRITIN, TOTJHNHX29, FOLATE, OCCULTBLD in the last 72 hours. BMP  Recent Labs     10/16/21  1008 10/16/21  1008 10/16/21  1250 10/16/21  1338 10/16/21  1338 10/16/21  1653 10/16/21  2143 10/17/21  0359     --   --  134*  --   --   --  128*   K 3.0*   < >  --  2.8*   < > 3.5* 3.6* 3.3*   CL 92*  --   --  100  --   --   --  97*   CO2 18*  --   --  20  --   --   --  18*   BUN 6  --   --  6  --   --   --  5*   CREATININE 0.50  --  0.40* 0.43*  --   --   --  0.44*   GLUCOSE 169*  --   --  147*  --   --   --  119*   CALCIUM 10.0  --   --  8.0*  --   --   --  8.5*    < > = values in this interval not displayed.        LFTS  Recent Labs     10/15/21  1818   ALKPHOS 114*   ALT 12   AST 16   BILITOT 0.94   LABALBU 4.2       AMYLASE/LIPASE/AMMONIA  Recent Labs     10/15/21  1818   LIPASE 34       Acute Hepatitis Panel   Lab Results   Component Value Date    HEPBSAG NONREACTIVE 09/18/2021    HEPCAB NONREACTIVE 09/18/2021    HEPBIGM NONREACTIVE 09/18/2021    HEPAIGM NONREACTIVE 09/18/2021       HCV Genotype   No results found for: HEPATITISCGENOTYPE    HCV Quantitative   No results found for: HCVQNT    LIVER WORK UP:    AFP  No results found for: AFP    Alpha 1 antitrypsin   No results found for: A1A    Anti - Liver/Kidney Ab  No results found for: LIVER-KIDNEYMICROSOMALAB    GILBERT  No results found for: GILBERT    AMA  No results found for: MITOAB    ASMA  No results found for: SMOOTHMUSCAB    Ceruloplasmin  No results found for: CERULOPLSM    Celiac panel  No results found for: TISSTRNTIIGG, TTGIGA, IGA    IgG  No results found for: IGG    IgM  No results found for: IGM    GGT   No results found for: LABGGT    PT/INR  No results for input(s): PROTIME, INR in the last 72 hours. Cancer Markers:  CEA:  No results for input(s): CEA in the last 72 hours. Ca 125:  No results for input(s):  in the last 72 hours. Ca 19-9:   No results for input(s):  in the last 72 hours. AFP: No results for input(s): AFP in the last 72 hours. Lactic acid:  Recent Labs     10/17/21  0351   LACTACIDWB 1.6         Radiology Review:    XR ABDOMEN (KUB) (SINGLE AP VIEW)    Result Date: 10/16/2021  Right femoral central line as above. CT ABDOMEN PELVIS W IV CONTRAST Additional Contrast? None    Result Date: 10/15/2021  1. New terminal ileal wall thickening which may relate to ileitis. No bowel obstruction. 2. Stable ascending colonic wall thickening and luminal narrowing which may relate to sequela of chronic colitis. 3. No evidence of perforation or abscess. 4. Nonspecific bilateral retroperitoneal perinephric stranding more prominent on the left. Pyelonephritis cannot be excluded. No hydronephrosis or abscess. XR CHEST PORTABLE    Result Date: 10/16/2021  Mild bilateral interstitial infiltrates     US RETROPERITONEAL LIMITED    Result Date: 10/16/2021  Slightly limited but essentially unremarkable renal ultrasound. No hydronephrosis. Principal Problem:    Nausea & vomiting  Active Problems:    Gastroesophageal reflux disease without esophagitis    COPD (chronic obstructive pulmonary disease) (HCC)    Hypertension    Hypokalemia    Hypothyroidism due to acquired atrophy of thyroid    Prolonged Q-T interval on ECG    Bipolar disorder, unspecified ([address]  Resolved Problems:    * No resolved hospital problems. *       GI Assessment:  · Nausea and vomiting-etiology unclear -CT abdomen and pelvis shows no bowel obstruction - consider other causes including possible pyelonephritis. · Crohn's disease.-Patient reports being diagnosed with Crohn's in 2012 per EMR.   Colonoscopy 2017 nondiagnostic for Crohn's disease however, CT imaging 2017 suggestive of small bowel disease and CT abdomen pelvis 1220 showed wall thickening and inflammatory changes of the cecum and ascending colon suggestive of infectious or inflammatory colitis. · Prolonged QT with SVT. · Hypokalemia. · Hypertension. · COPD. · Bipolar/depression     Old records, labs and imaging reviewed. Plan of care:  - Continue IV Hydration per primary services. - Continue Scopolamine for her nausea. - Recommend further work- up for nausea and vomiting per primary team.  - We will plan for video capsule versus colonoscopy in outpatient setting to confirm Crohn's disease as it has not yet been confirmed. - GI will sign off. This plan was formulated in collaboration with Dr. Mendez Loomis .  Please feel free to contact me with any questions or concerns. Thank you for allowing me to participate in the care of your patient.       Corona Arevalo MD  PGY-1, Internal Medicine Resident  [address] [place], [place]  10/17/2021 10:32 AM

## 2021-10-17 NOTE — PROGRESS NOTES
[facility]  Internal Medicine Teaching Residency Program  Inpatient Daily Progress Note  ______________________________________________________________________________    Patient: Yan Park  YOB: 1965   EVC:[id number]    Acct: [de-identified]     Room: [address] [place] [address]  Admit date: 10/15/2021  Today's date: 10/17/21  Number of days in the hospital: 1    SUBJECTIVE   Admitting Diagnosis: Nausea & vomiting  CC: vomiting  Pt examined at bedside. Chart & results reviewed. Remains hypokalemic 3.3 and hyponatremic at 2.8. Replace. Give RL bolus  /82. Will give Hydralazine. Was febrile at 100. 5. wbc 13. 3. on cefepime. Has scopolamine patch and received morphine 2mg IV every 2 hours and Zofran po overnight, but still retching/vomiting and in pain  Adequate urine output. Ketonuria    ROS:  Constitutional:  negative for chills, fevers, sweats  Respiratory:  negative for cough, dyspnea on exertion, hemoptysis, shortness of breath, wheezing  Cardiovascular:  negative for chest pain, chest pressure/discomfort, lower extremity edema, palpitations  Gastrointestinal:  negative for abdominal pain, constipation, diarrhea, nausea, vomiting  Neurological:  negative for dizziness, headache  BRIEF HISTORY     he patient is a pleasant 61 y.o. female presents with a chief complaint of Nausea and vomiting. She has a PMH of Crohn's disease, bowel obstruction for which she had small bowel resection. Colonoscopy of 2017 was grossly normal. HTN on lisinopril, Hypothyroidism on levothyroxine, COPD on albuterol and symbicort inhalers, and Home Oxygen, drug intoxication. Presented to the with epigastric abdo pain, nausea and vomiting since 1 day. No diarrhea, fever, cough, dyspnea, chest pain, palpitations. She was afebrile, /75, saturating on room air, K+ 3.1, lactic acid 2.0-->2.7, glycemia 127, trops 11, urine ketone pos, EKG revealed QTc 654. CT abd revealed 1. New terminal ileal wall thickening which may relate to ileitis. No bowel obstruction. 2. Stable ascending colonic wall thickening and luminal narrowing which may relate to sequela of chronic colitis. Administered 1L NS bolus, zofran, 10meq K+, proclorperazine 10mg and she was admitted to observation. Her symptoms persisted and around 10:00am today a rapid response was called on her. She was alert and oriented, retching, with intractable vomiting /98, pulse 79, resp 24, strips showed non-sustained SVT. A proper ECG could mot be obtained as she was restless. She was administered 2g Mg, 1g Calcium gluconate and transferred to step down for continuation of care. Cardiology consulted; recommend stop all QT prolonging medications (zofran, pebcid, chlorpromazine and urgent administration of IV potassium through central line. GI consulted    OBJECTIVE     Vital Signs:  BP (!) 178/82   Pulse 79   Temp 99.8 °F (37.7 °C)   Resp 16   Ht 5' 6\" (1.676 m)   Wt 160 lb (72.6 kg)   SpO2 95%   BMI 25.82 kg/m²     Temp (24hrs), Av.8 °F (37.1 °C), Min:97.2 °F (36.2 °C), Max:100.5 °F (38.1 °C)    In: 2639   Out: 1900 [Urine:1900]    Physical Exam:  Constitutional: This is a well developed, well nourished, 25-29.9 - Overweight 61y.o. year old female who is alert, oriented, cooperative and in no apparent distress. Head:normocephalic and atraumatic. EENT:  PERRLA. No conjunctival injections. Septum was midline, mucosa was without erythema, exudates or cobblestoning. No thrush was noted. Neck: Supple without thyromegaly. No elevated JVP. Trachea was midline. Respiratory: On NC 4 L. Chest was symmetrical without dullness to percussion. Breath sounds bilaterally were clear to auscultation. There were no wheezes, rhonchi or rales. There is no intercostal retraction or use of accessory muscles. No egophony noted. Cardiovascular: Regular without murmur, clicks, gallops or rubs.    Abdomen: Tender epigastric area. Slightly rounded and soft without organomegaly. No rebound, rigidity or guarding was appreciated. Lymphatic: No lymphadenopathy. Musculoskeletal: Normal curvature of the spine. No gross muscle weakness. Extremities:  No lower extremity edema, ulcerations, tenderness, varicosities or erythema. Muscle size, tone and strength are normal.  No involuntary movements are noted. Skin:  Warm and dry. Good color, turgor and pigmentation. No lesions or scars.   No cyanosis or clubbing  Neurological/Psychiatric: The patient's general behavior, level of consciousness, thought content and emotional status is normal.         Medications:  Scheduled Medications:    sodium chloride flush  5-40 mL IntraVENous 2 times per day    enoxaparin  40 mg SubCUTAneous Daily    cefepime  2,000 mg IntraVENous Q12H    scopolamine  1 patch TransDERmal Q72H     Continuous Infusions:    sodium chloride 125 mL/hr at 10/16/21 1046    sodium chloride       PRN Medicationsondansetron, 4 mg, Q8H PRN  sodium chloride flush, 5-40 mL, PRN  sodium chloride, 25 mL, PRN  potassium chloride, 40 mEq, PRN   Or  potassium alternative oral replacement, 40 mEq, PRN   Or  potassium chloride, 10 mEq, PRN  polyethylene glycol, 17 g, Daily PRN  acetaminophen, 650 mg, Q6H PRN   Or  acetaminophen, 650 mg, Q6H PRN  morphine, 2 mg, Q2H PRN   Or  morphine, 4 mg, Q2H PRN        Diagnostic Labs:  CBC:   Recent Labs     10/15/21  1818 10/16/21  1030 10/17/21  0351   WBC 5.2 11.8* 13.3*   RBC 5.64* 5.70* 5.09   HGB 15.7* 15.8* 14.0   HCT 47.6* 46.8 44.8   MCV 84.4 82.1* 88.0   RDW 15.7* 16.5* 15.9*    349 292     BMP:   Recent Labs     10/16/21  1008 10/16/21  1008 10/16/21  1250 10/16/21  1338 10/16/21  1338 10/16/21  1653 10/16/21  2143 10/17/21  0359     --   --  134*  --   --   --  128*   K 3.0*   < >  --  2.8*   < > 3.5* 3.6* 3.3*   CL 92*  --   --  100  --   --   --  97*   CO2 18*  --   --  20  --   --   --  18*   BUN 6  -- --  6  --   --   --  5*   CREATININE 0.50  --  0.40* 0.43*  --   --   --  0.44*    < > = values in this interval not displayed. BNP: No results for input(s): BNP in the last 72 hours. PT/INR: No results for input(s): PROTIME, INR in the last 72 hours. APTT: No results for input(s): APTT in the last 72 hours. CARDIAC ENZYMES: No results for input(s): CKMB, CKMBINDEX, TROPONINI in the last 72 hours. Invalid input(s): CKTOTAL;3  FASTING LIPID PANEL:No results found for: CHOL, HDL, TRIG  LIVER PROFILE:   Recent Labs     10/15/21  1818   AST 16   ALT 12   BILITOT 0.94   ALKPHOS 114*      MICROBIOLOGY:   Lab Results   Component Value Date/Time    CULTURE NO GROWTH 4 HOURS 10/16/2021 10:25 AM       Imaging:    XR ABDOMEN (KUB) (SINGLE AP VIEW)    Result Date: 10/16/2021  Right femoral central line as above. CT ABDOMEN PELVIS W IV CONTRAST Additional Contrast? None    Result Date: 10/15/2021  1. New terminal ileal wall thickening which may relate to ileitis. No bowel obstruction. 2. Stable ascending colonic wall thickening and luminal narrowing which may relate to sequela of chronic colitis. 3. No evidence of perforation or abscess. 4. Nonspecific bilateral retroperitoneal perinephric stranding more prominent on the left. Pyelonephritis cannot be excluded. No hydronephrosis or abscess. XR CHEST PORTABLE    Result Date: 10/16/2021  Mild bilateral interstitial infiltrates     US RETROPERITONEAL LIMITED    Result Date: 10/16/2021  Slightly limited but essentially unremarkable renal ultrasound. No hydronephrosis. ASSESSMENT & PLAN        Nausea & vomiting  Hx Crohns.  Hx bowel resection; not sure if for Crohns or intestinal obstruction  Hx cholectomy  CT abd concerning for ileitis and sequela of chronic colitis  Febrile at 100.5, wbc 13  GI consulted  Ringer lactate bolus 1000ml  RL at 125ml/hour  Morphine for pain  Refrain from Zofran, Reglan due to prolonged QT  On cefepime     Hypokalemia  K+ 3.3, Na 128  Due to vomiting  RL 125ml/hr and 1000ml bolus  Replace potassium  Monitor K+ every 6 hours     Prolonged QT with SVT  Cardiology consulted  Received IV Mg 2g and calcium gluconate  Replace potassium  Refrain from QT prolonging medications    Hypertension  /95  Probably worsened by pain and cocaine intoxication  Hydralazine 5mg IV prn every 6hrs  if SBP >160       COPD  Oxygen as needed  On albuterol prn     Bipolar/depression  Hold medications due to prolonged QT        DVT ppx: Enoxaparin  GI ppx: None due to prolonged QT     PT/OT/SW: consulted  Discharge Planning: Case management     Kamlesh Alarcon MD  Internal Medicine Resident, PGY-1  [address], [place]  10/17/2021, 6:12 AM

## 2021-10-18 PROBLEM — E87.6 ACUTE HYPOKALEMIA: Status: ACTIVE | Noted: 2021-10-18

## 2021-10-18 PROBLEM — D72.829 LEUKOCYTOSIS: Status: ACTIVE | Noted: 2021-10-18

## 2021-10-18 LAB
ABSOLUTE EOS #: 0.03 K/UL (ref 0–0.44)
ABSOLUTE IMMATURE GRANULOCYTE: 0.08 K/UL (ref 0–0.3)
ABSOLUTE LYMPH #: 1.14 K/UL (ref 1.1–3.7)
ABSOLUTE MONO #: 1.19 K/UL (ref 0.1–1.2)
ANION GAP SERPL CALCULATED.3IONS-SCNC: 17 MMOL/L (ref 9–17)
BASOPHILS # BLD: 0 % (ref 0–2)
BASOPHILS ABSOLUTE: 0.04 K/UL (ref 0–0.2)
BUN BLDV-MCNC: 8 MG/DL (ref 6–20)
BUN/CREAT BLD: ABNORMAL (ref 9–20)
CALCIUM SERPL-MCNC: 8.9 MG/DL (ref 8.6–10.4)
CHLORIDE BLD-SCNC: 93 MMOL/L (ref 98–107)
CO2: 19 MMOL/L (ref 20–31)
CREAT SERPL-MCNC: 0.4 MG/DL (ref 0.5–0.9)
DIFFERENTIAL TYPE: ABNORMAL
EKG ATRIAL RATE: 81 BPM
EKG ATRIAL RATE: 82 BPM
EKG ATRIAL RATE: 83 BPM
EKG ATRIAL RATE: 94 BPM
EKG P AXIS: 53 DEGREES
EKG P AXIS: 68 DEGREES
EKG P AXIS: 69 DEGREES
EKG P AXIS: 76 DEGREES
EKG P-R INTERVAL: 106 MS
EKG P-R INTERVAL: 126 MS
EKG P-R INTERVAL: 130 MS
EKG Q-T INTERVAL: 434 MS
EKG Q-T INTERVAL: 466 MS
EKG Q-T INTERVAL: 468 MS
EKG Q-T INTERVAL: 600 MS
EKG QRS DURATION: 114 MS
EKG QRS DURATION: 84 MS
EKG QRS DURATION: 84 MS
EKG QRS DURATION: 86 MS
EKG QTC CALCULATION (BAZETT): 504 MS
EKG QTC CALCULATION (BAZETT): 546 MS
EKG QTC CALCULATION (BAZETT): 582 MS
EKG QTC CALCULATION (BAZETT): 705 MS
EKG R AXIS: 74 DEGREES
EKG R AXIS: 81 DEGREES
EKG R AXIS: 83 DEGREES
EKG R AXIS: 85 DEGREES
EKG T AXIS: 45 DEGREES
EKG T AXIS: 57 DEGREES
EKG T AXIS: 59 DEGREES
EKG T AXIS: 79 DEGREES
EKG VENTRICULAR RATE: 81 BPM
EKG VENTRICULAR RATE: 82 BPM
EKG VENTRICULAR RATE: 83 BPM
EKG VENTRICULAR RATE: 94 BPM
EOSINOPHILS RELATIVE PERCENT: 0 % (ref 1–4)
GFR AFRICAN AMERICAN: >60 ML/MIN
GFR NON-AFRICAN AMERICAN: >60 ML/MIN
GFR SERPL CREATININE-BSD FRML MDRD: ABNORMAL ML/MIN/{1.73_M2}
GFR SERPL CREATININE-BSD FRML MDRD: ABNORMAL ML/MIN/{1.73_M2}
GLUCOSE BLD-MCNC: 116 MG/DL (ref 70–99)
HCT VFR BLD CALC: 40.9 % (ref 36.3–47.1)
HEMOGLOBIN: 13.2 G/DL (ref 11.9–15.1)
IMMATURE GRANULOCYTES: 1 %
LYMPHOCYTES # BLD: 8 % (ref 24–43)
MAGNESIUM: 1.8 MG/DL (ref 1.6–2.6)
MCH RBC QN AUTO: 27.2 PG (ref 25.2–33.5)
MCHC RBC AUTO-ENTMCNC: 32.3 G/DL (ref 28.4–34.8)
MCV RBC AUTO: 84.3 FL (ref 82.6–102.9)
MONOCYTES # BLD: 9 % (ref 3–12)
NRBC AUTOMATED: 0 PER 100 WBC
PDW BLD-RTO: 15.9 % (ref 11.8–14.4)
PLATELET # BLD: 371 K/UL (ref 138–453)
PLATELET ESTIMATE: ABNORMAL
PMV BLD AUTO: 11.6 FL (ref 8.1–13.5)
POTASSIUM SERPL-SCNC: 3.3 MMOL/L (ref 3.7–5.3)
POTASSIUM SERPL-SCNC: 3.4 MMOL/L (ref 3.7–5.3)
POTASSIUM SERPL-SCNC: 4.2 MMOL/L (ref 3.7–5.3)
RBC # BLD: 4.85 M/UL (ref 3.95–5.11)
RBC # BLD: ABNORMAL 10*6/UL
SEG NEUTROPHILS: 82 % (ref 36–65)
SEGMENTED NEUTROPHILS ABSOLUTE COUNT: 11.33 K/UL (ref 1.5–8.1)
SODIUM BLD-SCNC: 129 MMOL/L (ref 135–144)
WBC # BLD: 13.8 K/UL (ref 3.5–11.3)
WBC # BLD: ABNORMAL 10*3/UL

## 2021-10-18 PROCEDURE — 96375 TX/PRO/DX INJ NEW DRUG ADDON: CPT

## 2021-10-18 PROCEDURE — 6360000002 HC RX W HCPCS: Performed by: STUDENT IN AN ORGANIZED HEALTH CARE EDUCATION/TRAINING PROGRAM

## 2021-10-18 PROCEDURE — 2580000003 HC RX 258: Performed by: PEDIATRICS

## 2021-10-18 PROCEDURE — 94761 N-INVAS EAR/PLS OXIMETRY MLT: CPT

## 2021-10-18 PROCEDURE — 83735 ASSAY OF MAGNESIUM: CPT

## 2021-10-18 PROCEDURE — 36415 COLL VENOUS BLD VENIPUNCTURE: CPT

## 2021-10-18 PROCEDURE — 6370000000 HC RX 637 (ALT 250 FOR IP): Performed by: STUDENT IN AN ORGANIZED HEALTH CARE EDUCATION/TRAINING PROGRAM

## 2021-10-18 PROCEDURE — 93005 ELECTROCARDIOGRAM TRACING: CPT | Performed by: STUDENT IN AN ORGANIZED HEALTH CARE EDUCATION/TRAINING PROGRAM

## 2021-10-18 PROCEDURE — 85025 COMPLETE CBC W/AUTO DIFF WBC: CPT

## 2021-10-18 PROCEDURE — 84132 ASSAY OF SERUM POTASSIUM: CPT

## 2021-10-18 PROCEDURE — 96376 TX/PRO/DX INJ SAME DRUG ADON: CPT

## 2021-10-18 PROCEDURE — 2060000000 HC ICU INTERMEDIATE R&B

## 2021-10-18 PROCEDURE — 2580000003 HC RX 258: Performed by: STUDENT IN AN ORGANIZED HEALTH CARE EDUCATION/TRAINING PROGRAM

## 2021-10-18 PROCEDURE — G0378 HOSPITAL OBSERVATION PER HR: HCPCS

## 2021-10-18 PROCEDURE — 99232 SBSQ HOSP IP/OBS MODERATE 35: CPT | Performed by: STUDENT IN AN ORGANIZED HEALTH CARE EDUCATION/TRAINING PROGRAM

## 2021-10-18 PROCEDURE — 2500000003 HC RX 250 WO HCPCS: Performed by: STUDENT IN AN ORGANIZED HEALTH CARE EDUCATION/TRAINING PROGRAM

## 2021-10-18 PROCEDURE — 6360000002 HC RX W HCPCS: Performed by: PEDIATRICS

## 2021-10-18 PROCEDURE — 96366 THER/PROPH/DIAG IV INF ADDON: CPT

## 2021-10-18 PROCEDURE — 96372 THER/PROPH/DIAG INJ SC/IM: CPT

## 2021-10-18 PROCEDURE — 80048 BASIC METABOLIC PNL TOTAL CA: CPT

## 2021-10-18 RX ORDER — MORPHINE SULFATE 4 MG/ML
4 INJECTION, SOLUTION INTRAMUSCULAR; INTRAVENOUS
Status: DISCONTINUED | OUTPATIENT
Start: 2021-10-18 | End: 2021-10-18

## 2021-10-18 RX ORDER — MORPHINE SULFATE 2 MG/ML
2 INJECTION, SOLUTION INTRAMUSCULAR; INTRAVENOUS EVERY 4 HOURS PRN
Status: DISCONTINUED | OUTPATIENT
Start: 2021-10-18 | End: 2021-10-20 | Stop reason: HOSPADM

## 2021-10-18 RX ORDER — MAGNESIUM SULFATE IN WATER 40 MG/ML
2000 INJECTION, SOLUTION INTRAVENOUS ONCE
Status: COMPLETED | OUTPATIENT
Start: 2021-10-18 | End: 2021-10-18

## 2021-10-18 RX ORDER — MORPHINE SULFATE 2 MG/ML
2 INJECTION, SOLUTION INTRAMUSCULAR; INTRAVENOUS
Status: DISCONTINUED | OUTPATIENT
Start: 2021-10-18 | End: 2021-10-18

## 2021-10-18 RX ORDER — POTASSIUM CHLORIDE 20 MEQ/1
40 TABLET, EXTENDED RELEASE ORAL 2 TIMES DAILY WITH MEALS
Status: DISPENSED | OUTPATIENT
Start: 2021-10-18 | End: 2021-10-19

## 2021-10-18 RX ORDER — MORPHINE SULFATE 4 MG/ML
4 INJECTION, SOLUTION INTRAMUSCULAR; INTRAVENOUS EVERY 4 HOURS PRN
Status: DISCONTINUED | OUTPATIENT
Start: 2021-10-18 | End: 2021-10-20 | Stop reason: HOSPADM

## 2021-10-18 RX ADMIN — POTASSIUM CHLORIDE 20 MEQ: 400 INJECTION, SOLUTION INTRAVENOUS at 10:27

## 2021-10-18 RX ADMIN — FAMOTIDINE 20 MG: 10 INJECTION INTRAVENOUS at 10:27

## 2021-10-18 RX ADMIN — ENOXAPARIN SODIUM 40 MG: 40 INJECTION SUBCUTANEOUS at 08:34

## 2021-10-18 RX ADMIN — MORPHINE SULFATE 4 MG: 4 INJECTION INTRAVENOUS at 10:28

## 2021-10-18 RX ADMIN — POTASSIUM CHLORIDE 20 MEQ: 400 INJECTION, SOLUTION INTRAVENOUS at 09:02

## 2021-10-18 RX ADMIN — SODIUM CHLORIDE, PRESERVATIVE FREE 10 ML: 5 INJECTION INTRAVENOUS at 20:20

## 2021-10-18 RX ADMIN — MORPHINE SULFATE 4 MG: 4 INJECTION INTRAVENOUS at 14:19

## 2021-10-18 RX ADMIN — MORPHINE SULFATE 4 MG: 4 INJECTION INTRAVENOUS at 23:43

## 2021-10-18 RX ADMIN — HYDRALAZINE HYDROCHLORIDE 20 MG: 20 INJECTION INTRAMUSCULAR; INTRAVENOUS at 05:00

## 2021-10-18 RX ADMIN — MAGNESIUM SULFATE 2000 MG: 2 INJECTION INTRAVENOUS at 15:05

## 2021-10-18 RX ADMIN — TRIMETHOBENZAMIDE HYDROCHLORIDE 200 MG: 100 INJECTION INTRAMUSCULAR at 21:48

## 2021-10-18 RX ADMIN — SODIUM CHLORIDE, PRESERVATIVE FREE 10 ML: 5 INJECTION INTRAVENOUS at 08:34

## 2021-10-18 RX ADMIN — CEFEPIME 2000 MG: 2 INJECTION, POWDER, FOR SOLUTION INTRAVENOUS at 16:09

## 2021-10-18 RX ADMIN — HYDRALAZINE HYDROCHLORIDE 20 MG: 20 INJECTION INTRAMUSCULAR; INTRAVENOUS at 23:27

## 2021-10-18 RX ADMIN — HYDRALAZINE HYDROCHLORIDE 20 MG: 20 INJECTION INTRAMUSCULAR; INTRAVENOUS at 12:27

## 2021-10-18 RX ADMIN — MORPHINE SULFATE 4 MG: 4 INJECTION INTRAVENOUS at 18:19

## 2021-10-18 RX ADMIN — CEFEPIME 2000 MG: 2 INJECTION, POWDER, FOR SOLUTION INTRAVENOUS at 04:30

## 2021-10-18 RX ADMIN — MORPHINE SULFATE 4 MG: 4 INJECTION INTRAVENOUS at 03:19

## 2021-10-18 RX ADMIN — MORPHINE SULFATE 2 MG: 2 INJECTION, SOLUTION INTRAMUSCULAR; INTRAVENOUS at 07:04

## 2021-10-18 RX ADMIN — MORPHINE SULFATE 4 MG: 4 INJECTION INTRAVENOUS at 00:58

## 2021-10-18 ASSESSMENT — PAIN DESCRIPTION - ONSET
ONSET: ON-GOING
ONSET: ON-GOING

## 2021-10-18 ASSESSMENT — PAIN - FUNCTIONAL ASSESSMENT: PAIN_FUNCTIONAL_ASSESSMENT: PREVENTS OR INTERFERES SOME ACTIVE ACTIVITIES AND ADLS

## 2021-10-18 ASSESSMENT — PAIN SCALES - GENERAL
PAINLEVEL_OUTOF10: 0
PAINLEVEL_OUTOF10: 1
PAINLEVEL_OUTOF10: 2
PAINLEVEL_OUTOF10: 8
PAINLEVEL_OUTOF10: 0
PAINLEVEL_OUTOF10: 0
PAINLEVEL_OUTOF10: 3
PAINLEVEL_OUTOF10: 8
PAINLEVEL_OUTOF10: 0
PAINLEVEL_OUTOF10: 9
PAINLEVEL_OUTOF10: 0
PAINLEVEL_OUTOF10: 0
PAINLEVEL_OUTOF10: 8

## 2021-10-18 ASSESSMENT — PAIN DESCRIPTION - FREQUENCY
FREQUENCY: CONTINUOUS
FREQUENCY: CONTINUOUS

## 2021-10-18 ASSESSMENT — PAIN DESCRIPTION - DESCRIPTORS
DESCRIPTORS: ACHING
DESCRIPTORS: ACHING

## 2021-10-18 ASSESSMENT — PAIN DESCRIPTION - LOCATION
LOCATION: ABDOMEN
LOCATION: ABDOMEN

## 2021-10-18 ASSESSMENT — PAIN DESCRIPTION - PAIN TYPE
TYPE: ACUTE PAIN
TYPE: ACUTE PAIN

## 2021-10-18 ASSESSMENT — PAIN DESCRIPTION - PROGRESSION
CLINICAL_PROGRESSION: NOT CHANGED
CLINICAL_PROGRESSION: NOT CHANGED

## 2021-10-18 ASSESSMENT — PAIN DESCRIPTION - ORIENTATION
ORIENTATION: LOWER;MID
ORIENTATION: MID;LOWER

## 2021-10-18 NOTE — PROGRESS NOTES
[facility]  Internal Medicine Teaching Residency Program  Inpatient Daily Progress Note  ______________________________________________________________________________    Patient: Yan Park  YOB: 1965   NA    Acct: [de-identified]     Room: [address] [place] [address]  Admit date: 10/15/2021  Today's date: 10/18/21  Number of days in the hospital: 0    SUBJECTIVE   Admitting Diagnosis: Nausea & vomiting  CC: vomiting    Pt examined at bedside. Chart & results reviewed. Remains hypokalemic 3.3 . Replaced oral and . Give RL infusion at 75 ml/hr. /82-->139/77. Will give Hydralazine IV if doesn't tolerate PO. Was febrile at 100.5-->98    wbc 13.3-->13.8. on cefepime. Has scopolamine patch  qtc prolonged serial ekg to review and monitor. ROS:  Constitutional:  negative for chills, fevers, sweats  Respiratory:  negative for cough, dyspnea on exertion, hemoptysis, shortness of breath, wheezing  Cardiovascular:  negative for chest pain, chest pressure/discomfort, lower extremity edema, palpitations  Gastrointestinal:  negative for abdominal pain, constipation, diarrhea, nausea, vomiting  Neurological:  negative for dizziness, headache  BRIEF HISTORY     he patient is a pleasant 61 y.o. female presents with a chief complaint of Nausea and vomiting. She has a PMH of Crohn's disease, bowel obstruction for which she had small bowel resection. Colonoscopy of 2017 was grossly normal. HTN on lisinopril, Hypothyroidism on levothyroxine, COPD on albuterol and symbicort inhalers, and Home Oxygen, drug intoxication. Presented to the with epigastric abdo pain, nausea and vomiting since 1 day. No diarrhea, fever, cough, dyspnea, chest pain, palpitations. She was afebrile, /75, saturating on room air, K+ 3.1, lactic acid 2.0-->2.7, glycemia 127, trops 11, urine ketone pos, EKG revealed QTc 654. CT abd revealed 1.  New terminal ileal wall thickening which may relate to ileitis. No bowel obstruction. 2. Stable ascending colonic wall thickening and luminal narrowing which may relate to sequela of chronic colitis. Administered 1L NS bolus, zofran, 10meq K+, proclorperazine 10mg and she was admitted to observation. Her symptoms persisted and around 10:00am today a rapid response was called on her. She was alert and oriented, retching, with intractable vomiting /98, pulse 79, resp 24, strips showed non-sustained SVT. A proper ECG could mot be obtained as she was restless. She was administered 2g Mg, 1g Calcium gluconate and transferred to step down for continuation of care. Cardiology consulted; recommend stop all QT prolonging medications (zofran, pebcid, chlorpromazine and urgent administration of IV potassium through central line. GI consulted    OBJECTIVE     Vital Signs:  /77   Pulse 93   Temp 98.4 °F (36.9 °C) (Oral)   Resp 20   Ht 5' 6\" (1.676 m)   Wt 160 lb (72.6 kg)   SpO2 96%   BMI 25.82 kg/m²     Temp (24hrs), Av.3 °F (36.8 °C), Min:97.9 °F (36.6 °C), Max:99 °F (37.2 °C)    No intake/output data recorded. Physical Exam:  Constitutional: This is a well developed, well nourished, 25-29.9 - Overweight 61y.o. year old female who is alert, oriented, cooperative and in no apparent distress. Head:normocephalic and atraumatic. EENT:  PERRLA. No conjunctival injections. Septum was midline, mucosa was without erythema, exudates or cobblestoning. No thrush was noted. Neck: Supple without thyromegaly. No elevated JVP. Trachea was midline. Respiratory: On room air. Chest was symmetrical without dullness to percussion. Breath sounds bilaterally were clear to auscultation. There were no wheezes, rhonchi or rales. There is no intercostal retraction or use of accessory muscles. No egophony noted. Cardiovascular: Regular without murmur, clicks, gallops or rubs. Abdomen: Tender epigastric area.  Slightly rounded and soft without organomegaly. No rebound, rigidity or guarding was appreciated. Lymphatic: No lymphadenopathy. Musculoskeletal: Normal curvature of the spine. No gross muscle weakness. Extremities:  No lower extremity edema, ulcerations, tenderness, varicosities or erythema. Muscle size, tone and strength are normal.  No involuntary movements are noted. Skin:  Warm and dry. Good color, turgor and pigmentation. No lesions or scars.   No cyanosis or clubbing  Neurological/Psychiatric: The patient's general behavior, level of consciousness, thought content and emotional status is normal.         Medications:  Scheduled Medications:    potassium chloride  40 mEq Oral BID WC    famotidine (PEPCID) injection  20 mg IntraVENous BID    budesonide-formoterol  2 puff Inhalation BID    nicotine  1 patch TransDERmal Daily    sodium chloride flush  5-40 mL IntraVENous 2 times per day    enoxaparin  40 mg SubCUTAneous Daily    cefepime  2,000 mg IntraVENous Q12H    scopolamine  1 patch TransDERmal Q72H     Continuous Infusions:    lactated ringers 75 mL/hr at 10/17/21 1733    sodium chloride       PRN Medicationspotassium chloride, 20 mEq, PRN  ipratropium-albuterol, 3 mL, Q6H PRN  hydrALAZINE, 20 mg, Q6H PRN  sodium chloride flush, 5-40 mL, PRN  sodium chloride, 25 mL, PRN  potassium chloride, 40 mEq, PRN   Or  potassium alternative oral replacement, 40 mEq, PRN  polyethylene glycol, 17 g, Daily PRN  acetaminophen, 650 mg, Q6H PRN   Or  acetaminophen, 650 mg, Q6H PRN  morphine, 2 mg, Q2H PRN   Or  morphine, 4 mg, Q2H PRN        Diagnostic Labs:  CBC:   Recent Labs     10/16/21  1030 10/17/21  0351 10/18/21  0655   WBC 11.8* 13.3* 13.8*   RBC 5.70* 5.09 4.85   HGB 15.8* 14.0 13.2   HCT 46.8 44.8 40.9   MCV 82.1* 88.0 84.3   RDW 16.5* 15.9* 15.9*    292 371     BMP:   Recent Labs     10/16/21  1338 10/16/21  1653 10/17/21  0359 10/17/21  1129 10/17/21  1635 10/17/21  1635 10/18/21  0000 10/18/21  0613 10/18/21  1233   *   < > 128*  --  130*  --   --  129*  --    K 2.8*   < > 3.3*   < > 4.2   < > 3.4* 3.3* 4.2      < > 97*  --  98  --   --  93*  --    CO2 20   < > 18*  --  19*  --   --  19*  --    BUN 6  --  5*  --   --   --   --  8  --    CREATININE 0.43*  --  0.44*  --   --   --   --  0.40*  --     < > = values in this interval not displayed. BNP: No results for input(s): BNP in the last 72 hours. PT/INR: No results for input(s): PROTIME, INR in the last 72 hours. APTT: No results for input(s): APTT in the last 72 hours. CARDIAC ENZYMES: No results for input(s): CKMB, CKMBINDEX, TROPONINI in the last 72 hours. Invalid input(s): CKTOTAL;3  FASTING LIPID PANEL:No results found for: CHOL, HDL, TRIG  LIVER PROFILE:   Recent Labs     10/15/21  1818   AST 16   ALT 12   BILITOT 0.94   ALKPHOS 114*      MICROBIOLOGY:   Lab Results   Component Value Date/Time    CULTURE NO GROWTH 2 DAYS 10/16/2021 10:25 AM       Imaging:    XR ABDOMEN (KUB) (SINGLE AP VIEW)    Result Date: 10/16/2021  Right femoral central line as above. CT ABDOMEN PELVIS W IV CONTRAST Additional Contrast? None    Result Date: 10/15/2021  1. New terminal ileal wall thickening which may relate to ileitis. No bowel obstruction. 2. Stable ascending colonic wall thickening and luminal narrowing which may relate to sequela of chronic colitis. 3. No evidence of perforation or abscess. 4. Nonspecific bilateral retroperitoneal perinephric stranding more prominent on the left. Pyelonephritis cannot be excluded. No hydronephrosis or abscess. XR CHEST PORTABLE    Result Date: 10/16/2021  Mild bilateral interstitial infiltrates     US RETROPERITONEAL LIMITED    Result Date: 10/16/2021  Slightly limited but essentially unremarkable renal ultrasound. No hydronephrosis. ASSESSMENT & PLAN        Nausea & vomiting  Hx Crohns.  Hx bowel resection; not sure if for Crohns or intestinal obstruction  Hx cholectomy  CT abd concerning for ileitis and sequela of chronic colitis  RL at 125-->75ml/hour  Morphine for pain  Monitor QTC WITH EKG  Oral potassium   On cefepime for h/o of MRSA.     Hypokalemia and hypomagnesinemia  K+ 3.3, Na 128-->129  Due to vomiting  -->75 ml/hr   Replace potassium  Monitor K+     Prolonged QT with SVT  Refrain from QT prolonging medications like ondansetron ,monitor EKG and replaced potassium and magnesium     Hypertension  /95-->139/77  Probably worsened by pain and cocaine intoxication  Hydralazine 5mg IV prn every 6hrs  if SBP >160 will resume oral antihypertensives once able to tolerate oral        COPD  Oxygen as needed  On albuterol prn     Bipolar/depression  Hold medications due to prolonged QT        DVT ppx: Enoxaparin  GI ppx: None due to prolonged QT     PT/OT/SW: consulted  Discharge Planning: Case management     Shannan Gerard MD  Internal Medicine Resident, PGY-1  [address], [place]  10/18/2021, 2:27 PM

## 2021-10-18 NOTE — PROGRESS NOTES
[facility]   Progress Note                 Date:   10/18/2021  Patient name:  Paul Reyez  Date of admission:  10/15/2021  5:54 PM  MRN:   [id number]  YOB: 1965  PCP:    Deondre Turner MD    Reason for Admission: Nausea & vomiting [R11.2]  Intractable vomiting with nausea, unspecified vomiting type [R11.2]      Subjective:       Clinical Changes / Abnormalities:     Patient seen and examined. Patient got central line yesterday. K is still 3.3   Continues to have nausea and vomiting. Started on scopalamine by primary      I/O last 3 completed shifts:  In: -   Out: 2000 [Urine:2000]  No intake/output data recorded. Medications:   Scheduled Meds:    potassium chloride  40 mEq Oral BID WC    potassium chloride  40 mEq IntraVENous Once    budesonide-formoterol  2 puff Inhalation BID    nicotine  1 patch TransDERmal Daily    sodium chloride flush  5-40 mL IntraVENous 2 times per day    enoxaparin  40 mg SubCUTAneous Daily    cefepime  2,000 mg IntraVENous Q12H    scopolamine  1 patch TransDERmal Q72H     Continuous Infusions:    lactated ringers 75 mL/hr at 10/17/21 1733    sodium chloride       CBC:   Recent Labs     10/15/21  1818 10/16/21  1030 10/17/21  0351   WBC 5.2 11.8* 13.3*   HGB 15.7* 15.8* 14.0    349 292     BMP:    Recent Labs     10/16/21  1008 10/16/21  1008 10/16/21  1250 10/16/21  1338 10/16/21  1653 10/17/21  0359 10/17/21  0359 10/17/21  1129 10/17/21  1635 10/18/21  0000      < >  --  134*  --  128*  --   --  130*  --    K 3.0*   < >  --  2.8*   < > 3.3*   < > 3.4* 4.2 3.4*   CL 92*   < >  --  100  --  97*  --   --  98  --    CO2 18*   < >  --  20  --  18*  --   --  19*  --    BUN 6  --   --  6  --  5*  --   --   --   --    CREATININE 0.50  --  0.40* 0.43*  --  0.44*  --   --   --   --    GLUCOSE 169*  --   --  147*  --  119*  --   --   --   --     < > = values in this interval not displayed.      Hepatic:   Recent Labs 10/15/21  1818   AST 16   ALT 12   BILITOT 0.94   ALKPHOS 114*     Troponin:   Recent Labs     10/15/21  1818   TROPHS 11     BNP: No results for input(s): BNP in the last 72 hours. Lipids: No results for input(s): CHOL, HDL in the last 72 hours. Invalid input(s): LDLCALCU  INR: No results for input(s): INR in the last 72 hours. Objective:   Vitals: BP (!) 168/104   Pulse 82   Temp 98 °F (36.7 °C) (Oral)   Resp 14   Ht 5' 6\" (1.676 m)   Wt 160 lb (72.6 kg)   SpO2 97%   BMI 25.82 kg/m²   General appearance: Alert. No acute distress. HEENT: Head: Normal, normocephalic, atraumatic. Neck: no JVD, trachea midline  Lungs: Clear to auscultation bilaterally, no wheeze or crackles  Heart: Regular rate and rhythm, S1, S2 normal, no murmur  Extremities: No edema  Neurologic: No focal neurologic deficits        Assessment:   1. Non sustained V. tach likely secondary to hypokalemia from ongoing emesis  2. Prolonged QTC   3. Cocaine use   4. Dehydration  5. chronic colitis/ileitis  6. Hypothyroidism  7. Hypokalemia  8. COPD  9. Crohn's disease  10. Lactic acidosis        Plan / Recommendations:   1. Replace potassium IV to keep K>4 and Mg>2  2. Repeat EKG today  3. Avoid any QTC prolonging medications: compazine, pepcid, zofran and antipsychotics  4. aggressively hydrate the patient. 5. GI consulted for for chronic colitis/ongoing emesis  6. TSH normal        Thank you for allowing us to participate in [address]. Will follow with you. Electronically signed on 10/18/21 at 6:48 AM by:    Shekhar Salazar MD,    Fellow, [address]    Attending note,   Patient seen and examined, agree with above. ECG reviewed. maintain K above 4 and Mg above 2. Serial ECG to follow on QTc, no arrhythmias noted, will follow.

## 2021-10-18 NOTE — CARE COORDINATION
Transitional Planning  IM ivf 75ml hr   GI plan flu outpt video capsule vs colonoscopy to confirm Crohn's disease as it has not been confirmed.  GI s/o  Cardiology-SVT central line yesterday tx hypokalemia and stopped all QT prolonging drugs  Admit status changed to inpt  Pt lives with GIRLFRIEND AND HER  goal is home she is open to home care but only if needed  Pt has PCP Dr Deena Jackson  last visit few mths  Continue to monitor for needs

## 2021-10-18 NOTE — PLAN OF CARE
Problem: Anxiety:  Goal: Level of anxiety will decrease  Description: Level of anxiety will decrease  Outcome: Ongoing     Problem: Activity:  Goal: Risk for activity intolerance will decrease  Description: Risk for activity intolerance will decrease  Outcome: Ongoing     Problem:  Bowel/Gastric:  Goal: Bowel function will improve  Description: Bowel function will improve  Outcome: Ongoing  Goal: Diagnostic test results will improve  Description: Diagnostic test results will improve  Outcome: Ongoing  Goal: Occurrences of nausea will decrease  Description: Occurrences of nausea will decrease  Outcome: Ongoing  Goal: Occurrences of vomiting will decrease  Description: Occurrences of vomiting will decrease  Outcome: Ongoing     Problem: Fluid Volume:  Goal: Maintenance of adequate hydration will improve  Description: Maintenance of adequate hydration will improve  Outcome: Ongoing     Problem: Health Behavior:  Goal: Ability to state signs and symptoms to report to health care provider will improve  Description: Ability to state signs and symptoms to report to health care provider will improve  Outcome: Ongoing     Problem: Physical Regulation:  Goal: Complications related to the disease process, condition or treatment will be avoided or minimized  Description: Complications related to the disease process, condition or treatment will be avoided or minimized  Outcome: Ongoing  Goal: Ability to maintain clinical measurements within normal limits will improve  Description: Ability to maintain clinical measurements within normal limits will improve  Outcome: Ongoing     Problem: Sensory:  Goal: Ability to identify factors that increase the pain will improve  Description: Ability to identify factors that increase the pain will improve  Outcome: Ongoing  Goal: Ability to notify healthcare provider of pain before it becomes unmanageable or unbearable will improve  Description: Ability to notify healthcare provider of pain before it becomes unmanageable or unbearable will improve  Outcome: Ongoing  Goal: Pain level will decrease  Description: Pain level will decrease  Outcome: Ongoing     Problem: Mental Status - Impaired:  Goal: Mental status will improve  Description: Mental status will improve  Outcome: Ongoing     Problem: Pain:  Goal: Pain level will decrease  Description: Pain level will decrease  Outcome: Ongoing  Goal: Control of acute pain  Description: Control of acute pain  Outcome: Ongoing  Goal: Control of chronic pain  Description: Control of chronic pain  Outcome: Ongoing     Problem: Skin Integrity:  Goal: Will show no infection signs and symptoms  Description: Will show no infection signs and symptoms  Outcome: Ongoing  Goal: Absence of new skin breakdown  Description: Absence of new skin breakdown  Outcome: Ongoing

## 2021-10-19 ENCOUNTER — APPOINTMENT (OUTPATIENT)
Dept: GENERAL RADIOLOGY | Age: 56
DRG: 422 | End: 2021-10-19
Payer: MEDICARE

## 2021-10-19 LAB
ABSOLUTE EOS #: 0.12 K/UL (ref 0–0.44)
ABSOLUTE IMMATURE GRANULOCYTE: 0.12 K/UL (ref 0–0.3)
ABSOLUTE LYMPH #: 1.24 K/UL (ref 1.1–3.7)
ABSOLUTE MONO #: 1.03 K/UL (ref 0.1–1.2)
ANION GAP SERPL CALCULATED.3IONS-SCNC: 13 MMOL/L (ref 9–17)
BASOPHILS # BLD: 1 % (ref 0–2)
BASOPHILS ABSOLUTE: 0.06 K/UL (ref 0–0.2)
BUN BLDV-MCNC: 9 MG/DL (ref 6–20)
BUN/CREAT BLD: ABNORMAL (ref 9–20)
C-REACTIVE PROTEIN: 24 MG/L (ref 0–5)
CALCIUM SERPL-MCNC: 8.4 MG/DL (ref 8.6–10.4)
CHLORIDE BLD-SCNC: 97 MMOL/L (ref 98–107)
CO2: 20 MMOL/L (ref 20–31)
CREAT SERPL-MCNC: 0.4 MG/DL (ref 0.5–0.9)
DIFFERENTIAL TYPE: ABNORMAL
EKG ATRIAL RATE: 88 BPM
EKG ATRIAL RATE: 91 BPM
EKG P AXIS: 70 DEGREES
EKG P AXIS: 72 DEGREES
EKG P-R INTERVAL: 130 MS
EKG P-R INTERVAL: 130 MS
EKG Q-T INTERVAL: 438 MS
EKG Q-T INTERVAL: 438 MS
EKG QRS DURATION: 84 MS
EKG QRS DURATION: 86 MS
EKG QTC CALCULATION (BAZETT): 529 MS
EKG QTC CALCULATION (BAZETT): 538 MS
EKG R AXIS: 78 DEGREES
EKG R AXIS: 85 DEGREES
EKG T AXIS: 40 DEGREES
EKG T AXIS: 53 DEGREES
EKG VENTRICULAR RATE: 88 BPM
EKG VENTRICULAR RATE: 91 BPM
EOSINOPHILS RELATIVE PERCENT: 1 % (ref 1–4)
GFR AFRICAN AMERICAN: >60 ML/MIN
GFR NON-AFRICAN AMERICAN: >60 ML/MIN
GFR SERPL CREATININE-BSD FRML MDRD: ABNORMAL ML/MIN/{1.73_M2}
GFR SERPL CREATININE-BSD FRML MDRD: ABNORMAL ML/MIN/{1.73_M2}
GLUCOSE BLD-MCNC: 109 MG/DL (ref 70–99)
HCT VFR BLD CALC: 39.3 % (ref 36.3–47.1)
HEMOGLOBIN: 12.6 G/DL (ref 11.9–15.1)
IMMATURE GRANULOCYTES: 1 %
LYMPHOCYTES # BLD: 12 % (ref 24–43)
MAGNESIUM: 2.1 MG/DL (ref 1.6–2.6)
MCH RBC QN AUTO: 27.5 PG (ref 25.2–33.5)
MCHC RBC AUTO-ENTMCNC: 32.1 G/DL (ref 28.4–34.8)
MCV RBC AUTO: 85.6 FL (ref 82.6–102.9)
MONOCYTES # BLD: 10 % (ref 3–12)
NRBC AUTOMATED: 0 PER 100 WBC
PDW BLD-RTO: 16 % (ref 11.8–14.4)
PLATELET # BLD: 334 K/UL (ref 138–453)
PLATELET ESTIMATE: ABNORMAL
PMV BLD AUTO: 11.1 FL (ref 8.1–13.5)
POTASSIUM SERPL-SCNC: 3.4 MMOL/L (ref 3.7–5.3)
POTASSIUM SERPL-SCNC: 3.5 MMOL/L (ref 3.7–5.3)
POTASSIUM SERPL-SCNC: 4 MMOL/L (ref 3.7–5.3)
RBC # BLD: 4.59 M/UL (ref 3.95–5.11)
RBC # BLD: ABNORMAL 10*6/UL
SEDIMENTATION RATE, ERYTHROCYTE: 15 MM (ref 0–30)
SEG NEUTROPHILS: 75 % (ref 36–65)
SEGMENTED NEUTROPHILS ABSOLUTE COUNT: 7.97 K/UL (ref 1.5–8.1)
SODIUM BLD-SCNC: 130 MMOL/L (ref 135–144)
WBC # BLD: 10.5 K/UL (ref 3.5–11.3)
WBC # BLD: ABNORMAL 10*3/UL

## 2021-10-19 PROCEDURE — 2580000003 HC RX 258: Performed by: PEDIATRICS

## 2021-10-19 PROCEDURE — 85025 COMPLETE CBC W/AUTO DIFF WBC: CPT

## 2021-10-19 PROCEDURE — 94761 N-INVAS EAR/PLS OXIMETRY MLT: CPT

## 2021-10-19 PROCEDURE — 74018 RADEX ABDOMEN 1 VIEW: CPT

## 2021-10-19 PROCEDURE — 85652 RBC SED RATE AUTOMATED: CPT

## 2021-10-19 PROCEDURE — 6370000000 HC RX 637 (ALT 250 FOR IP): Performed by: STUDENT IN AN ORGANIZED HEALTH CARE EDUCATION/TRAINING PROGRAM

## 2021-10-19 PROCEDURE — 6360000002 HC RX W HCPCS: Performed by: STUDENT IN AN ORGANIZED HEALTH CARE EDUCATION/TRAINING PROGRAM

## 2021-10-19 PROCEDURE — 93005 ELECTROCARDIOGRAM TRACING: CPT | Performed by: STUDENT IN AN ORGANIZED HEALTH CARE EDUCATION/TRAINING PROGRAM

## 2021-10-19 PROCEDURE — 36415 COLL VENOUS BLD VENIPUNCTURE: CPT

## 2021-10-19 PROCEDURE — 2580000003 HC RX 258: Performed by: STUDENT IN AN ORGANIZED HEALTH CARE EDUCATION/TRAINING PROGRAM

## 2021-10-19 PROCEDURE — 80048 BASIC METABOLIC PNL TOTAL CA: CPT

## 2021-10-19 PROCEDURE — 6360000002 HC RX W HCPCS: Performed by: PEDIATRICS

## 2021-10-19 PROCEDURE — 6370000000 HC RX 637 (ALT 250 FOR IP): Performed by: NURSE PRACTITIONER

## 2021-10-19 PROCEDURE — 86140 C-REACTIVE PROTEIN: CPT

## 2021-10-19 PROCEDURE — 99232 SBSQ HOSP IP/OBS MODERATE 35: CPT | Performed by: INTERNAL MEDICINE

## 2021-10-19 PROCEDURE — 2060000000 HC ICU INTERMEDIATE R&B

## 2021-10-19 PROCEDURE — 83735 ASSAY OF MAGNESIUM: CPT

## 2021-10-19 PROCEDURE — 84132 ASSAY OF SERUM POTASSIUM: CPT

## 2021-10-19 PROCEDURE — 94640 AIRWAY INHALATION TREATMENT: CPT

## 2021-10-19 RX ORDER — SENNA AND DOCUSATE SODIUM 50; 8.6 MG/1; MG/1
2 TABLET, FILM COATED ORAL DAILY PRN
Status: DISCONTINUED | OUTPATIENT
Start: 2021-10-19 | End: 2021-10-20

## 2021-10-19 RX ORDER — POTASSIUM CHLORIDE 7.45 MG/ML
10 INJECTION INTRAVENOUS
Status: ACTIVE | OUTPATIENT
Start: 2021-10-19 | End: 2021-10-19

## 2021-10-19 RX ORDER — CHOLECALCIFEROL (VITAMIN D3) 125 MCG
5 CAPSULE ORAL NIGHTLY
Status: DISCONTINUED | OUTPATIENT
Start: 2021-10-19 | End: 2021-10-19 | Stop reason: SDUPTHER

## 2021-10-19 RX ORDER — 0.9 % SODIUM CHLORIDE 0.9 %
1000 INTRAVENOUS SOLUTION INTRAVENOUS ONCE
Status: COMPLETED | OUTPATIENT
Start: 2021-10-19 | End: 2021-10-19

## 2021-10-19 RX ORDER — SODIUM CHLORIDE 9 MG/ML
INJECTION, SOLUTION INTRAVENOUS CONTINUOUS
Status: DISCONTINUED | OUTPATIENT
Start: 2021-10-19 | End: 2021-10-20 | Stop reason: HOSPADM

## 2021-10-19 RX ADMIN — SODIUM CHLORIDE 1000 ML: 9 INJECTION, SOLUTION INTRAVENOUS at 10:30

## 2021-10-19 RX ADMIN — MORPHINE SULFATE 4 MG: 4 INJECTION INTRAVENOUS at 12:18

## 2021-10-19 RX ADMIN — MORPHINE SULFATE 4 MG: 4 INJECTION INTRAVENOUS at 03:50

## 2021-10-19 RX ADMIN — SODIUM CHLORIDE: 9 INJECTION, SOLUTION INTRAVENOUS at 14:01

## 2021-10-19 RX ADMIN — POTASSIUM CHLORIDE 20 MEQ: 400 INJECTION, SOLUTION INTRAVENOUS at 09:32

## 2021-10-19 RX ADMIN — CEFEPIME 2000 MG: 2 INJECTION, POWDER, FOR SOLUTION INTRAVENOUS at 05:22

## 2021-10-19 RX ADMIN — CEFEPIME 2000 MG: 2 INJECTION, POWDER, FOR SOLUTION INTRAVENOUS at 16:59

## 2021-10-19 RX ADMIN — HYDRALAZINE HYDROCHLORIDE 20 MG: 20 INJECTION INTRAMUSCULAR; INTRAVENOUS at 16:54

## 2021-10-19 RX ADMIN — MORPHINE SULFATE 4 MG: 4 INJECTION INTRAVENOUS at 20:13

## 2021-10-19 RX ADMIN — MORPHINE SULFATE 4 MG: 4 INJECTION INTRAVENOUS at 16:16

## 2021-10-19 RX ADMIN — TRIMETHOBENZAMIDE HYDROCHLORIDE 200 MG: 100 INJECTION INTRAMUSCULAR at 06:39

## 2021-10-19 RX ADMIN — HYDRALAZINE HYDROCHLORIDE 20 MG: 20 INJECTION INTRAMUSCULAR; INTRAVENOUS at 07:38

## 2021-10-19 RX ADMIN — HYDRALAZINE HYDROCHLORIDE 20 MG: 20 INJECTION INTRAMUSCULAR; INTRAVENOUS at 23:21

## 2021-10-19 RX ADMIN — ENOXAPARIN SODIUM 40 MG: 40 INJECTION SUBCUTANEOUS at 07:35

## 2021-10-19 RX ADMIN — BUDESONIDE AND FORMOTEROL FUMARATE DIHYDRATE 2 PUFF: 160; 4.5 AEROSOL RESPIRATORY (INHALATION) at 20:06

## 2021-10-19 RX ADMIN — MORPHINE SULFATE 4 MG: 4 INJECTION INTRAVENOUS at 07:55

## 2021-10-19 RX ADMIN — SODIUM CHLORIDE, PRESERVATIVE FREE 10 ML: 5 INJECTION INTRAVENOUS at 07:35

## 2021-10-19 RX ADMIN — TRIMETHOBENZAMIDE HYDROCHLORIDE 200 MG: 100 INJECTION INTRAMUSCULAR at 21:58

## 2021-10-19 RX ADMIN — TRIMETHOBENZAMIDE HYDROCHLORIDE 200 MG: 100 INJECTION INTRAMUSCULAR at 15:06

## 2021-10-19 RX ADMIN — Medication 5 MG: at 03:14

## 2021-10-19 RX ADMIN — POTASSIUM CHLORIDE 20 MEQ: 400 INJECTION, SOLUTION INTRAVENOUS at 07:44

## 2021-10-19 ASSESSMENT — PAIN DESCRIPTION - FREQUENCY
FREQUENCY: CONTINUOUS

## 2021-10-19 ASSESSMENT — PAIN DESCRIPTION - LOCATION
LOCATION: BACK
LOCATION: BACK;ABDOMEN
LOCATION: ABDOMEN;BACK
LOCATION: BACK;ABDOMEN

## 2021-10-19 ASSESSMENT — PAIN DESCRIPTION - PAIN TYPE
TYPE: ACUTE PAIN
TYPE: CHRONIC PAIN
TYPE: CHRONIC PAIN;ACUTE PAIN
TYPE: ACUTE PAIN

## 2021-10-19 ASSESSMENT — PAIN SCALES - GENERAL
PAINLEVEL_OUTOF10: 0
PAINLEVEL_OUTOF10: 9
PAINLEVEL_OUTOF10: 8
PAINLEVEL_OUTOF10: 0
PAINLEVEL_OUTOF10: 7

## 2021-10-19 ASSESSMENT — PAIN DESCRIPTION - PROGRESSION
CLINICAL_PROGRESSION: NOT CHANGED

## 2021-10-19 ASSESSMENT — PAIN DESCRIPTION - DESCRIPTORS
DESCRIPTORS: ACHING

## 2021-10-19 ASSESSMENT — PAIN DESCRIPTION - ORIENTATION
ORIENTATION: LOWER

## 2021-10-19 ASSESSMENT — PAIN DESCRIPTION - ONSET
ONSET: ON-GOING

## 2021-10-19 ASSESSMENT — PAIN - FUNCTIONAL ASSESSMENT: PAIN_FUNCTIONAL_ASSESSMENT: PREVENTS OR INTERFERES SOME ACTIVE ACTIVITIES AND ADLS

## 2021-10-19 NOTE — PROGRESS NOTES
Comprehensive Nutrition Assessment    Type and Reason for Visit:  Initial ([facility] - Crohns education)    Nutrition Recommendations/Plan:   -Continue CL diet, advance diet as able   -Suggest ensure clear supplements TID   -Will monitor po intake and weights     Nutrition Assessment:   Pt admitted d/t nausea and emesis. Pt w/ hx of Crohns and bowel resection. RN contacted writer regarding pt requesting education on a Crohns diet. Writer reviewed and provided handouts on a Crohns/low-fiber diet. Informed pt to avoid whole grains, nuts, seeds, raw vegetables, apples w/ skin and products that contain caffeine. Pt stated understanding, all questions answered at this time. Pt stated that she has been able to consume any PO for 5 days now d/t nausea and emesis. Per RD note in 2020 - pt stated UBW of 167 lbs; JOSE actual wt loss at this time as admit wt has not been obtained yet. Pt agreed to try ensure clear supplements on her meal trays. Will monitor. Malnutrition Assessment:  Malnutrition Status:  Insufficient data    Context:  Acute Illness     Findings of the 6 clinical characteristics of malnutrition:  Energy Intake:  7 - 50% or less of estimated energy requirements for 5 or more days  Weight Loss:  Unable to assess     Body Fat Loss:  No significant body fat loss     Muscle Mass Loss:  No significant muscle mass loss    Fluid Accumulation:  No significant fluid accumulation     Strength:  Not Performed    Estimated Daily Nutrient Needs:  Energy (kcal):  1.2-1.3 ~> 9125-3816 kcals/d; Weight Used for Energy Requirements:  Current     Protein (g):  1.2-1.3 gm/kg ~> 71-77 gms/d; Weight Used for Protein Requirements:  Ideal        Fluid (ml/day):  30 ~> 2200 mLs/d OR per MD discretion; Method Used for Fluid Requirements:  ml/Kg      Nutrition Related Findings:  active bowel sounds; Na 130, K 3.4; meds reviewed      Wounds:  None       Current Nutrition Therapies:    ADULT DIET;  Clear Liquid    Anthropometric Measures:  · Height: 5' 6\" (167.6 cm)  · Current Body Weight: 160 lb (72.6 kg) (stated)   · Admission Body Weight: 160 lb (72.6 kg) (stated)    · Usual Body Weight: 167 lb (75.8 kg) (per RD note on 2/2020)     · Ideal Body Weight: 130 lbs; % Ideal Body Weight 123.1 %   · BMI: 25.8  · BMI Categories: Overweight (BMI 25.0-29. 9)       Nutrition Diagnosis:   · Inadequate oral intake related to altered GI function (nausea, emesis) as evidenced by NPO or clear liquid status due to medical condition, intake 0-25%      Nutrition Interventions:   Food and/or Nutrient Delivery:  Continue Current Diet, Start Oral Nutrition Supplement (advance diet as able)  Nutrition Education/Counseling:  Education completed   Coordination of Nutrition Care:  Continue to monitor while inpatient    Goals: Set   Pt to meet >75% of est'd needs daily via PO       Nutrition Monitoring and Evaluation:   Food/Nutrient Intake Outcomes:  Food and Nutrient Intake, Diet Advancement/Tolerance, Supplement Intake  Physical Signs/Symptoms Outcomes:  Biochemical Data, Nutrition Focused Physical Findings, Skin, Weight, GI Status, Nausea or Vomiting, Fluid Status or Edema     Discharge Planning:     Too soon to determine     Electronically signed by Yvette Kearney RD, LD on 10/19/21 at 12:43 PM EDT    Contact: [phone]

## 2021-10-19 NOTE — CARE COORDINATION
Transitonal planning. Diet is now clear liquids-severe N/V remain and is getting LR @75/hr. GI signed off-will do capsule study as outpt. Goal is home with family friend and her .  Watch for [facility], [facility]. needs

## 2021-10-19 NOTE — PROGRESS NOTES
[facility]  Progress Note                   Date:   10/19/2021  Patient name: Kelsie Diehl  Date of admission:  10/15/2021  5:54 PM  MRN:   [id number]  YOB: 1965  PCP: Toby Cnaas MD    Reason for Admission: Nausea & vomiting [R11.2]  Intractable vomiting with nausea, unspecified vomiting type [R11.2]  Acute hypokalemia [E87.6]    Subjective:       Clinical Changes /Abnormalities:  Patient seen and examined after discussion with RN. Denies chest pain and SOB. C/o nausea and vomiting. SR on tele HR 86   Review of Systems    Medications:   Scheduled Meds:   influenza virus vaccine  0.5 mL IntraMUSCular Prior to discharge    potassium chloride  10 mEq IntraVENous Q1H    budesonide-formoterol  2 puff Inhalation BID    nicotine  1 patch TransDERmal Daily    sodium chloride flush  5-40 mL IntraVENous 2 times per day    enoxaparin  40 mg SubCUTAneous Daily    cefepime  2,000 mg IntraVENous Q12H    scopolamine  1 patch TransDERmal Q72H     Continuous Infusions:   sodium chloride      sodium chloride       CBC:   Recent Labs     10/17/21  0351 10/18/21  0655 10/19/21  0348   WBC 13.3* 13.8* 10.5   HGB 14.0 13.2 12.6    371 334     BMP:    Recent Labs     10/17/21  0359 10/17/21  1129 10/17/21  1635 10/18/21  0000 10/18/21  0655 10/18/21  0655 10/18/21  1233 10/19/21  0022 10/19/21  0348   *  --  130*  --  129*  --   --   --  130*   K 3.3*   < > 4.2   < > 3.3*   < > 4.2 3.5* 3.4*   CL 97*  --  98  --  93*  --   --   --  97*   CO2 18*  --  19*  --  19*  --   --   --  20   BUN 5*  --   --   --  8  --   --   --  9   CREATININE 0.44*  --   --   --  0.40*  --   --   --  0.40*   GLUCOSE 119*  --   --   --  116*  --   --   --  109*    < > = values in this interval not displayed. Hepatic:No results for input(s): AST, ALT, ALB, BILITOT, ALKPHOS in the last 72 hours. Troponin: No results for input(s): TROPHS in the last 72 hours.   BNP: No results for input(s): BNP in the last 72 hours. Lipids: No results for input(s): CHOL, HDL in the last 72 hours. Invalid input(s): LDLCALCU  INR: No results for input(s): INR in the last 72 hours. Objective:   Vitals: BP (!) 150/86   Pulse 88   Temp 98.2 °F (36.8 °C) (Oral)   Resp 22   Ht 5' 6\" (1.676 m)   Wt 160 lb (72.6 kg)   SpO2 97%   BMI 25.82 kg/m²   General appearance: alert and cooperative with exam  HEENT: Head: Normocephalic, no lesions, without obvious abnormality. Neck:no JVD, trachea midline, no adenopathy  Lungs: Clear to auscultation  Heart: Regular rate and rhythm, s1/s2 auscultated, no murmurs  SR on tele  Abdomen: soft, tender, bowel sounds active  Extremities: no edema  Neurologic: not done        Assessment / Acute Cardiac Problems:   1. Non sustained V. tach likely secondary to hypokalemia from ongoing emesis  2. Prolonged QTC   3. Cocaine use   4. Dehydration  5. chronic colitis/ileitis  6. Hypothyroidism  7. Hypokalemia  8. COPD  9. Crohn's disease  10.  Lactic acidosis    Patient Active Problem List:     Hypothyroidism     Gastroesophageal reflux disease without esophagitis     Gastroenteritis     Ileus (HCC)     Pancreatitis, acute     Drug abuse ([address]     COPD (chronic obstructive pulmonary disease) ([address]     Hypertension     Hypokalemia     Hypothyroidism due to acquired atrophy of thyroid     Crohn disease ([address]     Acute cystitis without hematuria     Accidental drug overdose     Prolonged Q-T interval on ECG     Bipolar disorder, unspecified ([address]     Polysubstance abuse ([address]     Alcohol intoxication delirium ([address]     Cocaine abuse ([address]     Acute respiratory failure with hypoxia (HCC)     Bipolar 1 disorder (HCC)     Diarrhea     Chronic bronchitis (HCC)     Chronic renal insufficiency, stage III (moderate) (HCC)     Colitis     Anxiety     Osteoarthritis resulting from left hip dysplasia     Shortness of breath     Elevated brain natriuretic peptide (BNP) level     Pneumonia of both lower lobes due to infectious organism     Nausea & vomiting     Acute hypokalemia     Leukocytosis      Plan of Treatment:   1. Nonsustained V tach. No recent episodes. Keep K > 4.0 and mag > 2.0 K 3.4 and Mag 2.1  K replaced today. Repeat labs this afternoon. 2. Prolonged QTC improving now 529  Continue to avoid QTC prolonging medications:  Compazine, pepcid, zofran, and antipsychotics. 3. Ongoing Nausea and vomiting managed by primary  4.  Rest of care managed per primary team.     Electronically signed by SHRADDHA Anne NP on 10/19/2021 at 12:46 PM  [address]  [phone]

## 2021-10-19 NOTE — PROGRESS NOTES
[facility]  Internal Medicine Teaching Residency Program  Inpatient Daily Progress Note  ______________________________________________________________________________    Patient: Sae Crowe  YOB: 1965   [id number]    Acct: [de-identified]     Room: [address] [place] [address]  Admit date: 10/15/2021  Today's date: 10/19/21  Number of days in the hospital: 3    SUBJECTIVE   Admitting Diagnosis: Nausea & vomiting  CC: vomiting  Pt examined at bedside. Chart & results reviewed. Still vomiting and retching. Clear fluid diet    ROS:  Constitutional:  negative for chills, fevers, sweats  Respiratory:  negative for cough, dyspnea on exertion, hemoptysis, shortness of breath, wheezing  Cardiovascular:  negative for chest pain, chest pressure/discomfort, lower extremity edema, palpitations  Gastrointestinal:  negative for abdominal pain, constipation, diarrhea, nausea, vomiting  Neurological:  negative for dizziness, headache  BRIEF HISTORY     61 y. o. female presents with a chief complaint of Nausea and vomiting. She has a PMH of Crohn's disease, bowel obstruction for which she had small bowel resection. Colonoscopy of 2017 was grossly normal. HTN on lisinopril, Hypothyroidism on levothyroxine, COPD on albuterol and symbicort inhalers, and Home Oxygen, drug intoxication. Presented to the with epigastric abdo pain, nausea and vomiting since 1 day. No diarrhea, fever, cough, dyspnea, chest pain, palpitations. She was afebrile, /75, saturating on room air, K+ 3.1, lactic acid 2.0-->2.7, glycemia 127, trops 11, urine ketone pos, EKG revealed QTc 654. CT abd revealed 1. New terminal ileal wall thickening which may relate to ileitis.  No bowel obstruction. 2. Stable ascending colonic wall thickening and luminal narrowing which may relate to sequela of chronic colitis.   Administered 1L NS bolus, zofran, 10meq K+, proclorperazine 10mg and she was admitted to observation. Her symptoms persisted and around 10:00am today a rapid response was called on her. She was alert and oriented, retching, with intractable vomiting /98, pulse 79, resp 24, strips showed non-sustained SVT. A proper ECG could mot be obtained as she was restless.  She was administered 2g Mg, 1g Calcium gluconate and transferred to step down for continuation of care. Cardiology consulted; recommend stop all QT prolonging medications (zofran, pebcid, chlorpromazine and urgent administration of IV potassium through central line. GI consulted    OBJECTIVE     Vital Signs:  BP (!) 150/86   Pulse 88   Temp 98.2 °F (36.8 °C) (Oral)   Resp 22   Ht 5' 6\" (1.676 m)   Wt 160 lb (72.6 kg)   SpO2 97%   BMI 25.82 kg/m²     Temp (24hrs), Av.1 °F (36.7 °C), Min:97.8 °F (36.6 °C), Max:98.3 °F (36.8 °C)    In: 1150   Out: 2525 [Urine:2525]    Physical Exam:  Constitutional: This is a well developed, well nourished, 25-29.9 - Overweight 61y.o. year old female who is alert, oriented, cooperative and in no apparent distress. Head:normocephalic and atraumatic. EENT:  PERRLA. No conjunctival injections. Septum was midline, mucosa was without erythema, exudates or cobblestoning. No thrush was noted. Neck: Supple without thyromegaly. No elevated JVP. Trachea was midline. Respiratory: Chest was symmetrical without dullness to percussion. Breath sounds bilaterally were clear to auscultation. There were no wheezes, rhonchi or rales. There is no intercostal retraction or use of accessory muscles. No egophony noted. Cardiovascular: Regular without murmur, clicks, gallops or rubs. Abdomen: Slightly rounded and soft without organomegaly. No rebound, rigidity or guarding was appreciated. Lymphatic: No lymphadenopathy. Musculoskeletal: Normal curvature of the spine. No gross muscle weakness. Extremities:  No lower extremity edema, ulcerations, tenderness, varicosities or erythema.   Muscle size, tone and strength are normal.  No involuntary movements are noted. Skin:  Warm and dry. Good color, turgor and pigmentation. No lesions or scars.   No cyanosis or clubbing  Neurological/Psychiatric: The patient's general behavior, level of consciousness, thought content and emotional status is normal.        Medications:  Scheduled Medications:    influenza virus vaccine  0.5 mL IntraMUSCular Prior to discharge    potassium chloride  10 mEq IntraVENous Q1H    budesonide-formoterol  2 puff Inhalation BID    nicotine  1 patch TransDERmal Daily    sodium chloride flush  5-40 mL IntraVENous 2 times per day    enoxaparin  40 mg SubCUTAneous Daily    cefepime  2,000 mg IntraVENous Q12H    scopolamine  1 patch TransDERmal Q72H     Continuous Infusions:    sodium chloride      sodium chloride       PRN Medicationsmelatonin, 5 mg, Nightly PRN  sennosides-docusate sodium, 2 tablet, Daily PRN  morphine, 2 mg, Q4H PRN   Or  morphine, 4 mg, Q4H PRN  trimethobenzamide, 200 mg, Q6H PRN  potassium chloride, 20 mEq, PRN  ipratropium-albuterol, 3 mL, Q6H PRN  hydrALAZINE, 20 mg, Q6H PRN  sodium chloride flush, 5-40 mL, PRN  sodium chloride, 25 mL, PRN  potassium chloride, 40 mEq, PRN   Or  potassium alternative oral replacement, 40 mEq, PRN  polyethylene glycol, 17 g, Daily PRN  acetaminophen, 650 mg, Q6H PRN   Or  acetaminophen, 650 mg, Q6H PRN        Diagnostic Labs:  CBC:   Recent Labs     10/17/21  0351 10/18/21  0655 10/19/21  0348   WBC 13.3* 13.8* 10.5   RBC 5.09 4.85 4.59   HGB 14.0 13.2 12.6   HCT 44.8 40.9 39.3   MCV 88.0 84.3 85.6   RDW 15.9* 15.9* 16.0*    371 334     BMP:   Recent Labs     10/17/21  0359 10/17/21  1129 10/17/21  1635 10/18/21  0000 10/18/21  0655 10/18/21  1233 10/19/21  0022 10/19/21  0348 10/19/21  1214   *  --  130*  --  129*  --   --  130*  --    K 3.3*   < > 4.2   < > 3.3*   < > 3.5* 3.4* 4.0   CL 97*  --  98  --  93*  --   --  97*  --    CO2 18*  --  19*  --  19*  -- --  20  --    BUN 5*  --   --   --  8  --   --  9  --    CREATININE 0.44*  --   --   --  0.40*  --   --  0.40*  --     < > = values in this interval not displayed. BNP: No results for input(s): BNP in the last 72 hours. PT/INR: No results for input(s): PROTIME, INR in the last 72 hours. APTT: No results for input(s): APTT in the last 72 hours. CARDIAC ENZYMES: No results for input(s): CKMB, CKMBINDEX, TROPONINI in the last 72 hours. Invalid input(s): CKTOTAL;3  FASTING LIPID PANEL:No results found for: CHOL, HDL, TRIG  LIVER PROFILE: No results for input(s): AST, ALT, ALB, BILIDIR, BILITOT, ALKPHOS in the last 72 hours. MICROBIOLOGY:   Lab Results   Component Value Date/Time    CULTURE NO GROWTH 3 DAYS 10/16/2021 10:25 AM       Imaging:    XR ABDOMEN (KUB) (SINGLE AP VIEW)    Result Date: 10/19/2021  Moderate constipation. No bowel obstruction. No change from priors. XR ABDOMEN (KUB) (SINGLE AP VIEW)    Result Date: 10/16/2021  Right femoral central line as above. CT ABDOMEN PELVIS W IV CONTRAST Additional Contrast? None    Result Date: 10/15/2021  1. New terminal ileal wall thickening which may relate to ileitis. No bowel obstruction. 2. Stable ascending colonic wall thickening and luminal narrowing which may relate to sequela of chronic colitis. 3. No evidence of perforation or abscess. 4. Nonspecific bilateral retroperitoneal perinephric stranding more prominent on the left. Pyelonephritis cannot be excluded. No hydronephrosis or abscess. XR CHEST PORTABLE    Result Date: 10/16/2021  Mild bilateral interstitial infiltrates     US RETROPERITONEAL LIMITED    Result Date: 10/16/2021  Slightly limited but essentially unremarkable renal ultrasound. No hydronephrosis. ASSESSMENT & PLAN      Nausea & vomiting  Hx Crohns.  Hx bowel resection; not sure if for Crohns or intestinal obstruction  Hx cholectomy  CT abd concerning for ileitis and sequela of chronic colitis  RL at 125-->75ml/hour  Morphine for pain  Monitor QTc WITH EKG  Oral potassium   On cefepime for h/o of MRSA.     Hypokalemia and hypomagnesinemia  K+ 3.3, Na 128-->129-->130  Due to vomiting  -->75 ml/hr   Replace potassium  Monitor K+     Prolonged QT with SVT  HR 88  , QTc 529  QTc 705 at admission  Refrain from QT prolonging medications like ondansetron ,monitor EKG and replaced potassium and magnesium      Hypertension  /86  Probably worsened by pain and cocaine intoxication  Hydralazine 5mg IV prn every 6hrs  if SBP >160 will resume oral antihypertensives once able to tolerate oral         COPD  Oxygen as needed  On albuterol prn     Bipolar/depression  Hold medications due to prolonged QT        DVT ppx: Enoxaparin  GI ppx: None due to prolonged QT     PT/OT/SW: consulted  Discharge Planning: Case management       Brock Jean-Baptiste MD  Internal Medicine Resident, PGY-1  [facility]; [facility]  10/19/2021, 1:42 PM    Attending Supervising Physicians Attestation Statement  I have seen and examined Dolores Camara and the key elements of all parts of the encounter have been performed by me. I agree with the assessment, plan and orders as documented by the Advanced Practice Provider. I discussed the findings and plans with the resident physician and agree as documented in their note . In addition to the pertinent positives and negatives as stated within HPI and the review of systems as documented in the notes, all other systems were reviewed when able to and are reported negative. Additional Comments: continues to have N/V. No BM recently. Check KUB, start senna. Avoid QT prolonging drugs.  Check inflammatory markers to evaluate crohns     Electronically signed by Nga Tapia MD on 10/19/2021 at 5:00 PM

## 2021-10-19 NOTE — PLAN OF CARE
Nutrition Problem #1: Inadequate oral intake  Intervention: Food and/or Nutrient Delivery: Continue Current Diet, Start Oral Nutrition Supplement (advance diet as able)  Nutritional Goals: Pt to meet >75% of est'd needs daily via PO

## 2021-10-20 VITALS
HEIGHT: 66 IN | SYSTOLIC BLOOD PRESSURE: 169 MMHG | BODY MASS INDEX: 22.85 KG/M2 | RESPIRATION RATE: 16 BRPM | WEIGHT: 142.2 LBS | OXYGEN SATURATION: 96 % | TEMPERATURE: 98 F | HEART RATE: 88 BPM | DIASTOLIC BLOOD PRESSURE: 91 MMHG

## 2021-10-20 PROBLEM — E83.42 HYPOMAGNESEMIA: Status: ACTIVE | Noted: 2021-10-20

## 2021-10-20 PROBLEM — D72.829 LEUKOCYTOSIS: Status: RESOLVED | Noted: 2021-10-18 | Resolved: 2021-10-20

## 2021-10-20 LAB
ABSOLUTE EOS #: 0.17 K/UL (ref 0–0.44)
ABSOLUTE IMMATURE GRANULOCYTE: 0.06 K/UL (ref 0–0.3)
ABSOLUTE LYMPH #: 0.98 K/UL (ref 1.1–3.7)
ABSOLUTE MONO #: 0.88 K/UL (ref 0.1–1.2)
ANION GAP SERPL CALCULATED.3IONS-SCNC: 16 MMOL/L (ref 9–17)
BASOPHILS # BLD: 1 % (ref 0–2)
BASOPHILS ABSOLUTE: 0.05 K/UL (ref 0–0.2)
BUN BLDV-MCNC: 7 MG/DL (ref 6–20)
BUN/CREAT BLD: ABNORMAL (ref 9–20)
C-REACTIVE PROTEIN: 25.2 MG/L (ref 0–5)
CALCIUM SERPL-MCNC: 8.4 MG/DL (ref 8.6–10.4)
CAMPYLOBACTER PCR: NORMAL
CHLORIDE BLD-SCNC: 96 MMOL/L (ref 98–107)
CO2: 20 MMOL/L (ref 20–31)
CREAT SERPL-MCNC: 0.3 MG/DL (ref 0.5–0.9)
DIFFERENTIAL TYPE: ABNORMAL
E COLI ENTEROTOXIGENIC PCR: NORMAL
EKG ATRIAL RATE: 86 BPM
EKG ATRIAL RATE: 87 BPM
EKG P AXIS: 63 DEGREES
EKG P AXIS: 70 DEGREES
EKG P-R INTERVAL: 130 MS
EKG P-R INTERVAL: 132 MS
EKG Q-T INTERVAL: 440 MS
EKG Q-T INTERVAL: 468 MS
EKG QRS DURATION: 84 MS
EKG QRS DURATION: 86 MS
EKG QTC CALCULATION (BAZETT): 529 MS
EKG QTC CALCULATION (BAZETT): 560 MS
EKG R AXIS: 82 DEGREES
EKG R AXIS: 83 DEGREES
EKG T AXIS: 33 DEGREES
EKG T AXIS: 9 DEGREES
EKG VENTRICULAR RATE: 86 BPM
EKG VENTRICULAR RATE: 87 BPM
EOSINOPHILS RELATIVE PERCENT: 2 % (ref 1–4)
GFR AFRICAN AMERICAN: >60 ML/MIN
GFR NON-AFRICAN AMERICAN: >60 ML/MIN
GFR SERPL CREATININE-BSD FRML MDRD: ABNORMAL ML/MIN/{1.73_M2}
GFR SERPL CREATININE-BSD FRML MDRD: ABNORMAL ML/MIN/{1.73_M2}
GLUCOSE BLD-MCNC: 102 MG/DL (ref 70–99)
HCT VFR BLD CALC: 37.5 % (ref 36.3–47.1)
HEMOGLOBIN: 12 G/DL (ref 11.9–15.1)
IMMATURE GRANULOCYTES: 1 %
LYMPHOCYTES # BLD: 10 % (ref 24–43)
MAGNESIUM: 1.8 MG/DL (ref 1.6–2.6)
MCH RBC QN AUTO: 27.7 PG (ref 25.2–33.5)
MCHC RBC AUTO-ENTMCNC: 32 G/DL (ref 28.4–34.8)
MCV RBC AUTO: 86.6 FL (ref 82.6–102.9)
MONOCYTES # BLD: 9 % (ref 3–12)
NRBC AUTOMATED: 0 PER 100 WBC
PDW BLD-RTO: 16.1 % (ref 11.8–14.4)
PLATELET # BLD: 345 K/UL (ref 138–453)
PLATELET ESTIMATE: ABNORMAL
PLESIOMONAS SHIGELLOIDES PCR: NORMAL
PMV BLD AUTO: 10.7 FL (ref 8.1–13.5)
POTASSIUM SERPL-SCNC: 3.4 MMOL/L (ref 3.7–5.3)
POTASSIUM SERPL-SCNC: 3.5 MMOL/L (ref 3.7–5.3)
POTASSIUM SERPL-SCNC: 4 MMOL/L (ref 3.7–5.3)
RBC # BLD: 4.33 M/UL (ref 3.95–5.11)
RBC # BLD: ABNORMAL 10*6/UL
SALMONELLA PCR: NORMAL
SEG NEUTROPHILS: 77 % (ref 36–65)
SEGMENTED NEUTROPHILS ABSOLUTE COUNT: 7.56 K/UL (ref 1.5–8.1)
SHIGATOXIN GENE PCR: NORMAL
SHIGELLA SP PCR: NORMAL
SODIUM BLD-SCNC: 132 MMOL/L (ref 135–144)
SPECIMEN DESCRIPTION: NORMAL
VIBRIO PCR: NORMAL
WBC # BLD: 9.7 K/UL (ref 3.5–11.3)
WBC # BLD: ABNORMAL 10*3/UL
YERSINIA ENTEROCOLITICA PCR: NORMAL

## 2021-10-20 PROCEDURE — 87506 IADNA-DNA/RNA PROBE TQ 6-11: CPT

## 2021-10-20 PROCEDURE — C9113 INJ PANTOPRAZOLE SODIUM, VIA: HCPCS | Performed by: STUDENT IN AN ORGANIZED HEALTH CARE EDUCATION/TRAINING PROGRAM

## 2021-10-20 PROCEDURE — 2580000003 HC RX 258: Performed by: STUDENT IN AN ORGANIZED HEALTH CARE EDUCATION/TRAINING PROGRAM

## 2021-10-20 PROCEDURE — 2580000003 HC RX 258: Performed by: PEDIATRICS

## 2021-10-20 PROCEDURE — 83735 ASSAY OF MAGNESIUM: CPT

## 2021-10-20 PROCEDURE — 94640 AIRWAY INHALATION TREATMENT: CPT

## 2021-10-20 PROCEDURE — 84132 ASSAY OF SERUM POTASSIUM: CPT

## 2021-10-20 PROCEDURE — 6370000000 HC RX 637 (ALT 250 FOR IP): Performed by: STUDENT IN AN ORGANIZED HEALTH CARE EDUCATION/TRAINING PROGRAM

## 2021-10-20 PROCEDURE — 80048 BASIC METABOLIC PNL TOTAL CA: CPT

## 2021-10-20 PROCEDURE — 6360000002 HC RX W HCPCS: Performed by: STUDENT IN AN ORGANIZED HEALTH CARE EDUCATION/TRAINING PROGRAM

## 2021-10-20 PROCEDURE — 86140 C-REACTIVE PROTEIN: CPT

## 2021-10-20 PROCEDURE — 87449 NOS EACH ORGANISM AG IA: CPT

## 2021-10-20 PROCEDURE — 87324 CLOSTRIDIUM AG IA: CPT

## 2021-10-20 PROCEDURE — 85025 COMPLETE CBC W/AUTO DIFF WBC: CPT

## 2021-10-20 PROCEDURE — 90686 IIV4 VACC NO PRSV 0.5 ML IM: CPT | Performed by: INTERNAL MEDICINE

## 2021-10-20 PROCEDURE — 6360000002 HC RX W HCPCS: Performed by: INTERNAL MEDICINE

## 2021-10-20 PROCEDURE — 99232 SBSQ HOSP IP/OBS MODERATE 35: CPT | Performed by: INTERNAL MEDICINE

## 2021-10-20 PROCEDURE — G0008 ADMIN INFLUENZA VIRUS VAC: HCPCS | Performed by: INTERNAL MEDICINE

## 2021-10-20 PROCEDURE — 36415 COLL VENOUS BLD VENIPUNCTURE: CPT

## 2021-10-20 PROCEDURE — 93005 ELECTROCARDIOGRAM TRACING: CPT | Performed by: STUDENT IN AN ORGANIZED HEALTH CARE EDUCATION/TRAINING PROGRAM

## 2021-10-20 PROCEDURE — 6370000000 HC RX 637 (ALT 250 FOR IP): Performed by: PEDIATRICS

## 2021-10-20 PROCEDURE — 83993 ASSAY FOR CALPROTECTIN FECAL: CPT

## 2021-10-20 PROCEDURE — 6360000002 HC RX W HCPCS: Performed by: PEDIATRICS

## 2021-10-20 RX ORDER — POTASSIUM CHLORIDE 7.45 MG/ML
10 INJECTION INTRAVENOUS
Status: COMPLETED | OUTPATIENT
Start: 2021-10-20 | End: 2021-10-20

## 2021-10-20 RX ORDER — SENNA AND DOCUSATE SODIUM 50; 8.6 MG/1; MG/1
2 TABLET, FILM COATED ORAL DAILY
Status: DISCONTINUED | OUTPATIENT
Start: 2021-10-20 | End: 2021-10-20 | Stop reason: HOSPADM

## 2021-10-20 RX ORDER — SENNA PLUS 8.6 MG/1
1 TABLET ORAL NIGHTLY
Status: DISCONTINUED | OUTPATIENT
Start: 2021-10-20 | End: 2021-10-20

## 2021-10-20 RX ORDER — POLYETHYLENE GLYCOL 3350 17 G/17G
17 POWDER, FOR SOLUTION ORAL DAILY PRN
Qty: 527 G | Refills: 1 | Status: ON HOLD | OUTPATIENT
Start: 2021-10-20 | End: 2021-10-22

## 2021-10-20 RX ORDER — PANTOPRAZOLE SODIUM 40 MG/10ML
40 INJECTION, POWDER, LYOPHILIZED, FOR SOLUTION INTRAVENOUS DAILY
Status: DISCONTINUED | OUTPATIENT
Start: 2021-10-20 | End: 2021-10-20 | Stop reason: HOSPADM

## 2021-10-20 RX ORDER — MAGNESIUM SULFATE IN WATER 40 MG/ML
2000 INJECTION, SOLUTION INTRAVENOUS ONCE
Status: COMPLETED | OUTPATIENT
Start: 2021-10-20 | End: 2021-10-20

## 2021-10-20 RX ORDER — SODIUM CHLORIDE 9 MG/ML
10 INJECTION INTRAVENOUS DAILY
Status: DISCONTINUED | OUTPATIENT
Start: 2021-10-20 | End: 2021-10-20 | Stop reason: HOSPADM

## 2021-10-20 RX ADMIN — POTASSIUM CHLORIDE 10 MEQ: 10 INJECTION, SOLUTION INTRAVENOUS at 08:27

## 2021-10-20 RX ADMIN — MORPHINE SULFATE 4 MG: 4 INJECTION INTRAVENOUS at 04:15

## 2021-10-20 RX ADMIN — POTASSIUM CHLORIDE 40 MEQ: 1500 TABLET, EXTENDED RELEASE ORAL at 06:24

## 2021-10-20 RX ADMIN — TRIMETHOBENZAMIDE HYDROCHLORIDE 200 MG: 100 INJECTION INTRAMUSCULAR at 10:16

## 2021-10-20 RX ADMIN — POTASSIUM CHLORIDE 10 MEQ: 10 INJECTION, SOLUTION INTRAVENOUS at 10:15

## 2021-10-20 RX ADMIN — INFLUENZA A VIRUS A/VICTORIA/2570/2019 IVR-215 (H1N1) ANTIGEN (PROPIOLACTONE INACTIVATED), INFLUENZA A VIRUS A/CAMBODIA/E0826360/2020 IVR-224 (H3N2) ANTIGEN (PROPIOLACTONE INACTIVATED), INFLUENZA B VIRUS B/VICTORIA/705/2018 BVR-11 ANTIGEN (PROPIOLACTONE INACTIVATED), INFLUENZA B VIRUS B/PHUKET/3073/2013 BVR-1B ANTIGEN (PROPIOLACTONE INACTIVATED) 0.5 ML: 15; 15; 15; 15 INJECTION, SUSPENSION INTRAMUSCULAR at 09:05

## 2021-10-20 RX ADMIN — HYDRALAZINE HYDROCHLORIDE 20 MG: 20 INJECTION INTRAMUSCULAR; INTRAVENOUS at 08:42

## 2021-10-20 RX ADMIN — MORPHINE SULFATE 4 MG: 4 INJECTION INTRAVENOUS at 12:29

## 2021-10-20 RX ADMIN — PANTOPRAZOLE SODIUM 40 MG: 40 INJECTION, POWDER, FOR SOLUTION INTRAVENOUS at 09:08

## 2021-10-20 RX ADMIN — MORPHINE SULFATE 4 MG: 4 INJECTION INTRAVENOUS at 08:22

## 2021-10-20 RX ADMIN — POTASSIUM CHLORIDE 10 MEQ: 10 INJECTION, SOLUTION INTRAVENOUS at 09:12

## 2021-10-20 RX ADMIN — CEFEPIME 2000 MG: 2 INJECTION, POWDER, FOR SOLUTION INTRAVENOUS at 05:24

## 2021-10-20 RX ADMIN — ENOXAPARIN SODIUM 40 MG: 40 INJECTION SUBCUTANEOUS at 09:11

## 2021-10-20 RX ADMIN — MAGNESIUM SULFATE 2000 MG: 2 INJECTION INTRAVENOUS at 08:27

## 2021-10-20 RX ADMIN — MORPHINE SULFATE 4 MG: 4 INJECTION INTRAVENOUS at 00:15

## 2021-10-20 RX ADMIN — SODIUM CHLORIDE, PRESERVATIVE FREE 10 ML: 5 INJECTION INTRAVENOUS at 09:11

## 2021-10-20 RX ADMIN — BUDESONIDE AND FORMOTEROL FUMARATE DIHYDRATE 2 PUFF: 160; 4.5 AEROSOL RESPIRATORY (INHALATION) at 09:38

## 2021-10-20 RX ADMIN — SODIUM CHLORIDE: 9 INJECTION, SOLUTION INTRAVENOUS at 11:16

## 2021-10-20 ASSESSMENT — PAIN SCALES - GENERAL
PAINLEVEL_OUTOF10: 7
PAINLEVEL_OUTOF10: 8

## 2021-10-20 NOTE — PROGRESS NOTES
CLINICAL PHARMACY NOTE: MEDS TO BEDS    Total # of Prescriptions Filled: 2   The following medications were delivered to the patient:  · miralax  · effer-k    Additional Documentation:

## 2021-10-20 NOTE — PROGRESS NOTES
[facility]  Internal Medicine Teaching Residency Program  Inpatient Daily Progress Note  ______________________________________________________________________________    Patient: Araceli Felix  YOB: 1965   [id number]    Acct: [de-identified]     Room: [address] [address]-  Admit date: 10/15/2021  Today's date: 10/20/21  Number of days in the hospital: 4    SUBJECTIVE   Admitting Diagnosis: Nausea & vomiting  CC: Nausea and vomiting  Pt examined at bedside. Chart & results reviewed. Some improvement in vomiting. Persistent nausea. Had Bowel movement today  On Tigan  Tolerating clear liquid diet  Persistent hypokalemia. Will continue to replace    ROS:  Constitutional:  negative for chills, fevers, sweats  Respiratory:  negative for cough, dyspnea on exertion, hemoptysis, shortness of breath, wheezing  Cardiovascular:  negative for chest pain, chest pressure/discomfort, lower extremity edema, palpitations  Gastrointestinal:  negative for abdominal pain, constipation, diarrhea,   Neurological:  negative for dizziness, headache  BRIEF HISTORY     61 y.o. female presents with a chief complaint of Nausea and vomiting. She has a PMH of Crohn's disease, bowel obstruction for which she had small bowel resection. Colonoscopy of 2017 was grossly normal. HTN on lisinopril, Hypothyroidism on levothyroxine, COPD on albuterol and symbicort inhalers, and Home Oxygen, drug intoxication. Presented to the with epigastric abdo pain, nausea and vomiting since 1 day. No diarrhea, fever, cough, dyspnea, chest pain, palpitations. She was afebrile, /75, saturating on room air, K+ 3.1, lactic acid 2.0-->2.7, glycemia 127, trops 11, urine ketone pos, EKG revealed QTc 654. CT abd revealed 1. New terminal ileal wall thickening which may relate to ileitis. No bowel obstruction.  2. Stable ascending colonic wall thickening and luminal narrowing which may relate to sequela of chronic colitis. Administered 1L NS bolus, zofran, 10meq K+, proclorperazine 10mg and she was admitted to observation. Her symptoms persisted and around 10:00am today a rapid response was called on her. She was alert and oriented, retching, with intractable vomiting /98, pulse 79, resp 24, strips showed non-sustained SVT. A proper ECG could mot be obtained as she was restless. She was administered 2g Mg, 1g Calcium gluconate and transferred to step down for continuation of care. Cardiology consulted; recommend stop all QT prolonging medications (zofran, pebcid, chlorpromazine and urgent administration of IV potassium through central line    OBJECTIVE     Vital Signs:  BP (!) 164/88   Pulse 86   Temp 98.8 °F (37.1 °C) (Oral)   Resp 19   Ht 5' 6\" (1.676 m)   Wt 142 lb 3.2 oz (64.5 kg)   SpO2 97%   BMI 22.95 kg/m²     Temp (24hrs), Av.6 °F (37 °C), Min:98.2 °F (36.8 °C), Max:98.9 °F (37.2 °C)    In: 5890   Out: 1400 [Urine:1400]    Physical Exam:  Constitutional: This is a well developed, well nourished, 18.5-24.9 - Normal 61y.o. year old female who is alert, oriented, cooperative and in no apparent distress. Head:normocephalic and atraumatic. EENT:  PERRLA. No conjunctival injections. Septum was midline, mucosa was without erythema, exudates or cobblestoning. No thrush was noted. Neck: Supple without thyromegaly. No elevated JVP. Trachea was midline. Respiratory: Chest was symmetrical without dullness to percussion. Breath sounds bilaterally were clear to auscultation. There were no wheezes, rhonchi or rales. There is no intercostal retraction or use of accessory muscles. No egophony noted. Cardiovascular: Regular without murmur, clicks, gallops or rubs. Abdomen: Generalized abdominal tenderness. Slightly rounded and soft without organomegaly. Lymphatic: No lymphadenopathy. Musculoskeletal: Normal curvature of the spine. No gross muscle weakness.     Extremities:  No lower extremity edema, ulcerations, tenderness, varicosities or erythema. Muscle size, tone and strength are normal.  No involuntary movements are noted. Skin:  Warm and dry. Good color, turgor and pigmentation. No lesions or scars.   No cyanosis or clubbing  Neurological/Psychiatric: The patient's general behavior, level of consciousness, thought content and emotional status is normal.        Medications:  Scheduled Medications:    influenza virus vaccine  0.5 mL IntraMUSCular Prior to discharge    budesonide-formoterol  2 puff Inhalation BID    nicotine  1 patch TransDERmal Daily    sodium chloride flush  5-40 mL IntraVENous 2 times per day    enoxaparin  40 mg SubCUTAneous Daily    cefepime  2,000 mg IntraVENous Q12H    scopolamine  1 patch TransDERmal Q72H     Continuous Infusions:    sodium chloride 100 mL/hr at 10/19/21 1401    sodium chloride       PRN Medicationsmelatonin, 5 mg, Nightly PRN  sennosides-docusate sodium, 2 tablet, Daily PRN  morphine, 2 mg, Q4H PRN   Or  morphine, 4 mg, Q4H PRN  trimethobenzamide, 200 mg, Q6H PRN  potassium chloride, 20 mEq, PRN  ipratropium-albuterol, 3 mL, Q6H PRN  hydrALAZINE, 20 mg, Q6H PRN  sodium chloride flush, 5-40 mL, PRN  sodium chloride, 25 mL, PRN  potassium chloride, 40 mEq, PRN   Or  potassium alternative oral replacement, 40 mEq, PRN  polyethylene glycol, 17 g, Daily PRN  acetaminophen, 650 mg, Q6H PRN   Or  acetaminophen, 650 mg, Q6H PRN        Diagnostic Labs:  CBC:   Recent Labs     10/18/21  0655 10/19/21  0348 10/20/21  0444   WBC 13.8* 10.5 9.7   RBC 4.85 4.59 4.33   HGB 13.2 12.6 12.0   HCT 40.9 39.3 37.5   MCV 84.3 85.6 86.6   RDW 15.9* 16.0* 16.1*    334 345     BMP:   Recent Labs     10/18/21  0655 10/18/21  1233 10/19/21  0348 10/19/21  0348 10/19/21  1214 10/19/21  2353 10/20/21  0444   *  --  130*  --   --   --  132*   K 3.3*   < > 3.4*   < > 4.0 3.5* 3.4*   CL 93*  --  97*  --   --   --  96*   CO2 19*  --  20  --   --   --  20 BUN 8  --  9  --   --   --  7   CREATININE 0.40*  --  0.40*  --   --   --  0.30*    < > = values in this interval not displayed. BNP: No results for input(s): BNP in the last 72 hours. PT/INR: No results for input(s): PROTIME, INR in the last 72 hours. APTT: No results for input(s): APTT in the last 72 hours. CARDIAC ENZYMES: No results for input(s): CKMB, CKMBINDEX, TROPONINI in the last 72 hours. Invalid input(s): CKTOTAL;3  FASTING LIPID PANEL:No results found for: CHOL, HDL, TRIG  LIVER PROFILE: No results for input(s): AST, ALT, ALB, BILIDIR, BILITOT, ALKPHOS in the last 72 hours. MICROBIOLOGY:   Lab Results   Component Value Date/Time    CULTURE NO GROWTH 4 DAYS 10/16/2021 10:25 AM       Imaging:    XR ABDOMEN (KUB) (SINGLE AP VIEW)    Result Date: 10/19/2021  Moderate constipation. No bowel obstruction. No change from priors. XR ABDOMEN (KUB) (SINGLE AP VIEW)    Result Date: 10/16/2021  Right femoral central line as above. CT ABDOMEN PELVIS W IV CONTRAST Additional Contrast? None    Result Date: 10/15/2021  1. New terminal ileal wall thickening which may relate to ileitis. No bowel obstruction. 2. Stable ascending colonic wall thickening and luminal narrowing which may relate to sequela of chronic colitis. 3. No evidence of perforation or abscess. 4. Nonspecific bilateral retroperitoneal perinephric stranding more prominent on the left. Pyelonephritis cannot be excluded. No hydronephrosis or abscess. XR CHEST PORTABLE    Result Date: 10/16/2021  Mild bilateral interstitial infiltrates     US RETROPERITONEAL LIMITED    Result Date: 10/16/2021  Slightly limited but essentially unremarkable renal ultrasound. No hydronephrosis. ASSESSMENT & PLAN      Nausea & vomiting  Hx Crohns.  Hx bowel resection; not sure if for Crohns or intestinal obstruction  Hx cholectomy  CT abd concerning for ileitis and sequela of chronic colitis  NS 100ml/hr  Morphine for pain  Monitor QTc WITH EKG  IV potassium replacement  On Tigan IV tid  On day 5 cefepime. Will Discontinue     Hypokalemia and hypoNatremia  K+ 3.4, Na 128-->129-->130-->132  Due to vomiting  NS 100ml/hr  Replace potassium  Monitor K+     Prolonged QT with SVT  HR 87  , QTc 529  QTc 705 at admission  Refrain from QT prolonging medications like ondansetron ,monitor EKG and replaced potassium and magnesium      Hypertension  /88  Probably worsened by pain and cocaine intoxication  Hydralazine 5mg IV prn every 6hrs  if SBP >160 will resume oral antihypertensives once able to tolerate oral      COPD  Oxygen as needed  On Nicotine patch  On albuterol prn     Bipolar/depression  Hold medications due to prolonged QT        DVT ppx: Enoxaparin  GI ppx: None due to prolonged QT     PT/OT/SW: consulted  Discharge Planning: Case management       Tulio Lua MD  Internal Medicine Resident, PGY-1  [address] [place], [place]  10/20/2021, 7:31 AM

## 2021-10-20 NOTE — PROGRESS NOTES
Patient notified of discharge. She will contact her daughter to see if she can pick her up. Patient to notify nurse if she needs a taxi.

## 2021-10-20 NOTE — CARE COORDINATION
[place] [facility] [place]  Written by: Yanna Garcia RN    Patient Name: Orquidea Norman  Attending Provider: Ngozi Boucher MD  Admit Date: 10/15/2021  5:54 PM  MRN: [id number]  Account: [de-identified]                     : 1965  Discharge Date:   10/20/2021      Disposition: home    Yanna Garcia RN

## 2021-10-20 NOTE — PROGRESS NOTES
Patient refusing to stay for meds to beds to be delivered. It was explained to her that the original nausea medication was not available and waiting on the physician to order a different nausea med. Patient states she wants to  all medications tomorrow. She refuses to remain at the hospital for her discharge medications. She understands she will not have medication for nausea when she leaves the hospital.  She does not appear nauseated at this time, she is very anxious to leave and is trying to rush the nurse to obtain a wheelchair to exit the hospital.    Wheelchair obtained and patient escorted out with hospital staff to daughter's vehicle.

## 2021-10-20 NOTE — PROGRESS NOTES
[facility]  Progress Note                   Date:   10/20/2021  Patient name: Archana Vega  Date of admission:  10/15/2021  5:54 PM  MRN:   [id number]  YOB: 1965  PCP: Nettie Ryder MD    Reason for Admission: Nausea & vomiting [R11.2]  Intractable vomiting with nausea, unspecified vomiting type [R11.2]  Acute hypokalemia [E87.6]    Subjective:       Clinical Changes /Abnormalities: Patient seen and examined in room. Denies chest pain and SOB. Labs, vitals, & tele reviewed. Review of Systems    Medications:   Scheduled Meds:   sennosides-docusate sodium  2 tablet Oral Daily    pantoprazole  40 mg IntraVENous Daily    And    sodium chloride (PF)  10 mL IntraVENous Daily    budesonide-formoterol  2 puff Inhalation BID    nicotine  1 patch TransDERmal Daily    sodium chloride flush  5-40 mL IntraVENous 2 times per day    enoxaparin  40 mg SubCUTAneous Daily    scopolamine  1 patch TransDERmal Q72H     Continuous Infusions:   sodium chloride 100 mL/hr at 10/20/21 1116    sodium chloride       CBC:   Recent Labs     10/18/21  0655 10/19/21  0348 10/20/21  0444   WBC 13.8* 10.5 9.7   HGB 13.2 12.6 12.0    334 345     BMP:    Recent Labs     10/18/21  0655 10/18/21  1233 10/19/21  0348 10/19/21  0348 10/19/21  1214 10/19/21  2353 10/20/21  0444   *  --  130*  --   --   --  132*   K 3.3*   < > 3.4*   < > 4.0 3.5* 3.4*   CL 93*  --  97*  --   --   --  96*   CO2 19*  --  20  --   --   --  20   BUN 8  --  9  --   --   --  7   CREATININE 0.40*  --  0.40*  --   --   --  0.30*   GLUCOSE 116*  --  109*  --   --   --  102*    < > = values in this interval not displayed. Hepatic:No results for input(s): AST, ALT, ALB, BILITOT, ALKPHOS in the last 72 hours. Troponin: No results for input(s): TROPHS in the last 72 hours. BNP: No results for input(s): BNP in the last 72 hours. Lipids: No results for input(s): CHOL, HDL in the last 72 hours.     Invalid input(s): LDLCALCU  INR: No results for input(s): INR in the last 72 hours. Objective:   Vitals: BP (!) 169/91   Pulse 88   Temp 98 °F (36.7 °C) (Oral)   Resp 16   Ht 5' 6\" (1.676 m)   Wt 142 lb 3.2 oz (64.5 kg)   SpO2 96%   BMI 22.95 kg/m²   General appearance: alert and cooperative with exam  HEENT: Head: Normocephalic, no lesions, without obvious abnormality. Neck:no JVD, trachea midline, no adenopathy  Lungs: Clear to auscultation  Heart: Regular rate and rhythm, s1/s2 auscultated, no murmurs  SR on tele  Abdomen: soft, tender, bowel sounds active  Extremities: no edema  Neurologic: not done        Assessment / Acute Cardiac Problems:   1. Non sustained V. tach likely secondary to hypokalemia from ongoing emesis  2. Prolonged QTC   3. Cocaine use   4. Dehydration  5. chronic colitis/ileitis  6. Hypothyroidism  7. Hypokalemia  8. COPD  9. Crohn's disease  10.  Lactic acidosis    Patient Active Problem List:     Hypothyroidism     Gastroesophageal reflux disease without esophagitis     Gastroenteritis     Ileus (HCC)     Pancreatitis, acute     Drug abuse ([address]     COPD (chronic obstructive pulmonary disease) ([address]     Hypertension     Hypokalemia     Hypothyroidism due to acquired atrophy of thyroid     Crohn disease ([address]     Acute cystitis without hematuria     Accidental drug overdose     Prolonged Q-T interval on ECG     Bipolar disorder, unspecified ([address]     Polysubstance abuse ([address]     Alcohol intoxication delirium ([address]     Cocaine abuse ([address]     Acute respiratory failure with hypoxia (HCC)     Bipolar 1 disorder (HCC)     Diarrhea     Chronic bronchitis (HCC)     Chronic renal insufficiency, stage III (moderate) (HCC)     Colitis     Anxiety     Osteoarthritis resulting from left hip dysplasia     Shortness of breath     Elevated brain natriuretic peptide (BNP) level     Pneumonia of both lower lobes due to infectious organism     Nausea & vomiting     Acute hypokalemia Leukocytosis      Plan of Treatment:   1. Nonsustained V tach - No further episodes. 2. Keep K > 4.0 and mag > 2.0    3. Prolonged QTC- remains stable 529 again today. Continue to avoid QTC prolonging medications:  Compazine, pepcid, zofran, and antipsychotics.      4. Ongoing Nausea and vomiting managed by primary      Electronically signed by SHRADDHA Duong CNP on 10/20/2021 at 12:42 PM  [id number] [address].  [phone]

## 2021-10-21 ENCOUNTER — APPOINTMENT (OUTPATIENT)
Dept: GENERAL RADIOLOGY | Age: 56
DRG: 422 | End: 2021-10-21
Payer: MEDICARE

## 2021-10-21 ENCOUNTER — HOSPITAL ENCOUNTER (INPATIENT)
Age: 56
LOS: 4 days | Discharge: HOME | DRG: 422 | End: 2021-10-25
Attending: EMERGENCY MEDICINE | Admitting: INTERNAL MEDICINE
Payer: MEDICARE

## 2021-10-21 DIAGNOSIS — E83.42 HYPOMAGNESEMIA: ICD-10-CM

## 2021-10-21 DIAGNOSIS — E87.6 ACUTE HYPOKALEMIA: ICD-10-CM

## 2021-10-21 DIAGNOSIS — R10.30 LOWER ABDOMINAL PAIN: ICD-10-CM

## 2021-10-21 DIAGNOSIS — R11.2 NAUSEA VOMITING AND DIARRHEA: ICD-10-CM

## 2021-10-21 DIAGNOSIS — R19.7 NAUSEA VOMITING AND DIARRHEA: ICD-10-CM

## 2021-10-21 DIAGNOSIS — E87.6 HYPOKALEMIA: ICD-10-CM

## 2021-10-21 DIAGNOSIS — R94.31 PROLONGED Q-T INTERVAL ON ECG: Primary | ICD-10-CM

## 2021-10-21 PROBLEM — R10.9 ABDOMINAL PAIN: Status: ACTIVE | Noted: 2021-10-21

## 2021-10-21 LAB
-: NORMAL
ABSOLUTE EOS #: 0.27 K/UL (ref 0–0.44)
ABSOLUTE IMMATURE GRANULOCYTE: 0.09 K/UL (ref 0–0.3)
ABSOLUTE LYMPH #: 1.07 K/UL (ref 1.1–3.7)
ABSOLUTE MONO #: 0.93 K/UL (ref 0.1–1.2)
ALBUMIN SERPL-MCNC: 3.9 G/DL (ref 3.5–5.2)
ALBUMIN/GLOBULIN RATIO: 1.4 (ref 1–2.5)
ALP BLD-CCNC: 86 U/L (ref 35–104)
ALT SERPL-CCNC: 11 U/L (ref 5–33)
AMORPHOUS: NORMAL
ANION GAP SERPL CALCULATED.3IONS-SCNC: 14 MMOL/L (ref 9–17)
AST SERPL-CCNC: 32 U/L
BACTERIA: NORMAL
BASOPHILS # BLD: 1 % (ref 0–2)
BASOPHILS ABSOLUTE: 0.07 K/UL (ref 0–0.2)
BILIRUB SERPL-MCNC: 0.72 MG/DL (ref 0.3–1.2)
BILIRUBIN URINE: NEGATIVE
BUN BLDV-MCNC: 7 MG/DL (ref 6–20)
BUN/CREAT BLD: ABNORMAL (ref 9–20)
C DIFF AG + TOXIN: NEGATIVE
CALCIUM SERPL-MCNC: 8.8 MG/DL (ref 8.6–10.4)
CALPROTECTIN, FECAL: 477 UG/G
CASTS UA: NORMAL /LPF (ref 0–8)
CHLORIDE BLD-SCNC: 98 MMOL/L (ref 98–107)
CO2: 21 MMOL/L (ref 20–31)
COLOR: YELLOW
COMMENT UA: ABNORMAL
CREAT SERPL-MCNC: 0.46 MG/DL (ref 0.5–0.9)
CRYSTALS, UA: NORMAL /HPF
DIFFERENTIAL TYPE: ABNORMAL
EOSINOPHILS RELATIVE PERCENT: 3 % (ref 1–4)
EPITHELIAL CELLS UA: NORMAL /HPF (ref 0–5)
GFR AFRICAN AMERICAN: >60 ML/MIN
GFR NON-AFRICAN AMERICAN: >60 ML/MIN
GFR SERPL CREATININE-BSD FRML MDRD: ABNORMAL ML/MIN/{1.73_M2}
GFR SERPL CREATININE-BSD FRML MDRD: ABNORMAL ML/MIN/{1.73_M2}
GLUCOSE BLD-MCNC: 90 MG/DL (ref 70–99)
GLUCOSE URINE: NEGATIVE
HCT VFR BLD CALC: 38.8 % (ref 36.3–47.1)
HEMOGLOBIN: 12.6 G/DL (ref 11.9–15.1)
IMMATURE GRANULOCYTES: 1 %
KETONES, URINE: ABNORMAL
LEUKOCYTE ESTERASE, URINE: NEGATIVE
LIPASE: 42 U/L (ref 13–60)
LYMPHOCYTES # BLD: 10 % (ref 24–43)
MAGNESIUM: 2 MG/DL (ref 1.6–2.6)
MAGNESIUM: 2 MG/DL (ref 1.6–2.6)
MCH RBC QN AUTO: 28.1 PG (ref 25.2–33.5)
MCHC RBC AUTO-ENTMCNC: 32.5 G/DL (ref 28.4–34.8)
MCV RBC AUTO: 86.6 FL (ref 82.6–102.9)
MONOCYTES # BLD: 9 % (ref 3–12)
MUCUS: NORMAL
NITRITE, URINE: NEGATIVE
NRBC AUTOMATED: 0 PER 100 WBC
OTHER OBSERVATIONS UA: NORMAL
PDW BLD-RTO: 16.1 % (ref 11.8–14.4)
PH UA: 7 (ref 5–8)
PLATELET # BLD: 379 K/UL (ref 138–453)
PLATELET ESTIMATE: ABNORMAL
PMV BLD AUTO: 11.3 FL (ref 8.1–13.5)
POTASSIUM SERPL-SCNC: 3.4 MMOL/L (ref 3.7–5.3)
PROTEIN UA: ABNORMAL
RBC # BLD: 4.48 M/UL (ref 3.95–5.11)
RBC # BLD: ABNORMAL 10*6/UL
RBC UA: NORMAL /HPF (ref 0–4)
RENAL EPITHELIAL, UA: NORMAL /HPF
SEG NEUTROPHILS: 76 % (ref 36–65)
SEGMENTED NEUTROPHILS ABSOLUTE COUNT: 8.23 K/UL (ref 1.5–8.1)
SODIUM BLD-SCNC: 133 MMOL/L (ref 135–144)
SPECIFIC GRAVITY UA: 1.01 (ref 1–1.03)
SPECIMEN DESCRIPTION: NORMAL
TOTAL PROTEIN: 6.7 G/DL (ref 6.4–8.3)
TRICHOMONAS: NORMAL
TURBIDITY: CLEAR
URINE HGB: NEGATIVE
UROBILINOGEN, URINE: NORMAL
WBC # BLD: 10.7 K/UL (ref 3.5–11.3)
WBC # BLD: ABNORMAL 10*3/UL
WBC UA: NORMAL /HPF (ref 0–5)
YEAST: NORMAL

## 2021-10-21 PROCEDURE — 93005 ELECTROCARDIOGRAM TRACING: CPT | Performed by: STUDENT IN AN ORGANIZED HEALTH CARE EDUCATION/TRAINING PROGRAM

## 2021-10-21 PROCEDURE — 94640 AIRWAY INHALATION TREATMENT: CPT

## 2021-10-21 PROCEDURE — 81001 URINALYSIS AUTO W/SCOPE: CPT

## 2021-10-21 PROCEDURE — 96375 TX/PRO/DX INJ NEW DRUG ADDON: CPT

## 2021-10-21 PROCEDURE — 6370000000 HC RX 637 (ALT 250 FOR IP): Performed by: STUDENT IN AN ORGANIZED HEALTH CARE EDUCATION/TRAINING PROGRAM

## 2021-10-21 PROCEDURE — 2500000003 HC RX 250 WO HCPCS: Performed by: STUDENT IN AN ORGANIZED HEALTH CARE EDUCATION/TRAINING PROGRAM

## 2021-10-21 PROCEDURE — 2580000003 HC RX 258

## 2021-10-21 PROCEDURE — 6360000002 HC RX W HCPCS: Performed by: EMERGENCY MEDICINE

## 2021-10-21 PROCEDURE — 6370000000 HC RX 637 (ALT 250 FOR IP)

## 2021-10-21 PROCEDURE — 36415 COLL VENOUS BLD VENIPUNCTURE: CPT

## 2021-10-21 PROCEDURE — 71045 X-RAY EXAM CHEST 1 VIEW: CPT

## 2021-10-21 PROCEDURE — 6360000002 HC RX W HCPCS: Performed by: STUDENT IN AN ORGANIZED HEALTH CARE EDUCATION/TRAINING PROGRAM

## 2021-10-21 PROCEDURE — 2580000003 HC RX 258: Performed by: STUDENT IN AN ORGANIZED HEALTH CARE EDUCATION/TRAINING PROGRAM

## 2021-10-21 PROCEDURE — 1200000000 HC SEMI PRIVATE

## 2021-10-21 PROCEDURE — 80053 COMPREHEN METABOLIC PANEL: CPT

## 2021-10-21 PROCEDURE — 85025 COMPLETE CBC W/AUTO DIFF WBC: CPT

## 2021-10-21 PROCEDURE — 96365 THER/PROPH/DIAG IV INF INIT: CPT

## 2021-10-21 PROCEDURE — 6360000002 HC RX W HCPCS

## 2021-10-21 PROCEDURE — 99285 EMERGENCY DEPT VISIT HI MDM: CPT

## 2021-10-21 PROCEDURE — 83735 ASSAY OF MAGNESIUM: CPT

## 2021-10-21 PROCEDURE — 83690 ASSAY OF LIPASE: CPT

## 2021-10-21 RX ORDER — BUDESONIDE AND FORMOTEROL FUMARATE DIHYDRATE 160; 4.5 UG/1; UG/1
2 AEROSOL RESPIRATORY (INHALATION) 2 TIMES DAILY
Status: DISCONTINUED | OUTPATIENT
Start: 2021-10-21 | End: 2021-10-25 | Stop reason: HOSPADM

## 2021-10-21 RX ORDER — FUROSEMIDE 20 MG/1
20 TABLET ORAL DAILY
Status: DISCONTINUED | OUTPATIENT
Start: 2021-10-21 | End: 2021-10-25 | Stop reason: HOSPADM

## 2021-10-21 RX ORDER — SODIUM CHLORIDE 0.9 % (FLUSH) 0.9 %
5-40 SYRINGE (ML) INJECTION PRN
Status: CANCELLED | OUTPATIENT
Start: 2021-10-21

## 2021-10-21 RX ORDER — SODIUM CHLORIDE 9 MG/ML
25 INJECTION, SOLUTION INTRAVENOUS PRN
Status: CANCELLED | OUTPATIENT
Start: 2021-10-21

## 2021-10-21 RX ORDER — SODIUM CHLORIDE 9 MG/ML
INJECTION, SOLUTION INTRAVENOUS CONTINUOUS
Status: DISCONTINUED | OUTPATIENT
Start: 2021-10-21 | End: 2021-10-25

## 2021-10-21 RX ORDER — GABAPENTIN 400 MG/1
800 CAPSULE ORAL 3 TIMES DAILY
Status: DISCONTINUED | OUTPATIENT
Start: 2021-10-21 | End: 2021-10-25 | Stop reason: HOSPADM

## 2021-10-21 RX ORDER — ONDANSETRON 2 MG/ML
4 INJECTION INTRAMUSCULAR; INTRAVENOUS ONCE
Status: COMPLETED | OUTPATIENT
Start: 2021-10-21 | End: 2021-10-21

## 2021-10-21 RX ORDER — SODIUM CHLORIDE 0.9 % (FLUSH) 0.9 %
5-40 SYRINGE (ML) INJECTION PRN
Status: DISCONTINUED | OUTPATIENT
Start: 2021-10-21 | End: 2021-10-25 | Stop reason: HOSPADM

## 2021-10-21 RX ORDER — SODIUM CHLORIDE 0.9 % (FLUSH) 0.9 %
5-40 SYRINGE (ML) INJECTION EVERY 12 HOURS SCHEDULED
Status: CANCELLED | OUTPATIENT
Start: 2021-10-21

## 2021-10-21 RX ORDER — SODIUM CHLORIDE 9 MG/ML
25 INJECTION, SOLUTION INTRAVENOUS PRN
Status: DISCONTINUED | OUTPATIENT
Start: 2021-10-21 | End: 2021-10-25 | Stop reason: HOSPADM

## 2021-10-21 RX ORDER — PREGABALIN 150 MG/1
300 CAPSULE ORAL 2 TIMES DAILY
Status: ON HOLD | COMMUNITY
End: 2021-10-24 | Stop reason: HOSPADM

## 2021-10-21 RX ORDER — POLYETHYLENE GLYCOL 3350 17 G/17G
17 POWDER, FOR SOLUTION ORAL DAILY PRN
Status: DISCONTINUED | OUTPATIENT
Start: 2021-10-21 | End: 2021-10-25 | Stop reason: HOSPADM

## 2021-10-21 RX ORDER — KETOROLAC TROMETHAMINE 30 MG/ML
30 INJECTION, SOLUTION INTRAMUSCULAR; INTRAVENOUS ONCE
Status: COMPLETED | OUTPATIENT
Start: 2021-10-21 | End: 2021-10-21

## 2021-10-21 RX ORDER — MORPHINE SULFATE 2 MG/ML
2 INJECTION, SOLUTION INTRAMUSCULAR; INTRAVENOUS ONCE
Status: COMPLETED | OUTPATIENT
Start: 2021-10-21 | End: 2021-10-21

## 2021-10-21 RX ORDER — SODIUM CHLORIDE 0.9 % (FLUSH) 0.9 %
5-40 SYRINGE (ML) INJECTION EVERY 12 HOURS SCHEDULED
Status: DISCONTINUED | OUTPATIENT
Start: 2021-10-21 | End: 2021-10-25 | Stop reason: HOSPADM

## 2021-10-21 RX ORDER — PROMETHAZINE HYDROCHLORIDE 25 MG/ML
6.25 INJECTION, SOLUTION INTRAMUSCULAR; INTRAVENOUS ONCE
Status: COMPLETED | OUTPATIENT
Start: 2021-10-21 | End: 2021-10-21

## 2021-10-21 RX ORDER — POTASSIUM CHLORIDE 20 MEQ/1
40 TABLET, EXTENDED RELEASE ORAL PRN
Status: DISCONTINUED | OUTPATIENT
Start: 2021-10-21 | End: 2021-10-25 | Stop reason: HOSPADM

## 2021-10-21 RX ORDER — FLUOXETINE HYDROCHLORIDE 20 MG/1
60 CAPSULE ORAL DAILY
Status: ON HOLD | COMMUNITY
End: 2021-10-24 | Stop reason: HOSPADM

## 2021-10-21 RX ORDER — FENTANYL CITRATE 50 UG/ML
50 INJECTION, SOLUTION INTRAMUSCULAR; INTRAVENOUS ONCE
Status: COMPLETED | OUTPATIENT
Start: 2021-10-21 | End: 2021-10-21

## 2021-10-21 RX ORDER — MAGNESIUM SULFATE IN WATER 40 MG/ML
2000 INJECTION, SOLUTION INTRAVENOUS ONCE
Status: COMPLETED | OUTPATIENT
Start: 2021-10-21 | End: 2021-10-21

## 2021-10-21 RX ORDER — ACETAMINOPHEN 325 MG/1
650 TABLET ORAL EVERY 4 HOURS PRN
Status: CANCELLED | OUTPATIENT
Start: 2021-10-21

## 2021-10-21 RX ORDER — LISINOPRIL 5 MG/1
5 TABLET ORAL DAILY
Status: DISCONTINUED | OUTPATIENT
Start: 2021-10-21 | End: 2021-10-25 | Stop reason: HOSPADM

## 2021-10-21 RX ORDER — HYDROCODONE BITARTRATE AND ACETAMINOPHEN 5; 325 MG/1; MG/1
1 TABLET ORAL EVERY 6 HOURS PRN
Status: DISPENSED | OUTPATIENT
Start: 2021-10-21 | End: 2021-10-23

## 2021-10-21 RX ORDER — GABAPENTIN 400 MG/1
800 CAPSULE ORAL 2 TIMES DAILY
Status: ON HOLD | COMMUNITY
End: 2022-10-25 | Stop reason: SDUPTHER

## 2021-10-21 RX ORDER — ACETAMINOPHEN 650 MG/1
650 SUPPOSITORY RECTAL EVERY 6 HOURS PRN
Status: DISCONTINUED | OUTPATIENT
Start: 2021-10-21 | End: 2021-10-25 | Stop reason: HOSPADM

## 2021-10-21 RX ORDER — FLUOXETINE HYDROCHLORIDE 20 MG/1
60 CAPSULE ORAL DAILY
Status: DISCONTINUED | OUTPATIENT
Start: 2021-10-21 | End: 2021-10-25 | Stop reason: HOSPADM

## 2021-10-21 RX ORDER — POTASSIUM CHLORIDE 7.45 MG/ML
10 INJECTION INTRAVENOUS PRN
Status: DISCONTINUED | OUTPATIENT
Start: 2021-10-21 | End: 2021-10-25 | Stop reason: HOSPADM

## 2021-10-21 RX ORDER — 0.9 % SODIUM CHLORIDE 0.9 %
1000 INTRAVENOUS SOLUTION INTRAVENOUS ONCE
Status: COMPLETED | OUTPATIENT
Start: 2021-10-21 | End: 2021-10-21

## 2021-10-21 RX ORDER — SCOLOPAMINE TRANSDERMAL SYSTEM 1 MG/1
1 PATCH, EXTENDED RELEASE TRANSDERMAL ONCE
Status: COMPLETED | OUTPATIENT
Start: 2021-10-21 | End: 2021-10-22

## 2021-10-21 RX ORDER — ACETAMINOPHEN 325 MG/1
650 TABLET ORAL EVERY 6 HOURS PRN
Status: DISCONTINUED | OUTPATIENT
Start: 2021-10-21 | End: 2021-10-25 | Stop reason: HOSPADM

## 2021-10-21 RX ORDER — TRAZODONE HYDROCHLORIDE 150 MG/1
250 TABLET ORAL NIGHTLY
Status: ON HOLD | COMMUNITY
End: 2021-10-24 | Stop reason: HOSPADM

## 2021-10-21 RX ADMIN — MORPHINE SULFATE 2 MG: 2 INJECTION, SOLUTION INTRAMUSCULAR; INTRAVENOUS at 21:30

## 2021-10-21 RX ADMIN — MORPHINE SULFATE 2 MG: 2 INJECTION, SOLUTION INTRAMUSCULAR; INTRAVENOUS at 15:25

## 2021-10-21 RX ADMIN — ONDANSETRON 4 MG: 2 INJECTION INTRAMUSCULAR; INTRAVENOUS at 07:13

## 2021-10-21 RX ADMIN — SODIUM CHLORIDE: 9 INJECTION, SOLUTION INTRAVENOUS at 15:09

## 2021-10-21 RX ADMIN — GABAPENTIN 800 MG: 400 CAPSULE ORAL at 21:29

## 2021-10-21 RX ADMIN — ONDANSETRON 4 MG: 2 INJECTION INTRAMUSCULAR; INTRAVENOUS at 09:23

## 2021-10-21 RX ADMIN — POTASSIUM CHLORIDE 40 MEQ: 1500 TABLET, EXTENDED RELEASE ORAL at 16:41

## 2021-10-21 RX ADMIN — FENTANYL CITRATE 50 MCG: 50 INJECTION, SOLUTION INTRAMUSCULAR; INTRAVENOUS at 09:23

## 2021-10-21 RX ADMIN — ENOXAPARIN SODIUM 40 MG: 40 INJECTION SUBCUTANEOUS at 15:26

## 2021-10-21 RX ADMIN — LISINOPRIL 5 MG: 5 TABLET ORAL at 21:30

## 2021-10-21 RX ADMIN — HYDROCODONE BITARTRATE AND ACETAMINOPHEN 1 TABLET: 5; 325 TABLET ORAL at 23:48

## 2021-10-21 RX ADMIN — BUDESONIDE AND FORMOTEROL FUMARATE DIHYDRATE 2 PUFF: 160; 4.5 AEROSOL RESPIRATORY (INHALATION) at 19:30

## 2021-10-21 RX ADMIN — FUROSEMIDE 20 MG: 20 TABLET ORAL at 21:30

## 2021-10-21 RX ADMIN — SODIUM CHLORIDE: 9 INJECTION, SOLUTION INTRAVENOUS at 23:48

## 2021-10-21 RX ADMIN — MAGNESIUM SULFATE 2000 MG: 2 INJECTION INTRAVENOUS at 08:07

## 2021-10-21 RX ADMIN — FAMOTIDINE 20 MG: 10 INJECTION, SOLUTION INTRAVENOUS at 07:13

## 2021-10-21 RX ADMIN — SODIUM CHLORIDE 1000 ML: 9 INJECTION, SOLUTION INTRAVENOUS at 07:14

## 2021-10-21 RX ADMIN — PROMETHAZINE HYDROCHLORIDE 6.25 MG: 25 INJECTION INTRAMUSCULAR; INTRAVENOUS at 15:25

## 2021-10-21 RX ADMIN — KETOROLAC TROMETHAMINE 30 MG: 30 INJECTION, SOLUTION INTRAMUSCULAR at 11:09

## 2021-10-21 ASSESSMENT — ENCOUNTER SYMPTOMS
WHEEZING: 0
ALLERGIC/IMMUNOLOGIC NEGATIVE: 1
COUGH: 0
BLOOD IN STOOL: 1
SORE THROAT: 0
NAUSEA: 1
ABDOMINAL PAIN: 1
SHORTNESS OF BREATH: 0
VOMITING: 1
DIARRHEA: 1
EYES NEGATIVE: 1
PHOTOPHOBIA: 0
CHEST TIGHTNESS: 0

## 2021-10-21 ASSESSMENT — PAIN DESCRIPTION - LOCATION
LOCATION: ABDOMEN
LOCATION: ABDOMEN

## 2021-10-21 ASSESSMENT — PAIN SCALES - GENERAL
PAINLEVEL_OUTOF10: 9
PAINLEVEL_OUTOF10: 9
PAINLEVEL_OUTOF10: 7
PAINLEVEL_OUTOF10: 7
PAINLEVEL_OUTOF10: 8
PAINLEVEL_OUTOF10: 10
PAINLEVEL_OUTOF10: 6
PAINLEVEL_OUTOF10: 7

## 2021-10-21 ASSESSMENT — PAIN DESCRIPTION - PAIN TYPE
TYPE: ACUTE PAIN
TYPE: CHRONIC PAIN

## 2021-10-21 ASSESSMENT — PAIN DESCRIPTION - DESCRIPTORS: DESCRIPTORS: ACHING;CONSTANT;DISCOMFORT

## 2021-10-21 ASSESSMENT — PAIN DESCRIPTION - ORIENTATION: ORIENTATION: RIGHT;LEFT;MID

## 2021-10-21 ASSESSMENT — PAIN DESCRIPTION - FREQUENCY: FREQUENCY: CONTINUOUS

## 2021-10-21 ASSESSMENT — PAIN DESCRIPTION - PROGRESSION: CLINICAL_PROGRESSION: NOT CHANGED

## 2021-10-21 ASSESSMENT — PAIN - FUNCTIONAL ASSESSMENT: PAIN_FUNCTIONAL_ASSESSMENT: PREVENTS OR INTERFERES SOME ACTIVE ACTIVITIES AND ADLS

## 2021-10-21 ASSESSMENT — PAIN DESCRIPTION - ONSET: ONSET: ON-GOING

## 2021-10-21 NOTE — ED TRIAGE NOTES
Patient reports she has been vomiting and having diarrhea since Saturday, including while she was admitted here, no vomiting since arriving. Patient states we discharged here with crohn's and colitis but she never got her meds because she was too sick, she continues to make a \"baaahhaa\" sound while talking and is very jump when IV was attempted. Vitals are stable.

## 2021-10-21 NOTE — ED PROVIDER NOTES
[facility] ED  Emergency Department Encounter  EmergencyMedicine Resident     Pt Name:Nguyen Vásquez  MRN: [id number]  [place] 1965  Date of evaluation: 10/21/21  PCP:  Trinidad Vela MD    [address] [place] [address]       Chief Complaint   Patient presents with    Nausea     dx with crohns & colitis, did not get meds    Emesis    Diarrhea       HISTORY OF PRESENT ILLNESS  (Location/Symptom, Timing/Onset, Context/Setting, Quality, Duration, Modifying Factors, Severity.)      Lexie Yancey is a 61 y.o. female, with pertinent past medical history of Crohn's disease, hypothyroidism, COPD, bipolar, CAD, and polysubstance abuse, who presents with nausea and vomiting. Patient notes that since the 15th has been having diffuse lower abdominal pain that is cramping in nature and was hospitalized on the 15th for this. Patient was seen by GI during this timeframe and was told that she could have an outpatient EGD for evaluation along with colonoscopy. Patient notes that she was discharged yesterday after feeling significantly better and was able to move around without any abdominal pain nausea or vomiting. She notes that approximately 3 AM this morning she woke up with bilateral lower quadrant abdominal cramping pain that radiates across the lower side of her abdomen and worse with movement. She also notes having associated nausea vomiting and diarrhea with this. Patient also does report having black tarry stools and notes no recent GI procedures. Patient does not take any aspirin. Patient denies any kind of chest pain, shortness of breath, vaginal bleeding or discharge, dysuria, hematuria, numbness and or weakness.          PAST MEDICAL / SURGICAL / SOCIAL / FAMILY HISTORY      has a past medical history of Acid reflux, Anxiety, Arthritis, Asthma, Bipolar 1 disorder ([address] Bowel obstruction ([address] COPD (chronic obstructive pulmonary disease) ([address] Crohn disease ([address] Depression, Dry eye, Fibromyalgia, Gout, Headache, Hyperlipidemia, Hypertension, Hypothyroidism, Kidney stones, Muscle spasm, Neuropathy, Pain, Personal history of other medical treatment, Wears partial dentures, and Wellness examination. has a past surgical history that includes Tonsillectomy and adenoidectomy; Tubal ligation; Cholecystectomy; Bunionectomy (Left); Colonoscopy (04/30/2007); Colonoscopy (10/12/2017); and Abdomen surgery. Social History     Socioeconomic History    Marital status: Single     Spouse name: Not on file    Number of children: Not on file    Years of education: Not on file    Highest education level: Not on file   Occupational History    Not on file   Tobacco Use    Smoking status: Current Every Day Smoker     Packs/day: 0.50     Years: 37.00     Pack years: 18.50     Types: Cigarettes    Smokeless tobacco: Former User     Types: Chew     Quit date: 9/3/2001   Vaping Use    Vaping Use: Former    Quit date: 9/3/2019    Substances: Nicotine   Substance and Sexual Activity    Alcohol use: Not Currently    Drug use: Yes     Types: Marijuana     Comment: h/o of substance abuse but denies drug use    Sexual activity: Not on file   Other Topics Concern    Not on file   Social History Narrative    Not on file     Social Determinants of Health     Financial Resource Strain:     Difficulty of Paying Living Expenses:    Food Insecurity:     Worried About [address] [employer] in the Last Year:     [address] in the Last Year:    Transportation Needs:     Lack of Transportation (Medical):      Lack of Transportation (Non-Medical):    Physical Activity:     Days of Exercise per Week:     Minutes of Exercise per Session:    Stress:     Feeling of Stress :    Social Connections:     Frequency of Communication with Friends and Family:     Frequency of Social Gatherings with Friends and Family:     Attends [employer] Services:     Active Member of Clubs or Organizations:     Attends [employer] or Organization Meetings:     Marital Status:    Intimate Partner Violence:     Fear of Current or Ex-Partner:     Emotionally Abused:     Physically Abused:     Sexually Abused:        Family History   Problem Relation Age of Onset    Diabetes Father     Lung Cancer Father     Hypertension Mother     Cancer Mother     High Blood Pressure Mother     Crohn's Disease Brother     Heart Disease Brother        Allergies:  Azithromycin, Methylprednisolone, Penicillins, and Tape [adhesive tape]    Home Medications:  Prior to Admission medications    Medication Sig Start Date End Date Taking? Authorizing Provider   gabapentin (NEURONTIN) 400 MG capsule Take 800 mg by mouth 3 times daily. Yes Historical Provider, MD   pregabalin (LYRICA) 150 MG capsule Take 300 mg by mouth 2 times daily. Yes Historical Provider, MD   FLUoxetine (PROZAC) 20 MG capsule Take 60 mg by mouth daily   Yes Historical Provider, MD   traZODone (DESYREL) 150 MG tablet Take 250 mg by mouth nightly   Yes Historical Provider, MD   potassium bicarb-citric acid (EFFER-K) 10 MEQ TBEF effervescent tablet Take 4 tablets by mouth daily for 3 days If 4 tablets are not tolerated, dilute 2 tablets instead and take BID 10/20/21 10/23/21 Yes Juan Rodriguez MD   trimethobenzamide Gentry Abelson) 300 MG capsule Take 1 capsule by mouth 3 times daily as needed (vomiting) 10/20/21 10/23/21 Yes Juan Rodriguez MD   HYDROcodone-acetaminophen (NORCO) 5-325 MG per tablet Take 1 tablet by mouth every 6 hours as needed for Pain for up to 14 days.  10/13/21 10/27/21 Yes Narcisa Mcgee MD   furosemide (LASIX) 20 MG tablet Take 1 tablet by mouth daily 9/19/21  Yes Edward Chris MD   budesonide-formoterol [facility]) 160-4.5 MCG/ACT AERO Inhale 2 puffs into the lungs 2 times daily 9/19/21  Yes Edward Chris MD   lisinopril (PRINIVIL;ZESTRIL) 5 MG tablet Take 1 tablet by mouth daily 9/20/21  Yes Edward Chris MD   nicotine (NICODERM CQ) 21 MG/24HR Place 1 patch onto the skin daily 9/20/21  Yes Tee Vasquez MD   acetaminophen (TYLENOL) 500 MG tablet Take 2 tablets by mouth 3 times daily  Patient taking differently: Take 1,000 mg by mouth 3 times daily as needed  7/17/21  Yes Marce Kim,    lidocaine (LIDODERM) 5 % Place 1 patch onto the skin daily 12 hours on, 12 hours off. Yes Historical Provider, MD   polyethylene glycol (GLYCOLAX) 17 g packet Take 17 g by mouth daily as needed for Constipation 10/20/21 11/19/21  Barbi Pascual MD   ipratropium-albuterol (DUONEB) 0.5-2.5 (3) MG/3ML SOLN nebulizer solution Inhale 3 mLs into the lungs every 6 hours as needed for Shortness of Breath 9/19/21 10/19/21  Tee Vasquez MD   Polyvinyl Alcohol-Povidone (REFRESH OP) Place 1 drop into both eyes 3 times daily    Historical Provider, MD       REVIEW OF SYSTEMS    (2-9 systems for level 4, 10 or more for level 5)      Review of Systems   Constitutional: Negative for chills, diaphoresis, fatigue and fever. HENT: Negative for congestion and sore throat. Eyes: Negative for photophobia and visual disturbance. Respiratory: Negative for cough and shortness of breath. Cardiovascular: Negative for palpitations and leg swelling. Gastrointestinal: Positive for abdominal pain, blood in stool (black stool), diarrhea, nausea and vomiting. Endocrine: Negative for polydipsia, polyphagia and polyuria. Genitourinary: Negative for difficulty urinating, dysuria, flank pain, frequency, hematuria, vaginal bleeding and vaginal discharge. Musculoskeletal: Negative for arthralgias and myalgias. Skin: Negative for rash and wound. Neurological: Negative for weakness, light-headedness and numbness. PHYSICAL EXAM   (up to 7 for level 4, 8 or more for level 5)      INITIAL VITALS:   /70   Pulse 70   Temp 98.6 °F (37 °C) (Oral)   Resp 20   Ht 5' 6\" (1.676 m)   Wt 160 lb (72.6 kg)   SpO2 99%   BMI 25.82 kg/m²     Physical Exam  Vitals and nursing note reviewed. Constitutional:       General: She is in acute distress (mild). Appearance: Normal appearance. She is normal weight. She is not toxic-appearing or diaphoretic. HENT:      Head: Normocephalic and atraumatic. Right Ear: External ear normal.      Left Ear: External ear normal.      Nose: Nose normal.      Mouth/Throat:      Mouth: Mucous membranes are moist.      Pharynx: Oropharynx is clear. Eyes:      Extraocular Movements: Extraocular movements intact. Conjunctiva/sclera: Conjunctivae normal.      Pupils: Pupils are equal, round, and reactive to light. Cardiovascular:      Rate and Rhythm: Normal rate and regular rhythm. Pulses: Normal pulses. Pulmonary:      Effort: Pulmonary effort is normal. No respiratory distress. Breath sounds: Normal breath sounds. Abdominal:      General: Abdomen is flat. There is no distension. Tenderness: There is abdominal tenderness in the right lower quadrant. There is no guarding or rebound. Negative signs include Smith's sign, Rovsing's sign and McBurney's sign. Musculoskeletal:         General: No swelling or tenderness. Normal range of motion. Cervical back: Normal range of motion and neck supple. No rigidity. Skin:     General: Skin is warm and dry. Capillary Refill: Capillary refill takes less than 2 seconds. Neurological:      General: No focal deficit present. Mental Status: She is alert and oriented to person, place, and time.          DIFFERENTIAL  DIAGNOSIS     PLAN (LABS / IMAGING / EKG):  Orders Placed This Encounter   Procedures    XR CHEST PORTABLE    Urinalysis Reflex to Culture    CBC Auto Differential    Comprehensive Metabolic Panel w/ Reflex to MG    Lipase    Magnesium    Microscopic Urinalysis    Inpatient consult to Hospitalist    Inpatient consult to Internal Medicine    EKG 12 Lead    Insert peripheral IV    PATIENT STATUS (FROM ED OR OR/PROCEDURAL) Inpatient       MEDICATIONS ORDERED:  Orders Placed This Encounter   Medications    0.9 % sodium chloride bolus    famotidine (PEPCID) injection 20 mg    ondansetron (ZOFRAN) injection 4 mg    magnesium sulfate 2000 mg in 50 mL IVPB premix    ondansetron (ZOFRAN) injection 4 mg    fentaNYL (SUBLIMAZE) injection 50 mcg    ketorolac (TORADOL) injection 30 mg       DDX: Electrolyte abnormality, prolonged QTC, arrhythmia, anemia, UTI, Crohn's disease, gastric reflux, pancreatitis, hepatitis, pneumonia    DIAGNOSTIC RESULTS / EMERGENCY DEPARTMENT COURSE / MDM   LAB RESULTS:  Results for orders placed or performed during the hospital encounter of 10/21/21   Urinalysis Reflex to Culture    Specimen: Urine, clean catch   Result Value Ref Range    Color, UA Yellow Yellow    Turbidity UA Clear Clear    Glucose, Ur NEGATIVE NEGATIVE    Bilirubin Urine NEGATIVE NEGATIVE    Ketones, Urine MODERATE (A) NEGATIVE    Specific Gravity, UA 1.014 1.005 - 1.030    Urine Hgb NEGATIVE NEGATIVE    pH, UA 7.0 5.0 - 8.0    Protein, UA TRACE (A) NEGATIVE    Urobilinogen, Urine Normal Normal    Nitrite, Urine NEGATIVE NEGATIVE    Leukocyte Esterase, Urine NEGATIVE NEGATIVE    Urinalysis Comments NOT REPORTED    CBC Auto Differential   Result Value Ref Range    WBC 10.7 3.5 - 11.3 k/uL    RBC 4.48 3.95 - 5.11 m/uL    Hemoglobin 12.6 11.9 - 15.1 g/dL    Hematocrit 38.8 36.3 - 47.1 %    MCV 86.6 82.6 - 102.9 fL    MCH 28.1 25.2 - 33.5 pg    MCHC 32.5 28.4 - 34.8 g/dL    RDW 16.1 (H) 11.8 - 14.4 %    Platelets 013 278 - 190 k/uL    MPV 11.3 8.1 - 13.5 fL    NRBC Automated 0.0 0.0 per 100 WBC    Differential Type NOT REPORTED     Seg Neutrophils 76 (H) 36 - 65 %    Lymphocytes 10 (L) 24 - 43 %    Monocytes 9 3 - 12 %    Eosinophils % 3 1 - 4 %    Basophils 1 0 - 2 %    Immature Granulocytes 1 (H) 0 %    Segs Absolute 8.23 (H) 1.50 - 8.10 k/uL    Absolute Lymph # 1.07 (L) 1.10 - 3.70 k/uL    Absolute Mono # 0.93 0.10 - 1.20 k/uL    Absolute Eos # 0.27 0.00 - 0.44 k/uL Basophils Absolute 0.07 0.00 - 0.20 k/uL    Absolute Immature Granulocyte 0.09 0.00 - 0.30 k/uL    WBC Morphology NOT REPORTED     RBC Morphology ANISOCYTOSIS PRESENT     Platelet Estimate NOT REPORTED    Comprehensive Metabolic Panel w/ Reflex to MG   Result Value Ref Range    Glucose 90 70 - 99 mg/dL    BUN 7 6 - 20 mg/dL    CREATININE 0.46 (L) 0.50 - 0.90 mg/dL    Bun/Cre Ratio NOT REPORTED 9 - 20    Calcium 8.8 8.6 - 10.4 mg/dL    Sodium 133 (L) 135 - 144 mmol/L    Potassium 3.4 (L) 3.7 - 5.3 mmol/L    Chloride 98 98 - 107 mmol/L    CO2 21 20 - 31 mmol/L    Anion Gap 14 9 - 17 mmol/L    Alkaline Phosphatase 86 35 - 104 U/L    ALT 11 5 - 33 U/L    AST 32 (H) <32 U/L    Total Bilirubin 0.72 0.3 - 1.2 mg/dL    Total Protein 6.7 6.4 - 8.3 g/dL    Albumin 3.9 3.5 - 5.2 g/dL    Albumin/Globulin Ratio 1.4 1.0 - 2.5    GFR Non-African American >60 >60 mL/min    GFR African American >60 >60 mL/min    GFR Comment          GFR Staging NOT REPORTED    Lipase   Result Value Ref Range    Lipase 42 13 - 60 U/L   Magnesium   Result Value Ref Range    Magnesium 2.0 1.6 - 2.6 mg/dL   Microscopic Urinalysis   Result Value Ref Range    -          WBC, UA 2 TO 5 0 - 5 /HPF    RBC, UA 0 TO 2 0 - 4 /HPF    Casts UA  0 - 8 /LPF     2 TO 5 HYALINE Reference range defined for non-centrifuged specimen. Crystals, UA NOT REPORTED None /HPF    Epithelial Cells UA 5 TO 10 0 - 5 /HPF    Renal Epithelial, UA NOT REPORTED 0 /HPF    Bacteria, UA NOT REPORTED None    Mucus, UA NOT REPORTED None    Trichomonas, UA NOT REPORTED None    Amorphous, UA NOT REPORTED None    Other Observations UA NOT REPORTED NOT REQ. Yeast, UA NOT REPORTED None       IMPRESSION: 69-year-old female presents for bilateral lower abdominal pain with nausea vomiting and diarrhea. Patient appears to be in mild distress but not toxic appearing.   Patient's initial vitals are stable nonconcerning set for slight tachypnea 52 however patient is speaking in full sentences without any difficulty and has no signs of respiratory distress or tachypnea while during my evaluation. Clear lung sounds. Abdomen does have some right lower quadrant tenderness with no guarding/rigidity/rebound tenderness, Rovsing sign, or McBurney's point. Concern for above differential diagnosis. Will order CBC, CMP, urinalysis, lipase, IV fluids, Pepcid, and EKG. Possible admission. RADIOLOGY:  XR CHEST PORTABLE    Result Date: 10/21/2021  EXAMINATION: ONE XRAY VIEW OF THE CHEST 10/21/2021 7:28 am COMPARISON: 10/16/2021 HISTORY: ORDERING SYSTEM PROVIDED HISTORY: Cough with bilateral interstitial infiltrates noted on prior chest x-ray on 10/16/2021. TECHNOLOGIST PROVIDED HISTORY: Cough with bilateral interstitial infiltrates noted on prior chest x-ray on 10/16/2021. FINDINGS: Normal cardiomediastinal silhouette. Mild perihilar interstitial prominence likely vascular. No discrete area of consolidation. No pleural effusion. No pneumothorax. Bones grossly intact. No focal consolidation. EKG  EKG Interpretation    Interpreted by emergency department physician    Rhythm: normal sinus   Rate: normal  Axis: normal  Ectopy: none  Conduction: normal  ST Segments: normal  T Waves: no acute change  Q Waves: inferior leads  QTC of 533 ms    Clinical Impression: no acute changes with prolonged QTC seen on prior EKG on 10/20/2021. Jonna Espino,       All EKG's are interpreted by the Emergency Department Physician who either signs or Co-signs this chart in the absence of a cardiologist.    EMERGENCY DEPARTMENT COURSE:  ED Course as of Oct 21 1149   Thu Oct 21, 2021   0753 CMP reveals slight hypokalemia of 3.4 and hyponatremia of 133, which is unchanged from yesterday's BMP. Lipase is normal at 42. [CS]   0906 Urinalysis is negative. [CS]   9956 Patient was reevaluated bedside.   Prior to arrival patient was resting comfortably in bed however when medical team went into the room to evaluate the patient, she started complaining the pain was \"a 9/10\" and then started having retching that was not heard prior to entering and started only after seeing medical team into the room. [CS]   1348 Talked to Internal medicine team, who accepted pt. Await for transfer to the floor.    [CS]      ED Course User Index  [CS] Sona Lauren DO         PROCEDURES:  None    CONSULTS:  IP CONSULT TO HOSPITALIST  IP CONSULT TO INTERNAL MEDICINE    CRITICAL CARE:  Please see attending note    FINAL IMPRESSION      1. Prolonged Q-T interval on ECG    2. Nausea vomiting and diarrhea    3. Lower abdominal pain          DISPOSITION / PLAN     DISPOSITION Admitted 10/21/2021 11:18:41 AM      PATIENT REFERRED TO:  No follow-up provider specified.     DISCHARGE MEDICATIONS:  New Prescriptions    No medications on file       Sona Lauren DO  Emergency Medicine Resident    (Please note that portions of thisnote were completed with a voice recognition program.  Efforts were made to edit the dictations but occasionally words are mis-transcribed.)       Sona Lauren DO  Resident  10/21/21 1828

## 2021-10-21 NOTE — ED PROVIDER NOTES
[facility]     Emergency Department     Faculty Attestation    I performed a history and physical examination of the patient and discussed management with the resident. I have reviewed and agree with the residents findings including all diagnostic interpretations, and treatment plans as written. Any areas of disagreement are noted on the chart. I was personally present for the key portions of any procedures. I have documented in the chart those procedures where I was not present during the key portions. I have reviewed the emergency nurses triage note. I agree with the chief complaint, past medical history, past surgical history, allergies, medications, social and family history as documented unless otherwise noted below. Documentation of the HPI, Physical Exam and Medical Decision Making performed by scribsunday is based on my personal performance of the HPI, PE and MDM. For Physician Assistant/ Nurse Practitioner cases/documentation I have personally evaluated this patient and have completed at least one if not all key elements of the E/M (history, physical exam, and MDM). Additional findings are as noted. 62 yo F nausea vomiting, r side abdominal pain, no cp no sob, no fever, pt left before receiving all of her medication 2 days prior,   pe Ja Grant escort for exam vss, neck supple, abdomen tender ruq rlq, no distension, no rigidity, + voluntary guarding, no calf tenderness, no calf swelling,     (ct 10/15 > ileitis, & chronic colitis),   eval started, care turned over to day shift,     EKG Interpretation    Interpreted by me  Normal sinus, hr 73, no ischemia, normal axis, qtc 533,     CRITICAL CARE: There was a high probability of clinically significant/life threatening deterioration in this patient's condition which required my urgent intervention. Total critical care time was 10 minutes. This excludes any time for separately reportable procedures. [facility] 24, DO  10/21/21 [address] DO  10/21/21 [address] DO  10/21/21 3329

## 2021-10-21 NOTE — H&P
[address]     Department of Internal Medicine - Staff Internal Medicine Teaching Service          ADMISSION NOTE/HISTORY AND PHYSICAL EXAMINATION   Date: 10/21/2021  Patient Name: Tonia Hernandez  Date of admission: 10/21/2021  6:11 AM  YOB: 1965  PCP: Hank Hernandez MD  History Obtained From:  patient, electronic medical record    CHIEF COMPLAINT     Chief complaint: Nausea, emesis, diarrhea and abdominal pain     HISTORY OF PRESENTING ILLNESS     The patient is a pleasant 61 y.o. female with a PMHx significant for Crohn's disease, COPD, bipolar disorder, polysubstance abuse, HTN and GERD who presents with a chief complaint of abdominal pain. Pt states that since October 15, she has had increasing right lower quadrant abdominal pain that she rates 10/10. The pain does not radiate anywhere but pt was diffusely tender on examination. Pt states she has been having diarrhea for 6 days. She has been vomiting that is green in nature. Pt was discharged yesterday after being admitted for N/V. Pt had prolonged QTc. Last QTc 529. Pt states she has not eaten in 6 days. Pt denies black tarry stools and there was no recent GI procedures. Significant labs in the ED: K 3.4, ALT: 11 and AST 32. Review of Systems:  Review of Systems   Constitutional: Positive for appetite change, chills and fatigue. Negative for fever. HENT: Negative. Eyes: Negative. Respiratory: Negative for chest tightness, shortness of breath and wheezing. Cardiovascular: Negative for chest pain and leg swelling. Gastrointestinal: Positive for abdominal pain, diarrhea, nausea and vomiting. Endocrine: Negative. Genitourinary: Negative for decreased urine volume, difficulty urinating, dysuria, enuresis, frequency and hematuria. Musculoskeletal: Negative. Skin: Negative. Allergic/Immunologic: Negative. Neurological: Negative. Hematological: Negative. Psychiatric/Behavioral: Negative. PAST MEDICAL HISTORY     Past Medical History:   Diagnosis Date    Acid reflux     Anxiety     Arthritis     Left hip    Asthma     Bipolar 1 disorder (HCC)     Bowel obstruction (HCC)     COPD (chronic obstructive pulmonary disease) (HCC)     O2 3L PRN/ Dr. R[facility]/last seen 2020/appt.9-7-21 for Clearance    Crohn disease ([facility] [address]     Depression     Dry eye     Fibromyalgia     Gout     Headache     Hyperlipidemia     Hypertension     Hypothyroidism     Kidney stones     Muscle spasm     Neuropathy     Pain     left hip    Personal history of other medical treatment     Pt. seen by Dr. Chaparro Kline for clearance for this OR Left hip/ Normally pt. doesn't follow [facility] Card[facility]. [facility]    Wears partial dentures     upper    Wellness examination     PCP Edwin Sterling MD/ genna/last seen 8-24-21       PAST SURGICAL HISTORY     Past Surgical History:   Procedure Laterality Date    ABDOMEN SURGERY      bowel obstruction OR    BUNIONECTOMY Left     CHOLECYSTECTOMY      COLONOSCOPY  04/30/2007    no gross pathology seen in the colon and also 3-4 inches of the ileum    COLONOSCOPY  10/12/2017    normal    TONSILLECTOMY AND ADENOIDECTOMY      TUBAL LIGATION         ALLERGIES     Azithromycin, Methylprednisolone, Penicillins, and Tape [adhesive tape]    MEDICATIONS PRIOR TO ADMISSION     Prior to Admission medications    Medication Sig Start Date End Date Taking? Authorizing Provider   gabapentin (NEURONTIN) 400 MG capsule Take 800 mg by mouth 3 times daily. Yes Historical Provider, MD   pregabalin (LYRICA) 150 MG capsule Take 300 mg by mouth 2 times daily.    Yes Historical Provider, MD   FLUoxetine (PROZAC) 20 MG capsule Take 60 mg by mouth daily   Yes Historical Provider, MD   traZODone (DESYREL) 150 MG tablet Take 250 mg by mouth nightly   Yes Historical Provider, MD   potassium bicarb-citric acid (EFFER-K) 10 MEQ TBEF effervescent tablet Take 4 tablets by mouth daily for 3 days If 4 tablets are not tolerated, dilute 2 tablets instead and take BID 10/20/21 10/23/21 Yes Kamlesh Alarcon MD   trimethobenzamide Clarene Annemarie) 300 MG capsule Take 1 capsule by mouth 3 times daily as needed (vomiting) 10/20/21 10/23/21 Yes Kamlesh Alarcon MD   HYDROcodone-acetaminophen (NORCO) 5-325 MG per tablet Take 1 tablet by mouth every 6 hours as needed for Pain for up to 14 days. 10/13/21 10/27/21 Yes Andrei Schreiber MD   furosemide (LASIX) 20 MG tablet Take 1 tablet by mouth daily 9/19/21  Yes Rere Watts MD   budesonide-formoterol [facility]) 160-4.5 MCG/ACT AERO Inhale 2 puffs into the lungs 2 times daily 9/19/21  Yes Rere Watts MD   lisinopril (PRINIVIL;ZESTRIL) 5 MG tablet Take 1 tablet by mouth daily 9/20/21  Yes Rere Watts MD   nicotine (NICODERM CQ) 21 MG/24HR Place 1 patch onto the skin daily 9/20/21  Yes Rere Watts MD   acetaminophen (TYLENOL) 500 MG tablet Take 2 tablets by mouth 3 times daily  Patient taking differently: Take 1,000 mg by mouth 3 times daily as needed  7/17/21  Yes Marce Kim,    lidocaine (LIDODERM) 5 % Place 1 patch onto the skin daily 12 hours on, 12 hours off. Yes Historical Provider, MD   polyethylene glycol (GLYCOLAX) 17 g packet Take 17 g by mouth daily as needed for Constipation 10/20/21 11/19/21  Kamlesh Alarcon MD   ipratropium-albuterol (DUONEB) 0.5-2.5 (3) MG/3ML SOLN nebulizer solution Inhale 3 mLs into the lungs every 6 hours as needed for Shortness of Breath 9/19/21 10/19/21  Rere Watts MD   Polyvinyl Alcohol-Povidone (REFRESH OP) Place 1 drop into both eyes 3 times daily    Historical Provider, MD       SOCIAL HISTORY     Tobacco: Current smoker . 50 packs per day   Alcohol: Denies   Illicits: Marijuana and cocaine     FAMILY HISTORY     Family History   Problem Relation Age of Onset    Diabetes Father     Lung Cancer Father     Hypertension Mother     Cancer Mother    [place] High Blood Pressure Mother     Crohn's Disease Brother     Heart Disease Brother        PHYSICAL EXAM     Vitals: /70   Pulse 70   Temp 98.6 °F (37 °C) (Oral)   Resp 20   Ht 5' 6\" (1.676 m)   Wt 160 lb (72.6 kg)   SpO2 99%   BMI 25.82 kg/m²   Tmax: Temp (24hrs), Av.6 °F (37 °C), Min:98.6 °F (37 °C), Max:98.6 °F (37 °C)    Last Body weight:   Wt Readings from Last 3 Encounters:   10/21/21 160 lb (72.6 kg)   10/20/21 142 lb 3.2 oz (64.5 kg)   21 149 lb 14.6 oz (68 kg)     Body Mass Index : Body mass index is 25.82 kg/m². PHYSICAL EXAMINATION:  Physical Exam  Vitals and nursing note reviewed. Constitutional:       Appearance: Normal appearance. She is normal weight. HENT:      Head: Normocephalic and atraumatic. Nose: Nose normal.      Mouth/Throat:      Mouth: Mucous membranes are moist.      Pharynx: Oropharynx is clear. Eyes:      Extraocular Movements: Extraocular movements intact. Conjunctiva/sclera: Conjunctivae normal.      Pupils: Pupils are equal, round, and reactive to light. Cardiovascular:      Rate and Rhythm: Normal rate and regular rhythm. Pulses: Normal pulses. Heart sounds: No murmur heard. No friction rub. No gallop. Pulmonary:      Effort: Pulmonary effort is normal. No respiratory distress. Breath sounds: Normal breath sounds. No wheezing or rales. Abdominal:      General: Abdomen is flat. Bowel sounds are normal. There is no distension. Palpations: Abdomen is soft. There is no mass. Tenderness: There is abdominal tenderness. There is right CVA tenderness, guarding and rebound. There is no left CVA tenderness. Hernia: No hernia is present. Musculoskeletal:         General: No swelling or tenderness. Normal range of motion. Cervical back: Normal range of motion. Right lower leg: No edema. Left lower leg: No edema. Skin:     General: Skin is warm.    Neurological:      General: No focal deficit present. Mental Status: She is alert and oriented to person, place, and time. Mental status is at baseline. Psychiatric:         Mood and Affect: Mood normal.         Behavior: Behavior normal.         Thought Content:  Thought content normal.         Judgment: Judgment normal.         INVESTIGATIONS     Laboratory Testing:     Recent Results (from the past 24 hour(s))   CBC Auto Differential    Collection Time: 10/21/21  6:48 AM   Result Value Ref Range    WBC 10.7 3.5 - 11.3 k/uL    RBC 4.48 3.95 - 5.11 m/uL    Hemoglobin 12.6 11.9 - 15.1 g/dL    Hematocrit 38.8 36.3 - 47.1 %    MCV 86.6 82.6 - 102.9 fL    MCH 28.1 25.2 - 33.5 pg    MCHC 32.5 28.4 - 34.8 g/dL    RDW 16.1 (H) 11.8 - 14.4 %    Platelets 293 073 - 409 k/uL    MPV 11.3 8.1 - 13.5 fL    NRBC Automated 0.0 0.0 per 100 WBC    Differential Type NOT REPORTED     Seg Neutrophils 76 (H) 36 - 65 %    Lymphocytes 10 (L) 24 - 43 %    Monocytes 9 3 - 12 %    Eosinophils % 3 1 - 4 %    Basophils 1 0 - 2 %    Immature Granulocytes 1 (H) 0 %    Segs Absolute 8.23 (H) 1.50 - 8.10 k/uL    Absolute Lymph # 1.07 (L) 1.10 - 3.70 k/uL    Absolute Mono # 0.93 0.10 - 1.20 k/uL    Absolute Eos # 0.27 0.00 - 0.44 k/uL    Basophils Absolute 0.07 0.00 - 0.20 k/uL    Absolute Immature Granulocyte 0.09 0.00 - 0.30 k/uL    WBC Morphology NOT REPORTED     RBC Morphology ANISOCYTOSIS PRESENT     Platelet Estimate NOT REPORTED    Comprehensive Metabolic Panel w/ Reflex to MG    Collection Time: 10/21/21  6:48 AM   Result Value Ref Range    Glucose 90 70 - 99 mg/dL    BUN 7 6 - 20 mg/dL    CREATININE 0.46 (L) 0.50 - 0.90 mg/dL    Bun/Cre Ratio NOT REPORTED 9 - 20    Calcium 8.8 8.6 - 10.4 mg/dL    Sodium 133 (L) 135 - 144 mmol/L    Potassium 3.4 (L) 3.7 - 5.3 mmol/L    Chloride 98 98 - 107 mmol/L    CO2 21 20 - 31 mmol/L    Anion Gap 14 9 - 17 mmol/L    Alkaline Phosphatase 86 35 - 104 U/L    ALT 11 5 - 33 U/L    AST 32 (H) <32 U/L    Total Bilirubin 0.72 0.3 - 1.2 mg/dL Total Protein 6.7 6.4 - 8.3 g/dL    Albumin 3.9 3.5 - 5.2 g/dL    Albumin/Globulin Ratio 1.4 1.0 - 2.5    GFR Non-African American >60 >60 mL/min    GFR African American >60 >60 mL/min    GFR Comment          GFR Staging NOT REPORTED    Lipase    Collection Time: 10/21/21  6:48 AM   Result Value Ref Range    Lipase 42 13 - 60 U/L   Magnesium    Collection Time: 10/21/21  6:48 AM   Result Value Ref Range    Magnesium 2.0 1.6 - 2.6 mg/dL   Urinalysis Reflex to Culture    Collection Time: 10/21/21  7:57 AM    Specimen: Urine, clean catch   Result Value Ref Range    Color, UA Yellow Yellow    Turbidity UA Clear Clear    Glucose, Ur NEGATIVE NEGATIVE    Bilirubin Urine NEGATIVE NEGATIVE    Ketones, Urine MODERATE (A) NEGATIVE    Specific Gravity, UA 1.014 1.005 - 1.030    Urine Hgb NEGATIVE NEGATIVE    pH, UA 7.0 5.0 - 8.0    Protein, UA TRACE (A) NEGATIVE    Urobilinogen, Urine Normal Normal    Nitrite, Urine NEGATIVE NEGATIVE    Leukocyte Esterase, Urine NEGATIVE NEGATIVE    Urinalysis Comments NOT REPORTED    Microscopic Urinalysis    Collection Time: 10/21/21  7:57 AM   Result Value Ref Range    -          WBC, UA 2 TO 5 0 - 5 /HPF    RBC, UA 0 TO 2 0 - 4 /HPF    Casts UA  0 - 8 /LPF     2 TO 5 HYALINE Reference range defined for non-centrifuged specimen. Crystals, UA NOT REPORTED None /HPF    Epithelial Cells UA 5 TO 10 0 - 5 /HPF    Renal Epithelial, UA NOT REPORTED 0 /HPF    Bacteria, UA NOT REPORTED None    Mucus, UA NOT REPORTED None    Trichomonas, UA NOT REPORTED None    Amorphous, UA NOT REPORTED None    Other Observations UA NOT REPORTED NOT REQ. Yeast, UA NOT REPORTED None       Imaging:   XR ABDOMEN (KUB) (SINGLE AP VIEW)    Result Date: 10/19/2021  Moderate constipation. No bowel obstruction. No change from priors. XR ABDOMEN (KUB) (SINGLE AP VIEW)    Result Date: 10/16/2021  Right femoral central line as above.      CT ABDOMEN PELVIS W IV CONTRAST Additional Contrast? None    Result Date: 10/15/2021  1. New terminal ileal wall thickening which may relate to ileitis. No bowel obstruction. 2. Stable ascending colonic wall thickening and luminal narrowing which may relate to sequela of chronic colitis. 3. No evidence of perforation or abscess. 4. Nonspecific bilateral retroperitoneal perinephric stranding more prominent on the left. Pyelonephritis cannot be excluded. No hydronephrosis or abscess. XR CHEST PORTABLE    Result Date: 10/21/2021  No focal consolidation. XR CHEST PORTABLE    Result Date: 10/16/2021  Mild bilateral interstitial infiltrates     US RETROPERITONEAL LIMITED    Result Date: 10/16/2021  Slightly limited but essentially unremarkable renal ultrasound. No hydronephrosis. ASSESSMENT & PLAN     ASSESSMENT / PLAN:     IMPRESSION  This is a 61 y.o. female who presented with abdominal pain and found to have low potassium.  Patient admitted to inpatient status for monitoring of abdominal pain     Principal Problem:    Abdominal pain with nausea, vomiting & diarrhea   - Tylenol for pain PRN   - Consider CT ABD   - Pt has had bowel resection due to Crohn's disease   - Avoid Pepcid due to QT prolongation - consider Phenergan when K+ has normalized as QT prolongation less likely   - Strict I's and O's   - IVF therapy   - Monitor K+ and replace when clinically indicated     Active Problems:   Prolonged Q-T interval on ECG  - Continue to monitor   - Avoid QTc prolonging drugs        Crohn disease (HCC)  - Pt having diarrhea   - Denies melena   - Continue to monitor         Non-intractable vomiting with nausea   - Avoid QTc prolonging medications   - Can give Phenergan as it is less likely to cause QTc prolongation          Hypomagnesemia  - Stable   - Low on admission 1.8  - Repeat was 2.0  - Continue to monitor and replace when indicated       Gastroesophageal reflux disease without esophagitis  - Continue to monitor   - Avoid Pepcid and other QTc prolonging drugs         COPD (chronic obstructive pulmonary disease) (HCC)  - Stable   - Symbicort   - RT aerosol protocol? Hypertension  - Stable   - Restart home medications if pt becomes hypotensive         Hypokalemia  - Potassium 3.4 > replaced  - Repeat 4.0    - Continue to monitor and replace when clinically indicated       Drug abuse ([facility] [address]  - Educated the pt on cessation of drug use       Cocaine abuse ([facility] [address]  - Pt educated on importance of cessation of illicit drugs       Bipolar 1 disorder ([address]  - Stable  - Consider restarting home medications        Anxiety  - Stable   - Consider restarting home medications       Diet: NPO  DVT ppx: Lovenox  GI ppx: QTc prolonged > avoid Pepcid etc     PT/OT/SW: Consulted   Discharge Planning: In process     Nae Black MD  Internal Medicine Resident, PGY-1  [facility];  [place], [place]  10/21/2021, 2:38 PM

## 2021-10-21 NOTE — ED NOTES
Pt able to ambulate with cane.  Pt is weak and unsteady gait on feat      Haley Bartlett RN  10/21/21 [id number]

## 2021-10-21 NOTE — ED NOTES
Pt ambulated to the bathroom with one assist,  Pt tolerated well  Urine obtained and sent to the lab     Nery Hernandez LPN  23/72/25 [id number]

## 2021-10-21 NOTE — CARE COORDINATION
Case Management Initial Discharge Plan  Roberto Monahan,             Met with:patient to discuss discharge plans. Information verified: address, contacts, phone number, , insurance Yes  Insurance Provider: Cassandra Dean    Emergency Contact/Next of Kin name & number:   [id number],QSLN  2. Alicia Jang      Child  Other [phone]       Who are involved in patient's support system?friend suzan    PCP: Sultana Dean MD  Date of last visit: 2 months      Discharge Planning    Living Arrangements:        Home has 2 stories  4 stairs to climb to get into front door, 14stairs to climb to reach second floor  Location of bedroom/bathroom in home main    Patient able to perform ADL's:Independent    Current Services (outpatient & in home) none  DME equipment: cane, walker  DME provider:     Is patient receiving oral anticoagulation therapy? No    If indicated:   Physician managing anticoagulation treatment: n/a  Where does patient obtain lab work for ATC treatment? n/a      Potential Assistance Needed:       Patient agreeable to home care: No  [place] of choice provided:  n/a    Prior SNF/Rehab Placement and Facility: none  Agreeable to SNF/Rehab: No  [place] of choice provided: n/a     Evaluation: no    Expected Discharge date:       Patient expects to be discharged to: If home: is the family and/or caregiver wiling & able to provide support at home? yes  Who will be providing this support? Alex Spivey*    Follow Up Appointment: Best Day/ Time:      Transportation provider: daughter  Transportation arrangements needed for discharge: No    Readmission Risk              Risk of Unplanned Readmission:  0             Does patient have a readmission risk score greater than 14?: Yes  If yes, follow-up appointment must be made within 7 days of discharge.      Goals of Care: stop vomiting      Educated pt on transitional options, provided freedom of choice and are agreeable with plan      Discharge Plan:Plans to return home independently follow for needs pcp established has transportation          Electronically signed by Nichole Mae RN on 10/21/21 at 2:49 PM EDT

## 2021-10-21 NOTE — ED PROVIDER NOTES
[address] HANDOFF       Handoff taken on the following patient from prior Attending Physician:  Pt Name: Sonja Guerrero  PCP:  Chuyita Clayton MD    Attestation  I was available and discussed any additional care issues that arose and coordinated the management plans with the resident(s) caring for the patient during my duty period. Any areas of disagreement with resident's documentation of care or procedures are noted on the chart. I was personally present for the key portions of any/all procedures during my duty period. I have documented in the chart those procedures where I was not present during the key portions. CHIEF COMPLAINT       Chief Complaint   Patient presents with    Nausea     dx with crohns & colitis, did not get meds    Emesis    Diarrhea         CURRENT MEDICATIONS     Previous Medications  Previous Medications    ACETAMINOPHEN (TYLENOL) 500 MG TABLET    Take 2 tablets by mouth 3 times daily    BUDESONIDE-FORMOTEROL (SYMBICORT) 160-4.5 MCG/ACT AERO    Inhale 2 puffs into the lungs 2 times daily    FLUOXETINE (PROZAC) 20 MG CAPSULE    Take 60 mg by mouth daily    FUROSEMIDE (LASIX) 20 MG TABLET    Take 1 tablet by mouth daily    GABAPENTIN (NEURONTIN) 400 MG CAPSULE    Take 800 mg by mouth 3 times daily. HYDROCODONE-ACETAMINOPHEN (NORCO) 5-325 MG PER TABLET    Take 1 tablet by mouth every 6 hours as needed for Pain for up to 14 days. IPRATROPIUM-ALBUTEROL (DUONEB) 0.5-2.5 (3) MG/3ML SOLN NEBULIZER SOLUTION    Inhale 3 mLs into the lungs every 6 hours as needed for Shortness of Breath    LIDOCAINE (LIDODERM) 5 %    Place 1 patch onto the skin daily 12 hours on, 12 hours off.     LISINOPRIL (PRINIVIL;ZESTRIL) 5 MG TABLET    Take 1 tablet by mouth daily    NICOTINE (NICODERM CQ) 21 MG/24HR    Place 1 patch onto the skin daily    POLYETHYLENE GLYCOL (GLYCOLAX) 17 G PACKET    Take 17 g by mouth daily as needed for Constipation    POLYVINYL ALCOHOL-POVIDONE (REFRESH OP)    Place 1 drop into both eyes 3 times daily    POTASSIUM BICARB-CITRIC ACID (EFFER-K) 10 MEQ TBEF EFFERVESCENT TABLET    Take 4 tablets by mouth daily for 3 days If 4 tablets are not tolerated, dilute 2 tablets instead and take BID    PREGABALIN (LYRICA) 150 MG CAPSULE    Take 300 mg by mouth 2 times daily. TRAZODONE (DESYREL) 150 MG TABLET    Take 250 mg by mouth nightly    TRIMETHOBENZAMIDE (TIGAN) 300 MG CAPSULE    Take 1 capsule by mouth 3 times daily as needed (vomiting)       Encounter Medications  Orders Placed This Encounter   Medications    0.9 % sodium chloride bolus    famotidine (PEPCID) injection 20 mg    ondansetron (ZOFRAN) injection 4 mg    magnesium sulfate 2000 mg in 50 mL IVPB premix       ALLERGIES     is allergic to azithromycin, methylprednisolone, penicillins, and tape [adhesive tape]. RECENT VITALS:   Temp: 98.6 °F (37 °C),  Pulse: 72, Resp: 22, BP: (!) 150/76    RADIOLOGY:   XR CHEST PORTABLE    (Results Pending)       LABS:  Labs Reviewed   URINE RT REFLEX TO CULTURE   CBC WITH AUTO DIFFERENTIAL   COMPREHENSIVE METABOLIC PANEL W/ REFLEX TO MG FOR LOW K   LIPASE   MAGNESIUM     Recently discharged from the hospital after Crohn's disease exacerbation with vomiting diarrhea hypokalemia and magnesium deficiency. She did not receive prescriptions for home and returns due to persistent symptoms including vomiting, diffuse abdominal pain. No difficulty breathing. On exam she is uncomfortable, has dry heaves, vital signs reveal hypertension otherwise normal.  EKG shows markedly prolonged QTC.   Plan is IV fluids, chemistries, antiemetics, simple analgesics, anticipate admission      PLAN/ TASKS OUTSTANDING     IV fluids, magnesium replacement, monitor potassium, antiemetics, simple analgesics, admission       (Please note that portions of this note were completed with a voice recognition program.  Efforts were made to edit the dictations but occasionally words are mis-transcribed. )    Moe Jon MD,, MD, F.A.C.E.P.   Attending Emergency Physician       Moe Jon MD  10/21/21 [address] Crow Arias MD  10/21/21 [id number]

## 2021-10-22 ENCOUNTER — APPOINTMENT (OUTPATIENT)
Dept: CT IMAGING | Age: 56
DRG: 422 | End: 2021-10-22
Payer: MEDICARE

## 2021-10-22 LAB
ABSOLUTE EOS #: 0.27 K/UL (ref 0–0.44)
ABSOLUTE IMMATURE GRANULOCYTE: 0.03 K/UL (ref 0–0.3)
ABSOLUTE LYMPH #: 0.95 K/UL (ref 1.1–3.7)
ABSOLUTE MONO #: 0.9 K/UL (ref 0.1–1.2)
AMPHETAMINE SCREEN URINE: POSITIVE
ANION GAP SERPL CALCULATED.3IONS-SCNC: 19 MMOL/L (ref 9–17)
BARBITURATE SCREEN URINE: NEGATIVE
BASOPHILS # BLD: 1 % (ref 0–2)
BASOPHILS ABSOLUTE: 0.07 K/UL (ref 0–0.2)
BENZODIAZEPINE SCREEN, URINE: NEGATIVE
BUN BLDV-MCNC: 4 MG/DL (ref 6–20)
BUN/CREAT BLD: ABNORMAL (ref 9–20)
BUPRENORPHINE URINE: ABNORMAL
CALCIUM SERPL-MCNC: 8.3 MG/DL (ref 8.6–10.4)
CANNABINOID SCREEN URINE: NEGATIVE
CHLORIDE BLD-SCNC: 95 MMOL/L (ref 98–107)
CO2: 19 MMOL/L (ref 20–31)
COCAINE METABOLITE, URINE: POSITIVE
CREAT SERPL-MCNC: 0.39 MG/DL (ref 0.5–0.9)
CULTURE: NORMAL
CULTURE: NORMAL
DIFFERENTIAL TYPE: ABNORMAL
EKG ATRIAL RATE: 73 BPM
EKG P AXIS: 68 DEGREES
EKG P-R INTERVAL: 138 MS
EKG Q-T INTERVAL: 484 MS
EKG QRS DURATION: 82 MS
EKG QTC CALCULATION (BAZETT): 533 MS
EKG R AXIS: 84 DEGREES
EKG T AXIS: 66 DEGREES
EKG VENTRICULAR RATE: 73 BPM
EOSINOPHILS RELATIVE PERCENT: 4 % (ref 1–4)
GFR AFRICAN AMERICAN: >60 ML/MIN
GFR NON-AFRICAN AMERICAN: >60 ML/MIN
GFR SERPL CREATININE-BSD FRML MDRD: ABNORMAL ML/MIN/{1.73_M2}
GFR SERPL CREATININE-BSD FRML MDRD: ABNORMAL ML/MIN/{1.73_M2}
GLUCOSE BLD-MCNC: 76 MG/DL (ref 70–99)
HCT VFR BLD CALC: 38.5 % (ref 36.3–47.1)
HEMOGLOBIN: 12.3 G/DL (ref 11.9–15.1)
IMMATURE GRANULOCYTES: 0 %
LYMPHOCYTES # BLD: 12 % (ref 24–43)
Lab: NORMAL
Lab: NORMAL
MAGNESIUM: 1.8 MG/DL (ref 1.6–2.6)
MCH RBC QN AUTO: 27.3 PG (ref 25.2–33.5)
MCHC RBC AUTO-ENTMCNC: 31.9 G/DL (ref 28.4–34.8)
MCV RBC AUTO: 85.6 FL (ref 82.6–102.9)
MDMA URINE: ABNORMAL
METHADONE SCREEN, URINE: NEGATIVE
METHAMPHETAMINE, URINE: ABNORMAL
MONOCYTES # BLD: 12 % (ref 3–12)
NRBC AUTOMATED: 0 PER 100 WBC
OPIATES, URINE: POSITIVE
OXYCODONE SCREEN URINE: NEGATIVE
PDW BLD-RTO: 15.8 % (ref 11.8–14.4)
PHENCYCLIDINE, URINE: NEGATIVE
PLATELET # BLD: 334 K/UL (ref 138–453)
PLATELET ESTIMATE: ABNORMAL
PMV BLD AUTO: 10.8 FL (ref 8.1–13.5)
POTASSIUM SERPL-SCNC: 3.2 MMOL/L (ref 3.7–5.3)
PROPOXYPHENE, URINE: ABNORMAL
RBC # BLD: 4.5 M/UL (ref 3.95–5.11)
RBC # BLD: ABNORMAL 10*6/UL
SEG NEUTROPHILS: 71 % (ref 36–65)
SEGMENTED NEUTROPHILS ABSOLUTE COUNT: 5.53 K/UL (ref 1.5–8.1)
SODIUM BLD-SCNC: 133 MMOL/L (ref 135–144)
SPECIMEN DESCRIPTION: NORMAL
SPECIMEN DESCRIPTION: NORMAL
TEST INFORMATION: ABNORMAL
TRICYCLIC ANTIDEPRESSANTS, UR: ABNORMAL
WBC # BLD: 7.8 K/UL (ref 3.5–11.3)
WBC # BLD: ABNORMAL 10*3/UL

## 2021-10-22 PROCEDURE — 6360000002 HC RX W HCPCS

## 2021-10-22 PROCEDURE — 6370000000 HC RX 637 (ALT 250 FOR IP)

## 2021-10-22 PROCEDURE — 88350 IMFLUOR EA ADDL 1ANTB STN PX: CPT

## 2021-10-22 PROCEDURE — 74177 CT ABD & PELVIS W/CONTRAST: CPT

## 2021-10-22 PROCEDURE — 94640 AIRWAY INHALATION TREATMENT: CPT

## 2021-10-22 PROCEDURE — 94664 DEMO&/EVAL PT USE INHALER: CPT

## 2021-10-22 PROCEDURE — 36415 COLL VENOUS BLD VENIPUNCTURE: CPT

## 2021-10-22 PROCEDURE — 97530 THERAPEUTIC ACTIVITIES: CPT

## 2021-10-22 PROCEDURE — 6370000000 HC RX 637 (ALT 250 FOR IP): Performed by: STUDENT IN AN ORGANIZED HEALTH CARE EDUCATION/TRAINING PROGRAM

## 2021-10-22 PROCEDURE — 80307 DRUG TEST PRSMV CHEM ANLYZR: CPT

## 2021-10-22 PROCEDURE — 1200000000 HC SEMI PRIVATE

## 2021-10-22 PROCEDURE — 86140 C-REACTIVE PROTEIN: CPT

## 2021-10-22 PROCEDURE — 2580000003 HC RX 258

## 2021-10-22 PROCEDURE — 94760 N-INVAS EAR/PLS OXIMETRY 1: CPT

## 2021-10-22 PROCEDURE — 6360000004 HC RX CONTRAST MEDICATION: Performed by: STUDENT IN AN ORGANIZED HEALTH CARE EDUCATION/TRAINING PROGRAM

## 2021-10-22 PROCEDURE — 83520 IMMUNOASSAY QUANT NOS NONAB: CPT

## 2021-10-22 PROCEDURE — 6360000002 HC RX W HCPCS: Performed by: STUDENT IN AN ORGANIZED HEALTH CARE EDUCATION/TRAINING PROGRAM

## 2021-10-22 PROCEDURE — 88346 IMFLUOR 1ST 1ANTB STAIN PX: CPT

## 2021-10-22 PROCEDURE — 81479 UNLISTED MOLECULAR PATHOLOGY: CPT

## 2021-10-22 PROCEDURE — 97166 OT EVAL MOD COMPLEX 45 MIN: CPT

## 2021-10-22 PROCEDURE — 97535 SELF CARE MNGMENT TRAINING: CPT

## 2021-10-22 PROCEDURE — 80048 BASIC METABOLIC PNL TOTAL CA: CPT

## 2021-10-22 PROCEDURE — 85025 COMPLETE CBC W/AUTO DIFF WBC: CPT

## 2021-10-22 PROCEDURE — 99222 1ST HOSP IP/OBS MODERATE 55: CPT | Performed by: INTERNAL MEDICINE

## 2021-10-22 PROCEDURE — 97162 PT EVAL MOD COMPLEX 30 MIN: CPT

## 2021-10-22 PROCEDURE — 83735 ASSAY OF MAGNESIUM: CPT

## 2021-10-22 PROCEDURE — 82397 CHEMILUMINESCENT ASSAY: CPT

## 2021-10-22 RX ORDER — POTASSIUM CHLORIDE 750 MG/1
10 TABLET, EXTENDED RELEASE ORAL DAILY
Status: ON HOLD | COMMUNITY
End: 2021-10-24 | Stop reason: HOSPADM

## 2021-10-22 RX ORDER — MAGNESIUM SULFATE 1 G/100ML
1000 INJECTION INTRAVENOUS ONCE
Status: COMPLETED | OUTPATIENT
Start: 2021-10-22 | End: 2021-10-23

## 2021-10-22 RX ORDER — POTASSIUM BICARBONATE 25 MEQ/1
25 TABLET, EFFERVESCENT ORAL EVERY 4 HOURS
Status: COMPLETED | OUTPATIENT
Start: 2021-10-22 | End: 2021-10-22

## 2021-10-22 RX ORDER — SUMATRIPTAN 100 MG/1
100 TABLET, FILM COATED ORAL
Status: ON HOLD | COMMUNITY

## 2021-10-22 RX ORDER — OMEPRAZOLE 20 MG/1
20 CAPSULE, DELAYED RELEASE ORAL DAILY
Status: ON HOLD | COMMUNITY
End: 2021-10-24 | Stop reason: HOSPADM

## 2021-10-22 RX ORDER — ALBUTEROL SULFATE 90 UG/1
2 AEROSOL, METERED RESPIRATORY (INHALATION) EVERY 6 HOURS PRN
Status: ON HOLD | COMMUNITY

## 2021-10-22 RX ORDER — TOPIRAMATE 100 MG/1
100 TABLET, FILM COATED ORAL PRN
Status: ON HOLD | COMMUNITY
End: 2022-09-18 | Stop reason: HOSPADM

## 2021-10-22 RX ORDER — DIPHENHYDRAMINE HCL 25 MG
25 TABLET ORAL NIGHTLY PRN
Status: ON HOLD | COMMUNITY

## 2021-10-22 RX ORDER — DIPHENHYDRAMINE HYDROCHLORIDE 50 MG/ML
12.5 INJECTION INTRAMUSCULAR; INTRAVENOUS EVERY 6 HOURS PRN
Status: DISCONTINUED | OUTPATIENT
Start: 2021-10-22 | End: 2021-10-25 | Stop reason: HOSPADM

## 2021-10-22 RX ORDER — LOPERAMIDE HYDROCHLORIDE 2 MG/1
2 CAPSULE ORAL ONCE
Status: COMPLETED | OUTPATIENT
Start: 2021-10-22 | End: 2021-10-22

## 2021-10-22 RX ADMIN — MAGNESIUM SULFATE HEPTAHYDRATE 1000 MG: 1 INJECTION, SOLUTION INTRAVENOUS at 23:55

## 2021-10-22 RX ADMIN — BUDESONIDE AND FORMOTEROL FUMARATE DIHYDRATE 2 PUFF: 160; 4.5 AEROSOL RESPIRATORY (INHALATION) at 21:32

## 2021-10-22 RX ADMIN — GABAPENTIN 800 MG: 400 CAPSULE ORAL at 14:54

## 2021-10-22 RX ADMIN — HYDROCODONE BITARTRATE AND ACETAMINOPHEN 1 TABLET: 5; 325 TABLET ORAL at 09:38

## 2021-10-22 RX ADMIN — DIPHENHYDRAMINE HYDROCHLORIDE 12.5 MG: 50 INJECTION, SOLUTION INTRAMUSCULAR; INTRAVENOUS at 14:55

## 2021-10-22 RX ADMIN — POTASSIUM BICARBONATE 25 MEQ: 977.5 TABLET, EFFERVESCENT ORAL at 16:31

## 2021-10-22 RX ADMIN — HYDROCODONE BITARTRATE AND ACETAMINOPHEN 1 TABLET: 5; 325 TABLET ORAL at 16:33

## 2021-10-22 RX ADMIN — POTASSIUM BICARBONATE 25 MEQ: 977.5 TABLET, EFFERVESCENT ORAL at 19:42

## 2021-10-22 RX ADMIN — TRIMETHOBENZAMIDE HYDROCHLORIDE 200 MG: 100 INJECTION INTRAMUSCULAR at 16:39

## 2021-10-22 RX ADMIN — SODIUM CHLORIDE: 9 INJECTION, SOLUTION INTRAVENOUS at 20:40

## 2021-10-22 RX ADMIN — ENOXAPARIN SODIUM 40 MG: 40 INJECTION SUBCUTANEOUS at 10:06

## 2021-10-22 RX ADMIN — GABAPENTIN 800 MG: 400 CAPSULE ORAL at 10:06

## 2021-10-22 RX ADMIN — BUDESONIDE AND FORMOTEROL FUMARATE DIHYDRATE 2 PUFF: 160; 4.5 AEROSOL RESPIRATORY (INHALATION) at 07:35

## 2021-10-22 RX ADMIN — HYDROCODONE BITARTRATE AND ACETAMINOPHEN 1 TABLET: 5; 325 TABLET ORAL at 22:56

## 2021-10-22 RX ADMIN — GABAPENTIN 800 MG: 400 CAPSULE ORAL at 20:53

## 2021-10-22 RX ADMIN — TRIMETHOBENZAMIDE HYDROCHLORIDE 200 MG: 100 INJECTION INTRAMUSCULAR at 22:57

## 2021-10-22 RX ADMIN — LOPERAMIDE HYDROCHLORIDE 2 MG: 2 CAPSULE ORAL at 23:55

## 2021-10-22 RX ADMIN — TRIMETHOBENZAMIDE HYDROCHLORIDE 200 MG: 100 INJECTION INTRAMUSCULAR at 10:05

## 2021-10-22 RX ADMIN — LISINOPRIL 5 MG: 5 TABLET ORAL at 10:06

## 2021-10-22 RX ADMIN — IOPAMIDOL 75 ML: 755 INJECTION, SOLUTION INTRAVENOUS at 14:07

## 2021-10-22 RX ADMIN — SODIUM CHLORIDE, PRESERVATIVE FREE 15 ML: 5 INJECTION INTRAVENOUS at 10:10

## 2021-10-22 ASSESSMENT — PAIN DESCRIPTION - LOCATION
LOCATION: ABDOMEN

## 2021-10-22 ASSESSMENT — PAIN DESCRIPTION - DESCRIPTORS
DESCRIPTORS: ACHING;CONSTANT;DISCOMFORT
DESCRIPTORS: DISCOMFORT
DESCRIPTORS: ACHING;BURNING;DISCOMFORT

## 2021-10-22 ASSESSMENT — PAIN DESCRIPTION - ORIENTATION
ORIENTATION: RIGHT;LEFT;MID
ORIENTATION: LOWER

## 2021-10-22 ASSESSMENT — PAIN SCALES - GENERAL
PAINLEVEL_OUTOF10: 8
PAINLEVEL_OUTOF10: 4
PAINLEVEL_OUTOF10: 5
PAINLEVEL_OUTOF10: 4
PAINLEVEL_OUTOF10: 9
PAINLEVEL_OUTOF10: 4
PAINLEVEL_OUTOF10: 7

## 2021-10-22 ASSESSMENT — PAIN - FUNCTIONAL ASSESSMENT: PAIN_FUNCTIONAL_ASSESSMENT: ACTIVITIES ARE NOT PREVENTED

## 2021-10-22 ASSESSMENT — PAIN DESCRIPTION - PAIN TYPE
TYPE: ACUTE PAIN
TYPE: CHRONIC PAIN
TYPE: ACUTE PAIN

## 2021-10-22 ASSESSMENT — PAIN DESCRIPTION - ONSET: ONSET: ON-GOING

## 2021-10-22 ASSESSMENT — PAIN DESCRIPTION - PROGRESSION: CLINICAL_PROGRESSION: NOT CHANGED

## 2021-10-22 ASSESSMENT — PAIN DESCRIPTION - FREQUENCY: FREQUENCY: CONTINUOUS

## 2021-10-22 NOTE — PROGRESS NOTES
Physical Therapy    Facility/Department: [facility]  Initial Assessment    NAME: Trenton Otoole  : 1965  MRN: [id number]    Date of Service: 10/22/2021  Chief Complaint   Patient presents with    Nausea     dx with crohns & colitis, did not get meds    Emesis    Diarrhea       Discharge Recommendations:    Further therapy recommended at discharge. PT Equipment Recommendations  Equipment Needed: Yes  Mobility Devices: Kyra Unique: Bindu recommendations listed below are based on what the patient would need if they were able to return to prior living arrangements at the time of discharge. Assessment   Body structures, Functions, Activity limitations: Decreased functional mobility ; Decreased posture;Decreased endurance;Decreased ROM; Decreased strength;Decreased balance; Increased pain  Assessment: Pt ambulated 125ft w/ RW CGA, pt's tolerance limited due to endurance deficits. Pt is would benefit from continued skilled physical therapy to address endurance deficits and return pt prior level of independence. Prognosis: Good  Decision Making: Medium Complexity  PT Education: Goals;PT Role;Plan of Care;General Safety  REQUIRES PT FOLLOW UP: Yes  Activity Tolerance  Activity Tolerance: Patient Tolerated treatment well       Patient Diagnosis(es): The primary encounter diagnosis was Prolonged Q-T interval on ECG. Diagnoses of Nausea vomiting and diarrhea and Lower abdominal pain were also pertinent to this visit. has a past medical history of Acid reflux, Anxiety, Arthritis, Asthma, Bipolar 1 disorder ([address] Bowel obstruction ([address] COPD (chronic obstructive pulmonary disease) ([address] Crohn disease ([address] Depression, Dry eye, Fibromyalgia, Gout, Headache, Hyperlipidemia, Hypertension, Hypothyroidism, Kidney stones, Muscle spasm, Neuropathy, Pain, Personal history of other medical treatment, Wears partial dentures, and Wellness examination.    has a past surgical history that includes Tonsillectomy and adenoidectomy; Tubal ligation; Cholecystectomy; Bunionectomy (Left); Colonoscopy (04/30/2007); Colonoscopy (10/12/2017); and Abdomen surgery. Restrictions  Restrictions/Precautions  Restrictions/Precautions: Up as Tolerated  Required Braces or Orthoses?: No  Vision/Hearing  Vision: Impaired  Vision Exceptions: Wears glasses for reading  Hearing: Exceptions to [facility]  Hearing Exceptions: Hard of hearing/hearing concerns     Subjective  General  Patient assessed for rehabilitation services?: Yes  Response To Previous Treatment: Not applicable  Family / Caregiver Present: No  Follows Commands: Within Functional Limits  Subjective  Subjective: RN and pt in agreement for PT eval; pt supine in bed upon PT arrival, pt pleasant and cooperative throughout session.   Pain Screening  Patient Currently in Pain: Yes  Pain Assessment  Pain Assessment: 0-10  Pain Level: 4  Pain Type: Acute pain  Pain Location: Abdomen  Pain Orientation: Lower  Pain Descriptors: Discomfort  Non-Pharmaceutical Pain Intervention(s): Ambulation/Increased Activity;Repositioned  Response to Pain Intervention: Patient Satisfied  Multiple Pain Sites: No  Vital Signs  Patient Currently in Pain: Yes       Orientation  Orientation  Overall Orientation Status: Within Functional Limits  Social/Functional History  Social/Functional History  Lives With: Other (comment) (Rommates)  Type of Home: House  Home Layout: Two level, Able to Live on Main level with bedroom/bathroom  Home Access: Stairs to enter with rails  Entrance Stairs - Number of Steps: 5  Entrance Stairs - Rails: Both  Bathroom Shower/Tub: Tub/Shower unit  Bathroom Toilet: Standard  Home Equipment: Cane, 4 wheeled walker  ADL Assistance: Independent  Homemaking Assistance: Needs assistance (Pt reports roommate performs cleaning and shopping tasks)  Homemaking Responsibilities: Yes  Meal Prep Responsibility: Primary  Laundry Responsibility: Secondary  Cleaning Responsibility: Secondary  Shopping Responsibility: No  Ambulation Assistance: Independent (pt ambulates with SPC at baseline)  Transfer Assistance: Independent  Active : No  Mode of Transportation: Family, Friends, Cab  Occupation: On disability  Leisure & Hobbies: Shoot pool  Additional Comments: Reports 1 roomate stays at home to take care of her boyfriend and could assist as needed; however, pt reports not feeling comfortable in her living arrangement. Cognition   Cognition  Overall Cognitive Status: WFL    Objective    Joint Mobility  ROM RLE: WFL  ROM LLE: WFL  ROM RUE: WFL  ROM LUE: WFL  Strength RLE  Strength RLE: WFL  Strength LLE  Strength LLE: WFL  Strength RUE  Strength RUE: WFL  Strength LUE  Strength LUE: WFL     Sensation  Overall Sensation Status: WFL (Denies any numbness/tingling)  Bed mobility  Supine to Sit: Stand by assistance (Increased time/effort)  Sit to Supine: Unable to assess (Retired to bedside chair upon conclusion of session)  Comment: HOB elevated to 40 degrees  Transfers  Sit to Stand: Contact guard assistance  Stand to sit: Contact guard assistance  Comment: Sit to stand performed with RW; pt required min verbal cues for hand placement with good return demo  Ambulation  Ambulation?: Yes  Ambulation 1  Surface: level tile  Device: Rolling Walker  Assistance: Contact guard assistance  Gait Deviations: Slow Marichuy  Distance: 125ft  Comments: No LOB noted throughout, slow, however, steady marichuy. Antalgic gait noted with decreased step length of RLE due to reported R hip pain.   Stairs/Curb  Stairs?: No     Balance  Posture: Fair  Sitting - Static: Good;-  Sitting - Dynamic: Good;-  Standing - Static: Fair;+  Standing - Dynamic: Fair;+  Comments: Standing balance assessed w/ RW; pt able to sit EOB with supervision        Plan   Plan  Times per week: 5x/week  Current Treatment Recommendations: Strengthening, Transfer Training, Endurance Training, Patient/Caregiver Education & Training, ROM, Equipment Evaluation, Education, & procurement, Balance Training, Gait Training, Home Exercise Program, Functional Mobility Training, Stair training, Safety Education & Training  Safety Devices  Type of devices: Left in chair, Call light within reach, Chair alarm in place, Gait belt, Nurse notified  Restraints  Initially in place: No    AM-PAC Score  AM-PAC Inpatient Mobility Raw Score : 22 (10/22/21 1621)  AM-PAC Inpatient T-Scale Score : 53.28 (10/22/21 1621)  Mobility Inpatient CMS 0-100% Score: 20.91 (10/22/21 1621)  Mobility Inpatient CMS G-Code Modifier : Allen Speaks (10/22/21 1621)          Goals  Short term goals  Time Frame for Short term goals: 14  Short term goal 1: Pt to perform bed mobility and functional transfers independently  Short term goal 2: Pt to ambulate 300ft w/ least restrictive AD with supervision  Short term goal 3: Ascend/descend 5 stairs with bilateral rail with supervision  Short term goal 4: Demonstrate standing balance of good - with no AD with supervision to decrease fall risk  Patient Goals   Patient goals :  To feel better       Therapy Time   Individual Concurrent Group Co-treatment   Time In 1000         Time Out 1034         Minutes 34         Timed Code Treatment Minutes: 9 Minutes       Dona Bedoya, PT

## 2021-10-22 NOTE — PLAN OF CARE
Patient has persistent nausea and vomiting with previous CT abdomen showing colitis was discharged with plan for outpatient capsule endoscopy. Came back with worsening of nausea and vomiting with slight abdominal pain. Repeat CT abdomen showed small hematoma periumbilical region and second part of duodenum with concern for perforation. Spoke with radiology. Consulted GI for possible EGD eval. We'll continue to monitor patient has been clinically stable. Belkys Fields MD  Internal Medicine Resident, PGY- [facility];  [place], [place]  10/22/2021, 3:56 PM

## 2021-10-22 NOTE — PROGRESS NOTES
Occupational Therapy   Occupational Therapy Initial Assessment  Date: 10/22/2021   Patient Name: Analia Simmons  MRN: [id number]     : 1965    Date of Service: 10/22/2021     Chief Complaint   Patient presents with    Nausea     dx with crohns & colitis, did not get meds    Emesis    Diarrhea       Discharge Recommendations:  Patient would benefit from continued therapy after discharge, Continue to assess pending progress  OT Equipment Recommendations  Equipment Needed: Yes  Mobility Devices: ADL Assistive Devices  ADL Assistive Devices: Shower Chair with back;Grab Bars - shower; Toileting - Raised Toilet Seat with arms    Assessment   Performance deficits / Impairments: Decreased functional mobility ; Decreased ADL status; Decreased endurance;Decreased high-level IADLs;Decreased safe awareness  Assessment: Pt demonstrated bed mobility supine to sit with SBA and increased effort w/ use of bedrail. Sit<>stand transfers and functional mobility completed with CGA and use of RW to/from bathroom and in halls to practice household distances. No LOB throughout all functional standing tasks. Required CGA for toileting. Completed UB dressing, hand hygiene, donned deodorant, washed face and oral hygiene with Mod IND. Pt limited by decreased endurance and increased pain. Pt is expected to require skilled OT services during their acute hospitalization stay to address the above noted deficits through skilled occupational therapy intervention for promotion of increased independence throughout ADLs, IADLs and functional mobility tasks.    Prognosis: Good  Decision Making: Medium Complexity  Patient Education: Pt educated on OT role, OT POC, activity promotion, transfer training, walker management-good return  REQUIRES OT FOLLOW UP: Yes  Activity Tolerance  Activity Tolerance: Patient Tolerated treatment well  Safety Devices  Safety Devices in place: Yes  Type of devices: Gait belt;Call light within reach;Nurse notified; Patient at risk for falls; Chair alarm in place; Left in chair  Restraints  Initially in place: No           Patient Diagnosis(es): The primary encounter diagnosis was Prolonged Q-T interval on ECG. Diagnoses of Nausea vomiting and diarrhea and Lower abdominal pain were also pertinent to this visit. has a past medical history of Acid reflux, Anxiety, Arthritis, Asthma, Bipolar 1 disorder ([address] Bowel obstruction ([address] COPD (chronic obstructive pulmonary disease) ([facility] Crohn disease ([facility] Depression, Dry eye, Fibromyalgia, Gout, Headache, Hyperlipidemia, Hypertension, Hypothyroidism, Kidney stones, Muscle spasm, Neuropathy, Pain, Personal history of other medical treatment, Wears partial dentures, and Wellness examination. has a past surgical history that includes Tonsillectomy and adenoidectomy; Tubal ligation; Cholecystectomy; Bunionectomy (Left); Colonoscopy (04/30/2007); Colonoscopy (10/12/2017); and Abdomen surgery. Restrictions  Restrictions/Precautions  Restrictions/Precautions: Up as Tolerated  Required Braces or Orthoses?: No    Subjective   General  Patient assessed for rehabilitation services?: Yes  Family / Caregiver Present: No  General Comment  Comments: RN ok'd for OT/PT eval this AM. Pt agreeable to session, pleasent/cooperative throughout.   Patient Currently in Pain: Yes  Pain Assessment  Pain Assessment: 0-10  Pain Level: 4  Pain Type: Acute pain  Pain Location: Abdomen  Pain Orientation: Lower  Pain Descriptors: Aching;Burning;Discomfort  Non-Pharmaceutical Pain Intervention(s): Ambulation/Increased Activity;Repositioned  Response to Pain Intervention: Patient Satisfied  Multiple Pain Sites: No      Social/Functional History  Social/Functional History  Lives With: Other (comment) (Rommates)  Type of Home: House  Home Layout: Two level, Able to Live on Main level with bedroom/bathroom  Home Access: Stairs to enter with rails  Entrance Stairs - Number of Steps: 5  Entrance Stairs - Rails: Both  Bathroom Shower/Tub: Tub/Shower unit  Bathroom Toilet: Standard  Home Equipment: [place], 4 wheeled walker  ADL Assistance: Independent  Homemaking Assistance: Needs assistance (Pt reports roommate performs cleaning and shopping tasks)  Homemaking Responsibilities: Yes  Meal Prep Responsibility: Primary  Laundry Responsibility: Secondary  Cleaning Responsibility: Secondary  Shopping Responsibility: No  Ambulation Assistance: Independent (pt ambulates with SPC at baseline)  Transfer Assistance: Independent  Active : No  Mode of Transportation: Family, Friends, Cab  Occupation: On disability  Leisure & Hobbies: Shoot pool  Additional Comments: Reports 1 roomate stays at home to take care of her boyfriend and could assist as needed; however, pt reports not feeling comfortable in her living arrangement. Objective   Vision: Impaired  Vision Exceptions: Wears glasses for reading  Hearing: Exceptions to [facility]  Hearing Exceptions: Hard of hearing/hearing concerns    Orientation  Overall Orientation Status: Within Functional Limits     Balance  Sitting Balance: Stand by assistance (Seated EOB unsupported to don socks and seated on toilet for toileting tasks ~15 minutes. Seated in recliner supported for eval questions and grooming tasks ~12 minutes.)  Standing Balance: Contact guard assistance  Standing Balance  Time: 4 minutes  Activity: static standing EOB, standing for hand hygiene sinkside  Comment: Use of RW    Functional Mobility  Functional - Mobility Device: Rolling Walker  Activity: To/from bathroom; Other  Assist Level: Contact guard assistance  Functional Mobility Comments: No LOB throughout. Slow functional mobility. Min VCs at times for navigation w/ walker in small spaces w/ lines.     Toilet Transfers  Toilet - Technique: Ambulating  Equipment Used: Standard toilet  Toilet Transfer: Contact guard assistance  Toilet Transfers Comments: No use of grab bars to mimic home setup    ADL  Feeding: Independent;Setup (IND took medication and completed beverage management with RN present)  Grooming: Modified independent ;Setup (Pt stood sinkside to complete hand hygiene after toileting. Seated in recliner supported pt washed face, donned deodorant and completed oral hygiene.)  UE Bathing: Stand by assistance;Setup  LE Bathing: Contact guard assistance;Setup; Increased time to complete  UE Dressing: Modified independent  (Donned gown seated EOB, assist for lines only)  LE Dressing: Contact guard assistance; Increased time to complete (Donned socks with Mod IND seated EOB with increased time/effort. Donned underwear during toileting sitting with SBA and standing with CGA.)  Toileting: Contact guard assistance; Increased time to complete (Completed pericare, and clothing management with CGA standing at toileting)     Tone RUE  RUE Tone: Normotonic  Tone LUE  LUE Tone: Normotonic  Coordination  Movements Are Fluid And Coordinated: Yes     Bed mobility  Supine to Sit: Stand by assistance (Increased time/effort)  Sit to Supine: Unable to assess (Retired to bedside chair upon conclusion of session)  Comment: HOB elevated ~40 degrees     Transfers  Sit to stand: Contact guard assistance  Stand to sit: Contact guard assistance  Transfer Comments: Use of RW, 1 VC for proper hand placement with good return     Cognition  Overall Cognitive Status: WFL        Sensation  Overall Sensation Status: WFL (Denies any numbness/tingling)        LUE AROM (degrees)  LUE AROM : WFL  Left Hand AROM (degrees)  Left Hand AROM: WFL  RUE AROM (degrees)  RUE AROM : WFL  Right Hand AROM (degrees)  Right Hand AROM: WFL  LUE Strength  Gross LUE Strength: WFL  L Hand General: 4+/5  LUE Strength Comment: Grossly 4+/5  RUE Strength  Gross RUE Strength: WFL  R Hand General: 4+/5  RUE Strength Comment: Grossly 4+/5                   Plan   Plan  Times per week: 3-4x/wk  Current Treatment Recommendations: Equipment Evaluation, Education, & procurement, Balance Training, Home Management Training, Functional Mobility Training, Safety Education & Training, Patient/Caregiver Education & Training, Endurance Training, Self-Care / ADL      AM-PAC Score        AM-PAC Inpatient Daily Activity Raw Score: 21 (10/22/21 1217)  AM-PAC Inpatient ADL T-Scale Score : 44.27 (10/22/21 1217)  ADL Inpatient CMS 0-100% Score: 32.79 (10/22/21 1217)  ADL Inpatient CMS G-Code Modifier : Kip Guerrero (10/22/21 1217)    Goals  Short term goals  Time Frame for Short term goals: By discharge, pt will:  Short term goal 1: Demo functional sit<>stand transfers with Mod IND, using LRD  Short term goal 2: Demo functional mobility with Mod IND, using LRD and 0 VCs for safety  Short term goal 3: Demo +15 minutes of standing tolerance throughout functional tasks with SUP for promotion of increased engagement in ADLs/IADLs  Short term goal 4: Demo LB ADLs with Mod IND, using DME PRN  Short term goal 5: Demo good safety awareness throughout all functional tasks IND with 0 VCs       Therapy Time   Individual Concurrent Group Co-treatment   Time In 1000         Time Out 1038         Minutes 38         Timed Code Treatment Minutes: 23 Minutes       Dwayne Lundborg, OTR/L

## 2021-10-22 NOTE — PROGRESS NOTES
[address]    Admission Medication Reconciliation       The patient's list of current home medications has been reviewed. Source(s) of information: patient. Based on information provided by the above source(s), I have updated the patient's home med list as described below. Please review the ACTION REQUESTED section of this note below for any discrepancies on current hospital orders. I changed or updated the following medications on the patient's home medication list:  Removed · Miralax powder  · Tigan (off the market)     Added · Albuterol HFA  · Benadryl 25 mg  · Topamax 100 mg  · Sumatriptan 100 mg  · Omeprazole 20 mg     Adjusted   · Furosemide from 20 mg to 10 mg  · Effer-K to Klor-Con     Other Notes · Patient says she only takes Klor-Con and only takes 1 tablet every other day  · Patient did confirm she takes pregabalin with gabapentin daily  · Patient states she has not had her nebulizer solution in almost 2 years as she does not have a nebulizer         PROVIDER ACTION REQUESTED  Medications that need to be addressed by a physician/nurse practitioner:    Medication Action Requested                 Please feel free to call me with any questions about this encounter. Thank you.     Thank you  Karla John, PharmD, [facility]  Inpatient Clinical Pharmacist  [phone]      Electronically signed by Divya Romero on 10/22/2021 at 4:22 PM

## 2021-10-22 NOTE — PLAN OF CARE
Problem: Falls - Risk of:  Goal: Will remain free from falls  Description: Will remain free from falls  10/22/2021 1627 by Kevin Maxwell RN  Outcome: Ongoing  10/22/2021 0518 by Blanca Perez RN  Outcome: Met This Shift  10/22/2021 0517 by Blanca Perez RN  Outcome: Met This Shift  Goal: Absence of physical injury  Description: Absence of physical injury  10/22/2021 1627 by Kevin Maxwell RN  Outcome: Ongoing  10/22/2021 0518 by Blanca Perez RN  Outcome: Met This Shift  10/22/2021 0517 by Blanca Perez RN  Outcome: Met This Shift     Problem: Pain:  Goal: Pain level will decrease  Description: Pain level will decrease  10/22/2021 1627 by Kevin Maxwell RN  Outcome: Ongoing  10/22/2021 0518 by Blanca Perez RN  Outcome: Ongoing  10/22/2021 0517 by Blanca Perez RN  Outcome: Ongoing  Goal: Control of acute pain  Description: Control of acute pain  10/22/2021 1627 by Kevin Maxwell RN  Outcome: Ongoing  10/22/2021 0518 by Blanca Perez RN  Outcome: Ongoing  10/22/2021 0517 by Blanca Perez RN  Outcome: Ongoing  Goal: Control of chronic pain  Description: Control of chronic pain  10/22/2021 1627 by Kevin Maxwell RN  Outcome: Ongoing  10/22/2021 0518 by Blanca Perez RN  Outcome: Ongoing  10/22/2021 0517 by Blanca Perez RN  Outcome: Ongoing     Problem: Nausea/Vomiting:  Goal: Absence of nausea/vomiting  Description: Absence of nausea/vomiting  10/22/2021 1627 by Kevin Maxwell RN  Outcome: Ongoing  10/22/2021 0518 by Blanca Perez RN  Outcome: Ongoing  Goal: Able to drink  Description: Able to drink  Outcome: Ongoing  Goal: Able to eat  Description: Able to eat  Outcome: Ongoing  Goal: Ability to achieve adequate nutritional intake will improve  Description: Ability to achieve adequate nutritional intake will improve  Outcome: Ongoing

## 2021-10-22 NOTE — PLAN OF CARE
Problem: Falls - Risk of:  Goal: Will remain free from falls  Description: Will remain free from falls  10/22/2021 0518 by Kristan Thacker RN  Outcome: Met This Shift  10/22/2021 0517 by Kristan Thacker RN  Outcome: Met This Shift  10/21/2021 1734 by Felice Montesinos RN  Outcome: Ongoing  Goal: Absence of physical injury  Description: Absence of physical injury  10/22/2021 0518 by Kristan Thacker RN  Outcome: Met This Shift  10/22/2021 0517 by Kristan Thacker RN  Outcome: Met This Shift  10/21/2021 1734 by Felice Montesinos RN  Outcome: Ongoing     Problem: Pain:  Goal: Pain level will decrease  Description: Pain level will decrease  10/22/2021 0518 by Kristan Thacker RN  Outcome: Ongoing  10/22/2021 0517 by Kristan Thacker RN  Outcome: Ongoing  10/21/2021 1734 by Felice Montesinos RN  Outcome: Ongoing  Goal: Control of acute pain  Description: Control of acute pain  10/22/2021 0518 by Kristan Thacker RN  Outcome: Ongoing  10/22/2021 0517 by Kristan Thacker RN  Outcome: Ongoing  10/21/2021 1734 by Felice Montesinos RN  Outcome: Ongoing  Goal: Control of chronic pain  Description: Control of chronic pain  10/22/2021 0518 by Kristan Thacker RN  Outcome: Ongoing  10/22/2021 0517 by Kristan Thacker RN  Outcome: Ongoing  10/21/2021 1734 by Felice Montesinos RN  Outcome: Ongoing     Problem: Nausea/Vomiting:  Goal: Absence of nausea/vomiting  Description: Absence of nausea/vomiting  Outcome: Ongoing

## 2021-10-22 NOTE — PROGRESS NOTES
[facility]  Internal Medicine Teaching Residency Program  Inpatient Daily Progress Note  ______________________________________________________________________________    Patient: Joie Service  YOB: 1965   BUJ:[id number]    Acct: [de-identified]     Room: [facility]  Admit date: 10/21/2021  Today's date: 10/22/21  Number of days in the hospital: 1    SUBJECTIVE   Admitting Diagnosis: Abdominal pain  CC: Abdominal pain  Pt examined at bedside. Chart & results reviewed. Still has nausea. Has some back pain. Will start clear liquid diet and advance as tolerated    ROS:  Constitutional:  negative for chills, fevers, sweats  Respiratory:  negative for cough, dyspnea on exertion, hemoptysis, shortness of breath, wheezing  Cardiovascular:  negative for chest pain, chest pressure/discomfort, lower extremity edema, palpitations  Gastrointestinal:  negative for abdominal pain, constipation, diarrhea, nausea, vomiting  Neurological:  negative for dizziness, headache  BRIEF HISTORY     The patient is a pleasant 61 y.o. female with a PMHx significant for Crohn's disease, COPD, bipolar disorder, polysubstance abuse, HTN and GERD who presents with a chief complaint of abdominal pain. Pt states that since October 15, she has had increasing right lower quadrant abdominal pain that she rates 10/10. The pain does not radiate anywhere but pt was diffusely tender on examination. Pt states she has been having diarrhea for 6 days. She has been vomiting that is green in nature. Pt was discharged yesterday after being admitted for N/V. Pt had prolonged QTc. Last QTc 529. Pt states she has not eaten in 6 days.  Pt denies black tarry stools and there was no recent GI procedures.     Significant labs in the ED: K 3.4, ALT: 11 and AST 32    OBJECTIVE     Vital Signs:  BP (!) 179/101   Pulse 75   Temp 98.3 °F (36.8 °C) (Oral)   Resp 18   Ht 5' 6\" (1.676 m)   Wt 160 lb (72.6 kg)   SpO2 96%   BMI 25.82 kg/m²     Temp (24hrs), Av.7 °F (37.1 °C), Min:98.3 °F (36.8 °C), Max:99 °F (37.2 °C)    In: 1753   Out: 3300 [Urine:3300]    Physical Exam:  Constitutional: This is a well developed, well nourished, 25-29.9 - Overweight 61y.o. year old female who is alert, oriented, cooperative and in no apparent distress. Head:normocephalic and atraumatic. EENT:  PERRLA. No conjunctival injections. Septum was midline, mucosa was without erythema, exudates or cobblestoning. No thrush was noted. Neck: Supple without thyromegaly. No elevated JVP. Trachea was midline. Respiratory: Chest was symmetrical without dullness to percussion. Breath sounds bilaterally were clear to auscultation. There were no wheezes, rhonchi or rales. There is no intercostal retraction or use of accessory muscles. No egophony noted. Cardiovascular: Regular without murmur, clicks, gallops or rubs. Abdomen: Slightly rounded and soft without organomegaly. No rebound, rigidity or guarding was appreciated. Lymphatic: No lymphadenopathy. Musculoskeletal: Normal curvature of the spine. No gross muscle weakness. Extremities:  No lower extremity edema, ulcerations, tenderness, varicosities or erythema. Muscle size, tone and strength are normal.  No involuntary movements are noted. Skin:  Warm and dry. Good color, turgor and pigmentation. No lesions or scars.   No cyanosis or clubbing  Neurological/Psychiatric: The patient's general behavior, level of consciousness, thought content and emotional status is normal.        Medications:  Scheduled Medications:    sodium chloride flush  5-40 mL IntraVENous 2 times per day    enoxaparin  40 mg SubCUTAneous Daily    budesonide-formoterol  2 puff Inhalation BID    scopolamine  1 patch TransDERmal Once    furosemide  20 mg Oral Daily    lisinopril  5 mg Oral Daily    [Held by provider] FLUoxetine  60 mg Oral Daily    gabapentin  800 mg Oral TID    [Held by provider] traZODone  250 mg Oral Nightly     Continuous Infusions:    sodium chloride      sodium chloride 125 mL/hr at 10/21/21 2348     PRN Medicationssodium chloride flush, 5-40 mL, PRN  sodium chloride, 25 mL, PRN  polyethylene glycol, 17 g, Daily PRN  acetaminophen, 650 mg, Q6H PRN   Or  acetaminophen, 650 mg, Q6H PRN  potassium chloride, 40 mEq, PRN   Or  potassium alternative oral replacement, 40 mEq, PRN   Or  potassium chloride, 10 mEq, PRN  trimethobenzamide, 300 mg, TID PRN  HYDROcodone-acetaminophen, 1 tablet, Q6H PRN        Diagnostic Labs:  CBC:   Recent Labs     10/20/21  0444 10/21/21  0648 10/22/21  0607   WBC 9.7 10.7 7.8   RBC 4.33 4.48 4.50   HGB 12.0 12.6 12.3   HCT 37.5 38.8 38.5   MCV 86.6 86.6 85.6   RDW 16.1* 16.1* 15.8*    379 334     BMP:   Recent Labs     10/20/21  0444 10/20/21  0444 10/20/21  1143 10/21/21  0648 10/22/21  0607   *  --   --  133* 133*   K 3.4*   < > 4.0 3.4* 3.2*   CL 96*  --   --  98 95*   CO2 20  --   --  21 19*   BUN 7  --   --  7 4*   CREATININE 0.30*  --   --  0.46* 0.39*    < > = values in this interval not displayed. BNP: No results for input(s): BNP in the last 72 hours. PT/INR: No results for input(s): PROTIME, INR in the last 72 hours. APTT: No results for input(s): APTT in the last 72 hours. CARDIAC ENZYMES: No results for input(s): CKMB, CKMBINDEX, TROPONINI in the last 72 hours. Invalid input(s): CKTOTAL;3  FASTING LIPID PANEL:No results found for: CHOL, HDL, TRIG  LIVER PROFILE:   Recent Labs     10/21/21  0648   AST 32*   ALT 11   BILITOT 0.72   ALKPHOS 86      MICROBIOLOGY:   Lab Results   Component Value Date/Time    CULTURE NO GROWTH 6 DAYS 10/16/2021 10:25 AM       Imaging:    XR ABDOMEN (KUB) (SINGLE AP VIEW)    Result Date: 10/19/2021  Moderate constipation. No bowel obstruction. No change from priors. XR ABDOMEN (KUB) (SINGLE AP VIEW)    Result Date: 10/16/2021  Right femoral central line as above.      CT ABDOMEN PELVIS W IV CONTRAST Additional Contrast? None    Result Date: 10/15/2021  1. New terminal ileal wall thickening which may relate to ileitis. No bowel obstruction. 2. Stable ascending colonic wall thickening and luminal narrowing which may relate to sequela of chronic colitis. 3. No evidence of perforation or abscess. 4. Nonspecific bilateral retroperitoneal perinephric stranding more prominent on the left. Pyelonephritis cannot be excluded. No hydronephrosis or abscess. XR CHEST PORTABLE    Result Date: 10/21/2021  No focal consolidation. XR CHEST PORTABLE    Result Date: 10/16/2021  Mild bilateral interstitial infiltrates     US RETROPERITONEAL LIMITED    Result Date: 10/16/2021  Slightly limited but essentially unremarkable renal ultrasound. No hydronephrosis.        ASSESSMENT & PLAN     Abdominal pain with nausea, vomiting & diarrhea   - Tylenol for pain PRN   - Consider Benadryl  - Has scopolamine patch  - Tigan IV  - IVF therapy   - Monitor K+ and replace when clinically indicated       Prolonged Q-T interval on ECG  - Continue to monitor   - Avoid QTc prolonging drugs        Hx Crohn disease (HCC)  - Pt having diarrhea   - Denies melena   - Continue to monitor           Hypomagnesemia  - Stable   - Low on admission 1.8  - Repeat was 2.0  - Continue to monitor and replace when indicated        Gastroesophageal reflux disease without esophagitis  - Continue to monitor   - Avoid Pepcid and other QTc prolonging drugs         COPD (chronic obstructive pulmonary disease) (HCC)  - Stable   - Symbicort   - Continue respiratory treatments       Hypertension  /101  IV hydralazine if SBP>160       Hypokalemia  - Potassium 3.4 > replaced  - Repeat 4.0    - Continue to monitor and replace when clinically indicated        Drug abuse ([address]  - Educated the pt on cessation of drug use        Cocaine abuse ([address]  - Pt educated on importance of cessation of illicit drugs        Bipolar 1 disorder ([address]  - Stable  - Will follow-up with psychiatrist OP for med rec as she has a prolonged QT       Anxiety  - Stable   - Benadryl prn       Diet: NPO  DVT ppx: Lovenox  GI ppx: QTc prolonged > avoid Pepcid etc       Renee Ramírez MD  Internal Medicine Resident, PGY-1  [address], [place]  10/22/2021, 8:59 AM

## 2021-10-22 NOTE — CARE COORDINATION
Met with pt after receiving a social work consult for \"concerns with living conditions\". Pt is alert and oriented. She stated that she is staying with a friend and her . They have 3 dogs and pt states that the apt smells. She states that she has filled out applications and numerous apt's. She states that she has the application for [place] [facility] [place]. She states that she wants to get her life together. She receives SSI and can pay rent. She states that she was staying with her brother and left all of her belongings and he was evicted so she lost everything. Provided pt a list of low income/senior housing complexes. Discussed shelters as well if it comes to that and provided her a list of shelters. Pt states that she can return to her friend's home and she will continue to apply to different apt's. Provided pt some clothes as she has very little. Pt states that she has a great deal of support from her daughter and some friends. Pt was very appreciative of the assistance.

## 2021-10-23 LAB
ABSOLUTE EOS #: 0.22 K/UL (ref 0–0.44)
ABSOLUTE IMMATURE GRANULOCYTE: 0.04 K/UL (ref 0–0.3)
ABSOLUTE LYMPH #: 1.3 K/UL (ref 1.1–3.7)
ABSOLUTE MONO #: 0.81 K/UL (ref 0.1–1.2)
ANION GAP SERPL CALCULATED.3IONS-SCNC: 16 MMOL/L (ref 9–17)
BASOPHILS # BLD: 0 % (ref 0–2)
BASOPHILS ABSOLUTE: 0.03 K/UL (ref 0–0.2)
BUN BLDV-MCNC: 3 MG/DL (ref 6–20)
BUN/CREAT BLD: ABNORMAL (ref 9–20)
CALCIUM SERPL-MCNC: 8.3 MG/DL (ref 8.6–10.4)
CHLORIDE BLD-SCNC: 102 MMOL/L (ref 98–107)
CO2: 20 MMOL/L (ref 20–31)
CREAT SERPL-MCNC: 0.45 MG/DL (ref 0.5–0.9)
DIFFERENTIAL TYPE: ABNORMAL
EOSINOPHILS RELATIVE PERCENT: 3 % (ref 1–4)
GFR AFRICAN AMERICAN: >60 ML/MIN
GFR NON-AFRICAN AMERICAN: >60 ML/MIN
GFR SERPL CREATININE-BSD FRML MDRD: ABNORMAL ML/MIN/{1.73_M2}
GFR SERPL CREATININE-BSD FRML MDRD: ABNORMAL ML/MIN/{1.73_M2}
GLUCOSE BLD-MCNC: 103 MG/DL (ref 70–99)
HCT VFR BLD CALC: 36.2 % (ref 36.3–47.1)
HEMOGLOBIN: 11.4 G/DL (ref 11.9–15.1)
IMMATURE GRANULOCYTES: 1 %
LYMPHOCYTES # BLD: 18 % (ref 24–43)
MAGNESIUM: 2 MG/DL (ref 1.6–2.6)
MCH RBC QN AUTO: 27.7 PG (ref 25.2–33.5)
MCHC RBC AUTO-ENTMCNC: 31.5 G/DL (ref 28.4–34.8)
MCV RBC AUTO: 87.9 FL (ref 82.6–102.9)
MONOCYTES # BLD: 11 % (ref 3–12)
NRBC AUTOMATED: 0 PER 100 WBC
PDW BLD-RTO: 15.8 % (ref 11.8–14.4)
PLATELET # BLD: 299 K/UL (ref 138–453)
PLATELET ESTIMATE: ABNORMAL
PMV BLD AUTO: 11 FL (ref 8.1–13.5)
POTASSIUM SERPL-SCNC: 3 MMOL/L (ref 3.7–5.3)
RBC # BLD: 4.12 M/UL (ref 3.95–5.11)
RBC # BLD: ABNORMAL 10*6/UL
SEG NEUTROPHILS: 67 % (ref 36–65)
SEGMENTED NEUTROPHILS ABSOLUTE COUNT: 4.89 K/UL (ref 1.5–8.1)
SODIUM BLD-SCNC: 138 MMOL/L (ref 135–144)
WBC # BLD: 7.3 K/UL (ref 3.5–11.3)
WBC # BLD: ABNORMAL 10*3/UL

## 2021-10-23 PROCEDURE — C9113 INJ PANTOPRAZOLE SODIUM, VIA: HCPCS

## 2021-10-23 PROCEDURE — 36415 COLL VENOUS BLD VENIPUNCTURE: CPT

## 2021-10-23 PROCEDURE — 6370000000 HC RX 637 (ALT 250 FOR IP)

## 2021-10-23 PROCEDURE — 6370000000 HC RX 637 (ALT 250 FOR IP): Performed by: STUDENT IN AN ORGANIZED HEALTH CARE EDUCATION/TRAINING PROGRAM

## 2021-10-23 PROCEDURE — 2580000003 HC RX 258

## 2021-10-23 PROCEDURE — 94640 AIRWAY INHALATION TREATMENT: CPT

## 2021-10-23 PROCEDURE — 83735 ASSAY OF MAGNESIUM: CPT

## 2021-10-23 PROCEDURE — 99222 1ST HOSP IP/OBS MODERATE 55: CPT | Performed by: INTERNAL MEDICINE

## 2021-10-23 PROCEDURE — 99232 SBSQ HOSP IP/OBS MODERATE 35: CPT | Performed by: INTERNAL MEDICINE

## 2021-10-23 PROCEDURE — 6360000002 HC RX W HCPCS

## 2021-10-23 PROCEDURE — 1200000000 HC SEMI PRIVATE

## 2021-10-23 PROCEDURE — 80048 BASIC METABOLIC PNL TOTAL CA: CPT

## 2021-10-23 PROCEDURE — 85025 COMPLETE CBC W/AUTO DIFF WBC: CPT

## 2021-10-23 RX ORDER — POTASSIUM BICARBONATE 25 MEQ/1
25 TABLET, EFFERVESCENT ORAL EVERY 4 HOURS
Status: COMPLETED | OUTPATIENT
Start: 2021-10-23 | End: 2021-10-23

## 2021-10-23 RX ORDER — PANTOPRAZOLE SODIUM 40 MG/10ML
40 INJECTION, POWDER, LYOPHILIZED, FOR SOLUTION INTRAVENOUS 2 TIMES DAILY
Status: DISCONTINUED | OUTPATIENT
Start: 2021-10-23 | End: 2021-10-25

## 2021-10-23 RX ORDER — MAGNESIUM SULFATE 1 G/100ML
1000 INJECTION INTRAVENOUS ONCE
Status: COMPLETED | OUTPATIENT
Start: 2021-10-23 | End: 2021-10-23

## 2021-10-23 RX ORDER — SODIUM CHLORIDE 9 MG/ML
10 INJECTION INTRAVENOUS 2 TIMES DAILY
Status: DISCONTINUED | OUTPATIENT
Start: 2021-10-23 | End: 2021-10-25

## 2021-10-23 RX ADMIN — SODIUM CHLORIDE, PRESERVATIVE FREE 10 ML: 5 INJECTION INTRAVENOUS at 20:29

## 2021-10-23 RX ADMIN — POTASSIUM BICARBONATE 25 MEQ: 977.5 TABLET, EFFERVESCENT ORAL at 11:30

## 2021-10-23 RX ADMIN — GABAPENTIN 800 MG: 400 CAPSULE ORAL at 20:29

## 2021-10-23 RX ADMIN — GABAPENTIN 800 MG: 400 CAPSULE ORAL at 08:53

## 2021-10-23 RX ADMIN — HYDROCODONE BITARTRATE AND ACETAMINOPHEN 1 TABLET: 5; 325 TABLET ORAL at 12:02

## 2021-10-23 RX ADMIN — BUDESONIDE AND FORMOTEROL FUMARATE DIHYDRATE 2 PUFF: 160; 4.5 AEROSOL RESPIRATORY (INHALATION) at 07:34

## 2021-10-23 RX ADMIN — PANTOPRAZOLE SODIUM 40 MG: 40 INJECTION, POWDER, FOR SOLUTION INTRAVENOUS at 11:30

## 2021-10-23 RX ADMIN — TRIMETHOBENZAMIDE HYDROCHLORIDE 200 MG: 100 INJECTION INTRAMUSCULAR at 12:02

## 2021-10-23 RX ADMIN — PANTOPRAZOLE SODIUM 40 MG: 40 INJECTION, POWDER, FOR SOLUTION INTRAVENOUS at 20:29

## 2021-10-23 RX ADMIN — TRIMETHOBENZAMIDE HYDROCHLORIDE 200 MG: 100 INJECTION INTRAMUSCULAR at 05:51

## 2021-10-23 RX ADMIN — GABAPENTIN 800 MG: 400 CAPSULE ORAL at 14:33

## 2021-10-23 RX ADMIN — SODIUM CHLORIDE, PRESERVATIVE FREE 10 ML: 5 INJECTION INTRAVENOUS at 11:31

## 2021-10-23 RX ADMIN — HYDROCODONE BITARTRATE AND ACETAMINOPHEN 1 TABLET: 5; 325 TABLET ORAL at 18:08

## 2021-10-23 RX ADMIN — TRIMETHOBENZAMIDE HYDROCHLORIDE 200 MG: 100 INJECTION INTRAMUSCULAR at 18:09

## 2021-10-23 RX ADMIN — SODIUM CHLORIDE: 9 INJECTION, SOLUTION INTRAVENOUS at 05:10

## 2021-10-23 RX ADMIN — BUDESONIDE AND FORMOTEROL FUMARATE DIHYDRATE 2 PUFF: 160; 4.5 AEROSOL RESPIRATORY (INHALATION) at 20:03

## 2021-10-23 RX ADMIN — HYDROCODONE BITARTRATE AND ACETAMINOPHEN 1 TABLET: 5; 325 TABLET ORAL at 05:51

## 2021-10-23 RX ADMIN — POTASSIUM BICARBONATE 25 MEQ: 977.5 TABLET, EFFERVESCENT ORAL at 18:44

## 2021-10-23 RX ADMIN — MAGNESIUM SULFATE HEPTAHYDRATE 1000 MG: 1 INJECTION, SOLUTION INTRAVENOUS at 10:56

## 2021-10-23 RX ADMIN — LISINOPRIL 5 MG: 5 TABLET ORAL at 08:53

## 2021-10-23 ASSESSMENT — PAIN SCALES - GENERAL
PAINLEVEL_OUTOF10: 7
PAINLEVEL_OUTOF10: 5
PAINLEVEL_OUTOF10: 7
PAINLEVEL_OUTOF10: 5
PAINLEVEL_OUTOF10: 8
PAINLEVEL_OUTOF10: 5

## 2021-10-23 ASSESSMENT — PAIN DESCRIPTION - FREQUENCY
FREQUENCY: CONTINUOUS
FREQUENCY: CONTINUOUS

## 2021-10-23 ASSESSMENT — ENCOUNTER SYMPTOMS
SORE THROAT: 0
SHORTNESS OF BREATH: 0
PHOTOPHOBIA: 0
SINUS PRESSURE: 0
NAUSEA: 1
BACK PAIN: 0
CHEST TIGHTNESS: 0
DIARRHEA: 1
ABDOMINAL PAIN: 1
VOMITING: 1
RHINORRHEA: 0
COUGH: 0

## 2021-10-23 ASSESSMENT — PAIN DESCRIPTION - DESCRIPTORS: DESCRIPTORS: ACHING;CONSTANT

## 2021-10-23 ASSESSMENT — PAIN DESCRIPTION - LOCATION
LOCATION: HIP
LOCATION: ABDOMEN

## 2021-10-23 ASSESSMENT — PAIN DESCRIPTION - ORIENTATION: ORIENTATION: RIGHT;LEFT;MID

## 2021-10-23 ASSESSMENT — PAIN DESCRIPTION - ONSET: ONSET: ON-GOING

## 2021-10-23 ASSESSMENT — PAIN DESCRIPTION - PAIN TYPE: TYPE: ACUTE PAIN

## 2021-10-23 ASSESSMENT — PAIN DESCRIPTION - PROGRESSION: CLINICAL_PROGRESSION: NOT CHANGED

## 2021-10-23 NOTE — DISCHARGE SUMMARY
[address]     Department of Internal Medicine - Staff Internal Medicine Teaching Service    INPATIENT DISCHARGE SUMMARY      Patient Identification:  Christal Opitz is a 61 y.o. female. :  1965  MRN: [id number]     Acct: [de-identified]   PCP: Yonas Brunner MD  Admit Date:  10/15/2021  Discharge date and time: 10/20/2021  4:28 PM   Attending Provider: No att. providers found                                     [address] Problem Lists:  Principal Problem:    Non-intractable vomiting with nausea  Active Problems:    Gastroesophageal reflux disease without esophagitis    Drug abuse ([address]    COPD (chronic obstructive pulmonary disease) ([address]    Hypertension    Hypokalemia    Hypothyroidism due to acquired atrophy of thyroid    Prolonged Q-T interval on ECG    Bipolar disorder, unspecified ([address]    Polysubstance abuse ([address]    Acute hypokalemia    Hypomagnesemia    Intractable vomiting  Resolved Problems:    Leukocytosis      HOSPITAL STAY     Brief Inpatient course:   Christal Opitz is a 61 y.o. female who was admitted for the management of Non-intractable vomiting with nausea, presented to the emergency department with Nausea and vomiting. She has a PMH of Crohn's disease, bowel obstruction for which she had small bowel resection. Colonoscopy of 2017 was grossly normal. HTN on lisinopril, Hypothyroidism on levothyroxine, COPD on albuterol and symbicort inhalers, and Home Oxygen, drug intoxication. Presented to the with epigastric abdo pain, nausea and vomiting since 1 day. No diarrhea, fever, cough, dyspnea, chest pain, palpitations. She was afebrile, /75, saturating on room air, K+ 3.1, lactic acid 2.0-->2.7, glycemia 127, trops 11, urine ketone pos, EKG revealed QTc 654. CT abd revealed 1. New terminal ileal wall thickening which may relate to ileitis.  No bowel obstruction.  2. Stable ascending colonic wall thickening and luminal narrowing which may relate to sequela of chronic colitis. Administered 1L NS bolus, zofran, 10meq K+, proclorperazine 10mg and she was admitted to observation. Her symptoms persisted and around 10:00am today a rapid response was called on her. She was alert and oriented, retching, with intractable vomiting /98, pulse 79, resp 24, strips showed non-sustained SVT. A proper ECG could mot be obtained as she was restless.  She was administered 2g Mg, 1g Calcium gluconate and transferred to step down for continuation of care. Cardiology consulted; recommend stop all QT prolonging medications (zofran, pebcid, chlorpromazine and urgent administration of IV potassium through central.   Managed for:   Nausea & vomiting  Hx Crohns. Hx bowel resection; not sure if for Crohns or intestinal obstruction  Hx cholectomy  CT abd concerning for ileitis and sequela of chronic colitis  NS 100ml/hr  Morphine for pain  Monitor QTc WITH EKG  IV potassium replacement  On Tigan IV tid  On day 5 cefepime.  Will Discontinue     Hypokalemia and hypoNatremia  K+ 3.4, Na 128-->129-->130-->132  Due to vomiting  NS 100ml/hr  Replace potassium  Monitor K+     Prolonged QT with SVT  HR 87  , QTc 529  QTc 705 at admission  Refrain from QT prolonging medications like ondansetron ,monitor EKG and replaced potassium and magnesium      Hypertension  BP 159/88  Probably worsened by pain and cocaine intoxication  Hydralazine 5mg IV prn every 6hrs  if SBP >160 will resume oral antihypertensives once able to tolerate oral      COPD  Oxygen as needed  On Nicotine patch  On albuterol prn     Bipolar/depression  Hold medications due to prolonged QT         Procedures/ Significant Interventions:    none    Consults:     Consults:     Final Specialist Recommendations/Findings:   IP CONSULT TO INTERNAL MEDICINE  IP CONSULT TO CARDIOLOGY  IP CONSULT TO GI      Any Hospital Acquired Infections: none    Discharge Functional Status: stable    DISCHARGE PLAN     Disposition: home    Patient Instructions:   Discharge Medication List as of 10/20/2021  2:33 PM      START taking these medications    Details   potassium bicarb-citric acid (EFFER-K) 10 MEQ TBEF effervescent tablet Take 4 tablets by mouth daily for 3 days If 4 tablets are not tolerated, dilute 2 tablets instead and take BID, Disp-12 tablet, R-0Normal      polyethylene glycol (GLYCOLAX) 17 g packet Take 17 g by mouth daily as needed for Constipation, Disp-527 g, R-1Normal      trimethobenzamide (TIGAN) 300 MG capsule Take 1 capsule by mouth 3 times daily as needed (vomiting), Disp-9 capsule, R-0Normal         CONTINUE these medications which have NOT CHANGED    Details   HYDROcodone-acetaminophen (NORCO) 5-325 MG per tablet Take 1 tablet by mouth every 6 hours as needed for Pain for up to 14 days. , Disp-56 tablet, R-0Normal      furosemide (LASIX) 20 MG tablet Take 1 tablet by mouth daily, Disp-60 tablet, R-3Normal      budesonide-formoterol (SYMBICORT) 160-4.5 MCG/ACT AERO Inhale 2 puffs into the lungs 2 times daily, Disp-10.2 g, R-3Normal      lisinopril (PRINIVIL;ZESTRIL) 5 MG tablet Take 1 tablet by mouth daily, Disp-30 tablet, R-3Normal      nicotine (NICODERM CQ) 21 MG/24HR Place 1 patch onto the skin daily, Disp-30 patch, R-3Normal      acetaminophen (TYLENOL) 500 MG tablet Take 2 tablets by mouth 3 times daily, Disp-15 tablet, R-1Print      lidocaine (LIDODERM) 5 % Place 1 patch onto the skin daily 12 hours on, 12 hours off. Historical Med      ipratropium-albuterol (DUONEB) 0.5-2.5 (3) MG/3ML SOLN nebulizer solution Inhale 3 mLs into the lungs every 6 hours as needed for Shortness of Breath, Disp-1 each, R-0Normal      Polyvinyl Alcohol-Povidone (REFRESH OP) Place 1 drop into both eyes 3 times dailyHistorical Med         STOP taking these medications       loperamide (IMODIUM) 2 MG capsule Comments:   Reason for Stopping:               Activity: activity as tolerated    Diet: clear liquids, advance as tolerated    Follow-up:    Sheridan Em MD  [address] [facility]  [phone]    In 1 week  Hospital follow up appt    Negin Agarwal MD  [place] [address]  [phone]    Schedule an appointment as soon as possible for a visit in 1 week  post hospital follow-up      Patient Instructions:   - You were admitted and managed for intractable nausea and vomiting.   - We also noticed prolongation of your QT interval which causes instability to your heart and can lead to sudden death  - Please avoid QT prolongation medications. We have stopped your Imodium  which is one of them  - Do not take Zofran, Reglan, Pepcid etc  - Please consult with your doctors before starting any new medication  - Please follow-up with the stomach doctors in 1 week  - Please follow-up with your PCP in 1 week  - Please return if you have any worsening of your symptoms    Follow up labs: none  Follow up imaging: none    Note that over 30 minutes was spent in preparing discharge papers, discussing discharge with patient, medication review, etc.      Daryn Garvey MD,  Internal Medicine Resident, PGY-1  [facility] [facility], [address] [place] [address] Drive  10/23/2021, 10:59 AM

## 2021-10-23 NOTE — PROGRESS NOTES
Patient's potassium is 3.0. Informed patient of need for IV potassium. Patient refusing potassium stating that \"it burns. \" Writer informed patient of extra measures to help with potassium infusion. Patient continues to refuse. Writer notified primary.

## 2021-10-23 NOTE — CONSULTS
[address] [facility] CONSULT    Patient:   Lexie Yancey   :    1965   Facility:   [facility]   Date:    10/23/2021  Admission Dx:  Abdominal pain [R10.9]  Prolonged Q-T interval on ECG [R94.31]  Lower abdominal pain [R10.30]  Nausea vomiting and diarrhea [R11.2, R19.7]  Requesting physician: Anish Billy MD  Reason for Consult: Duodenal Hematoma  Chief Complaint :  Nausea, vomiting, abdominal pain, diarrhea    SUBJECTIVE     HISTORY OF PRESENT ILLNESS  Lexie Yancey is a 61 y.o. female who has reportedly been diagnosed with Crohns Disease who presented to the hospital on 10/15/21 with complaints of nausea, vomiting, abdominal pain and diarrhea for a day prior to presentation. She was managed with antibiotics and discharged with recommendations to follow up outpatient with a video capsule vs colonoscopy to diagnose Crohns disease. She then returns the next day to the ER with similar complaints and was admitted to the Medicine service. On my evaluation, patient tells me she has been suffering with these symptoms on and off since she was 20years old. She reports previous colonoscopies and states that she was diagnosed with Crohns disease in  or . She was asked to follow up as an outpatient but never did so. She has had multiple admissions for complaints of nausea, vomiting, abdominal pain and diarrhea. GI panel and C diff negative, CRP and Calprotectin elevated.      OBJECTIVE:     PAST MEDICAL/SURGICAL HISTORY  Past Medical History:   Diagnosis Date    Acid reflux     Anxiety     Arthritis     Left hip    Asthma     Bipolar 1 disorder (HCC)     Bowel obstruction (HCC)     [facility]ch[facility] disease[facility] ([facility])     O2 3L PRN/ Dr. [facility]/last seen /appt.21 for Clearance    [facility] disease ([facility] [address]     Depression     Dry eye     Fibromyalgia     Gout     Headache     Hyperlipidemia     Hypertension     Hypothyroidism     Kidney stones     Muscle spasm     Neuropathy     Pain     left hip    Personal history of other medical treatment     Pt. seen by Dr. Aida Mcarthur for clearance for this OR Left hip/ Normally pt. doesn't follow with Cardiology. [facility]    Wears partial dentures     upper    Wellness examination     PCP Hyacinth Nunez MD/ genna/last seen 8-24-21     Past Surgical History:   Procedure Laterality Date    ABDOMEN SURGERY      bowel obstruction OR    BUNIONECTOMY Left     CHOLECYSTECTOMY      COLONOSCOPY  04/30/2007    no gross pathology seen in the colon and also 3-4 inches of the ileum    COLONOSCOPY  10/12/2017    normal    TONSILLECTOMY AND ADENOIDECTOMY      TUBAL LIGATION         ALLERGIES:  Allergies   Allergen Reactions    Azithromycin Other (See Comments)     'It doesn't work\"    Methylprednisolone      Elevates blood pressure    Penicillins Swelling     throat    Tape [Adhesive Tape] Other (See Comments)     \" Tears my skin off\"       HOME MEDICATIONS:  Prior to Admission medications    Medication Sig Start Date End Date Taking?  Authorizing Provider   albuterol sulfate HFA (VENTOLIN HFA) 108 (90 Base) MCG/ACT inhaler Inhale 2 puffs into the lungs every 6 hours as needed for Wheezing   Yes Historical Provider, MD   diphenhydrAMINE (BENADRYL) 25 MG tablet Take 25 mg by mouth nightly as needed   Yes Historical Provider, MD   topiramate (TOPAMAX) 100 MG tablet Take 100 mg by mouth 3 times daily    Yes Historical Provider, MD   SUMAtriptan (IMITREX) 100 MG tablet Take 100 mg by mouth once as needed for Migraine   Yes Historical Provider, MD   omeprazole (PRILOSEC) 20 MG delayed release capsule Take 20 mg by mouth Daily   Yes Historical Provider, MD   potassium chloride (KLOR-CON M) 10 MEQ extended release tablet Take 10 mEq by mouth daily Patient says she takes every other day   Yes Historical Provider, MD   gabapentin (NEURONTIN) 400 MG capsule Take 800 mg by mouth 3 times daily. Yes Historical Provider, MD   pregabalin (LYRICA) 150 MG capsule Take 300 mg by mouth 2 times daily. Yes Historical Provider, MD   FLUoxetine (PROZAC) 20 MG capsule Take 60 mg by mouth daily   Yes Historical Provider, MD   traZODone (DESYREL) 150 MG tablet Take 250 mg by mouth nightly   Yes Historical Provider, MD   HYDROcodone-acetaminophen (NORCO) 5-325 MG per tablet Take 1 tablet by mouth every 6 hours as needed for Pain for up to 14 days. 10/13/21 10/27/21 Yes Jose Mead MD   furosemide (LASIX) 20 MG tablet Take 1 tablet by mouth daily  Patient taking differently: Take 20 mg by mouth daily Patient says she takes 10 mg daily 9/19/21  Yes Shima Velaqzuez MD   budesonide-formoterol [facility]) 160-4.5 MCG/ACT AERO Inhale 2 puffs into the lungs 2 times daily 9/19/21  Yes Shima Velazquez MD   lisinopril (PRINIVIL;ZESTRIL) 5 MG tablet Take 1 tablet by mouth daily 9/20/21  Yes Shima Velazquez MD   nicotine (NICODERM CQ) 21 MG/24HR Place 1 patch onto the skin daily 9/20/21  Yes Shima Velazquez MD   acetaminophen (TYLENOL) 500 MG tablet Take 2 tablets by mouth 3 times daily  Patient taking differently: Take 1,000 mg by mouth 3 times daily as needed  7/17/21  Yes Marce Kim,    lidocaine (LIDODERM) 5 % Place 1 patch onto the skin daily as needed for Pain 12 hours on, 12 hours off.     Yes Historical Provider, MD   ipratropium-albuterol (DUONEB) 0.5-2.5 (3) MG/3ML SOLN nebulizer solution Inhale 3 mLs into the lungs every 6 hours as needed for Shortness of Breath 9/19/21 10/19/21  Shima Velazquez MD   Polyvinyl Alcohol-Povidone (REFRESH OP) Place 1 drop into both eyes 3 times daily    Historical Provider, MD       CURRENT MEDICATIONS:  Scheduled Meds:   magnesium sulfate  1,000 mg IntraVENous Once    pantoprazole  40 mg IntraVENous BID    And    sodium chloride (PF)  10 mL IntraVENous Daily    sodium chloride flush  5-40 mL IntraVENous 2 times per day    [Held by provider] enoxaparin  40 mg SubCUTAneous Daily    budesonide-formoterol  2 puff Inhalation BID    [Held by provider] furosemide  20 mg Oral Daily    lisinopril  5 mg Oral Daily    [Held by provider] FLUoxetine  60 mg Oral Daily    gabapentin  800 mg Oral TID    [Held by provider] traZODone  250 mg Oral Nightly     Continuous Infusions:   sodium chloride      sodium chloride 125 mL/hr at 10/23/21 0510     PRN Meds:diphenhydrAMINE, trimethobenzamide, sodium chloride flush, sodium chloride, polyethylene glycol, acetaminophen **OR** acetaminophen, potassium chloride **OR** potassium alternative oral replacement **OR** potassium chloride, trimethobenzamide, HYDROcodone-acetaminophen    SOCIAL HISTORY:     Tobacco:   reports that she has been smoking cigarettes. She has a 18.50 pack-year smoking history. She quit smokeless tobacco use about 20 years ago. Her smokeless tobacco use included chew. Alcohol:   reports previous alcohol use. Illicit drugs:  reports current drug use. Drug: Marijuana. FAMILY HISTORY:     Family History   Problem Relation Age of Onset    Diabetes Father     Lung Cancer Father     Hypertension Mother     Cancer Mother     High Blood Pressure Mother     Crohn's Disease Brother     Heart Disease Brother        REVIEW OF SYSTEMS:    Review of Systems   Constitutional: Negative for chills, diaphoresis and fever. HENT: Negative for rhinorrhea, sinus pressure and sore throat. Eyes: Negative for photophobia. Respiratory: Negative for cough, chest tightness and shortness of breath. Cardiovascular: Negative for chest pain. Gastrointestinal: Positive for abdominal pain, diarrhea, nausea and vomiting. Genitourinary: Negative for dysuria, frequency and urgency. Musculoskeletal: Negative for back pain and neck stiffness. Skin: Negative for rash. Neurological: Negative for seizures and speech difficulty. Psychiatric/Behavioral: Negative for agitation, confusion and decreased concentration. PHYSICAL EXAM:    /75   Pulse 65   Temp 98.5 °F (36.9 °C) (Oral)   Resp 17   Ht 5' 6\" (1.676 m)   Wt 160 lb (72.6 kg)   SpO2 96%   BMI 25.82 kg/m²     Physical Exam -  Constitutional:  Alert, cooperative and no distress. Mental Status:  Oriented to person, place and time and normal affect. Lungs:  Bilateral air entry present, lung fields clear. Normal effort. Heart:  Regular rate and rhythm, no murmur. Abdomen:  Soft, tender over lower quadrants, nondistended, normal bowel sounds. Extremities:  No edema, redness, tenderness in the calves. Skin:  Warm, dry, no gross lesions or rashes. LABS AND IMAGING:     CBC  Recent Labs     10/21/21  0648 10/22/21  0607 10/23/21  0630   WBC 10.7 7.8 7.3   HGB 12.6 12.3 11.4*   HCT 38.8 38.5 36.2*   MCV 86.6 85.6 87.9   MCH 28.1 27.3 27.7   MCHC 32.5 31.9 31.5    334 299       BMP  Recent Labs     10/21/21  0648 10/22/21  0607 10/23/21  0630   * 133* 138   K 3.4* 3.2* 3.0*   CL 98 95* 102   CO2 21 19* 20   BUN 7 4* 3*   CREATININE 0.46* 0.39* 0.45*   GLUCOSE 90 76 103*   CALCIUM 8.8 8.3* 8.3*       LFTS  Recent Labs     10/21/21  0648   ALKPHOS 86   ALT 11   AST 32*   PROT 6.7   BILITOT 0.72   LABALBU 3.9       IMAGING  XR ABDOMEN (KUB) (SINGLE AP VIEW)  Result Date: 10/19/2021  Moderate constipation. No bowel obstruction. No change from priors. XR ABDOMEN (KUB) (SINGLE AP VIEW)  Result Date: 10/16/2021  Right femoral central line as above. CT ABDOMEN PELVIS W IV CONTRAST Additional Contrast? None  Result Date: 10/22/2021  New small hematoma posterior to the proximal duodenum. Findings suspicious for perforated tiny duodenal ulcer with contained hematoma likely in the periampullary region. Critical results were called by Dr. Evon Goss MD to Dr. Emely Olivas On 10/22/2021 at 15:40. XR CHEST PORTABLE  Result Date: 10/21/2021  No focal consolidation.      XR CHEST PORTABLE  Result Date: 10/16/2021  Mild bilateral interstitial infiltrates     US RETROPERITONEAL LIMITED  Result Date: 10/16/2021  Slightly limited but essentially unremarkable renal ultrasound. No hydronephrosis. IMPRESSION:     Suspected Crohns Disease with  Duodenal ulcer with hematoma on CT scan  Substance abuse - U tox positive for Cocaine, Amphetamines and opioids    Old records, labs and imaging reviewed. PLAN     - Previous CT abdomens show colitis, ileitis however to my knowledge Crohns has not been biopsy proven so far. - Will need video capsule endoscopy vs colonoscopy to confirm Crohns disease.  - GI panel is negative for infectious causes of diarrhea. - May benefit from EGD to evaluate for duodenal ulcer. Will discuss the case with Dr. Katia Sun . Thank you for this interesting consult. We will continue to follow.     Flavia Bermeo MD  PGY-3, Internal Medicine Resident  [facility]         10/23/2021, 10:38 AM

## 2021-10-23 NOTE — PROGRESS NOTES
[facility]  Internal Medicine Teaching Residency Program  Inpatient Daily Progress Note  ______________________________________________________________________________    Patient: Yan Park  YOB: 1965   ZDU:[id number]    Acct: [de-identified]     Room: [address] [place] [address]  Admit date: 10/21/2021  Today's date: 10/23/21  Number of days in the hospital: 2    SUBJECTIVE   Admitting Diagnosis: Non-intractable vomiting with nausea  CC: Nausea and vomiting  Pt examined at bedside. Chart & results reviewed. Feeling better today. Had diarrhea over night. Started on Imodium. GI consulted for duodenal hematoma with suspicion of ulcer. Start on PPI and Mg    ROS:  Constitutional:  negative for chills, fevers, sweats  Respiratory:  negative for cough, dyspnea on exertion, hemoptysis, shortness of breath, wheezing  Cardiovascular:  negative for chest pain, chest pressure/discomfort, lower extremity edema, palpitations  Gastrointestinal: Watery Diarrhea over night. 3 episodes. Neurological:  negative for dizziness, headache  BRIEF HISTORY     The patient is a pleasant 56 y. o. female with a PMHx significant for Crohn's disease, COPD, bipolar disorder, polysubstance abuse, HTN and Sonu Marco presents with a chief complaint of abdominal pain. Pt states that since October 15, she has had increasing right lower quadrant abdominal pain that she rates 10/10. The pain does not radiate anywhere but pt was diffusely tender on examination. Pt states she has been having diarrhea for 6 days. She has been vomiting that is green in nature. Pt was discharged yesterday after being admitted for N/V. Pt had prolonged QTc. Last QTc 529. Pt states she has not eaten in 6 days.  Pt denies black tarry stools and there was no recent GI procedures.     Significant labs in the ED: K 3.4, ALT: 11 and AST 32    OBJECTIVE     Vital Signs:  /74   Pulse 67   Temp 99 °F (37.2 °C) (Oral)   Resp 18   Ht 5' 6\" (1.676 m)   Wt 160 lb (72.6 kg)   SpO2 96%   BMI 25.82 kg/m²     Temp (24hrs), Av °F (37.2 °C), Min:99 °F (37.2 °C), Max:99 °F (37.2 °C)    In: 1966   Out: 900 [Urine:900]    Physical Exam:  Constitutional: This is a well developed, well nourished, 25-29.9 - Overweight 61y.o. year old female who is alert, oriented, cooperative and in no apparent distress. Head:normocephalic and atraumatic. EENT:  PERRLA. No conjunctival injections. Septum was midline, mucosa was without erythema, exudates or cobblestoning. No thrush was noted. Neck: Supple without thyromegaly. No elevated JVP. Trachea was midline. Respiratory: Chest was symmetrical without dullness to percussion. Breath sounds bilaterally were clear to auscultation. There were no wheezes, rhonchi or rales. There is no intercostal retraction or use of accessory muscles. No egophony noted. Cardiovascular: Regular without murmur, clicks, gallops or rubs. Abdomen: Slightly rounded and soft without organomegaly. No rebound, rigidity or guarding was appreciated. Lymphatic: No lymphadenopathy. Musculoskeletal: Normal curvature of the spine. No gross muscle weakness. Extremities:  No lower extremity edema, ulcerations, tenderness, varicosities or erythema. Muscle size, tone and strength are normal.  No involuntary movements are noted. Skin:  Warm and dry. Good color, turgor and pigmentation. No lesions or scars.   No cyanosis or clubbing  Neurological/Psychiatric: The patient's general behavior, level of consciousness, thought content and emotional status is normal.        Medications:  Scheduled Medications:    sodium chloride flush  5-40 mL IntraVENous 2 times per day    [Held by provider] enoxaparin  40 mg SubCUTAneous Daily    budesonide-formoterol  2 puff Inhalation BID    [Held by provider] furosemide  20 mg Oral Daily    lisinopril  5 mg Oral Daily    [Held by provider] FLUoxetine  60 mg Oral Daily    gabapentin  800 mg Oral TID    [Held by provider] traZODone  250 mg Oral Nightly     Continuous Infusions:    sodium chloride      sodium chloride 125 mL/hr at 10/23/21 0510     PRN MedicationsdiphenhydrAMINE, 12.5 mg, Q6H PRN  trimethobenzamide, 200 mg, Q6H PRN  sodium chloride flush, 5-40 mL, PRN  sodium chloride, 25 mL, PRN  polyethylene glycol, 17 g, Daily PRN  acetaminophen, 650 mg, Q6H PRN   Or  acetaminophen, 650 mg, Q6H PRN  potassium chloride, 40 mEq, PRN   Or  potassium alternative oral replacement, 40 mEq, PRN   Or  potassium chloride, 10 mEq, PRN  trimethobenzamide, 300 mg, TID PRN  HYDROcodone-acetaminophen, 1 tablet, Q6H PRN        Diagnostic Labs:  CBC:   Recent Labs     10/21/21  0648 10/22/21  0607 10/23/21  0630   WBC 10.7 7.8 7.3   RBC 4.48 4.50 4.12   HGB 12.6 12.3 11.4*   HCT 38.8 38.5 36.2*   MCV 86.6 85.6 87.9   RDW 16.1* 15.8* 15.8*    334 299     BMP:   Recent Labs     10/20/21  1143 10/21/21  0648 10/22/21  0607   NA  --  133* 133*   K 4.0 3.4* 3.2*   CL  --  98 95*   CO2  --  21 19*   BUN  --  7 4*   CREATININE  --  0.46* 0.39*     BNP: No results for input(s): BNP in the last 72 hours. PT/INR: No results for input(s): PROTIME, INR in the last 72 hours. APTT: No results for input(s): APTT in the last 72 hours. CARDIAC ENZYMES: No results for input(s): CKMB, CKMBINDEX, TROPONINI in the last 72 hours. Invalid input(s): CKTOTAL;3  FASTING LIPID PANEL:No results found for: CHOL, HDL, TRIG  LIVER PROFILE:   Recent Labs     10/21/21  0648   AST 32*   ALT 11   BILITOT 0.72   ALKPHOS 86      MICROBIOLOGY:   Lab Results   Component Value Date/Time    CULTURE NO GROWTH 6 DAYS 10/16/2021 10:25 AM       Imaging:    XR ABDOMEN (KUB) (SINGLE AP VIEW)    Result Date: 10/19/2021  Moderate constipation. No bowel obstruction. No change from priors. XR ABDOMEN (KUB) (SINGLE AP VIEW)    Result Date: 10/16/2021  Right femoral central line as above.      CT ABDOMEN PELVIS W IV CONTRAST Additional Contrast? None    Result Date: 10/22/2021  New small hematoma posterior to the proximal duodenum. Findings suspicious for perforated tiny duodenal ulcer with contained hematoma likely in the periampullary region. Critical results were called by Dr. Aline Abdalla MD to Dr. Ritesh Salomon On 10/22/2021 at 15:40. XR CHEST PORTABLE    Result Date: 10/21/2021  No focal consolidation. XR CHEST PORTABLE    Result Date: 10/16/2021  Mild bilateral interstitial infiltrates     US RETROPERITONEAL LIMITED    Result Date: 10/16/2021  Slightly limited but essentially unremarkable renal ultrasound. No hydronephrosis. ASSESSMENT & PLAN     Abdominal pain with nausea, vomiting & diarrhea   - CT  10/22/2021: New small hematoma posterior to the proximal duodenum. Findings suspicious for perforated tiny duodenal ulcer with contained hematoma likely in the periampullary region.  - Urine drug screen positive for amphetamines, cocaine  - GI consulted.  Possible EGD today  - Has scopolamine patch  - Tigan IV  - Protonix 40mg bid  - IVF therapy   - Monitor K+ and replace when clinically indicated         Prolonged Q-T interval on ECG  - Continue to monitor   - Avoid QTc prolonging drugs        Hx Crohn disease ([address]  - Pt having diarrhea   - Calprotein elevated   - Denies melena   - Continue to monitor           Hypomagnesemia  - Mg 2.0  - Continue to monitor and replace when indicated        Gastroesophageal reflux disease without esophagitis  - Continue to monitor   - Avoid Pepcid and other QTc prolonging drugs         COPD (chronic obstructive pulmonary disease) (HCC)  - Stable   - Symbicort   - Continue respiratory treatments       Hypertension  /75  IV hydralazine if SBP>160       Hypokalemia  - Potassium 3.0   - Pt refusing IV replacement  - Replacement ordered  - Continue to monitor and replace when clinically indicated        Drug abuse ([address]  - Educated the pt on cessation of drug use   - Urine drug screen positive for amphetamines, cocaine        Bipolar 1 disorder ([facility] [address]  - Stable  - Will follow-up with psychiatrist OP for med rec as she has a prolonged QT       Anxiety  - Stable       Diet: clear liquid  DVT ppx: Lovenox  GI ppx: Protonix IV     Jennifer MD Ankita  Internal Medicine Resident, PGY-1  [facility];  [place], [place]  10/23/2021, 7:03 AM

## 2021-10-23 NOTE — PLAN OF CARE
Problem: Falls - Risk of:  Goal: Will remain free from falls  Description: Will remain free from falls  10/23/2021 0512 by Merlin Posner, RN  Outcome: Met This Shift  10/22/2021 1627 by Star Henley RN  Outcome: Ongoing  Goal: Absence of physical injury  Description: Absence of physical injury  10/23/2021 0512 by Merlin Posner, RN  Outcome: Met This Shift  10/22/2021 1627 by Star Henley RN  Outcome: Ongoing     Problem: Pain:  Goal: Pain level will decrease  Description: Pain level will decrease  10/23/2021 0512 by Merlin Posner, RN  Outcome: Ongoing  10/22/2021 1627 by Star Henley RN  Outcome: Ongoing  Goal: Control of acute pain  Description: Control of acute pain  10/23/2021 0512 by Merlin Posner, RN  Outcome: Ongoing  10/22/2021 1627 by Star Henley RN  Outcome: Ongoing  Goal: Control of chronic pain  Description: Control of chronic pain  10/23/2021 0512 by Merlin Posner, RN  Outcome: Ongoing  10/22/2021 1627 by Star Henley RN  Outcome: Ongoing     Problem: Nausea/Vomiting:  Goal: Absence of nausea/vomiting  Description: Absence of nausea/vomiting  10/23/2021 0512 by Merlin Posner, RN  Outcome: Ongoing  10/22/2021 1627 by Star Henley RN  Outcome: Ongoing  Goal: Able to drink  Description: Able to drink  10/23/2021 0512 by Merlin Posner, RN  Outcome: Ongoing  10/22/2021 1627 by Star Henley RN  Outcome: Ongoing  Goal: Able to eat  Description: Able to eat  10/22/2021 1627 by Star Henley RN  Outcome: Ongoing  Goal: Ability to achieve adequate nutritional intake will improve  Description: Ability to achieve adequate nutritional intake will improve  10/23/2021 0512 by Merlin Posner, RN  Outcome: Ongoing  10/22/2021 1627 by Star Henley RN  Outcome: Ongoing

## 2021-10-23 NOTE — PLAN OF CARE
Problem: RESPIRATORY  Intervention: Respiratory assessment  10/23/2021 0737 by Donna Gray RCP  Note: BRONCHOSPASM/BRONCHOCONSTRICTION     [x]         IMPROVE AERATION/BREATH SOUNDS  [x]   ADMINISTER BRONCHODILATOR THERAPY AS APPROPRIATE  [x]   ASSESS BREATH SOUNDS  []   IMPLEMENT AEROSOL/MDI PROTOCOL  [x]   PATIENT EDUCATION AS NEEDED

## 2021-10-24 LAB
ABSOLUTE EOS #: 0.24 K/UL (ref 0–0.44)
ABSOLUTE IMMATURE GRANULOCYTE: 0.03 K/UL (ref 0–0.3)
ABSOLUTE LYMPH #: 1.44 K/UL (ref 1.1–3.7)
ABSOLUTE MONO #: 0.82 K/UL (ref 0.1–1.2)
ANION GAP SERPL CALCULATED.3IONS-SCNC: 14 MMOL/L (ref 9–17)
BASOPHILS # BLD: 1 % (ref 0–2)
BASOPHILS ABSOLUTE: 0.04 K/UL (ref 0–0.2)
BUN BLDV-MCNC: <2 MG/DL (ref 6–20)
BUN/CREAT BLD: ABNORMAL (ref 9–20)
CALCIUM SERPL-MCNC: 8.3 MG/DL (ref 8.6–10.4)
CHLORIDE BLD-SCNC: 104 MMOL/L (ref 98–107)
CO2: 21 MMOL/L (ref 20–31)
CREAT SERPL-MCNC: 0.43 MG/DL (ref 0.5–0.9)
DIFFERENTIAL TYPE: ABNORMAL
EOSINOPHILS RELATIVE PERCENT: 4 % (ref 1–4)
GFR AFRICAN AMERICAN: >60 ML/MIN
GFR NON-AFRICAN AMERICAN: >60 ML/MIN
GFR SERPL CREATININE-BSD FRML MDRD: ABNORMAL ML/MIN/{1.73_M2}
GFR SERPL CREATININE-BSD FRML MDRD: ABNORMAL ML/MIN/{1.73_M2}
GLUCOSE BLD-MCNC: 88 MG/DL (ref 70–99)
HCT VFR BLD CALC: 35.3 % (ref 36.3–47.1)
HEMOGLOBIN: 10.8 G/DL (ref 11.9–15.1)
IMMATURE GRANULOCYTES: 1 %
LYMPHOCYTES # BLD: 22 % (ref 24–43)
MAGNESIUM: 1.8 MG/DL (ref 1.6–2.6)
MCH RBC QN AUTO: 27.6 PG (ref 25.2–33.5)
MCHC RBC AUTO-ENTMCNC: 30.6 G/DL (ref 28.4–34.8)
MCV RBC AUTO: 90.1 FL (ref 82.6–102.9)
MONOCYTES # BLD: 13 % (ref 3–12)
NRBC AUTOMATED: 0 PER 100 WBC
PDW BLD-RTO: 16.2 % (ref 11.8–14.4)
PLATELET # BLD: 262 K/UL (ref 138–453)
PLATELET ESTIMATE: ABNORMAL
PMV BLD AUTO: 11 FL (ref 8.1–13.5)
POTASSIUM SERPL-SCNC: 3.5 MMOL/L (ref 3.7–5.3)
RBC # BLD: 3.92 M/UL (ref 3.95–5.11)
RBC # BLD: ABNORMAL 10*6/UL
SARS-COV-2, RAPID: NOT DETECTED
SEG NEUTROPHILS: 59 % (ref 36–65)
SEGMENTED NEUTROPHILS ABSOLUTE COUNT: 3.94 K/UL (ref 1.5–8.1)
SODIUM BLD-SCNC: 139 MMOL/L (ref 135–144)
SPECIMEN DESCRIPTION: NORMAL
WBC # BLD: 6.5 K/UL (ref 3.5–11.3)
WBC # BLD: ABNORMAL 10*3/UL

## 2021-10-24 PROCEDURE — C9113 INJ PANTOPRAZOLE SODIUM, VIA: HCPCS

## 2021-10-24 PROCEDURE — 6360000002 HC RX W HCPCS

## 2021-10-24 PROCEDURE — 6370000000 HC RX 637 (ALT 250 FOR IP)

## 2021-10-24 PROCEDURE — 6360000002 HC RX W HCPCS: Performed by: STUDENT IN AN ORGANIZED HEALTH CARE EDUCATION/TRAINING PROGRAM

## 2021-10-24 PROCEDURE — 1200000000 HC SEMI PRIVATE

## 2021-10-24 PROCEDURE — 2580000003 HC RX 258

## 2021-10-24 PROCEDURE — 6370000000 HC RX 637 (ALT 250 FOR IP): Performed by: STUDENT IN AN ORGANIZED HEALTH CARE EDUCATION/TRAINING PROGRAM

## 2021-10-24 PROCEDURE — 85025 COMPLETE CBC W/AUTO DIFF WBC: CPT

## 2021-10-24 PROCEDURE — 36415 COLL VENOUS BLD VENIPUNCTURE: CPT

## 2021-10-24 PROCEDURE — 94761 N-INVAS EAR/PLS OXIMETRY MLT: CPT

## 2021-10-24 PROCEDURE — 83735 ASSAY OF MAGNESIUM: CPT

## 2021-10-24 PROCEDURE — 94640 AIRWAY INHALATION TREATMENT: CPT

## 2021-10-24 PROCEDURE — 80048 BASIC METABOLIC PNL TOTAL CA: CPT

## 2021-10-24 PROCEDURE — 87635 SARS-COV-2 COVID-19 AMP PRB: CPT

## 2021-10-24 PROCEDURE — 99232 SBSQ HOSP IP/OBS MODERATE 35: CPT | Performed by: INTERNAL MEDICINE

## 2021-10-24 PROCEDURE — 6370000000 HC RX 637 (ALT 250 FOR IP): Performed by: INTERNAL MEDICINE

## 2021-10-24 PROCEDURE — 6370000000 HC RX 637 (ALT 250 FOR IP): Performed by: NURSE PRACTITIONER

## 2021-10-24 RX ORDER — SUCRALFATE 1 G/1
1 TABLET ORAL 4 TIMES DAILY
Qty: 112 TABLET | Refills: 0 | Status: ON HOLD | OUTPATIENT
Start: 2021-10-24 | End: 2022-03-26 | Stop reason: HOSPADM

## 2021-10-24 RX ORDER — POTASSIUM CHLORIDE 20 MEQ/1
20 TABLET, EXTENDED RELEASE ORAL DAILY
Qty: 60 TABLET | Refills: 0 | Status: SHIPPED | OUTPATIENT
Start: 2021-10-24 | End: 2021-10-25 | Stop reason: HOSPADM

## 2021-10-24 RX ORDER — HYDROCODONE BITARTRATE AND ACETAMINOPHEN 5; 325 MG/1; MG/1
1 TABLET ORAL EVERY 6 HOURS PRN
Status: DISCONTINUED | OUTPATIENT
Start: 2021-10-24 | End: 2021-10-25 | Stop reason: HOSPADM

## 2021-10-24 RX ORDER — MAGNESIUM SULFATE 1 G/100ML
1000 INJECTION INTRAVENOUS ONCE
Status: COMPLETED | OUTPATIENT
Start: 2021-10-24 | End: 2021-10-24

## 2021-10-24 RX ORDER — POTASSIUM BICARBONATE 25 MEQ/1
25 TABLET, EFFERVESCENT ORAL EVERY 4 HOURS
Status: DISPENSED | OUTPATIENT
Start: 2021-10-24 | End: 2021-10-24

## 2021-10-24 RX ORDER — FENTANYL CITRATE 50 UG/ML
50 INJECTION, SOLUTION INTRAMUSCULAR; INTRAVENOUS ONCE
Status: COMPLETED | OUTPATIENT
Start: 2021-10-24 | End: 2021-10-24

## 2021-10-24 RX ORDER — PANTOPRAZOLE SODIUM 40 MG/1
40 TABLET, DELAYED RELEASE ORAL
Qty: 112 TABLET | Refills: 0 | Status: ON HOLD | OUTPATIENT
Start: 2021-10-24 | End: 2022-03-26 | Stop reason: HOSPADM

## 2021-10-24 RX ORDER — POLYETHYLENE GLYCOL 3350 17 G/17G
238 POWDER, FOR SOLUTION ORAL ONCE
Status: COMPLETED | OUTPATIENT
Start: 2021-10-24 | End: 2021-10-24

## 2021-10-24 RX ADMIN — TRIMETHOBENZAMIDE HYDROCHLORIDE 200 MG: 100 INJECTION INTRAMUSCULAR at 09:28

## 2021-10-24 RX ADMIN — POTASSIUM BICARBONATE 25 MEQ: 977.5 TABLET, EFFERVESCENT ORAL at 10:29

## 2021-10-24 RX ADMIN — GABAPENTIN 800 MG: 400 CAPSULE ORAL at 20:49

## 2021-10-24 RX ADMIN — MAGNESIUM SULFATE HEPTAHYDRATE 1000 MG: 1 INJECTION, SOLUTION INTRAVENOUS at 10:28

## 2021-10-24 RX ADMIN — TRIMETHOBENZAMIDE HYDROCHLORIDE 200 MG: 100 INJECTION INTRAMUSCULAR at 15:18

## 2021-10-24 RX ADMIN — TRIMETHOBENZAMIDE HYDROCHLORIDE 200 MG: 100 INJECTION INTRAMUSCULAR at 01:00

## 2021-10-24 RX ADMIN — Medication 238 G: at 15:28

## 2021-10-24 RX ADMIN — SODIUM CHLORIDE, PRESERVATIVE FREE 10 ML: 5 INJECTION INTRAVENOUS at 20:50

## 2021-10-24 RX ADMIN — SODIUM CHLORIDE, PRESERVATIVE FREE 10 ML: 5 INJECTION INTRAVENOUS at 09:31

## 2021-10-24 RX ADMIN — FENTANYL CITRATE 50 MCG: 50 INJECTION INTRAMUSCULAR; INTRAVENOUS at 01:22

## 2021-10-24 RX ADMIN — SODIUM CHLORIDE: 9 INJECTION, SOLUTION INTRAVENOUS at 16:28

## 2021-10-24 RX ADMIN — LISINOPRIL 5 MG: 5 TABLET ORAL at 09:27

## 2021-10-24 RX ADMIN — BUDESONIDE AND FORMOTEROL FUMARATE DIHYDRATE 2 PUFF: 160; 4.5 AEROSOL RESPIRATORY (INHALATION) at 20:21

## 2021-10-24 RX ADMIN — HYDROCODONE BITARTRATE AND ACETAMINOPHEN 1 TABLET: 5; 325 TABLET ORAL at 16:28

## 2021-10-24 RX ADMIN — SODIUM CHLORIDE, PRESERVATIVE FREE 10 ML: 5 INJECTION INTRAVENOUS at 20:49

## 2021-10-24 RX ADMIN — HYDROCODONE BITARTRATE AND ACETAMINOPHEN 1 TABLET: 5; 325 TABLET ORAL at 10:29

## 2021-10-24 RX ADMIN — PANTOPRAZOLE SODIUM 40 MG: 40 INJECTION, POWDER, FOR SOLUTION INTRAVENOUS at 20:49

## 2021-10-24 RX ADMIN — BISACODYL 20 MG: 5 TABLET, COATED ORAL at 15:18

## 2021-10-24 RX ADMIN — TRIMETHOBENZAMIDE HYDROCHLORIDE 200 MG: 100 INJECTION INTRAMUSCULAR at 21:58

## 2021-10-24 RX ADMIN — HYDROCODONE BITARTRATE AND ACETAMINOPHEN 1 TABLET: 5; 325 TABLET ORAL at 22:18

## 2021-10-24 RX ADMIN — GABAPENTIN 800 MG: 400 CAPSULE ORAL at 15:30

## 2021-10-24 RX ADMIN — BUDESONIDE AND FORMOTEROL FUMARATE DIHYDRATE 2 PUFF: 160; 4.5 AEROSOL RESPIRATORY (INHALATION) at 08:34

## 2021-10-24 RX ADMIN — GABAPENTIN 800 MG: 400 CAPSULE ORAL at 09:27

## 2021-10-24 RX ADMIN — PANTOPRAZOLE SODIUM 40 MG: 40 INJECTION, POWDER, FOR SOLUTION INTRAVENOUS at 09:27

## 2021-10-24 ASSESSMENT — PAIN SCALES - GENERAL
PAINLEVEL_OUTOF10: 7
PAINLEVEL_OUTOF10: 4
PAINLEVEL_OUTOF10: 4
PAINLEVEL_OUTOF10: 8
PAINLEVEL_OUTOF10: 8
PAINLEVEL_OUTOF10: 7
PAINLEVEL_OUTOF10: 8
PAINLEVEL_OUTOF10: 7

## 2021-10-24 ASSESSMENT — PAIN DESCRIPTION - PAIN TYPE
TYPE: ACUTE PAIN

## 2021-10-24 ASSESSMENT — PAIN DESCRIPTION - FREQUENCY
FREQUENCY: CONTINUOUS

## 2021-10-24 ASSESSMENT — PAIN DESCRIPTION - LOCATION
LOCATION: ABDOMEN
LOCATION: HIP

## 2021-10-24 ASSESSMENT — PAIN DESCRIPTION - ONSET: ONSET: ON-GOING

## 2021-10-24 NOTE — PROGRESS NOTES
THE [facility] AT [facility]   Progress Note    Kelsie Diehl is a 61 y.o. female patient. Hospitalization Day:3      Chief consult reason:     Nausea, vomiting, abdominal pain, diarrhea    Subjective:  Patient seen and examined. Patient continues to complain of nausea and diarrhea. Patient with abdominal pain rated 6 out of 10 requesting Norco  Hemoglobin stable from 11.4 10.8  Fecal calprotectin was significantly elevated on 10/  No leukocytosis noted    VITALS:  BP (!) 151/92   Pulse 72   Temp 98.9 °F (37.2 °C) (Oral)   Resp 17   Ht 5' 6\" (1.676 m)   Wt 160 lb (72.6 kg)   SpO2 97%   BMI 25.82 kg/m²   TEMPERATURE:  Current - Temp: 98.9 °F (37.2 °C); Max - Temp  Av.7 °F (37.1 °C)  Min: 98.4 °F (36.9 °C)  Max: 98.9 °F (37.2 °C)    Physical Assessment:  General appearance:  alert, cooperative and mild pain distress  Mental Status:  oriented to person, place and time and normal affect  Lungs:  clear to auscultation bilaterally, normal effort  Heart:  regular rate and rhythm, no murmur  Abdomen:  soft, tender, nondistended, normal bowel sounds, no masses, hepatomegaly, splenomegaly  Extremities:  no edema, redness, tenderness in the calves  Skin:  no gross lesions, rashes, induration    Data Review:    Labs and Imaging:     CBC:  Recent Labs     10/22/21  0607 10/23/21  0630 10/24/21  0617   WBC 7.8 7.3 6.5   HGB 12.3 11.4* 10.8*   MCV 85.6 87.9 90.1   RDW 15.8* 15.8* 16.2*    299 262       ANEMIA STUDIES:  No results for input(s): LABIRON, TIBC, FERRITIN, [id number], FOLATE, OCCULTBLD in the last 72 hours. BMP:  Recent Labs     10/22/21  0607 10/23/21  0630 10/24/21  0617   * 138 139   K 3.2* 3.0* 3.5*   CL 95* 102 104   CO2 19* 20 21   BUN 4* 3* <2*   CREATININE 0.39* 0.45* 0.43*   GLUCOSE 76 103* 88   CALCIUM 8.3* 8.3* 8.3*       LFTS:  No results for input(s): ALKPHOS, ALT, AST, BILITOT, BILIDIR, LABALBU in the last 72 hours.     Amylase/Lipase and Ammonia:  No results for input(s): AMYLASE, LIPASE, AMMONIA in the last 72 hours. Acute Hepatitis Panel:  Lab Results   Component Value Date    HEPBSAG NONREACTIVE 09/18/2021    HEPCAB NONREACTIVE 09/18/2021    HEPBIGM NONREACTIVE 09/18/2021    HEPAIGM NONREACTIVE 09/18/2021       HCV Genotype:  No results found for: HEPATITISCGENOTYPE    HCV Quantitative:  No results found for: HCVQNT    LIVER WORK UP:    AFP  No results found for: AFP    Alpha 1 antitrypsin   No results found for: A1A    GILBERT  No results found for: GILBERT    AMA  No results found for: MITOAB    ASMA  No results found for: SMOOTHMUSCAB    PT/INR  No results for input(s): PROTIME, INR in the last 72 hours. Cancer Markers:  CEA:  No results for input(s): CEA in the last 72 hours. Ca 125:  No results for input(s):  in the last 72 hours. Ca 19-9:   Invalid input(s):   AFP: No results for input(s): AFP in the last 72 hours. Lactic acid:Invalid input(s): LACTIC ACID    Radiology Review:    No results found. Principal Problem:    Non-intractable vomiting with nausea  Active Problems:    Gastroesophageal reflux disease without esophagitis    Drug abuse (HCC)    COPD (chronic obstructive pulmonary disease) (HCC)    Hypertension    Hypokalemia    Crohn disease (HCC)    Prolonged Q-T interval on ECG    Cocaine abuse (HCC)    Bipolar 1 disorder (HCC)    Colitis    Anxiety    Hypomagnesemia    Abdominal pain    Nausea vomiting and diarrhea  Resolved Problems:    * No resolved hospital problems. *       GI Impression:    1. Chronic abdominal pain  2. Persistent nausea and vomiting  3. Suspected Crohn's disease without clear evidence of location of Crohn's  4. History of polysubstance abuse  5. Abnormal CT findings suggestive of contained duodenal perforation with hematoma  6. Oral intake intolerance-patient unable to tolerate even clears      Plan and Recommendations:    1.  We will plan for upper endoscopy to assess for etiology of persistent nausea vomiting and poor oral intake on Monday  2. We will plan for colonoscopy to assess for location of Crohn's disease with biopsies on Monday  3. We will order MiraLAX bowel prep  4. Please place NG tube for prep administration if patient unable to take orally-then removed after medication has been given. Discussed with patient at bedside and she is agreeable to this plan. 5. Clear liquid diet-n.p.o. at midnight  6. Continue current medical management  7. Rapid Covid ordered  8. We will follow      This plan was formulated in collaboration with Dr. Sarkis Sadler MD    Thank you for allowing me to participate in the care of your patient. Please feel free to contact me with any questions or concerns. 2 [facility]    This note was created with the assistance of a speech-recognition program.  Although the intention is to generate a document that actually reflects the content of the visit, no guarantees can be provided that every mistake has been identified and corrected by editing.

## 2021-10-24 NOTE — PROGRESS NOTES
[facility]  Internal Medicine Teaching Residency Program  Inpatient Daily Progress Note  ______________________________________________________________________________    Patient: Toby Lee  YOB: 1965   LCN:[id number]    Acct: [de-identified]     Room: 9068/9929-10  Admit date: 10/21/2021  Today's date: 10/24/21  Number of days in the hospital: 3    SUBJECTIVE   Admitting Diagnosis: Non-intractable vomiting with nausea  CC: Nausea and vomiting  Pt examined at bedside. Chart & results reviewed. Has some diarrhea. No vomiting  GI planning out-patient follow-up  Will discharge      ROS:  Constitutional:  negative for chills, fevers, sweats  Respiratory:  negative for cough, dyspnea on exertion, hemoptysis, shortness of breath, wheezing  Cardiovascular:  negative for chest pain, chest pressure/discomfort, lower extremity edema, palpitations  Gastrointestinal:  negative for abdominal pain, constipation, diarrhea, nausea, vomiting  Neurological:  negative for dizziness, headache  BRIEF HISTORY     The patient is a pleasant 56 y. o. female with a PMHx significant for Crohn's disease, COPD, bipolar disorder, polysubstance abuse, HTN and Kelli Siemens presents with a chief complaint of abdominal pain. Pt states that since October 15, she has had increasing right lower quadrant abdominal pain that she rates 10/10. The pain does not radiate anywhere but pt was diffusely tender on examination. Pt states she has been having diarrhea for 6 days. She has been vomiting that is green in nature. Pt was discharged yesterday after being admitted for N/V. Pt had prolonged QTc. Last QTc 529. Pt states she has not eaten in 6 days.  Pt denies black tarry stools and there was no recent GI procedures.     Significant labs in the ED: K 3.4, ALT: 11 and AST 32    OBJECTIVE     Vital Signs:  BP (!) 151/92   Pulse 72   Temp 98.9 °F (37.2 °C) (Oral)   Resp 17   Ht 5' 6\" (1.676 m)   Wt 160 lb (72.6 kg)   SpO2 97%   BMI 25.82 kg/m²     Temp (24hrs), Av.7 °F (37.1 °C), Min:98.4 °F (36.9 °C), Max:98.9 °F (37.2 °C)    In: 2100   Out: -     Physical Exam:  Constitutional: This is a well developed, well nourished, 25-29.9 - Overweight 61y.o. year old female who is alert, oriented, cooperative and in no apparent distress. Head:normocephalic and atraumatic. EENT:  PERRLA. No conjunctival injections. Septum was midline, mucosa was without erythema, exudates or cobblestoning. No thrush was noted. Neck: Supple without thyromegaly. No elevated JVP. Trachea was midline. Respiratory: Chest was symmetrical without dullness to percussion. Breath sounds bilaterally were clear to auscultation. There were no wheezes, rhonchi or rales. There is no intercostal retraction or use of accessory muscles. No egophony noted. Cardiovascular: Regular without murmur, clicks, gallops or rubs. Abdomen: Slightly rounded and soft without organomegaly. No rebound, rigidity or guarding was appreciated. Lymphatic: No lymphadenopathy. Musculoskeletal: Normal curvature of the spine. No gross muscle weakness. Extremities:  No lower extremity edema, ulcerations, tenderness, varicosities or erythema. Muscle size, tone and strength are normal.  No involuntary movements are noted. Skin:  Warm and dry. Good color, turgor and pigmentation. No lesions or scars.   No cyanosis or clubbing  Neurological/Psychiatric: The patient's general behavior, level of consciousness, thought content and emotional status is normal.        Medications:  Scheduled Medications:    pantoprazole  40 mg IntraVENous BID    And    sodium chloride (PF)  10 mL IntraVENous BID    sodium chloride flush  5-40 mL IntraVENous 2 times per day    [Held by provider] enoxaparin  40 mg SubCUTAneous Daily    budesonide-formoterol  2 puff Inhalation BID    [Held by provider] furosemide  20 mg Oral Daily    lisinopril  5 mg Oral Daily    [Held by provider] FLUoxetine  60 mg Oral Daily    gabapentin  800 mg Oral TID    [Held by provider] traZODone  250 mg Oral Nightly     Continuous Infusions:    sodium chloride      sodium chloride 125 mL/hr at 10/23/21 0510     PRN MedicationsdiphenhydrAMINE, 12.5 mg, Q6H PRN  trimethobenzamide, 200 mg, Q6H PRN  sodium chloride flush, 5-40 mL, PRN  sodium chloride, 25 mL, PRN  polyethylene glycol, 17 g, Daily PRN  acetaminophen, 650 mg, Q6H PRN   Or  acetaminophen, 650 mg, Q6H PRN  potassium chloride, 40 mEq, PRN   Or  potassium alternative oral replacement, 40 mEq, PRN   Or  potassium chloride, 10 mEq, PRN  trimethobenzamide, 300 mg, TID PRN        Diagnostic Labs:  CBC:   Recent Labs     10/22/21  0607 10/23/21  0630 10/24/21  0617   WBC 7.8 7.3 6.5   RBC 4.50 4.12 3.92*   HGB 12.3 11.4* 10.8*   HCT 38.5 36.2* 35.3*   MCV 85.6 87.9 90.1   RDW 15.8* 15.8* 16.2*    299 262     BMP:   Recent Labs     10/22/21  0607 10/23/21  0630 10/24/21  0617   * 138 139   K 3.2* 3.0* 3.5*   CL 95* 102 104   CO2 19* 20 21   BUN 4* 3* <2*   CREATININE 0.39* 0.45* 0.43*     BNP: No results for input(s): BNP in the last 72 hours. PT/INR: No results for input(s): PROTIME, INR in the last 72 hours. APTT: No results for input(s): APTT in the last 72 hours. CARDIAC ENZYMES: No results for input(s): CKMB, CKMBINDEX, TROPONINI in the last 72 hours. Invalid input(s): CKTOTAL;3  FASTING LIPID PANEL:No results found for: CHOL, HDL, TRIG  LIVER PROFILE: No results for input(s): AST, ALT, ALB, BILIDIR, BILITOT, ALKPHOS in the last 72 hours. MICROBIOLOGY:   Lab Results   Component Value Date/Time    CULTURE NO GROWTH 6 DAYS 10/16/2021 10:25 AM       Imaging:    XR ABDOMEN (KUB) (SINGLE AP VIEW)    Result Date: 10/19/2021  Moderate constipation. No bowel obstruction. No change from priors.      CT ABDOMEN PELVIS W IV CONTRAST Additional Contrast? None    Result Date: 10/22/2021  New small hematoma posterior to the proximal duodenum. Findings suspicious for perforated tiny duodenal ulcer with contained hematoma likely in the periampullary region. Critical results were called by Dr. Gloria Nguyen MD to Dr. Dav Guajardo On 10/22/2021 at 15:40. XR CHEST PORTABLE    Result Date: 10/21/2021  No focal consolidation. ASSESSMENT & PLAN     Abdominal pain with nausea, vomiting & diarrhea   - CT  10/22/2021: New small hematoma posterior to the proximal duodenum. Findings suspicious for perforated tiny duodenal ulcer with contained hematoma likely in the periampullary region.  - Urine drug screen positive for amphetamines, cocaine  - GI consulted.     - Recommend EGD in 4-6 weeks   -  Continue IV pantoprazole 40 mg twice daily   - Twice daily PPI therapy for 6 to 8 weeks as well as Carafate 1 g 4 times daily for 3 to 4  Weeks   - Patient will need to follow-up in GI clinic with Dr Asia Barrett to schedule outpatient EGD  and colonoscopy and to establish follow-up for Crohn's disease  - Has scopolamine patch  - Tigan IV  - Protonix 40mg bid  - Monitor K+ and replace when clinically indicated   - Discharge         Prolonged Q-T interval on ECG  - Continue to monitor   - Avoid QTc prolonging drugs        Hx Crohn disease ([facility] [address]  - Pt having diarrhea   - Calprotein elevated   - Denies melena   - Continue to monitor           Hypomagnesemia  - Mg 2.0  - Continue to monitor and replace when indicated        Gastroesophageal reflux disease without esophagitis  - Continue to monitor   - Avoid Pepcid and other QTc prolonging drugs         COPD (chronic obstructive pulmonary disease) (HCC)  - Stable   - Symbicort   - Continue respiratory treatments       Hypertension  /92  IV hydralazine if SBP>160       Hypokalemia  - Potassium 3.5  - Pt refusing IV replacement  - PO Replacement ordered  - Continue to monitor and replace when clinically indicated        Drug abuse ([facility] [address]  - Educated the pt on cessation of drug use   - Urine drug screen positive for amphetamines, cocaine        Bipolar 1 disorder ([facility] [address]  - Stable  - Will follow-up with psychiatrist OP for med rec as she has a prolonged QT       Anxiety  - Stable       Diet: clear liquid  DVT ppx: Lovenox  GI ppx: Protonix IV      Son Smyth MD  Internal Medicine Resident, PGY-1  [facility];  [place], [place]  10/24/2021, 7:41 AM

## 2021-10-24 NOTE — PLAN OF CARE
Problem: Falls - Risk of:  Goal: Will remain free from falls  Description: Will remain free from falls  10/24/2021 0418 by Elizabeth Naranjo RN  Outcome: Ongoing  10/23/2021 1829 by Mine Rogers RN  Outcome: Ongoing  Goal: Absence of physical injury  Description: Absence of physical injury  10/24/2021 0418 by Elizabeth Naranjo RN  Outcome: Ongoing  10/23/2021 1829 by Mine Rogers RN  Outcome: Ongoing     Problem: Pain:  Goal: Pain level will decrease  Description: Pain level will decrease  10/24/2021 0418 by Elizabeth Naranjo RN  Outcome: Ongoing  10/23/2021 1829 by Mine Rogers RN  Outcome: Ongoing  Goal: Control of acute pain  Description: Control of acute pain  10/24/2021 0418 by Elizabeth Naranjo RN  Outcome: Ongoing  10/23/2021 1829 by Mine Rogers RN  Outcome: Ongoing  Goal: Control of chronic pain  Description: Control of chronic pain  10/24/2021 0418 by Elizabeth Naranjo RN  Outcome: Ongoing  10/23/2021 1829 by Mine Rogers RN  Outcome: Ongoing     Problem: Nausea/Vomiting:  Goal: Absence of nausea/vomiting  Description: Absence of nausea/vomiting  10/24/2021 0418 by Elizabeth Naranjo RN  Outcome: Ongoing  10/23/2021 1829 by Mine Rogers RN  Outcome: Ongoing  Goal: Able to drink  Description: Able to drink  10/24/2021 0418 by Elizabeth Naranjo RN  Outcome: Ongoing  10/23/2021 1829 by Mine Rogers RN  Outcome: Ongoing  Goal: Able to eat  Description: Able to eat  10/24/2021 0418 by Elizabeth Naranjo RN  Outcome: Ongoing  10/23/2021 1829 by Mine Rogers RN  Outcome: Ongoing  Goal: Ability to achieve adequate nutritional intake will improve  Description: Ability to achieve adequate nutritional intake will improve  10/24/2021 0418 by Elizabeth Naranjo RN  Outcome: Ongoing  10/23/2021 1829 by Mine Rogers RN  Outcome: Ongoing

## 2021-10-24 NOTE — PLAN OF CARE
Problem: Falls - Risk of:  Goal: Will remain free from falls  Description: Will remain free from falls  10/24/2021 1722 by Felipe Fleming RN  Outcome: Ongoing  10/24/2021 0418 by Shukri Deluna RN  Outcome: Ongoing  Goal: Absence of physical injury  Description: Absence of physical injury  10/24/2021 1722 by Felipe Fleming RN  Outcome: Ongoing  10/24/2021 0418 by Shukri Deluna RN  Outcome: Ongoing     Problem: Pain:  Goal: Pain level will decrease  Description: Pain level will decrease  10/24/2021 1722 by Felipe Fleming RN  Outcome: Ongoing  10/24/2021 0418 by Shukri Deluna RN  Outcome: Ongoing  Goal: Control of acute pain  Description: Control of acute pain  10/24/2021 1722 by Felipe Fleming RN  Outcome: Ongoing  10/24/2021 0418 by Shukri Deluna RN  Outcome: Ongoing  Goal: Control of chronic pain  Description: Control of chronic pain  10/24/2021 1722 by Felipe Fleming RN  Outcome: Ongoing  10/24/2021 0418 by Shukri Deluna RN  Outcome: Ongoing     Problem: Nausea/Vomiting:  Goal: Absence of nausea/vomiting  Description: Absence of nausea/vomiting  10/24/2021 1722 by Felipe Fleming RN  Outcome: Ongoing  10/24/2021 0418 by Shukri Deluna RN  Outcome: Ongoing  Goal: Able to drink  Description: Able to drink  10/24/2021 1722 by Felipe Fleming RN  Outcome: Ongoing  10/24/2021 0418 by Shukri Deluna RN  Outcome: Ongoing  Goal: Able to eat  Description: Able to eat  10/24/2021 1722 by Felipe Fleming RN  Outcome: Ongoing  10/24/2021 0418 by Shukri Deluna RN  Outcome: Ongoing  Goal: Ability to achieve adequate nutritional intake will improve  Description: Ability to achieve adequate nutritional intake will improve  10/24/2021 1722 by Felipe Fleming RN  Outcome: Ongoing  10/24/2021 0418 by Shukri Deluna RN  Outcome: Ongoing

## 2021-10-24 NOTE — FLOWSHEET NOTE
SPIRITUAL CARE PROGRESS NOTE    Spiritual Assessment:  encountered patient as part of spiritual care rounds. Patient was sitting up in bed when  arrived watching television. Patient shared she was coping with hospitalization but acknowledged the emotional hardship of health issues. Patient shared having a daughter and grandson who are a support in her life. Patient identified with  as being Manuela. Patient accepted 's offer for a prayer during the encounter. Intervention:  was a spiritual presence to the patient during the encounter, engaging her through active listening and by nurturing hope. Outcome: Chaplains are available for specific request visits at any time and may be paged via [facility]. 10/23/21 2017   Encounter Summary   Services provided to: Patient   Referral/Consult From: [address] [place] [address] Street Children;Family members   Continue Visiting   (10/23/2021)   Complexity of Encounter Low   Length of Encounter 15 minutes   Routine   Type Initial   Assessment Calm; Approachable;Coping   Intervention Active listening;Explored coping resources;Nurtured hope;Explored feelings, thoughts, concerns;Sustaining presence/ Ministry of presence   Outcome Expressed gratitude   Spiritual/Worship   Type Spiritual support       Electronically signed by Patrice Thacker on 10/23/2021 at 8:20 PM.  Alexander Ivy  [phone]

## 2021-10-24 NOTE — PLAN OF CARE
Patient admitted for chronic abdominal pain and diarrhea. Still having diarrhea today. K3.4. Mag 1.8. Receiving replacement. GI not planning any intervention 1 to follow-up as outpatient. Acute GI panel negative for acute infectious causes of diarrhea. Likely chronic diarrhea from underlying Crohn's and/or possible IBS.     -We will discharge with p.o. potassium replacement with BMP in 3 to 4 days  -Follow-up PCP 1 week  -Follow-up GI per GI note    Megha Mcgrath MD  PGY-2, Department of Internal Medicine  [facility], [facility], [address] [place] [address]

## 2021-10-25 ENCOUNTER — ANESTHESIA EVENT (OUTPATIENT)
Dept: ENDOSCOPY | Age: 56
DRG: 422 | End: 2021-10-25
Payer: MEDICARE

## 2021-10-25 ENCOUNTER — ANESTHESIA (OUTPATIENT)
Dept: ENDOSCOPY | Age: 56
DRG: 422 | End: 2021-10-25
Payer: MEDICARE

## 2021-10-25 VITALS
TEMPERATURE: 98.2 F | HEART RATE: 66 BPM | BODY MASS INDEX: 25.71 KG/M2 | HEIGHT: 66 IN | OXYGEN SATURATION: 97 % | SYSTOLIC BLOOD PRESSURE: 146 MMHG | WEIGHT: 160 LBS | RESPIRATION RATE: 16 BRPM | DIASTOLIC BLOOD PRESSURE: 78 MMHG

## 2021-10-25 VITALS
RESPIRATION RATE: 17 BRPM | DIASTOLIC BLOOD PRESSURE: 72 MMHG | OXYGEN SATURATION: 98 % | SYSTOLIC BLOOD PRESSURE: 106 MMHG

## 2021-10-25 LAB
ABSOLUTE EOS #: 0.22 K/UL (ref 0–0.44)
ABSOLUTE IMMATURE GRANULOCYTE: <0.03 K/UL (ref 0–0.3)
ABSOLUTE LYMPH #: 1.46 K/UL (ref 1.1–3.7)
ABSOLUTE MONO #: 0.57 K/UL (ref 0.1–1.2)
ANION GAP SERPL CALCULATED.3IONS-SCNC: 13 MMOL/L (ref 9–17)
BASOPHILS # BLD: 1 % (ref 0–2)
BASOPHILS ABSOLUTE: 0.04 K/UL (ref 0–0.2)
BUN BLDV-MCNC: <2 MG/DL (ref 6–20)
BUN/CREAT BLD: ABNORMAL (ref 9–20)
CALCIUM SERPL-MCNC: 8.3 MG/DL (ref 8.6–10.4)
CHLORIDE BLD-SCNC: 103 MMOL/L (ref 98–107)
CO2: 20 MMOL/L (ref 20–31)
CREAT SERPL-MCNC: 0.37 MG/DL (ref 0.5–0.9)
DIFFERENTIAL TYPE: ABNORMAL
EOSINOPHILS RELATIVE PERCENT: 4 % (ref 1–4)
GFR AFRICAN AMERICAN: >60 ML/MIN
GFR NON-AFRICAN AMERICAN: >60 ML/MIN
GFR SERPL CREATININE-BSD FRML MDRD: ABNORMAL ML/MIN/{1.73_M2}
GFR SERPL CREATININE-BSD FRML MDRD: ABNORMAL ML/MIN/{1.73_M2}
GLUCOSE BLD-MCNC: 89 MG/DL (ref 70–99)
HCT VFR BLD CALC: 33.1 % (ref 36.3–47.1)
HEMOGLOBIN: 10.1 G/DL (ref 11.9–15.1)
IMMATURE GRANULOCYTES: 0 %
LYMPHOCYTES # BLD: 28 % (ref 24–43)
MAGNESIUM: 1.8 MG/DL (ref 1.6–2.6)
MCH RBC QN AUTO: 27.9 PG (ref 25.2–33.5)
MCHC RBC AUTO-ENTMCNC: 30.5 G/DL (ref 28.4–34.8)
MCV RBC AUTO: 91.4 FL (ref 82.6–102.9)
MONOCYTES # BLD: 11 % (ref 3–12)
NRBC AUTOMATED: 0 PER 100 WBC
PDW BLD-RTO: 16.1 % (ref 11.8–14.4)
PLATELET # BLD: 339 K/UL (ref 138–453)
PLATELET ESTIMATE: ABNORMAL
PMV BLD AUTO: 11.2 FL (ref 8.1–13.5)
POTASSIUM SERPL-SCNC: 3.3 MMOL/L (ref 3.7–5.3)
RBC # BLD: 3.62 M/UL (ref 3.95–5.11)
RBC # BLD: ABNORMAL 10*6/UL
SEG NEUTROPHILS: 56 % (ref 36–65)
SEGMENTED NEUTROPHILS ABSOLUTE COUNT: 2.92 K/UL (ref 1.5–8.1)
SODIUM BLD-SCNC: 136 MMOL/L (ref 135–144)
WBC # BLD: 5.2 K/UL (ref 3.5–11.3)
WBC # BLD: ABNORMAL 10*3/UL

## 2021-10-25 PROCEDURE — 83735 ASSAY OF MAGNESIUM: CPT

## 2021-10-25 PROCEDURE — 6370000000 HC RX 637 (ALT 250 FOR IP): Performed by: INTERNAL MEDICINE

## 2021-10-25 PROCEDURE — 6360000002 HC RX W HCPCS: Performed by: NURSE ANESTHETIST, CERTIFIED REGISTERED

## 2021-10-25 PROCEDURE — 6360000002 HC RX W HCPCS

## 2021-10-25 PROCEDURE — 2709999900 HC NON-CHARGEABLE SUPPLY: Performed by: INTERNAL MEDICINE

## 2021-10-25 PROCEDURE — 2580000003 HC RX 258

## 2021-10-25 PROCEDURE — 3700000000 HC ANESTHESIA ATTENDED CARE: Performed by: INTERNAL MEDICINE

## 2021-10-25 PROCEDURE — 6370000000 HC RX 637 (ALT 250 FOR IP): Performed by: STUDENT IN AN ORGANIZED HEALTH CARE EDUCATION/TRAINING PROGRAM

## 2021-10-25 PROCEDURE — 80048 BASIC METABOLIC PNL TOTAL CA: CPT

## 2021-10-25 PROCEDURE — 3609010700 HC COLONOSCOPY POLYPECTOMY REMOVAL SNARE/STOMA: Performed by: INTERNAL MEDICINE

## 2021-10-25 PROCEDURE — 85025 COMPLETE CBC W/AUTO DIFF WBC: CPT

## 2021-10-25 PROCEDURE — 7100000010 HC PHASE II RECOVERY - FIRST 15 MIN: Performed by: INTERNAL MEDICINE

## 2021-10-25 PROCEDURE — 7100000011 HC PHASE II RECOVERY - ADDTL 15 MIN: Performed by: INTERNAL MEDICINE

## 2021-10-25 PROCEDURE — 45385 COLONOSCOPY W/LESION REMOVAL: CPT | Performed by: INTERNAL MEDICINE

## 2021-10-25 PROCEDURE — 99239 HOSP IP/OBS DSCHRG MGMT >30: CPT | Performed by: INTERNAL MEDICINE

## 2021-10-25 PROCEDURE — 88305 TISSUE EXAM BY PATHOLOGIST: CPT

## 2021-10-25 PROCEDURE — 43239 EGD BIOPSY SINGLE/MULTIPLE: CPT | Performed by: INTERNAL MEDICINE

## 2021-10-25 PROCEDURE — 36415 COLL VENOUS BLD VENIPUNCTURE: CPT

## 2021-10-25 PROCEDURE — 0DBL8ZX EXCISION OF TRANSVERSE COLON, VIA NATURAL OR ARTIFICIAL OPENING ENDOSCOPIC, DIAGNOSTIC: ICD-10-PCS | Performed by: INTERNAL MEDICINE

## 2021-10-25 PROCEDURE — 3609012400 HC EGD TRANSORAL BIOPSY SINGLE/MULTIPLE: Performed by: INTERNAL MEDICINE

## 2021-10-25 PROCEDURE — 3609010300 HC COLONOSCOPY W/BIOPSY SINGLE/MULTIPLE: Performed by: INTERNAL MEDICINE

## 2021-10-25 PROCEDURE — 0DBF8ZX EXCISION OF RIGHT LARGE INTESTINE, VIA NATURAL OR ARTIFICIAL OPENING ENDOSCOPIC, DIAGNOSTIC: ICD-10-PCS | Performed by: INTERNAL MEDICINE

## 2021-10-25 PROCEDURE — 2500000003 HC RX 250 WO HCPCS: Performed by: NURSE ANESTHETIST, CERTIFIED REGISTERED

## 2021-10-25 PROCEDURE — 3700000001 HC ADD 15 MINUTES (ANESTHESIA): Performed by: INTERNAL MEDICINE

## 2021-10-25 PROCEDURE — 94761 N-INVAS EAR/PLS OXIMETRY MLT: CPT

## 2021-10-25 PROCEDURE — 0DBB8ZX EXCISION OF ILEUM, VIA NATURAL OR ARTIFICIAL OPENING ENDOSCOPIC, DIAGNOSTIC: ICD-10-PCS | Performed by: INTERNAL MEDICINE

## 2021-10-25 PROCEDURE — 0DBG8ZX EXCISION OF LEFT LARGE INTESTINE, VIA NATURAL OR ARTIFICIAL OPENING ENDOSCOPIC, DIAGNOSTIC: ICD-10-PCS | Performed by: INTERNAL MEDICINE

## 2021-10-25 PROCEDURE — 0DBP8ZZ EXCISION OF RECTUM, VIA NATURAL OR ARTIFICIAL OPENING ENDOSCOPIC: ICD-10-PCS | Performed by: INTERNAL MEDICINE

## 2021-10-25 PROCEDURE — 0DB68ZX EXCISION OF STOMACH, VIA NATURAL OR ARTIFICIAL OPENING ENDOSCOPIC, DIAGNOSTIC: ICD-10-PCS | Performed by: INTERNAL MEDICINE

## 2021-10-25 RX ORDER — POTASSIUM CHLORIDE 20 MEQ/1
20 TABLET, EXTENDED RELEASE ORAL 2 TIMES DAILY
Qty: 2 TABLET | Refills: 0 | Status: ON HOLD | OUTPATIENT
Start: 2021-10-25 | End: 2022-03-26 | Stop reason: HOSPADM

## 2021-10-25 RX ORDER — POTASSIUM BICARBONATE 25 MEQ/1
25 TABLET, EFFERVESCENT ORAL ONCE
Status: COMPLETED | OUTPATIENT
Start: 2021-10-25 | End: 2021-10-25

## 2021-10-25 RX ORDER — MECLIZINE HCL 12.5 MG/1
12.5 TABLET ORAL 3 TIMES DAILY PRN
Qty: 15 TABLET | Refills: 0 | Status: SHIPPED | OUTPATIENT
Start: 2021-10-25 | End: 2021-11-01

## 2021-10-25 RX ORDER — PROPOFOL 10 MG/ML
INJECTION, EMULSION INTRAVENOUS PRN
Status: DISCONTINUED | OUTPATIENT
Start: 2021-10-25 | End: 2021-10-25 | Stop reason: SDUPTHER

## 2021-10-25 RX ORDER — PANTOPRAZOLE SODIUM 40 MG/1
40 TABLET, DELAYED RELEASE ORAL
Status: DISCONTINUED | OUTPATIENT
Start: 2021-10-26 | End: 2021-10-25 | Stop reason: HOSPADM

## 2021-10-25 RX ORDER — PROCHLORPERAZINE EDISYLATE 5 MG/ML
5 INJECTION INTRAMUSCULAR; INTRAVENOUS
Status: DISCONTINUED | OUTPATIENT
Start: 2021-10-25 | End: 2021-10-25

## 2021-10-25 RX ORDER — LIDOCAINE HYDROCHLORIDE 10 MG/ML
INJECTION, SOLUTION EPIDURAL; INFILTRATION; INTRACAUDAL; PERINEURAL PRN
Status: DISCONTINUED | OUTPATIENT
Start: 2021-10-25 | End: 2021-10-25 | Stop reason: SDUPTHER

## 2021-10-25 RX ADMIN — HYDROCODONE BITARTRATE AND ACETAMINOPHEN 1 TABLET: 5; 325 TABLET ORAL at 11:45

## 2021-10-25 RX ADMIN — POTASSIUM BICARBONATE 25 MEQ: 977.5 TABLET, EFFERVESCENT ORAL at 12:47

## 2021-10-25 RX ADMIN — LIDOCAINE HYDROCHLORIDE 50 MG: 10 INJECTION, SOLUTION EPIDURAL; INFILTRATION; INTRACAUDAL; PERINEURAL at 09:25

## 2021-10-25 RX ADMIN — HYDROCODONE BITARTRATE AND ACETAMINOPHEN 1 TABLET: 5; 325 TABLET ORAL at 04:24

## 2021-10-25 RX ADMIN — SODIUM CHLORIDE: 9 INJECTION, SOLUTION INTRAVENOUS at 09:19

## 2021-10-25 RX ADMIN — PROPOFOL 600 MG: 10 INJECTION, EMULSION INTRAVENOUS at 09:26

## 2021-10-25 RX ADMIN — SODIUM CHLORIDE: 0.9 INJECTION, SOLUTION INTRAVENOUS at 09:22

## 2021-10-25 RX ADMIN — LISINOPRIL 5 MG: 5 TABLET ORAL at 11:42

## 2021-10-25 RX ADMIN — TRIMETHOBENZAMIDE HYDROCHLORIDE 200 MG: 100 INJECTION INTRAMUSCULAR at 04:24

## 2021-10-25 RX ADMIN — GABAPENTIN 800 MG: 400 CAPSULE ORAL at 11:41

## 2021-10-25 ASSESSMENT — PAIN SCALES - GENERAL
PAINLEVEL_OUTOF10: 7
PAINLEVEL_OUTOF10: 1
PAINLEVEL_OUTOF10: 8
PAINLEVEL_OUTOF10: 1

## 2021-10-25 ASSESSMENT — PAIN DESCRIPTION - PAIN TYPE: TYPE: ACUTE PAIN

## 2021-10-25 ASSESSMENT — ENCOUNTER SYMPTOMS: SHORTNESS OF BREATH: 1

## 2021-10-25 ASSESSMENT — PAIN DESCRIPTION - FREQUENCY: FREQUENCY: CONTINUOUS

## 2021-10-25 ASSESSMENT — PAIN DESCRIPTION - LOCATION
LOCATION: ABDOMEN
LOCATION: ABDOMEN

## 2021-10-25 ASSESSMENT — LIFESTYLE VARIABLES: SMOKING_STATUS: 1

## 2021-10-25 NOTE — ANESTHESIA POSTPROCEDURE EVALUATION
Department of Anesthesiology  Postprocedure Note    Patient: Demetrio Armenta  MRN: [id number]  YOB: 1965  Date of evaluation: 10/25/2021  Time:  10:22 AM     Procedure Summary     Date: 10/25/21 Room / Location: [address]    Anesthesia Start: 5206 Anesthesia Stop: 1005    Procedures:       EGD BIOPSY      COLONOSCOPY WITH BIOPSY      COLONOSCOPY POLYPECTOMY REMOVAL SNARE Diagnosis: (nausea  vomiting  diarrhea)    Surgeons: Richard Peace MD Responsible Provider: Gregorio Gilliland MD    Anesthesia Type: MAC ASA Status: 4          Anesthesia Type: MAC    Ammy Phase I:      Ammy Phase II: Amym Score: 10    Last vitals: Reviewed and per EMR flowsheets.        Anesthesia Post Evaluation    Patient location during evaluation: bedside  Patient participation: complete - patient participated  Level of consciousness: awake and alert  Pain score: 1  Nausea & Vomiting: no nausea  Cardiovascular status: hemodynamically stable  Respiratory status: nasal cannula

## 2021-10-25 NOTE — OP NOTE
Operative Note      Patient: Joie Neri  YOB: 1965  MRN: [id number]    Date of Procedure: 10/25/2021    Pre-Op Diagnosis: nausea  vomiting  diarrhea    Post-Op Diagnosis: Same   · Mild reflux esophagitis  · Mild antral gastritis  · Normal duodenum  · Normal terminal ileum  · 7 mm rectal polyp   · Otherwise normal colon mucosa         Procedure(s):  EGD BIOPSY  COLONOSCOPY WITH BIOPSY  COLONOSCOPY POLYPECTOMY REMOVAL SNARE    Surgeon(s):  Shivani Bee MD    Assistant:   First Assistant: Charis Stewart RN    Anesthesia: * No anesthesia type entered *    Estimated Blood Loss (mL): Minimal    Complications: None    Specimens:   ID Type Source Tests Collected by Time Destination   A : gastric bxs rule out h pylori Tissue Stomach SURGICAL PATHOLOGY Shivani Bee MD 10/25/2021 5751    B : terminal ileum bxs Tissue Ileum [address] [facility], MD 10/25/2021 3955    C : random right colon bxs Tissue Colon SURGICAL PATHOLOGY Shivani Bee MD 10/25/2021 5200    D : transverse colon bxs Tissue Colon-Transverse SURGICAL PATHOLOGY Shivani Bee MD 10/25/2021 8993    E : random left colon bxs Tissue Colon SURGICAL PATHOLOGY Shivani Bee MD 10/25/2021 4713    F : rectal polyp Tissue Rectum SURGICAL PATHOLOGY Shivani Bee MD 10/25/2021 0004        Implants:  * No implants in log *      Drains:   [REMOVED] External Urinary Catheter (Removed)   Catheter changed  Yes 10/20/21 0900   Urine Color Other (Comment) 10/19/21 0800   Urine Appearance Clear 10/19/21 0800   Output (mL) 600 mL 10/18/21 1615   Suction 40 mmgHg continuous 10/19/21 0800   Placement Initiated 10/16/21 1100   Skin Assessment No Injury 10/19/21 0800       [REMOVED] External Urinary Catheter (Removed)   Urine Appearance Clear 10/22/21 0447   Output (mL) 1200 mL 10/22/21 0447   Suction 40 mmgHg continuous 10/22/21 0447   Placement Initiated 10/21/21 2130   Skin Assessment No Injury 10/22/21 0447 EGD  Findings:   1. The GE junction was noted at 40 cm  2. LA grade A reflux esophagitis at the GE junction  3. Mild erythema and linear erosions in the antrum suggestive of antral gastritis. Biopsies were performed for histology and H. pylori testing. 4. Remainder of the stomach mucosa appeared normal on direct and retroflexed views. 5. The examined duodenum appeared normal.    Colonoscopy  Findings:  1. The terminal ileum appeared normal.  Biopsies were performed for histology. 2. The background colon mucosa appeared normal in the entire colon. Random biopsies were performed from right colon, transverse colon, and the left colon for histology and to assess for microscopic colitis. 3. 7 mm sessile polyp was found in the rectum. Resected using a cold snare. Resection and retrieval were complete. Recommendations  1. Await pathology results. 2. Start pantoprazole 40 mg once daily. 3. Resume diet and advance as tolerated  4. Okay to discharge from GI standpoint when tolerating diet. 5. Follow-up in GI clinic in 1 to 2 weeks to discuss biopsy pathology results. Detailed Description of Procedure:   Informed consent was obtained from the patient after explanation of the procedure including indications, description of the procedure,  benefits and possible risks and complications of the procedure, and alternatives. Questions were answered. The patient's history was reviewed and a directed physical examination was performed prior to the procedure. Patient was monitored throughout the procedure with pulse oximetry and periodic assessment of vital signs. Patient was sedated as noted above. With the patient in the left lateral decubitus position, the Olympus videoendoscope was placed in the patient's mouth and under direct visualization passed into the esophagus.   Visualization of the esophagus, stomach, and duodenum was performed during both introduction and withdrawal of the endoscope and retroflexed view of the proximal stomach was obtained. The scope was passed to the 3rd portion of the duodenum. With the patient in the left lateral decubitus position, a digital rectal examination was performed and revealed thrombosed external hemorrhoids noted. The Olympus video colonoscope was placed in the patient's rectum and advanced without difficulty  to the cecum, which was identified by the ileocecal valve and appendiceal orifice. The prep was good. Examination of the mucosa was performed during both introduction and withdrawal of the colonoscope. Retroflexed view of the rectum was performed. The patient tolerated the procedure well and was taken to the recovery area in good condition. The patient  was taken to the recovery area in good condition.       Electronically signed by Selena Kaur MD on 10/25/2021 at 10:06 AM

## 2021-10-25 NOTE — ANESTHESIA PRE PROCEDURE
Department of Anesthesiology  Preprocedure Note       Name:  Diana Baker   Age:  61 y.o.  :  1965                                          MRN:  [id number]         Date:  10/25/2021      Surgeon: Lord Hinojosa):  Antwon Gomes MD    Procedure: Procedure(s):  EGD ESOPHAGOGASTRODUODENOSCOPY  COLONOSCOPY DIAGNOSTIC    Department of Anesthesiology  Pre-Anesthesia Evaluation/Consultation         Name:  Diana Baker                                         Age:  61 y.o.   MRN:  [id number]             Medications  Current Facility-Administered Medications   Medication Dose Route Frequency Provider Last Rate Last Admin    [MAR Hold] HYDROcodone-acetaminophen (NORCO) 5-325 MG per tablet 1 tablet  1 tablet Oral Q6H PRN Manjula Lieberman MD   1 tablet at 10/25/21 0424    [MAR Hold] pantoprazole (PROTONIX) injection 40 mg  40 mg IntraVENous BID More Sanchez MD   40 mg at 10/24/21 2049    And    [MAR Hold] sodium chloride (PF) 0.9 % injection 10 mL  10 mL IntraVENous BID More Sanchez MD   10 mL at 10/24/21 2050    [MAR Hold] diphenhydrAMINE (BENADRYL) injection 12.5 mg  12.5 mg IntraVENous Q6H PRN More Sanchez MD   12.5 mg at 10/22/21 1455    [MAR Hold] trimethobenzamide (TIGAN) injection 200 mg  200 mg IntraMUSCular Q6H PRN More Sanchez MD   200 mg at 10/25/21 042    [MAR Hold] sodium chloride flush 0.9 % injection 5-40 mL  5-40 mL IntraVENous 2 times per day Layla Vasquez MD   10 mL at 10/24/21 2049    [MAR Hold] sodium chloride flush 0.9 % injection 5-40 mL  5-40 mL IntraVENous PRN Layla Vasquez MD        [facility] Hold] 0.9 % sodium chloride infusion  25 mL IntraVENous PRN Layla Vasquez MD        [Held by provider] enoxaparin (LOVENOX) injection 40 mg  40 mg SubCUTAneous Daily Layla Vasquez MD   40 mg at 10/22/21 1006    [MAR Hold] polyethylene glycol (GLYCOLAX) packet 17 g  17 g Oral Daily PRN Layla Vasquez MD        [facility] Hold] acetaminophen (TYLENOL) tablet 650 mg 650 mg Oral Q6H PRN Marely Thompson MD        Or    [facility] Hold] acetaminophen (TYLENOL) suppository 650 mg  650 mg Rectal Q6H PRN Marely Thompson MD        [facility] Hold] budesonide-formoterol [facility]) 160-4.5 MCG/ACT inhaler 2 puff  2 puff Inhalation BID Marely Thompson MD   2 puff at 10/24/21 2021    [MAR Hold] 0.9 % sodium chloride infusion   IntraVENous Continuous Marely Thompson  mL/hr at 10/24/21 1628 New Bag at 10/24/21 1628    [MAR Hold] potassium chloride (KLOR-CON M) extended release tablet 40 mEq  40 mEq Oral PRN Marely Thompson MD   40 mEq at 10/21/21 1641    Or    [MAR Hold] potassium chloride 10 mEq/100 mL IVPB (Peripheral Line)  10 mEq IntraVENous PRN Marely Thompson MD        [Held by provider] furosemide (LASIX) tablet 20 mg  20 mg Oral Daily Marely Thompson MD   20 mg at 10/21/21 2130    [MAR Hold] lisinopril (PRINIVIL;ZESTRIL) tablet 5 mg  5 mg Oral Daily Marely Thompson MD   5 mg at 10/24/21 9742    [Held by provider] FLUoxetine (PROZAC) capsule 60 mg  60 mg Oral Daily Isael Sales MD        [facility] Hold] gabapentin (NEURONTIN) capsule 800 mg  800 mg Oral TID Janak Dunn MD   800 mg at 10/24/21 2049    [Held by provider] traZODone (DESYREL) tablet 250 mg  250 mg Oral Nightly Isael Sales MD        [facility] Hold] trimethobenzamide Sharyne Aldirch) capsule 300 mg  300 mg Oral TID PRN Janak Dunn MD           Allergies   Allergen Reactions    Azithromycin Other (See Comments)     'It doesn't work\"    Methylprednisolone      Elevates blood pressure    Penicillins Swelling     throat    Tape Jeaneen Eastern Tape] Other (See Comments)     \" Tears my skin off\"     Patient Active Problem List   Diagnosis    Gastroesophageal reflux disease without esophagitis    Gastroenteritis    [facility]    Pancreatitis, acute    Drug abuse ([address]    [facility] obstructive [facility]) [facility]    Hypertension    Hypokalemia    Hypothyroidism due to acquired atrophy of thyroid    Crohn disease ([address]    Acute cystitis without hematuria    Accidental drug overdose    Prolonged Q-T interval on ECG    Bipolar disorder, unspecified (HCC)    Polysubstance abuse ([address]    Alcohol intoxication delirium ([address]    Cocaine abuse ([address]    Acute respiratory failure with hypoxia (HCC)    Bipolar 1 disorder (HCC)    Diarrhea    Chronic bronchitis (HCC)    Chronic [facility] insufficiency, stage III (moderate) ([facility])    Colitis    Anxiety    Osteoarthritis resulting from left hip dysplasia    Shortness of breath    Elevated brain natriuretic peptide (BNP) level    Pneumonia of both lower lobes due to infectious organism    Non-intractable vomiting with nausea    Acute hypokalemia    Hypomagnesemia    Intractable vomiting    Abdominal pain    Nausea vomiting and diarrhea     Past Medical History:   Diagnosis Date    Acid reflux     Anxiety     Arthritis     Left hip    Asthma     Bipolar 1 disorder (HCC)     Bowel obstruction ([address]     COPD (chronic obstructive pulmonary disease) ([facility])     O2 3L PRN/ Dr. Deonna mullen/last seen 2020/appt.9-7-21 for Clearance    Cro[facility] disease ([facility]     Depression     Dry eye     Fibromyalgia     Gout     Headache     Hyperlipidemia     Hypertension     Hypothyroidism     Kidney stones     Muscle spasm     Neuropathy     Pain     left hip    Personal history of other medical treatment     Pt. seen by Dr. Jaren Post for clearance for this OR Left hip/ Normally pt. doesn't follow with Cardiology.  [facility]    Wears partial dentures     upper    Wellness examination     PCP Trey Chamorro MD/ [place]/last seen 8-24-21     Past Surgical History:   Procedure Laterality Date    ABDOMEN SURGERY      bowel obstruction OR    BUNIONECTOMY Left     CHOLECYSTECTOMY      COLONOSCOPY  04/30/2007    no gross pathology seen in the colon and also 3-4 inches of the ileum    COLONOSCOPY  10/12/2017    normal    TONSILLECTOMY AND ADENOIDECTOMY  TUBAL LIGATION       Social History     Tobacco Use    Smoking status: Current Every Day Smoker     Packs/day: 0.50     Years: 37.00     Pack years: 18.50     Types: Cigarettes    Smokeless tobacco: Former User     Types: Chew     Quit date: 9/3/2001   Vaping Use    Vaping Use: Former    Quit date: 9/3/2019    Substances: Nicotine   Substance Use Topics    Alcohol use: Not Currently    Drug use: Yes     Types: Marijuana     Comment: h/o of substance abuse but denies drug use         Vital Signs (Current)   Vitals:    10/25/21 0806   BP: (!) 177/90   Pulse: 69   Resp: 16   Temp: 98.3 °F (36.8 °C)   SpO2: 96%     Vital Signs Statistics (for past 48 hrs)     Temp  Av.5 °F (36.9 °C)  Min: 98.1 °F (36.7 °C)   Min taken time: 10/24/21 1940  Max: 98.9 °F (37.2 °C)   Max taken time: 10/24/21 0730  Pulse  Av.4  Min: 79   Min taken time: 10/24/21 1940  Max: 74   Max taken time: 10/23/21 1930  Resp  Av.6  Min: 12   Min taken time: 10/25/21 08  Max: 18   Max taken time: 10/24/21 1940  BP  Min: 135/85   Min taken time: 10/23/21 1930  Max: 177/90   Max taken time: 10/25/21 08  SpO2  Av.8 %  Min: 95 %   Min taken time: 10/24/21 1145  Max: 99 %   Max taken time: 10/24/21 1940  BP Readings from Last 3 Encounters:   10/25/21 (!) 177/90   10/20/21 (!) 169/91   21 107/75       BMI  Body mass index is 25.82 kg/m².     CBC   Lab Results   Component Value Date    WBC 5.2 10/25/2021    RBC 3.62 10/25/2021    RBC 4.50 2012    HGB 10.1 10/25/2021    HCT 33.1 10/25/2021    MCV 91.4 10/25/2021    RDW 16.1 10/25/2021     10/25/2021     2012       CMP    Lab Results   Component Value Date     10/25/2021    K 3.3 10/25/2021     10/25/2021    CO2 20 10/25/2021    BUN <2 10/25/2021    CREATININE 0.37 10/25/2021    GFRAA >60 10/25/2021    LABGLOM >60 10/25/2021    GLUCOSE 89 10/25/2021    GLUCOSE 125 2012    PROT 6.7 10/21/2021    CALCIUM 8.3 10/25/2021    BILITOT 0.72 10/21/2021    ALKPHOS 86 10/21/2021    AST 32 10/21/2021    ALT 11 10/21/2021       BMP    Lab Results   Component Value Date     10/25/2021    K 3.3 10/25/2021     10/25/2021    CO2 20 10/25/2021    BUN <2 10/25/2021    CREATININE 0.37 10/25/2021    CALCIUM 8.3 10/25/2021    GFRAA >60 10/25/2021    LABGLOM >60 10/25/2021    GLUCOSE 89 10/25/2021    GLUCOSE 125 05/28/2012       POC Testing  No results for input(s): POCGLU, POCNA, POCK, POCCL, POCBUN, POCHEMO, POCHCT in the last 72 hours. Coags    Lab Results   Component Value Date    PROTIME 10.4 02/15/2020    INR 1.0 02/15/2020    APTT 44.5 02/17/2020       HCG (If Applicable)   Lab Results   Component Value Date    HCG NEGATIVE 08/30/2011        ABGs No results found for: PHART, PO2ART, IHK2MOI, DGF9QGY, BEART, Q1LWWUBH     Type & Screen (If Applicable)  No results found for: [facility]    Radiology (If Applicable)    Cardiac Testing (If Applicable)     EKG (If Applicable) nl          Medications prior to admission:   Prior to Admission medications    Medication Sig Start Date End Date Taking?  Authorizing Provider   trimethobenzamide (TIGAN) 300 MG capsule Take 1 capsule by mouth 3 times daily for 7 days 10/24/21 10/31/21 Yes Shanique Bernstein MD   potassium chloride (KLOR-CON M) 20 MEQ extended release tablet Take 1 tablet by mouth daily 10/24/21  Yes Shanique Bernstein MD   pantoprazole (PROTONIX) 40 MG tablet Take 1 tablet by mouth 2 times daily (before meals) 10/24/21 12/19/21 Yes Shanique Bernstein MD   sucralfate (CARAFATE) 1 GM tablet Take 1 tablet by mouth 4 times daily for 28 days 10/24/21 11/21/21 Yes Shanique Bernstein MD   albuterol sulfate HFA (VENTOLIN HFA) 108 (90 Base) MCG/ACT inhaler Inhale 2 puffs into the lungs every 6 hours as needed for Wheezing   Yes Historical Provider, MD   diphenhydrAMINE (BENADRYL) 25 MG tablet Take 25 mg by mouth nightly as needed   Yes Historical Provider, MD   topiramate (TOPAMAX) 100 MG tablet Take 100 mg by mouth 3 times daily    Yes Historical Provider, MD   gabapentin (NEURONTIN) 400 MG capsule Take 800 mg by mouth 3 times daily. Yes Historical Provider, MD   budesonide-formoterol (SYMBICORT) 160-4.5 MCG/ACT AERO Inhale 2 puffs into the lungs 2 times daily 9/19/21  Yes Jamar Barros MD   lisinopril (PRINIVIL;ZESTRIL) 5 MG tablet Take 1 tablet by mouth daily 9/20/21  Yes Jamar Barros MD   nicotine (NICODERM CQ) 21 MG/24HR Place 1 patch onto the skin daily 9/20/21  Yes Jamar Barros MD   acetaminophen (TYLENOL) 500 MG tablet Take 2 tablets by mouth 3 times daily  Patient taking differently: Take 1,000 mg by mouth 3 times daily as needed  7/17/21  Yes Marce Kim,    lidocaine (LIDODERM) 5 % Place 1 patch onto the skin daily as needed for Pain 12 hours on, 12 hours off.     Yes Historical Provider, MD   SUMAtriptan (IMITREX) 100 MG tablet Take 100 mg by mouth once as needed for Migraine    Historical Provider, MD   Polyvinyl Alcohol-Povidone (REFRESH OP) Place 1 drop into both eyes 3 times daily    Historical Provider, MD       Current medications:    Current Facility-Administered Medications   Medication Dose Route Frequency Provider Last Rate Last Admin    [MAR Hold] HYDROcodone-acetaminophen (NORCO) 5-325 MG per tablet 1 tablet  1 tablet Oral Q6H PRN Efrain Woody MD   1 tablet at 10/25/21 0424    [MAR Hold] pantoprazole (PROTONIX) injection 40 mg  40 mg IntraVENous BID Doug Womack MD   40 mg at 10/24/21 2049    And    [MAR Hold] sodium chloride (PF) 0.9 % injection 10 mL  10 mL IntraVENous BID Doug Womack MD   10 mL at 10/24/21 2050    [MAR Hold] diphenhydrAMINE (BENADRYL) injection 12.5 mg  12.5 mg IntraVENous Q6H PRN Doug Womack MD   12.5 mg at 10/22/21 1455    [MAR Hold] trimethobenzamide (TIGAN) injection 200 mg  200 mg IntraMUSCular Q6H PRN Doug Womack MD   200 mg at 10/25/21 0424    [MAR Hold] sodium chloride flush 0.9 % injection 5-40 mL  5-40 mL IntraVENous 2 times per day Radha Sahni MD   10 mL at 10/24/21 2049    [MAR Hold] sodium chloride flush 0.9 % injection 5-40 mL  5-40 mL IntraVENous PRN Radha Sahni MD        [facility] Hold] 0.9 % sodium chloride infusion  25 mL IntraVENous PRN Radha Sahni MD        [Held by provider] enoxaparin (LOVENOX) injection 40 mg  40 mg SubCUTAneous Daily Radha Sahni MD   40 mg at 10/22/21 1006    [MAR Hold] polyethylene glycol (GLYCOLAX) packet 17 g  17 g Oral Daily PRN Radha Sahni MD        [facility] Hold] acetaminophen (TYLENOL) tablet 650 mg  650 mg Oral Q6H PRN Radha Sahni MD        Or   [facility] Hold] acetaminophen (TYLENOL) suppository 650 mg  650 mg Rectal Q6H PRN Radha Sahni MD        [facility] Hold] budesonide-formoterol [facility]) 160-4.5 MCG/ACT inhaler 2 puff  2 puff Inhalation BID Radha Sahni MD   2 puff at 10/24/21 2021    [MAR Hold] 0.9 % sodium chloride infusion   IntraVENous Continuous Radha Sahni  mL/hr at 10/24/21 1628 New Bag at 10/24/21 1628    [MAR Hold] potassium chloride (KLOR-CON M) extended release tablet 40 mEq  40 mEq Oral PRN Radha Sahni MD   40 mEq at 10/21/21 1641    Or    [MAR Hold] potassium chloride 10 mEq/100 mL IVPB (Peripheral Line)  10 mEq IntraVENous PRN Radha Sahni MD        [Held by provider] furosemide (LASIX) tablet 20 mg  20 mg Oral Daily Radha Sahni MD   20 mg at 10/21/21 2130    [MAR Hold] lisinopril (PRINIVIL;ZESTRIL) tablet 5 mg  5 mg Oral Daily Radha Sahni MD   5 mg at 10/24/21 1500    [Held by provider] FLUoxetine (PROZAC) capsule 60 mg  60 mg Oral Daily Janak Quintero MD        [facility] Hold] gabapentin (NEURONTIN) capsule 800 mg  800 mg Oral TID Janak Quintero MD   800 mg at 10/24/21 2049    [Held by provider] traZODone (DESYREL) tablet 250 mg  250 mg Oral Nightly Smita Jamil MD        [facility] Hold] trimethobenzamide Thaddeus Junior) capsule 300 mg  300 mg Oral TID PRN Smita Jamil MD           Allergies:     Allergies   Allergen Reactions  Azithromycin Other (See Comments)     'It doesn't work\"    Methylprednisolone      Elevates blood pressure    Penicillins Swelling     throat    Tape Karene Derrick Tape] Other (See Comments)     \" Tears my skin off\"       Problem List:    Patient Active Problem List   Diagnosis Code    Gastroesophageal reflux disease without esophagitis K21.9    Gastroenteritis K52.9    [facility] K56.7    Pancreatitis, acute K85.90    Drug abuse ([facility] F19.10    COPD (chronic obstructive pulmonary disease) ([facility] J44.9    Hypertension I10    Hypokalemia E87.6    Hypothyroidism due to acquired atrophy of thyroid E03.4    Crohn disease ([facility] K50.90    Acute cystitis without hematuria N30.00    Accidental drug overdose T50.901A    Prolonged Q-T interval on ECG R94.31    Bipolar disorder, unspecified ([facility]) F31.9    Polysubstance abuse ([facility] F19.10    Alcohol intoxication delirium ([facility] F10.121    Cocaine abuse ([facility] F14.10    Acute respiratory failure with hypoxia ([facility]) J96.01    Bipolar 1 disorder ([facility]) F31.9    Diarrhea R19.7    Chronic bronchitis ([facility]) J42    Chronic renal insufficiency, stage III (moderate) ([facility]) N18.30    Colitis K52.9    Anxiety F41.9    Osteoarthritis resulting from left hip dysplasia M16.32    Shortness of breath R06.02    Elevated brain natriuretic peptide (BNP) level R79.89    Pneumonia of both lower lobes due to infectious organism J18.9    Non-intractable vomiting with nausea R11.2    Acute hypokalemia E87.6    Hypomagnesemia E83.42    Intractable vomiting R11.10    Abdominal pain R10.9    Nausea vomiting and diarrhea R11.2, R19.7       Past Medical History:        Diagnosis Date    Acid reflux     Anxiety     Arthritis     Left hip    Asthma     Bipolar 1 disorder ([facility])     [facility]     COPD (chronic obstructive pulmonary disease) ([facility])     O2 3L PRN/ Dr. Diamante Barrios [facility]/last seen 2020/appt.9-7-21 for Clearance    Cro[facility] disease ([facility]     Depression     Dry eye     Fibromyalgia     Gout     Headache     Hyperlipidemia     Hypertension     Hypothyroidism     Kidney stones     Muscle spasm     Neuropathy     Pain     left hip    Personal history of other medical treatment     Pt. seen by Dr. Keara Faith for clearance for this OR Left hip/ Normally pt. doesn't follow with Cardiology.  [facility]    Wears partial dentures     upper    Wellness examination     PCP Bill Navarro MD/ [place]/last seen 8-24-21       Past Surgical History:        Procedure Laterality Date    ABDOMEN SURGERY      bowel obstruction OR    BUNIONECTOMY Left     CHOLECYSTECTOMY      COLONOSCOPY  04/30/2007    no gross pathology seen in the colon and also 3-4 inches of the ileum    COLONOSCOPY  10/12/2017    normal    TONSILLECTOMY AND ADENOIDECTOMY      TUBAL LIGATION         Social History:    Social History     Tobacco Use    Smoking status: Current Every Day Smoker     Packs/day: 0.50     Years: 37.00     Pack years: 18.50     Types: Cigarettes    Smokeless tobacco: Former User     Types: Chew     Quit date: 9/3/2001   Substance Use Topics    Alcohol use: Not Currently                                Ready to quit: Not Answered  Counseling given: Not Answered      Vital Signs (Current):   Vitals:    10/24/21 0730 10/24/21 1145 10/24/21 1940 10/25/21 0806   BP: (!) 151/92 (!) 142/89 (!) 167/85 (!) 177/90   Pulse: 72 70 67 69   Resp: 17 16 18 16   Temp: 98.9 °F (37.2 °C) 98.8 °F (37.1 °C) 98.1 °F (36.7 °C) 98.3 °F (36.8 °C)   TempSrc: Oral Oral Oral Temporal   SpO2:  95% 99% 96%   Weight:    160 lb (72.6 kg)   Height:    5' 6\" (1.676 m)                                              BP Readings from Last 3 Encounters:   10/25/21 (!) 177/90   10/20/21 (!) 169/91   09/19/21 107/75       NPO Status: Time of last liquid consumption: 2200                        Time of last solid consumption: 0900                        Date of last liquid consumption: 10/24/21                        Date of last solid food consumption: 10/24/21    BMI:   Wt Readings from Last 3 Encounters:   10/25/21 160 lb (72.6 kg)   10/20/21 142 lb 3.2 oz (64.5 kg)   09/19/21 149 lb 14.6 oz (68 kg)     Body mass index is 25.82 kg/m². CBC:   Lab Results   Component Value Date    WBC 5.2 10/25/2021    RBC 3.62 10/25/2021    RBC 4.50 05/28/2012    HGB 10.1 10/25/2021    HCT 33.1 10/25/2021    MCV 91.4 10/25/2021    RDW 16.1 10/25/2021     10/25/2021     05/28/2012       CMP:   Lab Results   Component Value Date     10/25/2021    K 3.3 10/25/2021     10/25/2021    CO2 20 10/25/2021    BUN <2 10/25/2021    CREATININE 0.37 10/25/2021    GFRAA >60 10/25/2021    LABGLOM >60 10/25/2021    GLUCOSE 89 10/25/2021    GLUCOSE 125 05/28/2012    PROT 6.7 10/21/2021    CALCIUM 8.3 10/25/2021    BILITOT 0.72 10/21/2021    ALKPHOS 86 10/21/2021    AST 32 10/21/2021    ALT 11 10/21/2021       POC Tests: No results for input(s): POCGLU, POCNA, POCK, POCCL, POCBUN, POCHEMO, POCHCT in the last 72 hours.     Coags:   Lab Results   Component Value Date    PROTIME 10.4 02/15/2020    INR 1.0 02/15/2020    APTT 44.5 02/17/2020       HCG (If Applicable):   Lab Results   Component Value Date    HCG NEGATIVE 08/30/2011        ABGs: No results found for: PHART, PO2ART, WWY9ZHM, MEK8RWC, BEART, [id number]     Type & Screen (If Applicable):  No results found for: LABABO, LABRH    Drug/Infectious Status (If Applicable):  Lab Results   Component Value Date    HEPCAB NONREACTIVE 09/18/2021       COVID-19 Screening (If Applicable):   Lab Results   Component Value Date    COVID19 Not Detected 10/24/2021    COVID19 Not Detected 09/17/2021           Anesthesia Evaluation   no history of anesthetic complications:   Airway: Mallampati: II     Neck ROM: full   Dental:    (+) upper dentures      Pulmonary:   (+) pneumonia:  COPD:  shortness of breath:  asthma: current smoker    (-) recent URI                           Cardiovascular:  Exercise tolerance: poor (<4 METS),   (+) hypertension:, BETANCOURT:,     (-) CAD                Neuro/Psych:   (+) neuromuscular disease:,              ROS comment: Bipolar,drug abuse GI/Hepatic/Renal:   (+) GERD:, renal disease: CRI,          ROS comment: Crohn. Endo/Other:    (+) hypothyroidism: arthritis:., .                 Abdominal:             Vascular: Other Findings:             Anesthesia Plan      MAC     ASA 4       Induction: intravenous.                           Danya Gilliam MD   10/25/2021

## 2021-10-25 NOTE — PROGRESS NOTES
Pt was prescribed tigan for her peristent nausea and vomiting. Tigan oral is out of pharmncy. Spoke with pharmacist in regards to anti emetics that can be safely given to pt for persistent  Symptoms given her prolonged QTc. Meclizine is recommended by pharmacist with out side effects of QTc and is given for 7 day supply as needed    Ryan Dietz MD  Internal Medicine Resident, PGY- [address], [place]  10/25/2021, 5:07 PM

## 2021-10-25 NOTE — PROGRESS NOTES
Physical Therapy        Physical Therapy Cancel Note      DATE: 10/25/2021    NAME: Joie Neri  MRN: [id number]   : 1965      Patient not seen this date for Physical Therapy due to:    Surgery/Procedure: EGD      Electronically signed by Tanya Frey PTA on 10/25/2021 at 9:26 AM

## 2021-10-25 NOTE — PLAN OF CARE
Problem: Falls - Risk of:  Goal: Will remain free from falls  Description: Will remain free from falls  10/25/2021 0404 by Bharati Cruz RN  Outcome: Ongoing  10/24/2021 1722 by Kacy Kendrick RN  Outcome: Ongoing  Goal: Absence of physical injury  Description: Absence of physical injury  10/25/2021 0404 by Bharati Cruz RN  Outcome: Ongoing  10/24/2021 1722 by Kacy Kendrick RN  Outcome: Ongoing     Problem: Pain:  Goal: Pain level will decrease  Description: Pain level will decrease  10/25/2021 0404 by Bharati Cruz RN  Outcome: Ongoing  10/24/2021 1722 by Kacy Kendrick RN  Outcome: Ongoing  Goal: Control of acute pain  Description: Control of acute pain  10/25/2021 0404 by Bharati Cruz RN  Outcome: Ongoing  10/24/2021 1722 by Kacy Kendrick RN  Outcome: Ongoing  Goal: Control of chronic pain  Description: Control of chronic pain  10/25/2021 0404 by Bharati Cruz RN  Outcome: Ongoing  10/24/2021 1722 by Kacy Kendrick RN  Outcome: Ongoing     Problem: Nausea/Vomiting:  Goal: Absence of nausea/vomiting  Description: Absence of nausea/vomiting  10/25/2021 0404 by Bahrati Cruz RN  Outcome: Ongoing  10/24/2021 1722 by Kacy Kendrick RN  Outcome: Ongoing  Goal: Able to drink  Description: Able to drink  10/25/2021 0404 by Bharati Cruz RN  Outcome: Ongoing  10/24/2021 1722 by Kacy Kendrick RN  Outcome: Ongoing  Goal: Able to eat  Description: Able to eat  10/25/2021 0404 by Bharati Cruz RN  Outcome: Ongoing  10/24/2021 1722 by Kacy Kendrick RN  Outcome: Ongoing  Goal: Ability to achieve adequate nutritional intake will improve  Description: Ability to achieve adequate nutritional intake will improve  10/25/2021 0404 by Bharati Cruz RN  Outcome: Ongoing  10/24/2021 1722 by Kacy Kendrick RN  Outcome: Ongoing

## 2021-10-25 NOTE — PROGRESS NOTES
[facility]  Internal Medicine Teaching Residency Program  Inpatient Daily Progress Note  ______________________________________________________________________________    Patient: Yan Park  YOB: 1965   YPJ:[id number]    Acct: [de-identified]     Room: [facility] RM/NONE  Admit date: 10/21/2021  Today's date: 10/25/21  Number of days in the hospital: 4    SUBJECTIVE   Admitting Diagnosis: Non-intractable vomiting with nausea  CC: Nausea and vomiting    Patient seen and examined at bedside today. Chart and results reviewed. Patient is getting her endoscopy and colonoscopy done today  Bowel prep is good  Patient complains of pain to her gluteal region due to tigan IM  No acute events overnight. ROS:  Constitutional:  negative for chills, fevers, sweats  Respiratory:  negative for cough, dyspnea on exertion, hemoptysis, shortness of breath, wheezing  Cardiovascular:  negative for chest pain, chest pressure/discomfort, lower extremity edema, palpitations  Gastrointestinal:  negative for abdominal pain, constipation, diarrhea, nausea, vomiting  Neurological:  negative for dizziness, headache  BRIEF HISTORY     The patient is a pleasant 56 y. o. female with a PMHx significant for Crohn's disease, COPD, bipolar disorder, polysubstance abuse, HTN and Sonu Marco presents with a chief complaint of abdominal pain. Pt states that since October 15, she has had increasing right lower quadrant abdominal pain that she rates 10/10. The pain does not radiate anywhere but pt was diffusely tender on examination. Pt states she has been having diarrhea for 6 days. She has been vomiting that is green in nature. Pt was discharged yesterday after being admitted for N/V. Pt had prolonged QTc. Last QTc 529. Pt states she has not eaten in 6 days.  Pt denies black tarry stools and there was no recent GI procedures.     Significant labs in the ED: K 3.4, ALT: 11 and AST 32    OBJECTIVE     Vital Signs:  BP (!) 157/90   Pulse 61   Temp 97.3 °F (36.3 °C) (Temporal)   Resp 13   Ht 5' 6\" (1.676 m)   Wt 160 lb (72.6 kg)   SpO2 99%   BMI 25.82 kg/m²     Temp (24hrs), Av.1 °F (36.7 °C), Min:97.3 °F (36.3 °C), Max:98.8 °F (37.1 °C)    In: 2400   Out: -     Physical Exam:  Constitutional: This is a well developed, well nourished, 25-29.9 - Overweight 61y.o. year old female who is alert, oriented, cooperative and in no apparent distress. Head:normocephalic and atraumatic. EENT:  PERRLA. No conjunctival injections. Septum was midline, mucosa was without erythema, exudates or cobblestoning. No thrush was noted. Neck: Supple without thyromegaly. No elevated JVP. Trachea was midline. Respiratory: Chest was symmetrical without dullness to percussion. Breath sounds bilaterally were clear to auscultation. There were no wheezes, rhonchi or rales. There is no intercostal retraction or use of accessory muscles. No egophony noted. Cardiovascular: Regular without murmur, clicks, gallops or rubs. Abdomen: Slightly rounded and soft without organomegaly. No rebound, rigidity or guarding was appreciated. Lymphatic: No lymphadenopathy. Musculoskeletal: Normal curvature of the spine. No gross muscle weakness. Extremities:  No lower extremity edema, ulcerations, tenderness, varicosities or erythema. Muscle size, tone and strength are normal.  No involuntary movements are noted. Skin:  Warm and dry. Good color, turgor and pigmentation. No lesions or scars.   No cyanosis or clubbing  Neurological/Psychiatric: The patient's general behavior, level of consciousness, thought content and emotional status is normal.        Medications:  Scheduled Medications:    [MAR Hold] pantoprazole  40 mg IntraVENous BID    And    [MAR Hold] sodium chloride (PF)  10 mL IntraVENous BID    [MAR Hold] sodium chloride flush  5-40 mL IntraVENous 2 times per day    [Held by provider] enoxaparin  40 mg SubCUTAneous Daily    [MAR Hold] budesonide-formoterol  2 puff Inhalation BID    [Held by provider] furosemide  20 mg Oral Daily    [MAR Hold] lisinopril  5 mg Oral Daily    [Held by provider] FLUoxetine  60 mg Oral Daily    [MAR Hold] gabapentin  800 mg Oral TID    [Held by provider] traZODone  250 mg Oral Nightly     Continuous Infusions:    [MAR Hold] sodium chloride      [MAR Hold] sodium chloride 125 mL/hr at 10/25/21 0919     PRN Medications[MAR Hold] HYDROcodone 5 mg - acetaminophen, 1 tablet, Q6H PRN  [MAR Hold] diphenhydrAMINE, 12.5 mg, Q6H PRN  [MAR Hold] trimethobenzamide, 200 mg, Q6H PRN  [MAR Hold] sodium chloride flush, 5-40 mL, PRN  [MAR Hold] sodium chloride, 25 mL, PRN  [MAR Hold] polyethylene glycol, 17 g, Daily PRN  [MAR Hold] acetaminophen, 650 mg, Q6H PRN   Or  [MAR Hold] acetaminophen, 650 mg, Q6H PRN  [MAR Hold] potassium chloride, 40 mEq, PRN   Or  [MAR Hold] potassium chloride, 10 mEq, PRN  [MAR Hold] trimethobenzamide, 300 mg, TID PRN        Diagnostic Labs:  CBC:   Recent Labs     10/23/21  0630 10/24/21  0617 10/25/21  0558   WBC 7.3 6.5 5.2   RBC 4.12 3.92* 3.62*   HGB 11.4* 10.8* 10.1*   HCT 36.2* 35.3* 33.1*   MCV 87.9 90.1 91.4   RDW 15.8* 16.2* 16.1*    262 339     BMP:   Recent Labs     10/23/21  0630 10/24/21  0617 10/25/21  0558    139 136   K 3.0* 3.5* 3.3*    104 103   CO2 20 21 20   BUN 3* <2* <2*   CREATININE 0.45* 0.43* 0.37*     BNP: No results for input(s): BNP in the last 72 hours. PT/INR: No results for input(s): PROTIME, INR in the last 72 hours. APTT: No results for input(s): APTT in the last 72 hours. CARDIAC ENZYMES: No results for input(s): CKMB, CKMBINDEX, TROPONINI in the last 72 hours. Invalid input(s): CKTOTAL;3  FASTING LIPID PANEL:No results found for: CHOL, HDL, TRIG  LIVER PROFILE: No results for input(s): AST, ALT, ALB, BILIDIR, BILITOT, ALKPHOS in the last 72 hours.    MICROBIOLOGY:   Lab Results   Component Value Date/Time    CULTURE NO GROWTH 6 DAYS 10/16/2021 10:25 AM       Imaging:    XR ABDOMEN (KUB) (SINGLE AP VIEW)    Result Date: 10/19/2021  Moderate constipation. No bowel obstruction. No change from priors. CT ABDOMEN PELVIS W IV CONTRAST Additional Contrast? None    Result Date: 10/22/2021  New small hematoma posterior to the proximal duodenum. Findings suspicious for perforated tiny duodenal ulcer with contained hematoma likely in the periampullary region. Critical results were called by Dr. Kenyatta Raman MD to Dr. Africa Heard On 10/22/2021 at 15:40. XR CHEST PORTABLE    Result Date: 10/21/2021  No focal consolidation. ASSESSMENT & PLAN     Abdominal pain with nausea, vomiting & diarrhea   - CT  10/22/2021: New small hematoma posterior to the proximal duodenum. Findings suspicious for perforated tiny duodenal ulcer with contained hematoma likely in the periampullary region.  - Urine drug screen positive for amphetamines, cocaine  - GI consulted. -  Continue IV pantoprazole 40 mg twice daily   - Twice daily PPI therapy for 6 to 8 weeks as well as Carafate 1 g 4 times daily for 3 to 4  Weeks   - EGD and colonoscopy performed today.  Pending results  - Has scopolamine patch  - Tigan IV  - IV compazine one time only  - Protonix 40mg bid  - Monitor K+ and replace when clinically indicated   - Discharge      Prolonged Q-T interval on ECG  - Continue to monitor   - Avoid QTc prolonging drugs        Hx Crohn disease ([facility] [address]  - Pt having diarrhea   - Calprotein elevated   - Denies melena   - Continue to monitor           Hypomagnesemia  - Mg 2.0  - Continue to monitor and replace when indicated        Gastroesophageal reflux disease without esophagitis  - Continue to monitor   - Avoid Pepcid and other QTc prolonging drugs         COPD (chronic obstructive pulmonary disease) (HCC)  - Stable   - Symbicort   - Continue respiratory treatments       Hypertension  /92  IV hydralazine if SBP>160       Hypokalemia  - Potassium 3.5  - Pt refusing IV replacement  - PO Replacement ordered  - Continue to monitor and replace when clinically indicated        Drug abuse ([facility] [address]  - Educated the pt on cessation of drug use   - Urine drug screen positive for amphetamines, cocaine        Bipolar 1 disorder ([facility] [address]  - Stable  - Will follow-up with psychiatrist OP for med rec as she has a prolonged QT       Anxiety  - Stable       Diet: clear liquid  DVT ppx: Lovenox  GI ppx: Protonix IV      Rebecca Crandall DPM  Internal Medicine Resident, PGY-1  [address], [place]  10/25/2021, 10:25 AM

## 2021-10-25 NOTE — PROGRESS NOTES
CLINICAL PHARMACY NOTE: MEDS TO BEDS    Total # of Prescriptions Filled: 4   The following medications were delivered to the patient:  · protonix  · sucralafate  · potassium    Additional Documentation:

## 2021-10-25 NOTE — CARE COORDINATION
Call to [place] transportation, pt being discharged to different address than her home address, she will be going to [place] [address], transportation arranged for discharge.      They will call nursing station upon arrival. Trip number is [phone]    Discharge 1 [facility] Case Management Department  Written by: Stephanie Lopez RN    Patient Name: Angel Boas  Attending Provider: Miguelangel Ferguson MD  Admit Date: 10/21/2021  6:11 AM  MRN: [id number]  Account: [de-identified]                     : 1965  Discharge Date: 10/25/2021      Disposition: home to friend house    Stephanie Lopez RN

## 2021-10-26 DIAGNOSIS — M16.32 OSTEOARTHRITIS RESULTING FROM LEFT HIP DYSPLASIA: ICD-10-CM

## 2021-10-26 LAB — SURGICAL PATHOLOGY REPORT: NORMAL

## 2021-10-26 RX ORDER — HYDROCODONE BITARTRATE AND ACETAMINOPHEN 5; 325 MG/1; MG/1
1 TABLET ORAL EVERY 6 HOURS PRN
Qty: 56 TABLET | Refills: 0 | Status: SHIPPED | OUTPATIENT
Start: 2021-10-26 | End: 2021-11-09 | Stop reason: SDUPTHER

## 2021-10-26 NOTE — TELEPHONE ENCOUNTER
Asking for refill on [place]. Scheduled hip surgery for 9/17/2021 was canceled. Patient had pneumonia bilat lower lobes. Last appointment 7/20/2021. No follow up in office has been made. Please Advise.      Med pended.

## 2021-10-29 NOTE — DISCHARGE SUMMARY
[address] [place] [address]     Department of Internal Medicine - Staff Internal Medicine Service    INPATIENT DISCHARGE SUMMARY        Patient Identification:  Rodrigue Gomez is a 61 y.o. female. :  1965  MRN: [id number]     Acct: [de-identified]   Admit Date:  10/21/2021  Discharge date and time: 10/25/2021  5:13 PM   Attending Provider: No att. providers found                                     Admission Diagnoses:   Abdominal pain [R10.9]  Prolonged Q-T interval on ECG [R94.31]  Lower abdominal pain [R10.30]  Nausea vomiting and diarrhea [R11.2, R19.7]    Discharge Diagnoses:   Principal Problem:    Non-intractable vomiting with nausea  Active Problems:    Gastroesophageal reflux disease without esophagitis    Drug abuse ([facility])    COPD (chronic obstructive pulmonary disease) ([facility])    Hypertension    Hypokalemia    Crohn disease ([facility] [address]    Prolonged Q-T interval on ECG    Cocaine abuse ([facility])    Bipolar 1 disorder ([facility])    Colitis    Anxiety    Hypomagnesemia    Abdominal pain    Nausea vomiting and diarrhea  Resolved Problems:    * No resolved hospital problems. *       Consults:   GI    Brief Inpatient course:  Patient is a 58-year-old female with a past medical history significant of Crohn's disease, COPD, bipolar disorder, polysubstance abuse, hypertension and GERD presents with chief complaint of abdominal pain. Patient states that since October 15 she has had increasing right lower quadrant abdominal pain and rates the pain 10 out of 10. She stated the pain radiated across her abdomen and was diffusely tender on examination. She has been having diarrhea for 6 days. She was discharged on 10/21 after being admitted for an/V. Patient had prolonged QTC. During his stay at the hospital she had an endoscopy and colonoscopy done. Patient was given Protonix 40 mg twice daily. She is also monitored for QT interval and were avoiding QTC prolonging drugs.   Due to decreased levels of magnesium she was given magnesium and was monitored. She is also getting respiratory treatments due to her COPD. Patient also had hypertension and was being treated with hydralazine. Due to her bipolar 1 disorder patient will be following outpatient with a psychiatrist.  Patient was discharged on 10/25/2021      Procedures:  EGD and colonoscopy    Any Hospital Acquired Infections: none    Discharge Functional Status:  stable    Disposition: Patient was discharged to a different address than her home per case management    Patient Instructions:   Discharge Medication List as of 10/25/2021  3:54 PM      START taking these medications    Details   pantoprazole (PROTONIX) 40 MG tablet Take 1 tablet by mouth 2 times daily (before meals), Disp-112 tablet, R-0Normal      sucralfate (CARAFATE) 1 GM tablet Take 1 tablet by mouth 4 times daily for 28 days, Disp-112 tablet, R-0Normal         CONTINUE these medications which have CHANGED    Details   potassium chloride (KLOR-CON M) 20 MEQ extended release tablet Take 1 tablet by mouth 2 times daily for 2 doses, Disp-2 tablet, R-0Normal      trimethobenzamide (TIGAN) 300 MG capsule Take 1 capsule by mouth 3 times daily for 7 days, Disp-21 capsule, R-0Normal         CONTINUE these medications which have NOT CHANGED    Details   albuterol sulfate HFA (VENTOLIN HFA) 108 (90 Base) MCG/ACT inhaler Inhale 2 puffs into the lungs every 6 hours as needed for WheezingHistorical Med      diphenhydrAMINE (BENADRYL) 25 MG tablet Take 25 mg by mouth nightly as neededHistorical Med      topiramate (TOPAMAX) 100 MG tablet Take 100 mg by mouth 3 times daily Historical Med      SUMAtriptan (IMITREX) 100 MG tablet Take 100 mg by mouth once as needed for MigraineHistorical Med      gabapentin (NEURONTIN) 400 MG capsule Take 800 mg by mouth 3 times daily. Historical Med      budesonide-formoterol (SYMBICORT) 160-4.5 MCG/ACT AERO Inhale 2 puffs into the lungs 2 times daily, Disp-10.2 g, R-3Normal      lisinopril (PRINIVIL;ZESTRIL) 5 MG tablet Take 1 tablet by mouth daily, Disp-30 tablet, R-3Normal      nicotine (NICODERM CQ) 21 MG/24HR Place 1 patch onto the skin daily, Disp-30 patch, R-3Normal      acetaminophen (TYLENOL) 500 MG tablet Take 2 tablets by mouth 3 times daily, Disp-15 tablet, R-1Print      Polyvinyl Alcohol-Povidone (REFRESH OP) Place 1 drop into both eyes 3 times dailyHistorical Med      lidocaine (LIDODERM) 5 % Place 1 patch onto the skin daily as needed for Pain 12 hours on, 12 hours off.  Historical Med         STOP taking these medications       omeprazole (PRILOSEC) 20 MG delayed release capsule Comments:   Reason for Stopping:         pregabalin (LYRICA) 150 MG capsule Comments:   Reason for Stopping:         FLUoxetine (PROZAC) 20 MG capsule Comments:   Reason for Stopping:         traZODone (DESYREL) 150 MG tablet Comments:   Reason for Stopping:         polyethylene glycol (GLYCOLAX) 17 g packet Comments:   Reason for Stopping:         potassium bicarb-citric acid (EFFER-K) 10 MEQ TBEF effervescent tablet Comments:   Reason for Stopping:         HYDROcodone-acetaminophen (Deyvi Comes) 5-325 MG per tablet Comments:   Reason for Stopping:         furosemide (LASIX) 20 MG tablet Comments:   Reason for Stopping:         ipratropium-albuterol (DUONEB) 0.5-2.5 (3) MG/3ML SOLN nebulizer solution Comments:   Reason for Stopping:               Activity: activity as tolerated    Diet: regular diet    Follow-up:    Ruddy Moody MD  [facility]  [facility] [address] [facility] [address] [facility]  [phone]    In 1 week  post-hospital discharge follow-up    Zandra Michael MD  [address] [place] [id number] [phone]    Schedule an appointment as soon as possible for a visit  Follow up on biopsy of EGD and colonoscopy      Follow up labs: None    Follow up imaging: None    Note that over 30 minutes was spent in preparing discharge papers, discussing discharge with patient, medication review, etc.      Tien Mendez DPM         Department of Internal Medicine  [facility]         10/29/2021, 9:35 AM

## 2021-11-09 DIAGNOSIS — M16.32 OSTEOARTHRITIS RESULTING FROM LEFT HIP DYSPLASIA: ICD-10-CM

## 2021-11-09 RX ORDER — HYDROCODONE BITARTRATE AND ACETAMINOPHEN 5; 325 MG/1; MG/1
1 TABLET ORAL EVERY 6 HOURS PRN
Qty: 56 TABLET | Refills: 0 | Status: SHIPPED | OUTPATIENT
Start: 2021-11-09 | End: 2021-11-22

## 2021-11-18 LAB — IBD PANEL: NORMAL

## 2021-11-22 DIAGNOSIS — M16.32 OSTEOARTHRITIS RESULTING FROM LEFT HIP DYSPLASIA: ICD-10-CM

## 2021-11-22 RX ORDER — HYDROCODONE BITARTRATE AND ACETAMINOPHEN 5; 325 MG/1; MG/1
1 TABLET ORAL EVERY 6 HOURS PRN
Qty: 56 TABLET | Refills: 0 | Status: SHIPPED | OUTPATIENT
Start: 2021-11-22 | End: 2021-12-06

## 2021-12-06 DIAGNOSIS — M16.32 OSTEOARTHRITIS RESULTING FROM LEFT HIP DYSPLASIA: ICD-10-CM

## 2021-12-06 RX ORDER — HYDROCODONE BITARTRATE AND ACETAMINOPHEN 5; 325 MG/1; MG/1
1 TABLET ORAL EVERY 6 HOURS PRN
Qty: 56 TABLET | Refills: 0 | Status: SHIPPED | OUTPATIENT
Start: 2021-12-06 | End: 2021-12-07 | Stop reason: CLARIF

## 2021-12-07 RX ORDER — HYDROCODONE BITARTRATE AND ACETAMINOPHEN 5; 325 MG/1; MG/1
1 TABLET ORAL EVERY 6 HOURS PRN
Qty: 28 TABLET | Refills: 0 | OUTPATIENT
Start: 2021-12-07 | End: 2021-12-14

## 2021-12-07 NOTE — TELEPHONE ENCOUNTER
Patient calling stating the [facility] has not received her refill order. Please manually resend order to the pharmacy.

## 2021-12-07 NOTE — TELEPHONE ENCOUNTER
[id number] to verify that they did not receive the order and they verified that they did not. I pended the order again to you.

## 2021-12-10 ENCOUNTER — TELEPHONE (OUTPATIENT)
Dept: GASTROENTEROLOGY | Age: 56
End: 2021-12-10

## 2021-12-10 NOTE — TELEPHONE ENCOUNTER
----- Message from Elio Berry MD sent at 12/9/2021  1:50 PM EST -----  Regarding: Needs clinic follow-up  Please schedule this patient for follow-up in the clinic with Dr. Lewis Mendoza. Patient had a colonoscopy with polyp resection.   Needs to discuss follow-up colonoscopy and recommendations per Dr. Lewis Mendoza.  ----- Message -----  From: Janel Johnston Incoming Lab Results From [facility]  Sent: 10/26/2021   9:07 AM EST  To: Elio Berry MD

## 2021-12-23 ENCOUNTER — OFFICE VISIT (OUTPATIENT)
Dept: GASTROENTEROLOGY | Age: 56
End: 2021-12-23
Payer: MEDICARE

## 2021-12-23 VITALS — BODY MASS INDEX: 23.73 KG/M2 | SYSTOLIC BLOOD PRESSURE: 140 MMHG | WEIGHT: 147 LBS | DIASTOLIC BLOOD PRESSURE: 89 MMHG

## 2021-12-23 DIAGNOSIS — K50.018 CROHN'S DISEASE OF SMALL INTESTINE WITH OTHER COMPLICATION (HCC): Primary | ICD-10-CM

## 2021-12-23 DIAGNOSIS — E46 MALNUTRITION, UNSPECIFIED TYPE (HCC): ICD-10-CM

## 2021-12-23 PROCEDURE — 4004F PT TOBACCO SCREEN RCVD TLK: CPT | Performed by: INTERNAL MEDICINE

## 2021-12-23 PROCEDURE — 99214 OFFICE O/P EST MOD 30 MIN: CPT | Performed by: INTERNAL MEDICINE

## 2021-12-23 PROCEDURE — G8420 CALC BMI NORM PARAMETERS: HCPCS | Performed by: INTERNAL MEDICINE

## 2021-12-23 PROCEDURE — G8427 DOCREV CUR MEDS BY ELIG CLIN: HCPCS | Performed by: INTERNAL MEDICINE

## 2021-12-23 PROCEDURE — 3017F COLORECTAL CA SCREEN DOC REV: CPT | Performed by: INTERNAL MEDICINE

## 2021-12-23 PROCEDURE — G8482 FLU IMMUNIZE ORDER/ADMIN: HCPCS | Performed by: INTERNAL MEDICINE

## 2021-12-23 ASSESSMENT — ENCOUNTER SYMPTOMS
NAUSEA: 1
ALLERGIC/IMMUNOLOGIC NEGATIVE: 1
ABDOMINAL PAIN: 1
EYES NEGATIVE: 1
RESPIRATORY NEGATIVE: 1
DIARRHEA: 1
VOMITING: 1
CONSTIPATION: 1

## 2021-12-23 NOTE — PROGRESS NOTES
GI OFFICE FOLLOW UP    INTERVAL HISTORY:   No referring provider defined for this encounter. Chief Complaint   Patient presents with    Follow-up     colon/ hfu       1. Crohn's disease of small intestine with other complication ([facility]    2. Malnutrition, unspecified type ([facility]              HISTORY OF PRESENT ILLNESS: Jaxson Darling is a 61 y.o. female with a past history remarkable for ,   Patient seen with a history of abdominal pain, diarrhea, nausea vomiting. Patient stated that she has as many as 7-10 bowel movements a day. Liquid consistency without gross hematochezia. Has urgency of bowel movements. Has anal incontinence. Has intermittent nausea emesis. Emesis contained previously ingested food. No hematemesis. No fever chills. Has abdominal distention bloating, cramps. The symptoms appears to be nonspecific. This patient was admitted at [facility] in September 2021 and at that time she was evaluated by GI service. I did review the evaluation, history which is as follows:      27-year-old female with a history of COPD, bipolar disorder, bowel obstruction, anxiety, polysubstance use/abuse, and Crohn's disease, bowel obstruction with partial small intestinal resection 2011, presented to the hospital with reports of shortness of breath and diarrhea. Patient was found to have elevated BNP with negative Covid. GI was asked to see patient for diarrhea and history of Crohn's disease.     Patient reports she was diagnosed with Crohn's disease 2012 by colonoscopy. She reports she was diagnosed with C. difficile infection at approximately the same time and treatment for IBD was deferred and she has not been following with GI. Patient reports she has been having approximately 15 stools per day for which she takes Imodium to manage diarrhea.   She reports urgency, incontinence as well as nocturnal symptoms. She reports an associated decreased appetite as well as intermittent nausea. She reports associated 15 pound unintentional weight loss over the past 6 months. She denies any hematochezia.     Records show she underwent colonoscopy 10/12/2017 by Dr. Leo Robison which showed normal-appearing TI as well as e normal exam for ntire examined portion of the colon. MRA was recommended however records show she had a CT abdomen and pelvis with contrast 11/5/2017 showing parenchymal abnormalities involving the small bowel with possible stricturing involving loops of small bowel and perirectal fluid collection possible abscess. She has been following with [facility] general surgery for perirectal abscess. She underwent fistulotomy 1/4/2021 with Dr. Monica Grajeda. Basing on the history review it is not clear whether this patient has Crohn's disease. However in the past imaging studies did reveal possible small bowel strictures. At present patient appears to be disabled. Not able to ambulate well. She appears to be malnourished. Past Medical,Family, and Social History reviewed and does contribute to the patient presenting condition. Patient's PMH/PSH,SH,PSYCH Hx, MEDs, ALLERGIES, and ROS were all reviewed and updated in the appropriate sections.  Yes      PAST MEDICAL HISTORY:  Past Medical History:   Diagnosis Date    Acid reflux     Anxiety     Arthritis     Left hip    Asthma     Bipolar 1 disorder (HCC)     Bowel obstruction (HCC)     COPD (chronic obstructive pulmonary disease) (HCC)     O2 3L PRN/ Dr. Lidya Weinberg clinic/last seen 2020/appt.9-7-21 for Clearance    Crohn disease ([facility] [address]     Depression     Dry eye     Fibromyalgia     Gout     Headache     Hyperlipidemia     Hypertension     Hypothyroidism     Kidney stones     Muscle spasm     Neuropathy     Pain     left hip    Personal history of other medical treatment     Pt. seen by  [facility] for clearance for this OR Left hip/ Normally pt. doesn't follow with Cardiology.  [facility]    Wears partial dentures     upper    Wellness examination     PCP David Brar MD/ genna/last seen 8-24-21       Past Surgical History:   Procedure Laterality Date    ABDOMEN SURGERY      bowel obstruction OR    BUNIONECTOMY Left     CHOLECYSTECTOMY      COLONOSCOPY  04/30/2007    no gross pathology seen in the colon and also 3-4 inches of the ileum    COLONOSCOPY  10/12/2017    normal    COLONOSCOPY  10/25/2021    COLONOSCOPY WITH BIOPSY performed by Brooks Melendez MD at  [place] [address] COLONOSCOPY  10/25/2021    COLONOSCOPY POLYPECTOMY REMOVAL SNARE performed by Brooks Melendez MD at [facility]      UPPER GASTROINTESTINAL ENDOSCOPY  10/25/2021    EGD BIOPSY performed by [facility] Melendez MD at [facility]       CURRENT MEDICATIONS:      ALLERGIES:   Allergies   Allergen Reactions    Azithromycin Other (See Comments)     'It doesn't work\"    Methylprednisolone      Elevates blood pressure    Penicillins Swelling     throat    Tape [employer] Emily Tape] Other (See Comments)     \" Tears my skin off\"       FAMILY HISTORY:       Problem Relation Age of Onset    Diabetes Father     Lung Cancer Father     Hypertension Mother     Cancer Mother     High Blood Pressure Mother     Crohn's Disease Brother     Heart Disease Brother          SOCIAL HISTORY:   Social History     Socioeconomic History    Marital status: Single     Spouse name: Not on file    Number of children: Not on file    Years of education: Not on file    Highest education level: Not on file   Occupational History    Not on file   Tobacco Use    Smoking status: Current Every Day Smoker     Packs/day: 0.50     Years: 37.00     Pack years: 18.50     Types: Cigarettes    Smokeless tobacco: Former User     Types: Chew     Quit date: 9/3/2001   Vaping Use    Vaping Use: Former    Quit date: 9/3/2019    Substances: Nicotine   Substance and Sexual Activity    Alcohol use: Not Currently    Drug use: Yes     Types: Marijuana (Weed)     Comment: h/o of substance abuse but denies drug use    Sexual activity: Not on file   Other Topics Concern    Not on file   Social History Narrative    Not on file     Social Determinants of Health     Financial Resource Strain:     Difficulty of Paying Living Expenses: Not on file   Food Insecurity:     Worried About Running Out of Food in the Last Year: Not on file    Benita of Food in the Last Year: Not on file   Transportation Needs:     Lack of Transportation (Medical): Not on file    Lack of Transportation (Non-Medical): Not on file   Physical Activity:     Days of Exercise per Week: Not on file    Minutes of Exercise per Session: Not on file   Stress:     Feeling of Stress : Not on file   Social Connections:     Frequency of Communication with Friends and Family: Not on file    Frequency of Social Gatherings with Friends and Family: Not on file    Attends [employer] Services: Not on file    Active Member of [address] [place] [address] or Organizations: Not on file    Attends Club or Organization Meetings: Not on file    Marital Status: Not on file   Intimate Partner Violence:     Fear of Current or Ex-Partner: Not on file    Emotionally Abused: Not on file    Physically Abused: Not on file    Sexually Abused: Not on file   Housing Stability:     Unable to Pay for Housing in the Last Year: Not on file    Number of [place] in the Last Year: Not on file    Unstable Housing in the Last Year: Not on file         REVIEW OF SYSTEMS:         Review of Systems   Constitutional: Positive for fatigue and unexpected weight change. HENT: Negative. Eyes: Negative. Respiratory: Negative. Cardiovascular: Negative. Gastrointestinal: Positive for abdominal pain, constipation, diarrhea, nausea and vomiting. Endocrine: Negative. Genitourinary: Negative. Musculoskeletal: Negative. Skin: Negative. Allergic/Immunologic: Negative. Neurological: Positive for headaches. Hematological: Negative. Psychiatric/Behavioral: Negative. PHYSICAL EXAMINATION:     Vital signs reviewed per the nursing documentation. BP (!) 140/89   Wt 147 lb (66.7 kg)   BMI 23.73 kg/m²   Body mass index is 23.73 kg/m². Physical Exam  Vitals and nursing note reviewed. Constitutional:       Appearance: She is well-developed. Comments: Patient appears malnourished. HENT:      Head: Normocephalic and atraumatic. Eyes:      General: No scleral icterus. Conjunctiva/sclera: Conjunctivae normal.      Pupils: Pupils are equal, round, and reactive to light. Neck:      Thyroid: No thyromegaly. Vascular: No hepatojugular reflux or JVD. Trachea: No tracheal deviation. Cardiovascular:      Rate and Rhythm: Normal rate and regular rhythm. Heart sounds: Normal heart sounds. Pulmonary:      Effort: Pulmonary effort is normal. No respiratory distress. Breath sounds: Normal breath sounds. No wheezing or rales. Abdominal:      General: Bowel sounds are normal. There is no distension. Palpations: Abdomen is soft. There is no hepatomegaly or mass. Tenderness: There is no abdominal tenderness. There is no rebound. Hernia: No hernia is present. Comments: Multiple scars over the abdominal wall. Musculoskeletal:         General: No tenderness. Cervical back: Normal range of motion and neck supple. Comments: No joint swelling   Lymphadenopathy:      Cervical: No cervical adenopathy. Skin:     General: Skin is warm. Findings: No bruising, ecchymosis, erythema or rash. Neurological:      Mental Status: She is alert and oriented to person, place, and time. Cranial Nerves: No cranial nerve deficit. Psychiatric:         Thought Content:  Thought content normal.           LABORATORY DATA: Reviewed  Lab Results   Component Value Date    WBC 5.2 10/25/2021    HGB 10.1 (L) 10/25/2021    HCT 33.1 (L) 10/25/2021    MCV 91.4 10/25/2021     10/25/2021     10/25/2021    K 3.3 (L) 10/25/2021     10/25/2021    CO2 20 10/25/2021    BUN <2 (L) 10/25/2021    CREATININE 0.37 (L) 10/25/2021    LABPROT 6.8 05/28/2012    LABALBU 3.9 10/21/2021    BILITOT 0.72 10/21/2021    ALKPHOS 86 10/21/2021    AST 32 (H) 10/21/2021    ALT 11 10/21/2021    INR 1.0 02/15/2020         Lab Results   Component Value Date    RBC 3.62 (L) 10/25/2021    HGB 10.1 (L) 10/25/2021    MCV 91.4 10/25/2021    MCH 27.9 10/25/2021    MCHC 30.5 10/25/2021    RDW 16.1 (H) 10/25/2021    MPV 11.2 10/25/2021    BASOPCT 1 10/25/2021    LYMPHSABS 1.46 10/25/2021    MONOSABS 0.57 10/25/2021    NEUTROABS 2.92 10/25/2021    EOSABS 0.22 10/25/2021    BASOSABS 0.04 10/25/2021         DIAGNOSTIC TESTING:     No results found. Assessment  1. Crohn's disease of small intestine with other complication ([address]    2. Malnutrition, unspecified type ([address]        Plan  At present not clear regarding etiology of her symptoms. She may need MR enterography to evaluate small bowel pathology/strictures. Labs ordered. Cussed with the patient regarding nutrition and nutrition supplements. Advised to see in the next 3 to 4 weeks. Thank you for allowing me to participate in the care of Ms. Farias. For any further questions please do not hesitate to contact me. I have reviewed and agree with the ROS entered by the MA/LPN. Note is dictated utilizing voice recognition software. Unfortunately this leads to occasional typographical errors.  Please contact our office if you have any questions        Abi Fay MD,FACP, [facility]  Board Certified in Gastroenterology and [address] [place] [address] Gastroenterology  Office #: [phone]

## 2021-12-30 DIAGNOSIS — M16.32 OSTEOARTHRITIS RESULTING FROM LEFT HIP DYSPLASIA: ICD-10-CM

## 2021-12-30 RX ORDER — HYDROCODONE BITARTRATE AND ACETAMINOPHEN 5; 325 MG/1; MG/1
TABLET ORAL
Qty: 56 TABLET | Refills: 0 | Status: SHIPPED | OUTPATIENT
Start: 2021-12-30 | End: 2022-01-12

## 2022-01-12 DIAGNOSIS — M16.32 OSTEOARTHRITIS RESULTING FROM LEFT HIP DYSPLASIA: ICD-10-CM

## 2022-01-12 RX ORDER — HYDROCODONE BITARTRATE AND ACETAMINOPHEN 5; 325 MG/1; MG/1
TABLET ORAL
Qty: 56 TABLET | Refills: 0 | Status: SHIPPED | OUTPATIENT
Start: 2022-01-12 | End: 2022-01-27

## 2022-01-25 ENCOUNTER — TELEPHONE (OUTPATIENT)
Dept: GASTROENTEROLOGY | Age: 57
End: 2022-01-25

## 2022-01-25 NOTE — TELEPHONE ENCOUNTER
Pt lvm stating that she did not make it to her MRI appt, states she may be having a bowel blockage, please advise and call pt.

## 2022-01-26 NOTE — TELEPHONE ENCOUNTER
Writer called the patient on 1/26/22. Writer told the patient to make sure that she comes to her appointment tomorrow, reschedule her MRI as soon as possible, and if she has severe pain to go to the ER. Patient stated \"I think I got it out, I think it all come out\". Writer verbalized understanding and repeated to the patient to keep appointment and reschedule MRI. Patient verbalized understanding and thanked the writer for calling her back.

## 2022-01-27 ENCOUNTER — OFFICE VISIT (OUTPATIENT)
Dept: GASTROENTEROLOGY | Age: 57
End: 2022-01-27
Payer: MEDICARE

## 2022-01-27 VITALS
BODY MASS INDEX: 23.09 KG/M2 | HEIGHT: 66 IN | DIASTOLIC BLOOD PRESSURE: 58 MMHG | OXYGEN SATURATION: 96 % | SYSTOLIC BLOOD PRESSURE: 84 MMHG | HEART RATE: 80 BPM | TEMPERATURE: 97.5 F | WEIGHT: 143.7 LBS

## 2022-01-27 DIAGNOSIS — M16.32 OSTEOARTHRITIS RESULTING FROM LEFT HIP DYSPLASIA: ICD-10-CM

## 2022-01-27 DIAGNOSIS — K50.80 CROHN'S DISEASE OF BOTH SMALL AND LARGE INTESTINE WITHOUT COMPLICATION (HCC): Primary | ICD-10-CM

## 2022-01-27 PROCEDURE — G8420 CALC BMI NORM PARAMETERS: HCPCS | Performed by: INTERNAL MEDICINE

## 2022-01-27 PROCEDURE — G8482 FLU IMMUNIZE ORDER/ADMIN: HCPCS | Performed by: INTERNAL MEDICINE

## 2022-01-27 PROCEDURE — 99213 OFFICE O/P EST LOW 20 MIN: CPT | Performed by: INTERNAL MEDICINE

## 2022-01-27 PROCEDURE — G8427 DOCREV CUR MEDS BY ELIG CLIN: HCPCS | Performed by: INTERNAL MEDICINE

## 2022-01-27 PROCEDURE — 3017F COLORECTAL CA SCREEN DOC REV: CPT | Performed by: INTERNAL MEDICINE

## 2022-01-27 PROCEDURE — 4004F PT TOBACCO SCREEN RCVD TLK: CPT | Performed by: INTERNAL MEDICINE

## 2022-01-27 RX ORDER — HYDROCODONE BITARTRATE AND ACETAMINOPHEN 5; 325 MG/1; MG/1
TABLET ORAL
Qty: 56 TABLET | Refills: 0 | Status: SHIPPED | OUTPATIENT
Start: 2022-01-27 | End: 2022-02-09

## 2022-01-27 ASSESSMENT — ENCOUNTER SYMPTOMS
CONSTIPATION: 1
ALLERGIC/IMMUNOLOGIC NEGATIVE: 1
ABDOMINAL PAIN: 1
VOMITING: 1
NAUSEA: 1
RESPIRATORY NEGATIVE: 1
EYES NEGATIVE: 1
DIARRHEA: 1

## 2022-01-27 NOTE — PROGRESS NOTES
GI OFFICE FOLLOW UP    INTERVAL HISTORY:   No referring provider defined for this encounter. Chief Complaint   Patient presents with    Abdominal Pain     Patient is here today expieriencing abdominal pain. 1. Crohn's disease of both small and large intestine without [facility]              HISTORY OF PRESENT ILLNESS: Teresa Moser is a 61 y.o. female with a past history remarkable for ,   Patient seen with the symptoms of abdominal pain, diarrhea, nausea vomiting. Patient was seen with these symptoms on 12/23/2021 and she was advised to have labs and MR enterography. Patient did not have any other labs or MR enterography. Patient stated that she was not feeling well and she canceled appointment for MRU. Also did not have labs done.     Rest of the history is as follows:      50-year-old female with a history of COPD, bipolar disorder, bowel obstruction, anxiety, polysubstance use/abuse, and Crohn's disease, bowel obstruction with partial small intestinal resection 2011, presented to the hospital with reports of shortness of breath and diarrhea.  Patient was found to have elevated BNP with negative Covid.  GI was asked to see patient for diarrhea and history of Crohn's disease.     Patient reports she was diagnosed with Crohn's disease 2012 by colonoscopy.  She reports she was diagnosed with C. difficile infection at approximately the same time and treatment for IBD was deferred and she has not been following with GI.  Patient reports she has been having approximately 15 stools per day for which she takes Imodium to manage diarrhea.  She reports urgency, incontinence as well as nocturnal symptoms.  She reports an associated decreased appetite as well as intermittent nausea.  She reports associated 15 pound unintentional weight loss over the past 6 months.  She denies any hematochezia.     Records show she underwent colonoscopy 10/12/2017 by Dr. Ni Delacruz showed normal-appearing TI as well as e normal exam for ntire examined portion of the colon.  MRA was recommended however records show she had a CT abdomen and pelvis with contrast 11/5/2017 showing parenchymal abnormalities involving the small bowel with possible stricturing involving loops of small bowel and perirectal fluid collection possible abscess.  She has been following with [facility] general surgery for perirectal abscess.  She underwent fistulotomy 1/4/2021 with Dr. Jose Bernard           Basing on the history review it is not clear whether this patient has Crohn's disease. However in the past imaging studies did reveal possible small bowel strictures.     At present patient appears to be disabled. Not able to ambulate well. She appears to be malnourished. Past Medical,Family, and Social History reviewed and does contribute to the patient presenting condition. Patient's PMH/PSH,SH,PSYCH Hx, MEDs, ALLERGIES, and ROS were all reviewed and updated in the appropriate sections. Yes      PAST MEDICAL HISTORY:  Past Medical History:   Diagnosis Date    Acid reflux     Anxiety     Arthritis     Left hip    Asthma     Bipolar 1 disorder (HCC)     Bowel obstruction (HCC)     COPD (chronic obstructive pulmonary disease) (HCC)     O2 3L PRN/ Dr. Dinero [facility]/last seen 2020/appt.9-7-21 for Clearance    [facility] ([facility]     Depression     Dry eye     Fibromyalgia     Gout     Headache     Hyperlipidemia     Hypertension     Hypothyroidism     Kidney stones     Muscle spasm     Neuropathy     Pain     left hip    Personal history of other medical treatment     Pt. seen by Dr. Shannan Corea for clearance for this OR Left hip/ Normally pt. doesn't follow with Cardiology.  [facility]    Wears partial dentures     upper    Wellness examination     PCP Sofiya Sarah MD/ [facility]/last seen 8-24-21       Past Surgical History:   Procedure Laterality Date    ABDOMEN SURGERY      bowel obstruction OR    BUNIONECTOMY Left     CHOLECYSTECTOMY      COLONOSCOPY  04/30/2007    no gross pathology seen in the colon and also 3-4 inches of the ileum    COLONOSCOPY  10/12/2017    normal    COLONOSCOPY  10/25/2021    COLONOSCOPY WITH BIOPSY performed by Nancy Oliveira MD at  [address] COLONOSCOPY  10/25/2021    COLONOSCOPY POLYPECTOMY REMOVAL SNARE performed by Nancy Oliveira MD at [address] [place] [address]      UPPER GASTROINTESTINAL ENDOSCOPY  10/25/2021    EGD BIOPSY performed by [facility] MD at [facility]       CURRENT MEDICATIONS:      ALLERGIES:   Allergies   Allergen Reactions    Azithromycin Other (See Comments)     'It doesn't work\"    Methylprednisolone      Elevates blood pressure    Penicillins Swelling     throat    Tape Starr Grady Tape] Other (See Comments)     \" Tears my skin off\"       FAMILY HISTORY:       Problem Relation Age of Onset    Diabetes Father     Lung Cancer Father     Hypertension Mother     Cancer Mother     High Blood Pressure Mother     Crohn's Disease Brother     Heart Disease Brother          SOCIAL HISTORY:   Social History     Socioeconomic History    Marital status: Single     Spouse name: Not on file    Number of children: Not on file    Years of education: Not on file    Highest education level: Not on file   Occupational History    Not on file   Tobacco Use    Smoking status: Current Every Day Smoker     Packs/day: 0.50     Years: 37.00     Pack years: 18.50     Types: Cigarettes    Smokeless tobacco: Former User     Types: Chew     Quit date: 9/3/2001   Vaping Use    Vaping Use: Former    Quit date: 9/3/2019    Substances: Nicotine   Substance and Sexual Activity    Alcohol use: Not Currently    Drug use: Yes     Types: Marijuana (Weed)     Comment: h/o of substance abuse but denies drug use    Sexual activity: Not on file   Other Topics Concern    Not on file   Social History Narrative    Not on file     Social Determinants of Health     Financial Resource Strain:     Difficulty of Paying Living Expenses: Not on file   Food Insecurity:     Worried About Running Out of Food in the Last Year: Not on file    Benita of Food in the Last Year: Not on file   Transportation Needs:     Lack of Transportation (Medical): Not on file    Lack of Transportation (Non-Medical): Not on file   Physical Activity:     Days of Exercise per Week: Not on file    Minutes of Exercise per Session: Not on file   Stress:     Feeling of Stress : Not on file   Social Connections:     Frequency of Communication with Friends and Family: Not on file    Frequency of Social Gatherings with Friends and Family: Not on file    Attends Holiness Services: Not on file    Active Member of [address] [place] [address] [employer] or Organizations: Not on file    Attends Club or Organization Meetings: Not on file    Marital Status: Not on file   Intimate Partner Violence:     Fear of Current or Ex-Partner: Not on file    Emotionally Abused: Not on file    Physically Abused: Not on file    Sexually Abused: Not on file   Housing Stability:     Unable to Pay for Housing in the Last Year: Not on file    Number of [place] in the Last Year: Not on file    Unstable Housing in the Last Year: Not on file         REVIEW OF SYSTEMS:         Review of Systems   Constitutional: Positive for unexpected weight change. Negative for fatigue. HENT: Negative. Eyes: Negative. Respiratory: Negative. Cardiovascular: Negative. Gastrointestinal: Positive for abdominal pain, constipation, diarrhea, nausea and vomiting. Endocrine: Negative. Genitourinary: Negative. Musculoskeletal: Negative. Skin: Negative. Allergic/Immunologic: Negative. Neurological: Positive for headaches. Hematological: Negative.     Psychiatric/Behavioral: Negative. PHYSICAL EXAMINATION:     Vital signs reviewed per the nursing documentation. BP (!) 84/58   Pulse 80   Temp 97.5 °F (36.4 °C)   Ht 5' 6\" (1.676 m)   Wt 143 lb 11.2 oz (65.2 kg)   SpO2 96%   BMI 23.19 kg/m²   Body mass index is 23.19 kg/m². Physical Exam  Vitals and nursing note reviewed. Constitutional:       Appearance: Normal appearance. She is well-developed. HENT:      Head: Normocephalic and atraumatic. Eyes:      Extraocular Movements: Extraocular movements intact. Conjunctiva/sclera: Conjunctivae normal.   Neck:      Thyroid: No thyromegaly. Vascular: No hepatojugular reflux or JVD. Trachea: No tracheal deviation. Cardiovascular:      Rate and Rhythm: Normal rate and regular rhythm. Heart sounds: Normal heart sounds. Pulmonary:      Effort: Pulmonary effort is normal. No respiratory distress. Breath sounds: Normal breath sounds. No wheezing or rales. Abdominal:      General: Bowel sounds are normal. There is no distension. Palpations: Abdomen is soft. There is no hepatomegaly or mass. Tenderness: There is no abdominal tenderness. There is no rebound. Hernia: No hernia is present. Musculoskeletal:         General: No tenderness. Lymphadenopathy:      Cervical: No cervical adenopathy. Skin:     General: Skin is warm and dry. Findings: No bruising, ecchymosis, erythema or rash. Neurological:      Mental Status: She is alert and oriented to person, place, and time. Cranial Nerves: No cranial nerve deficit. Psychiatric:         Mood and Affect: Mood normal.         Behavior: Behavior normal.         Thought Content:  Thought content normal.           LABORATORY DATA: Reviewed  Lab Results   Component Value Date    WBC 5.2 10/25/2021    HGB 10.1 (L) 10/25/2021    HCT 33.1 (L) 10/25/2021    MCV 91.4 10/25/2021     10/25/2021     10/25/2021    K 3.3 (L) 10/25/2021     10/25/2021    CO2 20 10/25/2021 BUN <2 (L) 10/25/2021    CREATININE 0.37 (L) 10/25/2021    LABPROT 6.8 05/28/2012    LABALBU 3.9 10/21/2021    BILITOT 0.72 10/21/2021    ALKPHOS 86 10/21/2021    AST 32 (H) 10/21/2021    ALT 11 10/21/2021    INR 1.0 02/15/2020         Lab Results   Component Value Date    RBC 3.62 (L) 10/25/2021    HGB 10.1 (L) 10/25/2021    MCV 91.4 10/25/2021    MCH 27.9 10/25/2021    MCHC 30.5 10/25/2021    RDW 16.1 (H) 10/25/2021    MPV 11.2 10/25/2021    BASOPCT 1 10/25/2021    LYMPHSABS 1.46 10/25/2021    MONOSABS 0.57 10/25/2021    NEUTROABS 2.92 10/25/2021    EOSABS 0.22 10/25/2021    BASOSABS 0.04 10/25/2021         DIAGNOSTIC TESTING:     Procedure Note   Interface - Cv Results/Orders In 1 - 10/12/2017 10:54 AM EDT    [address]  Patient Name: Aga Chu Procedure Date No Time: 10/12/2017  MRN: [id number] Account Number: [de-identified]  YOB: 1965  Admit Type: Inpatient  Age: 47  Gender: Female  Attending MD: Jose Raul Alonzo MD  Procedure: Colonoscopy  Indications: Generalized abdominal pain  Providers: Jose Raul Alonzo MD  Referring MD:   Complications: No immediate complications. Procedure: After I obtained informed consent, the scope was passed   under direct vision. Throughout the procedure, the   patient's blood pressure, pulse, and oxygen saturations   were monitored continuously. The endoscope was   introduced through the anus and advanced to 20 cm into   the ileum. The colonoscopy was performed without   difficulty. The patient tolerated the procedure well. The quality of the bowel preparation was not adequate to   identify polyps 6 mm and larger in size. procedure was   done with CO2. Findings: The terminal ileum appeared normal. Biopsies were taken with a cold   forceps for histology. The colon (entire examined portion) appeared normal.  Impression: - Preparation of the colon was inadequate.   - The examined portion of the ileum was normal. Biopsied.  - The entire examined colon is normal.  Recommendation: - [facility]. - IV Solumedrol trial.  -MR enetrography if Sx do not improve on IV steroids. Procedure Code(s): --- Professional ---  26773, Colonoscopy, flexible; with biopsy, single or   multiple  Diagnosis Code(s): --- Professional ---  R10.84, Generalized abdominal pain  CPT copyright 2016 American Medical Association. All rights reserved. The codes documented in this report are preliminary and upon  review may   be revised to meet current compliance requirements. MD Toro Mendez MD  10/12/2017 10:53:58 AM  This report has been signed electronically. Toro Flores MD  Number of Addenda: 0  Note Initiated On: 10/12/2017 10:30 AM        Colonoscopy (10/12/2017 10:30 AM)  Colonoscopy (10/12/2017 10:30 AM)   Specimen Performing Laboratory     PM CARDIOVASCULAR       Colonoscopy (10/12/2017 10:30 AM)   [facility]   Patient Name: Yogesh Burns Procedure Date No Time: 10/12/2017    MRN: [id number]   Account Number: [de-identified]   YOB: 1965    Admit Type: Inpatient    Age: 47    Gender: Female    Attending MD: MD Anna Mendez Generalized abdominal pain    Providers:           Toro Flores MD    Referring MD:            Complications:       No immediate complications.    Procedure:           After I obtained informed consent, the scope was passed                          under direct vision. Throughout the procedure, the                          patient's blood pressure, pulse, and oxygen saturations                          were monitored continuously. The endoscope was                          introduced through the anus and advanced to 20 cm into                          the ileum. The colonoscopy was performed without                          difficulty.  The patient tolerated the procedure well.                        The quality of the bowel preparation was not adequate to                          identify polyps 6 mm and larger in size. procedure was                          done with CO2.    Findings:         The terminal ileum appeared normal. Biopsies were taken with a cold          forceps for histology.         The colon (entire examined portion) appeared normal.    Impression:          - Preparation of the colon was inadequate.                         - The examined portion of the ileum was normal. Biopsied.                         - The entire examined colon is normal.    Recommendation:      - [facility].                         - IV Solumedrol trial.                         -MR enetrography if Sx do not improve on IV steroids.    Procedure Code(s):   --- Professional ---                         37215, Colonoscopy, flexible; with biopsy, single or                          multiple    Diagnosis Code(s):   --- Professional ---                         R10.84, Generalized abdominal pain    CPT copyright 2016 American Medical Association. All rights reserved.    The codes documented in this report are preliminary and upon  review may     be revised to meet current compliance requirements.    MD Veronica Xiong MD    10/12/2017 10:53:58 AM    This report has been signed electronically.    Veronica Gore MD    Number of Addenda: 0    Note Initiated On: 10/12/2017 10:30 AM                  Assessment  1. Crohn's disease of both small and large intestine without complication [facility])        Plan  Patient looks stable. She is advised to have MRV and labs. Discussed with the patient regarding importance of this investigations completed. Patient understood and agreed. Thank you for allowing me to participate in the care of Ms. Farias. For any further questions please do not hesitate to contact me.     I have reviewed and agree with the ROS entered by the MA/LPN. Note is dictated utilizing voice recognition software. Unfortunately this leads to occasional typographical errors.  Please contact our office if you have any questions        Maddy Torres MD,FACP, [facility]  Board Certified in Gastroenterology and [address] [place] [address] Gastroenterology  Office #: [phone]

## 2022-02-09 DIAGNOSIS — M16.32 OSTEOARTHRITIS RESULTING FROM LEFT HIP DYSPLASIA: ICD-10-CM

## 2022-02-09 RX ORDER — HYDROCODONE BITARTRATE AND ACETAMINOPHEN 5; 325 MG/1; MG/1
TABLET ORAL
Qty: 56 TABLET | Refills: 0 | Status: SHIPPED | OUTPATIENT
Start: 2022-02-09 | End: 2022-02-22

## 2022-02-22 DIAGNOSIS — M16.32 OSTEOARTHRITIS RESULTING FROM LEFT HIP DYSPLASIA: ICD-10-CM

## 2022-02-22 RX ORDER — HYDROCODONE BITARTRATE AND ACETAMINOPHEN 5; 325 MG/1; MG/1
TABLET ORAL
Qty: 56 TABLET | Refills: 0 | Status: SHIPPED | OUTPATIENT
Start: 2022-02-22 | End: 2022-03-08

## 2022-03-07 DIAGNOSIS — M16.32 OSTEOARTHRITIS RESULTING FROM LEFT HIP DYSPLASIA: ICD-10-CM

## 2022-03-08 DIAGNOSIS — M16.32 OSTEOARTHRITIS RESULTING FROM LEFT HIP DYSPLASIA: ICD-10-CM

## 2022-03-08 RX ORDER — HYDROCODONE BITARTRATE AND ACETAMINOPHEN 5; 325 MG/1; MG/1
TABLET ORAL
Qty: 56 TABLET | Refills: 0 | Status: CANCELLED | OUTPATIENT
Start: 2022-03-08 | End: 2022-03-22

## 2022-03-08 RX ORDER — HYDROCODONE BITARTRATE AND ACETAMINOPHEN 5; 325 MG/1; MG/1
TABLET ORAL
Qty: 56 TABLET | Refills: 0 | Status: SHIPPED | OUTPATIENT
Start: 2022-03-08 | End: 2022-03-21 | Stop reason: SDUPTHER

## 2022-03-08 NOTE — TELEPHONE ENCOUNTER
Patient is requesting medication refill be sent to pharmacy on file. Please advise if any concern. Thank you.

## 2022-03-21 ENCOUNTER — OFFICE VISIT (OUTPATIENT)
Dept: ORTHOPEDIC SURGERY | Age: 57
End: 2022-03-21
Payer: MEDICARE

## 2022-03-21 VITALS — BODY MASS INDEX: 22.98 KG/M2 | HEIGHT: 66 IN | WEIGHT: 143 LBS

## 2022-03-21 DIAGNOSIS — M16.32 OSTEOARTHRITIS RESULTING FROM LEFT HIP DYSPLASIA: ICD-10-CM

## 2022-03-21 PROCEDURE — G8482 FLU IMMUNIZE ORDER/ADMIN: HCPCS | Performed by: ORTHOPAEDIC SURGERY

## 2022-03-21 PROCEDURE — 4004F PT TOBACCO SCREEN RCVD TLK: CPT | Performed by: ORTHOPAEDIC SURGERY

## 2022-03-21 PROCEDURE — G8427 DOCREV CUR MEDS BY ELIG CLIN: HCPCS | Performed by: ORTHOPAEDIC SURGERY

## 2022-03-21 PROCEDURE — 3017F COLORECTAL CA SCREEN DOC REV: CPT | Performed by: ORTHOPAEDIC SURGERY

## 2022-03-21 PROCEDURE — 99212 OFFICE O/P EST SF 10 MIN: CPT | Performed by: ORTHOPAEDIC SURGERY

## 2022-03-21 PROCEDURE — G8420 CALC BMI NORM PARAMETERS: HCPCS | Performed by: ORTHOPAEDIC SURGERY

## 2022-03-21 RX ORDER — HYDROCODONE BITARTRATE AND ACETAMINOPHEN 5; 325 MG/1; MG/1
1 TABLET ORAL EVERY 8 HOURS PRN
Qty: 56 TABLET | Refills: 0 | Status: SHIPPED | OUTPATIENT
Start: 2022-03-21 | End: 2022-03-28

## 2022-03-21 NOTE — PROGRESS NOTES
Chief Complaint   Patient presents with    Follow-up     BILATERAL hip pain - pt requesting injections   Patient was previously been treated for knee for primary osteoarthritis of the left hip is here today complaining of pain in the both hip but right at the moment is worse than the left. Examination: Right hip examination shows definite significant pain particularly internal rotation. Left hip has marked limitation of motion with pain in all the direction. X-rays: I reviewed the x-rays of the pelvis and there is definite is some narrowing of the joint space in the weightbearing area on the right side. The left hip shows significant primary osteoarthritis. Diagnosis: Bilateral primary osteoarthritis of the hip. Treatment: I had already given the surgical sheet for Darris Canavan to schedule this patient for left hip injection but have left a message with [employer] to give it to Darris Canavan that and she needs to be scheduled for bilateral hip injection. Also given her prescription for Norco.  Occasionally she has been taking 6 hours and I told her to keep it only to every 8 hours.

## 2022-03-22 ENCOUNTER — APPOINTMENT (OUTPATIENT)
Dept: CT IMAGING | Age: 57
DRG: 463 | End: 2022-03-22
Payer: MEDICARE

## 2022-03-22 ENCOUNTER — APPOINTMENT (OUTPATIENT)
Dept: GENERAL RADIOLOGY | Age: 57
DRG: 463 | End: 2022-03-22
Payer: MEDICARE

## 2022-03-22 ENCOUNTER — TELEPHONE (OUTPATIENT)
Dept: ORTHOPEDIC SURGERY | Age: 57
End: 2022-03-22

## 2022-03-22 ENCOUNTER — HOSPITAL ENCOUNTER (EMERGENCY)
Age: 57
Discharge: HOME | DRG: 463 | End: 2022-03-22
Attending: EMERGENCY MEDICINE
Payer: MEDICARE

## 2022-03-22 VITALS
SYSTOLIC BLOOD PRESSURE: 100 MMHG | OXYGEN SATURATION: 96 % | RESPIRATION RATE: 18 BRPM | DIASTOLIC BLOOD PRESSURE: 80 MMHG | TEMPERATURE: 97.4 F | HEART RATE: 64 BPM
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
## 2022-03-22 LAB
ABSOLUTE EOS #: 0.2 K/UL (ref 0–0.44)
ABSOLUTE IMMATURE GRANULOCYTE: <0.03 K/UL (ref 0–0.3)
ABSOLUTE LYMPH #: 1.54 K/UL (ref 1.1–3.7)
ABSOLUTE MONO #: 0.52 K/UL (ref 0.1–1.2)
ANION GAP SERPL CALCULATED.3IONS-SCNC: 15 MMOL/L (ref 9–17)
BASOPHILS # BLD: 1 % (ref 0–2)
BASOPHILS ABSOLUTE: 0.04 K/UL (ref 0–0.2)
BUN BLDV-MCNC: 8 MG/DL (ref 6–20)
CALCIUM SERPL-MCNC: 8.9 MG/DL (ref 8.6–10.4)
CHLORIDE BLD-SCNC: 104 MMOL/L (ref 98–107)
CO2: 23 MMOL/L (ref 20–31)
CREAT SERPL-MCNC: 0.83 MG/DL (ref 0.5–0.9)
D-DIMER QUANTITATIVE: 0.59 MG/L FEU
EOSINOPHILS RELATIVE PERCENT: 3 % (ref 1–4)
GFR AFRICAN AMERICAN: >60 ML/MIN
GFR NON-AFRICAN AMERICAN: >60 ML/MIN
GFR SERPL CREATININE-BSD FRML MDRD: ABNORMAL ML/MIN/{1.73_M2}
GLUCOSE BLD-MCNC: 102 MG/DL (ref 70–99)
HCT VFR BLD CALC: 39.6 % (ref 36.3–47.1)
HEMOGLOBIN: 13.1 G/DL (ref 11.9–15.1)
IMMATURE GRANULOCYTES: 0 %
LYMPHOCYTES # BLD: 24 % (ref 24–43)
MAGNESIUM: 2.1 MG/DL (ref 1.6–2.6)
MCH RBC QN AUTO: 27.3 PG (ref 25.2–33.5)
MCHC RBC AUTO-ENTMCNC: 33.1 G/DL (ref 28.4–34.8)
MCV RBC AUTO: 82.7 FL (ref 82.6–102.9)
MONOCYTES # BLD: 8 % (ref 3–12)
NRBC AUTOMATED: 0 PER 100 WBC
PDW BLD-RTO: 15.8 % (ref 11.8–14.4)
PLATELET # BLD: 258 K/UL (ref 138–453)
PMV BLD AUTO: 11.3 FL (ref 8.1–13.5)
POTASSIUM SERPL-SCNC: 3.1 MMOL/L (ref 3.7–5.3)
RBC # BLD: 4.79 M/UL (ref 3.95–5.11)
RBC # BLD: ABNORMAL 10*6/UL
SEG NEUTROPHILS: 64 % (ref 36–65)
SEGMENTED NEUTROPHILS ABSOLUTE COUNT: 4.22 K/UL (ref 1.5–8.1)
SODIUM BLD-SCNC: 142 MMOL/L (ref 135–144)
TROPONIN, HIGH SENSITIVITY: 16 NG/L (ref 0–14)
TROPONIN, HIGH SENSITIVITY: 16 NG/L (ref 0–14)
WBC # BLD: 6.5 K/UL (ref 3.5–11.3)

## 2022-03-22 PROCEDURE — 3209999900 CT LUMBAR SPINE TRAUMA RECONSTRUCTION

## 2022-03-22 PROCEDURE — 80048 BASIC METABOLIC PNL TOTAL CA: CPT

## 2022-03-22 PROCEDURE — 72125 CT NECK SPINE W/O DYE: CPT

## 2022-03-22 PROCEDURE — 84484 ASSAY OF TROPONIN QUANT: CPT

## 2022-03-22 PROCEDURE — 99284 EMERGENCY DEPT VISIT MOD MDM: CPT

## 2022-03-22 PROCEDURE — 73502 X-RAY EXAM HIP UNI 2-3 VIEWS: CPT

## 2022-03-22 PROCEDURE — 93005 ELECTROCARDIOGRAM TRACING: CPT | Performed by: STUDENT IN AN ORGANIZED HEALTH CARE EDUCATION/TRAINING PROGRAM

## 2022-03-22 PROCEDURE — 6360000004 HC RX CONTRAST MEDICATION: Performed by: STUDENT IN AN ORGANIZED HEALTH CARE EDUCATION/TRAINING PROGRAM

## 2022-03-22 PROCEDURE — 96360 HYDRATION IV INFUSION INIT: CPT

## 2022-03-22 PROCEDURE — 83735 ASSAY OF MAGNESIUM: CPT

## 2022-03-22 PROCEDURE — 71260 CT THORAX DX C+: CPT

## 2022-03-22 PROCEDURE — 85379 FIBRIN DEGRADATION QUANT: CPT

## 2022-03-22 PROCEDURE — 85025 COMPLETE CBC W/AUTO DIFF WBC: CPT

## 2022-03-22 PROCEDURE — 6370000000 HC RX 637 (ALT 250 FOR IP): Performed by: STUDENT IN AN ORGANIZED HEALTH CARE EDUCATION/TRAINING PROGRAM

## 2022-03-22 PROCEDURE — 74177 CT ABD & PELVIS W/CONTRAST: CPT

## 2022-03-22 PROCEDURE — 2580000003 HC RX 258: Performed by: STUDENT IN AN ORGANIZED HEALTH CARE EDUCATION/TRAINING PROGRAM

## 2022-03-22 PROCEDURE — 70450 CT HEAD/BRAIN W/O DYE: CPT

## 2022-03-22 PROCEDURE — 3209999900 CT THORACIC SPINE TRAUMA RECONSTRUCTION

## 2022-03-22 PROCEDURE — 73610 X-RAY EXAM OF ANKLE: CPT

## 2022-03-22 PROCEDURE — 73562 X-RAY EXAM OF KNEE 3: CPT

## 2022-03-22 RX ORDER — POTASSIUM CHLORIDE 20 MEQ/1
40 TABLET, EXTENDED RELEASE ORAL ONCE
Status: COMPLETED | OUTPATIENT
Start: 2022-03-22 | End: 2022-03-22

## 2022-03-22 RX ORDER — 0.9 % SODIUM CHLORIDE 0.9 %
1000 INTRAVENOUS SOLUTION INTRAVENOUS ONCE
Status: COMPLETED | OUTPATIENT
Start: 2022-03-22 | End: 2022-03-22

## 2022-03-22 RX ADMIN — SODIUM CHLORIDE 1000 ML: 9 INJECTION, SOLUTION INTRAVENOUS at 15:45

## 2022-03-22 RX ADMIN — IOPAMIDOL 75 ML: 755 INJECTION, SOLUTION INTRAVENOUS at 17:52

## 2022-03-22 RX ADMIN — POTASSIUM CHLORIDE 40 MEQ: 1500 TABLET, EXTENDED RELEASE ORAL at 18:52

## 2022-03-22 ASSESSMENT — PAIN DESCRIPTION - FREQUENCY: FREQUENCY: CONTINUOUS

## 2022-03-22 ASSESSMENT — ENCOUNTER SYMPTOMS
VOMITING: 0
DIARRHEA: 0
WHEEZING: 0
NAUSEA: 0
SHORTNESS OF BREATH: 0
BACK PAIN: 1
COUGH: 0
CONSTIPATION: 0
CHEST TIGHTNESS: 0
PHOTOPHOBIA: 0
COLOR CHANGE: 0
ABDOMINAL PAIN: 1

## 2022-03-22 ASSESSMENT — PAIN DESCRIPTION - LOCATION: LOCATION: HIP;LEG

## 2022-03-22 ASSESSMENT — PAIN - FUNCTIONAL ASSESSMENT: PAIN_FUNCTIONAL_ASSESSMENT: 0-10

## 2022-03-22 ASSESSMENT — PAIN DESCRIPTION - DESCRIPTORS: DESCRIPTORS: DULL

## 2022-03-22 ASSESSMENT — PAIN DESCRIPTION - ORIENTATION: ORIENTATION: LEFT

## 2022-03-22 ASSESSMENT — PAIN SCALES - GENERAL: PAINLEVEL_OUTOF10: 8

## 2022-03-22 ASSESSMENT — PAIN DESCRIPTION - PAIN TYPE: TYPE: ACUTE PAIN

## 2022-03-22 NOTE — ED NOTES
Encouraged pt to attempt to give urine sample, pt unable to urinate at this time.       Hector Cobos RN  03/22/22 [id number]

## 2022-03-22 NOTE — ED PROVIDER NOTES
[facility]  ED  Emergency Department Encounter  EmergencyMedicine Resident     Pt Merly Stokes  MRN: [id number]  [place] 1965  Date of evaluation: 3/22/22  PCP:  Bryan Guerin MD    [address] [place] [address]       Chief Complaint   Patient presents with    Fall       HISTORY OF PRESENT ILLNESS  (Location/Symptom, Timing/Onset, Context/Setting, Quality, Duration, Modifying Factors, Severity.)      Florinda Hatchet is a 61 y.o. female who presents with fall from standing height yesterday. Patient's not sure how she fell, is unsure if she tripped. Denies any presyncopal symptoms and is adamant that she did not lose consciousness but does not remember the event complains of a severe left-sided headache. She also endorses back pain, neck pain, left hip, knee, foot pain. Patient on multiple medications for chronic pain. Patient denies any weakness, numbness, tingling. Presents today because her orthopedic surgeon office told her to go be evaluated for this recent fall with acute exacerbation of pain. PAST MEDICAL / SURGICAL / SOCIAL / FAMILY HISTORY      has a past medical history of Acid reflux, Anxiety, Arthritis, Asthma, Bipolar 1 disorder ([address] Bowel obstruction ([address] COPD (chronic obstructive pulmonary disease) ([address] Crohn disease ([address] Depression, Dry eye, Fibromyalgia, Gout, Headache, Hyperlipidemia, Hypertension, Hypothyroidism, Kidney stones, Muscle spasm, Neuropathy, Pain, Personal history of other medical treatment, Wears partial dentures, and Wellness examination. has a past surgical history that includes Tonsillectomy and adenoidectomy; Tubal ligation; Cholecystectomy; Bunionectomy (Left); Colonoscopy (04/30/2007); Colonoscopy (10/12/2017); Abdomen surgery; Upper gastrointestinal endoscopy (10/25/2021); Colonoscopy (10/25/2021); and Colonoscopy (10/25/2021).       Social History     Socioeconomic History    Marital status: Single     Spouse name: Not on file    Number of children: Not on file    Years of education: Not on file    Highest education level: Not on file   Occupational History    Not on file   Tobacco Use    Smoking status: Current Every Day Smoker     Packs/day: 0.50     Years: 37.00     Pack years: 18.50     Types: Cigarettes    Smokeless tobacco: Former User     Types: Chew     Quit date: 9/3/2001   Vaping Use    Vaping Use: Former    Quit date: 9/3/2019    Substances: Nicotine   Substance and Sexual Activity    Alcohol use: Not Currently    Drug use: Yes     Types: Marijuana (Weed)     Comment: h/o of substance abuse but denies drug use    Sexual activity: Not on file   Other Topics Concern    Not on file   Social History Narrative    Not on file     Social Determinants of Health     Financial Resource Strain:     Difficulty of Paying Living Expenses: Not on file   Food Insecurity:     Worried About [address] in the Last Year: Not on file    [address] in the Last Year: Not on file   Transportation Needs:     Lack of Transportation (Medical): Not on file    Lack of Transportation (Non-Medical):  Not on file   Physical Activity:     Days of Exercise per Week: Not on file    Minutes of Exercise per Session: Not on file   Stress:     Feeling of Stress : Not on file   Social Connections:     Frequency of Communication with Friends and Family: Not on file    Frequency of Social Gatherings with Friends and Family: Not on file    Attends Tenri[employer] Services: Not on file    Active Member of Clubs or Organizations: Not on file    Attends Club or Organization Meetings: Not on file    Marital Status: Not on file   Intimate Partner Violence:     Fear of Current or Ex-Partner: Not on file    Emotionally Abused: Not on file    Physically Abused: Not on file    Sexually Abused: Not on file   Housing Stability:     Unable to Pay for Housing in the Last Year: Not on file    Number of [place] in the Last Year: Not on file    Unstable Housing in the Last Year: Not on file       Family History   Problem Relation Age of Onset    Diabetes Father     Lung Cancer Father     Hypertension Mother     Cancer Mother     High Blood Pressure Mother     Crohn's Disease Brother     Heart Disease Brother        Allergies:  Azithromycin, Methylprednisolone, Penicillins, and Tape [adhesive tape]    Home Medications:  Prior to Admission medications    Medication Sig Start Date End Date Taking? Authorizing Provider   HYDROcodone-acetaminophen (NORCO) 5-325 MG per tablet Take 1 tablet by mouth every 8 hours as needed for Pain for up to 14 days. 3/21/22 4/4/22  Carroll Ridley MD   potassium chloride (KLOR-CON M) 20 MEQ extended release tablet Take 1 tablet by mouth 2 times daily for 2 doses 10/25/21 10/26/21  Nicole Masters MD   pantoprazole (PROTONIX) 40 MG tablet Take 1 tablet by mouth 2 times daily (before meals) 10/24/21 12/19/21  Joseph Nyhan, MD   sucralfate (CARAFATE) 1 GM tablet Take 1 tablet by mouth 4 times daily for 28 days 10/24/21 11/21/21  Joseph Nyhan, MD   albuterol sulfate HFA (VENTOLIN HFA) 108 (90 Base) MCG/ACT inhaler Inhale 2 puffs into the lungs every 6 hours as needed for Wheezing    Historical Provider, MD   diphenhydrAMINE (BENADRYL) 25 MG tablet Take 25 mg by mouth nightly as needed    Historical Provider, MD   topiramate (TOPAMAX) 100 MG tablet Take 100 mg by mouth 3 times daily     Historical Provider, MD   SUMAtriptan (IMITREX) 100 MG tablet Take 100 mg by mouth once as needed for Migraine    Historical Provider, MD   gabapentin (NEURONTIN) 400 MG capsule Take 800 mg by mouth 3 times daily.     Historical Provider, MD   budesonide-formoterol [facility]) 160-4.5 MCG/ACT AERO Inhale 2 puffs into the lungs 2 times daily 9/19/21   Bruce Juárez MD   lisinopril (PRINIVIL;ZESTRIL) 5 MG tablet Take 1 tablet by mouth daily 9/20/21   Bruce Juárez MD   nicotine (NICODERM CQ) 21 MG/24HR Place 1 patch onto the skin daily 9/20/21   Tee Vasquez MD   acetaminophen (TYLENOL) 500 MG tablet Take 2 tablets by mouth 3 times daily  Patient taking differently: Take 1,000 mg by mouth 3 times daily as needed  7/17/21   Darylene Bors, DO   Polyvinyl Alcohol-Povidone (REFRESH OP) Place 1 drop into both eyes 3 times daily    Historical Provider, MD   lidocaine (LIDODERM) 5 % Place 1 patch onto the skin daily as needed for Pain 12 hours on, 12 hours off. Historical Provider, MD       REVIEW OF SYSTEMS    (2-9 systems for level 4, 10 or more for level 5)      Review of Systems   Constitutional: Negative for chills, diaphoresis, fatigue and fever. Eyes: Negative for photophobia and visual disturbance. Respiratory: Negative for cough, chest tightness, shortness of breath and wheezing. Cardiovascular: Negative for chest pain, palpitations and leg swelling. Gastrointestinal: Positive for abdominal pain (Lower abdomen, chronic). Negative for constipation, diarrhea, nausea and vomiting. Endocrine: Negative for polydipsia, polyphagia and polyuria. Genitourinary: Negative for difficulty urinating, dysuria and urgency. Musculoskeletal: Positive for arthralgias (Left hip, knee, ankle), back pain and neck pain. Negative for neck stiffness. Skin: Negative for color change, pallor and rash. Neurological: Positive for headaches. Negative for dizziness, weakness and light-headedness. PHYSICAL EXAM   (up to 7 for level 4, 8 or more for level 5)      INITIAL VITALS:   /80   Pulse 64   Temp 97.4 °F (36.3 °C) (Oral)   Resp 18   SpO2 96%     Physical Exam  Vitals and nursing note reviewed. Constitutional:       General: She is not in acute distress. Appearance: Normal appearance. She is well-developed. She is not diaphoretic. HENT:      Head: Normocephalic and atraumatic. Ears:      Comments: No hemotympanum     Mouth/Throat:      Mouth: Mucous membranes are moist.      Pharynx: Oropharynx is clear.    Eyes: General: No scleral icterus. Conjunctiva/sclera: Conjunctivae normal.      Pupils: Pupils are equal, round, and reactive to light. Neck:      Vascular: No JVD. Trachea: No tracheal deviation. Cardiovascular:      Rate and Rhythm: Normal rate and regular rhythm. Pulses: Normal pulses. Heart sounds: Normal heart sounds. Pulmonary:      Effort: Pulmonary effort is normal. No respiratory distress. Breath sounds: Normal breath sounds. No wheezing. Chest:      Chest wall: No tenderness. Abdominal:      General: Abdomen is flat. There is no distension. Palpations: Abdomen is soft. Tenderness: There is no abdominal tenderness. There is no guarding. Musculoskeletal:      Cervical back: Normal range of motion and neck supple. Skin:     General: Skin is warm and dry. Capillary Refill: Capillary refill takes less than 2 seconds. Coloration: Skin is not pale. Findings: No erythema. Neurological:      General: No focal deficit present. Mental Status: She is alert and oriented to person, place, and time. Cranial Nerves: No cranial nerve deficit. Sensory: No sensory deficit.    Psychiatric:         Mood and Affect: Mood normal.         Behavior: Behavior normal.         DIFFERENTIAL  DIAGNOSIS     PLAN (LABS / IMAGING / EKG):  Orders Placed This Encounter   Procedures    CT Head WO Contrast    CT CERVICAL SPINE WO CONTRAST    CT THORACIC SPINE TRAUMA RECONSTRUCTION    XR HIP LEFT (2-3 VIEWS)    XR KNEE LEFT (3 VIEWS)    XR ANKLE LEFT (MIN 3 VIEWS)    CT LUMBAR SPINE TRAUMA RECONSTRUCTION    CT CHEST PULMONARY EMBOLISM W CONTRAST    CT ABDOMEN PELVIS W IV CONTRAST Additional Contrast? None    CBC with Auto Differential    Basic Metabolic Panel w/ Reflex to MG    Troponin    D-Dimer, Quantitative    Magnesium    Troponin    EKG 12 Lead       MEDICATIONS ORDERED:  Orders Placed This Encounter   Medications    0.9 % sodium chloride bolus    iopamidol (ISOVUE-370) 76 % injection 75 mL    potassium chloride (KLOR-CON M) extended release tablet 40 mEq       DDX: Intracranial hemorrhage, closed head injury, spinal injury, musculoskeletal pain, hip fracture, hip, knee, ankle sprain, near-syncope or syncope versus mechanical fall    MDM/IMPRESSION: This a 62-year-old female who is overall poor historian fell at some point last night was not sure how she fell. She denies losing consciousness, states she did hit her head and presents with headache and left side of her head. She is not on anticoagulation but does not remember the event. Denies any presyncopal symptoms but overall a poor historian. Plan for trauma scans, CT head ordered for severe headache. Plan for basic labs, reevaluate. Also obtain x-ray of left hip, knee, ankle given pain on the side but suspect this is exacerbation of chronic pain. MIPS 415      Patients GCS was less than 15: No  Focal neurological deficit: No  Severe Headache: Yes  Vomiting: No  Physical signs of a basilar skull fracture: No  Coagulopathy: No  Thrombocytopenia: No  Patient suspected of taking an anticoagulant medication: No  Severe or Dangerous mechanism of injury (Select one or more):    MVA with patient ejection, death of another passenger, rollover, speed > 40mph, airbag deployment,  or passenger on ATV or motorcycle: No   Pedestrian or bicyclist without helmet: struck by motorized vehicle, in bicycle crash: No  Fall > 3 feet or 5 stairs: No  Head struck by high-impact object (hammer, baseball, baseball bat, heavy object such as falling brick): No          DIAGNOSTIC RESULTS / [facility]   LAB RESULTS:  Results for orders placed or performed during the hospital encounter of 03/22/22   CBC with Auto Differential   Result Value Ref Range    WBC 6.5 3.5 - 11.3 k/uL    RBC 4.79 3.95 - 5.11 m/uL    Hemoglobin 13.1 11.9 - 15.1 g/dL    Hematocrit 39.6 36.3 - 47.1 %    MCV 82.7 82.6 - 102.9 fL    MCH 27.3 25.2 - 33.5 pg    MCHC 33.1 28.4 - 34.8 g/dL    RDW 15.8 (H) 11.8 - 14.4 %    Platelets 945 031 - 791 k/uL    MPV 11.3 8.1 - 13.5 fL    NRBC Automated 0.0 0.0 per 100 WBC    Seg Neutrophils 64 36 - 65 %    Lymphocytes 24 24 - 43 %    Monocytes 8 3 - 12 %    Eosinophils % 3 1 - 4 %    Basophils 1 0 - 2 %    Immature Granulocytes 0 0 %    Segs Absolute 4.22 1.50 - 8.10 k/uL    Absolute Lymph # 1.54 1.10 - 3.70 k/uL    Absolute Mono # 0.52 0.10 - 1.20 k/uL    Absolute Eos # 0.20 0.00 - 0.44 k/uL    Basophils Absolute 0.04 0.00 - 0.20 k/uL    Absolute Immature Granulocyte <0.03 0.00 - 0.30 k/uL    RBC Morphology ANISOCYTOSIS PRESENT    Basic Metabolic Panel w/ Reflex to MG   Result Value Ref Range    Glucose 102 (H) 70 - 99 mg/dL    BUN 8 6 - 20 mg/dL    CREATININE 0.83 0.50 - 0.90 mg/dL    Calcium 8.9 8.6 - 10.4 mg/dL    Sodium 142 135 - 144 mmol/L    Potassium 3.1 (L) 3.7 - 5.3 mmol/L    Chloride 104 98 - 107 mmol/L    CO2 23 20 - 31 mmol/L    Anion Gap 15 9 - 17 mmol/L    GFR Non-African American >60 >60 mL/min    GFR African American >60 >60 mL/min    GFR Comment         Troponin   Result Value Ref Range    Troponin, High Sensitivity 16 (H) 0 - 14 ng/L   D-Dimer, Quantitative   Result Value Ref Range    D-Dimer, Quant 0.59 mg/L FEU   Magnesium   Result Value Ref Range    Magnesium 2.1 1.6 - 2.6 mg/dL   Troponin   Result Value Ref Range    Troponin, High Sensitivity 16 (H) 0 - 14 ng/L         RADIOLOGY:  CT THORACIC SPINE TRAUMA RECONSTRUCTION   Final Result      No evidence of fracture with minimal degenerative disc disease noted. CT LUMBAR SPINE TRAUMA RECONSTRUCTION   Final Result   No evidence of an acute fracture or traumatic malalignment involving the   lumbar spine         CT CHEST PULMONARY EMBOLISM W CONTRAST   Final Result   1. No acute pulmonary embolism. 2. No acute pulmonary disease. 3. Possible esophagitis seen as diffuse mild distal esophageal wall   thickening.    4. No acute traumatic abnormality seen. CT ABDOMEN PELVIS W IV CONTRAST Additional Contrast? None   Final Result   1. Wall thickening present throughout the colon, most prominent within the   right colon. Differential considerations would include antibiotic associated   colitis, infectious colitis, or inflammatory bowel disease. 2. Severe degenerative change involving the left hip joint   3. Nonobstructive bowel gas pattern         CT Head WO Contrast   Final Result      No acute findings in the head/brain. CT CERVICAL SPINE WO CONTRAST   Final Result      No evidence of fracture with multilevel degenerative/chronic findings as   described. XR HIP LEFT (2-3 VIEWS)   Final Result   No acute bony abnormalities are noted         XR KNEE LEFT (3 VIEWS)   Final Result   No acute bony abnormalities are noted         XR ANKLE LEFT (MIN 3 VIEWS)   Final Result   No acute bony abnormalities are noted              EKG  EKG Interpretation    Interpreted by emergency department physician    Rhythm: sinus bradycardia  Rate: 50-60  Axis: normal  Ectopy: none  Conduction: prolonged QTc  ST Segments: no acute change  T Waves: no acute change  Q Waves: nonspecific    Clinical Impression: sinus bradycardia    Ronnald Real, DO    All EKG's are interpreted by the Emergency Department Physician who either signs or Co-signs this chart in the absence of a cardiologist.    EMERGENCY DEPARTMENT COURSE:  ED Course as of 03/22/22 1917   Tue Mar 22, 2022   1842 CT CHEST PULMONARY EMBOLISM W CONTRAST [JG]   1844 Imaging unremarkable for acute process. Patient has Crohn's disease likely accounting for the bowel thickening on the CT imaging. No leukocytosis, no acute injury or intra-abdominal etiology. Denies any urinary frequency and patient's blood pressures are soft at baseline. Low concern for UTI at this time.   Will discharge with outpatient follow-up with primary care provider and orthopedic surgery pending troponin improved/stable. [JG]   1917 Troponin, High Sensitivity(!): 16  Trop stable. Will discharge. [JG]      ED Course User Index  [JG] Tamiko Cornelius DO        PROCEDURES:      CONSULTS:  None    CRITICAL CARE:      FINAL IMPRESSION      1.  Fall, initial encounter          DISPOSITION / PLAN     DISPOSITION Decision To Discharge 03/22/2022 06:43:23 PM      PATIENT REFERRED TO:  Celina Stone MD  [facility] [address] [facility]  [phone]    Go in 3 days      [facility]  [address] [facility] [address] [phone]  Go to   If symptoms worsen      DISCHARGE MEDICATIONS:  New Prescriptions    No medications on file       Tamiko Cornelius DO  Emergency Medicine Resident    (Please note that portions of thisnote were completed with a voice recognition program.  Efforts were made to edit the dictations but occasionally words are mis-transcribed.)     Tamiko Cornelius DO  03/22/22 1917

## 2022-03-22 NOTE — TELEPHONE ENCOUNTER
Called patient to schedule hip injection and she told me she fell yesterday and does not feel to good and her hips/knees/ankles hurt and are swollen. I told her she needs to go get them looked at and to call me back to schedule injection.

## 2022-03-22 NOTE — ED PROVIDER NOTES
Final Result   No acute bony abnormalities are noted               Julia Escalera MD  Attending Physician       Julia Escalera MD  03/22/22 1343

## 2022-03-22 NOTE — ED PROVIDER NOTES
[address] [facility]  Faculty Attestation    I performed a history and physical examination of the patient and discussed management with the resident. I reviewed the residents note and agree with the documented findings and plan of care. Any areas of disagreement are noted on the chart. I was personally present for the key portions of any procedures and the inclusive time noted in any critical care statement. I have documented in the chart those procedures where I was not present during the key portions. I have reviewed the emergency nurses triage note. I agree with the chief complaint, past medical history, past surgical history, allergies, medications, social, and family history as documented unless otherwise noted below.        This is a 76-year-old female who presents to the emergency department for evaluation of generalized pain post fall  Patient describes that she fell around 1:30 AM this morning  Patient does not remember why she fell   She does not member if she tripped or if she was feeling dizzy  She did hit the back of her head  She is unsure if she lost consciousness  Patient is unclear if she has a headache  She does have discomfort to her neck, back, and to the left hip and left leg  She denies any focal or unilateral weakness  No chest pain or acute pulmonary complaints  No GI/ complaints at this time  Pain to the neck, back, and left leg are worse with movement  Patient denies any recent illness or fever  Review of systems otherwise negative  She has no additional complaints at this time    On examination she appears in no acute distress  GCS 15  She is alert and oriented  Patient has discomfort in the area of the occiput without hematoma or open wound  No mastoid ecchymosis  No hemotympanum  Patient has normal range of motion of the cervical spine but has generalized cervical, thoracic, and lumbar discomfort  No chest wall or rib pain on exam  Heart is regular in rate and rhythm  Lungs clear to auscultation  Normal work of breathing  Abdomen soft and nontender   Patient has generalized discomfort to the left hip, left knee, and left ankle  No deformity  No bruising or open wound  No edema  Intact peripheral pulse, perfusion, and sensation  No sensory, motor, or coordination deficits on exam    EKG at 1544 shows a sinus bradycardia. Rate of 55 bpm.  Prolonged QTc measured at 528 ms. Normal axis. Poor wave progression. No T wave inversion. No ST segment depression/elevation or evidence for acute ischemia/infarction.     Initial blood pressure is 90/58  IV fluids ordered awaiting labs and imaging    Labs reviewed  Patient does have an elevated troponin but is not significantly changed from her baseline  No acute process seen on plain films    CT scans and reassessment pending at shift change      Jd Wolfe DO  Attending Emergency Physician        Rowan Gale DO  03/22/22 4779

## 2022-03-22 NOTE — ED NOTES
Pt to ED for fall from standing yesterday with no LOC. Pt states that she is having pain to her left hip and left leg but is able to ambulate slowly with her walker. Pt states that she is supposed to be getting her left hip replaced by Ronny Jacobo but has not yet. Pt denies blood thinner use or hitting her head.  Pt A&O x4 on arrival.      Viktoriya Mayorga RN  03/22/22 [id number]

## 2022-03-23 LAB
EKG ATRIAL RATE: 55 BPM
EKG P AXIS: 61 DEGREES
EKG P-R INTERVAL: 154 MS
EKG Q-T INTERVAL: 552 MS
EKG QRS DURATION: 92 MS
EKG QTC CALCULATION (BAZETT): 528 MS
EKG R AXIS: 79 DEGREES
EKG T AXIS: 59 DEGREES
EKG VENTRICULAR RATE: 55 BPM

## 2022-03-23 PROCEDURE — 93010 ELECTROCARDIOGRAM REPORT: CPT | Performed by: INTERNAL MEDICINE

## 2022-03-25 ENCOUNTER — APPOINTMENT (OUTPATIENT)
Dept: GENERAL RADIOLOGY | Age: 57
DRG: 463 | End: 2022-03-25
Payer: MEDICARE

## 2022-03-25 ENCOUNTER — HOSPITAL ENCOUNTER (INPATIENT)
Age: 57
LOS: 1 days | Discharge: HOME HEALTH | DRG: 463 | End: 2022-03-26
Attending: EMERGENCY MEDICINE | Admitting: INTERNAL MEDICINE
Payer: MEDICARE
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
## 2022-03-25 PROBLEM — I95.1 ORTHOSTATIC HYPOTENSION: Status: ACTIVE | Noted: 2022-03-25

## 2022-03-25 PROBLEM — R29.6 FALLS FREQUENTLY: Status: ACTIVE | Noted: 2022-03-25

## 2022-03-25 PROBLEM — I95.9 HYPOTENSION: Status: ACTIVE | Noted: 2022-03-25

## 2022-03-25 PROBLEM — N39.0 UTI (URINARY TRACT INFECTION): Status: ACTIVE | Noted: 2022-03-25

## 2022-03-25 LAB
-: ABNORMAL
ABSOLUTE EOS #: 0.14 K/UL (ref 0–0.44)
ABSOLUTE IMMATURE GRANULOCYTE: <0.03 K/UL (ref 0–0.3)
ABSOLUTE LYMPH #: 1.33 K/UL (ref 1.1–3.7)
ABSOLUTE MONO #: 0.39 K/UL (ref 0.1–1.2)
ALBUMIN SERPL-MCNC: 3 G/DL (ref 3.5–5.2)
ALBUMIN/GLOBULIN RATIO: 1.4 (ref 1–2.5)
ALP BLD-CCNC: 118 U/L (ref 35–104)
ALT SERPL-CCNC: 8 U/L (ref 5–33)
ANION GAP SERPL CALCULATED.3IONS-SCNC: 11 MMOL/L (ref 9–17)
ANION GAP SERPL CALCULATED.3IONS-SCNC: 13 MMOL/L (ref 9–17)
AST SERPL-CCNC: 16 U/L
BACTERIA: ABNORMAL
BASOPHILS # BLD: 1 % (ref 0–2)
BASOPHILS ABSOLUTE: 0.05 K/UL (ref 0–0.2)
BILIRUB SERPL-MCNC: 0.44 MG/DL (ref 0.3–1.2)
BILIRUBIN URINE: NEGATIVE
BUN BLDV-MCNC: 20 MG/DL (ref 6–20)
BUN BLDV-MCNC: 20 MG/DL (ref 6–20)
C-REACTIVE PROTEIN: 30.3 MG/L (ref 0–5)
CALCIUM SERPL-MCNC: 7.7 MG/DL (ref 8.6–10.4)
CALCIUM SERPL-MCNC: 7.8 MG/DL (ref 8.6–10.4)
CASTS UA: ABNORMAL /LPF (ref 0–8)
CHLORIDE BLD-SCNC: 106 MMOL/L (ref 98–107)
CHLORIDE BLD-SCNC: 108 MMOL/L (ref 98–107)
CO2: 22 MMOL/L (ref 20–31)
CO2: 22 MMOL/L (ref 20–31)
COLOR: YELLOW
CREAT SERPL-MCNC: 0.77 MG/DL (ref 0.5–0.9)
CREAT SERPL-MCNC: 0.8 MG/DL (ref 0.5–0.9)
EOSINOPHILS RELATIVE PERCENT: 3 % (ref 1–4)
EPITHELIAL CELLS UA: ABNORMAL /HPF (ref 0–5)
GFR AFRICAN AMERICAN: >60 ML/MIN
GFR AFRICAN AMERICAN: >60 ML/MIN
GFR NON-AFRICAN AMERICAN: >60 ML/MIN
GFR NON-AFRICAN AMERICAN: >60 ML/MIN
GFR SERPL CREATININE-BSD FRML MDRD: ABNORMAL ML/MIN/{1.73_M2}
GFR SERPL CREATININE-BSD FRML MDRD: ABNORMAL ML/MIN/{1.73_M2}
GLUCOSE BLD-MCNC: 78 MG/DL (ref 70–99)
GLUCOSE BLD-MCNC: 83 MG/DL (ref 70–99)
GLUCOSE URINE: NEGATIVE
HCT VFR BLD CALC: 33.3 % (ref 36.3–47.1)
HEMOGLOBIN: 10.3 G/DL (ref 11.9–15.1)
IMMATURE GRANULOCYTES: 0 %
KETONES, URINE: NEGATIVE
LACTIC ACID, WHOLE BLOOD: 0.7 MMOL/L (ref 0.7–2.1)
LEUKOCYTE ESTERASE, URINE: ABNORMAL
LYMPHOCYTES # BLD: 32 % (ref 24–43)
MAGNESIUM: 1.7 MG/DL (ref 1.6–2.6)
MAGNESIUM: 1.7 MG/DL (ref 1.6–2.6)
MCH RBC QN AUTO: 27.3 PG (ref 25.2–33.5)
MCHC RBC AUTO-ENTMCNC: 30.9 G/DL (ref 28.4–34.8)
MCV RBC AUTO: 88.3 FL (ref 82.6–102.9)
MONOCYTES # BLD: 9 % (ref 3–12)
NITRITE, URINE: NEGATIVE
NRBC AUTOMATED: 0 PER 100 WBC
PDW BLD-RTO: 16.1 % (ref 11.8–14.4)
PH UA: 5.5 (ref 5–8)
PLATELET # BLD: 172 K/UL (ref 138–453)
PMV BLD AUTO: 11.6 FL (ref 8.1–13.5)
POTASSIUM SERPL-SCNC: 3 MMOL/L (ref 3.7–5.3)
POTASSIUM SERPL-SCNC: 3.1 MMOL/L (ref 3.7–5.3)
POTASSIUM SERPL-SCNC: 3.6 MMOL/L (ref 3.7–5.3)
PROTEIN UA: ABNORMAL
RBC # BLD: 3.77 M/UL (ref 3.95–5.11)
RBC # BLD: ABNORMAL 10*6/UL
RBC UA: ABNORMAL /HPF (ref 0–4)
SARS-COV-2, RAPID: NOT DETECTED
SEDIMENTATION RATE, ERYTHROCYTE: 1 MM/HR (ref 0–30)
SEG NEUTROPHILS: 54 % (ref 36–65)
SEGMENTED NEUTROPHILS ABSOLUTE COUNT: 2.27 K/UL (ref 1.5–8.1)
SODIUM BLD-SCNC: 141 MMOL/L (ref 135–144)
SODIUM BLD-SCNC: 141 MMOL/L (ref 135–144)
SPECIFIC GRAVITY UA: 1.02 (ref 1–1.03)
SPECIMEN DESCRIPTION: NORMAL
TOTAL PROTEIN: 5.1 G/DL (ref 6.4–8.3)
TROPONIN, HIGH SENSITIVITY: 33 NG/L (ref 0–14)
TROPONIN, HIGH SENSITIVITY: 35 NG/L (ref 0–14)
TROPONIN, HIGH SENSITIVITY: 36 NG/L (ref 0–14)
TURBIDITY: ABNORMAL
URINE HGB: NEGATIVE
UROBILINOGEN, URINE: NORMAL
WBC # BLD: 4.2 K/UL (ref 3.5–11.3)
WBC UA: ABNORMAL /HPF (ref 0–5)

## 2022-03-25 PROCEDURE — 6360000002 HC RX W HCPCS: Performed by: STUDENT IN AN ORGANIZED HEALTH CARE EDUCATION/TRAINING PROGRAM

## 2022-03-25 PROCEDURE — 96375 TX/PRO/DX INJ NEW DRUG ADDON: CPT

## 2022-03-25 PROCEDURE — 96361 HYDRATE IV INFUSION ADD-ON: CPT

## 2022-03-25 PROCEDURE — G0378 HOSPITAL OBSERVATION PER HR: HCPCS

## 2022-03-25 PROCEDURE — 86140 C-REACTIVE PROTEIN: CPT

## 2022-03-25 PROCEDURE — 2500000003 HC RX 250 WO HCPCS: Performed by: STUDENT IN AN ORGANIZED HEALTH CARE EDUCATION/TRAINING PROGRAM

## 2022-03-25 PROCEDURE — 87088 URINE BACTERIA CULTURE: CPT

## 2022-03-25 PROCEDURE — 87186 SC STD MICRODIL/AGAR DIL: CPT

## 2022-03-25 PROCEDURE — 83735 ASSAY OF MAGNESIUM: CPT

## 2022-03-25 PROCEDURE — 6370000000 HC RX 637 (ALT 250 FOR IP): Performed by: STUDENT IN AN ORGANIZED HEALTH CARE EDUCATION/TRAINING PROGRAM

## 2022-03-25 PROCEDURE — 84132 ASSAY OF SERUM POTASSIUM: CPT

## 2022-03-25 PROCEDURE — 84484 ASSAY OF TROPONIN QUANT: CPT

## 2022-03-25 PROCEDURE — 99223 1ST HOSP IP/OBS HIGH 75: CPT | Performed by: INTERNAL MEDICINE

## 2022-03-25 PROCEDURE — 96372 THER/PROPH/DIAG INJ SC/IM: CPT

## 2022-03-25 PROCEDURE — 73080 X-RAY EXAM OF ELBOW: CPT

## 2022-03-25 PROCEDURE — 80048 BASIC METABOLIC PNL TOTAL CA: CPT

## 2022-03-25 PROCEDURE — 87086 URINE CULTURE/COLONY COUNT: CPT

## 2022-03-25 PROCEDURE — 6370000000 HC RX 637 (ALT 250 FOR IP): Performed by: HEALTH CARE PROVIDER

## 2022-03-25 PROCEDURE — 87040 BLOOD CULTURE FOR BACTERIA: CPT

## 2022-03-25 PROCEDURE — 2580000003 HC RX 258: Performed by: STUDENT IN AN ORGANIZED HEALTH CARE EDUCATION/TRAINING PROGRAM

## 2022-03-25 PROCEDURE — 87635 SARS-COV-2 COVID-19 AMP PRB: CPT

## 2022-03-25 PROCEDURE — 99284 EMERGENCY DEPT VISIT MOD MDM: CPT

## 2022-03-25 PROCEDURE — 36415 COLL VENOUS BLD VENIPUNCTURE: CPT

## 2022-03-25 PROCEDURE — 85652 RBC SED RATE AUTOMATED: CPT

## 2022-03-25 PROCEDURE — 80053 COMPREHEN METABOLIC PANEL: CPT

## 2022-03-25 PROCEDURE — 93005 ELECTROCARDIOGRAM TRACING: CPT | Performed by: HEALTH CARE PROVIDER

## 2022-03-25 PROCEDURE — 83605 ASSAY OF LACTIC ACID: CPT

## 2022-03-25 PROCEDURE — 81001 URINALYSIS AUTO W/SCOPE: CPT

## 2022-03-25 PROCEDURE — 73502 X-RAY EXAM HIP UNI 2-3 VIEWS: CPT

## 2022-03-25 PROCEDURE — 2580000003 HC RX 258: Performed by: HEALTH CARE PROVIDER

## 2022-03-25 PROCEDURE — 2060000000 HC ICU INTERMEDIATE R&B

## 2022-03-25 PROCEDURE — 85025 COMPLETE CBC W/AUTO DIFF WBC: CPT

## 2022-03-25 PROCEDURE — 73030 X-RAY EXAM OF SHOULDER: CPT

## 2022-03-25 RX ORDER — NICOTINE 21 MG/24HR
1 PATCH, TRANSDERMAL 24 HOURS TRANSDERMAL DAILY
Status: DISCONTINUED | OUTPATIENT
Start: 2022-03-25 | End: 2022-03-26 | Stop reason: HOSPADM

## 2022-03-25 RX ORDER — SODIUM CHLORIDE 0.9 % (FLUSH) 0.9 %
5-40 SYRINGE (ML) INJECTION EVERY 12 HOURS SCHEDULED
Status: DISCONTINUED | OUTPATIENT
Start: 2022-03-25 | End: 2022-03-26 | Stop reason: HOSPADM

## 2022-03-25 RX ORDER — MIDODRINE HYDROCHLORIDE 5 MG/1
5 TABLET ORAL
Status: DISCONTINUED | OUTPATIENT
Start: 2022-03-25 | End: 2022-03-25

## 2022-03-25 RX ORDER — HYDROCODONE BITARTRATE AND ACETAMINOPHEN 5; 325 MG/1; MG/1
1 TABLET ORAL EVERY 8 HOURS PRN
Status: DISCONTINUED | OUTPATIENT
Start: 2022-03-25 | End: 2022-03-26 | Stop reason: HOSPADM

## 2022-03-25 RX ORDER — ACETAMINOPHEN 325 MG/1
650 TABLET ORAL EVERY 4 HOURS PRN
Status: DISCONTINUED | OUTPATIENT
Start: 2022-03-25 | End: 2022-03-26 | Stop reason: HOSPADM

## 2022-03-25 RX ORDER — FENTANYL CITRATE 50 UG/ML
25 INJECTION, SOLUTION INTRAMUSCULAR; INTRAVENOUS
Status: DISCONTINUED | OUTPATIENT
Start: 2022-03-25 | End: 2022-03-25

## 2022-03-25 RX ORDER — SODIUM CHLORIDE 9 MG/ML
INJECTION, SOLUTION INTRAVENOUS CONTINUOUS
Status: DISCONTINUED | OUTPATIENT
Start: 2022-03-25 | End: 2022-03-26 | Stop reason: HOSPADM

## 2022-03-25 RX ORDER — TOPIRAMATE 100 MG/1
100 TABLET, FILM COATED ORAL 3 TIMES DAILY
Status: DISCONTINUED | OUTPATIENT
Start: 2022-03-25 | End: 2022-03-26 | Stop reason: HOSPADM

## 2022-03-25 RX ORDER — DIPHENHYDRAMINE HCL 25 MG
25 TABLET ORAL NIGHTLY PRN
Status: DISCONTINUED | OUTPATIENT
Start: 2022-03-25 | End: 2022-03-26 | Stop reason: HOSPADM

## 2022-03-25 RX ORDER — HYDROCODONE BITARTRATE AND ACETAMINOPHEN 5; 325 MG/1; MG/1
1 TABLET ORAL ONCE
Status: COMPLETED | OUTPATIENT
Start: 2022-03-25 | End: 2022-03-25

## 2022-03-25 RX ORDER — LISINOPRIL 10 MG/1
5 TABLET ORAL DAILY
Status: DISCONTINUED | OUTPATIENT
Start: 2022-03-25 | End: 2022-03-26 | Stop reason: HOSPADM

## 2022-03-25 RX ORDER — POTASSIUM CHLORIDE 20 MEQ/1
40 TABLET, EXTENDED RELEASE ORAL ONCE
Status: COMPLETED | OUTPATIENT
Start: 2022-03-25 | End: 2022-03-25

## 2022-03-25 RX ORDER — 0.9 % SODIUM CHLORIDE 0.9 %
500 INTRAVENOUS SOLUTION INTRAVENOUS ONCE
Status: COMPLETED | OUTPATIENT
Start: 2022-03-25 | End: 2022-03-25

## 2022-03-25 RX ORDER — POTASSIUM CHLORIDE 20 MEQ/1
20 TABLET, EXTENDED RELEASE ORAL 2 TIMES DAILY
Status: DISCONTINUED | OUTPATIENT
Start: 2022-03-25 | End: 2022-03-25

## 2022-03-25 RX ORDER — SUCRALFATE 1 G/1
1 TABLET ORAL 4 TIMES DAILY
Status: DISCONTINUED | OUTPATIENT
Start: 2022-03-25 | End: 2022-03-26 | Stop reason: HOSPADM

## 2022-03-25 RX ORDER — NAPROXEN 250 MG/1
500 TABLET ORAL ONCE
Status: COMPLETED | OUTPATIENT
Start: 2022-03-25 | End: 2022-03-25

## 2022-03-25 RX ORDER — ALBUTEROL SULFATE 90 UG/1
2 AEROSOL, METERED RESPIRATORY (INHALATION) EVERY 6 HOURS PRN
Status: DISCONTINUED | OUTPATIENT
Start: 2022-03-25 | End: 2022-03-26 | Stop reason: HOSPADM

## 2022-03-25 RX ORDER — LIDOCAINE 4 G/G
1 PATCH TOPICAL DAILY
Status: DISCONTINUED | OUTPATIENT
Start: 2022-03-25 | End: 2022-03-26 | Stop reason: HOSPADM

## 2022-03-25 RX ORDER — ACETAMINOPHEN 500 MG
1000 TABLET ORAL EVERY 6 HOURS PRN
Status: DISCONTINUED | OUTPATIENT
Start: 2022-03-25 | End: 2022-03-25

## 2022-03-25 RX ORDER — SODIUM CHLORIDE 9 MG/ML
25 INJECTION, SOLUTION INTRAVENOUS PRN
Status: DISCONTINUED | OUTPATIENT
Start: 2022-03-25 | End: 2022-03-26 | Stop reason: HOSPADM

## 2022-03-25 RX ORDER — ONDANSETRON 4 MG/1
4 TABLET, ORALLY DISINTEGRATING ORAL EVERY 8 HOURS PRN
Status: DISCONTINUED | OUTPATIENT
Start: 2022-03-25 | End: 2022-03-26 | Stop reason: HOSPADM

## 2022-03-25 RX ORDER — PANTOPRAZOLE SODIUM 40 MG/1
40 TABLET, DELAYED RELEASE ORAL
Status: DISCONTINUED | OUTPATIENT
Start: 2022-03-25 | End: 2022-03-26 | Stop reason: HOSPADM

## 2022-03-25 RX ORDER — MIDODRINE HYDROCHLORIDE 2.5 MG/1
2.5 TABLET ORAL
Status: DISCONTINUED | OUTPATIENT
Start: 2022-03-25 | End: 2022-03-25

## 2022-03-25 RX ORDER — OXYCODONE HYDROCHLORIDE 5 MG/1
10 TABLET ORAL EVERY 4 HOURS PRN
Status: DISCONTINUED | OUTPATIENT
Start: 2022-03-25 | End: 2022-03-25

## 2022-03-25 RX ORDER — FENTANYL CITRATE 50 UG/ML
50 INJECTION, SOLUTION INTRAMUSCULAR; INTRAVENOUS
Status: DISCONTINUED | OUTPATIENT
Start: 2022-03-25 | End: 2022-03-25

## 2022-03-25 RX ORDER — 0.9 % SODIUM CHLORIDE 0.9 %
1000 INTRAVENOUS SOLUTION INTRAVENOUS ONCE
Status: COMPLETED | OUTPATIENT
Start: 2022-03-25 | End: 2022-03-25

## 2022-03-25 RX ORDER — GABAPENTIN 400 MG/1
800 CAPSULE ORAL 3 TIMES DAILY
Status: DISCONTINUED | OUTPATIENT
Start: 2022-03-25 | End: 2022-03-26 | Stop reason: HOSPADM

## 2022-03-25 RX ORDER — POLYVINYL ALCOHOL 14 MG/ML
1 SOLUTION/ DROPS OPHTHALMIC DAILY PRN
Status: DISCONTINUED | OUTPATIENT
Start: 2022-03-25 | End: 2022-03-26 | Stop reason: HOSPADM

## 2022-03-25 RX ORDER — SUMATRIPTAN 50 MG/1
100 TABLET, FILM COATED ORAL
Status: DISPENSED | OUTPATIENT
Start: 2022-03-25 | End: 2022-03-25

## 2022-03-25 RX ORDER — OXYCODONE HYDROCHLORIDE 5 MG/1
5 TABLET ORAL EVERY 4 HOURS PRN
Status: DISCONTINUED | OUTPATIENT
Start: 2022-03-25 | End: 2022-03-25

## 2022-03-25 RX ORDER — ONDANSETRON 2 MG/ML
4 INJECTION INTRAMUSCULAR; INTRAVENOUS EVERY 6 HOURS PRN
Status: DISCONTINUED | OUTPATIENT
Start: 2022-03-25 | End: 2022-03-26 | Stop reason: HOSPADM

## 2022-03-25 RX ORDER — SODIUM CHLORIDE 0.9 % (FLUSH) 0.9 %
5-40 SYRINGE (ML) INJECTION PRN
Status: DISCONTINUED | OUTPATIENT
Start: 2022-03-25 | End: 2022-03-26 | Stop reason: HOSPADM

## 2022-03-25 RX ADMIN — SODIUM CHLORIDE, PRESERVATIVE FREE 10 ML: 5 INJECTION INTRAVENOUS at 20:31

## 2022-03-25 RX ADMIN — TOPIRAMATE 100 MG: 100 TABLET, FILM COATED ORAL at 14:30

## 2022-03-25 RX ADMIN — ENOXAPARIN SODIUM 40 MG: 40 INJECTION SUBCUTANEOUS at 11:46

## 2022-03-25 RX ADMIN — GABAPENTIN 800 MG: 400 CAPSULE ORAL at 21:36

## 2022-03-25 RX ADMIN — SODIUM CHLORIDE 1000 ML: 9 INJECTION, SOLUTION INTRAVENOUS at 10:15

## 2022-03-25 RX ADMIN — HYDROCODONE BITARTRATE AND ACETAMINOPHEN 1 TABLET: 5; 325 TABLET ORAL at 00:31

## 2022-03-25 RX ADMIN — PANTOPRAZOLE SODIUM 40 MG: 40 TABLET, DELAYED RELEASE ORAL at 05:49

## 2022-03-25 RX ADMIN — SODIUM CHLORIDE 500 ML: 0.9 INJECTION, SOLUTION INTRAVENOUS at 00:40

## 2022-03-25 RX ADMIN — SUCRALFATE 1 G: 1 TABLET ORAL at 11:45

## 2022-03-25 RX ADMIN — GABAPENTIN 800 MG: 400 CAPSULE ORAL at 14:30

## 2022-03-25 RX ADMIN — MIDODRINE HYDROCHLORIDE 2.5 MG: 2.5 TABLET ORAL at 13:20

## 2022-03-25 RX ADMIN — CEFTRIAXONE SODIUM 1000 MG: 1 INJECTION, POWDER, FOR SOLUTION INTRAMUSCULAR; INTRAVENOUS at 20:30

## 2022-03-25 RX ADMIN — SUCRALFATE 1 G: 1 TABLET ORAL at 21:36

## 2022-03-25 RX ADMIN — SUCRALFATE 1 G: 1 TABLET ORAL at 17:39

## 2022-03-25 RX ADMIN — NAPROXEN 500 MG: 250 TABLET ORAL at 00:30

## 2022-03-25 RX ADMIN — SODIUM CHLORIDE 1000 ML: 9 INJECTION, SOLUTION INTRAVENOUS at 08:49

## 2022-03-25 RX ADMIN — OXYCODONE 10 MG: 5 TABLET ORAL at 04:53

## 2022-03-25 RX ADMIN — PANTOPRAZOLE SODIUM 40 MG: 40 TABLET, DELAYED RELEASE ORAL at 15:38

## 2022-03-25 RX ADMIN — POTASSIUM CHLORIDE 40 MEQ: 1500 TABLET, EXTENDED RELEASE ORAL at 21:39

## 2022-03-25 RX ADMIN — TOPIRAMATE 100 MG: 100 TABLET, FILM COATED ORAL at 11:45

## 2022-03-25 RX ADMIN — TOPIRAMATE 100 MG: 100 TABLET, FILM COATED ORAL at 21:36

## 2022-03-25 RX ADMIN — POTASSIUM CHLORIDE 20 MEQ: 1500 TABLET, EXTENDED RELEASE ORAL at 11:45

## 2022-03-25 RX ADMIN — HYDROCODONE BITARTRATE AND ACETAMINOPHEN 1 TABLET: 5; 325 TABLET ORAL at 21:42

## 2022-03-25 RX ADMIN — SODIUM CHLORIDE: 9 INJECTION, SOLUTION INTRAVENOUS at 11:53

## 2022-03-25 ASSESSMENT — ENCOUNTER SYMPTOMS
PHOTOPHOBIA: 0
SHORTNESS OF BREATH: 0
NAUSEA: 0
VOMITING: 0
ABDOMINAL PAIN: 0

## 2022-03-25 ASSESSMENT — PAIN SCALES - GENERAL
PAINLEVEL_OUTOF10: 9
PAINLEVEL_OUTOF10: 8
PAINLEVEL_OUTOF10: 8
PAINLEVEL_OUTOF10: 10

## 2022-03-25 ASSESSMENT — PAIN DESCRIPTION - PAIN TYPE: TYPE: ACUTE PAIN

## 2022-03-25 ASSESSMENT — PAIN DESCRIPTION - ORIENTATION: ORIENTATION: RIGHT

## 2022-03-25 ASSESSMENT — PAIN DESCRIPTION - FREQUENCY: FREQUENCY: CONTINUOUS

## 2022-03-25 ASSESSMENT — PAIN DESCRIPTION - LOCATION: LOCATION: HIP

## 2022-03-25 ASSESSMENT — PAIN DESCRIPTION - DESCRIPTORS: DESCRIPTORS: ACHING

## 2022-03-25 NOTE — PROGRESS NOTES
Physical Therapy        Physical Therapy Cancel Note      DATE: 3/25/2022    NAME: Tonia Hernandez  MRN: [id number]   : 1965      Patient not seen this date for Physical Therapy due to:     Other: RNCX   pt with low BP  70/60      Electronically signed by Enzo Banks PT on 3/25/2022 at 2:06 PM

## 2022-03-25 NOTE — PLAN OF CARE
Problem: Falls - Risk of:  Goal: Will remain free from falls  Description: Will remain free from falls  3/25/2022 1026 by Jessica Khan RN  Outcome: Ongoing  3/25/2022 0501 by Miguelina Osman RN  Outcome: Met This Shift

## 2022-03-25 NOTE — DISCHARGE INSTR - COC
Continuity of Care Form    Patient Name: Rodrigue Gomez   :  1965  MRN:  [id number]    Admit date:  3/25/2022  Discharge date:  3/26/22    Code Status Order: Full Code   Advance Directives:      Admitting Physician:  Torri Mensah MD  PCP: Leigh Correa MD    Discharging Nurse: Nolan Saez RN  [address] Unit/Room#: 1659/4608-14  Discharging Unit Phone Number: [phone]    Emergency Contact:   Extended Emergency Contact Information  Primary Emergency Contact: Yasemin Post [address] Phone: [phone]  Relation: Child  Secondary Emergency Contact: Carmen Leahy [address] Phone: [phone]  Relation: Other    Past Surgical History:  Past Surgical History:   Procedure Laterality Date    ABDOMEN SURGERY      bowel obstruction OR    BUNIONECTOMY Left     CHOLECYSTECTOMY      COLONOSCOPY  2007    no gross pathology seen in the colon and also 3-4 inches of the ileum    COLONOSCOPY  10/12/2017    normal    COLONOSCOPY  10/25/2021    COLONOSCOPY WITH BIOPSY performed by Kesha Ariza MD at [facility]    COLONOSCOPY  10/25/2021    COLONOSCOPY POLYPECTOMY REMOVAL SNARE performed by Kesha Ariza MD at [facility]  10/25/2021    EGD BIOPSY performed by Kesha Ariza MD at [facility]       Immunization History:   Immunization History   Administered Date(s) Administered    COVID-19, Pfizer Purple top, DILUTE for use, 12+ yrs, 30mcg/0.3mL dose 2021, 2021, 2021    Influenza, Quadv, IM, PF (6 mo and older Fluzone, Flulaval, Fluarix, and 3 yrs and older Afluria) 2017, 10/20/2021    Pneumococcal Polysaccharide ([id number]) 2015       Active Problems:  Patient Active Problem List   Diagnosis Code    Gastroesophageal reflux disease without esophagitis K21.9    Gastroenteritis K52.9    Ileus ([address] K56.7 Pancreatitis, acute K85.90    Drug abuse ([facility] F19.10    COPD (chronic obstructive pulmonary disease) ([facility] J44.9    Hypertension I10    Hypokalemia E87.6    Hypothyroidism due to acquired atrophy of thyroid E03.4    Crohn disease ([facility] K50.90    Acute cystitis without hematuria N30.00    Accidental drug overdose T50.901A    Prolonged Q-T interval on ECG R94.31    Bipolar disorder, unspecified ([facility]) F31.9    Polysubstance abuse ([facility] F19.10    Alcohol intoxication delirium ([facility] F10.121    Cocaine abuse ([facility] F14.10    Acute respiratory failure with hypoxia ([facility] J96.01    Bipolar 1 disorder ([facility]) F31.9    Diarrhea R19.7    Chronic bronchitis ([facility] J42    Chronic renal insufficiency, stage III (moderate) ([facility]) N18.30    Colitis K52.9    Anxiety F41.9    Osteoarthritis resulting from left hip dysplasia M16.32    Shortness of breath R06.02    Elevated brain natriuretic peptide (BNP) level R79.89    Pneumonia of both lower lobes due to infectious organism J18.9    Non-intractable vomiting with nausea R11.2    Acute hypokalemia E87.6    Hypomagnesemia E83.42    Intractable vomiting R11.10    Abdominal pain R10.9    Nausea vomiting and diarrhea R11.2, R19.7    Falls frequently R29.6       Isolation/Infection:   Isolation            No Isolation          Patient Infection Status       Infection Onset Added Last Indicated Last Indicated By Review Planned Expiration Resolved Resolved By    None active    Resolved    C-diff Rule Out  10/20/21 10/20/21 Yaakov Serrano RN   10/20/21 Rule-Out Test Resulted    C-diff Rule Out 10/16/21 10/16/21 10/20/21 C DIFF TOXIN/ANTIGEN (Ordered)   10/19/21 Yaakov Serrano RN    GI Panel    COVID-19 (Rule Out) 10/15/21 10/15/21 10/16/21 COVID-19, Rapid (Ordered)   10/16/21 Rule-Out Test Resulted    C-diff Rule Out 09/17/21 09/17/21 09/19/21 Gastrointestinal Panel, Molecular (Ordered)   09/20/21 Rule-Out Test Resulted    COVID-19 (Rule Out) 09/17/21 09/17/21 09/17/21 Respiratory Panel, Molecular, with COVID-19 (Restricted: peds pts or suitable admitted adults) (Ordered)   09/17/21 Rule-Out Test Resulted    COVID-19 (Rule Out) 09/17/21 09/17/21 09/17/21 COVID-19, Rapid (Ordered)   09/17/21 Rule-Out Test Resulted            Nurse Assessment:  Last Vital Signs: BP (!) 70/60   Pulse 62   Temp 97.9 °F (36.6 °C) (Oral)   Resp 18   Ht 5' 6\" (1.676 m)   Wt 151 lb 1.6 oz (68.5 kg)   SpO2 94%   BMI 24.39 kg/m²     Last documented pain score (0-10 scale): Pain Level: 8  Last Weight:   Wt Readings from Last 1 Encounters:   03/25/22 151 lb 1.6 oz (68.5 kg)     Mental Status:  oriented, alert, coherent, logical, thought processes intact, and able to concentrate and follow conversation    IV Access:  - None    Nursing Mobility/ADLs:  Walking   Independent  Transfer  Independent  Bathing  Independent  Dressing  Independent  [facility]  Med Delivery   whole    Wound Care Documentation and Therapy:        Elimination:  Continence: Bowel: Yes  Bladder: Yes  Urinary Catheter: None   Colostomy/Ileostomy/Ileal Conduit: No       Date of Last BM: 3/26/22  No intake or output data in the 24 hours ending 03/25/22 0927  No intake/output data recorded. Safety Concerns:     History of Falls (last 30 days) and At Risk for Falls    Impairments/Disabilities:      None    Nutrition Therapy:  Current Nutrition Therapy:   - Oral Diet:  General    Routes of Feeding: Oral  Liquids: Thin Liquids  Daily Fluid Restriction: no  Last Modified Barium Swallow with Video (Video Swallowing Test): not done    Treatments at the Time of Hospital Discharge:   Respiratory Treatments: none  Oxygen Therapy:  is not on home oxygen therapy.   Ventilator:    - No ventilator support    Rehab Therapies: Physical Therapy and Occupational Therapy  Weight Bearing Status/Restrictions: No weight bearing restrictions  Other Medical Equipment (for information only, NOT a DME order): walker  Other Treatments: none    Patient's personal belongings (please select all that are sent with patient):  None    RN SIGNATURE:  Electronically signed by Samm Stewart RN on 3/26/22 at 4:06 PM EDT    CASE MANAGEMENT/SOCIAL WORK SECTION    Inpatient Status Date: 3/25/2022    Readmission Risk Assessment Score:  Readmission Risk              Risk of Unplanned Readmission:  0           Discharging to Facility/ Agency   Name:   [address] [place] [address] Road,[address] Details  [address] [place] [address]       Phone: [phone]       Fax: [phone]          / signature: Electronically signed by Selam Vigil RN on 3/26/22 at 1:01 PM EDT    PHYSICIAN SECTION    Prognosis: Fair    Condition at Discharge: Stable    Rehab Potential (if transferring to Rehab): Fair    Recommended Labs or Other Treatments After Discharge:     Physician Certification: I certify the above information and transfer of Joie Service  is necessary for the continuing treatment of the diagnosis listed and that she requires [address] for less 30 days.      Update Admission H&P: No change in H&P    PHYSICIAN SIGNATURE:  Electronically signed by Ciro Pallas, MD on 3/25/22 at 9:27 AM EDT

## 2022-03-25 NOTE — PROGRESS NOTES
CDU Daily Progress Note  Attending Physician       Pt Name: Kelsie Diehl  MRN: [id number]  [place] 1965  Date of evaluation: 3/25/22    I performed a history and physical examination of the patient and discussed management with the resident. I reviewed the residents note and agree with the documented findings and plan of care. Any areas of disagreement are noted on the chart. I was personally present for the key portions of any procedures. I have documented in the chart those procedures where I was not present during the key portions. I have reviewed the emergency nurses triage note. I agree with the chief complaint, past medical history, past surgical history, allergies, medications, social and family history as documented unless otherwise noted below. Documentation of the HPI, Physical Exam and Medical Decision Making performed by medical students or scribes is based on my personal performance of the HPI, PE and MDM. For Physician Assistant/ Nurse Practitioner cases/documentation I have personally evaluated this patient and have completed at least one if not all key elements of the E/M (history, physical exam, and MDM). Additional findings are as noted. The Family History, Social History and Review of Systems are unchanged from the previous day. No significant events overnight. Patient wanting to get up and walk outside. But told that she shouldn't be getting up alone. BP has been running low also. Getting labs and urine sample to see if any obvious source for hypotension.     Mitchell Dasilva MD,  MD  Attending Physician  Critical Decision Unit

## 2022-03-25 NOTE — H&P
[address]     Department of Internal Medicine - Staff Internal Medicine Teaching Service          ADMISSION NOTE/HISTORY AND PHYSICAL EXAMINATION   Date: 3/25/2022  Patient Name: Tonia Hernandez  Date of admission: 3/25/2022 12:07 AM  YOB: 1965  PCP: Hank Hernandez MD  History Obtained From:  patient, electronic medical record    CHIEF COMPLAINT     Chief complaint: fall    HISTORY OF PRESENTING ILLNESS     The patient is a pleasant 61 y.o. female presents with a chief complaint of fall. PMH : Crohn's disease, hypertension, hyperlipidemia, COPD, anxiety/depression/bipolar disorder, bowel obstruction s/p partial small intestinal resection, bilateral primary osteoarthritis of the hip joint. Home Meds: Lisinopril 5 mg, Topamax 100 mg, Imitrex 100, albuterol and Symbicort inhaler, Protonix. Last echo 2/20 EF>65%, mild to moderate LVH. Creatinine <1, TSH 1.01 10/21    Presented to the ED on 3/25 with mechanical fall. Patient slipped when she was trying to sit in a chair, landed forward on her elbow and shoulder on the right side. Patient did not lose consciousness, was aware of surrounding, did not hit her head, he was able to get up with assistance from her daughter and able to walk around. No tongue bite, bowel or bladder incontinence . No nausea or other prodromal symptoms reported. Trauma work-up was negative. She also presented to the ED on 3/22 with a fall from standing height. Patient is admitted to observation unit. Internal medicine services are called for admitting the patient due to hypotension. Blood pressure is checked with a reading of 77/44. Systolic BP is consistently below 100 since admission. On evaluation patient is weak but AAOx4, dehydrated, not in apparent distress, on NC 2 L. Reports bilateral hip, thigh and lower abdominal pain. Lower abdominal pain is 4/10 in severity, diffuse, constant achy.   Denies fever, chills, chest pain, worsening of shortness of breath, nausea, vomiting, diarrhea, melena, hematochezia. Chest exam shows bilateral equal air entry with no added sounds, no murmur appreciated. Abdomen is soft, nondistended but tender and hypogastrium and left flank area. Bilateral trace pedal edema noted. Pertinent labs are sodium 141, potassium 3.1, magnesium 1.7, troponin 35>36, WBC 4.2, Hb 10.3, UA shows WBC too many, many bacteria and moderate leukocyte esterase. Blood culture and urine culture are pending.     Review of Systems:  General ROS: Completed and except as mentioned above were negative   HEENT ROS: Completed and except as mentioned above were negative   Allergy and Immunology ROS:  Completed and except as mentioned above were negative  Hematological and Lymphatic ROS:  Completed and except as mentioned above were negative  Respiratory ROS:  Completed and except as mentioned above were negative  Cardiovascular ROS:  Completed and except as mentioned above were negative  Gastrointestinal ROS: Completed and except as mentioned above were negative  Genito-Urinary ROS:  Completed and except as mentioned above were negative  Musculoskeletal ROS:  Completed and except as mentioned above were negative  Neurological ROS:  Completed and except as mentioned above were negative  Skin & Dermatological ROS:  Completed and except as mentioned above were negative  Psychological ROS:  Completed and except as mentioned above were negative    PAST MEDICAL HISTORY     Past Medical History:   Diagnosis Date    Acid reflux     Anxiety     Arthritis     Left hip    Asthma     Bipolar 1 disorder ([address]     Bowel obstruction ([address]     COPD (chronic obstructive pulmonary disease) ([facility])     O2 3L PRN/ Dr. [facility]/last seen 2020/appt.9-7-21 for Clearance    Crohn disease ([address]     Depression     Dry eye     Fibromyalgia     Gout     Headache     Hyperlipidemia     Hypertension     Hypothyroidism     Kidney stones     Muscle spasm     Neuropathy     Pain     left hip    Personal history of other medical treatment     Pt. seen by Dr. Nika Jon for clearance for this OR Left hip/ Normally pt. doesn't follow with Cardiology. [facility]    Wears partial dentures     upper    Wellness examination     PCP Khalida Hess MD/ genna/last seen 8-24-21       PAST SURGICAL HISTORY     Past Surgical History:   Procedure Laterality Date    ABDOMEN SURGERY      bowel obstruction OR    BUNIONECTOMY Left     CHOLECYSTECTOMY      COLONOSCOPY  04/30/2007    no gross pathology seen in the colon and also 3-4 inches of the ileum    COLONOSCOPY  10/12/2017    normal    COLONOSCOPY  10/25/2021    COLONOSCOPY WITH BIOPSY performed by Kermit Lou MD at [address] [place] [address] COLONOSCOPY  10/25/2021    COLONOSCOPY POLYPECTOMY REMOVAL SNARE performed by Kermit Lou MD at [facility] ENDOSCOPY  10/25/2021    EGD BIOPSY performed by Kermit Lou MD at [facility]       ALLERGIES     Azithromycin, Methylprednisolone, Penicillins, and Tape [adhesive tape]    MEDICATIONS PRIOR TO ADMISSION     Prior to Admission medications    Medication Sig Start Date End Date Taking? Authorizing Provider   HYDROcodone-acetaminophen (NORCO) 5-325 MG per tablet Take 1 tablet by mouth every 8 hours as needed for Pain for up to 14 days.  3/21/22 4/4/22  Yessy Roland MD   potassium chloride (KLOR-CON M) 20 MEQ extended release tablet Take 1 tablet by mouth 2 times daily for 2 doses 10/25/21 10/26/21  Gabriela Fountain MD   pantoprazole (PROTONIX) 40 MG tablet Take 1 tablet by mouth 2 times daily (before meals) 10/24/21 12/19/21  Elly Hanson MD   sucralfate (CARAFATE) 1 GM tablet Take 1 tablet by mouth 4 times daily for 28 days 10/24/21 11/21/21  Elly Hanson MD   albuterol sulfate HFA (VENTOLIN HFA) 108 (90 Base) MCG/ACT inhaler Inhale 2 puffs into the lungs every 6 hours as needed for Wheezing    Historical Provider, MD   diphenhydrAMINE (BENADRYL) 25 MG tablet Take 25 mg by mouth nightly as needed    Historical Provider, MD   topiramate (TOPAMAX) 100 MG tablet Take 100 mg by mouth 3 times daily     Historical Provider, MD   SUMAtriptan (IMITREX) 100 MG tablet Take 100 mg by mouth once as needed for Migraine    Historical Provider, MD   gabapentin (NEURONTIN) 400 MG capsule Take 800 mg by mouth 3 times daily. Historical Provider, MD   budesonide-formoterol (SYMBICORT) 160-4.5 MCG/ACT AERO Inhale 2 puffs into the lungs 2 times daily 21   Chaparro Corley MD   lisinopril (PRINIVIL;ZESTRIL) 5 MG tablet Take 1 tablet by mouth daily 21   Chaparro Corley MD   nicotine (NICODERM CQ) 21 MG/24HR Place 1 patch onto the skin daily 21   Chaparro Corley MD   acetaminophen (TYLENOL) 500 MG tablet Take 2 tablets by mouth 3 times daily  Patient taking differently: Take 1,000 mg by mouth 3 times daily as needed  21   Brenda Ng,    Polyvinyl Alcohol-Povidone (REFRESH OP) Place 1 drop into both eyes 3 times daily    Historical Provider, MD   lidocaine (LIDODERM) 5 % Place 1 patch onto the skin daily as needed for Pain 12 hours on, 12 hours off. Historical Provider, MD       SOCIAL HISTORY     Tobacco: Half pack per day for about 40 years  Alcohol: Occasional alcohol use  Illicits: Polysubstance abuse  Occupation:     FAMILY HISTORY     Family History   Problem Relation Age of Onset    Diabetes Father     Lung Cancer Father     Hypertension Mother     Cancer Mother     High Blood Pressure Mother     Crohn's Disease Brother     Heart Disease Brother        PHYSICAL EXAM     Vitals: BP (!) 77/44   Pulse 64   Temp 98 °F (36.7 °C) (Oral)   Resp 18   Ht 5' 6\" (1.676 m)   Wt 151 lb 1.6 oz (68.5 kg)   SpO2 97%   BMI 24.39 kg/m²   Tmax: Temp (24hrs), Av °F (36.7 °C), Min:97.7 °F (36.5 °C), Max:98.2 °F (36.8 °C)    Last Body weight:    Wt Readings from Last 3 Encounters:   03/25/22 151 lb 1.6 oz (68.5 kg)   03/21/22 143 lb (64.9 kg)   01/27/22 143 lb 11.2 oz (65.2 kg)     Body Mass Index : Body mass index is 24.39 kg/m². Physical Exam  Constitutional:       General: She is not in acute distress. Appearance: She is ill-appearing. She is not toxic-appearing. Cardiovascular:      Heart sounds: No murmur heard. No friction rub. No gallop. Pulmonary:      Effort: No respiratory distress. Breath sounds: No wheezing, rhonchi or rales. Chest:      Chest wall: No tenderness. Abdominal:      General: There is no distension. Palpations: There is no mass. Tenderness: There is abdominal tenderness. There is no right CVA tenderness, left CVA tenderness, guarding or rebound. Musculoskeletal:         General: No swelling, tenderness, deformity or signs of injury. Right lower leg: No edema. Left lower leg: No edema. Skin:     Coloration: Skin is pale. Skin is not jaundiced. Findings: No bruising, erythema or lesion. Neurological:      Cranial Nerves: No cranial nerve deficit. Sensory: No sensory deficit. Motor: Weakness present. Coordination: Coordination normal.   Psychiatric:         Mood and Affect: Mood normal.         Behavior: Behavior normal.         Thought Content: Thought content normal.       INVESTIGATIONS     Laboratory Testing:     Recent Results (from the past 24 hour(s))   COVID-19, Rapid    Collection Time: 03/25/22  3:01 AM    Specimen: Nasopharyngeal Swab   Result Value Ref Range    Specimen Description . NASOPHARYNGEAL SWAB     SARS-CoV-2, Rapid Not Detected Not Detected   Troponin    Collection Time: 03/25/22  9:46 AM   Result Value Ref Range    Troponin, High Sensitivity 36 (H) 0 - 14 ng/L   Basic Metabolic Panel    Collection Time: 03/25/22  9:46 AM   Result Value Ref Range    Glucose 78 70 - 99 mg/dL    BUN 20 6 - 20 mg/dL    CREATININE 0.77 0.50 - 0.90 mg/dL    Calcium 7.8 (L) 8.6 - 10.4 mg/dL    Sodium 141 135 - 144 mmol/L    Potassium 3.0 (L) 3.7 - 5.3 mmol/L    Chloride 106 98 - 107 mmol/L    CO2 22 20 - 31 mmol/L    Anion Gap 13 9 - 17 mmol/L    GFR Non-African American >60 >60 mL/min    GFR African American >60 >60 mL/min    GFR Comment         Magnesium    Collection Time: 03/25/22  9:46 AM   Result Value Ref Range    Magnesium 1.7 1.6 - 2.6 mg/dL   Urinalysis with Microscopic    Collection Time: 03/25/22 10:52 AM   Result Value Ref Range    Color, UA Yellow Yellow    Turbidity UA Cloudy (A) Clear    Glucose, Ur NEGATIVE NEGATIVE    Bilirubin Urine NEGATIVE NEGATIVE    Ketones, Urine NEGATIVE NEGATIVE    Specific Gravity, UA 1.025 1.005 - 1.030    Urine Hgb NEGATIVE NEGATIVE    pH, UA 5.5 5.0 - 8.0    Protein, UA TRACE (A) NEGATIVE    Urobilinogen, Urine Normal Normal    Nitrite, Urine NEGATIVE NEGATIVE    Leukocyte Esterase, Urine MODERATE (A) NEGATIVE    -          WBC, UA TOO NUMEROUS TO COUNT 0 - 5 /HPF    RBC, UA 2 TO 5 0 - 4 /HPF    Casts UA  0 - 8 /LPF     2 TO 5 HYALINE Reference range defined for non-centrifuged specimen. Epithelial Cells UA 2 TO 5 0 - 5 /HPF    Bacteria, UA MANY (A) None   Culture, Blood 1    Collection Time: 03/25/22 11:30 AM    Specimen: Blood   Result Value Ref Range    Specimen Description . BLOOD     Special Requests 4ML LEFT AC     Culture NO GROWTH <24 HRS    Culture, Blood 1    Collection Time: 03/25/22 11:39 AM    Specimen: Blood   Result Value Ref Range    Specimen Description . BLOOD     Special Requests 2 ML LEFT WRIST     Culture NO GROWTH <24 HRS    Lactic Acid    Collection Time: 03/25/22 11:42 AM   Result Value Ref Range    Lactic Acid, Whole Blood 0.7 0.7 - 2.1 mmol/L   Comprehensive Metabolic Panel    Collection Time: 03/25/22 11:42 AM   Result Value Ref Range    Glucose 83 70 - 99 mg/dL    BUN 20 6 - 20 mg/dL    CREATININE 0.80 0.50 - 0.90 mg/dL    Calcium 7.7 (L) 8.6 - 10.4 mg/dL    Sodium 141 135 - 144 mmol/L    Potassium 3.1 (L) 3.7 - 5.3 mmol/L    Chloride 108 (H) 98 - 107 mmol/L    CO2 22 20 - 31 mmol/L    Anion Gap 11 9 - 17 mmol/L    Alkaline Phosphatase 118 (H) 35 - 104 U/L    ALT 8 5 - 33 U/L    AST 16 <32 U/L    Total Bilirubin 0.44 0.3 - 1.2 mg/dL    Total Protein 5.1 (L) 6.4 - 8.3 g/dL    Albumin 3.0 (L) 3.5 - 5.2 g/dL    Albumin/Globulin Ratio 1.4 1.0 - 2.5    GFR Non-African American >60 >60 mL/min    GFR African American >60 >60 mL/min    GFR Comment         CBC with Auto Differential    Collection Time: 03/25/22 11:42 AM   Result Value Ref Range    WBC 4.2 3.5 - 11.3 k/uL    RBC 3.77 (L) 3.95 - 5.11 m/uL    Hemoglobin 10.3 (L) 11.9 - 15.1 g/dL    Hematocrit 33.3 (L) 36.3 - 47.1 %    MCV 88.3 82.6 - 102.9 fL    MCH 27.3 25.2 - 33.5 pg    MCHC 30.9 28.4 - 34.8 g/dL    RDW 16.1 (H) 11.8 - 14.4 %    Platelets 705 816 - 822 k/uL    MPV 11.6 8.1 - 13.5 fL    NRBC Automated 0.0 0.0 per 100 WBC    RBC Morphology ANISOCYTOSIS PRESENT     Seg Neutrophils 54 36 - 65 %    Lymphocytes 32 24 - 43 %    Monocytes 9 3 - 12 %    Eosinophils % 3 1 - 4 %    Basophils 1 0 - 2 %    Immature Granulocytes 0 0 %    Segs Absolute 2.27 1.50 - 8.10 k/uL    Absolute Lymph # 1.33 1.10 - 3.70 k/uL    Absolute Mono # 0.39 0.10 - 1.20 k/uL    Absolute Eos # 0.14 0.00 - 0.44 k/uL    Basophils Absolute 0.05 0.00 - 0.20 k/uL    Absolute Immature Granulocyte <0.03 0.00 - 0.30 k/uL   Magnesium    Collection Time: 03/25/22 11:42 AM   Result Value Ref Range    Magnesium 1.7 1.6 - 2.6 mg/dL   Troponin    Collection Time: 03/25/22 11:42 AM   Result Value Ref Range    Troponin, High Sensitivity 35 (H) 0 - 14 ng/L       Imaging:   XR SHOULDER RIGHT (MIN 2 VIEWS)    Result Date: 3/25/2022  Unremarkable right shoulder and right elbow. XR ELBOW RIGHT (MIN 3 VIEWS)    Result Date: 3/25/2022  Unremarkable right shoulder and right elbow.      XR HIP LEFT (2-3 VIEWS)    Result Date: 3/22/2022  No acute bony abnormalities are noted     XR KNEE LEFT (3 VIEWS)    Result Date: 3/22/2022  No acute bony abnormalities are noted     XR ANKLE LEFT (MIN 3 VIEWS)    Result Date: 3/22/2022  No acute bony abnormalities are noted     CT Head WO Contrast    Result Date: 3/22/2022  No acute findings in the head/brain. CT CERVICAL SPINE WO CONTRAST    Result Date: 3/22/2022  No evidence of fracture with multilevel degenerative/chronic findings as described. CT ABDOMEN PELVIS W IV CONTRAST Additional Contrast? None    Result Date: 3/22/2022  1. Wall thickening present throughout the colon, most prominent within the right colon. Differential considerations would include antibiotic associated colitis, infectious colitis, or inflammatory bowel disease. 2. Severe degenerative change involving the left hip joint 3. Nonobstructive bowel gas pattern     CT CHEST PULMONARY EMBOLISM W CONTRAST    Result Date: 3/22/2022  1. No acute pulmonary embolism. 2. No acute pulmonary disease. 3. Possible esophagitis seen as diffuse mild distal esophageal wall thickening. 4. No acute traumatic abnormality seen. CT LUMBAR SPINE TRAUMA RECONSTRUCTION    Result Date: 3/22/2022  No evidence of an acute fracture or traumatic malalignment involving the lumbar spine     CT THORACIC SPINE TRAUMA RECONSTRUCTION    Result Date: 3/22/2022  No evidence of fracture with minimal degenerative disc disease noted. XR HIP 2-3 VW W PELVIS LEFT    Result Date: 3/25/2022  1. Severe degenerative changes again noted involving the left hip joint, without evidence of an acute fracture 2. Moderate degree of degenerative change involving the right hip joint.  3. No evidence of an acute fracture involving the pelvis       ASSESSMENT & PLAN     Principal Problem:    UTI (urinary tract infection)  Active Problems:    COPD (chronic obstructive pulmonary disease) (HCC)    Hypertension    Hypokalemia    Crohn disease (HCC)    Prolonged Q-T interval on ECG    Bipolar disorder, unspecified ([address]    Abdominal pain    Falls frequently Hypotension  Resolved Problems:    * No resolved hospital problems. *    1. Hypotension. S/p 1.5 L bolus. Resolved  2. UTI. Cefepime 2 g twice daily, NS 75 mL/h.  3. Hypertension. Currently stable, hold lisinopril  4. Hypokalemia. Replace potassium  5. Chronic disease. Currently stable  6. Prolonged QTC on EKG. Avoid QTC prolonging drugs. 7. Migraine. Continue on Imitrex, naproxen and Norco.  Topamax 100 3 times daily  8. Anemia likely secondary to Crohn's disease. Monitor H&H daily  9. Tobacco smoking. NicoDerm 21/24-hour 1 patch    DVT ppx: Lovenox daily  GI ppx: Protonix 40 twice daily    PT/OT/SW: Consulted  Discharge Planning: CM to assist with    Nena Pillai MD  Internal Medicine Resident, PGY-1  [facility];  [place], [place]  3/25/2022, 2:01 PM

## 2022-03-25 NOTE — H&P
[address] [facility]  CDU / OBSERVATION ENCOUNTER  RESIDENT NOTE     Pt Name: Paul Reyez  MRN: [id number]  [place] 1965  Date of evaluation: 3/25/22  Patient's PCP is :  Deondre Turner MD    CHIEF COMPLAINT     No chief complaint on file. Falls      HISTORY OF PRESENT ILLNESS    Paul Reyez is a 61 y.o. female who presented to the emergency department for multiple falls in the last week. Has some pain in her elbow from the fall, imaging in the department was negative for fracture. Significant findings morning is that her blood pressure is 70 systolic, she reports feeling very very tired but otherwise answering questions appropriately. No change with lying flat or with 1 L bolus. Ordered labs EKG and consulted medicine for transfer to higher level of care. Found to have UTI with urosepsis. Does endorse burning with urination intermittently in the last few days. Also found to have troponin elevation from baseline, cardiology consulted, plan for serial troponins and their evaluation. She has baseline abdominal pain with her Crohn's, not worse today no rebound or guarding. Location/Symptom: Lightheaded  Timing/Onset:  This morning  Provocation: Sitting up  Quality: Very tired and lightheaded  Radiation: N/A  Severity: Moderate  Timing/Duration: Several hours  Modifying Factors: None    REVIEW OF SYSTEMS       General ROS - No fevers, No malaise   Ophthalmic ROS - No discharge, No changes in vision  ENT ROS -  No sore throat, No rhinorrhea,   Respiratory ROS - no shortness of breath, no cough, no  wheezing  Cardiovascular ROS - No chest pain, no dyspnea on exertion  Gastrointestinal ROS - no nausea or vomiting, no change in bowel habits, no black or bloody stools  Genito-Urinary ROS - No trouble voiding, or hematuria  Musculoskeletal ROS - No myalgias, No arthalgias  Neurological ROS - No headache, No focal weakness, no loss of sensation  Dermatological ROS - No lesions, No rash     (PQRS) Advance directives on face sheet per hospital policy. No change unless specifically mentioned in chart    PAST MEDICAL HISTORY    has a past medical history of Acid reflux, Anxiety, Arthritis, Asthma, Bipolar 1 disorder ([address] Bowel obstruction ([address] COPD (chronic obstructive pulmonary disease) ([facility] Crohn disease ([facility] [address] Depression, Dry eye, Fibromyalgia, Gout, Headache, Hyperlipidemia, Hypertension, Hypothyroidism, Kidney stones, Muscle spasm, Neuropathy, Pain, Personal history of other medical treatment, Wears partial dentures, and Wellness examination. I have reviewed the past medical history with the patient and it is pertinent to this complaint. SURGICAL HISTORY      has a past surgical history that includes Tonsillectomy and adenoidectomy; Tubal ligation; Cholecystectomy; Bunionectomy (Left); Colonoscopy (04/30/2007); Colonoscopy (10/12/2017); Abdomen surgery; Upper gastrointestinal endoscopy (10/25/2021); Colonoscopy (10/25/2021); and Colonoscopy (10/25/2021). I have reviewed and agree with Surgical History entered and it is pertinent to this complaint.      CURRENT MEDICATIONS     lidocaine 4 % external patch 1 patch, Daily  diphenhydrAMINE (BENADRYL) tablet 25 mg, Nightly PRN  gabapentin (NEURONTIN) capsule 800 mg, TID  HYDROcodone-acetaminophen (NORCO) 5-325 MG per tablet 1 tablet, Q8H PRN  albuterol sulfate  (90 Base) MCG/ACT inhaler 2 puff, Q6H PRN  nicotine (NICODERM CQ) 21 MG/24HR 1 patch, Daily  [Held by provider] lisinopril (PRINIVIL;ZESTRIL) tablet 5 mg, Daily  polyvinyl alcohol (LIQUIFILM TEARS) 1.4 % ophthalmic solution 1 drop, Daily PRN  topiramate (TOPAMAX) tablet 100 mg, TID  pantoprazole (PROTONIX) tablet 40 mg, BID AC  sucralfate (CARAFATE) tablet 1 g, 4x Daily  sodium chloride flush 0.9 % injection 5-40 mL, 2 times per day  sodium chloride flush 0.9 % injection 5-40 mL, PRN  0.9 % sodium chloride infusion, PRN  enoxaparin (LOVENOX) injection 40 mg, Daily  acetaminophen (TYLENOL) tablet 650 mg, Q4H PRN  ondansetron (ZOFRAN-ODT) disintegrating tablet 4 mg, Q8H PRN   Or  ondansetron (ZOFRAN) injection 4 mg, Q6H PRN  0.9 % sodium chloride infusion, Continuous  cefTRIAXone (ROCEPHIN) 1,000 mg in sterile water 10 mL IV syringe, Q24H        All medication charted and reviewed. ALLERGIES     is allergic to azithromycin, methylprednisolone, penicillins, and tape [adhesive tape]. FAMILY HISTORY     She indicated that her mother is . She indicated that her father is . She indicated that her brother is alive. family history includes Cancer in her mother; Crohn's Disease in her brother; Diabetes in her father; Heart Disease in her brother; High Blood Pressure in her mother; Hypertension in her mother; Cleaster Libman in her father. The patient denies any pertinent family history. I have reviewed and agree with the family history entered. I have reviewed the Family History and it is not significant to the case    SOCIAL HISTORY      reports that she has been smoking cigarettes. She has a 18.50 pack-year smoking history. She quit smokeless tobacco use about 20 years ago. Her smokeless tobacco use included chew. She reports previous alcohol use. She reports current drug use. Drug: Marijuana Amelia Fink). I have reviewed and agree with all Social.  There are no concerns for substance abuse/use. PHYSICAL EXAM     INITIAL VITALS:  height is 5' 6\" (1.676 m) and weight is 151 lb 1.6 oz (68.5 kg). Her oral temperature is 98 °F (36.7 °C). Her blood pressure is 122/75 and her pulse is 73. Her respiration is 16 and oxygen saturation is 94%.       CONSTITUTIONAL: AOx4, no apparent distress, appears stated age    HEAD: normocephalic, atraumatic   EYES: PERRLA, EOMI    ENT: moist mucous membranes, uvula midline   NECK: supple, symmetric   BACK: symmetric   LUNGS: clear to auscultation bilaterally   CARDIOVASCULAR: regular rate and rhythm, no murmurs, rubs or gallops   ABDOMEN: soft, non-tender, non-distended with normal active bowel sounds   NEUROLOGIC:  MAEx4, no focal sensory or motor deficits   MUSCULOSKELETAL: no clubbing, cyanosis or edema   SKIN: no rash or wounds       DIFFERENTIAL DIAGNOSIS/MDM:     Placed in observation for repeated mechanical falls, found to be significantly hypotensive in the morning with UTI. DIAGNOSTIC RESULTS     RADIOLOGY:   I directly visualized the following  images and reviewed the radiologist interpretations:    XR SHOULDER RIGHT (MIN 2 VIEWS)    Result Date: 3/25/2022  EXAMINATION: THREE XRAY VIEWS OF THE RIGHT ELBOW; THREE XRAY VIEWS OF THE RIGHT SHOULDER 3/25/2022 1:04 am COMPARISON: None. HISTORY: ORDERING SYSTEM PROVIDED HISTORY: pain eval fx sp fall TECHNOLOGIST PROVIDED HISTORY: pain eval fx sp fall FINDINGS: No fracture, dislocation or joint abnormality is identified. Bone density and soft tissues are unremarkable. Unremarkable right shoulder and right elbow. XR ELBOW RIGHT (MIN 3 VIEWS)    Result Date: 3/25/2022  EXAMINATION: THREE XRAY VIEWS OF THE RIGHT ELBOW; THREE XRAY VIEWS OF THE RIGHT SHOULDER 3/25/2022 1:04 am COMPARISON: None. HISTORY: ORDERING SYSTEM PROVIDED HISTORY: pain eval fx sp fall TECHNOLOGIST PROVIDED HISTORY: pain eval fx sp fall FINDINGS: No fracture, dislocation or joint abnormality is identified. Bone density and soft tissues are unremarkable. Unremarkable right shoulder and right elbow. XR HIP 2-3 VW W PELVIS LEFT    Result Date: 3/25/2022  EXAMINATION: ONE XRAY VIEW OF THE PELVIS AND TWO XRAY VIEWS LEFT HIP 3/24/2022 10:04 pm COMPARISON: July 17, 2021 HISTORY: ORDERING SYSTEM PROVIDED HISTORY: pain sp fall TECHNOLOGIST PROVIDED HISTORY: pain sp fall FINDINGS: Severe degenerative changes again noted involving the left hip joint, similar compared to the prior plain film series.   Mild-to-moderate degree of degenerative changes present involving the right hip joint, progressed since the prior study. Pubic rami are intact. No evidence of an acute fracture is present involving the left hip. 1. Severe degenerative changes again noted involving the left hip joint, without evidence of an acute fracture 2. Moderate degree of degenerative change involving the right hip joint. 3. No evidence of an acute fracture involving the pelvis       LABS:  I have reviewed and interpreted all available lab results.   Labs Reviewed   CULTURE, URINE - Abnormal; Notable for the following components:       Result Value    Culture ESCHERICHIA COLI >165879 CFU/ML (*)     All other components within normal limits   TROPONIN - Abnormal; Notable for the following components:    Troponin, High Sensitivity 36 (*)     All other components within normal limits   BASIC METABOLIC PANEL - Abnormal; Notable for the following components:    Calcium 7.8 (*)     Potassium 3.0 (*)     All other components within normal limits   URINALYSIS WITH MICROSCOPIC - Abnormal; Notable for the following components:    Turbidity UA Cloudy (*)     Protein, UA TRACE (*)     Leukocyte Esterase, Urine MODERATE (*)     Bacteria, UA MANY (*)     All other components within normal limits   COMPREHENSIVE METABOLIC PANEL - Abnormal; Notable for the following components:    Calcium 7.7 (*)     Potassium 3.1 (*)     Chloride 108 (*)     Alkaline Phosphatase 118 (*)     Total Protein 5.1 (*)     Albumin 3.0 (*)     All other components within normal limits   CBC WITH AUTO DIFFERENTIAL - Abnormal; Notable for the following components:    RBC 3.77 (*)     Hemoglobin 10.3 (*)     Hematocrit 33.3 (*)     RDW 16.1 (*)     All other components within normal limits   TROPONIN - Abnormal; Notable for the following components:    Troponin, High Sensitivity 33 (*)     All other components within normal limits   TROPONIN - Abnormal; Notable for the following components:    Troponin, High Sensitivity 35 (*)     All other components within normal limits   C-REACTIVE PROTEIN - Abnormal; Notable for the following components:    CRP 30.3 (*)     All other components within normal limits   POTASSIUM W/ REFLEX TO MAGNESIUM - Abnormal; Notable for the following components:    Potassium 3.6 (*)     All other components within normal limits   COVID-19, RAPID   CULTURE, BLOOD 1   CULTURE, BLOOD 1   MAGNESIUM   LACTIC ACID   MAGNESIUM   SEDIMENTATION RATE       SCREENING TOOLS:    HEART Risk Score for Chest Pain Patients   History and Physical Exam Suspicion Level  (Nausea, Vomiting, Diaphoresis, Radiation, Exertion)   Slightly Suspicious (0 pts)   Moderately Suspicious (1 pt)   Highly Suspicious (2 pts)   EKG Interpretation   Normal (0 pts)   Non-Specific Repolarization Disturbance (1 pt)   Significant ST-Depression (2 pts)   Age of Patient (in years)   = 39 (0 pts)   46-64 (1 pt)   = 65 (2 pts)   Risk Factors   No Risk Factors (0 pts)   1-2 Risk Factors (1 pt)   = 3 Risk Factors (2 pts)   Risk Factors Include:   Hypercholesterolemia   Hypertension   Diabetes Mellitus   Cigarette smoking   Positive family history   Obesity   CAD   (SLE, CKDz, HIV, Cocaine abuse)   Troponin Levels   = Normal Limit (0 pts)   1-3 Times Normal Limit (1 pt)   > 3 Times Normal Limit (2 pts)  TOTAL:    Percent Risk for Major Adverse Cardiac Event (MACE)  0-3 pts indicates low risk for MACE   2.5% (DISCHARGE)   4-7 pts indicates moderate risk for MACE  20.3% (OBS)  8-10 pts indicates high risk for MACE  72.7% (EARLY INVASIVE TX)    CDU IMPRESSION / PLAN      Angie Leahy is a 61 y.o. female who presents with with falls, found to be in urosepsis, transferred to internal medicine. Cardiology also consulted for troponin elevation, no active chest pain. · Transfer to internal medicine    CONSULTS:    IP CONSULT TO INTERNAL MEDICINE  IP CONSULT TO CARDIOLOGY    PROCEDURES:  Not indicated       PATIENT REFERRED TO:    No follow-up provider specified.     --  Mary Lou Stewart MD  Emergency Medicine Resident This dictation was generated by voice recognition computer software. Although all attempts are made to edit the dictation for accuracy, there may be errors in the transcription that are not intended.

## 2022-03-25 NOTE — ED PROVIDER NOTES
[facility] ED     Emergency Department     Faculty Attestation        I performed a history and physical examination of the patient and discussed management with the resident. I reviewed the residents note and agree with the documented findings and plan of care. Any areas of disagreement are noted on the chart. I was personally present for the key portions of any procedures. I have documented in the chart those procedures where I was not present during the key portions. I have reviewed the emergency nurses triage note. I agree with the chief complaint, past medical history, past surgical history, allergies, medications, social and family history as documented unless otherwise noted below. For mid-level providers such as nurse practitioners as well as physicians assistants:    I have personally seen and evaluated the patient. I find the patient's history and physical exam are consistent with NP/PA documentation. I agree with the care provided, treatment rendered, disposition, & follow-up plan. Additional findings are as noted.     Vital Signs: BP (!) 91/57   Pulse 71   Temp 98.2 °F (36.8 °C) (Oral)   Resp 16   Ht 5' 6\" (1.676 m)   Wt 135 lb (61.2 kg)   SpO2 98%   BMI 21.79 kg/m²   PCP:  Carolene Dubin, MD    Pertinent Comments:           Critical Care  None          Dionte Bañuelos MD    Attending Emergency Medicine Physician              Markel Calhoun MD  03/25/22 [id number]

## 2022-03-25 NOTE — PROGRESS NOTES
[facility]upational [address]  Occupational Therapy Not Seen Note    DATE: 3/25/2022    NAME: Archana Vega  MRN: [id number]   : 1965      Patient not seen this date for Occupational Therapy due to: Other: Per RN, pt is hypotensive, BP 71/50 this am. Pt with decreased alertness, to be transferred to step down.     Next Scheduled Treatment:3/26/2022     Electronically signed by TOMI Coburn on 3/25/2022 at 11:41 AM

## 2022-03-25 NOTE — PROGRESS NOTES
On morning vitals pt found to have BP 70/50, same sitting and laying, unresponsive to 1L fluids. Speaking clearly but reports feeling very tired. Good radial pulse, equal in all 4 extremities. Abdominal pain is at her baseline (Crohn's), hip pain is also at her baseline. Denies chest pain. Has been having some dysuria over the last few days. Consult placed to internal medicine for hypotension of unknown origin and minimally responsive to fluid. Evaluated with team and currently stable not requiring pressors, not at the level of requiring MICU care. Cardiology also consulted for increased troponin, no chest pain. Pt transferred to the care of Dr. Clarence Weinberg. Full report given to Dr. Marlo Olsen with the opportunity to ask questions and discuss care. Dr. Marlo Olsen accepted the patient. Care was transferred at this time.      --  Meaghan Calzada MD  Emergency Medicine Resident Physician   10:21 AM EDT

## 2022-03-25 NOTE — H&P
Attending Supervising Physicians Attestation Statement  I have seen and examined Annamaria Nance and the rucker elements of all parts of the encounter have been performed by me. I agree with the assessment, plan and orders as documented by the Advanced Practice Provider. I discussed the findings and plans with the resident physician and agree as documented in their note . In addition to the pertinent positives and negatives as stated within HPI and the review of systems as documented in the notes, all other systems were reviewed when able to and are reported negative. Additional Comments:   61 F presented with dysuria  Admitted to obs unit for UTI  Noted to have hypotension  Transferred to IM service  States she has hypotension at home  BP currently in 140s    Principal Problem:    UTI (urinary tract infection)  Active Problems:    COPD (chronic obstructive pulmonary disease) ([facility])    Hypertension    Hypokalemia    Crohn disease ([address]    Prolonged Q-T interval on ECG    Bipolar disorder, unspecified ([address]    Abdominal pain    Falls frequently    Hypotension  Resolved Problems:    * No resolved hospital problems.  *    - monitor BP  - Continue IVF  - Follow up cultures  - Start abx   - hold home anti hypertensives  - pain control for crohns  - repeat CT abd if worsening abd pain   - check inflammatory markers     Electronically signed by Krishna Brown MD on 3/25/2022 at 6:16 PM

## 2022-03-25 NOTE — Clinical Note
Discharge Plan[de-identified] Extended Care Facility (e.g. Adult Home, Nursing Home, etc.)   Bed request comments: jose neg

## 2022-03-25 NOTE — CARE COORDINATION
Case Management Initial Discharge Plan  Tonia Hernandez,             Met with:patient to discuss discharge plans. Information verified: address, contacts, phone number, , insurance Yes  Insurance Provider: [place] [employer]     Emergency Contact/Next of Kin name & number: Luz Agarwal, daughter [phone]  Who are involved in patient's support system? Daughter     PCP: Hank Hernandez MD        Discharge Planning    Living Arrangements:        Home has 1 stories  0 stairs to climb to get into front door, 0stairs to climb to reach second floor  Location of bedroom/bathroom in home main level     Patient able to perform ADL's:Independent    Current Services (outpatient & in home) none   DME equipment: [employer]   DME provider: na     Is patient receiving oral anticoagulation therapy? No    If indicated:   Physician managing anticoagulation treatment: na  Where does patient obtain lab work for ATC treatment? na      Potential Assistance Needed:       Patient agreeable to home care: Yes  Freedom of choice provided:  yes    Prior SNF/Rehab Placement and Facility: none   Agreeable to SNF/Rehab: No  [place] of choice provided: n/a     Evaluation: no    Expected Discharge date:       Patient expects to be discharged to: If home: is the family and/or caregiver wiling & able to provide support at home? Yes   Who will be providing this support? Daughter     Follow Up Appointment: Best Day/ Time:      Transportation provider: unsure   Transportation arrangements needed for discharge: unsure     Readmission Risk              Risk of Unplanned Readmission:  0             Does patient have a readmission risk score greater than 14?: No  If yes, follow-up appointment must be made within 7 days of discharge. Goals of Care:  To walk better     Educated patient  on transitional options, provided freedom of choice and are agreeable with plan      Discharge Plan: [id number] [place] [address] from [facility] they cannot take due to prior drug use. Patient would like to have a referral sent to [facility]. Sent.       Electronically signed by Pema Chahal RN on 3/25/22 at 10:23 AM EDT

## 2022-03-25 NOTE — ED PROVIDER NOTES
[facility]  ED  Emergency Department Encounter  EmergencyMedicine Resident     Pt Ricki Flood  MRN: [id number]  [place] 1965  Date of evaluation: 3/25/22  PCP:  Jessica Duggan MD    This patient was evaluated in the Emergency Department for symptoms described in the history of present illness. The patient was evaluated in the context of the global COVID-19 pandemic, which necessitated consideration that the patient might be at risk for infection with the SARS-CoV-2 virus that causes COVID-19. Institutional protocols and algorithms that pertain to the evaluation of patients at risk for COVID-19 are in a state of rapid change based on information released by regulatory bodies including the CDC and federal and state organizations. These policies and algorithms were followed during the patient's care in the ED. CHIEF COMPLAINT       No chief complaint on file. fall    HISTORY OF PRESENT ILLNESS  (Location/Symptom, Timing/Onset, Context/Setting, Quality, Duration, Modifying Factors, Severity.)      Mike Lemon is a 61 y.o. female who presents with a fall which occurred approximately 2 hours prior to arrival.  Patient states that she was trying to sit down on her seated wheelchair/walker when she slipped and landed forward on her elbow and shoulder On the right side. Patient did not lose consciousness and no prodromal symptoms she was able to get up with assistance from her daughter. Who brought her to the emergency department. Patient denies any blood thinning medications any chest pain shortness of breath. Onset: 2 hours ago  Provocation: Fall from standing  Quality: Throbbing  Radiation: None  Severity: Moderate  Timing: Constant  Interventions: None    PAST MEDICAL / SURGICAL / SOCIAL / FAMILY HISTORY     Past medical history, surgical history, social history, family history as well as patient's allergies and home medications were reviewed.      has a past medical history of Acid reflux, Anxiety, Arthritis, Asthma, Bipolar 1 disorder ([facility] [address] Bowel obstruction ([facility] [address] COPD (chronic obstructive pulmonary disease) ([facility] [address] Crohn disease ([facility] [address] Depression, Dry eye, Fibromyalgia, Gout, Headache, Hyperlipidemia, Hypertension, Hypothyroidism, Kidney stones, Muscle spasm, Neuropathy, Pain, Personal history of other medical treatment, Wears partial dentures, and Wellness examination. has a past surgical history that includes Tonsillectomy and adenoidectomy; Tubal ligation; Cholecystectomy; Bunionectomy (Left); Colonoscopy (04/30/2007); Colonoscopy (10/12/2017); Abdomen surgery; Upper gastrointestinal endoscopy (10/25/2021); Colonoscopy (10/25/2021); and Colonoscopy (10/25/2021). Social History     Socioeconomic History    Marital status: Single     Spouse name: Not on file    Number of children: Not on file    Years of education: Not on file    Highest education level: Not on file   Occupational History    Not on file   Tobacco Use    Smoking status: Current Every Day Smoker     Packs/day: 0.50     Years: 37.00     Pack years: 18.50     Types: Cigarettes    Smokeless tobacco: Former User     Types: Chew     Quit date: 9/3/2001   Vaping Use    Vaping Use: Former    Quit date: 9/3/2019    Substances: Nicotine   Substance and Sexual Activity    Alcohol use: Not Currently    Drug use: Yes     Types: Marijuana (Weed)     Comment: h/o of substance abuse but denies drug use    Sexual activity: Not on file   Other Topics Concern    Not on file   Social History Narrative    Not on file     Social Determinants of Health     Financial Resource Strain:     Difficulty of Paying Living Expenses: Not on file   Food Insecurity:     Worried About [email or web address] in the Last Year: Not on file    [address] in the Last Year: Not on file   Transportation Needs:     Lack of Transportation (Medical): Not on file    Lack of Transportation (Non-Medical):  Not on file   Physical Activity:     Days of Exercise per Week: Not on file    Minutes of Exercise per Session: Not on file   Stress:     Feeling of Stress : Not on file   Social Connections:     Frequency of Communication with Friends and Family: Not on file    Frequency of Social Gatherings with Friends and Family: Not on file    Attends Jew Services: Not on file    Active Member of Clubs or Organizations: Not on file    Attends Club or Organization Meetings: Not on file    Marital Status: Not on file   Intimate Partner Violence:     Fear of Current or Ex-Partner: Not on file    Emotionally Abused: Not on file    Physically Abused: Not on file    Sexually Abused: Not on file   Housing Stability:     Unable to Pay for Housing in the Last Year: Not on file    Number of [place] in the Last Year: Not on file    Unstable Housing in the Last Year: Not on file       Family History   Problem Relation Age of Onset    Diabetes Father     Lung Cancer Father     Hypertension Mother     Cancer Mother     High Blood Pressure Mother     Crohn's Disease Brother     Heart Disease Brother        Allergies:  Azithromycin, Methylprednisolone, Penicillins, and Tape [adhesive tape]    Home Medications:  Prior to Admission medications    Medication Sig Start Date End Date Taking? Authorizing Provider   HYDROcodone-acetaminophen (NORCO) 5-325 MG per tablet Take 1 tablet by mouth every 8 hours as needed for Pain for up to 14 days.  3/21/22 4/4/22  Yessy Roland MD   potassium chloride (KLOR-CON M) 20 MEQ extended release tablet Take 1 tablet by mouth 2 times daily for 2 doses 10/25/21 10/26/21  Gabriela Fountain MD   pantoprazole (PROTONIX) 40 MG tablet Take 1 tablet by mouth 2 times daily (before meals) 10/24/21 12/19/21  Elly Hanson MD   sucralfate (CARAFATE) 1 GM tablet Take 1 tablet by mouth 4 times daily for 28 days 10/24/21 11/21/21  Elly Hanson MD   albuterol sulfate HFA (VENTOLIN HFA) 108 (90 Base) MCG/ACT inhaler Inhale 2 puffs into the lungs every 6 hours as needed for Wheezing    Historical Provider, MD   diphenhydrAMINE (BENADRYL) 25 MG tablet Take 25 mg by mouth nightly as needed    Historical Provider, MD   topiramate (TOPAMAX) 100 MG tablet Take 100 mg by mouth 3 times daily     Historical Provider, MD   SUMAtriptan (IMITREX) 100 MG tablet Take 100 mg by mouth once as needed for Migraine    Historical Provider, MD   gabapentin (NEURONTIN) 400 MG capsule Take 800 mg by mouth 3 times daily. Historical Provider, MD   budesonide-formoterol (SYMBICORT) 160-4.5 MCG/ACT AERO Inhale 2 puffs into the lungs 2 times daily 9/19/21   Shima Velazquez MD   lisinopril (PRINIVIL;ZESTRIL) 5 MG tablet Take 1 tablet by mouth daily 9/20/21   Shima Velazquez MD   nicotine (NICODERM CQ) 21 MG/24HR Place 1 patch onto the skin daily 9/20/21   Shima Velazquez MD   acetaminophen (TYLENOL) 500 MG tablet Take 2 tablets by mouth 3 times daily  Patient taking differently: Take 1,000 mg by mouth 3 times daily as needed  7/17/21   Joanne Sampson DO   Polyvinyl Alcohol-Povidone (REFRESH OP) Place 1 drop into both eyes 3 times daily    Historical Provider, MD   lidocaine (LIDODERM) 5 % Place 1 patch onto the skin daily as needed for Pain 12 hours on, 12 hours off. Historical Provider, MD       REVIEW OF SYSTEMS    (2-9 systems for level 4, 10 or more for level 5)      Review of Systems   Constitutional: Negative for fever. HENT: Negative for congestion. Eyes: Negative for photophobia. Respiratory: Negative for shortness of breath. Cardiovascular: Negative for chest pain. Gastrointestinal: Negative for abdominal pain, nausea and vomiting. Genitourinary: Negative for flank pain. Musculoskeletal: Positive for arthralgias and gait problem. Skin: Negative for pallor, rash and wound. Allergic/Immunologic: Positive for environmental allergies. Neurological: Positive for headaches.    Hematological: Negative for adenopathy. Psychiatric/Behavioral: Negative for agitation. PHYSICAL EXAM   (up to 7 for level 4, 8 or more for level 5)      INITIAL VITALS:   BP (!) 91/57   Pulse 71   Temp 98.2 °F (36.8 °C) (Oral)   Resp 16   Ht 5' 6\" (1.676 m)   Wt 135 lb (61.2 kg)   SpO2 98%   BMI 21.79 kg/m²     Physical Exam  Vitals and nursing note reviewed. Constitutional:       General: She is not in acute distress. Appearance: Normal appearance. She is not toxic-appearing. HENT:      Head: Normocephalic and atraumatic. Right Ear: External ear normal.      Left Ear: External ear normal.      Nose: Nose normal.      Mouth/Throat:      Pharynx: Oropharynx is clear. Eyes:      Conjunctiva/sclera: Conjunctivae normal.   Neck:      Comments: At baseline  Cardiovascular:      Rate and Rhythm: Normal rate. Pulses: Normal pulses. Pulmonary:      Effort: Pulmonary effort is normal.      Breath sounds: Normal breath sounds. Abdominal:      Palpations: Abdomen is soft. Tenderness: There is no abdominal tenderness. There is no guarding. Musculoskeletal:         General: Normal range of motion. Cervical back: Normal range of motion. Skin:     General: Skin is warm. Capillary Refill: Capillary refill takes less than 2 seconds. Neurological:      Mental Status: She is alert.    Psychiatric:         Mood and Affect: Mood normal.          DIFFERENTIAL  DIAGNOSIS     PLAN (LABS / IMAGING / EKG):  Orders Placed This Encounter   Procedures    COVID-19, Rapid    XR SHOULDER RIGHT (MIN 2 VIEWS)    XR ELBOW RIGHT (MIN 3 VIEWS)    XR HIP 2-3 VW W PELVIS LEFT    Place in Observation Service    Place in Observation Service       MEDICATIONS ORDERED:  Orders Placed This Encounter   Medications    naproxen (NAPROSYN) tablet 500 mg    lidocaine 4 % external patch 1 patch    HYDROcodone-acetaminophen (NORCO) 5-325 MG per tablet 1 tablet    0.9 % sodium chloride bolus       DDX: contusion, doubt fracture    DIAGNOSTIC RESULTS / EMERGENCY DEPARTMENT COURSE / MDM   LAB RESULTS:  No results found for this visit on 03/25/22. IMPRESSION: Is an alert oriented nontoxic 24-year-old female chronically ill-appearing on examination who presents after mechanical fall where she landed forward on her elbow and right shoulder hospitalization with head pain and headache. Blood thinning medications medication at this time for CT scan of her head she did not hit her head or lose consciousness. Full range of motion of her elbow she has limited range of motion of her shoulder but it does not clinically appear dislocated. Pulse motor and sensory function are intact distally. Left hip is not shortened or rotated there is pain on logroll test.  Pulses are intact sensation is intact distally. Plan to be x-ray imaging of the hip as well analgesia with a small fluid bolus as patient has low normal blood pressures. RADIOLOGY:  XR SHOULDER RIGHT (MIN 2 VIEWS)    Result Date: 3/25/2022  EXAMINATION: THREE XRAY VIEWS OF THE RIGHT ELBOW; THREE XRAY VIEWS OF THE RIGHT SHOULDER 3/25/2022 1:04 am COMPARISON: None. HISTORY: ORDERING SYSTEM PROVIDED HISTORY: pain eval fx sp fall TECHNOLOGIST PROVIDED HISTORY: pain eval fx sp fall FINDINGS: No fracture, dislocation or joint abnormality is identified. Bone density and soft tissues are unremarkable. Unremarkable right shoulder and right elbow. XR ELBOW RIGHT (MIN 3 VIEWS)    Result Date: 3/25/2022  EXAMINATION: THREE XRAY VIEWS OF THE RIGHT ELBOW; THREE XRAY VIEWS OF THE RIGHT SHOULDER 3/25/2022 1:04 am COMPARISON: None. HISTORY: ORDERING SYSTEM PROVIDED HISTORY: pain eval fx sp fall TECHNOLOGIST PROVIDED HISTORY: pain eval fx sp fall FINDINGS: No fracture, dislocation or joint abnormality is identified. Bone density and soft tissues are unremarkable. Unremarkable right shoulder and right elbow.      XR HIP 2-3 VW W PELVIS LEFT    Result Date: 3/25/2022  EXAMINATION: ONE XRAY VIEW OF THE PELVIS AND TWO XRAY VIEWS LEFT HIP 3/24/2022 10:04 pm COMPARISON: July 17, 2021 HISTORY: ORDERING SYSTEM PROVIDED HISTORY: pain sp fall TECHNOLOGIST PROVIDED HISTORY: pain sp fall FINDINGS: Severe degenerative changes again noted involving the left hip joint, similar compared to the prior plain film series. Mild-to-moderate degree of degenerative changes present involving the right hip joint, progressed since the prior study. Pubic rami are intact. No evidence of an acute fracture is present involving the left hip. 1. Severe degenerative changes again noted involving the left hip joint, without evidence of an acute fracture 2. Moderate degree of degenerative change involving the right hip joint. 3. No evidence of an acute fracture involving the pelvis       EKG  none    All EKG's are interpreted by the Emergency Department Physician who either signs or Co-signs this chart in the absence of a cardiologist.    EMERGENCY DEPARTMENT COURSE:  ED Course as of 03/25/22 0323   Fri Mar 25, 2022   0032 Prior blood pressures reviewed [BG]   0107 Xr images reviewed [BG]      ED Course User Index  [BG] Rosalie Garcia DO     ED Course addendum  With patient's multiple falls and the fact that she lives alone this is her second ED visit this week for falls plan will be admission to the observation unit for further PT and OT evaluation possible placement/outpatient rehab      PROCEDURES:      CONSULTS:  None    CRITICAL CARE:      FINAL IMPRESSION      1. Fall from standing, subsequent encounter          DISPOSITION / [address] Admitted 03/25/2022 02:43:16 AM      PATIENT REFERRED TO:  No follow-up provider specified.     DISCHARGE MEDICATIONS:  New Prescriptions    No medications on file       Rosalie Garcia DO  Emergency Medicine Resident    (Please note that portions of thisnote were completed with a voice recognition program.  Efforts were made to edit the dictations but occasionally words are mis-transcribed.)        Melida Isaacs DO  Resident  03/25/22 [id number]

## 2022-03-25 NOTE — ED TRIAGE NOTES
The patient reports falling when attempting to sit in a chair tonight. The patient denies LOC. The patient reports right arm pain and headache.

## 2022-03-26 VITALS
BODY MASS INDEX: 24.28 KG/M2 | HEIGHT: 66 IN | SYSTOLIC BLOOD PRESSURE: 101 MMHG | RESPIRATION RATE: 15 BRPM | TEMPERATURE: 98 F | HEART RATE: 72 BPM | OXYGEN SATURATION: 94 % | WEIGHT: 151.1 LBS | DIASTOLIC BLOOD PRESSURE: 69 MMHG

## 2022-03-26 LAB
ABSOLUTE EOS #: 0.18 K/UL (ref 0–0.44)
ABSOLUTE IMMATURE GRANULOCYTE: <0.03 K/UL (ref 0–0.3)
ABSOLUTE LYMPH #: 1.78 K/UL (ref 1.1–3.7)
ABSOLUTE MONO #: 0.56 K/UL (ref 0.1–1.2)
ANION GAP SERPL CALCULATED.3IONS-SCNC: 11 MMOL/L (ref 9–17)
BASOPHILS # BLD: 1 % (ref 0–2)
BASOPHILS ABSOLUTE: 0.05 K/UL (ref 0–0.2)
BUN BLDV-MCNC: 16 MG/DL (ref 6–20)
CALCIUM SERPL-MCNC: 8.8 MG/DL (ref 8.6–10.4)
CHLORIDE BLD-SCNC: 110 MMOL/L (ref 98–107)
CO2: 21 MMOL/L (ref 20–31)
CREAT SERPL-MCNC: 0.66 MG/DL (ref 0.5–0.9)
CULTURE: ABNORMAL
EKG ATRIAL RATE: 59 BPM
EKG P AXIS: 59 DEGREES
EKG P-R INTERVAL: 154 MS
EKG Q-T INTERVAL: 516 MS
EKG QRS DURATION: 92 MS
EKG QTC CALCULATION (BAZETT): 510 MS
EKG R AXIS: 62 DEGREES
EKG T AXIS: 49 DEGREES
EKG VENTRICULAR RATE: 59 BPM
EOSINOPHILS RELATIVE PERCENT: 3 % (ref 1–4)
GFR AFRICAN AMERICAN: >60 ML/MIN
GFR NON-AFRICAN AMERICAN: >60 ML/MIN
GFR SERPL CREATININE-BSD FRML MDRD: ABNORMAL ML/MIN/{1.73_M2}
GLUCOSE BLD-MCNC: 83 MG/DL (ref 70–99)
HCT VFR BLD CALC: 38.1 % (ref 36.3–47.1)
HEMOGLOBIN: 12.1 G/DL (ref 11.9–15.1)
IMMATURE GRANULOCYTES: 0 %
LYMPHOCYTES # BLD: 25 % (ref 24–43)
MCH RBC QN AUTO: 27.3 PG (ref 25.2–33.5)
MCHC RBC AUTO-ENTMCNC: 31.8 G/DL (ref 28.4–34.8)
MCV RBC AUTO: 85.8 FL (ref 82.6–102.9)
MONOCYTES # BLD: 8 % (ref 3–12)
NRBC AUTOMATED: 0 PER 100 WBC
PDW BLD-RTO: 16.6 % (ref 11.8–14.4)
PLATELET # BLD: 191 K/UL (ref 138–453)
PMV BLD AUTO: 10.6 FL (ref 8.1–13.5)
POTASSIUM SERPL-SCNC: 4.1 MMOL/L (ref 3.7–5.3)
RBC # BLD: 4.44 M/UL (ref 3.95–5.11)
RBC # BLD: ABNORMAL 10*6/UL
SEG NEUTROPHILS: 64 % (ref 36–65)
SEGMENTED NEUTROPHILS ABSOLUTE COUNT: 4.54 K/UL (ref 1.5–8.1)
SODIUM BLD-SCNC: 142 MMOL/L (ref 135–144)
SPECIMEN DESCRIPTION: ABNORMAL
WBC # BLD: 7.1 K/UL (ref 3.5–11.3)

## 2022-03-26 PROCEDURE — 97530 THERAPEUTIC ACTIVITIES: CPT

## 2022-03-26 PROCEDURE — 2580000003 HC RX 258: Performed by: STUDENT IN AN ORGANIZED HEALTH CARE EDUCATION/TRAINING PROGRAM

## 2022-03-26 PROCEDURE — 6360000002 HC RX W HCPCS: Performed by: STUDENT IN AN ORGANIZED HEALTH CARE EDUCATION/TRAINING PROGRAM

## 2022-03-26 PROCEDURE — 85025 COMPLETE CBC W/AUTO DIFF WBC: CPT

## 2022-03-26 PROCEDURE — 96366 THER/PROPH/DIAG IV INF ADDON: CPT

## 2022-03-26 PROCEDURE — 96361 HYDRATE IV INFUSION ADD-ON: CPT

## 2022-03-26 PROCEDURE — G0378 HOSPITAL OBSERVATION PER HR: HCPCS

## 2022-03-26 PROCEDURE — 97166 OT EVAL MOD COMPLEX 45 MIN: CPT

## 2022-03-26 PROCEDURE — 36415 COLL VENOUS BLD VENIPUNCTURE: CPT

## 2022-03-26 PROCEDURE — 99239 HOSP IP/OBS DSCHRG MGMT >30: CPT | Performed by: INTERNAL MEDICINE

## 2022-03-26 PROCEDURE — 97161 PT EVAL LOW COMPLEX 20 MIN: CPT

## 2022-03-26 PROCEDURE — 80048 BASIC METABOLIC PNL TOTAL CA: CPT

## 2022-03-26 PROCEDURE — 2580000003 HC RX 258

## 2022-03-26 PROCEDURE — 96365 THER/PROPH/DIAG IV INF INIT: CPT

## 2022-03-26 PROCEDURE — 6370000000 HC RX 637 (ALT 250 FOR IP): Performed by: STUDENT IN AN ORGANIZED HEALTH CARE EDUCATION/TRAINING PROGRAM

## 2022-03-26 PROCEDURE — 93010 ELECTROCARDIOGRAM REPORT: CPT | Performed by: INTERNAL MEDICINE

## 2022-03-26 PROCEDURE — 96372 THER/PROPH/DIAG INJ SC/IM: CPT

## 2022-03-26 PROCEDURE — 97535 SELF CARE MNGMENT TRAINING: CPT

## 2022-03-26 RX ORDER — MAGNESIUM SULFATE IN WATER 40 MG/ML
2000 INJECTION, SOLUTION INTRAVENOUS ONCE
Status: COMPLETED | OUTPATIENT
Start: 2022-03-26 | End: 2022-03-26

## 2022-03-26 RX ORDER — POTASSIUM CHLORIDE 20 MEQ/1
40 TABLET, EXTENDED RELEASE ORAL ONCE
Status: COMPLETED | OUTPATIENT
Start: 2022-03-26 | End: 2022-03-26

## 2022-03-26 RX ORDER — CEPHALEXIN 500 MG/1
500 CAPSULE ORAL 4 TIMES DAILY
Qty: 20 CAPSULE | Refills: 0 | Status: SHIPPED | OUTPATIENT
Start: 2022-03-26 | End: 2022-03-31

## 2022-03-26 RX ADMIN — SODIUM CHLORIDE, PRESERVATIVE FREE 10 ML: 5 INJECTION INTRAVENOUS at 08:52

## 2022-03-26 RX ADMIN — GABAPENTIN 800 MG: 400 CAPSULE ORAL at 14:20

## 2022-03-26 RX ADMIN — ACETAMINOPHEN 650 MG: 325 TABLET ORAL at 03:26

## 2022-03-26 RX ADMIN — GABAPENTIN 800 MG: 400 CAPSULE ORAL at 08:47

## 2022-03-26 RX ADMIN — ENOXAPARIN SODIUM 40 MG: 40 INJECTION SUBCUTANEOUS at 08:48

## 2022-03-26 RX ADMIN — MAGNESIUM SULFATE 2000 MG: 2 INJECTION INTRAVENOUS at 12:55

## 2022-03-26 RX ADMIN — HYDROCODONE BITARTRATE AND ACETAMINOPHEN 1 TABLET: 5; 325 TABLET ORAL at 08:58

## 2022-03-26 RX ADMIN — SODIUM CHLORIDE 75 ML/HR: 9 INJECTION, SOLUTION INTRAVENOUS at 03:28

## 2022-03-26 RX ADMIN — TOPIRAMATE 100 MG: 100 TABLET, FILM COATED ORAL at 08:47

## 2022-03-26 RX ADMIN — SUCRALFATE 1 G: 1 TABLET ORAL at 08:47

## 2022-03-26 RX ADMIN — SUCRALFATE 1 G: 1 TABLET ORAL at 13:21

## 2022-03-26 RX ADMIN — TOPIRAMATE 100 MG: 100 TABLET, FILM COATED ORAL at 14:20

## 2022-03-26 RX ADMIN — POTASSIUM CHLORIDE 40 MEQ: 1500 TABLET, EXTENDED RELEASE ORAL at 08:50

## 2022-03-26 RX ADMIN — PANTOPRAZOLE SODIUM 40 MG: 40 TABLET, DELAYED RELEASE ORAL at 06:52

## 2022-03-26 ASSESSMENT — PAIN SCALES - GENERAL
PAINLEVEL_OUTOF10: 4
PAINLEVEL_OUTOF10: 9

## 2022-03-26 NOTE — PROGRESS NOTES
CLINICAL PHARMACY NOTE: MEDS TO BEDS    Total # of Prescriptions Filled: 1   The following medications were delivered to the patient:  · keflex    Additional Documentation:

## 2022-03-26 NOTE — DISCHARGE SUMMARY
[address]     Department of Internal Medicine - Staff Internal Medicine Teaching Service    INPATIENT DISCHARGE SUMMARY      Patient Identification:  Orquidea Norman is a 61 y.o. female. :  1965  MRN: [id number]     Acct: [de-identified]   PCP: Faraz Garcia MD  Admit Date:  3/25/2022  Discharge date and time: 3/26/2022  4:05 PM   Attending Provider: No att. providers found                                     [address] Problem Lists:  Principal Problem:    UTI (urinary tract infection)  Active Problems:    COPD (chronic obstructive pulmonary disease) (HCC)    Hypertension    Hypokalemia    Crohn disease ([address]    Prolonged Q-T interval on ECG    Abdominal pain    Falls frequently    Hypotension  Resolved Problems:    * No resolved hospital problems. *      HOSPITAL STAY     Brief Inpatient course:   Orquidea Norman is a 61 y.o. female who was admitted for the management of UTI (urinary tract infection), presented to the emergency department with mechanical fall. She is admitted to observation unit. PMH : Crohn's disease, hypertension, hyperlipidemia, COPD, anxiety/depression/bipolar disorder, bowel obstruction s/p partial small intestinal resection, bilateral primary osteoarthritis of the hip joint. She developed hypotension and was transferred to IM services. Patient hypotension got improve with fluid resuscitation. Lab work up showed UTI, UC showed E coli >100k CFU she is allergic to penicillin. Started on cefepime. Denied any fever, chills, new onset abdominal pain, diarrhea or melena or BRBPR.    significant therapeutic interventions done at the hospital:     1. Hypotension.   Resolved  2. UTI. UC shows E. Coli >100k. keflex 500 4 times daily for 5 days. 3. Hypertension.  continue on home dose of lisinopril  4. Hypokalemia.  replaced   5. Chronic disease. Not in flare or exacerbation  6.  Prolonged QTC on EKG.  Avoid QTC prolonging drugs.  7. Migraine.  Continue on Imitrex, naproxen and Norco.  Topamax 100 3 times daily  8. Anemia likely secondary to Crohn's disease. Follow up with PCP    Procedures/ Significant Interventions:    None     Consults:     Consults:     Final Specialist Recommendations/Findings:   IP CONSULT TO INTERNAL MEDICINE  IP CONSULT TO CARDIOLOGY  IP CONSULT TO HOME CARE NEEDS      Any Hospital Acquired Infections: none    Discharge Functional Status:  stable    DISCHARGE PLAN     Disposition: home    Patient Instructions:   Discharge Medication List as of 3/26/2022  2:59 PM      START taking these medications    Details   cephALEXin (KEFLEX) 500 MG capsule Take 1 capsule by mouth 4 times daily for 5 days, Disp-20 capsule, R-0Normal         CONTINUE these medications which have NOT CHANGED    Details   HYDROcodone-acetaminophen (NORCO) 5-325 MG per tablet Take 1 tablet by mouth every 8 hours as needed for Pain for up to 14 days. , Disp-56 tablet, R-0Normal      albuterol sulfate HFA (VENTOLIN HFA) 108 (90 Base) MCG/ACT inhaler Inhale 2 puffs into the lungs every 6 hours as needed for WheezingHistorical Med      diphenhydrAMINE (BENADRYL) 25 MG tablet Take 25 mg by mouth nightly as neededHistorical Med      topiramate (TOPAMAX) 100 MG tablet Take 100 mg by mouth 3 times daily Historical Med      SUMAtriptan (IMITREX) 100 MG tablet Take 100 mg by mouth once as needed for MigraineHistorical Med      gabapentin (NEURONTIN) 400 MG capsule Take 800 mg by mouth 3 times daily. Historical Med      budesonide-formoterol (SYMBICORT) 160-4.5 MCG/ACT AERO Inhale 2 puffs into the lungs 2 times daily, Disp-10.2 g, R-3Normal      lisinopril (PRINIVIL;ZESTRIL) 5 MG tablet Take 1 tablet by mouth daily, Disp-30 tablet, R-3Normal      nicotine (NICODERM CQ) 21 MG/24HR Place 1 patch onto the skin daily, Disp-30 patch, R-3Normal      acetaminophen (TYLENOL) 500 MG tablet Take 2 tablets by mouth 3 times daily, Disp-15 tablet, R-1Print Polyvinyl Alcohol-Povidone (REFRESH OP) Place 1 drop into both eyes 3 times dailyHistorical Med      lidocaine (LIDODERM) 5 % Place 1 patch onto the skin daily as needed for Pain 12 hours on, 12 hours off. Historical Med         STOP taking these medications       potassium chloride (KLOR-CON M) 20 MEQ extended release tablet Comments:   Reason for Stopping:         pantoprazole (PROTONIX) 40 MG tablet Comments:   Reason for Stopping:         sucralfate (CARAFATE) 1 GM tablet Comments:   Reason for Stopping:               Activity: as tolerated    Diet: adult diet    Follow-up:    Carolene Dubin, [address]  [phone]            Patient Instructions: Follow up with PCP in 2 weeks  Continue on antibiotics for 5 days  Increase fluid intake  Avoid smoking tobacco  Return to ED with worsening or new onset symptoms  Follow up labs: none  Follow up imaging: none    Note that over 30 minutes was spent in preparing discharge papers, discussing discharge with patient, medication review, etc.      Bella Moore MD,   Internal Medicine Resident, PGY-1  [place], [place]  3/26/2022, 5:29 PM

## 2022-03-26 NOTE — CARE COORDINATION
Transitional Planning    [facility] [place].. They can accept patient.

## 2022-03-26 NOTE — PROGRESS NOTES
[facility]  Internal Medicine Teaching Residency Program  Inpatient Daily Progress Note  ______________________________________________________________________________    Patient: Allie Khoury  YOB: 1965   JFK:[id number]    Acct: [de-identified]     Room: [facility]  Admit date: 3/25/2022  Today's date: 22  Number of days in the hospital: 1    SUBJECTIVE   Admitting Diagnosis: UTI (urinary tract infection)  CC: Fall at home  Pt examined at bedside. Chart & results reviewed. No acute events overnight  Clinically better, afebrile, hemodynamically stable  Hypotension resolved  Urine culture shows E. Coli >100k  Continue on antibiotics    ROS:  Constitutional:  negative for chills, fevers, sweats  Respiratory:  negative for cough, dyspnea on exertion, hemoptysis, shortness of breath, wheezing  Cardiovascular:  negative for chest pain, chest pressure/discomfort, lower extremity edema, palpitations  Gastrointestinal:  negative for abdominal pain, constipation, diarrhea, nausea, vomiting  Neurological:  negative for dizziness, headache  BRIEF HISTORY     25-year-old female with PMH of Crohn's disease, bowel obstruction, bilateral hip joint osteoarthritis and hypertension was admitted to observation unit after mechanical fall. She developed hypotension and transferred to internal medicine services. UA shows too many WBCs with many bacteria. Blood cultures and urine culture. Hypotension is resolved with fluid resuscitation. We will treat the UTI Rocephin and follow. OBJECTIVE     Vital Signs:  /77   Pulse 71   Temp 97.8 °F (36.6 °C) (Temporal)   Resp 13   Ht 5' 6\" (1.676 m)   Wt 151 lb 1.6 oz (68.5 kg)   SpO2 94%   BMI 24.39 kg/m²     Temp (24hrs), Av.9 °F (36.6 °C), Min:97.6 °F (36.4 °C), Max:98.2 °F (36.8 °C)    No intake/output data recorded.     Physical Exam:  Constitutional: This is a well developed, well nourished, 18.5-24.9 - Normal 61y.o. year old female who is alert, oriented, cooperative and in no apparent distress. Head:normocephalic and atraumatic. Flat, atrophic tongue. Angular stomatitis  EENT:  PERRLA. No conjunctival injections. Septum was midline, mucosa was without erythema, exudates or cobblestoning. No thrush was noted. Neck: Supple without thyromegaly. No elevated JVP. Trachea was midline. Respiratory: Chest was symmetrical without dullness to percussion. Breath sounds bilaterally were clear to auscultation. There were no wheezes, rhonchi or rales. There is no intercostal retraction or use of accessory muscles. No egophony noted. Cardiovascular: Regular without murmur, clicks, gallops or rubs. Abdomen: Slightly rounded and soft without organomegaly. No rebound, rigidity or guarding was appreciated. Musculoskeletal: Normal curvature of the spine. No gross muscle weakness. Extremities:  No lower extremity edema, ulcerations, tenderness, varicosities or erythema. Muscle size, tone and strength are normal.  No involuntary movements are noted. Skin:  Warm and dry. Good color, turgor and pigmentation. No lesions or scars.   No cyanosis or clubbing  Neurological/Psychiatric: The patient's general behavior, level of consciousness, thought content and emotional status is normal.        Medications:  Scheduled Medications:    lidocaine  1 patch TransDERmal Daily    gabapentin  800 mg Oral TID    nicotine  1 patch TransDERmal Daily    [Held by provider] lisinopril  5 mg Oral Daily    topiramate  100 mg Oral TID    pantoprazole  40 mg Oral BID AC    sucralfate  1 g Oral 4x Daily    sodium chloride flush  5-40 mL IntraVENous 2 times per day    enoxaparin  40 mg SubCUTAneous Daily    cefTRIAXone (ROCEPHIN) IV  1,000 mg IntraVENous Q24H     Continuous Infusions:    sodium chloride      sodium chloride 75 mL/hr (03/26/22 3827)     PRN MedicationsdiphenhydrAMINE, 25 mg, Nightly PRN  HYDROcodone-acetaminophen, 1 tablet, Q8H PRN  albuterol sulfate HFA, 2 puff, Q6H PRN  polyvinyl alcohol, 1 drop, Daily PRN  sodium chloride flush, 5-40 mL, PRN  sodium chloride, 25 mL, PRN  acetaminophen, 650 mg, Q4H PRN  ondansetron, 4 mg, Q8H PRN   Or  ondansetron, 4 mg, Q6H PRN        Diagnostic Labs:  CBC:   Recent Labs     03/25/22  1142   WBC 4.2   RBC 3.77*   HGB 10.3*   HCT 33.3*   MCV 88.3   RDW 16.1*        BMP:   Recent Labs     03/25/22  0946 03/25/22  1142 03/25/22  1856    141  --    K 3.0* 3.1* 3.6*    108*  --    CO2 22 22  --    BUN 20 20  --    CREATININE 0.77 0.80  --      BNP: No results for input(s): BNP in the last 72 hours. PT/INR: No results for input(s): PROTIME, INR in the last 72 hours. APTT: No results for input(s): APTT in the last 72 hours. CARDIAC ENZYMES: No results for input(s): CKMB, CKMBINDEX, TROPONINI in the last 72 hours. Invalid input(s): CKTOTAL;3  FASTING LIPID PANEL:No results found for: CHOL, HDL, TRIG  LIVER PROFILE:   Recent Labs     03/25/22  1142   AST 16   ALT 8   BILITOT 0.44   ALKPHOS 118*      MICROBIOLOGY:   Lab Results   Component Value Date/Time    CULTURE NO GROWTH 12 HOURS 03/25/2022 11:39 AM       Imaging:    XR SHOULDER RIGHT (MIN 2 VIEWS)    Result Date: 3/25/2022  Unremarkable right shoulder and right elbow. XR ELBOW RIGHT (MIN 3 VIEWS)    Result Date: 3/25/2022  Unremarkable right shoulder and right elbow. XR HIP LEFT (2-3 VIEWS)    Result Date: 3/22/2022  No acute bony abnormalities are noted     XR KNEE LEFT (3 VIEWS)    Result Date: 3/22/2022  No acute bony abnormalities are noted     XR ANKLE LEFT (MIN 3 VIEWS)    Result Date: 3/22/2022  No acute bony abnormalities are noted     CT Head WO Contrast    Result Date: 3/22/2022  No acute findings in the head/brain. CT CERVICAL SPINE WO CONTRAST    Result Date: 3/22/2022  No evidence of fracture with multilevel degenerative/chronic findings as described.      CT ABDOMEN PELVIS W IV CONTRAST Additional Contrast? None    Result Date: 3/22/2022  1. Wall thickening present throughout the colon, most prominent within the right colon. Differential considerations would include antibiotic associated colitis, infectious colitis, or inflammatory bowel disease. 2. Severe degenerative change involving the left hip joint 3. Nonobstructive bowel gas pattern     CT CHEST PULMONARY EMBOLISM W CONTRAST    Result Date: 3/22/2022  1. No acute pulmonary embolism. 2. No acute pulmonary disease. 3. Possible esophagitis seen as diffuse mild distal esophageal wall thickening. 4. No acute traumatic abnormality seen. CT LUMBAR SPINE TRAUMA RECONSTRUCTION    Result Date: 3/22/2022  No evidence of an acute fracture or traumatic malalignment involving the lumbar spine     CT THORACIC SPINE TRAUMA RECONSTRUCTION    Result Date: 3/22/2022  No evidence of fracture with minimal degenerative disc disease noted. XR HIP 2-3 VW W PELVIS LEFT    Result Date: 3/25/2022  1. Severe degenerative changes again noted involving the left hip joint, without evidence of an acute fracture 2. Moderate degree of degenerative change involving the right hip joint. 3. No evidence of an acute fracture involving the pelvis       ASSESSMENT & PLAN     Principal Problem:    UTI (urinary tract infection)  Active Problems:    COPD (chronic obstructive pulmonary disease) (HCC)    Hypertension    Hypokalemia    Crohn disease (HCC)    Prolonged Q-T interval on ECG    Abdominal pain    Falls frequently    Hypotension  Resolved Problems:    * No resolved hospital problems. *    1. Hypotension. Resolved  2. UTI. UC shows E. Coli >100k. Cefepime 2 g twice daily, NS 75 mL/h.  3. Hypertension. Currently stable, hold lisinopril  4. Hypokalemia. Replace potassium  5. Chronic disease. Currently stable  6. Prolonged QTC on EKG. Avoid QTC prolonging drugs. 7. Migraine.   Continue on Imitrex, naproxen and Norco.  Topamax 100 3 times daily  8. Anemia likely secondary to Crohn's disease. Monitor H&H daily  9. Tobacco smoking. NicoDerm 21/24-hour 1 patch     DVT ppx: Lovenox daily  GI ppx: Protonix 40 twice daily  PT/OT/SW: Consulted  Discharge Planning: CM to assist with    Lisa Pacheco MD  Internal Medicine Resident, PGY-1  [address], [place]  3/26/2022, 5:34 AM

## 2022-03-26 NOTE — PROGRESS NOTES
Physical Therapy    Facility/Department: [address] STEPDOWN  Initial Assessment    NAME: Mike Lemon  : 1965  MRN: [id number]    No chief complaint on file. HISTORY OF PRESENT ILLNESS  (Location/Symptom, Timing/Onset, Context/Setting, Quality, Duration, Modifying Factors, Severity.)       Mike Lemon is a 61 y.o. female who presents with a fall which occurred approximately 2 hours prior to arrival.  Patient states that she was trying to sit down on her seated wheelchair/walker when she slipped and landed forward on her elbow and shoulder On the right side. Patient did not lose consciousness and no prodromal symptoms she was able to get up with assistance from her daughter. Who brought her to the emergency department. Patient denies any blood thinning medications any chest pain shortness of breath. Date of Service: 3/26/2022    Discharge Recommendations:    Pt may benefit from further therapy after discharge for fall prevention    PT Equipment Recommendations  Equipment Needed: No    Assessment   Assessment: Pt amb 250' rollator CGA for safety, no assist given. Pt without acute PT needs. Physical therapy to sign off. Prognosis: Good  Decision Making: Low Complexity  PT Education: Plan of Care;PT Role;General Safety;Transfer Training;Functional Mobility Training;Gait Training  Patient Education: cues and edu on safe transfers and correct amb with rollator and safe mobility throughout  REQUIRES PT FOLLOW UP: No  Activity Tolerance  Activity Tolerance: Patient Tolerated treatment well       Patient Diagnosis(es): The encounter diagnosis was Fall from standing, subsequent encounter.      has a past medical history of Acid reflux, Anxiety, Arthritis, Asthma, Bipolar 1 disorder ([address] Bowel obstruction ([address] COPD (chronic obstructive pulmonary disease) ([address] Crohn disease ([address] Depression, Dry eye, Fibromyalgia, Gout, Headache, Hyperlipidemia, Hypertension, Hypothyroidism, Kidney stones, Muscle spasm, Neuropathy, Pain, Personal history of other medical treatment, Wears partial dentures, and Wellness examination. has a past surgical history that includes Tonsillectomy and adenoidectomy; Tubal ligation; Cholecystectomy; Bunionectomy (Left); Colonoscopy (04/30/2007); Colonoscopy (10/12/2017); Abdomen surgery; Upper gastrointestinal endoscopy (10/25/2021); Colonoscopy (10/25/2021); and Colonoscopy (10/25/2021). Restrictions  Restrictions/Precautions  Restrictions/Precautions: General Precautions  Required Braces or Orthoses?: No  Position Activity Restriction  Other position/activity restrictions: up with assist  Vision/Hearing  Vision: Impaired  Vision Exceptions: Wears glasses for reading (mostly for reading)  Hearing: Within functional limits     Subjective  General  Chart Reviewed: Yes  Patient assessed for rehabilitation services?: Yes  Response To Previous Treatment: Not applicable  Family / Caregiver Present: No  Follows Commands: Within Functional Limits  General Comment  Comments: OT co-eval. RN and pt agreeable to PT.  Pt alert standing next to bed upon arrival.  Pain Screening  Patient Currently in Pain: Denies  Vital Signs  Patient Currently in Pain: Denies  Pre Treatment Pain Screening  Intervention List: Patient able to continue with treatment    Orientation  Orientation  Overall Orientation Status: Within Normal Limits  Orientation Level: Oriented to place;Oriented to time;Oriented to situation;Oriented to person  Social/Functional History  Social/Functional History  Lives With: Alone  Type of Home: Apartment (3rd floor)  Home Layout: One level  Home Access: Elevator,Level entry  Bathroom Shower/Tub: Tub/Shower unit,Curtain  Bathroom Toilet: Standard  Bathroom Equipment:  (have fallen in shower within past 6 months 1x. only in place last 3 weeks)  Bathroom Accessibility: Accessible  Home Equipment: Cane (rollator)  Receives Help From: Family,Friend(s) (can assist prn)  ADL Assistance: Independent  Homemaking Assistance: Independent  Homemaking Responsibilities: Yes (pt goes with friend/ dtr for grocery shopping. pt cooks. does own laundry at [employer], pushing it on rollator)  Meal Prep Responsibility: Primary  Laundry Responsibility: Primary  Cleaning Responsibility: Primary  Shopping Responsibility: Primary  Ambulation Assistance: Independent (rollator used at all times)  Transfer Assistance: Independent  Active : No  Mode of Transportation: Friends  Occupation: On disability  Leisure & Hobbies: shoot pool, place has pool table. Additional Comments: fall ~5x prior to one before admission in a week. Pt states uncertain cause, \"just go down\". Cognition    WFL, impulsive, redirection    Objective          AROM RLE (degrees)  RLE AROM: WFL  AROM LLE (degrees)  LLE AROM : WFL  Strength RLE  Strength RLE: WFL  Strength LLE  Strength LLE: WFL  Strength RUE  Comment: antigravity observed, see OT  Strength LUE  Comment: antigravity observed, see OT     Sensation  Overall Sensation Status: Impaired (Pt reports numbness/ tingling in bilateral hands and feet.)  Bed mobility  Comment: standiing upon arrival, reclined in chair after session  Transfers  Sit to Stand: Stand by assistance  Stand to sit: Stand by assistance  Ambulation  Ambulation?: Yes  Ambulation 1  Surface: level tile  Device: Rollator  Assistance: Contact guard assistance  Quality of Gait: reciprocal, good marichuy, no LOB or buckling, cues on correct rollator use.   Distance: 250'  Stairs/Curb  Stairs?: No     Balance  Posture: Fair  Sitting - Static: Good  Sitting - Dynamic: Good  Standing - Static: Good  Standing - Dynamic: Good;-  Comments: Rollator used while assessing standing balance  Exercises  Comments: toileted, see OT     Plan   Plan  Times per week: d/c PT  Safety Devices  Type of devices: Call light within reach,Nurse notified,Gait belt,Left in chair,All fall risk precautions in place  Restraints  Initially in place: No    AM-PAC Score  AM-PAC Inpatient Mobility Raw Score : 24 (03/26/22 1351)  AM-PAC Inpatient T-Scale Score : 61.14 (03/26/22 1351)  Mobility Inpatient CMS 0-100% Score: 0 (03/26/22 [facility])  Mobility Inpatient CMS G-Code Modifier : CH (03/26/22 1351)          Goals  Short term goals  Time Frame for Short term goals: d/c PT       Therapy Time   Individual Concurrent Group Co-treatment   Time In [facility]         Time Out 1046         Minutes 30         Timed Code Treatment Minutes: 9 Minutes       Shelton Fernando, PT

## 2022-03-26 NOTE — CONSULTS
Attestation signed by      Attending Physician Statement:    I have discussed the care of  Thomes Northern , including pertinent history and exam findings, with the Cardiology fellow/resident. I have seen and examined the patient and the key elements of all parts of the encounter have been performed by me. I agree with the assessment, plan and orders as documented by the fellow/resident, after I modified exam findings and plan of treatments, and the final version is my approved version of the assessment. Additional Comments:     61years old female admitted with mechanical fall and UTI with brief episodes of hypotension resolved with IV hydration likely secondary to dehydration. Noted to have prolonged QT on EKG likely secondary to multiple medications. She has preserved LV systolic function on previous echo. Recommend IV hydration, avoid QT prolonging medications. Okay to discharge from cardiac standpoint   Follow-up with Cardiology as an outpatient. Electronically signed by Lyn Perales MD on 3/26/2022 at 1:19 PM      [address] [place] [address] / H&P               Today's Date: 3/26/2022  Patient Name: Sae Crowe  Date of admission: 3/25/2022 12:07 AM  Patient's age: 61 y.o., 1965  Admission Dx: Falls frequently [R29.6]  Fall from standing, subsequent encounter [W19. XXXD]  Orthostatic hypotension [I95.1]    Reason for Consult:  Cardiac evaluation    Requesting Physician: Alvin Roblero MD    CHIEF COMPLAINT:  Syncope. History Obtained From:  patient, electronic medical record    HISTORY OF PRESENT ILLNESS:      The patient is a 61 y.o. female initially admitted with status post mechanical fall while trying to sit down from standing height. Patient reports no loss of consciousness. Was subsequently brought to the ER where initial EKG showed no acute ischemic changes.   Troponin has been mildly elevated in the 35s. Patient reported no chest pain, palpitations, shortness of breath. No previous cardiac history on file    Past Medical History:   has a past medical history of Acid reflux, Anxiety, Arthritis, Asthma, Bipolar 1 disorder ([address] Bowel obstruction ([address] COPD (chronic obstructive pulmonary disease) ([facility] [address] Crohn disease ([facility] [address] Depression, Dry eye, Fibromyalgia, Gout, Headache, Hyperlipidemia, Hypertension, Hypothyroidism, Kidney stones, Muscle spasm, Neuropathy, Pain, Personal history of other medical treatment, Wears partial dentures, and Wellness examination. Past Surgical History:   has a past surgical history that includes Tonsillectomy and adenoidectomy; Tubal ligation; Cholecystectomy; Bunionectomy (Left); Colonoscopy (04/30/2007); Colonoscopy (10/12/2017); Abdomen surgery; Upper gastrointestinal endoscopy (10/25/2021); Colonoscopy (10/25/2021); and Colonoscopy (10/25/2021). Home Medications:    Prior to Admission medications    Medication Sig Start Date End Date Taking? Authorizing Provider   HYDROcodone-acetaminophen (NORCO) 5-325 MG per tablet Take 1 tablet by mouth every 8 hours as needed for Pain for up to 14 days.  3/21/22 4/4/22  Tia Hughes MD   potassium chloride (KLOR-CON M) 20 MEQ extended release tablet Take 1 tablet by mouth 2 times daily for 2 doses 10/25/21 10/26/21  Tanna Thurston MD   pantoprazole (PROTONIX) 40 MG tablet Take 1 tablet by mouth 2 times daily (before meals) 10/24/21 12/19/21  Manuela Luna MD   sucralfate (CARAFATE) 1 GM tablet Take 1 tablet by mouth 4 times daily for 28 days 10/24/21 11/21/21  Manuela Luna MD   albuterol sulfate HFA (VENTOLIN HFA) 108 (90 Base) MCG/ACT inhaler Inhale 2 puffs into the lungs every 6 hours as needed for Wheezing    Historical Provider, MD   diphenhydrAMINE (BENADRYL) 25 MG tablet Take 25 mg by mouth nightly as needed    Historical Provider, MD   topiramate (TOPAMAX) 100 MG tablet Take 100 mg by mouth 3 times daily     Historical Provider, MD   SUMAtriptan (IMITREX) 100 MG tablet Take 100 mg by mouth once as needed for Migraine    Historical Provider, MD   gabapentin (NEURONTIN) 400 MG capsule Take 800 mg by mouth 3 times daily. Historical Provider, MD   budesonide-formoterol (SYMBICORT) 160-4.5 MCG/ACT AERO Inhale 2 puffs into the lungs 2 times daily 9/19/21   Chaparro Corley MD   lisinopril (PRINIVIL;ZESTRIL) 5 MG tablet Take 1 tablet by mouth daily 9/20/21   Chaparro Corley MD   nicotine (NICODERM CQ) 21 MG/24HR Place 1 patch onto the skin daily 9/20/21   Chaparro Corley MD   acetaminophen (TYLENOL) 500 MG tablet Take 2 tablets by mouth 3 times daily  Patient taking differently: Take 1,000 mg by mouth 3 times daily as needed  7/17/21   Brenda Ng DO   Polyvinyl Alcohol-Povidone (REFRESH OP) Place 1 drop into both eyes 3 times daily    Historical Provider, MD   lidocaine (LIDODERM) 5 % Place 1 patch onto the skin daily as needed for Pain 12 hours on, 12 hours off.      Historical Provider, MD      Current Facility-Administered Medications: potassium chloride (KLOR-CON M) extended release tablet 40 mEq, 40 mEq, Oral, Once  lidocaine 4 % external patch 1 patch, 1 patch, TransDERmal, Daily  diphenhydrAMINE (BENADRYL) tablet 25 mg, 25 mg, Oral, Nightly PRN  gabapentin (NEURONTIN) capsule 800 mg, 800 mg, Oral, TID  HYDROcodone-acetaminophen (NORCO) 5-325 MG per tablet 1 tablet, 1 tablet, Oral, Q8H PRN  albuterol sulfate  (90 Base) MCG/ACT inhaler 2 puff, 2 puff, Inhalation, Q6H PRN  nicotine (NICODERM CQ) 21 MG/24HR 1 patch, 1 patch, TransDERmal, Daily  [Held by provider] lisinopril (PRINIVIL;ZESTRIL) tablet 5 mg, 5 mg, Oral, Daily  polyvinyl alcohol (LIQUIFILM TEARS) 1.4 % ophthalmic solution 1 drop, 1 drop, Both Eyes, Daily PRN  topiramate (TOPAMAX) tablet 100 mg, 100 mg, Oral, TID  pantoprazole (PROTONIX) tablet 40 mg, 40 mg, Oral, BID AC  sucralfate (CARAFATE) tablet 1 g, 1 g, Oral, 4x Daily  sodium chloride flush 0.9 % injection 5-40 mL, 5-40 mL, IntraVENous, 2 times per day  sodium chloride flush 0.9 % injection 5-40 mL, 5-40 mL, IntraVENous, PRN  0.9 % sodium chloride infusion, 25 mL, IntraVENous, PRN  enoxaparin (LOVENOX) injection 40 mg, 40 mg, SubCUTAneous, Daily  acetaminophen (TYLENOL) tablet 650 mg, 650 mg, Oral, Q4H PRN  ondansetron (ZOFRAN-ODT) disintegrating tablet 4 mg, 4 mg, Oral, Q8H PRN **OR** ondansetron (ZOFRAN) injection 4 mg, 4 mg, IntraVENous, Q6H PRN  0.9 % sodium chloride infusion, , IntraVENous, Continuous  cefTRIAXone (ROCEPHIN) 1,000 mg in sterile water 10 mL IV syringe, 1,000 mg, IntraVENous, Q24H    Allergies:  Azithromycin, Methylprednisolone, Penicillins, and Tape [adhesive tape]    Social History:   reports that she has been smoking cigarettes. She has a 18.50 pack-year smoking history. She quit smokeless tobacco use about 20 years ago. Her smokeless tobacco use included chew. She reports previous alcohol use. She reports current drug use. Drug: Marijuana [place]). Family History: family history includes Cancer in her mother; Crohn's Disease in her brother; Diabetes in her father; Heart Disease in her brother; High Blood Pressure in her mother; Hypertension in her mother; Matt Monteagle in her father. REVIEW OF SYSTEMS:    · Constitutional: there has been no unanticipated weight loss. There's been No change in energy level, No change in activity level. · Eyes: No visual changes or diplopia. No scleral icterus. · ENT: No Headaches  · Cardiovascular: As above. · Respiratory: No previous pulmonary problems, No cough  · Gastrointestinal: No abdominal pain. No change in bowel or bladder habits. · Genitourinary: No dysuria, trouble voiding, or hematuria. · Musculoskeletal:  No gait disturbance, No weakness or joint complaints. · Integumentary: No rash or pruritis. · Neurological: No headache, diplopia, change in muscle strength, numbness or tingling.  No change in gait, balance, coordination, mood, affect, memory, mentation, behavior. · Psychiatric: No anxiety, or depression. · Endocrine: No temperature intolerance. No excessive thirst, fluid intake, or urination. No tremor. · Hematologic/Lymphatic: No abnormal bruising or bleeding, blood clots or swollen lymph nodes. · Allergic/Immunologic: No nasal congestion or hives. PHYSICAL EXAM:      /75   Pulse 73   Temp 98 °F (36.7 °C) (Oral)   Resp 16   Ht 5' 6\" (1.676 m)   Wt 151 lb 1.6 oz (68.5 kg)   SpO2 94%   BMI 24.39 kg/m²    Constitutional and General Appearance: alert, cooperative, no distress and appears stated age  HEENT: PERRL, no cervical lymphadenopathy. No masses palpable. Normal oral mucosa  Respiratory:  · Normal excursion and expansion without use of accessory muscles  · Resp Auscultation: Good respiratory effort. No for increased work of breathing. On auscultation: clear to auscultation bilaterally  Cardiovascular:  · The apical impulse is not displaced  · Heart tones are crisp and normal. regular S1 and S2.  · Jugular venous pulsation Normal  · The carotid upstroke is normal in amplitude and contour without delay or bruit  · Peripheral pulses are symmetrical and full   Abdomen:   · No masses or tenderness  · Bowel sounds present  Extremities:  ·  No Cyanosis or Clubbing  ·  Lower extremity edema: No  ·  Skin: Warm and dry  Neurological:  · Alert and oriented. · Moves all extremities well  · No abnormalities of mood, affect, memory, mentation, or behavior are noted      Labs:     CBC:   Recent Labs     03/25/22  1142   WBC 4.2   HGB 10.3*   HCT 33.3*        BMP:   Recent Labs     03/25/22  0946 03/25/22  0946 03/25/22  1142 03/25/22  1856     --  141  --    K 3.0*   < > 3.1* 3.6*   CO2 22  --  22  --    BUN 20  --  20  --    CREATININE 0.77  --  0.80  --    LABGLOM >60  --  >60  --    GLUCOSE 78  --  83  --     < > = values in this interval not displayed.      LIVER PROFILE:  Recent Labs     03/25/22  1142   AST 16 ALT 8   LABALBU 3.0*     DATA:    Diagnostics:    EKG: No acute ischemic changes. ECHO: 02/17/2020  Normal left ventricle size, wall thickness and function with an estimated EF  > 65%. Mild to moderate left ventricular hypertrophy. No significant valvular regurgitation or stenosis seen. IMPRESSION:    1. Status post mechanical fall with no LOC. No chest pain or palpitations or syncope. Likely secondary to dehydration. 2. Brief episode of hypotension inpatient. Pt is on multiple pain medications at home including gabapentin, Norco, benadryl etc.  3. No previous cardiac history on file. 4. Prolonged QTc secondary to polypharmacy. 4. UTI. RECOMMENDATIONS:  1. Will order orthostatics. Agree with IV hydration. Avoided the patient to avoid dehydration, paying close attention to any prodromal symptoms and how to avert syncope by lying down and elevating legs. 2. Treatment of UTI as per primary. 3. Recommend avoiding QTc prolonging drugs. 4. Ok to DC from cardiology stand point. Discussed with patient and Nurse.     Mandy Petit MD       Cardiovascular Fellow PGY-4  3/26/2022, 8:46 AM      .Ninfa Goodson

## 2022-03-28 DIAGNOSIS — M16.32 OSTEOARTHRITIS RESULTING FROM LEFT HIP DYSPLASIA: ICD-10-CM

## 2022-03-28 RX ORDER — HYDROCODONE BITARTRATE AND ACETAMINOPHEN 5; 325 MG/1; MG/1
1 TABLET ORAL EVERY 8 HOURS PRN
Qty: 42 TABLET | Refills: 0 | Status: SHIPPED | OUTPATIENT
Start: 2022-03-28 | End: 2022-04-18

## 2022-04-18 DIAGNOSIS — M16.32 OSTEOARTHRITIS RESULTING FROM LEFT HIP DYSPLASIA: ICD-10-CM

## 2022-04-18 RX ORDER — HYDROCODONE BITARTRATE AND ACETAMINOPHEN 5; 325 MG/1; MG/1
1 TABLET ORAL EVERY 8 HOURS PRN
Qty: 42 TABLET | Refills: 0 | Status: SHIPPED | OUTPATIENT
Start: 2022-04-18 | End: 2022-05-02 | Stop reason: SDUPTHER

## 2022-04-24 PROBLEM — N39.0 UTI (URINARY TRACT INFECTION): Status: RESOLVED | Noted: 2022-03-25 | Resolved: 2022-04-24

## 2022-04-27 DIAGNOSIS — M16.0 BILATERAL PRIMARY OSTEOARTHRITIS OF HIP: Primary | ICD-10-CM

## 2022-04-28 DIAGNOSIS — M16.32 OSTEOARTHRITIS RESULTING FROM LEFT HIP DYSPLASIA: ICD-10-CM

## 2022-04-28 RX ORDER — HYDROCODONE BITARTRATE AND ACETAMINOPHEN 5; 325 MG/1; MG/1
1 TABLET ORAL EVERY 8 HOURS PRN
Qty: 42 TABLET | Refills: 0 | OUTPATIENT
Start: 2022-04-28 | End: 2022-05-12

## 2022-05-02 ENCOUNTER — TELEPHONE (OUTPATIENT)
Dept: ORTHOPEDIC SURGERY | Age: 57
End: 2022-05-02

## 2022-05-02 DIAGNOSIS — M16.32 OSTEOARTHRITIS RESULTING FROM LEFT HIP DYSPLASIA: ICD-10-CM

## 2022-05-02 RX ORDER — HYDROCODONE BITARTRATE AND ACETAMINOPHEN 5; 325 MG/1; MG/1
1 TABLET ORAL EVERY 8 HOURS PRN
Qty: 42 TABLET | Refills: 0 | Status: SHIPPED | OUTPATIENT
Start: 2022-05-02 | End: 2022-05-16

## 2022-05-02 RX ORDER — HYDROCODONE BITARTRATE AND ACETAMINOPHEN 5; 325 MG/1; MG/1
1 TABLET ORAL EVERY 8 HOURS PRN
Qty: 42 TABLET | Refills: 0 | Status: SHIPPED | OUTPATIENT
Start: 2022-05-02 | End: 2022-05-16 | Stop reason: SDUPTHER

## 2022-05-02 NOTE — TELEPHONE ENCOUNTER
Refill request is pending in another encounter, she is out of medication and hoping this can be approved by 10am so [employer] can deliver it to her.      Please call patient with any questions  Thank you

## 2022-05-09 ENCOUNTER — HOSPITAL ENCOUNTER (OUTPATIENT)
Dept: GENERAL RADIOLOGY | Age: 57
Discharge: HOME | End: 2022-05-11
Payer: MEDICARE

## 2022-05-09 DIAGNOSIS — M16.0 BILATERAL PRIMARY OSTEOARTHRITIS OF HIP: ICD-10-CM

## 2022-05-09 PROCEDURE — 6360000002 HC RX W HCPCS: Performed by: ORTHOPAEDIC SURGERY

## 2022-05-09 PROCEDURE — 2500000003 HC RX 250 WO HCPCS: Performed by: ORTHOPAEDIC SURGERY

## 2022-05-09 PROCEDURE — 77002 NEEDLE LOCALIZATION BY XRAY: CPT

## 2022-05-09 PROCEDURE — 20610 DRAIN/INJ JOINT/BURSA W/O US: CPT

## 2022-05-09 RX ORDER — LIDOCAINE HYDROCHLORIDE 10 MG/ML
10 INJECTION, SOLUTION INFILTRATION; PERINEURAL ONCE
Status: COMPLETED | OUTPATIENT
Start: 2022-05-09 | End: 2022-05-09

## 2022-05-09 RX ORDER — METHYLPREDNISOLONE ACETATE 40 MG/ML
40 INJECTION, SUSPENSION INTRA-ARTICULAR; INTRALESIONAL; INTRAMUSCULAR; SOFT TISSUE ONCE
Status: COMPLETED | OUTPATIENT
Start: 2022-05-09 | End: 2022-05-09

## 2022-05-09 RX ADMIN — METHYLPREDNISOLONE ACETATE 40 MG: 40 INJECTION, SUSPENSION INTRA-ARTICULAR; INTRALESIONAL; INTRAMUSCULAR; SOFT TISSUE at 15:12

## 2022-05-09 RX ADMIN — LIDOCAINE HYDROCHLORIDE 10 ML: 10 INJECTION, SOLUTION EPIDURAL; INFILTRATION; INTRACAUDAL; PERINEURAL at 15:12

## 2022-05-09 RX ADMIN — METHYLPREDNISOLONE ACETATE 40 MG: 40 INJECTION, SUSPENSION INTRA-ARTICULAR; INTRALESIONAL; INTRAMUSCULAR; SOFT TISSUE at 15:05

## 2022-05-09 RX ADMIN — LIDOCAINE HYDROCHLORIDE 10 ML: 10 INJECTION, SOLUTION EPIDURAL; INFILTRATION; INTRACAUDAL; PERINEURAL at 15:04

## 2022-05-09 ASSESSMENT — PAIN DESCRIPTION - LOCATION: LOCATION: HIP

## 2022-05-09 ASSESSMENT — PAIN SCALES - GENERAL
PAINLEVEL_OUTOF10: 9
PAINLEVEL_OUTOF10: 9

## 2022-05-11 PROCEDURE — 77002 NEEDLE LOCALIZATION BY XRAY: CPT | Performed by: ORTHOPAEDIC SURGERY

## 2022-05-11 PROCEDURE — 20610 DRAIN/INJ JOINT/BURSA W/O US: CPT | Performed by: ORTHOPAEDIC SURGERY

## 2022-05-12 NOTE — PROCEDURES
[facility]                                 PROCEDURE NOTE    PATIENT NAME: BASIM SWEENEY                 :        1965  MED REC NO:   [id number]                             ROOM:  ACCOUNT NO:   [de-identified]                           ADMIT DATE: 2022  PROVIDER:     Kaycee Davis    DATE OF PROCEDURE:  2022    NON-OPERATING ROOM PROCEDURE    PROCEDURE:  Aspiration and injection, bilateral hips. SURGEON:  Kaycee Davis MD    HISTORY:  The patient has had progressive pain in both the hips from  primary osteoarthritis. DESCRIPTION OF PROCEDURE:  The patient was on the x-ray table. First,  the right hip was injected. So, the right hip was brought under  fluoroscopy. A metal marker was placed over the femoral neck  identifying the center part of the neck. This area was then prepped with Betadine and alcohol and then 1% plain  lidocaine was injected using 25-gauge needle intracutaneous,  subcutaneous, intramuscular and up to the capsule. The needle was withdrawn and then a spinal needle was introduced into  the hip joint confirming its position on fluoroscopy. Aspiration was  carried out. No fluid was obtained. Then, 40 mg Depo-Medrol and about  7 mL of 1% plain lidocaine was injected into the hip joint. The needle was maintained in position while the patient was asked to  exercise the hip joint by flexing and extending it. The needle was then  withdrawn and manual pressure was maintained while she was further asked  to exercise the hip. Pressure dressing was applied. Then, the patient was turned around and then the left hip underwent the  same needle procedure exactly the same way. The patient withstood the procedure satisfactorily. She was then asked  to walk in the X-ray Department and she was able to do that without any  pain.   She was discharged home in satisfactory condition.         Tara Jones    D: 05/12/2022 12:31:35       T: 05/12/2022 12:35:30     [email or web address]  Job#: [id number]     Doc#: [id number]    CC:

## 2022-05-13 NOTE — PROCEDURES
[address]                  [place] [address]                                 PROCEDURE NOTE    PATIENT NAME: YQHQBASIM RONQUILLO                 :        1965  MED REC NO:   [id number]                             ROOM:  ACCOUNT NO:   [de-identified]                           ADMIT DATE: 2022  PROVIDER:     Bridger Marquez    DATE OF PROCEDURE:  2022    FLUOROSCOPY REPORT    PROCEDURE PERFORMED:  Fluoroscopy, bilateral hips for hip aspiration and  injection. SURGEON:  Bridger Marquez MD    Fluoroscopic view showing regular needle again overlying the femoral  neck, which has been replaced then with a spinal needle into the hip  joint. Its position was confirmed fluoroscopically to be over the  femoral neck. The patient underwent a similar procedure first on the right hip and  then on the left hip. Without the benefit of fluoroscopy, it would have been very difficult to  make sure that the injection was intraarticularly to the hip joints.         Tom Ruiz    D: 2022 12:33:53       T: 2022 14:41:05     [email or web address]  Job#: [id number]     Doc#: [id number]

## 2022-05-15 ENCOUNTER — APPOINTMENT (OUTPATIENT)
Dept: GENERAL RADIOLOGY | Age: 57
End: 2022-05-15
Payer: MEDICARE

## 2022-05-15 ENCOUNTER — HOSPITAL ENCOUNTER (EMERGENCY)
Age: 57
Discharge: HOME | End: 2022-05-15
Attending: EMERGENCY MEDICINE
Payer: MEDICARE

## 2022-05-15 VITALS
TEMPERATURE: 97.5 F | SYSTOLIC BLOOD PRESSURE: 103 MMHG | DIASTOLIC BLOOD PRESSURE: 70 MMHG | RESPIRATION RATE: 18 BRPM | HEART RATE: 76 BPM | OXYGEN SATURATION: 95 %

## 2022-05-15 DIAGNOSIS — M25.552 LEFT HIP PAIN: Primary | ICD-10-CM

## 2022-05-15 PROCEDURE — 99284 EMERGENCY DEPT VISIT MOD MDM: CPT

## 2022-05-15 PROCEDURE — 96372 THER/PROPH/DIAG INJ SC/IM: CPT

## 2022-05-15 PROCEDURE — 72070 X-RAY EXAM THORAC SPINE 2VWS: CPT

## 2022-05-15 PROCEDURE — 73502 X-RAY EXAM HIP UNI 2-3 VIEWS: CPT

## 2022-05-15 PROCEDURE — 73030 X-RAY EXAM OF SHOULDER: CPT

## 2022-05-15 PROCEDURE — 6370000000 HC RX 637 (ALT 250 FOR IP): Performed by: STUDENT IN AN ORGANIZED HEALTH CARE EDUCATION/TRAINING PROGRAM

## 2022-05-15 PROCEDURE — 6360000002 HC RX W HCPCS: Performed by: STUDENT IN AN ORGANIZED HEALTH CARE EDUCATION/TRAINING PROGRAM

## 2022-05-15 RX ORDER — KETOROLAC TROMETHAMINE 15 MG/ML
15 INJECTION, SOLUTION INTRAMUSCULAR; INTRAVENOUS ONCE
Status: COMPLETED | OUTPATIENT
Start: 2022-05-15 | End: 2022-05-15

## 2022-05-15 RX ORDER — IBUPROFEN 400 MG/1
400 TABLET ORAL EVERY 6 HOURS PRN
Qty: 12 TABLET | Refills: 0 | Status: SHIPPED | OUTPATIENT
Start: 2022-05-15 | End: 2022-09-18

## 2022-05-15 RX ORDER — ACETAMINOPHEN 500 MG
1000 TABLET ORAL ONCE
Status: COMPLETED | OUTPATIENT
Start: 2022-05-15 | End: 2022-05-15

## 2022-05-15 RX ADMIN — KETOROLAC TROMETHAMINE 15 MG: 15 INJECTION, SOLUTION INTRAMUSCULAR; INTRAVENOUS at 19:04

## 2022-05-15 RX ADMIN — ACETAMINOPHEN 1000 MG: 500 TABLET ORAL at 17:13

## 2022-05-15 ASSESSMENT — ENCOUNTER SYMPTOMS
RHINORRHEA: 0
PHOTOPHOBIA: 0
CONSTIPATION: 0
VOMITING: 0
SORE THROAT: 0
ANAL BLEEDING: 0
SHORTNESS OF BREATH: 0
CHEST TIGHTNESS: 0
ABDOMINAL DISTENTION: 0
DIARRHEA: 0
NAUSEA: 0

## 2022-05-15 ASSESSMENT — PAIN SCALES - GENERAL
PAINLEVEL_OUTOF10: 3
PAINLEVEL_OUTOF10: 6

## 2022-05-15 ASSESSMENT — PAIN - FUNCTIONAL ASSESSMENT: PAIN_FUNCTIONAL_ASSESSMENT: 0-10

## 2022-05-15 ASSESSMENT — PAIN DESCRIPTION - ORIENTATION: ORIENTATION: LEFT

## 2022-05-15 ASSESSMENT — PAIN DESCRIPTION - LOCATION: LOCATION: HIP

## 2022-05-15 NOTE — ED NOTES
Pt to ed with c/o left hip pain after falling today. Pt lives in assisted living facility, states she fell this am in the bathroom, did not hit her head or pass out. Pt is able to ambulate but with pain. Pt has walker. Pt states pain starts in the hip and radiates down the leg. Pt also c/o point tenderness in her neck.  Pt states while laying pain is 4/10     Moo Fuchs RN  05/15/22 [id number]

## 2022-05-15 NOTE — ED PROVIDER NOTES
[facility]     Emergency Department     Faculty Attestation    I performed a history and physical examination of the patient and discussed management with the resident. I reviewed the resident´s note and agree with the documented findings and plan of care. Any areas of disagreement are noted on the chart. I was personally present for the key portions of any procedures. I have documented in the chart those procedures where I was not present during the key portions. I have reviewed the emergency nurses triage note. I agree with the chief complaint, past medical history, past surgical history, allergies, medications, social and family history as documented unless otherwise noted below. For Physician Assistant/ Nurse Practitioner cases/documentation I have personally evaluated this patient and have completed at least one if not all key elements of the E/M (history, physical exam, and MDM). Additional findings are as noted. Fall this morning, pain left lateral hip, right anterior shoulder, upper thoracic spine midline.      Ethan Allen MD  05/15/22 [id number]

## 2022-05-15 NOTE — ED PROVIDER NOTES
[facility]  ED  Emergency Department Encounter  EmergencyMedicine Resident     Pt Kenia Hernandez  MRN: [id number]  [place]urt 1965  Date of evaluation: 5/15/22  PCP:  Sheridan Em MD    This patient was evaluated in the Emergency Department for symptoms described in the history of present illness. The patient was evaluated in the context of the global COVID-19 pandemic, which necessitated consideration that the patient might be at risk for infection with the SARS-CoV-2 virus that causes COVID-19. Institutional protocols and algorithms that pertain to the evaluation of patients at risk for COVID-19 are in a state of rapid change based on information released by regulatory bodies including the CDC and federal and state organizations. These policies and algorithms were followed during the patient's care in the ED. CHIEF COMPLAINT       Chief Complaint   Patient presents with    Hip Pain       HISTORY OF PRESENT ILLNESS  (Location/Symptom, Timing/Onset, Context/Setting, Quality, Duration, Modifying Factors, Severity.)      Umberto Shine is a 61 y.o. female who presents after mechanical fall from standing height last night. Patient states she was getting out of her shower when she slipped and fell. She states she thinks she may have hit her head but she did not lose consciousness. She is coming in because she has a left-sided hip pain. She also states she is having thoracic back pain as well as right shoulder pain. She denies taking any blood thinners, or any prodromal symptoms such as dizziness or chest pain prior to fall.     PAST MEDICAL / SURGICAL / SOCIAL / FAMILY HISTORY      has a past medical history of Acid reflux, Anxiety, Arthritis, Asthma, Bipolar 1 disorder ([address] Bowel obstruction ([address] COPD (chronic obstructive pulmonary disease) ([address] Crohn disease ([address] Depression, Dry eye, Fibromyalgia, Gout, Headache, Hyperlipidemia, Hypertension, Hypothyroidism, Kidney stones, Muscle spasm, Neuropathy, Pain, Personal history of other medical treatment, Wears partial dentures, and Wellness examination. has a past surgical history that includes Tonsillectomy and adenoidectomy; Tubal ligation; Cholecystectomy; Bunionectomy (Left); Colonoscopy (04/30/2007); Colonoscopy (10/12/2017); Abdomen surgery; Upper gastrointestinal endoscopy (10/25/2021); Colonoscopy (10/25/2021); and Colonoscopy (10/25/2021). Social History     Socioeconomic History    Marital status: Single     Spouse name: Not on file    Number of children: Not on file    Years of education: Not on file    Highest education level: Not on file   Occupational History    Not on file   Tobacco Use    Smoking status: Current Every Day Smoker     Packs/day: 0.50     Years: 37.00     Pack years: 18.50     Types: Cigarettes    Smokeless tobacco: Former User     Types: Chew     Quit date: 9/3/2001   Vaping Use    Vaping Use: Former    Quit date: 9/3/2019    Substances: Nicotine   Substance and Sexual Activity    Alcohol use: Not Currently    Drug use: Yes     Types: Marijuana (Weed)     Comment: h/o of substance abuse but denies drug use    Sexual activity: Not on file   Other Topics Concern    Not on file   Social History Narrative    Not on file     Social Determinants of Health     Financial Resource Strain:     Difficulty of Paying Living Expenses: Not on file   Food Insecurity:     Worried About [address] in the Last Year: Not on file    [address] in the Last Year: Not on file   Transportation Needs:     Lack of Transportation (Medical): Not on file    Lack of Transportation (Non-Medical):  Not on file   Physical Activity:     Days of Exercise per Week: Not on file    Minutes of Exercise per Session: Not on file   Stress:     Feeling of Stress : Not on file   Social Connections:     Frequency of Communication with Friends and Family: Not on file    Frequency of Social Gatherings with Friends and Family: Not on file    Attends Zoroastrianism Services: Not on file    Active Member of Clubs or Organizations: Not on file    Attends Club or Organization Meetings: Not on file    Marital Status: Not on file   Intimate Partner Violence:     Fear of Current or Ex-Partner: Not on file    Emotionally Abused: Not on file    Physically Abused: Not on file    Sexually Abused: Not on file   Housing Stability:     Unable to Pay for Housing in the Last Year: Not on file    Number of [place] in the Last Year: Not on file    Unstable Housing in the Last Year: Not on file       Family History   Problem Relation Age of Onset    Diabetes Father     Lung Cancer Father     Hypertension Mother     Cancer Mother     High Blood Pressure Mother     Crohn's Disease Brother     Heart Disease Brother        Allergies:  Azithromycin, Methylprednisolone, Penicillins, and Tape [adhesive tape]    Home Medications:  Prior to Admission medications    Medication Sig Start Date End Date Taking? Authorizing Provider   Pregabalin (LYRICA PO) Take by mouth   Yes Historical Provider, MD   ibuprofen (IBU) 400 MG tablet Take 1 tablet by mouth every 6 hours as needed for Pain 5/15/22 5/18/22 Yes Briana Lindo DO   HYDROcodone-acetaminophen (NORCO) 5-325 MG per tablet TAKE 1 TABLET BY MOUTH EVERY 8 HOURS AS NEEDED FOR PAIN FOR UP TO 14 DAYS. 5/2/22 5/16/22  Yessy Roland MD   HYDROcodone-acetaminophen (NORCO) 5-325 MG per tablet Take 1 tablet by mouth every 8 hours as needed for Pain for up to 14 days.  5/2/22 5/16/22  Yessy Roland MD   albuterol sulfate HFA (VENTOLIN HFA) 108 (90 Base) MCG/ACT inhaler Inhale 2 puffs into the lungs every 6 hours as needed for Wheezing    Historical Provider, MD   diphenhydrAMINE (BENADRYL) 25 MG tablet Take 25 mg by mouth nightly as needed    Historical Provider, MD   topiramate (TOPAMAX) 100 MG tablet Take 100 mg by mouth 3 times daily     Historical Provider, MD SUMAtriptan (IMITREX) 100 MG tablet Take 100 mg by mouth once as needed for Migraine    Historical Provider, MD   gabapentin (NEURONTIN) 400 MG capsule Take 800 mg by mouth 3 times daily. Historical Provider, MD   budesonide-formoterol (SYMBICORT) 160-4.5 MCG/ACT AERO Inhale 2 puffs into the lungs 2 times daily 9/19/21   Kamilla Aceves MD   lisinopril (PRINIVIL;ZESTRIL) 5 MG tablet Take 1 tablet by mouth daily 9/20/21   Kamilla Aceves MD   nicotine (NICODERM CQ) 21 MG/24HR Place 1 patch onto the skin daily 9/20/21   Kamilla Aceves MD   acetaminophen (TYLENOL) 500 MG tablet Take 2 tablets by mouth 3 times daily  Patient taking differently: Take 1,000 mg by mouth 3 times daily as needed  7/17/21   Olimpia Davenport DO   Polyvinyl Alcohol-Povidone (REFRESH OP) Place 1 drop into both eyes 3 times daily    Historical Provider, MD   lidocaine (LIDODERM) 5 % Place 1 patch onto the skin daily as needed for Pain 12 hours on, 12 hours off. Historical Provider, MD       REVIEW OF SYSTEMS    (2-9 systems for level 4, 10 or more for level 5)      Review of Systems   Constitutional: Negative for chills and fever. HENT: Negative for rhinorrhea and sore throat. Eyes: Negative for photophobia. Respiratory: Negative for chest tightness and shortness of breath. Cardiovascular: Negative for chest pain. Gastrointestinal: Negative for abdominal distention, anal bleeding, constipation, diarrhea, nausea and vomiting. Endocrine: Negative for polyuria. Genitourinary: Negative for difficulty urinating and flank pain. Musculoskeletal: Negative for arthralgias. Skin: Negative for rash. Neurological: Negative for weakness and headaches. PHYSICAL EXAM   (up to 7 for level 4, 8 or more for level 5)      INITIAL VITALS:   /70   Pulse 76   Temp 97.5 °F (36.4 °C) (Oral)   Resp 18   SpO2 95%     Physical Exam  Constitutional:       General: She is not in acute distress.      Appearance: She is not ill-appearing. HENT:      Head: Normocephalic and atraumatic. Nose: Nose normal.      Mouth/Throat:      Mouth: Mucous membranes are moist.      Pharynx: No posterior oropharyngeal erythema. Eyes:      Extraocular Movements: Extraocular movements intact. Pupils: Pupils are equal, round, and reactive to light. Cardiovascular:      Rate and Rhythm: Normal rate. Pulses: Normal pulses. Heart sounds: Normal heart sounds. Pulmonary:      Effort: Pulmonary effort is normal.      Breath sounds: Normal breath sounds. Abdominal:      General: Abdomen is flat. Palpations: Abdomen is soft. Tenderness: There is no abdominal tenderness. Musculoskeletal:         General: No swelling or deformity. Cervical back: No rigidity or tenderness. Comments: Left hip tenderness, right shoulder tenderness, right sided thoracic paraspinal muscle tenderness   Skin:     Capillary Refill: Capillary refill takes less than 2 seconds. Coloration: Skin is not jaundiced. Findings: No bruising. Neurological:      General: No focal deficit present. Mental Status: She is oriented to person, place, and time. DIFFERENTIAL  DIAGNOSIS     PLAN (LABS / IMAGING / EKG):  Orders Placed This Encounter   Procedures    XR SHOULDER RIGHT (MIN 2 VIEWS)    XR THORACIC SPINE (2 VIEWS)    XR HIP 2-3 VW W PELVIS LEFT       MEDICATIONS ORDERED:  Orders Placed This Encounter   Medications    acetaminophen (TYLENOL) tablet 1,000 mg    ibuprofen (IBU) 400 MG tablet     Sig: Take 1 tablet by mouth every 6 hours as needed for Pain     Dispense:  12 tablet     Refill:  0    ketorolac (TORADOL) injection 15 mg       DDX: Hip strain, femoral neck fracture, humeral neck fracture    DIAGNOSTIC RESULTS / EMERGENCY DEPARTMENT COURSE / MDM   LAB RESULTS:  No results found for this visit on 05/15/22.     RADIOLOGY:  XR THORACIC SPINE (2 VIEWS)    Result Date: 5/15/2022  No acute osseous abnormality of the right shoulder. No acute osseous abnormality of the thoracic spine. Osteopenia. XR SHOULDER RIGHT (MIN 2 VIEWS)    Result Date: 5/15/2022  No acute osseous abnormality of the right shoulder. No acute osseous abnormality of the thoracic spine. Osteopenia. XR HIP 2-3 VW W PELVIS LEFT    Result Date: 5/15/2022  No fracture or dislocation. Advanced degenerate changes versus underlying avascular necrosis involving the left hip similar to previous exam.       EKG Interpretation    none    EMERGENCY DEPARTMENT COURSE:  ED Course as of 05/15/22 2028   Sun May 15, 2022   1700 Patient value bedside. Fall from standing height mechanical.  Having some left hip pain, right shoulder pain and thoracic paraspinal pain will get x-rays and give Tylenol [ZE]   1819 No acute process on x-ray imaging. Will discharge patient [ZE]      ED Course User Index  [ZE] Ron Griffin DO       PROCEDURES:  Procedures     CONSULTS:  None    CRITICAL CARE:  none    MDM  Here for evaluation of multiple musculoskeletal complaints after fall from standing height. Patient nontoxic-appearing with no obvious deformities on exam.  Patient did have x-rays done of areas of bony point tenderness that were unremarkable. Patient's symptoms improved following Tylenol and Toradol administration. Patient told return if symptoms worsen    FINAL IMPRESSION      1.  Left hip pain      DISPOSITION / PLAN     DISPOSITION Decision To Discharge 05/15/2022 06:57:58 PM      PATIENT REFERRED TO:  Malathi Blanco MD  [address] [facility]  [phone]      As needed    [facility] ED  [address] [facility]  [phone]    If symptoms worsen      DISCHARGE MEDICATIONS:  Discharge Medication List as of 5/15/2022  6:20 PM      START taking these medications    Details   ibuprofen (IBU) 400 MG tablet Take 1 tablet by mouth every 6 hours as needed for Pain, Disp-12 tablet, R-0Print             Ethan Cantu Perez Santizo, [address] [place] [address]  Emergency Medicine Resident    (Please note that portions of thisnote were completed with a voice recognition program.  Efforts were made to edit the dictations but occasionally words are mis-transcribed.)        Jane Ritchie DO  Resident  05/15/22 2029

## 2022-05-16 DIAGNOSIS — M16.32 OSTEOARTHRITIS RESULTING FROM LEFT HIP DYSPLASIA: ICD-10-CM

## 2022-05-16 RX ORDER — HYDROCODONE BITARTRATE AND ACETAMINOPHEN 5; 325 MG/1; MG/1
1 TABLET ORAL EVERY 8 HOURS PRN
Qty: 42 TABLET | Refills: 0 | Status: SHIPPED | OUTPATIENT
Start: 2022-05-16 | End: 2022-05-25 | Stop reason: SDUPTHER

## 2022-05-16 NOTE — TELEPHONE ENCOUNTER
Patient also states she went to [facility]'s ER ,because she recently had a fall and is now shaking and in pain  , she would like you to review the most recent ER NOTE and XR.

## 2022-05-17 ENCOUNTER — OFFICE VISIT (OUTPATIENT)
Dept: ORTHOPEDIC SURGERY | Age: 57
End: 2022-05-17
Payer: MEDICARE

## 2022-05-17 VITALS — BODY MASS INDEX: 22.18 KG/M2 | HEIGHT: 66 IN | WEIGHT: 138 LBS

## 2022-05-17 DIAGNOSIS — M16.32 OSTEOARTHRITIS RESULTING FROM LEFT HIP DYSPLASIA: Primary | ICD-10-CM

## 2022-05-17 DIAGNOSIS — G56.31 RADIAL NERVE PALSY, RIGHT: ICD-10-CM

## 2022-05-17 PROCEDURE — 99213 OFFICE O/P EST LOW 20 MIN: CPT | Performed by: ORTHOPAEDIC SURGERY

## 2022-05-17 PROCEDURE — G8427 DOCREV CUR MEDS BY ELIG CLIN: HCPCS | Performed by: ORTHOPAEDIC SURGERY

## 2022-05-17 PROCEDURE — 3017F COLORECTAL CA SCREEN DOC REV: CPT | Performed by: ORTHOPAEDIC SURGERY

## 2022-05-17 PROCEDURE — G8420 CALC BMI NORM PARAMETERS: HCPCS | Performed by: ORTHOPAEDIC SURGERY

## 2022-05-17 PROCEDURE — 4004F PT TOBACCO SCREEN RCVD TLK: CPT | Performed by: ORTHOPAEDIC SURGERY

## 2022-05-17 RX ORDER — ACETAMINOPHEN AND CODEINE PHOSPHATE 300; 30 MG/1; MG/1
1 TABLET ORAL EVERY 6 HOURS PRN
Qty: 56 TABLET | Refills: 0 | Status: SHIPPED | OUTPATIENT
Start: 2022-05-17 | End: 2022-05-31

## 2022-05-17 NOTE — PROGRESS NOTES
This pleasant 62-year-old patient is seen here because of injury she sustained to her left leg. This happened on 5/15/661420 when she was getting out of the bathroom and slipped and fell. She hurt her back as well as the left hip and went to the emergency room where there is areas were checked out and referred here. Patient says that she has been losing her balance and feels that she is very clumsy. She does have frequent falls. Last night she fell again she says she is using the sling person. Slipped and she fell. After that she slept in the chair all night through with the right arm supported on the angle of the chair. This morning when she woke up she could not extend her wrist or the fingers. Patient continues to complain of pain in the left hip the back pain is getting better. Examination: Right upper extremity examination shows she has no extension of the thumb fingers or the wrist.  She is able to extend and flex the elbow and she is able to abduct the shoulder against resistance. Semination of the left hip shows reveals significant pain on internal or external rotation. She is walking with an antalgic gait. X-rays: X-rays of the thoracic spine showed no fractures x-rays of the right shoulder taken in the emergency room showed no fractures x-ray of the left hip continues to show significant degenerative changes. The patient is known to have dysplastic left hip with secondary osteoarthritis. 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 #2 right radial nerve palsy. #3 frequent falls from balance issues. Treatment: Given her a cock-up splint for radial nerve palsy. She does not want to continue using Norco therefore a prescription for Tylenol 3 has been given. She is a 69 order for disability parking sticker for 5 years. I also strongly suggested to her that she call 's office to see if she can be evaluated for her balance issues.   She may require neurological consultation. We are going to hold off putting her in the schedule for hip replacement. I like to see her again in 2 weeks time.

## 2022-05-18 ENCOUNTER — HOSPITAL ENCOUNTER (EMERGENCY)
Age: 57
Discharge: HOME | End: 2022-05-18
Attending: EMERGENCY MEDICINE
Payer: MEDICARE

## 2022-05-18 ENCOUNTER — APPOINTMENT (OUTPATIENT)
Dept: CT IMAGING | Age: 57
End: 2022-05-18
Payer: MEDICARE

## 2022-05-18 ENCOUNTER — TELEPHONE (OUTPATIENT)
Dept: ORTHOPEDIC SURGERY | Age: 57
End: 2022-05-18

## 2022-05-18 ENCOUNTER — APPOINTMENT (OUTPATIENT)
Dept: GENERAL RADIOLOGY | Age: 57
End: 2022-05-18
Payer: MEDICARE

## 2022-05-18 VITALS
HEIGHT: 66 IN | BODY MASS INDEX: 22.18 KG/M2 | RESPIRATION RATE: 17 BRPM | WEIGHT: 138 LBS | TEMPERATURE: 100.6 F | HEART RATE: 75 BPM | SYSTOLIC BLOOD PRESSURE: 99 MMHG | OXYGEN SATURATION: 97 % | DIASTOLIC BLOOD PRESSURE: 65 MMHG
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
## 2022-05-18 DIAGNOSIS — R50.9 FEVER, UNSPECIFIED FEVER CAUSE: Primary | ICD-10-CM

## 2022-05-18 LAB
-: NORMAL
ABSOLUTE EOS #: 0.14 K/UL (ref 0–0.44)
ABSOLUTE IMMATURE GRANULOCYTE: 0.06 K/UL (ref 0–0.3)
ABSOLUTE LYMPH #: 1.73 K/UL (ref 1.1–3.7)
ABSOLUTE MONO #: 1.11 K/UL (ref 0.1–1.2)
ANION GAP SERPL CALCULATED.3IONS-SCNC: 12 MMOL/L (ref 9–17)
BASOPHILS # BLD: 0 % (ref 0–2)
BASOPHILS ABSOLUTE: 0.04 K/UL (ref 0–0.2)
BILIRUBIN URINE: NEGATIVE
BUN BLDV-MCNC: 16 MG/DL (ref 6–20)
BUN/CREAT BLD: 20 (ref 9–20)
CALCIUM SERPL-MCNC: 8.9 MG/DL (ref 8.6–10.4)
CHLORIDE BLD-SCNC: 98 MMOL/L (ref 98–107)
CO2: 30 MMOL/L (ref 20–31)
COLOR: YELLOW
CREAT SERPL-MCNC: 0.8 MG/DL (ref 0.5–0.9)
EOSINOPHILS RELATIVE PERCENT: 1 % (ref 1–4)
EPITHELIAL CELLS UA: NORMAL /HPF (ref 0–5)
GFR AFRICAN AMERICAN: >60 ML/MIN
GFR NON-AFRICAN AMERICAN: >60 ML/MIN
GFR SERPL CREATININE-BSD FRML MDRD: ABNORMAL ML/MIN/{1.73_M2}
GLUCOSE BLD-MCNC: 102 MG/DL (ref 70–99)
GLUCOSE URINE: NEGATIVE
HCT VFR BLD CALC: 40.9 % (ref 36.3–47.1)
HEMOGLOBIN: 12.7 G/DL (ref 11.9–15.1)
IMMATURE GRANULOCYTES: 1 %
KETONES, URINE: NEGATIVE
LEUKOCYTE ESTERASE, URINE: ABNORMAL
LYMPHOCYTES # BLD: 13 % (ref 24–43)
MCH RBC QN AUTO: 27.7 PG (ref 25.2–33.5)
MCHC RBC AUTO-ENTMCNC: 31.1 G/DL (ref 28.4–34.8)
MCV RBC AUTO: 89.1 FL (ref 82.6–102.9)
MONOCYTES # BLD: 8 % (ref 3–12)
NITRITE, URINE: NEGATIVE
NRBC AUTOMATED: 0 PER 100 WBC
PDW BLD-RTO: 16.3 % (ref 11.8–14.4)
PH UA: 6 (ref 5–8)
PLATELET # BLD: 225 K/UL (ref 138–453)
PMV BLD AUTO: 12.3 FL (ref 8.1–13.5)
POTASSIUM SERPL-SCNC: 4.2 MMOL/L (ref 3.7–5.3)
PROTEIN UA: NEGATIVE
RBC # BLD: 4.59 M/UL (ref 3.95–5.11)
RBC # BLD: ABNORMAL 10*6/UL
RBC UA: NORMAL /HPF (ref 0–2)
SARS-COV-2, RAPID: NOT DETECTED
SEG NEUTROPHILS: 77 % (ref 36–65)
SEGMENTED NEUTROPHILS ABSOLUTE COUNT: 10.07 K/UL (ref 1.5–8.1)
SODIUM BLD-SCNC: 140 MMOL/L (ref 135–144)
SPECIFIC GRAVITY UA: 1.01 (ref 1–1.03)
SPECIMEN DESCRIPTION: NORMAL
TURBIDITY: CLEAR
URINE HGB: NEGATIVE
UROBILINOGEN, URINE: NORMAL
WBC # BLD: 13.2 K/UL (ref 3.5–11.3)
WBC UA: NORMAL /HPF (ref 0–5)

## 2022-05-18 PROCEDURE — 99284 EMERGENCY DEPT VISIT MOD MDM: CPT

## 2022-05-18 PROCEDURE — 80048 BASIC METABOLIC PNL TOTAL CA: CPT

## 2022-05-18 PROCEDURE — 93005 ELECTROCARDIOGRAM TRACING: CPT

## 2022-05-18 PROCEDURE — 6370000000 HC RX 637 (ALT 250 FOR IP): Performed by: EMERGENCY MEDICINE

## 2022-05-18 PROCEDURE — 72125 CT NECK SPINE W/O DYE: CPT

## 2022-05-18 PROCEDURE — 70450 CT HEAD/BRAIN W/O DYE: CPT

## 2022-05-18 PROCEDURE — 96374 THER/PROPH/DIAG INJ IV PUSH: CPT

## 2022-05-18 PROCEDURE — 87635 SARS-COV-2 COVID-19 AMP PRB: CPT

## 2022-05-18 PROCEDURE — 71045 X-RAY EXAM CHEST 1 VIEW: CPT

## 2022-05-18 PROCEDURE — 85025 COMPLETE CBC W/AUTO DIFF WBC: CPT

## 2022-05-18 PROCEDURE — 6360000002 HC RX W HCPCS: Performed by: EMERGENCY MEDICINE

## 2022-05-18 PROCEDURE — 81001 URINALYSIS AUTO W/SCOPE: CPT

## 2022-05-18 PROCEDURE — 2580000003 HC RX 258: Performed by: EMERGENCY MEDICINE

## 2022-05-18 RX ORDER — KETOROLAC TROMETHAMINE 30 MG/ML
30 INJECTION, SOLUTION INTRAMUSCULAR; INTRAVENOUS ONCE
Status: COMPLETED | OUTPATIENT
Start: 2022-05-18 | End: 2022-05-18

## 2022-05-18 RX ORDER — ACETAMINOPHEN 325 MG/1
650 TABLET ORAL ONCE
Status: COMPLETED | OUTPATIENT
Start: 2022-05-18 | End: 2022-05-18

## 2022-05-18 RX ORDER — SODIUM CHLORIDE 9 MG/ML
INJECTION, SOLUTION INTRAVENOUS CONTINUOUS
Status: DISCONTINUED | OUTPATIENT
Start: 2022-05-18 | End: 2022-05-18 | Stop reason: HOSPADM

## 2022-05-18 RX ADMIN — ACETAMINOPHEN 650 MG: 325 TABLET ORAL at 15:50

## 2022-05-18 RX ADMIN — KETOROLAC TROMETHAMINE 30 MG: 30 INJECTION, SOLUTION INTRAMUSCULAR at 18:29

## 2022-05-18 RX ADMIN — SODIUM CHLORIDE: 9 INJECTION, SOLUTION INTRAVENOUS at 16:15

## 2022-05-18 ASSESSMENT — ENCOUNTER SYMPTOMS
COLOR CHANGE: 0
ABDOMINAL PAIN: 0
EYE REDNESS: 0
COUGH: 0
EYE DISCHARGE: 0
VOMITING: 0
SHORTNESS OF BREATH: 0
CONSTIPATION: 0
FACIAL SWELLING: 0
DIARRHEA: 0

## 2022-05-18 ASSESSMENT — PAIN - FUNCTIONAL ASSESSMENT: PAIN_FUNCTIONAL_ASSESSMENT: 0-10

## 2022-05-18 ASSESSMENT — PAIN SCALES - GENERAL
PAINLEVEL_OUTOF10: 7
PAINLEVEL_OUTOF10: 7

## 2022-05-18 NOTE — TELEPHONE ENCOUNTER
Pt called in stating her insurance is requesting a PA on acetaminophen-codeine (TYLENOL #3) 300-30 MG per tablet

## 2022-05-18 NOTE — ED NOTES
Pt able to ambulate to ED waiting room with assistance of rollator. Writer accompanied pt to ensure pt safety.       Pa Bustillo RN  05/18/22 [id number]

## 2022-05-18 NOTE — ED PROVIDER NOTES
[address] [place] [address] ED  EMERGENCY DEPARTMENT ENCOUNTER      Pt Name: Teodoro Hagen  MRN: [id number]  [place] 1965  Date of evaluation: 5/18/2022  Provider: Thomas Denver, MD    [address] [place] [address]       Chief Complaint   Patient presents with    Fall    Head Injury    Extremity Weakness         HISTORY OF PRESENT ILLNESS  (Location/Symptom, Timing/Onset, Context/Setting, Quality, Duration, Modifying Factors, Severity.)   Teodoro Hagen is a 61 y.o. female who presents to the emergency department because she fell and hit the back of her head. She was in her home when this happened and she does not know why she fell. She did not pass out. She was found to have a fever at triage, 102.9 degrees. She did not realize that she had a fever. She has had no significant cough and has no abdominal pain or dysuria. No vomiting. She states she has had her 2 COVID shots plus a booster. She rated the pain in the back of her head is a 7 and it goes into the back of her neck. Nursing Notes were reviewed. ALLERGIES     Azithromycin, Methylprednisolone, Penicillins, and Tape [adhesive tape]    CURRENT MEDICATIONS       Previous Medications    ACETAMINOPHEN (TYLENOL) 500 MG TABLET    Take 2 tablets by mouth 3 times daily    ACETAMINOPHEN-CODEINE (TYLENOL #3) 300-30 MG PER TABLET    Take 1 tablet by mouth every 6 hours as needed for Pain for up to 14 days. ALBUTEROL SULFATE HFA (VENTOLIN HFA) 108 (90 BASE) MCG/ACT INHALER    Inhale 2 puffs into the lungs every 6 hours as needed for Wheezing    BUDESONIDE-FORMOTEROL (SYMBICORT) 160-4.5 MCG/ACT AERO    Inhale 2 puffs into the lungs 2 times daily    DIPHENHYDRAMINE (BENADRYL) 25 MG TABLET    Take 25 mg by mouth nightly as needed    GABAPENTIN (NEURONTIN) 400 MG CAPSULE    Take 800 mg by mouth 3 times daily.     HANDICAP PLACARD MISC    by Does not apply route Duration 5 years    HYDROCODONE-ACETAMINOPHEN (NORCO) 5-325 MG PER TABLET    Take 1 tablet by mouth every 8 hours as needed for Pain for up to 14 days. IBUPROFEN (IBU) 400 MG TABLET    Take 1 tablet by mouth every 6 hours as needed for Pain    LIDOCAINE (LIDODERM) 5 %    Place 1 patch onto the skin daily as needed for Pain 12 hours on, 12 hours off. LISINOPRIL (PRINIVIL;ZESTRIL) 5 MG TABLET    Take 1 tablet by mouth daily    NICOTINE (NICODERM CQ) 21 MG/24HR    Place 1 patch onto the skin daily    POLYVINYL ALCOHOL-POVIDONE (REFRESH OP)    Place 1 drop into both eyes 3 times daily    PREGABALIN (LYRICA PO)    Take by mouth    SUMATRIPTAN (IMITREX) 100 MG TABLET    Take 100 mg by mouth once as needed for Migraine    TOPIRAMATE (TOPAMAX) 100 MG TABLET    Take 100 mg by mouth 3 times daily        PAST MEDICAL HISTORY         Diagnosis Date    Acid reflux     Anxiety     Arthritis     Left hip    Asthma     Bipolar 1 disorder (HCC)     Bowel obstruction (HCC)     COPD (chronic obstructive pulmonary disease) (HCC)     O2 3L PRN/ Dr. R[facility]/last seen 2020/appt.9-7-21 for Clearance    Crohn disease ([facility] [address]     Depression     Dry eye     Fibromyalgia     Gout     Headache     Hyperlipidemia     Hypertension     Hypothyroidism     Kidney stones     Muscle spasm     Neuropathy     Pain     left hip    Personal history of other medical treatment     Pt. seen by Dr. Chaparro Kline for clearance for this OR Left hip/ Normally pt. doesn't follow with Cardiology.  [facility]    Wears partial dentures     upper    Wellness examination     PCP Edwin Sterling MD/ [facility]/last seen 8-24-21       SURGICAL HISTORY           Procedure Laterality Date    ABDOMEN SURGERY      bowel obstruction OR    BUNIONECTOMY Left     CHOLECYSTECTOMY      COLONOSCOPY  04/30/2007    no gross pathology seen in the colon and also 3-4 inches of the ileum    COLONOSCOPY  10/12/2017    normal    COLONOSCOPY  10/25/2021    COLONOSCOPY WITH BIOPSY performed by Daniel Springer MD at  [facility] [address] COLONOSCOPY 10/25/2021    COLONOSCOPY POLYPECTOMY REMOVAL SNARE performed by Eros Lamar MD at [address] [place] [address]      UPPER GASTROINTESTINAL ENDOSCOPY  10/25/2021    EGD BIOPSY performed by Eros Lamar MD at [facility]         FAMILY HISTORY           Problem Relation Age of Onset    Diabetes Father     Lung Cancer Father     Hypertension Mother     Cancer Mother     High Blood Pressure Mother     Crohn's Disease Brother     Heart Disease Brother      Family Status   Relation Name Status    Father      Mother      Brother  Alive        SOCIAL HISTORY      reports that she has been smoking cigarettes. She has a 18.50 pack-year smoking history. She quit smokeless tobacco use about 20 years ago. Her smokeless tobacco use included chew. She reports previous alcohol use. She reports current drug use. Drug: Marijuana [place]). REVIEW OF SYSTEMS    (2-9 systems for level 4, 10 or more for level 5)     Review of Systems   Constitutional: Negative for chills, fatigue and fever. HENT: Negative for congestion, ear discharge and facial swelling. Eyes: Negative for discharge and redness. Respiratory: Negative for cough and shortness of breath. Cardiovascular: Negative for chest pain. Gastrointestinal: Negative for abdominal pain, constipation, diarrhea and vomiting. Genitourinary: Negative for dysuria and hematuria. Musculoskeletal: Negative for arthralgias. Skin: Negative for color change and rash. Neurological: Positive for weakness. Negative for syncope, numbness and headaches. Hematological: Negative for adenopathy. Psychiatric/Behavioral: Negative for confusion. The patient is not nervous/anxious. Except as noted above the remainder of the review of systems was reviewed and negative.      PHYSICAL EXAM    (up to 7 for level 4, 8 or more for level 5)     Vitals:    22 1730 22 1735 22 1750 05/18/22 1755   BP: 99/65      Pulse: 79 75 74 75   Resp: 17      Temp:       SpO2: (!) 88% 95% 98% 97%   Weight:       Height:           Physical Exam  Vitals reviewed. Constitutional:       General: She is not in acute distress. Appearance: She is well-developed. She is not diaphoretic. Comments: She is wearing a cervical collar when I walk into the room. HENT:      Head: Normocephalic and atraumatic. Eyes:      General: No scleral icterus. Right eye: No discharge. Left eye: No discharge. Cardiovascular:      Rate and Rhythm: Normal rate and regular rhythm. Pulmonary:      Effort: Pulmonary effort is normal. No respiratory distress. Breath sounds: Normal breath sounds. No stridor. No wheezing or rales. Abdominal:      General: There is no distension. Palpations: Abdomen is soft. Tenderness: There is no abdominal tenderness. Musculoskeletal:         General: Normal range of motion. Cervical back: Neck supple. Lymphadenopathy:      Cervical: No cervical adenopathy. Skin:     General: Skin is warm and dry. Findings: No erythema or rash. Neurological:      Mental Status: She is alert and oriented to person, place, and time.    Psychiatric:         Behavior: Behavior normal.             DIAGNOSTIC RESULTS     EKG: All EKG's are interpreted by the Emergency Department Physician who either signs or Co-signs this chart in the absence of a cardiologist.    EKG on my interpretation shows sinus rhythm with a rate of 77    RADIOLOGY:   Non-plain film images such as CT, Ultrasound and MRI are read by the radiologist. Plain radiographic images are visualized and preliminarily interpreted by the emergency physician with the below findings:    Interpretation per the Radiologist below, if available at the time of this note:    CT HEAD WO CONTRAST    Result Date: 5/18/2022  EXAMINATION: CT OF THE HEAD WITHOUT CONTRAST  5/18/2022 4:16 pm TECHNIQUE: CT of the head was performed without the administration of intravenous contrast. Automated exposure control, iterative reconstruction, and/or weight based adjustment of the mA/kV was utilized to reduce the radiation dose to as low as reasonably achievable. COMPARISON: None. HISTORY: ORDERING SYSTEM PROVIDED HISTORY: fall TECHNOLOGIST PROVIDED HISTORY: fall Decision Support Exception - unselect if not a suspected or confirmed emergency medical condition->Emergency Medical Condition ([place]) Reason for Exam: s/p fall. PT c/o posterior head and neck pain. FINDINGS: BRAIN/VENTRICLES: Study was motion degraded. Repeat imaging was obtained. There is no acute intracranial hemorrhage, mass effect or midline shift. No abnormal extra-axial fluid collection. The gray-white differentiation is maintained without evidence of an acute infarct. There is no evidence of hydrocephalus. ORBITS: The visualized portion of the orbits demonstrate no acute abnormality. SINUSES: The visualized paranasal sinuses and mastoid air cells demonstrate no acute abnormality. There is a defect in the anterior nasal septum. SOFT TISSUES/SKULL:  No acute abnormality of the visualized skull or soft tissues. No acute intracranial abnormality. CT CERVICAL SPINE WO CONTRAST    Result Date: 5/18/2022  EXAMINATION: CT OF THE CERVICAL SPINE WITHOUT CONTRAST 5/18/2022 4:16 pm TECHNIQUE: CT of the cervical spine was performed without the administration of intravenous contrast. Multiplanar reformatted images are provided for review. Automated exposure control, iterative reconstruction, and/or weight based adjustment of the mA/kV was utilized to reduce the radiation dose to as low as reasonably achievable. COMPARISON: 03/22/2022 HISTORY: ORDERING SYSTEM PROVIDED HISTORY: fall TECHNOLOGIST PROVIDED HISTORY: fall Decision Support Exception - unselect if not a suspected or confirmed emergency medical condition->Emergency Medical Condition ([place]) Reason for Exam: s/p fall.  PT c/o posterior head and neck pain. FINDINGS: BONES/ALIGNMENT: Study was motion degraded and repeat imaging was obtained. There is reversal of the normal cervical lordosis unchanged from 03/02/2022. Vertebral body alignment is otherwise unremarkable. Cervical vertebral body heights are normal.  Facet joints are normally aligned. There is no acute fracture or traumatic malalignment. DEGENERATIVE CHANGES: Multilevel degenerative changes including severe degenerative disc disease at C5-C6 and C6-C7. There are broad-based disc protrusions at both levels causing mild mass effect on the cervical spinal cord. There is multilevel facet arthropathy. SOFT TISSUES: There is no prevertebral soft tissue swelling. Multilevel degenerative changes with no acute fracture or traumatic malalignment. Reversal of the normal cervical lordosis is unchanged and likely degenerative. XR CHEST PORTABLE    Result Date: 5/18/2022  EXAMINATION: ONE XRAY VIEW OF THE CHEST 5/18/2022 5:49 pm COMPARISON: 10/21/2021 HISTORY: ORDERING SYSTEM PROVIDED HISTORY: fever TECHNOLOGIST PROVIDED HISTORY: fever Reason for Exam: fever FINDINGS: Heart size and configuration are normal.  Hilar and mediastinal structures are unremarkable. Small band of left retrocardiac atelectasis. The lungs are otherwise clear. No pneumothorax or pleural fluid. Prominent costal cartilage calcification at the anterior costochondral junctions in the mid and lower chest.     No acute cardiopulmonary disease.        LABS:  Labs Reviewed   CBC WITH AUTO DIFFERENTIAL - Abnormal; Notable for the following components:       Result Value    WBC 13.2 (*)     RDW 16.3 (*)     Seg Neutrophils 77 (*)     Lymphocytes 13 (*)     Immature Granulocytes 1 (*)     Segs Absolute 10.07 (*)     All other components within normal limits   BASIC METABOLIC PANEL - Abnormal; Notable for the following components:    Glucose 102 (*)     All other components within normal limits   URINALYSIS - Abnormal; Notable for the following components:    Leukocyte Esterase, Urine TRACE (*)     All other components within normal limits   COVID-19, RAPID   MICROSCOPIC URINALYSIS       All other labs were within normal range or not returned as of this dictation. EMERGENCY DEPARTMENT COURSE and DIFFERENTIAL DIAGNOSIS/MDM:   Vitals:    Vitals:    05/18/22 1730 05/18/22 1735 05/18/22 1750 05/18/22 1755   BP: 99/65      Pulse: 79 75 74 75   Resp: 17      Temp:       SpO2: (!) 88% 95% 98% 97%   Weight:       Height:           Orders Placed This Encounter   Medications    acetaminophen (TYLENOL) tablet 650 mg    0.9 % sodium chloride infusion    ketorolac (TORADOL) injection 30 mg       Medical Decision Making: Her fever is much better after being given Tylenol and IV fluids. Scans of her head and neck are negative and the rest of her work-up is negative as well. My clinical impression is that she has a viral illness. Antibiotic not indicated and she is able to be discharged home. She is feeling improved. Treatment diagnosis and follow-up were discussed with the patient. CONSULTS:  None    PROCEDURES:  None    FINAL IMPRESSION      1. Fever, unspecified fever cause    2. Fall, initial encounter          DISPOSITION/PLAN   DISPOSITION Decision To Discharge 05/18/2022 06:16:01 PM      PATIENT REFERRED TO:   Bryan Guerin MD  [facility] [address] [facility] [address] [facility]  [phone]      As needed    [facility]  [address] [facility]  [phone]    If symptoms worsen      DISCHARGE MEDICATIONS:     New Prescriptions    No medications on file       The care is provided during an unprecedented national emergency due to the novel coronavirus, COVID-19.     (Please note that portions of this note were completed with a voice recognition program.  Efforts were made to edit the dictations but occasionally words are mis-transcribed.)    Jonatan Nath MD  Attending Emergency Physician            Wilder Saab MD  05/18/22 0261

## 2022-05-20 DIAGNOSIS — M16.32 OSTEOARTHRITIS RESULTING FROM LEFT HIP DYSPLASIA: ICD-10-CM

## 2022-05-20 LAB
EKG ATRIAL RATE: 77 BPM
EKG P AXIS: -4 DEGREES
EKG P-R INTERVAL: 130 MS
EKG Q-T INTERVAL: 410 MS
EKG QRS DURATION: 90 MS
EKG QTC CALCULATION (BAZETT): 463 MS
EKG R AXIS: 71 DEGREES
EKG T AXIS: 55 DEGREES
EKG VENTRICULAR RATE: 77 BPM

## 2022-05-20 RX ORDER — HYDROCODONE BITARTRATE AND ACETAMINOPHEN 5; 325 MG/1; MG/1
1 TABLET ORAL EVERY 8 HOURS PRN
Qty: 42 TABLET | Refills: 0 | OUTPATIENT
Start: 2022-05-20 | End: 2022-06-03

## 2022-05-24 DIAGNOSIS — M16.32 OSTEOARTHRITIS RESULTING FROM LEFT HIP DYSPLASIA: ICD-10-CM

## 2022-05-25 RX ORDER — HYDROCODONE BITARTRATE AND ACETAMINOPHEN 5; 325 MG/1; MG/1
1 TABLET ORAL EVERY 8 HOURS PRN
Qty: 42 TABLET | Refills: 0 | Status: SHIPPED | OUTPATIENT
Start: 2022-05-25 | End: 2022-06-09

## 2022-06-08 DIAGNOSIS — M16.32 OSTEOARTHRITIS RESULTING FROM LEFT HIP DYSPLASIA: ICD-10-CM

## 2022-06-09 DIAGNOSIS — M16.32 OSTEOARTHRITIS RESULTING FROM LEFT HIP DYSPLASIA: ICD-10-CM

## 2022-06-09 RX ORDER — HYDROCODONE BITARTRATE AND ACETAMINOPHEN 5; 325 MG/1; MG/1
1 TABLET ORAL EVERY 8 HOURS PRN
Qty: 42 TABLET | Refills: 0 | OUTPATIENT
Start: 2022-06-09 | End: 2022-06-23

## 2022-06-09 RX ORDER — HYDROCODONE BITARTRATE AND ACETAMINOPHEN 5; 325 MG/1; MG/1
1 TABLET ORAL EVERY 8 HOURS PRN
Qty: 42 TABLET | Refills: 0 | Status: SHIPPED | OUTPATIENT
Start: 2022-06-09 | End: 2022-06-21 | Stop reason: SDUPTHER

## 2022-06-14 ENCOUNTER — OFFICE VISIT (OUTPATIENT)
Dept: ORTHOPEDIC SURGERY | Age: 57
End: 2022-06-14
Payer: MEDICARE

## 2022-06-14 VITALS — WEIGHT: 138 LBS | BODY MASS INDEX: 22.18 KG/M2 | HEIGHT: 66 IN

## 2022-06-14 DIAGNOSIS — M16.32 OSTEOARTHRITIS RESULTING FROM LEFT HIP DYSPLASIA: Primary | ICD-10-CM

## 2022-06-14 PROCEDURE — G8427 DOCREV CUR MEDS BY ELIG CLIN: HCPCS | Performed by: ORTHOPAEDIC SURGERY

## 2022-06-14 PROCEDURE — 99211 OFF/OP EST MAY X REQ PHY/QHP: CPT | Performed by: ORTHOPAEDIC SURGERY

## 2022-06-14 PROCEDURE — G8420 CALC BMI NORM PARAMETERS: HCPCS | Performed by: ORTHOPAEDIC SURGERY

## 2022-06-14 NOTE — PROGRESS NOTES
This patient still continues to have pain in her left hip. Patient is now due to have another consultation with a new primary care physician. In the meantime she continues to ambulate with considerable difficulty. She will let us know when she has obtained the clearance so we can proceed with scheduling her for hip replacement on the left side.

## 2022-06-20 ENCOUNTER — TELEPHONE (OUTPATIENT)
Dept: ORTHOPEDIC SURGERY | Age: 57
End: 2022-06-20

## 2022-06-20 NOTE — TELEPHONE ENCOUNTER
Patient met with her new PCP last week but was told she has very low potassium levels.  is awaiting more results before clearing her for the surgery. She will also be due for a pain med refill on Friday and wondering if you can send it now so she doesn't have to go the weekend without it?      [address]    Please call her with any questions  Thank you

## 2022-06-20 NOTE — TELEPHONE ENCOUNTER
Called patient. She just keeps repeating hellooo. Returned call, busy tone. Mayi, Please see surgery info.

## 2022-06-21 DIAGNOSIS — M16.32 OSTEOARTHRITIS RESULTING FROM LEFT HIP DYSPLASIA: ICD-10-CM

## 2022-06-21 RX ORDER — HYDROCODONE BITARTRATE AND ACETAMINOPHEN 5; 325 MG/1; MG/1
1 TABLET ORAL EVERY 8 HOURS PRN
Qty: 42 TABLET | Refills: 0 | Status: SHIPPED | OUTPATIENT
Start: 2022-06-21 | End: 2022-07-05

## 2022-06-21 NOTE — TELEPHONE ENCOUNTER
Called patient to find out who her new doctor is and when her appointment is which is 7/20/22 and let her know if she had cardiologist and has not seen them in the past year she needed to make appointments. Patient is aware and will make appointments to get cleared for surgery with Dr. Mary Lou España for L JOSEFINA.

## 2022-07-05 DIAGNOSIS — M16.32 OSTEOARTHRITIS RESULTING FROM LEFT HIP DYSPLASIA: ICD-10-CM

## 2022-07-05 RX ORDER — HYDROCODONE BITARTRATE AND ACETAMINOPHEN 5; 325 MG/1; MG/1
1 TABLET ORAL EVERY 8 HOURS PRN
Qty: 42 TABLET | Refills: 0 | Status: SHIPPED | OUTPATIENT
Start: 2022-07-05 | End: 2022-07-18

## 2022-07-18 DIAGNOSIS — M16.32 OSTEOARTHRITIS RESULTING FROM LEFT HIP DYSPLASIA: ICD-10-CM

## 2022-07-18 RX ORDER — HYDROCODONE BITARTRATE AND ACETAMINOPHEN 5; 325 MG/1; MG/1
1 TABLET ORAL EVERY 8 HOURS PRN
Qty: 42 TABLET | Refills: 0 | Status: SHIPPED | OUTPATIENT
Start: 2022-07-18 | End: 2022-08-01

## 2022-07-19 NOTE — TELEPHONE ENCOUNTER
Patient called about script for pain medication-advised patient that medication has been sent to pharmacy. She verbalized that she will call pharmacy for delivery.

## 2022-08-01 DIAGNOSIS — M16.32 OSTEOARTHRITIS RESULTING FROM LEFT HIP DYSPLASIA: ICD-10-CM

## 2022-08-01 RX ORDER — HYDROCODONE BITARTRATE AND ACETAMINOPHEN 5; 325 MG/1; MG/1
1 TABLET ORAL EVERY 8 HOURS PRN
Qty: 42 TABLET | Refills: 0 | Status: SHIPPED | OUTPATIENT
Start: 2022-08-01 | End: 2022-08-15

## 2022-08-01 NOTE — TELEPHONE ENCOUNTER
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 Medication pended.

## 2022-08-12 DIAGNOSIS — M16.32 OSTEOARTHRITIS RESULTING FROM LEFT HIP DYSPLASIA: ICD-10-CM

## 2022-08-12 RX ORDER — HYDROCODONE BITARTRATE AND ACETAMINOPHEN 5; 325 MG/1; MG/1
1 TABLET ORAL EVERY 8 HOURS PRN
Qty: 42 TABLET | Refills: 0 | OUTPATIENT
Start: 2022-08-12 | End: 2022-08-26

## 2022-08-15 DIAGNOSIS — M16.32 OSTEOARTHRITIS RESULTING FROM LEFT HIP DYSPLASIA: ICD-10-CM

## 2022-08-15 RX ORDER — HYDROCODONE BITARTRATE AND ACETAMINOPHEN 5; 325 MG/1; MG/1
1 TABLET ORAL EVERY 8 HOURS PRN
Qty: 42 TABLET | Refills: 0 | Status: SHIPPED | OUTPATIENT
Start: 2022-08-15 | End: 2022-08-29 | Stop reason: SDUPTHER

## 2022-08-15 NOTE — TELEPHONE ENCOUNTER
Pt called in wanting to know if this can be sent over to [facility] by 11am today. Pt states if medication is sent by 11am today they will be abke to deliver her medication today.  Pt is also requesting a call open medication has be sent over to the pharmacy

## 2022-08-15 NOTE — TELEPHONE ENCOUNTER
Patient is asking for the refill as soon as possible. She does not drive and has to have [employer] deliver medication which takes a few days, which is why she asked for the refill early.      Please call her once we can send refill over  Thank you

## 2022-08-20 ENCOUNTER — APPOINTMENT (OUTPATIENT)
Dept: GENERAL RADIOLOGY | Age: 57
DRG: 140 | End: 2022-08-20
Payer: MEDICARE

## 2022-08-20 ENCOUNTER — HOSPITAL ENCOUNTER (INPATIENT)
Age: 57
LOS: 3 days | Discharge: HOME | DRG: 140 | End: 2022-08-23
Attending: EMERGENCY MEDICINE | Admitting: INTERNAL MEDICINE
Payer: MEDICARE

## 2022-08-20 ENCOUNTER — APPOINTMENT (OUTPATIENT)
Dept: CT IMAGING | Age: 57
DRG: 140 | End: 2022-08-20
Payer: MEDICARE

## 2022-08-20 DIAGNOSIS — J44.1 COPD WITH ACUTE EXACERBATION (HCC): Primary | ICD-10-CM

## 2022-08-20 DIAGNOSIS — R19.7 NAUSEA VOMITING AND DIARRHEA: ICD-10-CM

## 2022-08-20 DIAGNOSIS — R11.2 NAUSEA VOMITING AND DIARRHEA: ICD-10-CM

## 2022-08-20 LAB
ABSOLUTE EOS #: 0.05 K/UL (ref 0–0.44)
ABSOLUTE IMMATURE GRANULOCYTE: <0.03 K/UL (ref 0–0.3)
ABSOLUTE LYMPH #: 1.19 K/UL (ref 1.1–3.7)
ABSOLUTE MONO #: 0.41 K/UL (ref 0.1–1.2)
ANION GAP SERPL CALCULATED.3IONS-SCNC: 18 MMOL/L (ref 9–17)
BASOPHILS # BLD: 1 % (ref 0–2)
BASOPHILS ABSOLUTE: 0.03 K/UL (ref 0–0.2)
BILIRUBIN URINE: NEGATIVE
BUN BLDV-MCNC: 21 MG/DL (ref 6–20)
CALCIUM SERPL-MCNC: 9.5 MG/DL (ref 8.6–10.4)
CHLORIDE BLD-SCNC: 99 MMOL/L (ref 98–107)
CO2: 20 MMOL/L (ref 20–31)
COLOR: YELLOW
CREAT SERPL-MCNC: 0.82 MG/DL (ref 0.5–0.9)
EOSINOPHILS RELATIVE PERCENT: 1 % (ref 1–4)
EPITHELIAL CELLS UA: ABNORMAL /HPF (ref 0–5)
GFR AFRICAN AMERICAN: >60 ML/MIN
GFR NON-AFRICAN AMERICAN: >60 ML/MIN
GFR SERPL CREATININE-BSD FRML MDRD: ABNORMAL ML/MIN/{1.73_M2}
GLUCOSE BLD-MCNC: 109 MG/DL (ref 70–99)
GLUCOSE URINE: NEGATIVE
HCT VFR BLD CALC: 45.5 % (ref 36.3–47.1)
HEMOGLOBIN: 15.9 G/DL (ref 11.9–15.1)
IMMATURE GRANULOCYTES: 0 %
KETONES, URINE: ABNORMAL
LEUKOCYTE ESTERASE, URINE: NEGATIVE
LYMPHOCYTES # BLD: 19 % (ref 24–43)
MCH RBC QN AUTO: 29 PG (ref 25.2–33.5)
MCHC RBC AUTO-ENTMCNC: 34.9 G/DL (ref 28.4–34.8)
MCV RBC AUTO: 83 FL (ref 82.6–102.9)
MONOCYTES # BLD: 6 % (ref 3–12)
NITRITE, URINE: NEGATIVE
NRBC AUTOMATED: 0 PER 100 WBC
PDW BLD-RTO: 13.6 % (ref 11.8–14.4)
PH UA: 6 (ref 5–8)
PLATELET # BLD: 232 K/UL (ref 138–453)
PMV BLD AUTO: 11.3 FL (ref 8.1–13.5)
POTASSIUM SERPL-SCNC: 3.5 MMOL/L (ref 3.7–5.3)
PROCALCITONIN: 0.04 NG/ML
PROTEIN UA: ABNORMAL
RBC # BLD: 5.48 M/UL (ref 3.95–5.11)
RBC UA: ABNORMAL /HPF (ref 0–2)
SARS-COV-2, RAPID: NOT DETECTED
SEG NEUTROPHILS: 73 % (ref 36–65)
SEGMENTED NEUTROPHILS ABSOLUTE COUNT: 4.72 K/UL (ref 1.5–8.1)
SODIUM BLD-SCNC: 137 MMOL/L (ref 135–144)
SPECIFIC GRAVITY UA: 1.1 (ref 1–1.03)
SPECIMEN DESCRIPTION: NORMAL
TURBIDITY: CLEAR
URINE HGB: NEGATIVE
UROBILINOGEN, URINE: NORMAL
WBC # BLD: 6.4 K/UL (ref 3.5–11.3)
WBC UA: ABNORMAL /HPF (ref 0–5)

## 2022-08-20 PROCEDURE — 84145 PROCALCITONIN (PCT): CPT

## 2022-08-20 PROCEDURE — 87635 SARS-COV-2 COVID-19 AMP PRB: CPT

## 2022-08-20 PROCEDURE — 80048 BASIC METABOLIC PNL TOTAL CA: CPT

## 2022-08-20 PROCEDURE — 2580000003 HC RX 258: Performed by: STUDENT IN AN ORGANIZED HEALTH CARE EDUCATION/TRAINING PROGRAM

## 2022-08-20 PROCEDURE — 94640 AIRWAY INHALATION TREATMENT: CPT

## 2022-08-20 PROCEDURE — 6370000000 HC RX 637 (ALT 250 FOR IP): Performed by: EMERGENCY MEDICINE

## 2022-08-20 PROCEDURE — 2700000000 HC OXYGEN THERAPY PER DAY

## 2022-08-20 PROCEDURE — 96375 TX/PRO/DX INJ NEW DRUG ADDON: CPT

## 2022-08-20 PROCEDURE — 71045 X-RAY EXAM CHEST 1 VIEW: CPT

## 2022-08-20 PROCEDURE — 87506 IADNA-DNA/RNA PROBE TQ 6-11: CPT

## 2022-08-20 PROCEDURE — 6360000002 HC RX W HCPCS: Performed by: STUDENT IN AN ORGANIZED HEALTH CARE EDUCATION/TRAINING PROGRAM

## 2022-08-20 PROCEDURE — 94761 N-INVAS EAR/PLS OXIMETRY MLT: CPT

## 2022-08-20 PROCEDURE — 94664 DEMO&/EVAL PT USE INHALER: CPT

## 2022-08-20 PROCEDURE — 99285 EMERGENCY DEPT VISIT HI MDM: CPT

## 2022-08-20 PROCEDURE — 6370000000 HC RX 637 (ALT 250 FOR IP)

## 2022-08-20 PROCEDURE — 87324 CLOSTRIDIUM AG IA: CPT

## 2022-08-20 PROCEDURE — 6370000000 HC RX 637 (ALT 250 FOR IP): Performed by: STUDENT IN AN ORGANIZED HEALTH CARE EDUCATION/TRAINING PROGRAM

## 2022-08-20 PROCEDURE — 1200000000 HC SEMI PRIVATE

## 2022-08-20 PROCEDURE — 6360000004 HC RX CONTRAST MEDICATION: Performed by: EMERGENCY MEDICINE

## 2022-08-20 PROCEDURE — 6360000002 HC RX W HCPCS: Performed by: EMERGENCY MEDICINE

## 2022-08-20 PROCEDURE — 74177 CT ABD & PELVIS W/CONTRAST: CPT

## 2022-08-20 PROCEDURE — 96372 THER/PROPH/DIAG INJ SC/IM: CPT

## 2022-08-20 PROCEDURE — 96374 THER/PROPH/DIAG INJ IV PUSH: CPT

## 2022-08-20 PROCEDURE — 81001 URINALYSIS AUTO W/SCOPE: CPT

## 2022-08-20 PROCEDURE — 2580000003 HC RX 258: Performed by: EMERGENCY MEDICINE

## 2022-08-20 PROCEDURE — 85025 COMPLETE CBC W/AUTO DIFF WBC: CPT

## 2022-08-20 PROCEDURE — 87449 NOS EACH ORGANISM AG IA: CPT

## 2022-08-20 PROCEDURE — 2500000003 HC RX 250 WO HCPCS: Performed by: EMERGENCY MEDICINE

## 2022-08-20 RX ORDER — FAMOTIDINE 10 MG/ML
20 INJECTION, SOLUTION INTRAVENOUS ONCE
Status: COMPLETED | OUTPATIENT
Start: 2022-08-20 | End: 2022-08-20

## 2022-08-20 RX ORDER — ONDANSETRON 2 MG/ML
4 INJECTION INTRAMUSCULAR; INTRAVENOUS ONCE
Status: COMPLETED | OUTPATIENT
Start: 2022-08-20 | End: 2022-08-20

## 2022-08-20 RX ORDER — DICYCLOMINE HYDROCHLORIDE 10 MG/ML
20 INJECTION INTRAMUSCULAR ONCE
Status: COMPLETED | OUTPATIENT
Start: 2022-08-20 | End: 2022-08-20

## 2022-08-20 RX ORDER — TRAZODONE HYDROCHLORIDE 150 MG/1
300 TABLET ORAL NIGHTLY
Status: ON HOLD | COMMUNITY
End: 2022-10-25 | Stop reason: SDUPTHER

## 2022-08-20 RX ORDER — ONDANSETRON 4 MG/1
4 TABLET, ORALLY DISINTEGRATING ORAL EVERY 8 HOURS PRN
Status: DISCONTINUED | OUTPATIENT
Start: 2022-08-20 | End: 2022-08-23 | Stop reason: HOSPADM

## 2022-08-20 RX ORDER — SODIUM CHLORIDE 0.9 % (FLUSH) 0.9 %
5-40 SYRINGE (ML) INJECTION PRN
Status: DISCONTINUED | OUTPATIENT
Start: 2022-08-20 | End: 2022-08-23 | Stop reason: HOSPADM

## 2022-08-20 RX ORDER — POTASSIUM CHLORIDE 7.45 MG/ML
10 INJECTION INTRAVENOUS
Status: DISCONTINUED | OUTPATIENT
Start: 2022-08-20 | End: 2022-08-20

## 2022-08-20 RX ORDER — SODIUM CHLORIDE 0.9 % (FLUSH) 0.9 %
5-40 SYRINGE (ML) INJECTION EVERY 12 HOURS SCHEDULED
Status: DISCONTINUED | OUTPATIENT
Start: 2022-08-20 | End: 2022-08-23 | Stop reason: HOSPADM

## 2022-08-20 RX ORDER — ACETAMINOPHEN 650 MG/1
650 SUPPOSITORY RECTAL EVERY 6 HOURS PRN
Status: DISCONTINUED | OUTPATIENT
Start: 2022-08-20 | End: 2022-08-23 | Stop reason: HOSPADM

## 2022-08-20 RX ORDER — IPRATROPIUM BROMIDE AND ALBUTEROL SULFATE 2.5; .5 MG/3ML; MG/3ML
1 SOLUTION RESPIRATORY (INHALATION) ONCE
Status: COMPLETED | OUTPATIENT
Start: 2022-08-20 | End: 2022-08-20

## 2022-08-20 RX ORDER — POLYETHYLENE GLYCOL 3350 17 G/17G
17 POWDER, FOR SOLUTION ORAL DAILY PRN
Status: DISCONTINUED | OUTPATIENT
Start: 2022-08-20 | End: 2022-08-23 | Stop reason: HOSPADM

## 2022-08-20 RX ORDER — ENOXAPARIN SODIUM 100 MG/ML
40 INJECTION SUBCUTANEOUS DAILY
Status: DISCONTINUED | OUTPATIENT
Start: 2022-08-20 | End: 2022-08-23 | Stop reason: HOSPADM

## 2022-08-20 RX ORDER — ONDANSETRON 2 MG/ML
4 INJECTION INTRAMUSCULAR; INTRAVENOUS EVERY 6 HOURS PRN
Status: DISCONTINUED | OUTPATIENT
Start: 2022-08-20 | End: 2022-08-23 | Stop reason: HOSPADM

## 2022-08-20 RX ORDER — ACETAMINOPHEN 325 MG/1
650 TABLET ORAL EVERY 6 HOURS PRN
Status: DISCONTINUED | OUTPATIENT
Start: 2022-08-20 | End: 2022-08-23 | Stop reason: HOSPADM

## 2022-08-20 RX ORDER — DEXTROSE AND SODIUM CHLORIDE 5; .9 G/100ML; G/100ML
INJECTION, SOLUTION INTRAVENOUS CONTINUOUS
Status: DISCONTINUED | OUTPATIENT
Start: 2022-08-20 | End: 2022-08-23

## 2022-08-20 RX ORDER — PREDNISONE 20 MG/1
60 TABLET ORAL ONCE
Status: COMPLETED | OUTPATIENT
Start: 2022-08-20 | End: 2022-08-20

## 2022-08-20 RX ORDER — MAGNESIUM SULFATE IN WATER 40 MG/ML
2000 INJECTION, SOLUTION INTRAVENOUS ONCE
Status: COMPLETED | OUTPATIENT
Start: 2022-08-20 | End: 2022-08-20

## 2022-08-20 RX ORDER — ALBUTEROL SULFATE 90 UG/1
2 AEROSOL, METERED RESPIRATORY (INHALATION) EVERY 6 HOURS PRN
Status: DISCONTINUED | OUTPATIENT
Start: 2022-08-20 | End: 2022-08-23 | Stop reason: HOSPADM

## 2022-08-20 RX ORDER — POTASSIUM CHLORIDE 20 MEQ/1
10 TABLET, EXTENDED RELEASE ORAL 2 TIMES DAILY
Status: COMPLETED | OUTPATIENT
Start: 2022-08-20 | End: 2022-08-20

## 2022-08-20 RX ORDER — 0.9 % SODIUM CHLORIDE 0.9 %
1000 INTRAVENOUS SOLUTION INTRAVENOUS ONCE
Status: COMPLETED | OUTPATIENT
Start: 2022-08-20 | End: 2022-08-20

## 2022-08-20 RX ORDER — SODIUM CHLORIDE 9 MG/ML
INJECTION, SOLUTION INTRAVENOUS PRN
Status: DISCONTINUED | OUTPATIENT
Start: 2022-08-20 | End: 2022-08-23 | Stop reason: HOSPADM

## 2022-08-20 RX ORDER — BUDESONIDE AND FORMOTEROL FUMARATE DIHYDRATE 160; 4.5 UG/1; UG/1
2 AEROSOL RESPIRATORY (INHALATION) 2 TIMES DAILY
Status: DISCONTINUED | OUTPATIENT
Start: 2022-08-20 | End: 2022-08-23 | Stop reason: HOSPADM

## 2022-08-20 RX ADMIN — PREDNISONE 60 MG: 20 TABLET ORAL at 18:12

## 2022-08-20 RX ADMIN — BUDESONIDE AND FORMOTEROL FUMARATE DIHYDRATE 2 PUFF: 160; 4.5 AEROSOL RESPIRATORY (INHALATION) at 20:53

## 2022-08-20 RX ADMIN — ONDANSETRON 4 MG: 2 INJECTION INTRAMUSCULAR; INTRAVENOUS at 14:00

## 2022-08-20 RX ADMIN — SODIUM CHLORIDE 1000 ML: 9 INJECTION, SOLUTION INTRAVENOUS at 14:11

## 2022-08-20 RX ADMIN — POTASSIUM CHLORIDE 10 MEQ: 1500 TABLET, EXTENDED RELEASE ORAL at 21:41

## 2022-08-20 RX ADMIN — FAMOTIDINE 20 MG: 10 INJECTION, SOLUTION INTRAVENOUS at 14:01

## 2022-08-20 RX ADMIN — MAGNESIUM SULFATE HEPTAHYDRATE 2000 MG: 40 INJECTION, SOLUTION INTRAVENOUS at 19:33

## 2022-08-20 RX ADMIN — ONDANSETRON 4 MG: 2 INJECTION INTRAMUSCULAR; INTRAVENOUS at 21:42

## 2022-08-20 RX ADMIN — SODIUM CHLORIDE, PRESERVATIVE FREE 10 ML: 5 INJECTION INTRAVENOUS at 21:48

## 2022-08-20 RX ADMIN — DEXTROSE AND SODIUM CHLORIDE: 5; 900 INJECTION, SOLUTION INTRAVENOUS at 21:48

## 2022-08-20 RX ADMIN — IPRATROPIUM BROMIDE AND ALBUTEROL SULFATE 1 AMPULE: .5; 3 SOLUTION RESPIRATORY (INHALATION) at 16:30

## 2022-08-20 RX ADMIN — IOPAMIDOL 75 ML: 755 INJECTION, SOLUTION INTRAVENOUS at 14:19

## 2022-08-20 RX ADMIN — DICYCLOMINE HYDROCHLORIDE 20 MG: 10 INJECTION, SOLUTION INTRAMUSCULAR at 13:59

## 2022-08-20 RX ADMIN — IPRATROPIUM BROMIDE AND ALBUTEROL SULFATE 1 AMPULE: .5; 3 SOLUTION RESPIRATORY (INHALATION) at 14:12

## 2022-08-20 RX ADMIN — ENOXAPARIN SODIUM 40 MG: 100 INJECTION SUBCUTANEOUS at 21:41

## 2022-08-20 ASSESSMENT — ENCOUNTER SYMPTOMS
ABDOMINAL DISTENTION: 0
DIARRHEA: 1
NAUSEA: 1
COLOR CHANGE: 0
VOMITING: 1
CHEST TIGHTNESS: 0
COUGH: 1
SHORTNESS OF BREATH: 0
SHORTNESS OF BREATH: 1
STRIDOR: 0
RHINORRHEA: 0
SORE THROAT: 0
WHEEZING: 0
ABDOMINAL PAIN: 1
CONSTIPATION: 0

## 2022-08-20 ASSESSMENT — PAIN DESCRIPTION - ORIENTATION
ORIENTATION: RIGHT;LEFT
ORIENTATION: LEFT

## 2022-08-20 ASSESSMENT — PAIN SCALES - GENERAL
PAINLEVEL_OUTOF10: 5
PAINLEVEL_OUTOF10: 4

## 2022-08-20 ASSESSMENT — PAIN DESCRIPTION - LOCATION
LOCATION: ABDOMEN;HIP
LOCATION: ABDOMEN

## 2022-08-20 ASSESSMENT — PAIN DESCRIPTION - DESCRIPTORS: DESCRIPTORS: CRAMPING

## 2022-08-20 ASSESSMENT — PAIN - FUNCTIONAL ASSESSMENT: PAIN_FUNCTIONAL_ASSESSMENT: 0-10

## 2022-08-20 NOTE — ED PROVIDER NOTES
[facility] ED  Emergency Department Encounter  Emergency Medicine Resident     Pt Tanvi Guzman  MRN: [id number]  [place] 1965  Date of evaluation: 8/20/22  PCP:  Butch Colin MD      [address] [place] [address]       Chief Complaint   Patient presents with    Nausea     With emesis this am.  Symptoms off an on for 3 days. \"Feels like Chron's flareup\"    Diarrhea     Symptoms for 3 days, denies blood in stool or vomit       HISTORY OF PRESENT ILLNESS  (Location/Symptom, Timing/Onset, Context/Setting, Quality, Duration, Modifying Factors, Severity.)      Sobeida Yeh is a 61 y.o. female who presents with 3 days of nausea, diarrhea, abdominal pain. She has a history of Crohn's disease. She states this feels like her Crohn's flareups. She also has new productive cough with yellow sputum production. She reports she has felt febrile at home but had not taken her temperature. She states she has not had any blood in her stool or blood in her vomit. She denies any chest pain or shortness of breath. PAST MEDICAL / SURGICAL / SOCIAL / FAMILY HISTORY      has a past medical history of Acid reflux, Anxiety, Arthritis, Asthma, Bipolar 1 disorder ([address] Bowel obstruction ([address] COPD (chronic obstructive pulmonary disease) ([address] Crohn disease ([address] Depression, Dry eye, Fibromyalgia, Gout, Headache, Hyperlipidemia, Hypertension, Hypothyroidism, Kidney stones, Muscle spasm, Neuropathy, Pain, Personal history of other medical treatment, Wears partial dentures, and Wellness examination. has a past surgical history that includes Tonsillectomy and adenoidectomy; Tubal ligation; Cholecystectomy; Bunionectomy (Left); Colonoscopy (04/30/2007); Colonoscopy (10/12/2017); Abdomen surgery; Upper gastrointestinal endoscopy (10/25/2021); Colonoscopy (10/25/2021); and Colonoscopy (10/25/2021).       Social History     Socioeconomic History    Marital status: Single     Spouse name: Not on file    Number of children: Not on file    Years of education: Not on file    Highest education level: Not on file   Occupational History    Not on file   Tobacco Use    Smoking status: Every Day     Packs/day: 0.50     Years: 37.00     Pack years: 18.50     Types: Cigarettes    Smokeless tobacco: Former     Types: Chew     Quit date: 9/3/2001   Vaping Use    Vaping Use: Former    Quit date: 9/3/2019    Substances: Nicotine   Substance and Sexual Activity    Alcohol use: Not Currently    Drug use: Yes     Types: Marijuana (Weed)     Comment: h/o of substance abuse but denies drug use    Sexual activity: Not on file   Other Topics Concern    Not on file   Social History Narrative    Not on file     Social Determinants of Health     Financial Resource Strain: Not on file   Food Insecurity: Not on file   Transportation Needs: Not on file   Physical Activity: Not on file   Stress: Not on file   Social Connections: Not on file   Intimate Partner Violence: Not on file   Housing Stability: Not on file       Family History   Problem Relation Age of Onset    Diabetes Father     Lung Cancer Father     Hypertension Mother     Cancer Mother     High Blood Pressure Mother     Crohn's Disease Brother     Heart Disease Brother        Allergies:  Azithromycin, Methylprednisolone, Penicillins, and Tape [adhesive tape]    Home Medications:  Prior to Admission medications    Medication Sig Start Date End Date Taking? Authorizing Provider   HYDROcodone-acetaminophen (NORCO) 5-325 MG per tablet TAKE 1 TABLET BY MOUTH EVERY 8 HOURS AS NEEDED FOR PAIN FOR UP TO 14 DAYS.  8/15/22 8/29/22  Georges Sams MD   Handicap Placard MISC by Does not apply route Duration 5 years 5/17/22   Georges Sams MD   Pregabalin (LYRICA PO) Take by mouth    Historical Provider, MD   ibuprofen (IBU) 400 MG tablet Take 1 tablet by mouth every 6 hours as needed for Pain 5/15/22 5/18/22  Rogelio Deluna DO   albuterol sulfate HFA (VENTOLIN HFA) 108 (90 Base) MCG/ACT inhaler Inhale 2 puffs into the lungs every 6 hours as needed for Wheezing    Historical Provider, MD   diphenhydrAMINE (BENADRYL) 25 MG tablet Take 25 mg by mouth nightly as needed    Historical Provider, MD   topiramate (TOPAMAX) 100 MG tablet Take 100 mg by mouth 3 times daily     Historical Provider, MD   SUMAtriptan (IMITREX) 100 MG tablet Take 100 mg by mouth once as needed for Migraine    Historical Provider, MD   gabapentin (NEURONTIN) 400 MG capsule Take 800 mg by mouth 3 times daily. Historical Provider, MD   budesonide-formoterol (SYMBICORT) 160-4.5 MCG/ACT AERO Inhale 2 puffs into the lungs 2 times daily 9/19/21   Trell Jade MD   lisinopril (PRINIVIL;ZESTRIL) 5 MG tablet Take 1 tablet by mouth daily 9/20/21   Trell Jade MD   nicotine (NICODERM CQ) 21 MG/24HR Place 1 patch onto the skin daily 9/20/21   Trell Jade MD   acetaminophen (TYLENOL) 500 MG tablet Take 2 tablets by mouth 3 times daily  Patient taking differently: Take 1,000 mg by mouth 3 times daily as needed  7/17/21   Peggy Luo DO   Polyvinyl Alcohol-Povidone (REFRESH OP) Place 1 drop into both eyes 3 times daily    Historical Provider, MD   lidocaine (LIDODERM) 5 % Place 1 patch onto the skin daily as needed for Pain 12 hours on, 12 hours off. Historical Provider, MD       REVIEW OF SYSTEMS    (2-9 systems for level 4, 10 or more for level 5)      Review of Systems   Constitutional:  Positive for fever. Negative for chills. HENT:  Negative for congestion, rhinorrhea and sore throat. Eyes:  Negative for visual disturbance. Respiratory:  Positive for cough. Negative for shortness of breath. Yellow sputum production   Cardiovascular:  Negative for chest pain and leg swelling. Gastrointestinal:  Positive for abdominal pain, diarrhea, nausea and vomiting. Negative for constipation. Endocrine: Negative for polyuria. Genitourinary:  Negative for dysuria and hematuria.    Musculoskeletal:  Negative for arthralgias and myalgias. Skin:  Negative for rash. Neurological:  Negative for light-headedness and headaches. Psychiatric/Behavioral:  Negative for behavioral problems. PHYSICAL EXAM   (up to 7 for level 4, 8 or more for level 5)      INITIAL VITALS:   /77   Pulse 81   Temp 98.2 °F (36.8 °C) (Oral)   Resp 18   Ht 5' 6.75\" (1.695 m)   Wt 150 lb (68 kg)   SpO2 96%   BMI 23.67 kg/m²     Physical Exam  Vitals reviewed. Constitutional:       General: She is not in acute distress. Appearance: She is not toxic-appearing. HENT:      Head: Normocephalic. Nose: No rhinorrhea. Mouth/Throat:      Mouth: Mucous membranes are moist.   Eyes:      Conjunctiva/sclera: Conjunctivae normal.      Pupils: Pupils are equal, round, and reactive to light. Cardiovascular:      Rate and Rhythm: Normal rate and regular rhythm. Pulses: Normal pulses. Pulmonary:      Effort: Pulmonary effort is normal. No respiratory distress. Breath sounds: Normal breath sounds. No wheezing. Comments: Lung sounds diminished bilaterally  Abdominal:      General: Bowel sounds are normal. There is no distension. Palpations: Abdomen is soft. Tenderness: There is abdominal tenderness. There is no rebound. Comments: Diffusely tender abdomen. Musculoskeletal:      Right lower leg: No edema. Left lower leg: No edema. Skin:     General: Skin is warm and dry. Neurological:      Mental Status: She is alert and oriented to person, place, and time.    Psychiatric:         Mood and Affect: Mood normal.         Behavior: Behavior normal.         Judgment: Judgment normal.       DIFFERENTIAL  DIAGNOSIS     PLAN (LABS / IMAGING / EKG):  Orders Placed This Encounter   Procedures    COVID-19, Rapid    C DIFF TOXIN/ANTIGEN    Gastrointestinal Panel, Molecular    CT ABDOMEN PELVIS W IV CONTRAST Additional Contrast? None    XR CHEST PORTABLE    CBC with Auto Differential    BMP    Urinalysis with Microscopic    Inpatient consult to Internal Medicine    ADMIT TO INPATIENT       MEDICATIONS ORDERED:  Orders Placed This Encounter   Medications    ipratropium-albuterol (DUONEB) nebulizer solution 1 ampule     Order Specific Question:   Initiate RT Bronchodilator Protocol     Answer:   Yes - ED protocol    famotidine (PEPCID) injection 20 mg    ondansetron (ZOFRAN) injection 4 mg    dicyclomine (BENTYL) injection 20 mg    0.9 % sodium chloride bolus    iopamidol (ISOVUE-370) 76 % injection 75 mL    ipratropium-albuterol (DUONEB) nebulizer solution 1 ampule     Order Specific Question:   Initiate RT Bronchodilator Protocol     Answer:   Yes - ED protocol    predniSONE (DELTASONE) tablet 60 mg    potassium chloride 10 mEq/100 mL IVPB (Peripheral Line)    magnesium sulfate 2000 mg in 50 mL IVPB premix       DDX: colitis, crohns flare up, COPD exacerbation, COVID-19    InitialMDM/Plan: 60-year-old female with history of Crohn's disease presenting with 3 days of nausea, vomiting, and diarrhea. She also reports having a increased cough from her baseline productive of yellow sputum. She states this is changed from her baseline. On exam, vitals are stable, lung sounds are diminished bilaterally, abdomen is soft but tender diffusely. We will proceed with CBC, BMP, rapid COVID swab, CT of the abdomen and pelvis. We will give Pepcid, Zofran, and Bentyl for abdominal pain. Will give DuoNeb treatment. DIAGNOSTIC RESULTS / EMERGENCY DEPARTMENT COURSE / MDM   LAB RESULTS:  Results for orders placed or performed during the hospital encounter of 08/20/22   COVID-19, Rapid    Specimen: Nasopharyngeal Swab   Result Value Ref Range    Specimen Description . NASOPHARYNGEAL SWAB     SARS-CoV-2, Rapid Not Detected Not Detected   CBC with Auto Differential   Result Value Ref Range    WBC 6.4 3.5 - 11.3 k/uL    RBC 5.48 (H) 3.95 - 5.11 m/uL    Hemoglobin 15.9 (H) 11.9 - 15.1 g/dL    Hematocrit 45.5 36.3 - 47.1 %    MCV 83.0 better characterization. 2. Pulmonary sequela typical of that seen with smoking, including COPD;   correlate with clinical history. 3. Calcific atherosclerotic disease aorta. CT ABDOMEN PELVIS W IV CONTRAST Additional Contrast? None   Final Result   1. Mild small bowel distension in the mid abdomen without inflammatory   change or focal transition to suggest obstruction. This may represent   regional ileus. 2.  Findings compatible with diarrheal process. SCREENING TOOLS:          [place] Coma Scale  Eye Opening: Spontaneous  Best Verbal Response: Oriented  Best Motor Response: Obeys commands  [place] Coma Scale Score: 15    EMERGENCY DEPARTMENT COURSE:      ED Course as of 08/20/22 1850   Sat Aug 20, 2022   1454 CT of the abdomen shows Mild small bowel distension in the mid abdomen without inflammatory change or focal transition to suggest obstruction. Findings consistent with diarrheal process. [BL]   1455 Patient states she is feeling better following medications and breathing treatment. [BL]   1456 We will collect a urine sample on her as well. [BL]   1506 On reevaluation, the patient's oxygen saturation is around 92% on room air. This is a deviation from baseline for the patient, patient was placed on 2 L nasal cannula. We will repeat DuoNeb treatment and obtain chest x-ray. If the patient's oxygen saturation does not improve following a second DuoNeb treatment, patient will likely need to be admitted for COPD exacerbation. [BL]   1741 On reassessment, the past patient's oxygen saturation is around 91% on room air. This is deviation from her baseline. We will admit the patient for COPD exacerbation at this time. Discussed this with the patient, patient became tearful stating she has been feeling depressed the last couple days. She denies any suicidal or homicidal ideation. [BL]   [id number] Internal medicine service at bedside to evaluate the patient.  [BL]   1840 Patient will be admitted to the internal medicine service for COPD exacerbation with new oxygen requirement. [BL]      ED Course User Index  [BL] Joaquin Springer DO       No notes of EC Admission Criteria type on file. PROCEDURES:  None    CONSULTS:  IP CONSULT TO INTERNAL MEDICINE  IP CONSULT TO CASE MANAGEMENT    CRITICAL CARE:  None      FINAL IMPRESSION      1. COPD with acute exacerbation ([facility] [address]    2. Nausea vomiting and diarrhea          DISPOSITION / PLAN     DISPOSITION Admitted 08/20/2022 06:36:59 PM      PATIENT REFERRED TO:  No follow-up provider specified.     DISCHARGE MEDICATIONS:  New Prescriptions    No medications on file       Joaquin Springer DO  Emergency Medicine Resident    (Please note that portions of thisnote were completed with a voice recognition program.         Joaquin Springer DO  Resident  08/20/22 0065

## 2022-08-20 NOTE — ED PROVIDER NOTES
[facility] HANDOFF       Handoff taken on the following patient from prior Attending Physician:  Pt Name: Steff Jaramillo  PCP:  Nataliia Garcia MD    Attestation  I was available and discussed any additional care issues that arose and coordinated the management plans with the resident(s) caring for the patient during my duty period. Any areas of disagreement with resident's documentation of care or procedures are noted on the chart. I was personally present for the key portions of any/all procedures during my duty period. I have documented in the chart those procedures where I was not present during the key portions. CHIEF COMPLAINT       Chief Complaint   Patient presents with    Nausea     With emesis this am.  Symptoms off an on for 3 days. \"Feels like Chron's flareup\"    Diarrhea     Symptoms for 3 days, denies blood in stool or vomit         CURRENT MEDICATIONS     Previous Medications  Previous Medications    ACETAMINOPHEN (TYLENOL) 500 MG TABLET    Take 2 tablets by mouth 3 times daily    ALBUTEROL SULFATE HFA (VENTOLIN HFA) 108 (90 BASE) MCG/ACT INHALER    Inhale 2 puffs into the lungs every 6 hours as needed for Wheezing    BUDESONIDE-FORMOTEROL (SYMBICORT) 160-4.5 MCG/ACT AERO    Inhale 2 puffs into the lungs 2 times daily    DIPHENHYDRAMINE (BENADRYL) 25 MG TABLET    Take 25 mg by mouth nightly as needed    GABAPENTIN (NEURONTIN) 400 MG CAPSULE    Take 800 mg by mouth 3 times daily. HANDICAP PLACARD MISC    by Does not apply route Duration 5 years    HYDROCODONE-ACETAMINOPHEN (NORCO) 5-325 MG PER TABLET    TAKE 1 TABLET BY MOUTH EVERY 8 HOURS AS NEEDED FOR PAIN FOR UP TO 14 DAYS. IBUPROFEN (IBU) 400 MG TABLET    Take 1 tablet by mouth every 6 hours as needed for Pain    LIDOCAINE (LIDODERM) 5 %    Place 1 patch onto the skin daily as needed for Pain 12 hours on, 12 hours off.      LISINOPRIL (PRINIVIL;ZESTRIL) 5 MG TABLET    Take 1 tablet by mouth daily    NICOTINE (NICODERM CQ) 21 MG/24HR    Place 1 patch onto the skin daily    POLYVINYL ALCOHOL-POVIDONE (REFRESH OP)    Place 1 drop into both eyes 3 times daily    PREGABALIN (LYRICA PO)    Take by mouth    SUMATRIPTAN (IMITREX) 100 MG TABLET    Take 100 mg by mouth once as needed for Migraine    TOPIRAMATE (TOPAMAX) 100 MG TABLET    Take 100 mg by mouth 3 times daily        Encounter Medications  Orders Placed This Encounter   Medications    ipratropium-albuterol (DUONEB) nebulizer solution 1 ampule     Order Specific Question:   Initiate RT Bronchodilator Protocol     Answer:   Yes - ED protocol    famotidine (PEPCID) injection 20 mg    ondansetron (ZOFRAN) injection 4 mg    dicyclomine (BENTYL) injection 20 mg    0.9 % sodium chloride bolus    iopamidol (ISOVUE-370) 76 % injection 75 mL    ipratropium-albuterol (DUONEB) nebulizer solution 1 ampule     Order Specific Question:   Initiate RT Bronchodilator Protocol     Answer:   Yes - ED protocol       ALLERGIES     is allergic to azithromycin, methylprednisolone, penicillins, and tape [adhesive tape]. RECENT VITALS:   Temp: 98.2 °F (36.8 °C),  Heart Rate: 72, Resp: 15, BP: 131/81    RADIOLOGY:   CT ABDOMEN PELVIS W IV CONTRAST Additional Contrast? None   Final Result   1. Mild small bowel distension in the mid abdomen without inflammatory   change or focal transition to suggest obstruction. This may represent   regional ileus. 2.  Findings compatible with diarrheal process.          XR CHEST PORTABLE    (Results Pending)       LABS:  Labs Reviewed   CBC WITH AUTO DIFFERENTIAL - Abnormal; Notable for the following components:       Result Value    RBC 5.48 (*)     Hemoglobin 15.9 (*)     MCHC 34.9 (*)     Seg Neutrophils 73 (*)     Lymphocytes 19 (*)     All other components within normal limits   BASIC METABOLIC PANEL - Abnormal; Notable for the following components:    Glucose 109 (*)     BUN 21 (*)     Potassium 3.5 (*)     Anion Gap 18 (*)

## 2022-08-20 NOTE — ED PROVIDER NOTES
[facility]     Emergency Department     Faculty Note/ Attestation      Pt Name: Sobeida Yeh                                       MRN: [id number]  [place] 1965  Date of evaluation: 8/20/2022    Patients PCP:    Butch Colin MD      Attestation  I performed a history and physical examination of the patient and discussed management with the resident. I reviewed the residents note and agree with the documented findings and plan of care. Any areas of disagreement are noted on the chart. I was personally present for the key portions of any procedures. I have documented in the chart those procedures where I was not present during the key portions. I have reviewed the emergency nurses triage note. I agree with the chief complaint, past medical history, past surgical history, allergies, medications, social and family history as documented unless otherwise noted below. For Physician Assistant/ Nurse Practitioner cases/documentation I have personally evaluated this patient and have completed at least one if not all key elements of the E/M (history, physical exam, and MDM). Additional findings are as noted. Initial Screens:        [place] Coma Scale  Eye Opening: Spontaneous  Best Verbal Response: Oriented  Best Motor Response: Obeys commands  [place] Coma Scale Score: 15    Vitals:    Vitals:    08/20/22 1210   Pulse: 85   Resp: 20   Temp: 98.2 °F (36.8 °C)   TempSrc: Oral   SpO2: 92%   Weight: 150 lb (68 kg)   Height: 5' 6.75\" (1.695 m)       CHIEF COMPLAINT       Chief Complaint   Patient presents with    Nausea     With emesis this am.  Symptoms off an on for 3 days.   \"Feels like Chron's flareup\"    Diarrhea     Symptoms for 3 days, denies blood in stool or vomit             DIAGNOSTIC RESULTS             RADIOLOGY:   No orders to display         LABS:  Labs Reviewed - No data to display      EMERGENCY DEPARTMENT COURSE:     -------------------------   , Temp: 98.2 °F (36.8 °C), Heart Rate: 85, Resp: 20      Comments    COPD and crohns   N/v/d, inc prod cough  Diffuse abd pain    Plan for sx tx, labs, nebs, CR, CT A/P, reassess    Condition improved, labs and CT point towards a diarrheal illness, no signs of enteritis or colitis.   Plan to discharge with symptomatic treatment, will give her GI referral as she has lost her gastroenterologist.    (Please note that portions of this note were completed with a voice recognition program.  Efforts were made to edit the dictations but occasionally words are mis-transcribed.)      Gregory Quijano MD,, MD  Attending Emergency Physician         Gregory Quijano MD  08/21/22 3738

## 2022-08-20 NOTE — H&P
[address]     Department of Internal Medicine - Staff Internal Medicine Teaching Service          ADMISSION NOTE/HISTORY AND PHYSICAL EXAMINATION   Date: 8/20/2022  Patient Name: Lori Brar  Date of admission: 8/20/2022 12:14 PM  YOB: 1965  PCP: Mercedez Guerrero MD  History Obtained From:  patient, electronic medical record    [facility]     Chief complaint:   Chief Complaint   Patient presents with    Nausea     With emesis this am.  Symptoms off an on for 3 days. \"Feels like Chron's flareup\"    Diarrhea     Symptoms for 3 days, denies blood in stool or vomit       HISTORY OF PRESENTING ILLNESS     Lori Brar is a 61 y.o. female who presents with 3 days of nausea, vomiting, diarrhea, and abdominal pain. She has a past medical history of COPD, hypertension, Crohn's disease, polysubstance abuse. She states this feels like her Crohn's flareups. She also has new productive cough with yellow sputum production. She reports she has felt febrile at home but had not taken her temperature. She states she has not had any blood in her stool or blood in her vomit. She denies any chest pain or shortness of breath, headaches, weakness, numbness and tingling. Patient is being admitted for new O2 requirement of nasal cannula 2 L. Review of Systems   Constitutional:  Positive for appetite change and fatigue. HENT:  Negative for congestion. Respiratory:  Positive for cough and shortness of breath. Negative for chest tightness, wheezing and stridor. Productive cough with yellow sputum  History of COPD   Cardiovascular:  Negative for chest pain and leg swelling. Gastrointestinal:  Positive for abdominal pain, diarrhea, nausea and vomiting. Negative for abdominal distention. Bilateral lower quadrant tenderness. Approximately 10 stools today. 3 episodes of emesis today.   History of Crohn's disease   Genitourinary:  Negative for difficulty urinating and dyspareunia. Musculoskeletal:  Negative for gait problem. Skin:  Negative for color change. Neurological:  Negative for dizziness and headaches. Psychiatric/Behavioral:  Negative for confusion. PAST MEDICAL HISTORY     Past Medical History:   Diagnosis Date    Acid reflux     Anxiety     Arthritis     Left hip    Asthma     Bipolar 1 disorder (HCC)     Bowel obstruction (HCC)     COPD (chronic obstructive pulmonary disease) (HCC)     O2 3L PRN/ Dr. Barr [facility]/last seen 2020/appt.9-7-21 for Clearance    Crohn disease ([address]     Depression     Dry eye     Fibromyalgia     Gout     Headache     Hyperlipidemia     Hypertension     Hypothyroidism     Kidney stones     Muscle spasm     Neuropathy     Pain     left hip    Personal history of other medical treatment     Pt. seen by Dr. Kierra Jaffe for clearance for this OR Left hip/ Normally pt. doesn't follow with Cardiology.  [facility]    Wears partial dentures     upper    Wellness examination     PCP Jacinda Barfield MD/ [facility]/last seen 8-24-21       PAST SURGICAL HISTORY     Past Surgical History:   Procedure Laterality Date    ABDOMEN SURGERY      bowel obstruction OR    BUNIONECTOMY Left     CHOLECYSTECTOMY      COLONOSCOPY  04/30/2007    no gross pathology seen in the colon and also 3-4 inches of the ileum    COLONOSCOPY  10/12/2017    normal    COLONOSCOPY  10/25/2021    COLONOSCOPY WITH BIOPSY performed by Nhan Sr MD at [facility]    COLONOSCOPY  10/25/2021    COLONOSCOPY POLYPECTOMY REMOVAL SNARE performed by Nhan Sr MD at [address] [place] [address] ENDOSCOPY  10/25/2021    EGD BIOPSY performed by Nhan Sr MD at [facility]       ALLERGIES     Azithromycin, Methylprednisolone, Penicillins, and Tape [adhesive tape]    MEDICATIONS PRIOR TO ADMISSION     Prior to Admission medications    Medication Sig Start Date End Date Taking? Authorizing Provider   HYDROcodone-acetaminophen (NORCO) 5-325 MG per tablet TAKE 1 TABLET BY MOUTH EVERY 8 HOURS AS NEEDED FOR PAIN FOR UP TO 14 DAYS. 8/15/22 8/29/22  Maribel Alexandre MD   Handicap Placard MISC by Does not apply route Duration 5 years 5/17/22   Maribel Alexandre MD   Pregabalin (LYRICA PO) Take by mouth    Historical Provider, MD   ibuprofen (IBU) 400 MG tablet Take 1 tablet by mouth every 6 hours as needed for Pain 5/15/22 5/18/22  Adama Ravi,    albuterol sulfate HFA (VENTOLIN HFA) 108 (90 Base) MCG/ACT inhaler Inhale 2 puffs into the lungs every 6 hours as needed for Wheezing    Historical Provider, MD   diphenhydrAMINE (BENADRYL) 25 MG tablet Take 25 mg by mouth nightly as needed    Historical Provider, MD   topiramate (TOPAMAX) 100 MG tablet Take 100 mg by mouth 3 times daily     Historical Provider, MD   SUMAtriptan (IMITREX) 100 MG tablet Take 100 mg by mouth once as needed for Migraine    Historical Provider, MD   gabapentin (NEURONTIN) 400 MG capsule Take 800 mg by mouth 3 times daily. Historical Provider, MD   budesonide-formoterol (SYMBICORT) 160-4.5 MCG/ACT AERO Inhale 2 puffs into the lungs 2 times daily 9/19/21   Krys Hess MD   lisinopril (PRINIVIL;ZESTRIL) 5 MG tablet Take 1 tablet by mouth daily 9/20/21   Krys Hess MD   nicotine (NICODERM CQ) 21 MG/24HR Place 1 patch onto the skin daily 9/20/21   Krys Hess MD   acetaminophen (TYLENOL) 500 MG tablet Take 2 tablets by mouth 3 times daily  Patient taking differently: Take 1,000 mg by mouth 3 times daily as needed  7/17/21   Ifeoma Kirk DO   Polyvinyl Alcohol-Povidone (REFRESH OP) Place 1 drop into both eyes 3 times daily    Historical Provider, MD   lidocaine (LIDODERM) 5 % Place 1 patch onto the skin daily as needed for Pain 12 hours on, 12 hours off.      Historical Provider, MD       SOCIAL HISTORY     Tobacco: 20-pack-year history  Alcohol: None  Illicits: Marijuana edibles    FAMILY HISTORY Family History   Problem Relation Age of Onset    Diabetes Father     Lung Cancer Father     Hypertension Mother     Cancer Mother     High Blood Pressure Mother     Crohn's Disease Brother     Heart Disease Brother        PHYSICAL EXAM     Vitals: /77   Pulse 81   Temp 98.2 °F (36.8 °C) (Oral)   Resp 18   Ht 5' 6.75\" (1.695 m)   Wt 150 lb (68 kg)   SpO2 96%   BMI 23.67 kg/m²   Tmax: Temp (24hrs), Av.2 °F (36.8 °C), Min:98.2 °F (36.8 °C), Max:98.2 °F (36.8 °C)    Last Body weight:   Wt Readings from Last 3 Encounters:   22 150 lb (68 kg)   22 138 lb (62.6 kg)   22 138 lb (62.6 kg)     Body Mass Index : Body mass index is 23.67 kg/m². Physical Exam  Constitutional:       General: She is not in acute distress. Appearance: She is normal weight. She is not toxic-appearing. Comments: Appears older than stated age   HENT:      Head: Normocephalic and atraumatic. Nose: Nose normal.      Mouth/Throat:      Mouth: Mucous membranes are moist.      Pharynx: Oropharynx is clear. Eyes:      Extraocular Movements: Extraocular movements intact. Conjunctiva/sclera: Conjunctivae normal.      Pupils: Pupils are equal, round, and reactive to light. Cardiovascular:      Rate and Rhythm: Normal rate and regular rhythm. Pulses: Normal pulses. Heart sounds: Normal heart sounds. Pulmonary:      Effort: Pulmonary effort is normal.      Breath sounds: Normal breath sounds. No wheezing or rhonchi. Abdominal:      General: Bowel sounds are normal.      Palpations: Abdomen is soft. Tenderness: There is abdominal tenderness. Comments: Newness in the bilateral lower quadrants   Musculoskeletal:         General: Normal range of motion. Cervical back: Normal range of motion. Skin:     General: Skin is warm and dry. Neurological:      General: No focal deficit present. Mental Status: She is alert.          INVESTIGATIONS     Laboratory Testing: Recent Results (from the past 24 hour(s))   CBC with Auto Differential    Collection Time: 08/20/22 12:44 PM   Result Value Ref Range    WBC 6.4 3.5 - 11.3 k/uL    RBC 5.48 (H) 3.95 - 5.11 m/uL    Hemoglobin 15.9 (H) 11.9 - 15.1 g/dL    Hematocrit 45.5 36.3 - 47.1 %    MCV 83.0 82.6 - 102.9 fL    MCH 29.0 25.2 - 33.5 pg    MCHC 34.9 (H) 28.4 - 34.8 g/dL    RDW 13.6 11.8 - 14.4 %    Platelets 125 812 - 022 k/uL    MPV 11.3 8.1 - 13.5 fL    NRBC Automated 0.0 0.0 per 100 WBC    Seg Neutrophils 73 (H) 36 - 65 %    Lymphocytes 19 (L) 24 - 43 %    Monocytes 6 3 - 12 %    Eosinophils % 1 1 - 4 %    Basophils 1 0 - 2 %    Immature Granulocytes 0 0 %    Segs Absolute 4.72 1.50 - 8.10 k/uL    Absolute Lymph # 1.19 1.10 - 3.70 k/uL    Absolute Mono # 0.41 0.10 - 1.20 k/uL    Absolute Eos # 0.05 0.00 - 0.44 k/uL    Basophils Absolute 0.03 0.00 - 0.20 k/uL    Absolute Immature Granulocyte <0.03 0.00 - 0.30 k/uL   BMP    Collection Time: 08/20/22 12:44 PM   Result Value Ref Range    Glucose 109 (H) 70 - 99 mg/dL    BUN 21 (H) 6 - 20 mg/dL    Creatinine 0.82 0.50 - 0.90 mg/dL    Calcium 9.5 8.6 - 10.4 mg/dL    Sodium 137 135 - 144 mmol/L    Potassium 3.5 (L) 3.7 - 5.3 mmol/L    Chloride 99 98 - 107 mmol/L    CO2 20 20 - 31 mmol/L    Anion Gap 18 (H) 9 - 17 mmol/L    GFR Non-African American >60 >60 mL/min    GFR African American >60 >60 mL/min    GFR Comment         COVID-19, Rapid    Collection Time: 08/20/22  1:17 PM    Specimen: Nasopharyngeal Swab   Result Value Ref Range    Specimen Description . NASOPHARYNGEAL SWAB     SARS-CoV-2, Rapid Not Detected Not Detected   Urinalysis with Microscopic    Collection Time: 08/20/22  2:58 PM   Result Value Ref Range    Color, UA Yellow Yellow    Turbidity UA Clear Clear    Glucose, Ur NEGATIVE NEGATIVE    Bilirubin Urine NEGATIVE NEGATIVE    Ketones, Urine SMALL (A) NEGATIVE    Specific Gravity, UA 1.096 (H) 1.005 - 1.030    Urine Hgb NEGATIVE NEGATIVE    pH, UA 6.0 5.0 - 8.0 Protein, UA TRACE (A) NEGATIVE    Urobilinogen, Urine Normal Normal    Nitrite, Urine NEGATIVE NEGATIVE    Leukocyte Esterase, Urine NEGATIVE NEGATIVE    WBC, UA 2 TO 5 0 - 5 /HPF    RBC, UA 0 TO 2 0 - 2 /HPF    Epithelial Cells UA 2 TO 5 0 - 5 /HPF       Imaging:   CT ABDOMEN PELVIS W IV CONTRAST Additional Contrast? None    Result Date: 8/20/2022  1. Mild small bowel distension in the mid abdomen without inflammatory change or focal transition to suggest obstruction. This may represent regional ileus. 2.  Findings compatible with diarrheal process. XR CHEST PORTABLE    Result Date: 8/20/2022  1. Faint increased opacity over the lateral mid to lower left chest is probably related to overlying soft tissue density though pulmonary infiltrate is a consideration. Consider follow-up full inspiration PA and lateral chest better characterization. 2. Pulmonary sequela typical of that seen with smoking, including COPD; correlate with clinical history. 3. Calcific atherosclerotic disease aorta. ASSESSMENT & PLAN     ASSESSMENT / PLAN:     IMPRESSION  This is a 61 y.o. female who presented with vomiting, abdominal pain, new O2 requirement and found to have COPD exacerbation. Patient admitted to inpatient status [facility] because new oxygen requirement and treatment for diarrhea nausea vomiting. Principal Problem:    COPD exacerbation ([address]  - 2 L O2 nasal cannula  - DuoNeb treatments  - Respiratory panel with COVID-19  - Prednisone  - Magnesium sulfate 2 g    Hypokalemia  -Potassium 3.5, replaced    Diarrhea/Crohn's exacerbation  - Steroids  - C. difficile toxin/antigen ordered  - GI panel  - Lactic acid    Nausea/vomiting  - Zofran  - Pepcid  - Bentyl      DVT ppx: Lovenox  GI ppx: Protonix    PT/OT/SW: Consulted  Discharge Planning:  consulted    Sameera Meade MD  Internal Medicine Resident, PGY-1  [facility];  [place], [place]  8/20/2022, 7:06 PM

## 2022-08-21 LAB
ABSOLUTE EOS #: <0.03 K/UL (ref 0–0.44)
ABSOLUTE IMMATURE GRANULOCYTE: <0.03 K/UL (ref 0–0.3)
ABSOLUTE LYMPH #: 1.66 K/UL (ref 1.1–3.7)
ABSOLUTE MONO #: 0.62 K/UL (ref 0.1–1.2)
ANION GAP SERPL CALCULATED.3IONS-SCNC: 11 MMOL/L (ref 9–17)
BASOPHILS # BLD: 0 % (ref 0–2)
BASOPHILS ABSOLUTE: <0.03 K/UL (ref 0–0.2)
BUN BLDV-MCNC: 15 MG/DL (ref 6–20)
C-REACTIVE PROTEIN: <3 MG/L (ref 0–5)
CALCIUM SERPL-MCNC: 8.7 MG/DL (ref 8.6–10.4)
CHLORIDE BLD-SCNC: 106 MMOL/L (ref 98–107)
CO2: 24 MMOL/L (ref 20–31)
CREAT SERPL-MCNC: 0.69 MG/DL (ref 0.5–0.9)
EOSINOPHILS RELATIVE PERCENT: 0 % (ref 1–4)
GFR AFRICAN AMERICAN: >60 ML/MIN
GFR NON-AFRICAN AMERICAN: >60 ML/MIN
GFR SERPL CREATININE-BSD FRML MDRD: ABNORMAL ML/MIN/{1.73_M2}
GLUCOSE BLD-MCNC: 103 MG/DL (ref 70–99)
HCT VFR BLD CALC: 41.2 % (ref 36.3–47.1)
HEMOGLOBIN: 13.3 G/DL (ref 11.9–15.1)
IMMATURE GRANULOCYTES: 0 %
LACTIC ACID, WHOLE BLOOD: 1.4 MMOL/L (ref 0.7–2.1)
LYMPHOCYTES # BLD: 24 % (ref 24–43)
MAGNESIUM: 2.2 MG/DL (ref 1.6–2.6)
MCH RBC QN AUTO: 28.5 PG (ref 25.2–33.5)
MCHC RBC AUTO-ENTMCNC: 32.3 G/DL (ref 28.4–34.8)
MCV RBC AUTO: 88.4 FL (ref 82.6–102.9)
MONOCYTES # BLD: 9 % (ref 3–12)
NRBC AUTOMATED: 0 PER 100 WBC
PDW BLD-RTO: 13.7 % (ref 11.8–14.4)
PLATELET # BLD: 202 K/UL (ref 138–453)
PMV BLD AUTO: 11.2 FL (ref 8.1–13.5)
POTASSIUM SERPL-SCNC: 3.5 MMOL/L (ref 3.7–5.3)
RBC # BLD: 4.66 M/UL (ref 3.95–5.11)
SEDIMENTATION RATE, ERYTHROCYTE: 5 MM/HR (ref 0–30)
SEG NEUTROPHILS: 66 % (ref 36–65)
SEGMENTED NEUTROPHILS ABSOLUTE COUNT: 4.48 K/UL (ref 1.5–8.1)
SODIUM BLD-SCNC: 141 MMOL/L (ref 135–144)
WBC # BLD: 6.8 K/UL (ref 3.5–11.3)

## 2022-08-21 PROCEDURE — 6360000002 HC RX W HCPCS: Performed by: STUDENT IN AN ORGANIZED HEALTH CARE EDUCATION/TRAINING PROGRAM

## 2022-08-21 PROCEDURE — 80048 BASIC METABOLIC PNL TOTAL CA: CPT

## 2022-08-21 PROCEDURE — 6370000000 HC RX 637 (ALT 250 FOR IP)

## 2022-08-21 PROCEDURE — 83735 ASSAY OF MAGNESIUM: CPT

## 2022-08-21 PROCEDURE — 85652 RBC SED RATE AUTOMATED: CPT

## 2022-08-21 PROCEDURE — 85025 COMPLETE CBC W/AUTO DIFF WBC: CPT

## 2022-08-21 PROCEDURE — 6370000000 HC RX 637 (ALT 250 FOR IP): Performed by: STUDENT IN AN ORGANIZED HEALTH CARE EDUCATION/TRAINING PROGRAM

## 2022-08-21 PROCEDURE — 86140 C-REACTIVE PROTEIN: CPT

## 2022-08-21 PROCEDURE — 2580000003 HC RX 258: Performed by: STUDENT IN AN ORGANIZED HEALTH CARE EDUCATION/TRAINING PROGRAM

## 2022-08-21 PROCEDURE — 36415 COLL VENOUS BLD VENIPUNCTURE: CPT

## 2022-08-21 PROCEDURE — 83605 ASSAY OF LACTIC ACID: CPT

## 2022-08-21 PROCEDURE — 94640 AIRWAY INHALATION TREATMENT: CPT

## 2022-08-21 PROCEDURE — 2700000000 HC OXYGEN THERAPY PER DAY

## 2022-08-21 PROCEDURE — 99222 1ST HOSP IP/OBS MODERATE 55: CPT | Performed by: INTERNAL MEDICINE

## 2022-08-21 PROCEDURE — 1200000000 HC SEMI PRIVATE

## 2022-08-21 RX ORDER — GABAPENTIN 400 MG/1
800 CAPSULE ORAL 3 TIMES DAILY
Status: DISCONTINUED | OUTPATIENT
Start: 2022-08-21 | End: 2022-08-21

## 2022-08-21 RX ORDER — GABAPENTIN 400 MG/1
800 CAPSULE ORAL 3 TIMES DAILY
Status: DISCONTINUED | OUTPATIENT
Start: 2022-08-21 | End: 2022-08-22

## 2022-08-21 RX ORDER — HYDROCODONE BITARTRATE AND ACETAMINOPHEN 5; 325 MG/1; MG/1
1 TABLET ORAL EVERY 8 HOURS PRN
Status: DISCONTINUED | OUTPATIENT
Start: 2022-08-21 | End: 2022-08-23 | Stop reason: HOSPADM

## 2022-08-21 RX ORDER — PREGABALIN 100 MG/1
300 CAPSULE ORAL 2 TIMES DAILY
Status: DISCONTINUED | OUTPATIENT
Start: 2022-08-21 | End: 2022-08-23 | Stop reason: HOSPADM

## 2022-08-21 RX ORDER — GUAIFENESIN 600 MG/1
600 TABLET, EXTENDED RELEASE ORAL 2 TIMES DAILY
Status: DISCONTINUED | OUTPATIENT
Start: 2022-08-21 | End: 2022-08-23 | Stop reason: HOSPADM

## 2022-08-21 RX ADMIN — ACETAMINOPHEN 650 MG: 325 TABLET ORAL at 08:17

## 2022-08-21 RX ADMIN — ONDANSETRON 4 MG: 2 INJECTION INTRAMUSCULAR; INTRAVENOUS at 21:57

## 2022-08-21 RX ADMIN — GABAPENTIN 800 MG: 400 CAPSULE ORAL at 13:39

## 2022-08-21 RX ADMIN — GUAIFENESIN 600 MG: 600 TABLET, EXTENDED RELEASE ORAL at 20:43

## 2022-08-21 RX ADMIN — DEXTROSE AND SODIUM CHLORIDE: 5; 900 INJECTION, SOLUTION INTRAVENOUS at 21:53

## 2022-08-21 RX ADMIN — POTASSIUM BICARBONATE 40 MEQ: 782 TABLET, EFFERVESCENT ORAL at 10:48

## 2022-08-21 RX ADMIN — GUAIFENESIN 600 MG: 600 TABLET, EXTENDED RELEASE ORAL at 10:48

## 2022-08-21 RX ADMIN — BUDESONIDE AND FORMOTEROL FUMARATE DIHYDRATE 2 PUFF: 160; 4.5 AEROSOL RESPIRATORY (INHALATION) at 08:20

## 2022-08-21 RX ADMIN — PREGABALIN 300 MG: 100 CAPSULE ORAL at 13:39

## 2022-08-21 RX ADMIN — GABAPENTIN 800 MG: 400 CAPSULE ORAL at 20:43

## 2022-08-21 RX ADMIN — ONDANSETRON 4 MG: 2 INJECTION INTRAMUSCULAR; INTRAVENOUS at 03:18

## 2022-08-21 RX ADMIN — ENOXAPARIN SODIUM 40 MG: 100 INJECTION SUBCUTANEOUS at 08:17

## 2022-08-21 RX ADMIN — BUDESONIDE AND FORMOTEROL FUMARATE DIHYDRATE 2 PUFF: 160; 4.5 AEROSOL RESPIRATORY (INHALATION) at 20:52

## 2022-08-21 RX ADMIN — ONDANSETRON 4 MG: 2 INJECTION INTRAMUSCULAR; INTRAVENOUS at 13:42

## 2022-08-21 RX ADMIN — PREGABALIN 300 MG: 100 CAPSULE ORAL at 20:43

## 2022-08-21 ASSESSMENT — PAIN SCALES - GENERAL
PAINLEVEL_OUTOF10: 7
PAINLEVEL_OUTOF10: 7

## 2022-08-21 NOTE — ED NOTES
[facility] All questions answered.      Kaleb Eng RN  08/20/22 2004

## 2022-08-21 NOTE — PROGRESS NOTES
[facility]  Internal Medicine Teaching Residency Program  Inpatient Daily Progress Note  ______________________________________________________________________________    Patient: Joi Gonzalez  YOB: 1965   VTU:[id number]    Acct: [de-identified]     Room: [address] [place] [address]  Admit date: 8/20/2022  Today's date: 08/21/22  Number of days in the hospital: 1    SUBJECTIVE   Admitting Diagnosis: COPD exacerbation ([facility] [address]  CC: Nausea/vomiting/diarrhea/SOB    - Pt examined at bedside. Chart & results reviewed. Plan for today:  - Monitor electrolytes, replace as needed  - Monitor O2 requirements  - Monitor bouts of diarrhea, nausea, vomiting  - Pain control  - Continue medical management  - Follow-up C-reactive protein  - Follow-up sed rate  -- Reordered gabapentin     Review of Systems   Constitutional:  Positive for appetite change and fatigue. HENT:  Negative for congestion. Respiratory:  Positive for cough   Negative for chest tightness, wheezing and stridor. Productive cough with yellow sputum  History of COPD   Cardiovascular:  Negative for chest pain and leg swelling. Gastrointestinal:  Positive for abdominal pain, diarrhea 3 bouts today. Negative for abdominal distention. Bilateral lower quadrant tenderness. History of Crohn's disease   Genitourinary:  Negative for difficulty urinating and dyspareunia. Musculoskeletal:  Negative for gait problem. Skin:  Negative for color change. Neurological:  Negative for dizziness and headaches. Psychiatric/Behavioral:  Negative for confusion. BRIEF HISTORY     Joi Gonzalez is a 61 y.o. female who presents with 3 days of nausea, vomiting, diarrhea, and abdominal pain. She has a past medical history of COPD, hypertension, Crohn's disease, polysubstance abuse. She states this feels like her Crohn's flareups. She also has new productive cough with yellow sputum production.  She No focal deficit present. Mental Status: She is alert. Medications:  Scheduled Medications:    guaiFENesin  600 mg Oral BID    budesonide-formoterol  2 puff Inhalation BID    sodium chloride flush  5-40 mL IntraVENous 2 times per day    enoxaparin  40 mg SubCUTAneous Daily     Continuous Infusions:    sodium chloride      dextrose 5 % and 0.9 % NaCl 100 mL/hr at 08/20/22 2148     PRN Medicationsalbuterol sulfate HFA, 2 puff, Q6H PRN  sodium chloride flush, 5-40 mL, PRN  sodium chloride, , PRN  ondansetron, 4 mg, Q8H PRN   Or  ondansetron, 4 mg, Q6H PRN  polyethylene glycol, 17 g, Daily PRN  acetaminophen, 650 mg, Q6H PRN   Or  acetaminophen, 650 mg, Q6H PRN        Diagnostic Labs:  CBC:   Recent Labs     08/20/22  1244 08/21/22  0938   WBC 6.4 6.8   RBC 5.48* 4.66   HGB 15.9* 13.3   HCT 45.5 41.2   MCV 83.0 88.4   RDW 13.6 13.7    202     BMP:   Recent Labs     08/20/22  1244 08/21/22  0938    141   K 3.5* 3.5*   CL 99 106   CO2 20 24   BUN 21* 15   CREATININE 0.82 0.69     BNP: No results for input(s): BNP in the last 72 hours. PT/INR: No results for input(s): PROTIME, INR in the last 72 hours. APTT: No results for input(s): APTT in the last 72 hours. CARDIAC ENZYMES: No results for input(s): CKMB, CKMBINDEX, TROPONINI in the last 72 hours. Invalid input(s): CKTOTAL;3  FASTING LIPID PANEL:No results found for: CHOL, HDL, TRIG  LIVER PROFILE: No results for input(s): AST, ALT, ALB, BILIDIR, BILITOT, ALKPHOS in the last 72 hours. MICROBIOLOGY:   Lab Results   Component Value Date/Time    CULTURE NO GROWTH 5 DAYS 03/25/2022 11:39 AM       Imaging:    CT ABDOMEN PELVIS W IV CONTRAST Additional Contrast? None    Result Date: 8/20/2022  1. Mild small bowel distension in the mid abdomen without inflammatory change or focal transition to suggest obstruction. This may represent regional ileus. 2.  Findings compatible with diarrheal process.      XR CHEST PORTABLE    Result Date: 8/20/2022  1. Faint increased opacity over the lateral mid to lower left chest is probably related to overlying soft tissue density though pulmonary infiltrate is a consideration. Consider follow-up full inspiration PA and lateral chest better characterization. 2. Pulmonary sequela typical of that seen with smoking, including COPD; correlate with clinical history. 3. Calcific atherosclerotic disease aorta. ASSESSMENT & PLAN     Assessment and Plan:    Principal Problem:    COPD exacerbation ([address]  Active Problems:    Hypokalemia    Crohn disease ([facility] [address]    Nausea vomiting and diarrhea  Resolved Problems:    * No resolved hospital problems. *      IMPRESSION  This is a 61 y.o. female who presented with vomiting, abdominal pain, new O2 requirement and found to have COPD exacerbation. Patient admitted to inpatient status MedSurg because new oxygen requirement and treatment for diarrhea nausea vomiting. Principal Problem:    COPD exacerbation ([facility])  - 2 L O2 nasal cannula  - DuoNeb treatments  - Symbicort  - Proventil  - Mucinex 600 mg oral twice daily  - Respiratory panel with COVID-19  - Prednisone 60 mg given once  - Magnesium: 2.2  - WBC: 6.8     Hypokalemia  -Potassium 3.5, replaced    Diarrhea/Crohn's exacerbation  - Steroids  - C. difficile toxin/antigen ordered  - GI panel  - Lactic acid 1.4  - Hemoglobin 13.3    Nausea/vomiting  - Zofran  - Pepcid 20 mg given once  - Bentyl 20 mg given once  - Potassium: 3.5, replaced  - Sodium 141  - Chloride 106  - Tylenol for pain     -- Reordered gabapentin for fibromyalgia      DVT ppx: Lovenox  GI ppx: Protonix     PT/OT/SW: Consulted  Discharge Planning:  consulted      Brendan Smith M.D. Internal Medicine Resident PGY-1  [facility].    10:28 AM 8/21/2022     Please note that part of this chart was generated using voice recognition dictation software.  Although every effort was made to ensure the accuracy of this automated transcription, some errors in transcription may have occurred.

## 2022-08-22 LAB
ABSOLUTE EOS #: 0.08 K/UL (ref 0–0.44)
ABSOLUTE IMMATURE GRANULOCYTE: <0.03 K/UL (ref 0–0.3)
ABSOLUTE LYMPH #: 1.77 K/UL (ref 1.1–3.7)
ABSOLUTE MONO #: 0.5 K/UL (ref 0.1–1.2)
ANION GAP SERPL CALCULATED.3IONS-SCNC: 10 MMOL/L (ref 9–17)
BASOPHILS # BLD: 1 % (ref 0–2)
BASOPHILS ABSOLUTE: 0.03 K/UL (ref 0–0.2)
BUN BLDV-MCNC: 10 MG/DL (ref 6–20)
C DIFF AG + TOXIN: NEGATIVE
CALCIUM SERPL-MCNC: 8.5 MG/DL (ref 8.6–10.4)
CAMPYLOBACTER PCR: NORMAL
CHLORIDE BLD-SCNC: 108 MMOL/L (ref 98–107)
CO2: 23 MMOL/L (ref 20–31)
CREAT SERPL-MCNC: 0.61 MG/DL (ref 0.5–0.9)
CRYPTOSPORIDIUM ANTIGEN STOOL: NEGATIVE
E COLI ENTEROTOXIGENIC PCR: NORMAL
EOSINOPHILS RELATIVE PERCENT: 1 % (ref 1–4)
GFR AFRICAN AMERICAN: >60 ML/MIN
GFR NON-AFRICAN AMERICAN: >60 ML/MIN
GFR SERPL CREATININE-BSD FRML MDRD: ABNORMAL ML/MIN/{1.73_M2}
GIARDIA ANTIGEN STOOL: NEGATIVE
GLUCOSE BLD-MCNC: 91 MG/DL (ref 70–99)
HCT VFR BLD CALC: 35.6 % (ref 36.3–47.1)
HEMOGLOBIN: 11.3 G/DL (ref 11.9–15.1)
IMMATURE GRANULOCYTES: 0 %
LYMPHOCYTES # BLD: 32 % (ref 24–43)
MAGNESIUM: 1.9 MG/DL (ref 1.6–2.6)
MCH RBC QN AUTO: 27.9 PG (ref 25.2–33.5)
MCHC RBC AUTO-ENTMCNC: 31.7 G/DL (ref 28.4–34.8)
MCV RBC AUTO: 87.9 FL (ref 82.6–102.9)
MONOCYTES # BLD: 9 % (ref 3–12)
NRBC AUTOMATED: 0 PER 100 WBC
PDW BLD-RTO: 13.8 % (ref 11.8–14.4)
PLATELET # BLD: 162 K/UL (ref 138–453)
PLESIOMONAS SHIGELLOIDES PCR: NORMAL
PMV BLD AUTO: 11.4 FL (ref 8.1–13.5)
POTASSIUM SERPL-SCNC: 3.5 MMOL/L (ref 3.7–5.3)
RBC # BLD: 4.05 M/UL (ref 3.95–5.11)
SALMONELLA PCR: NORMAL
SEG NEUTROPHILS: 57 % (ref 36–65)
SEGMENTED NEUTROPHILS ABSOLUTE COUNT: 3.14 K/UL (ref 1.5–8.1)
SHIGATOXIN GENE PCR: NORMAL
SHIGELLA SP PCR: NORMAL
SODIUM BLD-SCNC: 141 MMOL/L (ref 135–144)
SOURCE: NORMAL
SPECIMEN DESCRIPTION: NORMAL
SPECIMEN DESCRIPTION: NORMAL
VIBRIO PCR: NORMAL
WBC # BLD: 5.5 K/UL (ref 3.5–11.3)
YERSINIA ENTEROCOLITICA PCR: NORMAL

## 2022-08-22 PROCEDURE — 87329 GIARDIA AG IA: CPT

## 2022-08-22 PROCEDURE — 83735 ASSAY OF MAGNESIUM: CPT

## 2022-08-22 PROCEDURE — 99253 IP/OBS CNSLTJ NEW/EST LOW 45: CPT | Performed by: INTERNAL MEDICINE

## 2022-08-22 PROCEDURE — 6360000002 HC RX W HCPCS: Performed by: STUDENT IN AN ORGANIZED HEALTH CARE EDUCATION/TRAINING PROGRAM

## 2022-08-22 PROCEDURE — 1200000000 HC SEMI PRIVATE

## 2022-08-22 PROCEDURE — 83993 ASSAY FOR CALPROTECTIN FECAL: CPT

## 2022-08-22 PROCEDURE — 6370000000 HC RX 637 (ALT 250 FOR IP)

## 2022-08-22 PROCEDURE — 6370000000 HC RX 637 (ALT 250 FOR IP): Performed by: STUDENT IN AN ORGANIZED HEALTH CARE EDUCATION/TRAINING PROGRAM

## 2022-08-22 PROCEDURE — 36415 COLL VENOUS BLD VENIPUNCTURE: CPT

## 2022-08-22 PROCEDURE — 85025 COMPLETE CBC W/AUTO DIFF WBC: CPT

## 2022-08-22 PROCEDURE — 94640 AIRWAY INHALATION TREATMENT: CPT

## 2022-08-22 PROCEDURE — 87328 CRYPTOSPORIDIUM AG IA: CPT

## 2022-08-22 PROCEDURE — 6370000000 HC RX 637 (ALT 250 FOR IP): Performed by: INTERNAL MEDICINE

## 2022-08-22 PROCEDURE — 94761 N-INVAS EAR/PLS OXIMETRY MLT: CPT

## 2022-08-22 PROCEDURE — 80048 BASIC METABOLIC PNL TOTAL CA: CPT

## 2022-08-22 PROCEDURE — 99232 SBSQ HOSP IP/OBS MODERATE 35: CPT | Performed by: INTERNAL MEDICINE

## 2022-08-22 RX ORDER — GABAPENTIN 400 MG/1
800 CAPSULE ORAL 3 TIMES DAILY
Status: DISCONTINUED | OUTPATIENT
Start: 2022-08-22 | End: 2022-08-23 | Stop reason: HOSPADM

## 2022-08-22 RX ORDER — TRAZODONE HYDROCHLORIDE 100 MG/1
300 TABLET ORAL NIGHTLY
Status: DISCONTINUED | OUTPATIENT
Start: 2022-08-22 | End: 2022-08-23 | Stop reason: HOSPADM

## 2022-08-22 RX ORDER — FAMOTIDINE 20 MG/1
20 TABLET, FILM COATED ORAL ONCE
Status: COMPLETED | OUTPATIENT
Start: 2022-08-22 | End: 2022-08-22

## 2022-08-22 RX ORDER — CHOLESTYRAMINE LIGHT 4 G/5.7G
4 POWDER, FOR SUSPENSION ORAL 2 TIMES DAILY
Status: DISCONTINUED | OUTPATIENT
Start: 2022-08-22 | End: 2022-08-23 | Stop reason: HOSPADM

## 2022-08-22 RX ORDER — LOPERAMIDE HYDROCHLORIDE 2 MG/1
2 CAPSULE ORAL 4 TIMES DAILY PRN
Status: DISCONTINUED | OUTPATIENT
Start: 2022-08-22 | End: 2022-08-23 | Stop reason: HOSPADM

## 2022-08-22 RX ADMIN — HYDROCODONE BITARTRATE AND ACETAMINOPHEN 1 TABLET: 5; 325 TABLET ORAL at 01:09

## 2022-08-22 RX ADMIN — BUDESONIDE AND FORMOTEROL FUMARATE DIHYDRATE 2 PUFF: 160; 4.5 AEROSOL RESPIRATORY (INHALATION) at 09:23

## 2022-08-22 RX ADMIN — ONDANSETRON 4 MG: 2 INJECTION INTRAMUSCULAR; INTRAVENOUS at 06:13

## 2022-08-22 RX ADMIN — HYDROCODONE BITARTRATE AND ACETAMINOPHEN 1 TABLET: 5; 325 TABLET ORAL at 09:51

## 2022-08-22 RX ADMIN — POTASSIUM BICARBONATE 40 MEQ: 782 TABLET, EFFERVESCENT ORAL at 09:41

## 2022-08-22 RX ADMIN — HYDROCODONE BITARTRATE AND ACETAMINOPHEN 1 TABLET: 5; 325 TABLET ORAL at 17:56

## 2022-08-22 RX ADMIN — GABAPENTIN 800 MG: 400 CAPSULE ORAL at 20:46

## 2022-08-22 RX ADMIN — ENOXAPARIN SODIUM 40 MG: 100 INJECTION SUBCUTANEOUS at 09:41

## 2022-08-22 RX ADMIN — GUAIFENESIN 600 MG: 600 TABLET, EXTENDED RELEASE ORAL at 09:41

## 2022-08-22 RX ADMIN — GUAIFENESIN 600 MG: 600 TABLET, EXTENDED RELEASE ORAL at 20:46

## 2022-08-22 RX ADMIN — CHOLESTYRAMINE 4 G: 4 POWDER, FOR SUSPENSION ORAL at 20:46

## 2022-08-22 RX ADMIN — PREGABALIN 300 MG: 100 CAPSULE ORAL at 09:41

## 2022-08-22 RX ADMIN — FAMOTIDINE 20 MG: 20 TABLET, FILM COATED ORAL at 20:46

## 2022-08-22 RX ADMIN — PREGABALIN 300 MG: 100 CAPSULE ORAL at 20:46

## 2022-08-22 RX ADMIN — LOPERAMIDE HYDROCHLORIDE 2 MG: 2 CAPSULE ORAL at 12:34

## 2022-08-22 RX ADMIN — CHOLESTYRAMINE 4 G: 4 POWDER, FOR SUSPENSION ORAL at 14:38

## 2022-08-22 RX ADMIN — GABAPENTIN 800 MG: 400 CAPSULE ORAL at 14:38

## 2022-08-22 RX ADMIN — GABAPENTIN 800 MG: 400 CAPSULE ORAL at 09:41

## 2022-08-22 RX ADMIN — TRAZODONE HYDROCHLORIDE 300 MG: 100 TABLET ORAL at 20:46

## 2022-08-22 ASSESSMENT — PAIN DESCRIPTION - DESCRIPTORS: DESCRIPTORS: ACHING;DISCOMFORT

## 2022-08-22 ASSESSMENT — PAIN DESCRIPTION - ORIENTATION: ORIENTATION: LEFT

## 2022-08-22 ASSESSMENT — PAIN DESCRIPTION - LOCATION: LOCATION: HIP

## 2022-08-22 ASSESSMENT — PAIN SCALES - GENERAL: PAINLEVEL_OUTOF10: 10

## 2022-08-22 NOTE — CARE COORDINATION
08/22/22 1000   Service Assessment   Patient Orientation Alert and Oriented   Cognition Alert   History Provided By Patient   Primary Caregiver Self   Accompanied By/Relationship lives alone   Support Systems Children;Friends/Neighbors   Patient's Healthcare Decision Maker is: Legal Next of Sarah Tamez   PCP Verified by CM Yes  (Dr. Bautista Degree at  [facility] on [address]   Last Visit to PCP Within last 3 months   Prior Functional Level Shopping   Current Functional Level Shopping   Can patient return to prior living arrangement Yes   Ability to make needs known: Good   Family able to assist with home care needs: Yes   Financial Resources Medicaid; Food [place]   Social/Functional History   Lives With Alone   Type of Home Apartment  (3rd floor has elevator)   Home Layout Multi-level   Home Access Elevator   Bathroom Shower/Tub Tub/Shower unit   Bathroom Toilet Handicap height   Bathroom Equipment Grab bars around toilet;Grab bars in [facility], 4 wheeled; Electric scooter  (electric scooter)   Receives Help From Family;Friend(s)   ADL Assistance Independent   Homemaking Assistance Independent  (gets some help with vacuming)   Ambulation Assistance Independent   Transfer Assistance Independent   Active  No   Mode of Transportation Family;Cab;Friends   Occupation On disability   Discharge [facility] Prior To Admission Emergency Call  [address] Medications No   Patient expects to be discharged to: Apartment   Follow Up Appointment: Best Day/Time    (no)   History of falls? [address] Provided? No   Mode of Transport at Discharge   (medical cab or daughter)   Condition of Participation: Discharge Planning   The Plan for Transition of Care is related to the following treatment goals: to diarrhea under control   Plan is to return home. Lives alone.   Has family and friends to help.  May need cab ride home. Would like a transfer bench for tub. Attending PS re: this.

## 2022-08-22 NOTE — CONSULTS
[id number] CONSULT    Patient:   Aaron Junior   :    1965   Facility:   [facility]   Date:    2022  Admission Dx:  COPD exacerbation ([address] [J44.1]  COPD with acute exacerbation ([address] [J44.1]  Nausea vomiting and diarrhea [R11.2, R19.7]  Requesting physician: Angela Freeman MD  Reason for consult:  Diarrhea with mucus in stool. CC : Shortness of breath and diarrhea    SUBJECTIVE     HISTORY OF PRESENT ILLNESS  This is a 61 y.o.  female who was admitted 2022 with COPD exacerbation ([address] [J44.1]  COPD with acute exacerbation (HCC) [J44.1]  Nausea vomiting and diarrhea [R11.2, R19.7]. We have been asked to see the patient in consultation by Angela Freeman MD for diarrhea with mucus in stools. 16-year-old female with a PMH of COPD, bipolar disorder, bowel obstruction, anxiety, polysubstance use/abuse, Crohn's disease, bowel obstruction with partial small intestinal resection  who presented to the hospital for evaluation of shortness of breath and diarrhea. Patient reports she has has chronic diarrhea on Imodium 6 tablets a day for several years. The past 3 days she developed nausea, vomiting, as well as diarrhea. No hematochezia. Stool studies were negative for C. difficile and other infectious etiology. CRP < 3.0, sed rate 5      The patient is known to the GI service for her history of possible Crohn's disease diagnosed . Patient has never been treated with any 5-ASA's or Biologics per chart. Patient's last colonoscopy was completed completed 10/25/2021 with random biopsies. Colonoscopy showed normal terminal ileum with normal mucosa throughout the entire colon. Patient did have one 7 mm polyp removed. Pathology showed normal TI and colonic mucosa with adenomatous polyp . Patient was last seen by GI Dr Prem Membreno who ordered MR RUIZ for further evaluation.   Patient reports she does not wish to follow-up with Dr. Tegan Waggoner and plans to follow-up with [facility] where her PCP is located. Previous GI history:     YOB: 1965  MRN: [id number]     Date of Procedure: 10/25/2021     Pre-Op Diagnosis: nausea  vomiting  diarrhea     Post-Op Diagnosis: Same   Mild reflux esophagitis  Mild antral gastritis  Normal duodenum  Normal terminal ileum  7 mm rectal polyp   Otherwise normal colon mucosa          Procedure(s):  EGD BIOPSY  COLONOSCOPY WITH BIOPSY  COLONOSCOPY POLYPECTOMY REMOVAL SNARE     Surgeon(s):  Sara Elliott MD      EGD  Findings: The GE junction was noted at 40 cm  LA grade A reflux esophagitis at the GE junction  Mild erythema and linear erosions in the antrum suggestive of antral gastritis. Biopsies were performed for histology and H. pylori testing. Remainder of the stomach mucosa appeared normal on direct and retroflexed views. The examined duodenum appeared normal.     Colonoscopy  Findings: The terminal ileum appeared normal.  Biopsies were performed for histology. The background colon mucosa appeared normal in the entire colon. Random biopsies were performed from right colon, transverse colon, and the left colon for histology and to assess for microscopic colitis. 7 mm sessile polyp was found in the rectum. Resected using a cold snare. Resection and retrieval were complete. Recommendations  Await pathology results. Start pantoprazole 40 mg once daily. Resume diet and advance as tolerated  Okay to discharge from GI standpoint when tolerating diet. Follow-up in GI clinic in 1 to 2 weeks to discuss biopsy pathology results. Electronically signed by Sara Elliott MD on 10/25/2021 at 10:06 AM    -- Diagnosis --   1. STOMACH, BIOPSIES:   - MILD CHRONIC GASTRITIS.   - NEGATIVE FOR HELICOBACTER PYLORI INFECTION AND INTESTINAL   METAPLASIA. 2.  TERMINAL ILEUM, BIOPSIES:   - NORMAL SMALL INTESTINAL MUCOSA.      3.  RIGHT COLON, RANDOM BIOPSIES: - NORMAL COLONIC MUCOSA. 4.  TRANSVERSE COLON, BIOPSIES:   - NORMAL COLONIC MUCOSA. 5.  LEFT COLON, RANDOM BIOPSIES:   - NORMAL COLONIC MUCOSA. 6.  RECTUM, BIOPSIES:   - ADENOMATOUS POLYP (TUBULAR ADENOMA). OBJECTIVE:     PAST MEDICAL/SURGICAL HISTORY  Past Medical History:   Diagnosis Date    Acid reflux     Anxiety     Arthritis     Left hip    Asthma     Bipolar 1 disorder (HCC)     Bowel obstruction (HCC)     COPD (chronic obstructive pulmonary disease) (HCC)     O2 3L PRN/ Dr. Noah [facility] clinic/last seen 2020/appt.9-7-21 for Clearance    Crohn disease ([address]     Depression     Dry eye     Fibromyalgia     Gout     Headache     Hyperlipidemia     Hypertension     Hypothyroidism     Kidney stones     Muscle spasm     Neuropathy     Pain     left hip    Personal history of other medical treatment     Pt. seen by Dr. Aviva Jacobson for clearance for this OR Left hip/ Normally pt. doesn't follow with Cardiology.  [facility]    Wears partial dentures     upper    Wellness examination     PCP Francisca Foss MD/ [place]/last seen 8-24-21     Past Surgical History:   Procedure Laterality Date    ABDOMEN SURGERY      bowel obstruction OR    BUNIONECTOMY Left     CHOLECYSTECTOMY      COLONOSCOPY  04/30/2007    no gross pathology seen in the colon and also 3-4 inches of the ileum    COLONOSCOPY  10/12/2017    normal    COLONOSCOPY  10/25/2021    COLONOSCOPY WITH BIOPSY performed by Josh Arellano MD at [facility] Endoscopy    COLONOSCOPY  10/25/2021    COLONOSCOPY POLYPECTOMY REMOVAL SNARE performed by Josh Arellano MD at [address] [place] [address] ENDOSCOPY  10/25/2021    EGD BIOPSY performed by Josh Arellano MD at [facility] Endoscopy       ALLERGIES:  Allergies   Allergen Reactions    Azithromycin Other (See Comments)     'It doesn't work\"    Methylprednisolone      Elevates blood pressure    Penicillins Swelling     throat Tape Eloisa Shaffer Tape] Other (See Comments)     \" Tears my skin off\"       HOME MEDICATIONS:  Prior to Admission medications    Medication Sig Start Date End Date Taking? Authorizing Provider   traZODone (DESYREL) 150 MG tablet Take 300 mg by mouth nightly   Yes Historical Provider, MD   HYDROcodone-acetaminophen (NORCO) 5-325 MG per tablet TAKE 1 TABLET BY MOUTH EVERY 8 HOURS AS NEEDED FOR PAIN FOR UP TO 14 DAYS. 8/15/22 8/29/22  Winnie Brooks MD   Handicap Placard MISC by Does not apply route Duration 5 years 5/17/22   Winnie Brooks MD   Pregabalin (LYRICA PO) Take 300 mg by mouth 2 times daily    Historical Provider, MD   ibuprofen (IBU) 400 MG tablet Take 1 tablet by mouth every 6 hours as needed for Pain 5/15/22 5/18/22  Parrish Cavanaugh DO   albuterol sulfate HFA (PROVENTIL;VENTOLIN;PROAIR) 108 (90 Base) MCG/ACT inhaler Inhale 2 puffs into the lungs every 6 hours as needed for Wheezing    Historical Provider, MD   diphenhydrAMINE (BENADRYL) 25 MG tablet Take 25 mg by mouth nightly as needed    Historical Provider, MD   topiramate (TOPAMAX) 100 MG tablet Take 100 mg by mouth as needed    Historical Provider, MD   SUMAtriptan (IMITREX) 100 MG tablet Take 100 mg by mouth once as needed for Migraine    Historical Provider, MD   gabapentin (NEURONTIN) 400 MG capsule Take 800 mg by mouth 3 times daily.     Historical Provider, MD   budesonide-formoterol [facility]) 160-4.5 MCG/ACT AERO Inhale 2 puffs into the lungs 2 times daily 9/19/21   Magali Altman MD   lisinopril (PRINIVIL;ZESTRIL) 5 MG tablet Take 1 tablet by mouth daily 9/20/21   Magali Altman MD   nicotine (NICODERM CQ) 21 MG/24HR Place 1 patch onto the skin daily  Patient not taking: Reported on 8/20/2022 9/20/21   Magali Altman MD   acetaminophen (TYLENOL) 500 MG tablet Take 2 tablets by mouth 3 times daily  Patient taking differently: Take 1,000 mg by mouth 3 times daily as needed 7/17/21   Frances Guerin,    Polyvinyl Alcohol-Povidone (REFRESH OP) Place 1 drop into both eyes 3 times daily    Historical Provider, MD   lidocaine (LIDODERM) 5 % Place 1 patch onto the skin daily as needed for Pain 12 hours on, 12 hours off. Historical Provider, MD       CURRENT MEDICATIONS:  Scheduled Meds:   guaiFENesin  600 mg Oral BID    pregabalin  300 mg Oral BID    gabapentin  800 mg Oral TID    budesonide-formoterol  2 puff Inhalation BID    sodium chloride flush  5-40 mL IntraVENous 2 times per day    enoxaparin  40 mg SubCUTAneous Daily     Continuous Infusions:   sodium chloride      dextrose 5 % and 0.9 % NaCl 100 mL/hr at 08/21/22 2153     PRN Meds:HYDROcodone-acetaminophen, albuterol sulfate HFA, sodium chloride flush, sodium chloride, ondansetron **OR** ondansetron, polyethylene glycol, acetaminophen **OR** acetaminophen    SOCIAL HISTORY:     Tobacco:   reports that she has been smoking cigarettes. She has a 18.50 pack-year smoking history. She quit smokeless tobacco use about 20 years ago. Her smokeless tobacco use included chew. Alcohol:   reports that she does not currently use alcohol. Illicit drugs:  reports current drug use. Drug: Marijuana Shyann Queen). FAMILY HISTORY:     Family History   Problem Relation Age of Onset    Diabetes Father     Lung Cancer Father     Hypertension Mother     Cancer Mother     High Blood Pressure Mother     Crohn's Disease Brother     Heart Disease Brother        REVIEW OF SYSTEMS:    Constitutional: No fever, no chills, no lethargy, no weakness. HEENT:  No headache, otalgia, itchy eyes, nasal discharge or sore throat. Cardiac:  No chest pain, dyspnea, orthopnea or PND. Chest:   No cough, phlegm or wheezing. Abdomen:  No abdominal pain, nausea or vomiting. Neuro:  No focal weakness, abnormal movements or seizure like activity. Skin:   No rashes, no itching. :   No hematuria, no pyuria, no dysuria, no flank pain. Extremities:  No swelling or joint pains. ROS was otherwise negative except as mentioned in the [address] PHYSICAL EXAM:    BP 97/64   Pulse 71   Temp 98.3 °F (36.8 °C) (Oral)   Resp 17   Ht 5' 6.75\" (1.695 m)   Wt 150 lb (68 kg)   SpO2 95%   BMI 23.67 kg/m²     GENERAL:   Well developed, Well nourished, No apparent distress  HEAD:   Normocephalic, Atraumatic  EENT:   EOMI, Sclera not icteric, Oropharynx moist   NECK:   Supple, Trachea midline  LUNGS:  CTA Bilaterally  HEART:  RRR, No murmur  ABDOMEN:   Soft, Nontender, Nondistended, BS WNL  EXT:   No clubbing. No cyanosis. No edema. SKIN:   No rashes. No jaundice. No stigmata of liver disease. MUSC/SKEL:   Adequate muscle bulk for patient's age, No significant synovitis, No deformities  NEURO:  A&O x Three, CN II- XII grossly intact      LABS AND IMAGING:     CBC  Recent Labs     08/20/22  1244 08/21/22  0938 08/22/22  0659   WBC 6.4 6.8 5.5   HGB 15.9* 13.3 11.3*   HCT 45.5 41.2 35.6*   MCV 83.0 88.4 87.9   MCH 29.0 28.5 27.9   MCHC 34.9* 32.3 31.7    202 162       IMMATURE PLTs  No results found for: PLTFLUORE    BMP  Recent Labs     08/20/22  1244 08/21/22  0938    141   K 3.5* 3.5*   CL 99 106   CO2 20 24   BUN 21* 15   CREATININE 0.82 0.69   GLUCOSE 109* 103*   CALCIUM 9.5 8.7         LIVER WORK UP:    Acute Hepatitis Panel   Lab Results   Component Value Date/Time    HEPBSAG NONREACTIVE 09/18/2021 02:15 PM    HEPCAB NONREACTIVE 09/18/2021 02:15 PM    HEPBIGM NONREACTIVE 09/18/2021 02:15 PM    HEPAIGM NONREACTIVE 09/18/2021 02:15 PM       Lactic acid:  Recent Labs     08/21/22  0938   LACTACIDWB 1.4           IMAGING  CT ABDOMEN PELVIS W IV CONTRAST Additional Contrast? None    Result Date: 8/20/2022  EXAMINATION: CT OF THE ABDOMEN AND PELVIS WITH CONTRAST 8/20/2022 2:12 pm TECHNIQUE: CT of the abdomen and pelvis was performed with the administration of intravenous contrast. Multiplanar reformatted images are provided for review.  Automated exposure control, iterative reconstruction, and/or weight based adjustment of the mA/kV was utilized to reduce the radiation dose to as low as reasonably achievable. COMPARISON: 03/22/2022 HISTORY: ORDERING SYSTEM PROVIDED HISTORY: abdominal pain TECHNOLOGIST PROVIDED HISTORY: abdominal pain Decision Support Exception - unselect if not a suspected or confirmed emergency medical condition->Emergency Medical Condition (MA) Reason for Exam: abd pain FINDINGS: Lower Chest: No acute findings. Organs: The gallbladder is surgically absent. Mild prominence of the biliary tree is noted, suggestive of reservoir effect. The liver, pancreas, spleen, kidneys, and adrenals reveal no acute findings. GI/Bowel: Small bowel anastomosis in the mid abdomen, which remains mildly patulous. Nearby mildly prominent loops of small bowel with air-fluid levels are noted without transition point to suggest obstruction. Small amount of liquid stool in the colon. Normal appendix. Pelvis: No acute findings. Peritoneum/Retroperitoneum: No free air or free fluid. The aorta is normal in caliber. The visceral branches are patent. No lymphadenopathy. Bones/Soft Tissues: No abnormality identified. Small fat containing umbilical hernia. *Unless otherwise specified, incidental findings do not require dedicated imaging follow-up. 1.  Mild small bowel distension in the mid abdomen without inflammatory change or focal transition to suggest obstruction. This may represent regional ileus. 2.  Findings compatible with diarrheal process. XR CHEST PORTABLE    Result Date: 8/20/2022  EXAMINATION: ONE XRAY VIEW OF THE CHEST 8/20/2022 4:24 pm COMPARISON: Chest radiograph 05/18/2022 HISTORY: productive cough FINDINGS: Calcific atherosclerotic disease aorta. The cardiomediastinal and hilar silhouettes appear otherwise unremarkable. Chronic appearing coarse interstitial densities predominate perihilar regions and lung bases. Nonspecific tapering of the vasculature and increased lucency both upper lungs.   Faint increased opacity over the lateral mid to lower left chest is probably related to overlying soft tissue density though pulmonary infiltrate is a consideration. The lungs appear otherwise clear. No pleural effusion evident. No pneumothorax is seen. No acute osseous abnormality is identified. Wires and cardiac leads overlying the chest could obscure an underlying finding. 1. Faint increased opacity over the lateral mid to lower left chest is probably related to overlying soft tissue density though pulmonary infiltrate is a consideration. Consider follow-up full inspiration PA and lateral chest better characterization. 2. Pulmonary sequela typical of that seen with smoking, including COPD; correlate with clinical history. 3. Calcific atherosclerotic disease aorta. IMPRESSION:     Chronic diarrhea. Patient was diagnosed with possible Crohn's disease 2017. Most recent colonoscopy including TI and random colon biopsies were unremarkable. Etiologies include bile diarrhea, small bowel bacterial overgrowth, pancreatic insufficiency, and multiple other etiologies. It is unclear at this time the extent of patient's prior work-up    Chronic diarrhea  Crohn's disease    Old records, labs and imaging reviewed. PLAN   1. We will add stool calprotectin  2. Start cholestyramine 4 g twice daily  3. Recommend continuing home dose of Imodium until further work-up can be completed  4. Not opposed to discharge and patient follow-up with gastroenterologist of her choice for comprehensive work-up for underlying cause for her chronic diarrhea. -Discussed with primary team.         This plan was formulated in collaboration with Dr. Bashir Ibarra  Thank you for allowing us to participate in the care of your patient. Electronically signed by: SHRADDHA Thorpe CNP on 8/22/2022 at 7:29 AM     Please note that this note was generated using a voice recognition dictation software.   Although every effort was made to ensure the accuracy of this automated transcription, some errors in transcription may have occurred.

## 2022-08-22 NOTE — PROGRESS NOTES
[facility]  Internal Medicine Teaching Residency Program  Inpatient Daily Progress Note  ______________________________________________________________________________    Patient: Demetrio Armenta  YOB: 1965   PJE:[id number]    Acct: [de-identified]     Room: [address] [place] [address]  Admit date: 8/20/2022  Today's date: 08/22/22  Number of days in the hospital: 2    SUBJECTIVE   Admitting Diagnosis: COPD exacerbation ([address]  CC: Nausea/vomiting/diarrhea/SOB     - Pt examined at bedside. Chart & results reviewed. Patient is not complaining of any abdominal pain and states that she is grateful for our services. Plan for today:  - Monitor electrolytes, replace as needed  - Monitor O2 requirements  - Monitor bouts of diarrhea, nausea, vomiting  - Pain control  - Continue medical management  - Reorder Imodium  - GI consulted and seizure follow-up  - Transfer tub bench  - Trazodone      Review of Systems   Constitutional:  patient has appetite today. Shaka Power HENT:  Negative for congestion. Respiratory: No longer has cough  Negative for chest tightness, wheezing and stridor. History of COPD   Cardiovascular:  Negative for chest pain and leg swelling. Gastrointestinal:  Positive for patient had 2 bouts of diarrhea today, says she is on Imodium that was restarted. negative for abdominal distention, pain. History of Crohn's disease   Genitourinary:  Negative for difficulty urinating and dyspareunia. Musculoskeletal:  Negative for gait problem. Skin:  Negative for color change. Neurological:  Negative for dizziness and headaches. Psychiatric/Behavioral:  Negative for confusion. BRIEF HISTORY     Demetrio Armenta is a 61 y.o. female who presents with 3 days of nausea, vomiting, diarrhea, and abdominal pain. She has a past medical history of COPD, hypertension, Crohn's disease, polysubstance abuse. She states this feels like her Crohn's flareups.   She also has new productive cough with yellow sputum production. She reports she has felt febrile at home but had not taken her temperature. She states she has not had any blood in her stool or blood in her vomit. She denies any chest pain or shortness of breath, headaches, weakness, numbness and tingling. Patient is being admitted for new O2 requirement of nasal cannula 2 L and management for nausea vomiting and diarrhea. OBJECTIVE     Vital Signs:  /70   Pulse 68   Temp 97.7 °F (36.5 °C) (Oral)   Resp 20   Ht 5' 6.75\" (1.695 m)   Wt 150 lb (68 kg)   SpO2 97%   BMI 23.67 kg/m²     Temp (24hrs), Av.9 °F (36.6 °C), Min:97.6 °F (36.4 °C), Max:98.3 °F (36.8 °C)    In: -   Out: 150 [Urine:150]    Physical Exam  Constitutional:       General: She is not in acute distress. Patient state she is able to eat but the food goes right through her. Appearance: She is normal weight. She is not toxic-appearing. Comments: Appears older than stated age   HENT:      Head: Normocephalic and atraumatic. Nose: Nose normal.      Mouth/Throat:      Mouth: Mucous membranes are moist.      Pharynx: Oropharynx is clear. Eyes:      Extraocular Movements: Extraocular movements intact. Conjunctiva/sclera: Conjunctivae normal.      Pupils: Pupils are equal, round, and reactive to light. Cardiovascular:      Rate and Rhythm: Normal rate and regular rhythm. Pulses: Normal pulses. Heart sounds: Normal heart sounds. Pulmonary:      Effort: Pulmonary effort is normal.      Breath sounds: Normal breath sounds. No wheezing or rhonchi. Abdominal:      General: Bowel sounds are normal.      Palpations: Abdomen is soft. No abdominal tenderness noted today. General: Normal range of motion. Cervical back: Normal range of motion. Skin:     General: Skin is warm and dry. Neurological:      General: No focal deficit present. Mental Status: She is alert.           Medications:  Scheduled Medications:    cholestyramine light  4 g Oral BID    gabapentin  800 mg Oral TID    traZODone  300 mg Oral Nightly    guaiFENesin  600 mg Oral BID    pregabalin  300 mg Oral BID    budesonide-formoterol  2 puff Inhalation BID    sodium chloride flush  5-40 mL IntraVENous 2 times per day    enoxaparin  40 mg SubCUTAneous Daily     Continuous Infusions:    sodium chloride      dextrose 5 % and 0.9 % NaCl 100 mL/hr at 08/21/22 2153     PRN Medicationsloperamide, 2 mg, 4x Daily PRN  HYDROcodone-acetaminophen, 1 tablet, Q8H PRN  albuterol sulfate HFA, 2 puff, Q6H PRN  sodium chloride flush, 5-40 mL, PRN  sodium chloride, , PRN  ondansetron, 4 mg, Q8H PRN   Or  ondansetron, 4 mg, Q6H PRN  polyethylene glycol, 17 g, Daily PRN  acetaminophen, 650 mg, Q6H PRN   Or  acetaminophen, 650 mg, Q6H PRN        Diagnostic Labs:  CBC:   Recent Labs     08/20/22  1244 08/21/22  0938 08/22/22  0659   WBC 6.4 6.8 5.5   RBC 5.48* 4.66 4.05   HGB 15.9* 13.3 11.3*   HCT 45.5 41.2 35.6*   MCV 83.0 88.4 87.9   RDW 13.6 13.7 13.8    202 162     BMP:   Recent Labs     08/20/22  1244 08/21/22  0938 08/22/22  0659    141 141   K 3.5* 3.5* 3.5*   CL 99 106 108*   CO2 20 24 23   BUN 21* 15 10   CREATININE 0.82 0.69 0.61     BNP: No results for input(s): BNP in the last 72 hours. PT/INR: No results for input(s): PROTIME, INR in the last 72 hours. APTT: No results for input(s): APTT in the last 72 hours. CARDIAC ENZYMES: No results for input(s): CKMB, CKMBINDEX, TROPONINI in the last 72 hours. Invalid input(s): CKTOTAL;3  FASTING LIPID PANEL:No results found for: CHOL, HDL, TRIG  LIVER PROFILE: No results for input(s): AST, ALT, ALB, BILIDIR, BILITOT, ALKPHOS in the last 72 hours. MICROBIOLOGY:   Lab Results   Component Value Date/Time    CULTURE NO GROWTH 5 DAYS 03/25/2022 11:39 AM       Imaging:    CT ABDOMEN PELVIS W IV CONTRAST Additional Contrast? None    Result Date: 8/20/2022  1.   Mild small bowel distension in the mid of this chart was generated using voice recognition dictation software. Although every effort was made to ensure the accuracy of this automated transcription, some errors in transcription may have occurred.

## 2022-08-22 NOTE — PROGRESS NOTES
Physical Therapy Cancel Note      DATE: 2022    NAME: Lyndon Zapata  MRN: [id number]   : 1965      Patient not seen this date for Physical Therapy due to:    Patient independent with functional mobility. Will defer PT evaluation at this time. Please reorder PT if future needs arise. Per pt, she's been walking in the hallway with her rollator independently and has been up in the chair and in the room. Will defer PT eval d/t pt at baseline level of independence with rollator.        Electronically signed by Adi Wagner PT on 2022 at 2:15 PM

## 2022-08-22 NOTE — PLAN OF CARE
Problem: Pain  Goal: Verbalizes/displays adequate comfort level or baseline comfort level  8/22/2022 0107 by Ronan Harper RN  Outcome: Progressing  8/21/2022 1829 by Karrie Ware RN  Outcome: Progressing     Problem: ABCDS Injury Assessment  Goal: Absence of physical injury  8/22/2022 0107 by Ronan Harper RN  Outcome: Progressing  8/21/2022 1829 by Karrie Ware RN  Outcome: Progressing     Problem: Safety - Adult  Goal: Free from fall injury  8/22/2022 0107 by Ronan Harper RN  Outcome: Progressing  8/21/2022 1829 by Karrie Ware RN  Outcome: Progressing

## 2022-08-23 VITALS
TEMPERATURE: 98.1 F | OXYGEN SATURATION: 97 % | RESPIRATION RATE: 16 BRPM | BODY MASS INDEX: 23.54 KG/M2 | HEART RATE: 66 BPM | DIASTOLIC BLOOD PRESSURE: 80 MMHG | WEIGHT: 150 LBS | HEIGHT: 67 IN | SYSTOLIC BLOOD PRESSURE: 145 MMHG

## 2022-08-23 LAB
ABSOLUTE EOS #: 0.1 K/UL (ref 0–0.44)
ABSOLUTE IMMATURE GRANULOCYTE: <0.03 K/UL (ref 0–0.3)
ABSOLUTE LYMPH #: 1.68 K/UL (ref 1.1–3.7)
ABSOLUTE MONO #: 0.35 K/UL (ref 0.1–1.2)
ANION GAP SERPL CALCULATED.3IONS-SCNC: 8 MMOL/L (ref 9–17)
BASOPHILS # BLD: 1 % (ref 0–2)
BASOPHILS ABSOLUTE: <0.03 K/UL (ref 0–0.2)
BUN BLDV-MCNC: 8 MG/DL (ref 6–20)
CALCIUM SERPL-MCNC: 8.3 MG/DL (ref 8.6–10.4)
CHLORIDE BLD-SCNC: 108 MMOL/L (ref 98–107)
CO2: 24 MMOL/L (ref 20–31)
CREAT SERPL-MCNC: 0.6 MG/DL (ref 0.5–0.9)
EOSINOPHILS RELATIVE PERCENT: 3 % (ref 1–4)
GFR AFRICAN AMERICAN: >60 ML/MIN
GFR NON-AFRICAN AMERICAN: >60 ML/MIN
GFR SERPL CREATININE-BSD FRML MDRD: ABNORMAL ML/MIN/{1.73_M2}
GLUCOSE BLD-MCNC: 87 MG/DL (ref 70–99)
HCT VFR BLD CALC: 33.1 % (ref 36.3–47.1)
HEMOGLOBIN: 11.1 G/DL (ref 11.9–15.1)
IMMATURE GRANULOCYTES: 0 %
LYMPHOCYTES # BLD: 44 % (ref 24–43)
MCH RBC QN AUTO: 29.8 PG (ref 25.2–33.5)
MCHC RBC AUTO-ENTMCNC: 33.5 G/DL (ref 28.4–34.8)
MCV RBC AUTO: 88.7 FL (ref 82.6–102.9)
MONOCYTES # BLD: 9 % (ref 3–12)
NRBC AUTOMATED: 0 PER 100 WBC
PDW BLD-RTO: 13.9 % (ref 11.8–14.4)
PLATELET # BLD: ABNORMAL K/UL (ref 138–453)
PLATELET, FLUORESCENCE: 145 K/UL (ref 138–453)
PLATELET, IMMATURE FRACTION: 4.6 % (ref 1.1–10.3)
POTASSIUM SERPL-SCNC: 3.9 MMOL/L (ref 3.7–5.3)
RBC # BLD: 3.73 M/UL (ref 3.95–5.11)
SEG NEUTROPHILS: 44 % (ref 36–65)
SEGMENTED NEUTROPHILS ABSOLUTE COUNT: 1.7 K/UL (ref 1.5–8.1)
SODIUM BLD-SCNC: 140 MMOL/L (ref 135–144)
WBC # BLD: 3.9 K/UL (ref 3.5–11.3)

## 2022-08-23 PROCEDURE — 94761 N-INVAS EAR/PLS OXIMETRY MLT: CPT

## 2022-08-23 PROCEDURE — 2580000003 HC RX 258: Performed by: STUDENT IN AN ORGANIZED HEALTH CARE EDUCATION/TRAINING PROGRAM

## 2022-08-23 PROCEDURE — 99232 SBSQ HOSP IP/OBS MODERATE 35: CPT | Performed by: INTERNAL MEDICINE

## 2022-08-23 PROCEDURE — 6370000000 HC RX 637 (ALT 250 FOR IP): Performed by: INTERNAL MEDICINE

## 2022-08-23 PROCEDURE — 6360000002 HC RX W HCPCS: Performed by: STUDENT IN AN ORGANIZED HEALTH CARE EDUCATION/TRAINING PROGRAM

## 2022-08-23 PROCEDURE — 80048 BASIC METABOLIC PNL TOTAL CA: CPT

## 2022-08-23 PROCEDURE — 6370000000 HC RX 637 (ALT 250 FOR IP)

## 2022-08-23 PROCEDURE — 85025 COMPLETE CBC W/AUTO DIFF WBC: CPT

## 2022-08-23 PROCEDURE — 6370000000 HC RX 637 (ALT 250 FOR IP): Performed by: STUDENT IN AN ORGANIZED HEALTH CARE EDUCATION/TRAINING PROGRAM

## 2022-08-23 PROCEDURE — 94640 AIRWAY INHALATION TREATMENT: CPT

## 2022-08-23 PROCEDURE — APPSS30 APP SPLIT SHARED TIME 16-30 MINUTES: Performed by: INTERNAL MEDICINE

## 2022-08-23 PROCEDURE — 99238 HOSP IP/OBS DSCHRG MGMT 30/<: CPT | Performed by: INTERNAL MEDICINE

## 2022-08-23 PROCEDURE — 85055 RETICULATED PLATELET ASSAY: CPT

## 2022-08-23 PROCEDURE — 36415 COLL VENOUS BLD VENIPUNCTURE: CPT

## 2022-08-23 RX ORDER — CHOLESTYRAMINE LIGHT 4 G/5.7G
4 POWDER, FOR SUSPENSION ORAL 2 TIMES DAILY
Qty: 60 PACKET | Refills: 3 | Status: SHIPPED | OUTPATIENT
Start: 2022-08-23 | End: 2022-09-15

## 2022-08-23 RX ORDER — DEXTROSE AND SODIUM CHLORIDE 5; .45 G/100ML; G/100ML
INJECTION, SOLUTION INTRAVENOUS CONTINUOUS
Status: DISCONTINUED | OUTPATIENT
Start: 2022-08-23 | End: 2022-08-23

## 2022-08-23 RX ADMIN — BUDESONIDE AND FORMOTEROL FUMARATE DIHYDRATE 2 PUFF: 160; 4.5 AEROSOL RESPIRATORY (INHALATION) at 10:08

## 2022-08-23 RX ADMIN — GUAIFENESIN 600 MG: 600 TABLET, EXTENDED RELEASE ORAL at 09:39

## 2022-08-23 RX ADMIN — CHOLESTYRAMINE 4 G: 4 POWDER, FOR SUSPENSION ORAL at 09:39

## 2022-08-23 RX ADMIN — GABAPENTIN 800 MG: 400 CAPSULE ORAL at 09:39

## 2022-08-23 RX ADMIN — GABAPENTIN 800 MG: 400 CAPSULE ORAL at 14:23

## 2022-08-23 RX ADMIN — HYDROCODONE BITARTRATE AND ACETAMINOPHEN 1 TABLET: 5; 325 TABLET ORAL at 11:04

## 2022-08-23 RX ADMIN — PREGABALIN 300 MG: 100 CAPSULE ORAL at 09:39

## 2022-08-23 RX ADMIN — ENOXAPARIN SODIUM 40 MG: 100 INJECTION SUBCUTANEOUS at 10:04

## 2022-08-23 RX ADMIN — DEXTROSE AND SODIUM CHLORIDE: 5; 900 INJECTION, SOLUTION INTRAVENOUS at 00:06

## 2022-08-23 RX ADMIN — HYDROCODONE BITARTRATE AND ACETAMINOPHEN 1 TABLET: 5; 325 TABLET ORAL at 02:58

## 2022-08-23 ASSESSMENT — PAIN DESCRIPTION - ORIENTATION: ORIENTATION: LEFT

## 2022-08-23 ASSESSMENT — PAIN SCALES - GENERAL: PAINLEVEL_OUTOF10: 8

## 2022-08-23 ASSESSMENT — PAIN DESCRIPTION - DESCRIPTORS: DESCRIPTORS: ACHING;DISCOMFORT

## 2022-08-23 ASSESSMENT — PAIN DESCRIPTION - LOCATION: LOCATION: HIP

## 2022-08-23 NOTE — PLAN OF CARE
Marcellus Fabry requires a tub transfer bench due to weakness, and is physically incapable of utilizing regular facilities. Current body weight is Weight: 150 lb (68 kg).      Electronically signed by Vanda Barnes MD on 8/23/2022 at 1:19 PM

## 2022-08-23 NOTE — PROGRESS NOTES
[facility]  Internal Medicine Teaching Residency Program  Inpatient Daily Progress Note  ______________________________________________________________________________    Patient: Jeanne Griffin  YOB: 1965   XAO:[id number]    Acct: [de-identified]     Room: [address] [place] [address]  Admit date: 2022  Today's date: 22  Number of days in the hospital: 3    SUBJECTIVE   Admitting Diagnosis: COPD exacerbation ([address]  CC: Nausea/vomiting/diarrhea/SOB     Patient seen and examined bedside. Charts and labs reviewed. Hemodynamically stable. Afebrile. Saturating well on room air. States that her diarrhea is better and has not had a bowel movement since morning. Denies any shortness of breath, cough. BRIEF HISTORY     Jeanne Griffin is a 61 y.o. female who presents with 3 days of nausea, vomiting, diarrhea, and abdominal pain. She has a past medical history of COPD, hypertension, Crohn's disease, polysubstance abuse. She states this feels like her Crohn's flareups. She also has new productive cough with yellow sputum production. She reports she has felt febrile at home but had not taken her temperature. She states she has not had any blood in her stool or blood in her vomit. She denies any chest pain or shortness of breath, headaches, weakness, numbness and tingling. Patient is being admitted for new O2 requirement of nasal cannula 2 L and management for nausea vomiting and diarrhea. OBJECTIVE     Vital Signs:  /81   Pulse 68   Temp 98.2 °F (36.8 °C) (Oral)   Resp 17   Ht 5' 6.75\" (1.695 m)   Wt 150 lb (68 kg)   SpO2 97%   BMI 23.67 kg/m²     Temp (24hrs), Av °F (36.7 °C), Min:97.7 °F (36.5 °C), Max:98.2 °F (36.8 °C)    In: 4453.8   Out: -     Physical Exam  Constitutional:       General: She is not in acute distress. Patient state she is able to eat but the food goes right through her. Appearance: She is normal weight.  She is not toxic-appearing. Comments: Appears older than stated age   HENT:      Head: Normocephalic and atraumatic. Nose: Nose normal.      Mouth/Throat:      Mouth: Mucous membranes are moist.      Pharynx: Oropharynx is clear. Eyes:      Extraocular Movements: Extraocular movements intact. Conjunctiva/sclera: Conjunctivae normal.      Pupils: Pupils are equal, round, and reactive to light. Cardiovascular:      Rate and Rhythm: Normal rate and regular rhythm. Pulses: Normal pulses. Heart sounds: Normal heart sounds. Pulmonary:      Effort: Pulmonary effort is normal.      Breath sounds: Normal breath sounds. No wheezing or rhonchi. Abdominal:      General: Bowel sounds are normal.      Palpations: Abdomen is soft. No abdominal tenderness noted today. General: Normal range of motion. Cervical back: Normal range of motion. Skin:     General: Skin is warm and dry. Neurological:      General: No focal deficit present. Mental Status: She is alert.           Medications:  Scheduled Medications:    cholestyramine light  4 g Oral BID    gabapentin  800 mg Oral TID    traZODone  300 mg Oral Nightly    guaiFENesin  600 mg Oral BID    pregabalin  300 mg Oral BID    budesonide-formoterol  2 puff Inhalation BID    sodium chloride flush  5-40 mL IntraVENous 2 times per day    enoxaparin  40 mg SubCUTAneous Daily     Continuous Infusions:    sodium chloride       PRN Medicationsloperamide, 2 mg, 4x Daily PRN  HYDROcodone-acetaminophen, 1 tablet, Q8H PRN  albuterol sulfate HFA, 2 puff, Q6H PRN  sodium chloride flush, 5-40 mL, PRN  sodium chloride, , PRN  ondansetron, 4 mg, Q8H PRN   Or  ondansetron, 4 mg, Q6H PRN  polyethylene glycol, 17 g, Daily PRN  acetaminophen, 650 mg, Q6H PRN   Or  acetaminophen, 650 mg, Q6H PRN      Diagnostic Labs:  CBC:   Recent Labs     08/21/22  0938 08/22/22  0659 08/23/22  0416   WBC 6.8 5.5 3.9   RBC 4.66 4.05 3.73*   HGB 13.3 11.3* 11.1*   HCT 41.2 35.6* 33.1*   MCV 88.4 87.9 88.7   RDW 13.7 13.8 13.9    162 See Reflexed IPF Result       BMP:   Recent Labs     08/21/22  0938 08/22/22  0659 08/23/22  0416    141 140   K 3.5* 3.5* 3.9    108* 108*   CO2 24 23 24   BUN 15 10 8   CREATININE 0.69 0.61 0.60       BNP: No results for input(s): BNP in the last 72 hours. PT/INR: No results for input(s): PROTIME, INR in the last 72 hours. APTT: No results for input(s): APTT in the last 72 hours. CARDIAC ENZYMES: No results for input(s): CKMB, CKMBINDEX, TROPONINI in the last 72 hours. Invalid input(s): CKTOTAL;3  FASTING LIPID PANEL:No results found for: CHOL, HDL, TRIG  LIVER PROFILE: No results for input(s): AST, ALT, ALB, BILIDIR, BILITOT, ALKPHOS in the last 72 hours. MICROBIOLOGY:   Lab Results   Component Value Date/Time    CULTURE NO GROWTH 5 DAYS 03/25/2022 11:39 AM       Imaging:    CT ABDOMEN PELVIS W IV CONTRAST Additional Contrast? None    Result Date: 8/20/2022  1. Mild small bowel distension in the mid abdomen without inflammatory change or focal transition to suggest obstruction. This may represent regional ileus. 2.  Findings compatible with diarrheal process. XR CHEST PORTABLE    Result Date: 8/20/2022  1. Faint increased opacity over the lateral mid to lower left chest is probably related to overlying soft tissue density though pulmonary infiltrate is a consideration. Consider follow-up full inspiration PA and lateral chest better characterization. 2. Pulmonary sequela typical of that seen with smoking, including COPD; correlate with clinical history. 3. Calcific atherosclerotic disease aorta. ASSESSMENT & PLAN     Assessment and Plan:    Principal Problem:    COPD with acute exacerbation ([address]  Active Problems:    Hypokalemia    Crohn disease (HCC)    Chronic diarrhea    Nausea vomiting and diarrhea  Resolved Problems:    * No resolved hospital problems.  *    IMPRESSION  This is a 61 y.o. female who presented with vomiting, abdominal pain, new O2 requirement and found to have COPD exacerbation. Patient admitted to inpatient status MedSurg because new oxygen requirement and treatment for diarrhea nausea vomiting. Principal Problem:    COPD exacerbation ([address] -resolved  - No longer requiring nasal cannula. - DuoNeb treatments  - Symbicort  - Proventil  - Mucinex 600 mg oral twice daily  - Respiratory panel with COVID-19 -negative  - Prednisone 60 mg given once     Hypokalemia -resolved    Diarrhea/Crohn's exacerbation -improving  - GI consulted follow-up recs. Started on cholestyramine  - GI panel, negative  - Hemoglobin 11.1    Nausea/vomiting  - Zofran  - Pepcid 20 mg given once  - Bentyl 20 mg given once    Peripheral neuropathy  -Continued on home dose of Lyrica and Neurontin      DVT ppx: Lovenox  GI ppx: Protonix     PT/OT/SW: Consulted  Discharge Planning: Transition plan to go home      Riri Medina MD  Internal Medicine Resident, PGY-3  [facility];  Nhan Duran  4/46/5565,4:46 AM

## 2022-08-23 NOTE — PROGRESS NOTES
[facility] Gastroenterology Progress Note    Brendan Valerio is a 61 y.o. female patient. Hospitalization Day:3      Chief consult reason: Chronic diarrhea    Subjective: Patient seen and examined. Patient cheerful this morning. Reports having a solid bowel movement after starting on cholestyramine, first 1 in many years. Patient reports taking just 1 dose of Imodium yesterday,      Objective:   VITALS:  BP (!) 142/74   Pulse 63   Temp 98.1 °F (36.7 °C) (Oral)   Resp 16   Ht 5' 6.75\" (1.695 m)   Wt 150 lb (68 kg)   SpO2 97%   BMI 23.67 kg/m²   TEMPERATURE:  Current - Temp: 98.1 °F (36.7 °C);  Max - Temp  Av °F (36.7 °C)  Min: 97.7 °F (36.5 °C)  Max: 98.2 °F (36.8 °C)    Physical Assessment:  General appearance:  alert, cooperative and no distress  Mental Status:  oriented to person, place and time and normal affect  Lungs:  clear to auscultation bilaterally, normal effort  Heart:  regular rate and rhythm, no murmur  Abdomen:  soft, nontender, nondistended, normal bowel sounds, no masses  Extremities:  no edema, no redness, No clubbing  Skin:  warm, dry, no gross lesions or rashes    CURRENT MEDICATIONS:  Scheduled Meds:   cholestyramine light  4 g Oral BID    gabapentin  800 mg Oral TID    traZODone  300 mg Oral Nightly    guaiFENesin  600 mg Oral BID    pregabalin  300 mg Oral BID    budesonide-formoterol  2 puff Inhalation BID    sodium chloride flush  5-40 mL IntraVENous 2 times per day    enoxaparin  40 mg SubCUTAneous Daily     Continuous Infusions:   sodium chloride       PRN Meds:loperamide, HYDROcodone-acetaminophen, albuterol sulfate HFA, sodium chloride flush, sodium chloride, ondansetron **OR** ondansetron, polyethylene glycol, acetaminophen **OR** acetaminophen      Data Review:  LABS and IMAGING:     CBC  Recent Labs     22  1244 22  0938 22  0659 22  0416   WBC 6.4 6.8 5.5 3.9   HGB 15.9* 13.3 11.3* 11.1*   HCT 45.5 41.2 35.6* 33.1*   MCV 83.0 88.4 87.9 88.7   MCHC 34.9* 32.3 31.7 33.5   RDW 13.6 13.7 13.8 13.9    202 162 See Reflexed IPF Result       Immature PLTs   Lab Results   Component Value Date/Time    PLTFLUORE 145 08/23/2022 04:16 AM       ANEMIA STUDIES  No results for input(s): LABIRON, TIBC, IRON, FERRITIN, [id number], FOLATE, OCCULTBLD in the last 72 hours. BMP  Recent Labs     08/21/22  0938 08/22/22  0659 08/23/22  0416    141 140   K 3.5* 3.5* 3.9    108* 108*   CO2 24 23 24   BUN 15 10 8   CREATININE 0.69 0.61 0.60   GLUCOSE 103* 91 87   CALCIUM 8.7 8.5* 8.3*       LFTS  No results for input(s): ALKPHOS, ALT, AST, BILITOT, BILIDIR, LABALBU in the last 72 hours. AMYLASE/LIPASE/AMMONIA  No results for input(s): AMYLASE, LIPASE, AMMONIA in the last 72 hours. Acute Hepatitis Panel   Lab Results   Component Value Date/Time    HEPBSAG NONREACTIVE 09/18/2021 02:15 PM    HEPCAB NONREACTIVE 09/18/2021 02:15 PM    HEPBIGM NONREACTIVE 09/18/2021 02:15 PM    HEPAIGM NONREACTIVE 09/18/2021 02:15 PM       HCV Genotype   No results found for: HEPATITISCGENOTYPE    HCV Quantitative   No results found for: HCVQNT    LIVER WORK UP:    AFP  No results found for: AFP    Alpha 1 antitrypsin   No results found for: A1A    Anti - Liver/Kidney Ab  No results found for: LIVER-KIDNEYMICROSOMALAB    GILBERT  No results found for: GILBERT    AMA  No results found for: MITOAB    ASMA  No results found for: SMOOTHMUSCAB    Ceruloplasmin  No results found for: CERULOPLSM    Celiac panel  No results found for: TISSTRNTIIGG, TTGIGA, IGA    IgG  No results found for: IGG    IgM  No results found for: IGM    GGT   No results found for: LABGGT    PT/INR  No results for input(s): PROTIME, INR in the last 72 hours.     Cancer Markers:  CEA:  No results found for: CEA  Ca 125:  No results found for:   Ca 19-9:   No results found for:   AFP: No results found for: AFP    Lactic acid:  Recent Labs     08/21/22  0938   LACTACIDWB 1.4         Radiology Review:    CT ABDOMEN PELVIS W IV CONTRAST Additional Contrast? None    Result Date: 8/20/2022  EXAMINATION: CT OF THE ABDOMEN AND PELVIS WITH CONTRAST 8/20/2022 2:12 pm TECHNIQUE: CT of the abdomen and pelvis was performed with the administration of intravenous contrast. Multiplanar reformatted images are provided for review. Automated exposure control, iterative reconstruction, and/or weight based adjustment of the mA/kV was utilized to reduce the radiation dose to as low as reasonably achievable. COMPARISON: 03/22/2022 HISTORY: ORDERING SYSTEM PROVIDED HISTORY: abdominal pain TECHNOLOGIST PROVIDED HISTORY: abdominal pain Decision Support Exception - unselect if not a suspected or confirmed emergency medical condition->Emergency Medical Condition (MA) Reason for Exam: abd pain FINDINGS: Lower Chest: No acute findings. Organs: The gallbladder is surgically absent. Mild prominence of the biliary tree is noted, suggestive of reservoir effect. The liver, pancreas, spleen, kidneys, and adrenals reveal no acute findings. GI/Bowel: Small bowel anastomosis in the mid abdomen, which remains mildly patulous. Nearby mildly prominent loops of small bowel with air-fluid levels are noted without transition point to suggest obstruction. Small amount of liquid stool in the colon. Normal appendix. Pelvis: No acute findings. Peritoneum/Retroperitoneum: No free air or free fluid. The aorta is normal in caliber. The visceral branches are patent. No lymphadenopathy. Bones/Soft Tissues: No abnormality identified. Small fat containing umbilical hernia. *Unless otherwise specified, incidental findings do not require dedicated imaging follow-up. 1.  Mild small bowel distension in the mid abdomen without inflammatory change or focal transition to suggest obstruction. This may represent regional ileus. 2.  Findings compatible with diarrheal process.      XR CHEST PORTABLE    Result Date: 8/20/2022  EXAMINATION: ONE XRAY VIEW gastroenterologist of her choice-plans to follow-up with gastroenterologist at [facility] to be discharged from GI perspective- Discussed with Dr Shima German      Time spent reviewing chart, seeing patient, and discussing with attending: Around 25 minutes    This plan was formulated in collaboration with Dr. Madelyn Bales  Please feel free to contact me with any questions or concerns. Thank you for allowing me to participate in the care of your patient. Yudy Mo, APRN - CNP on 8/23/2022 at 10:29 AM   [facility]    Please note that this note was generated using a voice recognition dictation software. Although every effort was made to ensure the accuracy of this automated transcription, some errors in transcription may have occurred.

## 2022-08-23 NOTE — CARE COORDINATION
Transitional Planning:  PS sent for order for DME, tub transfer bench and supporting F2F note. 14:30  Above faxed to [facility]. Asked for delivery to pt's home tomorrow. Pt aware.

## 2022-08-23 NOTE — DISCHARGE INSTRUCTIONS
Start taking cholestyramine twice daily  Follow up with PCP  Follow up with GI  Keep taking your medications regularly  If symptoms persist or worsen would recommend coming back to the ER

## 2022-08-23 NOTE — PLAN OF CARE
Problem: Pain  Goal: Verbalizes/displays adequate comfort level or baseline comfort level  8/23/2022 1402 by Eagle Peterson RN  Outcome: Completed  8/23/2022 0339 by Dave King RN  Outcome: Progressing     Problem: ABCDS Injury Assessment  Goal: Absence of physical injury  8/23/2022 1402 by Eagle Peterson RN  Outcome: Completed  8/23/2022 0339 by Dave King RN  Outcome: Progressing     Problem: Safety - Adult  Goal: Free from fall injury  8/23/2022 1402 by Eagle Peterson RN  Outcome: Completed  8/23/2022 0339 by Dave King RN  Outcome: Progressing

## 2022-08-25 LAB — CALPROTECTIN, FECAL: 41 UG/G

## 2022-08-26 NOTE — DISCHARGE SUMMARY
[facility]     Department of Internal Medicine - Staff Internal Medicine Teaching Service    INPATIENT DISCHARGE SUMMARY      Patient Identification:  Odalis Cannon is a 61 y.o. female. :  1965  MRN: [id number]     Acct: [de-identified]   PCP: Liz Madrigal MD  Admit Date:  2022  Discharge date and time: 2022  4:42 PM   Attending Provider: No att. providers found                                     [id number] Problem Lists:  Principal Problem:    COPD with acute exacerbation ([facility] [address]  Active Problems:    Hypokalemia    Crohn disease ([facility] [address]    Chronic diarrhea    Nausea vomiting and diarrhea  Resolved Problems:    * No resolved hospital problems. *      HOSPITAL STAY     Brief Inpatient course:   Admitted for COPD exacerbation: Odalis Cannon is a 61 y.o. female who presents with 3 days of nausea, vomiting, diarrhea, and abdominal pain. She has a past medical history of COPD, hypertension, Crohn's disease, polysubstance abuse. She states this feels like her Crohn's flareups. She also has SOB and new productive cough with yellow sputum production. She reports she has felt febrile at home but had not taken her temperature. She states she has not had any blood in her stool or blood in her vomit. She denies any chest pain, headaches, weakness, numbness and tingling. Patient is being admitted for new O2 requirement of nasal cannula 2 L and management for nausea vomiting and diarrhea.          Procedures/ Significant Interventions:    None     Consults:     Consults:     Final Specialist Recommendations/Findings:   IP CONSULT TO INTERNAL MEDICINE  IP CONSULT TO CASE MANAGEMENT  IP CONSULT TO GI      Any Hospital Acquired Infections: none    Discharge Functional Status:  stable    DISCHARGE PLAN     Disposition: home    Patient Instructions:   Discharge Medication List as of 2022  2:55 PM        START taking these medications Details   cholestyramine light 4 g packet Take 1 packet by mouth 2 times daily, Disp-60 packet, R-3Normal           CONTINUE these medications which have NOT CHANGED    Details   traZODone (DESYREL) 150 MG tablet Take 300 mg by mouth nightlyHistorical Med      HYDROcodone-acetaminophen (NORCO) 5-325 MG per tablet TAKE 1 TABLET BY MOUTH EVERY 8 HOURS AS NEEDED FOR PAIN FOR UP TO 14 DAYS., Disp-42 tablet, R-0Normal      Handicap Placard [facility] Starting Tue 5/17/2022, Disp-1 each, R-0, PrintDuration 5 years      Pregabalin (LYRICA PO) Take 300 mg by mouth 2 times dailyHistorical Med      ibuprofen (IBU) 400 MG tablet Take 1 tablet by mouth every 6 hours as needed for Pain, Disp-12 tablet, R-0Print      albuterol sulfate HFA (PROVENTIL;VENTOLIN;PROAIR) 108 (90 Base) MCG/ACT inhaler Inhale 2 puffs into the lungs every 6 hours as needed for WheezingHistorical Med      diphenhydrAMINE (BENADRYL) 25 MG tablet Take 25 mg by mouth nightly as neededHistorical Med      topiramate (TOPAMAX) 100 MG tablet Take 100 mg by mouth as neededHistorical Med      SUMAtriptan (IMITREX) 100 MG tablet Take 100 mg by mouth once as needed for MigraineHistorical Med      gabapentin (NEURONTIN) 400 MG capsule Take 800 mg by mouth 3 times daily. Historical Med      budesonide-formoterol (SYMBICORT) 160-4.5 MCG/ACT AERO Inhale 2 puffs into the lungs 2 times daily, Disp-10.2 g, R-3Normal      lisinopril (PRINIVIL;ZESTRIL) 5 MG tablet Take 1 tablet by mouth daily, Disp-30 tablet, R-3Normal      acetaminophen (TYLENOL) 500 MG tablet Take 2 tablets by mouth 3 times daily, Disp-15 tablet, R-1Print      Polyvinyl Alcohol-Povidone (REFRESH OP) Place 1 drop into both eyes 3 times dailyHistorical Med      lidocaine (LIDODERM) 5 % Place 1 patch onto the skin daily as needed for Pain 12 hours on, 12 hours off.  Historical Med           STOP taking these medications       nicotine (NICODERM CQ) 21 MG/24HR Comments:   Reason for Stopping:               Activity:

## 2022-08-29 DIAGNOSIS — M16.32 OSTEOARTHRITIS RESULTING FROM LEFT HIP DYSPLASIA: ICD-10-CM

## 2022-08-29 RX ORDER — HYDROCODONE BITARTRATE AND ACETAMINOPHEN 5; 325 MG/1; MG/1
1 TABLET ORAL EVERY 8 HOURS PRN
Qty: 42 TABLET | Refills: 0 | Status: SHIPPED | OUTPATIENT
Start: 2022-08-29 | End: 2022-09-09

## 2022-08-29 NOTE — TELEPHONE ENCOUNTER
Patient called in requesting a refill patient use [facility] pharmacy please advise thank you!

## 2022-08-30 ENCOUNTER — TELEPHONE (OUTPATIENT)
Dept: ORTHOPEDIC SURGERY | Age: 57
End: 2022-08-30

## 2022-08-30 NOTE — TELEPHONE ENCOUNTER
Patient called about pain medication refill.  Informed her that medication has been sent to her pharmacy

## 2022-09-09 DIAGNOSIS — M16.32 OSTEOARTHRITIS RESULTING FROM LEFT HIP DYSPLASIA: ICD-10-CM

## 2022-09-09 RX ORDER — HYDROCODONE BITARTRATE AND ACETAMINOPHEN 5; 325 MG/1; MG/1
1 TABLET ORAL EVERY 8 HOURS PRN
Qty: 42 TABLET | Refills: 0 | Status: SHIPPED | OUTPATIENT
Start: 2022-09-09 | End: 2022-09-19

## 2022-09-09 NOTE — TELEPHONE ENCOUNTER
Patient notified of medication refill. She stated that she fell 2 weeks ago and her PCP has her going for MRIs, CT scan of her head and back on 9/20 and then she should be cleared for surgery.

## 2022-09-15 ENCOUNTER — APPOINTMENT (OUTPATIENT)
Dept: GENERAL RADIOLOGY | Age: 57
DRG: 140 | End: 2022-09-15
Payer: MEDICARE

## 2022-09-15 ENCOUNTER — HOSPITAL ENCOUNTER (INPATIENT)
Age: 57
LOS: 3 days | Discharge: HOME | DRG: 140 | End: 2022-09-18
Attending: STUDENT IN AN ORGANIZED HEALTH CARE EDUCATION/TRAINING PROGRAM | Admitting: INTERNAL MEDICINE
Payer: MEDICARE

## 2022-09-15 ENCOUNTER — TELEPHONE (OUTPATIENT)
Dept: OTHER | Facility: CLINIC | Age: 57
End: 2022-09-15

## 2022-09-15 DIAGNOSIS — J44.1 COPD EXACERBATION (HCC): Primary | ICD-10-CM

## 2022-09-15 DIAGNOSIS — J44.1 COPD WITH ACUTE EXACERBATION (HCC): ICD-10-CM

## 2022-09-15 LAB
ABSOLUTE EOS #: 0 K/UL (ref 0–0.4)
ABSOLUTE LYMPH #: 1.1 K/UL (ref 1–4.8)
ABSOLUTE MONO #: 0.4 K/UL (ref 0.1–1.3)
ALBUMIN SERPL-MCNC: 4.1 G/DL (ref 3.5–5.2)
ALP BLD-CCNC: 125 U/L (ref 35–104)
ALT SERPL-CCNC: 10 U/L (ref 5–33)
ANION GAP SERPL CALCULATED.3IONS-SCNC: 13 MMOL/L (ref 9–17)
AST SERPL-CCNC: 12 U/L
BASOPHILS # BLD: 1 % (ref 0–2)
BASOPHILS ABSOLUTE: 0 K/UL (ref 0–0.2)
BILIRUB SERPL-MCNC: 0.6 MG/DL (ref 0.3–1.2)
BUN BLDV-MCNC: 10 MG/DL (ref 6–20)
CALCIUM SERPL-MCNC: 9.5 MG/DL (ref 8.6–10.4)
CHLORIDE BLD-SCNC: 102 MMOL/L (ref 98–107)
CO2: 25 MMOL/L (ref 20–31)
CREAT SERPL-MCNC: 0.57 MG/DL (ref 0.5–0.9)
EOSINOPHILS RELATIVE PERCENT: 1 % (ref 0–4)
GFR AFRICAN AMERICAN: >60 ML/MIN
GFR NON-AFRICAN AMERICAN: >60 ML/MIN
GFR SERPL CREATININE-BSD FRML MDRD: ABNORMAL ML/MIN/{1.73_M2}
GLUCOSE BLD-MCNC: 111 MG/DL (ref 70–99)
HCT VFR BLD CALC: 40.5 % (ref 36–46)
HEMOGLOBIN: 13.6 G/DL (ref 12–16)
INFLUENZA A: NOT DETECTED
INFLUENZA B: NOT DETECTED
LYMPHOCYTES # BLD: 22 % (ref 24–44)
MAGNESIUM: 2 MG/DL (ref 1.6–2.6)
MCH RBC QN AUTO: 28.1 PG (ref 26–34)
MCHC RBC AUTO-ENTMCNC: 33.5 G/DL (ref 31–37)
MCV RBC AUTO: 83.6 FL (ref 80–100)
MONOCYTES # BLD: 9 % (ref 1–7)
PDW BLD-RTO: 14.4 % (ref 11.5–14.9)
PLATELET # BLD: 217 K/UL (ref 150–450)
PMV BLD AUTO: 8.9 FL (ref 6–12)
POTASSIUM SERPL-SCNC: 3.5 MMOL/L (ref 3.7–5.3)
RBC # BLD: 4.84 M/UL (ref 4–5.2)
SARS-COV-2 RNA, RT PCR: NOT DETECTED
SEG NEUTROPHILS: 67 % (ref 36–66)
SEGMENTED NEUTROPHILS ABSOLUTE COUNT: 3.5 K/UL (ref 1.3–9.1)
SODIUM BLD-SCNC: 140 MMOL/L (ref 135–144)
SOURCE: NORMAL
SPECIMEN DESCRIPTION: NORMAL
TOTAL PROTEIN: 6.9 G/DL (ref 6.4–8.3)
TROPONIN, HIGH SENSITIVITY: 9 NG/L (ref 0–14)
TROPONIN, HIGH SENSITIVITY: 9 NG/L (ref 0–14)
WBC # BLD: 5.1 K/UL (ref 3.5–11)

## 2022-09-15 PROCEDURE — 2060000000 HC ICU INTERMEDIATE R&B

## 2022-09-15 PROCEDURE — 85025 COMPLETE CBC W/AUTO DIFF WBC: CPT

## 2022-09-15 PROCEDURE — 96374 THER/PROPH/DIAG INJ IV PUSH: CPT

## 2022-09-15 PROCEDURE — 6370000000 HC RX 637 (ALT 250 FOR IP): Performed by: STUDENT IN AN ORGANIZED HEALTH CARE EDUCATION/TRAINING PROGRAM

## 2022-09-15 PROCEDURE — 36415 COLL VENOUS BLD VENIPUNCTURE: CPT

## 2022-09-15 PROCEDURE — 94640 AIRWAY INHALATION TREATMENT: CPT

## 2022-09-15 PROCEDURE — 2580000003 HC RX 258: Performed by: NURSE PRACTITIONER

## 2022-09-15 PROCEDURE — 94761 N-INVAS EAR/PLS OXIMETRY MLT: CPT

## 2022-09-15 PROCEDURE — 84484 ASSAY OF TROPONIN QUANT: CPT

## 2022-09-15 PROCEDURE — 87636 SARSCOV2 & INF A&B AMP PRB: CPT

## 2022-09-15 PROCEDURE — 80053 COMPREHEN METABOLIC PANEL: CPT

## 2022-09-15 PROCEDURE — 94664 DEMO&/EVAL PT USE INHALER: CPT

## 2022-09-15 PROCEDURE — 83735 ASSAY OF MAGNESIUM: CPT

## 2022-09-15 PROCEDURE — 6360000002 HC RX W HCPCS: Performed by: STUDENT IN AN ORGANIZED HEALTH CARE EDUCATION/TRAINING PROGRAM

## 2022-09-15 PROCEDURE — 99285 EMERGENCY DEPT VISIT HI MDM: CPT

## 2022-09-15 PROCEDURE — 71045 X-RAY EXAM CHEST 1 VIEW: CPT

## 2022-09-15 RX ORDER — DIPHENHYDRAMINE HCL 25 MG
25 TABLET ORAL NIGHTLY PRN
Status: DISCONTINUED | OUTPATIENT
Start: 2022-09-15 | End: 2022-09-18 | Stop reason: HOSPADM

## 2022-09-15 RX ORDER — SODIUM CHLORIDE 0.9 % (FLUSH) 0.9 %
5-40 SYRINGE (ML) INJECTION EVERY 12 HOURS SCHEDULED
Status: DISCONTINUED | OUTPATIENT
Start: 2022-09-15 | End: 2022-09-18 | Stop reason: HOSPADM

## 2022-09-15 RX ORDER — SODIUM CHLORIDE 9 MG/ML
INJECTION, SOLUTION INTRAVENOUS CONTINUOUS
Status: DISCONTINUED | OUTPATIENT
Start: 2022-09-15 | End: 2022-09-18 | Stop reason: HOSPADM

## 2022-09-15 RX ORDER — ENOXAPARIN SODIUM 100 MG/ML
40 INJECTION SUBCUTANEOUS DAILY
Status: DISCONTINUED | OUTPATIENT
Start: 2022-09-16 | End: 2022-09-18 | Stop reason: HOSPADM

## 2022-09-15 RX ORDER — ALBUTEROL SULFATE 2.5 MG/3ML
2.5 SOLUTION RESPIRATORY (INHALATION)
Status: DISCONTINUED | OUTPATIENT
Start: 2022-09-15 | End: 2022-09-18 | Stop reason: HOSPADM

## 2022-09-15 RX ORDER — ALBUTEROL SULFATE 90 UG/1
2 AEROSOL, METERED RESPIRATORY (INHALATION) EVERY 6 HOURS PRN
Status: DISCONTINUED | OUTPATIENT
Start: 2022-09-15 | End: 2022-09-18 | Stop reason: HOSPADM

## 2022-09-15 RX ORDER — LIDOCAINE 4 G/G
1 PATCH TOPICAL DAILY
Status: DISCONTINUED | OUTPATIENT
Start: 2022-09-16 | End: 2022-09-18 | Stop reason: HOSPADM

## 2022-09-15 RX ORDER — ACETAMINOPHEN 650 MG/1
650 SUPPOSITORY RECTAL EVERY 6 HOURS PRN
Status: DISCONTINUED | OUTPATIENT
Start: 2022-09-15 | End: 2022-09-18 | Stop reason: HOSPADM

## 2022-09-15 RX ORDER — BUDESONIDE AND FORMOTEROL FUMARATE DIHYDRATE 160; 4.5 UG/1; UG/1
2 AEROSOL RESPIRATORY (INHALATION) 2 TIMES DAILY
Status: DISCONTINUED | OUTPATIENT
Start: 2022-09-15 | End: 2022-09-18 | Stop reason: HOSPADM

## 2022-09-15 RX ORDER — HYDROCODONE BITARTRATE AND ACETAMINOPHEN 5; 325 MG/1; MG/1
1 TABLET ORAL EVERY 8 HOURS PRN
Status: DISCONTINUED | OUTPATIENT
Start: 2022-09-15 | End: 2022-09-18 | Stop reason: HOSPADM

## 2022-09-15 RX ORDER — ONDANSETRON 4 MG/1
4 TABLET, ORALLY DISINTEGRATING ORAL EVERY 8 HOURS PRN
Status: DISCONTINUED | OUTPATIENT
Start: 2022-09-15 | End: 2022-09-18 | Stop reason: HOSPADM

## 2022-09-15 RX ORDER — DEXAMETHASONE SODIUM PHOSPHATE 10 MG/ML
10 INJECTION, SOLUTION INTRAMUSCULAR; INTRAVENOUS ONCE
Status: COMPLETED | OUTPATIENT
Start: 2022-09-15 | End: 2022-09-15

## 2022-09-15 RX ORDER — IPRATROPIUM BROMIDE AND ALBUTEROL SULFATE 2.5; .5 MG/3ML; MG/3ML
1 SOLUTION RESPIRATORY (INHALATION)
Status: DISCONTINUED | OUTPATIENT
Start: 2022-09-16 | End: 2022-09-18 | Stop reason: HOSPADM

## 2022-09-15 RX ORDER — TRAZODONE HYDROCHLORIDE 150 MG/1
300 TABLET ORAL NIGHTLY
Status: DISCONTINUED | OUTPATIENT
Start: 2022-09-15 | End: 2022-09-18 | Stop reason: HOSPADM

## 2022-09-15 RX ORDER — LISINOPRIL 5 MG/1
5 TABLET ORAL DAILY
Status: DISCONTINUED | OUTPATIENT
Start: 2022-09-16 | End: 2022-09-18 | Stop reason: HOSPADM

## 2022-09-15 RX ORDER — SODIUM CHLORIDE 0.9 % (FLUSH) 0.9 %
5-40 SYRINGE (ML) INJECTION PRN
Status: DISCONTINUED | OUTPATIENT
Start: 2022-09-15 | End: 2022-09-18 | Stop reason: HOSPADM

## 2022-09-15 RX ORDER — ACETAMINOPHEN 325 MG/1
650 TABLET ORAL EVERY 6 HOURS PRN
Status: DISCONTINUED | OUTPATIENT
Start: 2022-09-15 | End: 2022-09-18 | Stop reason: HOSPADM

## 2022-09-15 RX ORDER — ALBUTEROL SULFATE 2.5 MG/3ML
2.5 SOLUTION RESPIRATORY (INHALATION)
Status: DISCONTINUED | OUTPATIENT
Start: 2022-09-16 | End: 2022-09-15 | Stop reason: SDUPTHER

## 2022-09-15 RX ORDER — ONDANSETRON 2 MG/ML
4 INJECTION INTRAMUSCULAR; INTRAVENOUS EVERY 6 HOURS PRN
Status: DISCONTINUED | OUTPATIENT
Start: 2022-09-15 | End: 2022-09-18 | Stop reason: HOSPADM

## 2022-09-15 RX ORDER — PREGABALIN 150 MG/1
300 CAPSULE ORAL 2 TIMES DAILY
Status: DISCONTINUED | OUTPATIENT
Start: 2022-09-16 | End: 2022-09-18 | Stop reason: HOSPADM

## 2022-09-15 RX ORDER — IPRATROPIUM BROMIDE AND ALBUTEROL SULFATE 2.5; .5 MG/3ML; MG/3ML
1 SOLUTION RESPIRATORY (INHALATION) EVERY 4 HOURS PRN
Status: DISCONTINUED | OUTPATIENT
Start: 2022-09-15 | End: 2022-09-18 | Stop reason: HOSPADM

## 2022-09-15 RX ORDER — POLYETHYLENE GLYCOL 3350 17 G/17G
17 POWDER, FOR SOLUTION ORAL DAILY PRN
Status: DISCONTINUED | OUTPATIENT
Start: 2022-09-15 | End: 2022-09-18 | Stop reason: HOSPADM

## 2022-09-15 RX ADMIN — DEXAMETHASONE SODIUM PHOSPHATE 10 MG: 10 INJECTION, SOLUTION INTRAMUSCULAR; INTRAVENOUS at 19:56

## 2022-09-15 RX ADMIN — SODIUM CHLORIDE: 9 INJECTION, SOLUTION INTRAVENOUS at 23:49

## 2022-09-15 RX ADMIN — IPRATROPIUM BROMIDE AND ALBUTEROL SULFATE 1 AMPULE: .5; 2.5 SOLUTION RESPIRATORY (INHALATION) at 20:57

## 2022-09-15 ASSESSMENT — ENCOUNTER SYMPTOMS
RHINORRHEA: 1
NAUSEA: 1
COUGH: 1
SHORTNESS OF BREATH: 1
VOMITING: 1
ABDOMINAL PAIN: 0
WHEEZING: 1
DIARRHEA: 1

## 2022-09-15 ASSESSMENT — PAIN - FUNCTIONAL ASSESSMENT: PAIN_FUNCTIONAL_ASSESSMENT: NONE - DENIES PAIN

## 2022-09-15 NOTE — ED PROVIDER NOTES
[address]  Emergency Department Encounter  Emergency Medicine Resident     Pt Salomón Turpin  MRN: [id number]  [place] 1965  Date of evaluation: 9/15/22  PCP:  Tereza Espinosa MD      [address] [place] [address]       Chief Complaint   Patient presents with    Shortness of Breath    Cough    Nausea       HISTORY OF PRESENT ILLNESS  (Location/Symptom, Timing/Onset, Context/Setting, Quality, Duration, Modifying Factors, Severity.)      Nancy Swann is a 61 y.o. female who presents with cough and increased sputum production, shortness of breath as well as nausea and vomiting for the past few days. Past medical history sniffing for SBO, hypertension, hyperlipidemia, hypothyroidism as well as COPD. Patient states she feels like a COPD flare to her and she had increased sputum duction with runny nose congestion myalgia arthralgias. Denies any hematemesis, medic easy, melena, diarrhea, dysuria, hematuria, one-sided leg swelling, hemoptysis. Denies any chest pain. PAST MEDICAL / SURGICAL / SOCIAL / FAMILY HISTORY      has a past medical history of Acid reflux, Anxiety, Arthritis, Asthma, Bipolar 1 disorder ([address] Bowel obstruction ([address] COPD (chronic obstructive pulmonary disease) ([address] Crohn disease ([address] Depression, Dry eye, Fibromyalgia, Gout, Headache, Hyperlipidemia, Hypertension, Hypothyroidism, Kidney stones, Muscle spasm, Neuropathy, Pain, Personal history of other medical treatment, Wears partial dentures, and Wellness examination. has a past surgical history that includes Tonsillectomy and adenoidectomy; Tubal ligation; Cholecystectomy; Bunionectomy (Left); Colonoscopy (04/30/2007); Colonoscopy (10/12/2017); Abdomen surgery; Upper gastrointestinal endoscopy (10/25/2021); Colonoscopy (10/25/2021); and Colonoscopy (10/25/2021).       Social History     Socioeconomic History    Marital status: Single     Spouse name: Not on file    Number of children: Not on file    Years of education: Not on file    Highest education level: Not on file   Occupational History    Not on file   Tobacco Use    Smoking status: Every Day     Packs/day: 0.50     Years: 37.00     Pack years: 18.50     Types: Cigarettes    Smokeless tobacco: Former     Types: Chew     Quit date: 9/3/2001   Vaping Use    Vaping Use: Former    Quit date: 9/3/2019    Substances: Nicotine   Substance and Sexual Activity    Alcohol use: Not Currently    Drug use: Yes     Types: Marijuana (Weed)     Comment: h/o of substance abuse but denies drug use    Sexual activity: Not on file   Other Topics Concern    Not on file   Social History Narrative    Not on file     Social Determinants of Health     Financial Resource Strain: Not on file   Food Insecurity: Not on file   Transportation Needs: Not on file   Physical Activity: Not on file   Stress: Not on file   Social Connections: Not on file   Intimate Partner Violence: Not on file   Housing Stability: Not on file       Family History   Problem Relation Age of Onset    Diabetes Father     Lung Cancer Father     Hypertension Mother     Cancer Mother     High Blood Pressure Mother     Crohn's Disease Brother     Heart Disease Brother        Allergies:  Azithromycin, Methylprednisolone, Penicillins, and Tape [adhesive tape]    Home Medications:  Prior to Admission medications    Medication Sig Start Date End Date Taking?  Authorizing Provider   HYDROcodone-acetaminophen (NORCO) 5-325 MG per tablet TAKE 1 TABLET BY MOUTH EVERY 8 HOURS AS NEEDED FOR PAIN FOR UP TO 14 DAYS. 9/9/22 9/23/22 Yes Lynn Mcneal MD   traZODone (DESYREL) 150 MG tablet Take 300 mg by mouth nightly   Yes Historical Provider, MD   Pregabalin (LYRICA PO) Take 300 mg by mouth 2 times daily   Yes Historical Provider, MD   albuterol sulfate HFA (PROVENTIL;VENTOLIN;PROAIR) 108 (90 Base) MCG/ACT inhaler Inhale 2 puffs into the lungs every 6 hours as needed for Wheezing   Yes Historical Provider, MD   diphenhydrAMINE (BENADRYL) 25 MG tablet Take 25 mg by mouth nightly as needed   Yes Historical Provider, MD   topiramate (TOPAMAX) 100 MG tablet Take 100 mg by mouth as needed   Yes Historical Provider, MD   SUMAtriptan (IMITREX) 100 MG tablet Take 100 mg by mouth once as needed for Migraine   Yes Historical Provider, MD   gabapentin (NEURONTIN) 400 MG capsule Take 800 mg by mouth 2 times daily. Yes Historical Provider, MD   lisinopril (PRINIVIL;ZESTRIL) 5 MG tablet Take 1 tablet by mouth daily 9/20/21  Yes Hong Patrick MD   acetaminophen (TYLENOL) 500 MG tablet Take 2 tablets by mouth 3 times daily  Patient taking differently: Take 1,000 mg by mouth 3 times daily as needed 7/17/21  Yes Marce Kim, DO   lidocaine (LIDODERM) 5 % Place 1 patch onto the skin daily as needed for Pain 12 hours on, 12 hours off. Yes Historical Provider, MD   Handicap Placard MISC by Does not apply route Duration 5 years 5/17/22   Caleb Gonzalez MD   ibuprofen (IBU) 400 MG tablet Take 1 tablet by mouth every 6 hours as needed for Pain 5/15/22 5/18/22  Adonis Castorena, DO   budesonide-formoterol [facility]) 160-4.5 MCG/ACT AERO Inhale 2 puffs into the lungs 2 times daily 9/19/21   Hong Patrick MD   nicotine (NICODERM CQ) 21 MG/24HR Place 1 patch onto the skin daily  Patient not taking: Reported on 8/20/2022 9/20/21 8/23/22  Hong Patrick MD   Polyvinyl Alcohol-Povidone (REFRESH OP) Place 1 drop into both eyes 3 times daily    Historical Provider, MD       REVIEW OF SYSTEMS    (2-9 systems for level 4, 10 or more for level 5)      Review of Systems   Constitutional:  Positive for fever. Negative for chills. HENT:  Positive for congestion and rhinorrhea. Eyes:  Negative for visual disturbance. Respiratory:  Positive for cough, shortness of breath and wheezing. Cardiovascular:  Negative for chest pain and leg swelling. Gastrointestinal:  Positive for diarrhea, nausea and vomiting. Negative for abdominal pain.    Genitourinary:  Negative for dysuria, hematuria, vaginal bleeding, vaginal discharge and vaginal pain. Skin:  Negative for rash and wound. Neurological:  Negative for weakness, numbness and headaches. PHYSICAL EXAM   (up to 7 for level 4, 8 or more for level 5)      INITIAL VITALS:   BP (!) 142/82   Pulse 86   Temp 98.3 °F (36.8 °C)   Resp 16   Ht 5' 6.5\" (1.689 m)   Wt 150 lb (68 kg)   SpO2 92%   BMI 23.85 kg/m²     Physical Exam  Constitutional:       General: She is not in acute distress. Appearance: She is not ill-appearing, toxic-appearing or diaphoretic. Cardiovascular:      Rate and Rhythm: Normal rate and regular rhythm. No extrasystoles are present. Pulses: No decreased pulses. Heart sounds: No murmur heard. No friction rub. No gallop. Pulmonary:      Breath sounds: Examination of the right-upper field reveals wheezing. Examination of the left-upper field reveals wheezing. Examination of the right-middle field reveals decreased breath sounds and wheezing. Examination of the left-middle field reveals wheezing. Examination of the right-lower field reveals decreased breath sounds and wheezing. Examination of the left-lower field reveals decreased breath sounds and wheezing. Decreased breath sounds and wheezing present. No rhonchi or rales. Chest:      Chest wall: No mass, deformity, tenderness, crepitus or edema. There is no dullness to percussion. Abdominal:      Palpations: There is no hepatomegaly, splenomegaly or mass. Tenderness: There is no abdominal tenderness. There is no guarding or rebound. Musculoskeletal:      Right lower leg: No tenderness. No edema. Left lower leg: No tenderness. No edema. Skin:     Capillary Refill: Capillary refill takes less than 2 seconds. Coloration: Skin is not cyanotic or pale. Findings: No ecchymosis, erythema or rash. Nails: There is no clubbing. Neurological:      Mental Status: She is oriented to person, place, and time. DIFFERENTIAL  DIAGNOSIS     PLAN (LABS / IMAGING / EKG):  Orders Placed This Encounter   Procedures    COVID-19 & Influenza Combo    XR CHEST PORTABLE    Troponin    CBC with Auto Differential    Comprehensive Metabolic Panel w/ Reflex to MG    Magnesium    Inpatient consult to Internal Medicine    Initiate ED RT Aerosol protocol    ADMIT TO INPATIENT       MEDICATIONS ORDERED:  Orders Placed This Encounter   Medications    dexamethasone (PF) (DECADRON) injection 10 mg    ipratropium-albuterol (DUONEB) nebulizer solution 1 ampule     Order Specific Question:   Initiate RT Bronchodilator Protocol     Answer:   Yes - ED protocol       DDX: COPD exacerbation, ACS, pneumonia, viral URI, electrolyte abnormality, pneumothorax, thoracic aortic dissection    DIAGNOSTIC RESULTS / EMERGENCY DEPARTMENT COURSE / MDM   LAB RESULTS:  Results for orders placed or performed during the hospital encounter of 09/15/22   COVID-19 & Influenza Combo    Specimen: Nasopharyngeal Swab   Result Value Ref Range    Specimen Description . NASOPHARYNGEAL SWAB     Source . NASOPHARYNGEAL SWAB     SARS-CoV-2 RNA, RT PCR Not Detected Not Detected    INFLUENZA A Not Detected Not Detected    INFLUENZA B Not Detected Not Detected   Troponin   Result Value Ref Range    Troponin, High Sensitivity 9 0 - 14 ng/L   Troponin   Result Value Ref Range    Troponin, High Sensitivity 9 0 - 14 ng/L   CBC with Auto Differential   Result Value Ref Range    WBC 5.1 3.5 - 11.0 k/uL    RBC 4.84 4.0 - 5.2 m/uL    Hemoglobin 13.6 12.0 - 16.0 g/dL    Hematocrit 40.5 36 - 46 %    MCV 83.6 80 - 100 fL    MCH 28.1 26 - 34 pg    MCHC 33.5 31 - 37 g/dL    RDW 14.4 11.5 - 14.9 %    Platelets 983 227 - 130 k/uL    MPV 8.9 6.0 - 12.0 fL    Seg Neutrophils 67 (H) 36 - 66 %    Lymphocytes 22 (L) 24 - 44 %    Monocytes 9 (H) 1 - 7 %    Eosinophils % 1 0 - 4 %    Basophils 1 0 - 2 %    Segs Absolute 3.50 1.3 - 9.1 k/uL    Absolute Lymph # 1.10 1.0 - 4.8 k/uL    Absolute Mono # 0. 40 0.1 - 1.3 k/uL    Absolute Eos # 0.00 0.0 - 0.4 k/uL    Basophils Absolute 0.00 0.0 - 0.2 k/uL   Comprehensive Metabolic Panel w/ Reflex to MG   Result Value Ref Range    Glucose 111 (H) 70 - 99 mg/dL    BUN 10 6 - 20 mg/dL    Creatinine 0.57 0.50 - 0.90 mg/dL    Calcium 9.5 8.6 - 10.4 mg/dL    Sodium 140 135 - 144 mmol/L    Potassium 3.5 (L) 3.7 - 5.3 mmol/L    Chloride 102 98 - 107 mmol/L    CO2 25 20 - 31 mmol/L    Anion Gap 13 9 - 17 mmol/L    Alkaline Phosphatase 125 (H) 35 - 104 U/L    ALT 10 5 - 33 U/L    AST 12 <32 U/L    Total Bilirubin 0.6 0.3 - 1.2 mg/dL    Total Protein 6.9 6.4 - 8.3 g/dL    Albumin 4.1 3.5 - 5.2 g/dL    GFR Non-African American >60 >60 mL/min    GFR African American >60 >60 mL/min    GFR Comment         Magnesium   Result Value Ref Range    Magnesium 2.0 1.6 - 2.6 mg/dL       IMPRESSION: Labs unremarkable    RADIOLOGY:  XR CHEST PORTABLE   Final Result   No acute process. EMERGENCY DEPARTMENT COURSE:  53-year-old female presenting with increased shortness of breath. States that this is consistent with her COPD. Laboratory testing grossly unremarkable. Patient given breathing treatment in emergency department with some improvement however patient still has increased work of breathing and saturating 90% to 89%. Patient having difficulty with ambulation. Does not have nebulizer treatment at home. ACS work-up grossly unremarkable. Chest x-ray shows no pneumonia. No suspicion for pulmonary embolism. Patient does not have any chest pain. States she has has wheezing. Patient admitted to internal medicine for further care. No notes of EC Admission Criteria type on file. PROCEDURES:  N/a    CONSULTS:  IP CONSULT TO INTERNAL MEDICINE    CRITICAL CARE:  N/a    FINAL IMPRESSION      1. COPD exacerbation ([facility] [address]          DISPOSITION / PLAN     DISPOSITION Admitted 09/15/2022 10:54:36 PM      PATIENT REFERRED TO:  No follow-up provider specified.     DISCHARGE MEDICATIONS:  New Prescriptions    No medications on file       Tanisha Sharma DO  Emergency Medicine Resident    (Please note that portions of thisnote were completed with a voice recognition program.  Efforts were made to edit the dictations but occasionally words are mis-transcribed.)        Giulia Encinas DO  Resident  09/1965

## 2022-09-15 NOTE — ED PROVIDER NOTES
EMERGENCY DEPARTMENT ENCOUNTER   ATTENDING ATTESTATION     Pt Name: Marcos Arciniega  MRN: [id number]  [place] 1965  Date of evaluation: 9/15/22       Marcos Arciniega is a 61 y.o. female who presents with Shortness of Breath, Cough, and Nausea    Congestion, cough, sob, n/v/diarrhea. Tight and wheezing. Cardiac workup and treatments. MDM:     Work-up is largely unremarkable but patient is having some work of breathing does not have a nebulizer machine at home and is borderline hypoxic at 89% at the lowest    We will admit for COPD exacerbation and hypoxic respiratory failure    Due to the immediate potential for life-threatening deterioration due to hypoxic respiratory failure , I spent 31 minutes providing critical care. This time is excluding time spent performing procedures. Vitals:   Vitals:    09/15/22 1917 09/15/22 2030 09/15/22 2057 09/15/22 2206   BP: (!) 140/92 (!) 145/88     Pulse: (!) 104 80 84    Resp: 18 16 16    Temp: 98.3 °F (36.8 °C)      SpO2: 96% 95% 92% (!) 89%   Weight: 150 lb (68 kg)      Height: 5' 6.5\" (1.689 m)            I personally saw and examined the patient. I have reviewed and agree with the resident's findings, including all diagnostic interpretations and treatment plan as written. I was present for the key portions of any procedures performed and the inclusive time noted for any critical care statement. The care is provided during an unprecedented national emergency due to the novel coronavirus, COVID 19.   Maritza Chong MD  Attending Emergency Physician           Maritza Chong MD  09/15/22 2207

## 2022-09-16 PROBLEM — G89.4 CHRONIC PAIN DISORDER: Status: ACTIVE | Noted: 2017-01-31

## 2022-09-16 LAB
ABSOLUTE EOS #: 0 K/UL (ref 0–0.4)
ABSOLUTE LYMPH #: 0.5 K/UL (ref 1–4.8)
ABSOLUTE MONO #: 0 K/UL (ref 0.1–1.3)
ANION GAP SERPL CALCULATED.3IONS-SCNC: 14 MMOL/L (ref 9–17)
BASOPHILS # BLD: 0 % (ref 0–2)
BASOPHILS ABSOLUTE: 0 K/UL (ref 0–0.2)
BUN BLDV-MCNC: 14 MG/DL (ref 6–20)
C DIFF AG + TOXIN: NEGATIVE
CALCIUM SERPL-MCNC: 9.3 MG/DL (ref 8.6–10.4)
CHLORIDE BLD-SCNC: 104 MMOL/L (ref 98–107)
CO2: 23 MMOL/L (ref 20–31)
CREAT SERPL-MCNC: 0.55 MG/DL (ref 0.5–0.9)
EOSINOPHILS RELATIVE PERCENT: 0 % (ref 0–4)
GFR AFRICAN AMERICAN: >60 ML/MIN
GFR NON-AFRICAN AMERICAN: >60 ML/MIN
GFR SERPL CREATININE-BSD FRML MDRD: ABNORMAL ML/MIN/{1.73_M2}
GLUCOSE BLD-MCNC: 145 MG/DL (ref 70–99)
HCT VFR BLD CALC: 40.7 % (ref 36–46)
HEMOGLOBIN: 13.8 G/DL (ref 12–16)
LYMPHOCYTES # BLD: 12 % (ref 24–44)
MCH RBC QN AUTO: 28.6 PG (ref 26–34)
MCHC RBC AUTO-ENTMCNC: 33.9 G/DL (ref 31–37)
MCV RBC AUTO: 84.5 FL (ref 80–100)
MONOCYTES # BLD: 1 % (ref 1–7)
PDW BLD-RTO: 14.2 % (ref 11.5–14.9)
PLATELET # BLD: 203 K/UL (ref 150–450)
PMV BLD AUTO: 9 FL (ref 6–12)
POTASSIUM SERPL-SCNC: 3.8 MMOL/L (ref 3.7–5.3)
RBC # BLD: 4.82 M/UL (ref 4–5.2)
SEG NEUTROPHILS: 87 % (ref 36–66)
SEGMENTED NEUTROPHILS ABSOLUTE COUNT: 3.7 K/UL (ref 1.3–9.1)
SODIUM BLD-SCNC: 141 MMOL/L (ref 135–144)
SPECIMEN DESCRIPTION: NORMAL
WBC # BLD: 4.2 K/UL (ref 3.5–11)

## 2022-09-16 PROCEDURE — 94761 N-INVAS EAR/PLS OXIMETRY MLT: CPT

## 2022-09-16 PROCEDURE — 6370000000 HC RX 637 (ALT 250 FOR IP): Performed by: NURSE PRACTITIONER

## 2022-09-16 PROCEDURE — 85025 COMPLETE CBC W/AUTO DIFF WBC: CPT

## 2022-09-16 PROCEDURE — 80048 BASIC METABOLIC PNL TOTAL CA: CPT

## 2022-09-16 PROCEDURE — 2060000000 HC ICU INTERMEDIATE R&B

## 2022-09-16 PROCEDURE — 2580000003 HC RX 258: Performed by: NURSE PRACTITIONER

## 2022-09-16 PROCEDURE — 94640 AIRWAY INHALATION TREATMENT: CPT

## 2022-09-16 PROCEDURE — 6360000002 HC RX W HCPCS: Performed by: NURSE PRACTITIONER

## 2022-09-16 PROCEDURE — 87324 CLOSTRIDIUM AG IA: CPT

## 2022-09-16 PROCEDURE — 36415 COLL VENOUS BLD VENIPUNCTURE: CPT

## 2022-09-16 PROCEDURE — 99223 1ST HOSP IP/OBS HIGH 75: CPT | Performed by: INTERNAL MEDICINE

## 2022-09-16 PROCEDURE — 2700000000 HC OXYGEN THERAPY PER DAY

## 2022-09-16 PROCEDURE — 87449 NOS EACH ORGANISM AG IA: CPT

## 2022-09-16 RX ORDER — SODIUM CHLORIDE 9 MG/ML
INJECTION, SOLUTION INTRAVENOUS PRN
Status: DISCONTINUED | OUTPATIENT
Start: 2022-09-16 | End: 2022-09-18 | Stop reason: HOSPADM

## 2022-09-16 RX ORDER — DEXAMETHASONE SODIUM PHOSPHATE 4 MG/ML
4 INJECTION, SOLUTION INTRA-ARTICULAR; INTRALESIONAL; INTRAMUSCULAR; INTRAVENOUS; SOFT TISSUE EVERY 6 HOURS
Status: COMPLETED | OUTPATIENT
Start: 2022-09-16 | End: 2022-09-17

## 2022-09-16 RX ADMIN — IPRATROPIUM BROMIDE AND ALBUTEROL SULFATE 1 AMPULE: 2.5; .5 SOLUTION RESPIRATORY (INHALATION) at 07:09

## 2022-09-16 RX ADMIN — BUDESONIDE AND FORMOTEROL FUMARATE DIHYDRATE 2 PUFF: 160; 4.5 AEROSOL RESPIRATORY (INHALATION) at 20:22

## 2022-09-16 RX ADMIN — TRAZODONE HYDROCHLORIDE 300 MG: 150 TABLET ORAL at 21:50

## 2022-09-16 RX ADMIN — SODIUM CHLORIDE, PRESERVATIVE FREE 10 ML: 5 INJECTION INTRAVENOUS at 21:52

## 2022-09-16 RX ADMIN — IPRATROPIUM BROMIDE AND ALBUTEROL SULFATE 1 AMPULE: 2.5; .5 SOLUTION RESPIRATORY (INHALATION) at 20:22

## 2022-09-16 RX ADMIN — BUDESONIDE AND FORMOTEROL FUMARATE DIHYDRATE 2 PUFF: 160; 4.5 AEROSOL RESPIRATORY (INHALATION) at 07:19

## 2022-09-16 RX ADMIN — GABAPENTIN 800 MG: 100 CAPSULE ORAL at 21:49

## 2022-09-16 RX ADMIN — PREGABALIN 300 MG: 150 CAPSULE ORAL at 12:37

## 2022-09-16 RX ADMIN — SODIUM CHLORIDE, PRESERVATIVE FREE 10 ML: 5 INJECTION INTRAVENOUS at 09:30

## 2022-09-16 RX ADMIN — DEXAMETHASONE SODIUM PHOSPHATE 4 MG: 4 INJECTION, SOLUTION INTRAMUSCULAR; INTRAVENOUS at 07:31

## 2022-09-16 RX ADMIN — DEXAMETHASONE SODIUM PHOSPHATE 4 MG: 4 INJECTION, SOLUTION INTRAMUSCULAR; INTRAVENOUS at 18:32

## 2022-09-16 RX ADMIN — LISINOPRIL 5 MG: 5 TABLET ORAL at 09:52

## 2022-09-16 RX ADMIN — ENOXAPARIN SODIUM 40 MG: 100 INJECTION SUBCUTANEOUS at 09:51

## 2022-09-16 RX ADMIN — SODIUM CHLORIDE: 9 INJECTION, SOLUTION INTRAVENOUS at 09:07

## 2022-09-16 RX ADMIN — PREGABALIN 300 MG: 150 CAPSULE ORAL at 21:49

## 2022-09-16 RX ADMIN — DEXAMETHASONE SODIUM PHOSPHATE 4 MG: 4 INJECTION, SOLUTION INTRAMUSCULAR; INTRAVENOUS at 14:38

## 2022-09-16 RX ADMIN — GABAPENTIN 800 MG: 100 CAPSULE ORAL at 09:51

## 2022-09-16 RX ADMIN — SODIUM CHLORIDE: 9 INJECTION, SOLUTION INTRAVENOUS at 22:39

## 2022-09-16 RX ADMIN — HYDROCODONE BITARTRATE AND ACETAMINOPHEN 1 TABLET: 5; 325 TABLET ORAL at 14:48

## 2022-09-16 ASSESSMENT — PAIN SCALES - GENERAL
PAINLEVEL_OUTOF10: 0
PAINLEVEL_OUTOF10: 8
PAINLEVEL_OUTOF10: 7
PAINLEVEL_OUTOF10: 0

## 2022-09-16 ASSESSMENT — ENCOUNTER SYMPTOMS
NAUSEA: 1
SHORTNESS OF BREATH: 1
BACK PAIN: 0
COUGH: 1
WHEEZING: 1
DIARRHEA: 1
RHINORRHEA: 1
VOMITING: 1
CONSTIPATION: 0
ABDOMINAL PAIN: 0
SORE THROAT: 0
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
## 2022-09-16 NOTE — TELEPHONE ENCOUNTER
Writer contacted Dr. Cricket Bustos to inform of 30 day readmission risk. Dr. Cricket Bustos informed writer there is no decision on disposition at this time.       Call Back: If you need to call back to inform of disposition you can contact me at [phone]

## 2022-09-16 NOTE — H&P
[address] [facility] Internal Medicine  Moon Ellington MD; Trinidad Hinojosa MD; Valentino Stevenson MD; MD Erick Bill MD; Renella Boeck, MD  [facility]   [address] [facility]     HISTORY AND PHYSICAL EXAMINATION            Date:   9/16/2022  Patientname:  Asia Godinez  Date of admission:  9/15/2022  7:17 PM  MRN:   [id number]  Account:  [de-identified]  YOB: 1965  PCP:    Travis Ambrocio MD  Room:   2121/2121-01  Code Status:    Full Code      Chief Complaint:     Chief Complaint   Patient presents with    Shortness of Breath    Cough    Nausea     History Obtained From:     patient    History of Present Illness:     Asia Godinez is a 61 y.o. Non- / non  female who presents with Shortness of Breath, Cough, and Nausea and is admitted to the hospital for the management of COPD exacerbation ([facility] [address] Medical history includes COPD, HTN, for thyroidism, history of cocaine abuse and chronic pain. According to patient, he has been feeling ill for the past 2 days with symptoms of increased cough, creased sputum production and increased shortness of breath. She also reports nausea and vomiting, intermittent fever with body aches for the past several days. Denies chest pain, abdominal pain and urinary symptoms. Symptoms are aggravated by increased activity Symptoms are alleviated by IV steroids, oxygen and breathing treatments/ Symptoms are reported as acute, constant and moderately severe. CXR in ED negative for acute process, no suspicion for PE. Respiratory status improved with IV steroids and breathing treatments. Patient continued to endorse increased shortness of breath and having difficulty ambulating without oxygen levels dropping below 90%. He does not have home O2 or a home nebulizer.   Past Medical History:     Past Medical History:   Diagnosis Date    Acid reflux     Anxiety     Arthritis     Left hip    Asthma Bipolar 1 disorder (HCC)     Bowel obstruction (HCC)     COPD (chronic obstructive pulmonary disease) (HCC)     O2 3L PRN/ Dr. Kody Chatterjee [facility]/last seen 2020/appt.9-7-21 for Clearance    Crohn disease ([address]     Depression     Dry eye     Fibromyalgia     Gout     Headache     Hyperlipidemia     Hypertension     Hypothyroidism     Kidney stones     Muscle spasm     Neuropathy     Pain     left hip    Personal history of other medical treatment     Pt. seen by Dr. Beronica Hinson for clearance for this OR Left hip/ Normally pt. doesn't follow with Cardiology. [facility]    Wears partial dentures     upper    Wellness examination     PCP Kathya Wild MD/ [place]/last seen 8-24-21        Past Surgical History:     Past Surgical History:   Procedure Laterality Date    ABDOMEN SURGERY      bowel obstruction OR    BUNIONECTOMY Left     CHOLECYSTECTOMY      COLONOSCOPY  04/30/2007    no gross pathology seen in the colon and also 3-4 inches of the ileum    COLONOSCOPY  10/12/2017    normal    COLONOSCOPY  10/25/2021    COLONOSCOPY WITH BIOPSY performed by Elio Berry MD at [facility]    COLONOSCOPY  10/25/2021    COLONOSCOPY POLYPECTOMY REMOVAL SNARE performed by Elio Berry MD at [address] [place] [address] ENDOSCOPY  10/25/2021    EGD BIOPSY performed by Elio eBrry MD at [facility]        Medications Prior to Admission:     Prior to Admission medications    Medication Sig Start Date End Date Taking?  Authorizing Provider   HYDROcodone-acetaminophen (NORCO) 5-325 MG per tablet TAKE 1 TABLET BY MOUTH EVERY 8 HOURS AS NEEDED FOR PAIN FOR UP TO 14 DAYS. 9/9/22 9/23/22 Yes Amrik Vargas MD   traZODone (DESYREL) 150 MG tablet Take 300 mg by mouth nightly   Yes Historical Provider, MD   Pregabalin (LYRICA PO) Take 300 mg by mouth 2 times daily   Yes Historical Provider, MD   albuterol sulfate HFA (PROVENTIL;VENTOLIN;PROAIR) 108 (90 Base) MCG/ACT inhaler Inhale 2 puffs into the lungs every 6 hours as needed for Wheezing   Yes Historical Provider, MD   diphenhydrAMINE (BENADRYL) 25 MG tablet Take 25 mg by mouth nightly as needed   Yes Historical Provider, MD   topiramate (TOPAMAX) 100 MG tablet Take 100 mg by mouth as needed   Yes Historical Provider, MD   SUMAtriptan (IMITREX) 100 MG tablet Take 100 mg by mouth once as needed for Migraine   Yes Historical Provider, MD   gabapentin (NEURONTIN) 400 MG capsule Take 800 mg by mouth 2 times daily. Yes Historical Provider, MD   lisinopril (PRINIVIL;ZESTRIL) 5 MG tablet Take 1 tablet by mouth daily 9/20/21  Yes Becky Vizcaino MD   acetaminophen (TYLENOL) 500 MG tablet Take 2 tablets by mouth 3 times daily  Patient taking differently: Take 1,000 mg by mouth 3 times daily as needed 7/17/21  Yes Marce Kim,    lidocaine (LIDODERM) 5 % Place 1 patch onto the skin daily as needed for Pain 12 hours on, 12 hours off. Yes Historical Provider, MD   Handicap Placard MISC by Does not apply route Duration 5 years 5/17/22   Flaquito Boles MD   ibuprofen (IBU) 400 MG tablet Take 1 tablet by mouth every 6 hours as needed for Pain 5/15/22 5/18/22  Martha Ochoa,    budesonide-formoterol [facility]) 160-4.5 MCG/ACT AERO Inhale 2 puffs into the lungs 2 times daily 9/19/21   Becky Vizcaino MD   nicotine (NICODERM CQ) 21 MG/24HR Place 1 patch onto the skin daily  Patient not taking: Reported on 8/20/2022 9/20/21 8/23/22  Becky Vizcaino MD   Polyvinyl Alcohol-Povidone (REFRESH OP) Place 1 drop into both eyes 3 times daily    Historical Provider, MD        Allergies:     Azithromycin, Methylprednisolone, Penicillins, and Tape Kathie Divers tape]    Social History:     Tobacco:    reports that she has been smoking cigarettes. She has a 18.50 pack-year smoking history. She quit smokeless tobacco use about 21 years ago. Her smokeless tobacco use included chew.   Alcohol:      reports that she does not currently use alcohol. Drug Use:  reports current drug use. Drug: Marijuana Alysia Haynes). Family History:     Family History   Problem Relation Age of Onset    Diabetes Father     Lung Cancer Father     Hypertension Mother     Cancer Mother     High Blood Pressure Mother     Crohn's Disease Brother     Heart Disease Brother        REVIEW OF SYSTEMS     Review of Systems   Constitutional:  Positive for activity change and fatigue. Negative for chills, diaphoresis and fever. HENT:  Positive for rhinorrhea. Negative for congestion and sore throat. Respiratory:  Positive for cough, shortness of breath and wheezing. Cardiovascular:  Negative for chest pain, palpitations and leg swelling. Gastrointestinal:  Positive for diarrhea, nausea and vomiting. Negative for abdominal pain and constipation. Genitourinary:  Negative for dysuria, frequency and urgency. Musculoskeletal:  Positive for arthralgias and myalgias. Negative for back pain. Chronic left hip pain   Skin:  Negative for rash. Neurological:  Negative for dizziness, weakness and headaches. Psychiatric/Behavioral:  The patient is not nervous/anxious. PHYSICAL EXAM      BP (!) 141/93   Pulse 85   Temp 97.9 °F (36.6 °C) (Oral)   Resp 16   Ht 5' 6.5\" (1.689 m)   Wt 150 lb (68 kg)   SpO2 95%   BMI 23.85 kg/m²  Body mass index is 23.85 kg/m². Physical Exam  Vitals and nursing note reviewed. Constitutional:       General: She is not in acute distress. Appearance: She is well-developed. She is not diaphoretic. HENT:      Head: Normocephalic and atraumatic. Eyes:      Conjunctiva/sclera: Conjunctivae normal.      Pupils: Pupils are equal, round, and reactive to light. Neck:      Trachea: No tracheal deviation. Cardiovascular:      Rate and Rhythm: Normal rate and regular rhythm. Heart sounds: Normal heart sounds. No murmur heard. No friction rub. No gallop.    Pulmonary:      Effort: Pulmonary effort is normal. No respiratory distress. Breath sounds: Decreased air movement present. Decreased breath sounds and wheezing present. No rales. Chest:      Chest wall: No tenderness. Abdominal:      General: Bowel sounds are normal. There is no distension. Palpations: Abdomen is soft. Tenderness: There is no abdominal tenderness. There is no guarding. Musculoskeletal:         General: No tenderness. Normal range of motion. Cervical back: Normal range of motion and neck supple. Lymphadenopathy:      Cervical: No cervical adenopathy. Skin:     General: Skin is warm and dry. Coloration: Skin is not pale. Findings: No erythema or rash. Neurological:      Mental Status: She is alert and oriented to person, place, and time. Motor: No seizure activity. Coordination: Coordination normal.   Psychiatric:         Behavior: Behavior normal.         Thought Content:  Thought content normal.       Investigations:      Laboratory Testing:  Recent Results (from the past 24 hour(s))   Troponin    Collection Time: 09/15/22  8:00 PM   Result Value Ref Range    Troponin, High Sensitivity 9 0 - 14 ng/L   CBC with Auto Differential    Collection Time: 09/15/22  8:00 PM   Result Value Ref Range    WBC 5.1 3.5 - 11.0 k/uL    RBC 4.84 4.0 - 5.2 m/uL    Hemoglobin 13.6 12.0 - 16.0 g/dL    Hematocrit 40.5 36 - 46 %    MCV 83.6 80 - 100 fL    MCH 28.1 26 - 34 pg    MCHC 33.5 31 - 37 g/dL    RDW 14.4 11.5 - 14.9 %    Platelets 128 520 - 266 k/uL    MPV 8.9 6.0 - 12.0 fL    Seg Neutrophils 67 (H) 36 - 66 %    Lymphocytes 22 (L) 24 - 44 %    Monocytes 9 (H) 1 - 7 %    Eosinophils % 1 0 - 4 %    Basophils 1 0 - 2 %    Segs Absolute 3.50 1.3 - 9.1 k/uL    Absolute Lymph # 1.10 1.0 - 4.8 k/uL    Absolute Mono # 0.40 0.1 - 1.3 k/uL    Absolute Eos # 0.00 0.0 - 0.4 k/uL    Basophils Absolute 0.00 0.0 - 0.2 k/uL   Comprehensive Metabolic Panel w/ Reflex to MG    Collection Time: 09/15/22  8:00 PM   Result Value Ref Range    Glucose MPV 9.0 6.0 - 12.0 fL    Seg Neutrophils 87 (H) 36 - 66 %    Lymphocytes 12 (L) 24 - 44 %    Monocytes 1 1 - 7 %    Eosinophils % 0 0 - 4 %    Basophils 0 0 - 2 %    Segs Absolute 3.70 1.3 - 9.1 k/uL    Absolute Lymph # 0.50 (L) 1.0 - 4.8 k/uL    Absolute Mono # 0.00 (L) 0.1 - 1.3 k/uL    Absolute Eos # 0.00 0.0 - 0.4 k/uL    Basophils Absolute 0.00 0.0 - 0.2 k/uL       Imaging/Diagnostics:  XR CHEST PORTABLE    Result Date: 9/15/2022  No acute process. Assessment :      Hospital Problems             Last Modified POA    * (Principal) COPD exacerbation ([facility] [address] 9/15/2022 Yes    Chronic pain disorder 9/16/2022 Yes    Hypertension 9/16/2022 Yes    Shortness of breath 9/16/2022 Yes     Plan:     Patient status inpatient in the Med/Surge    COPD Exacerbation  -IV decadron initiated in ED  -Continue on admission  -Respiratory care eval and treat  -Duoneb aerosols 4x/ day  -Albuterol aerosols PRN  -low suspicion for infective process    HTN  -Continue home BP meds  -Monitor VS    Chronic pain  -Narcotics confirmed on OARRS  -continued on admission    Full code    Lovenox for DVT prophylaxis    Disposition 1-2 days    Consultations:   IP CONSULT TO INTERNAL MEDICINE    Patient is admitted as inpatient status because of co-morbidities listed above, severity of signs and symptoms as outlined, requirement for current medical therapies and most importantly because of direct risk to patient if care not provided in a hospital setting. Expected length of stay > 48 hours. Tyson Sylvester MD  9/16/2022  8:03 AM    Copy sent to Dr. Scottie Camacho MD    Please note that this chart was generated using voice recognition Dragon dictation software. Although every effort was made to ensure the accuracy of this automated transcription, some errors in transcription may have occurred. [address]  [facility]    Phone [phone]     Attending Physician Statement  I have discussed the care of Loni Rosas and I have examined the patient myselft and taken ros and hpi , including pertinent history and exam findings,  with the NP.  I have reviewed the key elements of all parts of the encounter with the NP  I agree with the assessment, plan and orders as documented by the NP  48-year-old female with underlying history of more than 50-pack-year history of smoking, hypertension, hyperlipidemia, chronic pain, current active smoker, COPD, on home inhalers, has been feeling sick for last couple days, presented with acute respiratory failure needing oxygen,  Acute hypoxic respiratory failure,  COPD exacerbation,  Chronic pain,  Polysubstance abuse,  Continue breathing treatments, antibiotics, watch for any withdrawals,      Electronically signed by Joseph Hanson MD

## 2022-09-16 NOTE — H&P
[facility]  Lewis Antonio MD; Tim Jolly MD; Izabel Salcido MD; MD Denilson Zayas MD; Shima Carolina MD  [facility]   [address] [facility]     HISTORY AND PHYSICAL EXAMINATION            Date:   9/16/2022  Patientname:  Naina Loaiza  Date of admission:  9/15/2022  7:17 PM  MRN:   [id number]  Account:  [de-identified]  YOB: 1965  PCP:    James Mata MD  Room:   01/01  Code Status:    Full Code      Chief Complaint:     Chief Complaint   Patient presents with    Shortness of Breath    Cough    Nausea     History Obtained From:     patient    History of Present Illness:     Naina Loaiza is a 61 y.o. Non- / non  female who presents with Shortness of Breath, Cough, and Nausea and is admitted to the hospital for the management of COPD exacerbation ([facility] [address] Medical history includes COPD, HTN, for thyroidism, history of cocaine abuse and chronic pain. According to patient, he has been feeling ill for the past 2 days with symptoms of increased cough, creased sputum production and increased shortness of breath. She also reports nausea and vomiting, intermittent fever with body aches for the past several days. Denies chest pain, abdominal pain and urinary symptoms. Symptoms are aggravated by increased activity Symptoms are alleviated by IV steroids, oxygen and breathing treatments/ Symptoms are reported as acute, constant and moderately severe. CXR in ED negative for acute process, no suspicion for PE. Respiratory status improved with IV steroids and breathing treatments. Patient continued to endorse increased shortness of breath and having difficulty ambulating without oxygen levels dropping below 90%. He does not have home O2 or a home nebulizer.   Past Medical History:     Past Medical History:   Diagnosis Date    Acid reflux     Anxiety     Arthritis     Left hip    Asthma Bipolar 1 disorder (HCC)     Bowel obstruction (HCC)     COPD (chronic obstructive pulmonary disease) (HCC)     O2 3L PRN/ Dr. Kitty Pallas [facility]/last seen 2020/appt.9-7-21 for Clearance    Crohn disease ([address]     Depression     Dry eye     Fibromyalgia     Gout     Headache     Hyperlipidemia     Hypertension     Hypothyroidism     Kidney stones     Muscle spasm     Neuropathy     Pain     left hip    Personal history of other medical treatment     Pt. seen by Dr. Pham Setting for clearance for this OR Left hip/ Normally pt. doesn't follow with Cardiology. [place] [facility]    Wears partial dentures     upper    Wellness examination     PCP Faviola Carreon MD/ [place]/last seen 8-24-21        Past Surgical History:     Past Surgical History:   Procedure Laterality Date    ABDOMEN SURGERY      bowel obstruction OR    BUNIONECTOMY Left     CHOLECYSTECTOMY      COLONOSCOPY  04/30/2007    no gross pathology seen in the colon and also 3-4 inches of the ileum    COLONOSCOPY  10/12/2017    normal    COLONOSCOPY  10/25/2021    COLONOSCOPY WITH BIOPSY performed by Fernando Arana MD at [facility]    COLONOSCOPY  10/25/2021    COLONOSCOPY POLYPECTOMY REMOVAL SNARE performed by Fernando Arana MD at [address] [place] [address] ENDOSCOPY  10/25/2021    EGD BIOPSY performed by Fernando Arana MD at [facility]        Medications Prior to Admission:     Prior to Admission medications    Medication Sig Start Date End Date Taking?  Authorizing Provider   HYDROcodone-acetaminophen (NORCO) 5-325 MG per tablet TAKE 1 TABLET BY MOUTH EVERY 8 HOURS AS NEEDED FOR PAIN FOR UP TO 14 DAYS. 9/9/22 9/23/22 Yes Patricia Jacobsen MD   traZODone (DESYREL) 150 MG tablet Take 300 mg by mouth nightly   Yes Historical Provider, MD   Pregabalin (LYRICA PO) Take 300 mg by mouth 2 times daily   Yes Historical Provider, MD   albuterol sulfate HFA (PROVENTIL;VENTOLIN;PROAIR) 108 (90 alcohol. Drug Use:  reports current drug use. Drug: Marijuana Dearing Aleena). Family History:     Family History   Problem Relation Age of Onset    Diabetes Father     Lung Cancer Father     Hypertension Mother     Cancer Mother     High Blood Pressure Mother     Crohn's Disease Brother     Heart Disease Brother        REVIEW OF SYSTEMS     Review of Systems   Constitutional:  Positive for activity change and fatigue. Negative for chills, diaphoresis and fever. HENT:  Positive for rhinorrhea. Negative for congestion and sore throat. Respiratory:  Positive for cough, shortness of breath and wheezing. Cardiovascular:  Negative for chest pain, palpitations and leg swelling. Gastrointestinal:  Positive for diarrhea, nausea and vomiting. Negative for abdominal pain and constipation. Genitourinary:  Negative for dysuria, frequency and urgency. Musculoskeletal:  Positive for arthralgias and myalgias. Negative for back pain. Chronic left hip pain   Skin:  Negative for rash. Neurological:  Negative for dizziness, weakness and headaches. Psychiatric/Behavioral:  The patient is not nervous/anxious. PHYSICAL EXAM      BP (!) 153/92   Pulse 79   Temp 97.9 °F (36.6 °C) (Oral)   Resp 16   Ht 5' 6.5\" (1.689 m)   Wt 150 lb (68 kg)   SpO2 96%   BMI 23.85 kg/m²  Body mass index is 23.85 kg/m². Physical Exam  Vitals and nursing note reviewed. Constitutional:       General: She is not in acute distress. Appearance: She is well-developed. She is not diaphoretic. HENT:      Head: Normocephalic and atraumatic. Eyes:      Conjunctiva/sclera: Conjunctivae normal.      Pupils: Pupils are equal, round, and reactive to light. Neck:      Trachea: No tracheal deviation. Cardiovascular:      Rate and Rhythm: Normal rate and regular rhythm. Heart sounds: Normal heart sounds. No murmur heard. No friction rub. No gallop.    Pulmonary:      Effort: Pulmonary effort is normal. No respiratory distress. Breath sounds: Decreased air movement present. Decreased breath sounds and wheezing present. No rales. Chest:      Chest wall: No tenderness. Abdominal:      General: Bowel sounds are normal. There is no distension. Palpations: Abdomen is soft. Tenderness: There is no abdominal tenderness. There is no guarding. Musculoskeletal:         General: No tenderness. Normal range of motion. Cervical back: Normal range of motion and neck supple. Lymphadenopathy:      Cervical: No cervical adenopathy. Skin:     General: Skin is warm and dry. Coloration: Skin is not pale. Findings: No erythema or rash. Neurological:      Mental Status: She is alert and oriented to person, place, and time. Motor: No seizure activity. Coordination: Coordination normal.   Psychiatric:         Behavior: Behavior normal.         Thought Content:  Thought content normal.       Investigations:      Laboratory Testing:  Recent Results (from the past 24 hour(s))   Troponin    Collection Time: 09/15/22  8:00 PM   Result Value Ref Range    Troponin, High Sensitivity 9 0 - 14 ng/L   CBC with Auto Differential    Collection Time: 09/15/22  8:00 PM   Result Value Ref Range    WBC 5.1 3.5 - 11.0 k/uL    RBC 4.84 4.0 - 5.2 m/uL    Hemoglobin 13.6 12.0 - 16.0 g/dL    Hematocrit 40.5 36 - 46 %    MCV 83.6 80 - 100 fL    MCH 28.1 26 - 34 pg    MCHC 33.5 31 - 37 g/dL    RDW 14.4 11.5 - 14.9 %    Platelets 311 576 - 361 k/uL    MPV 8.9 6.0 - 12.0 fL    Seg Neutrophils 67 (H) 36 - 66 %    Lymphocytes 22 (L) 24 - 44 %    Monocytes 9 (H) 1 - 7 %    Eosinophils % 1 0 - 4 %    Basophils 1 0 - 2 %    Segs Absolute 3.50 1.3 - 9.1 k/uL    Absolute Lymph # 1.10 1.0 - 4.8 k/uL    Absolute Mono # 0.40 0.1 - 1.3 k/uL    Absolute Eos # 0.00 0.0 - 0.4 k/uL    Basophils Absolute 0.00 0.0 - 0.2 k/uL   Comprehensive Metabolic Panel w/ Reflex to MG    Collection Time: 09/15/22  8:00 PM   Result Value Ref Range    Glucose 111 (H) 70 - 99 mg/dL    BUN 10 6 - 20 mg/dL    Creatinine 0.57 0.50 - 0.90 mg/dL    Calcium 9.5 8.6 - 10.4 mg/dL    Sodium 140 135 - 144 mmol/L    Potassium 3.5 (L) 3.7 - 5.3 mmol/L    Chloride 102 98 - 107 mmol/L    CO2 25 20 - 31 mmol/L    Anion Gap 13 9 - 17 mmol/L    Alkaline Phosphatase 125 (H) 35 - 104 U/L    ALT 10 5 - 33 U/L    AST 12 <32 U/L    Total Bilirubin 0.6 0.3 - 1.2 mg/dL    Total Protein 6.9 6.4 - 8.3 g/dL    Albumin 4.1 3.5 - 5.2 g/dL    GFR Non-African American >60 >60 mL/min    GFR African American >60 >60 mL/min    GFR Comment         COVID-19 & Influenza Combo    Collection Time: 09/15/22  8:00 PM    Specimen: Nasopharyngeal Swab   Result Value Ref Range    Specimen Description . NASOPHARYNGEAL SWAB     Source . NASOPHARYNGEAL SWAB     SARS-CoV-2 RNA, RT PCR Not Detected Not Detected    INFLUENZA A Not Detected Not Detected    INFLUENZA B Not Detected Not Detected   Magnesium    Collection Time: 09/15/22  8:00 PM   Result Value Ref Range    Magnesium 2.0 1.6 - 2.6 mg/dL   Troponin    Collection Time: 09/15/22  9:32 PM   Result Value Ref Range    Troponin, High Sensitivity 9 0 - 14 ng/L       Imaging/Diagnostics:  XR CHEST PORTABLE    Result Date: 9/15/2022  No acute process.        Assessment :      Hospital Problems             Last Modified POA    * (Principal) COPD exacerbation ([facility] [address] 9/15/2022 Yes    Chronic pain disorder 9/16/2022 Yes    Hypertension 9/16/2022 Yes    Shortness of breath 9/16/2022 Yes       Plan:     Patient status inpatient in the Med/Surge    COPD Exacerbation  -IV decadron initiated in ED  -Continue on admission  -Respiratory care eval and treat  -Duoneb aerosols 4x/ day  -Albuterol aerosols PRN  -low suspicion for infective process    HTN  -Continue home BP meds  -Monitor VS    Chronic pain  -Narcotics confirmed on OARRS  -continued on admission    Full code    Lovenox for DVT prophylaxis    Disposition 1-2 days    Consultations:   IP CONSULT TO INTERNAL MEDICINE    Patient is admitted as inpatient status because of co-morbidities listed above, severity of signs and symptoms as outlined, requirement for current medical therapies and most importantly because of direct risk to patient if care not provided in a hospital setting. Expected length of stay > 48 hours. SHRADDHA Dyer NP  9/16/2022  4:40 AM    Copy sent to Dr. Mitzi Borges MD    Please note that this chart was generated using voice recognition Dragon dictation software. Although every effort was made to ensure the accuracy of this automated transcription, some errors in transcription may have occurred. Domingo Polanco [address]  [facility], [address] [place] [address].    Phone [phone]

## 2022-09-17 LAB
ABSOLUTE EOS #: 0 K/UL (ref 0–0.4)
ABSOLUTE LYMPH #: 0.5 K/UL (ref 1–4.8)
ABSOLUTE MONO #: 0.2 K/UL (ref 0.1–1.3)
ANION GAP SERPL CALCULATED.3IONS-SCNC: 11 MMOL/L (ref 9–17)
BASOPHILS # BLD: 0 % (ref 0–2)
BASOPHILS ABSOLUTE: 0 K/UL (ref 0–0.2)
BUN BLDV-MCNC: 17 MG/DL (ref 6–20)
CALCIUM SERPL-MCNC: 8.5 MG/DL (ref 8.6–10.4)
CHLORIDE BLD-SCNC: 107 MMOL/L (ref 98–107)
CO2: 23 MMOL/L (ref 20–31)
CREAT SERPL-MCNC: 0.53 MG/DL (ref 0.5–0.9)
EOSINOPHILS RELATIVE PERCENT: 0 % (ref 0–4)
GFR AFRICAN AMERICAN: >60 ML/MIN
GFR NON-AFRICAN AMERICAN: >60 ML/MIN
GFR SERPL CREATININE-BSD FRML MDRD: ABNORMAL ML/MIN/{1.73_M2}
GLUCOSE BLD-MCNC: 131 MG/DL (ref 70–99)
HCT VFR BLD CALC: 36.3 % (ref 36–46)
HEMOGLOBIN: 12.3 G/DL (ref 12–16)
LYMPHOCYTES # BLD: 7 % (ref 24–44)
MCH RBC QN AUTO: 28.7 PG (ref 26–34)
MCHC RBC AUTO-ENTMCNC: 33.8 G/DL (ref 31–37)
MCV RBC AUTO: 85 FL (ref 80–100)
MONOCYTES # BLD: 3 % (ref 1–7)
PDW BLD-RTO: 14.5 % (ref 11.5–14.9)
PLATELET # BLD: 162 K/UL (ref 150–450)
PMV BLD AUTO: 9.5 FL (ref 6–12)
POTASSIUM SERPL-SCNC: 3.9 MMOL/L (ref 3.7–5.3)
RBC # BLD: 4.28 M/UL (ref 4–5.2)
SEG NEUTROPHILS: 90 % (ref 36–66)
SEGMENTED NEUTROPHILS ABSOLUTE COUNT: 6.4 K/UL (ref 1.3–9.1)
SODIUM BLD-SCNC: 141 MMOL/L (ref 135–144)
WBC # BLD: 7.1 K/UL (ref 3.5–11)

## 2022-09-17 PROCEDURE — 36415 COLL VENOUS BLD VENIPUNCTURE: CPT

## 2022-09-17 PROCEDURE — 99232 SBSQ HOSP IP/OBS MODERATE 35: CPT | Performed by: INTERNAL MEDICINE

## 2022-09-17 PROCEDURE — 2060000000 HC ICU INTERMEDIATE R&B

## 2022-09-17 PROCEDURE — 6370000000 HC RX 637 (ALT 250 FOR IP): Performed by: NURSE PRACTITIONER

## 2022-09-17 PROCEDURE — 2700000000 HC OXYGEN THERAPY PER DAY

## 2022-09-17 PROCEDURE — 94640 AIRWAY INHALATION TREATMENT: CPT

## 2022-09-17 PROCEDURE — 6360000002 HC RX W HCPCS: Performed by: INTERNAL MEDICINE

## 2022-09-17 PROCEDURE — 6370000000 HC RX 637 (ALT 250 FOR IP): Performed by: INTERNAL MEDICINE

## 2022-09-17 PROCEDURE — 94761 N-INVAS EAR/PLS OXIMETRY MLT: CPT

## 2022-09-17 PROCEDURE — 80048 BASIC METABOLIC PNL TOTAL CA: CPT

## 2022-09-17 PROCEDURE — 85025 COMPLETE CBC W/AUTO DIFF WBC: CPT

## 2022-09-17 PROCEDURE — 6360000002 HC RX W HCPCS: Performed by: NURSE PRACTITIONER

## 2022-09-17 PROCEDURE — 2580000003 HC RX 258: Performed by: NURSE PRACTITIONER

## 2022-09-17 RX ORDER — CALCIUM CARBONATE 200(500)MG
500 TABLET,CHEWABLE ORAL 3 TIMES DAILY PRN
Status: DISCONTINUED | OUTPATIENT
Start: 2022-09-17 | End: 2022-09-18 | Stop reason: HOSPADM

## 2022-09-17 RX ORDER — DEXAMETHASONE SODIUM PHOSPHATE 4 MG/ML
4 INJECTION, SOLUTION INTRA-ARTICULAR; INTRALESIONAL; INTRAMUSCULAR; INTRAVENOUS; SOFT TISSUE EVERY 6 HOURS
Status: COMPLETED | OUTPATIENT
Start: 2022-09-17 | End: 2022-09-18

## 2022-09-17 RX ADMIN — DEXAMETHASONE SODIUM PHOSPHATE 4 MG: 4 INJECTION, SOLUTION INTRAMUSCULAR; INTRAVENOUS at 16:53

## 2022-09-17 RX ADMIN — SODIUM CHLORIDE, PRESERVATIVE FREE 10 ML: 5 INJECTION INTRAVENOUS at 09:35

## 2022-09-17 RX ADMIN — PREGABALIN 300 MG: 150 CAPSULE ORAL at 09:31

## 2022-09-17 RX ADMIN — IPRATROPIUM BROMIDE AND ALBUTEROL SULFATE 1 AMPULE: 2.5; .5 SOLUTION RESPIRATORY (INHALATION) at 08:19

## 2022-09-17 RX ADMIN — GABAPENTIN 800 MG: 100 CAPSULE ORAL at 21:03

## 2022-09-17 RX ADMIN — HYDROCODONE BITARTRATE AND ACETAMINOPHEN 1 TABLET: 5; 325 TABLET ORAL at 01:14

## 2022-09-17 RX ADMIN — DEXAMETHASONE SODIUM PHOSPHATE 4 MG: 4 INJECTION, SOLUTION INTRAMUSCULAR; INTRAVENOUS at 12:20

## 2022-09-17 RX ADMIN — LISINOPRIL 5 MG: 5 TABLET ORAL at 09:33

## 2022-09-17 RX ADMIN — DEXAMETHASONE SODIUM PHOSPHATE 4 MG: 4 INJECTION, SOLUTION INTRAMUSCULAR; INTRAVENOUS at 01:14

## 2022-09-17 RX ADMIN — ENOXAPARIN SODIUM 40 MG: 100 INJECTION SUBCUTANEOUS at 09:32

## 2022-09-17 RX ADMIN — IPRATROPIUM BROMIDE AND ALBUTEROL SULFATE 1 AMPULE: 2.5; .5 SOLUTION RESPIRATORY (INHALATION) at 11:14

## 2022-09-17 RX ADMIN — PREGABALIN 300 MG: 150 CAPSULE ORAL at 16:51

## 2022-09-17 RX ADMIN — HYDROCODONE BITARTRATE AND ACETAMINOPHEN 1 TABLET: 5; 325 TABLET ORAL at 12:19

## 2022-09-17 RX ADMIN — BUDESONIDE AND FORMOTEROL FUMARATE DIHYDRATE 2 PUFF: 160; 4.5 AEROSOL RESPIRATORY (INHALATION) at 08:19

## 2022-09-17 RX ADMIN — SODIUM CHLORIDE, PRESERVATIVE FREE 10 ML: 5 INJECTION INTRAVENOUS at 22:09

## 2022-09-17 RX ADMIN — IPRATROPIUM BROMIDE AND ALBUTEROL SULFATE 1 AMPULE: 2.5; .5 SOLUTION RESPIRATORY (INHALATION) at 14:55

## 2022-09-17 RX ADMIN — ANTACID TABLETS 500 MG: 500 TABLET, CHEWABLE ORAL at 19:34

## 2022-09-17 RX ADMIN — TRAZODONE HYDROCHLORIDE 300 MG: 150 TABLET ORAL at 21:03

## 2022-09-17 RX ADMIN — GABAPENTIN 800 MG: 100 CAPSULE ORAL at 09:31

## 2022-09-17 ASSESSMENT — PAIN SCALES - GENERAL
PAINLEVEL_OUTOF10: 6
PAINLEVEL_OUTOF10: 8
PAINLEVEL_OUTOF10: 7
PAINLEVEL_OUTOF10: 8

## 2022-09-17 NOTE — PROGRESS NOTES
Patient complaining of acid reflux. No medications on. Message out to Dr. Sriram Desai to see if possible to get order for prn Tums. Awaiting response.

## 2022-09-17 NOTE — PROGRESS NOTES
evidenced by nausea, vomiting, poor intake prior to admission    Nutrition Interventions:   Food and/or Nutrient Delivery: Continue Current Diet  Nutrition Education/Counseling: No recommendation at this time  Coordination of Nutrition Care: Continue to monitor while inpatient       Goals:     Goals: Meet at least 75% of estimated needs       Nutrition Monitoring and Evaluation:   Behavioral-Environmental Outcomes: None Identified  Food/Nutrient Intake Outcomes: Food and Nutrient Intake  Physical Signs/Symptoms Outcomes: Biochemical Data, GI Status, Nutrition Focused Physical Findings, Skin, Weight    Discharge Planning:    Continue current diet     Shawn Denise, [address] [place] [address] [address] [id number]

## 2022-09-17 NOTE — PROGRESS NOTES
Patient shared her anxiety over the apt complex where she lives having bed bugs and hoping that the landlord takes care of things. Writer provided listening presence, emotional support, and prayer. 09/17/22 1353   Encounter Summary   Encounter Overview/Reason  Spiritual/Emotional Needs   Service Provided For: Patient   Referral/Consult From: [employer] Children;Friends/neighbors   Last Encounter  09/17/22   Complexity of Encounter Moderate   Spiritual/Emotional needs   Type Spiritual Support;Emotional Distress   Assessment/Intervention/Outcome   Assessment Compromised coping   Intervention Active listening;Discussed illness injury and its impact; Explored/Affirmed feelings, thoughts, concerns;Explored Coping Skills/Resources;Prayer (assurance of)/[place];Sustaining Presence/Ministry of presence   Outcome Engaged in conversation;Expressed feelings, needs, and concerns;Expressed Gratitude;Receptive

## 2022-09-17 NOTE — PLAN OF CARE
Problem: Discharge Planning  Goal: Discharge to home or other facility with appropriate resources  9/17/2022 0341 by Last Rojas RN  Outcome: Progressing     Problem: Pain  Goal: Verbalizes/displays adequate comfort level or baseline comfort level  9/17/2022 0341 by Last Rojas RN  Outcome: Progressing  Note: Pt medicated with pain medication prn. Assessed all pain characteristics including level, type, location, frequency, and onset. Non-pharmacologic interventions offered to pt as well. Pt states pain is tolerable at this time. Will continue to monitor. Problem: Safety - Adult  Goal: Free from fall injury  9/17/2022 0341 by Last Rojas RN  Outcome: Progressing  Note: No falls noted this shift. Patient ambulates with x1 staff assistance without difficulty. Bed kept in low position. Safe environment maintained. Bedside table & call light in reach. Uses call light appropriately when needing assistance. Problem: Skin/Tissue Integrity  Goal: Absence of new skin breakdown  Description: 1. Monitor for areas of redness and/or skin breakdown  2. Assess vascular access sites hourly  3. Every 4-6 hours minimum:  Change oxygen saturation probe site  4. Every 4-6 hours:  If on nasal continuous positive airway pressure, respiratory therapy assess nares and determine need for appliance change or resting period. Outcome: Progressing  Note: Skin assessment complete. Sensicare applied PRN. Patient encouraged to turned and repositioned every two hours. Area kept free from moisture. Proper nourishment and fluids encouraged, as appropriate. Will continue to monitor for additional needs and changes in skin breakdown.        Problem: Chronic Conditions and Co-morbidities  Goal: Patient's chronic conditions and co-morbidity symptoms are monitored and maintained or improved  Outcome: Progressing

## 2022-09-17 NOTE — DISCHARGE INSTR - COC
Continuity of Care Form    Patient Name: Diana Baker   :  1965  MRN:  [id number]    Admit date:  9/15/2022  Discharge date:  22    Code Status Order: Full Code   Advance Directives:     Admitting Physician:  Jess Long MD  PCP: Marielena Capone MD    Discharging Nurse: [facility] Unit/Room#: 2121/2121-01  Discharging Unit Phone Number: [phone]    Emergency Contact:   Extended Emergency Contact Information  Primary Emergency Contact: Natalia Fontanez [address] Phone: [phone]  Relation: Child    Past Surgical History:  Past Surgical History:   Procedure Laterality Date    ABDOMEN SURGERY      bowel obstruction OR    BUNIONECTOMY Left     CHOLECYSTECTOMY      COLONOSCOPY  2007    no gross pathology seen in the colon and also 3-4 inches of the ileum    COLONOSCOPY  10/12/2017    normal    COLONOSCOPY  10/25/2021    COLONOSCOPY WITH BIOPSY performed by Antwon Gomes MD at [facility]    COLONOSCOPY  10/25/2021    COLONOSCOPY POLYPECTOMY REMOVAL SNARE performed by Antwon Gomes MD at [address] [place] [address] ENDOSCOPY  10/25/2021    EGD BIOPSY performed by Antwon Gomes MD at [facility]       Immunization History:   Immunization History   Administered Date(s) Administered    COVID-19, PFIZER GRAY top, DO NOT Dilute, (age 15 y+), IM, 30 mcg/0.3 mL 07/15/2022    COVID-19, PFIZER PURPLE top, DILUTE for use, (age 15 y+), 30mcg/0.3mL 2021, 2021, 2021    Influenza, FLUARIX, FLULAVAL, FLUZONE (age 10 mo+) AND AFLURIA, (age 1 y+), PF, 0.5mL 2017, 10/20/2021    Pneumococcal Polysaccharide ([id number]) 2015       Active Problems:  Patient Active Problem List   Diagnosis Code    Gastroesophageal reflux disease without esophagitis K21.9    Gastroenteritis K52.9    Ileus ([facility] K56.7    Pancreatitis, acute K85.90    Drug abuse ([facility] [address] F19.10 COPD (chronic obstructive pulmonary disease) ([facility]) J44.9    Hypertension I10    Hypokalemia E87.6    Hypothyroidism due to acquired atrophy of thyroid E03.4    Crohn disease ([address] K50.90    Acute cystitis without hematuria N30.00    Accidental drug overdose T50.901A    Prolonged Q-T interval on ECG R94.31    Bipolar disorder, unspecified ([address] F31.9    Polysubstance abuse ([address] F19.10    Alcohol intoxication delirium ([address] F10.121    Cocaine abuse ([address] F14.10    Acute respiratory failure with hypoxia ([address] J96.01    Bipolar 1 disorder ([address] F31.9    Diarrhea R19.7    Chronic bronchitis ([address] J42    Chronic renal insufficiency, stage III (moderate) ([facility]) N18.30    Chronic diarrhea K52.9    Anxiety F41.9    Osteoarthritis resulting from left hip dysplasia M16.32    Shortness of breath R06.02    Elevated brain natriuretic peptide (BNP) level R79.89    Pneumonia of both lower lobes due to infectious organism J18.9    Non-intractable vomiting with nausea R11.2    Acute hypokalemia E87.6    Hypomagnesemia E83.42    Intractable vomiting R11.10    Abdominal pain R10.9    Nausea vomiting and diarrhea R11.2, R19.7    Falls frequently R29.6    Orthostatic hypotension I95.1    Hypotension I95.9    Radial nerve palsy, right G56.31    COPD with acute exacerbation ([facility]) J44.1    COPD exacerbation ([facility]) J44.1    Chronic pain disorder G89.4       Isolation/Infection:   Isolation            C Diff Contact          Patient Infection Status       Infection Onset Added Last Indicated Last Indicated By Review Planned Expiration Resolved Resolved By    None active    Resolved    C-diff Rule Out 09/16/22 09/16/22 09/16/22 C DIFF TOXIN/ANTIGEN (Ordered)   09/16/22 Rule-Out Test Resulted    COVID-19 (Rule Out) 09/15/22 09/15/22 09/15/22 COVID-19 & Influenza Combo (Ordered)   09/15/22 Rule-Out Test Resulted    COVID-19 (Rule Out) 08/20/22 08/20/22 08/20/22 Respiratory Panel, Molecular, with COVID-19 (Restricted: peds pts or suitable admitted adults) (Ordered)   08/22/22 Matt Santiago RN    C-diff Rule Out 08/20/22 08/20/22 08/21/22 Gastrointestinal Panel, Molecular (Ordered)   08/22/22 Rule-Out Test Resulted    COVID-19 (Rule Out) 08/20/22 08/20/22 08/20/22 COVID-19, Rapid (Ordered)   08/20/22 Rule-Out Test Resulted    COVID-19 (Rule Out) 05/18/22 05/18/22 05/18/22 COVID-19, Rapid (Ordered)   05/18/22 Rule-Out Test Resulted    C-diff Rule Out  10/20/21 10/20/21 Americoe Flies, RN   10/20/21 Rule-Out Test Resulted    C-diff Rule Out 10/16/21 10/16/21 10/20/21 C DIFF TOXIN/ANTIGEN (Ordered)   10/19/21 Americoe Flies, RN    GI Panel    COVID-19 (Rule Out) 10/15/21 10/15/21 10/16/21 COVID-19, Rapid (Ordered)   10/16/21 Rule-Out Test Resulted    C-diff Rule Out 09/17/21 09/17/21 09/19/21 Gastrointestinal Panel, Molecular (Ordered)   09/20/21 Rule-Out Test Resulted    COVID-19 (Rule Out) 09/17/21 09/17/21 09/17/21 Respiratory Panel, Molecular, with COVID-19 (Restricted: peds pts or suitable admitted adults) (Ordered)   09/17/21 Rule-Out Test Resulted    COVID-19 (Rule Out) 09/17/21 09/17/21 09/17/21 COVID-19, Rapid (Ordered)   09/17/21 Rule-Out Test Resulted            Nurse Assessment:  Last Vital Signs: /67   Pulse 81   Temp 98.4 °F (36.9 °C)   Resp 18   Ht 5' 6.5\" (1.689 m)   Wt 161 lb (73 kg)   SpO2 94%   BMI 25.60 kg/m²     Last documented pain score (0-10 scale): Pain Level: 6  Last Weight:   Wt Readings from Last 1 Encounters:   09/17/22 161 lb (73 kg)     Mental Status:  oriented and alert    IV Access:  - None    Nursing Mobility/ADLs:  Walking   Assisted  Transfer  Independent  Bathing  Independent  Dressing  Independent  [address]  Independent  Med Delivery   whole    Wound Care Documentation and Therapy:        Elimination:  Continence:    Bowel: Yes  Bladder: Yes  Urinary Catheter: None   Colostomy/Ileostomy/Ileal Conduit: No       Date of Last BM: Certification: I certify the above information and transfer of Joi Gonzalez  is necessary for the continuing treatment of the diagnosis listed and that she requires [address] for less 30 days.      Update Admission H&P: No change in H&P    PHYSICIAN SIGNATURE:  Electronically signed by Mel Lund MD on 9/18/22 at 12:15 PM EDT

## 2022-09-17 NOTE — PLAN OF CARE
Problem: Discharge Planning  Goal: Discharge to home or other facility with appropriate resources  9/17/2022 1633 by Sylvie Rehman RN  Outcome: Progressing  9/17/2022 0341 by Michelle Rondon RN  Outcome: Progressing     Problem: Pain  Goal: Verbalizes/displays adequate comfort level or baseline comfort level  9/17/2022 1633 by Sylvie Rehman RN  Outcome: Progressing  9/17/2022 0341 by Michelle Rondon RN  Outcome: Progressing  Note: Pt medicated with pain medication prn. Assessed all pain characteristics including level, type, location, frequency, and onset. Non-pharmacologic interventions offered to pt as well. Pt states pain is tolerable at this time. Will continue to monitor. Problem: Safety - Adult  Goal: Free from fall injury  9/17/2022 1633 by Sylive Rehman RN  Outcome: Progressing  9/17/2022 0341 by Michelle Rondon RN  Outcome: Progressing  Note: No falls noted this shift. Patient ambulates with x1 staff assistance without difficulty. Bed kept in low position. Safe environment maintained. Bedside table & call light in reach. Uses call light appropriately when needing assistance. Problem: Skin/Tissue Integrity  Goal: Absence of new skin breakdown  Description: 1. Monitor for areas of redness and/or skin breakdown  2. Assess vascular access sites hourly  3. Every 4-6 hours minimum:  Change oxygen saturation probe site  4. Every 4-6 hours:  If on nasal continuous positive airway pressure, respiratory therapy assess nares and determine need for appliance change or resting period. 9/17/2022 1633 by Sylvie Rehman RN  Outcome: Progressing  9/17/2022 0341 by Michelle Rondon RN  Outcome: Progressing  Note: Skin assessment complete. Sensicare applied PRN. Patient encouraged to turned and repositioned every two hours. Area kept free from moisture. Proper nourishment and fluids encouraged, as appropriate.  Will continue to monitor for additional needs and changes in skin breakdown.        Problem: Chronic Conditions and Co-morbidities  Goal: Patient's chronic conditions and co-morbidity symptoms are monitored and maintained or improved  9/17/2022 1633 by Asia Arreola RN  Outcome: Progressing  9/17/2022 0341 by Marilyn Hdez RN  Outcome: Progressing     Problem: Nutrition Deficit:  Goal: Optimize nutritional status  Outcome: Progressing

## 2022-09-17 NOTE — CARE COORDINATION
ONGOING DISCHARGE PLAN:    Patient is alert and oriented x4. Spoke with patient regarding discharge plan and patient confirms that plan is still to go home . Pt is interested in VNS with [facility], referral sent. Pt is on o2 here @3lpm NC 98%, following for home O2 needs. Needs home O2 eval.     IV decadron 4mg  every 6 hours. Will continue to follow for additional discharge needs.     Electronically signed by Alfreda Springer RN on 9/17/2022 at 2:03 PM

## 2022-09-18 VITALS
RESPIRATION RATE: 18 BRPM | WEIGHT: 167.9 LBS | BODY MASS INDEX: 26.35 KG/M2 | SYSTOLIC BLOOD PRESSURE: 152 MMHG | HEART RATE: 67 BPM | HEIGHT: 67 IN | DIASTOLIC BLOOD PRESSURE: 90 MMHG | OXYGEN SATURATION: 94 % | TEMPERATURE: 97.8 F

## 2022-09-18 LAB
ABSOLUTE EOS #: 0 K/UL (ref 0–0.4)
ABSOLUTE LYMPH #: 0.6 K/UL (ref 1–4.8)
ABSOLUTE MONO #: 0.3 K/UL (ref 0.1–1.3)
ANION GAP SERPL CALCULATED.3IONS-SCNC: 8 MMOL/L (ref 9–17)
BASOPHILS # BLD: 0 % (ref 0–2)
BASOPHILS ABSOLUTE: 0 K/UL (ref 0–0.2)
BUN BLDV-MCNC: 18 MG/DL (ref 6–20)
CALCIUM SERPL-MCNC: 8.8 MG/DL (ref 8.6–10.4)
CHLORIDE BLD-SCNC: 109 MMOL/L (ref 98–107)
CO2: 24 MMOL/L (ref 20–31)
CREAT SERPL-MCNC: 0.65 MG/DL (ref 0.5–0.9)
EOSINOPHILS RELATIVE PERCENT: 0 % (ref 0–4)
GFR AFRICAN AMERICAN: >60 ML/MIN
GFR NON-AFRICAN AMERICAN: >60 ML/MIN
GFR SERPL CREATININE-BSD FRML MDRD: ABNORMAL ML/MIN/{1.73_M2}
GLUCOSE BLD-MCNC: 123 MG/DL (ref 70–99)
HCT VFR BLD CALC: 36.9 % (ref 36–46)
HEMOGLOBIN: 12.3 G/DL (ref 12–16)
LYMPHOCYTES # BLD: 9 % (ref 24–44)
MCH RBC QN AUTO: 28.3 PG (ref 26–34)
MCHC RBC AUTO-ENTMCNC: 33.2 G/DL (ref 31–37)
MCV RBC AUTO: 85.1 FL (ref 80–100)
MONOCYTES # BLD: 5 % (ref 1–7)
PDW BLD-RTO: 14.6 % (ref 11.5–14.9)
PLATELET # BLD: 151 K/UL (ref 150–450)
PMV BLD AUTO: 9.2 FL (ref 6–12)
POTASSIUM SERPL-SCNC: 4.3 MMOL/L (ref 3.7–5.3)
RBC # BLD: 4.33 M/UL (ref 4–5.2)
SEG NEUTROPHILS: 86 % (ref 36–66)
SEGMENTED NEUTROPHILS ABSOLUTE COUNT: 5.8 K/UL (ref 1.3–9.1)
SODIUM BLD-SCNC: 141 MMOL/L (ref 135–144)
WBC # BLD: 6.7 K/UL (ref 3.5–11)

## 2022-09-18 PROCEDURE — 2580000003 HC RX 258: Performed by: NURSE PRACTITIONER

## 2022-09-18 PROCEDURE — 36415 COLL VENOUS BLD VENIPUNCTURE: CPT

## 2022-09-18 PROCEDURE — 94761 N-INVAS EAR/PLS OXIMETRY MLT: CPT

## 2022-09-18 PROCEDURE — 99239 HOSP IP/OBS DSCHRG MGMT >30: CPT | Performed by: INTERNAL MEDICINE

## 2022-09-18 PROCEDURE — 6370000000 HC RX 637 (ALT 250 FOR IP): Performed by: NURSE PRACTITIONER

## 2022-09-18 PROCEDURE — 6360000002 HC RX W HCPCS: Performed by: INTERNAL MEDICINE

## 2022-09-18 PROCEDURE — 85025 COMPLETE CBC W/AUTO DIFF WBC: CPT

## 2022-09-18 PROCEDURE — 80048 BASIC METABOLIC PNL TOTAL CA: CPT

## 2022-09-18 PROCEDURE — 94640 AIRWAY INHALATION TREATMENT: CPT

## 2022-09-18 PROCEDURE — 6360000002 HC RX W HCPCS: Performed by: NURSE PRACTITIONER

## 2022-09-18 RX ORDER — DEXAMETHASONE 1.5 MG/1
1 TABLET ORAL ONCE AS NEEDED
Qty: 1 EACH | Refills: 0 | Status: ON HOLD | OUTPATIENT
Start: 2022-09-18 | End: 2022-10-25 | Stop reason: HOSPADM

## 2022-09-18 RX ORDER — DOXYCYCLINE HYCLATE 100 MG
100 TABLET ORAL 2 TIMES DAILY
Qty: 14 TABLET | Refills: 0 | Status: SHIPPED | OUTPATIENT
Start: 2022-09-18 | End: 2022-09-25

## 2022-09-18 RX ADMIN — DEXAMETHASONE SODIUM PHOSPHATE 4 MG: 4 INJECTION, SOLUTION INTRAMUSCULAR; INTRAVENOUS at 00:31

## 2022-09-18 RX ADMIN — LISINOPRIL 5 MG: 5 TABLET ORAL at 09:12

## 2022-09-18 RX ADMIN — GABAPENTIN 800 MG: 100 CAPSULE ORAL at 09:11

## 2022-09-18 RX ADMIN — BUDESONIDE AND FORMOTEROL FUMARATE DIHYDRATE 2 PUFF: 160; 4.5 AEROSOL RESPIRATORY (INHALATION) at 08:09

## 2022-09-18 RX ADMIN — IPRATROPIUM BROMIDE AND ALBUTEROL SULFATE 1 AMPULE: 2.5; .5 SOLUTION RESPIRATORY (INHALATION) at 11:37

## 2022-09-18 RX ADMIN — ENOXAPARIN SODIUM 40 MG: 100 INJECTION SUBCUTANEOUS at 09:10

## 2022-09-18 RX ADMIN — PREGABALIN 300 MG: 150 CAPSULE ORAL at 09:11

## 2022-09-18 RX ADMIN — DEXAMETHASONE SODIUM PHOSPHATE 4 MG: 4 INJECTION, SOLUTION INTRAMUSCULAR; INTRAVENOUS at 05:38

## 2022-09-18 RX ADMIN — IPRATROPIUM BROMIDE AND ALBUTEROL SULFATE 1 AMPULE: 2.5; .5 SOLUTION RESPIRATORY (INHALATION) at 08:06

## 2022-09-18 RX ADMIN — HYDROCODONE BITARTRATE AND ACETAMINOPHEN 1 TABLET: 5; 325 TABLET ORAL at 07:02

## 2022-09-18 RX ADMIN — SODIUM CHLORIDE: 9 INJECTION, SOLUTION INTRAVENOUS at 02:35

## 2022-09-18 RX ADMIN — SODIUM CHLORIDE, PRESERVATIVE FREE 10 ML: 5 INJECTION INTRAVENOUS at 09:16

## 2022-09-18 ASSESSMENT — PAIN DESCRIPTION - DESCRIPTORS: DESCRIPTORS: ACHING;CRAMPING;CRUSHING

## 2022-09-18 ASSESSMENT — PAIN SCALES - GENERAL
PAINLEVEL_OUTOF10: 8
PAINLEVEL_OUTOF10: 4

## 2022-09-18 ASSESSMENT — PAIN DESCRIPTION - LOCATION: LOCATION: GROIN;LEG

## 2022-09-18 NOTE — CARE COORDINATION
Continuity of Care Form    Patient Name: Brendan Valerio   :  1965  MRN:  [id number]    Admit date:  9/15/2022  Discharge date:  22    Code Status Order: Full Code   Advance Directives:     Admitting Physician:  oJ Glez MD  PCP: Micah Moore MD    Discharging Nurse: [facility] Unit/Room#: 2121/2121-01  Discharging Unit Phone Number: [phone]    Emergency Contact:   Extended Emergency Contact Information  Primary Emergency Contact: Jaskaran [address] Phone: [phone]  Relation: Child    Past Surgical History:  Past Surgical History:   Procedure Laterality Date    ABDOMEN SURGERY      bowel obstruction OR    BUNIONECTOMY Left     CHOLECYSTECTOMY      COLONOSCOPY  2007    no gross pathology seen in the colon and also 3-4 inches of the ileum    COLONOSCOPY  10/12/2017    normal    COLONOSCOPY  10/25/2021    COLONOSCOPY WITH BIOPSY performed by Salvador Rebolledo MD at [facility]    COLONOSCOPY  10/25/2021    COLONOSCOPY POLYPECTOMY REMOVAL SNARE performed by Salvador Rebolledo MD at [address] [place] [address] ENDOSCOPY  10/25/2021    EGD BIOPSY performed by Salvador Rebolledo MD at [facility]       Immunization History:   Immunization History   Administered Date(s) Administered    COVID-19, PFIZER GRAY top, DO NOT Dilute, (age 15 y+), IM, 30 mcg/0.3 mL 07/15/2022    COVID-19, PFIZER PURPLE top, DILUTE for use, (age 15 y+), 30mcg/0.3mL 2021, 2021, 2021    Influenza, FLUARIX, FLULAVAL, FLUZONE (age 10 mo+) AND AFLURIA, (age 1 y+), PF, 0.5mL 2017, 10/20/2021    Pneumococcal Polysaccharide ([id number]) 2015       Active Problems:  Patient Active Problem List   Diagnosis Code    Gastroesophageal reflux disease without esophagitis K21.9    Gastroenteritis K52.9    Ileus ([facility] [address] K56.7    Pancreatitis, acute K85.90    Drug abuse ([facility] [address] F19.10 COPD (chronic obstructive pulmonary disease) ([facility]) J44.9    Hypertension I10    Hypokalemia E87.6    Hypothyroidism due to acquired atrophy of thyroid E03.4    Crohn disease ([address] K50.90    Acute cystitis without hematuria N30.00    Accidental drug overdose T50.901A    Prolonged Q-T interval on ECG R94.31    Bipolar disorder, unspecified ([address] F31.9    Polysubstance abuse ([address] F19.10    Alcohol intoxication delirium ([address] F10.121    Cocaine abuse ([address] F14.10    Acute respiratory failure with hypoxia ([address] J96.01    Bipolar 1 disorder ([address] F31.9    Diarrhea R19.7    Chronic bronchitis ([address] J42    Chronic renal insufficiency, stage III (moderate) ([facility]) N18.30    Chronic diarrhea K52.9    Anxiety F41.9    Osteoarthritis resulting from left hip dysplasia M16.32    Shortness of breath R06.02    Elevated brain natriuretic peptide (BNP) level R79.89    Pneumonia of both lower lobes due to infectious organism J18.9    Non-intractable vomiting with nausea R11.2    Acute hypokalemia E87.6    Hypomagnesemia E83.42    Intractable vomiting R11.10    Abdominal pain R10.9    Nausea vomiting and diarrhea R11.2, R19.7    Falls frequently R29.6    Orthostatic hypotension I95.1    Hypotension I95.9    Radial nerve palsy, right G56.31    COPD with acute exacerbation ([facility]) J44.1    COPD exacerbation ([facility]) J44.1    Chronic pain disorder G89.4       Isolation/Infection:   Isolation            C Diff Contact          Patient Infection Status       Infection Onset Added Last Indicated Last Indicated By Review Planned Expiration Resolved Resolved By    None active    Resolved    C-diff Rule Out 09/16/22 09/16/22 09/16/22 C DIFF TOXIN/ANTIGEN (Ordered)   09/16/22 Rule-Out Test Resulted    COVID-19 (Rule Out) 09/15/22 09/15/22 09/15/22 COVID-19 & Influenza Combo (Ordered)   09/15/22 Rule-Out Test Resulted    COVID-19 (Rule Out) 08/20/22 08/20/22 08/20/22 Respiratory Panel, Molecular, with COVID-19 (Restricted: peds pts or suitable admitted adults) (Ordered)   08/22/22 Sayda Romero RN    C-diff Rule Out 08/20/22 08/20/22 08/21/22 Gastrointestinal Panel, Molecular (Ordered)   08/22/22 Rule-Out Test Resulted    COVID-19 (Rule Out) 08/20/22 08/20/22 08/20/22 COVID-19, Rapid (Ordered)   08/20/22 Rule-Out Test Resulted    COVID-19 (Rule Out) 05/18/22 05/18/22 05/18/22 COVID-19, Rapid (Ordered)   05/18/22 Rule-Out Test Resulted    C-diff Rule Out  10/20/21 10/20/21 Prema Sales RN   10/20/21 Rule-Out Test Resulted    C-diff Rule Out 10/16/21 10/16/21 10/20/21 C DIFF TOXIN/ANTIGEN (Ordered)   10/19/21 Prema Sales RN    GI Panel    COVID-19 (Rule Out) 10/15/21 10/15/21 10/16/21 COVID-19, Rapid (Ordered)   10/16/21 Rule-Out Test Resulted    C-diff Rule Out 09/17/21 09/17/21 09/19/21 Gastrointestinal Panel, Molecular (Ordered)   09/20/21 Rule-Out Test Resulted    COVID-19 (Rule Out) 09/17/21 09/17/21 09/17/21 Respiratory Panel, Molecular, with COVID-19 (Restricted: peds pts or suitable admitted adults) (Ordered)   09/17/21 Rule-Out Test Resulted    COVID-19 (Rule Out) 09/17/21 09/17/21 09/17/21 COVID-19, Rapid (Ordered)   09/17/21 Rule-Out Test Resulted            Nurse Assessment:  Last Vital Signs: /67   Pulse 81   Temp 98.4 °F (36.9 °C)   Resp 18   Ht 5' 6.5\" (1.689 m)   Wt 161 lb (73 kg)   SpO2 94%   BMI 25.60 kg/m²     Last documented pain score (0-10 scale): Pain Level: 6  Last Weight:   Wt Readings from Last 1 Encounters:   09/17/22 161 lb (73 kg)     Mental Status:  oriented and alert    IV Access:  - None    Nursing Mobility/ADLs:  Walking   Assisted  Transfer  Independent  Bathing  Independent  Dressing  Independent  [address]  Independent  Med Delivery   whole    Wound Care Documentation and Therapy:        Elimination:  Continence:    Bowel: Yes  Bladder: Yes  Urinary Catheter: None   Colostomy/Ileostomy/Ileal Conduit: No       Date of Last BM: 09/17/2022    Intake/Output Summary (Last 24 hours) at 9/17/2022 1405  Last data filed at 9/17/2022 0549  Gross per 24 hour   Intake 360 ml   Output 475 ml   Net -115 ml     I/O last 3 completed shifts: In: 480 [P.O.:480]  Out: 475 [Urine:475]    Safety Concerns: At Risk for Falls    Impairments/Disabilities:      None    Nutrition Therapy:  Current Nutrition Therapy:   - Oral Diet:  General    Routes of Feeding: Oral  Liquids: No Restrictions  Daily Fluid Restriction: no  Last Modified Barium Swallow with Video (Video Swallowing Test): not done    Treatments at the Time of Hospital Discharge:   Respiratory Treatments: nebulizer  Oxygen Therapy:  is not on home oxygen therapy. Ventilator:    - No ventilator support    Rehab Therapies: Physical Therapy and Occupational Therapy  Weight Bearing Status/Restrictions: No weight bearing restrictions  Other Medical Equipment (for information only, NOT a DME order):  motorized scooter  Other Treatments: Skilled Nursing assessment and monitoring. Medication education and monitoring per protocol. Patient's personal belongings (please select all that are sent with patient):  Glasses, Dentures lower    RN SIGNATURE:  Electronically signed by Marisel Tan RN on 9/18/22 at 12:40 PM EDT    CASE MANAGEMENT/SOCIAL WORK SECTION    Inpatient Status Date: 9/15/22    Readmission Risk Assessment Score:  Readmission Risk              Risk of Unplanned Readmission:  40           Discharging to Facility/ [facility]  Phone [phone]  Fax [phone]     Dialysis Facility (if applicable)   Name:  Address:  Dialysis Schedule:  Phone:  Fax:    / signature: Electronically signed by Gurjit Rodriguez RN on 9/17/22 at 2:06 PM EDT    PHYSICIAN SECTION    Prognosis: Fair    Condition at Discharge: Stable    Rehab Potential (if transferring to Rehab):  Fair    Recommended Labs or Other Treatments After Discharge:     Physician Certification: I certify the above information and transfer of Loni Rosas  is necessary for the continuing treatment of the diagnosis listed and that she requires [address] for less 30 days. Update Admission H&P: No change in H&P    PHYSICIAN SIGNATURE:  Electronically signed by Joseph Hanson MD on 9/18/22 at 12:15 PM EDT      START taking these medications    START taking these medications   Dexamethasone 1.5 MG (21) Tbpk  Commonly known as: DexPak 6 Day  Take 1 kit by mouth 1 (one) time if needed (as advised)   doxycycline hyclate 100 MG tablet  Commonly known as: VIBRA-TABS  Take 1 tablet by mouth 2 times daily for 7 days     CONTINUE taking these medications    CONTINUE taking these medications   albuterol sulfate  (90 Base) MCG/ACT inhaler  Commonly known as: PROVENTIL;VENTOLIN;PROAIR  Inhale 2 puffs into the lungs every 6 hours as needed for Wheezing   budesonide-formoterol 160-4.5 MCG/ACT Aero  Commonly known as: Symbicort  Inhale 2 puffs into the lungs 2 times daily   diphenhydrAMINE 25 MG tablet  Commonly known as: BENADRYL  Take 25 mg by mouth nightly as needed   gabapentin 400 MG capsule  Commonly known as: NEURONTIN  Take 800 mg by mouth 2 times daily.    Handicap Placard Misc  by Does not apply route Duration 5 years   HYDROcodone-acetaminophen 5-325 MG per tablet  Commonly known as: NORCO  TAKE 1 TABLET BY MOUTH EVERY 8 HOURS AS NEEDED FOR PAIN FOR UP TO 14 DAYS.   lidocaine 5 %  Commonly known as: LIDODERM  Place 1 patch onto the skin daily as needed for Pain 12 hours on, 12 hours off.   lisinopril 5 MG tablet  Commonly known as: PRINIVIL;ZESTRIL  Take 1 tablet by mouth daily   LYRICA PO  Take 300 mg by mouth 2 times daily   REFRESH OP  Place 1 drop into both eyes 3 times daily   SUMAtriptan 100 MG tablet  Commonly known as: IMITREX  Take 100 mg by mouth once as needed for Migraine   traZODone 150 MG tablet  Commonly known as: DESYREL  Take 300 mg by mouth nightly     STOP taking these medications    STOP taking these medications   ibuprofen 400 MG tablet  Commonly known as: IBU   nicotine 21 MG/24HR  Commonly known as: NICODERM CQ   topiramate 100 MG tablet  Commonly known as: TOPAMAX     ASK your doctor about these medications    ASK your doctor about these medications   acetaminophen 500 MG tablet  Commonly known as: TYLENOL  Take 2 tablets by mouth 3 times daily

## 2022-09-18 NOTE — PLAN OF CARE
Problem: Discharge Planning  Goal: Discharge to home or other facility with appropriate resources  9/18/2022 0339 by Ever Flores RN  Outcome: Progressing     Problem: Pain  Goal: Verbalizes/displays adequate comfort level or baseline comfort level  9/18/2022 0339 by Ever Flores RN  Outcome: Progressing  Note: Pt medicated with pain medication prn. Assessed all pain characteristics including level, type, location, frequency, and onset. Non-pharmacologic interventions offered to pt as well. Pt states pain is tolerable at this time. Will continue to monitor. Problem: Safety - Adult  Goal: Free from fall injury  9/18/2022 0339 by Ever Flores RN  Outcome: Progressing  Note: No falls noted this shift. Patient ambulates with x1 staff assistance without difficulty. Bed kept in low position. Safe environment maintained. Bedside table & call light in reach. Uses call light appropriately when needing assistance.         Problem: Chronic Conditions and Co-morbidities  Goal: Patient's chronic conditions and co-morbidity symptoms are monitored and maintained or improved  9/18/2022 0339 by Ever Flores RN  Outcome: Progressing

## 2022-09-18 NOTE — PLAN OF CARE
Problem: Discharge Planning  Goal: Discharge to home or other facility with appropriate resources  9/18/2022 1230 by Noah Bush RN  Outcome: Adequate for Discharge  9/18/2022 0339 by Keri Hemphill RN  Outcome: Progressing     Problem: Pain  Goal: Verbalizes/displays adequate comfort level or baseline comfort level  9/18/2022 1230 by Noah Bush RN  Outcome: Adequate for Discharge  9/18/2022 0339 by Keri Hemphill RN  Outcome: Progressing  Note: Pt medicated with pain medication prn. Assessed all pain characteristics including level, type, location, frequency, and onset. Non-pharmacologic interventions offered to pt as well. Pt states pain is tolerable at this time. Will continue to monitor. Problem: Safety - Adult  Goal: Free from fall injury  9/18/2022 1230 by Noah Bush RN  Outcome: Adequate for Discharge  9/18/2022 0339 by Keri Hemphill RN  Outcome: Progressing  Note: No falls noted this shift. Patient ambulates with x1 staff assistance without difficulty. Bed kept in low position. Safe environment maintained. Bedside table & call light in reach. Uses call light appropriately when needing assistance. Problem: Skin/Tissue Integrity  Goal: Absence of new skin breakdown  Description: 1. Monitor for areas of redness and/or skin breakdown  2. Assess vascular access sites hourly  3. Every 4-6 hours minimum:  Change oxygen saturation probe site  4. Every 4-6 hours:  If on nasal continuous positive airway pressure, respiratory therapy assess nares and determine need for appliance change or resting period.   9/18/2022 1230 by Noah Bush RN  Outcome: Adequate for Discharge  9/18/2022 1459 by Keri Hemphill RN  Outcome: Progressing     Problem: Chronic Conditions and Co-morbidities  Goal: Patient's chronic conditions and co-morbidity symptoms are monitored and maintained or improved  9/18/2022 1230 by Noah Bush RN  Outcome: Adequate for

## 2022-09-18 NOTE — PROGRESS NOTES
Patient taking self outside per wheelchair. Patient educated on harmfulness of smoking and encouraged not to smoke.

## 2022-09-18 NOTE — PROGRESS NOTES
...Discharge teaching and instructions for diagnosis of COPD completed with patient using teachback method. AVS reviewed. Printed prescriptions given to patient. Patient voiced understanding regarding prescriptions, follow up appointments, and care of self at home. Discharged in a wheelchair to  home with support per  taxi.

## 2022-09-18 NOTE — PROGRESS NOTES
[facility]  Moon Ellington MD; Trinidad Hinojosa MD; Valentino Stevenson MD; MD Erick Bill MD; Renella Boeck, MD    [facility]   [facility]     Progress Note    9/17/2022    8:59 PM    Name:   Asia Godinez  MRN:     [id number]     Acct:      [de-identified]   Room:   2121/2121-01  IP Day:  2  Admit Date:  9/15/2022  7:17 PM    PCP:   Travis Ambrocio MD  Code Status:  Full Code    Subjective:     C/C:   Chief Complaint   Patient presents with    Shortness of Breath    Cough    Nausea     Interval History Status: significantly improved. Seen and examined face-to-face today,  Still feels short of breath,  Wheezing,  Will get home oxygen eval      Brief History:     Asia Godinez is a 61 y.o. Non- / non  female who presents with Shortness of Breath, Cough, and Nausea and is admitted to the hospital for the management of COPD exacerbation ([facility] [address] Medical history includes COPD, HTN, for thyroidism, history of cocaine abuse and chronic pain. According to patient, he has been feeling ill for the past 2 days with symptoms of increased cough, creased sputum production and increased shortness of breath. She also reports nausea and vomiting, intermittent fever with body aches for the past several days. Denies chest pain, abdominal pain and urinary symptoms. Symptoms are aggravated by increased activity Symptoms are alleviated by IV steroids, oxygen and breathing treatments/ Symptoms are reported as acute, constant and moderately severe. CXR in ED negative for acute process, no suspicion for PE. Respiratory status improved with IV steroids and breathing treatments. Patient continued to endorse increased shortness of breath and having difficulty ambulating without oxygen levels dropping below 90%. He does not have home O2 or a home nebulizer.     Review of Systems:     Shortness of breath and wheezing    Medications: Allergies: Allergies   Allergen Reactions    Azithromycin Other (See Comments)     'It doesn't work\"    Methylprednisolone      Elevates blood pressure    Penicillins Swelling     throat    Tape [Adhesive Tape] Other (See Comments)     \" Tears my skin off\"       Current Meds:   Scheduled Meds:    dexamethasone  4 mg IntraVENous Q6H    budesonide-formoterol  2 puff Inhalation BID    gabapentin  800 mg Oral BID    lidocaine  1 patch Topical Daily    lisinopril  5 mg Oral Daily    pregabalin  300 mg Oral BID    traZODone  300 mg Oral Nightly    sodium chloride flush  5-40 mL IntraVENous 2 times per day    enoxaparin  40 mg SubCUTAneous Daily    ipratropium-albuterol  1 ampule Inhalation Q4H WA     Continuous Infusions:    sodium chloride      sodium chloride 75 mL/hr at 09/16/22 2239     PRN Meds: calcium carbonate, sodium chloride, ipratropium-albuterol, albuterol sulfate HFA, diphenhydrAMINE, HYDROcodone-acetaminophen, sodium chloride flush, ondansetron **OR** ondansetron, polyethylene glycol, acetaminophen **OR** acetaminophen, albuterol    Data:     Past Medical History:   has a past medical history of Acid reflux, Anxiety, Arthritis, Asthma, Bipolar 1 disorder ([address] Bowel obstruction ([facility] [address] COPD (chronic obstructive pulmonary disease) ([facility] [address] Crohn disease ([facility] [address] Depression, Dry eye, Fibromyalgia, Gout, Headache, Hyperlipidemia, Hypertension, Hypothyroidism, Kidney stones, Muscle spasm, Neuropathy, Pain, Personal history of other medical treatment, Wears partial dentures, and Wellness examination. Social History:   reports that she has been smoking cigarettes. She has a 18.50 pack-year smoking history. She quit smokeless tobacco use about 21 years ago. Her smokeless tobacco use included chew. She reports that she does not currently use alcohol. She reports current drug use. Drug: Marijuana Berneta Malik).      Family History:   Family History   Problem Relation Age of Onset    Diabetes Father     Lung Cancer Father     Hypertension Mother     Cancer Mother     High Blood Pressure Mother     Crohn's Disease Brother     Heart Disease Brother        Vitals:  /66   Pulse 70   Temp 98.2 °F (36.8 °C) (Oral)   Resp 18   Ht 5' 6.5\" (1.689 m)   Wt 161 lb (73 kg)   SpO2 94%   BMI 25.60 kg/m²   Temp (24hrs), Av.2 °F (36.8 °C), Min:97.5 °F (36.4 °C), Max:98.7 °F (37.1 °C)    No results for input(s): POCGLU in the last 72 hours. I/O (24Hr): Intake/Output Summary (Last 24 hours) at 2022  Last data filed at 2022 0549  Gross per 24 hour   Intake 360 ml   Output 375 ml   Net -15 ml       Labs:  Hematology:  Recent Labs     09/15/22  2000 09/16/22  0538 09/17/22  0549   WBC 5.1 4.2 7.1   RBC 4.84 4.82 4.28   HGB 13.6 13.8 12.3   HCT 40.5 40.7 36.3   MCV 83.6 84.5 85.0   MCH 28.1 28.6 28.7   MCHC 33.5 33.9 33.8   RDW 14.4 14.2 14.5    203 162   MPV 8.9 9.0 9.5     Chemistry:  Recent Labs     09/15/22  2000 09/15/22  2132 09/16/22  0538 09/17/22  0549     --  141 141   K 3.5*  --  3.8 3.9     --  104 107   CO2 25  --  23 23   GLUCOSE 111*  --  145* 131*   BUN 10  --  14 17   CREATININE 0.57  --  0.55 0.53   MG 2.0  --   --   --    ANIONGAP 13  --  14 11   LABGLOM >60  --  >60 >60   GFRAA >60  --  >60 >60   CALCIUM 9.5  --  9.3 8.5*   TROPHS 9 9  --   --      Recent Labs     09/15/22  2000   PROT 6.9   LABALBU 4.1   AST 12   ALT 10   ALKPHOS 125*   BILITOT 0.6     ABG:  Lab Results   Component Value Date/Time    FIO2 INFORMATION NOT PROVIDED 2021 01:39 PM     Lab Results   Component Value Date/Time    SPECIAL 2 ML LEFT WRIST 2022 11:39 AM     Lab Results   Component Value Date/Time    CULTURE NO GROWTH 5 DAYS 2022 11:39 AM       Radiology:  XR CHEST PORTABLE    Result Date: 9/15/2022  No acute process.        Physical Examination:        General appearance:  alert, cooperative and no distress  Mental Status:  oriented to person, place and time and normal affect  Lungs: Coarse breath sounds bilateral, wheezing all over  Heart:  regular rate and rhythm, no murmur  Abdomen:  soft, nontender, nondistended, normal bowel sounds, no masses, hepatomegaly, splenomegaly  Extremities:  no edema, redness, tenderness in the calves  Skin:  no gross lesions, rashes, induration    Assessment:        Hospital Problems             Last Modified POA    * (Principal) COPD exacerbation ([address] 9/15/2022 Yes    Chronic pain disorder 9/16/2022 Yes    Hypertension 9/16/2022 Yes    Shortness of breath 9/16/2022 Yes       Plan:        39-year-old female with underlying history of more than 50-pack-year history of smoking, hypertension, hyperlipidemia, chronic pain, current active smoker, COPD, on home inhalers, has been feeling sick for last couple days, presented with acute respiratory failure needing oxygen,  Acute hypoxic respiratory failure,  COPD exacerbation,  Chronic pain,  Polysubstance abuse,  Continue breathing treatments, antibiotics, watch for any withdrawals,    Disposition possible discharge next morning    Edison Sifuentes MD  9/17/2022  8:59 PM     Please note that this chart was generated using voice recognition Dragon dictation software. Although every effort was made to ensure the accuracy of this automated transcription, some errors in transcription may have occurred.

## 2022-09-18 NOTE — PROGRESS NOTES
Home Oxygen Evaluation    Home Oxygen Evaluation completed. On RA at arrival    Resting SpO2 on room air = 96%    SpO2 on room air with exercise = 92%    Pt does NOT qualify for home O2 at this time. Nocturnal Oximetry with patient on room air is recommended is SpO2 is between 89% and 95% (requires additional order).     Verna Ramirez RCP  11:30 AM

## 2022-09-19 DIAGNOSIS — M16.32 OSTEOARTHRITIS RESULTING FROM LEFT HIP DYSPLASIA: ICD-10-CM

## 2022-09-19 RX ORDER — HYDROCODONE BITARTRATE AND ACETAMINOPHEN 5; 325 MG/1; MG/1
1 TABLET ORAL EVERY 8 HOURS PRN
Qty: 42 TABLET | Refills: 0 | Status: SHIPPED | OUTPATIENT
Start: 2022-09-19 | End: 2022-10-07

## 2022-09-23 ENCOUNTER — HOSPITAL ENCOUNTER (EMERGENCY)
Age: 57
Discharge: HOME | End: 2022-09-23
Attending: EMERGENCY MEDICINE
Payer: MEDICARE

## 2022-09-23 VITALS
SYSTOLIC BLOOD PRESSURE: 113 MMHG | OXYGEN SATURATION: 96 % | HEART RATE: 51 BPM | TEMPERATURE: 97.5 F | RESPIRATION RATE: 15 BRPM | DIASTOLIC BLOOD PRESSURE: 75 MMHG

## 2022-09-23 DIAGNOSIS — M79.604 BILATERAL LOWER EXTREMITY PAIN: Primary | ICD-10-CM

## 2022-09-23 DIAGNOSIS — M79.605 BILATERAL LOWER EXTREMITY PAIN: Primary | ICD-10-CM

## 2022-09-23 LAB
ABSOLUTE EOS #: 0.21 K/UL (ref 0–0.44)
ABSOLUTE IMMATURE GRANULOCYTE: <0.03 K/UL (ref 0–0.3)
ABSOLUTE LYMPH #: 1.73 K/UL (ref 1.1–3.7)
ABSOLUTE MONO #: 0.57 K/UL (ref 0.1–1.2)
ANION GAP SERPL CALCULATED.3IONS-SCNC: 7 MMOL/L (ref 9–17)
BASOPHILS # BLD: 0 % (ref 0–2)
BASOPHILS ABSOLUTE: <0.03 K/UL (ref 0–0.2)
BUN BLDV-MCNC: 7 MG/DL (ref 6–20)
C-REACTIVE PROTEIN: 31.8 MG/L (ref 0–5)
CALCIUM SERPL-MCNC: 8.2 MG/DL (ref 8.6–10.4)
CHLORIDE BLD-SCNC: 104 MMOL/L (ref 98–107)
CO2: 28 MMOL/L (ref 20–31)
CREAT SERPL-MCNC: 0.66 MG/DL (ref 0.5–0.9)
EOSINOPHILS RELATIVE PERCENT: 3 % (ref 1–4)
GFR AFRICAN AMERICAN: >60 ML/MIN
GFR NON-AFRICAN AMERICAN: >60 ML/MIN
GFR SERPL CREATININE-BSD FRML MDRD: ABNORMAL ML/MIN/{1.73_M2}
GLUCOSE BLD-MCNC: 101 MG/DL (ref 70–99)
HCT VFR BLD CALC: 35.5 % (ref 36.3–47.1)
HEMOGLOBIN: 11.8 G/DL (ref 11.9–15.1)
IMMATURE GRANULOCYTES: 0 %
LYMPHOCYTES # BLD: 23 % (ref 24–43)
MCH RBC QN AUTO: 28.6 PG (ref 25.2–33.5)
MCHC RBC AUTO-ENTMCNC: 33.2 G/DL (ref 28.4–34.8)
MCV RBC AUTO: 86 FL (ref 82.6–102.9)
MONOCYTES # BLD: 8 % (ref 3–12)
NRBC AUTOMATED: 0 PER 100 WBC
PDW BLD-RTO: 13.7 % (ref 11.8–14.4)
PLATELET # BLD: 230 K/UL (ref 138–453)
PMV BLD AUTO: 10.9 FL (ref 8.1–13.5)
POTASSIUM SERPL-SCNC: 3.4 MMOL/L (ref 3.7–5.3)
PRO-BNP: 207 PG/ML
RBC # BLD: 4.13 M/UL (ref 3.95–5.11)
SEDIMENTATION RATE, ERYTHROCYTE: 9 MM/HR (ref 0–30)
SEG NEUTROPHILS: 66 % (ref 36–65)
SEGMENTED NEUTROPHILS ABSOLUTE COUNT: 4.87 K/UL (ref 1.5–8.1)
SODIUM BLD-SCNC: 139 MMOL/L (ref 135–144)
TROPONIN, HIGH SENSITIVITY: 18 NG/L (ref 0–14)
WBC # BLD: 7.4 K/UL (ref 3.5–11.3)

## 2022-09-23 PROCEDURE — 84484 ASSAY OF TROPONIN QUANT: CPT

## 2022-09-23 PROCEDURE — 86140 C-REACTIVE PROTEIN: CPT

## 2022-09-23 PROCEDURE — 99284 EMERGENCY DEPT VISIT MOD MDM: CPT

## 2022-09-23 PROCEDURE — 85025 COMPLETE CBC W/AUTO DIFF WBC: CPT

## 2022-09-23 PROCEDURE — 80048 BASIC METABOLIC PNL TOTAL CA: CPT

## 2022-09-23 PROCEDURE — 93970 EXTREMITY STUDY: CPT

## 2022-09-23 PROCEDURE — 83880 ASSAY OF NATRIURETIC PEPTIDE: CPT

## 2022-09-23 PROCEDURE — 85652 RBC SED RATE AUTOMATED: CPT

## 2022-09-23 RX ORDER — ACETAMINOPHEN 500 MG
1000 TABLET ORAL ONCE
Status: DISCONTINUED | OUTPATIENT
Start: 2022-09-23 | End: 2022-09-24 | Stop reason: HOSPADM

## 2022-09-23 ASSESSMENT — ENCOUNTER SYMPTOMS
COUGH: 0
BACK PAIN: 0
ABDOMINAL PAIN: 0
SORE THROAT: 0
VOMITING: 0
SHORTNESS OF BREATH: 0

## 2022-09-23 NOTE — ED PROVIDER NOTES
[facility] ED  Emergency Department Encounter  Emergency Medicine Resident     Pt Alana Ross Micki Govea  MRN: [id number]  [place] 1965  Date of evaluation: 9/23/22  PCP:  Micah Moore MD      [address] [place] [address]       Chief Complaint   Patient presents with    Leg Pain     X3 DAYS, STATES SHE CANT WALK ON IT      HISTORY OF PRESENT ILLNESS  (Location/Symptom, Timing/Onset, Context/Setting, Quality, Duration, Modifying Factors, Severity.)      Brendan Nephew is a 61 y.o. female who presents with bilateral lower extremity pain for the last 3 days. Patient denies injury, states that she is not sure why she is hurting, however it is hurting from \"her ankles to the thigh\" and she \"has a bad hip\". Patient denies chest pain, shortness of breath, abdominal pain, nausea or vomiting. Denies IVDU. No diabetes. Patient does have a history of drug abuse on discharge, however patient denies use at this time. No history of DVT/PE. Denies fevers, chills. PAST MEDICAL / SURGICAL / SOCIAL / FAMILY HISTORY      has a past medical history of Acid reflux, Anxiety, Arthritis, Asthma, Bipolar 1 disorder ([address] Bowel obstruction ([address] COPD (chronic obstructive pulmonary disease) ([address] Crohn disease ([address] Depression, Dry eye, Fibromyalgia, Gout, Headache, Hyperlipidemia, Hypertension, Hypothyroidism, Kidney stones, Muscle spasm, Neuropathy, Pain, Personal history of other medical treatment, Wears partial dentures, and Wellness examination. has a past surgical history that includes Tonsillectomy and adenoidectomy; Tubal ligation; Cholecystectomy; Bunionectomy (Left); Colonoscopy (04/30/2007); Colonoscopy (10/12/2017); Abdomen surgery; Upper gastrointestinal endoscopy (10/25/2021); Colonoscopy (10/25/2021); and Colonoscopy (10/25/2021).       Social History     Socioeconomic History    Marital status: Single     Spouse name: Not on file    Number of children: Not on file    Years of education: Not on file    Highest education level: Not on file   Occupational History    Not on file   Tobacco Use    Smoking status: Every Day     Packs/day: 0.50     Years: 37.00     Pack years: 18.50     Types: Cigarettes    Smokeless tobacco: Former     Types: Chew     Quit date: 9/3/2001   Vaping Use    Vaping Use: Former    Quit date: 9/3/2019    Substances: Nicotine   Substance and Sexual Activity    Alcohol use: Not Currently    Drug use: Yes     Types: Marijuana (Weed)     Comment: h/o of substance abuse but denies drug use    Sexual activity: Not on file   Other Topics Concern    Not on file   Social History Narrative    Not on file     Social Determinants of Health     Financial Resource Strain: Not on file   Food Insecurity: Not on file   Transportation Needs: Not on file   Physical Activity: Not on file   Stress: Not on file   Social Connections: Not on file   Intimate Partner Violence: Not on file   Housing Stability: Not on file       Family History   Problem Relation Age of Onset    Diabetes Father     Lung Cancer Father     Hypertension Mother     Cancer Mother     High Blood Pressure Mother     Crohn's Disease Brother     Heart Disease Brother        Allergies:  Azithromycin, Methylprednisolone, Penicillins, and Tape [adhesive tape]    Home Medications:  Prior to Admission medications    Medication Sig Start Date End Date Taking? Authorizing Provider   HYDROcodone-acetaminophen (NORCO) 5-325 MG per tablet TAKE 1 TABLET BY MOUTH EVERY 8 HOURS AS NEEDED FOR PAIN FOR UP TO 14 DAYS.  9/19/22 10/3/22  Winnie Brooks MD   Dexamethasone (DEXPAK 6 DAY) 1.5 MG (21) TBPK Take 1 kit by mouth 1 (one) time if needed (as advised) 9/18/22   Cong Chamorro MD   doxycycline hyclate (VIBRA-TABS) 100 MG tablet Take 1 tablet by mouth 2 times daily for 7 days 9/18/22 9/25/22  Cong Chamorro MD   traZODone (DESYREL) 150 MG tablet Take 300 mg by mouth nightly    Historical Provider, MD   Handicap Placard [facility] by Does not apply route Duration 5 years 5/17/22   Machelle Negron MD   Pregabalin (LYRICA PO) Take 300 mg by mouth 2 times daily    Historical Provider, MD   ibuprofen (IBU) 400 MG tablet Take 1 tablet by mouth every 6 hours as needed for Pain 5/15/22 9/18/22  Luke Villarreal DO   albuterol sulfate HFA (PROVENTIL;VENTOLIN;PROAIR) 108 (90 Base) MCG/ACT inhaler Inhale 2 puffs into the lungs every 6 hours as needed for Wheezing    Historical Provider, MD   diphenhydrAMINE (BENADRYL) 25 MG tablet Take 25 mg by mouth nightly as needed    Historical Provider, MD   SUMAtriptan (IMITREX) 100 MG tablet Take 100 mg by mouth once as needed for Migraine    Historical Provider, MD   gabapentin (NEURONTIN) 400 MG capsule Take 800 mg by mouth 2 times daily. Historical Provider, MD   budesonide-formoterol (SYMBICORT) 160-4.5 MCG/ACT AERO Inhale 2 puffs into the lungs 2 times daily 9/19/21   Trell Jade MD   lisinopril (PRINIVIL;ZESTRIL) 5 MG tablet Take 1 tablet by mouth daily 9/20/21   Trell Jade MD   nicotine (NICODERM CQ) 21 MG/24HR Place 1 patch onto the skin daily  Patient not taking: Reported on 8/20/2022 9/20/21 8/23/22  Trell Jade MD   acetaminophen (TYLENOL) 500 MG tablet Take 2 tablets by mouth 3 times daily  Patient taking differently: Take 1,000 mg by mouth 3 times daily as needed 7/17/21   Peggy Luo DO   Polyvinyl Alcohol-Povidone (REFRESH OP) Place 1 drop into both eyes 3 times daily    Historical Provider, MD   lidocaine (LIDODERM) 5 % Place 1 patch onto the skin daily as needed for Pain 12 hours on, 12 hours off. Historical Provider, MD       REVIEW OF SYSTEMS    (2-9 systems for level 4, 10 or more for level 5)      Review of Systems   Constitutional:  Negative for chills and fever. HENT:  Negative for sore throat. Eyes:  Negative for visual disturbance. Respiratory:  Negative for cough and shortness of breath. Cardiovascular:  Negative for chest pain.    Gastrointestinal:  Negative for abdominal pain and vomiting. Endocrine: Negative for polyuria. Genitourinary:  Negative for dysuria and hematuria. Musculoskeletal:  Negative for back pain. Neurological:  Negative for light-headedness and headaches. Psychiatric/Behavioral:  Negative for confusion. PHYSICAL EXAM   (up to 7 for level 4, 8 or more for level 5)      INITIAL VITALS:   /75   Pulse 51   Temp 97.5 °F (36.4 °C) (Oral)   Resp 15   SpO2 96%     Physical Exam  Constitutional:       Appearance: Normal appearance. HENT:      Head: Normocephalic. Nose: Nose normal.      Mouth/Throat:      Mouth: Mucous membranes are moist.      Pharynx: Oropharynx is clear. Eyes:      Extraocular Movements: Extraocular movements intact. Pupils: Pupils are equal, round, and reactive to light. Cardiovascular:      Rate and Rhythm: Normal rate and regular rhythm. Pulses: Normal pulses. Heart sounds: Normal heart sounds. Pulmonary:      Effort: Pulmonary effort is normal.      Breath sounds: Normal breath sounds. Abdominal:      Palpations: Abdomen is soft. Tenderness: no abdominal tenderness There is no right CVA tenderness or left CVA tenderness. Musculoskeletal:         General: Tenderness (Patient is grossly tender to palpation of the entirety of bilateral lower extremities) present. Right lower leg: No edema. Left lower leg: No edema. Comments: Distal pulses intact, sensation is intact, moving bilateral lower extremities without issues  No tenderness of the spine   Neurological:      Mental Status: She is alert.        DIFFERENTIAL  DIAGNOSIS     PLAN (LABS / IMAGING / EKG):  Orders Placed This Encounter   Procedures    VL DUP LOWER EXTREMITY VENOUS BILATERAL    CBC with Auto Differential    Basic Metabolic Panel    Sedimentation Rate    C-Reactive Protein    Brain Natriuretic Peptide    Troponin       MEDICATIONS ORDERED:  Orders Placed This Encounter   Medications    acetaminophen (TYLENOL) tablet 1,000 mg     DDX: DVT, cellulitis, osteomyelitis, superficial thrombophlebitis, infection, inflammatory syndrome, musculoskeletal pain    DIAGNOSTIC RESULTS / EMERGENCY DEPARTMENT COURSE / MDM   LAB RESULTS:  Results for orders placed or performed during the hospital encounter of 09/23/22   CBC with Auto Differential   Result Value Ref Range    WBC 7.4 3.5 - 11.3 k/uL    RBC 4.13 3.95 - 5.11 m/uL    Hemoglobin 11.8 (L) 11.9 - 15.1 g/dL    Hematocrit 35.5 (L) 36.3 - 47.1 %    MCV 86.0 82.6 - 102.9 fL    MCH 28.6 25.2 - 33.5 pg    MCHC 33.2 28.4 - 34.8 g/dL    RDW 13.7 11.8 - 14.4 %    Platelets 412 910 - 309 k/uL    MPV 10.9 8.1 - 13.5 fL    NRBC Automated 0.0 0.0 per 100 WBC    Seg Neutrophils 66 (H) 36 - 65 %    Lymphocytes 23 (L) 24 - 43 %    Monocytes 8 3 - 12 %    Eosinophils % 3 1 - 4 %    Basophils 0 0 - 2 %    Immature Granulocytes 0 0 %    Segs Absolute 4.87 1.50 - 8.10 k/uL    Absolute Lymph # 1.73 1.10 - 3.70 k/uL    Absolute Mono # 0.57 0.10 - 1.20 k/uL    Absolute Eos # 0.21 0.00 - 0.44 k/uL    Basophils Absolute <0.03 0.00 - 0.20 k/uL    Absolute Immature Granulocyte <0.03 0.00 - 0.30 k/uL   Basic Metabolic Panel   Result Value Ref Range    Glucose 101 (H) 70 - 99 mg/dL    BUN 7 6 - 20 mg/dL    Creatinine 0.66 0.50 - 0.90 mg/dL    Calcium 8.2 (L) 8.6 - 10.4 mg/dL    Sodium 139 135 - 144 mmol/L    Potassium 3.4 (L) 3.7 - 5.3 mmol/L    Chloride 104 98 - 107 mmol/L    CO2 28 20 - 31 mmol/L    Anion Gap 7 (L) 9 - 17 mmol/L    GFR Non-African American >60 >60 mL/min    GFR African American >60 >60 mL/min    GFR Comment         Sedimentation Rate   Result Value Ref Range    Sed Rate 9 0 - 30 mm/Hr   C-Reactive Protein   Result Value Ref Range    CRP 31.8 (H) 0.0 - 5.0 mg/L   Brain Natriuretic Peptide   Result Value Ref Range    Pro- <300 pg/mL   Troponin   Result Value Ref Range    Troponin, High Sensitivity 18 (H) 0 - 14 ng/L       IMPRESSION: 79-year-old lady presents to the emergency department with bilateral lower extremity pain for the last 3 days, denies injuries. Denies IVDU. Able to ambulate. Vital signs stable, afebrile and nontachycardic. Physical exam grossly unremarkable, no bilateral calf swelling or tenderness, distal pulses are intact, neurovascularly intact. No significant bony tenderness to palpation. Tylenol given. Labs grossly unremarkable. Cardiac work-up stable. DVT study unremarkable. Discussed with patient with regards to follow-up with primary care physician and for strict return precautions. Patient verbalized agreement or standing. Stable for discharge. RADIOLOGY:  VL DUP LOWER EXTREMITY VENOUS BILATERAL    (Results Pending)     EMERGENCY DEPARTMENT COURSE:  ED Course as of 09/23/22 2158   Fri Sep 23, 2022   2155 DVT study neg [EM]      ED Course User Index  [EM] Bharathi Manley MD       No notes of EC Admission Criteria type on file. PROCEDURES:  None    CONSULTS:  None    FINAL IMPRESSION      1.  Bilateral lower extremity pain          DISPOSITION / PLAN     DISPOSITION Decision To Discharge 09/23/2022 09:55:40 PM      PATIENT REFERRED TO:  Marielena Capone, [address]  [phone]    Schedule an appointment as soon as possible for a visit   For follow up    [facility] ED  [address] [id number]  [phone]  Go to   As needed    [address]:  New Prescriptions    No medications on file       Bharathi Manley MD  Emergency Medicine Resident    (Please note that portions of thisnote were completed with a voice recognition program.  Efforts were made to edit the dictations but occasionally words are mis-transcribed.)        Bharathi Manley MD  Resident  09/23/22 2158

## 2022-09-23 NOTE — ED NOTES
Pt presents to the ED via Medic with c/o of leg pain x3days. Pt states she had leg pain x3 days. Denies any falls or recent trauma. Pt was recently discharged from [facility].   Upon arrival pt is extremely sleeping. Pt is arrousable to to voice but slow to respond. Pt states that she took two 800mg gabapentin pta. Pt denies taking any narcotic medications. Pt received a script for hydrocodone from [facility] as well as has frequent refill requests for such from orthopedics. Pt states pain is 10/10 but falls asleep as writer is speaking with patient. Pt placed on full cardiac monitor, Call light in reach, white board updated.        Zane Bains, RN  09/23/22 [address] [place] [address] Street, RN  09/23/22 2000

## 2022-09-23 NOTE — DISCHARGE INSTRUCTIONS
For pain use acetaminophen (Tylenol) or ibuprofen (Motrin / Advil), unless prescribed medications that have acetaminophen or ibuprofen (or similar medications) in it. You can take over the counter acetaminophen tablets (1 - 2 tablets of the 500-mg strength every 6 hours) or ibuprofen tablets (2 tablets every 4 hours). PLEASE RETURN TO THE EMERGENCY DEPARTMENT IMMEDIATELY for worsening symptoms of shortness of breath, wheezing, change in the amount of sputum that you cough up or a change in the color of your sputum, using your inhaler more frequently or if your inhaler only lasts up to 2 hours, or if you develop any concerning symptoms such as: high fever not relieved by acetaminophen (Tylenol) and/or ibuprofen (Motrin / Advil), chills, shortness of breath, chest pain, feeling of your heart fluttering or racing, persistent nausea and/or vomiting, vomiting up blood, blood in your stool, loss of consciousness, numbness, weakness or tingling in the arms or legs or change in color of the extremities, changes in mental status, persistent headache, blurry vision, loss of bladder / bowel control, unable to follow up with your physician, or other any other care or concern.

## 2022-09-23 NOTE — ED PROVIDER NOTES
[facility] ED     Emergency Department     Faculty Attestation        I performed a history and physical examination of the patient and discussed management with the resident. I reviewed the residents note and agree with the documented findings and plan of care. Any areas of disagreement are noted on the chart. I was personally present for the key portions of any procedures. I have documented in the chart those procedures where I was not present during the key portions. I have reviewed the emergency nurses triage note. I agree with the chief complaint, past medical history, past surgical history, allergies, medications, social and family history as documented unless otherwise noted below. For mid-level providers such as nurse practitioners as well as physicians assistants:    I have personally seen and evaluated the patient. I find the patient's history and physical exam are consistent with NP/PA documentation. I agree with the care provided, treatment rendered, disposition, & follow-up plan. Additional findings are as noted. Vital Signs: /70   Pulse 54   Temp 97.5 °F (36.4 °C) (Oral)   Resp 15   SpO2 97%   PCP:  Brooke Abreu MD    Pertinent Comments:     Patient presents with bilateral leg pain that is been present for the past 3 days she complains of pain throughout the entirety of both lower extremities. She has no midline back tenderness no bowel or bladder incontinence. He has 5 out of 5 strength but is having trouble ambulating secondary to pain, not weakness.       Critical Care  None          Edwin Park MD    Attending Emergency Medicine Physician            Solis Bassett MD  09/23/22 Gokul Owusu MD  09/23/22 [id number]

## 2022-10-04 NOTE — DISCHARGE SUMMARY
[facility]  Tim Ruiz MD; Jane Small MD; Jossy Sewell MD; MD Ronaldo Fritz MD; Mignon Lackey MD      [facility]  [facility]    Discharge Summary     Patient ID: Aurelia Correa  :  1965   MRN: [id number]     ACCOUNT:  [de-identified]   Patient's PCP: Dottie Wiley MD  Admit Date: 9/15/2022   Discharge Date: 22    Length of Stay: 3  Code Status:  Prior  Admitting Physician: Ronaldo Gotti MD  Discharge Physician: Mignon Lackey MD     Active Discharge Diagnoses:     Hospital Problem Lists:  Principal Problem:    COPD exacerbation ([facility].)  Active Problems:    Chronic pain disorder    Hypertension    Shortness of breath  Resolved Problems:    * No resolved hospital problems.  *      Admission Condition:  serious     Discharged Condition: stable    Hospital Stay:     Hospital Course:  Aurelia Correa is a 61 y.o. female who was admitted for the management of   [facility] , presented to ER with Shortness of Breath, Cough, and Nausea    62-year-old female with underlying history of more than 50-pack-year history of smoking, hypertension, hyperlipidemia, chronic pain, current active smoker, COPD, on home inhalers, has been feeling sick for last couple days, presented with acute respiratory failure needing oxygen,  Acute hypoxic respiratory failure,  COPD exacerbation,  Chronic pain,  Polysubstance abuse,  Continue breathing treatments, antibiotics, watch for any withdrawals,  Improved with treatments   Dc home     Review of system:  Denies any nausea vomiting fever chills,  Denies any headaches or blurred vision,  Denies any cough phlegm hemoptysis,  Denies any abdominal pain diarrhea constipation,  Denies any tingling tingling numbness weakness of arms or legs,   Skin no rash,    On examination,  Alert awake oriented x3,  S1-S2 present,  wheezing bilateral,  Abdomen soft nontender nondistended bowel sounds present   Extremity no edema no calf tenderness,,  Skin no rash  CNS no focal neurological deficits        Significant therapeutic interventions:     Significant Diagnostic Studies:   Labs / Micro:  CBC:   Lab Results   Component Value Date/Time    WBC 7.4 09/23/2022 06:36 PM    RBC 4.13 09/23/2022 06:36 PM    RBC 4.50 05/28/2012 02:46 PM    HGB 11.8 09/23/2022 06:36 PM    HCT 35.5 09/23/2022 06:36 PM    MCV 86.0 09/23/2022 06:36 PM    MCH 28.6 09/23/2022 06:36 PM    MCHC 33.2 09/23/2022 06:36 PM    RDW 13.7 09/23/2022 06:36 PM     09/23/2022 06:36 PM     05/28/2012 02:46 PM     BMP:    Lab Results   Component Value Date/Time    GLUCOSE 101 09/23/2022 06:36 PM    GLUCOSE 125 05/28/2012 02:46 PM     09/23/2022 06:36 PM    K 3.4 09/23/2022 06:36 PM     09/23/2022 06:36 PM    CO2 28 09/23/2022 06:36 PM    ANIONGAP 7 09/23/2022 06:36 PM    BUN 7 09/23/2022 06:36 PM    CREATININE 0.66 09/23/2022 06:36 PM    BUNCRER 20 05/18/2022 04:26 PM    CALCIUM 8.2 09/23/2022 06:36 PM    LABGLOM >60 09/23/2022 06:36 PM    GFRAA >60 09/23/2022 06:36 PM    GFR      09/23/2022 06:36 PM     HFP:    Lab Results   Component Value Date/Time    PROT 6.9 09/15/2022 08:00 PM     CMP:    Lab Results   Component Value Date/Time    GLUCOSE 101 09/23/2022 06:36 PM    GLUCOSE 125 05/28/2012 02:46 PM     09/23/2022 06:36 PM    K 3.4 09/23/2022 06:36 PM     09/23/2022 06:36 PM    CO2 28 09/23/2022 06:36 PM    BUN 7 09/23/2022 06:36 PM    CREATININE 0.66 09/23/2022 06:36 PM    ANIONGAP 7 09/23/2022 06:36 PM    ALKPHOS 125 09/15/2022 08:00 PM    ALT 10 09/15/2022 08:00 PM    AST 12 09/15/2022 08:00 PM    BILITOT 0.6 09/15/2022 08:00 PM    LABALBU 4.1 09/15/2022 08:00 PM    LABALBU 4.4 05/28/2012 02:46 PM    ALBUMIN 1.4 03/25/2022 11:42 AM    LABGLOM >60 09/23/2022 06:36 PM    GFRAA >60 09/23/2022 06:36 PM    GFR      09/23/2022 06:36 PM    PROT 6.9 1 (one) time if needed (as advised)            CONTINUE taking these medications      albuterol sulfate  (90 Base) MCG/ACT inhaler  Commonly known as: PROVENTIL;VENTOLIN;PROAIR     budesonide-formoterol 160-4.5 MCG/ACT Aero  Commonly known as: Symbicort  Inhale 2 puffs into the lungs 2 times daily     diphenhydrAMINE 25 MG tablet  Commonly known as: BENADRYL     gabapentin 400 MG capsule  Commonly known as: NEURONTIN     Handicap Placard Misc  by Does not apply route Duration 5 years     lidocaine 5 %  Commonly known as: LIDODERM     lisinopril 5 MG tablet  Commonly known as: PRINIVIL;ZESTRIL  Take 1 tablet by mouth daily     LYRICA PO     REFRESH OP     SUMAtriptan 100 MG tablet  Commonly known as: IMITREX     traZODone 150 MG tablet  Commonly known as: DESYREL            STOP taking these medications      HYDROcodone-acetaminophen 5-325 MG per tablet  Commonly known as: NORCO     ibuprofen 400 MG tablet  Commonly known as: IBU     nicotine 21 MG/24HR  Commonly known as: NICODERM CQ     topiramate 100 MG tablet  Commonly known as: TOPAMAX            ASK your doctor about these medications      acetaminophen 500 MG tablet  Commonly known as: TYLENOL  Take 2 tablets by mouth 3 times daily     doxycycline hyclate 100 MG tablet  Commonly known as: VIBRA-TABS  Take 1 tablet by mouth 2 times daily for 7 days  Ask about: Should I take this medication?                Where to Get Your Medications        These medications were sent to [address] [place] [address], [address] [facility] [address]      Phone: [phone]   Dexamethasone 1.5 MG (21) Tbpk  doxycycline hyclate 100 MG tablet         Discharge Procedure Orders   External Referral To Home Health   Referral Priority: Routine Referral Type: Home Health Care   Referral Reason: Specialty Services Required   Requested Specialty: [address]   Number of Visits Requested: 1       Time Spent on discharge

## 2022-10-07 DIAGNOSIS — M16.32 OSTEOARTHRITIS RESULTING FROM LEFT HIP DYSPLASIA: ICD-10-CM

## 2022-10-07 RX ORDER — HYDROCODONE BITARTRATE AND ACETAMINOPHEN 5; 325 MG/1; MG/1
1 TABLET ORAL EVERY 8 HOURS PRN
Qty: 42 TABLET | Refills: 0 | Status: SHIPPED | OUTPATIENT
Start: 2022-10-07 | End: 2022-10-25

## 2022-10-07 NOTE — TELEPHONE ENCOUNTER
Patient is requesting a refill of Norco  Medication is pended. No future appointment with this patient are scheduled with clinic.

## 2022-10-20 DIAGNOSIS — M16.32 OSTEOARTHRITIS RESULTING FROM LEFT HIP DYSPLASIA: ICD-10-CM

## 2022-10-20 NOTE — TELEPHONE ENCOUNTER
Patient is requesting a refill of Norco  Medication is pended.   No future appointment with this patient are scheduled with clinic

## 2022-10-21 NOTE — TELEPHONE ENCOUNTER
Attempted to contact patient to let her know her medication was sent to the pharmacy and inquire if she ever got the clearance for her left hip replacement. No answer, will attempt to contact pt again.

## 2022-10-22 ENCOUNTER — APPOINTMENT (OUTPATIENT)
Dept: CT IMAGING | Age: 57
DRG: 351 | End: 2022-10-22
Payer: MEDICARE

## 2022-10-22 ENCOUNTER — APPOINTMENT (OUTPATIENT)
Dept: GENERAL RADIOLOGY | Age: 57
DRG: 351 | End: 2022-10-22
Payer: MEDICARE

## 2022-10-22 ENCOUNTER — HOSPITAL ENCOUNTER (INPATIENT)
Age: 57
LOS: 3 days | Discharge: HOME HEALTH | DRG: 351 | End: 2022-10-25
Attending: STUDENT IN AN ORGANIZED HEALTH CARE EDUCATION/TRAINING PROGRAM | Admitting: INTERNAL MEDICINE
Payer: MEDICARE

## 2022-10-22 DIAGNOSIS — R55 SYNCOPE AND COLLAPSE: Primary | ICD-10-CM

## 2022-10-22 PROBLEM — M62.82 RHABDOMYOLYSIS: Status: ACTIVE | Noted: 2022-10-22

## 2022-10-22 LAB
ABSOLUTE EOS #: 0.1 K/UL (ref 0–0.4)
ABSOLUTE LYMPH #: 1.7 K/UL (ref 1–4.8)
ABSOLUTE MONO #: 0.7 K/UL (ref 0.1–1.3)
ANION GAP SERPL CALCULATED.3IONS-SCNC: 12 MMOL/L (ref 9–17)
BASOPHILS # BLD: 1 % (ref 0–2)
BASOPHILS ABSOLUTE: 0.1 K/UL (ref 0–0.2)
BUN BLDV-MCNC: 6 MG/DL (ref 6–20)
CALCIUM SERPL-MCNC: 9.3 MG/DL (ref 8.6–10.4)
CHLORIDE BLD-SCNC: 100 MMOL/L (ref 98–107)
CO2: 25 MMOL/L (ref 20–31)
CREAT SERPL-MCNC: 0.64 MG/DL (ref 0.5–0.9)
EOSINOPHILS RELATIVE PERCENT: 1 % (ref 0–4)
GFR SERPL CREATININE-BSD FRML MDRD: >60 ML/MIN/1.73M2
GLUCOSE BLD-MCNC: 83 MG/DL (ref 70–99)
HCT VFR BLD CALC: 41.4 % (ref 36–46)
HEMOGLOBIN: 13.7 G/DL (ref 12–16)
LYMPHOCYTES # BLD: 24 % (ref 24–44)
MCH RBC QN AUTO: 28.3 PG (ref 26–34)
MCHC RBC AUTO-ENTMCNC: 33.1 G/DL (ref 31–37)
MCV RBC AUTO: 85.6 FL (ref 80–100)
MONOCYTES # BLD: 9 % (ref 1–7)
PDW BLD-RTO: 15.4 % (ref 11.5–14.9)
PLATELET # BLD: 271 K/UL (ref 150–450)
PMV BLD AUTO: 9.1 FL (ref 6–12)
POTASSIUM SERPL-SCNC: 3.7 MMOL/L (ref 3.7–5.3)
RBC # BLD: 4.84 M/UL (ref 4–5.2)
SEG NEUTROPHILS: 65 % (ref 36–66)
SEGMENTED NEUTROPHILS ABSOLUTE COUNT: 4.7 K/UL (ref 1.3–9.1)
SODIUM BLD-SCNC: 137 MMOL/L (ref 135–144)
TOTAL CK: 3264 U/L (ref 26–192)
TROPONIN, HIGH SENSITIVITY: 46 NG/L (ref 0–14)
TROPONIN, HIGH SENSITIVITY: 67 NG/L (ref 0–14)
WBC # BLD: 7.2 K/UL (ref 3.5–11)

## 2022-10-22 PROCEDURE — 72128 CT CHEST SPINE W/O DYE: CPT

## 2022-10-22 PROCEDURE — 94640 AIRWAY INHALATION TREATMENT: CPT

## 2022-10-22 PROCEDURE — 6360000002 HC RX W HCPCS: Performed by: STUDENT IN AN ORGANIZED HEALTH CARE EDUCATION/TRAINING PROGRAM

## 2022-10-22 PROCEDURE — 6370000000 HC RX 637 (ALT 250 FOR IP): Performed by: INTERNAL MEDICINE

## 2022-10-22 PROCEDURE — 70450 CT HEAD/BRAIN W/O DYE: CPT

## 2022-10-22 PROCEDURE — 80048 BASIC METABOLIC PNL TOTAL CA: CPT

## 2022-10-22 PROCEDURE — 72125 CT NECK SPINE W/O DYE: CPT

## 2022-10-22 PROCEDURE — 36415 COLL VENOUS BLD VENIPUNCTURE: CPT

## 2022-10-22 PROCEDURE — 2500000003 HC RX 250 WO HCPCS: Performed by: STUDENT IN AN ORGANIZED HEALTH CARE EDUCATION/TRAINING PROGRAM

## 2022-10-22 PROCEDURE — 6360000004 HC RX CONTRAST MEDICATION: Performed by: STUDENT IN AN ORGANIZED HEALTH CARE EDUCATION/TRAINING PROGRAM

## 2022-10-22 PROCEDURE — 1200000000 HC SEMI PRIVATE

## 2022-10-22 PROCEDURE — 82550 ASSAY OF CK (CPK): CPT

## 2022-10-22 PROCEDURE — 2580000003 HC RX 258: Performed by: STUDENT IN AN ORGANIZED HEALTH CARE EDUCATION/TRAINING PROGRAM

## 2022-10-22 PROCEDURE — 93005 ELECTROCARDIOGRAM TRACING: CPT | Performed by: STUDENT IN AN ORGANIZED HEALTH CARE EDUCATION/TRAINING PROGRAM

## 2022-10-22 PROCEDURE — 84484 ASSAY OF TROPONIN QUANT: CPT

## 2022-10-22 PROCEDURE — 6360000002 HC RX W HCPCS: Performed by: INTERNAL MEDICINE

## 2022-10-22 PROCEDURE — 71260 CT THORAX DX C+: CPT

## 2022-10-22 PROCEDURE — 72131 CT LUMBAR SPINE W/O DYE: CPT

## 2022-10-22 PROCEDURE — 85025 COMPLETE CBC W/AUTO DIFF WBC: CPT

## 2022-10-22 PROCEDURE — 99285 EMERGENCY DEPT VISIT HI MDM: CPT

## 2022-10-22 PROCEDURE — 73502 X-RAY EXAM HIP UNI 2-3 VIEWS: CPT

## 2022-10-22 RX ORDER — SODIUM CHLORIDE 9 MG/ML
INJECTION, SOLUTION INTRAVENOUS PRN
Status: DISCONTINUED | OUTPATIENT
Start: 2022-10-22 | End: 2022-10-25 | Stop reason: HOSPADM

## 2022-10-22 RX ORDER — SODIUM CHLORIDE 0.9 % (FLUSH) 0.9 %
5-40 SYRINGE (ML) INJECTION PRN
Status: DISCONTINUED | OUTPATIENT
Start: 2022-10-22 | End: 2022-10-25 | Stop reason: SDUPTHER

## 2022-10-22 RX ORDER — ENOXAPARIN SODIUM 100 MG/ML
40 INJECTION SUBCUTANEOUS DAILY
Status: DISCONTINUED | OUTPATIENT
Start: 2022-10-22 | End: 2022-10-25 | Stop reason: HOSPADM

## 2022-10-22 RX ORDER — LISINOPRIL 5 MG/1
5 TABLET ORAL DAILY
Status: DISCONTINUED | OUTPATIENT
Start: 2022-10-22 | End: 2022-10-25 | Stop reason: HOSPADM

## 2022-10-22 RX ORDER — HYDROCODONE BITARTRATE AND ACETAMINOPHEN 5; 325 MG/1; MG/1
1 TABLET ORAL EVERY 6 HOURS PRN
Status: ON HOLD | COMMUNITY
End: 2022-11-05

## 2022-10-22 RX ORDER — 0.9 % SODIUM CHLORIDE 0.9 %
1000 INTRAVENOUS SOLUTION INTRAVENOUS ONCE
Status: COMPLETED | OUTPATIENT
Start: 2022-10-22 | End: 2022-10-22

## 2022-10-22 RX ORDER — ONDANSETRON 4 MG/1
4 TABLET, ORALLY DISINTEGRATING ORAL EVERY 8 HOURS PRN
Status: DISCONTINUED | OUTPATIENT
Start: 2022-10-22 | End: 2022-10-25 | Stop reason: HOSPADM

## 2022-10-22 RX ORDER — SODIUM CHLORIDE 0.9 % (FLUSH) 0.9 %
10 SYRINGE (ML) INJECTION PRN
Status: DISCONTINUED | OUTPATIENT
Start: 2022-10-22 | End: 2022-10-25 | Stop reason: HOSPADM

## 2022-10-22 RX ORDER — BUDESONIDE AND FORMOTEROL FUMARATE DIHYDRATE 160; 4.5 UG/1; UG/1
2 AEROSOL RESPIRATORY (INHALATION) 2 TIMES DAILY
Status: DISCONTINUED | OUTPATIENT
Start: 2022-10-22 | End: 2022-10-25 | Stop reason: HOSPADM

## 2022-10-22 RX ORDER — ACETAMINOPHEN 325 MG/1
650 TABLET ORAL EVERY 6 HOURS PRN
Status: DISCONTINUED | OUTPATIENT
Start: 2022-10-22 | End: 2022-10-25 | Stop reason: HOSPADM

## 2022-10-22 RX ORDER — SODIUM CHLORIDE 0.9 % (FLUSH) 0.9 %
5-40 SYRINGE (ML) INJECTION EVERY 12 HOURS SCHEDULED
Status: DISCONTINUED | OUTPATIENT
Start: 2022-10-22 | End: 2022-10-25 | Stop reason: HOSPADM

## 2022-10-22 RX ORDER — POTASSIUM CHLORIDE 7.45 MG/ML
10 INJECTION INTRAVENOUS PRN
Status: DISCONTINUED | OUTPATIENT
Start: 2022-10-22 | End: 2022-10-25 | Stop reason: HOSPADM

## 2022-10-22 RX ORDER — 0.9 % SODIUM CHLORIDE 0.9 %
100 INTRAVENOUS SOLUTION INTRAVENOUS ONCE
Status: COMPLETED | OUTPATIENT
Start: 2022-10-22 | End: 2022-10-22

## 2022-10-22 RX ORDER — ALBUTEROL SULFATE 90 UG/1
2 AEROSOL, METERED RESPIRATORY (INHALATION) EVERY 6 HOURS PRN
Status: DISCONTINUED | OUTPATIENT
Start: 2022-10-22 | End: 2022-10-25 | Stop reason: HOSPADM

## 2022-10-22 RX ORDER — SODIUM CHLORIDE 0.9 % (FLUSH) 0.9 %
5-40 SYRINGE (ML) INJECTION EVERY 12 HOURS SCHEDULED
Status: DISCONTINUED | OUTPATIENT
Start: 2022-10-22 | End: 2022-10-25 | Stop reason: SDUPTHER

## 2022-10-22 RX ORDER — POLYETHYLENE GLYCOL 3350 17 G/17G
17 POWDER, FOR SOLUTION ORAL DAILY PRN
Status: DISCONTINUED | OUTPATIENT
Start: 2022-10-22 | End: 2022-10-25 | Stop reason: HOSPADM

## 2022-10-22 RX ORDER — ACETAMINOPHEN 650 MG/1
650 SUPPOSITORY RECTAL EVERY 6 HOURS PRN
Status: DISCONTINUED | OUTPATIENT
Start: 2022-10-22 | End: 2022-10-25 | Stop reason: HOSPADM

## 2022-10-22 RX ORDER — ONDANSETRON 2 MG/ML
4 INJECTION INTRAMUSCULAR; INTRAVENOUS EVERY 6 HOURS PRN
Status: DISCONTINUED | OUTPATIENT
Start: 2022-10-22 | End: 2022-10-22 | Stop reason: SDUPTHER

## 2022-10-22 RX ORDER — FENTANYL CITRATE 50 UG/ML
25 INJECTION, SOLUTION INTRAMUSCULAR; INTRAVENOUS ONCE
Status: COMPLETED | OUTPATIENT
Start: 2022-10-22 | End: 2022-10-22

## 2022-10-22 RX ORDER — POTASSIUM CHLORIDE 20 MEQ/1
40 TABLET, EXTENDED RELEASE ORAL PRN
Status: DISCONTINUED | OUTPATIENT
Start: 2022-10-22 | End: 2022-10-25 | Stop reason: HOSPADM

## 2022-10-22 RX ORDER — MAGNESIUM SULFATE 1 G/100ML
1000 INJECTION INTRAVENOUS PRN
Status: DISCONTINUED | OUTPATIENT
Start: 2022-10-22 | End: 2022-10-25 | Stop reason: HOSPADM

## 2022-10-22 RX ORDER — ACETAMINOPHEN 325 MG/1
650 TABLET ORAL EVERY 4 HOURS PRN
Status: DISCONTINUED | OUTPATIENT
Start: 2022-10-22 | End: 2022-10-22

## 2022-10-22 RX ORDER — ENOXAPARIN SODIUM 100 MG/ML
40 INJECTION SUBCUTANEOUS DAILY
Status: DISCONTINUED | OUTPATIENT
Start: 2022-10-22 | End: 2022-10-22 | Stop reason: SDUPTHER

## 2022-10-22 RX ORDER — ONDANSETRON 2 MG/ML
4 INJECTION INTRAMUSCULAR; INTRAVENOUS EVERY 6 HOURS PRN
Status: DISCONTINUED | OUTPATIENT
Start: 2022-10-22 | End: 2022-10-25 | Stop reason: HOSPADM

## 2022-10-22 RX ORDER — CLONAZEPAM 0.5 MG/1
0.5 TABLET ORAL 2 TIMES DAILY PRN
Status: ON HOLD | COMMUNITY
End: 2022-10-25 | Stop reason: HOSPADM

## 2022-10-22 RX ORDER — ONDANSETRON 4 MG/1
4 TABLET, ORALLY DISINTEGRATING ORAL EVERY 8 HOURS PRN
Status: DISCONTINUED | OUTPATIENT
Start: 2022-10-22 | End: 2022-10-22 | Stop reason: SDUPTHER

## 2022-10-22 RX ADMIN — DEXTROSE AND SODIUM CHLORIDE: 5; 450 INJECTION, SOLUTION INTRAVENOUS at 22:19

## 2022-10-22 RX ADMIN — FOLIC ACID: 5 INJECTION, SOLUTION INTRAMUSCULAR; INTRAVENOUS; SUBCUTANEOUS at 18:38

## 2022-10-22 RX ADMIN — ENOXAPARIN SODIUM 40 MG: 100 INJECTION SUBCUTANEOUS at 22:02

## 2022-10-22 RX ADMIN — BUDESONIDE AND FORMOTEROL FUMARATE DIHYDRATE 2 PUFF: 160; 4.5 AEROSOL RESPIRATORY (INHALATION) at 21:10

## 2022-10-22 RX ADMIN — SODIUM CHLORIDE 100 ML: 9 INJECTION, SOLUTION INTRAVENOUS at 14:29

## 2022-10-22 RX ADMIN — SODIUM CHLORIDE, PRESERVATIVE FREE 10 ML: 5 INJECTION INTRAVENOUS at 14:29

## 2022-10-22 RX ADMIN — SODIUM CHLORIDE 1000 ML: 9 INJECTION, SOLUTION INTRAVENOUS at 14:34

## 2022-10-22 RX ADMIN — LISINOPRIL 5 MG: 5 TABLET ORAL at 22:03

## 2022-10-22 RX ADMIN — FENTANYL CITRATE 25 MCG: 50 INJECTION, SOLUTION INTRAMUSCULAR; INTRAVENOUS at 14:34

## 2022-10-22 RX ADMIN — ACETAMINOPHEN 650 MG: 325 TABLET, FILM COATED ORAL at 22:03

## 2022-10-22 RX ADMIN — IOPAMIDOL 75 ML: 755 INJECTION, SOLUTION INTRAVENOUS at 14:28

## 2022-10-22 ASSESSMENT — ENCOUNTER SYMPTOMS
PHOTOPHOBIA: 0
VOMITING: 0
RHINORRHEA: 0
SHORTNESS OF BREATH: 0
COUGH: 0
NAUSEA: 0
ABDOMINAL PAIN: 1
BACK PAIN: 1

## 2022-10-22 ASSESSMENT — PAIN SCALES - GENERAL: PAINLEVEL_OUTOF10: 3

## 2022-10-22 NOTE — PROGRESS NOTES
Pt arrived to the floor at shift change. Pt was assessed by other nursing staff. Shift report given to Briana Jc.

## 2022-10-22 NOTE — ED PROVIDER NOTES
[facility]  Emergency Department Encounter  Emergency Medicine Physician     Pt Name: Maribel Hayden  MRN: [id number]  [place] 1965  Date of evaluation: 10/22/22  PCP:  Lily Vega MD    [address] [place] [address]       Chief Complaint   Patient presents with    Hip Pain    Head Injury       HISTORY OF PRESENT ILLNESS  (Location/Symptom, Timing/Onset, Context/Setting, Quality, Duration, Modifying Factors, Severity.)    Maribel Hayden is a 61 y.o. female who presents with left-sided hip pain, possible head injury. Patient states that she believes that she fell a few days ago but does not remember falling. She believes that she hit her head but does not know if she hit her head. She also states that since that time she has been having some worsening left-sided hip pain. She states that she has previous left hip injuries to the point where she thinks it needs to be replaced but has unable to schedule yet. Endorses headache, neck pain, back pain, chest pain, abdominal pain, and left-sided hip pain. Denies any pain to her upper extremities or anything to the right lower extremity. Patient believes that she lost consciousness. She is not sure where she fell or how long she might of been unconscious all that she remembers is that she woke up on her bed. She denies any alcohol or other illicit substance use. PAST MEDICAL / SURGICAL / SOCIAL / FAMILY HISTORY    has a past medical history of Acid reflux, Anxiety, Arthritis, Asthma, Bipolar 1 disorder ([address] Bowel obstruction ([address] COPD (chronic obstructive pulmonary disease) ([address] Crohn disease ([address] Depression, Dry eye, Fibromyalgia, Gout, Headache, Hyperlipidemia, Hypertension, Hypothyroidism, Kidney stones, Muscle spasm, Neuropathy, Pain, Personal history of other medical treatment, Wears partial dentures, and Wellness examination. has a past surgical history that includes Tonsillectomy and adenoidectomy;  Tubal ligation; Cholecystectomy; Bunionectomy (Left); Colonoscopy (04/30/2007); Colonoscopy (10/12/2017); Abdomen surgery; Upper gastrointestinal endoscopy (10/25/2021); Colonoscopy (10/25/2021); and Colonoscopy (10/25/2021). Social History     Socioeconomic History    Marital status: Single     Spouse name: Not on file    Number of children: Not on file    Years of education: Not on file    Highest education level: Not on file   Occupational History    Not on file   Tobacco Use    Smoking status: Every Day     Packs/day: 0.50     Years: 37.00     Pack years: 18.50     Types: Cigarettes    Smokeless tobacco: Former     Types: Chew     Quit date: 9/3/2001   Vaping Use    Vaping Use: Former    Quit date: 9/3/2019    Substances: Nicotine   Substance and Sexual Activity    Alcohol use: Not Currently    Drug use: Yes     Types: Marijuana (Weed)     Comment: h/o of substance abuse but denies drug use    Sexual activity: Not on file   Other Topics Concern    Not on file   Social History Narrative    Not on file     Social Determinants of Health     Financial Resource Strain: Not on file   Food Insecurity: Not on file   Transportation Needs: Not on file   Physical Activity: Not on file   Stress: Not on file   Social Connections: Not on file   Intimate Partner Violence: Not on file   Housing Stability: Not on file       Family History   Problem Relation Age of Onset    Diabetes Father     Lung Cancer Father     Hypertension Mother     Cancer Mother     High Blood Pressure Mother     Crohn's Disease Brother     Heart Disease Brother        Allergies:    Azithromycin, Methylprednisolone, Penicillins, and Tape [adhesive tape]    Home Medications:  Prior to Admission medications    Medication Sig Start Date End Date Taking? Authorizing Provider   HYDROcodone-acetaminophen (NORCO) 5-325 MG per tablet Take 1 tablet by mouth every 6 hours as needed for Pain.    Yes Historical Provider, MD   clonazePAM (KLONOPIN) 0.5 MG tablet Take 0.5 mg by mouth 2 times daily as needed. Yes Historical Provider, MD   Dexamethasone (DEXPAK 6 DAY) 1.5 MG (21) TBPK Take 1 kit by mouth 1 (one) time if needed (as advised) 9/18/22   Rocío Charles MD   traZODone (DESYREL) 150 MG tablet Take 300 mg by mouth nightly    Historical Provider, MD   Handicap Placard MISC by Does not apply route Duration 5 years 5/17/22   Jesus Mora MD   Pregabalin (LYRICA PO) Take 300 mg by mouth 2 times daily    Historical Provider, MD   ibuprofen (IBU) 400 MG tablet Take 1 tablet by mouth every 6 hours as needed for Pain 5/15/22 9/18/22  Julieta Ma,    albuterol sulfate HFA (PROVENTIL;VENTOLIN;PROAIR) 108 (90 Base) MCG/ACT inhaler Inhale 2 puffs into the lungs every 6 hours as needed for Wheezing    Historical Provider, MD   diphenhydrAMINE (BENADRYL) 25 MG tablet Take 25 mg by mouth nightly as needed    Historical Provider, MD   SUMAtriptan (IMITREX) 100 MG tablet Take 100 mg by mouth once as needed for Migraine    Historical Provider, MD   gabapentin (NEURONTIN) 400 MG capsule Take 800 mg by mouth 2 times daily. Historical Provider, MD   budesonide-formoterol (SYMBICORT) 160-4.5 MCG/ACT AERO Inhale 2 puffs into the lungs 2 times daily 9/19/21   Lety Brown MD   lisinopril (PRINIVIL;ZESTRIL) 5 MG tablet Take 1 tablet by mouth daily 9/20/21   Lety Brown MD   nicotine (NICODERM CQ) 21 MG/24HR Place 1 patch onto the skin daily  Patient not taking: Reported on 8/20/2022 9/20/21 8/23/22  Lety Brown MD   acetaminophen (TYLENOL) 500 MG tablet Take 2 tablets by mouth 3 times daily  Patient taking differently: Take 1,000 mg by mouth 3 times daily as needed 7/17/21   Eduardo Aguero,    Polyvinyl Alcohol-Povidone (REFRESH OP) Place 1 drop into both eyes 3 times daily    Historical Provider, MD   lidocaine (LIDODERM) 5 % Place 1 patch onto the skin daily as needed for Pain 12 hours on, 12 hours off.      Historical Provider, MD       REVIEW OF SYSTEMS    (2-9 systems for level 4, 10 or more for level 5)    Review of Systems   Constitutional:  Negative for chills, fatigue and fever. HENT:  Negative for congestion and rhinorrhea. Eyes:  Negative for photophobia and visual disturbance. Respiratory:  Negative for cough and shortness of breath. Cardiovascular:  Positive for chest pain. Gastrointestinal:  Positive for abdominal pain. Negative for nausea and vomiting. Genitourinary:  Positive for flank pain. Negative for dysuria. Musculoskeletal:  Positive for back pain, gait problem and neck pain. Negative for myalgias. + Left hip pain   Neurological:  Positive for headaches. All other systems reviewed and are negative. PHYSICAL EXAM   (up to 7 for level 4, 8 or more for level 5)    INITIAL VITALS:   ED Triage Vitals [10/22/22 1254]   BP Temp Temp Source Heart Rate Resp SpO2 Height Weight   107/67 98.6 °F (37 °C) Oral 72 18 98 % -- --       Physical Exam  Vitals and nursing note reviewed. Constitutional:       General: She is not in acute distress. Appearance: She is well-developed. She is ill-appearing. She is not toxic-appearing. HENT:      Head: Abrasion and contusion present. No raccoon eyes, Montes's sign, right periorbital erythema or left periorbital erythema. Comments: Abrasion and contusion to the indicated area     Right Ear: Tympanic membrane normal. No hemotympanum. Left Ear: Tympanic membrane normal. No hemotympanum. Nose: Nose normal.      Right Nostril: No epistaxis or septal hematoma. Left Nostril: No epistaxis or septal hematoma. Eyes:      Extraocular Movements: Extraocular movements intact. Pupils: Pupils are equal, round, and reactive to light. Cardiovascular:      Rate and Rhythm: Normal rate and regular rhythm. Pulses:           Radial pulses are 2+ on the right side and 2+ on the left side. Posterior tibial pulses are 1+ on the right side and 1+ on the left side.    Pulmonary:      Effort: Pulmonary effort is normal. No respiratory distress. Breath sounds: Normal breath sounds. No decreased breath sounds, wheezing or rhonchi. Chest:      Chest wall: Tenderness present. Comments: Tenderness to the indicated area  Abdominal:      General: There is no distension. Palpations: Abdomen is soft. Tenderness: There is abdominal tenderness. There is left CVA tenderness. Comments: Tenderness on palpation indicated area   Musculoskeletal:         General: Tenderness present. Cervical back: Tenderness present. Spinous process tenderness and muscular tenderness present. Right lower leg: No edema. Left lower leg: No edema. Comments: Diffuse cervical, thoracic, and lumbar pain. Tenderness on palpation of the left hip. Unable to test range of motion to the left hip. No tenderness throughout the rest of the left lower extremity. Skin:     General: Skin is warm and dry. Capillary Refill: Capillary refill takes less than 2 seconds. Neurological:      General: No focal deficit present. Mental Status: She is alert and oriented to person, place, and time. GCS: GCS eye subscore is 4. GCS verbal subscore is 5. GCS motor subscore is 6. Psychiatric:         Behavior: Behavior is cooperative. DIFFERENTIAL  DIAGNOSIS   PLAN (LABS / IMAGING / EKG):  Orders Placed This Encounter   Procedures    C. difficile toxin Molecular    Gastrointestinal Panel, Molecular    CT HEAD WO CONTRAST    CT CERVICAL SPINE WO CONTRAST    CT CHEST ABDOMEN PELVIS W CONTRAST Additional Contrast? None    CT THORACIC SPINE WO CONTRAST    CT LUMBAR SPINE WO CONTRAST    XR HIP 2-3 VW W PELVIS LEFT    CBC with Auto Differential    Basic Metabolic Panel    CK    Troponin    Troponin    Basic Metabolic Panel w/ Reflex to MG    Protime-INR    Magnesium    Urinalysis with Reflex to Culture    ADULT DIET;  Regular    Place Cervical Collar    Vital signs per unit routine    Notify physician    Notify physician    Up with assistance    Telemetry monitoring - 48 hour duration    Vital signs per unit routine    Notify physician    Up with assistance    Daily weights    Intake and output    Telemetry monitoring - 24 hour duration    Full Code    Inpatient consult to Internal Medicine    C diff contact isolation    OT eval and treat    PT evaluation and treat    Initiate Oxygen Therapy Protocol    Pulse Oximetry Spot Check    EKG 12 Lead    ADMIT TO INPATIENT    ADMIT TO INPATIENT       MEDICATIONS ORDERED:  Orders Placed This Encounter   Medications    0.9 % sodium chloride bolus    fentaNYL (SUBLIMAZE) injection 25 mcg    sodium chloride flush 0.9 % injection 10 mL    iopamidol (ISOVUE-370) 76 % injection 75 mL    0.9 % sodium chloride bolus    folic acid 1 mg, thiamine (B-1) 100 mg in sodium chloride 0.9 % 50 mL IVPB    dextrose 5 % and 0.45 % NaCl 1,000 mL infusion    sodium chloride flush 0.9 % injection 5-40 mL    sodium chloride flush 0.9 % injection 5-40 mL    0.9 % sodium chloride infusion    DISCONTD: enoxaparin (LOVENOX) injection 40 mg     Order Specific Question:   Indication of Use     Answer:   Prophylaxis-DVT/PE    DISCONTD: acetaminophen (TYLENOL) tablet 650 mg    DISCONTD: ondansetron (ZOFRAN-ODT) disintegrating tablet 4 mg    DISCONTD: ondansetron (ZOFRAN) injection 4 mg    albuterol sulfate HFA (PROVENTIL;VENTOLIN;PROAIR) 108 (90 Base) MCG/ACT inhaler 2 puff     Order Specific Question:   Initiate RT Bronchodilator Protocol     Answer:   Yes - Inpatient Protocol    budesonide-formoterol (SYMBICORT) 160-4.5 MCG/ACT inhaler 2 puff    lisinopril (PRINIVIL;ZESTRIL) tablet 5 mg    sodium chloride flush 0.9 % injection 5-40 mL    sodium chloride flush 0.9 % injection 10 mL    0.9 % sodium chloride infusion    OR Linked Order Group     potassium chloride (KLOR-CON M) extended release tablet 40 mEq     potassium bicarb-citric acid (EFFER-K) effervescent tablet 40 mEq     potassium chloride 10 mEq/100 mL IVPB (Peripheral Line)    magnesium sulfate 1000 mg in dextrose 5% 100 mL IVPB    OR Linked Order Group     ondansetron (ZOFRAN-ODT) disintegrating tablet 4 mg     ondansetron (ZOFRAN) injection 4 mg    polyethylene glycol (GLYCOLAX) packet 17 g    OR Linked Order Group     acetaminophen (TYLENOL) tablet 650 mg     acetaminophen (TYLENOL) suppository 650 mg    enoxaparin (LOVENOX) injection 40 mg     Order Specific Question:   Indication of Use     Answer:   Prophylaxis-DVT/PE    morphine (PF) injection 2 mg    0.9 % sodium chloride bolus    HYDROcodone-acetaminophen (NORCO) 5-325 MG per tablet 1 tablet         DIAGNOSTIC RESULTS / EMERGENCYDEPARTMENT COURSE / MDM   LABS:  Labs Reviewed   CBC WITH AUTO DIFFERENTIAL - Abnormal; Notable for the following components:       Result Value    RDW 15.4 (*)     Monocytes 9 (*)     All other components within normal limits   CK - Abnormal; Notable for the following components:     Total CK 3,264 (*)     All other components within normal limits   TROPONIN - Abnormal; Notable for the following components:    Troponin, High Sensitivity 67 (*)     All other components within normal limits   TROPONIN - Abnormal; Notable for the following components:    Troponin, High Sensitivity 46 (*)     All other components within normal limits   BASIC METABOLIC PANEL W/ REFLEX TO MG FOR LOW K - Abnormal; Notable for the following components:    Glucose 115 (*)     BUN 5 (*)     Calcium 8.1 (*)     Potassium 3.3 (*)     Anion Gap 7 (*)     All other components within normal limits   C. DIFFICILE TOXIN MOLECULAR   GASTROINTESTINAL PANEL, MOLECULAR   BASIC METABOLIC PANEL   PROTIME-INR   MAGNESIUM   URINALYSIS WITH REFLEX TO CULTURE       RADIOLOGY:  CT HEAD WO CONTRAST    Result Date: 10/22/2022  EXAMINATION: CT OF THE HEAD WITHOUT CONTRAST; CT OF THE CERVICAL SPINE WITHOUT CONTRAST 10/22/2022 2:14 pm TECHNIQUE: CT of the head was performed without the administration of intravenous contrast. Automated exposure control, iterative reconstruction, and/or weight based adjustment of the mA/kV was utilized to reduce the radiation dose to as low as reasonably achievable.; CT of the cervical spine was performed without the administration of intravenous contrast. Multiplanar reformatted images are provided for review. Automated exposure control, iterative reconstruction, and/or weight based adjustment of the mA/kV was utilized to reduce the radiation dose to as low as reasonably achievable. COMPARISON: 05/18/2022 HISTORY: ORDERING SYSTEM PROVIDED HISTORY: fall, +LOC TECHNOLOGIST PROVIDED HISTORY: fall, +LOC Decision Support Exception - unselect if not a suspected or confirmed emergency medical condition->Emergency Medical Condition (MA) Reason for Exam: fall, + LOC Additional signs and symptoms: pt states she fell, hitting the left side , front of her head; ORDERING SYSTEM PROVIDED HISTORY: fall, midline posterior neck pain TECHNOLOGIST PROVIDED HISTORY: fall, midline posterior neck pain Decision Support Exception - unselect if not a suspected or confirmed emergency medical condition->Emergency Medical Condition (MA) Reason for Exam: fall, midline posterior neck pain Additional signs and symptoms: pt states she fell, hitting her left side FINDINGS: HEAD: BRAIN/VENTRICLES: There is no acute intracranial hemorrhage, mass effect or midline shift. No abnormal extra-axial fluid collection. The gray-white differentiation is maintained. There is no evidence of hydrocephalus. ORBITS: The visualized portion of the orbits demonstrate no acute abnormality. SINUSES: The visualized paranasal sinuses and mastoid air cells demonstrate no acute abnormality. SOFT TISSUES/SKULL:  No acute abnormality of the visualized skull or soft tissues. CERVICAL SPINE: BONES/ALIGNMENT: Reversal the normal cervical lordosis appears degenerative. The vertebral body heights are maintained. No acute fracture identified. DEGENERATIVE CHANGES: Advanced multilevel degenerative disc disease and advanced multilevel facet arthropathy. Notable disc osteophyte complex at C6-7 encroaching on the central canal. SOFT TISSUES: There is no prevertebral soft tissue swelling. No acute CT abnormality identified in the brain. No acute osseous abnormality identified in the cervical spine. CT CERVICAL SPINE WO CONTRAST    Result Date: 10/22/2022  EXAMINATION: CT OF THE HEAD WITHOUT CONTRAST; CT OF THE CERVICAL SPINE WITHOUT CONTRAST 10/22/2022 2:14 pm TECHNIQUE: CT of the head was performed without the administration of intravenous contrast. Automated exposure control, iterative reconstruction, and/or weight based adjustment of the mA/kV was utilized to reduce the radiation dose to as low as reasonably achievable.; CT of the cervical spine was performed without the administration of intravenous contrast. Multiplanar reformatted images are provided for review. Automated exposure control, iterative reconstruction, and/or weight based adjustment of the mA/kV was utilized to reduce the radiation dose to as low as reasonably achievable.  COMPARISON: 05/18/2022 HISTORY: ORDERING SYSTEM PROVIDED HISTORY: fall, +LOC TECHNOLOGIST PROVIDED HISTORY: fall, +LOC Decision Support Exception - unselect if not a suspected or confirmed emergency medical condition->Emergency Medical Condition (MA) Reason for Exam: fall, + LOC Additional signs and symptoms: pt states she fell, hitting the left side , front of her head; ORDERING SYSTEM PROVIDED HISTORY: fall, midline posterior neck pain TECHNOLOGIST PROVIDED HISTORY: fall, midline posterior neck pain Decision Support Exception - unselect if not a suspected or confirmed emergency medical condition->Emergency Medical Condition (MA) Reason for Exam: fall, midline posterior neck pain Additional signs and symptoms: pt states she fell, hitting her left side FINDINGS: HEAD: BRAIN/VENTRICLES: There is no acute intracranial hemorrhage, mass effect or midline shift. No abnormal extra-axial fluid collection. The gray-white differentiation is maintained. There is no evidence of hydrocephalus. ORBITS: The visualized portion of the orbits demonstrate no acute abnormality. SINUSES: The visualized paranasal sinuses and mastoid air cells demonstrate no acute abnormality. SOFT TISSUES/SKULL:  No acute abnormality of the visualized skull or soft tissues. CERVICAL SPINE: BONES/ALIGNMENT: Reversal the normal cervical lordosis appears degenerative. The vertebral body heights are maintained. No acute fracture identified. DEGENERATIVE CHANGES: Advanced multilevel degenerative disc disease and advanced multilevel facet arthropathy. Notable disc osteophyte complex at C6-7 encroaching on the central canal. SOFT TISSUES: There is no prevertebral soft tissue swelling. No acute CT abnormality identified in the brain. No acute osseous abnormality identified in the cervical spine. CT THORACIC SPINE WO CONTRAST    Result Date: 10/22/2022  EXAMINATION: CT OF THE CHEST, ABDOMEN, AND PELVIS WITH CONTRAST; CT OF THE THORACIC SPINE WITHOUT CONTRAST 10/22/2022 2:14 pm TECHNIQUE: CT of the chest, abdomen and pelvis was performed with the administration of intravenous contrast. Multiplanar reformatted images are provided for review. Automated exposure control, iterative reconstruction, and/or weight based adjustment of the mA/kV was utilized to reduce the radiation dose to as low as reasonably achievable.; CT of the thoracic spine was performed without the administration of intravenous contrast. Multiplanar reformatted images are provided for review. Automated exposure control, iterative reconstruction, and/or weight based adjustment of the mA/kV was utilized to reduce the radiation dose to as low as reasonably achievable.  COMPARISON: CT abdomen and pelvis 08/20/2022 HISTORY: ORDERING SYSTEM PROVIDED HISTORY: diffuse chest, abdominal, and pelvic pain,concern for left hip fracture TECHNOLOGIST PROVIDED HISTORY: diffuse chest, abdominal, and pelvic pain,concern for left hip fracture Decision Support Exception - unselect if not a suspected or confirmed emergency medical condition->Emergency Medical Condition (MA) Reason for Exam: diffuse chest, abdominal, and pelvic pain,concern for left hip fracture Additional signs and symptoms: pt states she fell a couple days ago, all over pain Relevant Medical/Surgical History: suggeries-bowel obstruction x 2, cholecystectomy, tubal ligation; ORDERING SYSTEM PROVIDED HISTORY: fall, upper thoracic pain TECHNOLOGIST PROVIDED HISTORY: fall, upper thoracic pain Reason for Exam: fall, upper thoracic pain Additional signs and symptoms: pt states she fell, hitting her left side CHEST: Mediastinum: No acute aortic abnormality identified. No mediastinal hematoma. No pericardial effusion. Lungs/pleura: No acute airspace disease, pneumothorax or effusion. Soft Tissues/Bones: No acute osseous abnormality identified in this region. No soft tissue hematoma. Abdomen/Pelvis: Organs: No solid organ injury identified. No acute inflammatory process. Status post cholecystectomy. Benign area of hypoattenuation in segment 4 the liver may represent a cyst or possibly fat deposition. GI/Bowel: There is no bowel dilatation or wall thickening identified. Status post partial small-bowel resection. Pelvis: No acute findings. Appropriate excretion of contrast is demonstrated into the bladder. Peritoneum/Retroperitoneum: No free air. No free fluid. The aorta is normal in caliber. The visceral branches are patent. Bones/Soft Tissues: Pelvic alignment maintained. No fracture identified. Severe arthropathy in the left hip with prominent subchondral cyst formation and prominent hypertrophic changes again noted. Moderately severe joint space loss in the superior aspect of the right hip with marginal hypertrophic changes also noted.   No soft tissue hematoma. Small fat containing umbilical hernia. Lumbar vertebral body heights are maintained with multilevel degenerative disc disease and lower lumbar facet arthropathy again noted. THORACIC: BONES/ALIGNMENT: There is normal alignment of the spine. The vertebral body heights are maintained. No osseous destructive lesion is seen. DEGENERATIVE CHANGES: No gross spinal canal stenosis or bony neural foraminal narrowing of the thoracic spine. SOFT TISSUES: No paraspinal hematoma. *Unless otherwise specified, incidental findings do not require dedicated imaging follow-up. 1.  No acute traumatic injury identified in the chest, abdomen or pelvis. 2.  Advanced arthropathy in the left hip, as described, without appreciable change since CT exam 08/20/2022. 3.  No acute osseous abnormality identified in the thoracic spine. CT LUMBAR SPINE WO CONTRAST    Result Date: 10/22/2022  EXAMINATION: CT OF THE LUMBAR SPINE WITHOUT CONTRAST  10/22/2022 TECHNIQUE: CT of the lumbar spine was performed without the administration of intravenous contrast. Multiplanar reformatted images are provided for review. Adjustment of mA and/or kV according to patient size was utilized. Automated exposure control, iterative reconstruction, and/or weight based adjustment of the mA/kV was utilized to reduce the radiation dose to as low as reasonably achievable. COMPARISON: CT abdomen and pelvis today and 08/20/2022 HISTORY: ORDERING SYSTEM PROVIDED HISTORY: fall, lumbar pain TECHNOLOGIST PROVIDED HISTORY: fall, lumbar pain Decision Support Exception - unselect if not a suspected or confirmed emergency medical condition->Emergency Medical Condition (MA) Reason for Exam: fall, lumbar pain Additional signs and symptoms: pt states she fell, hitting her left side FINDINGS: BONES/ALIGNMENT: There is normal alignment of the spine. The vertebral body heights are maintained. No osseous destructive lesion is seen.  DEGENERATIVE CHANGES: Moderately severe disc disease in the lower lumbar spine with lower lumbar facet arthropathy. Mild encroachment on the central canal by the disc disease notable at L4-5 and L5-S1. SOFT TISSUES/RETROPERITONEUM: No soft tissue hematoma. No acute osseous abnormality identified in the lumbar spine. RECOMMENDATIONS: Unavailable     CT CHEST ABDOMEN PELVIS W CONTRAST Additional Contrast? None    Result Date: 10/22/2022  EXAMINATION: CT OF THE CHEST, ABDOMEN, AND PELVIS WITH CONTRAST; CT OF THE THORACIC SPINE WITHOUT CONTRAST 10/22/2022 2:14 pm TECHNIQUE: CT of the chest, abdomen and pelvis was performed with the administration of intravenous contrast. Multiplanar reformatted images are provided for review. Automated exposure control, iterative reconstruction, and/or weight based adjustment of the mA/kV was utilized to reduce the radiation dose to as low as reasonably achievable.; CT of the thoracic spine was performed without the administration of intravenous contrast. Multiplanar reformatted images are provided for review. Automated exposure control, iterative reconstruction, and/or weight based adjustment of the mA/kV was utilized to reduce the radiation dose to as low as reasonably achievable.  COMPARISON: CT abdomen and pelvis 08/20/2022 HISTORY: ORDERING SYSTEM PROVIDED HISTORY: diffuse chest, abdominal, and pelvic pain,concern for left hip fracture TECHNOLOGIST PROVIDED HISTORY: diffuse chest, abdominal, and pelvic pain,concern for left hip fracture Decision Support Exception - unselect if not a suspected or confirmed emergency medical condition->Emergency Medical Condition (MA) Reason for Exam: diffuse chest, abdominal, and pelvic pain,concern for left hip fracture Additional signs and symptoms: pt states she fell a couple days ago, all over pain Relevant Medical/Surgical History: suggeries-bowel obstruction x 2, cholecystectomy, tubal ligation; ORDERING SYSTEM PROVIDED HISTORY: fall, upper thoracic pain TECHNOLOGIST PROVIDED HISTORY: fall, upper thoracic pain Reason for Exam: fall, upper thoracic pain Additional signs and symptoms: pt states she fell, hitting her left side CHEST: Mediastinum: No acute aortic abnormality identified. No mediastinal hematoma. No pericardial effusion. Lungs/pleura: No acute airspace disease, pneumothorax or effusion. Soft Tissues/Bones: No acute osseous abnormality identified in this region. No soft tissue hematoma. Abdomen/Pelvis: Organs: No solid organ injury identified. No acute inflammatory process. Status post cholecystectomy. Benign area of hypoattenuation in segment 4 the liver may represent a cyst or possibly fat deposition. GI/Bowel: There is no bowel dilatation or wall thickening identified. Status post partial small-bowel resection. Pelvis: No acute findings. Appropriate excretion of contrast is demonstrated into the bladder. Peritoneum/Retroperitoneum: No free air. No free fluid. The aorta is normal in caliber. The visceral branches are patent. Bones/Soft Tissues: Pelvic alignment maintained. No fracture identified. Severe arthropathy in the left hip with prominent subchondral cyst formation and prominent hypertrophic changes again noted. Moderately severe joint space loss in the superior aspect of the right hip with marginal hypertrophic changes also noted. No soft tissue hematoma. Small fat containing umbilical hernia. Lumbar vertebral body heights are maintained with multilevel degenerative disc disease and lower lumbar facet arthropathy again noted. THORACIC: BONES/ALIGNMENT: There is normal alignment of the spine. The vertebral body heights are maintained. No osseous destructive lesion is seen. DEGENERATIVE CHANGES: No gross spinal canal stenosis or bony neural foraminal narrowing of the thoracic spine. SOFT TISSUES: No paraspinal hematoma. *Unless otherwise specified, incidental findings do not require dedicated imaging follow-up.      1.  No acute traumatic injury identified in the chest, abdomen or pelvis. 2.  Advanced arthropathy in the left hip, as described, without appreciable change since CT exam 08/20/2022. 3.  No acute osseous abnormality identified in the thoracic spine. XR HIP 2-3 VW W PELVIS LEFT    Result Date: 10/22/2022  EXAMINATION: ONE XRAY VIEW OF THE PELVIS AND TWO XRAY VIEWS LEFT HIP 10/22/2022 1:49 pm COMPARISON: CT abdomen and pelvis 08/20/2022. Radiographs 05/15/2022. HISTORY: ORDERING SYSTEM PROVIDED HISTORY: fall, possible left hip fracture TECHNOLOGIST PROVIDED HISTORY: fall, possible left hip fracture Reason for Exam: fall, possible left hip fracture Additional signs and symptoms: PT states fall a few days ago and states lower back pain and left hip pain. States left hip pain is chronic FINDINGS: Severe arthropathy in the left hip again demonstrated. Mature ossification and overgrowth about the superior acetabulum again demonstrated. Mild flattening of the superior aspect of the femoral head appears more prominent since the prior exam with large subchondral cyst formation. No evidence for femoral neck fracture. Pelvic alignment is maintained. Severe arthropathy in the left hip. Mild flattening of the superior aspect of the femoral head with large subchondral cyst formation appears more prominent since prior radiographs, which may be related to trauma or articular surface collapse related to arthropathy. EKG    EKG Interpretation    Interpreted by me    Rhythm: normal sinus   Rate: normal, 66  Axis: normal  Ectopy: none  Conduction: normal  ST Segments: no acute change  T Waves: no acute change  Q Waves: none    Clinical Impression: Sinus rhythm rate of 66. No ST segment changes, no arrhythmia, no ectopy. Normal axis, good R wave progression.         All EKG's are interpreted by the Emergency Department Physician whoeither signs or Co-signs this chart in the absence of a cardiologist.    Impression:  Patient presents with a inconsistent story.  She believes that she might of fallen approximately 3 days ago but she is not sure where she fell or when she fell. She remembers waking up on her couch. She denies any alcohol use or any illicit substance use. She does have a contusion and a slight abrasion to the left side of her forehead. She has diffuse pain basically everywhere except for bilateral upper extremities and the right lower extremity. She is mostly concerned about pain in her left hip. She states that she needs to have it replaced at some point. No other external signs of trauma seen. Patient's blood pressure had readings in the 90s over 60s on multiple completions by myself in the room. I did complete a E FAST exam which was negative. We will proceed with lab work, CAT scans, EKG as above. EMERGENCY DEPARTMENT COURSE:  ED Course as of 10/23/22 1147   Sat Oct 22, 2022   1659 Troponin downtrending to 46. CT scans negative for acute injury. Severe arthropathy of the left hip noted but no fracture. We will plan for admission for syncope and collapse. [AP]      ED Course User Index  [AP] Javi Postin, DO     CT scans negative as above for acute injury. Blood pressure did rise slightly. However her troponin was slightly elevated above her baseline, repeat was downtrending. No changes on EKG. Concern for possible syncope and collapse along with rhabdomyolysis. Did speak about this with internal medicine who accepted patient to their service. PROCEDURES:  EXTENDED FAST EXAM:    A limited, bedside extended FAST exam was performed. The medical necessity was to evaluate for the presence or absence of intraperitoneal, intrathoracic or pericardial fluid. The structures studied were the hepatorenal space, splenorenal space, and bladder. FINDINGS:  Negative for free intra-abdominal fluid. Negative for intrathoracic fluid  The study was technically adequate.       IMAGES:  Electronically uploaded to the PACS system      CONSULTS:  IP CONSULT TO INTERNAL MEDICINE    CRITICAL CARE:  There was a high probability of clinically significant/life threatening deterioration in this patient's condition which required my urgent intervention. Total critical care time was 20 minutes. This excludes any time for separately reportable procedures. FINAL IMPRESSION     1. Syncope and collapse          DISPOSITION / PLAN   DISPOSITION Admitted 10/22/2022 06:05:11 PM        PATIENT REFERRED TO:  No follow-up provider specified.     DISCHARGE MEDICATIONS:  Current Discharge Medication List          Nestor Tsai DO  Emergency Medicine Attending    (Please note that portions of this note were completed with a voice recognition program.  Efforts were made to edit the dictations but occasionally words are mis-transcribed.)          Nestor Tsai DO  10/23/22 [id number]

## 2022-10-22 NOTE — ED NOTES
Pt presents to ED by ems with reports of falling a few days ago but does not remember when, where or how she fell. Pt is alert and oriented x4. Pt denies any substance abuse at this time. c-collar placed on patient as directed. Pt complains of left hip, left arm pain, elbow and back tenderness.        Jake Jean RN  10/22/22 [id number]

## 2022-10-23 LAB
AMPHETAMINE SCREEN URINE: NEGATIVE
ANION GAP SERPL CALCULATED.3IONS-SCNC: 7 MMOL/L (ref 9–17)
BACTERIA: NORMAL
BARBITURATE SCREEN URINE: NEGATIVE
BENZODIAZEPINE SCREEN, URINE: NEGATIVE
BILIRUBIN URINE: NEGATIVE
BUN BLDV-MCNC: 5 MG/DL (ref 6–20)
C-REACTIVE PROTEIN: 36.7 MG/L (ref 0–5)
CALCIUM SERPL-MCNC: 8.1 MG/DL (ref 8.6–10.4)
CANNABINOID SCREEN URINE: NEGATIVE
CHLORIDE BLD-SCNC: 105 MMOL/L (ref 98–107)
CO2: 28 MMOL/L (ref 20–31)
COCAINE METABOLITE, URINE: NEGATIVE
COLOR: YELLOW
CREAT SERPL-MCNC: 0.52 MG/DL (ref 0.5–0.9)
EPITHELIAL CELLS UA: NORMAL /HPF
FENTANYL URINE: NEGATIVE
GFR SERPL CREATININE-BSD FRML MDRD: >60 ML/MIN/1.73M2
GLUCOSE BLD-MCNC: 115 MG/DL (ref 70–99)
GLUCOSE URINE: NEGATIVE
INR BLD: 1.1
KETONES, URINE: NEGATIVE
LEUKOCYTE ESTERASE, URINE: NEGATIVE
MAGNESIUM: 1.9 MG/DL (ref 1.6–2.6)
METHADONE SCREEN, URINE: NEGATIVE
NITRITE, URINE: NEGATIVE
OPIATES, URINE: POSITIVE
OXYCODONE SCREEN URINE: NEGATIVE
PH UA: 7 (ref 5–8)
PHENCYCLIDINE, URINE: NEGATIVE
POTASSIUM SERPL-SCNC: 3.3 MMOL/L (ref 3.7–5.3)
PROTEIN UA: NEGATIVE
PROTHROMBIN TIME: 14 SEC (ref 11.8–14.6)
RBC UA: NORMAL /HPF
SEDIMENTATION RATE, ERYTHROCYTE: 6 MM/HR (ref 0–30)
SODIUM BLD-SCNC: 140 MMOL/L (ref 135–144)
SPECIFIC GRAVITY UA: 1.01 (ref 1–1.03)
TEST INFORMATION: ABNORMAL
TOTAL CK: 1590 U/L (ref 26–192)
TURBIDITY: CLEAR
URINE HGB: ABNORMAL
UROBILINOGEN, URINE: NORMAL
WBC UA: NORMAL /HPF

## 2022-10-23 PROCEDURE — 6370000000 HC RX 637 (ALT 250 FOR IP): Performed by: INTERNAL MEDICINE

## 2022-10-23 PROCEDURE — 81001 URINALYSIS AUTO W/SCOPE: CPT

## 2022-10-23 PROCEDURE — 86140 C-REACTIVE PROTEIN: CPT

## 2022-10-23 PROCEDURE — 2500000003 HC RX 250 WO HCPCS: Performed by: STUDENT IN AN ORGANIZED HEALTH CARE EDUCATION/TRAINING PROGRAM

## 2022-10-23 PROCEDURE — 94760 N-INVAS EAR/PLS OXIMETRY 1: CPT

## 2022-10-23 PROCEDURE — 82550 ASSAY OF CK (CPK): CPT

## 2022-10-23 PROCEDURE — 94640 AIRWAY INHALATION TREATMENT: CPT

## 2022-10-23 PROCEDURE — 85610 PROTHROMBIN TIME: CPT

## 2022-10-23 PROCEDURE — 36415 COLL VENOUS BLD VENIPUNCTURE: CPT

## 2022-10-23 PROCEDURE — 6360000002 HC RX W HCPCS: Performed by: STUDENT IN AN ORGANIZED HEALTH CARE EDUCATION/TRAINING PROGRAM

## 2022-10-23 PROCEDURE — 80048 BASIC METABOLIC PNL TOTAL CA: CPT

## 2022-10-23 PROCEDURE — 6360000002 HC RX W HCPCS: Performed by: INTERNAL MEDICINE

## 2022-10-23 PROCEDURE — 83735 ASSAY OF MAGNESIUM: CPT

## 2022-10-23 PROCEDURE — 80307 DRUG TEST PRSMV CHEM ANLYZR: CPT

## 2022-10-23 PROCEDURE — 99222 1ST HOSP IP/OBS MODERATE 55: CPT | Performed by: PSYCHIATRY & NEUROLOGY

## 2022-10-23 PROCEDURE — 2580000003 HC RX 258: Performed by: INTERNAL MEDICINE

## 2022-10-23 PROCEDURE — 2580000003 HC RX 258: Performed by: STUDENT IN AN ORGANIZED HEALTH CARE EDUCATION/TRAINING PROGRAM

## 2022-10-23 PROCEDURE — 97162 PT EVAL MOD COMPLEX 30 MIN: CPT

## 2022-10-23 PROCEDURE — 85652 RBC SED RATE AUTOMATED: CPT

## 2022-10-23 PROCEDURE — 1200000000 HC SEMI PRIVATE

## 2022-10-23 PROCEDURE — 99223 1ST HOSP IP/OBS HIGH 75: CPT | Performed by: INTERNAL MEDICINE

## 2022-10-23 RX ORDER — SODIUM CHLORIDE 9 MG/ML
INJECTION, SOLUTION INTRAVENOUS CONTINUOUS
Status: ACTIVE | OUTPATIENT
Start: 2022-10-23 | End: 2022-10-24

## 2022-10-23 RX ORDER — 0.9 % SODIUM CHLORIDE 0.9 %
500 INTRAVENOUS SOLUTION INTRAVENOUS ONCE
Status: COMPLETED | OUTPATIENT
Start: 2022-10-23 | End: 2022-10-23

## 2022-10-23 RX ORDER — HYDROCODONE BITARTRATE AND ACETAMINOPHEN 5; 325 MG/1; MG/1
1 TABLET ORAL EVERY 6 HOURS PRN
Status: DISCONTINUED | OUTPATIENT
Start: 2022-10-23 | End: 2022-10-25 | Stop reason: HOSPADM

## 2022-10-23 RX ORDER — PREDNISONE 20 MG/1
40 TABLET ORAL DAILY
Status: DISCONTINUED | OUTPATIENT
Start: 2022-10-23 | End: 2022-10-25 | Stop reason: HOSPADM

## 2022-10-23 RX ORDER — PREGABALIN 150 MG/1
300 CAPSULE ORAL 2 TIMES DAILY
Status: DISCONTINUED | OUTPATIENT
Start: 2022-10-23 | End: 2022-10-25 | Stop reason: HOSPADM

## 2022-10-23 RX ORDER — MORPHINE SULFATE 2 MG/ML
2 INJECTION, SOLUTION INTRAMUSCULAR; INTRAVENOUS EVERY 6 HOURS PRN
Status: DISCONTINUED | OUTPATIENT
Start: 2022-10-23 | End: 2022-10-25 | Stop reason: HOSPADM

## 2022-10-23 RX ADMIN — FOLIC ACID: 5 INJECTION, SOLUTION INTRAMUSCULAR; INTRAVENOUS; SUBCUTANEOUS at 12:40

## 2022-10-23 RX ADMIN — ACETAMINOPHEN 650 MG: 325 TABLET, FILM COATED ORAL at 05:51

## 2022-10-23 RX ADMIN — HYDROCODONE BITARTRATE AND ACETAMINOPHEN 1 TABLET: 5; 325 TABLET ORAL at 10:04

## 2022-10-23 RX ADMIN — MORPHINE SULFATE 2 MG: 2 INJECTION, SOLUTION INTRAMUSCULAR; INTRAVENOUS at 05:50

## 2022-10-23 RX ADMIN — SODIUM CHLORIDE: 9 INJECTION, SOLUTION INTRAVENOUS at 14:50

## 2022-10-23 RX ADMIN — POTASSIUM CHLORIDE 40 MEQ: 1500 TABLET, EXTENDED RELEASE ORAL at 11:16

## 2022-10-23 RX ADMIN — BUDESONIDE AND FORMOTEROL FUMARATE DIHYDRATE 2 PUFF: 160; 4.5 AEROSOL RESPIRATORY (INHALATION) at 07:57

## 2022-10-23 RX ADMIN — PREGABALIN 300 MG: 150 CAPSULE ORAL at 21:26

## 2022-10-23 RX ADMIN — PREDNISONE 40 MG: 20 TABLET ORAL at 17:19

## 2022-10-23 RX ADMIN — ENOXAPARIN SODIUM 40 MG: 100 INJECTION SUBCUTANEOUS at 08:31

## 2022-10-23 RX ADMIN — HYDROCODONE BITARTRATE AND ACETAMINOPHEN 1 TABLET: 5; 325 TABLET ORAL at 19:57

## 2022-10-23 RX ADMIN — SODIUM CHLORIDE 500 ML: 9 INJECTION, SOLUTION INTRAVENOUS at 10:09

## 2022-10-23 RX ADMIN — BUDESONIDE AND FORMOTEROL FUMARATE DIHYDRATE 2 PUFF: 160; 4.5 AEROSOL RESPIRATORY (INHALATION) at 21:07

## 2022-10-23 RX ADMIN — SODIUM CHLORIDE: 9 INJECTION, SOLUTION INTRAVENOUS at 22:30

## 2022-10-23 ASSESSMENT — PAIN SCALES - GENERAL
PAINLEVEL_OUTOF10: 8
PAINLEVEL_OUTOF10: 8
PAINLEVEL_OUTOF10: 7
PAINLEVEL_OUTOF10: 7

## 2022-10-23 NOTE — PLAN OF CARE
Waiting plan from medical team, no new falls or injuries at this time.   Order for morphine received and patient has adequate pain control at this time

## 2022-10-23 NOTE — H&P
History and Physical Service  [facility]    HISTORY AND PHYSICAL EXAMINATION            Date:   10/23/2022  Patient name:  Dayana Branham  MRN:   [id number]  Account:  [de-identified]  YOB: 1965  PCP:    Antonieta Hutton MD  Code Status:    Full Code    Chief Complaint:     Chief Complaint   Patient presents with    Hip Pain    Head Injury         History Obtained From:     Patient, EMR, nursing staff  HPI     This patient is a 61 y.o. Non- / non  femalewho presents with  Fall few days prior to presentation  In ER she complained of left hip pain and possible head injury few days ago  Patient does not remember the fall or surrounding circumstances  Patient is unable to say if she lost consciousness-only remembers waking up in her couch  Reports feeling generally sore when she woke up but denies any evidence of incontinence or tongue bite or new injuries  Patient is unable to give history of circumstances leading up to the event  Does report history of chronic diarrhea states she has Crohn's disease    History of cocaine use-reports last use 2 years ago  Polypharmacy with many sedative medications-Norco, Klonopin, pregabalin, Neurontin for generalized pain  History of bipolar disorder    Symptoms are severe, improving, no aggravating relieving factors      Medical history includes COPD, bipolar disorder, Crohn's disease, fibromyalgia, hypothyroid hypertension hyperlipidemia    Review of Systems:   Reports generalized pain, worse in left hip has chronic joint pain  Denies any shortness of breath or cough  Denies chest pain or palpitations  Positive for chronic diarrhea, reports some lower abdominal pain denies   Denies any new numbness tremors or weakness. A 10 point review of systems was performed and and negative except as mentioned in HPI  Positive and Negative as described in HPI.       Past Medical History:     Past Medical History:   Diagnosis Date Acid reflux     Anxiety     Arthritis     Left hip    Asthma     Bipolar 1 disorder (HCC)     Bowel obstruction (HCC)     COPD (chronic obstructive pulmonary disease) (HCC)     O2 3L PRN/ Dr. Huber Elizondo [facility]/last seen 2020/appt.9-7-21 for Clearance    Crohn disease ([address]     Depression     Dry eye     Fibromyalgia     Gout     Headache     Hyperlipidemia     Hypertension     Hypothyroidism     Kidney stones     Muscle spasm     Neuropathy     Pain     left hip    Personal history of other medical treatment     Pt. seen by Dr. Erika Corrales for clearance for this OR Left hip/ Normally pt. doesn't follow with Cardiology. [facility]    Wears partial dentures     upper    Wellness examination     PCP Elvin Chacon MD/ [place]/last seen 8-24-21        Past Surgical History:     Past Surgical History:   Procedure Laterality Date    ABDOMEN SURGERY      bowel obstruction OR    BUNIONECTOMY Left     CHOLECYSTECTOMY      COLONOSCOPY  04/30/2007    no gross pathology seen in the colon and also 3-4 inches of the ileum    COLONOSCOPY  10/12/2017    normal    COLONOSCOPY  10/25/2021    COLONOSCOPY WITH BIOPSY performed by Edmundo Stoll MD at [facility]    COLONOSCOPY  10/25/2021    COLONOSCOPY POLYPECTOMY REMOVAL SNARE performed by Edmundo Stoll MD at [address] [place] [address] ENDOSCOPY  10/25/2021    EGD BIOPSY performed by Edmundo Stoll MD at [facility]        Medications Prior to Admission:     Prior to Admission medications    Medication Sig Start Date End Date Taking? Authorizing Provider   HYDROcodone-acetaminophen (NORCO) 5-325 MG per tablet Take 1 tablet by mouth every 6 hours as needed for Pain. Yes Historical Provider, MD   clonazePAM (KLONOPIN) 0.5 MG tablet Take 0.5 mg by mouth 2 times daily as needed.    Yes Historical Provider, MD   Dexamethasone (DEXPAK 6 DAY) 1.5 MG (21) TBPK Take 1 kit by mouth 1 (one) time if needed (as advised) 9/18/22   Gunner Stewart MD   traZODone (DESYREL) 150 MG tablet Take 300 mg by mouth nightly    Historical Provider, MD   Handicap Placard MISC by Does not apply route Duration 5 years 5/17/22   Domingo Wood MD   Pregabalin (LYRICA PO) Take 300 mg by mouth 2 times daily    Historical Provider, MD   ibuprofen (IBU) 400 MG tablet Take 1 tablet by mouth every 6 hours as needed for Pain 5/15/22 9/18/22  Millicent Vann,    albuterol sulfate HFA (PROVENTIL;VENTOLIN;PROAIR) 108 (90 Base) MCG/ACT inhaler Inhale 2 puffs into the lungs every 6 hours as needed for Wheezing    Historical Provider, MD   diphenhydrAMINE (BENADRYL) 25 MG tablet Take 25 mg by mouth nightly as needed    Historical Provider, MD   SUMAtriptan (IMITREX) 100 MG tablet Take 100 mg by mouth once as needed for Migraine    Historical Provider, MD   gabapentin (NEURONTIN) 400 MG capsule Take 800 mg by mouth 2 times daily. Historical Provider, MD   budesonide-formoterol (SYMBICORT) 160-4.5 MCG/ACT AERO Inhale 2 puffs into the lungs 2 times daily 9/19/21   Ranulfo Gomez MD   lisinopril (PRINIVIL;ZESTRIL) 5 MG tablet Take 1 tablet by mouth daily 9/20/21   Ranulfo Gomez MD   nicotine (NICODERM CQ) 21 MG/24HR Place 1 patch onto the skin daily  Patient not taking: Reported on 8/20/2022 9/20/21 8/23/22  Ranulfo Gomez MD   acetaminophen (TYLENOL) 500 MG tablet Take 2 tablets by mouth 3 times daily  Patient taking differently: Take 1,000 mg by mouth 3 times daily as needed 7/17/21   Joey Chatterjee,    Polyvinyl Alcohol-Povidone (REFRESH OP) Place 1 drop into both eyes 3 times daily    Historical Provider, MD   lidocaine (LIDODERM) 5 % Place 1 patch onto the skin daily as needed for Pain 12 hours on, 12 hours off. Historical Provider, MD        Allergies:     Azithromycin, Methylprednisolone, Penicillins, and Tape [adhesive tape]    Social History:     Tobacco:    reports that she has been smoking cigarettes.  She has a 18.50 pack-year smoking history. She quit smokeless tobacco use about 21 years ago. Her smokeless tobacco use included chew. Alcohol:      reports that she does not currently use alcohol. Drug Use:  reports current drug use. Drug: Marijuana Shelltrevor Burkitt). Family History:     Family History   Problem Relation Age of Onset    Diabetes Father     Lung Cancer Father     Hypertension Mother     Cancer Mother     High Blood Pressure Mother     Crohn's Disease Brother     Heart Disease Brother            Physical Exam:   BP (!) 73/63   Pulse 64   Temp 97.5 °F (36.4 °C)   Resp 16   Ht 5' 6\" (1.676 m)   Wt 158 lb 11.7 oz (72 kg)   SpO2 97%   BMI 25.62 kg/m²   No results for input(s): POCGLU in the last 72 hours. General Appearance:  alert, well appearing, and in no acute distress  Mental status: oriented to person, place, and time with normal affect  Head:  normocephalic, atraumatic. Eye: no icterus, redness, pupils equal and reactive, extraocular eye movements intact, conjunctiva clear  Ear: normal external ear, no discharge, hearing intact  Nose:  no drainage noted  Mouth: mucous membranes moist  Neck: supple, no carotid bruits, thyroid not palpable  Lungs: Bilateral equal air entry, clear to ausculation, no wheezing, rales or rhonchi, normal effort  Cardiovascular: normal rate, regular rhythm, no murmur, gallop, rub.   Abdomen: Soft, mild suprapubic tenderness,, nondistended, normal bowel sounds, no hepatomegaly or splenomegaly  Neurologic: There are no new focal motor or sensory deficits, normal muscle tone and bulk, no abnormal sensation, normal speech, cranial nerves II through XII grossly intact  Skin: No gross lesions, rashes, bruising or bleeding on exposed skin area  Extremities:  peripheral pulses palpable, no pedal edema or calf pain with palpation  Psych: normal affect     Investigations:      Laboratory Testing:  Recent Results (from the past 24 hour(s))   CBC with Auto Differential    Collection Time: 10/22/22  1:56 PM   Result Value Ref Range    WBC 7.2 3.5 - 11.0 k/uL    RBC 4.84 4.0 - 5.2 m/uL    Hemoglobin 13.7 12.0 - 16.0 g/dL    Hematocrit 41.4 36 - 46 %    MCV 85.6 80 - 100 fL    MCH 28.3 26 - 34 pg    MCHC 33.1 31 - 37 g/dL    RDW 15.4 (H) 11.5 - 14.9 %    Platelets 316 365 - 429 k/uL    MPV 9.1 6.0 - 12.0 fL    Seg Neutrophils 65 36 - 66 %    Lymphocytes 24 24 - 44 %    Monocytes 9 (H) 1 - 7 %    Eosinophils % 1 0 - 4 %    Basophils 1 0 - 2 %    Segs Absolute 4.70 1.3 - 9.1 k/uL    Absolute Lymph # 1.70 1.0 - 4.8 k/uL    Absolute Mono # 0.70 0.1 - 1.3 k/uL    Absolute Eos # 0.10 0.0 - 0.4 k/uL    Basophils Absolute 0.10 0.0 - 0.2 k/uL   Basic Metabolic Panel    Collection Time: 10/22/22  1:56 PM   Result Value Ref Range    Glucose 83 70 - 99 mg/dL    BUN 6 6 - 20 mg/dL    Creatinine 0.64 0.50 - 0.90 mg/dL    Est, Glom Filt Rate >60 >60 mL/min/1.73m2    Calcium 9.3 8.6 - 10.4 mg/dL    Sodium 137 135 - 144 mmol/L    Potassium 3.7 3.7 - 5.3 mmol/L    Chloride 100 98 - 107 mmol/L    CO2 25 20 - 31 mmol/L    Anion Gap 12 9 - 17 mmol/L   CK    Collection Time: 10/22/22  1:56 PM   Result Value Ref Range    Total CK 3,264 (H) 26 - 192 U/L   Troponin    Collection Time: 10/22/22  1:56 PM   Result Value Ref Range    Troponin, High Sensitivity 67 (HH) 0 - 14 ng/L   EKG 12 Lead    Collection Time: 10/22/22  1:58 PM   Result Value Ref Range    Ventricular Rate 66 BPM    Atrial Rate 66 BPM    P-R Interval 152 ms    QRS Duration 86 ms    Q-T Interval 448 ms    QTc Calculation (Bazett) 469 ms    P Axis 42 degrees    R Axis 74 degrees    T Axis 59 degrees   Troponin    Collection Time: 10/22/22  4:08 PM   Result Value Ref Range    Troponin, High Sensitivity 46 (H) 0 - 14 ng/L   Basic Metabolic Panel w/ Reflex to MG    Collection Time: 10/23/22  7:09 AM   Result Value Ref Range    Glucose 115 (H) 70 - 99 mg/dL    BUN 5 (L) 6 - 20 mg/dL    Creatinine 0.52 0.50 - 0.90 mg/dL    Est, Glom Filt Rate >60 >60 mL/min/1.73m2 Calcium 8.1 (L) 8.6 - 10.4 mg/dL    Sodium 140 135 - 144 mmol/L    Potassium 3.3 (L) 3.7 - 5.3 mmol/L    Chloride 105 98 - 107 mmol/L    CO2 28 20 - 31 mmol/L    Anion Gap 7 (L) 9 - 17 mmol/L   Protime-INR    Collection Time: 10/23/22  7:09 AM   Result Value Ref Range    Protime 14.0 11.8 - 14.6 sec    INR 1.1    Magnesium    Collection Time: 10/23/22  7:09 AM   Result Value Ref Range    Magnesium 1.9 1.6 - 2.6 mg/dL       Recent Labs     10/23/22  0709 10/22/22  1356   HGB  --  13.7   HCT  --  41.4   WBC  --  7.2   MCV  --  85.6    137   K 3.3* 3.7    100   CO2 28 25   BUN 5* 6   CREATININE 0.52 0.64   GLUCOSE 115* 83   INR 1.1  --    PROTIME 14.0  --        Hematology:  Recent Labs     10/22/22  1356 10/23/22  0709   WBC 7.2  --    RBC 4.84  --    HGB 13.7  --    HCT 41.4  --    MCV 85.6  --    MCH 28.3  --    MCHC 33.1  --    RDW 15.4*  --      --    MPV 9.1  --    INR  --  1.1     Chemistry:  Recent Labs     10/22/22  1356 10/23/22  0709    140   K 3.7 3.3*    105   CO2 25 28   GLUCOSE 83 115*   BUN 6 5*   CREATININE 0.64 0.52   MG  --  1.9   ANIONGAP 12 7*   LABGLOM >60 >60   CALCIUM 9.3 8.1*   CKTOTAL 3,264*  --      No results for input(s): PROT, LABALBU, LABA1C, D2FRPIZ, I2EFEGV, FT4, TSH, AST, ALT, LDH, GGT, ALKPHOS, LABGGT, BILITOT, BILIDIR, AMMONIA, AMYLASE, LIPASE, LACTATE, CHOL, HDL, LDLCHOLESTEROL, CHOLHDLRATIO, TRIG, VLDL, RZG80PQ, PHENYTOIN, PHENYF, URICACID, POCGLU in the last 72 hours.     Imaging/Diagnostics:       CT HEAD WO CONTRAST    Result Date: 10/22/2022  EXAMINATION: CT OF THE HEAD WITHOUT CONTRAST; CT OF THE CERVICAL SPINE WITHOUT CONTRAST 10/22/2022 2:14 pm TECHNIQUE: CT of the head was performed without the administration of intravenous contrast. Automated exposure control, iterative reconstruction, and/or weight based adjustment of the mA/kV was utilized to reduce the radiation dose to as low as reasonably achievable.; CT of the cervical spine was performed without the administration of intravenous contrast. Multiplanar reformatted images are provided for review. Automated exposure control, iterative reconstruction, and/or weight based adjustment of the mA/kV was utilized to reduce the radiation dose to as low as reasonably achievable. COMPARISON: 05/18/2022 HISTORY: ORDERING SYSTEM PROVIDED HISTORY: fall, +LOC TECHNOLOGIST PROVIDED HISTORY: fall, +LOC Decision Support Exception - unselect if not a suspected or confirmed emergency medical condition->Emergency Medical Condition (MA) Reason for Exam: fall, + LOC Additional signs and symptoms: pt states she fell, hitting the left side , front of her head; ORDERING SYSTEM PROVIDED HISTORY: fall, midline posterior neck pain TECHNOLOGIST PROVIDED HISTORY: fall, midline posterior neck pain Decision Support Exception - unselect if not a suspected or confirmed emergency medical condition->Emergency Medical Condition (MA) Reason for Exam: fall, midline posterior neck pain Additional signs and symptoms: pt states she fell, hitting her left side FINDINGS: HEAD: BRAIN/VENTRICLES: There is no acute intracranial hemorrhage, mass effect or midline shift. No abnormal extra-axial fluid collection. The gray-white differentiation is maintained. There is no evidence of hydrocephalus. ORBITS: The visualized portion of the orbits demonstrate no acute abnormality. SINUSES: The visualized paranasal sinuses and mastoid air cells demonstrate no acute abnormality. SOFT TISSUES/SKULL:  No acute abnormality of the visualized skull or soft tissues. CERVICAL SPINE: BONES/ALIGNMENT: Reversal the normal cervical lordosis appears degenerative. The vertebral body heights are maintained. No acute fracture identified. DEGENERATIVE CHANGES: Advanced multilevel degenerative disc disease and advanced multilevel facet arthropathy.   Notable disc osteophyte complex at C6-7 encroaching on the central canal. SOFT TISSUES: There is no prevertebral soft tissue swelling. No acute CT abnormality identified in the brain. No acute osseous abnormality identified in the cervical spine. CT CERVICAL SPINE WO CONTRAST    Result Date: 10/22/2022  EXAMINATION: CT OF THE HEAD WITHOUT CONTRAST; CT OF THE CERVICAL SPINE WITHOUT CONTRAST 10/22/2022 2:14 pm TECHNIQUE: CT of the head was performed without the administration of intravenous contrast. Automated exposure control, iterative reconstruction, and/or weight based adjustment of the mA/kV was utilized to reduce the radiation dose to as low as reasonably achievable.; CT of the cervical spine was performed without the administration of intravenous contrast. Multiplanar reformatted images are provided for review. Automated exposure control, iterative reconstruction, and/or weight based adjustment of the mA/kV was utilized to reduce the radiation dose to as low as reasonably achievable. COMPARISON: 05/18/2022 HISTORY: ORDERING SYSTEM PROVIDED HISTORY: fall, +LOC TECHNOLOGIST PROVIDED HISTORY: fall, +LOC Decision Support Exception - unselect if not a suspected or confirmed emergency medical condition->Emergency Medical Condition (MA) Reason for Exam: fall, + LOC Additional signs and symptoms: pt states she fell, hitting the left side , front of her head; ORDERING SYSTEM PROVIDED HISTORY: fall, midline posterior neck pain TECHNOLOGIST PROVIDED HISTORY: fall, midline posterior neck pain Decision Support Exception - unselect if not a suspected or confirmed emergency medical condition->Emergency Medical Condition (MA) Reason for Exam: fall, midline posterior neck pain Additional signs and symptoms: pt states she fell, hitting her left side FINDINGS: HEAD: BRAIN/VENTRICLES: There is no acute intracranial hemorrhage, mass effect or midline shift. No abnormal extra-axial fluid collection. The gray-white differentiation is maintained. There is no evidence of hydrocephalus.  ORBITS: The visualized portion of the orbits demonstrate no acute abnormality. SINUSES: The visualized paranasal sinuses and mastoid air cells demonstrate no acute abnormality. SOFT TISSUES/SKULL:  No acute abnormality of the visualized skull or soft tissues. CERVICAL SPINE: BONES/ALIGNMENT: Reversal the normal cervical lordosis appears degenerative. The vertebral body heights are maintained. No acute fracture identified. DEGENERATIVE CHANGES: Advanced multilevel degenerative disc disease and advanced multilevel facet arthropathy. Notable disc osteophyte complex at C6-7 encroaching on the central canal. SOFT TISSUES: There is no prevertebral soft tissue swelling. No acute CT abnormality identified in the brain. No acute osseous abnormality identified in the cervical spine. CT THORACIC SPINE WO CONTRAST    Result Date: 10/22/2022  EXAMINATION: CT OF THE CHEST, ABDOMEN, AND PELVIS WITH CONTRAST; CT OF THE THORACIC SPINE WITHOUT CONTRAST 10/22/2022 2:14 pm TECHNIQUE: CT of the chest, abdomen and pelvis was performed with the administration of intravenous contrast. Multiplanar reformatted images are provided for review. Automated exposure control, iterative reconstruction, and/or weight based adjustment of the mA/kV was utilized to reduce the radiation dose to as low as reasonably achievable.; CT of the thoracic spine was performed without the administration of intravenous contrast. Multiplanar reformatted images are provided for review. Automated exposure control, iterative reconstruction, and/or weight based adjustment of the mA/kV was utilized to reduce the radiation dose to as low as reasonably achievable.  COMPARISON: CT abdomen and pelvis 08/20/2022 HISTORY: ORDERING SYSTEM PROVIDED HISTORY: diffuse chest, abdominal, and pelvic pain,concern for left hip fracture TECHNOLOGIST PROVIDED HISTORY: diffuse chest, abdominal, and pelvic pain,concern for left hip fracture Decision Support Exception - unselect if not a suspected or confirmed emergency medical condition->Emergency Medical Condition (MA) Reason for Exam: diffuse chest, abdominal, and pelvic pain,concern for left hip fracture Additional signs and symptoms: pt states she fell a couple days ago, all over pain Relevant Medical/Surgical History: suggeries-bowel obstruction x 2, cholecystectomy, tubal ligation; ORDERING SYSTEM PROVIDED HISTORY: fall, upper thoracic pain TECHNOLOGIST PROVIDED HISTORY: fall, upper thoracic pain Reason for Exam: fall, upper thoracic pain Additional signs and symptoms: pt states she fell, hitting her left side CHEST: Mediastinum: No acute aortic abnormality identified. No mediastinal hematoma. No pericardial effusion. Lungs/pleura: No acute airspace disease, pneumothorax or effusion. Soft Tissues/Bones: No acute osseous abnormality identified in this region. No soft tissue hematoma. Abdomen/Pelvis: Organs: No solid organ injury identified. No acute inflammatory process. Status post cholecystectomy. Benign area of hypoattenuation in segment 4 the liver may represent a cyst or possibly fat deposition. GI/Bowel: There is no bowel dilatation or wall thickening identified. Status post partial small-bowel resection. Pelvis: No acute findings. Appropriate excretion of contrast is demonstrated into the bladder. Peritoneum/Retroperitoneum: No free air. No free fluid. The aorta is normal in caliber. The visceral branches are patent. Bones/Soft Tissues: Pelvic alignment maintained. No fracture identified. Severe arthropathy in the left hip with prominent subchondral cyst formation and prominent hypertrophic changes again noted. Moderately severe joint space loss in the superior aspect of the right hip with marginal hypertrophic changes also noted. No soft tissue hematoma. Small fat containing umbilical hernia. Lumbar vertebral body heights are maintained with multilevel degenerative disc disease and lower lumbar facet arthropathy again noted.  THORACIC: BONES/ALIGNMENT: There is normal alignment of the spine. The vertebral body heights are maintained. No osseous destructive lesion is seen. DEGENERATIVE CHANGES: No gross spinal canal stenosis or bony neural foraminal narrowing of the thoracic spine. SOFT TISSUES: No paraspinal hematoma. *Unless otherwise specified, incidental findings do not require dedicated imaging follow-up. 1.  No acute traumatic injury identified in the chest, abdomen or pelvis. 2.  Advanced arthropathy in the left hip, as described, without appreciable change since CT exam 08/20/2022. 3.  No acute osseous abnormality identified in the thoracic spine. CT LUMBAR SPINE WO CONTRAST    Result Date: 10/22/2022  EXAMINATION: CT OF THE LUMBAR SPINE WITHOUT CONTRAST  10/22/2022 TECHNIQUE: CT of the lumbar spine was performed without the administration of intravenous contrast. Multiplanar reformatted images are provided for review. Adjustment of mA and/or kV according to patient size was utilized. Automated exposure control, iterative reconstruction, and/or weight based adjustment of the mA/kV was utilized to reduce the radiation dose to as low as reasonably achievable. COMPARISON: CT abdomen and pelvis today and 08/20/2022 HISTORY: ORDERING SYSTEM PROVIDED HISTORY: fall, lumbar pain TECHNOLOGIST PROVIDED HISTORY: fall, lumbar pain Decision Support Exception - unselect if not a suspected or confirmed emergency medical condition->Emergency Medical Condition (MA) Reason for Exam: fall, lumbar pain Additional signs and symptoms: pt states she fell, hitting her left side FINDINGS: BONES/ALIGNMENT: There is normal alignment of the spine. The vertebral body heights are maintained. No osseous destructive lesion is seen. DEGENERATIVE CHANGES: Moderately severe disc disease in the lower lumbar spine with lower lumbar facet arthropathy. Mild encroachment on the central canal by the disc disease notable at L4-5 and L5-S1. SOFT TISSUES/RETROPERITONEUM: No soft tissue hematoma.      No acute osseous abnormality identified in the lumbar spine. RECOMMENDATIONS: Unavailable     VL DUP LOWER EXTREMITY VENOUS BILATERAL    Result Date: 9/24/2022    [facility]  Vascular Lower Extremities DVT Study Procedure   Patient Name    ANEUDY      Date of Study             09/23/2022                  BASIM KAUFFMAN   Date of Birth   1965   Gender                    Female   Age             61 year(s)   Race                         Room Number     32   Corporate ID #  [id number]   Patient Acct #  [de-identified]   MR #            [id number]      Sonographer               Griselda Aguero RVT   Accession #     [id number]   Interpreting Physician    Casey Arambula   Referring Nurse              Referring Physician       ER MD *  Practitioner  Procedure Type of Study:   Veins: Lower Extremities DVT Study, Venous Scan Lower Bilateral.  Indications for Study:Pain, leg. Patient Status:ER. Conclusions   Summary   Bilateral:  No evidence of deep or superficial venous thrombosis. Signature   ----------------------------------------------------------------  Electronically signed by Griselda Aguero RVT(Sonographer) on  09/23/2022 07:59 PM  ----------------------------------------------------------------   ----------------------------------------------------------------  Electronically signed by Casey Arambula(Interpreting physician)  on 09/24/2022 07:45 AM  ----------------------------------------------------------------  Findings:   Right Impression:                       Left Impression:  The common femoral, femoral, popliteal  The common femoral, femoral,  and tibial veins demonstrate normal     popliteal and tibial veins  compressibility and augmentation. demonstrate normal compressibility  Normal compressibility of the great     and augmentation. Normal  saphenous vein. Normal compressibility  compressibility of the great  of the small saphenous vein. Enlarged   saphenous vein.  Normal  lymph nodes are noted at the right      compressibility of the small  groin level. saphenous vein. Risk Factors History +------------------------------------------------------------+----+--------+ ! Diagnosis                                                   ! Date! Comments! +------------------------------------------------------------+----+--------+ ! Chronic lung disease->COPD                                  !    !        ! +------------------------------------------------------------+----+--------+   - The patient's risk factor(s) include: chronic lung disease, dyslipidemia     and arterial hypertension.   - Current - Every day. Velocities are measured in cm/s ; Diameters are measured in cm Right Lower Extremities DVT Study Measurements Right 2D Measurements +------------------------------------+----------+---------------+----------+ ! Location                            ! Visualized! Compressibility! Thrombosis! +------------------------------------+----------+---------------+----------+ ! Common Femoral                      !Yes       ! Yes            ! None      ! +------------------------------------+----------+---------------+----------+ ! Prox Femoral                        !Yes       ! Yes            ! None      ! +------------------------------------+----------+---------------+----------+ ! Mid Femoral                         !Yes       ! Yes            ! None      ! +------------------------------------+----------+---------------+----------+ ! Dist Femoral                        !Yes       ! Yes            ! None      ! +------------------------------------+----------+---------------+----------+ ! Deep Femoral                        !Yes       ! Yes            ! None      ! +------------------------------------+----------+---------------+----------+ ! Popliteal                           !Yes       ! Yes            ! None      ! +------------------------------------+----------+---------------+----------+ ! Sapheno Femoral Junction            ! Yes       ! Yes            ! None      ! +------------------------------------+----------+---------------+----------+ ! PTV                                 ! Yes       ! Yes            ! None      ! +------------------------------------+----------+---------------+----------+ ! Peroneal                            !Yes       ! Yes            ! None      ! +------------------------------------+----------+---------------+----------+ ! Gastroc                             ! Yes       ! Yes            ! None      ! +------------------------------------+----------+---------------+----------+ ! GSV Thigh                           ! Yes       ! Yes            ! None      ! +------------------------------------+----------+---------------+----------+ ! GSV Knee                            ! Yes       ! Yes            ! None      ! +------------------------------------+----------+---------------+----------+ ! GSV Ankle                           ! Yes       ! Yes            ! None      ! +------------------------------------+----------+---------------+----------+ ! SSV                                 ! Yes       ! Yes            ! None      ! +------------------------------------+----------+---------------+----------+ Right Doppler Measurements +---------------------------+------+------+--------------------------------+ ! Location                   ! Signal!Reflux! Reflux (msec)                   ! +---------------------------+------+------+--------------------------------+ ! Common Femoral             !Phasic!      !                                ! +---------------------------+------+------+--------------------------------+ ! Prox Femoral               !Phasic!      !                                ! +---------------------------+------+------+--------------------------------+ ! Popliteal                  !Phasic!      !                                ! +---------------------------+------+------+--------------------------------+ Left Lower Extremities DVT Study Measurements Left 2D Measurements +------------------------------------+----------+---------------+----------+ ! Location                            ! Visualized! Compressibility! Thrombosis! +------------------------------------+----------+---------------+----------+ ! Common Femoral                      !Yes       ! Yes            ! None      ! +------------------------------------+----------+---------------+----------+ ! Prox Femoral                        !Yes       ! Yes            ! None      ! +------------------------------------+----------+---------------+----------+ ! Mid Femoral                         !Yes       ! Yes            ! None      ! +------------------------------------+----------+---------------+----------+ ! Dist Femoral                        !Yes       ! Yes            ! None      ! +------------------------------------+----------+---------------+----------+ ! Deep Femoral                        !Yes       ! Yes            ! None      ! +------------------------------------+----------+---------------+----------+ ! Popliteal                           !Yes       ! Yes            ! None      ! +------------------------------------+----------+---------------+----------+ ! Sapheno Femoral Junction            ! Yes       ! Yes            ! None      ! +------------------------------------+----------+---------------+----------+ ! PTV                                 ! Yes       ! Yes            ! None      ! +------------------------------------+----------+---------------+----------+ ! Peroneal                            !Yes       ! Yes            ! None      ! +------------------------------------+----------+---------------+----------+ ! Gastroc                             ! Yes       ! Yes            ! None      ! +------------------------------------+----------+---------------+----------+ ! GSJAM Acharya                           ! Yes       ! Yes            ! None      ! +------------------------------------+----------+---------------+----------+ ! GSV Knee                            ! Yes       ! Yes            ! None      ! +------------------------------------+----------+---------------+----------+ ! GSV Ankle                           ! Yes       ! Yes            ! None      ! +------------------------------------+----------+---------------+----------+ ! SSV                                 ! Yes       ! Yes            ! None      ! +------------------------------------+----------+---------------+----------+ Left Doppler Measurements +---------------------------+------+------+--------------------------------+ ! Location                   ! Signal!Reflux! Reflux (msec)                   ! +---------------------------+------+------+--------------------------------+ ! Common Femoral             !Phasic!      !                                ! +---------------------------+------+------+--------------------------------+ ! Prox Femoral               !Phasic!      !                                ! +---------------------------+------+------+--------------------------------+ ! Popliteal                  !Phasic!      !                                ! +---------------------------+------+------+--------------------------------+    CT CHEST ABDOMEN PELVIS W CONTRAST Additional Contrast? None    Result Date: 10/22/2022  EXAMINATION: CT OF THE CHEST, ABDOMEN, AND PELVIS WITH CONTRAST; CT OF THE THORACIC SPINE WITHOUT CONTRAST 10/22/2022 2:14 pm TECHNIQUE: CT of the chest, abdomen and pelvis was performed with the administration of intravenous contrast. Multiplanar reformatted images are provided for review.  Automated exposure control, iterative reconstruction, and/or weight based adjustment of the mA/kV was utilized to reduce the radiation dose to as low as reasonably achievable.; CT of the thoracic spine was performed without the administration of intravenous contrast. Multiplanar reformatted images are provided for review. Automated exposure control, iterative reconstruction, and/or weight based adjustment of the mA/kV was utilized to reduce the radiation dose to as low as reasonably achievable. COMPARISON: CT abdomen and pelvis 08/20/2022 HISTORY: ORDERING SYSTEM PROVIDED HISTORY: diffuse chest, abdominal, and pelvic pain,concern for left hip fracture TECHNOLOGIST PROVIDED HISTORY: diffuse chest, abdominal, and pelvic pain,concern for left hip fracture Decision Support Exception - unselect if not a suspected or confirmed emergency medical condition->Emergency Medical Condition (MA) Reason for Exam: diffuse chest, abdominal, and pelvic pain,concern for left hip fracture Additional signs and symptoms: pt states she fell a couple days ago, all over pain Relevant Medical/Surgical History: suggeries-bowel obstruction x 2, cholecystectomy, tubal ligation; ORDERING SYSTEM PROVIDED HISTORY: fall, upper thoracic pain TECHNOLOGIST PROVIDED HISTORY: fall, upper thoracic pain Reason for Exam: fall, upper thoracic pain Additional signs and symptoms: pt states she fell, hitting her left side CHEST: Mediastinum: No acute aortic abnormality identified. No mediastinal hematoma. No pericardial effusion. Lungs/pleura: No acute airspace disease, pneumothorax or effusion. Soft Tissues/Bones: No acute osseous abnormality identified in this region. No soft tissue hematoma. Abdomen/Pelvis: Organs: No solid organ injury identified. No acute inflammatory process. Status post cholecystectomy. Benign area of hypoattenuation in segment 4 the liver may represent a cyst or possibly fat deposition. GI/Bowel: There is no bowel dilatation or wall thickening identified. Status post partial small-bowel resection. Pelvis: No acute findings. Appropriate excretion of contrast is demonstrated into the bladder. Peritoneum/Retroperitoneum: No free air. No free fluid. The aorta is normal in caliber. The visceral branches are patent.  Bones/Soft Tissues: Pelvic alignment maintained. No fracture identified. Severe arthropathy in the left hip with prominent subchondral cyst formation and prominent hypertrophic changes again noted. Moderately severe joint space loss in the superior aspect of the right hip with marginal hypertrophic changes also noted. No soft tissue hematoma. Small fat containing umbilical hernia. Lumbar vertebral body heights are maintained with multilevel degenerative disc disease and lower lumbar facet arthropathy again noted. THORACIC: BONES/ALIGNMENT: There is normal alignment of the spine. The vertebral body heights are maintained. No osseous destructive lesion is seen. DEGENERATIVE CHANGES: No gross spinal canal stenosis or bony neural foraminal narrowing of the thoracic spine. SOFT TISSUES: No paraspinal hematoma. *Unless otherwise specified, incidental findings do not require dedicated imaging follow-up. 1.  No acute traumatic injury identified in the chest, abdomen or pelvis. 2.  Advanced arthropathy in the left hip, as described, without appreciable change since CT exam 08/20/2022. 3.  No acute osseous abnormality identified in the thoracic spine. XR HIP 2-3 VW W PELVIS LEFT    Result Date: 10/22/2022  EXAMINATION: ONE XRAY VIEW OF THE PELVIS AND TWO XRAY VIEWS LEFT HIP 10/22/2022 1:49 pm COMPARISON: CT abdomen and pelvis 08/20/2022. Radiographs 05/15/2022. HISTORY: ORDERING SYSTEM PROVIDED HISTORY: fall, possible left hip fracture TECHNOLOGIST PROVIDED HISTORY: fall, possible left hip fracture Reason for Exam: fall, possible left hip fracture Additional signs and symptoms: PT states fall a few days ago and states lower back pain and left hip pain. States left hip pain is chronic FINDINGS: Severe arthropathy in the left hip again demonstrated. Mature ossification and overgrowth about the superior acetabulum again demonstrated.   Mild flattening of the superior aspect of the femoral head appears more prominent since the prior exam with large subchondral cyst formation. No evidence for femoral neck fracture. Pelvic alignment is maintained. Severe arthropathy in the left hip. Mild flattening of the superior aspect of the femoral head with large subchondral cyst formation appears more prominent since prior radiographs, which may be related to trauma or articular surface collapse related to arthropathy.         Current Facility-Administered Medications   Medication Dose Route Frequency Provider Last Rate Last Admin    morphine (PF) injection 2 mg  2 mg IntraVENous Q6H PRN Alex Angel MD   2 mg at 10/23/22 0550    0.9 % sodium chloride bolus  500 mL IntraVENous Once Riri Wiggins .9 mL/hr at 10/23/22 1009 500 mL at 10/23/22 1009    HYDROcodone-acetaminophen (NORCO) 5-325 MG per tablet 1 tablet  1 tablet Oral Q6H PRN Riri Wiggins MD   1 tablet at 10/23/22 1004    sodium chloride flush 0.9 % injection 10 mL  10 mL IntraVENous PRN Dario Cota, DO   10 mL at 43/30/66 3159    folic acid 1 mg, thiamine (B-1) 100 mg in sodium chloride 0.9 % 50 mL IVPB   IntraVENous Daily Keyonna Young, DO   Stopped at 10/22/22 1908    dextrose 5 % and 0.45 % NaCl 1,000 mL infusion   IntraVENous Continuous Keyonna Young  mL/hr at 10/22/22 2219 New Bag at 10/22/22 2219    sodium chloride flush 0.9 % injection 5-40 mL  5-40 mL IntraVENous 2 times per day Dario Cota, DO        sodium chloride flush 0.9 % injection 5-40 mL  5-40 mL IntraVENous PRN Dario Cota, DO        0.9 % sodium chloride infusion   IntraVENous PRN Dario Cota, DO        albuterol sulfate HFA (PROVENTIL;VENTOLIN;PROAIR) 108 (90 Base) MCG/ACT inhaler 2 puff  2 puff Inhalation Q6H PRN Gunner Stewart MD        budesonide-formoterol (SYMBICORT) 160-4.5 MCG/ACT inhaler 2 puff  2 puff Inhalation BID Gunner Stewart MD   2 puff at 10/23/22 9377    lisinopril (PRINIVIL;ZESTRIL) tablet 5 mg  5 mg Oral Daily Gunner Stewart MD   5 mg at 10/22/22 3885 sodium chloride flush 0.9 % injection 5-40 mL  5-40 mL IntraVENous 2 times per day Maria Locke MD        sodium chloride flush 0.9 % injection 10 mL  10 mL IntraVENous PRN Maria Locke MD        0.9 % sodium chloride infusion   IntraVENous PRN Maria Locke MD        potassium chloride (KLOR-CON M) extended release tablet 40 mEq  40 mEq Oral PRN Maria Locke MD   40 mEq at 10/23/22 1116    Or    potassium bicarb-citric acid (EFFER-K) effervescent tablet 40 mEq  40 mEq Oral PRN Maria Locke MD        Or    potassium chloride 10 mEq/100 mL IVPB (Peripheral Line)  10 mEq IntraVENous PRN Maria Locke MD        magnesium sulfate 1000 mg in dextrose 5% 100 mL IVPB  1,000 mg IntraVENous PRN Maria Locke MD        ondansetron (ZOFRAN-ODT) disintegrating tablet 4 mg  4 mg Oral Q8H PRN Maria Locke MD        Or    ondansetron (ZOFRAN) injection 4 mg  4 mg IntraVENous Q6H PRN Maria Locke MD        polyethylene glycol (GLYCOLAX) packet 17 g  17 g Oral Daily PRN Maria Locek MD        acetaminophen (TYLENOL) tablet 650 mg  650 mg Oral Q6H PRN Maria Locke MD   650 mg at 10/23/22 0551    Or    acetaminophen (TYLENOL) suppository 650 mg  650 mg Rectal Q6H PRN Maria Locke MD        enoxaparin (LOVENOX) injection 40 mg  40 mg SubCUTAneous Daily Maria Locke MD   40 mg at 10/23/22 0831       Impressions :     1. Principal Problem:    Rhabdomyolysis  Active Problems:    Syncope and collapse  Resolved Problems:    * No resolved hospital problems. *        2.  has a past medical history of Acid reflux, Anxiety, Arthritis, Asthma, Bipolar 1 disorder ([address] Bowel obstruction ([address] COPD (chronic obstructive pulmonary disease) ([facility] [address] Crohn disease ([facility] [address] Depression, Dry eye, Fibromyalgia, Gout, Headache, Hyperlipidemia, Hypertension, Hypothyroidism, Kidney stones, Muscle spasm, Neuropathy, Pain, Personal history of other medical treatment, Wears partial dentures, and Wellness examination.

## 2022-10-23 NOTE — CONSULTS
[facility] Neurology   IN-PATIENT SERVICE      NEUROLOGY CONSULT  NOTE            Date:   10/23/2022  Patient name:  Anca Bergman  Date of admission:  10/22/2022  YOB: 1965      Chief Complaint:     Chief Complaint   Patient presents with    Hip Pain    Head Injury       Reason for Consult:      Syncopal episode    History of Present Illness: The patient is a 61 y.o. female who presents with Hip Pain and Head Injury  . The patient was seen and examined and the chart was reviewed. Pt presents after falling down at home for unknown length of time. She states she had hip pain which caused her to fall down. She does not recall any further details regarding this. Denies any loss of bowel or bladder control. Denies any seizure-like activity. Presented to the ED found to be in rhabdomyolysis. She does have history of Crohn's disease and states she suffers from chronic diarrhea. Blood pressures have been in the lower side as low as 70 systolic earlier today per nursing report. She underwent CT head which revealed no acute intracranial abnormalities. At this time she is sitting up in bed. She is complaining of hip pain and asking for Norco.  She states she does not remember all the details regarding her episode of syncope. She believes she lost consciousness but cannot say for sure.     Past Medical History:     Past Medical History:   Diagnosis Date    Acid reflux     Anxiety     Arthritis     Left hip    Asthma     Bipolar 1 disorder (HCC)     Bowel obstruction (HCC)     COPD (chronic obstructive pulmonary disease) (HCC)     O2 3L PRN/ Dr. Adele [facility]/last seen 2020/appt.9-7-21 for Clearance    Crohn disease ([facility] [address]     Depression     Dry eye     Fibromyalgia     Gout     Headache     Hyperlipidemia     Hypertension     Hypothyroidism     Kidney stones     Muscle spasm     Neuropathy     Pain     left hip    Personal history of other medical treatment     Pt. seen by  Jaciel Holley for clearance for this OR Left hip/ Normally pt. doesn't follow with Cardiology. [facility]    Wears partial dentures     upper    Wellness examination     PCP True Medina MD/ genna/last seen 8-24-21        Past Surgical History:     Past Surgical History:   Procedure Laterality Date    ABDOMEN SURGERY      bowel obstruction OR    BUNIONECTOMY Left     CHOLECYSTECTOMY      COLONOSCOPY  04/30/2007    no gross pathology seen in the colon and also 3-4 inches of the ileum    COLONOSCOPY  10/12/2017    normal    COLONOSCOPY  10/25/2021    COLONOSCOPY WITH BIOPSY performed by Ravi Milan MD at [facility]    COLONOSCOPY  10/25/2021    COLONOSCOPY POLYPECTOMY REMOVAL SNARE performed by Ravi Milan MD at [address] [place] [address] ENDOSCOPY  10/25/2021    EGD BIOPSY performed by Ravi Milan MD at [facility]        Medications Prior to Admission:     Prior to Admission medications    Medication Sig Start Date End Date Taking? Authorizing Provider   HYDROcodone-acetaminophen (NORCO) 5-325 MG per tablet Take 1 tablet by mouth every 6 hours as needed for Pain. Yes Historical Provider, MD   clonazePAM (KLONOPIN) 0.5 MG tablet Take 0.5 mg by mouth 2 times daily as needed.    Yes Historical Provider, MD   Dexamethasone (DEXPAK 6 DAY) 1.5 MG (21) TBPK Take 1 kit by mouth 1 (one) time if needed (as advised) 9/18/22   Sánchez Staton MD   traZODone (DESYREL) 150 MG tablet Take 300 mg by mouth nightly    Historical Provider, MD   Handicap Placard MISC by Does not apply route Duration 5 years 5/17/22   Key August MD   Pregabalin (LYRICA PO) Take 300 mg by mouth 2 times daily    Historical Provider, MD   ibuprofen (IBU) 400 MG tablet Take 1 tablet by mouth every 6 hours as needed for Pain 5/15/22 9/18/22  Moises Spence,    albuterol sulfate HFA (PROVENTIL;VENTOLIN;PROAIR) 108 (90 Base) MCG/ACT inhaler Inhale 2 puffs into the lungs every 6 hours as needed for Wheezing    Historical Provider, MD   diphenhydrAMINE (BENADRYL) 25 MG tablet Take 25 mg by mouth nightly as needed    Historical Provider, MD   SUMAtriptan (IMITREX) 100 MG tablet Take 100 mg by mouth once as needed for Migraine    Historical Provider, MD   gabapentin (NEURONTIN) 400 MG capsule Take 800 mg by mouth 2 times daily. Historical Provider, MD   budesonide-formoterol (SYMBICORT) 160-4.5 MCG/ACT AERO Inhale 2 puffs into the lungs 2 times daily 9/19/21   Princess Gustavo MD   lisinopril (PRINIVIL;ZESTRIL) 5 MG tablet Take 1 tablet by mouth daily 9/20/21   Princess Gustavo MD   nicotine (NICODERM CQ) 21 MG/24HR Place 1 patch onto the skin daily  Patient not taking: Reported on 8/20/2022 9/20/21 8/23/22  Princess Gustavo MD   acetaminophen (TYLENOL) 500 MG tablet Take 2 tablets by mouth 3 times daily  Patient taking differently: Take 1,000 mg by mouth 3 times daily as needed 7/17/21   Dustin Carroll DO   Polyvinyl Alcohol-Povidone (REFRESH OP) Place 1 drop into both eyes 3 times daily    Historical Provider, MD   lidocaine (LIDODERM) 5 % Place 1 patch onto the skin daily as needed for Pain 12 hours on, 12 hours off. Historical Provider, MD        Allergies:     Azithromycin, Methylprednisolone, Penicillins, and Tape [adhesive tape]    Social History:     Tobacco:    reports that she has been smoking cigarettes. She has a 18.50 pack-year smoking history. She quit smokeless tobacco use about 21 years ago. Her smokeless tobacco use included chew. Alcohol:      reports that she does not currently use alcohol. Drug Use:  reports current drug use. Drug: Marijuana Nan Elias).     Family History:     Family History   Problem Relation Age of Onset    Diabetes Father     Lung Cancer Father     Hypertension Mother     Cancer Mother     High Blood Pressure Mother     Crohn's Disease Brother     Heart Disease Brother        Review of Systems:       Constitutional Negative for fever and chills   HEENT Negative for ear discharge, ear pain, nosebleed. Negative for photophobia, headache. Musculoskeletal Negative for joint pain, negative for myalgia   Respiratory Negative for cough, dyspnea. Negative for hemoptysis and sputum. Cardiovascular Negative for palpitations, chest pain. Negative for orthopnea, claudication. Gastrointestinal Negative for nausea, vomiting. Negative for abdominal pain, diarrhea, blood in stool   Genitourinary  Negative for dysuria, hematuria. Negative for suprapubic pain. Negative for bladder incontinence. Skin Negative for rash or itching   Hematology Negative for ecchymosis, anemia   Psychiatric Negative for anxiety, depression. Negative for suicidal ideation, hallucinations         Physical Exam:   BP (!) 73/63   Pulse 64   Temp 97.5 °F (36.4 °C)   Resp 16   Ht 5' 6\" (1.676 m)   Wt 158 lb 11.7 oz (72 kg)   SpO2 97%   BMI 25.62 kg/m²   Temp (24hrs), Av.6 °F (36.4 °C), Min:97.5 °F (36.4 °C), Max:97.6 °F (36.4 °C)        General examination:      General Appearance:  alert, well appearing, and in no acute distress  HEENT: Normocephalic, atraumatic, dry mucus membranes  Neck: supple, no carotid bruits, (-) nuchal rigidity  Lungs:  Respirations unlabored, chest wall no deformity  Cardiovascular: normal rate, regular rhythm  Abdomen: Soft, nontender, nondistended, normal bowel sounds  Skin: No gross lesions, rashes, bruising or bleeding on exposed skin area  Extremities:  peripheral pulses palpable, no cyanosis, clubbing or edema  Psych: normal affect      Neurological examination:    Mental status   Alert and oriented x 3; following all commands; speech is fluent, no dysarthria, aphasia.       Cranial nerves   II - visual fields intact to confrontation; pupils reactive  III, IV, VI - extraocular muscles intact; no PARMJIT; no nystagmus; no ptosis   V - normal facial sensation                                                               VII - normal facial symmetry                                                             VIII - intact hearing                                                                             IX, X - symmetrical palate elevation                                               XI - symmetrical shoulder shrug                                                       XII - midline tongue without atrophy or fasciculation     Motor function  Strength: 5/5 RUE, 4/5 RLE, 5/5 LUE, 4/5  LLE  Normal bulk and tone. No tremors                      Sensory function Intact to touch, pin, vibration, proprioception throughout     Cerebellar Intact finger-nose-finger testing. Intact heel-shin testing. No dysdiadochokinesia present. Reflex function 3/4 symmetric throughout . Downgoing plantar response bilaterally. (-)Kong's sign bilaterally    Gait                  Slow and cautious secondary to pain             Diagnostics:      Laboratory Testing:  CBC:   Recent Labs     10/22/22  1356   WBC 7.2   HGB 13.7        BMP:    Recent Labs     10/22/22  1356 10/23/22  0709    140   K 3.7 3.3*    105   CO2 25 28   BUN 6 5*   CREATININE 0.64 0.52   GLUCOSE 83 115*         Lab Results   Component Value Date    ALT 10 09/15/2022    AST 12 09/15/2022    TSH 1.01 10/16/2021    INR 1.1 10/23/2022       Imaging/Diagnostics:      CT HEAD WO CONTRAST    Narrative  EXAMINATION:  CT OF THE HEAD WITHOUT CONTRAST; CT OF THE CERVICAL SPINE WITHOUT CONTRAST  10/22/2022 2:14 pm    TECHNIQUE:  CT of the head was performed without the administration of intravenous  contrast. Automated exposure control, iterative reconstruction, and/or weight  based adjustment of the mA/kV was utilized to reduce the radiation dose to as  low as reasonably achievable.; CT of the cervical spine was performed without  the administration of intravenous contrast. Multiplanar reformatted images  are provided for review.  Automated exposure control, iterative  reconstruction, and/or weight based adjustment of the mA/kV was utilized to  reduce the radiation dose to as low as reasonably achievable. COMPARISON:  05/18/2022    HISTORY:  ORDERING SYSTEM PROVIDED HISTORY: fall, +LOC  TECHNOLOGIST PROVIDED HISTORY:    fall, +LOC  Decision Support Exception - unselect if not a suspected or confirmed  emergency medical condition->Emergency Medical Condition (MA)  Reason for Exam: fall, + LOC  Additional signs and symptoms: pt states she fell, hitting the left side ,  front of her head; ORDERING SYSTEM PROVIDED HISTORY: fall, midline posterior  neck pain  TECHNOLOGIST PROVIDED HISTORY:  fall, midline posterior neck pain  Decision Support Exception - unselect if not a suspected or confirmed  emergency medical condition->Emergency Medical Condition (MA)  Reason for Exam: fall, midline posterior neck pain  Additional signs and symptoms: pt states she fell, hitting her left side    FINDINGS:  HEAD:    BRAIN/VENTRICLES: There is no acute intracranial hemorrhage, mass effect or  midline shift. No abnormal extra-axial fluid collection. The gray-white  differentiation is maintained. There is no evidence of hydrocephalus. ORBITS: The visualized portion of the orbits demonstrate no acute abnormality. SINUSES: The visualized paranasal sinuses and mastoid air cells demonstrate  no acute abnormality. SOFT TISSUES/SKULL:  No acute abnormality of the visualized skull or soft  tissues. CERVICAL SPINE:    BONES/ALIGNMENT: Reversal the normal cervical lordosis appears degenerative. The vertebral body heights are maintained. No acute fracture identified. DEGENERATIVE CHANGES: Advanced multilevel degenerative disc disease and  advanced multilevel facet arthropathy. Notable disc osteophyte complex at  C6-7 encroaching on the central canal.    SOFT TISSUES: There is no prevertebral soft tissue swelling. Impression  No acute CT abnormality identified in the brain.     No acute osseous abnormality identified in the cervical spine. I personally reviewed all of the above medications, clinical laboratory, imaging and other diagnostic tests. Impression:      Suspected syncopal episode in the setting of hypotension, dehydration secondary to diarrhea  Rhabdomyolysis    Plan:     Check orthostatic vital signs  IV fluid hydration as per primary  Consider 2D echo  Low suspicion for seizure we will hold off EEG. Limit pain medications as this can lead to hypotension  No further neuro work-up planned at this time. We will sign off, please do not hesitate to contact with any further questions or concerns. Thank you for this very interesting consultation.       Electronically signed by Christian Mora DO on 10/23/2022 at 2:53 PM      Christian Mora [address] [place] [address]  Neurology

## 2022-10-23 NOTE — PLAN OF CARE
Problem: Discharge Planning  Goal: Discharge to home or other facility with appropriate resources  10/23/2022 1558 by Palmer Bridges RN  Outcome: Progressing  10/23/2022 0632 by Selina Grajeda RN  Outcome: Progressing  Flowsheets  Taken 10/23/2022 0243  Discharge to home or other facility with appropriate resources:   Identify barriers to discharge with patient and caregiver   Identify discharge learning needs (meds, wound care, etc)  Taken 10/23/2022 0122  Discharge to home or other facility with appropriate resources:   Identify barriers to discharge with patient and caregiver   Identify discharge learning needs (meds, wound care, etc)     Problem: Safety - Adult  Goal: Free from fall injury  10/23/2022 1558 by Palmer Bridges RN  Outcome: Progressing  10/23/2022 0632 by Selina Grajeda RN  Outcome: Progressing     Problem: Pain  Goal: Verbalizes/displays adequate comfort level or baseline comfort level  10/23/2022 1558 by Palmer Bridges RN  Outcome: Progressing  10/23/2022 0632 by Selina Grajeda RN  Outcome: Progressing

## 2022-10-23 NOTE — CARE COORDINATION
Call from Efe at [facility] , they are unable to accept referral due to staffing issues. Referrals sent o [facility] and [address]    Electronically signed by Prem Rowell RN on 10/23/2022 at 3:46 PM

## 2022-10-23 NOTE — PROGRESS NOTES
A consult was placed for Dr Juliet Davis and  Dr Mahesh Valdez is on call and perfect serve was sent and read at this time. .. thank you!

## 2022-10-23 NOTE — PROGRESS NOTES
A Consult was placed for Dr Henry Nolen and Ella Mahmood is on call and responded to perfect serve and will see tomorrow. .. thank you!

## 2022-10-23 NOTE — PROGRESS NOTES
Physical Therapy  Facility/Department: [facility] MED SURG  Physical Therapy Initial Assessment    Name: Wilder Madison  : 1965  MRN: [id number]  Date of Service: 10/23/2022    Discharge Recommendations:  Patient would benefit from continued therapy after discharge   PT Equipment Recommendations  Equipment Needed: No  Other: has equipment      Patient Diagnosis(es): The encounter diagnosis was Syncope and collapse. Past Medical History:  has a past medical history of Acid reflux, Anxiety, Arthritis, Asthma, Bipolar 1 disorder ([address] Bowel obstruction ([address] COPD (chronic obstructive pulmonary disease) ([address] Crohn disease ([address] Depression, Dry eye, Fibromyalgia, Gout, Headache, Hyperlipidemia, Hypertension, Hypothyroidism, Kidney stones, Muscle spasm, Neuropathy, Pain, Personal history of other medical treatment, Wears partial dentures, and Wellness examination. Past Surgical History:  has a past surgical history that includes Tonsillectomy and adenoidectomy; Tubal ligation; Cholecystectomy; Bunionectomy (Left); Colonoscopy (2007); Colonoscopy (10/12/2017); Abdomen surgery; Upper gastrointestinal endoscopy (10/25/2021); Colonoscopy (10/25/2021); and Colonoscopy (10/25/2021). Assessment   Body Structures, Functions, Activity Limitations Requiring Skilled Therapeutic Intervention: Decreased functional mobility ; Increased pain;Decreased ROM; Decreased strength;Decreased endurance  Assessment: Pt presents with L hip pain impairming tolerance to mobility. Only completed a bed level assessment given pain levels. Asked nursing if patient can have pain meds but pt defers tylenol option. Much encouragement to participate. Only agreeable to sit EOB and return to bed given high pain levels in L hip. Will need to assess transfers and ambulation in future sessions to maximize her mobility to allow her to return home.   Treatment Diagnosis: impaired mobility due to L hip pain  Specific Instructions for Next Treatment: need to assess transfers and ambulation as pain is controlled  Therapy Prognosis: Fair  Decision Making: Medium Complexity  Requires PT Follow-Up: Yes  Activity Tolerance  Activity Tolerance: Patient limited by pain  Activity Tolerance Comments: limited assessment to only bed level due to pain     Plan   Physcial Therapy Plan  General Plan: 6-7 times per week  Specific Instructions for Next Treatment: need to assess transfers and ambulation as pain is controlled  Current Treatment Recommendations: Strengthening, ROM, Balance training, Functional mobility training, Transfer training, Endurance training, Gait training, Neuromuscular re-education, Home exercise program, Safety education & training, Therapeutic activities  Safety Devices  Type of Devices: Call light within reach, Left in bed, Nurse notified  Restraints  Restraints Initially in Place: No     Restrictions  Restrictions/Precautions  Restrictions/Precautions: Up as Tolerated  Required Braces or Orthoses?: No     Subjective   Pain: Most pain in the LLE, 9/10 at rest.  General  Chart Reviewed: Yes  Patient assessed for rehabilitation services?: Yes  Additional Pertinent Hx: Pj Beltran is a 61 y.o. female who presents with left-sided hip pain, possible head injury. Patient states that she believes that she fell a few days ago but does not remember falling. She believes that she hit her head but does not know if she hit her head. She also states that since that time she has been having some worsening left-sided hip pain. She states that she has previous left hip injuries to the point where she thinks it needs to be replaced but has unable to schedule yet. Endorses headache, neck pain, back pain, chest pain, abdominal pain, and left-sided hip pain. Denies any pain to her upper extremities or anything to the right lower extremity. Patient believes that she lost consciousness.   She is not sure where she fell or how long she might of been unconscious all that she remembers is that she woke up on her bed. She denies any alcohol or other illicit substance use. Family / Caregiver Present: No  Diagnosis: syncope and collapse  Follows Commands: Within Functional Limits  Other (Comment): Okay'd for assessment per BRENDA Shahid  General Comment  Comments: L IV line  Subjective  Subjective: Pt notes she came to the hospital due to pain in the LEs and a fall. She notes she has a bad L hip and wanting to get it replaced. Feels this was causing her to decrease her mobility. She has been using a rollator and a motorized scooter to get around. She notes she would like some pain meds due to 9/10 pain in the L leg. Social/Functional History  Social/Functional History  Lives With: Alone  Type of Home: Apartment  Home Layout: One level (3rd floor apartment)  Home Access: Elevator  Bathroom Shower/Tub: Tub/Shower unit, Shower chair with back, Curtain (hard to get in)  [employer]: Standard  Bathroom Equipment: Toilet raiser, Shower chair, Grab bars in shower (with arms on toilet)  Bathroom Accessibility: Not accessible (uses a cane to get in restroom)  Home Equipment: Electric scooter, Rollator, Cane, Grab bars, Alert Button  Has the patient had two or more falls in the past year or any fall with injury in the past year?: Yes (fell 3 days ago but does not remember the fall or details of it.)  Receives Help From: Family (limited support from daughter)  ADL Assistance: Independent (difficulty doing laundry as it in the downstairs of apartment complex)  [address]: Independent  Homemaking Responsibilities: Yes  Ambulation Assistance: Independent (using a rollator or cane; at times to use scooter)  Transfer Assistance: Independent  Active : No  Occupation: Unemployed  IADL Comments: sleeps on a low couch -- hard to stand to/from  Additional Comments: Pt  feels very limited by her pain.  She notes she is doing less at home due to pain  Vision/Hearing  Vision  Vision: Impaired  Vision Exceptions: Wears glasses for reading (suposed to wear glasses at all times but does not have any)  Hearing  Hearing: Within functional limits    Cognition   Orientation  Overall Orientation Status: Within Normal Limits  Cognition  Overall Cognitive Status: WFL     Objective   Heart Rate: 64  BP: (!) 73/63  MAP (Calculated): 66.33  Resp: 16  SpO2: 97 %     Observation/Palpation  Posture: Fair  Gross Assessment  Tone: Normal  Sensation: Intact     AROM RLE (degrees)  RLE AROM: WFL  AROM LLE (degrees)  LLE General AROM: limited by pain. apprehensive to move the LE  AROM RUE (degrees)  RUE AROM : WFL  AROM LUE (degrees)  LUE AROM : WFL  Strength RLE  Comment: R ankle MMT 5/5; R quad & HS 4-/5, R hip flexion 4-/5  Strength LLE  Comment: does not tolerate strength assment due to pain  Strength RUE  Strength RUE: WNL  Strength LUE  Strength LUE: WNL           Bed mobility  Bridging: Supervision (partial bridge with inc time due to pain)  Rolling to Right: Minimal assistance (limited by pain)  Supine to Sit: Contact guard assistance (use of HR)  Sit to Supine: Contact guard assistance (increased time)  Bed Mobility Comments: increased time for all due to pain.  Encouragement needed to participate  Transfers  Comment: defers sit to stand due to high levels of pain in L hip  Ambulation  Comments: defers due to pain     Balance  Posture: Fair  Sitting - Static: Good  Sitting - Dynamic: Good           OutComes Score                                                  AM-PAC Score  AM-PAC Inpatient Mobility Raw Score : 14 (10/23/22 1052)  AM-PAC Inpatient T-Scale Score : 38.1 (10/23/22 1052)  Mobility Inpatient CMS 0-100% Score: 61.29 (10/23/22 1052)  Mobility Inpatient CMS G-Code Modifier : CL (10/23/22 1052)          Tinneti Score       Goals  Short Term Goals  Time Frame for Short Term Goals: 5-7 days  Short Term Goal 1: Pt will be able to complete sit <> stand mod IND with rollator to function at PLOF  Short Term Goal 2: Pt will be able to ambulate x100ft with rollator  mod IND to be able to return home. Short Term Goal 3: Pt will be able to march with UE support to demonstrate improved L hip ROM to be able to enter/exit her tub shower  Short Term Goal 4: Pt will tolerate 30 minutes of exercise with appropriate rest breaks demonstrating functional capacity for ADLs.   Patient Goals   Patient Goals : to relieve pain in the L hip       Education  Patient Education  Education Given To: Patient  Education Provided: Role of Therapy;Plan of Care  Education Method: Demonstration;Verbal  Education Outcome: Verbalized understanding;Demonstrated understanding      Therapy Time   Individual Concurrent Group Co-treatment   Time In 0846         Time Out 0921         Minutes [address] [place], [place]

## 2022-10-23 NOTE — CARE COORDINATION
CASE MANAGEMENT NOTE:    Admission Date:  10/22/2022 Daily Billingsley is a 61 y.o.  female    Admitted for : Syncope and collapse [R55]  Rhabdomyolysis [M62.82]    Met with:  Patient    PCP: Yumiko Schofield                              Insurance:  [address]      Is patient alert and oriented at time of discussion:  Yes    Current Residence/ Living Arrangements:  independently at home in apartment with elevator            Current Services PTA:  No    Does patient go to outpatient dialysis: No  If yes, location and chair time: NA  Who is their nephrologist? NA    Is patient agreeable to VNS: Yes    Freedom of choice provided:  Yes    List of [address] provided: Yes    VNS chosen:  Yes-[facility]    DME:  shower chair, motorized scooter and rollator    Home Oxygen: No    Nebulizer: No    CPAP/BIPAP: No    Supplier: N/A    Potential Assistance Needed: Yes    SNF needed: No    Freedom of choice and list provided: No    Pharmacy:  [facility]       Is patient currently receiving oral anticoagulation therapy? No    Is the Patient an RAMSEY DOTY [facility] [place] with Readmission Risk Score greater than 14%? No  If yes, pt needs a follow up appointment made within 7 days. Family Members/Caregivers that pt would like involved in their care:    Yes    If yes, list name here:  Dtr Ganesh    Transportation Provider:  Family             Discharge Plan:  10/23/22 DAMASO [facility] MEDICAID from home alone DME: shower chair, motorized scooter and rollator VNS: none but agreeable. Referral sent to [facility]. PT/OT johanna. KIERSTEN NEEDS SIGNED/COMPLETED.  Following for needs//JF                Electronically signed by: Donice Gosselin, RN on 10/23/2022 at 1:51 PM

## 2022-10-24 PROBLEM — E78.00 HYPERCHOLESTEROLEMIA: Status: ACTIVE | Noted: 2020-01-09

## 2022-10-24 PROBLEM — D64.9 ANEMIA: Status: ACTIVE | Noted: 2022-10-24

## 2022-10-24 PROBLEM — M17.9 OSTEOARTHRITIS OF KNEE: Status: ACTIVE | Noted: 2022-10-24

## 2022-10-24 PROBLEM — M47.817 LUMBOSACRAL SPONDYLOSIS WITHOUT MYELOPATHY: Status: ACTIVE | Noted: 2022-10-24

## 2022-10-24 PROBLEM — F19.11 HX OF DRUG ABUSE (HCC): Status: ACTIVE | Noted: 2017-01-31

## 2022-10-24 PROBLEM — K76.89 LIVER CYST: Status: ACTIVE | Noted: 2022-10-24

## 2022-10-24 PROBLEM — G25.0 ESSENTIAL TREMOR: Status: ACTIVE | Noted: 2022-10-24

## 2022-10-24 PROBLEM — F31.62 BIPOLAR DISORDER, CURRENT EPISODE MIXED, MODERATE (HCC): Status: ACTIVE | Noted: 2017-04-08

## 2022-10-24 PROBLEM — K50.10 CROHN'S COLITIS, WITHOUT COMPLICATIONS (HCC): Status: ACTIVE | Noted: 2022-10-24

## 2022-10-24 PROBLEM — J45.909 ASTHMA: Status: ACTIVE | Noted: 2017-01-31

## 2022-10-24 PROBLEM — E03.9 ACQUIRED HYPOTHYROIDISM: Status: ACTIVE | Noted: 2018-01-28

## 2022-10-24 LAB
ANION GAP SERPL CALCULATED.3IONS-SCNC: 8 MMOL/L (ref 9–17)
BUN BLDV-MCNC: 9 MG/DL (ref 6–20)
CALCIUM SERPL-MCNC: 8.3 MG/DL (ref 8.6–10.4)
CHLORIDE BLD-SCNC: 105 MMOL/L (ref 98–107)
CO2: 26 MMOL/L (ref 20–31)
CREAT SERPL-MCNC: 0.5 MG/DL (ref 0.5–0.9)
EKG ATRIAL RATE: 66 BPM
EKG P AXIS: 42 DEGREES
EKG P-R INTERVAL: 152 MS
EKG Q-T INTERVAL: 448 MS
EKG QRS DURATION: 86 MS
EKG QTC CALCULATION (BAZETT): 469 MS
EKG R AXIS: 74 DEGREES
EKG T AXIS: 59 DEGREES
EKG VENTRICULAR RATE: 66 BPM
GFR SERPL CREATININE-BSD FRML MDRD: >60 ML/MIN/1.73M2
GLUCOSE BLD-MCNC: 126 MG/DL (ref 70–99)
HAV IGM SER IA-ACNC: NONREACTIVE
HCT VFR BLD CALC: 33.5 % (ref 36–46)
HEMOGLOBIN: 11.2 G/DL (ref 12–16)
HEPATITIS B CORE IGM ANTIBODY: NONREACTIVE
HEPATITIS B SURFACE ANTIGEN: NONREACTIVE
HEPATITIS C ANTIBODY: NONREACTIVE
MCH RBC QN AUTO: 28.6 PG (ref 26–34)
MCHC RBC AUTO-ENTMCNC: 33.6 G/DL (ref 31–37)
MCV RBC AUTO: 85.4 FL (ref 80–100)
PDW BLD-RTO: 15.2 % (ref 11.5–14.9)
PLATELET # BLD: 241 K/UL (ref 150–450)
PMV BLD AUTO: 8.5 FL (ref 6–12)
POTASSIUM SERPL-SCNC: 4.3 MMOL/L (ref 3.7–5.3)
RBC # BLD: 3.92 M/UL (ref 4–5.2)
SODIUM BLD-SCNC: 139 MMOL/L (ref 135–144)
WBC # BLD: 4.8 K/UL (ref 3.5–11)

## 2022-10-24 PROCEDURE — 85027 COMPLETE CBC AUTOMATED: CPT

## 2022-10-24 PROCEDURE — 6370000000 HC RX 637 (ALT 250 FOR IP): Performed by: INTERNAL MEDICINE

## 2022-10-24 PROCEDURE — 99232 SBSQ HOSP IP/OBS MODERATE 35: CPT | Performed by: INTERNAL MEDICINE

## 2022-10-24 PROCEDURE — 6360000002 HC RX W HCPCS: Performed by: INTERNAL MEDICINE

## 2022-10-24 PROCEDURE — 2580000003 HC RX 258: Performed by: INTERNAL MEDICINE

## 2022-10-24 PROCEDURE — 2500000003 HC RX 250 WO HCPCS: Performed by: STUDENT IN AN ORGANIZED HEALTH CARE EDUCATION/TRAINING PROGRAM

## 2022-10-24 PROCEDURE — 2580000003 HC RX 258: Performed by: STUDENT IN AN ORGANIZED HEALTH CARE EDUCATION/TRAINING PROGRAM

## 2022-10-24 PROCEDURE — 97530 THERAPEUTIC ACTIVITIES: CPT

## 2022-10-24 PROCEDURE — 87340 HEPATITIS B SURFACE AG IA: CPT

## 2022-10-24 PROCEDURE — 97166 OT EVAL MOD COMPLEX 45 MIN: CPT

## 2022-10-24 PROCEDURE — 1200000000 HC SEMI PRIVATE

## 2022-10-24 PROCEDURE — 82105 ALPHA-FETOPROTEIN SERUM: CPT

## 2022-10-24 PROCEDURE — 97535 SELF CARE MNGMENT TRAINING: CPT

## 2022-10-24 PROCEDURE — 83540 ASSAY OF IRON: CPT

## 2022-10-24 PROCEDURE — 6360000002 HC RX W HCPCS: Performed by: STUDENT IN AN ORGANIZED HEALTH CARE EDUCATION/TRAINING PROGRAM

## 2022-10-24 PROCEDURE — 86225 DNA ANTIBODY NATIVE: CPT

## 2022-10-24 PROCEDURE — 80048 BASIC METABOLIC PNL TOTAL CA: CPT

## 2022-10-24 PROCEDURE — 93010 ELECTROCARDIOGRAM REPORT: CPT | Performed by: INTERNAL MEDICINE

## 2022-10-24 PROCEDURE — 36415 COLL VENOUS BLD VENIPUNCTURE: CPT

## 2022-10-24 PROCEDURE — 83516 IMMUNOASSAY NONANTIBODY: CPT

## 2022-10-24 PROCEDURE — 99254 IP/OBS CNSLTJ NEW/EST MOD 60: CPT | Performed by: INTERNAL MEDICINE

## 2022-10-24 PROCEDURE — 94761 N-INVAS EAR/PLS OXIMETRY MLT: CPT

## 2022-10-24 PROCEDURE — 80074 ACUTE HEPATITIS PANEL: CPT

## 2022-10-24 PROCEDURE — 83550 IRON BINDING TEST: CPT

## 2022-10-24 PROCEDURE — 86803 HEPATITIS C AB TEST: CPT

## 2022-10-24 PROCEDURE — 86480 TB TEST CELL IMMUN MEASURE: CPT

## 2022-10-24 PROCEDURE — APPNB30 APP NON BILLABLE TIME 0-30 MINS: Performed by: NURSE PRACTITIONER

## 2022-10-24 PROCEDURE — 6370000000 HC RX 637 (ALT 250 FOR IP)

## 2022-10-24 PROCEDURE — 86038 ANTINUCLEAR ANTIBODIES: CPT

## 2022-10-24 PROCEDURE — 94640 AIRWAY INHALATION TREATMENT: CPT

## 2022-10-24 RX ORDER — FLUOXETINE HYDROCHLORIDE 20 MG/1
60 CAPSULE ORAL DAILY
COMMUNITY

## 2022-10-24 RX ORDER — CLONAZEPAM 0.5 MG/1
0.5 TABLET ORAL 2 TIMES DAILY PRN
Status: DISCONTINUED | OUTPATIENT
Start: 2022-10-24 | End: 2022-10-24

## 2022-10-24 RX ORDER — TRAZODONE HYDROCHLORIDE 100 MG/1
300 TABLET ORAL NIGHTLY
Status: DISCONTINUED | OUTPATIENT
Start: 2022-10-24 | End: 2022-10-25 | Stop reason: HOSPADM

## 2022-10-24 RX ORDER — CHOLESTYRAMINE LIGHT 4 G/5.7G
4 POWDER, FOR SUSPENSION ORAL DAILY
Status: DISCONTINUED | OUTPATIENT
Start: 2022-10-24 | End: 2022-10-25 | Stop reason: HOSPADM

## 2022-10-24 RX ORDER — FLUOXETINE 10 MG/1
60 CAPSULE ORAL DAILY
Status: ON HOLD | COMMUNITY
End: 2022-10-24

## 2022-10-24 RX ORDER — FLUOXETINE HYDROCHLORIDE 20 MG/1
60 CAPSULE ORAL DAILY
Status: DISCONTINUED | OUTPATIENT
Start: 2022-10-25 | End: 2022-10-25 | Stop reason: HOSPADM

## 2022-10-24 RX ADMIN — PREGABALIN 300 MG: 150 CAPSULE ORAL at 09:29

## 2022-10-24 RX ADMIN — SODIUM CHLORIDE, PRESERVATIVE FREE 10 ML: 5 INJECTION INTRAVENOUS at 09:29

## 2022-10-24 RX ADMIN — TRAZODONE HYDROCHLORIDE 300 MG: 100 TABLET ORAL at 19:58

## 2022-10-24 RX ADMIN — BUDESONIDE AND FORMOTEROL FUMARATE DIHYDRATE 2 PUFF: 160; 4.5 AEROSOL RESPIRATORY (INHALATION) at 20:21

## 2022-10-24 RX ADMIN — HYDROCODONE BITARTRATE AND ACETAMINOPHEN 1 TABLET: 5; 325 TABLET ORAL at 09:29

## 2022-10-24 RX ADMIN — BUDESONIDE AND FORMOTEROL FUMARATE DIHYDRATE 2 PUFF: 160; 4.5 AEROSOL RESPIRATORY (INHALATION) at 07:56

## 2022-10-24 RX ADMIN — ENOXAPARIN SODIUM 40 MG: 100 INJECTION SUBCUTANEOUS at 09:29

## 2022-10-24 RX ADMIN — SODIUM CHLORIDE, PRESERVATIVE FREE 10 ML: 5 INJECTION INTRAVENOUS at 19:59

## 2022-10-24 RX ADMIN — MORPHINE SULFATE 2 MG: 2 INJECTION, SOLUTION INTRAMUSCULAR; INTRAVENOUS at 19:58

## 2022-10-24 RX ADMIN — SODIUM CHLORIDE, PRESERVATIVE FREE 10 ML: 5 INJECTION INTRAVENOUS at 09:40

## 2022-10-24 RX ADMIN — PREDNISONE 40 MG: 20 TABLET ORAL at 09:29

## 2022-10-24 RX ADMIN — PREGABALIN 300 MG: 150 CAPSULE ORAL at 19:58

## 2022-10-24 RX ADMIN — HYDROCODONE BITARTRATE AND ACETAMINOPHEN 1 TABLET: 5; 325 TABLET ORAL at 18:26

## 2022-10-24 RX ADMIN — ACETAMINOPHEN 650 MG: 325 TABLET, FILM COATED ORAL at 13:36

## 2022-10-24 RX ADMIN — FOLIC ACID: 5 INJECTION, SOLUTION INTRAMUSCULAR; INTRAVENOUS; SUBCUTANEOUS at 09:56

## 2022-10-24 ASSESSMENT — PAIN SCALES - GENERAL
PAINLEVEL_OUTOF10: 8
PAINLEVEL_OUTOF10: 6

## 2022-10-24 NOTE — DISCHARGE INSTR - COC
Continuity of Care Form    Patient Name: Cecil Del Toro   :  1965  MRN:  [id number]    Admit date:  10/22/2022  Discharge date:  10/25/22    Code Status Order: Full Code   Advance Directives:     Admitting Physician:  Roya Corbin MD  PCP: Aj Marsh MD    Discharging Nurse:   [address] Unit/Room#: 3924/5457-09  Discharging Unit Phone Number: [phone]    Emergency Contact:   Extended Emergency Contact Information  Primary Emergency Contact: Cat [address] Phone: [phone]  Relation: Child    Past Surgical History:  Past Surgical History:   Procedure Laterality Date    ABDOMEN SURGERY      bowel obstruction OR    BUNIONECTOMY Left     CHOLECYSTECTOMY      COLONOSCOPY  2007    no gross pathology seen in the colon and also 3-4 inches of the ileum    COLONOSCOPY  10/12/2017    normal    COLONOSCOPY  10/25/2021    COLONOSCOPY WITH BIOPSY performed by Kenna Liang MD at [facility]    COLONOSCOPY  10/25/2021    COLONOSCOPY POLYPECTOMY REMOVAL SNARE performed by Kenna Liang MD at [address] [place] [address] ENDOSCOPY  10/25/2021    EGD BIOPSY performed by Kenna Liang MD at [facility]       Immunization History:   Immunization History   Administered Date(s) Administered    COVID-19, PFIZER Bivalent BOOSTER, (age 12y+), IM, 30 mcg/0.3 mL dose 10/11/2022    COVID-19, PFIZER GRAY top, DO NOT Dilute, (age 15 y+), IM, 30 mcg/0.3 mL 07/15/2022    COVID-19, PFIZER PURPLE top, DILUTE for use, (age 15 y+), 30mcg/0.3mL 2021, 2021, 2021    Influenza, FLUARIX, FLULAVAL, FLUZONE (age 10 mo+) AND AFLURIA, (age 1 y+), PF, 0.5mL 2017, 10/20/2021    Pneumococcal Polysaccharide ([id number]) 2015       Active Problems:  Patient Active Problem List   Diagnosis Code    Gastroesophageal reflux disease without esophagitis K21.9    Gastroenteritis K52.9 Ileus ([address] K56.7    Pancreatitis, acute K85.90    Drug abuse ([facility]) F19.10    COPD (chronic obstructive pulmonary disease) ([facility] J44.9    Hypertension I10    Hypokalemia E87.6    Hypothyroidism due to acquired atrophy of thyroid E03.4    Crohn disease ([facility] K50.90    Acute cystitis without hematuria N30.00    Accidental drug overdose T50.901A    Prolonged Q-T interval on ECG R94.31    Bipolar disorder, unspecified ([facility]) F31.9    Polysubstance abuse ([facility]) F19.10    Alcohol intoxication delirium ([facility] F10.121    Cocaine abuse ([facility] F14.10    Acute respiratory failure with hypoxia ([facility] J96.01    Bipolar disorder with psychotic features ([facility] F31.9    Diarrhea R19.7    Chronic bronchitis ([facility] J42    Chronic renal insufficiency, stage III (moderate) ([facility]) N18.30    Chronic diarrhea K52.9    Anxiety F41.9    Osteoarthritis resulting from left hip dysplasia M16.32    Shortness of breath R06.02    Elevated brain natriuretic peptide (BNP) level R79.89    Pneumonia of both lower lobes due to infectious organism J18.9    Non-intractable vomiting with nausea R11.2    Acute hypokalemia E87.6    Hypomagnesemia E83.42    Intractable vomiting R11.10    Abdominal pain R10.9    Nausea vomiting and diarrhea R11.2, R19.7    Falls frequently R29.6    Orthostatic hypotension I95.1    Hypotension I95.9    Radial nerve palsy, right G56.31    COPD with acute exacerbation ([facility]) J44.1    COPD exacerbation ([facility]) J44.1    Chronic pain disorder G89.4    Rhabdomyolysis M62.82    Syncope and collapse R55    Asthma J45.909    Bipolar disorder, current episode mixed, moderate ([facility]) F31.62    Essential tremor G25.0    Hx of drug abuse ([facility]) F19.11    Acquired hypothyroidism E03.9    Lumbosacral spondylosis without myelopathy M47.817    Hypercholesterolemia E78.00    Osteoarthritis of knee M17.9    Crohn's colitis, without complications ([facility]) K89.57    Anemia D64.9       Isolation/Infection:   Isolation            C Diff Contact          Patient Infection Status       Infection Onset Added Last Indicated Last Indicated By Review Planned Expiration Resolved Resolved By    C-diff Rule Out 10/23/22 10/23/22 10/23/22 Gastrointestinal Panel, Molecular (Ordered) 10/30/22 11/02/22      Resolved    C-diff Rule Out 09/16/22 09/16/22 09/16/22 C DIFF TOXIN/ANTIGEN (Ordered)   09/16/22 Rule-Out Test Resulted    COVID-19 (Rule Out) 09/15/22 09/15/22 09/15/22 COVID-19 & Influenza Combo (Ordered)   09/15/22 Rule-Out Test Resulted    COVID-19 (Rule Out) 08/20/22 08/20/22 08/20/22 Respiratory Panel, Molecular, with COVID-19 (Restricted: peds pts or suitable admitted adults) (Ordered)   08/22/22 Marixa Amin RN    C-diff Rule Out 08/20/22 08/20/22 08/21/22 Gastrointestinal Panel, Molecular (Ordered)   08/22/22 Rule-Out Test Resulted    COVID-19 (Rule Out) 08/20/22 08/20/22 08/20/22 COVID-19, Rapid (Ordered)   08/20/22 Rule-Out Test Resulted    COVID-19 (Rule Out) 05/18/22 05/18/22 05/18/22 COVID-19, Rapid (Ordered)   05/18/22 Rule-Out Test Resulted    C-diff Rule Out  10/20/21 10/20/21 Pam Goldstein, RN   10/20/21 Rule-Out Test Resulted    C-diff Rule Out 10/16/21 10/16/21 10/20/21 C DIFF TOXIN/ANTIGEN (Ordered)   10/19/21 Pam Fraction, RN    GI Panel    COVID-19 (Rule Out) 10/15/21 10/15/21 10/16/21 COVID-19, Rapid (Ordered)   10/16/21 Rule-Out Test Resulted    C-diff Rule Out 09/17/21 09/17/21 09/19/21 Gastrointestinal Panel, Molecular (Ordered)   09/20/21 Rule-Out Test Resulted    COVID-19 (Rule Out) 09/17/21 09/17/21 09/17/21 Respiratory Panel, Molecular, with COVID-19 (Restricted: peds pts or suitable admitted adults) (Ordered)   09/17/21 Rule-Out Test Resulted    COVID-19 (Rule Out) 09/17/21 09/17/21 09/17/21 COVID-19, Rapid (Ordered)   09/17/21 Rule-Out Test Resulted            Nurse Assessment:  Last Vital Signs: BP (!) 157/87   Pulse 69   Temp 98.2 °F (36.8 °C)   Resp 16   Ht 5' 6\" (1.676 m)   Wt 158 lb 11.7 oz (72 kg)   SpO2 98%   BMI 25.62 kg/m²     Last documented pain score (0-10 scale): Pain Level: 7  Last Weight:   Wt Readings from Last 1 Encounters:   10/22/22 158 lb 11.7 oz (72 kg)     Mental Status:  oriented and alert    IV Access:  - None    Nursing Mobility/ADLs:  Walking   Independent  Transfer  Independent  Bathing  Assisted  Dressing  Independent  [address] Delivery   whole    Wound Care Documentation and Therapy:        Safety Concerns: At Risk for Falls    Impairments/Disabilities:      None    Nutrition Therapy:  Current Nutrition Therapy:   - Oral Diet:  General    Routes of Feeding: Oral  Liquids: No Restrictions  Daily Fluid Restriction: no  Last Modified Barium Swallow with Video (Video Swallowing Test): not done    Treatments at the Time of Hospital Discharge:   Respiratory Treatments: see MAR  Oxygen Therapy:  is not on home oxygen therapy. Ventilator:    - No ventilator support    Rehab Therapies: Physical Therapy and Occupational Therapy  Weight Bearing Status/Restrictions: No weight bearing restrictions  Other Medical Equipment (for information only, NOT a DME order): Other Treatments: skilled nursing assessment, medication education and monitoring  HHA    Patient's personal belongings (please select all that are sent with patient):   All belongings returned to patient    RN SIGNATURE:  Electronically signed by Vicki Soto RN on 10/25/22 at 11:13 AM EDT    CASE MANAGEMENT/SOCIAL WORK SECTION    Inpatient Status Date: 10/22/22    Readmission Risk Assessment Score:  Readmission Risk              Risk of Unplanned Readmission:  41           Discharging to [address]  P: [phone]   F: [phone]       / signature: Electronically signed by Vicki Soto RN on 10/24/22 at 1:51 PM EDT    PHYSICIAN SECTION    Prognosis: Fair    Condition at Discharge: Stable    Rehab Potential (if transferring to Rehab): Fair    Recommended Labs or Other Treatments After Discharge: see above    Physician Certification: I certify the above information and transfer of Primo Esparza  is necessary for the continuing treatment of the diagnosis listed and that she requires Home Care for less 30 days.      Update Admission H&P: No change in H&P    PHYSICIAN SIGNATURE:  Electronically signed by Linden Esqueda MD on 10/25/22 at 8:38 AM EDT

## 2022-10-24 NOTE — PROGRESS NOTES
Pt left to smoke at 0425. Pt edu on risk of leaving the floor and not being able to get help. Pt states understanding of risks. Smoking alternative offered to patient, pt declined stating \"I cant take artificial nicotine with this medicine I take at home it makes me sick. \"  IV fluids paused at this time. Pt returned from smoking, IV fluid order completed no need to resume.

## 2022-10-24 NOTE — CONSULTS
Gastroenterology Consult Note      Patient: Marzena Tafoya  : 1965  Acct#:  [id number]     Date:  10/24/2022    Subjective:       History of Present Illness  Patient is a 61 y.o.  female admitted with Syncope and collapse [R55]  Rhabdomyolysis [M62.82] who is seen in consult for CD      This 69-year-old lady  Which I am seeing for the first time reports a long history of Crohn's disease status post bowel resection in  due to obstruction and that is where her Crohn's diagnosis was made  Nevertheless she has not been on any medication all this years  Currently she came to the emergency room for hip pain and head injury  Found to have rhabdomyolysis  But because of the history of Crohn's disease we are consulted  But when asked she did admit to 10-15 bowel movements loose every day nonbloody  And this been going on for the last 2 years at least  She does use tons of Imodium she said with no improvement  No significant weight loss her weight does fluctuate up and down no bleeding  Little intermittent mid abdomen pain  But no fever no chills  She think her symptoms of the GI tract are better since yesterday she was started on prednisone 40 mg by the primary team  She was [facility]  for diarrhea  Was supposed to follow-up for an MRA but she did not    In this admission a CT of the chest and abdomen was done  CHEST:   Mediastinum: No acute aortic abnormality identified. No mediastinal hematoma. No pericardial effusion. Lungs/pleura: No acute airspace disease, pneumothorax or effusion. Soft Tissues/Bones: No acute osseous abnormality identified in this region. No soft tissue hematoma. Abdomen/Pelvis:       Organs: No solid organ injury identified. No acute inflammatory process. Status post cholecystectomy. Benign area of hypoattenuation in segment 4 the   liver may represent a cyst or possibly fat deposition. GI/Bowel:  There is no bowel dilatation or wall thickening identified. Status   post partial small-bowel resection. Pelvis: No acute findings. Appropriate excretion of contrast is demonstrated   into the bladder. Peritoneum/Retroperitoneum: No free air. No free fluid. The aorta is normal   in caliber. The visceral branches are patent. Bones/Soft Tissues: Pelvic alignment maintained. No fracture identified. Severe arthropathy in the left hip with prominent subchondral cyst formation   and prominent hypertrophic changes again noted. Moderately severe joint   space loss in the superior aspect of the right hip with marginal hypertrophic   changes also noted. No soft tissue hematoma. Small fat containing umbilical   hernia. Lumbar vertebral body heights are maintained with multilevel degenerative   disc disease and lower lumbar facet arthropathy again noted. THORACIC:       BONES/ALIGNMENT: There is normal alignment of the spine. The vertebral body   heights are maintained. No osseous destructive lesion is seen. DEGENERATIVE CHANGES: No gross spinal canal stenosis or bony neural foraminal   narrowing of the thoracic spine. SOFT TISSUES: No paraspinal hematoma. Labs show no leucocytosis, hgb 11.2 alk phos 125. Ck 1590 crp 36.7 She denies fevers chills dysphagia melena hematemesis hematochezia rash.      Endoscopy 10/21/21 egd (stephen) mild gastritis esophagitis colonoscopy same day showed rectal polyp that was removed bx showed tubular adenoma    Family reports brother with crohns no hx of liver colon stomach or pancreatic cancer   Her electrolytes are normal  White blood count is normal hemoglobin is 11.2  Liver enzymes showing slight elevation of the alkaline phosphatase      Past Medical History:   Diagnosis Date    Acid reflux     Anxiety     Arthritis     Left hip    Asthma     Bipolar 1 disorder (HCC)     Bowel obstruction (HCC)     COPD (chronic obstructive pulmonary disease) (HCC)     O2 3L PRN/ Dr. Everett [facility]/last seen 2020/appt.9-7-21 for Clearance    Crohn disease ([facility]     Depression     Dry eye     Fibromyalgia     Gout     Headache     Hyperlipidemia     Hypertension     Hypothyroidism     Kidney stones     Muscle spasm     Neuropathy     Pain     left hip    Personal history of other medical treatment     Pt. seen by Dr. Kari Hurt for clearance for this OR Left hip/ Normally pt. doesn't follow with Cardiology. [facility]    Wears partial dentures     upper    Wellness examination     PCP Karina Olson MD/ [place]/last seen 8-24-21      Past Surgical History:   Procedure Laterality Date    ABDOMEN SURGERY      bowel obstruction OR    BUNIONECTOMY Left     CHOLECYSTECTOMY      COLONOSCOPY  04/30/2007    no gross pathology seen in the colon and also 3-4 inches of the ileum    COLONOSCOPY  10/12/2017    normal    COLONOSCOPY  10/25/2021    COLONOSCOPY WITH BIOPSY performed by Noemí Bragg MD at [facility]    COLONOSCOPY  10/25/2021    COLONOSCOPY POLYPECTOMY REMOVAL SNARE performed by Noemí Bragg MD at [address] [place] [address] ENDOSCOPY  10/25/2021    EGD BIOPSY performed by Noemí Bragg MD at [facility]      Past Endoscopic History she had a colonoscopy at the [facility] clinic 1 year ago we do not have the result of that    Admission Meds  No current facility-administered medications on file prior to encounter. Current Outpatient Medications on File Prior to Encounter   Medication Sig Dispense Refill    HYDROcodone-acetaminophen (NORCO) 5-325 MG per tablet Take 1 tablet by mouth every 6 hours as needed for Pain. clonazePAM (KLONOPIN) 0.5 MG tablet Take 0.5 mg by mouth 2 times daily as needed.       Dexamethasone (DEXPAK 6 DAY) 1.5 MG (21) TBPK Take 1 kit by mouth 1 (one) time if needed (as advised) 1 each 0    traZODone (DESYREL) 150 MG tablet Take 300 mg by mouth nightly Handicap Placard MISC by Does not apply route Duration 5 years 1 each 0    Pregabalin (LYRICA PO) Take 300 mg by mouth 2 times daily      [DISCONTINUED] ibuprofen (IBU) 400 MG tablet Take 1 tablet by mouth every 6 hours as needed for Pain 12 tablet 0    albuterol sulfate HFA (PROVENTIL;VENTOLIN;PROAIR) 108 (90 Base) MCG/ACT inhaler Inhale 2 puffs into the lungs every 6 hours as needed for Wheezing      diphenhydrAMINE (BENADRYL) 25 MG tablet Take 25 mg by mouth nightly as needed      SUMAtriptan (IMITREX) 100 MG tablet Take 100 mg by mouth once as needed for Migraine      gabapentin (NEURONTIN) 400 MG capsule Take 800 mg by mouth 2 times daily. budesonide-formoterol (SYMBICORT) 160-4.5 MCG/ACT AERO Inhale 2 puffs into the lungs 2 times daily 10.2 g 3    lisinopril (PRINIVIL;ZESTRIL) 5 MG tablet Take 1 tablet by mouth daily 30 tablet 3    [DISCONTINUED] nicotine (NICODERM CQ) 21 MG/24HR Place 1 patch onto the skin daily (Patient not taking: Reported on 8/20/2022) 30 patch 3    acetaminophen (TYLENOL) 500 MG tablet Take 2 tablets by mouth 3 times daily (Patient taking differently: Take 1,000 mg by mouth 3 times daily as needed) 15 tablet 1    Polyvinyl Alcohol-Povidone (REFRESH OP) Place 1 drop into both eyes 3 times daily      lidocaine (LIDODERM) 5 % Place 1 patch onto the skin daily as needed for Pain 12 hours on, 12 hours off.           Patient   Does Use ASA, NSAID No  Allergies  Allergies   Allergen Reactions    Azithromycin Other (See Comments)     'It doesn't work\"    Methylprednisolone      Elevates blood pressure    Penicillins Swelling     throat    Tape [Adhesive Tape] Other (See Comments)     \" Tears my skin off\"        Social   Social History     Tobacco Use    Smoking status: Every Day     Packs/day: 0.50     Years: 37.00     Pack years: 18.50     Types: Cigarettes    Smokeless tobacco: Former     Types: Chew     Quit date: 9/3/2001   Substance Use Topics    Alcohol use: Not Currently        [facility] HISTORY:  Depression No  Anxiety No  Suicide No       Family History   Problem Relation Age of Onset    Diabetes Father     Lung Cancer Father     Hypertension Mother     Cancer Mother     High Blood Pressure Mother     Crohn's Disease Brother     Heart Disease Brother       No family history of colon cancer, Crohn's disease, or ulcerative colitis. Review of Systems  Constitutional: negative  Eyes: negative  Ears, nose, mouth, throat, and face: negative  Respiratory: negative  Cardiovascular: negative  Gastrointestinal: negative  Genitourinary:negative  Integument/breast: negative  Hematologic/lymphatic: negative  Musculoskeletal:negative  Endocrine: negative           Physical Exam  Blood pressure (!) 157/87, pulse 69, temperature 98.2 °F (36.8 °C), resp. rate 16, height 5' 6\" (1.676 m), weight 158 lb 11.7 oz (72 kg), SpO2 98 %.          General Appearance: alert and oriented to person, place and time, well-developed and well-nourished, in no acute distress  Skin: warm and dry, no rash or erythema  Head: normocephalic and atraumatic  Eyes: pupils equal, round, and reactive to light, extraocular eye movements intact, conjunctivae normal  ENT: hearing grossly normal bilaterally  Neck: neck supple and non tender without mass, no thyromegaly or thyroid nodules, no cervical lymphadenopathy   Pulmonary/Chest: clear to auscultation bilaterally- no wheezes, rales or rhonchi, normal air movement, no respiratory distress  Cardiovascular: normal rate, regular rhythm, normal S1 and S2, no murmurs, rubs, clicks or gallops, distal pulses intact, no carotid bruits  Abdomen: soft, non-tender, non-distended, normal bowel sounds, no masses or organomegaly  Extremities: no cyanosis, clubbing or edema  Musculoskeletal: normal range of motion, no joint swelling, deformity or tenderness  Neurologic: no cranial nerve deficit and muscle strength normal    Data Review:    Recent Labs     10/22/22  1356 10/24/22  0550   WBC 7.2 4.8   HGB 13.7 11.2*   HCT 41.4 33.5*   MCV 85.6 85.4    241     Recent Labs     10/22/22  1356 10/23/22  0709 10/24/22  0550    140 139   K 3.7 3.3* 4.3    105 105   CO2 25 28 26   BUN 6 5* 9   CREATININE 0.64 0.52 0.50     No results for input(s): AST, ALT, ALB, BILIDIR, BILITOT, ALKPHOS in the last 72 hours. No results for input(s): LIPASE, AMYLASE in the last 72 hours. Recent Labs     10/23/22  0709   PROTIME 14.0   INR 1.1     No results for input(s): PTT in the last 72 hours. No results for input(s): OCCULTBLD in the last 72 hours. CEA:  No results found for: CEA  Ca 125:  No results found for:   Ca 19-9:  No results found for:   Ca 15-3:  No results found for:   AFP:  No components found for: AFAFP  Beta HCG:  No components found for: BHCG  Neuron Specific Enolase:  No results found for: NSE  Imaging Studies:                           All appropriate imaging studies and reports reviewed: Yes                 Assessment:     Principal Problem:    Rhabdomyolysis  Active Problems:    Chronic pain disorder    Syncope and collapse    Asthma    Bipolar disorder, current episode mixed, moderate (HCC)    Essential tremor    Acquired hypothyroidism    Hypercholesterolemia    Osteoarthritis of knee    Drug abuse (HCC)    COPD (chronic obstructive pulmonary disease) (HCC)    Hypertension  Resolved Problems:    * No resolved hospital problems.  *    Diarrhea  History of Crohn's disease  Status postcholecystectomy, elevated alkaline phosphatase  Status post bowel obstruction with surgery in 2011 when the Crohn's disease was diagnosed  Small liver cyst by imaging as mentioned with elevated alkaline phosphatase  status post fall with rhabdomyolysis      Recommendations:   Patient will need MR RUIZ down the road  She would need colonoscopy to rule out colitis  Will try to get the colonoscopy report from [facility] was done 1 year ago  Stool studies  Continue with the prednisone for now  We will add Colestid also  MRI also as needed for the liver cyst  She would need liver diseases work-up also and alpha-fetoprotein  And will take it from there                        Thank you for allowing me to participate in the care of your patient. Please feel free to contact me with any questions or concerns.      Jo Dominguez MD

## 2022-10-24 NOTE — PLAN OF CARE
PRE CONSULT ROUNDING NOTE  HPI  61year old female with pmh of copd bipolar crohns disease s/p bowel resection in 2011 due to obstruction, anxiety who presented to the ED for hip pain and head injury, she had reported that she fell several days ago. She is being treated for rhabdomyolysis. Our service is consulted for crohns disease. Reports a 10 year history of crohns disease that is not being managed by a MD. She is not on any medication for her disease, denies recent steroid use. Has up to 15 loose non bloody BM's per day for the last two years. She states that she uses a \"ton of imodium\" with little improvement. Reports weight fluctuations. Has had intermittent mid abdominal pain. No fevers or chills. She was seen at [facility] in 2021 for diarrhea also and was supposed to f/u for a MRE but did not. Ct abd shows  CHEST:   Mediastinum: No acute aortic abnormality identified. No mediastinal hematoma. No pericardial effusion. Lungs/pleura: No acute airspace disease, pneumothorax or effusion. Soft Tissues/Bones: No acute osseous abnormality identified in this region. No soft tissue hematoma. Abdomen/Pelvis:       Organs: No solid organ injury identified. No acute inflammatory process. Status post cholecystectomy. Benign area of hypoattenuation in segment 4 the   liver may represent a cyst or possibly fat deposition. GI/Bowel: There is no bowel dilatation or wall thickening identified. Status   post partial small-bowel resection. Pelvis: No acute findings. Appropriate excretion of contrast is demonstrated   into the bladder. Peritoneum/Retroperitoneum: No free air. No free fluid. The aorta is normal   in caliber. The visceral branches are patent. Bones/Soft Tissues: Pelvic alignment maintained. No fracture identified. Severe arthropathy in the left hip with prominent subchondral cyst formation   and prominent hypertrophic changes again noted.   Moderately severe joint   space loss in the superior aspect of the right hip with marginal hypertrophic   changes also noted. No soft tissue hematoma. Small fat containing umbilical   hernia. Lumbar vertebral body heights are maintained with multilevel degenerative   disc disease and lower lumbar facet arthropathy again noted. THORACIC:       BONES/ALIGNMENT: There is normal alignment of the spine. The vertebral body   heights are maintained. No osseous destructive lesion is seen. DEGENERATIVE CHANGES: No gross spinal canal stenosis or bony neural foraminal   narrowing of the thoracic spine. SOFT TISSUES: No paraspinal hematoma. Labs show no leucocytosis, hgb 11.2 alk phos 125. Ck 1590 crp 36.7 She denies fevers chills dysphagia melena hematemesis hematochezia rash.     Endoscopy 10/21/21 egd (stephen) mild gastritis esophagitis colonoscopy same day showed rectal polyp that was removed bx showed tubular adenoma    Family reports brother with crohns no hx of liver colon stomach or pancreatic cancer   Social admits to smoking no etoh or illicit drugs   BP (!) 157/87   Pulse 65   Temp 98.2 °F (36.8 °C)   Resp 16   Ht 5' 6\" (1.676 m)   Wt 158 lb 11.7 oz (72 kg)   SpO2 98%   BMI 25.62 kg/m²     ROS as above meds labs imaging and past medical records were reviewed    Exam  General Appearance: alert and oriented to person, place and time, well-developed and well-nourished, in no acute distress  Skin: warm and dry, no rash or erythema  Head: normocephalic and atraumatic  Eyes: pupils equal, round, and reactive to light, extraocular eye movements intact, conjunctivae normal  ENT: hearing grossly normal bilaterally  Neck: neck supple and non tender without mass, no thyromegaly or thyroid nodules, no cervical lymphadenopathy   Pulmonary/Chest: clear to auscultation bilaterally- no wheezes, rales or rhonchi, normal air movement, no respiratory distress  Cardiovascular: normal rate, regular rhythm, normal S1 and S2, no murmurs, rubs, clicks or gallops, distal pulses intact, no carotid bruits  Abdomen: soft, obese non tender, non-distended, normal bowel sounds, no masses or organomegaly no ascites  Extremities: no cyanosis, clubbing or edema  Musculoskeletal: normal range of motion, no joint swelling, deformity or tenderness  Neurologic: no cranial nerve deficit and muscle strength normal    Assessment  Diarrhea with hx Crohns disease hx surgery for bowel obstruction 2011  Anemia  Liver cyst per imaging  Rhabdomyolysis s/p fall    Plan   D/w md  Continue the prednisone, will need biologic tx as outpt  Stool studies and cdiff testing  Tb test and acute hepatitis panel ordered  Outpt mri to eval the liver cyst  Formal gi consult to follow  . Heron Hansen, APRN - CNP

## 2022-10-24 NOTE — TELEPHONE ENCOUNTER
Pt called in stating [address] has not received a prescription yet. Pt is also request a 650mg tab of tylenol. Writer was unable to reach clinical staff so pt could discuss surgery.

## 2022-10-24 NOTE — PLAN OF CARE
Problem: Discharge Planning  Goal: Discharge to home or other facility with appropriate resources  Outcome: Progressing     Problem: Safety - Adult  Goal: Free from fall injury  Outcome: Progressing  Note: Patient remains free of falls and injuries throughout shift. Bed remains in the lowest position, wheels locked, call light and bedside table are within reach.       Problem: Pain  Goal: Verbalizes/displays adequate comfort level or baseline comfort level  Outcome: Progressing

## 2022-10-24 NOTE — PROGRESS NOTES
[facility]  Nirali Chris MD; Crystal Birmingham MD; Talon Buenrostro MD; MD Elsy Burr MD; MD MARCO Newsome [facility]   [address] [place] [address]     Progress Note    10/24/2022    12:28 PM    Name:   Tone Garrido  MRN:     [id number]     Acct:      [de-identified]   Room:   [address] [place] [address]  IP Day:  2  Admit Date:  10/22/2022 12:53 PM    PCP:   Johnathan Bay MD  Code Status:  Full Code    Subjective:     C/C:   Chief Complaint   Patient presents with    Hip Pain    Head Injury     Interval History Status: improved. Patient is awake, alert and oriented  Denies having complaints   Request Prozac for bipolar and clonazepam  Blood pressure now trending up  Negative for orthostatic hypotension  Neuro on board       Brief History: This patient is a 61 y.o.  Non- / non  femalewho presents with  Fall few days prior to presentation  In ER she complained of left hip pain and possible head injury few days ago  Patient does not remember the fall or surrounding circumstances  Patient is unable to say if she lost consciousness-only remembers waking up in her couch  Reports feeling generally sore when she woke up but denies any evidence of incontinence or tongue bite or new injuries  Patient is unable to give history of circumstances leading up to the event  Does report history of chronic diarrhea states she has Crohn's disease     History of cocaine use-reports last use 2 years ago  Polypharmacy with many sedative medications-Norco, Klonopin, pregabalin, Neurontin for generalized pain  History of bipolar disorder     Symptoms are severe, improving, no aggravating relieving factors        Medical history includes COPD, bipolar disorder, Crohn's disease, fibromyalgia, hypothyroid hypertension hyperlipidemia    Review of Systems:         As recorded in interval hx                                     Medications: Allergies: Allergies   Allergen Reactions    Azithromycin Other (See Comments)     'It doesn't work\"    Methylprednisolone      Elevates blood pressure    Penicillins Swelling     throat    Tape [Adhesive Tape] Other (See Comments)     \" Tears my skin off\"       Current Meds:   Scheduled Meds:    traZODone  300 mg Oral Nightly    predniSONE  40 mg Oral Daily    pregabalin  300 mg Oral BID    thiamine and folic acid IVPB   IntraVENous Daily    sodium chloride flush  5-40 mL IntraVENous 2 times per day    budesonide-formoterol  2 puff Inhalation BID    [Held by provider] lisinopril  5 mg Oral Daily    sodium chloride flush  5-40 mL IntraVENous 2 times per day    enoxaparin  40 mg SubCUTAneous Daily     Continuous Infusions:    sodium chloride      sodium chloride       PRN Meds: morphine, HYDROcodone 5 mg - acetaminophen, sodium chloride flush, sodium chloride flush, sodium chloride, albuterol sulfate HFA, sodium chloride flush, sodium chloride, potassium chloride **OR** potassium alternative oral replacement **OR** potassium chloride, magnesium sulfate, ondansetron **OR** ondansetron, polyethylene glycol, acetaminophen **OR** acetaminophen    Data:     I/O (24Hr): Intake/Output Summary (Last 24 hours) at 10/24/2022 1228  Last data filed at 10/24/2022 0344  Gross per 24 hour   Intake 990.41 ml   Output 400 ml   Net 590.41 ml       Labs:  Significant last 24 hr data reviewed ;   Vitals:    10/23/22 2027 10/23/22 2108 10/24/22 0618 10/24/22 0756   BP:   (!) 157/87    Pulse:  70 65 69   Resp: 17 16 16 16   Temp:   98.2 °F (36.8 °C)    TempSrc:       SpO2:  94% 98% 98%   Weight:       Height:         No results for input(s): POCGLU in the last 72 hours.     Recent Results (from the past 24 hour(s))   Urinalysis with Reflex to Culture    Collection Time: 10/23/22  2:04 PM    Specimen: Urine   Result Value Ref Range    Color, UA Yellow Yellow    Turbidity UA Clear Clear    Glucose, Ur NEGATIVE NEGATIVE    Bilirubin Urine NEGATIVE NEGATIVE    Ketones, Urine NEGATIVE NEGATIVE    Specific Gravity, UA 1.006 1.000 - 1.030    Urine Hgb SMALL (A) NEGATIVE    pH, UA 7.0 5.0 - 8.0    Protein, UA NEGATIVE NEGATIVE    Urobilinogen, Urine Normal Normal    Nitrite, Urine NEGATIVE NEGATIVE    Leukocyte Esterase, Urine NEGATIVE NEGATIVE   Drug Scr, Abuse, Ur    Collection Time: 10/23/22  2:04 PM   Result Value Ref Range    Amphetamine Screen, Ur NEGATIVE NEGATIVE    Barbiturate Screen, Ur NEGATIVE NEGATIVE    Benzodiazepine Screen, Urine NEGATIVE NEGATIVE    Cocaine Metabolite, Urine NEGATIVE NEGATIVE    Methadone Screen, Urine NEGATIVE NEGATIVE    Opiates, Urine POSITIVE (A) NEGATIVE    Phencyclidine, Urine NEGATIVE NEGATIVE    Cannabinoid Scrn, Ur NEGATIVE NEGATIVE    Oxycodone Screen, Ur NEGATIVE NEGATIVE    Fentanyl, Ur NEGATIVE NEGATIVE    Test Information       Assay provides medical screening only. The absence of expected drug(s) and/or metabolite(s) may indicate diluted or adulterated urine, limitations of testing or timing of collection.    Microscopic Urinalysis    Collection Time: 10/23/22  2:04 PM   Result Value Ref Range    WBC, UA 0 TO 2 /HPF    RBC, UA 0 TO 2 /HPF    Epithelial Cells UA 0 TO 2 /HPF    Bacteria, UA None None   Sedimentation Rate    Collection Time: 10/23/22  2:17 PM   Result Value Ref Range    Sed Rate 6 0 - 30 mm/Hr   CBC    Collection Time: 10/24/22  5:50 AM   Result Value Ref Range    WBC 4.8 3.5 - 11.0 k/uL    RBC 3.92 (L) 4.0 - 5.2 m/uL    Hemoglobin 11.2 (L) 12.0 - 16.0 g/dL    Hematocrit 33.5 (L) 36 - 46 %    MCV 85.4 80 - 100 fL    MCH 28.6 26 - 34 pg    MCHC 33.6 31 - 37 g/dL    RDW 15.2 (H) 11.5 - 14.9 %    Platelets 767 987 - 513 k/uL    MPV 8.5 6.0 - 12.0 fL   Basic Metabolic Panel    Collection Time: 10/24/22  5:50 AM   Result Value Ref Range    Glucose 126 (H) 70 - 99 mg/dL    BUN 9 6 - 20 mg/dL    Creatinine 0.50 0.50 - 0.90 mg/dL    Est, Glom Filt Rate >60 >60 mL/min/1.73m2    Calcium 8.3 (L) 8.6 - 10.4 mg/dL    Sodium 139 135 - 144 mmol/L    Potassium 4.3 3.7 - 5.3 mmol/L    Chloride 105 98 - 107 mmol/L    CO2 26 20 - 31 mmol/L    Anion Gap 8 (L) 9 - 17 mmol/L     No results for input(s): POCGLU in the last 72 hours.                 URINE ANALYSIS: No results found for: LABURIN     CBC:  Lab Results   Component Value Date/Time    WBC 4.8 10/24/2022 05:50 AM    WBC 7.2 10/22/2022 01:56 PM    WBC 7.4 09/23/2022 06:36 PM    HGB 11.2 10/24/2022 05:50 AM    HGB 13.7 10/22/2022 01:56 PM    HGB 11.8 09/23/2022 06:36 PM     10/24/2022 05:50 AM     10/22/2022 01:56 PM     09/23/2022 06:36 PM     05/28/2012 02:46 PM     09/08/2011 07:27 AM     09/07/2011 09:28 AM        BMP:    Lab Results   Component Value Date/Time     10/24/2022 05:50 AM     10/23/2022 07:09 AM     10/22/2022 01:56 PM    K 4.3 10/24/2022 05:50 AM    K 3.3 10/23/2022 07:09 AM    K 3.7 10/22/2022 01:56 PM     10/24/2022 05:50 AM     10/23/2022 07:09 AM     10/22/2022 01:56 PM    CO2 26 10/24/2022 05:50 AM    CO2 28 10/23/2022 07:09 AM    CO2 25 10/22/2022 01:56 PM    BUN 9 10/24/2022 05:50 AM    BUN 5 10/23/2022 07:09 AM    BUN 6 10/22/2022 01:56 PM    CREATININE 0.50 10/24/2022 05:50 AM    CREATININE 0.52 10/23/2022 07:09 AM    CREATININE 0.64 10/22/2022 01:56 PM    GLUCOSE 126 10/24/2022 05:50 AM    GLUCOSE 115 10/23/2022 07:09 AM    GLUCOSE 83 10/22/2022 01:56 PM    GLUCOSE 125 05/28/2012 02:46 PM    GLUCOSE 129 09/08/2011 07:27 AM    GLUCOSE 156 09/07/2011 09:28 AM      LIVER PROFILE:  Lab Results   Component Value Date/Time    ALT 10 09/15/2022 08:00 PM    AST 12 09/15/2022 08:00 PM    PROT 6.9 09/15/2022 08:00 PM    BILITOT 0.6 09/15/2022 08:00 PM    BILIDIR 0.15 12/27/2020 01:28 AM    LABALBU 4.1 09/15/2022 08:00 PM    LABALBU 4.4 05/28/2012 02:46 PM               Lab Results   Component Value Date/Time    SPECIAL 2 ML LEFT WRIST 03/25/2022 11:39 AM     Lab Results   Component Value Date/Time    CULTURE NO GROWTH 5 DAYS 03/25/2022 11:39 AM       Radiology:  CT HEAD WO CONTRAST    Result Date: 10/22/2022  No acute CT abnormality identified in the brain. No acute osseous abnormality identified in the cervical spine. CT CERVICAL SPINE WO CONTRAST    Result Date: 10/22/2022  No acute CT abnormality identified in the brain. No acute osseous abnormality identified in the cervical spine. CT THORACIC SPINE WO CONTRAST    Result Date: 10/22/2022  1. No acute traumatic injury identified in the chest, abdomen or pelvis. 2.  Advanced arthropathy in the left hip, as described, without appreciable change since CT exam 08/20/2022. 3.  No acute osseous abnormality identified in the thoracic spine. CT LUMBAR SPINE WO CONTRAST    Result Date: 10/22/2022  No acute osseous abnormality identified in the lumbar spine. RECOMMENDATIONS: Unavailable     CT CHEST ABDOMEN PELVIS W CONTRAST Additional Contrast? None    Result Date: 10/22/2022  1. No acute traumatic injury identified in the chest, abdomen or pelvis. 2.  Advanced arthropathy in the left hip, as described, without appreciable change since CT exam 08/20/2022. 3.  No acute osseous abnormality identified in the thoracic spine. XR HIP 2-3 VW W PELVIS LEFT    Result Date: 10/22/2022  Severe arthropathy in the left hip. Mild flattening of the superior aspect of the femoral head with large subchondral cyst formation appears more prominent since prior radiographs, which may be related to trauma or articular surface collapse related to arthropathy. Physical Examination:      Physical Exam   Vitals:    Vitals:    10/23/22 2027 10/23/22 2108 10/24/22 0618 10/24/22 0756   BP:   (!) 157/87    Pulse:  70 65 69   Resp: 17 16 16 16   Temp:   98.2 °F (36.8 °C)    TempSrc:       SpO2:  94% 98% 98%   Weight:       Height:                        Body mass index is 25.62 kg/m². General Appearance:    Moderate distress Pulmonary/Chest:        Clear to auscultation bilaterally . No wheezes, rales or rhonchi . Cardiovascular:            Normal rate, regular rhythm,                                          No murmur or  Gallop . Abdomen:                       Soft, non-tender                                                                                    Extremities:                    No  Edema . Assessment:        Hospital Problems             Last Modified POA    * (Principal) Rhabdomyolysis 10/22/2022 Yes    Chronic pain disorder 10/24/2022 Yes    Syncope and collapse 10/22/2022 Yes    Asthma 10/24/2022 Yes    Bipolar disorder, current episode mixed, moderate ([address] 10/24/2022 Yes    Overview Signed 10/24/2022 12:27 PM by Lelia Vora MD     Last Assessment & Plan:   Formatting of this note might be different from the original.  Goes to [facility] - on prozac seems to be helping \"miracle medicine\"         Essential tremor 10/24/2022 Yes    Acquired hypothyroidism 10/24/2022 Yes    Hypercholesterolemia 10/24/2022 Yes    Osteoarthritis of knee 10/24/2022 Yes    Drug abuse ([address] (Chronic) 10/24/2022 Yes    COPD (chronic obstructive pulmonary disease) ([address] 10/24/2022 Yes    Hypertension 10/24/2022 Yes       Plan:        50-year-old female presenting with joint pain in the setting of fall 3 days prior to presentation patient is poor historian does report chronic diarrhea  Trauma series CT brain negative in ER        Syncopal episode-unclear etiology-  neurology on board.   CT brain unremarkable EKG unremarkable troponin downtrending 67-46  Hypotension - resolved -IV fluid hydration, orthostatics negative  Rhabdomyolysis-CK downtrending, no MERARY  Hypokalemia -resolved  Chronic diarrhea recently worse likely exacerbation of  Crohn's-has seen GI in the past will reconsult, start on prednisone, also check C. difficile, GI panel. GI consulted and they plan to do a colonoscopy. History of cocaine use-  urine tox positive for opiates. 10/24:   Patient denies complaints  Afebrile and hemodynamically stable  Requests Prozaca and clonazepam  GI plans for a colonoscopy. Matias Butler MD  10/24/2022  12:28 PM     I have discussed the care of Sabine Ryan , including pertinent history and exam findings,    today with the resident. I have seen and examined the patient and the key elements of all parts of the encounter have been performed by me . I agree with the assessment, plan and orders as documented by the resident. Principal Problem:    Rhabdomyolysis  Active Problems:    Chronic pain disorder    Syncope and collapse    Asthma    Bipolar disorder, current episode mixed, moderate (HCC)    Essential tremor    Acquired hypothyroidism    Hypercholesterolemia    Osteoarthritis of knee    Crohn's colitis, without complications (HCC)    Anemia    Drug abuse (HCC)    COPD (chronic obstructive pulmonary disease) (HCC)    Hypertension  Resolved Problems:    * No resolved hospital problems. *        Overall  course ;                                   are improving over time. Patient loose stools improving  No new complaints  Likely will have colonoscopy            Electronically signed by Rajendra Baldwin MD     Please note that this chart was generated using voice recognition Dragon dictation software. Although every effort was made to ensure the accuracy of this automated transcription, some errors in transcription may have occurred.

## 2022-10-24 NOTE — PROGRESS NOTES
Writer speaks with patient's Pharmacy per Dr. Sawyer Coil request to verify that she takes Prozac 60mg daily, as it is not listed in her home medication record. Pharmacy states that she does in fact take Prozac 60mg daily. Writer given verbal order from Dr. April Person to order this medication for the patient if verified by Pharmacy.

## 2022-10-24 NOTE — PROGRESS NOTES
[address]   Occupational Therapy Evaluation  Date: 10/24/22  Patient Name: Rhys Perez       Room: [facility]/4502-90  MRN: [id number]  Account: [de-identified]   : 1965  (58 y.o.) Gender: female     Discharge Recommendations:  Further Occupational Therapy is recommended upon facility discharge. OT Equipment Recommendations  Equipment Needed: Yes  Mobility Devices: ADL Assistive Devices  ADL Assistive Devices: Transfer Tub Bench    Referring Practitioner: Tyrese West MD  Diagnosis: Rhabdomyolysis Additional Pertinent Hx: Rhys Perez is a 61 y.o. female who presents with left-sided hip pain, possible head injury. Patient states that she believes that she fell a few days ago but does not remember falling. She believes that she hit her head but does not know if she hit her head. She also states that since that time she has been having some worsening left-sided hip pain. She states that she has previous left hip injuries to the point where she thinks it needs to be replaced but has unable to schedule yet. Endorses headache, neck pain, back pain, chest pain, abdominal pain, and left-sided hip pain. Denies any pain to her upper extremities or anything to the right lower extremity. Patient believes that she lost consciousness. She is not sure where she fell or how long she might of been unconscious all that she remembers is that she woke up on her bed.   She denies any alcohol or other illicit substance use    Treatment Diagnosis: impaired self care status    Past Medical History:  has a past medical history of Acid reflux, Anxiety, Arthritis, Asthma, Bipolar 1 disorder ([address] Bowel obstruction ([address] COPD (chronic obstructive pulmonary disease) ([address] Crohn disease ([address] Depression, Dry eye, Fibromyalgia, Gout, Headache, Hyperlipidemia, Hypertension, Hypothyroidism, Kidney stones, Muscle spasm, Neuropathy, Pain, Personal history of other medical treatment, Wears partial dentures, and Wellness examination. Past Surgical History:   has a past surgical history that includes Tonsillectomy and adenoidectomy; Tubal ligation; Cholecystectomy; Bunionectomy (Left); Colonoscopy (04/30/2007); Colonoscopy (10/12/2017); Abdomen surgery; Upper gastrointestinal endoscopy (10/25/2021); Colonoscopy (10/25/2021); and Colonoscopy (10/25/2021). Restrictions  Restrictions/Precautions  Restrictions/Precautions: Fall Risk;General Precautions  Required Braces or Orthoses?: No  Position Activity Restriction  Other position/activity restrictions: up with assistance      Vitals  Vitals  Heart Rate: 69  BP: (!) 157/87  MAP (Calculated): 110.33  Resp: 16  SpO2: 98 %  O2 Device: None (Room air)     Subjective  Comments: RN Aundrea Soares ok'd pt for OT eval and treat. Pt agreeable to participate and pleasant/cooperative throughout.   Subjective  Pain: pt reports 7/10 pain in L hip      Social/Functional History  Social/Functional History  Lives With: Alone  Type of Home: Apartment  Home Layout: One level (pt reports 7/10 pain in L hip)  Home Access: Elevator  Bathroom Shower/Tub: Tub/Shower unit, Shower chair with back, Curtain (hard to get in)  [employer]: Standard  Bathroom Equipment: Grab bars in shower, Shower chair, Toilet raiser (toilet raiser with handles)  Bathroom Accessibility: Not accessible (uses a cane to get in restroom due to clutter)  Home Equipment: Electric scooter, Rollator, Cane, Grab bars, Alert Button  Has the patient had two or more falls in the past year or any fall with injury in the past year?: Yes (fell 3 days ago but does not remember the fall or details of it)  Receives Help From: Family  ADL Assistance: Independent  Homemaking Assistance: Independent (pt peforms cooking, cleaning, laundry, dtr does laundry)  Homemaking Responsibilities: Yes  Ambulation Assistance: Independent (using a rollator or cane; at times to use scooter in community)  Transfer Assistance: Independent  Active : No  Occupation: On disability  IADL Comments: sleeps on a low couch -- hard to stand to/from  Additional Comments: pt reports dtr lives locally and is able to provide assistance as needed      Objective  Cognition  Orientation  Overall Orientation Status: Within Functional Limits  Orientation Level: Oriented to person, Oriented to time, Oriented to place, Oriented to situation, Oriented X4  Cognition  Overall Cognitive Status: WFL   Sensation  Overall Sensation Status: WFL (pt denies numbness/tingling this date)    Activities of Daily Living  ADL  Feeding: Setup  Grooming: Setup  Grooming Skilled Clinical Factors: standing sinkside to complete oral hygiene  UE Bathing: Stand by assistance  LE Bathing: Contact guard assistance  UE Dressing: Stand by assistance  LE Dressing: Contact guard assistance  Toileting: Contact guard assistance  Additional Comments: ADL scores based on skilled observation and clinical reasoning unless otherwise noted. Pt is currently limited by pain, balance, and endurance impacting performance in self care tasks. UE Function  LUE AROM (degrees)  LUE AROM : WFL  Left Hand AROM (degrees)  Left Hand AROM: WFL  Tone LUE  LUE Tone: Normotonic  LUE Strength  Gross LUE Strength: WFL  L Hand General: 4/5  LUE Strength Comment: grossly 4/5    RUE AROM (degrees)  RUE AROM : WFL  Right Hand AROM (degrees)  Right Hand AROM: WFL  Tone RUE  RUE Tone: Normotonic  RUE Strength  Gross RUE Strength: WFL  R Hand General: 4/5  RUE Strength Comment: grossly 4/5         Fine Motor Skills/Coordination  Coordination  Movements Are Fluid And Coordinated:  Yes                Mobility  Bed Mobility  Bed mobility  Bridging: Supervision (partial bridge with inc time due to pain)  Rolling to Right: Minimal assistance (limited by pain)  Supine to Sit: Stand by assistance (with HOB elevated ~45 degrees and use of HR)  Sit to Supine: Unable to assess (pt retired in bedside chair)  Scooting: Stand by assistance (to progress hips to EOB)  Bed Mobility Comments: increased time for all due to pain.  Encouragement needed to participate    Balance  Balance  Sitting Balance: Stand by assistance (unsupported static and dynamic sitting for ~5 minutes, SBA for safety)  Standing Balance: Contact guard assistance  Standing Balance  Time: 4 minutes  Activity: oral hygiene, functional transfers, functional mobility  Comment: standing sinkside to complete oral hygiene with alternating UE support    Transfers  Transfers  Sit to stand: Contact guard assistance  Stand to sit: Contact guard assistance  Transfer Comments: pt demonstrates Good hand placement/safety during transfers without verbal cues    Functional Mobility  Functional - Mobility Device: 4-Wheeled Walker  Activity: To/from bathroom (and in room/hallway to simulate household distances)  Assist Level: Contact guard assistance  Functional Mobility Comments: CGA for safety this date due to pain; pt demonstrates no LOB throughout    Assessment  Assessment  Performance deficits / Impairments: Decreased functional mobility , Decreased ADL status, Decreased endurance, Decreased balance, Decreased high-level IADLs  Treatment Diagnosis: impaired self care status  Prognosis: Good  Decision Making: Medium Complexity  Discharge Recommendations: Patient would benefit from continued therapy after discharge    Activity Tolerance  Activity Tolerance: Patient limited by pain, Patient Tolerated treatment well    Safety Devices  Type of Devices: Nurse notified, Left in chair, Call light within reach    Patient Education  Patient Education  Education Given To: Patient  Education Provided: Role of Therapy, Plan of Care, ADL Adaptive Strategies, Transfer Training  Education Method: Verbal  Barriers to Learning: None  Education Outcome: Verbalized understanding, Demonstrated understanding      Functional Outcome Measures  AM-PAC Daily Activity Inpatient   How much help for putting on and taking off regular lower body clothing?: A Little  How much help for Bathing?: A Little  How much help for Toileting?: A Little  How much help for putting on and taking off regular upper body clothing?: A Little  How much help for taking care of personal grooming?: A Little  How much help for eating meals?: A Little  AM-[facility] Inpatient Daily Activity Raw Score: 18  AM-PAC Inpatient ADL T-Scale Score : 38.66  ADL Inpatient CMS 0-100% Score: 46.65  ADL Inpatient CMS G-Code Modifier : CK       Goals     Short Term Goals  Time Frame for Short Term Goals: By discharge, pt will  Short Term Goal 1: demonstrate functional transfers/mobility during self care tasks with SUP, least restrictive device, and Good safety  Short Term Goal 2: perform lower body bathing/dressing/toileting with SUP and adaptive equipment as needed  Short Term Goal 3: verbalize/demonstrate Good understanding of home safety/fall prevention techs to increase IND and safety during self care tasks  Short Term Goal 4: tolerate dynamic standing for 7+ minutes during self care tasks/functional activity with SUP  Short Term Goal 5: actively participate in 15+ minutes of therapeutic exercise/functional activity to increase overall strength/endurance needed for ADLs/IADLs    Plan  Occupational Therapy Plan  Times Per Week: 3-5  Current Treatment Recommendations: Strengthening, Balance training, Functional mobility training, Endurance training, Pain management, Safety education & training, Patient/Caregiver education & training, Equipment evaluation, education, & procurement, Self-Care / ADL, Home management training      OT Individual Minutes  OT Individual Minutes  Time In: 4867  Time Out: 3990  Minutes: 42  Time Code Minutes   Timed Code Treatment Minutes: 23 Minutes        Electronically signed by Cindy Calles OT on 10/24/22 at 11:16 AM EDT

## 2022-10-24 NOTE — PLAN OF CARE
Problem: Discharge Planning  Goal: Discharge to home or other facility with appropriate resources  10/24/2022 0221 by Katherin Sanford RN  Outcome: Progressing     Problem: Safety - Adult  Goal: Free from fall injury  10/24/2022 0221 by Katherin Sanford RN  Outcome: Progressing     Problem: Pain  Goal: Verbalizes/displays adequate comfort level or baseline comfort level  10/24/2022 0221 by Katherin Sanford RN  Outcome: Progressing

## 2022-10-25 ENCOUNTER — TELEPHONE (OUTPATIENT)
Dept: ORTHOPEDIC SURGERY | Age: 57
End: 2022-10-25

## 2022-10-25 VITALS
TEMPERATURE: 97.7 F | SYSTOLIC BLOOD PRESSURE: 161 MMHG | HEIGHT: 66 IN | WEIGHT: 158.73 LBS | BODY MASS INDEX: 25.51 KG/M2 | OXYGEN SATURATION: 100 % | DIASTOLIC BLOOD PRESSURE: 93 MMHG | HEART RATE: 68 BPM | RESPIRATION RATE: 20 BRPM

## 2022-10-25 LAB
AFP: 4 UG/L
ANION GAP SERPL CALCULATED.3IONS-SCNC: 7 MMOL/L (ref 9–17)
BUN BLDV-MCNC: 15 MG/DL (ref 6–20)
CALCIUM SERPL-MCNC: 9 MG/DL (ref 8.6–10.4)
CHLORIDE BLD-SCNC: 103 MMOL/L (ref 98–107)
CO2: 31 MMOL/L (ref 20–31)
CREAT SERPL-MCNC: 0.58 MG/DL (ref 0.5–0.9)
GFR SERPL CREATININE-BSD FRML MDRD: >60 ML/MIN/1.73M2
GLUCOSE BLD-MCNC: 97 MG/DL (ref 70–99)
HCT VFR BLD CALC: 34.6 % (ref 36–46)
HEMOGLOBIN: 11.6 G/DL (ref 12–16)
HEPATITIS B SURFACE ANTIGEN: NONREACTIVE
HEPATITIS C ANTIBODY: NONREACTIVE
IRON SATURATION: 9 % (ref 20–55)
IRON: 28 UG/DL (ref 37–145)
MCH RBC QN AUTO: 28.6 PG (ref 26–34)
MCHC RBC AUTO-ENTMCNC: 33.5 G/DL (ref 31–37)
MCV RBC AUTO: 85.4 FL (ref 80–100)
PDW BLD-RTO: 15.2 % (ref 11.5–14.9)
PLATELET # BLD: 227 K/UL (ref 150–450)
PMV BLD AUTO: 8.8 FL (ref 6–12)
POTASSIUM SERPL-SCNC: 4.1 MMOL/L (ref 3.7–5.3)
RBC # BLD: 4.05 M/UL (ref 4–5.2)
SODIUM BLD-SCNC: 141 MMOL/L (ref 135–144)
TOTAL IRON BINDING CAPACITY: 305 UG/DL (ref 250–450)
UNSATURATED IRON BINDING CAPACITY: 277 UG/DL (ref 112–347)
WBC # BLD: 7 K/UL (ref 3.5–11)

## 2022-10-25 PROCEDURE — 94640 AIRWAY INHALATION TREATMENT: CPT

## 2022-10-25 PROCEDURE — 6370000000 HC RX 637 (ALT 250 FOR IP): Performed by: INTERNAL MEDICINE

## 2022-10-25 PROCEDURE — 85027 COMPLETE CBC AUTOMATED: CPT

## 2022-10-25 PROCEDURE — 99239 HOSP IP/OBS DSCHRG MGMT >30: CPT | Performed by: INTERNAL MEDICINE

## 2022-10-25 PROCEDURE — 2580000003 HC RX 258: Performed by: INTERNAL MEDICINE

## 2022-10-25 PROCEDURE — 99231 SBSQ HOSP IP/OBS SF/LOW 25: CPT | Performed by: INTERNAL MEDICINE

## 2022-10-25 PROCEDURE — 6360000002 HC RX W HCPCS: Performed by: INTERNAL MEDICINE

## 2022-10-25 PROCEDURE — 94761 N-INVAS EAR/PLS OXIMETRY MLT: CPT

## 2022-10-25 PROCEDURE — 36415 COLL VENOUS BLD VENIPUNCTURE: CPT

## 2022-10-25 PROCEDURE — 80048 BASIC METABOLIC PNL TOTAL CA: CPT

## 2022-10-25 PROCEDURE — APPSS30 APP SPLIT SHARED TIME 16-30 MINUTES: Performed by: NURSE PRACTITIONER

## 2022-10-25 RX ORDER — PREDNISONE 10 MG/1
20 TABLET ORAL DAILY
Qty: 14 TABLET | Refills: 0 | Status: SHIPPED | OUTPATIENT
Start: 2022-11-10 | End: 2022-11-17

## 2022-10-25 RX ORDER — HYDROCODONE BITARTRATE AND ACETAMINOPHEN 5; 325 MG/1; MG/1
1 TABLET ORAL EVERY 8 HOURS PRN
Qty: 42 TABLET | Refills: 0 | Status: SHIPPED | OUTPATIENT
Start: 2022-10-25 | End: 2022-11-10

## 2022-10-25 RX ORDER — GABAPENTIN 400 MG/1
400 CAPSULE ORAL 2 TIMES DAILY
Qty: 60 CAPSULE | Refills: 0 | Status: SHIPPED | OUTPATIENT
Start: 2022-10-25 | End: 2022-11-24

## 2022-10-25 RX ORDER — PREDNISONE 20 MG/1
40 TABLET ORAL DAILY
Qty: 14 TABLET | Refills: 0 | Status: SHIPPED | OUTPATIENT
Start: 2022-10-25 | End: 2022-11-01

## 2022-10-25 RX ORDER — TRAZODONE HYDROCHLORIDE 150 MG/1
150 TABLET ORAL NIGHTLY
Qty: 30 TABLET | Refills: 0 | Status: SHIPPED | OUTPATIENT
Start: 2022-10-25

## 2022-10-25 RX ORDER — PREDNISONE 10 MG/1
30 TABLET ORAL DAILY
Qty: 21 TABLET | Refills: 0 | Status: SHIPPED | OUTPATIENT
Start: 2022-11-02 | End: 2022-11-09

## 2022-10-25 RX ORDER — CHOLESTYRAMINE LIGHT 4 G/5.7G
4 POWDER, FOR SUSPENSION ORAL DAILY
Qty: 60 PACKET | Refills: 3 | Status: SHIPPED | OUTPATIENT
Start: 2022-10-26

## 2022-10-25 RX ORDER — PREDNISONE 10 MG/1
10 TABLET ORAL DAILY
Qty: 7 TABLET | Refills: 0 | Status: SHIPPED | OUTPATIENT
Start: 2022-11-18 | End: 2022-11-25

## 2022-10-25 RX ADMIN — PREGABALIN 300 MG: 150 CAPSULE ORAL at 08:28

## 2022-10-25 RX ADMIN — SODIUM CHLORIDE, PRESERVATIVE FREE 10 ML: 5 INJECTION INTRAVENOUS at 10:44

## 2022-10-25 RX ADMIN — PREDNISONE 40 MG: 20 TABLET ORAL at 08:12

## 2022-10-25 RX ADMIN — FLUOXETINE HYDROCHLORIDE 60 MG: 20 CAPSULE ORAL at 08:11

## 2022-10-25 RX ADMIN — ENOXAPARIN SODIUM 40 MG: 100 INJECTION SUBCUTANEOUS at 08:12

## 2022-10-25 RX ADMIN — LISINOPRIL 5 MG: 5 TABLET ORAL at 11:21

## 2022-10-25 RX ADMIN — BUDESONIDE AND FORMOTEROL FUMARATE DIHYDRATE 2 PUFF: 160; 4.5 AEROSOL RESPIRATORY (INHALATION) at 08:21

## 2022-10-25 RX ADMIN — HYDROCODONE BITARTRATE AND ACETAMINOPHEN 1 TABLET: 5; 325 TABLET ORAL at 08:27

## 2022-10-25 NOTE — DISCHARGE INSTRUCTIONS
Your information:  Name: Ramos Garcia  : 1965      What to do after you leave the hospital:    Recommended diet: regular diet    Recommended activity: activity as tolerated        The following personal items were collected during your admission and were returned to you:    Belongings  Dental Appliances: None  Vision - Corrective Lenses: None  Hearing Aid: None  Clothing: Shirt, Pants, Socks, Slippers  Jewelry: None  Body Piercings Removed: N/A  Electronic Devices: Cell Phone  Weapons (Notify Protective Services/Security): None  Other Valuables: At bedside  Home Medications: None  Valuables Given To: Patient  Provide Name(s) of Who Valuable(s) Were Given To: NA    Information obtained by:  By signing below, I understand that if any problems occur once I leave the hospital I am to contact my PCP. I understand and acknowledge receipt of the instructions indicated above.

## 2022-10-25 NOTE — DISCHARGE SUMMARY
[place] [address] Internal Medicine    Discharge Summary     Patient ID: Primo Esparza  :  1965   MRN: [id number]     ACCOUNT:  [de-identified]   Patient's PCP: Devon Rodríguez MD  Admit Date: 10/22/2022   Discharge Date: 10/25/2022    Length of Stay: 3  Code Status:  Full Code  Admitting Physician: Marquis Wili MD  Discharge Physician: Linden Esqueda MD     Active Discharge Diagnoses:     Primary Problem  Rhabdomyolysis      [place] Problems    Diagnosis Date Noted    Essential tremor [G25.0] 10/24/2022     Priority: Medium    Osteoarthritis of knee [M17.9] 10/24/2022     Priority: Medium    Crohn's colitis, without complications ([facility] [N91.17] 10/24/2022     Priority: Medium    Anemia [D64.9] 10/24/2022     Priority: Medium    Liver cyst [K76.89] 10/24/2022     Priority: Medium    Rhabdomyolysis [M62.82] 10/22/2022     Priority: Medium    Syncope and collapse [R55] 10/22/2022     Priority: Medium    Hypercholesterolemia [E78.00] 2020     Priority: Medium    Acquired hypothyroidism [E03.9] 2018     Priority: Medium    Bipolar disorder, current episode mixed, moderate ([facility] [F31.62] 2017     Priority: Medium    Chronic pain disorder [G89.4] 2017     Priority: Medium    Asthma [J45.909] 2017     Priority: Medium    COPD (chronic obstructive pulmonary disease) ([facility] [J44.9] 2017    Hypertension [I10] 2017    Drug abuse ([facility] [F19.10] 2015       Admission Condition:  Poor     Discharged Condition: fair    Hospital Stay:     Hospital Course:  Primo Esparza is a 61 y.o. female who was admitted for the management of Rhabdomyolysis , presented to ER with Hip Pain and Head Injury  Patient, admitted to hospital after loss of consciousness, patient has multiple medical problems, she is using polypharmacy she is on gabapentin, Lyrica, clonazepam, Norco, Prozac, she also has Crohn's disease, she was also found positive for cocaine in the system    Patient was altered by GI, started on cholestyramine, she is also getting steroids, will reach out to GI team about tapering of prednisone  Patient will follow with GI for colonoscopy and MRI liver  Likely cause of her syncope was polypharmacy  Patient clonazepam was discontinued dose of gabapentin was reduced to half  Patient advised to follow-up with his PCP and gradually tapering of psych meds  She need to stop using street drugs          Significant therapeutic interventions:     Significant Diagnostic Studies:   Labs / Micro:        ,     Radiology:    CT HEAD WO CONTRAST    Result Date: 10/22/2022  EXAMINATION: CT OF THE HEAD WITHOUT CONTRAST; CT OF THE CERVICAL SPINE WITHOUT CONTRAST 10/22/2022 2:14 pm TECHNIQUE: CT of the head was performed without the administration of intravenous contrast. Automated exposure control, iterative reconstruction, and/or weight based adjustment of the mA/kV was utilized to reduce the radiation dose to as low as reasonably achievable.; CT of the cervical spine was performed without the administration of intravenous contrast. Multiplanar reformatted images are provided for review. Automated exposure control, iterative reconstruction, and/or weight based adjustment of the mA/kV was utilized to reduce the radiation dose to as low as reasonably achievable.  COMPARISON: 05/18/2022 HISTORY: ORDERING SYSTEM PROVIDED HISTORY: fall, +LOC TECHNOLOGIST PROVIDED HISTORY: fall, +LOC Decision Support Exception - unselect if not a suspected or confirmed emergency medical condition->Emergency Medical Condition (MA) Reason for Exam: fall, + LOC Additional signs and symptoms: pt states she fell, hitting the left side , front of her head; ORDERING SYSTEM PROVIDED HISTORY: fall, midline posterior neck pain TECHNOLOGIST PROVIDED HISTORY: fall, midline posterior neck pain Decision Support Exception - unselect if not a suspected or confirmed emergency medical achievable. COMPARISON: 05/18/2022 HISTORY: ORDERING SYSTEM PROVIDED HISTORY: fall, +LOC TECHNOLOGIST PROVIDED HISTORY: fall, +LOC Decision Support Exception - unselect if not a suspected or confirmed emergency medical condition->Emergency Medical Condition (MA) Reason for Exam: fall, + LOC Additional signs and symptoms: pt states she fell, hitting the left side , front of her head; ORDERING SYSTEM PROVIDED HISTORY: fall, midline posterior neck pain TECHNOLOGIST PROVIDED HISTORY: fall, midline posterior neck pain Decision Support Exception - unselect if not a suspected or confirmed emergency medical condition->Emergency Medical Condition (MA) Reason for Exam: fall, midline posterior neck pain Additional signs and symptoms: pt states she fell, hitting her left side FINDINGS: HEAD: BRAIN/VENTRICLES: There is no acute intracranial hemorrhage, mass effect or midline shift. No abnormal extra-axial fluid collection. The gray-white differentiation is maintained. There is no evidence of hydrocephalus. ORBITS: The visualized portion of the orbits demonstrate no acute abnormality. SINUSES: The visualized paranasal sinuses and mastoid air cells demonstrate no acute abnormality. SOFT TISSUES/SKULL:  No acute abnormality of the visualized skull or soft tissues. CERVICAL SPINE: BONES/ALIGNMENT: Reversal the normal cervical lordosis appears degenerative. The vertebral body heights are maintained. No acute fracture identified. DEGENERATIVE CHANGES: Advanced multilevel degenerative disc disease and advanced multilevel facet arthropathy. Notable disc osteophyte complex at C6-7 encroaching on the central canal. SOFT TISSUES: There is no prevertebral soft tissue swelling. No acute CT abnormality identified in the brain. No acute osseous abnormality identified in the cervical spine.      CT THORACIC SPINE WO CONTRAST    Result Date: 10/22/2022  EXAMINATION: CT OF THE CHEST, ABDOMEN, AND PELVIS WITH CONTRAST; CT OF THE THORACIC SPINE WITHOUT CONTRAST 10/22/2022 2:14 pm TECHNIQUE: CT of the chest, abdomen and pelvis was performed with the administration of intravenous contrast. Multiplanar reformatted images are provided for review. Automated exposure control, iterative reconstruction, and/or weight based adjustment of the mA/kV was utilized to reduce the radiation dose to as low as reasonably achievable.; CT of the thoracic spine was performed without the administration of intravenous contrast. Multiplanar reformatted images are provided for review. Automated exposure control, iterative reconstruction, and/or weight based adjustment of the mA/kV was utilized to reduce the radiation dose to as low as reasonably achievable. COMPARISON: CT abdomen and pelvis 08/20/2022 HISTORY: ORDERING SYSTEM PROVIDED HISTORY: diffuse chest, abdominal, and pelvic pain,concern for left hip fracture TECHNOLOGIST PROVIDED HISTORY: diffuse chest, abdominal, and pelvic pain,concern for left hip fracture Decision Support Exception - unselect if not a suspected or confirmed emergency medical condition->Emergency Medical Condition (MA) Reason for Exam: diffuse chest, abdominal, and pelvic pain,concern for left hip fracture Additional signs and symptoms: pt states she fell a couple days ago, all over pain Relevant Medical/Surgical History: suggeries-bowel obstruction x 2, cholecystectomy, tubal ligation; ORDERING SYSTEM PROVIDED HISTORY: fall, upper thoracic pain TECHNOLOGIST PROVIDED HISTORY: fall, upper thoracic pain Reason for Exam: fall, upper thoracic pain Additional signs and symptoms: pt states she fell, hitting her left side CHEST: Mediastinum: No acute aortic abnormality identified. No mediastinal hematoma. No pericardial effusion. Lungs/pleura: No acute airspace disease, pneumothorax or effusion. Soft Tissues/Bones: No acute osseous abnormality identified in this region. No soft tissue hematoma. Abdomen/Pelvis: Organs: No solid organ injury identified.  No acute inflammatory process. Status post cholecystectomy. Benign area of hypoattenuation in segment 4 the liver may represent a cyst or possibly fat deposition. GI/Bowel: There is no bowel dilatation or wall thickening identified. Status post partial small-bowel resection. Pelvis: No acute findings. Appropriate excretion of contrast is demonstrated into the bladder. Peritoneum/Retroperitoneum: No free air. No free fluid. The aorta is normal in caliber. The visceral branches are patent. Bones/Soft Tissues: Pelvic alignment maintained. No fracture identified. Severe arthropathy in the left hip with prominent subchondral cyst formation and prominent hypertrophic changes again noted. Moderately severe joint space loss in the superior aspect of the right hip with marginal hypertrophic changes also noted. No soft tissue hematoma. Small fat containing umbilical hernia. Lumbar vertebral body heights are maintained with multilevel degenerative disc disease and lower lumbar facet arthropathy again noted. THORACIC: BONES/ALIGNMENT: There is normal alignment of the spine. The vertebral body heights are maintained. No osseous destructive lesion is seen. DEGENERATIVE CHANGES: No gross spinal canal stenosis or bony neural foraminal narrowing of the thoracic spine. SOFT TISSUES: No paraspinal hematoma. *Unless otherwise specified, incidental findings do not require dedicated imaging follow-up. 1.  No acute traumatic injury identified in the chest, abdomen or pelvis. 2.  Advanced arthropathy in the left hip, as described, without appreciable change since CT exam 08/20/2022. 3.  No acute osseous abnormality identified in the thoracic spine. CT LUMBAR SPINE WO CONTRAST    Result Date: 10/22/2022  EXAMINATION: CT OF THE LUMBAR SPINE WITHOUT CONTRAST  10/22/2022 TECHNIQUE: CT of the lumbar spine was performed without the administration of intravenous contrast. Multiplanar reformatted images are provided for review. Adjustment of mA and/or kV according to patient size was utilized. Automated exposure control, iterative reconstruction, and/or weight based adjustment of the mA/kV was utilized to reduce the radiation dose to as low as reasonably achievable. COMPARISON: CT abdomen and pelvis today and 08/20/2022 HISTORY: ORDERING SYSTEM PROVIDED HISTORY: fall, lumbar pain TECHNOLOGIST PROVIDED HISTORY: fall, lumbar pain Decision Support Exception - unselect if not a suspected or confirmed emergency medical condition->Emergency Medical Condition (MA) Reason for Exam: fall, lumbar pain Additional signs and symptoms: pt states she fell, hitting her left side FINDINGS: BONES/ALIGNMENT: There is normal alignment of the spine. The vertebral body heights are maintained. No osseous destructive lesion is seen. DEGENERATIVE CHANGES: Moderately severe disc disease in the lower lumbar spine with lower lumbar facet arthropathy. Mild encroachment on the central canal by the disc disease notable at L4-5 and L5-S1. SOFT TISSUES/RETROPERITONEUM: No soft tissue hematoma. No acute osseous abnormality identified in the lumbar spine. RECOMMENDATIONS: Unavailable     CT CHEST ABDOMEN PELVIS W CONTRAST Additional Contrast? None    Result Date: 10/22/2022  EXAMINATION: CT OF THE CHEST, ABDOMEN, AND PELVIS WITH CONTRAST; CT OF THE THORACIC SPINE WITHOUT CONTRAST 10/22/2022 2:14 pm TECHNIQUE: CT of the chest, abdomen and pelvis was performed with the administration of intravenous contrast. Multiplanar reformatted images are provided for review. Automated exposure control, iterative reconstruction, and/or weight based adjustment of the mA/kV was utilized to reduce the radiation dose to as low as reasonably achievable.; CT of the thoracic spine was performed without the administration of intravenous contrast. Multiplanar reformatted images are provided for review.  Automated exposure control, iterative reconstruction, and/or weight based adjustment of the mA/kV was utilized to reduce the radiation dose to as low as reasonably achievable. COMPARISON: CT abdomen and pelvis 08/20/2022 HISTORY: ORDERING SYSTEM PROVIDED HISTORY: diffuse chest, abdominal, and pelvic pain,concern for left hip fracture TECHNOLOGIST PROVIDED HISTORY: diffuse chest, abdominal, and pelvic pain,concern for left hip fracture Decision Support Exception - unselect if not a suspected or confirmed emergency medical condition->Emergency Medical Condition (MA) Reason for Exam: diffuse chest, abdominal, and pelvic pain,concern for left hip fracture Additional signs and symptoms: pt states she fell a couple days ago, all over pain Relevant Medical/Surgical History: suggeries-bowel obstruction x 2, cholecystectomy, tubal ligation; ORDERING SYSTEM PROVIDED HISTORY: fall, upper thoracic pain TECHNOLOGIST PROVIDED HISTORY: fall, upper thoracic pain Reason for Exam: fall, upper thoracic pain Additional signs and symptoms: pt states she fell, hitting her left side CHEST: Mediastinum: No acute aortic abnormality identified. No mediastinal hematoma. No pericardial effusion. Lungs/pleura: No acute airspace disease, pneumothorax or effusion. Soft Tissues/Bones: No acute osseous abnormality identified in this region. No soft tissue hematoma. Abdomen/Pelvis: Organs: No solid organ injury identified. No acute inflammatory process. Status post cholecystectomy. Benign area of hypoattenuation in segment 4 the liver may represent a cyst or possibly fat deposition. GI/Bowel: There is no bowel dilatation or wall thickening identified. Status post partial small-bowel resection. Pelvis: No acute findings. Appropriate excretion of contrast is demonstrated into the bladder. Peritoneum/Retroperitoneum: No free air. No free fluid. The aorta is normal in caliber. The visceral branches are patent. Bones/Soft Tissues: Pelvic alignment maintained. No fracture identified.  Severe arthropathy in the left hip with prominent subchondral cyst formation and prominent hypertrophic changes again noted. Moderately severe joint space loss in the superior aspect of the right hip with marginal hypertrophic changes also noted. No soft tissue hematoma. Small fat containing umbilical hernia. Lumbar vertebral body heights are maintained with multilevel degenerative disc disease and lower lumbar facet arthropathy again noted. THORACIC: BONES/ALIGNMENT: There is normal alignment of the spine. The vertebral body heights are maintained. No osseous destructive lesion is seen. DEGENERATIVE CHANGES: No gross spinal canal stenosis or bony neural foraminal narrowing of the thoracic spine. SOFT TISSUES: No paraspinal hematoma. *Unless otherwise specified, incidental findings do not require dedicated imaging follow-up. 1.  No acute traumatic injury identified in the chest, abdomen or pelvis. 2.  Advanced arthropathy in the left hip, as described, without appreciable change since CT exam 08/20/2022. 3.  No acute osseous abnormality identified in the thoracic spine. XR HIP 2-3 VW W PELVIS LEFT    Result Date: 10/22/2022  EXAMINATION: ONE XRAY VIEW OF THE PELVIS AND TWO XRAY VIEWS LEFT HIP 10/22/2022 1:49 pm COMPARISON: CT abdomen and pelvis 08/20/2022. Radiographs 05/15/2022. HISTORY: ORDERING SYSTEM PROVIDED HISTORY: fall, possible left hip fracture TECHNOLOGIST PROVIDED HISTORY: fall, possible left hip fracture Reason for Exam: fall, possible left hip fracture Additional signs and symptoms: PT states fall a few days ago and states lower back pain and left hip pain. States left hip pain is chronic FINDINGS: Severe arthropathy in the left hip again demonstrated. Mature ossification and overgrowth about the superior acetabulum again demonstrated. Mild flattening of the superior aspect of the femoral head appears more prominent since the prior exam with large subchondral cyst formation. No evidence for femoral neck fracture.   Pelvic alignment is maintained. Severe arthropathy in the left hip. Mild flattening of the superior aspect of the femoral head with large subchondral cyst formation appears more prominent since prior radiographs, which may be related to trauma or articular surface collapse related to arthropathy. Consultations:    Consults:     Final Specialist Recommendations/Findings:   IP CONSULT TO INTERNAL MEDICINE  IP CONSULT TO NEUROLOGY  IP CONSULT TO GI      The patient was seen and examined on day of discharge and this discharge summary is in conjunction with any daily progress note from day of discharge. Discharge plan:     Disposition: Home    Physician Follow Up:     [id number] [facility]   [address] [place] [address]  [phone]    they will call you to setup a time to come out to your house       Requiring Further Evaluation/Follow Up POST HOSPITALIZATION/Incidental Findings:    Diet: cardiac diet    Activity: As tolerated    Instructions to Patient:     Discharge Medications:      Medication List        START taking these medications      cholestyramine light 4 g packet  Take 1 packet by mouth daily  Start taking on: October 26, 2022            CHANGE how you take these medications      gabapentin 400 MG capsule  Commonly known as: NEURONTIN  Take 1 capsule by mouth 2 times daily for 30 days.   What changed: how much to take     traZODone 150 MG tablet  Commonly known as: DESYREL  Take 1 tablet by mouth nightly  What changed: how much to take            CONTINUE taking these medications      albuterol sulfate  (90 Base) MCG/ACT inhaler  Commonly known as: PROVENTIL;VENTOLIN;PROAIR     budesonide-formoterol 160-4.5 MCG/ACT Aero  Commonly known as: Symbicort  Inhale 2 puffs into the lungs 2 times daily     diphenhydrAMINE 25 MG tablet  Commonly known as: BENADRYL     FLUoxetine 20 MG capsule  Commonly known as: PROZAC     Handicap Placard Misc  by Does not apply route Duration 5 years HYDROcodone-acetaminophen 5-325 MG per tablet  Commonly known as: NORCO     lidocaine 5 %  Commonly known as: LIDODERM     lisinopril 5 MG tablet  Commonly known as: PRINIVIL;ZESTRIL  Take 1 tablet by mouth daily     LYRICA PO     REFRESH OP     SUMAtriptan 100 MG tablet  Commonly known as: IMITREX            STOP taking these medications      acetaminophen 500 MG tablet  Commonly known as: TYLENOL     clonazePAM 0.5 MG tablet  Commonly known as: KLONOPIN     Dexamethasone 1.5 MG (21) Tbpk  Commonly known as: DexPak 6 Day     ibuprofen 400 MG tablet  Commonly known as: IBU     nicotine 21 MG/24HR  Commonly known as: Asher Cirilo               Where to Get Your Medications        These medications were sent to [facility] [address] [place], [address] [place] [address]      Phone: [phone]   cholestyramine light 4 g packet  gabapentin 400 MG capsule  traZODone 150 MG tablet         Time Spent on discharge is  35 mins in patient examination, evaluation, counseling as well as medication reconciliation, prescriptions for required medications, discharge plan and follow up. Electronically signed by   Kymberly Carlton MD  10/25/2022  8:37 AM      Thank you Dr. Leigh Ann Nixon MD for the opportunity to be involved in this patient's care.

## 2022-10-25 NOTE — PROGRESS NOTES
Physical Therapy        Physical Therapy Cancel Note      DATE: 10/25/2022    NAME: Anca Bergman  MRN: [id number]   : 1965      Patient not seen this date for Physical Therapy due to: Other: Attempt @ 0910 - pt OBRENDA MONTOYA reports pt is smoking outside. Will attempt later as time permits.        Electronically signed by Elton Peabody, PTA on 10/25/2022 at 9:17 AM

## 2022-10-25 NOTE — TELEPHONE ENCOUNTER
Patient called in requesting a refill on pain medication as she is being discharged today and just discovered a cyst on her liver. Patient uses [facility] please advise thank you!

## 2022-10-25 NOTE — PROGRESS NOTES
[facility]    Gastroenterology Daily Progress Note      Patient:   Wilder Madison   :    1965   Facility:   [facility]  Date:     10/25/2022  Consultant:   SHRADDHA Vazquez - CNP, CNP      SUBJECTIVE  61 y.o. female admitted 10/22/2022 with Syncope and collapse [R55]  Rhabdomyolysis [M62.82] and seen for crohns disease. The pt was seen and examined no c/o abdominal pain or nausea. Nol BM overnight. Has been started on cholestyramine.  .        OBJECTIVE  Scheduled Meds:   traZODone  300 mg Oral Nightly    FLUoxetine  60 mg Oral Daily    cholestyramine light  4 g Oral Daily    predniSONE  40 mg Oral Daily    pregabalin  300 mg Oral BID    thiamine and folic acid IVPB   IntraVENous Daily    sodium chloride flush  5-40 mL IntraVENous 2 times per day    budesonide-formoterol  2 puff Inhalation BID    [Held by provider] lisinopril  5 mg Oral Daily    sodium chloride flush  5-40 mL IntraVENous 2 times per day    enoxaparin  40 mg SubCUTAneous Daily       Vital Signs:  BP (!) 161/93   Pulse 58   Temp 97.7 °F (36.5 °C)   Resp 20   Ht 5' 6\" (1.676 m)   Wt 158 lb 11.7 oz (72 kg)   SpO2 98%   BMI 25.62 kg/m²    Review of Systems - History obtained from chart review and the patient  General ROS: negative for - chills or fever  Respiratory ROS: no cough, shortness of breath, or wheezing  Cardiovascular ROS: no chest pain or dyspnea on exertion  Gastrointestinal ROS: negative for - abdominal pain, blood in stools, hematemesis, melena, nausea/vomiting, or swallowing difficulty/pain     Physical Exam:     General Appearance: alert and oriented to person, place and time, well-developed and well-nourished, in no acute distress  Skin: warm and dry, no rash or erythema  Head: normocephalic and atraumatic  Eyes: pupils equal, round, and reactive to light, extraocular eye movements intact, conjunctivae normal  ENT: hearing grossly normal bilaterally  Neck: neck supple and non tender without mass, no thyromegaly or thyroid nodules, no cervical lymphadenopathy   Pulmonary/Chest: clear to auscultation bilaterally- no wheezes, rales or rhonchi, normal air movement, no respiratory distress  Cardiovascular: normal rate, regular rhythm, normal S1 and S2, no murmurs, rubs, clicks or gallops, distal pulses intact, no carotid bruits  Abdomen: soft, non-tender, non-distended, normal bowel sounds, no masses or organomegaly  Extremities: no cyanosis, clubbing or edema  Musculoskeletal: normal range of motion, no joint swelling, deformity or tenderness  Neurologic: no cranial nerve deficit and muscle strength normal    Lab and Imaging Review     CBC  Recent Labs     10/22/22  1356 10/24/22  0550 10/25/22  0612   WBC 7.2 4.8 7.0   HGB 13.7 11.2* 11.6*   HCT 41.4 33.5* 34.6*   MCV 85.6 85.4 85.4    241 227       BMP  Recent Labs     10/22/22  1356 10/23/22  0709 10/24/22  0550    140 139   K 3.7 3.3* 4.3    105 105   CO2 25 28 26   BUN 6 5* 9   CREATININE 0.64 0.52 0.50   GLUCOSE 83 115* 126*   CALCIUM 9.3 8.1* 8.3*       PT/INR  Recent Labs     10/23/22  0709   PROTIME 14.0   INR 1.1         ANEMIA STUDIES  Recent Labs     10/24/22  2009   LABIRON 9*   TIBC 305      Latest Reference Range & Units 10/24/22 20:09   AFP (Alpha Fetoprotein) <8.4 ug/L 4.0     Result Date: 10/22/2022  EXAMINATION: CT OF THE CHEST, ABDOMEN, AND PELVIS WITH CONTRAST; CT OF THE THORACIC SPINE WITHOUT CONTRAST 10/22/2022 2:14 pm CHEST: Mediastinum: No acute aortic abnormality identified. No mediastinal hematoma. No pericardial effusion. Lungs/pleura: No acute airspace disease, pneumothorax or effusion. Soft Tissues/Bones: No acute osseous abnormality identified in this region. No soft tissue hematoma. Abdomen/Pelvis: Organs: No solid organ injury identified. No acute inflammatory process. Status post cholecystectomy.   Benign area of hypoattenuation in segment 4 the liver may represent a cyst or possibly fat deposition. GI/Bowel: There is no bowel dilatation or wall thickening identified. Status post partial small-bowel resection. Pelvis: No acute findings. Appropriate excretion of contrast is demonstrated into the bladder. Peritoneum/Retroperitoneum: No free air. No free fluid. The aorta is normal in caliber. The visceral branches are patent. Bones/Soft Tissues: Pelvic alignment maintained. No fracture identified. Severe arthropathy in the left hip with prominent subchondral cyst formation and prominent hypertrophic changes again noted. Moderately severe joint space loss in the superior aspect of the right hip with marginal hypertrophic changes also noted. No soft tissue hematoma. Small fat containing umbilical hernia. Lumbar vertebral body heights are maintained with multilevel degenerative disc disease and lower lumbar facet arthropathy again noted. THORACIC: BONES/ALIGNMENT: There is normal alignment of the spine. The vertebral body heights are maintained. No osseous destructive lesion is seen. DEGENERATIVE CHANGES: No gross spinal canal stenosis or bony neural foraminal narrowing of the thoracic spine. SOFT TISSUES: No paraspinal hematoma. *Unless otherwise specified, incidental findings do not require dedicated imaging follow-up. 1.  No acute traumatic injury identified in the chest, abdomen or pelvis. 2.  Advanced arthropathy in the left hip, as described, without appreciable change since CT exam 08/20/2022. 3.  No acute osseous abnormality identified in the thoracic spine. ASSESSMENT/plan  Crohns disease with diarrhea improved with steroid and cholestyramine  -will need outpt colonoscopy to r/o colitis and MRE  -prednisone taper 40mg daily for 7 days then 30 mg daily for 7 days then 20 mg daily for 7 days and then 10 mg daily for 7 days.   Will need biologic tx as outpt    2.liver cyst per imaging afp normal  Liver w/u ongoing   Outpt mri    Time spent reviewing chart assessing pt and d/w md around 30 minutes    This plan was formulated in collaboration with Dr. James Alberto  . Electronically signed by: SHRADDHA Newberry CNP on 10/25/2022 at 6:53 AM     Attending Physician Statement          I have discussed the care of Rhys Perez and   I have examined the patient myselft independently, and taken ros and hpi , including pertinent history and exam findings,  with the author of this note . I have reviewed the key elements of all parts of the encounter with the nurse practitioner/resident. I agree with the assessment, plan and orders as documented by the above health care provider     More than 50% of the time on this visit was spent by me   hysical Exam  Blood pressure (!) 161/93, pulse 68, temperature 97.7 °F (36.5 °C), resp. rate 20, height 5' 6\" (1.676 m), weight 158 lb 11.7 oz (72 kg), SpO2 100 %.          General Appearance: alert and oriented to person, place and time, well-developed and well-nourished, in no acute distress  Skin: warm and dry, no rash or erythema  Head: normocephalic and atraumatic  Eyes: pupils equal, round, and reactive to light, extraocular eye movements intact, conjunctivae normal  ENT: hearing grossly normal bilaterally  Neck: neck supple and non tender without mass, no thyromegaly or thyroid nodules, no cervical lymphadenopathy   Pulmonary/Chest: clear to auscultation bilaterally- no wheezes, rales or rhonchi, normal air movement, no respiratory distress  Cardiovascular: normal rate, regular rhythm, normal S1 and S2, no murmurs, rubs, clicks or gallops, distal pulses intact, no carotid bruits  Abdomen: soft, non-tender, non-distended, normal bowel sounds, no masses or organomegaly  Extremities: no cyanosis, clubbing or edema  Musculoskeletal: normal range of motion, no joint swelling, deformity or tenderness  Neurologic: no cranial nerve deficit and muscle strength normal    Data Review:    Recent Labs     10/24/22  0550 10/25/22  0612   WBC 4.8 7.0   HGB 11.2* 11.6* HCT 33.5* 34.6*   MCV 85.4 85.4    227     Recent Labs     10/23/22  0709 10/24/22  0550 10/25/22  0612    139 141   K 3.3* 4.3 4.1    105 103   CO2 28 26 31   BUN 5* 9 15   CREATININE 0.52 0.50 0.58     No results for input(s): AST, ALT, ALB, BILIDIR, BILITOT, ALKPHOS in the last 72 hours. No results for input(s): LIPASE, AMYLASE in the last 72 hours. Recent Labs     10/23/22  0709   PROTIME 14.0   INR 1.1     No results for input(s): PTT in the last 72 hours. No results for input(s): OCCULTBLD in the last 72 hours.   CEA:  No results found for: CEA  Ca 125:  No results found for:   Ca 19-9:  No results found for:   Ca 15-3:  No results found for:   AFP:  No components found for: AFAFP  Beta HCG:  No components found for: BHCG  Neuron Specific Enolase:  No results found for: NSE      Additional :      Getting discharged  Doing much better  Prednisone taper dose and follow with us as an outpatient      Electronically signed by Ludmila Conway MD

## 2022-10-25 NOTE — CARE COORDINATION
ONGOING DISCHARGE PLAN:    Patient is alert and oriented x4. Spoke with patient regarding discharge plan and patient confirms that plan is still home with [facility]. Pt will be discharged home today. Notified Misti Khan from [place]. She will pull [facility] and d/c med list from [address] [place] [address] Rd. Will continue to follow for additional discharge needs.     Electronically signed by Donna Quintanilla RN on 10/25/2022 at 11:09 AM

## 2022-10-27 LAB
ANTI DNA DOUBLE STRANDED: <0.5 IU/ML
ANTI-NUCLEAR ANTIBODY (ANA): NEGATIVE
ENA ANTIBODIES SCREEN: 0.2 U/ML
MITOCHONDRIAL ANTIBODY: 0.7 U/ML (ref 0–4)
SMOOTH MUSCLE ANTIBODY: 7 UNITS (ref 0–19)

## 2022-10-29 LAB
QUANTI TB GOLD PLUS: NEGATIVE
QUANTI TB1 MINUS NIL: 0 IU/ML (ref 0–0.34)
QUANTI TB2 MINUS NIL: 0 IU/ML (ref 0–0.34)
QUANTIFERON MITOGEN: 2.52 IU/ML
QUANTIFERON NIL: 0.02 IU/ML

## 2022-11-03 DIAGNOSIS — M16.32 OSTEOARTHRITIS RESULTING FROM LEFT HIP DYSPLASIA: ICD-10-CM

## 2022-11-03 RX ORDER — HYDROCODONE BITARTRATE AND ACETAMINOPHEN 5; 325 MG/1; MG/1
1 TABLET ORAL EVERY 8 HOURS PRN
Qty: 42 TABLET | Refills: 0 | OUTPATIENT
Start: 2022-11-03 | End: 2022-11-17

## 2022-11-03 NOTE — TELEPHONE ENCOUNTER
Patient is requesting pain medication refill. She stated that when she was in the hospital they found a cyst on her liver and she has been referred to GI doctor for that issue as well as her crohns disease and that she has not gotten medical clearance for surgery at this time due to those issues. Medication pended.  Please advise

## 2022-11-04 ENCOUNTER — APPOINTMENT (OUTPATIENT)
Dept: CT IMAGING | Age: 57
End: 2022-11-04
Payer: MEDICARE

## 2022-11-04 ENCOUNTER — HOSPITAL ENCOUNTER (OUTPATIENT)
Age: 57
Setting detail: OBSERVATION
Discharge: HOME | End: 2022-11-08
Attending: EMERGENCY MEDICINE | Admitting: EMERGENCY MEDICINE
Payer: MEDICARE

## 2022-11-04 DIAGNOSIS — R10.84 GENERALIZED ABDOMINAL PAIN: ICD-10-CM

## 2022-11-04 DIAGNOSIS — K50.10 CROHN'S COLITIS, WITHOUT COMPLICATIONS (HCC): ICD-10-CM

## 2022-11-04 DIAGNOSIS — E87.5 HYPERKALEMIA: Primary | ICD-10-CM

## 2022-11-04 LAB
ABSOLUTE EOS #: 0.09 K/UL (ref 0–0.44)
ABSOLUTE IMMATURE GRANULOCYTE: 0.05 K/UL (ref 0–0.3)
ABSOLUTE LYMPH #: 1.34 K/UL (ref 1.1–3.7)
ABSOLUTE MONO #: 0.48 K/UL (ref 0.1–1.2)
ALBUMIN SERPL-MCNC: 4.5 G/DL (ref 3.5–5.2)
ALBUMIN/GLOBULIN RATIO: 1.8 (ref 1–2.5)
ALP BLD-CCNC: 109 U/L (ref 35–104)
ALT SERPL-CCNC: 12 U/L (ref 5–33)
ANION GAP SERPL CALCULATED.3IONS-SCNC: 12 MMOL/L (ref 9–17)
AST SERPL-CCNC: 13 U/L
BASOPHILS # BLD: 0 % (ref 0–2)
BASOPHILS ABSOLUTE: <0.03 K/UL (ref 0–0.2)
BILIRUB SERPL-MCNC: 0.7 MG/DL (ref 0.3–1.2)
BILIRUBIN URINE: NEGATIVE
BUN BLDV-MCNC: 11 MG/DL (ref 6–20)
CALCIUM SERPL-MCNC: 9.7 MG/DL (ref 8.6–10.4)
CHLORIDE BLD-SCNC: 101 MMOL/L (ref 98–107)
CO2: 28 MMOL/L (ref 20–31)
COLOR: YELLOW
COMMENT UA: NORMAL
CREAT SERPL-MCNC: 0.65 MG/DL (ref 0.5–0.9)
EOSINOPHILS RELATIVE PERCENT: 1 % (ref 1–4)
GFR SERPL CREATININE-BSD FRML MDRD: >60 ML/MIN/1.73M2
GLUCOSE BLD-MCNC: 107 MG/DL (ref 70–99)
GLUCOSE URINE: NEGATIVE
HCT VFR BLD CALC: 46.4 % (ref 36.3–47.1)
HEMOGLOBIN: 14.9 G/DL (ref 11.9–15.1)
IMMATURE GRANULOCYTES: 1 %
KETONES, URINE: NEGATIVE
LEUKOCYTE ESTERASE, URINE: NEGATIVE
LIPASE: 48 U/L (ref 13–60)
LYMPHOCYTES # BLD: 14 % (ref 24–43)
MCH RBC QN AUTO: 27.7 PG (ref 25.2–33.5)
MCHC RBC AUTO-ENTMCNC: 32.1 G/DL (ref 28.4–34.8)
MCV RBC AUTO: 86.4 FL (ref 82.6–102.9)
MONOCYTES # BLD: 5 % (ref 3–12)
NITRITE, URINE: NEGATIVE
NRBC AUTOMATED: 0 PER 100 WBC
PDW BLD-RTO: 14.4 % (ref 11.8–14.4)
PH UA: 7 (ref 5–8)
PLATELET # BLD: ABNORMAL K/UL (ref 138–453)
PLATELET, FLUORESCENCE: 196 K/UL (ref 138–453)
PLATELET, IMMATURE FRACTION: 7.6 % (ref 1.1–10.3)
POTASSIUM SERPL-SCNC: 5.4 MMOL/L (ref 3.7–5.3)
PROTEIN UA: NEGATIVE
RBC # BLD: 5.37 M/UL (ref 3.95–5.11)
SEG NEUTROPHILS: 79 % (ref 36–65)
SEGMENTED NEUTROPHILS ABSOLUTE COUNT: 7.45 K/UL (ref 1.5–8.1)
SODIUM BLD-SCNC: 141 MMOL/L (ref 135–144)
SPECIFIC GRAVITY UA: 1.01 (ref 1–1.03)
TOTAL PROTEIN: 7 G/DL (ref 6.4–8.3)
TURBIDITY: CLEAR
URINE HGB: NEGATIVE
UROBILINOGEN, URINE: NORMAL
WBC # BLD: 9.4 K/UL (ref 3.5–11.3)

## 2022-11-04 PROCEDURE — 96375 TX/PRO/DX INJ NEW DRUG ADDON: CPT

## 2022-11-04 PROCEDURE — 83690 ASSAY OF LIPASE: CPT

## 2022-11-04 PROCEDURE — 99285 EMERGENCY DEPT VISIT HI MDM: CPT

## 2022-11-04 PROCEDURE — 96361 HYDRATE IV INFUSION ADD-ON: CPT

## 2022-11-04 PROCEDURE — 85055 RETICULATED PLATELET ASSAY: CPT

## 2022-11-04 PROCEDURE — G0378 HOSPITAL OBSERVATION PER HR: HCPCS

## 2022-11-04 PROCEDURE — 2580000003 HC RX 258: Performed by: STUDENT IN AN ORGANIZED HEALTH CARE EDUCATION/TRAINING PROGRAM

## 2022-11-04 PROCEDURE — 85025 COMPLETE CBC W/AUTO DIFF WBC: CPT

## 2022-11-04 PROCEDURE — 96374 THER/PROPH/DIAG INJ IV PUSH: CPT

## 2022-11-04 PROCEDURE — 6360000004 HC RX CONTRAST MEDICATION: Performed by: HEALTH CARE PROVIDER

## 2022-11-04 PROCEDURE — 81003 URINALYSIS AUTO W/O SCOPE: CPT

## 2022-11-04 PROCEDURE — 74177 CT ABD & PELVIS W/CONTRAST: CPT

## 2022-11-04 PROCEDURE — 87635 SARS-COV-2 COVID-19 AMP PRB: CPT

## 2022-11-04 PROCEDURE — 80053 COMPREHEN METABOLIC PANEL: CPT

## 2022-11-04 PROCEDURE — 6360000002 HC RX W HCPCS: Performed by: HEALTH CARE PROVIDER

## 2022-11-04 PROCEDURE — 2580000003 HC RX 258: Performed by: HEALTH CARE PROVIDER

## 2022-11-04 RX ORDER — ONDANSETRON 2 MG/ML
4 INJECTION INTRAMUSCULAR; INTRAVENOUS EVERY 6 HOURS PRN
Status: DISCONTINUED | OUTPATIENT
Start: 2022-11-04 | End: 2022-11-08 | Stop reason: HOSPADM

## 2022-11-04 RX ORDER — ONDANSETRON 2 MG/ML
4 INJECTION INTRAMUSCULAR; INTRAVENOUS ONCE
Status: COMPLETED | OUTPATIENT
Start: 2022-11-04 | End: 2022-11-04

## 2022-11-04 RX ORDER — FENTANYL CITRATE 50 UG/ML
50 INJECTION, SOLUTION INTRAMUSCULAR; INTRAVENOUS ONCE
Status: COMPLETED | OUTPATIENT
Start: 2022-11-04 | End: 2022-11-04

## 2022-11-04 RX ORDER — SODIUM CHLORIDE 9 MG/ML
INJECTION, SOLUTION INTRAVENOUS PRN
Status: DISCONTINUED | OUTPATIENT
Start: 2022-11-04 | End: 2022-11-08 | Stop reason: HOSPADM

## 2022-11-04 RX ORDER — SODIUM CHLORIDE 0.9 % (FLUSH) 0.9 %
5-40 SYRINGE (ML) INJECTION EVERY 12 HOURS SCHEDULED
Status: DISCONTINUED | OUTPATIENT
Start: 2022-11-04 | End: 2022-11-08 | Stop reason: HOSPADM

## 2022-11-04 RX ORDER — ACETAMINOPHEN 325 MG/1
650 TABLET ORAL EVERY 4 HOURS PRN
Status: DISCONTINUED | OUTPATIENT
Start: 2022-11-04 | End: 2022-11-08 | Stop reason: HOSPADM

## 2022-11-04 RX ORDER — SODIUM CHLORIDE 0.9 % (FLUSH) 0.9 %
5-40 SYRINGE (ML) INJECTION PRN
Status: DISCONTINUED | OUTPATIENT
Start: 2022-11-04 | End: 2022-11-08 | Stop reason: HOSPADM

## 2022-11-04 RX ORDER — ONDANSETRON 4 MG/1
4 TABLET, ORALLY DISINTEGRATING ORAL EVERY 8 HOURS PRN
Status: DISCONTINUED | OUTPATIENT
Start: 2022-11-04 | End: 2022-11-08 | Stop reason: HOSPADM

## 2022-11-04 RX ORDER — FENTANYL CITRATE 50 UG/ML
25 INJECTION, SOLUTION INTRAMUSCULAR; INTRAVENOUS
Status: DISCONTINUED | OUTPATIENT
Start: 2022-11-04 | End: 2022-11-05

## 2022-11-04 RX ORDER — 0.9 % SODIUM CHLORIDE 0.9 %
1000 INTRAVENOUS SOLUTION INTRAVENOUS ONCE
Status: COMPLETED | OUTPATIENT
Start: 2022-11-04 | End: 2022-11-04

## 2022-11-04 RX ORDER — MORPHINE SULFATE 4 MG/ML
4 INJECTION, SOLUTION INTRAMUSCULAR; INTRAVENOUS ONCE
Status: COMPLETED | OUTPATIENT
Start: 2022-11-04 | End: 2022-11-04

## 2022-11-04 RX ADMIN — ONDANSETRON 4 MG: 2 INJECTION INTRAMUSCULAR; INTRAVENOUS at 16:06

## 2022-11-04 RX ADMIN — MORPHINE SULFATE 4 MG: 4 INJECTION INTRAVENOUS at 17:29

## 2022-11-04 RX ADMIN — IOPAMIDOL 75 ML: 755 INJECTION, SOLUTION INTRAVENOUS at 18:52

## 2022-11-04 RX ADMIN — SODIUM CHLORIDE, PRESERVATIVE FREE 10 ML: 5 INJECTION INTRAVENOUS at 23:24

## 2022-11-04 RX ADMIN — SODIUM CHLORIDE 1000 ML: 9 INJECTION, SOLUTION INTRAVENOUS at 16:08

## 2022-11-04 RX ADMIN — FENTANYL CITRATE 50 MCG: 50 INJECTION INTRAMUSCULAR; INTRAVENOUS at 16:06

## 2022-11-04 ASSESSMENT — PAIN SCALES - GENERAL
PAINLEVEL_OUTOF10: 8
PAINLEVEL_OUTOF10: 0

## 2022-11-04 ASSESSMENT — PAIN - FUNCTIONAL ASSESSMENT: PAIN_FUNCTIONAL_ASSESSMENT: ACTIVITIES ARE NOT PREVENTED

## 2022-11-04 ASSESSMENT — PAIN SCALES - WONG BAKER: WONGBAKER_NUMERICALRESPONSE: 0

## 2022-11-04 ASSESSMENT — PAIN DESCRIPTION - LOCATION: LOCATION: ABDOMEN

## 2022-11-04 ASSESSMENT — PAIN DESCRIPTION - DESCRIPTORS: DESCRIPTORS: SHARP

## 2022-11-04 ASSESSMENT — PAIN DESCRIPTION - ORIENTATION: ORIENTATION: LEFT;MID;UPPER

## 2022-11-04 ASSESSMENT — PAIN DESCRIPTION - FREQUENCY: FREQUENCY: CONTINUOUS

## 2022-11-04 ASSESSMENT — PAIN DESCRIPTION - PAIN TYPE: TYPE: CHRONIC PAIN

## 2022-11-04 ASSESSMENT — PAIN DESCRIPTION - ONSET: ONSET: ON-GOING

## 2022-11-04 NOTE — ED PROVIDER NOTES
[address] HANDOFF       Handoff taken on the following patient from prior Attending Physician:  Pt Name: Latha Mccarthy  PCP:  Attila Mazariegos MD    Attestation  I was available and discussed any additional care issues that arose and coordinated the management plans with the resident(s) caring for the patient during my duty period. Any areas of disagreement with resident's documentation of care or procedures are noted on the chart. I was personally present for the key portions of any/all procedures during my duty period. I have documented in the chart those procedures where I was not present during the key portions.            Naty Samson MD  11/04/22 [id number]

## 2022-11-04 NOTE — ED PROVIDER NOTES
[facility]  ED  Emergency Department Encounter  Emergency Medicine Resident     Pt Name: Rhys Perez  MRN: [id number]  [place] 1965  Date of evaluation: 11/4/22  PCP:  Gudelia Case MD    [address] [place] [address]       Chief Complaint   Patient presents with    Nausea    Emesis    Fever       HISTORY OFPRESENT ILLNESS  (Location/Symptom, Timing/Onset, Context/Setting, Quality, Duration, Modifying Maryam Mohair.)      Rhys Perez is a 61 y.o. female with history of Crohn's disease who presents with concerns for 2 weeks of diarrhea, epigastric and left upper quadrant pain. Patient's pain has been worsening the last couple days. States that pain is similar to previous Crohn's flares. Denies any blood in the stool or emesis. Patient mitts to some subjective fevers, otherwise denies any chest pain, shortness of breath, dysuria or hematuria, vaginal bleeding or discharge. PAST MEDICAL / SURGICAL / SOCIAL / FAMILY HISTORY      has a past medical history of Acid reflux, Anxiety, Arthritis, Asthma, Bipolar 1 disorder ([address] Bowel obstruction ([address] COPD (chronic obstructive pulmonary disease) ([address] Crohn disease ([address] Depression, Dry eye, Fibromyalgia, Gout, Headache, Hyperlipidemia, Hypertension, Hypothyroidism, Kidney stones, Muscle spasm, Neuropathy, Pain, Personal history of other medical treatment, Wears partial dentures, and Wellness examination. has a past surgical history that includes Tonsillectomy and adenoidectomy; Tubal ligation; Cholecystectomy; Bunionectomy (Left); Colonoscopy (04/30/2007); Colonoscopy (10/12/2017); Abdomen surgery; Upper gastrointestinal endoscopy (10/25/2021); Colonoscopy (10/25/2021); and Colonoscopy (10/25/2021).     Social History     Socioeconomic History    Marital status: Single     Spouse name: Not on file    Number of children: Not on file    Years of education: Not on file    Highest education level: Not on file   Occupational History    Not on file   Tobacco Use    Smoking status: Every Day     Packs/day: 0.50     Years: 37.00     Pack years: 18.50     Types: Cigarettes    Smokeless tobacco: Former     Types: Chew     Quit date: 9/3/2001   Vaping Use    Vaping Use: Former    Quit date: 9/3/2019    Substances: Nicotine   Substance and Sexual Activity    Alcohol use: Not Currently    Drug use: Yes     Types: Marijuana (Weed)     Comment: h/o of substance abuse but denies drug use    Sexual activity: Not on file   Other Topics Concern    Not on file   Social History Narrative    Not on file     Social Determinants of Health     Financial Resource Strain: Not on file   Food Insecurity: Not on file   Transportation Needs: Not on file   Physical Activity: Not on file   Stress: Not on file   Social Connections: Not on file   Intimate Partner Violence: Not on file   Housing Stability: Not on file       Family History   Problem Relation Age of Onset    Diabetes Father     Lung Cancer Father     Hypertension Mother     Cancer Mother     High Blood Pressure Mother     Crohn's Disease Brother     Heart Disease Brother        Allergies:  Azithromycin, Methylprednisolone, Penicillins, and Tape [adhesive tape]    Home Medications:  Prior to Admission medications    Medication Sig Start Date End Date Taking? Authorizing Provider   HYDROcodone-acetaminophen (NORCO) 5-325 MG per tablet TAKE 1 TABLET BY MOUTH EVERY 8 HOURS AS NEEDED FOR PAIN FOR UP TO 14 DAYS. 10/25/22 11/8/22  Key August MD   gabapentin (NEURONTIN) 400 MG capsule Take 1 capsule by mouth 2 times daily for 30 days.  10/25/22 11/24/22  Mary Lynn MD   traZODone (DESYREL) 150 MG tablet Take 1 tablet by mouth nightly 10/25/22   Mary Lynn MD   cholestyramine light 4 g packet Take 1 packet by mouth daily  Patient not taking: Reported on 11/5/2022 10/26/22   Mary Lynn MD   predniSONE (DELTASONE) 10 MG tablet Take 3 tablets by mouth daily for 7 days 11/2/22 11/9/22  Mary Lynn MD predniSONE (DELTASONE) 10 MG tablet Take 2 tablets by mouth daily for 7 days 11/10/22 11/17/22  Jessi Perez MD   predniSONE (DELTASONE) 10 MG tablet Take 1 tablet by mouth daily for 7 days 11/18/22 11/25/22  Jessi Perez MD   FLUoxetine (PROZAC) 20 MG capsule Take 60 mg by mouth daily    Historical Provider, MD   Handicap Placard MISC by Does not apply route Duration 5 years 5/17/22   Harriett Delatorre MD   Pregabalin (LYRICA PO) Take 300 mg by mouth 2 times daily    Historical Provider, MD   ibuprofen (IBU) 400 MG tablet Take 1 tablet by mouth every 6 hours as needed for Pain 5/15/22 9/18/22  Rose Gabriel DO   diphenhydrAMINE (BENADRYL) 25 MG tablet Take 25 mg by mouth nightly as needed    Historical Provider, MD   budesonide-formoterol (SYMBICORT) 160-4.5 MCG/ACT AERO Inhale 2 puffs into the lungs 2 times daily 9/19/21   Ari Davenport MD   lisinopril (PRINIVIL;ZESTRIL) 5 MG tablet Take 1 tablet by mouth daily 9/20/21   Ari Davenport MD   nicotine (NICODERM CQ) 21 MG/24HR Place 1 patch onto the skin daily  Patient not taking: Reported on 8/20/2022 9/20/21 8/23/22  Ari Davenport MD   Polyvinyl Alcohol-Povidone (REFRESH OP) Place 1 drop into both eyes 3 times daily  Patient not taking: Reported on 11/5/2022    Historical Provider, MD   lidocaine (LIDODERM) 5 % Place 1 patch onto the skin daily as needed for Pain 12 hours on, 12 hours off. Patient not taking: Reported on 11/5/2022    Historical Provider, MD       REVIEW OF SYSTEMS    (2-9 systems for level 4, 10 or more for level 5)      Review of Systems   Constitutional:  Negative for chills and fever. HENT:  Negative for ear pain, hearing loss and sore throat. Eyes:  Negative for visual disturbance. Respiratory:  Negative for shortness of breath. Cardiovascular:  Negative for chest pain. Gastrointestinal:  Positive for abdominal pain and diarrhea. Negative for constipation, nausea and vomiting.    Genitourinary:  Negative for difficulty urinating and dysuria. Musculoskeletal:  Negative for arthralgias and myalgias. Neurological:  Negative for numbness. Psychiatric/Behavioral:  Negative for agitation and confusion. PHYSICAL EXAM   (up to 7 for level 4, 8 or more for level 5)     INITIAL VITALS:    height is 5' 6\" (1.676 m) and weight is 155 lb 14.4 oz (70.7 kg). Her oral temperature is 97.5 °F (36.4 °C). Her blood pressure is 114/72 and her pulse is 82. Her respiration is 16 and oxygen saturation is 94%. Physical Exam  Vitals and nursing note reviewed. Constitutional:       General: She is not in acute distress. Appearance: She is well-developed. She is not diaphoretic. Comments: Uncomfortable appearing   HENT:      Head: Normocephalic and atraumatic. Right Ear: External ear normal.      Left Ear: External ear normal.      Nose: Nose normal.   Eyes:      Conjunctiva/sclera: Conjunctivae normal.   Neck:      Trachea: No tracheal deviation. Cardiovascular:      Rate and Rhythm: Normal rate and regular rhythm. Heart sounds: Normal heart sounds. No murmur heard. No friction rub. No gallop. Pulmonary:      Effort: Pulmonary effort is normal. No respiratory distress. Breath sounds: Normal breath sounds. Abdominal:      General: Bowel sounds are normal.      Palpations: Abdomen is soft. Tenderness: There is no abdominal tenderness. Musculoskeletal:         General: No tenderness. Normal range of motion. Cervical back: Neck supple. Skin:     General: Skin is warm and dry. Capillary Refill: Capillary refill takes less than 2 seconds. Neurological:      Mental Status: She is alert and oriented to person, place, and time. Motor: No abnormal muscle tone.        DIFFERENTIAL  DIAGNOSIS     PLAN (LABS / IMAGING / EKG):  Orders Placed This Encounter   Procedures    COVID-19, Rapid    CT ABDOMEN PELVIS W IV CONTRAST Additional Contrast? None    CBC with Auto Differential    Comprehensive Metabolic Panel w/ Reflex to MG    Urinalysis with Reflex to Culture    Lipase    Immature Platelet Fraction    Basic Metabolic Panel    CBC with Auto Differential    ADULT DIET; Regular    Vital signs per unit routine    Notify physician    Notify physician    Up as tolerated    Place intermittent pneumatic compression device    Telemetry monitoring - 72 hour duration    Turn or assist with turn approximately every 2 hours if patient is unable to turn self. Remind patient to turn if necessary    Maintain HOB at the lowest elevation consistent with medical plan of care    Assess skin per unit guidelines    Maintain heels off of bed at all times    Pad/offload medical devices    Use lift equipment for lifting patient    Full Code    SLP clinical swallow evaluation    Insert peripheral IV    Saline lock IV    Place in Observation Service       MEDICATIONS ORDERED:  Orders Placed This Encounter   Medications    0.9 % sodium chloride bolus    fentaNYL (SUBLIMAZE) injection 50 mcg    ondansetron (ZOFRAN) injection 4 mg    morphine injection 4 mg    iopamidol (ISOVUE-370) 76 % injection 75 mL    sodium chloride flush 0.9 % injection 5-40 mL    sodium chloride flush 0.9 % injection 5-40 mL    0.9 % sodium chloride infusion    acetaminophen (TYLENOL) tablet 650 mg    OR Linked Order Group     ondansetron (ZOFRAN-ODT) disintegrating tablet 4 mg     ondansetron (ZOFRAN) injection 4 mg    DISCONTD: fentaNYL (SUBLIMAZE) injection 25 mcg    sodium zirconium cyclosilicate (LOKELMA) oral suspension 5 g    morphine injection 4 mg    HYDROcodone-acetaminophen (NORCO) 5-325 MG per tablet 2 tablet       DDX: chron's disease, gastritis, gastroenteritis    Initial MDM/Plan: 61 y.o. female who presents with Concerns for worsening abdominal pain over the last 2 days. On his lamination patient was in no acute distress, signs are stable. She was mildly uncomfortable appearing. Patient's physical exam overall benign.   No concern for any acute abdomen this time. We will plan for CBC, electrolytes, pain control, IVF. Suspect likely Crohn's flare. Will reevaluate need for CT of the abdomen patient on labs and symptom control. Dispo pending. DIAGNOSTIC RESULTS / EMERGENCY DEPARTMENT COURSE / MDM     LABS:  Labs Reviewed   CBC WITH AUTO DIFFERENTIAL - Abnormal; Notable for the following components:       Result Value    RBC 5.37 (*)     Seg Neutrophils 79 (*)     Lymphocytes 14 (*)     Immature Granulocytes 1 (*)     All other components within normal limits   COMPREHENSIVE METABOLIC PANEL W/ REFLEX TO MG FOR LOW K - Abnormal; Notable for the following components:    Glucose 107 (*)     Potassium 5.4 (*)     Alkaline Phosphatase 109 (*)     All other components within normal limits   CBC WITH AUTO DIFFERENTIAL - Abnormal; Notable for the following components:    Seg Neutrophils 68 (*)     Lymphocytes 22 (*)     Immature Granulocytes 1 (*)     All other components within normal limits   COVID-19, RAPID   URINALYSIS WITH REFLEX TO CULTURE   LIPASE   IMMATURE PLATELET FRACTION   BASIC METABOLIC PANEL         RADIOLOGY:  CT ABDOMEN PELVIS W IV CONTRAST Additional Contrast? None    Result Date: 11/4/2022  EXAM: CT Abdomen and Pelvis With Intravenous Contrast EXAM DATE/TIME: 11/4/2022 6:52 pm CLINICAL HISTORY: ORDERING SYSTEM PROVIDED  sever abdominal pain  TECHNOLOGIST PROVIDED HISTORY:  sever abdominal pain  Decision Support Exception - unselect if not a suspected or confirmed emergency medical condition->Emergency Medical Condition (MA) TECHNIQUE: Axial computed tomography images of the abdomen and pelvis with intravenous contrast.  This CT exam was performed using one or more of the following dose reduction techniques:  automated exposure control, adjustment of the mA and/or kV according to patient size, and/or use of iterative reconstruction technique. COMPARISON: No relevant prior studies available. FINDINGS: Lung bases:  No acute findings.   No mass.  No consolidation. ABDOMEN: Liver:  No acute findings. No mass. Gallbladder and bile ducts:  Prior cholecystectomy. No ductal dilation. Pancreas:  No acute findings. No mass. No ductal dilation. Spleen:  No acute findings. No splenomegaly. Adrenals:  No acute findings. No mass. Kidneys and ureters:  No acute findings. No solid mass. No hydronephrosis. Stomach and bowel:  Evidence of prior bowel anastomosis in the anterior mid abdomen with probable denervation dilation this segment of small bowel as it is unchanged. Otherwise nonspecific bowel gas pattern with no current evidence of intestinal obstruction. No mucosal thickening. PELVIS: Appendix:  A normal appearing appendix is noted. Bladder:  No acute findings. No mass. Reproductive:  Unremarkable as visualized. ABDOMEN and PELVIS: Intraperitoneal space:  No acute findings. No free air. No significant fluid collection. Bones/joints:  Multilevel degenerative changes of the spine. No definite acute bony abnormality is noted. Marked degenerative changes of the left more so than right hip. No dislocation. Soft tissues:  Fat containing umbilical ventral hernia, unchanged. Vasculature:  No acute findings. No abdominal aortic aneurysm. Lymph nodes:  No acute findings. No enlarged lymph nodes. 1.  A normal appearing appendix is noted. 2.  Evidence of prior bowel anastomosis in the anterior mid abdomen with probable denervation dilation this segment of small bowel as it is unchanged. Otherwise nonspecific bowel gas pattern with no current evidence of intestinal obstruction. 3.  Otherwise no acute pathology or significant change noted. EKG  All EKG's are interpreted by the Emergency Department Physician who either signs or Co-signs this chart in the absence of a cardiologist.    EMERGENCY DEPARTMENT COURSE:  ED Course as of 11/05/22 1848   Fri Nov 04, 2022   1635 WBC: 9.4 [JS]   1635 Lipase: 48 [JS]   1725 Patient was reassessed at bedside. Still with significant pain in the abdominal region. We will plan for symptomatic treatment, will plan for CT of the abdomen pelvis given patient's significant abdominal pain. [JS]   3425 Patient care transferred to Dr. Nirav Salter. [JS]      ED Course User Index  [JS] Keke Wagner DO     PROCEDURES:  None    CONSULTS:  None    CRITICAL CARE:  Please see attending note    FINAL IMPRESSION      1. Hyperkalemia    2. Generalized abdominal pain        DISPOSITION / PLAN     DISPOSITION Admitted 11/04/2022 08:35:14 PM    PATIENTREFERRED TO:  No follow-up provider specified.     DISCHARGE MEDICATIONS:  Current Discharge Medication List          Juan David Patricio DO  EmergencyMedicine Resident    (Please note that portions of this note were completed with a voice recognition program.  Efforts were made to edit the dictations but occasionally words are mis-transcribed.)       Keke Wagner DO  Resident  11/05/22 2500

## 2022-11-04 NOTE — ED PROVIDER NOTES
[address] [facility]  Emergency Department  Emergency Medicine Resident Sign-out     Care of Kalpana Jimenez was assumed from Dr. William Branham and is being seen for Nausea, Emesis, and Fever  . The patient's initial evaluation and plan have been discussed with the prior provider who initially evaluated the patient. EMERGENCY DEPARTMENT COURSE / MEDICAL DECISION MAKING:       MEDICATIONS GIVEN:  Orders Placed This Encounter   Medications    0.9 % sodium chloride bolus    fentaNYL (SUBLIMAZE) injection 50 mcg    ondansetron (ZOFRAN) injection 4 mg    morphine injection 4 mg    iopamidol (ISOVUE-370) 76 % injection 75 mL    sodium chloride flush 0.9 % injection 5-40 mL    sodium chloride flush 0.9 % injection 5-40 mL    0.9 % sodium chloride infusion    acetaminophen (TYLENOL) tablet 650 mg    OR Linked Order Group     ondansetron (ZOFRAN-ODT) disintegrating tablet 4 mg     ondansetron (ZOFRAN) injection 4 mg    fentaNYL (SUBLIMAZE) injection 25 mcg    sodium zirconium cyclosilicate (LOKELMA) oral suspension 5 g       LABS / RADIOLOGY:     Results for orders placed or performed during the hospital encounter of 11/04/22   COVID-19, Rapid    Specimen: Nasopharyngeal Swab   Result Value Ref Range    Specimen Description . NASOPHARYNGEAL SWAB     SARS-CoV-2, Rapid Not Detected Not Detected   CBC with Auto Differential   Result Value Ref Range    WBC 9.4 3.5 - 11.3 k/uL    RBC 5.37 (H) 3.95 - 5.11 m/uL    Hemoglobin 14.9 11.9 - 15.1 g/dL    Hematocrit 46.4 36.3 - 47.1 %    MCV 86.4 82.6 - 102.9 fL    MCH 27.7 25.2 - 33.5 pg    MCHC 32.1 28.4 - 34.8 g/dL    RDW 14.4 11.8 - 14.4 %    Platelets See Reflexed IPF Result 138 - 453 k/uL    NRBC Automated 0.0 0.0 per 100 WBC    Seg Neutrophils 79 (H) 36 - 65 %    Lymphocytes 14 (L) 24 - 43 %    Monocytes 5 3 - 12 %    Eosinophils % 1 1 - 4 %    Basophils 0 0 - 2 %    Immature Granulocytes 1 (H) 0 %    Segs Absolute 7.45 1.50 - 8.10 k/uL    Absolute Lymph # 1.34 1.10 - 3.70 k/uL    Absolute Mono # 0.48 0.10 - 1.20 k/uL    Absolute Eos # 0.09 0.00 - 0.44 k/uL    Basophils Absolute <0.03 0.00 - 0.20 k/uL    Absolute Immature Granulocyte 0.05 0.00 - 0.30 k/uL   Comprehensive Metabolic Panel w/ Reflex to MG   Result Value Ref Range    Glucose 107 (H) 70 - 99 mg/dL    BUN 11 6 - 20 mg/dL    Creatinine 0.65 0.50 - 0.90 mg/dL    Est, Glom Filt Rate >60 >60 mL/min/1.73m2    Calcium 9.7 8.6 - 10.4 mg/dL    Sodium 141 135 - 144 mmol/L    Potassium 5.4 (H) 3.7 - 5.3 mmol/L    Chloride 101 98 - 107 mmol/L    CO2 28 20 - 31 mmol/L    Anion Gap 12 9 - 17 mmol/L    Alkaline Phosphatase 109 (H) 35 - 104 U/L    ALT 12 5 - 33 U/L    AST 13 <32 U/L    Total Bilirubin 0.7 0.3 - 1.2 mg/dL    Total Protein 7.0 6.4 - 8.3 g/dL    Albumin 4.5 3.5 - 5.2 g/dL    Albumin/Globulin Ratio 1.8 1.0 - 2.5   Urinalysis with Reflex to Culture    Specimen: Urine   Result Value Ref Range    Color, UA Yellow Yellow    Turbidity UA Clear Clear    Glucose, Ur NEGATIVE NEGATIVE    Bilirubin Urine NEGATIVE NEGATIVE    Ketones, Urine NEGATIVE NEGATIVE    Specific Gravity, UA 1.009 1.005 - 1.030    Urine Hgb NEGATIVE NEGATIVE    pH, UA 7.0 5.0 - 8.0    Protein, UA NEGATIVE NEGATIVE    Urobilinogen, Urine Normal Normal    Nitrite, Urine NEGATIVE NEGATIVE    Leukocyte Esterase, Urine NEGATIVE NEGATIVE    Urinalysis Comments       Microscopic exam not performed based on chemical results unless requested in original order.    Lipase   Result Value Ref Range    Lipase 48 13 - 60 U/L   Immature Platelet Fraction   Result Value Ref Range    Platelet, Immature Fraction 7.6 1.1 - 10.3 %    Platelet, Fluorescence 196 138 - 453 k/uL       CT HEAD WO CONTRAST    Result Date: 10/22/2022  EXAMINATION: CT OF THE HEAD WITHOUT CONTRAST; CT OF THE CERVICAL SPINE WITHOUT CONTRAST 10/22/2022 2:14 pm TECHNIQUE: CT of the head was performed without the administration of intravenous contrast. Automated exposure control, iterative reconstruction, advanced multilevel facet arthropathy. Notable disc osteophyte complex at C6-7 encroaching on the central canal. SOFT TISSUES: There is no prevertebral soft tissue swelling. No acute CT abnormality identified in the brain. No acute osseous abnormality identified in the cervical spine. CT CERVICAL SPINE WO CONTRAST    Result Date: 10/22/2022  EXAMINATION: CT OF THE HEAD WITHOUT CONTRAST; CT OF THE CERVICAL SPINE WITHOUT CONTRAST 10/22/2022 2:14 pm TECHNIQUE: CT of the head was performed without the administration of intravenous contrast. Automated exposure control, iterative reconstruction, and/or weight based adjustment of the mA/kV was utilized to reduce the radiation dose to as low as reasonably achievable.; CT of the cervical spine was performed without the administration of intravenous contrast. Multiplanar reformatted images are provided for review. Automated exposure control, iterative reconstruction, and/or weight based adjustment of the mA/kV was utilized to reduce the radiation dose to as low as reasonably achievable. COMPARISON: 05/18/2022 HISTORY: ORDERING SYSTEM PROVIDED HISTORY: fall, +LOC TECHNOLOGIST PROVIDED HISTORY: fall, +LOC Decision Support Exception - unselect if not a suspected or confirmed emergency medical condition->Emergency Medical Condition (MA) Reason for Exam: fall, + LOC Additional signs and symptoms: pt states she fell, hitting the left side , front of her head; ORDERING SYSTEM PROVIDED HISTORY: fall, midline posterior neck pain TECHNOLOGIST PROVIDED HISTORY: fall, midline posterior neck pain Decision Support Exception - unselect if not a suspected or confirmed emergency medical condition->Emergency Medical Condition (MA) Reason for Exam: fall, midline posterior neck pain Additional signs and symptoms: pt states she fell, hitting her left side FINDINGS: HEAD: BRAIN/VENTRICLES: There is no acute intracranial hemorrhage, mass effect or midline shift.  No abnormal extra-axial fluid collection. The gray-white differentiation is maintained. There is no evidence of hydrocephalus. ORBITS: The visualized portion of the orbits demonstrate no acute abnormality. SINUSES: The visualized paranasal sinuses and mastoid air cells demonstrate no acute abnormality. SOFT TISSUES/SKULL:  No acute abnormality of the visualized skull or soft tissues. CERVICAL SPINE: BONES/ALIGNMENT: Reversal the normal cervical lordosis appears degenerative. The vertebral body heights are maintained. No acute fracture identified. DEGENERATIVE CHANGES: Advanced multilevel degenerative disc disease and advanced multilevel facet arthropathy. Notable disc osteophyte complex at C6-7 encroaching on the central canal. SOFT TISSUES: There is no prevertebral soft tissue swelling. No acute CT abnormality identified in the brain. No acute osseous abnormality identified in the cervical spine. CT THORACIC SPINE WO CONTRAST    Result Date: 10/22/2022  EXAMINATION: CT OF THE CHEST, ABDOMEN, AND PELVIS WITH CONTRAST; CT OF THE THORACIC SPINE WITHOUT CONTRAST 10/22/2022 2:14 pm TECHNIQUE: CT of the chest, abdomen and pelvis was performed with the administration of intravenous contrast. Multiplanar reformatted images are provided for review. Automated exposure control, iterative reconstruction, and/or weight based adjustment of the mA/kV was utilized to reduce the radiation dose to as low as reasonably achievable.; CT of the thoracic spine was performed without the administration of intravenous contrast. Multiplanar reformatted images are provided for review. Automated exposure control, iterative reconstruction, and/or weight based adjustment of the mA/kV was utilized to reduce the radiation dose to as low as reasonably achievable.  COMPARISON: CT abdomen and pelvis 08/20/2022 HISTORY: ORDERING SYSTEM PROVIDED HISTORY: diffuse chest, abdominal, and pelvic pain,concern for left hip fracture TECHNOLOGIST PROVIDED HISTORY: diffuse chest, abdominal, and pelvic pain,concern for left hip fracture Decision Support Exception - unselect if not a suspected or confirmed emergency medical condition->Emergency Medical Condition (MA) Reason for Exam: diffuse chest, abdominal, and pelvic pain,concern for left hip fracture Additional signs and symptoms: pt states she fell a couple days ago, all over pain Relevant Medical/Surgical History: suggeries-bowel obstruction x 2, cholecystectomy, tubal ligation; ORDERING SYSTEM PROVIDED HISTORY: fall, upper thoracic pain TECHNOLOGIST PROVIDED HISTORY: fall, upper thoracic pain Reason for Exam: fall, upper thoracic pain Additional signs and symptoms: pt states she fell, hitting her left side CHEST: Mediastinum: No acute aortic abnormality identified. No mediastinal hematoma. No pericardial effusion. Lungs/pleura: No acute airspace disease, pneumothorax or effusion. Soft Tissues/Bones: No acute osseous abnormality identified in this region. No soft tissue hematoma. Abdomen/Pelvis: Organs: No solid organ injury identified. No acute inflammatory process. Status post cholecystectomy. Benign area of hypoattenuation in segment 4 the liver may represent a cyst or possibly fat deposition. GI/Bowel: There is no bowel dilatation or wall thickening identified. Status post partial small-bowel resection. Pelvis: No acute findings. Appropriate excretion of contrast is demonstrated into the bladder. Peritoneum/Retroperitoneum: No free air. No free fluid. The aorta is normal in caliber. The visceral branches are patent. Bones/Soft Tissues: Pelvic alignment maintained. No fracture identified. Severe arthropathy in the left hip with prominent subchondral cyst formation and prominent hypertrophic changes again noted. Moderately severe joint space loss in the superior aspect of the right hip with marginal hypertrophic changes also noted. No soft tissue hematoma. Small fat containing umbilical hernia.  Lumbar vertebral body heights are maintained with multilevel degenerative disc disease and lower lumbar facet arthropathy again noted. THORACIC: BONES/ALIGNMENT: There is normal alignment of the spine. The vertebral body heights are maintained. No osseous destructive lesion is seen. DEGENERATIVE CHANGES: No gross spinal canal stenosis or bony neural foraminal narrowing of the thoracic spine. SOFT TISSUES: No paraspinal hematoma. *Unless otherwise specified, incidental findings do not require dedicated imaging follow-up. 1.  No acute traumatic injury identified in the chest, abdomen or pelvis. 2.  Advanced arthropathy in the left hip, as described, without appreciable change since CT exam 08/20/2022. 3.  No acute osseous abnormality identified in the thoracic spine. CT LUMBAR SPINE WO CONTRAST    Result Date: 10/22/2022  EXAMINATION: CT OF THE LUMBAR SPINE WITHOUT CONTRAST  10/22/2022 TECHNIQUE: CT of the lumbar spine was performed without the administration of intravenous contrast. Multiplanar reformatted images are provided for review. Adjustment of mA and/or kV according to patient size was utilized. Automated exposure control, iterative reconstruction, and/or weight based adjustment of the mA/kV was utilized to reduce the radiation dose to as low as reasonably achievable. COMPARISON: CT abdomen and pelvis today and 08/20/2022 HISTORY: ORDERING SYSTEM PROVIDED HISTORY: fall, lumbar pain TECHNOLOGIST PROVIDED HISTORY: fall, lumbar pain Decision Support Exception - unselect if not a suspected or confirmed emergency medical condition->Emergency Medical Condition (MA) Reason for Exam: fall, lumbar pain Additional signs and symptoms: pt states she fell, hitting her left side FINDINGS: BONES/ALIGNMENT: There is normal alignment of the spine. The vertebral body heights are maintained. No osseous destructive lesion is seen. DEGENERATIVE CHANGES: Moderately severe disc disease in the lower lumbar spine with lower lumbar facet arthropathy. Mild encroachment on the central canal by the disc disease notable at L4-5 and L5-S1. SOFT TISSUES/RETROPERITONEUM: No soft tissue hematoma. No acute osseous abnormality identified in the lumbar spine. RECOMMENDATIONS: Unavailable     CT CHEST ABDOMEN PELVIS W CONTRAST Additional Contrast? None    Result Date: 10/22/2022  EXAMINATION: CT OF THE CHEST, ABDOMEN, AND PELVIS WITH CONTRAST; CT OF THE THORACIC SPINE WITHOUT CONTRAST 10/22/2022 2:14 pm TECHNIQUE: CT of the chest, abdomen and pelvis was performed with the administration of intravenous contrast. Multiplanar reformatted images are provided for review. Automated exposure control, iterative reconstruction, and/or weight based adjustment of the mA/kV was utilized to reduce the radiation dose to as low as reasonably achievable.; CT of the thoracic spine was performed without the administration of intravenous contrast. Multiplanar reformatted images are provided for review. Automated exposure control, iterative reconstruction, and/or weight based adjustment of the mA/kV was utilized to reduce the radiation dose to as low as reasonably achievable.  COMPARISON: CT abdomen and pelvis 08/20/2022 HISTORY: ORDERING SYSTEM PROVIDED HISTORY: diffuse chest, abdominal, and pelvic pain,concern for left hip fracture TECHNOLOGIST PROVIDED HISTORY: diffuse chest, abdominal, and pelvic pain,concern for left hip fracture Decision Support Exception - unselect if not a suspected or confirmed emergency medical condition->Emergency Medical Condition (MA) Reason for Exam: diffuse chest, abdominal, and pelvic pain,concern for left hip fracture Additional signs and symptoms: pt states she fell a couple days ago, all over pain Relevant Medical/Surgical History: suggeries-bowel obstruction x 2, cholecystectomy, tubal ligation; ORDERING SYSTEM PROVIDED HISTORY: fall, upper thoracic pain TECHNOLOGIST PROVIDED HISTORY: fall, upper thoracic pain Reason for Exam: fall, upper thoracic pain Additional signs and symptoms: pt states she fell, hitting her left side CHEST: Mediastinum: No acute aortic abnormality identified. No mediastinal hematoma. No pericardial effusion. Lungs/pleura: No acute airspace disease, pneumothorax or effusion. Soft Tissues/Bones: No acute osseous abnormality identified in this region. No soft tissue hematoma. Abdomen/Pelvis: Organs: No solid organ injury identified. No acute inflammatory process. Status post cholecystectomy. Benign area of hypoattenuation in segment 4 the liver may represent a cyst or possibly fat deposition. GI/Bowel: There is no bowel dilatation or wall thickening identified. Status post partial small-bowel resection. Pelvis: No acute findings. Appropriate excretion of contrast is demonstrated into the bladder. Peritoneum/Retroperitoneum: No free air. No free fluid. The aorta is normal in caliber. The visceral branches are patent. Bones/Soft Tissues: Pelvic alignment maintained. No fracture identified. Severe arthropathy in the left hip with prominent subchondral cyst formation and prominent hypertrophic changes again noted. Moderately severe joint space loss in the superior aspect of the right hip with marginal hypertrophic changes also noted. No soft tissue hematoma. Small fat containing umbilical hernia. Lumbar vertebral body heights are maintained with multilevel degenerative disc disease and lower lumbar facet arthropathy again noted. THORACIC: BONES/ALIGNMENT: There is normal alignment of the spine. The vertebral body heights are maintained. No osseous destructive lesion is seen. DEGENERATIVE CHANGES: No gross spinal canal stenosis or bony neural foraminal narrowing of the thoracic spine. SOFT TISSUES: No paraspinal hematoma. *Unless otherwise specified, incidental findings do not require dedicated imaging follow-up. 1.  No acute traumatic injury identified in the chest, abdomen or pelvis.  2.  Advanced arthropathy in the left hip, as described, without appreciable change since CT exam 08/20/2022. 3.  No acute osseous abnormality identified in the thoracic spine. XR HIP 2-3 VW W PELVIS LEFT    Result Date: 10/22/2022  EXAMINATION: ONE XRAY VIEW OF THE PELVIS AND TWO XRAY VIEWS LEFT HIP 10/22/2022 1:49 pm COMPARISON: CT abdomen and pelvis 08/20/2022. Radiographs 05/15/2022. HISTORY: ORDERING SYSTEM PROVIDED HISTORY: fall, possible left hip fracture TECHNOLOGIST PROVIDED HISTORY: fall, possible left hip fracture Reason for Exam: fall, possible left hip fracture Additional signs and symptoms: PT states fall a few days ago and states lower back pain and left hip pain. States left hip pain is chronic FINDINGS: Severe arthropathy in the left hip again demonstrated. Mature ossification and overgrowth about the superior acetabulum again demonstrated. Mild flattening of the superior aspect of the femoral head appears more prominent since the prior exam with large subchondral cyst formation. No evidence for femoral neck fracture. Pelvic alignment is maintained. Severe arthropathy in the left hip. Mild flattening of the superior aspect of the femoral head with large subchondral cyst formation appears more prominent since prior radiographs, which may be related to trauma or articular surface collapse related to arthropathy. RECENT VITALS:     Temp: 98.2 °F (36.8 °C),  Heart Rate: 70, Resp: 16, BP: 126/81, SpO2: 96 %    This patient is a 61 y.o. Female with past medical history significant for Crohn's disease who presents for 2 weeks of diarrhea, 2 days of worsening epigastric and left upper quadrant pain. Laboratory work-up overall unremarkable, patient given 50 of fentanyl initially with no improvement in pain. On reassessment patient still with significant epigastric and left upper quadrant pain. Plan for CT of the abdomen pelvis with IV contrast, pain control, reassessment.   Dispo pending results of CT scan abdomen    OUTSTANDING TASKS / RECOMMENDATIONS:    Follow-up imaging  Dispo     FINAL IMPRESSION:     1. Hyperkalemia    2. Generalized abdominal pain        DISPOSITION:     CT imaging unremarkable for acute abnormalities. Patient still endorsing abdominal pain. Patient with hyperkalemia. Will give [place] July. Will admit to observation unit. Patient understands and agrees the plan. DISPOSITION:  []  Home   []  Nursing Facility   []  Transfer -    [x]  Admission -observation unit   FOLLOW-UP: No follow-up provider specified.    DISCHARGE MEDICATIONS: Current Discharge Medication List             Sue Fleming DO  Emergency Medicine Resident  [id number]        Sue Fleming [place]  Resident  11/05/22 2000

## 2022-11-04 NOTE — ED NOTES
Pt presents to the ED with C/O having nausea and vomiting since yesterday. Pt stated that she has been having fevers since last night. Pt is tearful in pain in her abdomen. Pt state that she is having trouble breathing. Pt stated that she tried tylenol for the pain but did not get any relief. Pt was placed on full cardiac monitor. Will continue to monitor and reassess.               Zander Moser RN  11/04/22 [id number]

## 2022-11-04 NOTE — ED PROVIDER NOTES
[address] [place] [address] ED     Emergency Department     Faculty Attestation    I performed a history and physical examination of the patient and discussed management with the resident. I reviewed the residents note and agree with the documented findings and plan of care. Any areas of disagreement are noted on the chart. I was personally present for the key portions of any procedures. I have documented in the chart those procedures where I was not present during the key portions. I have reviewed the emergency nurses triage note. I agree with the chief complaint, past medical history, past surgical history, allergies, medications, social and family history as documented unless otherwise noted below. For Physician Assistant/ Nurse Practitioner cases/documentation I have personally evaluated this patient and have completed at least one if not all key elements of the E/M (history, physical exam, and MDM). Additional findings are as noted. Patient here with nausea vomiting abdominal pain. History of Crohn's feels like similar. No blood in the stools no blood in the emesis. Subjective fevers but is afebrile here. On exam patient is uncomfortable but nontoxic. Abdomen soft mild diffuse tenderness no focal tenderness no rebound or guarding. Abdominal mass. Lungs clear and equal heart regular.   Will check labs reevaluate need for CT      Critical Care     none    Markell Barbosa MD, Lina Hyde  Attending Emergency  Physician           Markell Barbosa MD  11/04/22 [id number]

## 2022-11-05 LAB
ABSOLUTE EOS #: 0.11 K/UL (ref 0–0.44)
ABSOLUTE IMMATURE GRANULOCYTE: 0.03 K/UL (ref 0–0.3)
ABSOLUTE LYMPH #: 1.45 K/UL (ref 1.1–3.7)
ABSOLUTE MONO #: 0.47 K/UL (ref 0.1–1.2)
ANION GAP SERPL CALCULATED.3IONS-SCNC: 14 MMOL/L (ref 9–17)
BASOPHILS # BLD: 0 % (ref 0–2)
BASOPHILS ABSOLUTE: <0.03 K/UL (ref 0–0.2)
BUN BLDV-MCNC: 12 MG/DL (ref 6–20)
CALCIUM SERPL-MCNC: 8.7 MG/DL (ref 8.6–10.4)
CHLORIDE BLD-SCNC: 103 MMOL/L (ref 98–107)
CO2: 24 MMOL/L (ref 20–31)
CREAT SERPL-MCNC: 0.55 MG/DL (ref 0.5–0.9)
EOSINOPHILS RELATIVE PERCENT: 2 % (ref 1–4)
GFR SERPL CREATININE-BSD FRML MDRD: >60 ML/MIN/1.73M2
GLUCOSE BLD-MCNC: 88 MG/DL (ref 70–99)
HCT VFR BLD CALC: 41.1 % (ref 36.3–47.1)
HEMOGLOBIN: 13.3 G/DL (ref 11.9–15.1)
IMMATURE GRANULOCYTES: 1 %
LYMPHOCYTES # BLD: 22 % (ref 24–43)
MCH RBC QN AUTO: 28.2 PG (ref 25.2–33.5)
MCHC RBC AUTO-ENTMCNC: 32.4 G/DL (ref 28.4–34.8)
MCV RBC AUTO: 87.1 FL (ref 82.6–102.9)
MONOCYTES # BLD: 7 % (ref 3–12)
NRBC AUTOMATED: 0 PER 100 WBC
PDW BLD-RTO: 14.4 % (ref 11.8–14.4)
PLATELET # BLD: 187 K/UL (ref 138–453)
PMV BLD AUTO: 11.1 FL (ref 8.1–13.5)
POTASSIUM SERPL-SCNC: 3.9 MMOL/L (ref 3.7–5.3)
RBC # BLD: 4.72 M/UL (ref 3.95–5.11)
SARS-COV-2, RAPID: NOT DETECTED
SEG NEUTROPHILS: 68 % (ref 36–65)
SEGMENTED NEUTROPHILS ABSOLUTE COUNT: 4.45 K/UL (ref 1.5–8.1)
SODIUM BLD-SCNC: 141 MMOL/L (ref 135–144)
SPECIMEN DESCRIPTION: NORMAL
WBC # BLD: 6.5 K/UL (ref 3.5–11.3)

## 2022-11-05 PROCEDURE — 85025 COMPLETE CBC W/AUTO DIFF WBC: CPT

## 2022-11-05 PROCEDURE — 6360000002 HC RX W HCPCS: Performed by: EMERGENCY MEDICINE

## 2022-11-05 PROCEDURE — 96376 TX/PRO/DX INJ SAME DRUG ADON: CPT

## 2022-11-05 PROCEDURE — 92611 MOTION FLUOROSCOPY/SWALLOW: CPT

## 2022-11-05 PROCEDURE — 6370000000 HC RX 637 (ALT 250 FOR IP): Performed by: EMERGENCY MEDICINE

## 2022-11-05 PROCEDURE — 6360000002 HC RX W HCPCS: Performed by: STUDENT IN AN ORGANIZED HEALTH CARE EDUCATION/TRAINING PROGRAM

## 2022-11-05 PROCEDURE — 2580000003 HC RX 258: Performed by: STUDENT IN AN ORGANIZED HEALTH CARE EDUCATION/TRAINING PROGRAM

## 2022-11-05 PROCEDURE — G0378 HOSPITAL OBSERVATION PER HR: HCPCS

## 2022-11-05 PROCEDURE — 96375 TX/PRO/DX INJ NEW DRUG ADDON: CPT

## 2022-11-05 PROCEDURE — 80048 BASIC METABOLIC PNL TOTAL CA: CPT

## 2022-11-05 PROCEDURE — 6370000000 HC RX 637 (ALT 250 FOR IP): Performed by: STUDENT IN AN ORGANIZED HEALTH CARE EDUCATION/TRAINING PROGRAM

## 2022-11-05 PROCEDURE — 36415 COLL VENOUS BLD VENIPUNCTURE: CPT

## 2022-11-05 RX ORDER — MORPHINE SULFATE 4 MG/ML
4 INJECTION, SOLUTION INTRAMUSCULAR; INTRAVENOUS
Status: DISCONTINUED | OUTPATIENT
Start: 2022-11-05 | End: 2022-11-06

## 2022-11-05 RX ORDER — HYDROCODONE BITARTRATE AND ACETAMINOPHEN 5; 325 MG/1; MG/1
2 TABLET ORAL ONCE
Status: COMPLETED | OUTPATIENT
Start: 2022-11-05 | End: 2022-11-05

## 2022-11-05 RX ADMIN — MORPHINE SULFATE 4 MG: 4 INJECTION INTRAVENOUS at 22:18

## 2022-11-05 RX ADMIN — MORPHINE SULFATE 4 MG: 4 INJECTION INTRAVENOUS at 16:14

## 2022-11-05 RX ADMIN — SODIUM CHLORIDE, PRESERVATIVE FREE 10 ML: 5 INJECTION INTRAVENOUS at 09:07

## 2022-11-05 RX ADMIN — MORPHINE SULFATE 4 MG: 4 INJECTION INTRAVENOUS at 19:00

## 2022-11-05 RX ADMIN — FENTANYL CITRATE 25 MCG: 50 INJECTION, SOLUTION INTRAMUSCULAR; INTRAVENOUS at 01:27

## 2022-11-05 RX ADMIN — SODIUM ZIRCONIUM CYCLOSILICATE 5 G: 5 POWDER, FOR SUSPENSION ORAL at 09:09

## 2022-11-05 RX ADMIN — HYDROCODONE BITARTRATE AND ACETAMINOPHEN 2 TABLET: 5; 325 TABLET ORAL at 11:50

## 2022-11-05 RX ADMIN — MORPHINE SULFATE 4 MG: 4 INJECTION INTRAVENOUS at 11:48

## 2022-11-05 RX ADMIN — ONDANSETRON 4 MG: 2 INJECTION INTRAMUSCULAR; INTRAVENOUS at 01:23

## 2022-11-05 RX ADMIN — SODIUM CHLORIDE, PRESERVATIVE FREE 10 ML: 5 INJECTION INTRAVENOUS at 20:08

## 2022-11-05 RX ADMIN — FENTANYL CITRATE 25 MCG: 50 INJECTION, SOLUTION INTRAMUSCULAR; INTRAVENOUS at 09:07

## 2022-11-05 RX ADMIN — FENTANYL CITRATE 25 MCG: 50 INJECTION, SOLUTION INTRAMUSCULAR; INTRAVENOUS at 06:07

## 2022-11-05 ASSESSMENT — PAIN SCALES - WONG BAKER
WONGBAKER_NUMERICALRESPONSE: 0
WONGBAKER_NUMERICALRESPONSE: 4
WONGBAKER_NUMERICALRESPONSE: 0
WONGBAKER_NUMERICALRESPONSE: 0
WONGBAKER_NUMERICALRESPONSE: 4
WONGBAKER_NUMERICALRESPONSE: 0

## 2022-11-05 ASSESSMENT — PAIN SCALES - GENERAL
PAINLEVEL_OUTOF10: 0
PAINLEVEL_OUTOF10: 6
PAINLEVEL_OUTOF10: 0
PAINLEVEL_OUTOF10: 8
PAINLEVEL_OUTOF10: 0
PAINLEVEL_OUTOF10: 8
PAINLEVEL_OUTOF10: 7
PAINLEVEL_OUTOF10: 0
PAINLEVEL_OUTOF10: 7
PAINLEVEL_OUTOF10: 0
PAINLEVEL_OUTOF10: 8
PAINLEVEL_OUTOF10: 7
PAINLEVEL_OUTOF10: 0
PAINLEVEL_OUTOF10: 8
PAINLEVEL_OUTOF10: 9
PAINLEVEL_OUTOF10: 0

## 2022-11-05 ASSESSMENT — ENCOUNTER SYMPTOMS
ABDOMINAL PAIN: 1
VOMITING: 0
DIARRHEA: 1
SHORTNESS OF BREATH: 0
CONSTIPATION: 0
NAUSEA: 0
SORE THROAT: 0

## 2022-11-05 ASSESSMENT — PAIN - FUNCTIONAL ASSESSMENT
PAIN_FUNCTIONAL_ASSESSMENT: ACTIVITIES ARE NOT PREVENTED

## 2022-11-05 ASSESSMENT — PAIN DESCRIPTION - DESCRIPTORS
DESCRIPTORS: SHARP
DESCRIPTORS: ACHING
DESCRIPTORS: SHARP
DESCRIPTORS: SHARP

## 2022-11-05 ASSESSMENT — PAIN DESCRIPTION - FREQUENCY
FREQUENCY: CONTINUOUS

## 2022-11-05 ASSESSMENT — PAIN DESCRIPTION - ORIENTATION
ORIENTATION: LEFT;UPPER
ORIENTATION: LEFT;MID;UPPER
ORIENTATION: LEFT;ANTERIOR;UPPER
ORIENTATION: LEFT;MID;UPPER

## 2022-11-05 ASSESSMENT — PAIN DESCRIPTION - LOCATION
LOCATION: ABDOMEN;HIP
LOCATION: ABDOMEN
LOCATION: ABDOMEN
LOCATION: ABDOMEN;HIP
LOCATION: ABDOMEN
LOCATION: ABDOMEN

## 2022-11-05 ASSESSMENT — PAIN DESCRIPTION - PAIN TYPE
TYPE: CHRONIC PAIN

## 2022-11-05 ASSESSMENT — PAIN DESCRIPTION - ONSET
ONSET: ON-GOING

## 2022-11-05 NOTE — PROGRESS NOTES
Speech Language Pathology  Facility/Department: [facility] 3C OBSERVATION   CLINICAL BEDSIDE SWALLOW EVALUATION    NAME: Audi Garay  : 1965  MRN: [id number]    ADMISSION DATE: 2022    ADMITTING DIAGNOSIS: has Gastroesophageal reflux disease without esophagitis; Gastroenteritis; Ileus ([address] Pancreatitis, acute; Drug abuse ([address] COPD (chronic obstructive pulmonary disease) ([address] Hypertension; Hypokalemia; Hypothyroidism due to acquired atrophy of thyroid; Crohn disease ([address] Acute cystitis without hematuria; Accidental drug overdose; Prolonged Q-T interval on ECG; Bipolar disorder, unspecified ([address] Polysubstance abuse ([address] Alcohol intoxication delirium ([address] Cocaine abuse ([address] Acute respiratory failure with hypoxia ([address] Bipolar disorder with psychotic features ([address] Diarrhea; Chronic bronchitis ([address] Chronic renal insufficiency, stage III (moderate) ([address] Chronic diarrhea; Anxiety; Osteoarthritis resulting from left hip dysplasia; Shortness of breath; Elevated brain natriuretic peptide (BNP) level; Pneumonia of both lower lobes due to infectious organism; Non-intractable vomiting with nausea; Acute hypokalemia; Hypomagnesemia; Intractable vomiting; Abdominal pain; Nausea vomiting and diarrhea; Falls frequently; Orthostatic hypotension; Hypotension; Radial nerve palsy, right; COPD with acute exacerbation ([address] COPD exacerbation ([address] Chronic pain disorder; Rhabdomyolysis; Syncope and collapse; Asthma; Bipolar disorder, current episode mixed, moderate ([address] Essential tremor; Hx of drug abuse ([address] Acquired hypothyroidism; Lumbosacral spondylosis without myelopathy; Hypercholesterolemia; Osteoarthritis of knee; Crohn's colitis, without complications ([address] Anemia; and Liver cyst on their problem list.    ONSET DATE: 2022    Recent Chest Xray/CT of Chest:   CT Chest/Abdoment/Pelvis - 10/22/2022  1. No acute traumatic injury identified in the chest, abdomen or pelvis.        2. Advanced arthropathy in the left hip, as described, without appreciable   change since CT exam 08/20/2022. 3.  No acute osseous abnormality identified in the thoracic spine. Date of Eval: 11/5/2022  Evaluating Therapist: BRIGHT Rainey    Current Diet level:   Regular diet       Primary Complaint   Jennifer Cantor is a 60 yo woman with a complicated medical history admitted with 2 week h/o diarrhea and 2 day h/o worsening epigastric pain.      Pt's history is significant for Crohn's Disease and GERD with no esophagitis    Pt does c/o intermittent choking on both liquids and solids          IMPRESSIONS  1) Clinically able to safely swallow thin liquids via straw and solids; no overt s/s of aspiration    2) Dysphagia is not a typical manifestation of Crohns but it can impact the esophagus            RECOMMENDATIONS  1) Continue PO    2) May want to consider MBSS to r/o aspiration and/or esophogram to examine functional esophogeal motility            Pain:  Pain Assessment  Pain Assessment: 0-10  Pain Level: 7  Fitzgerald-Baker Pain Rating: No hurt  Patient's Stated Pain Goal: 0 - No pain  Pain Location: Abdomen  Pain Orientation: Left, Upper  Pain Descriptors: Sharp  Functional Pain Assessment: Activities are not prevented  Pain Type: Chronic pain  Pain Radiating Towards: from abdomen to left hip  Pain Frequency: Continuous  Pain Onset: On-going  Non-Pharmaceutical Pain Intervention(s): None - Patient Satisfied, None - no pain observed (in bed eyes clsoed, quiet, no complaints at this time, will continue to monitor)  Response to Pain Intervention: Patient satisfied  Side Effects: No reported side effects    Reason for Referral  Rhys Perez was referred for a bedside swallow evaluation to assess the efficiency of her swallow function, identify signs and symptoms of aspiration and make recommendations regarding safe dietary consistencies, effective compensatory strategies, and safe eating environment. Impression  Dysphagia Diagnosis: Swallow function appears [facility]  Dysphagia Outcome Severity Scale: Level 6: Within functional limits/Modified independence     Treatment Plan        D/C Recommendations:  To be determined       Recommended Diet and Intervention        Recommended Form of Meds: PO  Recommendations: Modified barium swallow study     Prognosis  Individuals consulted  Consulted and agree with results and recommendations: Patient    Education  Patient Education Response: Demonstrated understanding             Therapy Time               BRIGHT Penn  11/5/2022 12:11 PM

## 2022-11-05 NOTE — CARE COORDINATION
11/05/22 0917   Service Assessment   Patient Orientation Alert and Oriented   Cognition Alert   History Provided By Patient   Primary [address]   PCP Verified by CM Yes  (Dr. Almas Lucia)   Last Visit to PCP Within last year   Prior Functional Level Independent in ADLs/IADLs   Current Functional Level Independent in ADLs/IADLs   Can patient return to prior living arrangement Yes   Ability to make needs known: Good   Social/Functional History   Lives With Alone   Type of [place], 4 wheeled  (scooter)   [address] Responsibilities Yes   Ambulation Assistance Independent   Transfer Assistance Independent   Active  No   Mode of Transportation Cab   Discharge Planning   Type of [address] Prior To Admission [address]  ([facility])   Current DME Prior to [employer]; Other (Comment)  (scooter)   Potential Assistance Needed Transportation   Potential Assistance Purchasing Medications No   Patient expects to be discharged to: Apartment   Condition of Participation: Discharge Planning   The Plan for Transition of Care is related to the following treatment goals: increased comfort level   Home with [facility], needs cab

## 2022-11-05 NOTE — PROGRESS NOTES
[address]  CDU / OBSERVATION ENCOUNTER  ATTENDING NOTE       I performed a history and physical examination of the patient and discussed management with the resident or midlevel provider. I reviewed the resident or midlevel provider's note and agree with the documented findings and plan of care. Any areas of disagreement are noted on the chart. I was personally present for the key portions of any procedures. I have documented in the chart those procedures where I was not present during the key portions. I have reviewed the nurses notes. I agree with the chief complaint, past medical history, past surgical history, allergies, medications, social and family history as documented unless otherwise noted below. The Family history, social history, and ROS are effectively unchanged since admission unless noted elsewhere in the chart. Patient with symptoms consistent with Crohn's exacerbation. Patient has history of Crohn's. Followed by HENNY Huang. Patient for aggressive supportive therapy. Discussed directly with GI here. Not felt to require any acute intervention procedurally but will need supportive therapy and aggressive pain control. Will consult GI should need for endoscopy arise.     Mariah Hazel MD  Attending Emergency  Physician

## 2022-11-05 NOTE — H&P
[facility]  CDU / [facility] NOTE     Pt Name: Maritza Rivera  MRN: [id number]  [place] 1965  Date of evaluation: 11/5/22  Patient's PCP is :  Liya Casanova MD    CHIEF COMPLAINT       Chief Complaint   Patient presents with    Nausea    Emesis    Fever         HISTORY OF PRESENT ILLNESS    Maritza Rivera is a 61 y.o. female with a significant history of Crohn's disease who presents 2 weeks of diarrhea, worsening epigastric and left upper quadrant abdominal pain for the past 2 days. CT abdomen pelvis was negative, patient was admitted for hyperkalemia. Location/Symptom: Epigastric and left upper quadrant pain  Timing/Onset: 2 weeks  Provocation: Uncertain  Quality: Sharp  Radiation: None  Severity: 7 out of 10  Timing/Duration: On and off  Modifying Factors: Uncertain    REVIEW OF SYSTEMS       General ROS - No fevers, No malaise   Ophthalmic ROS - No discharge, No changes in vision  ENT ROS -  No sore throat, No rhinorrhea,   Respiratory ROS - no shortness of breath, no cough, no  wheezing  Cardiovascular ROS - No chest pain, no dyspnea on exertion  Gastrointestinal ROS -positive for abdominal pain, no nausea or vomiting, no change in bowel habits, no black or bloody stools  Genito-Urinary ROS - No dysuria, trouble voiding, or hematuria  Musculoskeletal ROS - No myalgias, No arthalgias  Neurological ROS - No headache, no dizziness/lightheadedness, No focal weakness, no loss of sensation  Dermatological ROS - No lesions, No rash     (PQRS) Advance directives on face sheet per hospital policy.  No change unless specifically mentioned in chart    PAST MEDICAL HISTORY    has a past medical history of Acid reflux, Anxiety, Arthritis, Asthma, Bipolar 1 disorder ([address] Bowel obstruction ([address] COPD (chronic obstructive pulmonary disease) ([address] Crohn disease ([address] Depression, Dry eye, Fibromyalgia, Gout, Headache, Hyperlipidemia, Hypertension, Hypothyroidism, Kidney stones, Muscle spasm, Neuropathy, Pain, Personal history of other medical treatment, Wears partial dentures, and Wellness examination. I have reviewed the past medical history with the patient and it is pertinent to this complaint. SURGICAL HISTORY      has a past surgical history that includes Tonsillectomy and adenoidectomy; Tubal ligation; Cholecystectomy; Bunionectomy (Left); Colonoscopy (2007); Colonoscopy (10/12/2017); Abdomen surgery; Upper gastrointestinal endoscopy (10/25/2021); Colonoscopy (10/25/2021); and Colonoscopy (10/25/2021). I have reviewed and agree with Surgical History entered and it is pertinent to this complaint. CURRENT MEDICATIONS     sodium chloride flush 0.9 % injection 5-40 mL, 2 times per day  sodium chloride flush 0.9 % injection 5-40 mL, PRN  0.9 % sodium chloride infusion, PRN  acetaminophen (TYLENOL) tablet 650 mg, Q4H PRN  ondansetron (ZOFRAN-ODT) disintegrating tablet 4 mg, Q8H PRN   Or  ondansetron (ZOFRAN) injection 4 mg, Q6H PRN  fentaNYL (SUBLIMAZE) injection 25 mcg, Q2H PRN  sodium zirconium cyclosilicate (LOKELMA) oral suspension 5 g, Daily        All medication charted and reviewed. ALLERGIES     is allergic to azithromycin, methylprednisolone, penicillins, and tape [adhesive tape]. FAMILY HISTORY     She indicated that her mother is . She indicated that her father is . She indicated that her brother is alive. family history includes Cancer in her mother; Crohn's Disease in her brother; Diabetes in her father; Heart Disease in her brother; High Blood Pressure in her mother; Hypertension in her mother; [place] in her father. The patient denies any pertinent family history. I have reviewed and agree with the family history entered. I have reviewed the Family History and it is not significant to the case    SOCIAL HISTORY      reports that she has been smoking cigarettes.  She has a 18.50 pack-year smoking history. She quit smokeless tobacco use about 21 years ago. Her smokeless tobacco use included chew. She reports that she does not currently use alcohol. She reports current drug use. Drug: Marijuana Arriola Fogo). I have reviewed and agree with all Social.  There are no concerns for substance abuse/use. PHYSICAL EXAM     INITIAL VITALS:  height is 5' 6\" (1.676 m) and weight is 155 lb 14.4 oz (70.7 kg). Her oral temperature is 98 °F (36.7 °C). Her blood pressure is 144/92 (abnormal) and her pulse is 62. Her respiration is 16 and oxygen saturation is 98%.       CONSTITUTIONAL: AOx4, no apparent distress, appears stated age    HEAD: normocephalic, atraumatic   EYES: PERRLA, EOMI    ENT: moist mucous membranes, uvula midline   NECK: supple, symmetric   BACK: symmetric   LUNGS: clear to auscultation bilaterally   CARDIOVASCULAR: regular rate and rhythm, no murmurs, rubs or gallops   ABDOMEN: soft, tender to palpation with old central lap scar visible, non-distended with normal active bowel sounds   NEUROLOGIC:  MAEx4, no focal sensory or motor deficits   MUSCULOSKELETAL: no clubbing, cyanosis or edema   SKIN: no rash or wounds       DIFFERENTIAL DIAGNOSIS/MDM:     Crohn's flare, diverticulitis, otitis    DIAGNOSTIC RESULTS     EKG: None performed    RADIOLOGY:   I directly visualized the following  images and reviewed the radiologist interpretations:    CT ABDOMEN PELVIS W IV CONTRAST Additional Contrast? None    Result Date: 11/4/2022  EXAM: CT Abdomen and Pelvis With Intravenous Contrast EXAM DATE/TIME: 11/4/2022 6:52 pm CLINICAL HISTORY: ORDERING SYSTEM PROVIDED  sever abdominal pain  TECHNOLOGIST PROVIDED HISTORY:  sever abdominal pain  Decision Support Exception - unselect if not a suspected or confirmed emergency medical condition->Emergency Medical Condition (MA) TECHNIQUE: Axial computed tomography images of the abdomen and pelvis with intravenous contrast.  This CT exam was performed using one or more of the following dose reduction techniques:  automated exposure control, adjustment of the mA and/or kV according to patient size, and/or use of iterative reconstruction technique. COMPARISON: No relevant prior studies available. FINDINGS: Lung bases:  No acute findings. No mass. No consolidation. ABDOMEN: Liver:  No acute findings. No mass. Gallbladder and bile ducts:  Prior cholecystectomy. No ductal dilation. Pancreas:  No acute findings. No mass. No ductal dilation. Spleen:  No acute findings. No splenomegaly. Adrenals:  No acute findings. No mass. Kidneys and ureters:  No acute findings. No solid mass. No hydronephrosis. Stomach and bowel:  Evidence of prior bowel anastomosis in the anterior mid abdomen with probable denervation dilation this segment of small bowel as it is unchanged. Otherwise nonspecific bowel gas pattern with no current evidence of intestinal obstruction. No mucosal thickening. PELVIS: Appendix:  A normal appearing appendix is noted. Bladder:  No acute findings. No mass. Reproductive:  Unremarkable as visualized. ABDOMEN and PELVIS: Intraperitoneal space:  No acute findings. No free air. No significant fluid collection. Bones/joints:  Multilevel degenerative changes of the spine. No definite acute bony abnormality is noted. Marked degenerative changes of the left more so than right hip. No dislocation. Soft tissues:  Fat containing umbilical ventral hernia, unchanged. Vasculature:  No acute findings. No abdominal aortic aneurysm. Lymph nodes:  No acute findings. No enlarged lymph nodes. 1.  A normal appearing appendix is noted. 2.  Evidence of prior bowel anastomosis in the anterior mid abdomen with probable denervation dilation this segment of small bowel as it is unchanged. Otherwise nonspecific bowel gas pattern with no current evidence of intestinal obstruction. 3.  Otherwise no acute pathology or significant change noted.        LABS:  I have reviewed and interpreted all available lab results. Labs Reviewed   CBC WITH AUTO DIFFERENTIAL - Abnormal; Notable for the following components:       Result Value    RBC 5.37 (*)     Seg Neutrophils 79 (*)     Lymphocytes 14 (*)     Immature Granulocytes 1 (*)     All other components within normal limits   COMPREHENSIVE METABOLIC PANEL W/ REFLEX TO MG FOR LOW K - Abnormal; Notable for the following components:    Glucose 107 (*)     Potassium 5.4 (*)     Alkaline Phosphatase 109 (*)     All other components within normal limits   COVID-19, RAPID   URINALYSIS WITH REFLEX TO CULTURE   LIPASE   IMMATURE PLATELET FRACTION   BASIC METABOLIC PANEL   CBC WITH AUTO DIFFERENTIAL         CDU IMPRESSION / PLAN      Marzena Tafoya is a 61 y.o. female with a history of Crohn's disease who presents with abdominal pain. Abdominal pain:  - CT abdomen pelvis clear, no evidence of anastomotic leak/perforation/mucosal thickening  - GI not consulted, will consider aggressive support therapy and pain control  - We will consult GI should the need for endoscopy arise    Hyperkalemia (5.4) - resolved  - Lokelma given  - BUN and creatinine within normal limits  - Repeat BMP showed potassium of 3.9 today. Continue home medications and pain control  There was a flag on her chart pointing for dysphagia, SLT cleared   Monitor vitals, labs, and imaging    DISPO: pending clinical improvement, if abdominal pain resolved can be discharged    CONSULTS:    None    PROCEDURES:  Not indicated       PATIENT REFERRED TO:    No follow-up provider specified. --  Anitra Leos MD   Emergency Medicine Resident    This dictation was generated by voice recognition computer software. Although all attempts are made to edit the dictation for accuracy, there may be errors in the transcription that are not intended.

## 2022-11-05 NOTE — PLAN OF CARE
Problem: Pain  Goal: Verbalizes/displays adequate comfort level or baseline comfort level  11/5/2022 1051 by Nova Zapata RN  Outcome: Progressing  Flowsheets (Taken 11/5/2022 0715)  Verbalizes/displays adequate comfort level or baseline comfort level: Encourage patient to monitor pain and request assistance  11/5/2022 0117 by Savanna Francois RN  Outcome: Progressing  Flowsheets (Taken 11/4/2022 2325)  Verbalizes/displays adequate comfort level or baseline comfort level: Assess pain using appropriate pain scale     Problem: Skin/Tissue Integrity  Goal: Absence of new skin breakdown  Description: 1. Monitor for areas of redness and/or skin breakdown  2. Assess vascular access sites hourly  3. Every 4-6 hours minimum:  Change oxygen saturation probe site  4. Every 4-6 hours:  If on nasal continuous positive airway pressure, respiratory therapy assess nares and determine need for appliance change or resting period.   11/5/2022 1051 by Nova Zapata RN  Outcome: Progressing  11/5/2022 0117 by Savanna Francois RN  Outcome: Progressing     Problem: Safety - Adult  Goal: Free from fall injury  11/5/2022 1051 by Nova Zapata RN  Outcome: Progressing  11/5/2022 0117 by Savanna Francois RN  Outcome: Progressing  Flowsheets (Taken 11/5/2022 0055)  Free From Fall Injury: Instruct family/caregiver on patient safety

## 2022-11-06 PROCEDURE — 6370000000 HC RX 637 (ALT 250 FOR IP)

## 2022-11-06 PROCEDURE — 2580000003 HC RX 258: Performed by: STUDENT IN AN ORGANIZED HEALTH CARE EDUCATION/TRAINING PROGRAM

## 2022-11-06 PROCEDURE — 6360000002 HC RX W HCPCS: Performed by: EMERGENCY MEDICINE

## 2022-11-06 PROCEDURE — 96376 TX/PRO/DX INJ SAME DRUG ADON: CPT

## 2022-11-06 PROCEDURE — 6370000000 HC RX 637 (ALT 250 FOR IP): Performed by: STUDENT IN AN ORGANIZED HEALTH CARE EDUCATION/TRAINING PROGRAM

## 2022-11-06 PROCEDURE — G0378 HOSPITAL OBSERVATION PER HR: HCPCS

## 2022-11-06 RX ORDER — OXYCODONE HYDROCHLORIDE 5 MG/1
5 TABLET ORAL EVERY 4 HOURS PRN
Status: DISCONTINUED | OUTPATIENT
Start: 2022-11-06 | End: 2022-11-07

## 2022-11-06 RX ORDER — BISACODYL 10 MG
10 SUPPOSITORY, RECTAL RECTAL ONCE
Status: COMPLETED | OUTPATIENT
Start: 2022-11-06 | End: 2022-11-06

## 2022-11-06 RX ADMIN — SODIUM ZIRCONIUM CYCLOSILICATE 5 G: 5 POWDER, FOR SUSPENSION ORAL at 08:27

## 2022-11-06 RX ADMIN — MORPHINE SULFATE 4 MG: 4 INJECTION INTRAVENOUS at 01:18

## 2022-11-06 RX ADMIN — SODIUM CHLORIDE, PRESERVATIVE FREE 10 ML: 5 INJECTION INTRAVENOUS at 20:20

## 2022-11-06 RX ADMIN — OXYCODONE 5 MG: 5 TABLET ORAL at 15:55

## 2022-11-06 RX ADMIN — OXYCODONE 5 MG: 5 TABLET ORAL at 11:51

## 2022-11-06 RX ADMIN — SODIUM CHLORIDE, PRESERVATIVE FREE 10 ML: 5 INJECTION INTRAVENOUS at 08:27

## 2022-11-06 RX ADMIN — MORPHINE SULFATE 4 MG: 4 INJECTION INTRAVENOUS at 05:53

## 2022-11-06 RX ADMIN — BISACODYL 10 MG: 10 SUPPOSITORY RECTAL at 11:50

## 2022-11-06 RX ADMIN — OXYCODONE 5 MG: 5 TABLET ORAL at 20:17

## 2022-11-06 ASSESSMENT — PAIN SCALES - GENERAL
PAINLEVEL_OUTOF10: 0
PAINLEVEL_OUTOF10: 7
PAINLEVEL_OUTOF10: 0
PAINLEVEL_OUTOF10: 8
PAINLEVEL_OUTOF10: 0
PAINLEVEL_OUTOF10: 7
PAINLEVEL_OUTOF10: 0
PAINLEVEL_OUTOF10: 7
PAINLEVEL_OUTOF10: 7
PAINLEVEL_OUTOF10: 0
PAINLEVEL_OUTOF10: 0
PAINLEVEL_OUTOF10: 8
PAINLEVEL_OUTOF10: 8
PAINLEVEL_OUTOF10: 0
PAINLEVEL_OUTOF10: 0

## 2022-11-06 ASSESSMENT — PAIN - FUNCTIONAL ASSESSMENT
PAIN_FUNCTIONAL_ASSESSMENT: ACTIVITIES ARE NOT PREVENTED

## 2022-11-06 ASSESSMENT — PAIN SCALES - WONG BAKER

## 2022-11-06 ASSESSMENT — PAIN DESCRIPTION - ORIENTATION
ORIENTATION: LEFT
ORIENTATION: LEFT
ORIENTATION: UPPER;MID
ORIENTATION: LEFT

## 2022-11-06 ASSESSMENT — PAIN DESCRIPTION - DESCRIPTORS
DESCRIPTORS: ACHING
DESCRIPTORS: ACHING
DESCRIPTORS: THROBBING

## 2022-11-06 ASSESSMENT — PAIN DESCRIPTION - LOCATION
LOCATION: ABDOMEN
LOCATION: ABDOMEN;HIP
LOCATION: ABDOMEN
LOCATION: ABDOMEN

## 2022-11-06 ASSESSMENT — PAIN DESCRIPTION - ONSET: ONSET: ON-GOING

## 2022-11-06 ASSESSMENT — PAIN DESCRIPTION - PAIN TYPE
TYPE: CHRONIC PAIN
TYPE: CHRONIC PAIN

## 2022-11-06 ASSESSMENT — PAIN DESCRIPTION - FREQUENCY: FREQUENCY: CONTINUOUS

## 2022-11-06 NOTE — PLAN OF CARE
Problem: Pain  Goal: Verbalizes/displays adequate comfort level or baseline comfort level  Outcome: Progressing  Flowsheets (Taken 11/6/2022 6633)  Verbalizes/displays adequate comfort level or baseline comfort level: Encourage patient to monitor pain and request assistance     Problem: Skin/Tissue Integrity  Goal: Absence of new skin breakdown  Description: 1. Monitor for areas of redness and/or skin breakdown  2. Assess vascular access sites hourly  3. Every 4-6 hours minimum:  Change oxygen saturation probe site  4. Every 4-6 hours:  If on nasal continuous positive airway pressure, respiratory therapy assess nares and determine need for appliance change or resting period.   Outcome: Progressing     Problem: Safety - Adult  Goal: Free from fall injury  Outcome: Progressing

## 2022-11-06 NOTE — PROGRESS NOTES
OBS/CDU   RESIDENT NOTE      Patients PCP is:  Attila Mazariegos MD        SUBJECTIVE      Gabriella Dixon continues to report abdominal pain localized in the epigastrium. She states that while it has improved since her admission, it has not subsided completely. She denies any vomiting or nausea, but states that she has not had a bowel movement in 2 days. He states that this is her normal.  She also states that there is a underlying base level of pain that she experiences. PHYSICAL EXAM      General: NAD, AO X 3  Heent: EMOI, PERRL  Neck: SUPPLE, NO JVD  Cardiovascular: RRR, S1S2  Pulmonary: CTAB, NO SOB  Abdomen: SOFT, abdomen is tender to palpation in the epigastrium, ND, +BS  Extremities: +2/4 PULSES DISTAL, NO SWELLING  Neuro / Psych: NO NUMBNESS OR TINGLING, MENTATION AT BASELINE    PERTINENT TEST /EXAMS      I have reviewed all available laboratory results. MEDICATIONS CURRENT   oxyCODONE (ROXICODONE) immediate release tablet 5 mg, Q4H PRN  sodium chloride flush 0.9 % injection 5-40 mL, 2 times per day  sodium chloride flush 0.9 % injection 5-40 mL, PRN  0.9 % sodium chloride infusion, PRN  acetaminophen (TYLENOL) tablet 650 mg, Q4H PRN  ondansetron (ZOFRAN-ODT) disintegrating tablet 4 mg, Q8H PRN   Or  ondansetron (ZOFRAN) injection 4 mg, Q6H PRN  sodium zirconium cyclosilicate (LOKELMA) oral suspension 5 g, Daily        All medication charted and reviewed. CONSULTS      None    ASSESSMENT/PLAN       Latha Mccarthy is a 61 y.o. female who presents with past medical history of Crohn's disease who presents with abdominal pain. Abdominal pain  Labs are all within normal limits. BUN/creatinine normal.  Patient given Dulcolax to help move bowels, will recheck after passing stool if abdominal pain has subsided.     hyperkalemia  Resolved  Continue to monitor electrolytes      Continue home medications and pain control  Monitor vitals, labs, and imaging  DISPO: pending consults and clinical improvement    --  Krish Zhang MD  Emergency Medicine Resident Physician     This dictation was generated by voice recognition computer software. Although all attempts are made to edit the dictation for accuracy, there may be errors in the transcription that are not intended.

## 2022-11-06 NOTE — PLAN OF CARE
Problem: Pain  Goal: Verbalizes/displays adequate comfort level or baseline comfort level  11/5/2022 2005 by Afsaneh Meek RN  Outcome: Progressing  Flowsheets (Taken 11/5/2022 1836)  Verbalizes/displays adequate comfort level or baseline comfort level: Encourage patient to monitor pain and request assistance  11/5/2022 1051 by La Nena Crenshaw RN  Outcome: Progressing  Flowsheets (Taken 11/5/2022 0715)  Verbalizes/displays adequate comfort level or baseline comfort level: Encourage patient to monitor pain and request assistance     Problem: Skin/Tissue Integrity  Goal: Absence of new skin breakdown  Description: 1. Monitor for areas of redness and/or skin breakdown  2. Assess vascular access sites hourly  3. Every 4-6 hours minimum:  Change oxygen saturation probe site  4. Every 4-6 hours:  If on nasal continuous positive airway pressure, respiratory therapy assess nares and determine need for appliance change or resting period.   11/5/2022 2005 by Afsaneh Meek RN  Outcome: Progressing  11/5/2022 1051 by La Nena Crenshaw RN  Outcome: Progressing     Problem: Safety - Adult  Goal: Free from fall injury  11/5/2022 2005 by Afsaneh Meek RN  Outcome: Sis Murphy (Taken 11/5/2022 2003)  Free From Fall Injury: Instruct family/caregiver on patient safety  11/5/2022 1051 by La Nena Crenshaw, RN  Outcome: Progressing

## 2022-11-06 NOTE — PROGRESS NOTES
This patient's name appeared on my inpatient list and therefore saw her. I had last seen this patient in June when it was requested that she obtain medical clearance for hip replacement for severe bilateral primary osteoarthritis. The patient has been using wheelchair and ambulating with significant difficulty. She was recently admitted to [facility] for syncope attack possibly related to polypharmacy usage. The patient is now admitted with a possible flareup of Crohn's disease. She now has a new primary care physician and she has mentioned it to him  that she requires hip replacement. Did discuss with her that we still need medical clearance before scheduling her hip surgery. She has severe bilateral primary osteoarthritis possibly also avascular necrosis with some fragmentation of femoral head on the left side. Until medical clearance she will continue with the wheelchair and pain medication.

## 2022-11-06 NOTE — PROGRESS NOTES
[employer]  CDU / OBSERVATION ENCOUNTER  ATTENDING NOTE       I performed a history and physical examination of the patient and discussed management with the resident or midlevel provider. I reviewed the resident or midlevel provider's note and agree with the documented findings and plan of care. Any areas of disagreement are noted on the chart. I was personally present for the key portions of any procedures. I have documented in the chart those procedures where I was not present during the key portions. I have reviewed the nurses notes. I agree with the chief complaint, past medical history, past surgical history, allergies, medications, social and family history as documented unless otherwise noted below. The Family history, social history, and ROS are effectively unchanged since admission unless noted elsewhere in the chart. Dakota Lynne MD [facility]  Attending Emergency  Physician    This patient was admitted with symptoms consistent with an exacerbation of her chronic inflammatory bowel disease. Today she reports that she has had no further nausea and vomiting today although she did have 1 episode last night. She does report that her discomfort/pain has persisted. No fevers or chills. No melena, hematochezia, hematemesis. Awake, alert, cooperative, responsive. Speech fluent, normal comprehension. Lungs clear bilaterally. No rales, rhonchi, wheezes, stridor, retractions. Cardiac-S1S2, RRR, no murmur, rub, or gallop. Abdomen soft, nondistended, moderately tender in the epigastrium. There is no guarding or rebound. No organomegaly, mass, bruit. Normal bowel sounds. Patient's lab results of been reviewed. Radiology studies and then reviewed as well. Impression: Exacerbation of chronic inflammatory bowel disease. Plan: We will attempt to switch the patient over to oral medications and continue to assess.

## 2022-11-06 NOTE — PROGRESS NOTES
Comprehensive Nutrition Assessment    Type and Reason for Visit:  Positive Nutrition Screen    Nutrition Recommendations/Plan:   Continue current diet. Start ONS      Malnutrition Assessment:  Malnutrition Status: At risk for malnutrition (Comment) (11/06/22 8347)    Context:  Chronic Illness     Findings of the 6 clinical characteristics of malnutrition:  Energy Intake:  Mild decrease in energy intake (Comment)  Weight Loss:  Mild weight loss (specify amount and time period)     Body Fat Loss:  No significant body fat loss     Muscle Mass Loss:  No significant muscle mass loss    Fluid Accumulation:  No significant fluid accumulation     Strength:  Not Performed    Nutrition Assessment:    Patient admitted with abdominal pain. Weight loss r/t crohns flair up. Per EMR, weight fluctuates between 150-165 lb. Patient with some pain relief since admission. Patient with no BM since admission. Nutrition Related Findings:    labs/meds reviewed Wound Type: None       Current Nutrition Intake & Therapies:    Average Meal Intake: 26-50%  Average Supplements Intake: None Ordered  ADULT DIET; Regular  ADULT ORAL NUTRITION SUPPLEMENT; Breakfast, Lunch, Dinner; Clear Liquid Oral Supplement    Anthropometric Measures:  Height: 5' 6\" (167.6 cm)  Ideal Body Weight (IBW): 130 lbs (59 kg)       Current Body Weight: 156 lb 4.9 oz (70.9 kg), 120.2 % IBW. Weight Source: Bed Scale  Current BMI (kg/m2): 25.2                          BMI Categories: Overweight (BMI 25.0-29. 9)    Estimated Daily Nutrient Needs:  Energy Requirements Based On: Kcal/kg  Weight Used for Energy Requirements: Current  Energy (kcal/day): 2866-8351 kcal  Weight Used for Protein Requirements: Current  Protein (g/day): 78 gm  Method Used for Fluid Requirements: 1 ml/kcal  Fluid (ml/day): 1750 mL or per MD    Nutrition Diagnosis:   Inadequate oral intake related to altered GI function, pain as evidenced by poor intake prior to admission    Nutrition Interventions:   Food and/or Nutrient Delivery: Continue Current Diet, Start Oral Nutrition Supplement  Nutrition Education/Counseling: No recommendation at this time  Coordination of Nutrition Care: Continue to monitor while inpatient       Goals:     Goals: Meet at least 75% of estimated needs       Nutrition Monitoring and Evaluation:   Behavioral-Environmental Outcomes: None Identified  Food/Nutrient Intake Outcomes: Food and Nutrient Intake, Supplement Intake  Physical Signs/Symptoms Outcomes: Biochemical Data, Diarrhea, Constipation, GI Status, Nausea or Vomiting, Skin, Weight    Discharge Planning:     Too soon to determine     Jazmin Otero RD  Contact: [phone]

## 2022-11-07 PROCEDURE — 94761 N-INVAS EAR/PLS OXIMETRY MLT: CPT

## 2022-11-07 PROCEDURE — 2580000003 HC RX 258: Performed by: STUDENT IN AN ORGANIZED HEALTH CARE EDUCATION/TRAINING PROGRAM

## 2022-11-07 PROCEDURE — 6370000000 HC RX 637 (ALT 250 FOR IP)

## 2022-11-07 PROCEDURE — G0378 HOSPITAL OBSERVATION PER HR: HCPCS

## 2022-11-07 PROCEDURE — 6370000000 HC RX 637 (ALT 250 FOR IP): Performed by: STUDENT IN AN ORGANIZED HEALTH CARE EDUCATION/TRAINING PROGRAM

## 2022-11-07 RX ORDER — OXYCODONE HYDROCHLORIDE 5 MG/1
5 TABLET ORAL EVERY 6 HOURS PRN
Qty: 10 TABLET | Refills: 0 | Status: SHIPPED | OUTPATIENT
Start: 2022-11-07 | End: 2022-11-10

## 2022-11-07 RX ORDER — OXYCODONE HYDROCHLORIDE 5 MG/1
5 TABLET ORAL ONCE
Status: COMPLETED | OUTPATIENT
Start: 2022-11-07 | End: 2022-11-07

## 2022-11-07 RX ADMIN — OXYCODONE 5 MG: 5 TABLET ORAL at 04:51

## 2022-11-07 RX ADMIN — OXYCODONE 5 MG: 5 TABLET ORAL at 00:28

## 2022-11-07 RX ADMIN — ONDANSETRON 4 MG: 4 TABLET, ORALLY DISINTEGRATING ORAL at 17:49

## 2022-11-07 RX ADMIN — OXYCODONE HYDROCHLORIDE 5 MG: 5 TABLET ORAL at 20:34

## 2022-11-07 RX ADMIN — OXYCODONE 5 MG: 5 TABLET ORAL at 14:15

## 2022-11-07 RX ADMIN — SODIUM CHLORIDE, PRESERVATIVE FREE 5 ML: 5 INJECTION INTRAVENOUS at 20:34

## 2022-11-07 RX ADMIN — SODIUM CHLORIDE, PRESERVATIVE FREE 10 ML: 5 INJECTION INTRAVENOUS at 09:02

## 2022-11-07 RX ADMIN — OXYCODONE 5 MG: 5 TABLET ORAL at 09:02

## 2022-11-07 ASSESSMENT — PAIN SCALES - GENERAL
PAINLEVEL_OUTOF10: 8
PAINLEVEL_OUTOF10: 9
PAINLEVEL_OUTOF10: 7
PAINLEVEL_OUTOF10: 9
PAINLEVEL_OUTOF10: 7
PAINLEVEL_OUTOF10: 9

## 2022-11-07 ASSESSMENT — PAIN SCALES - WONG BAKER
WONGBAKER_NUMERICALRESPONSE: 0

## 2022-11-07 ASSESSMENT — PAIN DESCRIPTION - LOCATION
LOCATION: ABDOMEN
LOCATION: ABDOMEN

## 2022-11-07 ASSESSMENT — PAIN DESCRIPTION - ORIENTATION: ORIENTATION: LOWER;MID

## 2022-11-07 ASSESSMENT — PAIN DESCRIPTION - DESCRIPTORS: DESCRIPTORS: SHOOTING

## 2022-11-07 NOTE — DISCHARGE INSTR - COC
Continuity of Care Form    Patient Name: Wilder Madison   :  1965  MRN:  [id number]    Admit date:  2022  Discharge date:  ***    Code Status Order: Full Code   Advance Directives:     Admitting Physician:  Arlette Jean-Baptiste MD  PCP: Janelle Barthel, MD    Discharging Nurse: [facility] Unit/Room#: 1159/8479-59  Discharging Unit Phone Number: ***    Emergency Contact:   Extended Emergency Contact Information  Primary Emergency Contact: [address] Phone: [phone]  Relation: Child    Past Surgical History:  Past Surgical History:   Procedure Laterality Date    ABDOMEN SURGERY      bowel obstruction OR    BUNIONECTOMY Left     CHOLECYSTECTOMY      COLONOSCOPY  2007    no gross pathology seen in the colon and also 3-4 inches of the ileum    COLONOSCOPY  10/12/2017    normal    COLONOSCOPY  10/25/2021    COLONOSCOPY WITH BIOPSY performed by Ravi Milan MD at [facility]    COLONOSCOPY  10/25/2021    COLONOSCOPY POLYPECTOMY REMOVAL SNARE performed by Ravi Milan MD at [address] [place] [address] ENDOSCOPY  10/25/2021    EGD BIOPSY performed by Ravi Milan MD at [facility]       Immunization History:   Immunization History   Administered Date(s) Administered    COVID-19, PFIZER Bivalent BOOSTER, (age 12y+), IM, 30 mcg/0.3 mL dose 10/11/2022    COVID-19, PFIZER GRAY top, DO NOT Dilute, (age 15 y+), IM, 30 mcg/0.3 mL 07/15/2022    COVID-19, PFIZER PURPLE top, DILUTE for use, (age 15 y+), 30mcg/0.3mL 2021, 2021, 2021    Influenza, FLUARIX, FLULAVAL, FLUZONE (age 10 mo+) AND AFLURIA, (age 1 y+), PF, 0.5mL 2017, 10/20/2021    Pneumococcal Polysaccharide (Beqecnhnu26) 2015       Active Problems:  Patient Active Problem List   Diagnosis Code    Gastroesophageal reflux disease without esophagitis K21.9    Gastroenteritis K52.9    Ileus ([address] K56.7    Pancreatitis, acute K85.90    Drug abuse ([facility] F19.10    COPD (chronic obstructive pulmonary disease) ([address] J44.9    Hypertension I10    Hypokalemia E87.6    Hypothyroidism due to acquired atrophy of thyroid E03.4    Crohn disease ([address] K50.90    Acute cystitis without hematuria N30.00    Accidental drug overdose T50.901A    Prolonged Q-T interval on ECG R94.31    Bipolar disorder, unspecified ([facility]) F31.9    Polysubstance abuse ([facility] F19.10    Alcohol intoxication delirium ([facility] F10.121    Cocaine abuse ([facility] F14.10    Acute respiratory failure with hypoxia ([facility] J96.01    Bipolar disorder with psychotic features ([facility] F31.9    Diarrhea R19.7    Chronic bronchitis ([facility] J42    Chronic renal insufficiency, stage III (moderate) ([facility]) N18.30    Chronic diarrhea K52.9    Anxiety F41.9    Osteoarthritis resulting from left hip dysplasia M16.32    Shortness of breath R06.02    Elevated brain natriuretic peptide (BNP) level R79.89    Pneumonia of both lower lobes due to infectious organism J18.9    Non-intractable vomiting with nausea R11.2    Acute hypokalemia E87.6    Hypomagnesemia E83.42    Intractable vomiting R11.10    Abdominal pain R10.9    Nausea vomiting and diarrhea R11.2, R19.7    Falls frequently R29.6    Orthostatic hypotension I95.1    Hypotension I95.9    Radial nerve palsy, right G56.31    COPD with acute exacerbation ([facility]) J44.1    COPD exacerbation ([facility]) J44.1    Chronic pain disorder G89.4    Rhabdomyolysis M62.82    Syncope and collapse R55    Asthma J45.909    Bipolar disorder, current episode mixed, moderate ([facility]) F31.62    Essential tremor G25.0    Hx of drug abuse ([facility]) F19.11    Acquired hypothyroidism E03.9    Lumbosacral spondylosis without myelopathy M47.817    Hypercholesterolemia E78.00    Osteoarthritis of knee M17.9    Crohn's colitis, without complications ([facility]) M73.78    Anemia D64.9    Liver cyst K76.89       Isolation/Infection:   Isolation            No Isolation Patient Infection Status       Infection Onset Added Last Indicated Last Indicated By Review Planned Expiration Resolved Resolved By    None active    Resolved    COVID-19 (Rule Out) 11/04/22 11/04/22 11/04/22 COVID-19, Rapid (Ordered)   11/05/22 Rule-Out Test Resulted    C-diff Rule Out 10/23/22 10/23/22 10/23/22 Gastrointestinal Panel, Molecular (Ordered)   10/25/22 Germania Jaimes RN    Order d/c'd    C-diff Rule Out 09/16/22 09/16/22 09/16/22 C DIFF TOXIN/ANTIGEN (Ordered)   09/16/22 Rule-Out Test Resulted    COVID-19 (Rule Out) 09/15/22 09/15/22 09/15/22 COVID-19 & Influenza Combo (Ordered)   09/15/22 Rule-Out Test Resulted    COVID-19 (Rule Out) 08/20/22 08/20/22 08/20/22 Respiratory Panel, Molecular, with COVID-19 (Restricted: peds pts or suitable admitted adults) (Ordered)   08/22/22 Clarissa Nelson RN    C-diff Rule Out 08/20/22 08/20/22 08/21/22 Gastrointestinal Panel, Molecular (Ordered)   08/22/22 Rule-Out Test Resulted    COVID-19 (Rule Out) 08/20/22 08/20/22 08/20/22 COVID-19, Rapid (Ordered)   08/20/22 Rule-Out Test Resulted    COVID-19 (Rule Out) 05/18/22 05/18/22 05/18/22 COVID-19, Rapid (Ordered)   05/18/22 Rule-Out Test Resulted    C-diff Rule Out  10/20/21 10/20/21 Leah Herron RN   10/20/21 Rule-Out Test Resulted    C-diff Rule Out 10/16/21 10/16/21 10/20/21 C DIFF TOXIN/ANTIGEN (Ordered)   10/19/21 Leah Herron RN    GI Panel    COVID-19 (Rule Out) 10/15/21 10/15/21 10/16/21 COVID-19, Rapid (Ordered)   10/16/21 Rule-Out Test Resulted    C-diff Rule Out 09/17/21 09/17/21 09/19/21 Gastrointestinal Panel, Molecular (Ordered)   09/20/21 Rule-Out Test Resulted    COVID-19 (Rule Out) 09/17/21 09/17/21 09/17/21 Respiratory Panel, Molecular, with COVID-19 (Restricted: peds pts or suitable admitted adults) (Ordered)   09/17/21 Rule-Out Test Resulted    COVID-19 (Rule Out) 09/17/21 09/17/21 09/17/21 COVID-19, Rapid (Ordered)   09/17/21 Rule-Out Test Resulted            Nurse Assessment:  Last Vital Signs: /76   Pulse 66   Temp 97.9 °F (36.6 °C) (Oral)   Resp 16   Ht 5' 6\" (1.676 m)   Wt 156 lb 4.8 oz (70.9 kg)   SpO2 98%   BMI 25.23 kg/m²     Last documented pain score (0-10 scale): Pain Level: 9  Last Weight:   Wt Readings from Last 1 Encounters:   22 156 lb 4.8 oz (70.9 kg)     Mental Status:  {IP PT MENTAL STATUS:}    IV Access:  { KIERSTEN IV ACCESS:488197075}    Nursing Mobility/ADLs:  Walking   {CHP DME ZRAB:466995345}  Transfer  {CHP DME RECT:263897418}  Bathing  {CHP DME RBVL:957133984}  Dressing  {CHP DME VHSY:903488493}  Toileting  {CHP DME QTUQ:642121820}  Feeding  {CHP DME FAUC:525981208}  Med Admin  {CHP DME FRAB:050759997}  Med Delivery   { KIERSTEN MED Delivery:720698714}    Wound Care Documentation and Therapy:        Elimination:  Continence: Bowel: {YES / PZ:23078}  Bladder: {YES / TK:66750}  Urinary Catheter: {Urinary Catheter:201362910}   Colostomy/Ileostomy/Ileal Conduit: {YES / OK:11655}       Date of Last BM: ***  No intake or output data in the 24 hours ending 22 1222  No intake/output data recorded.     Safety Concerns:     [address] Safety Concerns:950130181}    Impairments/Disabilities:      [address] Impairments/Disabilities:526286850}    Nutrition Therapy:  Current Nutrition Therapy:   [address] Diet List:406617749}    Routes of Feeding: {CHP DME Other Feedings:482391189}  Liquids: {Slp liquid thickness:90381}  Daily Fluid Restriction: {CHP DME Yes amt example:081760051}  Last Modified Barium Swallow with Video (Video Swallowing Test): {Done Not Done MMIU:284826949}    Treatments at the Time of Hospital Discharge:   Respiratory Treatments: ***  Oxygen Therapy:  {Therapy; copd oxygen:47274}  Ventilator:    { CC Vent BSOY:594491534}    Rehab Therapies: {THERAPEUTIC INTERVENTION:5788295807}  Weight Bearing Status/Restrictions: [address] F[address] Weight Bearin}  Other Medical Equipment (for information only, NOT a DME order): {EQUIPMENT:477507257}  Other Treatments: ***    Patient's personal belongings (please select all that are sent with patient):  {CHP DME Belongings:610869581}    RN SIGNATURE:  {Esignature:378223456}    CASE MANAGEMENT/SOCIAL WORK SECTION    Inpatient Status Date: ***    Readmission Risk Assessment Score:  Readmission Risk              Risk of Unplanned Readmission:  0           Discharging to Facility/ Agency   Name: [facility]   Address:  Phone: [phone]  Fax:    Dialysis Facility (if applicable)   Name:  Address:  Dialysis Schedule:  Phone:  Fax:    / signature: Electronically signed by Calin Valdovinos RN on 11/7/22 at 5:43 PM EST    PHYSICIAN SECTION    Prognosis: Good    Condition at Discharge: Stable    Rehab Potential (if transferring to Rehab): Good    Recommended Labs or Other Treatments After Discharge:     Physician Certification: I certify the above information and transfer of Fela May  is necessary for the continuing treatment of the diagnosis listed and that she requires Home Care for greater 30 days.      Update Admission H&P: No change in H&P    PHYSICIAN SIGNATURE:  Electronically signed by Cesilia Zhou MD on 11/7/22 at 12:22 PM EST

## 2022-11-07 NOTE — CARE COORDINATION
11/07/22 1104   Readmission Assessment   Number of Days since last admission? 8-30 days   Previous Disposition Home Alone   Who is being Interviewed Patient   What was the patient's/caregiver's perception as to why they think they needed to return back to the hospital? Other (Comment)  (Crohn's flair up)   Did you visit your Primary Care Physician after you left the hospital, before you returned this time? No   Why weren't you able to visit your PCP? Did not have an appointment   Did you see a specialist, such as Cardiac, Pulmonary, Orthopedic Physician, etc. after you left the hospital? No   Who advised the patient to return to the hospital? Self-referral   Does the patient report anything that got in the way of taking their medications? No   In our efforts to provide the best possible care to you and others like you, can you think of anything that we could have done to help you after you left the hospital the first time, so that you might not have needed to return so soon?  Other (Comment)  (\"different problems\")

## 2022-11-07 NOTE — PROGRESS NOTES
[facility]  CDU / OBSERVATION eNCOUnter  Attending NOte       I performed a history and physical examination of the patient and discussed management with the resident. I reviewed the residents note and agree with the documented findings and plan of care. Any areas of disagreement are noted on the chart. I was personally present for the key portions of any procedures. I have documented in the chart those procedures where I was not present during the key portions. I have reviewed the nurses notes. I agree with the chief complaint, past medical history, past surgical history, allergies, medications, social and family history as documented unless otherwise noted below. The Family history, social history, and ROS are effectively unchanged since admission unless noted elsewhere in the chart. Tolerating oral intake. Continues to report abdominal pain and diarrhea. No vomiting.      Юлия Monroy MD  Attending Emergency  Physician

## 2022-11-07 NOTE — DISCHARGE INSTRUCTIONS
You were seen in the emergency department for ongoing abdominal pain. Given that you have had a bowel movement ad your abdominal pain has improved, you are being discharged. Please follow-up with your primary care doctor as soon as possible. Continue taking all your medications as you were prior to discharge. Return to the emergency department should you have any worsening of your pain, black or bloody stools, recurrent nausea, blood in vomit, chest pain, difficulty breathing or any other acute concern.

## 2022-11-07 NOTE — PLAN OF CARE
Problem: Pain  Goal: Verbalizes/displays adequate comfort level or baseline comfort level  11/7/2022 0950 by Theodore Alexander RN  Outcome: Progressing  Flowsheets (Taken 11/7/2022 0717)  Verbalizes/displays adequate comfort level or baseline comfort level: Encourage patient to monitor pain and request assistance  11/7/2022 0032 by Jens Jha RN  Outcome: Progressing     Problem: Skin/Tissue Integrity  Goal: Absence of new skin breakdown  Description: 1. Monitor for areas of redness and/or skin breakdown  2. Assess vascular access sites hourly  3. Every 4-6 hours minimum:  Change oxygen saturation probe site  4. Every 4-6 hours:  If on nasal continuous positive airway pressure, respiratory therapy assess nares and determine need for appliance change or resting period.   11/7/2022 0950 by Theodore Alexander RN  Outcome: Progressing  11/7/2022 0032 by Jens Jha RN  Outcome: Progressing     Problem: Safety - Adult  Goal: Free from fall injury  11/7/2022 0950 by Theodore Alexander RN  Outcome: Progressing  11/7/2022 0032 by Jens Jha RN  Outcome: Progressing

## 2022-11-08 VITALS
HEART RATE: 68 BPM | BODY MASS INDEX: 25.12 KG/M2 | OXYGEN SATURATION: 96 % | TEMPERATURE: 98.4 F | HEIGHT: 66 IN | DIASTOLIC BLOOD PRESSURE: 86 MMHG | WEIGHT: 156.3 LBS | RESPIRATION RATE: 16 BRPM | SYSTOLIC BLOOD PRESSURE: 153 MMHG

## 2022-11-08 PROCEDURE — G0378 HOSPITAL OBSERVATION PER HR: HCPCS

## 2022-11-08 PROCEDURE — 6370000000 HC RX 637 (ALT 250 FOR IP): Performed by: STUDENT IN AN ORGANIZED HEALTH CARE EDUCATION/TRAINING PROGRAM

## 2022-11-08 PROCEDURE — 96376 TX/PRO/DX INJ SAME DRUG ADON: CPT

## 2022-11-08 PROCEDURE — 6360000002 HC RX W HCPCS: Performed by: STUDENT IN AN ORGANIZED HEALTH CARE EDUCATION/TRAINING PROGRAM

## 2022-11-08 PROCEDURE — 2580000003 HC RX 258: Performed by: STUDENT IN AN ORGANIZED HEALTH CARE EDUCATION/TRAINING PROGRAM

## 2022-11-08 PROCEDURE — 96375 TX/PRO/DX INJ NEW DRUG ADDON: CPT

## 2022-11-08 RX ADMIN — SODIUM CHLORIDE, PRESERVATIVE FREE 10 ML: 5 INJECTION INTRAVENOUS at 09:00

## 2022-11-08 RX ADMIN — SODIUM ZIRCONIUM CYCLOSILICATE 5 G: 5 POWDER, FOR SUSPENSION ORAL at 09:27

## 2022-11-08 RX ADMIN — ONDANSETRON 4 MG: 2 INJECTION INTRAMUSCULAR; INTRAVENOUS at 08:46

## 2022-11-08 NOTE — PROGRESS NOTES
OBS/CDU   RESIDENT NOTE      Patients PCP is:  Devon Rodríguez MD        SUBJECTIVE      No acute events overnight. Patient states she is able to tolerate foods, however at the end of the day around 5 PM in anticipation of her discharge, she was checked up again, states that her abdominal pain suddenly worsened after a bowel movement. Denies any bloody stool, strain, and became more nauseous. We will give ondansetron and reevaluate in 2 hours. Can be discharged pending clinical improvement. Has been able to tolerate a full diet without nausea or vomiting. The patient is urinating on her own and is passing flatus. Denies fever, chills, nausea, vomiting, chest pain, shortness of breath, focal weakness, numbness, tingling, urinary/bowel symptoms, vision changes, visual hallucinations, or headache. PHYSICAL EXAM      General: NAD, AO X 3  Heent: EMOI, PERRL  Neck: SUPPLE, NO JVD  Cardiovascular: RRR, S1S2  Pulmonary: CTAB, NO SOB  Abdomen: SOFT, mild epigastric and suprapubic tenderness, ND, +BS  Extremities: +2/4 PULSES DISTAL, NO SWELLING  Neuro / Psych: NO NUMBNESS OR TINGLING, MENTATION AT BASELINE    PERTINENT TEST /EXAMS      I have reviewed all available laboratory results. MEDICATIONS CURRENT   hydroxypropyl methylcellulose (GONIOSOL) 2.5 % ophthalmic solution 2 drop, PRN  sodium chloride flush 0.9 % injection 5-40 mL, 2 times per day  sodium chloride flush 0.9 % injection 5-40 mL, PRN  0.9 % sodium chloride infusion, PRN  acetaminophen (TYLENOL) tablet 650 mg, Q4H PRN  ondansetron (ZOFRAN-ODT) disintegrating tablet 4 mg, Q8H PRN   Or  ondansetron (ZOFRAN) injection 4 mg, Q6H PRN  sodium zirconium cyclosilicate (LOKELMA) oral suspension 5 g, Daily      All medication charted and reviewed. CONSULTS      IP CONSULT TO HOME CARE NEEDS    ASSESSMENT/PLAN       Primo Esparza is a 61 y.o. female who presents with past medical history of Crohn's disease who presents with abdominal pain. Abdominal pain  Labs are all within normal limits. BUN/creatinine normal.  Patient given Dulcolax to help move bowels, will recheck after passing stool if abdominal pain has subsided. Hyperkalemia  Resolved       Continue home medications and pain control  Monitor vitals, labs, and imaging  DISPO: pending consults and clinical improvement    --  Lora Mcneal MD  Emergency Medicine Resident Physician     This dictation was generated by voice recognition computer software. Although all attempts are made to edit the dictation for accuracy, there may be errors in the transcription that are not intended.

## 2022-11-08 NOTE — PROGRESS NOTES
[address]  CDU / OBSERVATION ENCOUNTER  ATTENDING NOTE       I discussed management with the resident. I reviewed the residents note and agree with the documented findings and plan of care. Any areas of disagreement are noted on the chart. I was personally present for the key portions of any procedures. I have documented in the chart those procedures where I was not present during the key portions. I have reviewed the nurses notes. I agree with the chief complaint, past medical history, past surgical history, allergies, medications, social and family history as documented unless otherwise noted below. The Family history, social history, and ROS are effectively unchanged since admission unless noted elsewhere in the chart. The patient is a 50-year-old female who presents for evaluation of nausea, vomiting and diarrhea. She has history of polysubstance abuse and previous bowel resection from Crohn's with bowel anastomosis. She had slight hypokalemia which was replaced. Labs and CT abdomen pelvis otherwise unremarkable. The patient was given medications to help with constipation. Plan for discharge yesterday but the patient stated that her abdominal pain came back after a bowel movement. She subsequently stayed overnight but felt improved this morning. She has not had any vomiting and has been tolerating a diet. The patient had a discharge order placed last night and was discharged before I could see her this morning.        Corry Kwok DO  Attending Emergency Physician

## 2022-11-08 NOTE — DISCHARGE SUMMARY
CDU Discharge Summary        Patient:  Dayana Branham  YOB: 1965    MRN: [id number]   Acct: [de-identified]    Primary Care Physician: Antonieta Hutton MD    Admit date:  11/4/2022  2:58 PM  Discharge date: 11/08/2022    Discharge Diagnoses:     Acute abdominal pain due to Crohn's flareup  Improved with symptomatic management. Patient was admitted to the hospital for acute abdominal pain associated with diarrhea. CT abdomen pelvis was negative, the patient was admitted for borderline hyperkalemia. GI was not consulted given the patient's normal labs and imaging. The patient's symptoms had improved and she was discharged with return precautions. Follow-up:  Call today/tomorrow for a follow up appointment with Antonieta Hutton MD , or return to the Emergency Room with worsening symptoms    Stressed to patient the importance of following up with primary care doctor for further workup/management of symptoms. Pt verbalizes understanding and agrees with plan. Discharge Medications:  Changes to medications           Medication List        START taking these medications      oxyCODONE 5 MG immediate release tablet  Commonly known as: ROXICODONE  Take 1 tablet by mouth every 6 hours as needed for Pain for up to 3 days. CONTINUE taking these medications      budesonide-formoterol 160-4.5 MCG/ACT Aero  Commonly known as: Symbicort  Inhale 2 puffs into the lungs 2 times daily     diphenhydrAMINE 25 MG tablet  Commonly known as: BENADRYL     FLUoxetine 20 MG capsule  Commonly known as: PROZAC     gabapentin 400 MG capsule  Commonly known as: NEURONTIN  Take 1 capsule by mouth 2 times daily for 30 days. Handicap Placard Misc  by Does not apply route Duration 5 years     HYDROcodone-acetaminophen 5-325 MG per tablet  Commonly known as: NORCO  TAKE 1 TABLET BY MOUTH EVERY 8 HOURS AS NEEDED FOR PAIN FOR UP TO 14 DAYS.      lisinopril 5 MG tablet  Commonly known as: PRINIVIL;ZESTRIL  Take 1 tablet by mouth daily     LYRICA PO     * predniSONE 10 MG tablet  Commonly known as: DELTASONE  Take 3 tablets by mouth daily for 7 days     * predniSONE 10 MG tablet  Commonly known as: DELTASONE  Take 2 tablets by mouth daily for 7 days  Start taking on: November 10, 2022     * predniSONE 10 MG tablet  Commonly known as: DELTASONE  Take 1 tablet by mouth daily for 7 days  Start taking on: November 18, 2022     REFRESH OP     traZODone 150 MG tablet  Commonly known as: DESYREL  Take 1 tablet by mouth nightly           * This list has 3 medication(s) that are the same as other medications prescribed for you. Read the directions carefully, and ask your doctor or other care provider to review them with you. STOP taking these medications      ibuprofen 400 MG tablet  Commonly known as: IBU     nicotine 21 MG/24HR  Commonly known as: Payam Thomas            ASK your doctor about these medications      cholestyramine light 4 g packet  Take 1 packet by mouth daily     lidocaine 5 %  Commonly known as: LIDODERM               Where to Get Your Medications        You can get these medications from any pharmacy    Bring a paper prescription for each of these medications  oxyCODONE 5 MG immediate release tablet         Diet:  ADULT DIET; Regular  ADULT ORAL NUTRITION SUPPLEMENT; Breakfast, Lunch, Dinner; Clear Liquid Oral Supplement , Advance as tolerated     Activity:  As tolerated    Consultants: IP CONSULT TO HOME CARE NEEDS    Procedures:  Not indicated     Diagnostic Test:   Results for orders placed or performed during the hospital encounter of 11/04/22   COVID-19, Rapid    Specimen: Nasopharyngeal Swab   Result Value Ref Range    Specimen Description . NASOPHARYNGEAL SWAB     SARS-CoV-2, Rapid Not Detected Not Detected   CBC with Auto Differential   Result Value Ref Range    WBC 9.4 3.5 - 11.3 k/uL    RBC 5.37 (H) 3.95 - 5.11 m/uL    Hemoglobin 14.9 11.9 - 15.1 g/dL    Hematocrit 46.4 36.3 - 47.1 %    MCV 86.4 82.6 - 102.9 fL    MCH 27.7 25.2 - 33.5 pg    MCHC 32.1 28.4 - 34.8 g/dL    RDW 14.4 11.8 - 14.4 %    Platelets See Reflexed IPF Result 138 - 453 k/uL    NRBC Automated 0.0 0.0 per 100 WBC    Seg Neutrophils 79 (H) 36 - 65 %    Lymphocytes 14 (L) 24 - 43 %    Monocytes 5 3 - 12 %    Eosinophils % 1 1 - 4 %    Basophils 0 0 - 2 %    Immature Granulocytes 1 (H) 0 %    Segs Absolute 7.45 1.50 - 8.10 k/uL    Absolute Lymph # 1.34 1.10 - 3.70 k/uL    Absolute Mono # 0.48 0.10 - 1.20 k/uL    Absolute Eos # 0.09 0.00 - 0.44 k/uL    Basophils Absolute <0.03 0.00 - 0.20 k/uL    Absolute Immature Granulocyte 0.05 0.00 - 0.30 k/uL   Comprehensive Metabolic Panel w/ Reflex to MG   Result Value Ref Range    Glucose 107 (H) 70 - 99 mg/dL    BUN 11 6 - 20 mg/dL    Creatinine 0.65 0.50 - 0.90 mg/dL    Est, Glom Filt Rate >60 >60 mL/min/1.73m2    Calcium 9.7 8.6 - 10.4 mg/dL    Sodium 141 135 - 144 mmol/L    Potassium 5.4 (H) 3.7 - 5.3 mmol/L    Chloride 101 98 - 107 mmol/L    CO2 28 20 - 31 mmol/L    Anion Gap 12 9 - 17 mmol/L    Alkaline Phosphatase 109 (H) 35 - 104 U/L    ALT 12 5 - 33 U/L    AST 13 <32 U/L    Total Bilirubin 0.7 0.3 - 1.2 mg/dL    Total Protein 7.0 6.4 - 8.3 g/dL    Albumin 4.5 3.5 - 5.2 g/dL    Albumin/Globulin Ratio 1.8 1.0 - 2.5   Urinalysis with Reflex to Culture    Specimen: Urine   Result Value Ref Range    Color, UA Yellow Yellow    Turbidity UA Clear Clear    Glucose, Ur NEGATIVE NEGATIVE    Bilirubin Urine NEGATIVE NEGATIVE    Ketones, Urine NEGATIVE NEGATIVE    Specific Gravity, UA 1.009 1.005 - 1.030    Urine Hgb NEGATIVE NEGATIVE    pH, UA 7.0 5.0 - 8.0    Protein, UA NEGATIVE NEGATIVE    Urobilinogen, Urine Normal Normal    Nitrite, Urine NEGATIVE NEGATIVE    Leukocyte Esterase, Urine NEGATIVE NEGATIVE    Urinalysis Comments       Microscopic exam not performed based on chemical results unless requested in original order.    Lipase   Result Value Ref Range    Lipase 48 13 - 60 U/L   Immature Platelet Fraction   Result Value Ref Range    Platelet, Immature Fraction 7.6 1.1 - 10.3 %    Platelet, Fluorescence 196 138 - 453 k/uL   Basic Metabolic Panel   Result Value Ref Range    Glucose 88 70 - 99 mg/dL    BUN 12 6 - 20 mg/dL    Creatinine 0.55 0.50 - 0.90 mg/dL    Est, Glom Filt Rate >60 >60 mL/min/1.73m2    Calcium 8.7 8.6 - 10.4 mg/dL    Sodium 141 135 - 144 mmol/L    Potassium 3.9 3.7 - 5.3 mmol/L    Chloride 103 98 - 107 mmol/L    CO2 24 20 - 31 mmol/L    Anion Gap 14 9 - 17 mmol/L   CBC with Auto Differential   Result Value Ref Range    WBC 6.5 3.5 - 11.3 k/uL    RBC 4.72 3.95 - 5.11 m/uL    Hemoglobin 13.3 11.9 - 15.1 g/dL    Hematocrit 41.1 36.3 - 47.1 %    MCV 87.1 82.6 - 102.9 fL    MCH 28.2 25.2 - 33.5 pg    MCHC 32.4 28.4 - 34.8 g/dL    RDW 14.4 11.8 - 14.4 %    Platelets 003 039 - 772 k/uL    MPV 11.1 8.1 - 13.5 fL    NRBC Automated 0.0 0.0 per 100 WBC    Seg Neutrophils 68 (H) 36 - 65 %    Lymphocytes 22 (L) 24 - 43 %    Monocytes 7 3 - 12 %    Eosinophils % 2 1 - 4 %    Basophils 0 0 - 2 %    Immature Granulocytes 1 (H) 0 %    Segs Absolute 4.45 1.50 - 8.10 k/uL    Absolute Lymph # 1.45 1.10 - 3.70 k/uL    Absolute Mono # 0.47 0.10 - 1.20 k/uL    Absolute Eos # 0.11 0.00 - 0.44 k/uL    Basophils Absolute <0.03 0.00 - 0.20 k/uL    Absolute Immature Granulocyte 0.03 0.00 - 0.30 k/uL     CT HEAD WO CONTRAST    Result Date: 10/22/2022  EXAMINATION: CT OF THE HEAD WITHOUT CONTRAST; CT OF THE CERVICAL SPINE WITHOUT CONTRAST 10/22/2022 2:14 pm TECHNIQUE: CT of the head was performed without the administration of intravenous contrast. Automated exposure control, iterative reconstruction, and/or weight based adjustment of the mA/kV was utilized to reduce the radiation dose to as low as reasonably achievable.; CT of the cervical spine was performed without the administration of intravenous contrast. Multiplanar reformatted images are provided for review.  Automated exposure control, iterative reconstruction, and/or weight based adjustment of the mA/kV was utilized to reduce the radiation dose to as low as reasonably achievable. COMPARISON: 05/18/2022 HISTORY: ORDERING SYSTEM PROVIDED HISTORY: fall, +LOC TECHNOLOGIST PROVIDED HISTORY: fall, +LOC Decision Support Exception - unselect if not a suspected or confirmed emergency medical condition->Emergency Medical Condition ([place]) Reason for Exam: fall, + LOC Additional signs and symptoms: pt states she fell, hitting the left side , front of her head; ORDERING SYSTEM PROVIDED HISTORY: fall, midline posterior neck pain TECHNOLOGIST PROVIDED HISTORY: fall, midline posterior neck pain Decision Support Exception - unselect if not a suspected or confirmed emergency medical condition->Emergency Medical Condition ([place]) Reason for Exam: fall, midline posterior neck pain Additional signs and symptoms: pt states she fell, hitting her left side FINDINGS: HEAD: BRAIN/VENTRICLES: There is no acute intracranial hemorrhage, mass effect or midline shift. No abnormal extra-axial fluid collection. The gray-white differentiation is maintained. There is no evidence of hydrocephalus. ORBITS: The visualized portion of the orbits demonstrate no acute abnormality. SINUSES: The visualized paranasal sinuses and mastoid air cells demonstrate no acute abnormality. SOFT TISSUES/SKULL:  No acute abnormality of the visualized skull or soft tissues. CERVICAL SPINE: BONES/ALIGNMENT: Reversal the normal cervical lordosis appears degenerative. The vertebral body heights are maintained. No acute fracture identified. DEGENERATIVE CHANGES: Advanced multilevel degenerative disc disease and advanced multilevel facet arthropathy. Notable disc osteophyte complex at C6-7 encroaching on the central canal. SOFT TISSUES: There is no prevertebral soft tissue swelling. No acute CT abnormality identified in the brain. No acute osseous abnormality identified in the cervical spine. CT CERVICAL SPINE WO CONTRAST    Result Date: 10/22/2022  EXAMINATION: CT OF THE HEAD WITHOUT CONTRAST; CT OF THE CERVICAL SPINE WITHOUT CONTRAST 10/22/2022 2:14 pm TECHNIQUE: CT of the head was performed without the administration of intravenous contrast. Automated exposure control, iterative reconstruction, and/or weight based adjustment of the mA/kV was utilized to reduce the radiation dose to as low as reasonably achievable.; CT of the cervical spine was performed without the administration of intravenous contrast. Multiplanar reformatted images are provided for review. Automated exposure control, iterative reconstruction, and/or weight based adjustment of the mA/kV was utilized to reduce the radiation dose to as low as reasonably achievable. COMPARISON: 05/18/2022 HISTORY: ORDERING SYSTEM PROVIDED HISTORY: fall, +LOC TECHNOLOGIST PROVIDED HISTORY: fall, +LOC Decision Support Exception - unselect if not a suspected or confirmed emergency medical condition->Emergency Medical Condition (MA) Reason for Exam: fall, + LOC Additional signs and symptoms: pt states she fell, hitting the left side , front of her head; ORDERING SYSTEM PROVIDED HISTORY: fall, midline posterior neck pain TECHNOLOGIST PROVIDED HISTORY: fall, midline posterior neck pain Decision Support Exception - unselect if not a suspected or confirmed emergency medical condition->Emergency Medical Condition (MA) Reason for Exam: fall, midline posterior neck pain Additional signs and symptoms: pt states she fell, hitting her left side FINDINGS: HEAD: BRAIN/VENTRICLES: There is no acute intracranial hemorrhage, mass effect or midline shift. No abnormal extra-axial fluid collection. The gray-white differentiation is maintained. There is no evidence of hydrocephalus. ORBITS: The visualized portion of the orbits demonstrate no acute abnormality. SINUSES: The visualized paranasal sinuses and mastoid air cells demonstrate no acute abnormality.  SOFT TISSUES/SKULL:  No acute abnormality of the visualized skull or soft tissues. CERVICAL SPINE: BONES/ALIGNMENT: Reversal the normal cervical lordosis appears degenerative. The vertebral body heights are maintained. No acute fracture identified. DEGENERATIVE CHANGES: Advanced multilevel degenerative disc disease and advanced multilevel facet arthropathy. Notable disc osteophyte complex at C6-7 encroaching on the central canal. SOFT TISSUES: There is no prevertebral soft tissue swelling. No acute CT abnormality identified in the brain. No acute osseous abnormality identified in the cervical spine. CT THORACIC SPINE WO CONTRAST    Result Date: 10/22/2022  EXAMINATION: CT OF THE CHEST, ABDOMEN, AND PELVIS WITH CONTRAST; CT OF THE THORACIC SPINE WITHOUT CONTRAST 10/22/2022 2:14 pm TECHNIQUE: CT of the chest, abdomen and pelvis was performed with the administration of intravenous contrast. Multiplanar reformatted images are provided for review. Automated exposure control, iterative reconstruction, and/or weight based adjustment of the mA/kV was utilized to reduce the radiation dose to as low as reasonably achievable.; CT of the thoracic spine was performed without the administration of intravenous contrast. Multiplanar reformatted images are provided for review. Automated exposure control, iterative reconstruction, and/or weight based adjustment of the mA/kV was utilized to reduce the radiation dose to as low as reasonably achievable.  COMPARISON: CT abdomen and pelvis 08/20/2022 HISTORY: ORDERING SYSTEM PROVIDED HISTORY: diffuse chest, abdominal, and pelvic pain,concern for left hip fracture TECHNOLOGIST PROVIDED HISTORY: diffuse chest, abdominal, and pelvic pain,concern for left hip fracture Decision Support Exception - unselect if not a suspected or confirmed emergency medical condition->Emergency Medical Condition ([place]) Reason for Exam: diffuse chest, abdominal, and pelvic pain,concern for left hip fracture Additional signs and symptoms: pt states she fell a couple days ago, all over pain Relevant Medical/Surgical History: suggeries-bowel obstruction x 2, cholecystectomy, tubal ligation; ORDERING SYSTEM PROVIDED HISTORY: fall, upper thoracic pain TECHNOLOGIST PROVIDED HISTORY: fall, upper thoracic pain Reason for Exam: fall, upper thoracic pain Additional signs and symptoms: pt states she fell, hitting her left side CHEST: Mediastinum: No acute aortic abnormality identified. No mediastinal hematoma. No pericardial effusion. Lungs/pleura: No acute airspace disease, pneumothorax or effusion. Soft Tissues/Bones: No acute osseous abnormality identified in this region. No soft tissue hematoma. Abdomen/Pelvis: Organs: No solid organ injury identified. No acute inflammatory process. Status post cholecystectomy. Benign area of hypoattenuation in segment 4 the liver may represent a cyst or possibly fat deposition. GI/Bowel: There is no bowel dilatation or wall thickening identified. Status post partial small-bowel resection. Pelvis: No acute findings. Appropriate excretion of contrast is demonstrated into the bladder. Peritoneum/Retroperitoneum: No free air. No free fluid. The aorta is normal in caliber. The visceral branches are patent. Bones/Soft Tissues: Pelvic alignment maintained. No fracture identified. Severe arthropathy in the left hip with prominent subchondral cyst formation and prominent hypertrophic changes again noted. Moderately severe joint space loss in the superior aspect of the right hip with marginal hypertrophic changes also noted. No soft tissue hematoma. Small fat containing umbilical hernia. Lumbar vertebral body heights are maintained with multilevel degenerative disc disease and lower lumbar facet arthropathy again noted. THORACIC: BONES/ALIGNMENT: There is normal alignment of the spine. The vertebral body heights are maintained. No osseous destructive lesion is seen.  DEGENERATIVE CHANGES: No gross spinal canal stenosis or bony neural foraminal narrowing of the thoracic spine. SOFT TISSUES: No paraspinal hematoma. *Unless otherwise specified, incidental findings do not require dedicated imaging follow-up. 1.  No acute traumatic injury identified in the chest, abdomen or pelvis. 2.  Advanced arthropathy in the left hip, as described, without appreciable change since CT exam 08/20/2022. 3.  No acute osseous abnormality identified in the thoracic spine. CT LUMBAR SPINE WO CONTRAST    Result Date: 10/22/2022  EXAMINATION: CT OF THE LUMBAR SPINE WITHOUT CONTRAST  10/22/2022 TECHNIQUE: CT of the lumbar spine was performed without the administration of intravenous contrast. Multiplanar reformatted images are provided for review. Adjustment of mA and/or kV according to patient size was utilized. Automated exposure control, iterative reconstruction, and/or weight based adjustment of the mA/kV was utilized to reduce the radiation dose to as low as reasonably achievable. COMPARISON: CT abdomen and pelvis today and 08/20/2022 HISTORY: ORDERING SYSTEM PROVIDED HISTORY: fall, lumbar pain TECHNOLOGIST PROVIDED HISTORY: fall, lumbar pain Decision Support Exception - unselect if not a suspected or confirmed emergency medical condition->Emergency Medical Condition (MA) Reason for Exam: fall, lumbar pain Additional signs and symptoms: pt states she fell, hitting her left side FINDINGS: BONES/ALIGNMENT: There is normal alignment of the spine. The vertebral body heights are maintained. No osseous destructive lesion is seen. DEGENERATIVE CHANGES: Moderately severe disc disease in the lower lumbar spine with lower lumbar facet arthropathy. Mild encroachment on the central canal by the disc disease notable at L4-5 and L5-S1. SOFT TISSUES/RETROPERITONEUM: No soft tissue hematoma. No acute osseous abnormality identified in the lumbar spine.  RECOMMENDATIONS: Unavailable     CT ABDOMEN PELVIS W IV CONTRAST Additional Contrast? None    Result Date: 11/4/2022  EXAM: CT Abdomen and Pelvis With Intravenous Contrast EXAM DATE/TIME: 11/4/2022 6:52 pm CLINICAL HISTORY: ORDERING SYSTEM PROVIDED  sever abdominal pain  TECHNOLOGIST PROVIDED HISTORY:  sever abdominal pain  Decision Support Exception - unselect if not a suspected or confirmed emergency medical condition->Emergency Medical Condition ([place]) TECHNIQUE: Axial computed tomography images of the abdomen and pelvis with intravenous contrast.  This CT exam was performed using one or more of the following dose reduction techniques:  automated exposure control, adjustment of the mA and/or kV according to patient size, and/or use of iterative reconstruction technique. COMPARISON: No relevant prior studies available. FINDINGS: Lung bases:  No acute findings. No mass. No consolidation. ABDOMEN: Liver:  No acute findings. No mass. Gallbladder and bile ducts:  Prior cholecystectomy. No ductal dilation. Pancreas:  No acute findings. No mass. No ductal dilation. Spleen:  No acute findings. No splenomegaly. Adrenals:  No acute findings. No mass. Kidneys and ureters:  No acute findings. No solid mass. No hydronephrosis. Stomach and bowel:  Evidence of prior bowel anastomosis in the anterior mid abdomen with probable denervation dilation this segment of small bowel as it is unchanged. Otherwise nonspecific bowel gas pattern with no current evidence of intestinal obstruction. No mucosal thickening. PELVIS: Appendix:  A normal appearing appendix is noted. Bladder:  No acute findings. No mass. Reproductive:  Unremarkable as visualized. ABDOMEN and PELVIS: Intraperitoneal space:  No acute findings. No free air. No significant fluid collection. Bones/joints:  Multilevel degenerative changes of the spine. No definite acute bony abnormality is noted. Marked degenerative changes of the left more so than right hip. No dislocation.  Soft tissues:  Fat containing umbilical ventral hernia, unchanged. Vasculature:  No acute findings. No abdominal aortic aneurysm. Lymph nodes:  No acute findings. No enlarged lymph nodes. 1.  A normal appearing appendix is noted. 2.  Evidence of prior bowel anastomosis in the anterior mid abdomen with probable denervation dilation this segment of small bowel as it is unchanged. Otherwise nonspecific bowel gas pattern with no current evidence of intestinal obstruction. 3.  Otherwise no acute pathology or significant change noted. CT CHEST ABDOMEN PELVIS W CONTRAST Additional Contrast? None    Result Date: 10/22/2022  EXAMINATION: CT OF THE CHEST, ABDOMEN, AND PELVIS WITH CONTRAST; CT OF THE THORACIC SPINE WITHOUT CONTRAST 10/22/2022 2:14 pm TECHNIQUE: CT of the chest, abdomen and pelvis was performed with the administration of intravenous contrast. Multiplanar reformatted images are provided for review. Automated exposure control, iterative reconstruction, and/or weight based adjustment of the mA/kV was utilized to reduce the radiation dose to as low as reasonably achievable.; CT of the thoracic spine was performed without the administration of intravenous contrast. Multiplanar reformatted images are provided for review. Automated exposure control, iterative reconstruction, and/or weight based adjustment of the mA/kV was utilized to reduce the radiation dose to as low as reasonably achievable.  COMPARISON: CT abdomen and pelvis 08/20/2022 HISTORY: ORDERING SYSTEM PROVIDED HISTORY: diffuse chest, abdominal, and pelvic pain,concern for left hip fracture TECHNOLOGIST PROVIDED HISTORY: diffuse chest, abdominal, and pelvic pain,concern for left hip fracture Decision Support Exception - unselect if not a suspected or confirmed emergency medical condition->Emergency Medical Condition (MA) Reason for Exam: diffuse chest, abdominal, and pelvic pain,concern for left hip fracture Additional signs and symptoms: pt states she fell a couple days ago, all over pain Relevant Medical/Surgical History: suggeries-bowel obstruction x 2, cholecystectomy, tubal ligation; ORDERING SYSTEM PROVIDED HISTORY: fall, upper thoracic pain TECHNOLOGIST PROVIDED HISTORY: fall, upper thoracic pain Reason for Exam: fall, upper thoracic pain Additional signs and symptoms: pt states she fell, hitting her left side CHEST: Mediastinum: No acute aortic abnormality identified. No mediastinal hematoma. No pericardial effusion. Lungs/pleura: No acute airspace disease, pneumothorax or effusion. Soft Tissues/Bones: No acute osseous abnormality identified in this region. No soft tissue hematoma. Abdomen/Pelvis: Organs: No solid organ injury identified. No acute inflammatory process. Status post cholecystectomy. Benign area of hypoattenuation in segment 4 the liver may represent a cyst or possibly fat deposition. GI/Bowel: There is no bowel dilatation or wall thickening identified. Status post partial small-bowel resection. Pelvis: No acute findings. Appropriate excretion of contrast is demonstrated into the bladder. Peritoneum/Retroperitoneum: No free air. No free fluid. The aorta is normal in caliber. The visceral branches are patent. Bones/Soft Tissues: Pelvic alignment maintained. No fracture identified. Severe arthropathy in the left hip with prominent subchondral cyst formation and prominent hypertrophic changes again noted. Moderately severe joint space loss in the superior aspect of the right hip with marginal hypertrophic changes also noted. No soft tissue hematoma. Small fat containing umbilical hernia. Lumbar vertebral body heights are maintained with multilevel degenerative disc disease and lower lumbar facet arthropathy again noted. THORACIC: BONES/ALIGNMENT: There is normal alignment of the spine. The vertebral body heights are maintained. No osseous destructive lesion is seen. DEGENERATIVE CHANGES: No gross spinal canal stenosis or bony neural foraminal narrowing of the thoracic spine.  SOFT TISSUES: No paraspinal hematoma. *Unless otherwise specified, incidental findings do not require dedicated imaging follow-up. 1.  No acute traumatic injury identified in the chest, abdomen or pelvis. 2.  Advanced arthropathy in the left hip, as described, without appreciable change since CT exam 08/20/2022. 3.  No acute osseous abnormality identified in the thoracic spine. XR HIP 2-3 VW W PELVIS LEFT    Result Date: 10/22/2022  EXAMINATION: ONE XRAY VIEW OF THE PELVIS AND TWO XRAY VIEWS LEFT HIP 10/22/2022 1:49 pm COMPARISON: CT abdomen and pelvis 08/20/2022. Radiographs 05/15/2022. HISTORY: ORDERING SYSTEM PROVIDED HISTORY: fall, possible left hip fracture TECHNOLOGIST PROVIDED HISTORY: fall, possible left hip fracture Reason for Exam: fall, possible left hip fracture Additional signs and symptoms: PT states fall a few days ago and states lower back pain and left hip pain. States left hip pain is chronic FINDINGS: Severe arthropathy in the left hip again demonstrated. Mature ossification and overgrowth about the superior acetabulum again demonstrated. Mild flattening of the superior aspect of the femoral head appears more prominent since the prior exam with large subchondral cyst formation. No evidence for femoral neck fracture. Pelvic alignment is maintained. Severe arthropathy in the left hip. Mild flattening of the superior aspect of the femoral head with large subchondral cyst formation appears more prominent since prior radiographs, which may be related to trauma or articular surface collapse related to arthropathy. Physical Exam:    General appearance - NAD, AOx 3   Lungs -CTAB, no R/R/R  Heart - RRR, no M/R/G  Abdomen - Soft, distended, mild epigastric tenderness much improved from admission.   Neurological:  MAEx4, No focal motor deficit, sensory loss  Extremities - Cap refil <2 sec in all ext., no edema  Skin -warm, dry      Hospital Course:  Clinical course has improved, labs and imaging reviewed. Alix Ochoa originally presented to the hospital on 11/4/2022  2:58 PM. with abdominal pain associate with nausea. At that time it was determined that She required further observation and symptomatic management. She was admitted and labs and imaging were followed daily. Imaging results as above. She is medically stable to be discharged. Disposition: Home    Patient stated that they will not drive themselves home from the hospital if they have gotten pain killers/ narcotics earlier that day and that they will arrange for transportation on their own or work with the  for a ride. Patient counseled NOT to drive while under the influence of narcotics/ pain killers. Condition: Good    Patient stable and ready for discharge home. I have discussed plan of care with patient and they are in understanding. They were instructed to read discharge paperwork. All of their questions and concerns were addressed. Time Spent: 0 day      --  Lora Mcneal MD  Emergency Medicine Resident Physician    This dictation was generated by voice recognition computer software. Although all attempts are made to edit the dictation for accuracy, there may be errors in the transcription that are not intended.

## 2022-11-09 ENCOUNTER — TELEPHONE (OUTPATIENT)
Dept: ORTHOPEDIC SURGERY | Age: 57
End: 2022-11-09

## 2022-11-09 DIAGNOSIS — M16.32 OSTEOARTHRITIS RESULTING FROM LEFT HIP DYSPLASIA: ICD-10-CM

## 2022-11-09 NOTE — PROGRESS NOTES
OBS/CDU   RESIDENT NOTE      Patients PCP is:  Devon Rodríguez MD        SUBJECTIVE      No acute events overnight. Has been able to tolerate a full diet without nausea or vomiting. The patient is urinating on her own and is passing flatus. Denies fever, chills, nausea, vomiting, chest pain, shortness of breath, abdominal pain, focal weakness, numbness, tingling, urinary/bowel symptoms, vision changes, visual hallucinations, or headache. PHYSICAL EXAM      General: NAD, AO X 3  Heent: EMOI, PERRL  Neck: SUPPLE, NO JVD  Cardiovascular: RRR, S1S2  Pulmonary: CTAB, NO SOB  Abdomen: SOFT, mild epigastric tenderness, ND, +BS  Extremities: +2/4 PULSES DISTAL, NO SWELLING  Neuro / Psych: NO NUMBNESS OR TINGLING, MENTATION AT BASELINE    PERTINENT TEST /EXAMS      I have reviewed all available laboratory results. MEDICATIONS CURRENT   No current facility-administered medications for this encounter. All medication charted and reviewed. CONSULTS      IP CONSULT TO HOME CARE NEEDS    ASSESSMENT/PLAN       Primo Esparza is a 61 y.o. female who presents with past medical history of Crohn's disease who presents with abdominal pain. Abdominal pain  Labs are all within normal limits. BUN/creatinine normal.  Patient given Dulcolax to help move bowels, will recheck after passing stool if abdominal pain has subsided. Hyperkalemia  Resolved     Continue home medications and pain control  Monitor vitals, labs, and imaging    DISPO: Discharge today    --  Klarissa Blackwell MD  Emergency Medicine Resident Physician     This dictation was generated by voice recognition computer software. Although all attempts are made to edit the dictation for accuracy, there may be errors in the transcription that are not intended.

## 2022-11-09 NOTE — TELEPHONE ENCOUNTER
Patient is requesting a refill of pain medication. Noted in chart pt was prescribed roxicodone on 11/7 by felix Bravo MD for Crohn's colitis. Please advise.

## 2022-11-10 RX ORDER — HYDROCODONE BITARTRATE AND ACETAMINOPHEN 5; 325 MG/1; MG/1
1 TABLET ORAL EVERY 8 HOURS PRN
Qty: 42 TABLET | Refills: 0 | Status: SHIPPED | OUTPATIENT
Start: 2022-11-10 | End: 2022-11-25 | Stop reason: SDUPTHER

## 2022-11-10 NOTE — TELEPHONE ENCOUNTER
I spoke with the patient and it looks like that roxicodone prescription was a medication prescribed on ED discharge for a 3 day supply. Today is the patients last day supply.

## 2022-11-25 DIAGNOSIS — M16.32 OSTEOARTHRITIS RESULTING FROM LEFT HIP DYSPLASIA: ICD-10-CM

## 2022-11-25 RX ORDER — HYDROCODONE BITARTRATE AND ACETAMINOPHEN 5; 325 MG/1; MG/1
1 TABLET ORAL EVERY 8 HOURS PRN
Qty: 42 TABLET | Refills: 0 | Status: SHIPPED | OUTPATIENT
Start: 2022-11-25 | End: 2022-12-09

## 2022-11-25 NOTE — TELEPHONE ENCOUNTER
Pt is requesting refill on HYDROcodone-acetaminophen (NORCO) 5-325 MG per tablet      Last office visit 06/14/22

## 2022-12-08 DIAGNOSIS — M16.32 OSTEOARTHRITIS RESULTING FROM LEFT HIP DYSPLASIA: ICD-10-CM

## 2022-12-08 RX ORDER — HYDROCODONE BITARTRATE AND ACETAMINOPHEN 5; 325 MG/1; MG/1
1 TABLET ORAL EVERY 8 HOURS PRN
Qty: 42 TABLET | Refills: 0 | Status: SHIPPED | OUTPATIENT
Start: 2022-12-08 | End: 2022-12-22

## 2022-12-22 DIAGNOSIS — M16.32 OSTEOARTHRITIS RESULTING FROM LEFT HIP DYSPLASIA: ICD-10-CM

## 2022-12-22 RX ORDER — HYDROCODONE BITARTRATE AND ACETAMINOPHEN 5; 325 MG/1; MG/1
1 TABLET ORAL EVERY 8 HOURS PRN
Qty: 42 TABLET | Refills: 0 | Status: SHIPPED | OUTPATIENT
Start: 2022-12-22 | End: 2023-01-05

## 2023-01-02 PROBLEM — E87.5 HYPERKALEMIA: Status: ACTIVE | Noted: 2023-01-02

## 2023-01-02 PROBLEM — M16.0 PRIMARY OSTEOARTHRITIS OF BOTH HIPS: Status: ACTIVE | Noted: 2023-01-02

## 2023-01-04 DIAGNOSIS — M16.32 OSTEOARTHRITIS RESULTING FROM LEFT HIP DYSPLASIA: ICD-10-CM

## 2023-01-04 RX ORDER — HYDROCODONE BITARTRATE AND ACETAMINOPHEN 5; 325 MG/1; MG/1
1 TABLET ORAL 2 TIMES DAILY
Qty: 28 TABLET | Refills: 0 | Status: SHIPPED | OUTPATIENT
Start: 2023-01-04 | End: 2023-01-18

## 2023-01-04 NOTE — TELEPHONE ENCOUNTER
Patient notified- stated that she did not want to make appointment today due to her being sick and that she will call office to set up appointment at later date.

## 2023-01-18 DIAGNOSIS — M16.32 OSTEOARTHRITIS RESULTING FROM LEFT HIP DYSPLASIA: ICD-10-CM

## 2023-01-19 ENCOUNTER — APPOINTMENT (OUTPATIENT)
Dept: GENERAL RADIOLOGY | Age: 58
End: 2023-01-19
Payer: MEDICARE

## 2023-01-19 ENCOUNTER — TELEPHONE (OUTPATIENT)
Dept: ORTHOPEDIC SURGERY | Age: 58
End: 2023-01-19

## 2023-01-19 ENCOUNTER — HOSPITAL ENCOUNTER (EMERGENCY)
Age: 58
Discharge: HOME | End: 2023-01-19
Attending: EMERGENCY MEDICINE
Payer: MEDICARE

## 2023-01-19 VITALS
BODY MASS INDEX: 24.11 KG/M2 | WEIGHT: 150 LBS | RESPIRATION RATE: 18 BRPM | HEIGHT: 66 IN | HEART RATE: 63 BPM | DIASTOLIC BLOOD PRESSURE: 70 MMHG | SYSTOLIC BLOOD PRESSURE: 125 MMHG | TEMPERATURE: 97 F | OXYGEN SATURATION: 98 %
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
## 2023-01-19 PROCEDURE — 6370000000 HC RX 637 (ALT 250 FOR IP): Performed by: STUDENT IN AN ORGANIZED HEALTH CARE EDUCATION/TRAINING PROGRAM

## 2023-01-19 PROCEDURE — 73030 X-RAY EXAM OF SHOULDER: CPT

## 2023-01-19 PROCEDURE — 93005 ELECTROCARDIOGRAM TRACING: CPT | Performed by: STUDENT IN AN ORGANIZED HEALTH CARE EDUCATION/TRAINING PROGRAM

## 2023-01-19 PROCEDURE — 73502 X-RAY EXAM HIP UNI 2-3 VIEWS: CPT

## 2023-01-19 PROCEDURE — 73080 X-RAY EXAM OF ELBOW: CPT

## 2023-01-19 PROCEDURE — 99283 EMERGENCY DEPT VISIT LOW MDM: CPT

## 2023-01-19 RX ORDER — ACETAMINOPHEN 500 MG
1000 TABLET ORAL ONCE
Status: COMPLETED | OUTPATIENT
Start: 2023-01-19 | End: 2023-01-19

## 2023-01-19 RX ORDER — OXYCODONE HYDROCHLORIDE 5 MG/1
5 TABLET ORAL ONCE
Status: COMPLETED | OUTPATIENT
Start: 2023-01-19 | End: 2023-01-19

## 2023-01-19 RX ORDER — HYDROCODONE BITARTRATE AND ACETAMINOPHEN 5; 325 MG/1; MG/1
1 TABLET ORAL 2 TIMES DAILY
Qty: 28 TABLET | Refills: 0 | Status: SHIPPED | OUTPATIENT
Start: 2023-01-19 | End: 2023-02-02

## 2023-01-19 RX ADMIN — ACETAMINOPHEN 1000 MG: 500 TABLET ORAL at 18:44

## 2023-01-19 RX ADMIN — OXYCODONE HYDROCHLORIDE 5 MG: 5 TABLET ORAL at 19:40

## 2023-01-19 ASSESSMENT — PAIN - FUNCTIONAL ASSESSMENT
PAIN_FUNCTIONAL_ASSESSMENT: PREVENTS OR INTERFERES SOME ACTIVE ACTIVITIES AND ADLS
PAIN_FUNCTIONAL_ASSESSMENT: 0-10

## 2023-01-19 ASSESSMENT — PAIN SCALES - GENERAL
PAINLEVEL_OUTOF10: 10
PAINLEVEL_OUTOF10: 9

## 2023-01-19 ASSESSMENT — ENCOUNTER SYMPTOMS
VOMITING: 0
DIARRHEA: 0
PHOTOPHOBIA: 0
NAUSEA: 0
CHEST TIGHTNESS: 0
COUGH: 0
ABDOMINAL PAIN: 0

## 2023-01-19 ASSESSMENT — PAIN DESCRIPTION - ORIENTATION
ORIENTATION: LEFT
ORIENTATION: LEFT

## 2023-01-19 ASSESSMENT — PAIN DESCRIPTION - PAIN TYPE: TYPE: ACUTE PAIN

## 2023-01-19 ASSESSMENT — PAIN DESCRIPTION - LOCATION
LOCATION: LEG
LOCATION: LEG;HIP

## 2023-01-19 ASSESSMENT — PAIN DESCRIPTION - FREQUENCY: FREQUENCY: CONTINUOUS

## 2023-01-19 ASSESSMENT — PAIN DESCRIPTION - DESCRIPTORS: DESCRIPTORS: BURNING;CRUSHING

## 2023-01-19 NOTE — ED PROVIDER NOTES
[facility] ED  Emergency Department  Emergency Medicine Resident Sign-out     Care of Florinda Hatchet was assumed from Dr. Nickolas Lewis and is being seen for Fall (Hit head) and Leg Pain (Left)  . The patient's initial evaluation and plan have been discussed with the prior provider who initially evaluated the patient. EMERGENCY DEPARTMENT COURSE / MEDICAL DECISION MAKING:       MEDICATIONS GIVEN:  Orders Placed This Encounter   Medications    acetaminophen (TYLENOL) tablet 1,000 mg       LABS / RADIOLOGY:     Labs Reviewed - No data to display    No results found. RECENT VITALS:     Temp: 97 °F (36.1 °C),  Heart Rate: 63, Resp: 18, BP: 125/70, SpO2: 98 %    This patient is a 61 y.o. Female presenting after a fall. States she hit her head, no loss of consciousness, photophobia, visual disturbance, focal weakness. Complains of arthralgias at the right shoulder, left elbow, left hip. X-rays have been ordered and are to be completed. Plan is to follow-up on x-rays to rule out fracture and likely dispo home. No requirement for head CT, no neurologic defects, does not meet [place] head CT rule requirements. OUTSTANDING TASKS / RECOMMENDATIONS:    X-ray     FINAL IMPRESSION:     1. Fall, initial encounter        DISPOSITION:         DISPOSITION:  [x]  Discharge   []  Transfer -    []  Admission -     []  Against Medical Advice   []  Eloped   FOLLOW-UP: [address] [facility]  [phone]    Schedule an appointment as soon as possible for a visit       [facility] ED  [address] [place] [address]  [phone]    If you develop headaches, light sensitivity, weakness, nausea, vomiting, or other urgent concerns.      DISCHARGE MEDICATIONS: New Prescriptions    No medications on file           Seth Caraballo DO  Emergency Medicine Resident  [facility]       [facility] [employer]  Resident  01/19/23 0532

## 2023-01-19 NOTE — DISCHARGE INSTRUCTIONS
Thank you for coming to [facility] emergency department! You were seen today for fall, you have no new fractures, but some arthritis in your joints which are not related to the fall. You were prescribed norco, please pick these medications up at the pharmacy. Follow up with your primary care doctor. If you have any worsening of symptoms or any other concerns, please return to the emergency department. We are always available!

## 2023-01-19 NOTE — ED PROVIDER NOTES
[facility]  ED  Emergency Department Encounter  Emergency Medicine Resident     Pt Samil Sherwin Ring  MRN: [id number]  [place] 1965  Date of evaluation: 1/19/23  PCP:  Jessi Howard DO  Note Started: 6:10 PM EST      CHIEF COMPLAINT       Chief Complaint   Patient presents with    Fall     Hit head    Leg Pain     Left       HISTORY OF PRESENT ILLNESS  (Location/Symptom, Timing/Onset, Context/Setting, Quality, Duration, Modifying Factors, Severity.)      Joie Neri is a 61 y.o. female who presents with with concern for a fall. This fall occurred about 2 to 3 hours prior to presentation to the emergency department. The patient states that she had some mild dizziness, lost her balance, and then fell. She landed onto her left side, braced her fall with her elbows but also hit her head. No loss of consciousness. She is not on any blood thinners. At the time of assessment she is complaining of headache, right shoulder pain, left elbow pain, and left hip pain. She was able to ambulate into her room with a walker. Denies any visual disturbance, nausea, vomiting, weakness or paresthesia. At home she states she takes Norco, gabapentin, Lyrica for pain. She has only taken gabapentin this morning. Has not taken any of her other medications today. PAST MEDICAL / SURGICAL / SOCIAL / FAMILY HISTORY      has a past medical history of Acid reflux, Anxiety, Arthritis, Asthma, Bipolar 1 disorder ([address] Bowel obstruction ([address] COPD (chronic obstructive pulmonary disease) ([address] Crohn disease ([address] Depression, Dry eye, Fibromyalgia, Gout, Headache, Hyperlipidemia, Hypertension, Hypothyroidism, Kidney stones, Muscle spasm, Neuropathy, Pain, Personal history of other medical treatment, Wears partial dentures, and Wellness examination. has a past surgical history that includes Tonsillectomy and adenoidectomy; Tubal ligation; Cholecystectomy; Bunionectomy (Left);  Colonoscopy (04/30/2007); Colonoscopy (10/12/2017); Abdomen surgery; Upper gastrointestinal endoscopy (10/25/2021); Colonoscopy (10/25/2021); and Colonoscopy (10/25/2021). Social History     Socioeconomic History    Marital status: Single     Spouse name: Not on file    Number of children: Not on file    Years of education: Not on file    Highest education level: Not on file   Occupational History    Not on file   Tobacco Use    Smoking status: Every Day     Packs/day: 0.50     Years: 37.00     Pack years: 18.50     Types: Cigarettes    Smokeless tobacco: Former     Types: Chew     Quit date: 9/3/2001   Vaping Use    Vaping Use: Former    Quit date: 9/3/2019    Substances: Nicotine   Substance and Sexual Activity    Alcohol use: Not Currently    Drug use: Yes     Types: Marijuana (Weed)     Comment: h/o of substance abuse but denies drug use    Sexual activity: Not on file   Other Topics Concern    Not on file   Social History Narrative    Not on file     Social Determinants of Health     Financial Resource Strain: Not on file   Food Insecurity: Not on file   Transportation Needs: Not on file   Physical Activity: Not on file   Stress: Not on file   Social Connections: Not on file   Intimate Partner Violence: Not on file   Housing Stability: Not on file       Family History   Problem Relation Age of Onset    Diabetes Father     Lung Cancer Father     Hypertension Mother     Cancer Mother     High Blood Pressure Mother     Crohn's Disease Brother     Heart Disease Brother        Allergies:  Azithromycin, Methylprednisolone, Penicillins, and Tape [adhesive tape]    Home Medications:  Prior to Admission medications    Medication Sig Start Date End Date Taking? Authorizing Provider   HYDROcodone-acetaminophen (NORCO) 5-325 MG per tablet TAKE 1 TABLET BY MOUTH IN THE MORNING AND AT BEDTIME FOR 14 DAYS.  MAX DAILY AMOUNT: 2 TABLETS 1/19/23 2/2/23  Irma Phoenix, MD   gabapentin (NEURONTIN) 400 MG capsule Take 1 capsule by mouth 2 times daily for 30 days. 10/25/22 11/24/22  Rachele Max MD   traZODone (DESYREL) 150 MG tablet Take 1 tablet by mouth nightly 10/25/22   Rachele Max MD   cholestyramine light 4 g packet Take 1 packet by mouth daily  Patient not taking: Reported on 11/5/2022 10/26/22   Rachele Max MD   FLUoxetine (PROZAC) 20 MG capsule Take 60 mg by mouth daily    Historical Provider, MD   Handicap Placard MISC by Does not apply route Duration 5 years 5/17/22   Marshal Herrera MD   Pregabalin (LYRICA PO) Take 300 mg by mouth 2 times daily    Historical Provider, MD   ibuprofen (IBU) 400 MG tablet Take 1 tablet by mouth every 6 hours as needed for Pain 5/15/22 9/18/22  Ron Griffin DO   diphenhydrAMINE (BENADRYL) 25 MG tablet Take 25 mg by mouth nightly as needed    Historical Provider, MD   budesonide-formoterol (SYMBICORT) 160-4.5 MCG/ACT AERO Inhale 2 puffs into the lungs 2 times daily 9/19/21   Gabrielle Elizondo MD   lisinopril (PRINIVIL;ZESTRIL) 5 MG tablet Take 1 tablet by mouth daily 9/20/21   Gabrielle Elizondo MD   nicotine (NICODERM CQ) 21 MG/24HR Place 1 patch onto the skin daily  Patient not taking: Reported on 8/20/2022 9/20/21 8/23/22  Gabrielle Elizondo MD   Polyvinyl Alcohol-Povidone (REFRESH OP) Place 1 drop into both eyes 3 times daily  Patient not taking: Reported on 11/5/2022    Historical Provider, MD   lidocaine (LIDODERM) 5 % Place 1 patch onto the skin daily as needed for Pain 12 hours on, 12 hours off. Patient not taking: Reported on 11/5/2022    Historical Provider, MD         REVIEW OF SYSTEMS       Review of Systems   Constitutional:  Negative for activity change, appetite change, chills, fatigue and fever. Eyes:  Negative for photophobia and visual disturbance. Respiratory:  Negative for cough and chest tightness. Cardiovascular:  Negative for chest pain. Gastrointestinal:  Negative for abdominal pain, diarrhea, nausea and vomiting. Musculoskeletal:  Positive for arthralgias and myalgias. Negative for joint swelling, neck pain and neck stiffness. Skin:  Negative for wound. Neurological:  Positive for dizziness. Negative for syncope and weakness. PHYSICAL EXAM      INITIAL VITALS:   /70   Pulse 63   Temp 97 °F (36.1 °C) (Oral)   Resp 18   Ht 5' 6\" (1.676 m)   Wt 150 lb (68 kg)   SpO2 98%   BMI 24.21 kg/m²     Physical Exam  Vitals reviewed. Constitutional:       General: She is not in acute distress. Appearance: Normal appearance. She is not ill-appearing. HENT:      Head: Normocephalic and atraumatic. Right Ear: Tympanic membrane normal.      Left Ear: Tympanic membrane normal.      Nose: No rhinorrhea. Mouth/Throat:      Mouth: Mucous membranes are moist.   Eyes:      Extraocular Movements: Extraocular movements intact. Pupils: Pupils are equal, round, and reactive to light. Cardiovascular:      Rate and Rhythm: Normal rate and regular rhythm. Heart sounds: Normal heart sounds. No murmur heard. Pulmonary:      Effort: Pulmonary effort is normal.      Breath sounds: Normal breath sounds. Abdominal:      Palpations: Abdomen is soft. Tenderness: There is no abdominal tenderness. There is no right CVA tenderness or left CVA tenderness. Musculoskeletal:         General: Tenderness present. No swelling, deformity or signs of injury. Normal range of motion. Cervical back: Normal range of motion and neck supple. No rigidity. Comments: Right shoulder TTP. Normal ROM  Left elbow joint TTP. Normal ROM. Left thigh tender to palpation at the greater trochanteric level. Reports groin, and lateral hip pain with log rolling. Skin:     General: Skin is warm and dry. Findings: No rash. Neurological:      General: No focal deficit present. Mental Status: She is alert and oriented to person, place, and time. Cranial Nerves: No cranial nerve deficit. Sensory: No sensory deficit. Motor: No weakness. DDX/DIAGNOSTIC RESULTS / EMERGENCY DEPARTMENT COURSE / MDM     Medical Decision Making      EKG      All EKG's are interpreted by the Emergency Department Physician who either signs or Co-signs this chart in the absence of a cardiologist.    EMERGENCY DEPARTMENT COURSE:  This patient presents to the emergency department with unremarkable vital signs. Per cn head CT rule, no requirement for head CT. Neurological exam is unremarkable. We will obtain XR of the right shoulder, L elbow and hip.     6:57 PM  Patient still pending Xrays. Her care will be handed over to Dr. Tu Jimenez who will follow up on results and determine final disposition. PROCEDURES:      CONSULTS:  None    CRITICAL CARE:  There was significant risk of life threatening deterioration of patient's condition requiring my direct management. Critical care time 0 minutes, excluding any documented procedures. FINAL IMPRESSION      1. Fall, initial encounter          DISPOSITION / PLAN     DISPOSITION        PATIENT REFERRED TO:   [facility]  [phone]    Schedule an appointment as soon as possible for a visit       [facility] ED  [address] [place] [address]  [phone]    If you develop headaches, light sensitivity, weakness, nausea, vomiting, or other urgent concerns.     DISCHARGE MEDICATIONS:  New Prescriptions    No medications on file       Fermin Wade MD  Emergency Medicine Resident    (Please note that portions of thisnote were completed with a voice recognition program.  Efforts were made to edit the dictations but occasionally words are mis-transcribed.)        Fermin Wade MD  Resident  01/19/23 7405

## 2023-01-19 NOTE — TELEPHONE ENCOUNTER
Patient called about medication refill.  Informed patient that request has been sent to Dr. Kellee Pettit, awaiting response

## 2023-01-20 LAB
EKG ATRIAL RATE: 60 BPM
EKG P AXIS: 68 DEGREES
EKG P-R INTERVAL: 158 MS
EKG Q-T INTERVAL: 472 MS
EKG QRS DURATION: 88 MS
EKG QTC CALCULATION (BAZETT): 472 MS
EKG R AXIS: 74 DEGREES
EKG T AXIS: 57 DEGREES
EKG VENTRICULAR RATE: 60 BPM

## 2023-01-20 NOTE — ED NOTES
Pt ambulated in the room using her walker with steady gait. Dr. Bandar Maxwell informed.      Houston Harper RN  01/19/23 1944

## 2023-01-20 NOTE — ED PROVIDER NOTES
I performed a history and physical examination of the patient and discussed management with the resident. I reviewed the residents note and agree with the documented findings and plan of care. Any areas of disagreement are noted on the chart. I was personally present for the key portions of any procedures. I have documented in the chart those procedures where I was not present during the key portions. I have reviewed the emergency nurses triage note. I agree with the chief complaint, past medical history, past surgical history, allergies, medications, social and family history as documented unless otherwise noted below. Documentation of the HPI, Physical Exam and Medical Decision Making performed by medical students or scribes is based on my personal performance of the HPI, PE and MDM. For Phys Assistant/ Nurse Practitioner cases/documentation I have personally evaluated this patient and have completed at least one if not all key elements of the E/M (history, physical exam, and MDM). I find the patient's history and physical exam are consistent with the NP/PA documentation. I agree with the care provided, treatment rendered, disposition and followup plan. Additional findings are as noted. Franky Dudley. Elvia Libman, MD  Attending Emergency  Physician    Patient presented to the emergency department with complaints of pain in the right shoulder, left elbow, and the left lower extremity after she apparently lost her balance and fell while using her cane to ambulate to the bathroom in her home. She reports she fell to the floor and did not strike any other objects. She denies any loss of consciousness. No amnestic period. No neck pain. No headache. No disturbance of vision, smell, taste, hearing, speech. No numbness, weakness, tingling. No chest or abdominal pain. No nausea or vomiting. Patient typically uses a walker or cane to ambulate. On examination patient is awake and alert.   She is cooperative and responsive. Speech is fluent and comprehension is normal.  Patient is oriented x4. GCS is 15. Scalp is normocephalic and atraumatic. Neck is nontender. Examination of the right shoulder reveals no swelling, deformity, crepitus. Patient actually demonstrates very minimal tenderness over the anterior aspect of the shoulder and good range of motion of the arm on the shoulder. Distal sensation, strength, capillary refill, pulses are intact in the upper extremity. Examination of the left upper extremity and specifically the elbow demonstrates no deformity, crepitus, swelling, palpable abnormality. Patient actually demonstrates excellent range of motion of the forearm on the elbow in flexion and extension as well as supination and pronation. There is no effusion palpable. There is no tenderness over the radial head. Distal sensation, motor function, pulses, capillary refill are intact. Examination of the left lower extremity demonstrates no swelling, deformity, shortening, abnormal rotation. No leg length discrepancy. Distal pulses and sensation are intact. Distal strength is intact. Examination of the hip reveals tenderness over the greater trochanter and some tenderness over the inguinal region with palpation and with internal and external rotation. No crepitus or deformity noted. No tenderness with AP or bilateral compression of the pelvis. Abdomen is soft, nondistended, nontender with normal bowel sounds. I reviewed the radiographs as well as the radiology interpretation of same. There is no fracture or other acute bony abnormality. There is significant degenerative changes noted in bilateral hips but more so on the left. Patient maintains that she is able to ambulate. We will provide her an opportunity to demonstrate that and if she is in fact able to ambulate we will be able to discharge her.           Atilio Denson MD  01/19/23 1928      I was advised by the nursing staff that the patient was able ambulate with use of her walker without difficulty. Impression: Fall with left lower extremity pain, right shoulder pain and left elbow pain. Plan: Patient will be discharged with instructions to continue to take her home medications including analgesics. She is advised to follow-up with her primary care provider at the next available opportunity and also to return to the emergency department should her symptoms worsen or progress.      Suman Austin MD  01/19/23 1948

## 2023-01-20 NOTE — ED NOTES
Pt arrived to ED with report of left leg pain and after falling approx 2-3 hours PTA. Pt reports hitting her head but denies LOC.  Pt A&Ox4, RR even/unlabored     Venancio Corona RN  01/19/23 2686

## 2023-01-25 ENCOUNTER — TELEPHONE (OUTPATIENT)
Dept: ADMINISTRATIVE | Age: 58
End: 2023-01-25

## 2023-01-25 DIAGNOSIS — M16.32 OSTEOARTHRITIS RESULTING FROM LEFT HIP DYSPLASIA: ICD-10-CM

## 2023-01-25 RX ORDER — HYDROCODONE BITARTRATE AND ACETAMINOPHEN 5; 325 MG/1; MG/1
1 TABLET ORAL 2 TIMES DAILY
Qty: 28 TABLET | Refills: 0 | Status: SHIPPED | OUTPATIENT
Start: 2023-01-25 | End: 2023-02-08

## 2023-01-25 NOTE — TELEPHONE ENCOUNTER
Patient called in requesting refill of her pain medicine. Pt stated her refill isn't due until Friday 1/27/2023 but she was hoping it could be called in sooner so it can bel delivered to her on Friday so she doesn't run out this weekend. Patient stated script has to be at the pharmacy by 10am on Friday in order to be delivered on Friday.     Please call into [facility]     Call back#: [phone]

## 2023-01-31 ENCOUNTER — OFFICE VISIT (OUTPATIENT)
Dept: ORTHOPEDIC SURGERY | Age: 58
End: 2023-01-31
Payer: MEDICARE

## 2023-01-31 VITALS — BODY MASS INDEX: 24.11 KG/M2 | WEIGHT: 150 LBS | HEIGHT: 66 IN

## 2023-01-31 DIAGNOSIS — Z01.818 PRE-OP TESTING: Primary | ICD-10-CM

## 2023-01-31 DIAGNOSIS — M16.32 OSTEOARTHRITIS RESULTING FROM LEFT HIP DYSPLASIA: Primary | ICD-10-CM

## 2023-01-31 PROCEDURE — 4004F PT TOBACCO SCREEN RCVD TLK: CPT | Performed by: ORTHOPAEDIC SURGERY

## 2023-01-31 PROCEDURE — 99212 OFFICE O/P EST SF 10 MIN: CPT | Performed by: ORTHOPAEDIC SURGERY

## 2023-01-31 PROCEDURE — G8484 FLU IMMUNIZE NO ADMIN: HCPCS | Performed by: ORTHOPAEDIC SURGERY

## 2023-01-31 PROCEDURE — G8420 CALC BMI NORM PARAMETERS: HCPCS | Performed by: ORTHOPAEDIC SURGERY

## 2023-01-31 PROCEDURE — G8427 DOCREV CUR MEDS BY ELIG CLIN: HCPCS | Performed by: ORTHOPAEDIC SURGERY

## 2023-01-31 PROCEDURE — 3017F COLORECTAL CA SCREEN DOC REV: CPT | Performed by: ORTHOPAEDIC SURGERY

## 2023-01-31 RX ORDER — HYDROCODONE BITARTRATE AND ACETAMINOPHEN 5; 325 MG/1; MG/1
1 TABLET ORAL EVERY 6 HOURS PRN
Qty: 56 TABLET | Refills: 0 | Status: ON HOLD | OUTPATIENT
Start: 2023-01-31 | End: 2023-02-14

## 2023-01-31 NOTE — PROGRESS NOTES
This patient is seen here because of severe pain in the left knee. She is hardly able to walk without significant pain. He had recently been admitted because of acute exacerbation of Crohn's disease. She says she is now under well control and has an appointment to see the gastroenterologist tomorrow. The patient also had UTI treated recently and is now over 8. Examination: She walks with a markedly antalgic gait. All hip motions were painful. Neurologically she is intact. Diagnosis: Primary osteoarthritis left hip with avascular porosis. Possibly superimposed dysplasia. Treatment: We will schedule her for left total hip arthroplasty but she will obtain medical clearance from her primary physician.

## 2023-02-03 ENCOUNTER — OFFICE VISIT (OUTPATIENT)
Dept: GASTROENTEROLOGY | Age: 58
End: 2023-02-03

## 2023-02-03 VITALS
BODY MASS INDEX: 27.16 KG/M2 | SYSTOLIC BLOOD PRESSURE: 108 MMHG | HEIGHT: 66 IN | WEIGHT: 169 LBS | DIASTOLIC BLOOD PRESSURE: 70 MMHG

## 2023-02-03 DIAGNOSIS — K50.119 CROHN'S DISEASE OF COLON WITH COMPLICATION (HCC): Primary | ICD-10-CM

## 2023-02-03 DIAGNOSIS — K52.9 CHRONIC DIARRHEA: ICD-10-CM

## 2023-02-03 PROCEDURE — APPSS45 APP SPLIT SHARED TIME 31-45 MINUTES: Performed by: NURSE PRACTITIONER

## 2023-02-03 ASSESSMENT — ENCOUNTER SYMPTOMS
RESPIRATORY NEGATIVE: 1
ALLERGIC/IMMUNOLOGIC NEGATIVE: 1
ABDOMINAL DISTENTION: 1
DIARRHEA: 1
CONSTIPATION: 1
ANAL BLEEDING: 1
VOMITING: 1
RECTAL PAIN: 1
ABDOMINAL PAIN: 1
EYES NEGATIVE: 1
NAUSEA: 1

## 2023-02-03 NOTE — PROGRESS NOTES
GI OFFICE FOLLOW UP    INTERVAL HISTORY:   No referring provider defined for this encounter. Chief Complaint   Patient presents with    Crohn's Disease     Pt states she has been having a lot of symptoms and would like to see about getting on some medicine        HISTORY OF PRESENT ILLNESS:     Patient being seen for diarrhea, abdominal discomfort, questionable history of Crohn's. Patient reports dx with Crohn's in 2011 following exploratory laparotomy at [facility]. V's for bowel obstruction. She has not been following with GI regularly. She has never been treated for IBD in the past.    In 2021 patient had EGD and colonoscopy with Dr. Matilda James. Normal appearing TI and colonic mucosa. Bx were normal colonic and small intestinal mucosa. Had 7 mm tubular adenoma excised from rectum. Patient followed up in the office following endoscopy and was advised labs, MRE which were not completed. Patient was most recently seen in the hospital in October 2022 for diarrhea, abdominal pain. She was put on Prednisone taper and Questran. She had liver disease workup in the hospital which was negative. Images reviewed. At present patient reports 10-15 loose bowel movements daily. Denies any melena, hematochezia. No weight loss. In fact, she is gaining weight. Occasionally reports mid abdomen discomfort. No fevers, chills. No recent antibiotics. Denies any dysphagia, odynophagia, dyspeptic symptoms. Past Medical,Family, and Social History reviewed and does contribute to the patient presenting condition. Patient's PMH/PSH,SH,PSYCH Hx, MEDs, ALLERGIES, and ROS were all reviewed and updated in the appropriate sections.  Yes      PAST MEDICAL HISTORY:  Past Medical History:   Diagnosis Date    Acid reflux     Anxiety     Arthritis     Left hip    Asthma     Bipolar 1 disorder ([facility] [address] Bowel obstruction (HCC)     COPD (chronic obstructive pulmonary disease) (HCC)     O2 3L PRN/ Dr. Esthela Vo [facility]/last seen 2020/appt.9-7-21 for Clearance    Crohn disease ([address]     Depression     Dry eye     Fibromyalgia     Gout     Headache     Hyperlipidemia     Hypertension     Hypothyroidism     Kidney stones     Muscle spasm     Neuropathy     Pain     left hip    Personal history of other medical treatment     Pt. seen by Dr. Arturo Vanessa for clearance for this OR Left hip/ Normally pt. doesn't follow with Cardiology. [facility]    Wears partial dentures     upper    Wellness examination     PCP Jolly Powers MD/ genna/last seen 8-24-21       Past Surgical History:   Procedure Laterality Date    ABDOMEN SURGERY      bowel obstruction OR    BUNIONECTOMY Left     CHOLECYSTECTOMY      COLONOSCOPY  04/30/2007    no gross pathology seen in the colon and also 3-4 inches of the ileum    COLONOSCOPY  10/12/2017    normal    COLONOSCOPY  10/25/2021    COLONOSCOPY WITH BIOPSY performed by Silviano Ochoa MD at [facility]    COLONOSCOPY  10/25/2021    COLONOSCOPY POLYPECTOMY REMOVAL SNARE performed by Silviano Ochoa MD at [facility] ENDOSCOPY  10/25/2021    EGD BIOPSY performed by Silviano Ochoa MD at [address] [facility] [address]:    Current Outpatient Medications:     HYDROcodone-acetaminophen (NORCO) 5-325 MG per tablet, Take 1 tablet by mouth every 6 hours as needed for Pain for up to 14 days.  Max Daily Amount: 4 tablets, Disp: 56 tablet, Rfl: 0    traZODone (DESYREL) 150 MG tablet, Take 1 tablet by mouth nightly, Disp: 30 tablet, Rfl: 0    FLUoxetine (PROZAC) 20 MG capsule, Take 60 mg by mouth daily, Disp: , Rfl:     Handicap Placard MISC, by Does not apply route Duration 5 years, Disp: 1 each, Rfl: 0    Pregabalin (LYRICA PO), Take 300 mg by mouth 2 times daily, Disp: , Rfl:     diphenhydrAMINE (BENADRYL) 25 MG tablet, Take 25 mg by mouth nightly as needed, Disp: , Rfl:     budesonide-formoterol (SYMBICORT) 160-4.5 MCG/ACT AERO, Inhale 2 puffs into the lungs 2 times daily, Disp: 10.2 g, Rfl: 3    lisinopril (PRINIVIL;ZESTRIL) 5 MG tablet, Take 1 tablet by mouth daily, Disp: 30 tablet, Rfl: 3    gabapentin (NEURONTIN) 400 MG capsule, Take 1 capsule by mouth 2 times daily for 30 days. , Disp: 60 capsule, Rfl: 0    cholestyramine light 4 g packet, Take 1 packet by mouth daily (Patient not taking: No sig reported), Disp: 60 packet, Rfl: 3    Polyvinyl Alcohol-Povidone (REFRESH OP), Place 1 drop into both eyes 3 times daily (Patient not taking: No sig reported), Disp: , Rfl:     lidocaine (LIDODERM) 5 %, Place 1 patch onto the skin daily as needed for Pain 12 hours on, 12 hours off.   (Patient not taking: No sig reported), Disp: , Rfl:     ALLERGIES:   Allergies   Allergen Reactions    Azithromycin Other (See Comments)     'It doesn't work\"    Methylprednisolone      Elevates blood pressure    Penicillins Swelling     throat    Tape [Adhesive Tape] Other (See Comments)     \" Tears my skin off\"       FAMILY HISTORY:       Problem Relation Age of Onset    Diabetes Father     Lung Cancer Father     Hypertension Mother     Cancer Mother     High Blood Pressure Mother     Crohn's Disease Brother     Heart Disease Brother          SOCIAL HISTORY:   Social History     Socioeconomic History    Marital status: Single     Spouse name: Not on file    Number of children: Not on file    Years of education: Not on file    Highest education level: Not on file   Occupational History    Not on file   Tobacco Use    Smoking status: Every Day     Packs/day: 0.50     Years: 37.00     Pack years: 18.50     Types: Cigarettes    Smokeless tobacco: Former     Types: Chew     Quit date: 9/3/2001   Vaping Use    Vaping Use: Former    Quit date: 9/3/2019    Substances: Nicotine   Substance and Sexual Activity    Alcohol use: Not Currently Drug use: Yes     Types: Marijuana Callie Coad)     Comment: h/o of substance abuse but denies drug use    Sexual activity: Not on file   Other Topics Concern    Not on file   Social History Narrative    Not on file     Social Determinants of Health     Financial Resource Strain: Not on file   Food Insecurity: Not on file   Transportation Needs: Not on file   Physical Activity: Not on file   Stress: Not on file   Social Connections: Not on file   Intimate Partner Violence: Not on file   Housing Stability: Not on file         REVIEW OF SYSTEMS:         Review of Systems   Constitutional:  Positive for appetite change and unexpected weight change (gain). HENT: Negative. Eyes: Negative. Respiratory: Negative. Cardiovascular: Negative. Gastrointestinal:  Positive for abdominal distention, abdominal pain, anal bleeding, constipation, diarrhea, nausea, rectal pain and vomiting. Endocrine: Negative. Genitourinary: Negative. Musculoskeletal: Negative. Skin: Negative. Allergic/Immunologic: Negative. Neurological: Negative. Hematological: Negative. Psychiatric/Behavioral: Negative. PHYSICAL EXAMINATION:     Vital signs reviewed per the nursing documentation. /70   Ht 5' 6\" (1.676 m)   Wt 169 lb (76.7 kg)   BMI 27.28 kg/m²   Body mass index is 27.28 kg/m². Physical Exam  Constitutional:       Appearance: Normal appearance. Eyes:      General: No scleral icterus. Pupils: Pupils are equal, round, and reactive to light. Cardiovascular:      Rate and Rhythm: Normal rate and regular rhythm. Heart sounds: Normal heart sounds. Pulmonary:      Effort: Pulmonary effort is normal.      Breath sounds: Normal breath sounds. Abdominal:      General: Bowel sounds are normal. There is no distension. Palpations: Abdomen is soft. There is no mass. Tenderness: There is no abdominal tenderness. There is no guarding. Skin:     General: Skin is warm and dry. Coloration: Skin is not jaundiced. Neurological:      Mental Status: She is alert and oriented to person, place, and time. Mental status is at baseline. Gait: Gait abnormal.         LABORATORY DATA: Reviewed  Lab Results   Component Value Date    WBC 6.5 11/05/2022    HGB 13.3 11/05/2022    HCT 41.1 11/05/2022    MCV 87.1 11/05/2022     11/05/2022     11/05/2022    K 3.9 11/05/2022     11/05/2022    CO2 24 11/05/2022    BUN 12 11/05/2022    CREATININE 0.55 11/05/2022    LABPROT 6.8 05/28/2012    LABALBU 4.5 11/04/2022    BILITOT 0.7 11/04/2022    ALKPHOS 109 (H) 11/04/2022    AST 13 11/04/2022    ALT 12 11/04/2022    INR 1.1 10/23/2022         Lab Results   Component Value Date    RBC 4.72 11/05/2022    HGB 13.3 11/05/2022    MCV 87.1 11/05/2022    MCH 28.2 11/05/2022    MCHC 32.4 11/05/2022    RDW 14.4 11/05/2022    MPV 11.1 11/05/2022    BASOPCT 0 11/05/2022    LYMPHSABS 1.45 11/05/2022    MONOSABS 0.47 11/05/2022    NEUTROABS 4.45 11/05/2022    EOSABS 0.11 11/05/2022    BASOSABS <0.03 11/05/2022         DIAGNOSTIC TESTING:     XR SHOULDER RIGHT (MIN 2 VIEWS)    Result Date: 1/19/2023  EXAMINATION: TWO XRAY VIEWS OF THE RIGHT SHOULDER 1/19/2023 5:45 pm COMPARISON: None. HISTORY: ORDERING SYSTEM PROVIDED HISTORY: ro fx TECHNOLOGIST PROVIDED HISTORY: ro fx Reason for Exam: fall FINDINGS: The bones are osteopenic. There are mild-to-moderate osteophytes at the acromioclavicular joint. The glenohumeral joint is unremarkable. No fracture, dislocation or focal bone lesion is identified. No acute fracture or dislocation. Mild-to-moderate degenerative changes of the acromioclavicular joint. XR ELBOW LEFT (MIN 3 VIEWS)    Result Date: 1/19/2023  EXAMINATION: THREE XRAY VIEWS OF THE LEFT ELBOW 1/19/2023 6:45 pm COMPARISON: None.  HISTORY: ORDERING SYSTEM PROVIDED HISTORY: ro fx TECHNOLOGIST PROVIDED HISTORY: ro fx Reason for Exam: fall FINDINGS: Well corticated ossific densities along the medial aspect of the condyle. Spurring of the radial head. Possible anterior fat pad sign. Chronic medial epicondylitis and degenerative changes. Possible effusion. No definite acute fracture but sensitivity and specificity are limited. XR HIP 2-3 VW W PELVIS LEFT    Result Date: 1/19/2023  EXAMINATION: ONE XRAY VIEW OF THE PELVIS AND TWO XRAY VIEWS LEFT HIP 1/19/2023 3:45 pm COMPARISON: 10/22/2022. HISTORY: ORDERING SYSTEM PROVIDED HISTORY: ro fx TECHNOLOGIST PROVIDED HISTORY: ro fx Reason for Exam: fall FINDINGS: The bones are osteopenic. Severe degenerative changes of the hips which are worse on the left appears similar to the prior study. There is significant flattening of the articular surfaces on the left with subcortical geode formation. The sacroiliac joints pubic symphysis are unremarkable. No fracture, dislocation or focal bone lesion is identified. No acute fracture or dislocation. Severe degenerative changes at the hips which are worse on the left. Assessment  1. Crohn's disease of colon with complication ([address]    2. Chronic diarrhea        Plan    Check inflammatory markers, stool studies. Also will need MRE. To follow-up in office after completion of above for further evaluation and management. Thank you for allowing me to participate in the care of Ms. Farias. For any further questions please do not hesitate to contact me. I have reviewed and agree with the ROS entered by the MA/LPN. Note is dictated utilizing voice recognition software. Unfortunately this leads to occasional typographical errors. Please contact our office if you have any questions    GI attending physician note. Patient seen with APRN patient seen, examined. I independently performed an evaluation on [employer]. I have reviewed the above documentation completed by the Nurse Practitioner and agree with the history, examination, and management plan. Recommendations discussed. Previous chart reviewed. Abdominal examination nonspecific. Management plan discussed with the GI APRN and patient. They understood and agreed.           Jose Causey MD,FACP, [facility]  Board Certified in Gastroenterology and [address] [place] [address] Gastroenterology  Office #: [phone]

## 2023-02-04 ENCOUNTER — APPOINTMENT (OUTPATIENT)
Dept: CT IMAGING | Age: 58
DRG: 199 | End: 2023-02-04
Payer: MEDICAID

## 2023-02-04 ENCOUNTER — HOSPITAL ENCOUNTER (INPATIENT)
Age: 58
LOS: 3 days | Discharge: HOME | DRG: 199 | End: 2023-02-07
Attending: EMERGENCY MEDICINE | Admitting: STUDENT IN AN ORGANIZED HEALTH CARE EDUCATION/TRAINING PROGRAM
Payer: MEDICAID

## 2023-02-04 ENCOUNTER — APPOINTMENT (OUTPATIENT)
Dept: GENERAL RADIOLOGY | Age: 58
DRG: 199 | End: 2023-02-04
Payer: MEDICAID

## 2023-02-04 DIAGNOSIS — I16.0 HYPERTENSIVE URGENCY: ICD-10-CM

## 2023-02-04 DIAGNOSIS — R41.82 ALTERED MENTAL STATUS, UNSPECIFIED ALTERED MENTAL STATUS TYPE: Primary | ICD-10-CM

## 2023-02-04 DIAGNOSIS — J41.1 MUCOPURULENT CHRONIC BRONCHITIS (HCC): ICD-10-CM

## 2023-02-04 LAB
ABSOLUTE EOS #: 0.14 K/UL (ref 0–0.44)
ABSOLUTE IMMATURE GRANULOCYTE: <0.03 K/UL (ref 0–0.3)
ABSOLUTE LYMPH #: 1.13 K/UL (ref 1.1–3.7)
ABSOLUTE MONO #: 0.45 K/UL (ref 0.1–1.2)
ALBUMIN SERPL-MCNC: 4.1 G/DL (ref 3.5–5.2)
ALBUMIN/GLOBULIN RATIO: 1.5 (ref 1–2.5)
ALP SERPL-CCNC: 135 U/L (ref 35–104)
ALT SERPL-CCNC: 9 U/L (ref 5–33)
AMMONIA PLAS-SCNC: 37 UMOL/L (ref 11–51)
AMPHETAMINE SCREEN URINE: NEGATIVE
ANION GAP SERPL CALCULATED.3IONS-SCNC: 11 MMOL/L (ref 9–17)
AST SERPL-CCNC: 11 U/L
BARBITURATE SCREEN URINE: NEGATIVE
BASOPHILS # BLD: 1 % (ref 0–2)
BASOPHILS ABSOLUTE: 0.03 K/UL (ref 0–0.2)
BENZODIAZEPINE SCREEN, URINE: NEGATIVE
BILIRUB SERPL-MCNC: 0.4 MG/DL (ref 0.3–1.2)
BILIRUBIN URINE: NEGATIVE
BUN SERPL-MCNC: 11 MG/DL (ref 6–20)
CALCIUM SERPL-MCNC: 9.2 MG/DL (ref 8.6–10.4)
CANNABINOID SCREEN URINE: POSITIVE
CARBOXYHEMOGLOBIN: 3.8 % (ref 0–5)
CHLORIDE SERPL-SCNC: 105 MMOL/L (ref 98–107)
CK SERPL-CCNC: 158 U/L (ref 26–192)
CO2 SERPL-SCNC: 25 MMOL/L (ref 20–31)
COCAINE METABOLITE, URINE: NEGATIVE
COLOR: YELLOW
COMMENT UA: ABNORMAL
CREAT SERPL-MCNC: 0.59 MG/DL (ref 0.5–0.9)
EOSINOPHILS RELATIVE PERCENT: 2 % (ref 1–4)
FENTANYL URINE: NEGATIVE
FIO2: ABNORMAL
GFR SERPL CREATININE-BSD FRML MDRD: >60 ML/MIN/1.73M2
GLUCOSE SERPL-MCNC: 103 MG/DL (ref 70–99)
GLUCOSE UR STRIP.AUTO-MCNC: NEGATIVE MG/DL
HCO3 VENOUS: 26.1 MMOL/L (ref 24–30)
HCT VFR BLD AUTO: 40.5 % (ref 36.3–47.1)
HGB BLD-MCNC: 12.9 G/DL (ref 11.9–15.1)
IMMATURE GRANULOCYTES: 0 %
KETONES UR STRIP.AUTO-MCNC: ABNORMAL MG/DL
LACTIC ACID, WHOLE BLOOD: 1 MMOL/L (ref 0.7–2.1)
LEUKOCYTE ESTERASE UR QL STRIP.AUTO: NEGATIVE
LIPASE SERPL-CCNC: 55 U/L (ref 13–60)
LYMPHOCYTES # BLD: 17 % (ref 24–43)
MAGNESIUM SERPL-MCNC: 1.8 MG/DL (ref 1.6–2.6)
MCH RBC QN AUTO: 27.9 PG (ref 25.2–33.5)
MCHC RBC AUTO-ENTMCNC: 31.9 G/DL (ref 28.4–34.8)
MCV RBC AUTO: 87.5 FL (ref 82.6–102.9)
METHADONE SCREEN, URINE: NEGATIVE
MONOCYTES # BLD: 7 % (ref 3–12)
MYOGLOBIN SERPL-MCNC: 28 NG/ML (ref 25–58)
NITRITE UR QL STRIP.AUTO: NEGATIVE
NRBC AUTOMATED: 0 PER 100 WBC
O2 SAT, VEN: 96.5 % (ref 60–85)
OPIATES, URINE: POSITIVE
OXYCODONE SCREEN URINE: NEGATIVE
PATIENT TEMP: 37
PCO2, VEN: 44.9 MM HG (ref 39–55)
PDW BLD-RTO: 14.3 % (ref 11.8–14.4)
PH VENOUS: 7.38 (ref 7.32–7.42)
PHENCYCLIDINE, URINE: NEGATIVE
PLATELET # BLD AUTO: 164 K/UL (ref 138–453)
PMV BLD AUTO: 12.2 FL (ref 8.1–13.5)
PO2, VEN: 84.7 MM HG (ref 30–50)
POSITIVE BASE EXCESS, VEN: 1.3 MMOL/L (ref 0–2)
POTASSIUM SERPL-SCNC: 4.2 MMOL/L (ref 3.7–5.3)
PROT SERPL-MCNC: 6.9 G/DL (ref 6.4–8.3)
PROT UR STRIP.AUTO-MCNC: 6.5 MG/DL (ref 5–8)
PROT UR STRIP.AUTO-MCNC: NEGATIVE MG/DL
RBC # BLD: 4.63 M/UL (ref 3.95–5.11)
SEG NEUTROPHILS: 73 % (ref 36–65)
SEGMENTED NEUTROPHILS ABSOLUTE COUNT: 4.85 K/UL (ref 1.5–8.1)
SODIUM SERPL-SCNC: 141 MMOL/L (ref 135–144)
SPECIFIC GRAVITY UA: 1.02 (ref 1–1.03)
TEST INFORMATION: ABNORMAL
TROPONIN I SERPL DL<=0.01 NG/ML-MCNC: 36 NG/L (ref 0–14)
TROPONIN I SERPL DL<=0.01 NG/ML-MCNC: 43 NG/L (ref 0–14)
TROPONIN I SERPL DL<=0.01 NG/ML-MCNC: 44 NG/L (ref 0–14)
TURBIDITY: CLEAR
URINE HGB: NEGATIVE
UROBILINOGEN, URINE: NORMAL
WBC # BLD AUTO: 6.6 K/UL (ref 3.5–11.3)

## 2023-02-04 PROCEDURE — 81003 URINALYSIS AUTO W/O SCOPE: CPT

## 2023-02-04 PROCEDURE — 83735 ASSAY OF MAGNESIUM: CPT

## 2023-02-04 PROCEDURE — 93005 ELECTROCARDIOGRAM TRACING: CPT

## 2023-02-04 PROCEDURE — 99285 EMERGENCY DEPT VISIT HI MDM: CPT

## 2023-02-04 PROCEDURE — 99222 1ST HOSP IP/OBS MODERATE 55: CPT | Performed by: INTERNAL MEDICINE

## 2023-02-04 PROCEDURE — 6360000002 HC RX W HCPCS

## 2023-02-04 PROCEDURE — 6360000002 HC RX W HCPCS: Performed by: STUDENT IN AN ORGANIZED HEALTH CARE EDUCATION/TRAINING PROGRAM

## 2023-02-04 PROCEDURE — 85025 COMPLETE CBC W/AUTO DIFF WBC: CPT

## 2023-02-04 PROCEDURE — 85652 RBC SED RATE AUTOMATED: CPT

## 2023-02-04 PROCEDURE — 80307 DRUG TEST PRSMV CHEM ANLYZR: CPT

## 2023-02-04 PROCEDURE — 86140 C-REACTIVE PROTEIN: CPT

## 2023-02-04 PROCEDURE — 82805 BLOOD GASES W/O2 SATURATION: CPT

## 2023-02-04 PROCEDURE — 82140 ASSAY OF AMMONIA: CPT

## 2023-02-04 PROCEDURE — 96372 THER/PROPH/DIAG INJ SC/IM: CPT

## 2023-02-04 PROCEDURE — 1200000000 HC SEMI PRIVATE

## 2023-02-04 PROCEDURE — 83874 ASSAY OF MYOGLOBIN: CPT

## 2023-02-04 PROCEDURE — 70450 CT HEAD/BRAIN W/O DYE: CPT

## 2023-02-04 PROCEDURE — 80179 DRUG ASSAY SALICYLATE: CPT

## 2023-02-04 PROCEDURE — 80053 COMPREHEN METABOLIC PANEL: CPT

## 2023-02-04 PROCEDURE — 83690 ASSAY OF LIPASE: CPT

## 2023-02-04 PROCEDURE — 6360000002 HC RX W HCPCS: Performed by: INTERNAL MEDICINE

## 2023-02-04 PROCEDURE — 84484 ASSAY OF TROPONIN QUANT: CPT

## 2023-02-04 PROCEDURE — 96374 THER/PROPH/DIAG INJ IV PUSH: CPT

## 2023-02-04 PROCEDURE — 83605 ASSAY OF LACTIC ACID: CPT

## 2023-02-04 PROCEDURE — 6360000004 HC RX CONTRAST MEDICATION: Performed by: STUDENT IN AN ORGANIZED HEALTH CARE EDUCATION/TRAINING PROGRAM

## 2023-02-04 PROCEDURE — 71046 X-RAY EXAM CHEST 2 VIEWS: CPT

## 2023-02-04 PROCEDURE — 80143 DRUG ASSAY ACETAMINOPHEN: CPT

## 2023-02-04 PROCEDURE — 36415 COLL VENOUS BLD VENIPUNCTURE: CPT

## 2023-02-04 PROCEDURE — 96376 TX/PRO/DX INJ SAME DRUG ADON: CPT

## 2023-02-04 PROCEDURE — 82550 ASSAY OF CK (CPK): CPT

## 2023-02-04 PROCEDURE — G0480 DRUG TEST DEF 1-7 CLASSES: HCPCS

## 2023-02-04 RX ORDER — SODIUM CHLORIDE 9 MG/ML
25 INJECTION, SOLUTION INTRAVENOUS PRN
Status: DISCONTINUED | OUTPATIENT
Start: 2023-02-04 | End: 2023-02-07 | Stop reason: HOSPADM

## 2023-02-04 RX ORDER — ONDANSETRON 2 MG/ML
4 INJECTION INTRAMUSCULAR; INTRAVENOUS EVERY 6 HOURS PRN
Status: DISCONTINUED | OUTPATIENT
Start: 2023-02-04 | End: 2023-02-07 | Stop reason: HOSPADM

## 2023-02-04 RX ORDER — SODIUM CHLORIDE 0.9 % (FLUSH) 0.9 %
5-40 SYRINGE (ML) INJECTION EVERY 12 HOURS SCHEDULED
Status: DISCONTINUED | OUTPATIENT
Start: 2023-02-04 | End: 2023-02-07 | Stop reason: HOSPADM

## 2023-02-04 RX ORDER — FENTANYL CITRATE 50 UG/ML
50 INJECTION, SOLUTION INTRAMUSCULAR; INTRAVENOUS ONCE
Status: COMPLETED | OUTPATIENT
Start: 2023-02-04 | End: 2023-02-04

## 2023-02-04 RX ORDER — LORAZEPAM 2 MG/ML
1 INJECTION INTRAMUSCULAR ONCE
Status: DISCONTINUED | OUTPATIENT
Start: 2023-02-04 | End: 2023-02-04

## 2023-02-04 RX ORDER — ACETAMINOPHEN 325 MG/1
650 TABLET ORAL EVERY 6 HOURS PRN
Status: DISCONTINUED | OUTPATIENT
Start: 2023-02-04 | End: 2023-02-07 | Stop reason: HOSPADM

## 2023-02-04 RX ORDER — HYDRALAZINE HYDROCHLORIDE 20 MG/ML
5 INJECTION INTRAMUSCULAR; INTRAVENOUS EVERY 6 HOURS PRN
Status: DISCONTINUED | OUTPATIENT
Start: 2023-02-04 | End: 2023-02-07 | Stop reason: HOSPADM

## 2023-02-04 RX ORDER — POLYETHYLENE GLYCOL 3350 17 G/17G
17 POWDER, FOR SOLUTION ORAL DAILY PRN
Status: DISCONTINUED | OUTPATIENT
Start: 2023-02-04 | End: 2023-02-07 | Stop reason: HOSPADM

## 2023-02-04 RX ORDER — HALOPERIDOL 5 MG/ML
5 INJECTION INTRAMUSCULAR ONCE
Status: COMPLETED | OUTPATIENT
Start: 2023-02-04 | End: 2023-02-04

## 2023-02-04 RX ORDER — SODIUM CHLORIDE 0.9 % (FLUSH) 0.9 %
5-40 SYRINGE (ML) INJECTION PRN
Status: DISCONTINUED | OUTPATIENT
Start: 2023-02-04 | End: 2023-02-07 | Stop reason: HOSPADM

## 2023-02-04 RX ORDER — SODIUM CHLORIDE 0.9 % (FLUSH) 0.9 %
5-40 SYRINGE (ML) INJECTION 2 TIMES DAILY
Status: DISCONTINUED | OUTPATIENT
Start: 2023-02-04 | End: 2023-02-07 | Stop reason: HOSPADM

## 2023-02-04 RX ORDER — LORAZEPAM 2 MG/ML
1 INJECTION INTRAMUSCULAR EVERY 4 HOURS PRN
Status: DISCONTINUED | OUTPATIENT
Start: 2023-02-04 | End: 2023-02-05

## 2023-02-04 RX ORDER — ONDANSETRON 4 MG/1
4 TABLET, ORALLY DISINTEGRATING ORAL EVERY 8 HOURS PRN
Status: DISCONTINUED | OUTPATIENT
Start: 2023-02-04 | End: 2023-02-07 | Stop reason: HOSPADM

## 2023-02-04 RX ORDER — ACETAMINOPHEN 650 MG/1
650 SUPPOSITORY RECTAL EVERY 6 HOURS PRN
Status: DISCONTINUED | OUTPATIENT
Start: 2023-02-04 | End: 2023-02-07 | Stop reason: HOSPADM

## 2023-02-04 RX ORDER — ENOXAPARIN SODIUM 100 MG/ML
40 INJECTION SUBCUTANEOUS DAILY
Status: DISCONTINUED | OUTPATIENT
Start: 2023-02-05 | End: 2023-02-07 | Stop reason: HOSPADM

## 2023-02-04 RX ORDER — LABETALOL HYDROCHLORIDE 5 MG/ML
5 INJECTION, SOLUTION INTRAVENOUS EVERY 4 HOURS PRN
Status: DISCONTINUED | OUTPATIENT
Start: 2023-02-04 | End: 2023-02-07 | Stop reason: HOSPADM

## 2023-02-04 RX ADMIN — HALOPERIDOL LACTATE 5 MG: 5 INJECTION, SOLUTION INTRAMUSCULAR at 22:26

## 2023-02-04 RX ADMIN — FENTANYL CITRATE 50 MCG: 50 INJECTION, SOLUTION INTRAMUSCULAR; INTRAVENOUS at 21:00

## 2023-02-04 RX ADMIN — FENTANYL CITRATE 50 MCG: 50 INJECTION, SOLUTION INTRAMUSCULAR; INTRAVENOUS at 19:12

## 2023-02-04 RX ADMIN — HYDROMORPHONE HYDROCHLORIDE 1 MG: 1 INJECTION, SOLUTION INTRAMUSCULAR; INTRAVENOUS; SUBCUTANEOUS at 23:31

## 2023-02-04 RX ADMIN — IOPAMIDOL 150 ML: 755 INJECTION, SOLUTION INTRAVENOUS at 20:49

## 2023-02-04 ASSESSMENT — PAIN - FUNCTIONAL ASSESSMENT: PAIN_FUNCTIONAL_ASSESSMENT: 0-10

## 2023-02-04 ASSESSMENT — PAIN SCALES - GENERAL
PAINLEVEL_OUTOF10: 10

## 2023-02-04 NOTE — ED PROVIDER NOTES
[address] [facility]     Emergency Department     Faculty Attestation    I performed a history and physical examination of the patient and discussed management with the resident. I reviewed the residents note and agree with the documented findings and plan of care. Any areas of disagreement are noted on the chart. I was personally present for the key portions of any procedures. I have documented in the chart those procedures where I was not present during the key portions. I have reviewed the emergency nurses triage note. I agree with the chief complaint, past medical history, past surgical history, allergies, medications, social and family history as documented unless otherwise noted below. For Physician Assistant/ Nurse Practitioner cases/documentation I have personally evaluated this patient and have completed at least one if not all key elements of the E/M (history, physical exam, and MDM). Additional findings are as noted. I have personally seen and evaluated the patient. I find the patient's history and physical exam are consistent with the NP/PA documentation. I agree with the care provided, treatment rendered, disposition and follow-up plan. 62-year-old female presenting from home with altered mental status. Patient has a known hip fracture that is scheduled for repair on 2/23. Reportedly lives alone. EMS was called today due to confusion. Patient is unable to tell me anything in the room. She states that she does not feel great, but cannot specify what does not feel good. She does not know who called EMS. No external signs of trauma    Exam:  General : Laying on the bed and in no acute distress  CV : normal rate and regular rhythm. 2+ radial and DP pulses bilaterally  Lungs : Rhonchorous, hypoxic on room air  Abdomen : Tender to palpation globally, mildly increased tenderness to palpation suprapubic and epigastric regions. Soft, nondistended.     DDx: Infection, CVA (less likely with no focal deficits), intracranial bleed, metabolic abnormalities    Plan:  CT head, metabolic work-up including CBC, electrolytes, LFTs, lipase, lactate, cardiac enzymes    Labs with downtrending troponin, no significant electrolyte abnormalities. Will obtain CT chest/abdomen/pelvis to help further characterize source of altered mental status. UA negative. Patient not able to lay down for CT scans. Having worsening altered mental status. Given Haldol and fentanyl. Will discuss with [facility] for admission    Medical Decision Making  Amount and/or Complexity of Data Reviewed  Labs: ordered. Decision-making details documented in ED Course. Radiology: ordered. ECG/medicine tests: ordered. Risk  Prescription drug management. Decision regarding hospitalization.       Denise Worley MD   Attending Emergency Physician    (Please note that portions of this note were completed with a voice recognition program. Efforts were made to edit the dictations but occasionally words are mis-transcribed.)                 Denise Worley MD  02/04/23 [id number]

## 2023-02-04 NOTE — ED PROVIDER NOTES
[facility]  ED  Emergency Department Encounter  Emergency Medicine Resident     Pt Name:Nguyen Harp  MRN: [id number]  [place] 1965  Date of evaluation: 2/4/23  PCP:  Magnolia Cisneros DO  Note Started: 4:13 PM EST      CHIEF COMPLAINT       Chief Complaint   Patient presents with    Hip Pain     Known left hip fracture    Altered Mental Status       HISTORY OF PRESENT ILLNESS  (Location/Symptom, Timing/Onset, Context/Setting, Quality, Duration, Modifying Factors, Severity.)      Keren Gilliam is a 61 y.o. female with h/o COPD, HTN, bipolar, crohn's disease who presents via EMS with left hip pain and altered mental status. Per EMS, patient has a known left hip fracture that is scheduled to be repaired in late February. Patient was altered at home today so her daughter called EMS. Patient has h/o hypokalemia and has not been taking her home medications per daughter. Patient is confused and unable to provide history. Later in ED stay nursing was able to get a hold of patient's daughter who states the patient has h/o drug abuse and frequent falls. Daughter is concerned the patient began using drugs again as she has been increasingly alerted over the past week. Patient's daughter went to check on her mom today. When the patient's daughter knocked on the door, the patient just stood there and stared at the patient and would not open the door. PAST MEDICAL / SURGICAL / SOCIAL / FAMILY HISTORY      has a past medical history of Acid reflux, Anxiety, Arthritis, Asthma, Bipolar 1 disorder ([address] Bowel obstruction ([address] COPD (chronic obstructive pulmonary disease) ([address] Crohn disease ([address] Depression, Dry eye, Fibromyalgia, Gout, Headache, Hyperlipidemia, Hypertension, Hypothyroidism, Kidney stones, Muscle spasm, Neuropathy, Pain, Personal history of other medical treatment, Wears partial dentures, and Wellness examination.        has a past surgical history that includes Tonsillectomy and adenoidectomy; Tubal ligation; Cholecystectomy; Bunionectomy (Left); Colonoscopy (04/30/2007); Colonoscopy (10/12/2017); Abdomen surgery; Upper gastrointestinal endoscopy (10/25/2021); Colonoscopy (10/25/2021); and Colonoscopy (10/25/2021). Social History     Socioeconomic History    Marital status: Single     Spouse name: Not on file    Number of children: Not on file    Years of education: Not on file    Highest education level: Not on file   Occupational History    Not on file   Tobacco Use    Smoking status: Every Day     Packs/day: 0.50     Years: 37.00     Pack years: 18.50     Types: Cigarettes    Smokeless tobacco: Former     Types: Chew     Quit date: 9/3/2001   Vaping Use    Vaping Use: Former    Quit date: 9/3/2019    Substances: Nicotine   Substance and Sexual Activity    Alcohol use: Not Currently    Drug use: Yes     Types: Marijuana (Weed)     Comment: h/o of substance abuse but denies drug use    Sexual activity: Not on file   Other Topics Concern    Not on file   Social History Narrative    Not on file     Social Determinants of Health     Financial Resource Strain: Not on file   Food Insecurity: Not on file   Transportation Needs: Not on file   Physical Activity: Not on file   Stress: Not on file   Social Connections: Not on file   Intimate Partner Violence: Not on file   Housing Stability: Not on file       Family History   Problem Relation Age of Onset    Diabetes Father     Lung Cancer Father     Hypertension Mother     Cancer Mother     High Blood Pressure Mother     Crohn's Disease Brother     Heart Disease Brother        Allergies:  Azithromycin, Methylprednisolone, Penicillins, and Tape [adhesive tape]    Home Medications:  Prior to Admission medications    Medication Sig Start Date End Date Taking? Authorizing Provider   HYDROcodone-acetaminophen (NORCO) 5-325 MG per tablet Take 1 tablet by mouth every 6 hours as needed for Pain for up to 14 days.  Max Daily Amount: 4 tablets 1/31/23 2/14/23  Lamont Palacio MD   gabapentin (NEURONTIN) 400 MG capsule Take 1 capsule by mouth 2 times daily for 30 days. 10/25/22 11/24/22  Latia Soto MD   traZODone (DESYREL) 150 MG tablet Take 1 tablet by mouth nightly 10/25/22   Latia Soto MD   cholestyramine light 4 g packet Take 1 packet by mouth daily  Patient not taking: No sig reported 10/26/22   Latia Soto MD   FLUoxetine (PROZAC) 20 MG capsule Take 60 mg by mouth daily    Historical Provider, MD   Handicap Placard MISC by Does not apply route Duration 5 years 5/17/22   Lamont Palacio MD   Pregabalin (LYRICA PO) Take 300 mg by mouth 2 times daily    Historical Provider, MD   ibuprofen (IBU) 400 MG tablet Take 1 tablet by mouth every 6 hours as needed for Pain 5/15/22 9/18/22  Jeramy Au DO   diphenhydrAMINE (BENADRYL) 25 MG tablet Take 25 mg by mouth nightly as needed    Historical Provider, MD   budesonide-formoterol (SYMBICORT) 160-4.5 MCG/ACT AERO Inhale 2 puffs into the lungs 2 times daily 9/19/21   Harman Colorado MD   lisinopril (PRINIVIL;ZESTRIL) 5 MG tablet Take 1 tablet by mouth daily 9/20/21   Harman Colorado MD   nicotine (NICODERM CQ) 21 MG/24HR Place 1 patch onto the skin daily  Patient not taking: Reported on 8/20/2022 9/20/21 8/23/22  Harman Colorado MD   Polyvinyl Alcohol-Povidone (REFRESH OP) Place 1 drop into both eyes 3 times daily  Patient not taking: No sig reported    Historical Provider, MD   lidocaine (LIDODERM) 5 % Place 1 patch onto the skin daily as needed for Pain 12 hours on, 12 hours off. Patient not taking: No sig reported    Historical Provider, MD         REVIEW OF SYSTEMS       Review of Systems   Unable to perform ROS: Mental status change     PHYSICAL EXAM      INITIAL VITALS:   BP (!) 173/88   Pulse 82   Temp 97.5 °F (36.4 °C) (Oral)   Resp 16   SpO2 94%     Physical Exam  Vitals and nursing note reviewed. Constitutional:       General: She is not in acute distress.   HENT:      Head: Normocephalic and atraumatic. Eyes:      Extraocular Movements: Extraocular movements intact. Pupils: Pupils are equal, round, and reactive to light. Cardiovascular:      Rate and Rhythm: Normal rate and regular rhythm. Heart sounds: Normal heart sounds. Pulmonary:      Effort: Pulmonary effort is normal.      Comments: Mild wheezes  Abdominal:      Comments: Abdomen diffusely tender with voluntary guarding   Musculoskeletal:      Comments: Tender over bilateral shoulders and hips. No obvious deformity. Neurovascularly intact distally. Unable to assess strength as patient is not following commands   Skin:     General: Skin is warm and dry. Neurological:      Mental Status: She is alert. GCS: GCS eye subscore is 4. GCS verbal subscore is 4. GCS motor subscore is 5. Sensory: No sensory deficit. Comments: Patient not following commands, A&Ox2   Psychiatric:         Behavior: Behavior is agitated. DDX/DIAGNOSTIC RESULTS / EMERGENCY DEPARTMENT COURSE / MDM     Medical Decision Making  Patient is a 48-year-old female with a history of COPD, HTN, bipolar, crohn's disease who presents with AMS and left hip pain. Concern for drug abuse per daughter. Hypertensive but vital signs otherwise WNL in ED. Plan for labs, head imaging, possible imaging of abdomen/extremities    Amount and/or Complexity of Data Reviewed  Independent Historian: caregiver and EMS  External Data Reviewed: labs, radiology and notes. Labs: ordered. Decision-making details documented in ED Course. Radiology: ordered. Risk  Prescription drug management.         EKG  1618  Rate 74  , Qtc 488  Normal sinus rhythm  No acute ST or Twave changes    All EKG's are interpreted by the Emergency Department Physician who either signs or Co-signs this chart in the absence of a cardiologist.    EMERGENCY DEPARTMENT COURSE:      ED Course as of 02/04/23 1908   Sat Feb 04, 2023   1705 No leukocytosis, anemia, lactic acid WNL [TD]   1736 Troponin, High Sensitivity(!): 44 [TD]   1747 Electrolytes WNL, no kidney injury [TD]   1818 Nursing spoke with patient's daughter who states the patient is normally AO x4. Patient lives alone and per patient's daughter patient's mental status has been declining over the past week. Today the patient's daughter went to the patient's house and the patient was standing at the door staring at her daughter and would not open the door. Patient's daughter called 911. Patient does have a history of substance abuse including opioids. Patient also has a history of falls. [TD]   1820 Troponin, High Sensitivity(!): 43  patient's baseline [TD]   1565 Patient refusing CT scan, states she is having pain. Will order 50mcg Fentanyl for CT head [TD]   1907 Patient to be signed out to Dr. Celine Fountain. Awaiting imaging, UA, UDS, tox screen [TD]      ED Course User Index  [TD] Venkat Littlejohn DO         CONSULTS:  None      FINAL IMPRESSION      No diagnosis found. DISPOSITION / PLAN     DISPOSITION        PATIENT REFERRED TO:  No follow-up provider specified.     DISCHARGE MEDICATIONS:  New Prescriptions    No medications on file       Venkat Littlejohn DO  Emergency Medicine Resident    (Please note that portions of thisnote were completed with a voice recognition program.  Efforts were made to edit the dictations but occasionally words are mis-transcribed.)      Venkat Littlejohn DO  Resident  02/04/23 [id number]

## 2023-02-04 NOTE — ED NOTES
Patient presents to ED via EMS with complaints of left hip pain from a known hip fracture which she is scheduled for surgery at the end of the month. Patient's daughter called EMS due to patient being in unbearable pain and for altered mental status. Patient arrives complaining of severe pain everywhere and confused. Patient is disoriented to date/time/place. Patient is not able to answered questions about her previous medical history. Patient has a history of COPD but arrives at 87% SpO2 and was placed on 2L O2 via nasal cannula. Patient placed on cardiac monitor, EKG completed, IV access established and specimens collected and sent to lab. Dr. Jorge Prakash at bedside to assess patient.       Eitan Ramos RN  02/04/23 [id number]

## 2023-02-05 ENCOUNTER — APPOINTMENT (OUTPATIENT)
Dept: CT IMAGING | Age: 58
DRG: 199 | End: 2023-02-05
Payer: MEDICAID

## 2023-02-05 LAB
ABSOLUTE EOS #: 0.09 K/UL (ref 0–0.44)
ABSOLUTE IMMATURE GRANULOCYTE: <0.03 K/UL (ref 0–0.3)
ABSOLUTE LYMPH #: 1.26 K/UL (ref 1.1–3.7)
ABSOLUTE MONO #: 0.42 K/UL (ref 0.1–1.2)
ACETAMINOPHEN LEVEL: <5 UG/ML (ref 10–30)
ANION GAP SERPL CALCULATED.3IONS-SCNC: 18 MMOL/L (ref 9–17)
BASOPHILS # BLD: 0 % (ref 0–2)
BASOPHILS ABSOLUTE: <0.03 K/UL (ref 0–0.2)
BUN SERPL-MCNC: 7 MG/DL (ref 6–20)
CALCIUM SERPL-MCNC: 9.1 MG/DL (ref 8.6–10.4)
CHLORIDE SERPL-SCNC: 101 MMOL/L (ref 98–107)
CO2 SERPL-SCNC: 20 MMOL/L (ref 20–31)
CREAT SERPL-MCNC: 0.41 MG/DL (ref 0.5–0.9)
CRP SERPL HS-MCNC: 47.9 MG/L (ref 0–5)
EOSINOPHILS RELATIVE PERCENT: 2 % (ref 1–4)
ERYTHROCYTE [SEDIMENTATION RATE] IN BLOOD BY WESTERGREN METHOD: 17 MM/HR (ref 0–30)
ETHANOL PERCENT: <0.01 %
ETHANOL: <10 MG/DL
GFR SERPL CREATININE-BSD FRML MDRD: >60 ML/MIN/1.73M2
GLUCOSE SERPL-MCNC: 91 MG/DL (ref 70–99)
HCT VFR BLD AUTO: 39.8 % (ref 36.3–47.1)
HGB BLD-MCNC: 12.8 G/DL (ref 11.9–15.1)
IMMATURE GRANULOCYTES: 0 %
LYMPHOCYTES # BLD: 25 % (ref 24–43)
MAGNESIUM SERPL-MCNC: 1.7 MG/DL (ref 1.6–2.6)
MCH RBC QN AUTO: 27.8 PG (ref 25.2–33.5)
MCHC RBC AUTO-ENTMCNC: 32.2 G/DL (ref 28.4–34.8)
MCV RBC AUTO: 86.5 FL (ref 82.6–102.9)
MONOCYTES # BLD: 8 % (ref 3–12)
NRBC AUTOMATED: 0 PER 100 WBC
PDW BLD-RTO: 13.9 % (ref 11.8–14.4)
PLATELET # BLD AUTO: 145 K/UL (ref 138–453)
PMV BLD AUTO: 12.1 FL (ref 8.1–13.5)
POTASSIUM SERPL-SCNC: 3.4 MMOL/L (ref 3.7–5.3)
PROCALCITONIN SERPL-MCNC: 4.26 NG/ML
RBC # BLD: 4.6 M/UL (ref 3.95–5.11)
SALICYLATE LEVEL: <1 MG/DL (ref 3–10)
SEG NEUTROPHILS: 65 % (ref 36–65)
SEGMENTED NEUTROPHILS ABSOLUTE COUNT: 3.3 K/UL (ref 1.5–8.1)
SODIUM SERPL-SCNC: 139 MMOL/L (ref 135–144)
TOXIC TRICYCLIC SC,BLOOD: NEGATIVE
TOXIC TRICYCLIC SC,BLOOD: NEGATIVE
WBC # BLD AUTO: 5.1 K/UL (ref 3.5–11.3)

## 2023-02-05 PROCEDURE — 6370000000 HC RX 637 (ALT 250 FOR IP): Performed by: INTERNAL MEDICINE

## 2023-02-05 PROCEDURE — 83735 ASSAY OF MAGNESIUM: CPT

## 2023-02-05 PROCEDURE — 70498 CT ANGIOGRAPHY NECK: CPT

## 2023-02-05 PROCEDURE — 85025 COMPLETE CBC W/AUTO DIFF WBC: CPT

## 2023-02-05 PROCEDURE — 71260 CT THORAX DX C+: CPT

## 2023-02-05 PROCEDURE — 99232 SBSQ HOSP IP/OBS MODERATE 35: CPT | Performed by: STUDENT IN AN ORGANIZED HEALTH CARE EDUCATION/TRAINING PROGRAM

## 2023-02-05 PROCEDURE — 2580000003 HC RX 258: Performed by: INTERNAL MEDICINE

## 2023-02-05 PROCEDURE — 36415 COLL VENOUS BLD VENIPUNCTURE: CPT

## 2023-02-05 PROCEDURE — 6370000000 HC RX 637 (ALT 250 FOR IP): Performed by: STUDENT IN AN ORGANIZED HEALTH CARE EDUCATION/TRAINING PROGRAM

## 2023-02-05 PROCEDURE — 6360000002 HC RX W HCPCS: Performed by: STUDENT IN AN ORGANIZED HEALTH CARE EDUCATION/TRAINING PROGRAM

## 2023-02-05 PROCEDURE — 84145 PROCALCITONIN (PCT): CPT

## 2023-02-05 PROCEDURE — 94640 AIRWAY INHALATION TREATMENT: CPT

## 2023-02-05 PROCEDURE — 6360000002 HC RX W HCPCS: Performed by: INTERNAL MEDICINE

## 2023-02-05 PROCEDURE — 1200000000 HC SEMI PRIVATE

## 2023-02-05 PROCEDURE — 80048 BASIC METABOLIC PNL TOTAL CA: CPT

## 2023-02-05 PROCEDURE — C9113 INJ PANTOPRAZOLE SODIUM, VIA: HCPCS | Performed by: INTERNAL MEDICINE

## 2023-02-05 RX ORDER — GABAPENTIN 400 MG/1
400 CAPSULE ORAL 2 TIMES DAILY
Status: DISCONTINUED | OUTPATIENT
Start: 2023-02-05 | End: 2023-02-07 | Stop reason: HOSPADM

## 2023-02-05 RX ORDER — LEVOFLOXACIN 5 MG/ML
750 INJECTION, SOLUTION INTRAVENOUS EVERY 24 HOURS
Status: DISCONTINUED | OUTPATIENT
Start: 2023-02-05 | End: 2023-02-06

## 2023-02-05 RX ORDER — KETOROLAC TROMETHAMINE 30 MG/ML
30 INJECTION, SOLUTION INTRAMUSCULAR; INTRAVENOUS EVERY 6 HOURS PRN
Status: DISCONTINUED | OUTPATIENT
Start: 2023-02-05 | End: 2023-02-05

## 2023-02-05 RX ORDER — QUETIAPINE FUMARATE 25 MG/1
25 TABLET, FILM COATED ORAL NIGHTLY
Status: DISCONTINUED | OUTPATIENT
Start: 2023-02-05 | End: 2023-02-05

## 2023-02-05 RX ORDER — POTASSIUM CHLORIDE 7.45 MG/ML
10 INJECTION INTRAVENOUS PRN
Status: DISCONTINUED | OUTPATIENT
Start: 2023-02-05 | End: 2023-02-07 | Stop reason: HOSPADM

## 2023-02-05 RX ORDER — POTASSIUM CHLORIDE 20 MEQ/1
40 TABLET, EXTENDED RELEASE ORAL PRN
Status: DISCONTINUED | OUTPATIENT
Start: 2023-02-05 | End: 2023-02-07 | Stop reason: HOSPADM

## 2023-02-05 RX ORDER — IPRATROPIUM BROMIDE AND ALBUTEROL SULFATE 2.5; .5 MG/3ML; MG/3ML
1 SOLUTION RESPIRATORY (INHALATION) EVERY 4 HOURS PRN
Status: DISCONTINUED | OUTPATIENT
Start: 2023-02-05 | End: 2023-02-06

## 2023-02-05 RX ORDER — HYDROCHLOROTHIAZIDE 25 MG/1
25 TABLET ORAL DAILY
Status: DISCONTINUED | OUTPATIENT
Start: 2023-02-05 | End: 2023-02-07

## 2023-02-05 RX ORDER — NICOTINE 21 MG/24HR
1 PATCH, TRANSDERMAL 24 HOURS TRANSDERMAL DAILY
Status: DISCONTINUED | OUTPATIENT
Start: 2023-02-05 | End: 2023-02-07 | Stop reason: HOSPADM

## 2023-02-05 RX ORDER — LISINOPRIL 10 MG/1
10 TABLET ORAL DAILY
Status: DISCONTINUED | OUTPATIENT
Start: 2023-02-06 | End: 2023-02-07 | Stop reason: HOSPADM

## 2023-02-05 RX ORDER — FLUOXETINE HYDROCHLORIDE 20 MG/1
80 CAPSULE ORAL DAILY
Status: DISCONTINUED | OUTPATIENT
Start: 2023-02-05 | End: 2023-02-07 | Stop reason: HOSPADM

## 2023-02-05 RX ORDER — MORPHINE SULFATE 2 MG/ML
2 INJECTION, SOLUTION INTRAMUSCULAR; INTRAVENOUS EVERY 6 HOURS PRN
Status: DISCONTINUED | OUTPATIENT
Start: 2023-02-05 | End: 2023-02-07 | Stop reason: HOSPADM

## 2023-02-05 RX ORDER — LISINOPRIL 5 MG/1
5 TABLET ORAL ONCE
Status: COMPLETED | OUTPATIENT
Start: 2023-02-05 | End: 2023-02-05

## 2023-02-05 RX ORDER — LISINOPRIL 5 MG/1
5 TABLET ORAL DAILY
Status: DISCONTINUED | OUTPATIENT
Start: 2023-02-06 | End: 2023-02-05

## 2023-02-05 RX ORDER — HALOPERIDOL 5 MG/ML
2 INJECTION INTRAMUSCULAR EVERY 6 HOURS PRN
Status: DISCONTINUED | OUTPATIENT
Start: 2023-02-05 | End: 2023-02-07 | Stop reason: HOSPADM

## 2023-02-05 RX ADMIN — SODIUM CHLORIDE 80 MG: 9 INJECTION, SOLUTION INTRAVENOUS at 03:33

## 2023-02-05 RX ADMIN — SODIUM CHLORIDE, PRESERVATIVE FREE 10 ML: 5 INJECTION INTRAVENOUS at 08:42

## 2023-02-05 RX ADMIN — POTASSIUM CHLORIDE 40 MEQ: 1500 TABLET, EXTENDED RELEASE ORAL at 13:30

## 2023-02-05 RX ADMIN — SODIUM CHLORIDE, PRESERVATIVE FREE 10 ML: 5 INJECTION INTRAVENOUS at 03:33

## 2023-02-05 RX ADMIN — LEVOFLOXACIN 750 MG: 5 INJECTION, SOLUTION INTRAVENOUS at 12:42

## 2023-02-05 RX ADMIN — HYDROCHLOROTHIAZIDE 25 MG: 25 TABLET ORAL at 12:42

## 2023-02-05 RX ADMIN — GABAPENTIN 400 MG: 400 CAPSULE ORAL at 15:20

## 2023-02-05 RX ADMIN — LISINOPRIL 5 MG: 5 TABLET ORAL at 11:54

## 2023-02-05 RX ADMIN — IPRATROPIUM BROMIDE AND ALBUTEROL SULFATE 1 AMPULE: .5; 2.5 SOLUTION RESPIRATORY (INHALATION) at 13:02

## 2023-02-05 RX ADMIN — FLUOXETINE HYDROCHLORIDE 80 MG: 20 CAPSULE ORAL at 18:55

## 2023-02-05 RX ADMIN — SODIUM CHLORIDE 25 ML: 9 INJECTION, SOLUTION INTRAVENOUS at 12:41

## 2023-02-05 RX ADMIN — GABAPENTIN 400 MG: 400 CAPSULE ORAL at 21:03

## 2023-02-05 RX ADMIN — TRAZODONE HYDROCHLORIDE 150 MG: 50 TABLET ORAL at 21:03

## 2023-02-05 RX ADMIN — ENOXAPARIN SODIUM 40 MG: 100 INJECTION SUBCUTANEOUS at 08:41

## 2023-02-05 RX ADMIN — SODIUM CHLORIDE, PRESERVATIVE FREE 10 ML: 5 INJECTION INTRAVENOUS at 21:05

## 2023-02-05 ASSESSMENT — ENCOUNTER SYMPTOMS
WHEEZING: 0
COUGH: 1
SHORTNESS OF BREATH: 0
ABDOMINAL PAIN: 0

## 2023-02-05 NOTE — ED NOTES
Patient back in room from [address] via [address] [place] [address]  02/04/23 1939

## 2023-02-05 NOTE — PROGRESS NOTES
[facility]    [facility]    Progress Note    2/5/2023    8:08 AM    Name:   Sintia Davenport  MRN:     [id number]     Acct:      [de-identified]   [facility] [id number]   Day:  1  Admit Date:  2/4/2023  4:10 PM    PCP:   Petty Lagunas DO  Code Status:  Full Code    Subjective:     C/C:   Chief Complaint   Patient presents with    Hip Pain     Known left hip fracture    Altered Mental Status     Interval History Status: not changed. Patient continues to be confused. Complicated by underlying schizophrenia. She was sleeping in between the conversation/closing eyes and not willing to talk. .   Vitals reviewed. Blood pressure was in systolic 529Y this morning. Labs reviewed which were grossly unremarkable. CT chest concerning for pneumonia and colitis. .      Brief History:       Sintia Davenport is a 61 y.o.  with known schitzophrenia, anxiety, crohns disease with chronic abdominal pain, copd, substance abuse (sedatives/anxiolytics of choice) and right hip OA and noncompliance presented with altered mental status. Had recent preop visit for her hip replacement after which patient got scared and paranoid. She was brought to the hospital eventually by her daughter. Work-up including CT head, CTA head and neck unremarkable for acute infarct. CT chest and abdomen showed concern for pneumonia. Otherwise afebrile with no clinical signs and symptoms. Other blood work unremarkable so far  On evaluation she continues to remain confused. Not willing to talk and closing eyes. Will consult psychiatry. Review of Systems:     Review of Systems   Constitutional:  Negative for fever. Limited as pt was confused. And not willing to talk. Closing eyes in between conversation   Respiratory:  Positive for cough. Negative for shortness of breath and wheezing. Gastrointestinal:  Negative for abdominal pain.      Medications: Allergies: Allergies   Allergen Reactions    Azithromycin Other (See Comments)     'It doesn't work\"    Methylprednisolone      Elevates blood pressure    Penicillins Swelling     throat    Tape [Adhesive Tape] Other (See Comments)     \" Tears my skin off\"       Current Meds:   Scheduled Meds:    [START ON 2/6/2023] lisinopril  5 mg Oral Daily    nicotine  1 patch TransDERmal Daily    sodium chloride flush  5-40 mL IntraVENous 2 times per day    enoxaparin  40 mg SubCUTAneous Daily    sodium chloride flush  5-40 mL IntraVENous BID     Continuous Infusions:    sodium chloride       PRN Meds: ipratropium-albuterol, sodium chloride flush, sodium chloride, ondansetron **OR** ondansetron, polyethylene glycol, acetaminophen **OR** acetaminophen, labetalol, hydrALAZINE, LORazepam    Data:     Past Medical History:   has a past medical history of Acid reflux, Anxiety, Arthritis, Asthma, Bipolar 1 disorder ([facility] Bowel obstruction ([facility] COPD (chronic obstructive pulmonary disease) ([facility] Crohn disease ([facility] Depression, Dry eye, Fibromyalgia, Gout, Headache, Hyperlipidemia, Hypertension, Hypothyroidism, Kidney stones, Muscle spasm, Neuropathy, Pain, Personal history of other medical treatment, Wears partial dentures, and Wellness examination. Social History:   reports that she has been smoking cigarettes. She has a 18.50 pack-year smoking history. She quit smokeless tobacco use about 21 years ago. Her smokeless tobacco use included chew. She reports that she does not currently use alcohol. She reports current drug use. Drug: Marijuana Syliva Roberta).      Family History:   Family History   Problem Relation Age of Onset    Diabetes Father     Lung Cancer Father     Hypertension Mother     Cancer Mother     High Blood Pressure Mother     Crohn's Disease Brother     Heart Disease Brother        Vitals:  BP (!) 160/92   Pulse 79   Temp 98.4 °F (36.9 °C) (Oral)   Resp 18   Ht 5' 6\" (1.676 m)   Wt 175 lb 4.3 oz (79.5 kg) SpO2 92%   BMI 28.29 kg/m²   Temp (24hrs), Av.7 °F (36.5 °C), Min:97.3 °F (36.3 °C), Max:98.4 °F (36.9 °C)    No results for input(s): POCGLU in the last 72 hours. I/O (24Hr): Intake/Output Summary (Last 24 hours) at 2023 0808  Last data filed at 2023 0600  Gross per 24 hour   Intake 60 ml   Output 950 ml   Net -890 ml       Labs:  Hematology:  Recent Labs     23   WBC 6.6 5.1   RBC 4.63 4.60   HGB 12.9 12.8   HCT 40.5 39.8   MCV 87.5 86.5   MCH 27.9 27.8   MCHC 31.9 32.2   RDW 14.3 13.9    145   MPV 12.2 12.1   SEDRATE 17  --    CRP 47.9*  --      Chemistry:  Recent Labs     23  0624     --   --  139   K 4.2  --   --  3.4*     --   --  101   CO2 25  --   --  20   GLUCOSE 103*  --   --  91   BUN 11  --   --  7   CREATININE 0.59  --   --  0.41*   MG 1.8  --   --  1.7   ANIONGAP 11  --   --  18*   LABGLOM >60  --   --  >60   CALCIUM 9.2  --   --  9.1   TROPHS 44* 43* 36*  --    CKTOTAL  --   --  158  --    MYOGLOBIN  --   --  28  --    LACTACIDWB 1.0  --   --   --      Recent Labs     23   PROT 6.9  --    LABALBU 4.1  --    AST 11  --    ALT 9  --    ALKPHOS 135*  --    BILITOT 0.4  --    AMMONIA  --  37   LIPASE 55  --      ABG:  Lab Results   Component Value Date/Time    FIO2 INFORMATION NOT PROVIDED 2023 08:40 PM     Lab Results   Component Value Date/Time    SPECIAL 2 ML LEFT WRIST 2022 11:39 AM     Lab Results   Component Value Date/Time    CULTURE NO GROWTH 5 DAYS 2022 11:39 AM       Radiology:  CT HEAD WO CONTRAST    Result Date: 2023  No acute intracranial abnormality. No acute territorial infarction, intracranial hemorrhage or mass lesion. Mild mucosal thickening of right maxillary sinus and ethmoidal air cells. Physical Examination:        Physical Exam  Constitutional:       Appearance: She is well-developed. Comments: Closing eyes. Not willing to talk. HENT:      Head: Normocephalic. Neck:      Thyroid: No thyromegaly. Vascular: No JVD. Cardiovascular:      Rate and Rhythm: Normal rate and regular rhythm. Heart sounds: Normal heart sounds. No murmur heard. Pulmonary:      Effort: Pulmonary effort is normal.      Breath sounds: Normal breath sounds. No wheezing. Abdominal:      General: There is no distension. Palpations: Abdomen is soft. Skin:     Findings: No rash. Neurological:      General: No focal deficit present. Psychiatric:      Comments: Closing eyes. Not willing to talk. Confusion/schizophrenia       Assessment:        Hospital Problems             Last Modified POA    * (Principal) Hypertensive urgency 2/4/2023 Yes       Plan:        Hypertensive Urgency medication noncompliance. On lisinopril 10 mg. HCTZ 25 mg. Labetalol and hydralazine as needed. Acute metabolic encephalopathy complicated by underlying schizophrenia. No active hallucinations. Will consult psychiatry. Schizophrenia-consulted psychiatry. Pneumonia-on imaging. Started on Levaquin. Right hip OA-outpatient follow-up with orthopedic surgery once medically stable  COPD-resume home inhalers. Crohn's disease-stable  History of substance abuse-monitor for withdrawals  Noncompliance  DVT prophylaxis-Lovenox  GI prophylaxis-Protonix  Discharge planning-once patient medically stable.     Samuel Marques MD  2/5/2023  8:08 AM

## 2023-02-05 NOTE — CARE COORDINATION
Transitional Planning  Attempted to see patient, initial assessment started, but patient falling asleep and not answering questions. Will re attempt as time allows.

## 2023-02-05 NOTE — ED NOTES
Patient not cooperating for CT scans  Patient yelling at staff to leave her alone  Dr. Tyson Colbert notified     Casey Luis, BRENDA  02/04/23 2101

## 2023-02-05 NOTE — ED PROVIDER NOTES
[facility] [facility]  Emergency Department  Emergency Medicine Resident Sign-out     Care of Dalila Garcia was assumed from Dr. Ndiaye and is being seen for Hip Pain (Known left hip fracture) and Altered Mental Status  . The patient's initial evaluation and plan have been discussed with the prior provider who initially evaluated the patient.      EMERGENCY DEPARTMENT COURSE / MEDICAL DECISION MAKING:       MEDICATIONS GIVEN:  Orders Placed This Encounter   Medications    fentaNYL (SUBLIMAZE) injection 50 mcg    iopamidol (ISOVUE-370) 76 % injection 150 mL    fentaNYL (SUBLIMAZE) injection 50 mcg    haloperidol lactate (HALDOL) injection 5 mg       LABS / RADIOLOGY:     Labs Reviewed   CBC WITH AUTO DIFFERENTIAL - Abnormal; Notable for the following components:       Result Value    Seg Neutrophils 73 (*)     Lymphocytes 17 (*)     All other components within normal limits   COMPREHENSIVE METABOLIC PANEL - Abnormal; Notable for the following components:    Glucose 103 (*)     Alkaline Phosphatase 135 (*)     All other components within normal limits   URINALYSIS - Abnormal; Notable for the following components:    Ketones, Urine SMALL (*)     All other components within normal limits   TROPONIN - Abnormal; Notable for the following components:    Troponin, High Sensitivity 44 (*)     All other components within normal limits   TROPONIN - Abnormal; Notable for the following components:    Troponin, High Sensitivity 43 (*)     All other components within normal limits   URINE DRUG SCREEN - Abnormal; Notable for the following components:    Opiates, Urine POSITIVE (*)     Cannabinoid Scrn, Ur POSITIVE (*)     All other components within normal limits   BLOOD GAS, VENOUS - Abnormal; Notable for the following components:    pO2, Jose F 84.7 (*)     O2 Sat, Jose F 96.5 (*)     All other components within normal limits   TROP/MYOGLOBIN - Abnormal; Notable for the following components:    Troponin, High Sensitivity 36 (*)     All other components within normal limits   MAGNESIUM   LIPASE   LACTIC ACID   AMMONIA   CK   BLOOD GAS, VENOUS   TOX SCR, BLD, ED       XR SHOULDER RIGHT (MIN 2 VIEWS)    Result Date: 1/19/2023  EXAMINATION: TWO XRAY VIEWS OF THE RIGHT SHOULDER 1/19/2023 5:45 pm COMPARISON: None. HISTORY: ORDERING SYSTEM PROVIDED HISTORY: ro fx TECHNOLOGIST PROVIDED HISTORY: ro fx Reason for Exam: fall FINDINGS: The bones are osteopenic. There are mild-to-moderate osteophytes at the acromioclavicular joint. The glenohumeral joint is unremarkable. No fracture, dislocation or focal bone lesion is identified. No acute fracture or dislocation. Mild-to-moderate degenerative changes of the acromioclavicular joint. XR ELBOW LEFT (MIN 3 VIEWS)    Result Date: 1/19/2023  EXAMINATION: THREE XRAY VIEWS OF THE LEFT ELBOW 1/19/2023 6:45 pm COMPARISON: None. HISTORY: ORDERING SYSTEM PROVIDED HISTORY: ro fx TECHNOLOGIST PROVIDED HISTORY: ro fx Reason for Exam: fall FINDINGS: Well corticated ossific densities along the medial aspect of the condyle. Spurring of the radial head. Possible anterior fat pad sign. Chronic medial epicondylitis and degenerative changes. Possible effusion. No definite acute fracture but sensitivity and specificity are limited. XR HIP 2-3 VW W PELVIS LEFT    Result Date: 1/19/2023  EXAMINATION: ONE XRAY VIEW OF THE PELVIS AND TWO XRAY VIEWS LEFT HIP 1/19/2023 3:45 pm COMPARISON: 10/22/2022. HISTORY: ORDERING SYSTEM PROVIDED HISTORY: ro fx TECHNOLOGIST PROVIDED HISTORY: ro fx Reason for Exam: fall FINDINGS: The bones are osteopenic. Severe degenerative changes of the hips which are worse on the left appears similar to the prior study. There is significant flattening of the articular surfaces on the left with subcortical geode formation. The sacroiliac joints pubic symphysis are unremarkable. No fracture, dislocation or focal bone lesion is identified.      No acute fracture or dislocation. Severe degenerative changes at the hips which are worse on the left. RECENT VITALS:     Temp: 97.5 °F (36.4 °C),  Heart Rate: 81, Resp: 14, BP: (!) 173/88, SpO2: 96 %      This patient is a 61 y.o. Female with altered mental status, left hip pain. Aox2, no FND. No chemical AC. Lives by her self, unsure of fall. Known left hip fracture, scheduled repair for end of this month. History of drug abuse, patient was brought to the emergency department by EMS as daughter was concerned for progressive altered mental status over the last week. Lab work grossly stable. CT imaging pending. UA pending. Likely admission. CT head unremarkable. On reevaluation, patient continues to state that she is in pain, however continues to be ANO x2 and not cooperating entirely with history and physical.  Attempt for further CT imaging unsuccessful due to agitation, despite sedation. Further agitation caused by likely sundowning. Haldol given for symptomatic control. Patient admitted to [facility] for further care and management. ED Course as of 02/04/23 2306   Sat Feb 04, 2023   1705 No leukocytosis, anemia, lactic acid WNL [TD]   1736 Troponin, High Sensitivity(!): 44 [TD]   1747 Electrolytes WNL, no kidney injury [TD]   1818 Nursing spoke with patient's daughter who states the patient is normally AO x4. Patient lives alone and per patient's daughter patient's mental status has been declining over the past week. Today the patient's daughter went to the patient's house and the patient was standing at the door staring at her daughter and would not open the door. Patient's daughter called 911. Patient does have a history of substance abuse including opioids. Patient also has a history of falls. [TD]   1820 Troponin, High Sensitivity(!): 43  patient's baseline [TD]   2045 Patient refusing CT scan, states she is having pain.  Will order 50mcg Fentanyl for CT head [TD]   1907 Patient to be signed out to  [facility]. Awaiting imaging, UA, UDS, tox screen [TD]   1959 CTH  No acute intracranial abnormality. No acute territorial infarction,  intracranial hemorrhage or mass lesion. Mild mucosal thickening of right maxillary sinus and ethmoidal air cells. [EM]   2012 Opiates, Urine(!): POSITIVE [EM]   2012 Cannabinoid Scrn, Ur(!): POSITIVE [EM]   2101 Patient has a hard time tolerating CT imaging. Will try again with Versed and fentanyl [EM]   2235 Intermed attending will evaluate patient at bedside [EM]      ED Course User Index  [EM] Macario Fleming MD  [TD] Adele Dutton, DO       OUTSTANDING TASKS / RECOMMENDATIONS:    CT imaging  UA  Dispo     FINAL IMPRESSION:     1.  Altered mental status, unspecified altered mental status type        DISPOSITION:         DISPOSITION:  []  Discharge   []  Transfer -    [x]  Admission - Intermed   []  Against Medical Advice   []  Eloped   FOLLOW-UP: Anamaria Ocampo DO  [address] [facility]  [phone]    Schedule an appointment as soon as possible for a visit   For follow up    [facility]  [address]  Go to   As needed   DISCHARGE MEDICATIONS: New Prescriptions    No medications on file          Macario Fleming MD  Emergency Medicine Resident  [facility]        Macario Fleming MD  Resident  02/04/23 2935

## 2023-02-05 NOTE — PROGRESS NOTES
Physical Therapy        Physical Therapy Cancel Note      DATE: 2023    NAME: Gary Zaragoza  MRN: [id number]   : 1965      Patient not seen this date for Physical Therapy due to:    Testing: pt off floor at CT.  Ck back       Electronically signed by Adithya Quach PT on 2023 at 11:00 AM

## 2023-02-05 NOTE — H&P
[facility]  Office: [address], DO, Rudy Mcintosh, DO, Luz Sheffield, DO, Angeles Faisal Martinez, DO, John Glass MD, Yogesh Matson MD, Lily Jones MD, Samuel Aguilar MD,  Conrado Moya MD, Emelyn Ny MD, Ayala Chris, DO, Ulices Garcia MD,  Ankit Ashby MD, Markel So MD, Andrea Monsalve, DO, Barbie Victoria MD, Gilmer Cerda MD, Charlene Garcia, DO, Silvia Craig MD, Elva Segovia MD, Marcio Branch MD, Ihsan Scott MD, Sheeba Tellez DO, Tammy Hines MD, Mai Shine MD, Osmin Rush, CNP,  Leni Redd, CNP, Diana Saint, CNP, Ann Ceballos, CNP,  Ronen Rosa, [facility], Chantal Bustillo, CNP, Atul Brunner, CNP, Lisbet Malcolm, CNP, Baldemar Aschoff, CNP, Overton Lefort, CNP, Lilliam Dotson PA-C, Arturo Levin, CNS, Maya Avelar, CNP, Teresita Nguyen, [facility]    HISTORY AND PHYSICAL EXAMINATION            Date:   2/4/2023  Patient name:  Tierra Hernandez  Date of admission:  2/4/2023  4:10 PM  MRN:   [id number]  Account:  [de-identified]  YOB: 1965  PCP:    Harper Gipson DO  Room:   30/30  Code Status:    Full Code    Chief Complaint:     Chief Complaint   Patient presents with    Hip Pain     Known left hip fracture    Altered Mental Status       Spoke to daughter who provided all of her Hx and presenting concerns         History of Present Illness:     Tierra Hernandez is a 61 y.o.  with known schitzophrenia, anxiety, crohns disease with chronic abdominal pain, copd, substance abuse (sedatives/anxiolytics of choice) and right hip OA with plans for replacement in a week . She has recentcy changed doctors and concern for taking home meds wrong, futher more, it appears she has been intermittently using drugs again. Daughter reports this occred in past multiple times and presentation very similar to medication noncompliance.  EMS on arrival reported full bottles filled of her meds for months. Concern she hasnt been compliant. Her pain is causing her distress and had a pre op office visit yesterday, after discussing risks patient returned home in a panic like state. Daughter called her earlier in day, told her shes coming by. Usually her mom opens door for her before she arrives but this time locked. Patient refused to open, was questioning her daughters intent, reluctant and paranoid. She denies auditory and visual hallucinations at bedside and daughter says she wasn't mentioning anything about seeing or hearing things. She reports chronic right hip pain not controlled. On arrival to hospital she was reluctant to share information. Given her change in baseline state there was work up for AMS while stabilization. Her SBP has been high since EMS assessed her. Concern for component of hypertensive Urgency +/- encephalopathy without focal neuro symptoms at this time. Admitted for further work up and evaluation   Past Medical History:     Past Medical History:   Diagnosis Date    Acid reflux     Anxiety     Arthritis     Left hip    Asthma     Bipolar 1 disorder ([address]     Bowel obstruction ([address]     COPD (chronic obstructive pulmonary disease) ([facility])     O2 3L PRN/ Dr. [facility]/last seen 2020/appt.9-7-21 for Clearance    Crohn disease ([address]     Depression     Dry eye     Fibromyalgia     Gout     Headache     Hyperlipidemia     Hypertension     Hypothyroidism     Kidney stones     Muscle spasm     Neuropathy     Pain     left hip    Personal history of other medical treatment     Pt. seen by Dr. Yanna Rivera for clearance for this OR Left hip/ Normally pt. doesn't follow with Cardiology.  [facility]    Wears partial dentures     upper    Wellness examination     PCP Diomedes Oquendo MD/ genna/last seen 8-24-21        Past Surgical History:     Past Surgical History:   Procedure Laterality Date    ABDOMEN SURGERY      bowel obstruction OR    BUNIONECTOMY Left     CHOLECYSTECTOMY      COLONOSCOPY  04/30/2007    no gross pathology seen in the colon and also 3-4 inches of the ileum    COLONOSCOPY  10/12/2017    normal    COLONOSCOPY  10/25/2021    COLONOSCOPY WITH BIOPSY performed by Elba Shook MD at \[facility]\"" Endoscopy    COLONOSCOPY  10/25/2021    COLONOSCOPY POLYPECTOMY REMOVAL SNARE performed by Elba Shook MD at [address] [place] [address] ENDOSCOPY  10/25/2021    EGD BIOPSY performed by Elba Shook MD at \[facility]\"" Endoscopy        Medications Prior to Admission:     Prior to Admission medications    Medication Sig Start Date End Date Taking? Authorizing Provider   HYDROcodone-acetaminophen (NORCO) 5-325 MG per tablet Take 1 tablet by mouth every 6 hours as needed for Pain for up to 14 days. Max Daily Amount: 4 tablets 1/31/23 2/14/23  Lamont Palacio MD   gabapentin (NEURONTIN) 400 MG capsule Take 1 capsule by mouth 2 times daily for 30 days.  10/25/22 11/24/22  Latia Soto MD   traZODone (DESYREL) 150 MG tablet Take 1 tablet by mouth nightly 10/25/22   Latia Soto MD   cholestyramine light 4 g packet Take 1 packet by mouth daily  Patient not taking: No sig reported 10/26/22   Latia Soto MD   FLUoxetine (PROZAC) 20 MG capsule Take 60 mg by mouth daily    Historical Provider, MD   Handicap Placard MISC by Does not apply route Duration 5 years 5/17/22   Lamont Palacio MD   Pregabalin (LYRICA PO) Take 300 mg by mouth 2 times daily    Historical Provider, MD   ibuprofen (IBU) 400 MG tablet Take 1 tablet by mouth every 6 hours as needed for Pain 5/15/22 9/18/22  Jeramy Au DO   diphenhydrAMINE (BENADRYL) 25 MG tablet Take 25 mg by mouth nightly as needed    Historical Provider, MD   budesonide-formoterol (SYMBICORT) 160-4.5 MCG/ACT AERO Inhale 2 puffs into the lungs 2 times daily 9/19/21   Harman Colorado MD   lisinopril (PRINIVIL;ZESTRIL) 5 MG tablet Take 1 tablet by mouth daily 21   Rita Arroyo MD   nicotine (NICODERM CQ) 21 MG/24HR Place 1 patch onto the skin daily  Patient not taking: Reported on 2022  Rita Arroyo MD   Polyvinyl Alcohol-Povidone (REFRESH OP) Place 1 drop into both eyes 3 times daily  Patient not taking: No sig reported    Historical Provider, MD   lidocaine (LIDODERM) 5 % Place 1 patch onto the skin daily as needed for Pain 12 hours on, 12 hours off. Patient not taking: No sig reported    Historical Provider, MD        Allergies:     Azithromycin, Methylprednisolone, Penicillins, and Tape [adhesive tape]    Social History:     Tobacco:    reports that she has been smoking cigarettes. She has a 18.50 pack-year smoking history. She quit smokeless tobacco use about 21 years ago. Her smokeless tobacco use included chew. Alcohol:      reports that she does not currently use alcohol. Drug Use:  reports current drug use. Drug: Marijuana Sonya Terence). Family History:     Family History   Problem Relation Age of Onset    Diabetes Father     Lung Cancer Father     Hypertension Mother     Cancer Mother     High Blood Pressure Mother     Crohn's Disease Brother     Heart Disease Brother        Review of Systems:     Positive and Negative as described in HPI. RO as per above     Physical Exam:   BP (!) 173/88   Pulse 81   Temp 97.5 °F (36.4 °C) (Oral)   Resp 14   SpO2 96%   Temp (24hrs), Av.5 °F (36.4 °C), Min:97.5 °F (36.4 °C), Max:97.5 °F (36.4 °C)    No results for input(s): POCGLU in the last 72 hours. No intake or output data in the 24 hours ending 23 [id number]    General Appearance: ao3, looking around very anxious and scared. Only answering with minimal reply's. No focal neuro signs. No asterixes. Sitting at 45%. Rapid and shallow breathing. Looks nauseous. Keeps looking around scared.    Mental status: oriented to person, place, and time  Head: normocephalic, atraumatic  Eye: no icterus, redness, pupils equal and reactive, extraocular eye movements intact, conjunctiva clear  Ear: normal external ear, no discharge, hearing intact  Nose: no drainage noted  Mouth: mucous membranes dry  Neck: supple, no carotid bruits, thyroid not palpable  Lungs: Bilateral equal air entry, clear to ausculation, no wheezing, rales or rhonchi, normal effort  Cardiovascular: normal rate, regular rhythm, no murmur, gallop, rub  Abdomen: Soft, non tender to superficial touch, nondistended, normal bowel sounds,no stigmata of chronic liver disease. No acute abdomen    Neurologic: There are no new focal motor or sensory deficits, normal muscle tone and bulk, no abnormal sensation, normal speech, cranial nerves II through XII grossly intact. Skin: No gross lesions, rashes, bruising or bleeding on exposed skin area  Extremities: tremulous, tenderness of right hip without erythema or swelling. peripheral pulses palpable, no pedal edema or calf pain with palpation.    Psych: anxious, non congruent     Investigations:      Laboratory Testing:  Recent Results (from the past 24 hour(s))   CBC with Auto Differential    Collection Time: 02/04/23  4:35 PM   Result Value Ref Range    WBC 6.6 3.5 - 11.3 k/uL    RBC 4.63 3.95 - 5.11 m/uL    Hemoglobin 12.9 11.9 - 15.1 g/dL    Hematocrit 40.5 36.3 - 47.1 %    MCV 87.5 82.6 - 102.9 fL    MCH 27.9 25.2 - 33.5 pg    MCHC 31.9 28.4 - 34.8 g/dL    RDW 14.3 11.8 - 14.4 %    Platelets 950 513 - 606 k/uL    MPV 12.2 8.1 - 13.5 fL    NRBC Automated 0.0 0.0 per 100 WBC    Seg Neutrophils 73 (H) 36 - 65 %    Lymphocytes 17 (L) 24 - 43 %    Monocytes 7 3 - 12 %    Eosinophils % 2 1 - 4 %    Basophils 1 0 - 2 %    Immature Granulocytes 0 0 %    Segs Absolute 4.85 1.50 - 8.10 k/uL    Absolute Lymph # 1.13 1.10 - 3.70 k/uL    Absolute Mono # 0.45 0.10 - 1.20 k/uL    Absolute Eos # 0.14 0.00 - 0.44 k/uL    Basophils Absolute 0.03 0.00 - 0.20 k/uL    Absolute Immature Granulocyte <0.03 0.00 - 0.30 k/uL   CMP Collection Time: 02/04/23  4:35 PM   Result Value Ref Range    Glucose 103 (H) 70 - 99 mg/dL    BUN 11 6 - 20 mg/dL    Creatinine 0.59 0.50 - 0.90 mg/dL    Est, Glom Filt Rate >60 >60 mL/min/1.73m2    Calcium 9.2 8.6 - 10.4 mg/dL    Sodium 141 135 - 144 mmol/L    Potassium 4.2 3.7 - 5.3 mmol/L    Chloride 105 98 - 107 mmol/L    CO2 25 20 - 31 mmol/L    Anion Gap 11 9 - 17 mmol/L    Alkaline Phosphatase 135 (H) 35 - 104 U/L    ALT 9 5 - 33 U/L    AST 11 <32 U/L    Total Bilirubin 0.4 0.3 - 1.2 mg/dL    Total Protein 6.9 6.4 - 8.3 g/dL    Albumin 4.1 3.5 - 5.2 g/dL    Albumin/Globulin Ratio 1.5 1.0 - 2.5   Magnesium    Collection Time: 02/04/23  4:35 PM   Result Value Ref Range    Magnesium 1.8 1.6 - 2.6 mg/dL   Lipase    Collection Time: 02/04/23  4:35 PM   Result Value Ref Range    Lipase 55 13 - 60 U/L   Lactic Acid    Collection Time: 02/04/23  4:35 PM   Result Value Ref Range    Lactic Acid, Whole Blood 1.0 0.7 - 2.1 mmol/L   Troponin    Collection Time: 02/04/23  4:35 PM   Result Value Ref Range    Troponin, High Sensitivity 44 (H) 0 - 14 ng/L   Troponin    Collection Time: 02/04/23  5:51 PM   Result Value Ref Range    Troponin, High Sensitivity 43 (H) 0 - 14 ng/L   Urinalysis    Collection Time: 02/04/23  6:38 PM   Result Value Ref Range    Color, UA Yellow Yellow    Turbidity UA Clear Clear    Glucose, Ur NEGATIVE NEGATIVE    Bilirubin Urine NEGATIVE NEGATIVE    Ketones, Urine SMALL (A) NEGATIVE    Specific Gravity, UA 1.018 1.005 - 1.030    Urine Hgb NEGATIVE NEGATIVE    pH, UA 6.5 5.0 - 8.0    Protein, UA NEGATIVE NEGATIVE    Urobilinogen, Urine Normal Normal    Nitrite, Urine NEGATIVE NEGATIVE    Leukocyte Esterase, Urine NEGATIVE NEGATIVE    Urinalysis Comments       Microscopic exam not performed based on chemical results unless requested in original order.    Urine Drug Screen    Collection Time: 02/04/23  6:38 PM   Result Value Ref Range    Amphetamine Screen, Ur NEGATIVE NEGATIVE    Barbiturate Screen, Ur NEGATIVE NEGATIVE    Benzodiazepine Screen, Urine NEGATIVE NEGATIVE    Cocaine Metabolite, Urine NEGATIVE NEGATIVE    Methadone Screen, Urine NEGATIVE NEGATIVE    Opiates, Urine POSITIVE (A) NEGATIVE    Phencyclidine, Urine NEGATIVE NEGATIVE    Cannabinoid Scrn, Ur POSITIVE (A) NEGATIVE    Oxycodone Screen, Ur NEGATIVE NEGATIVE    Fentanyl, Ur NEGATIVE NEGATIVE    Test Information       Assay provides medical screening only. The absence of expected drug(s) and/or metabolite(s) may indicate diluted or adulterated urine, limitations of testing or timing of collection. Ammonia    Collection Time: 02/04/23  8:40 PM   Result Value Ref Range    Ammonia 37 11 - 51 umol/L   BLOOD GAS, VENOUS    Collection Time: 02/04/23  8:40 PM   Result Value Ref Range    pH, Jose F 7.383 7.320 - 7.420    pCO2, Jose F 44.9 39 - 55 mm Hg    pO2, Jose F 84.7 (H) 30 - 50 mm Hg    HCO3, Venous 26.1 24 - 30 mmol/L    Positive Base Excess, Jose F 1.3 0.0 - 2.0 mmol/L    O2 Sat, Jose F 96.5 (H) 60.0 - 85.0 %    Carboxyhemoglobin 3.8 0 - 5 %    Pt Temp 37.0     FIO2 INFORMATION NOT PROVIDED    CK    Collection Time: 02/04/23  8:40 PM   Result Value Ref Range    Total  26 - 192 U/L   TROP/MYOGLOBIN    Collection Time: 02/04/23  8:40 PM   Result Value Ref Range    Troponin, High Sensitivity 36 (H) 0 - 14 ng/L    Myoglobin 28 25 - 58 ng/mL       Imaging/Diagnostics:  CT HEAD WO CONTRAST    Result Date: 2/4/2023  No acute intracranial abnormality. No acute territorial infarction, intracranial hemorrhage or mass lesion. Mild mucosal thickening of right maxillary sinus and ethmoidal air cells. Assessment :      Hospital Problems             Last Modified POA    * (Principal) Hypertensive urgency 2/4/2023 Yes       #Hypertensive Urgency  -Etiology multifactorial with continued working Dx.  At this time concern for medication nonadherence,  uncontrolled pain, pannic attack, possible withdrawal.   -Patient has crohns disease, daughter reports chronic nausea and pain. Once she gets her pain meds and protonix she can tolerate oral typically. -will address analgesia which should improve her pain and help if there is a withdrawal component. Will assess her BP after those meds are given . Will restart her on home lisinopril if she can tolerate PO at that time.   -renal function wnml, unrevealing CBC without left shift or elevated eosinophilia. -U tox showing + cannbinoids and opiates   -ammonia level wnml, myoglobin, cpk wnml, lactic unrevealing, bicarb 25,   -UA showing small ketones  - Last echo from 2020: EF> 65%. Mild to moderate left ventricular hypertrophy.  -Bedside Ekg: no st changes, no prolonged IA or Qtc, sinus  -Patient is tremulous but no asterixes. Euvolemic, clear lungs. She responds but cannot elaborate or express her symptoms shes feeling. Endorses severe anxiety.   -Home meds reviewed with daughter. Unsure what shes been really taking, she often takes more gabapentin and lyrica then her doctor prescribes. Psych consult for guidance on antipsychotics, caution with haldol. She received in er. Avoid dopaminergic meds until we can understand her underlying psychiatric disturbances better. No tardive noted. -I am unable to Open her CXR via EM due to a notice of computer errer. Cannot see if theres any mediastinal widening.--->will need to revaluate this    PLAN  -Analgesia with protonix first as per daughter, then can tolerate po, thus will resume her home lisinopril.   -avoid IV infusions at this time. Patient becomes fearful with Ivs and can worsen anxiety state or she can pull it out and put self at risk for harm   -obtain Lfts, IVF shes dry on exam and ketones in urine  -TCA level, shes hot, dry and  -inflammatory markers to investigate for vasculities, occult inflammation given the lack of info from patient.   -Head imaging: ? Pending.  Ordered in er, unsure.   -medication review and psych consult for medication recs and considerations []  -may need sitter service  -monitor for withdrawal, psychosis or pannic attack   -Avoid Benadryl at this time. Avoid anticholinergics. #Right Hip OA  -pain uncontrolled. Her Dr recently stopped her pain meds. Patient very upset why the dr would do that before hip replacement considering how bad her pain is.   -concerned that since out of pain meds, taking non prescribed or going into withdrawal.  -CIWA protocol but with caution given concern for mixed picture and lack of info from patient.   -recent preop eval with pcp yesterday. Plans for surgical replacement in a week or 2.   -exam shows nml pupils, tachypnea, nml bowl sounds. Possible hypoeralgesia or doesn't want to be touched.   -she does endorse 10/10 pain  Plan  -nsaids and analgesia with pain scale,     #COPD  -no wheezing. Intermittent wet coughs. Reports baseline chronic cough. No obviosu signs of respitory decompensation, lungs overall clear with acceptable air movement. Doesn't report any respiratory symptoms  -CXR unable to open but appears obtained.   -daughter reports noncompliance to inhalers and continues to smoke.   -resume inhalers PRN, will  on cessation once more stable or before discharge. Prn nicotine patch. #Crohns disease  -daughter reports not on any corhns meds or steroids. Has chronic abdominal pain and nausea. Daughter says they always felt like her chronic Gi issues was a combination between drug use and crohns. Will assess for both. Obtain esr/crp, diet as tolerated [][]    #Substance abuse  -Has been using since daughter could remember.   -Her drugs of choice are sedatives and anxiolytics  -U tox + for opiates  -LFTs ordered, will review once out [][],   -obtain TCA level [][]  -ciwa  -monitor for withdrawal or overdose      #Schitzophrenia  -eliza reports paranoia when she has her flares. She says shes acting very similar to how she does when she stops taking her meds. She gets very nervous and paranoid.  Today seems a little worse.   -will confirm her home meds again as daugher not sure on doses and frequency, recent doctor changes.  Will resume or have psych assess if questionable [][]  -likely needs sitter service, neuro checks []    Admitted to [facility] under [facility]

## 2023-02-05 NOTE — PROGRESS NOTES
Pt appeared in pain, breathing shallow and tearful. Pt refused to rate pain level. States that pain is \"all over\". Physician notified. Medication ordered. Writer offered medication to pt but pt refusing. Writer educated on the use of the medication, pt still refusing.

## 2023-02-05 NOTE — PLAN OF CARE
Problem: Pain  Goal: Verbalizes/displays adequate comfort level or baseline comfort level  2/5/2023 1627 by Sam Fonseca RN  Outcome: Progressing  2/5/2023 0228 by Yogi Brunner RN  Outcome: Progressing     Problem: Skin/Tissue Integrity  Goal: Absence of new skin breakdown  Description: 1. Monitor for areas of redness and/or skin breakdown  2. Assess vascular access sites hourly  3. Every 4-6 hours minimum:  Change oxygen saturation probe site  4. Every 4-6 hours:  If on nasal continuous positive airway pressure, respiratory therapy assess nares and determine need for appliance change or resting period.   2/5/2023 1627 by Sam Fonseca RN  Outcome: Progressing  2/5/2023 0228 by Yogi Brunner RN  Outcome: Progressing     Problem: Safety - Adult  Goal: Free from fall injury  Outcome: Progressing     Problem: ABCDS Injury Assessment  Goal: Absence of physical injury  Outcome: Progressing

## 2023-02-06 PROBLEM — F39 UNSPECIFIED MOOD (AFFECTIVE) DISORDER (HCC): Status: ACTIVE | Noted: 2023-02-06

## 2023-02-06 LAB
EKG ATRIAL RATE: 74 BPM
EKG P AXIS: 47 DEGREES
EKG P-R INTERVAL: 140 MS
EKG Q-T INTERVAL: 440 MS
EKG QRS DURATION: 84 MS
EKG QTC CALCULATION (BAZETT): 488 MS
EKG R AXIS: 79 DEGREES
EKG T AXIS: 62 DEGREES
EKG VENTRICULAR RATE: 74 BPM

## 2023-02-06 PROCEDURE — 6360000002 HC RX W HCPCS: Performed by: INTERNAL MEDICINE

## 2023-02-06 PROCEDURE — 6360000002 HC RX W HCPCS: Performed by: STUDENT IN AN ORGANIZED HEALTH CARE EDUCATION/TRAINING PROGRAM

## 2023-02-06 PROCEDURE — 1200000000 HC SEMI PRIVATE

## 2023-02-06 PROCEDURE — 2580000003 HC RX 258: Performed by: INTERNAL MEDICINE

## 2023-02-06 PROCEDURE — 6370000000 HC RX 637 (ALT 250 FOR IP): Performed by: STUDENT IN AN ORGANIZED HEALTH CARE EDUCATION/TRAINING PROGRAM

## 2023-02-06 PROCEDURE — 94640 AIRWAY INHALATION TREATMENT: CPT

## 2023-02-06 PROCEDURE — 51798 US URINE CAPACITY MEASURE: CPT

## 2023-02-06 PROCEDURE — 6370000000 HC RX 637 (ALT 250 FOR IP): Performed by: INTERNAL MEDICINE

## 2023-02-06 PROCEDURE — 99232 SBSQ HOSP IP/OBS MODERATE 35: CPT | Performed by: STUDENT IN AN ORGANIZED HEALTH CARE EDUCATION/TRAINING PROGRAM

## 2023-02-06 RX ORDER — LEVOFLOXACIN 500 MG/1
500 TABLET, FILM COATED ORAL DAILY
Status: DISCONTINUED | OUTPATIENT
Start: 2023-02-07 | End: 2023-02-07

## 2023-02-06 RX ORDER — ALBUTEROL SULFATE 2.5 MG/3ML
2.5 SOLUTION RESPIRATORY (INHALATION)
Status: DISCONTINUED | OUTPATIENT
Start: 2023-02-06 | End: 2023-02-07 | Stop reason: HOSPADM

## 2023-02-06 RX ORDER — IPRATROPIUM BROMIDE AND ALBUTEROL SULFATE 2.5; .5 MG/3ML; MG/3ML
1 SOLUTION RESPIRATORY (INHALATION)
Status: DISCONTINUED | OUTPATIENT
Start: 2023-02-06 | End: 2023-02-07 | Stop reason: HOSPADM

## 2023-02-06 RX ADMIN — LISINOPRIL 10 MG: 10 TABLET ORAL at 05:26

## 2023-02-06 RX ADMIN — IPRATROPIUM BROMIDE AND ALBUTEROL SULFATE 1 AMPULE: .5; 2.5 SOLUTION RESPIRATORY (INHALATION) at 21:52

## 2023-02-06 RX ADMIN — LEVOFLOXACIN 750 MG: 5 INJECTION, SOLUTION INTRAVENOUS at 12:05

## 2023-02-06 RX ADMIN — SODIUM CHLORIDE, PRESERVATIVE FREE 10 ML: 5 INJECTION INTRAVENOUS at 21:15

## 2023-02-06 RX ADMIN — TRAZODONE HYDROCHLORIDE 150 MG: 50 TABLET ORAL at 21:15

## 2023-02-06 RX ADMIN — SODIUM CHLORIDE, PRESERVATIVE FREE 10 ML: 5 INJECTION INTRAVENOUS at 08:17

## 2023-02-06 RX ADMIN — GABAPENTIN 400 MG: 400 CAPSULE ORAL at 08:16

## 2023-02-06 RX ADMIN — FLUOXETINE HYDROCHLORIDE 80 MG: 20 CAPSULE ORAL at 08:16

## 2023-02-06 RX ADMIN — GABAPENTIN 400 MG: 400 CAPSULE ORAL at 21:15

## 2023-02-06 RX ADMIN — HYDROCHLOROTHIAZIDE 25 MG: 25 TABLET ORAL at 08:16

## 2023-02-06 RX ADMIN — ENOXAPARIN SODIUM 40 MG: 100 INJECTION SUBCUTANEOUS at 08:16

## 2023-02-06 ASSESSMENT — ENCOUNTER SYMPTOMS
ABDOMINAL PAIN: 0
WHEEZING: 0
SHORTNESS OF BREATH: 0
COUGH: 1

## 2023-02-06 NOTE — PLAN OF CARE
Problem: Pain  Goal: Verbalizes/displays adequate comfort level or baseline comfort level  2/6/2023 0457 by Elvira Tam RN  Outcome: Progressing  2/5/2023 1627 by Brien Oarntes RN  Outcome: Progressing     Problem: Skin/Tissue Integrity  Goal: Absence of new skin breakdown  Description: 1. Monitor for areas of redness and/or skin breakdown  2. Assess vascular access sites hourly  3. Every 4-6 hours minimum:  Change oxygen saturation probe site  4. Every 4-6 hours:  If on nasal continuous positive airway pressure, respiratory therapy assess nares and determine need for appliance change or resting period.   2/6/2023 0457 by Elvira Tam RN  Outcome: Progressing  2/5/2023 1627 by Brien Orantes RN  Outcome: Progressing     Problem: Safety - Adult  Goal: Free from fall injury  2/6/2023 0457 by Elvira Tam RN  Outcome: Progressing  2/5/2023 1627 by Brien Orantes RN  Outcome: Progressing     Problem: ABCDS Injury Assessment  Goal: Absence of physical injury  2/6/2023 0457 by Elvira Tam RN  Outcome: Progressing  Flowsheets (Taken 2/5/2023 1945)  Absence of Physical Injury: Implement safety measures based on patient assessment  2/5/2023 1627 by Brien Orantes RN  Outcome: Progressing

## 2023-02-06 NOTE — PROGRESS NOTES
@[email or web address]@    [facility]    Progress Note    2/6/2023    7:38 AM    Name:   Glenda Mcgill  MRN:     [id number]     Acct:      [de-identified]   Room:   [address] [place] [address]  IP Day:  2  Admit Date:  2/4/2023  4:10 PM    PCP:   Sendy Saha DO  Code Status:  Full Code    Subjective:     C/C:   Chief Complaint   Patient presents with    Hip Pain     Known left hip fracture    Altered Mental Status     Interval History Status: Improved    Hemodynamically stable. Patient was alert oriented x2. Looked much better today. Continue antibiotics. Monitor today and discharge home tomorrow    Brief History:       Glenda Mcgill is a 61 y.o.  with known schitzophrenia, anxiety, crohns disease with chronic abdominal pain, copd, substance abuse (sedatives/anxiolytics of choice) and right hip OA and noncompliance presented with altered mental status. Had recent preop visit for her hip replacement after which patient got scared and paranoid. She was brought to the hospital eventually by her daughter. Work-up including CT head, CTA head and neck unremarkable for acute infarct. CT chest and abdomen showed concern for pneumonia. Otherwise afebrile with no clinical signs and symptoms. Other blood work unremarkable so far  On evaluation she continues to remain confused. Not willing to talk and closing eyes. Consulted psychiatry. Review of Systems:     Review of Systems   Constitutional:  Negative for fatigue and fever. Mentation much better today. HENT:  Negative for congestion. Respiratory:  Positive for cough. Negative for shortness of breath and wheezing. Gastrointestinal:  Negative for abdominal pain. Genitourinary:  Negative for dysuria, frequency and hematuria. Musculoskeletal:  Negative for arthralgias. Skin:  Negative for rash. Neurological:  Negative for seizures, numbness and headaches. Psychiatric/Behavioral:  Negative for agitation, hallucinations and suicidal ideas. Mentation much better  Willing to talk     Medications: Allergies: Allergies   Allergen Reactions    Azithromycin Other (See Comments)     'It doesn't work\"    Methylprednisolone      Elevates blood pressure    Penicillins Swelling     throat    Tape [Adhesive Tape] Other (See Comments)     \" Tears my skin off\"       Current Meds:   Scheduled Meds:    nicotine  1 patch TransDERmal Daily    lisinopril  10 mg Oral Daily    levofloxacin  750 mg IntraVENous Q24H    hydroCHLOROthiazide  25 mg Oral Daily    gabapentin  400 mg Oral BID    FLUoxetine  80 mg Oral Daily    traZODone  150 mg Oral Nightly    sodium chloride flush  5-40 mL IntraVENous 2 times per day    enoxaparin  40 mg SubCUTAneous Daily    sodium chloride flush  5-40 mL IntraVENous BID     Continuous Infusions:    sodium chloride 25 mL (02/05/23 1241)     PRN Meds: ipratropium-albuterol, potassium chloride **OR** potassium alternative oral replacement **OR** potassium chloride, haloperidol lactate, morphine, sodium chloride flush, sodium chloride, ondansetron **OR** ondansetron, polyethylene glycol, acetaminophen **OR** acetaminophen, labetalol, hydrALAZINE    Data:     Past Medical History:   has a past medical history of Acid reflux, Anxiety, Arthritis, Asthma, Bipolar 1 disorder ([facility] [address] Bowel obstruction ([address] COPD (chronic obstructive pulmonary disease) ([facility] [address] Crohn disease ([facility] [address] Depression, Dry eye, Fibromyalgia, Gout, Headache, Hyperlipidemia, Hypertension, Hypothyroidism, Kidney stones, Muscle spasm, Neuropathy, Pain, Personal history of other medical treatment, Wears partial dentures, and Wellness examination. Social History:   reports that she has been smoking cigarettes. She has a 18.50 pack-year smoking history. She quit smokeless tobacco use about 21 years ago. Her smokeless tobacco use included chew.  She reports that she does not currently use alcohol. She reports current drug use. Drug: Marijuana Bobby Yung). Family History:   Family History   Problem Relation Age of Onset    Diabetes Father     Lung Cancer Father     Hypertension Mother     Cancer Mother     High Blood Pressure Mother     Crohn's Disease Brother     Heart Disease Brother        Vitals:  /78   Pulse 76   Temp 98.2 °F (36.8 °C) (Oral)   Resp 18   Ht 5' 6\" (1.676 m)   Wt 175 lb 4.3 oz (79.5 kg)   SpO2 92%   BMI 28.29 kg/m²   Temp (24hrs), Av.9 °F (36.6 °C), Min:97.2 °F (36.2 °C), Max:98.4 °F (36.9 °C)    No results for input(s): POCGLU in the last 72 hours. I/O (24Hr):     Intake/Output Summary (Last 24 hours) at 2023 0738  Last data filed at 2023 2130  Gross per 24 hour   Intake --   Output 150 ml   Net -150 ml         Labs:  Hematology:  Recent Labs     23  1635 23  0624   WBC 6.6 5.1   RBC 4.63 4.60   HGB 12.9 12.8   HCT 40.5 39.8   MCV 87.5 86.5   MCH 27.9 27.8   MCHC 31.9 32.2   RDW 14.3 13.9    145   MPV 12.2 12.1   SEDRATE 17  --    CRP 47.9*  --        Chemistry:  Recent Labs     23  1635 23  17523  2040 23  0624     --   --  139   K 4.2  --   --  3.4*     --   --  101   CO2 25  --   --  20   GLUCOSE 103*  --   --  91   BUN 11  --   --  7   CREATININE 0.59  --   --  0.41*   MG 1.8  --   --  1.7   ANIONGAP 11  --   --  18*   LABGLOM >60  --   --  >60   CALCIUM 9.2  --   --  9.1   TROPHS 44* 43* 36*  --    CKTOTAL  --   --  158  --    MYOGLOBIN  --   --  28  --    LACTACIDWB 1.0  --   --   --        Recent Labs     23  1635 23   PROT 6.9  --    LABALBU 4.1  --    AST 11  --    ALT 9  --    ALKPHOS 135*  --    BILITOT 0.4  --    AMMONIA  --  37   LIPASE 55  --        ABG:  Lab Results   Component Value Date/Time    FIO2 INFORMATION NOT PROVIDED 2023 08:40 PM     Lab Results   Component Value Date/Time    SPECIAL 2 ML LEFT WRIST 2022 11:39 AM     Lab Results   Component Value Date/Time    CULTURE NO GROWTH 5 DAYS 03/25/2022 11:39 AM       Radiology:  CT HEAD WO CONTRAST    Result Date: 2/4/2023  No acute intracranial abnormality. No acute territorial infarction, intracranial hemorrhage or mass lesion. Mild mucosal thickening of right maxillary sinus and ethmoidal air cells. Physical Examination:        Physical Exam  Constitutional:       Appearance: She is well-developed. Comments: Mentation much better. HENT:      Head: Normocephalic. Neck:      Thyroid: No thyromegaly. Vascular: No JVD. Cardiovascular:      Rate and Rhythm: Normal rate and regular rhythm. Heart sounds: Normal heart sounds. No murmur heard. Pulmonary:      Effort: Pulmonary effort is normal.      Breath sounds: Normal breath sounds. No wheezing. Abdominal:      General: There is no distension. Palpations: Abdomen is soft. Musculoskeletal:      Cervical back: Normal range of motion. Skin:     Findings: No rash. Neurological:      General: No focal deficit present. Psychiatric:      Comments: Mentation much better  Answering appropriately       Assessment:        Hospital Problems             Last Modified POA    * (Principal) Hypertensive urgency 2/4/2023 Yes     Plan:        Hypertensive Urgency medication noncompliance. Resolved. Continue on lisinopril 10 mg. HCTZ 25 mg. Labetalol and hydralazine as needed. Acute metabolic encephalopathy complicated by underlying schizophrenia. No active hallucinations. Mentation much better today. Continue Prozac and trazodone. Appreciate psych recommendations. Follow-up at [facility]. Schizophrenia-no active hallucinations. Much better today. Continue current meds. Appreciate psych recommendations. Pneumonia-on imaging. Continue Levaquin. Right hip OA-outpatient follow-up with orthopedic surgery once medically stable  COPD with mild exacerbation. -RT protocol.     Crohn's disease-stable  History of substance abuse-monitor for withdrawals  Noncompliance  DVT prophylaxis-Lovenox  GI prophylaxis-Protonix  Discharge planning-once patient medically stable.     Sixto Covarrubias MD  2/6/2023  7:38 AM

## 2023-02-06 NOTE — CARE COORDINATION
Transitional Planning:  Attempted to complete CM initial assessment. Pt care at this time. 11:30  Attempted to complete CM initial assessment. Daughter just left and pt needs assistance with this. 16:00  Call to daughter Branden Trevizo. She will be up soon. CM informed her to ask for CM so we can get the initial assessment completed.

## 2023-02-06 NOTE — CARE COORDINATION
Case Management Assessment  Initial Evaluation    Date/Time of Evaluation: 2/6/2023 4:54 PM  Assessment Completed by: Lynn Arias RN    If patient is discharged prior to next notation, then this note serves as note for discharge by case management. Patient Name: Glenda Mcgill                   YOB: 1965  Diagnosis: Hypertensive urgency [I16.0]  Altered mental status, unspecified altered mental status type [R41.82]                   Date / Time: 2/4/2023  4:10 PM    Patient Admission Status: Inpatient   Readmission Risk (Low < 19, Mod (19-27), High > 27): Readmission Risk Score: 18.2    Current PCP: Sendy Saha, DO  PCP verified by CM? Yes    Chart Reviewed: Yes      History Provided by: Patient  Patient Orientation:      Patient Cognition: Alert    Hospitalization in the last 30 days (Readmission):  No    If yes, Readmission Assessment in CM Navigator will be completed. Advance Directives:      Code Status: Full Code   Patient's Primary Decision Maker is: Legal Next of Kin      Discharge Planning:    Patient lives with: Alone Type of Home:    Primary Care Giver:    Patient Support Systems include: Family Members, Children   Current Financial resources: Medicaid  Current community resources:    Current services prior to admission:              Current DME:              Type of Home Care services:       ADLS  Prior functional level: Independent in ADLs/IADLs, Cooking, Housework, Shopping  Current functional level: Independent in ADLs/IADLs, Assistance with the following:, Cooking, Housework, Shopping    PT AM-PAC:   /24  OT AM-PAC:   /24    Family can provide assistance at DC: No  Would you like Case Management to discuss the discharge plan with any other family members/significant others, and if so, who?  Yes (daughter)  Plans to Return to Present Housing: Yes  Other Identified Issues/Barriers to RETURNING to current housing: unable to get home care for IV ABX due to h/o drug abuse  Potential Assistance needed at discharge:              Potential DME:    Patient expects to discharge to:    Plan for transportation at discharge:      Financial    Payor: Janel Maurer / Plan: Janel Maurer / Product Type: *No Product type* /       Potential assistance Purchasing Medications: No  Meds-to-Beds request: Yes      [address] [place] [address]  Phone: [phone] Fax: [phone]      Notes:    Factors facilitating achievement of predicted outcomes: Cooperative    Barriers to discharge: Limited family support    Additional Case Management Notes: Plan is to return to her apartment. She lives alone. Will need infusion center if d/c on IV ABX. Will use medical cab. The Plan for Transition of Care is related to the following treatment goals of Hypertensive urgency [I16.0]  Altered mental status, unspecified altered mental status type [G76.77]    IF APPLICABLE: The Patient and/or patient representative Denise Dial and her family were provided with a choice of provider and agrees with the discharge plan. Freedom of choice list with basic dialogue that supports the patient's individualized plan of care/goals and shares the quality data associated with the providers was provided to:     Patient Representative Name:       The Patient and/or Patient Representative Agree with the Discharge Plan?   Yes    Onesimo Santizo RN  Case Management Department  Ph: [phone]

## 2023-02-06 NOTE — PLAN OF CARE
Problem: Pain  Goal: Verbalizes/displays adequate comfort level or baseline comfort level  2/6/2023 1621 by Elham Taveras RN  Outcome: Progressing  2/6/2023 0457 by Tylor Rico RN  Outcome: Progressing     Problem: Skin/Tissue Integrity  Goal: Absence of new skin breakdown  Description: 1. Monitor for areas of redness and/or skin breakdown  2. Assess vascular access sites hourly  3. Every 4-6 hours minimum:  Change oxygen saturation probe site  4. Every 4-6 hours:  If on nasal continuous positive airway pressure, respiratory therapy assess nares and determine need for appliance change or resting period.   2/6/2023 1621 by Elham Taveras RN  Outcome: Progressing  2/6/2023 0457 by Tylor Rico RN  Outcome: Progressing     Problem: Safety - Adult  Goal: Free from fall injury  2/6/2023 1621 by Elham Taveras RN  Outcome: Progressing  2/6/2023 0457 by Tylor Rico RN  Outcome: Progressing     Problem: ABCDS Injury Assessment  Goal: Absence of physical injury  2/6/2023 1621 by Elham Taveras RN  Outcome: Progressing  2/6/2023 0457 by Tylor Rico RN  Outcome: Progressing  Flowsheets (Taken 2/5/2023 1945)  Absence of Physical Injury: Implement safety measures based on patient assessment

## 2023-02-06 NOTE — CONSULTS
Department of Psychiatry   Psychiatric Assessment      Thank you very much for allowing us to participate in the care of this patient. Reason for Consult: Altered mentation, hx of schizophrenia? HISTORY OF PRESENT ILLNESS:    Patient is a 69-year-old female with history of bipolar disorder and opioid abuse admitted for altered mentation. Patient had a recent admission for similar presentation. Patient is alert oriented to place month and year. Patient is not oriented to situation. Mentions that she has been feeling confused and felt like she has a UTI again. Reports that she was very confused when she had a UTI in the past.  Reviewed O[facility]RS report and patient had a refill hydrocodone/acetaminophen 5 325 mg for 56 pills filled on 2/2/2023. Some concerns that she is abusing this prescription meds however patient is very guarded when discussing about this. Reports that she has been actively following up with her appointments at [facility]. Reports that her depression and mood are well controlled. No history of any psychosis or schizophrenia. She did report having manic episodes in the past but unclear if they are independent of substance use. Denies any suicidal or homicidal ideation plan or intent today. PSYCHIATRIC HISTORY:      Outpatient psychiatric provider:  Felipe  Suicide attempts: OD 3 years ago  Inpatient psychiatric admissions: 3 years ago to [facility]    Lifetime Psychiatric Review of Systems         Obsessions and Compulsions: Denies       Mariam or Hypomania: reports manic episodes in past     Hallucinations: Denies     Panic Attacks:  Denies     Delusions:  Denies     Phobias:  Denies     Trauma: Denies    Prior to Admission medications    Medication Sig Start Date End Date Taking? Authorizing Provider   HYDROcodone-acetaminophen (NORCO) 5-325 MG per tablet Take 1 tablet by mouth every 6 hours as needed for Pain for up to 14 days.  Max Daily Amount: 4 tablets 1/31/23 2/14/23  Kim Bonilla MD gabapentin (NEURONTIN) 400 MG capsule Take 1 capsule by mouth 2 times daily for 30 days. 10/25/22 11/24/22  Mariposa Quigley MD   traZODone (DESYREL) 150 MG tablet Take 1 tablet by mouth nightly 10/25/22   Mariposa Quigley MD   cholestyramine light 4 g packet Take 1 packet by mouth daily  Patient not taking: No sig reported 10/26/22   Mariposa Quigley MD   FLUoxetine (PROZAC) 20 MG capsule Take 80 mg by mouth daily    Historical Provider, MD   Handicap Placard MISC by Does not apply route Duration 5 years 5/17/22   Baylee Mc MD   Pregabalin (LYRICA PO) Take 300 mg by mouth 2 times daily    Historical Provider, MD   ibuprofen (IBU) 400 MG tablet Take 1 tablet by mouth every 6 hours as needed for Pain 5/15/22 9/18/22  Julio C Parsons DO   diphenhydrAMINE (BENADRYL) 25 MG tablet Take 25 mg by mouth nightly as needed    Historical Provider, MD   budesonide-formoterol (SYMBICORT) 160-4.5 MCG/ACT AERO Inhale 2 puffs into the lungs 2 times daily 9/19/21   Viet Reardon MD   lisinopril (PRINIVIL;ZESTRIL) 5 MG tablet Take 1 tablet by mouth daily 9/20/21   Viet Reardon MD   nicotine (NICODERM CQ) 21 MG/24HR Place 1 patch onto the skin daily  Patient not taking: Reported on 8/20/2022 9/20/21 8/23/22  Viet Reardon MD   Polyvinyl Alcohol-Povidone (REFRESH OP) Place 1 drop into both eyes 3 times daily  Patient not taking: No sig reported    Historical Provider, MD   lidocaine (LIDODERM) 5 % Place 1 patch onto the skin daily as needed for Pain 12 hours on, 12 hours off.    Patient not taking: No sig reported    Historical Provider, MD        Medications:    Current Facility-Administered Medications: nicotine (NICODERM CQ) 21 MG/24HR 1 patch, 1 patch, TransDERmal, Daily  ipratropium-albuterol (DUONEB) nebulizer solution 1 ampule, 1 ampule, Inhalation, Q4H PRN  lisinopril (PRINIVIL;ZESTRIL) tablet 10 mg, 10 mg, Oral, Daily  levoFLOXacin (LEVAQUIN) 750 MG/150ML infusion 750 mg, 750 mg, IntraVENous, Q24H  potassium chloride (KLOR-CON M) extended release tablet 40 mEq, 40 mEq, Oral, PRN **OR** potassium bicarb-citric acid (EFFER-K) effervescent tablet 40 mEq, 40 mEq, Oral, PRN **OR** potassium chloride 10 mEq/100 mL IVPB (Peripheral Line), 10 mEq, IntraVENous, PRN  hydroCHLOROthiazide (HYDRODIURIL) tablet 25 mg, 25 mg, Oral, Daily  gabapentin (NEURONTIN) capsule 400 mg, 400 mg, Oral, BID  haloperidol lactate (HALDOL) injection 2 mg, 2 mg, IntraMUSCular, Q6H PRN  morphine (PF) injection 2 mg, 2 mg, IntraVENous, Q6H PRN  FLUoxetine (PROZAC) capsule 80 mg, 80 mg, Oral, Daily  traZODone (DESYREL) tablet 150 mg, 150 mg, Oral, Nightly  sodium chloride flush 0.9 % injection 5-40 mL, 5-40 mL, IntraVENous, 2 times per day  sodium chloride flush 0.9 % injection 5-40 mL, 5-40 mL, IntraVENous, PRN  0.9 % sodium chloride infusion, 25 mL, IntraVENous, PRN  enoxaparin (LOVENOX) injection 40 mg, 40 mg, SubCUTAneous, Daily  ondansetron (ZOFRAN-ODT) disintegrating tablet 4 mg, 4 mg, Oral, Q8H PRN **OR** ondansetron (ZOFRAN) injection 4 mg, 4 mg, IntraVENous, Q6H PRN  polyethylene glycol (GLYCOLAX) packet 17 g, 17 g, Oral, Daily PRN  acetaminophen (TYLENOL) tablet 650 mg, 650 mg, Oral, Q6H PRN **OR** acetaminophen (TYLENOL) suppository 650 mg, 650 mg, Rectal, Q6H PRN  labetalol (NORMODYNE;TRANDATE) injection 5 mg, 5 mg, IntraVENous, Q4H PRN  hydrALAZINE (APRESOLINE) injection 5 mg, 5 mg, IntraVENous, Q6H PRN  sodium chloride flush 0.9 % injection 5-40 mL, 5-40 mL, IntraVENous, BID     Past Medical History:        Diagnosis Date    Acid reflux     Anxiety     Arthritis     Left hip    Asthma     Bipolar 1 disorder (HCC)     Bowel obstruction (HCC)     COPD (chronic obstructive pulmonary disease) (HCC)     O2 3L PRN/ Dr. Jorge mullen/last seen 2020/appt.9-7-21 for Clearance    Crohn disease ([facility] [address]     Depression     Dry eye     Fibromyalgia     Gout     Headache     Hyperlipidemia     Hypertension     Hypothyroidism     Kidney stones     Muscle spasm     Neuropathy     Pain     left hip    Personal history of other medical treatment     Pt. seen by Dr. Cecilia Hernandes for clearance for this OR Left hip/ Normally pt. doesn't follow with Cardiology. [facility]    Wears partial dentures     upper    Wellness examination     PCP Isaiah Ross MD/ genna/last seen 8-24-21       Past Surgical History:        Procedure Laterality Date    ABDOMEN SURGERY      bowel obstruction OR    BUNIONECTOMY Left     CHOLECYSTECTOMY      COLONOSCOPY  04/30/2007    no gross pathology seen in the colon and also 3-4 inches of the ileum    COLONOSCOPY  10/12/2017    normal    COLONOSCOPY  10/25/2021    COLONOSCOPY WITH BIOPSY performed by Kori Sumner MD at \[facility]"" Endoscopy    COLONOSCOPY  10/25/2021    COLONOSCOPY POLYPECTOMY REMOVAL SNARE performed by Kori Sumner MD at [address] [place] [address] ENDOSCOPY  10/25/2021    EGD BIOPSY performed by Kori Sumner MD at [facility] Endoscopy       Allergies: Azithromycin, Methylprednisolone, Penicillins, and Tape Radha Fanti tape]      Social History:    [place]. Currently single, unemployed. She is on SSDI for medical issues.  Worked in retail in past. Daughter is very supportive    SUBSTANCE USE HISTORY: opioid abuse    Family Medical and Psychiatric History:           Problem Relation Age of Onset    Diabetes Father     Lung Cancer Father     Hypertension Mother     Cancer Mother     High Blood Pressure Mother     Crohn's Disease Brother     Heart Disease Brother        Physical  /83   Pulse 87   Temp 98.3 °F (36.8 °C) (Oral)   Resp 17   Ht 5' 6\" (1.676 m)   Wt 175 lb 4.3 oz (79.5 kg)   SpO2 92%   BMI 28.29 kg/m²     Mental Status Examination:  Level of consciousness:  Within normal limits  Appearance: hospital attire, lying in bed, fair grooming  Behavior/Motor:  no abnormalities noted  Attitude toward examiner:  cooperative, attentive and good eye contact  Speech:  Spontaneous, normal rate and volume  Mood:  anxious  Affect: mood congruent  Thought processes:  Linear, goal directed, coherent  Thought content: denies suicidal ideations   Denies homicidal ideations    Denies hallucinations   Denies delusions  Cognition:  Oriented to self, location but not to situation  Concentration clinically adequate  Memory age appropriate  Insight & Judgment:  fair    DSM-5 DIAGNOSIS:  Mood disorder unspecified    Stressors     Severity of stressors is moderate  Source of stressors include: Other psychosocial and environmental stressors    PLAN:    Continue current psychotropics  Can discharge home with f/u at [facility] after she is medically stable  Will follow as needed    Thank you very much for allowing us to participate in the care of this patient.       Electronically signed by Anca Park MD on 2/6/23 at 2:49 PM EST

## 2023-02-07 VITALS
DIASTOLIC BLOOD PRESSURE: 66 MMHG | BODY MASS INDEX: 28.34 KG/M2 | RESPIRATION RATE: 16 BRPM | HEART RATE: 78 BPM | WEIGHT: 176.37 LBS | TEMPERATURE: 98.1 F | OXYGEN SATURATION: 96 % | SYSTOLIC BLOOD PRESSURE: 95 MMHG | HEIGHT: 66 IN

## 2023-02-07 LAB
ABSOLUTE EOS #: 0.07 K/UL (ref 0–0.44)
ABSOLUTE IMMATURE GRANULOCYTE: <0.03 K/UL (ref 0–0.3)
ABSOLUTE LYMPH #: 1.36 K/UL (ref 1.1–3.7)
ABSOLUTE MONO #: 0.66 K/UL (ref 0.1–1.2)
ANION GAP SERPL CALCULATED.3IONS-SCNC: 17 MMOL/L (ref 9–17)
BASOPHILS # BLD: 1 % (ref 0–2)
BASOPHILS ABSOLUTE: 0.03 K/UL (ref 0–0.2)
BUN SERPL-MCNC: 32 MG/DL (ref 6–20)
CALCIUM SERPL-MCNC: 9.2 MG/DL (ref 8.6–10.4)
CHLORIDE SERPL-SCNC: 101 MMOL/L (ref 98–107)
CO2 SERPL-SCNC: 22 MMOL/L (ref 20–31)
CREAT SERPL-MCNC: 1.81 MG/DL (ref 0.5–0.9)
EOSINOPHILS RELATIVE PERCENT: 1 % (ref 1–4)
GFR SERPL CREATININE-BSD FRML MDRD: 32 ML/MIN/1.73M2
GLUCOSE SERPL-MCNC: 99 MG/DL (ref 70–99)
HCT VFR BLD AUTO: 40 % (ref 36.3–47.1)
HGB BLD-MCNC: 13.3 G/DL (ref 11.9–15.1)
IMMATURE GRANULOCYTES: 0 %
LYMPHOCYTES # BLD: 24 % (ref 24–43)
MAGNESIUM SERPL-MCNC: 1.9 MG/DL (ref 1.6–2.6)
MCH RBC QN AUTO: 27.7 PG (ref 25.2–33.5)
MCHC RBC AUTO-ENTMCNC: 33.3 G/DL (ref 28.4–34.8)
MCV RBC AUTO: 83.2 FL (ref 82.6–102.9)
MONOCYTES # BLD: 12 % (ref 3–12)
NRBC AUTOMATED: 0 PER 100 WBC
PDW BLD-RTO: 14.7 % (ref 11.8–14.4)
PLATELET # BLD AUTO: 219 K/UL (ref 138–453)
PMV BLD AUTO: 11.4 FL (ref 8.1–13.5)
POTASSIUM SERPL-SCNC: 3.5 MMOL/L (ref 3.7–5.3)
RBC # BLD: 4.81 M/UL (ref 3.95–5.11)
RBC # BLD: ABNORMAL 10*6/UL
SEG NEUTROPHILS: 62 % (ref 36–65)
SEGMENTED NEUTROPHILS ABSOLUTE COUNT: 3.52 K/UL (ref 1.5–8.1)
SODIUM SERPL-SCNC: 140 MMOL/L (ref 135–144)
WBC # BLD AUTO: 5.7 K/UL (ref 3.5–11.3)

## 2023-02-07 PROCEDURE — 94640 AIRWAY INHALATION TREATMENT: CPT

## 2023-02-07 PROCEDURE — 83735 ASSAY OF MAGNESIUM: CPT

## 2023-02-07 PROCEDURE — 6360000002 HC RX W HCPCS: Performed by: INTERNAL MEDICINE

## 2023-02-07 PROCEDURE — 6370000000 HC RX 637 (ALT 250 FOR IP): Performed by: STUDENT IN AN ORGANIZED HEALTH CARE EDUCATION/TRAINING PROGRAM

## 2023-02-07 PROCEDURE — 6360000002 HC RX W HCPCS: Performed by: STUDENT IN AN ORGANIZED HEALTH CARE EDUCATION/TRAINING PROGRAM

## 2023-02-07 PROCEDURE — 85025 COMPLETE CBC W/AUTO DIFF WBC: CPT

## 2023-02-07 PROCEDURE — 36415 COLL VENOUS BLD VENIPUNCTURE: CPT

## 2023-02-07 PROCEDURE — 2580000003 HC RX 258: Performed by: INTERNAL MEDICINE

## 2023-02-07 PROCEDURE — 99231 SBSQ HOSP IP/OBS SF/LOW 25: CPT | Performed by: STUDENT IN AN ORGANIZED HEALTH CARE EDUCATION/TRAINING PROGRAM

## 2023-02-07 PROCEDURE — 80048 BASIC METABOLIC PNL TOTAL CA: CPT

## 2023-02-07 PROCEDURE — 6370000000 HC RX 637 (ALT 250 FOR IP): Performed by: INTERNAL MEDICINE

## 2023-02-07 RX ORDER — LISINOPRIL 10 MG/1
10 TABLET ORAL DAILY
Qty: 30 TABLET | Refills: 3 | Status: SHIPPED | OUTPATIENT
Start: 2023-02-08 | End: 2023-02-07 | Stop reason: SDUPTHER

## 2023-02-07 RX ORDER — CEFDINIR 300 MG/1
300 CAPSULE ORAL 2 TIMES DAILY
Qty: 6 CAPSULE | Refills: 0 | Status: SHIPPED | OUTPATIENT
Start: 2023-02-07 | End: 2023-02-07 | Stop reason: HOSPADM

## 2023-02-07 RX ORDER — LEVOFLOXACIN 500 MG/1
500 TABLET, FILM COATED ORAL DAILY
Qty: 2 TABLET | Refills: 0 | Status: SHIPPED | OUTPATIENT
Start: 2023-02-08 | End: 2023-02-07 | Stop reason: HOSPADM

## 2023-02-07 RX ORDER — NICOTINE 21 MG/24HR
1 PATCH, TRANSDERMAL 24 HOURS TRANSDERMAL DAILY
Qty: 30 PATCH | Refills: 0 | Status: SHIPPED | OUTPATIENT
Start: 2023-02-08 | End: 2023-03-10

## 2023-02-07 RX ORDER — LISINOPRIL 10 MG/1
5 TABLET ORAL DAILY
Qty: 30 TABLET | Refills: 3 | Status: SHIPPED | OUTPATIENT
Start: 2023-02-08

## 2023-02-07 RX ADMIN — SODIUM CHLORIDE, PRESERVATIVE FREE 10 ML: 5 INJECTION INTRAVENOUS at 10:06

## 2023-02-07 RX ADMIN — GABAPENTIN 400 MG: 400 CAPSULE ORAL at 10:06

## 2023-02-07 RX ADMIN — IPRATROPIUM BROMIDE AND ALBUTEROL SULFATE 1 AMPULE: .5; 2.5 SOLUTION RESPIRATORY (INHALATION) at 12:27

## 2023-02-07 RX ADMIN — FLUOXETINE HYDROCHLORIDE 80 MG: 20 CAPSULE ORAL at 10:06

## 2023-02-07 RX ADMIN — MORPHINE SULFATE 2 MG: 2 INJECTION, SOLUTION INTRAMUSCULAR; INTRAVENOUS at 00:54

## 2023-02-07 RX ADMIN — ENOXAPARIN SODIUM 40 MG: 100 INJECTION SUBCUTANEOUS at 10:05

## 2023-02-07 RX ADMIN — LEVOFLOXACIN 500 MG: 500 TABLET, FILM COATED ORAL at 10:06

## 2023-02-07 RX ADMIN — IPRATROPIUM BROMIDE AND ALBUTEROL SULFATE 1 AMPULE: .5; 2.5 SOLUTION RESPIRATORY (INHALATION) at 09:01

## 2023-02-07 ASSESSMENT — ENCOUNTER SYMPTOMS
CONSTIPATION: 0
FACIAL SWELLING: 0
BACK PAIN: 0
ABDOMINAL DISTENTION: 0
ABDOMINAL PAIN: 0
EYE ITCHING: 0
APNEA: 0
DIARRHEA: 0
COLOR CHANGE: 0
EYE DISCHARGE: 0
CHEST TIGHTNESS: 0

## 2023-02-07 ASSESSMENT — PAIN DESCRIPTION - DESCRIPTORS: DESCRIPTORS: ACHING

## 2023-02-07 ASSESSMENT — PAIN DESCRIPTION - LOCATION
LOCATION: HIP

## 2023-02-07 ASSESSMENT — PAIN SCALES - GENERAL
PAINLEVEL_OUTOF10: 6
PAINLEVEL_OUTOF10: 7
PAINLEVEL_OUTOF10: 7

## 2023-02-07 NOTE — PLAN OF CARE
Problem: Pain  Goal: Verbalizes/displays adequate comfort level or baseline comfort level  2/7/2023 0403 by Tamia Meade RN  Outcome: Progressing  2/6/2023 1621 by Suhas Reece RN  Outcome: Progressing     Problem: Skin/Tissue Integrity  Goal: Absence of new skin breakdown  Description: 1. Monitor for areas of redness and/or skin breakdown  2. Assess vascular access sites hourly  3. Every 4-6 hours minimum:  Change oxygen saturation probe site  4. Every 4-6 hours:  If on nasal continuous positive airway pressure, respiratory therapy assess nares and determine need for appliance change or resting period.   2/7/2023 0403 by Tamia Meade RN  Outcome: Progressing  2/6/2023 1621 by Suhas Reece RN  Outcome: Progressing     Problem: Safety - Adult  Goal: Free from fall injury  2/7/2023 0403 by Tamia Meade RN  Outcome: Progressing  2/6/2023 1621 by Suhas Reece RN  Outcome: Progressing     Problem: ABCDS Injury Assessment  Goal: Absence of physical injury  2/7/2023 0403 by Tamia Meade RN  Outcome: Progressing  Flowsheets (Taken 2/6/2023 1946)  Absence of Physical Injury: Implement safety measures based on patient assessment  2/6/2023 1621 by Suhas Reece RN  Outcome: Progressing

## 2023-02-07 NOTE — PROGRESS NOTES
[facility]  Office: [address], DO, Jo Ann [place], DO, Cheyenne Monteer, DO, José Manuel Calhoun Blood, DO, Herb Angelucci, MD, Lillie Ortega MD, Ed Harris MD, Lesa Andrew MD,  Destin Brito MD, Suad Noguera MD, Walter Alonso, DO, Diana Chappell MD,  Mervin Fuentes MD, Gary Harris MD, Leobardo Tesfaye, DO, Albert Smith MD, Víctor Nieves MD, Marah Flood, DO, Dami Lopez MD, Momo Still MD, Diane Carias MD, Katelyn Bedoya MD, Joby Cardenas, DO, Elly Yan MD, Chinyere Alvarado MD, Jose Abreu, CNP,  Roxanna Babinski, CNP, Maryjane Burch, CNP, Gabriela Noel, CNP,  Kait Hill, [facility], Monae Meade, CNP, Angélica Duvall, CNP, Jg Rodriguez, CNP, Azucena Christianson, CNP, Lee Pardo, CNP, Basilio Peralta PAHopeC, Lee Dunn, CNS, Romy Stephenson, CNP, Aurelia Tran, [address] [facility]    Progress Note    2/7/2023    10:13 PM    Name:   Dalila Garcia  MRN:     [id number]     Acct:      [de-identified]   Room:   [address] [place] [address] Day:  3  Admit Date:  2/4/2023  4:10 PM    PCP:   Jerald Toledo DO  Code Status:  Prior    Subjective:     C/C:   Chief Complaint   Patient presents with    Hip Pain     Known left hip fracture    Altered Mental Status     Interval History Status: improved. Paitent farzaneh nd examined. Feeling much better. Alert to person place and time. Patient has been feeling well and fatigue has resolved. BMo showing MERARY. Brief History:     61year old female with past medicla history of COPD, chrons disease, history of polysubstance abuse presents with AMS and HTN. Patients AMS resolved and patient was evaluated by psychiatry and recommednated to continue home medicaitons. Patient ntoed to have MERARY, BMp in 3 days. Patient agreeable to follow up with PCP and psychiatrst as outapteint.      Review of Systems:     Review of Systems   Constitutional:  Negative for activity change, appetite change, chills and fever. HENT:  Negative for congestion, ear pain, facial swelling and mouth sores. Eyes:  Negative for discharge and itching. Respiratory:  Negative for apnea and chest tightness. Cardiovascular:  Negative for chest pain and leg swelling. Gastrointestinal:  Negative for abdominal distention, abdominal pain, constipation and diarrhea. Endocrine: Negative for cold intolerance, polyphagia and polyuria. Genitourinary:  Negative for difficulty urinating, dysuria and flank pain. Musculoskeletal:  Negative for arthralgias, back pain and joint swelling. Skin:  Negative for color change and wound. Neurological:  Negative for dizziness, seizures, light-headedness and headaches. Psychiatric/Behavioral:  Negative for agitation, behavioral problems and self-injury. Medications: Allergies: Allergies   Allergen Reactions    Azithromycin Other (See Comments)     'It doesn't work\"    Methylprednisolone      Elevates blood pressure    Penicillins Swelling     throat    Tape [Adhesive Tape] Other (See Comments)     \" Tears my skin off\"       Current Meds:   Scheduled Meds:   REM    Continuous Infusions:   REM    PRN Meds: REM    Data:     Past Medical History:   has a past medical history of Acid reflux, Anxiety, Arthritis, Asthma, Bipolar 1 disorder ([facility] [address] Bowel obstruction ([facility]), COPD (chronic obstructive pulmonary disease) ([facility] [address] Crohn disease ([facility] [address] Depression, Dry eye, Fibromyalgia, Gout, Headache, Hyperlipidemia, Hypertension, Hypothyroidism, Kidney stones, Muscle spasm, Neuropathy, Pain, Personal history of other medical treatment, Wears partial dentures, and Wellness examination. Social History:   reports that she has been smoking cigarettes. She has a 18.50 pack-year smoking history. She quit smokeless tobacco use about 21 years ago. Her smokeless tobacco use included chew. She reports that she does not currently use alcohol.  She reports current drug use. Drug: Marijuana Princella Melly). Family History:   Family History   Problem Relation Age of Onset    Diabetes Father     Lung Cancer Father     Hypertension Mother     Cancer Mother     High Blood Pressure Mother     Crohn's Disease Brother     Heart Disease Brother        Vitals:  BP 95/66   Pulse 78   Temp 98.1 °F (36.7 °C) (Oral)   Resp 16   Ht 5' 6\" (1.676 m)   Wt 176 lb 5.9 oz (80 kg)   SpO2 96%   BMI 28.47 kg/m²   Temp (24hrs), Av.7 °F (36.5 °C), Min:97.2 °F (36.2 °C), Max:98.1 °F (36.7 °C)    No results for input(s): POCGLU in the last 72 hours. I/O (24Hr): Intake/Output Summary (Last 24 hours) at 20233  Last data filed at 2023 0121  Gross per 24 hour   Intake --   Output 150 ml   Net -150 ml       Labs:  Hematology:  Recent Labs     23  0624 23  0756   WBC 5.1 5.7   RBC 4.60 4.81   HGB 12.8 13.3   HCT 39.8 40.0   MCV 86.5 83.2   MCH 27.8 27.7   MCHC 32.2 33.3   RDW 13.9 14.7*    219   MPV 12.1 11.4     Chemistry:  Recent Labs     23  0624 23  0756    140   K 3.4* 3.5*    101   CO2 20 22   GLUCOSE 91 99   BUN 7 32*   CREATININE 0.41* 1.81*   MG 1.7 1.9   ANIONGAP 18* 17   LABGLOM >60 32*   CALCIUM 9.1 9.2     No results for input(s): PROT, LABALBU, LABA1C, U7UFWHV, N0VIJXU, FT4, TSH, AST, ALT, LDH, GGT, ALKPHOS, LABGGT, BILITOT, BILIDIR, AMMONIA, AMYLASE, LIPASE, LACTATE, CHOL, HDL, LDLCHOLESTEROL, CHOLHDLRATIO, TRIG, VLDL, FMQ40VN, PHENYTOIN, PHENYF, URICACID, POCGLU in the last 72 hours. ABG:  Lab Results   Component Value Date/Time    FIO2 INFORMATION NOT PROVIDED 2023 08:40 PM     Lab Results   Component Value Date/Time    SPECIAL 2 ML LEFT WRIST 2022 11:39 AM     Lab Results   Component Value Date/Time    CULTURE NO GROWTH 5 DAYS 2022 11:39 AM       Radiology:  CT HEAD WO CONTRAST    Result Date: 2023  No acute intracranial abnormality.  No acute territorial infarction, intracranial hemorrhage or mass lesion. Mild mucosal thickening of right maxillary sinus and ethmoidal air cells. CTA HEAD NECK W CONTRAST    Result Date: 2/5/2023  No significant stenosis or evidence for occlusion. 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 CT CHEST ABDOMEN PELVIS W CONTRAST Additional Contrast? None    Result Date: 2/5/2023  1. Interval development of airspace disease in the medial aspect posterior segment right upper lobe likely pneumonia. Small amount of subtle patchy ground-glass attenuation in the anterior aspect right upper lobe could also be inflammatory or atelectatic. 2. GI tract evaluation limited by motion artifact and absence of GI tract contrast.  Ascending colon is diffusely thickened with some mural edema. This may be accentuated by underdistention. Presence of colitis is suggested. This may be subacute. No gross pericolonic fat stranding. Clinical correlation is advised. 3. Severe degenerative changes in the hips. Physical Examination:        Physical Exam  Constitutional:       Appearance: She is not ill-appearing or diaphoretic. HENT:      Head: Normocephalic and atraumatic. Right Ear: External ear normal.      Left Ear: External ear normal.      Nose: Nose normal.      Mouth/Throat:      Mouth: Mucous membranes are moist.   Eyes:      Pupils: Pupils are equal, round, and reactive to light. Cardiovascular:      Rate and Rhythm: Normal rate and regular rhythm. Pulmonary:      Effort: Pulmonary effort is normal.      Breath sounds: No wheezing or rales. Abdominal:      General: There is no distension. Palpations: Abdomen is soft. Tenderness: There is no abdominal tenderness. Musculoskeletal:      Cervical back: Neck supple. Right lower leg: No edema. Left lower leg: No edema.    Skin:     General: Skin is warm and dry. Capillary Refill: Capillary refill takes 2 to 3 seconds. Coloration: Skin is pale. Neurological:      General: No focal deficit present. Mental Status: She is alert and oriented to person, place, and time. Psychiatric:         Mood and Affect: Mood normal.         Behavior: Behavior normal.       Assessment:        Hospital Problems             Last Modified POA    * (Principal) Hypertensive urgency 2/4/2023 Yes    Asthma 2/7/2023 Yes    Bipolar disorder, current episode mixed, moderate ([facility] [address] 2/7/2023 Yes    Overview Signed 10/24/2022 12:27 PM by Phong Cuevas MD     Last Assessment & Plan:   Formatting of this note might be different from the original.  Goes to [facility] - on prozac seems to be helping \"miracle medicine\"         Hx of drug abuse ([facility] [address] 2/7/2023 Yes    Unspecified mood (affective) disorder ([address] 2/6/2023 Yes    COPD (chronic obstructive pulmonary disease) ([facility] [address] 2/7/2023 Yes    Anxiety 2/7/2023 Yes       Plan:        COPD completed levaquin  AMS with history of bipolar disorder, Continue home medications  HTN urgency resolved.  Continue home medications  MERARY BMP in 3 days  Discharge planning today, patient ok with following up with PCP and psychiatrist      Skye Guardado MD  2/7/2023  10:13 PM

## 2023-02-07 NOTE — PLAN OF CARE
Problem: Respiratory - Adult  Goal: Achieves optimal ventilation and oxygenation  2/7/2023 0903 by Alfreda Mas RCP  Outcome: Progressing   BRONCHOSPASM/BRONCHOCONSTRICTION     [x]         IMPROVE AERATION/BREATH SOUNDS  [x]   ADMINISTER BRONCHODILATOR THERAPY AS APPROPRIATE  [x]   ASSESS BREATH SOUNDS  [x]   IMPLEMENT AEROSOL/MDI PROTOCOL  [x]   PATIENT EDUCATION AS NEEDED

## 2023-02-08 RX ORDER — SODIUM CHLORIDE, SODIUM LACTATE, POTASSIUM CHLORIDE, CALCIUM CHLORIDE 600; 310; 30; 20 MG/100ML; MG/100ML; MG/100ML; MG/100ML
1000 INJECTION, SOLUTION INTRAVENOUS CONTINUOUS
OUTPATIENT
Start: 2023-02-08

## 2023-02-08 NOTE — DISCHARGE INSTRUCTIONS
Pre-operative Instructions    Please arrive at the surgery center by 5:45 AM on 2/24/2023  (or as directed by your surgeon's office). See Directons to Surgery Center below. FASTING    NOTHING TO EAT OR DRINK AFTER MIDNIGHT the night prior to surgery (This includes gum, candy, mints, chewing tobacco, etc). MEDICATIONS    What to STOP: ANY BLOOD THINNING MEDICATION(S) as directed by your surgeon or prescribing physician. FAILURE TO STOP CERTAIN MEDICATIONS MAY INTERFERE WITH YOUR SCHEDULED SURGERY. According to the medication list you provided today, PLEASE STOP:     2. What to CONTINUE leading up to your surgery:   Please take all your other daily medications except the medications listed above that you were instructed to hold. 3. What to [place] with SMALL SIP OF WATER: lisinopril (Prinvil), gabapentin (Neurontin), lyrica, and if needed: norco                       IF APPLICABLE:  -If you have been given a blood band, you must bring it with you the day of surgery, unclasped.  -Use routine inhalers and bring inhalers the day of surgery.   -Bring C-Pap/Bi-pap with you morning of surgery if planning on staying in the hospital overnight.  -Do not take diabetic medications on the day of surgery. OTHER IMPORTANT REMINDERS    1) You may be required to provide a urine sample upon your arrival to the pre-op area, so please take this into consideration. 2) If  NOT planning on staying in the hospital overnight : A. You will need an adult family member /friend to drive you home after your procedure. Taxi cabs or any form of public transportation ALONE is not acceptable.   -Your  must be 25years of age or older and able to sign off on your discharge instructions.     -It is preferable that the friend or family member stay at the hospital throughout your procedure.   Liv Acevedo must remain with you once you have arrived home for the first 24 hours after your surgery if you receive anesthesia or medication. If you do not have someone to stay with you, your procedure may be cancelled. 4) Do not wear any jewelry or body piercings day of surgery. 5) In case of illness - If you have cold or flu like symptoms (high fever, runny nose, sore throat, cough, etc.) rash, nausea, vomiting, loose stools, and/or recent contact with someone who has a contagious disease (Covid-19, chicken pox, measles, etc.) PLEASE notify your surgeon as soon as possible. 2/8/23  1:25 PM      ___________________  _______________________  Signature (Provider)              Signature (Patient)     Day of Surgery/Procedure    As a patient at [address] [place] [address] you can expect quality medical and nursing care that is centered on your individual needs. Our goal is to make your surgical experience as comfortable as possible  . Directions to the [address] [place] [address] is located at [address] [facility] [place], [address] [facility]. Please pull into the Emergency/Surgery Center parking lot or there is additional parking across the street. You will enter the facility under through the glass doors and proceed to registration check-in which is right inside the door. Thereafter you will be directed to the [address] [place] [address]. Patient Instructions    ·Please shower the night before and the morning of surgery with an antibacterial soap. Please use the cleaning solution (bottle) given to you the night before your surgery after your shower. Unless otherwise told by your physician, please do not shave legs or any part of your body below your neck the night before or day of your surgery. You may shave your face or neck. ·Please wear loose, comfortable clothing. If you are potentially going to have a cast or brace bring clothing that will fit over them.       ·Bring a list of all medications you take, along with the dose of the medications and how often you take it. If more convenient bring the pharmacy bottles in a zip lock bag. ·Brush your teeth but do not swallow water. ·Bring your eyeglasses and case with you. No contacts are to be worn the day of surgery. You also may bring your hearing aids. ·Do not bring any valuables, such as jewelry, cash or credit cards. If you are staying overnight with us, please bring a SMALL bag of personal items. We cannot accommodate large items, like suitcases. ·If your child is having surgery please make arrangements for any other children to be cared for at home on the day of surgery. Other children are not permitted in recovery room and we want you to be able to spend time with the patient. If other arrangements are not available then we suggest that you have a second adult to stay in the waiting room. ·If you are having any type of anesthesia you are to have nothing to eat or drink after midnight the night before your surgery. This includes gum, mints, water or smoking or chewing tobacco.  The only exception to this is a small sip of water to take with any morning dose of heart, blood pressure, or seizure medications. ·Bring your inhaler if you are currently using one. ·Bring your blood band if one has been given to you. Please do not close the clasp. ·If you are on C-PAP or Bi-PAP at home and plan on staying in the hospital overnight for your surgery please bring the machine with you. ·Do not wear any jewelry or body piercings day of surgery. Also, NO lotion, perfume or deodorant to be used the day of surgery. If you have any other questions regarding your procedure/surgery please call  your surgeon's office.      If you have a last minute question(s) the DAY OF your surgery, you may call [phone]

## 2023-02-09 NOTE — DISCHARGE SUMMARY
[facility]  Office: [phone]  Kwan Britt DO, Lino Davis DO, Jasbir Ventura DO, Mikael Martinez DO, Alexandra Joy MD, Beverley Lane MD, Willie Montez MD, Ramya Bermeo MD,  Jhonatan Fitzgerald MD, Jeferson Hunt MD, Cleveland Puri DO, Natalia Soto MD,  Franky Rouse DO, Adalberto Fabian MD, Srinivasa Peterson MD, Robert Britt DO, Cindy Mancera MD, Dinesh Simmons MD, Alpesh Fox DO, Terrie Trent MD, Joelle Cheng MD, Sandra Perry MD, Diana Long MD, Bartolo Chicas DO, Gia Davis MD, Felipe Montes MD, Shirley Waterhouse, CNP,  Sandra Jc, CNP, Nava Christensen, CNP, Reed Bernstein, CNP,  Shannan Kent, [facility], Sunshine Felix, CNP, Ivett Couch, CNP, Bee Licea, CNP, Pia Fernandez, CNP, Viji Tan, CNP, Bridger Tamez PA-C, Ursula Mar, CNS, Cat Medrano, CNP, Nasima Vazquez, CNP         [facility]   IN-PATIENT SERVICE   [facility]    Discharge Summary     Patient ID: Nguyen Farias  :  1965   MRN: [id number]     ACCOUNT:  [id number]   Patient's PCP: Juli Blanchard DO  Admit Date: 2023   Discharge Date: 23  Length of Stay: 3  Code Status:  Prior  Admitting Physician: Shaun Bell MD  Discharge Physician: Natalia Soto MD     Active Discharge Diagnoses:     Hospital Problem Lists:  Principal Problem:    Hypertensive urgency  Active Problems:    Asthma    Bipolar disorder, current episode mixed, moderate (HCC)    Hx of drug abuse (HCC)    Unspecified mood (affective) disorder (HCC)    COPD (chronic obstructive pulmonary disease) ([facility])    Anxiety  Resolved Problems:    * No resolved hospital problems. *      Admission Condition:  stable     Discharged Condition: stable    Hospital Stay:     Hospital Course:  Nguyen Farias is a 57 y.o. female who was admitted for the management of   Hypertensive urgency , presented to ER with Hip Pain (Known left hip fracture) and Altered Mental Status       57 year  old female with past medicla history of COPD, chrons disease, history of polysubstance abuse presents with AMS and HTN. Patients AMS resolved and patient was evaluated by psychiatry and recommednated to continue home medicaitons. Patient ntoed to have MERARY, BMp in 3 days. Patient agreeable to follow up with PCP and psychiatrst as outapteint. Significant therapeutic interventions: see above    Significant Diagnostic Studies:   Labs / Micro:  CBC:   Lab Results   Component Value Date/Time    WBC 5.7 02/07/2023 07:56 AM    RBC 4.81 02/07/2023 07:56 AM    RBC 4.50 05/28/2012 02:46 PM    HGB 13.3 02/07/2023 07:56 AM    HCT 40.0 02/07/2023 07:56 AM    MCV 83.2 02/07/2023 07:56 AM    MCH 27.7 02/07/2023 07:56 AM    MCHC 33.3 02/07/2023 07:56 AM    RDW 14.7 02/07/2023 07:56 AM     02/07/2023 07:56 AM     05/28/2012 02:46 PM     BMP:    Lab Results   Component Value Date/Time    GLUCOSE 99 02/07/2023 07:56 AM    GLUCOSE 125 05/28/2012 02:46 PM     02/07/2023 07:56 AM    K 3.5 02/07/2023 07:56 AM     02/07/2023 07:56 AM    CO2 22 02/07/2023 07:56 AM    ANIONGAP 17 02/07/2023 07:56 AM    BUN 32 02/07/2023 07:56 AM    CREATININE 1.81 02/07/2023 07:56 AM    BUNCRER 20 05/18/2022 04:26 PM    CALCIUM 9.2 02/07/2023 07:56 AM    LABGLOM 32 02/07/2023 07:56 AM    GFRAA >60 09/23/2022 06:36 PM    GFR      09/23/2022 06:36 PM        Radiology:  CT HEAD WO CONTRAST    Result Date: 2/4/2023  No acute intracranial abnormality. No acute territorial infarction, intracranial hemorrhage or mass lesion. Mild mucosal thickening of right maxillary sinus and ethmoidal air cells. CTA HEAD NECK W CONTRAST    Result Date: 2/5/2023  No significant stenosis or evidence for occlusion. 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      CT CHEST ABDOMEN PELVIS W CONTRAST Additional Contrast? None    Result Date: 2/5/2023  1. Interval development of airspace disease in the medial aspect posterior segment right upper lobe likely pneumonia. Small amount of subtle patchy ground-glass attenuation in the anterior aspect right upper lobe could also be inflammatory or atelectatic. 2. GI tract evaluation limited by motion artifact and absence of GI tract contrast.  Ascending colon is diffusely thickened with some mural edema. This may be accentuated by underdistention. Presence of colitis is suggested. This may be subacute. No gross pericolonic fat stranding. Clinical correlation is advised. 3. Severe degenerative changes in the hips. Consultations:    Consults:     Final Specialist Recommendations/Findings:   IP CONSULT TO HOSPITALIST  IP CONSULT TO PSYCHIATRY  IP CONSULT TO IV TEAM      The patient was seen and examined on day of discharge and this discharge summary is in conjunction with any daily progress note from day of discharge. Discharge plan:     Disposition: Home    Physician Follow Up:     Anamaria Ocampo DO  [address] [facility]  [phone]    Schedule an appointment as soon as possible for a visit   For follow up    [facility]  [address] [place] [address]  [phone]  Go to   As needed       Requiring Further Evaluation/Follow Up POST HOSPITALIZATION/Incidental Findings: Follow-up PCP, ARACELI within 3 days.     Diet: cardiac diet    Activity: As tolerated    Instructions to Patient: Follow-up PCPARACELI in 3 days    Discharge Medications:      Medication List        CHANGE how you take these medications      lisinopril 10 MG tablet  Commonly known as: PRINIVIL;ZESTRIL  Take 0.5 tablets by mouth daily  What changed: medication strength            CONTINUE taking these medications      budesonide-formoterol 160-4.5 MCG/ACT Aero  Commonly known as: Symbicort  Inhale 2 puffs into the lungs 2 times daily     FLUoxetine 20 MG capsule  Commonly known as: PROZAC     gabapentin 400 MG capsule  Commonly known as: NEURONTIN  Take 1 capsule by mouth 2 times daily for 30 days. Handicap Placard Misc  by Does not apply route Duration 5 years     HYDROcodone-acetaminophen 5-325 MG per tablet  Commonly known as: NORCO  Take 1 tablet by mouth every 6 hours as needed for Pain for up to 14 days. Max Daily Amount: 4 tablets     LYRICA PO     nicotine 21 MG/24HR  Commonly known as: NICODERM CQ  Place 1 patch onto the skin daily     REFRESH OP     traZODone 150 MG tablet  Commonly known as: DESYREL  Take 1 tablet by mouth nightly            STOP taking these medications      diphenhydrAMINE 25 MG tablet  Commonly known as: BENADRYL     ibuprofen 400 MG tablet  Commonly known as: IBU     lidocaine 5 %  Commonly known as: LIDODERM            ASK your doctor about these medications      cholestyramine light 4 g packet  Take 1 packet by mouth daily               Where to Get Your Medications        These medications were sent to [address] [place] [address] [address] [place] [place] [address]      Phone: [phone]   nicotine 21 MG/24HR       These medications were sent to [facility] [address] [facility], [address], [address] [place] [address]      Phone: [phone]   lisinopril 10 MG tablet         Discharge Procedure Orders   XR CHEST STANDARD (2 VW)   Standing Status: Future Standing Exp. Date: 02/07/24     Basic Metabolic Panel   Standing Status: Future Standing Exp. Date: 02/07/24       Time Spent on discharge is  20 mins in patient examination, evaluation, counseling as well as medication reconciliation, prescriptions for required medications, discharge plan and follow up.     Electronically signed by   Johnny Arreguin MD  2/9/2023  4:24 PM      Thank you Dr. Aurelia Ernandez DO for the opportunity to be involved in this patient's care.

## 2023-02-10 ENCOUNTER — HOSPITAL ENCOUNTER (OUTPATIENT)
Dept: GENERAL RADIOLOGY | Age: 58
End: 2023-02-10
Payer: MEDICAID

## 2023-02-10 ENCOUNTER — HOSPITAL ENCOUNTER (OUTPATIENT)
Dept: PREADMISSION TESTING | Age: 58
End: 2023-02-10
Payer: MEDICAID

## 2023-02-10 VITALS
RESPIRATION RATE: 18 BRPM | BODY MASS INDEX: 27.32 KG/M2 | HEIGHT: 66 IN | WEIGHT: 170 LBS | SYSTOLIC BLOOD PRESSURE: 114 MMHG | OXYGEN SATURATION: 99 % | HEART RATE: 70 BPM | TEMPERATURE: 97.3 F | DIASTOLIC BLOOD PRESSURE: 74 MMHG

## 2023-02-10 DIAGNOSIS — Z01.818 PRE-OP TESTING: ICD-10-CM

## 2023-02-10 LAB
ALBUMIN SERPL-MCNC: 4.3 G/DL (ref 3.5–5.2)
ALBUMIN/GLOBULIN RATIO: 1.5 (ref 1–2.5)
ALP SERPL-CCNC: 96 U/L (ref 35–104)
ALT SERPL-CCNC: 7 U/L (ref 5–33)
ANION GAP SERPL CALCULATED.3IONS-SCNC: 12 MMOL/L (ref 9–17)
AST SERPL-CCNC: 11 U/L
BILIRUB SERPL-MCNC: 0.6 MG/DL (ref 0.3–1.2)
BILIRUBIN URINE: NEGATIVE
BUN SERPL-MCNC: 24 MG/DL (ref 6–20)
CALCIUM SERPL-MCNC: 9.2 MG/DL (ref 8.6–10.4)
CASTS UA: NORMAL /LPF (ref 0–8)
CHLORIDE SERPL-SCNC: 103 MMOL/L (ref 98–107)
CO2 SERPL-SCNC: 25 MMOL/L (ref 20–31)
COLOR: YELLOW
CREAT SERPL-MCNC: 0.94 MG/DL (ref 0.5–0.9)
EPITHELIAL CELLS UA: NORMAL /HPF (ref 0–5)
EST. AVERAGE GLUCOSE BLD GHB EST-MCNC: 97 MG/DL
GFR SERPL CREATININE-BSD FRML MDRD: >60 ML/MIN/1.73M2
GLUCOSE SERPL-MCNC: 95 MG/DL (ref 70–99)
GLUCOSE UR STRIP.AUTO-MCNC: NEGATIVE MG/DL
HBA1C MFR BLD: 5 % (ref 4–6)
KETONES UR STRIP.AUTO-MCNC: NEGATIVE MG/DL
LEUKOCYTE ESTERASE UR QL STRIP.AUTO: NEGATIVE
NITRITE UR QL STRIP.AUTO: NEGATIVE
POTASSIUM SERPL-SCNC: 3.3 MMOL/L (ref 3.7–5.3)
PROT SERPL-MCNC: 7.1 G/DL (ref 6.4–8.3)
PROT UR STRIP.AUTO-MCNC: 5.5 MG/DL (ref 5–8)
PROT UR STRIP.AUTO-MCNC: ABNORMAL MG/DL
RBC CLUMPS #/AREA URNS AUTO: NORMAL /HPF (ref 0–4)
SODIUM SERPL-SCNC: 140 MMOL/L (ref 135–144)
SPECIFIC GRAVITY UA: 1.03 (ref 1–1.03)
TURBIDITY: CLEAR
URINE HGB: NEGATIVE
UROBILINOGEN, URINE: NORMAL
WBC UA: NORMAL /HPF (ref 0–5)

## 2023-02-10 PROCEDURE — 87641 MR-STAPH DNA AMP PROBE: CPT

## 2023-02-10 PROCEDURE — 80053 COMPREHEN METABOLIC PANEL: CPT

## 2023-02-10 PROCEDURE — 81001 URINALYSIS AUTO W/SCOPE: CPT

## 2023-02-10 PROCEDURE — 71046 X-RAY EXAM CHEST 2 VIEWS: CPT

## 2023-02-10 PROCEDURE — 83036 HEMOGLOBIN GLYCOSYLATED A1C: CPT

## 2023-02-10 RX ORDER — DIPHENHYDRAMINE HYDROCHLORIDE 25 MG/1
25 CAPSULE ORAL NIGHTLY PRN
COMMUNITY
Start: 2023-01-23

## 2023-02-10 RX ORDER — CYANOCOBALAMIN 1000 UG/ML
1 INJECTION, SOLUTION INTRAMUSCULAR; SUBCUTANEOUS
COMMUNITY
Start: 2022-07-30

## 2023-02-10 RX ORDER — LISINOPRIL 30 MG/1
TABLET ORAL
COMMUNITY
Start: 2023-01-23 | End: 2023-02-10

## 2023-02-10 RX ORDER — CLONAZEPAM 0.5 MG/1
0.5 TABLET ORAL PRN
COMMUNITY
Start: 2023-01-16

## 2023-02-10 RX ORDER — GABAPENTIN 800 MG/1
TABLET ORAL
COMMUNITY
Start: 2023-01-14 | End: 2023-02-10

## 2023-02-10 RX ORDER — PANTOPRAZOLE SODIUM 40 MG/1
40 TABLET, DELAYED RELEASE ORAL DAILY
COMMUNITY
Start: 2023-01-23

## 2023-02-10 RX ORDER — BISMUTH SUBSALICYLATE 525 MG/15ML
2 SUSPENSION ORAL PRN
COMMUNITY
Start: 2023-01-23

## 2023-02-10 ASSESSMENT — PAIN SCALES - GENERAL: PAINLEVEL_OUTOF10: 9

## 2023-02-10 ASSESSMENT — PAIN DESCRIPTION - DESCRIPTORS: DESCRIPTORS: STABBING

## 2023-02-10 ASSESSMENT — PAIN - FUNCTIONAL ASSESSMENT: PAIN_FUNCTIONAL_ASSESSMENT: PREVENTS OR INTERFERES WITH ALL ACTIVE AND SOME PASSIVE ACTIVITIES

## 2023-02-10 ASSESSMENT — PAIN DESCRIPTION - LOCATION: LOCATION: HIP

## 2023-02-10 ASSESSMENT — PAIN DESCRIPTION - PAIN TYPE: TYPE: CHRONIC PAIN

## 2023-02-10 ASSESSMENT — PAIN DESCRIPTION - FREQUENCY: FREQUENCY: CONTINUOUS

## 2023-02-10 ASSESSMENT — PAIN DESCRIPTION - ORIENTATION: ORIENTATION: LEFT

## 2023-02-10 ASSESSMENT — PAIN DESCRIPTION - ONSET: ONSET: GRADUAL

## 2023-02-10 NOTE — H&P
History and Physical    Pt Name: Pearley Merlin  MRN: [id number]  YOB: 1965  Date of evaluation: 2/10/2023  Primary Care Physician: Terrie Dumont DO    SUBJECTIVE:   History of Chief Complaint:    Pearley Merlin is a 61 y.o. female who presents for PAT appointment. Patient complains of left hip pain for about seven years. She denies any numbness or tingling to either lower extremity. Patient denies history of physical therapy but states she has had injections to the hip in the past but this did not help. Patient uses a walker or scooter/wheelchair as an ambulatory aide. Patient has been scheduled for TOTAL HIP ARTHROPLASTY  (DEPUY, FASCIA ILLIACA NERVE BLOCK PRE-OP, LATERAL DECUBITIS, 3080 TABLE)  *SHORT STAY* - Left. Allergies  is allergic to penicillins, methylprednisolone, tape [adhesive tape], and azithromycin. Medications  Prior to Admission medications    Medication Sig Start Date End Date Taking? Authorizing Provider   cyanocobalamin 1000 MCG/ML injection 1 mL every 30 days 7/30/22  Yes Historical Provider, MD   clonazePAM (KLONOPIN) 0.5 MG tablet Take 0.5 mg by mouth as needed. 1/16/23   Historical Provider, MD   BANOPHEN 25 MG capsule Take 25 mg by mouth nightly as needed 1/23/23   Historical Provider, MD   ANTI-DIARRHEAL 2 MG tablet Take 2 mg by mouth as needed 1/23/23   Historical Provider, MD   pantoprazole (PROTONIX) 40 MG tablet Take 40 mg by mouth daily 1/23/23   Historical Provider, MD   nicotine (NICODERM CQ) 21 MG/24HR Place 1 patch onto the skin daily 2/8/23 3/10/23  Mariana Cortes MD   lisinopril (PRINIVIL;ZESTRIL) 10 MG tablet Take 0.5 tablets by mouth daily 2/8/23   Mariana Cortes MD   HYDROcodone-acetaminophen (NORCO) 5-325 MG per tablet Take 1 tablet by mouth every 6 hours as needed for Pain for up to 14 days.  Max Daily Amount: 4 tablets 1/31/23 2/14/23  Kelsey Vega MD   gabapentin (NEURONTIN) 400 MG capsule Take 1 capsule by mouth 2 times daily for 30 days.  Patient taking differently: Take 800 mg by mouth 2 times daily. 10/25/22 11/24/22  Jesse Smith MD   traZODone (DESYREL) 150 MG tablet Take 1 tablet by mouth nightly 10/25/22   Jesse Smith MD   cholestyramine light 4 g packet Take 1 packet by mouth daily  Patient not taking: No sig reported 10/26/22   Jesse Smith MD   FLUoxetine HCl 60 MG TABS Take 60 mg by mouth daily    Historical Provider, MD   Handicap Placard MISC by Does not apply route Duration 5 years 5/17/22   Edilma Ley MD   Pregabalin (LYRICA PO) Take 300 mg by mouth 2 times daily    Historical Provider, MD   ibuprofen (IBU) 400 MG tablet Take 1 tablet by mouth every 6 hours as needed for Pain 5/15/22 9/18/22  Luis Enrique Mane,    budesonide-formoterol [facility]) 160-4.5 MCG/ACT AERO Inhale 2 puffs into the lungs 2 times daily 9/19/21   Linda Deleon MD   Polyvinyl Alcohol-Povidone (REFRESH OP) Place 1 drop into both eyes 3 times daily  Patient not taking: No sig reported    Historical Provider, MD     Past Medical History    has a past medical history of Acid reflux, Anxiety, Arthritis, Asthma, Bipolar 1 disorder ([address] Bowel obstruction ([address] COPD (chronic obstructive pulmonary disease) ([address] Crohn disease ([address] Depression, Dry eye, Fibromyalgia, Gout, Headache, History of recent hospitalization, Hyperlipidemia, Hypertension, Hypothyroidism, Kidney stones, Muscle spasm, Neuropathy, Pain, Under care of team, Under care of team, Wears partial dentures, Wears reading eyeglasses, and Wellness examination. Past Surgical History   has a past surgical history that includes Tonsillectomy and adenoidectomy; Tubal ligation; Cholecystectomy; Bunionectomy (Left); Colonoscopy (04/30/2007); Colonoscopy (10/12/2017); Abdomen surgery; Upper gastrointestinal endoscopy (10/25/2021); Colonoscopy (10/25/2021); and Colonoscopy (10/25/2021). Social History   reports that she has been smoking cigarettes. She started smoking about 47 years ago.  She has a 18.50 pack-year smoking history. She quit smokeless tobacco use about 21 years ago. Her smokeless tobacco use included chew.    has no history on file for alcohol use. reports current drug use. Drug: Marijuana [facility]). Marital Status single  Children 1  Occupation none  Family History  Family Status   Relation Name Status    Father      Mother      Brother  Alive     family history includes Cancer in her mother; Crohn's Disease in her brother; Diabetes in her father; Heart Disease in her brother; High Blood Pressure in her mother; Hypertension in her mother; Pearletha Layton in her father. Review of Systems:  CONSTITUTIONAL:   negative for fevers, chills, fatigue and malaise    EYES:   negative for double vision, blurred vision and photophobia    HEENT:   Shortness of breath, no cough   RESPIRATORY:   negative for cough, shortness of breath, wheezing     CARDIOVASCULAR:   negative for chest pain, palpitations, syncope, edema     GASTROINTESTINAL:   negative for nausea, vomiting     GENITOURINARY:   negative for incontinence     MUSCULOSKELETAL:   negative for neck or back pain left hip pain   NEUROLOGICAL:   Negative for weakness and tingling  negative for headaches and dizziness     PSYCHIATRIC:   negative for anxiety       OBJECTIVE:   VITALS:  height is 5' 6\" (1.676 m) and weight is 170 lb (77.1 kg). Her temporal temperature is 97.3 °F (36.3 °C). Her blood pressure is 114/74 and her pulse is 70. Her respiration is 18 and oxygen saturation is 99%. CONSTITUTIONAL:alert & oriented x 3, no acute distress. Calm and pleasant. SKIN:  Warm and dry, no rashes to exposed areas of skin. HEAD:  Normocephalic, atraumatic. EYES: PERRL. EOMs intact. EARS:  Intact and equal bilaterally. Hearing grossly WNL. NOSE:  Nares patent. No rhinorrhea   MOUTH/THROAT:  Mucous membranes pink and moist, many missing upper teeth, dental decay to lower teeth. NECK:supple, good ROM.   LUNGS: Respirations even and non-labored. Clear to RUL, RLL; expiratory wheezes to GEOFFREY, LLL, on room air. CARDIOVASCULAR: Regular rate and rhythm, no murmurs. ABDOMEN: soft, non-tender, non-distended, bowel sounds active x 4   EXTREMITIES: No edema to bilateral lower extremities. No varicosities to bilateral lower extremities. Venous insufficiency bilateral lower extremities. NEUROLOGIC: CN II-XII are grossly intact. Gait is smooth. Testing:   EKG: on file from 2/4/2023  Labs pending: drawn 2/10/2023   Chest XRay:  2/10/2023  MRSA nasal swab: 2/10/2023  IMPRESSIONS:   OA left hip. PLANS:   TOTAL HIP ARTHROPLASTY  (DEPUY, FASCIA ILLIACA NERVE BLOCK PRE-OP, LATERAL DECUBITIS, 3080 TABLE)  *SHORT STAY* - Left.     Jennetta Krabbe, APRN - CNP  Electronically signed 2/10/2023 at 2:26 PM
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
## 2023-02-11 LAB
MRSA, DNA, NASAL: NEGATIVE
SPECIMEN DESCRIPTION: NORMAL

## 2023-02-13 DIAGNOSIS — M16.32 OSTEOARTHRITIS RESULTING FROM LEFT HIP DYSPLASIA: ICD-10-CM

## 2023-02-13 DIAGNOSIS — M16.32 OSTEOARTHRITIS RESULTING FROM LEFT HIP DYSPLASIA: Primary | ICD-10-CM

## 2023-02-13 RX ORDER — HYDROCODONE BITARTRATE AND ACETAMINOPHEN 5; 325 MG/1; MG/1
1 TABLET ORAL EVERY 6 HOURS PRN
Qty: 56 TABLET | Refills: 0 | Status: SHIPPED | OUTPATIENT
Start: 2023-02-13 | End: 2023-02-27

## 2023-02-27 DIAGNOSIS — M16.32 OSTEOARTHRITIS RESULTING FROM LEFT HIP DYSPLASIA: ICD-10-CM

## 2023-02-27 RX ORDER — HYDROCODONE BITARTRATE AND ACETAMINOPHEN 5; 325 MG/1; MG/1
1 TABLET ORAL EVERY 6 HOURS PRN
Qty: 56 TABLET | Refills: 0 | Status: SHIPPED | OUTPATIENT
Start: 2023-02-27 | End: 2023-03-13

## 2023-02-27 NOTE — TELEPHONE ENCOUNTER
Diagnosis: Primary osteoarthritis left hip with avascular necrosis. Possibly superimposed dysplasia. Surgery scheduled 3/24/2023  Medication pended.

## 2023-03-13 DIAGNOSIS — M16.32 OSTEOARTHRITIS RESULTING FROM LEFT HIP DYSPLASIA: ICD-10-CM

## 2023-03-13 RX ORDER — HYDROCODONE BITARTRATE AND ACETAMINOPHEN 5; 325 MG/1; MG/1
1 TABLET ORAL EVERY 6 HOURS PRN
Qty: 56 TABLET | Refills: 0 | Status: SHIPPED | OUTPATIENT
Start: 2023-03-13 | End: 2023-03-27

## 2023-03-13 RX ORDER — HYDROCODONE BITARTRATE AND ACETAMINOPHEN 5; 325 MG/1; MG/1
1 TABLET ORAL EVERY 6 HOURS PRN
Qty: 56 TABLET | Refills: 0 | OUTPATIENT
Start: 2023-03-13 | End: 2023-03-27

## 2023-03-22 NOTE — PROGRESS NOTES
Obtained medical clearance for OR on 3/24/2023. Pt is being released from the hospital and would like a handicap sticker

## 2023-03-23 ENCOUNTER — ANESTHESIA EVENT (OUTPATIENT)
Dept: OPERATING ROOM | Age: 58
End: 2023-03-23
Payer: MEDICAID

## 2023-03-24 ENCOUNTER — APPOINTMENT (OUTPATIENT)
Dept: GENERAL RADIOLOGY | Age: 58
End: 2023-03-24
Attending: ORTHOPAEDIC SURGERY
Payer: MEDICAID

## 2023-03-24 ENCOUNTER — HOSPITAL ENCOUNTER (OUTPATIENT)
Age: 58
End: 2023-03-24
Attending: ORTHOPAEDIC SURGERY
Payer: MEDICAID

## 2023-03-24 ENCOUNTER — ANESTHESIA (OUTPATIENT)
Dept: OPERATING ROOM | Age: 58
End: 2023-03-24
Payer: MEDICAID

## 2023-03-24 ENCOUNTER — HOSPITAL ENCOUNTER (OUTPATIENT)
Age: 58
Discharge: HOME | End: 2023-03-25
Attending: ORTHOPAEDIC SURGERY | Admitting: ORTHOPAEDIC SURGERY
Payer: MEDICAID

## 2023-03-24 DIAGNOSIS — Z96.642 S/P TOTAL LEFT HIP ARTHROPLASTY: Primary | ICD-10-CM

## 2023-03-24 LAB
EGFR, POC: >60 ML/MIN/1.73M2
GLUCOSE BLD-MCNC: 256 MG/DL (ref 65–105)
GLUCOSE BLD-MCNC: 89 MG/DL (ref 74–100)
POC BUN: 13 MG/DL (ref 8–26)
POC CREATININE: 0.94 MG/DL (ref 0.51–1.19)

## 2023-03-24 PROCEDURE — 6370000000 HC RX 637 (ALT 250 FOR IP): Performed by: STUDENT IN AN ORGANIZED HEALTH CARE EDUCATION/TRAINING PROGRAM

## 2023-03-24 PROCEDURE — 73502 X-RAY EXAM HIP UNI 2-3 VIEWS: CPT

## 2023-03-24 PROCEDURE — 2720000010 HC SURG SUPPLY STERILE: Performed by: ORTHOPAEDIC SURGERY

## 2023-03-24 PROCEDURE — 3600000014 HC SURGERY LEVEL 4 ADDTL 15MIN: Performed by: ORTHOPAEDIC SURGERY

## 2023-03-24 PROCEDURE — 2500000003 HC RX 250 WO HCPCS: Performed by: ANESTHESIOLOGY

## 2023-03-24 PROCEDURE — 51701 INSERT BLADDER CATHETER: CPT

## 2023-03-24 PROCEDURE — 2580000003 HC RX 258: Performed by: STUDENT IN AN ORGANIZED HEALTH CARE EDUCATION/TRAINING PROGRAM

## 2023-03-24 PROCEDURE — 3600000004 HC SURGERY LEVEL 4 BASE: Performed by: ORTHOPAEDIC SURGERY

## 2023-03-24 PROCEDURE — 6360000002 HC RX W HCPCS: Performed by: STUDENT IN AN ORGANIZED HEALTH CARE EDUCATION/TRAINING PROGRAM

## 2023-03-24 PROCEDURE — 97530 THERAPEUTIC ACTIVITIES: CPT

## 2023-03-24 PROCEDURE — 3700000000 HC ANESTHESIA ATTENDED CARE: Performed by: ORTHOPAEDIC SURGERY

## 2023-03-24 PROCEDURE — 73552 X-RAY EXAM OF FEMUR 2/>: CPT

## 2023-03-24 PROCEDURE — 3700000001 HC ADD 15 MINUTES (ANESTHESIA): Performed by: ORTHOPAEDIC SURGERY

## 2023-03-24 PROCEDURE — 3209999900 FLUORO FOR SURGICAL PROCEDURES

## 2023-03-24 PROCEDURE — C1776 JOINT DEVICE (IMPLANTABLE): HCPCS | Performed by: ORTHOPAEDIC SURGERY

## 2023-03-24 PROCEDURE — 2709999900 HC NON-CHARGEABLE SUPPLY: Performed by: ORTHOPAEDIC SURGERY

## 2023-03-24 PROCEDURE — 2500000003 HC RX 250 WO HCPCS: Performed by: NURSE ANESTHETIST, CERTIFIED REGISTERED

## 2023-03-24 PROCEDURE — 64450 NJX AA&/STRD OTHER PN/BRANCH: CPT | Performed by: ANESTHESIOLOGY

## 2023-03-24 PROCEDURE — 82947 ASSAY GLUCOSE BLOOD QUANT: CPT

## 2023-03-24 PROCEDURE — 2580000003 HC RX 258: Performed by: ORTHOPAEDIC SURGERY

## 2023-03-24 PROCEDURE — 2580000003 HC RX 258: Performed by: ANESTHESIOLOGY

## 2023-03-24 PROCEDURE — 2500000003 HC RX 250 WO HCPCS: Performed by: STUDENT IN AN ORGANIZED HEALTH CARE EDUCATION/TRAINING PROGRAM

## 2023-03-24 PROCEDURE — 7100000000 HC PACU RECOVERY - FIRST 15 MIN: Performed by: ORTHOPAEDIC SURGERY

## 2023-03-24 PROCEDURE — 97162 PT EVAL MOD COMPLEX 30 MIN: CPT

## 2023-03-24 PROCEDURE — 82565 ASSAY OF CREATININE: CPT

## 2023-03-24 PROCEDURE — 97166 OT EVAL MOD COMPLEX 45 MIN: CPT

## 2023-03-24 PROCEDURE — 6360000002 HC RX W HCPCS: Performed by: NURSE ANESTHETIST, CERTIFIED REGISTERED

## 2023-03-24 PROCEDURE — 84520 ASSAY OF UREA NITROGEN: CPT

## 2023-03-24 PROCEDURE — 6360000002 HC RX W HCPCS: Performed by: ANESTHESIOLOGY

## 2023-03-24 PROCEDURE — 7100000001 HC PACU RECOVERY - ADDTL 15 MIN: Performed by: ORTHOPAEDIC SURGERY

## 2023-03-24 DEVICE — BI MENTUM PFR PE LINER 49MM28MM: Type: IMPLANTABLE DEVICE | Site: HIP | Status: FUNCTIONAL

## 2023-03-24 DEVICE — IMPLANTABLE DEVICE: Type: IMPLANTABLE DEVICE | Site: HIP | Status: FUNCTIONAL

## 2023-03-24 DEVICE — ELIMINATOR H APEX FOR 48-60MM PINN HIP SHELL: Type: IMPLANTABLE DEVICE | Site: HIP | Status: FUNCTIONAL

## 2023-03-24 DEVICE — SCREW BNE L30MM DIA6.5MM CANC HIP S STL GRIPTION FULL THRD: Type: IMPLANTABLE DEVICE | Site: HIP | Status: FUNCTIONAL

## 2023-03-24 DEVICE — STEM FEM SZ 4 L103MM 12/14 TAPR HI OFFSET HIP DUOFIX CLLRD: Type: IMPLANTABLE DEVICE | Site: HIP | Status: FUNCTIONAL

## 2023-03-24 DEVICE — CUP ACET DIA56MM HIP GRIPTION PRI CEMENTLESS FIX SECT SER: Type: IMPLANTABLE DEVICE | Site: HIP | Status: FUNCTIONAL

## 2023-03-24 DEVICE — HEAD FEM DIA28MM NK L+1.5MM HIP MTL ON MTL 12/14 TAPR: Type: IMPLANTABLE DEVICE | Site: HIP | Status: FUNCTIONAL

## 2023-03-24 DEVICE — HIP H3 TOT ADV DUAL MOB IMPL CAPPED H3: Type: IMPLANTABLE DEVICE | Site: HIP | Status: FUNCTIONAL

## 2023-03-24 DEVICE — SCREW BNE L25MM DIA6.5MM CANC HIP S STL GRIPTION FULL THRD: Type: IMPLANTABLE DEVICE | Site: HIP | Status: FUNCTIONAL

## 2023-03-24 RX ORDER — DEXAMETHASONE SODIUM PHOSPHATE 10 MG/ML
10 INJECTION INTRAMUSCULAR; INTRAVENOUS ONCE
Status: DISCONTINUED | OUTPATIENT
Start: 2023-03-24 | End: 2023-03-24 | Stop reason: HOSPADM

## 2023-03-24 RX ORDER — BUPIVACAINE HYDROCHLORIDE 5 MG/ML
30 INJECTION, SOLUTION EPIDURAL; INTRACAUDAL ONCE
Status: COMPLETED | OUTPATIENT
Start: 2023-03-24 | End: 2023-03-24

## 2023-03-24 RX ORDER — ONDANSETRON 2 MG/ML
4 INJECTION INTRAMUSCULAR; INTRAVENOUS
Status: DISCONTINUED | OUTPATIENT
Start: 2023-03-24 | End: 2023-03-24

## 2023-03-24 RX ORDER — SENNOSIDES 8.8 MG/5ML
5 LIQUID ORAL 2 TIMES DAILY PRN
Status: DISCONTINUED | OUTPATIENT
Start: 2023-03-24 | End: 2023-03-25 | Stop reason: HOSPADM

## 2023-03-24 RX ORDER — ACETAMINOPHEN 500 MG
1000 TABLET ORAL ONCE
Status: COMPLETED | OUTPATIENT
Start: 2023-03-24 | End: 2023-03-24

## 2023-03-24 RX ORDER — LIDOCAINE HYDROCHLORIDE 10 MG/ML
INJECTION, SOLUTION EPIDURAL; INFILTRATION; INTRACAUDAL; PERINEURAL PRN
Status: DISCONTINUED | OUTPATIENT
Start: 2023-03-24 | End: 2023-03-24 | Stop reason: SDUPTHER

## 2023-03-24 RX ORDER — SODIUM CHLORIDE 0.9 % (FLUSH) 0.9 %
5-40 SYRINGE (ML) INJECTION EVERY 12 HOURS SCHEDULED
Status: DISCONTINUED | OUTPATIENT
Start: 2023-03-24 | End: 2023-03-24

## 2023-03-24 RX ORDER — ASPIRIN 81 MG/1
81 TABLET ORAL 2 TIMES DAILY
Status: DISCONTINUED | OUTPATIENT
Start: 2023-03-24 | End: 2023-03-25 | Stop reason: HOSPADM

## 2023-03-24 RX ORDER — ACETAMINOPHEN 500 MG
1000 TABLET ORAL EVERY 6 HOURS SCHEDULED
Status: DISCONTINUED | OUTPATIENT
Start: 2023-03-24 | End: 2023-03-25 | Stop reason: HOSPADM

## 2023-03-24 RX ORDER — SODIUM CHLORIDE 9 MG/ML
INJECTION, SOLUTION INTRAVENOUS CONTINUOUS
Status: DISCONTINUED | OUTPATIENT
Start: 2023-03-24 | End: 2023-03-25 | Stop reason: HOSPADM

## 2023-03-24 RX ORDER — MIDAZOLAM HYDROCHLORIDE 2 MG/2ML
0.5 INJECTION, SOLUTION INTRAMUSCULAR; INTRAVENOUS 4 TIMES DAILY PRN
Status: DISCONTINUED | OUTPATIENT
Start: 2023-03-24 | End: 2023-03-24

## 2023-03-24 RX ORDER — SODIUM CHLORIDE, SODIUM LACTATE, POTASSIUM CHLORIDE, CALCIUM CHLORIDE 600; 310; 30; 20 MG/100ML; MG/100ML; MG/100ML; MG/100ML
INJECTION, SOLUTION INTRAVENOUS CONTINUOUS
Status: DISCONTINUED | OUTPATIENT
Start: 2023-03-24 | End: 2023-03-24 | Stop reason: HOSPADM

## 2023-03-24 RX ORDER — GLYCOPYRROLATE 0.2 MG/ML
INJECTION INTRAMUSCULAR; INTRAVENOUS PRN
Status: DISCONTINUED | OUTPATIENT
Start: 2023-03-24 | End: 2023-03-24 | Stop reason: SDUPTHER

## 2023-03-24 RX ORDER — ONDANSETRON 2 MG/ML
INJECTION INTRAMUSCULAR; INTRAVENOUS PRN
Status: DISCONTINUED | OUTPATIENT
Start: 2023-03-24 | End: 2023-03-24 | Stop reason: SDUPTHER

## 2023-03-24 RX ORDER — OXYCODONE HYDROCHLORIDE 5 MG/1
5 TABLET ORAL EVERY 4 HOURS PRN
Status: DISCONTINUED | OUTPATIENT
Start: 2023-03-24 | End: 2023-03-25 | Stop reason: HOSPADM

## 2023-03-24 RX ORDER — TRANEXAMIC ACID 10 MG/ML
1000 INJECTION, SOLUTION INTRAVENOUS ONCE
Status: DISCONTINUED | OUTPATIENT
Start: 2023-03-24 | End: 2023-03-24

## 2023-03-24 RX ORDER — DEXAMETHASONE SODIUM PHOSPHATE 10 MG/ML
INJECTION INTRAMUSCULAR; INTRAVENOUS PRN
Status: DISCONTINUED | OUTPATIENT
Start: 2023-03-24 | End: 2023-03-24 | Stop reason: SDUPTHER

## 2023-03-24 RX ORDER — PANTOPRAZOLE SODIUM 40 MG/1
40 TABLET, DELAYED RELEASE ORAL DAILY
Status: DISCONTINUED | OUTPATIENT
Start: 2023-03-25 | End: 2023-03-25 | Stop reason: HOSPADM

## 2023-03-24 RX ORDER — SODIUM CHLORIDE 0.9 % (FLUSH) 0.9 %
5-40 SYRINGE (ML) INJECTION PRN
Status: DISCONTINUED | OUTPATIENT
Start: 2023-03-24 | End: 2023-03-24

## 2023-03-24 RX ORDER — CLONAZEPAM 0.5 MG/1
0.5 TABLET ORAL DAILY
Status: DISCONTINUED | OUTPATIENT
Start: 2023-03-24 | End: 2023-03-25 | Stop reason: HOSPADM

## 2023-03-24 RX ORDER — DOCUSATE SODIUM 100 MG/1
100 CAPSULE, LIQUID FILLED ORAL 2 TIMES DAILY PRN
Qty: 60 CAPSULE | Refills: 0 | Status: SHIPPED | OUTPATIENT
Start: 2023-03-24 | End: 2023-03-25 | Stop reason: SDUPTHER

## 2023-03-24 RX ORDER — LABETALOL HYDROCHLORIDE 5 MG/ML
INJECTION, SOLUTION INTRAVENOUS PRN
Status: DISCONTINUED | OUTPATIENT
Start: 2023-03-24 | End: 2023-03-24 | Stop reason: SDUPTHER

## 2023-03-24 RX ORDER — SODIUM CHLORIDE 9 MG/ML
INJECTION, SOLUTION INTRAVENOUS PRN
Status: DISCONTINUED | OUTPATIENT
Start: 2023-03-24 | End: 2023-03-25 | Stop reason: HOSPADM

## 2023-03-24 RX ORDER — ONDANSETRON 4 MG/1
4 TABLET, ORALLY DISINTEGRATING ORAL EVERY 8 HOURS PRN
Status: DISCONTINUED | OUTPATIENT
Start: 2023-03-24 | End: 2023-03-25 | Stop reason: HOSPADM

## 2023-03-24 RX ORDER — TRAMADOL HYDROCHLORIDE 50 MG/1
50 TABLET ORAL ONCE
Status: COMPLETED | OUTPATIENT
Start: 2023-03-24 | End: 2023-03-24

## 2023-03-24 RX ORDER — ONDANSETRON 2 MG/ML
4 INJECTION INTRAMUSCULAR; INTRAVENOUS EVERY 6 HOURS PRN
Status: DISCONTINUED | OUTPATIENT
Start: 2023-03-24 | End: 2023-03-25 | Stop reason: HOSPADM

## 2023-03-24 RX ORDER — CELECOXIB 100 MG/1
100 CAPSULE ORAL 2 TIMES DAILY
Status: DISCONTINUED | OUTPATIENT
Start: 2023-03-24 | End: 2023-03-25 | Stop reason: HOSPADM

## 2023-03-24 RX ORDER — OXYCODONE HYDROCHLORIDE AND ACETAMINOPHEN 5; 325 MG/1; MG/1
1 TABLET ORAL EVERY 6 HOURS PRN
Qty: 28 TABLET | Refills: 0 | Status: SHIPPED | OUTPATIENT
Start: 2023-03-24 | End: 2023-03-25 | Stop reason: SDUPTHER

## 2023-03-24 RX ORDER — SODIUM CHLORIDE 9 MG/ML
INJECTION, SOLUTION INTRAVENOUS PRN
Status: DISCONTINUED | OUTPATIENT
Start: 2023-03-24 | End: 2023-03-24

## 2023-03-24 RX ORDER — FLUOXETINE HYDROCHLORIDE 20 MG/1
60 CAPSULE ORAL DAILY
Status: DISCONTINUED | OUTPATIENT
Start: 2023-03-24 | End: 2023-03-25 | Stop reason: HOSPADM

## 2023-03-24 RX ORDER — PREGABALIN 75 MG/1
75 CAPSULE ORAL ONCE
Status: DISCONTINUED | OUTPATIENT
Start: 2023-03-24 | End: 2023-03-24 | Stop reason: HOSPADM

## 2023-03-24 RX ORDER — NEOSTIGMINE METHYLSULFATE 5 MG/5 ML
SYRINGE (ML) INTRAVENOUS PRN
Status: DISCONTINUED | OUTPATIENT
Start: 2023-03-24 | End: 2023-03-24 | Stop reason: SDUPTHER

## 2023-03-24 RX ORDER — ASPIRIN 81 MG/1
81 TABLET ORAL 2 TIMES DAILY
Qty: 84 TABLET | Refills: 0 | Status: SHIPPED | OUTPATIENT
Start: 2023-03-24 | End: 2023-03-25 | Stop reason: SDUPTHER

## 2023-03-24 RX ORDER — GABAPENTIN 400 MG/1
800 CAPSULE ORAL 2 TIMES DAILY
Status: DISCONTINUED | OUTPATIENT
Start: 2023-03-24 | End: 2023-03-25 | Stop reason: HOSPADM

## 2023-03-24 RX ORDER — FENTANYL CITRATE 50 UG/ML
INJECTION, SOLUTION INTRAMUSCULAR; INTRAVENOUS PRN
Status: DISCONTINUED | OUTPATIENT
Start: 2023-03-24 | End: 2023-03-24 | Stop reason: SDUPTHER

## 2023-03-24 RX ORDER — OXYCODONE HYDROCHLORIDE 5 MG/1
10 TABLET ORAL EVERY 4 HOURS PRN
Status: DISCONTINUED | OUTPATIENT
Start: 2023-03-24 | End: 2023-03-25 | Stop reason: HOSPADM

## 2023-03-24 RX ORDER — FENTANYL CITRATE 50 UG/ML
25 INJECTION, SOLUTION INTRAMUSCULAR; INTRAVENOUS EVERY 5 MIN PRN
Status: DISCONTINUED | OUTPATIENT
Start: 2023-03-24 | End: 2023-03-24

## 2023-03-24 RX ORDER — SENNOSIDES 8.8 MG/5ML
5 LIQUID ORAL 2 TIMES DAILY PRN
Status: DISCONTINUED | OUTPATIENT
Start: 2023-03-24 | End: 2023-03-24 | Stop reason: SDUPTHER

## 2023-03-24 RX ORDER — SODIUM CHLORIDE 0.9 % (FLUSH) 0.9 %
5-40 SYRINGE (ML) INJECTION EVERY 12 HOURS SCHEDULED
Status: DISCONTINUED | OUTPATIENT
Start: 2023-03-24 | End: 2023-03-25 | Stop reason: HOSPADM

## 2023-03-24 RX ORDER — POLYETHYLENE GLYCOL 3350 17 G/17G
17 POWDER, FOR SOLUTION ORAL DAILY
Status: DISCONTINUED | OUTPATIENT
Start: 2023-03-24 | End: 2023-03-25 | Stop reason: HOSPADM

## 2023-03-24 RX ORDER — DIPHENHYDRAMINE HCL 25 MG
25 TABLET ORAL NIGHTLY PRN
Status: DISCONTINUED | OUTPATIENT
Start: 2023-03-24 | End: 2023-03-25 | Stop reason: HOSPADM

## 2023-03-24 RX ORDER — ROCURONIUM BROMIDE 10 MG/ML
INJECTION, SOLUTION INTRAVENOUS PRN
Status: DISCONTINUED | OUTPATIENT
Start: 2023-03-24 | End: 2023-03-24 | Stop reason: SDUPTHER

## 2023-03-24 RX ORDER — MAGNESIUM HYDROXIDE 1200 MG/15ML
LIQUID ORAL CONTINUOUS PRN
Status: DISCONTINUED | OUTPATIENT
Start: 2023-03-24 | End: 2023-03-24 | Stop reason: HOSPADM

## 2023-03-24 RX ORDER — DIPHENHYDRAMINE HYDROCHLORIDE 50 MG/ML
12.5 INJECTION INTRAMUSCULAR; INTRAVENOUS
Status: DISCONTINUED | OUTPATIENT
Start: 2023-03-24 | End: 2023-03-24

## 2023-03-24 RX ORDER — SODIUM CHLORIDE, SODIUM LACTATE, POTASSIUM CHLORIDE, CALCIUM CHLORIDE 600; 310; 30; 20 MG/100ML; MG/100ML; MG/100ML; MG/100ML
1000 INJECTION, SOLUTION INTRAVENOUS CONTINUOUS
Status: DISCONTINUED | OUTPATIENT
Start: 2023-03-24 | End: 2023-03-24 | Stop reason: HOSPADM

## 2023-03-24 RX ORDER — LISINOPRIL 5 MG/1
5 TABLET ORAL DAILY
Status: DISCONTINUED | OUTPATIENT
Start: 2023-03-24 | End: 2023-03-25 | Stop reason: HOSPADM

## 2023-03-24 RX ORDER — BUDESONIDE AND FORMOTEROL FUMARATE DIHYDRATE 160; 4.5 UG/1; UG/1
2 AEROSOL RESPIRATORY (INHALATION) 2 TIMES DAILY
Status: DISCONTINUED | OUTPATIENT
Start: 2023-03-24 | End: 2023-03-25 | Stop reason: HOSPADM

## 2023-03-24 RX ORDER — PANTOPRAZOLE SODIUM 40 MG/1
40 TABLET, DELAYED RELEASE ORAL ONCE
Status: COMPLETED | OUTPATIENT
Start: 2023-03-24 | End: 2023-03-24

## 2023-03-24 RX ORDER — CLINDAMYCIN PHOSPHATE 900 MG/50ML
INJECTION INTRAVENOUS PRN
Status: DISCONTINUED | OUTPATIENT
Start: 2023-03-24 | End: 2023-03-24 | Stop reason: SDUPTHER

## 2023-03-24 RX ORDER — PROPOFOL 10 MG/ML
INJECTION, EMULSION INTRAVENOUS PRN
Status: DISCONTINUED | OUTPATIENT
Start: 2023-03-24 | End: 2023-03-24 | Stop reason: SDUPTHER

## 2023-03-24 RX ORDER — TRANEXAMIC ACID 10 MG/ML
1000 INJECTION, SOLUTION INTRAVENOUS ONCE
Status: COMPLETED | OUTPATIENT
Start: 2023-03-24 | End: 2023-03-24

## 2023-03-24 RX ORDER — SODIUM CHLORIDE 0.9 % (FLUSH) 0.9 %
5-40 SYRINGE (ML) INJECTION PRN
Status: DISCONTINUED | OUTPATIENT
Start: 2023-03-24 | End: 2023-03-25 | Stop reason: HOSPADM

## 2023-03-24 RX ORDER — NICOTINE 21 MG/24HR
1 PATCH, TRANSDERMAL 24 HOURS TRANSDERMAL DAILY
Status: DISCONTINUED | OUTPATIENT
Start: 2023-03-24 | End: 2023-03-25 | Stop reason: HOSPADM

## 2023-03-24 RX ORDER — TRAMADOL HYDROCHLORIDE 50 MG/1
50 TABLET ORAL EVERY 6 HOURS SCHEDULED
Status: DISCONTINUED | OUTPATIENT
Start: 2023-03-24 | End: 2023-03-25 | Stop reason: HOSPADM

## 2023-03-24 RX ORDER — KETAMINE HCL IN NACL, ISO-OSM 100MG/10ML
SYRINGE (ML) INJECTION PRN
Status: DISCONTINUED | OUTPATIENT
Start: 2023-03-24 | End: 2023-03-24 | Stop reason: SDUPTHER

## 2023-03-24 RX ORDER — KETOROLAC TROMETHAMINE 15 MG/ML
15 INJECTION, SOLUTION INTRAMUSCULAR; INTRAVENOUS EVERY 6 HOURS PRN
Status: DISCONTINUED | OUTPATIENT
Start: 2023-03-24 | End: 2023-03-25 | Stop reason: HOSPADM

## 2023-03-24 RX ORDER — CELECOXIB 100 MG/1
200 CAPSULE ORAL ONCE
Status: COMPLETED | OUTPATIENT
Start: 2023-03-24 | End: 2023-03-24

## 2023-03-24 RX ORDER — FENTANYL CITRATE 50 UG/ML
50 INJECTION, SOLUTION INTRAMUSCULAR; INTRAVENOUS PRN
Status: COMPLETED | OUTPATIENT
Start: 2023-03-24 | End: 2023-03-24

## 2023-03-24 RX ORDER — MORPHINE SULFATE 2 MG/ML
2 INJECTION, SOLUTION INTRAMUSCULAR; INTRAVENOUS EVERY 5 MIN PRN
Status: DISCONTINUED | OUTPATIENT
Start: 2023-03-24 | End: 2023-03-24

## 2023-03-24 RX ORDER — MAGNESIUM HYDROXIDE 1200 MG/15ML
LIQUID ORAL PRN
Status: DISCONTINUED | OUTPATIENT
Start: 2023-03-24 | End: 2023-03-24 | Stop reason: HOSPADM

## 2023-03-24 RX ADMIN — ACETAMINOPHEN 1000 MG: 500 TABLET ORAL at 07:04

## 2023-03-24 RX ADMIN — CLINDAMYCIN PHOSPHATE 900 MG: 900 INJECTION, SOLUTION INTRAVENOUS at 09:21

## 2023-03-24 RX ADMIN — GABAPENTIN 800 MG: 400 CAPSULE ORAL at 20:20

## 2023-03-24 RX ADMIN — PANTOPRAZOLE SODIUM 40 MG: 40 TABLET, DELAYED RELEASE ORAL at 17:29

## 2023-03-24 RX ADMIN — FENTANYL CITRATE 50 MCG: 50 INJECTION, SOLUTION INTRAMUSCULAR; INTRAVENOUS at 09:39

## 2023-03-24 RX ADMIN — FENTANYL CITRATE 50 MCG: 50 INJECTION, SOLUTION INTRAMUSCULAR; INTRAVENOUS at 07:39

## 2023-03-24 RX ADMIN — PROPOFOL 200 MG: 10 INJECTION, EMULSION INTRAVENOUS at 08:57

## 2023-03-24 RX ADMIN — Medication 3 MG: at 11:37

## 2023-03-24 RX ADMIN — ROCURONIUM BROMIDE 50 MG: 10 INJECTION, SOLUTION INTRAVENOUS at 08:58

## 2023-03-24 RX ADMIN — GLYCOPYRROLATE 0.4 MG: 0.2 INJECTION INTRAMUSCULAR; INTRAVENOUS at 11:37

## 2023-03-24 RX ADMIN — KETOROLAC TROMETHAMINE 15 MG: 15 INJECTION, SOLUTION INTRAMUSCULAR; INTRAVENOUS at 15:48

## 2023-03-24 RX ADMIN — TRAMADOL HYDROCHLORIDE 50 MG: 50 TABLET, COATED ORAL at 14:26

## 2023-03-24 RX ADMIN — MIDAZOLAM HYDROCHLORIDE 2 MG: 1 INJECTION, SOLUTION INTRAMUSCULAR; INTRAVENOUS at 08:05

## 2023-03-24 RX ADMIN — OXYCODONE HYDROCHLORIDE 10 MG: 5 TABLET ORAL at 14:26

## 2023-03-24 RX ADMIN — GLYCOPYRROLATE 0.2 MG: 0.2 INJECTION INTRAMUSCULAR; INTRAVENOUS at 11:04

## 2023-03-24 RX ADMIN — ACETAMINOPHEN 1000 MG: 500 TABLET ORAL at 18:03

## 2023-03-24 RX ADMIN — LISINOPRIL 5 MG: 5 TABLET ORAL at 16:20

## 2023-03-24 RX ADMIN — TRAZODONE HYDROCHLORIDE 150 MG: 50 TABLET ORAL at 20:20

## 2023-03-24 RX ADMIN — ONDANSETRON 4 MG: 2 INJECTION INTRAMUSCULAR; INTRAVENOUS at 11:39

## 2023-03-24 RX ADMIN — ACETAMINOPHEN 1000 MG: 500 TABLET ORAL at 15:05

## 2023-03-24 RX ADMIN — FENTANYL CITRATE 50 MCG: 50 INJECTION, SOLUTION INTRAMUSCULAR; INTRAVENOUS at 12:54

## 2023-03-24 RX ADMIN — CELECOXIB 200 MG: 100 CAPSULE ORAL at 07:03

## 2023-03-24 RX ADMIN — ROCURONIUM BROMIDE 20 MG: 10 INJECTION, SOLUTION INTRAVENOUS at 10:36

## 2023-03-24 RX ADMIN — SODIUM CHLORIDE, POTASSIUM CHLORIDE, SODIUM LACTATE AND CALCIUM CHLORIDE: 600; 310; 30; 20 INJECTION, SOLUTION INTRAVENOUS at 07:56

## 2023-03-24 RX ADMIN — Medication 5 MG: at 09:51

## 2023-03-24 RX ADMIN — Medication 2000 MG: at 09:25

## 2023-03-24 RX ADMIN — SODIUM CHLORIDE: 9 INJECTION, SOLUTION INTRAVENOUS at 21:33

## 2023-03-24 RX ADMIN — FENTANYL CITRATE 50 MCG: 50 INJECTION, SOLUTION INTRAMUSCULAR; INTRAVENOUS at 12:03

## 2023-03-24 RX ADMIN — DEXAMETHASONE SODIUM PHOSPHATE 10 MG: 10 INJECTION INTRAMUSCULAR; INTRAVENOUS at 09:14

## 2023-03-24 RX ADMIN — FENTANYL CITRATE 50 MCG: 50 INJECTION, SOLUTION INTRAMUSCULAR; INTRAVENOUS at 10:33

## 2023-03-24 RX ADMIN — SODIUM CHLORIDE, POTASSIUM CHLORIDE, SODIUM LACTATE AND CALCIUM CHLORIDE: 600; 310; 30; 20 INJECTION, SOLUTION INTRAVENOUS at 10:22

## 2023-03-24 RX ADMIN — KETOROLAC TROMETHAMINE 15 MG: 15 INJECTION, SOLUTION INTRAMUSCULAR; INTRAVENOUS at 21:44

## 2023-03-24 RX ADMIN — TRAMADOL HYDROCHLORIDE 50 MG: 50 TABLET, COATED ORAL at 07:04

## 2023-03-24 RX ADMIN — Medication 2000 MG: at 16:21

## 2023-03-24 RX ADMIN — Medication 81 MG: at 20:20

## 2023-03-24 RX ADMIN — SODIUM CHLORIDE: 9 INJECTION, SOLUTION INTRAVENOUS at 13:08

## 2023-03-24 RX ADMIN — FENTANYL CITRATE 50 MCG: 50 INJECTION, SOLUTION INTRAMUSCULAR; INTRAVENOUS at 08:56

## 2023-03-24 RX ADMIN — TRAMADOL HYDROCHLORIDE 50 MG: 50 TABLET, COATED ORAL at 18:03

## 2023-03-24 RX ADMIN — CLONAZEPAM 0.5 MG: 0.5 TABLET ORAL at 16:20

## 2023-03-24 RX ADMIN — Medication 30 MG: at 09:39

## 2023-03-24 RX ADMIN — Medication 10 MG: at 11:35

## 2023-03-24 RX ADMIN — TRANEXAMIC ACID 1 G: 10 INJECTION, SOLUTION INTRAVENOUS at 09:34

## 2023-03-24 RX ADMIN — FENTANYL CITRATE 100 MCG: 50 INJECTION, SOLUTION INTRAMUSCULAR; INTRAVENOUS at 09:49

## 2023-03-24 RX ADMIN — CELECOXIB 100 MG: 100 CAPSULE ORAL at 20:20

## 2023-03-24 RX ADMIN — BUPIVACAINE HYDROCHLORIDE 150 MG: 5 INJECTION, SOLUTION EPIDURAL; INTRACAUDAL; PERINEURAL at 08:05

## 2023-03-24 RX ADMIN — OXYCODONE HYDROCHLORIDE 10 MG: 5 TABLET ORAL at 20:24

## 2023-03-24 RX ADMIN — LIDOCAINE HYDROCHLORIDE 50 MG: 10 INJECTION, SOLUTION EPIDURAL; INFILTRATION; INTRACAUDAL; PERINEURAL at 08:56

## 2023-03-24 RX ADMIN — TRANEXAMIC ACID 1 G: 10 INJECTION, SOLUTION INTRAVENOUS at 11:02

## 2023-03-24 RX ADMIN — FENTANYL CITRATE 50 MCG: 50 INJECTION, SOLUTION INTRAMUSCULAR; INTRAVENOUS at 12:11

## 2023-03-24 ASSESSMENT — PAIN DESCRIPTION - FREQUENCY: FREQUENCY: INTERMITTENT

## 2023-03-24 ASSESSMENT — PAIN DESCRIPTION - ORIENTATION
ORIENTATION: LEFT
ORIENTATION: RIGHT

## 2023-03-24 ASSESSMENT — PAIN SCALES - GENERAL
PAINLEVEL_OUTOF10: 6
PAINLEVEL_OUTOF10: 7
PAINLEVEL_OUTOF10: 2
PAINLEVEL_OUTOF10: 6
PAINLEVEL_OUTOF10: 3
PAINLEVEL_OUTOF10: 6
PAINLEVEL_OUTOF10: 6
PAINLEVEL_OUTOF10: 8
PAINLEVEL_OUTOF10: 4
PAINLEVEL_OUTOF10: 9
PAINLEVEL_OUTOF10: 10
PAINLEVEL_OUTOF10: 8
PAINLEVEL_OUTOF10: 8
PAINLEVEL_OUTOF10: 4
PAINLEVEL_OUTOF10: 8
PAINLEVEL_OUTOF10: 8
PAINLEVEL_OUTOF10: 7
PAINLEVEL_OUTOF10: 8

## 2023-03-24 ASSESSMENT — ENCOUNTER SYMPTOMS: SHORTNESS OF BREATH: 1

## 2023-03-24 ASSESSMENT — PAIN DESCRIPTION - DESCRIPTORS
DESCRIPTORS: SHARP;STABBING;THROBBING
DESCRIPTORS: SHARP;STABBING
DESCRIPTORS: ACHING

## 2023-03-24 ASSESSMENT — PAIN DESCRIPTION - LOCATION
LOCATION: HIP

## 2023-03-24 ASSESSMENT — LIFESTYLE VARIABLES: SMOKING_STATUS: 1

## 2023-03-24 ASSESSMENT — PAIN DESCRIPTION - ONSET: ONSET: AWAKENED FROM SLEEP

## 2023-03-24 ASSESSMENT — PAIN DESCRIPTION - PAIN TYPE: TYPE: SURGICAL PAIN

## 2023-03-24 NOTE — DISCHARGE INSTRUCTIONS
Orthopedic Instructions:  -Weight bearing status: as tolerated left leg  -Posterior hip precautions - avoid flexion and internal rotation of hip, do no cross leg over other, avoid bending at the waist past 90 degrees    -Keep dressing clean and dry. Maintain until follow up. Ok to shower but no soaks or baths.  -Physical Therapy for strengthening and gait training. Occupational therapy for activities of daily living.  -Ice (20 minutes on and off 1 hour) and elevate (above heart) as needed for swelling/pain  -Drink plenty of fluids.  -Call the office or come to Emergency Room if signs of infection appear (hot, swollen, red, draining pus, fever)  -Take medications as prescribed.  -Wean off narcotics (percocet/norco) as soon as possible. Do not take tylenol if still taking narcotics. -No alcoholic beverages or driving/operating machinery while on narcotics  -Follow up with Dr. Etienne Davila in his office in 10-14 days after surgery/discharge. Call [phone]  to schedule.

## 2023-03-24 NOTE — ANESTHESIA PROCEDURE NOTES
Peripheral Block    Patient location during procedure: pre-op  Reason for block: post-op pain management and at surgeon's request  Start time: 3/24/2023 8:05 AM  End time: 3/24/2023 8:13 AM  Staffing  Performed: anesthesiologist   Anesthesiologist: Brielle Go MD  Preanesthetic Checklist  Completed: patient identified, IV checked, site marked, risks and benefits discussed, surgical/procedural consents, equipment checked, pre-op evaluation, timeout performed, anesthesia consent given and monitors applied/VS acknowledged  Peripheral Block   Patient position: supine  Prep: ChloraPrep  Provider prep: mask and sterile gloves  Patient monitoring: cardiac monitor, continuous pulse ox, frequent blood pressure checks, IV access and responsive to questions  Block type: Fascia iliaca  Laterality: left  Injection technique: single-shot  Guidance: ultrasound guided  Local infiltration: lidocaine  Infiltration strength: 1 %  Local infiltration: lidocaine  Dose: 2 mL    Needle   Needle type: short-bevel   Needle gauge: 20 G  Test dose: negative  Needle length: 10 cm  Assessment   Injection assessment: negative aspiration for heme, no paresthesia on injection, local visualized surrounding nerve on ultrasound and no intravascular symptoms  Paresthesia pain: none  Slow fractionated injection: yes  Hemodynamics: stable  Outcomes: patient tolerated procedure well    Additional Notes  Versed 2 mg,30 cc PF .5% PF marcaine,DD,neg asp
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
## 2023-03-24 NOTE — PROGRESS NOTES
Occupational Therapy  Facility/Department: [address] ORTHO/MED SURG  Occupational Therapy Initial Assessment    Name: Torie Pacheco  : 1965  MRN: [id number]  Date of Service: 3/24/2023  Obtained from medical chart:   \" S/p L JOSEFINA 3/24/23 \"  Discharge Recommendations:  Patient would benefit from continued therapy after discharge  OT Equipment Recommendations  Equipment Needed: Yes  Mobility Devices: ADL Assistive Devices  ADL Assistive Devices: Sock-Aid Hard;Reacher (in order to maintain surgical precautions)       Patient Diagnosis(es): The encounter diagnosis was S/P total left hip arthroplasty. Past Medical History:  has a past medical history of Acid reflux, Anxiety, Arthritis, Asthma, Bipolar 1 disorder ([address] Bowel obstruction ([address] COPD (chronic obstructive pulmonary disease) ([facility] COVID-19 vaccine administered, Crohn disease ([facility] Depression, Dry eye, Fibromyalgia, Gout, Headache, History of recent hospitalization, Hyperlipidemia, Hypertension, Hypothyroidism, Kidney stones, Muscle spasm, Neuropathy, Pain, Under care of team, Under care of team, Wears partial dentures, Wears reading eyeglasses, and Wellness examination. Past Surgical History:  has a past surgical history that includes Tonsillectomy and adenoidectomy; Tubal ligation; Cholecystectomy; Bunionectomy (Left); Colonoscopy (2007); Colonoscopy (10/12/2017); Abdomen surgery; Upper gastrointestinal endoscopy (10/25/2021); Colonoscopy (10/25/2021); Colonoscopy (10/25/2021); and Hip Arthroplasty (Left, 2023). Assessment   Performance deficits / Impairments: Decreased functional mobility ; Decreased ADL status; Decreased endurance;Decreased high-level IADLs;Decreased safe awareness;Decreased balance;Decreased strength  Assessment: Patient completed bed mobility at [facility] maintaining precautions of JOSEFINA with extensive education provided on maintaining precautions.  Pt completed functional transfers/mobiltiy using RW at [address] to complete side steps to [facility] with verbal cues provided throughout. OT provided patient with visual aid of AE for LB dressing with patient verbalizing understanding and past use of AE with good return. Patient would benefit from continued acute OT services to address functional deficits through skilled intervention impacting performance and safety with ADLs/IADLs. Prognosis: Good  Decision Making: Medium Complexity  REQUIRES OT FOLLOW-UP: Yes  Activity Tolerance  Activity Tolerance: Patient Tolerated treatment well        Plan   Occupational Therapy Plan  Times Per Week: 5-7x/wk  Current Treatment Recommendations: Strengthening, Balance training, Functional mobility training, Endurance training, Safety education & training, Positioning, Equipment evaluation, education, & procurement, Patient/Caregiver education & training, Self-Care / ADL, Home management training     Restrictions  Restrictions/Precautions  Restrictions/Precautions: Weight Bearing  Lower Extremity Weight Bearing Restrictions  Left Lower Extremity Weight Bearing: Weight Bearing As Tolerated  Position Activity Restriction  Hip Precautions: No hip flexion > 90 degrees, No hip internal rotation, Posterior hip precautions, No ADduction    Subjective   General  Patient assessed for rehabilitation services?: Yes  Family / Caregiver Present: No  General Comment  Comments: RN ok'd patient for OT session. Pt pleasant, cooperative and agreeable. Pt reports 8/10 pain in the L hip, able to progress with treatment with encouragement and education on positioning from therapist.  pt reports 8/10 pain in L hip  Social/Functional History  Social/Functional History  Lives With: Alone  Type of Home: Apartment  Home Layout: One level  Home Access: Level entry, Elevator  Bathroom Shower/Tub: Tub/Shower unit  Bathroom Toilet: Handicap height  Bathroom Equipment: Tub transfer bench, Grab bars in shower (reports having a TTB on hold at a DME store)  Home Equipment: [employer],

## 2023-03-24 NOTE — ANESTHESIA PRE PROCEDURE
PROT 7.1 02/10/2023 02:17 PM    CALCIUM 9.2 02/10/2023 02:17 PM    BILITOT 0.6 02/10/2023 02:17 PM    ALKPHOS 96 02/10/2023 02:17 PM    AST 11 02/10/2023 02:17 PM    ALT 7 02/10/2023 02:17 PM       POC Tests: No results for input(s): POCGLU, POCNA, POCK, POCCL, POCBUN, POCHEMO, POCHCT in the last 72 hours. Coags:   Lab Results   Component Value Date/Time    PROTIME 14.0 10/23/2022 07:09 AM    INR 1.1 10/23/2022 07:09 AM    APTT 44.5 02/17/2020 09:51 AM       HCG (If Applicable):   Lab Results   Component Value Date    HCG NEGATIVE 08/30/2011        ABGs: No results found for: PHART, PO2ART, GWB9XUV, FRF1IPW, BEART, E2XILYDG     Type & Screen (If Applicable):  No results found for: LABABO, LABRH    Drug/Infectious Status (If Applicable):  Lab Results   Component Value Date/Time    HEPCAB NONREACTIVE 10/24/2022 08:09 PM       COVID-19 Screening (If Applicable):   Lab Results   Component Value Date/Time    COVID19 Not Detected 11/04/2022 11:42 PM    COVID19 Not Detected 09/15/2022 08:00 PM    COVID19 Not Detected 09/17/2021 06:04 PM           Anesthesia Evaluation   no history of anesthetic complications:   Airway: Mallampati: II     Neck ROM: full     Dental:    (+) upper dentures      Pulmonary:   (+) pneumonia:  COPD:  shortness of breath:  asthma: current smoker    (-) recent URI                          ROS comment: .5 ppd   Cardiovascular:  Exercise tolerance: poor (<4 METS),   (+) hypertension:, BETANCOURT:,     (-) CAD             ROS comment: Inc QT     Neuro/Psych:   (+) neuromuscular disease:, psychiatric history:   (-) seizures            ROS comment: Bipolar,drug abuse GI/Hepatic/Renal:   (+) GERD:, renal disease: CRI,      (-) hepatitis      ROS comment: Crohn. Endo/Other:    (+) hypothyroidism: arthritis:., .    (-) diabetes mellitus               Abdominal:             Vascular:           Other Findings: many missing            Anesthesia Plan      general     ASA 4       Induction:

## 2023-03-24 NOTE — PROGRESS NOTES
Physical Therapy  Facility/Department: [address] ORTHO/MED SURG  Physical Therapy Initial Assessment    Name: Jaelyn Yee  : 1965  MRN: [id number]  Date of Service: 3/24/2023  L THR 3/23 for degenerative joint disease. R hip steroid injection  Discharge Recommendations:  Patient would benefit from continued therapy after discharge   PT Equipment Recommendations  Equipment Needed: No  Mobility Devices: Hermina Jaquan: Rollator (4 Wheeled); Rolling      Patient Diagnosis(es): The encounter diagnosis was S/P total left hip arthroplasty. Past Medical History:  has a past medical history of Acid reflux, Anxiety, Arthritis, Asthma, Bipolar 1 disorder ([address] Bowel obstruction ([facility] [address] COPD (chronic obstructive pulmonary disease) ([facility] [address] COVID-19 vaccine administered, Crohn disease ([facility] [address] Depression, Dry eye, Fibromyalgia, Gout, Headache, History of recent hospitalization, Hyperlipidemia, Hypertension, Hypothyroidism, Kidney stones, Muscle spasm, Neuropathy, Pain, Under care of team, Under care of team, Wears partial dentures, Wears reading eyeglasses, and Wellness examination. Past Surgical History:  has a past surgical history that includes Tonsillectomy and adenoidectomy; Tubal ligation; Cholecystectomy; Bunionectomy (Left); Colonoscopy (2007); Colonoscopy (10/12/2017); Abdomen surgery; Upper gastrointestinal endoscopy (10/25/2021); Colonoscopy (10/25/2021); Colonoscopy (10/25/2021); and Hip Arthroplasty (Left, 2023). Assessment   Body Structures, Functions, Activity Limitations Requiring Skilled Therapeutic Intervention: Decreased functional mobility ; Decreased strength;Decreased coordination; Increased pain  Assessment: Patient able to repeat some of her posterior hip precautions. Able to sit up, stand, and take a few side steps with two staff. Pain 8/10. Anticipate further gait tomorrow.   Therapy Prognosis: Good  Decision Making: Medium Complexity  Clinical Presentation: evolving  Requires PT Follow-Up: Yes  Activity Tolerance  Activity Tolerance: Patient limited by pain     Plan   Physcial Therapy Plan  General Plan: 2 times a day 7 days a week  Current Treatment Recommendations: Strengthening, ROM, Transfer training, Gait training, Pain management, Patient/Caregiver education & training, Home exercise program, Functional mobility training, Therapeutic activities  Safety Devices  Type of Devices: All fall risk precautions in place, Left in bed, Call light within reach, Bed alarm in place, Gait belt     Restrictions  Restrictions/Precautions  Restrictions/Precautions: Weight Bearing  Lower Extremity Weight Bearing Restrictions  Left Lower Extremity Weight Bearing: Weight Bearing As Tolerated  Position Activity Restriction  Hip Precautions: No hip flexion > 90 degrees, No hip internal rotation, Posterior hip precautions, No ADduction     Subjective   Pain: pt reports 8/10 pain in L hip  General  Chart Reviewed: Yes  Patient assessed for rehabilitation services?: Yes  Family / Caregiver Present: No  Follows Commands: Within Functional Limits  Subjective  Subjective: \"I did good. Agata Escalera I'm a trooper. \"         Social/Functional History  Social/Functional History  Lives With: Alone  Type of Home: Apartment  Home Layout: One level  Home Access: Level entry, Elevator  Bathroom Shower/Tub: Tub/Shower unit  Bathroom Toilet: Handicap height  Bathroom Equipment: Tub transfer bench, Grab bars in shower (reports having a TTB on hold at a DME store)  Home Equipment: Eulice Kinjal, rolling, Rollator (uses rollator primarily)  Has the patient had two or more falls in the past year or any fall with injury in the past year?: No  ADL Assistance: [facility] [address]: Independent  Homemaking Responsibilities: Yes  Meal Prep Responsibility: Primary  Laundry Responsibility: Primary  Cleaning Responsibility: Primary  Shopping Responsibility: Primary  Ambulation Assistance: Independent  Transfer Assistance:

## 2023-03-24 NOTE — H&P
2/8/23   Keysha Yung MD   gabapentin (NEURONTIN) 400 MG capsule Take 1 capsule by mouth 2 times daily for 30 days. Patient taking differently: Take 800 mg by mouth 2 times daily. 10/25/22 11/24/22  Paolo Rodríguez MD   traZODone (DESYREL) 150 MG tablet Take 1 tablet by mouth nightly 10/25/22   Paolo Rodríguez MD   cholestyramine light 4 g packet Take 1 packet by mouth daily  Patient not taking: No sig reported 10/26/22   Paolo Rodríguez MD   FLUoxetine HCl 60 MG TABS Take 60 mg by mouth daily    Historical Provider, MD   Handicap Placard MISC by Does not apply route Duration 5 years 5/17/22   Mary Babcock MD   Pregabalin (LYRICA PO) Take 300 mg by mouth 2 times daily    Historical Provider, MD   ibuprofen (IBU) 400 MG tablet Take 1 tablet by mouth every 6 hours as needed for Pain 5/15/22 9/18/22  Mary Mcneal DO   budesonide-formoterol [facility]) 160-4.5 MCG/ACT AERO Inhale 2 puffs into the lungs 2 times daily 9/19/21   Adolfo Ma MD   Polyvinyl Alcohol-Povidone (REFRESH OP) Place 1 drop into both eyes 3 times daily  Patient not taking: No sig reported    Historical Provider, MD     Past Medical History    has a past medical history of Acid reflux, Anxiety, Arthritis, Asthma, Bipolar 1 disorder ([address] Bowel obstruction ([address] COPD (chronic obstructive pulmonary disease) ([address] COVID-19 vaccine administered, Crohn disease ([address] Depression, Dry eye, Fibromyalgia, Gout, Headache, History of recent hospitalization, Hyperlipidemia, Hypertension, Hypothyroidism, Kidney stones, Muscle spasm, Neuropathy, Pain, Under care of team, Under care of team, Wears partial dentures, Wears reading eyeglasses, and Wellness examination. Past Surgical History   has a past surgical history that includes Tonsillectomy and adenoidectomy; Tubal ligation; Cholecystectomy; Bunionectomy (Left); Colonoscopy (04/30/2007); Colonoscopy (10/12/2017); Abdomen surgery; Upper gastrointestinal endoscopy (10/25/2021);  Colonoscopy moist, many missing upper teeth, dental decay to lower teeth. NECK:supple, good ROM. LUNGS: Respirations even and non-labored. Clear lung fields today, mildly diminished, no wheezes  CARDIOVASCULAR: Regular rate and rhythm, no murmurs. ABDOMEN: soft, non-tender, non-distended, bowel sounds active x 4   EXTREMITIES: No edema to bilateral lower extremities. No varicosities to bilateral lower extremities. Venous insufficiency bilateral lower extremities. NEUROLOGIC: CN II-XII are grossly intact. Gait not assessed. IMPRESSIONS:   OA left hip. PLANS:   TOTAL HIP ARTHROPLASTY  (DEPUY, FASCIA ILLIACA NERVE BLOCK PRE-OP, LATERAL DECUBITIS, 3080 TABLE)  *SHORT STAY* - Left.     Jayleen Prom, APRN - CNP  Electronically signed 3/24/2023 at 7:24 AM

## 2023-03-25 VITALS
HEART RATE: 79 BPM | OXYGEN SATURATION: 97 % | TEMPERATURE: 98.2 F | DIASTOLIC BLOOD PRESSURE: 56 MMHG | SYSTOLIC BLOOD PRESSURE: 99 MMHG | RESPIRATION RATE: 16 BRPM

## 2023-03-25 LAB
ABSOLUTE EOS #: 0 K/UL (ref 0–0.4)
ABSOLUTE IMMATURE GRANULOCYTE: 0 K/UL (ref 0–0.3)
ABSOLUTE LYMPH #: 1.62 K/UL (ref 1–4.8)
ABSOLUTE MONO #: 0.43 K/UL (ref 0.1–0.8)
BASOPHILS # BLD: 0 % (ref 0–2)
BASOPHILS ABSOLUTE: 0 K/UL (ref 0–0.2)
EOSINOPHILS RELATIVE PERCENT: 0 % (ref 1–4)
HCT VFR BLD AUTO: 31.9 % (ref 36.3–47.1)
HGB BLD-MCNC: 10.2 G/DL (ref 11.9–15.1)
IMMATURE GRANULOCYTES: 0 %
LYMPHOCYTES # BLD: 15 % (ref 24–44)
MCH RBC QN AUTO: 28.1 PG (ref 25.2–33.5)
MCHC RBC AUTO-ENTMCNC: 32 G/DL (ref 28.4–34.8)
MCV RBC AUTO: 87.9 FL (ref 82.6–102.9)
MONOCYTES # BLD: 4 % (ref 1–7)
MORPHOLOGY: ABNORMAL
NRBC AUTOMATED: 0 PER 100 WBC
PDW BLD-RTO: 15.3 % (ref 11.8–14.4)
PLATELET # BLD AUTO: 170 K/UL (ref 138–453)
PMV BLD AUTO: 11 FL (ref 8.1–13.5)
RBC # BLD: 3.63 M/UL (ref 3.95–5.11)
SEG NEUTROPHILS: 81 % (ref 36–66)
SEGMENTED NEUTROPHILS ABSOLUTE COUNT: 8.75 K/UL (ref 1.8–7.7)
WBC # BLD AUTO: 10.8 K/UL (ref 3.5–11.3)

## 2023-03-25 PROCEDURE — 85025 COMPLETE CBC W/AUTO DIFF WBC: CPT

## 2023-03-25 PROCEDURE — 97110 THERAPEUTIC EXERCISES: CPT

## 2023-03-25 PROCEDURE — 2580000003 HC RX 258: Performed by: STUDENT IN AN ORGANIZED HEALTH CARE EDUCATION/TRAINING PROGRAM

## 2023-03-25 PROCEDURE — 6370000000 HC RX 637 (ALT 250 FOR IP): Performed by: STUDENT IN AN ORGANIZED HEALTH CARE EDUCATION/TRAINING PROGRAM

## 2023-03-25 PROCEDURE — 94760 N-INVAS EAR/PLS OXIMETRY 1: CPT

## 2023-03-25 PROCEDURE — 6360000002 HC RX W HCPCS: Performed by: STUDENT IN AN ORGANIZED HEALTH CARE EDUCATION/TRAINING PROGRAM

## 2023-03-25 PROCEDURE — 97530 THERAPEUTIC ACTIVITIES: CPT

## 2023-03-25 PROCEDURE — 36415 COLL VENOUS BLD VENIPUNCTURE: CPT

## 2023-03-25 PROCEDURE — 97116 GAIT TRAINING THERAPY: CPT

## 2023-03-25 PROCEDURE — 97535 SELF CARE MNGMENT TRAINING: CPT

## 2023-03-25 RX ORDER — DOCUSATE SODIUM 100 MG/1
100 CAPSULE, LIQUID FILLED ORAL 2 TIMES DAILY PRN
Qty: 60 CAPSULE | Refills: 0 | Status: SHIPPED | OUTPATIENT
Start: 2023-03-25

## 2023-03-25 RX ORDER — OXYCODONE HYDROCHLORIDE AND ACETAMINOPHEN 5; 325 MG/1; MG/1
1 TABLET ORAL EVERY 6 HOURS PRN
Qty: 28 TABLET | Refills: 0 | Status: SHIPPED | OUTPATIENT
Start: 2023-03-25 | End: 2023-03-28 | Stop reason: SDUPTHER

## 2023-03-25 RX ORDER — ASPIRIN 81 MG/1
81 TABLET ORAL 2 TIMES DAILY
Qty: 84 TABLET | Refills: 0 | Status: ON HOLD | OUTPATIENT
Start: 2023-03-25 | End: 2023-04-02 | Stop reason: SDUPTHER

## 2023-03-25 RX ADMIN — Medication 81 MG: at 08:46

## 2023-03-25 RX ADMIN — FLUOXETINE HYDROCHLORIDE 60 MG: 20 CAPSULE ORAL at 08:47

## 2023-03-25 RX ADMIN — SODIUM CHLORIDE, PRESERVATIVE FREE 10 ML: 5 INJECTION INTRAVENOUS at 08:46

## 2023-03-25 RX ADMIN — TRAMADOL HYDROCHLORIDE 50 MG: 50 TABLET, COATED ORAL at 11:29

## 2023-03-25 RX ADMIN — ACETAMINOPHEN 1000 MG: 500 TABLET ORAL at 00:03

## 2023-03-25 RX ADMIN — GABAPENTIN 800 MG: 400 CAPSULE ORAL at 08:45

## 2023-03-25 RX ADMIN — KETOROLAC TROMETHAMINE 15 MG: 15 INJECTION, SOLUTION INTRAMUSCULAR; INTRAVENOUS at 04:10

## 2023-03-25 RX ADMIN — POLYETHYLENE GLYCOL 3350 17 G: 17 POWDER, FOR SOLUTION ORAL at 08:42

## 2023-03-25 RX ADMIN — OXYCODONE HYDROCHLORIDE 10 MG: 5 TABLET ORAL at 13:29

## 2023-03-25 RX ADMIN — TRAMADOL HYDROCHLORIDE 50 MG: 50 TABLET, COATED ORAL at 05:05

## 2023-03-25 RX ADMIN — ACETAMINOPHEN 1000 MG: 500 TABLET ORAL at 11:30

## 2023-03-25 RX ADMIN — Medication 2000 MG: at 08:46

## 2023-03-25 RX ADMIN — CELECOXIB 100 MG: 100 CAPSULE ORAL at 08:45

## 2023-03-25 RX ADMIN — OXYCODONE HYDROCHLORIDE 10 MG: 5 TABLET ORAL at 08:46

## 2023-03-25 RX ADMIN — TRAMADOL HYDROCHLORIDE 50 MG: 50 TABLET, COATED ORAL at 00:03

## 2023-03-25 RX ADMIN — PANTOPRAZOLE SODIUM 40 MG: 40 TABLET, DELAYED RELEASE ORAL at 06:36

## 2023-03-25 RX ADMIN — Medication 2000 MG: at 00:04

## 2023-03-25 RX ADMIN — LISINOPRIL 5 MG: 5 TABLET ORAL at 08:45

## 2023-03-25 RX ADMIN — KETOROLAC TROMETHAMINE 15 MG: 15 INJECTION, SOLUTION INTRAMUSCULAR; INTRAVENOUS at 14:44

## 2023-03-25 RX ADMIN — CLONAZEPAM 0.5 MG: 0.5 TABLET ORAL at 08:46

## 2023-03-25 RX ADMIN — SODIUM CHLORIDE: 9 INJECTION, SOLUTION INTRAVENOUS at 06:35

## 2023-03-25 ASSESSMENT — PAIN DESCRIPTION - DESCRIPTORS
DESCRIPTORS: ACHING;DISCOMFORT

## 2023-03-25 ASSESSMENT — PAIN SCALES - GENERAL
PAINLEVEL_OUTOF10: 8
PAINLEVEL_OUTOF10: 7
PAINLEVEL_OUTOF10: 8
PAINLEVEL_OUTOF10: 7
PAINLEVEL_OUTOF10: 8
PAINLEVEL_OUTOF10: 7

## 2023-03-25 ASSESSMENT — PAIN DESCRIPTION - ORIENTATION
ORIENTATION: LEFT

## 2023-03-25 ASSESSMENT — PAIN DESCRIPTION - FREQUENCY: FREQUENCY: INTERMITTENT

## 2023-03-25 ASSESSMENT — PAIN SCALES - WONG BAKER
WONGBAKER_NUMERICALRESPONSE: 6

## 2023-03-25 ASSESSMENT — PAIN - FUNCTIONAL ASSESSMENT: PAIN_FUNCTIONAL_ASSESSMENT: PREVENTS OR INTERFERES SOME ACTIVE ACTIVITIES AND ADLS

## 2023-03-25 ASSESSMENT — PAIN DESCRIPTION - ONSET: ONSET: AWAKENED FROM SLEEP

## 2023-03-25 ASSESSMENT — PAIN DESCRIPTION - LOCATION
LOCATION: HIP

## 2023-03-25 ASSESSMENT — PAIN DESCRIPTION - PAIN TYPE: TYPE: SURGICAL PAIN

## 2023-03-25 NOTE — DISCHARGE INSTR - COC
Continuity of Care Form    Patient Name: Juliocesar Awad   :  1965  MRN:  [id number]    Admit date:  3/24/2023  Discharge date:  ***    Code Status Order: Full Code   Advance Directives:   Advance Care Flowsheet Documentation       Date/Time Healthcare Directive Type of Healthcare Directive Copy in [address] Agent's Name Healthcare Agent's Phone Number    [phone] No, patient does not have an advance directive for healthcare treatment -- -- -- -- --            Admitting Physician:  Megha Villarreal MD  PCP: Rima Borrero DO    Discharging Nurse: [facility] Unit/Room#: 1026/6546-09  Discharging Unit Phone Number: ***    Emergency Contact:   Extended Emergency Contact Information  Primary Emergency Contact: Jr Morrow [address] Phone: [phone]  Relation: Child    Past Surgical History:  Past Surgical History:   Procedure Laterality Date    ABDOMEN SURGERY      bowel obstruction OR    BUNIONECTOMY Left     CHOLECYSTECTOMY      COLONOSCOPY  2007    no gross pathology seen in the colon and also 3-4 inches of the ileum    COLONOSCOPY  10/12/2017    normal    COLONOSCOPY  10/25/2021    COLONOSCOPY WITH BIOPSY performed by Kori Sumner MD at [facility]    COLONOSCOPY  10/25/2021    COLONOSCOPY POLYPECTOMY REMOVAL SNARE performed by Kori Sumner MD at [address] [place] [address] [facility] Left 2023    TOTAL HIP ARTHROPLASTY LEFT (Left: Hip)    TONSILLECTOMY AND ADENOIDECTOMY      TUBAL LIGATION      UPPER GASTROINTESTINAL ENDOSCOPY  10/25/2021    EGD BIOPSY performed by Kori Sumner MD at [facility]       Immunization History:   Immunization History   Administered Date(s) Administered    COVID-19, PFIZER Bivalent BOOSTER, DO NOT Dilute, (age 12y+), IM, 30 mcg/0.3 mL 10/11/2022    COVID-19, PFIZER GRAY top, DO NOT Dilute, (age 15 y+), IM, 30 mcg/0.3 mL 07/15/2022    COVID-19, PFIZER PURPLE top, DILUTE for use, (age 15 y+), 30mcg/0.3mL 03/24/2021, 04/14/2021, 12/21/2021    Influenza, FLUARIX, FLULAVAL, [place] (age 10 mo+) AND AFLURIA, (age 1 y+), PF, 0.5mL 02/05/2017, 10/20/2021    Pneumococcal, PPSV23, PNEUMOVAX 23, (age 2y+), SC/IM, 0.5mL 02/09/2015       Active Problems:  Patient Active Problem List   Diagnosis Code    Gastroesophageal reflux disease without esophagitis K21.9    Gastroenteritis K52.9    Ileus ([address] K56.7    Pancreatitis, acute K85.90    Drug abuse ([address] F19.10    COPD (chronic obstructive pulmonary disease) ([address] J44.9    Hypertension I10    Hypokalemia E87.6    Hypothyroidism due to acquired atrophy of thyroid E03.4    Crohn disease ([address] K50.90    Acute cystitis without hematuria N30.00    Accidental drug overdose T50.901A    Prolonged Q-T interval on ECG R94.31    Bipolar disorder, unspecified ([address] F31.9    Polysubstance abuse ([address] F19.10    Alcohol intoxication delirium ([address] F10.121    Cocaine abuse ([address] F14.10    Acute respiratory failure with hypoxia ([address] J96.01    Bipolar disorder with psychotic features ([address] F31.9    Diarrhea R19.7    Chronic bronchitis ([address] J42    Chronic renal insufficiency, stage III (moderate) (HCC) N18.30    Chronic diarrhea K52.9    Anxiety F41.9    Osteoarthritis resulting from left hip dysplasia M16.32    Shortness of breath R06.02    Elevated brain natriuretic peptide (BNP) level R79.89    Pneumonia of both lower lobes due to infectious organism J18.9    Non-intractable vomiting with nausea R11.2    Acute hypokalemia E87.6    Hypomagnesemia E83.42    Intractable vomiting R11.10    Abdominal pain R10.9    Nausea vomiting and diarrhea R11.2, R19.7    Falls frequently R29.6    Orthostatic hypotension I95.1    Hypotension I95.9    Radial nerve palsy, right G56.31    COPD with acute exacerbation (HCC) J44.1    COPD exacerbation (HCC) J44.1    Chronic pain disorder G89.4    Rhabdomyolysis M62.82    Syncope and collapse R55    Asthma J45.909    Bipolar

## 2023-03-25 NOTE — PROGRESS NOTES
Occupational Therapy  Facility/Department: [address] ORTHO/MED SURG  Occupational Therapy Daily Treatment Note    Name: Juliocesar Awad  : 1965  MRN: [id number]  Date of Service: 3/25/2023    Discharge Recommendations:  Patient would benefit from continued therapy after discharge  OT Equipment Recommendations  Equipment Needed: Yes  Mobility Devices: ADL Assistive Devices  ADL Assistive Devices: Sock-Aid Hard;Reacher;Transfer Tub Bench;Long-handled Shoe Horn       Patient Diagnosis(es): The encounter diagnosis was S/P total left hip arthroplasty. Past Medical History:  has a past medical history of Acid reflux, Anxiety, Arthritis, Asthma, Bipolar 1 disorder ([address]el obstruction ([facility] [address] COPD (chronic obstructive pulmonary disease) ([facility] [address] COVID-19 vaccine administered, Crohn disease ([facility] [address] Depression, Dry eye, Fibromyalgia, Gout, Headache, History of recent hospitalization, Hyperlipidemia, Hypertension, Hypothyroidism, Kidney stones, Muscle spasm, Neuropathy, Pain, Under care of team, Under care of team, Wears partial dentures, Wears reading eyeglasses, and Wellness examination. Past Surgical History:  has a past surgical history that includes Tonsillectomy and adenoidectomy; Tubal ligation; Cholecystectomy; Bunionectomy (Left); Colonoscopy (2007); Colonoscopy (10/12/2017); Abdomen surgery; Upper gastrointestinal endoscopy (10/25/2021); Colonoscopy (10/25/2021); Colonoscopy (10/25/2021); and Hip Arthroplasty (Left, 2023). Assessment   Performance deficits / Impairments: Decreased functional mobility ; Decreased ADL status; Decreased endurance;Decreased high-level IADLs;Decreased safe awareness;Decreased balance;Decreased strength  Assessment: Patient would benefit from continued acute OT services to address functional deficits through skilled intervention impacting performance and safety with ADLs/IADLs.   Prognosis: Good  REQUIRES OT FOLLOW-UP: Yes  Activity Tolerance  Activity hip precautions. Toileting: Moderate assistance  Toileting Skilled Clinical Factors: Pt completed toileting and completed pericare IND, pt required Min A w/ donning underwear and pulling them up d/t L hip precautions     Activity Tolerance  Activity Tolerance: Patient limited by pain           Cognition  Overall Cognitive Status: Exceptions  Arousal/Alertness: Appropriate responses to stimuli  Following Commands: Follows multistep commands with increased time  Attention Span: Attends with cues to redirect  Memory: Decreased recall of precautions  Safety Judgement: Decreased awareness of need for safety;Decreased awareness of need for assistance  Problem Solving: Assistance required to correct errors made;Decreased awareness of errors  Insights: Decreased awareness of deficits  Initiation: Requires cues for some  Sequencing: Does not require cues  Orientation  Overall Orientation Status: Within Functional Limits                  Education Given To: Patient  Education Provided: Transfer Training;Role of Therapy;Plan of Care;Precautions; ADL Adaptive Strategies; Equipment  Education Provided Comments: Role of therapy, POC, L hip precautions, ed. on use of reacher with donning clothing, transfer training w/ Rollator -good return  Education Method: Verbal;Demonstration; Teach Back  Barriers to Learning: Cognition  Education Outcome: Verbalized understanding;Continued education needed; Unable to demonstrate understanding                            AM-PAC Score        AM-[facility] Inpatient Daily Activity Raw Score: 19 (03/25/23 1408)  AM-PAC Inpatient ADL T-Scale Score : 40.22 (03/25/23 1408)  ADL Inpatient CMS 0-100% Score: 42.8 (03/25/23 1408)  ADL Inpatient CMS G-Code Modifier : CK (03/25/23 1408)    Goals  Short Term Goals  Time Frame for Short Term Goals: patient will, by discharge  Short Term Goal 1: demo UB ADLs independently  Short Term Goal 2: demo LB ADLs at Mod I using AE PRN, maintaining precautions  Short Term Goal 3:

## 2023-03-25 NOTE — PROGRESS NOTES
Physical Therapy  Facility/Department: [address] ORTHO/MED SURG  Physical Therapy Daily Note    Name: Kd Garcia  : 1965  MRN: [id number]  Date of Service: 3/25/2023    Discharge Recommendations:  Patient would benefit from continued therapy after discharge          Patient Diagnosis(es): The encounter diagnosis was S/P total left hip arthroplasty. Past Medical History:  has a past medical history of Acid reflux, Anxiety, Arthritis, Asthma, Bipolar 1 disorder ([address] Bowel obstruction ([facility] [address] COPD (chronic obstructive pulmonary disease) ([facility] [address] COVID-19 vaccine administered, Crohn disease ([facility] [address] Depression, Dry eye, Fibromyalgia, Gout, Headache, History of recent hospitalization, Hyperlipidemia, Hypertension, Hypothyroidism, Kidney stones, Muscle spasm, Neuropathy, Pain, Under care of team, Under care of team, Wears partial dentures, Wears reading eyeglasses, and Wellness examination. Past Surgical History:  has a past surgical history that includes Tonsillectomy and adenoidectomy; Tubal ligation; Cholecystectomy; Bunionectomy (Left); Colonoscopy (2007); Colonoscopy (10/12/2017); Abdomen surgery; Upper gastrointestinal endoscopy (10/25/2021); Colonoscopy (10/25/2021); Colonoscopy (10/25/2021); and Hip Arthroplasty (Left, 2023). Assessment   Body Structures, Functions, Activity Limitations Requiring Skilled Therapeutic Intervention: Decreased functional mobility ; Decreased strength;Decreased coordination; Increased pain  Assessment: Left hip soreness with AMB. .  Intermitteint left hip pain with transfers and AMB. Increaed AMB distance today.  feet x1 with RW, WBAT left LE, SBA for safety. .  Bed mobility SBA. Transfers SBA. Therapy Prognosis: Good  Requires PT Follow-Up: Yes  Activity Tolerance  Activity Tolerance: Patient tolerated treatment well  Activity Tolerance Comments: left hip soreness noted with AMB.      Plan   Physcial Therapy Plan  General Plan: 2 times a day 7 days a Stair Climbing T-Scale Score : 43.03 (03/25/23 1456)  Mobility Inpatient CMS 0-100% Score: 47.43 (03/25/23 1456)  Mobility Inpatient without Stair CMS G-Code Modifier : CK (03/25/23 1456)       Goals  Short Term Goals  Time Frame for Short Term Goals: 14 visits  Short Term Goal 1: Supine to/from sit with SBA. Short Term Goal 2: Sit to/from stand with SBA. Short Term Goal 3: Ambulate 76' with RW with SBA, WBAT LLE. Education  Patient Education  Education Given To: Patient  Education Provided: Role of Therapy;Plan of Care;Transfer Training;Precautions  Education Provided Comments: Posterior hip precautions. Reviewed hip precations when getting off a low seat.   Education Method: Demonstration;Verbal  Education Outcome: Verbalized understanding      Therapy Time   Individual Concurrent Group Co-treatment   Time In 1411         Time Out 1444         Minutes [address] [place], [place]

## 2023-03-25 NOTE — PROGRESS NOTES
Orthopedic Progress Note     Patient:  Caden Padilla  YOB: 1965     60 y.o. female    Subjective  Patient seen and examined at bedside this morning. No complaints or concerns, per patient and nursing. No issue overnight. Pain controlled. Denies fever, HA, CP, SOB, N/V, dysuria.  +flatus/-BM. Patient was able to sit up, stand and take a few side steps with two staff with physical therapy yesterday    Vitals reviewed, afebrile. Objective  Vitals:    03/25/23 0345   BP: 113/72   Pulse: 66   Resp: 18   Temp: 97.3 °F (36.3 °C)   SpO2: 96%     Gen: NAD, Cooperative. Cardiovascular: Regular Rate  Respiratory: No Acute Respiratory Distress  MSK:  LLE   Dressings are clean/dry/intact. Appropriately tender to palpation about the surgical incision. Compartments soft. EHL/FHL/TA/GSC motor intact. Sural, Saphenous, Superficial/Deep Peroneal, and Plantar Nerve distribution SILT. DP and PT pulses 2+ with BCR. Recent Labs     03/24/23  0729   CREATININE 0.94      Meds: See Rec for Complete List    Impression 60 y.o. female being seen for    - Left total hip arthroplasty, POD#1  - Right hip steroid injection on 3/24/23    Plan  - No plan for further orthopaedic intervention at this time. - Post-Op HgB 10.2 (3/25/23 @ 0614)  - WB status: Weight bearing as tolerated with the left leg  - Dressings: on LLE please maintain, reinforce as needed  - Primary Team: Orthopaedic Surgery    - Pain Management: Multi-modal    - Antibiotics:  Post-op Ancef    - DVT ppx:  ASA    - Ice for Pain and Swelling  - Encourage Incentive Spirometry use  - PT/OT  - Dispo: Plan to DC home today, pending final PT recs   - F/u with Dr. Peyton Aleman in 14d  - Please page the On Call Ortho resident with any questions. _________________________________  Vikki Leach D.O.   Orthopedic Surgery Resident, PGY-1  [facility]

## 2023-03-25 NOTE — ANESTHESIA POSTPROCEDURE EVALUATION
Department of Anesthesiology  Postprocedure Note    Patient: Isrrael Iyer  MRN: [id number]  YOB: 1965  Date of evaluation: 3/24/2023      Procedure Summary     Date: 03/24/23 Room / Location: [address]    Anesthesia Start: 0848 Anesthesia Stop: 7267    Procedure: TOTAL HIP ARTHROPLASTY LEFT (Left: Hip) Diagnosis:       Arthritis of left hip      (ARTHRITIS OF LEFT HIP)    Surgeons: Samm Del Real MD Responsible Provider: Tatyana Nunes MD    Anesthesia Type: general ASA Status: 4          Anesthesia Type: No value filed.     Ammy Phase I: Ammy Score: 9    Ammy Phase II:      POST-OP ANESTHESIA NOTE       /73   Pulse 66   Temp 97.3 °F (36.3 °C)   Resp 22   SpO2 96%    Pain Assessment: 0-10  Pain Level: 8       Anesthesia Post Evaluation    Patient location during evaluation: PACU  Patient participation: complete - patient participated  Level of consciousness: awake  Pain score: 8  Airway patency: patent  Nausea & Vomiting: no nausea and no vomiting  Complications: no  Cardiovascular status: hemodynamically stable  Respiratory status: acceptable  Hydration status: stable

## 2023-03-25 NOTE — PLAN OF CARE
Problem: Discharge Planning  Goal: Discharge to home or other facility with appropriate resources  Outcome: Completed     Problem: Pain  Goal: Verbalizes/displays adequate comfort level or baseline comfort level  Outcome: Completed     Problem: Safety - Adult  Goal: Free from fall injury  Outcome: Completed     Problem: Safety - Adult  Goal: Free from fall injury  Outcome: Completed     Problem: Skin/Tissue Integrity  Goal: Absence of new skin breakdown  Description: 1. Monitor for areas of redness and/or skin breakdown  2. Assess vascular access sites hourly  3. Every 4-6 hours minimum:  Change oxygen saturation probe site  4. Every 4-6 hours:  If on nasal continuous positive airway pressure, respiratory therapy assess nares and determine need for appliance change or resting period.   Outcome: Completed

## 2023-03-25 NOTE — PLAN OF CARE
Face-to-Face    Patient seen and examined. Discussed the need for medical equipment to assist with their recovery during the post-operative period. Based on the patient's current physical condition and post-operative restrictions, we have determined that they will need: sock aid & reacher.     Eddi Jackson DO  Orthopedic Surgery Resident, PGY-1  [address]  2:26 [place] 3/25/2023

## 2023-03-25 NOTE — PROGRESS NOTES
Transfer training, Gait training, Pain management, Patient/Caregiver education & training, Home exercise program, Functional mobility training, Therapeutic activities  Safety Devices  Type of Devices: All fall risk precautions in place, Left in bed, Call light within reach, Bed alarm in place, Gait belt, Nurse notified  Restraints  Restraints Initially in Place: No     Restrictions  Restrictions/Precautions  Restrictions/Precautions: Weight Bearing  Lower Extremity Weight Bearing Restrictions  Left Lower Extremity Weight Bearing: Weight Bearing As Tolerated  Position Activity Restriction  Hip Precautions: No hip flexion > 90 degrees, No hip internal rotation, Posterior hip precautions, No ADduction     Subjective   General  Chart Reviewed: Yes  Family / Caregiver Present: No  Follows Commands: Within Functional Limits  Subjective  Subjective: 7/10 hip pain reported. Nursing notified. Pt is eager to walk. Polite and cooperative. Objective      Bed mobility  Supine to Sit: Minimal assistance  Sit to Supine: Minimal assistance  Scooting: Minimal assistance  Transfers  Sit to Stand: Minimal Assistance  Stand to Sit: Minimal Assistance  Ambulation  Surface: Level tile  Device: Rolling Walker  Assistance: Minimal assistance  Gait Deviations: Decreased step length;Shuffles; Slow Meseret  Distance: 18 feet x1  Comments: WBAT left LE        A/AROM Exercises: Supine left LE x10 reps: AP, QS, GS, Heel slides and SAQ. Seated: LAQ x10 reps      AM-PAC Score     AM-PAC Inpatient Mobility without Stair Climbing Raw Score : 15 (03/25/23 0847)  AM-PAC Inpatient without Stair Climbing T-Scale Score : 43.03 (03/25/23 0847)  Mobility Inpatient CMS 0-100% Score: 47.43 (03/25/23 0847)  Mobility Inpatient without Stair CMS G-Code Modifier : CK (03/25/23 0847)     Goals  Short Term Goals  Time Frame for Short Term Goals: 14 visits  Short Term Goal 1: Supine to/from sit with SBA. Short Term Goal 2: Sit to/from stand with SBA.   Short Term Goal 3: Ambulate 76' with RW with SBA, WBAT LLE. Education  Patient Education  Education Given To: Patient  Education Provided: Role of Therapy;Plan of Care;Transfer Training;Precautions  Education Provided Comments: Posterior hip precautions.   Education Method: Demonstration;Verbal  Education Outcome: Verbalized understanding      Therapy Time   Individual Concurrent Group Co-treatment   Time In 0810         Time Out 0851         Minutes [address] [place], [place]

## 2023-03-25 NOTE — CARE COORDINATION
Met with patient to review transition plan - patient reports that she will be discharging today and is interested in therapy post d/c - offered HC vs OP. Patient prefers OP option and requests referral to [facility] additionally requesting DME - sock aide and reacher - discussed that these items my not be covered by her insurance, but will message physician for orders and submit to requested DME provider which is [address] [place] [address]. Patient states that daughter will transport home and has rollator here at bedside    [phone] - orders for sock aid and reacher faxed to [address] [place] [address] - patient order for OP PT on chart.

## 2023-03-27 NOTE — OP NOTE
electrocautery was used for dissection through the  subcutaneous tissue until the TFL was identified. At this time, the  TFL was sharply incised with a #15 blade in line with the skin  incision. Once the TFL was incised, the longitudinal/colinear fibers of   the gluteus valdez muscle cranially were bluntly split in line with   their fibers and the TFL incision. At this time, the  Charnley retractor was put in place to gain better exposure of the  greater trochanter. The sciatic nerve was protected and not caught in  the teeth of this retractor. The greater trochanter bursa was then debrided. At this time, the leg was gently internally rotated to place the short  external rotators on tension. We then identified the piriformis tendon  as well as the short external rotators caudad to this. A eric was used to elevator piriformis tendon and protect it with cobra retractor. At this time, the rest of the short external  rotators were also elevated off of their insertion down to the quadratus  femoris. At this time, the capsule was then identified and a L-shaped  capsulotomy was created. Two stay stitches were then placed in the capsule and sewn  through skin, again to retract soft tissues and so that the femoral neck  could be identified. The hip was dislocated and saw was used to remove femoral head 1cm above lesser troch. Once the femoral head was removed,we sized it. Retractors were placed to expose acetabulum. Labrum and excessive capsule were excised being careful to keep FELIPA. Pulvinar was removed. We then began reaming starting 2 below femoral head size and worked down to medial wall. We then expanded reamers to until we got good bite and full cancellous bone in position of final cup. Trial cup was inserted and felt to be stable. Real acetabulum cup was malleted into place. Two screw were placed in posterosuperior quadrant without issue. Dual mobility liner placed.      We then presented the proximal

## 2023-03-28 DIAGNOSIS — Z96.642 S/P TOTAL LEFT HIP ARTHROPLASTY: ICD-10-CM

## 2023-03-28 RX ORDER — OXYCODONE HYDROCHLORIDE AND ACETAMINOPHEN 5; 325 MG/1; MG/1
1 TABLET ORAL EVERY 6 HOURS PRN
Qty: 28 TABLET | Refills: 0 | Status: SHIPPED | OUTPATIENT
Start: 2023-03-28 | End: 2023-04-04

## 2023-03-30 ENCOUNTER — APPOINTMENT (OUTPATIENT)
Dept: CT IMAGING | Age: 58
DRG: 245 | End: 2023-03-30
Payer: MEDICAID

## 2023-03-30 ENCOUNTER — HOSPITAL ENCOUNTER (INPATIENT)
Age: 58
LOS: 2 days | Discharge: HOME HEALTH | DRG: 245 | End: 2023-04-02
Attending: EMERGENCY MEDICINE | Admitting: STUDENT IN AN ORGANIZED HEALTH CARE EDUCATION/TRAINING PROGRAM
Payer: MEDICAID

## 2023-03-30 ENCOUNTER — APPOINTMENT (OUTPATIENT)
Dept: GENERAL RADIOLOGY | Age: 58
DRG: 245 | End: 2023-03-30
Payer: MEDICAID

## 2023-03-30 ENCOUNTER — HOSPITAL ENCOUNTER (OUTPATIENT)
Dept: PHYSICAL THERAPY | Age: 58
Setting detail: THERAPIES SERIES
Discharge: HOME | End: 2023-03-30

## 2023-03-30 DIAGNOSIS — R09.02 HYPOXIA: ICD-10-CM

## 2023-03-30 DIAGNOSIS — J18.9 MULTIFOCAL PNEUMONIA: Primary | ICD-10-CM

## 2023-03-30 DIAGNOSIS — K52.9 COLITIS: ICD-10-CM

## 2023-03-30 LAB
ABSOLUTE EOS #: <0.03 K/UL (ref 0–0.44)
ABSOLUTE IMMATURE GRANULOCYTE: 0.08 K/UL (ref 0–0.3)
ABSOLUTE LYMPH #: 0.85 K/UL (ref 1.1–3.7)
ABSOLUTE MONO #: 1.05 K/UL (ref 0.1–1.2)
ALBUMIN SERPL-MCNC: 3.8 G/DL (ref 3.5–5.2)
ALBUMIN/GLOBULIN RATIO: 1.1 (ref 1–2.5)
ALP SERPL-CCNC: 114 U/L (ref 35–104)
ALT SERPL-CCNC: 12 U/L (ref 5–33)
ANION GAP SERPL CALCULATED.3IONS-SCNC: 20 MMOL/L (ref 9–17)
AST SERPL-CCNC: 21 U/L
BASOPHILS # BLD: 0 % (ref 0–2)
BASOPHILS ABSOLUTE: <0.03 K/UL (ref 0–0.2)
BILIRUB DIRECT SERPL-MCNC: 0.2 MG/DL
BILIRUB INDIRECT SERPL-MCNC: 0.9 MG/DL (ref 0–1)
BILIRUB SERPL-MCNC: 1.1 MG/DL (ref 0.3–1.2)
BNP SERPL-MCNC: 7500 PG/ML
BUN SERPL-MCNC: 9 MG/DL (ref 6–20)
CALCIUM SERPL-MCNC: 8.8 MG/DL (ref 8.6–10.4)
CHLORIDE SERPL-SCNC: 95 MMOL/L (ref 98–107)
CO2 SERPL-SCNC: 22 MMOL/L (ref 20–31)
CREAT SERPL-MCNC: 0.57 MG/DL (ref 0.5–0.9)
EOSINOPHILS RELATIVE PERCENT: 0 % (ref 1–4)
FLUAV AG SPEC QL: NEGATIVE
FLUBV AG SPEC QL: NEGATIVE
GFR SERPL CREATININE-BSD FRML MDRD: >60 ML/MIN/1.73M2
GLUCOSE SERPL-MCNC: 120 MG/DL (ref 70–99)
HCT VFR BLD AUTO: 32.5 % (ref 36.3–47.1)
HGB BLD-MCNC: 10.6 G/DL (ref 11.9–15.1)
IMMATURE GRANULOCYTES: 1 %
LIPASE SERPL-CCNC: 63 U/L (ref 13–60)
LYMPHOCYTES # BLD: 7 % (ref 24–43)
MCH RBC QN AUTO: 28 PG (ref 25.2–33.5)
MCHC RBC AUTO-ENTMCNC: 32.6 G/DL (ref 28.4–34.8)
MCV RBC AUTO: 86 FL (ref 82.6–102.9)
MONOCYTES # BLD: 8 % (ref 3–12)
NRBC AUTOMATED: 0 PER 100 WBC
PDW BLD-RTO: 16 % (ref 11.8–14.4)
PLATELET # BLD AUTO: 179 K/UL (ref 138–453)
PMV BLD AUTO: 12.9 FL (ref 8.1–13.5)
POTASSIUM SERPL-SCNC: 3.1 MMOL/L (ref 3.7–5.3)
PROT SERPL-MCNC: 7.3 G/DL (ref 6.4–8.3)
RBC # BLD: 3.78 M/UL (ref 3.95–5.11)
RBC # BLD: ABNORMAL 10*6/UL
SARS-COV-2 RDRP RESP QL NAA+PROBE: NOT DETECTED
SEG NEUTROPHILS: 84 % (ref 36–65)
SEGMENTED NEUTROPHILS ABSOLUTE COUNT: 10.79 K/UL (ref 1.5–8.1)
SODIUM SERPL-SCNC: 137 MMOL/L (ref 135–144)
SPECIMEN DESCRIPTION: NORMAL
TROPONIN I SERPL DL<=0.01 NG/ML-MCNC: 30 NG/L (ref 0–14)
TROPONIN I SERPL DL<=0.01 NG/ML-MCNC: 34 NG/L (ref 0–14)
WBC # BLD AUTO: 12.8 K/UL (ref 3.5–11.3)

## 2023-03-30 PROCEDURE — 2580000003 HC RX 258: Performed by: EMERGENCY MEDICINE

## 2023-03-30 PROCEDURE — 87804 INFLUENZA ASSAY W/OPTIC: CPT

## 2023-03-30 PROCEDURE — 6360000004 HC RX CONTRAST MEDICATION: Performed by: HEALTH CARE PROVIDER

## 2023-03-30 PROCEDURE — 87635 SARS-COV-2 COVID-19 AMP PRB: CPT

## 2023-03-30 PROCEDURE — 6360000002 HC RX W HCPCS: Performed by: HEALTH CARE PROVIDER

## 2023-03-30 PROCEDURE — 80076 HEPATIC FUNCTION PANEL: CPT

## 2023-03-30 PROCEDURE — 71260 CT THORAX DX C+: CPT | Performed by: HEALTH CARE PROVIDER

## 2023-03-30 PROCEDURE — 74177 CT ABD & PELVIS W/CONTRAST: CPT

## 2023-03-30 PROCEDURE — 96375 TX/PRO/DX INJ NEW DRUG ADDON: CPT

## 2023-03-30 PROCEDURE — 80048 BASIC METABOLIC PNL TOTAL CA: CPT

## 2023-03-30 PROCEDURE — 6370000000 HC RX 637 (ALT 250 FOR IP): Performed by: HEALTH CARE PROVIDER

## 2023-03-30 PROCEDURE — 96372 THER/PROPH/DIAG INJ SC/IM: CPT

## 2023-03-30 PROCEDURE — 85025 COMPLETE CBC W/AUTO DIFF WBC: CPT

## 2023-03-30 PROCEDURE — 99285 EMERGENCY DEPT VISIT HI MDM: CPT

## 2023-03-30 PROCEDURE — 6360000002 HC RX W HCPCS: Performed by: EMERGENCY MEDICINE

## 2023-03-30 PROCEDURE — 71046 X-RAY EXAM CHEST 2 VIEWS: CPT

## 2023-03-30 PROCEDURE — 83880 ASSAY OF NATRIURETIC PEPTIDE: CPT

## 2023-03-30 PROCEDURE — 96365 THER/PROPH/DIAG IV INF INIT: CPT

## 2023-03-30 PROCEDURE — 83690 ASSAY OF LIPASE: CPT

## 2023-03-30 PROCEDURE — 93005 ELECTROCARDIOGRAM TRACING: CPT | Performed by: STUDENT IN AN ORGANIZED HEALTH CARE EDUCATION/TRAINING PROGRAM

## 2023-03-30 PROCEDURE — 84484 ASSAY OF TROPONIN QUANT: CPT

## 2023-03-30 PROCEDURE — 96366 THER/PROPH/DIAG IV INF ADDON: CPT

## 2023-03-30 RX ORDER — SODIUM CHLORIDE, SODIUM LACTATE, POTASSIUM CHLORIDE, AND CALCIUM CHLORIDE .6; .31; .03; .02 G/100ML; G/100ML; G/100ML; G/100ML
1000 INJECTION, SOLUTION INTRAVENOUS ONCE
Status: COMPLETED | OUTPATIENT
Start: 2023-03-30 | End: 2023-03-30

## 2023-03-30 RX ORDER — LEVOFLOXACIN 5 MG/ML
750 INJECTION, SOLUTION INTRAVENOUS ONCE
Status: COMPLETED | OUTPATIENT
Start: 2023-03-30 | End: 2023-03-31

## 2023-03-30 RX ORDER — MORPHINE SULFATE 4 MG/ML
4 INJECTION, SOLUTION INTRAMUSCULAR; INTRAVENOUS ONCE
Status: COMPLETED | OUTPATIENT
Start: 2023-03-30 | End: 2023-03-30

## 2023-03-30 RX ORDER — LOPERAMIDE HYDROCHLORIDE 2 MG/1
2 CAPSULE ORAL ONCE
Status: COMPLETED | OUTPATIENT
Start: 2023-03-30 | End: 2023-03-30

## 2023-03-30 RX ORDER — ONDANSETRON 2 MG/ML
4 INJECTION INTRAMUSCULAR; INTRAVENOUS ONCE
Status: COMPLETED | OUTPATIENT
Start: 2023-03-30 | End: 2023-03-30

## 2023-03-30 RX ORDER — PROMETHAZINE HYDROCHLORIDE 25 MG/ML
12.5 INJECTION, SOLUTION INTRAMUSCULAR; INTRAVENOUS ONCE
Status: COMPLETED | OUTPATIENT
Start: 2023-03-30 | End: 2023-03-30

## 2023-03-30 RX ADMIN — PROMETHAZINE HYDROCHLORIDE 12.5 MG: 25 INJECTION INTRAMUSCULAR; INTRAVENOUS at 23:26

## 2023-03-30 RX ADMIN — LOPERAMIDE HYDROCHLORIDE 2 MG: 2 CAPSULE ORAL at 23:01

## 2023-03-30 RX ADMIN — IOPAMIDOL 75 ML: 755 INJECTION, SOLUTION INTRAVENOUS at 22:49

## 2023-03-30 RX ADMIN — SODIUM CHLORIDE, POTASSIUM CHLORIDE, SODIUM LACTATE AND CALCIUM CHLORIDE 1000 ML: 600; 310; 30; 20 INJECTION, SOLUTION INTRAVENOUS at 20:35

## 2023-03-30 RX ADMIN — MORPHINE SULFATE 4 MG: 4 INJECTION, SOLUTION INTRAMUSCULAR; INTRAVENOUS at 23:01

## 2023-03-30 RX ADMIN — LEVOFLOXACIN 750 MG: 5 INJECTION, SOLUTION INTRAVENOUS at 23:45

## 2023-03-30 RX ADMIN — MORPHINE SULFATE 4 MG: 4 INJECTION, SOLUTION INTRAMUSCULAR; INTRAVENOUS at 20:25

## 2023-03-30 RX ADMIN — ONDANSETRON 4 MG: 2 INJECTION INTRAMUSCULAR; INTRAVENOUS at 20:25

## 2023-03-30 ASSESSMENT — ENCOUNTER SYMPTOMS
VOMITING: 0
CONSTIPATION: 0
SHORTNESS OF BREATH: 0
ABDOMINAL PAIN: 1
NAUSEA: 0
SORE THROAT: 0
DIARRHEA: 0

## 2023-03-30 ASSESSMENT — PAIN SCALES - GENERAL: PAINLEVEL_OUTOF10: 4

## 2023-03-30 NOTE — FLOWSHEET NOTE
[x] [facility] [address]  [address]    [phone]  F: [phone]          Physical Therapy Cancel/No Show note    Date: 3/30/2023  Patient: Jyoti Cole  : 1965  MRN: [id number]    Cancels/No Shows to date:     For today's appointment patient:    []  Cancelled    [] Rescheduled appointment    [x] No-show     Reason given by patient:    []  Patient ill    []  Conflicting appointment    [] No transportation      [] Conflict with work    [] No reason given    [] Weather related    [] COVID-19    [x] Other:      Comments:   Pt No-Showed for initial evaluation for Physical Therapy for L JOSEFINA.           [] Next appointment was confirmed    Electronically signed by: Manuel Potts PT

## 2023-03-31 PROBLEM — K50.018 CROHN'S DISEASE OF SMALL INTESTINE WITH OTHER COMPLICATION (HCC): Status: ACTIVE | Noted: 2023-03-31

## 2023-03-31 PROBLEM — J18.9 MULTIFOCAL PNEUMONIA: Status: ACTIVE | Noted: 2023-03-31

## 2023-03-31 LAB
ABSOLUTE EOS #: 0.15 K/UL (ref 0–0.44)
ABSOLUTE IMMATURE GRANULOCYTE: 0.04 K/UL (ref 0–0.3)
ABSOLUTE LYMPH #: 1.32 K/UL (ref 1.1–3.7)
ABSOLUTE MONO #: 0.79 K/UL (ref 0.1–1.2)
ADENOVIRUS PCR: NOT DETECTED
ALBUMIN SERPL-MCNC: 2.9 G/DL (ref 3.5–5.2)
ALBUMIN/GLOBULIN RATIO: 1 (ref 1–2.5)
ALP SERPL-CCNC: 80 U/L (ref 35–104)
ALT SERPL-CCNC: 8 U/L (ref 5–33)
ANION GAP SERPL CALCULATED.3IONS-SCNC: 13 MMOL/L (ref 9–17)
AST SERPL-CCNC: 11 U/L
B PARAP IS1001 DNA NPH QL NAA+NON-PROBE: NOT DETECTED
B PERT DNA SPEC QL NAA+PROBE: NOT DETECTED
BASOPHILS # BLD: 0 % (ref 0–2)
BASOPHILS ABSOLUTE: <0.03 K/UL (ref 0–0.2)
BILIRUB SERPL-MCNC: 0.7 MG/DL (ref 0.3–1.2)
BUN SERPL-MCNC: 12 MG/DL (ref 6–20)
CALCIUM SERPL-MCNC: 7.8 MG/DL (ref 8.6–10.4)
CHLAMYDIA PNEUMONIAE BY PCR: NOT DETECTED
CHLORIDE SERPL-SCNC: 97 MMOL/L (ref 98–107)
CO2 SERPL-SCNC: 25 MMOL/L (ref 20–31)
CORONAVIRUS 229E PCR: NOT DETECTED
CORONAVIRUS HKU1 PCR: NOT DETECTED
CORONAVIRUS NL63 PCR: NOT DETECTED
CORONAVIRUS OC43 PCR: NOT DETECTED
CREAT SERPL-MCNC: 0.63 MG/DL (ref 0.5–0.9)
CRP SERPL HS-MCNC: 236.4 MG/L (ref 0–5)
EKG ATRIAL RATE: 82 BPM
EKG P AXIS: 66 DEGREES
EKG P-R INTERVAL: 112 MS
EKG Q-T INTERVAL: 430 MS
EKG QRS DURATION: 78 MS
EKG QTC CALCULATION (BAZETT): 502 MS
EKG R AXIS: 79 DEGREES
EKG T AXIS: 62 DEGREES
EKG VENTRICULAR RATE: 82 BPM
EOSINOPHILS RELATIVE PERCENT: 2 % (ref 1–4)
ERYTHROCYTE [SEDIMENTATION RATE] IN BLOOD BY WESTERGREN METHOD: 35 MM/HR (ref 0–30)
FERRITIN SERPL-MCNC: 363 NG/ML (ref 13–150)
FLUAV RNA NPH QL NAA+NON-PROBE: NOT DETECTED
FLUBV RNA NPH QL NAA+NON-PROBE: NOT DETECTED
FOLATE SERPL-MCNC: 12.2 NG/ML
GFR SERPL CREATININE-BSD FRML MDRD: >60 ML/MIN/1.73M2
GLUCOSE SERPL-MCNC: 84 MG/DL (ref 70–99)
HCT VFR BLD AUTO: 24.6 % (ref 36.3–47.1)
HCT VFR BLD AUTO: 24.6 % (ref 36.3–47.1)
HGB BLD-MCNC: 7.6 G/DL (ref 11.9–15.1)
HGB BLD-MCNC: 7.8 G/DL (ref 11.9–15.1)
HUMAN METAPNEUMOVIRUS PCR: NOT DETECTED
IMMATURE GRANULOCYTES: 1 %
IRON SATURATION: 15 % (ref 20–55)
IRON SERPL-MCNC: 32 UG/DL (ref 37–145)
LYMPHOCYTES # BLD: 19 % (ref 24–43)
MAGNESIUM SERPL-MCNC: 1.9 MG/DL (ref 1.6–2.6)
MCH RBC QN AUTO: 28.5 PG (ref 25.2–33.5)
MCHC RBC AUTO-ENTMCNC: 31.7 G/DL (ref 28.4–34.8)
MCV RBC AUTO: 89.8 FL (ref 82.6–102.9)
MONOCYTES # BLD: 11 % (ref 3–12)
MYCOPLASMA PNEUMONIAE PCR: NOT DETECTED
NRBC AUTOMATED: 0 PER 100 WBC
PARAINFLUENZA 1 PCR: NOT DETECTED
PARAINFLUENZA 2 PCR: NOT DETECTED
PARAINFLUENZA 3 PCR: NOT DETECTED
PARAINFLUENZA 4 PCR: NOT DETECTED
PDW BLD-RTO: 16.5 % (ref 11.8–14.4)
PLATELET # BLD AUTO: 127 K/UL (ref 138–453)
PMV BLD AUTO: 12.2 FL (ref 8.1–13.5)
POTASSIUM SERPL-SCNC: 3.2 MMOL/L (ref 3.7–5.3)
POTASSIUM SERPL-SCNC: 3.9 MMOL/L (ref 3.7–5.3)
PROT SERPL-MCNC: 5.7 G/DL (ref 6.4–8.3)
RBC # BLD: 2.74 M/UL (ref 3.95–5.11)
RBC # BLD: ABNORMAL 10*6/UL
RESP SYNCYTIAL VIRUS PCR: NOT DETECTED
RHINO/ENTEROVIRUS PCR: NOT DETECTED
SARS-COV-2 RNA NPH QL NAA+NON-PROBE: NOT DETECTED
SEG NEUTROPHILS: 67 % (ref 36–65)
SEGMENTED NEUTROPHILS ABSOLUTE COUNT: 4.74 K/UL (ref 1.5–8.1)
SODIUM SERPL-SCNC: 135 MMOL/L (ref 135–144)
SPECIMEN DESCRIPTION: NORMAL
TIBC SERPL-MCNC: 211 UG/DL (ref 250–450)
TROPONIN I SERPL DL<=0.01 NG/ML-MCNC: 30 NG/L (ref 0–14)
TROPONIN I SERPL DL<=0.01 NG/ML-MCNC: 35 NG/L (ref 0–14)
TSH SERPL-ACNC: 3.28 UIU/ML (ref 0.3–5)
UNSATURATED IRON BINDING CAPACITY: 179 UG/DL (ref 112–347)
VIT B12 SERPL-MCNC: 275 PG/ML (ref 232–1245)
WBC # BLD AUTO: 7.1 K/UL (ref 3.5–11.3)

## 2023-03-31 PROCEDURE — 94640 AIRWAY INHALATION TREATMENT: CPT

## 2023-03-31 PROCEDURE — 82728 ASSAY OF FERRITIN: CPT

## 2023-03-31 PROCEDURE — 6370000000 HC RX 637 (ALT 250 FOR IP): Performed by: STUDENT IN AN ORGANIZED HEALTH CARE EDUCATION/TRAINING PROGRAM

## 2023-03-31 PROCEDURE — 1200000000 HC SEMI PRIVATE

## 2023-03-31 PROCEDURE — 86140 C-REACTIVE PROTEIN: CPT

## 2023-03-31 PROCEDURE — 84443 ASSAY THYROID STIM HORMONE: CPT

## 2023-03-31 PROCEDURE — 84132 ASSAY OF SERUM POTASSIUM: CPT

## 2023-03-31 PROCEDURE — 6360000002 HC RX W HCPCS: Performed by: STUDENT IN AN ORGANIZED HEALTH CARE EDUCATION/TRAINING PROGRAM

## 2023-03-31 PROCEDURE — 36415 COLL VENOUS BLD VENIPUNCTURE: CPT

## 2023-03-31 PROCEDURE — 2580000003 HC RX 258: Performed by: NURSE PRACTITIONER

## 2023-03-31 PROCEDURE — 6370000000 HC RX 637 (ALT 250 FOR IP): Performed by: NURSE PRACTITIONER

## 2023-03-31 PROCEDURE — 6360000002 HC RX W HCPCS: Performed by: NURSE PRACTITIONER

## 2023-03-31 PROCEDURE — 83735 ASSAY OF MAGNESIUM: CPT

## 2023-03-31 PROCEDURE — 83550 IRON BINDING TEST: CPT

## 2023-03-31 PROCEDURE — 84484 ASSAY OF TROPONIN QUANT: CPT

## 2023-03-31 PROCEDURE — 99254 IP/OBS CNSLTJ NEW/EST MOD 60: CPT | Performed by: INTERNAL MEDICINE

## 2023-03-31 PROCEDURE — 0202U NFCT DS 22 TRGT SARS-COV-2: CPT

## 2023-03-31 PROCEDURE — 82746 ASSAY OF FOLIC ACID SERUM: CPT

## 2023-03-31 PROCEDURE — 6360000002 HC RX W HCPCS: Performed by: HEALTH CARE PROVIDER

## 2023-03-31 PROCEDURE — 82607 VITAMIN B-12: CPT

## 2023-03-31 PROCEDURE — 85025 COMPLETE CBC W/AUTO DIFF WBC: CPT

## 2023-03-31 PROCEDURE — 94761 N-INVAS EAR/PLS OXIMETRY MLT: CPT

## 2023-03-31 PROCEDURE — 80053 COMPREHEN METABOLIC PANEL: CPT

## 2023-03-31 PROCEDURE — 85014 HEMATOCRIT: CPT

## 2023-03-31 PROCEDURE — 2700000000 HC OXYGEN THERAPY PER DAY

## 2023-03-31 PROCEDURE — 87641 MR-STAPH DNA AMP PROBE: CPT

## 2023-03-31 PROCEDURE — 97530 THERAPEUTIC ACTIVITIES: CPT

## 2023-03-31 PROCEDURE — 97162 PT EVAL MOD COMPLEX 30 MIN: CPT

## 2023-03-31 PROCEDURE — 2500000003 HC RX 250 WO HCPCS: Performed by: STUDENT IN AN ORGANIZED HEALTH CARE EDUCATION/TRAINING PROGRAM

## 2023-03-31 PROCEDURE — 83540 ASSAY OF IRON: CPT

## 2023-03-31 PROCEDURE — C9113 INJ PANTOPRAZOLE SODIUM, VIA: HCPCS | Performed by: NURSE PRACTITIONER

## 2023-03-31 PROCEDURE — 85018 HEMOGLOBIN: CPT

## 2023-03-31 PROCEDURE — 99223 1ST HOSP IP/OBS HIGH 75: CPT | Performed by: STUDENT IN AN ORGANIZED HEALTH CARE EDUCATION/TRAINING PROGRAM

## 2023-03-31 PROCEDURE — 85652 RBC SED RATE AUTOMATED: CPT

## 2023-03-31 RX ORDER — ALBUTEROL SULFATE 2.5 MG/3ML
2.5 SOLUTION RESPIRATORY (INHALATION)
Status: DISCONTINUED | OUTPATIENT
Start: 2023-03-31 | End: 2023-04-02 | Stop reason: HOSPADM

## 2023-03-31 RX ORDER — ONDANSETRON 4 MG/1
4 TABLET, ORALLY DISINTEGRATING ORAL EVERY 8 HOURS PRN
Status: DISCONTINUED | OUTPATIENT
Start: 2023-03-31 | End: 2023-04-02 | Stop reason: HOSPADM

## 2023-03-31 RX ORDER — GABAPENTIN 400 MG/1
800 CAPSULE ORAL 3 TIMES DAILY
Status: DISCONTINUED | OUTPATIENT
Start: 2023-03-31 | End: 2023-04-02 | Stop reason: HOSPADM

## 2023-03-31 RX ORDER — ACETAMINOPHEN 325 MG/1
650 TABLET ORAL EVERY 6 HOURS PRN
Status: DISCONTINUED | OUTPATIENT
Start: 2023-03-31 | End: 2023-04-02 | Stop reason: HOSPADM

## 2023-03-31 RX ORDER — BUDESONIDE AND FORMOTEROL FUMARATE DIHYDRATE 80; 4.5 UG/1; UG/1
2 AEROSOL RESPIRATORY (INHALATION) 2 TIMES DAILY
Status: DISCONTINUED | OUTPATIENT
Start: 2023-03-31 | End: 2023-04-02 | Stop reason: HOSPADM

## 2023-03-31 RX ORDER — IPRATROPIUM BROMIDE AND ALBUTEROL SULFATE 2.5; .5 MG/3ML; MG/3ML
1 SOLUTION RESPIRATORY (INHALATION)
Status: DISCONTINUED | OUTPATIENT
Start: 2023-03-31 | End: 2023-04-02 | Stop reason: HOSPADM

## 2023-03-31 RX ORDER — ONDANSETRON 2 MG/ML
4 INJECTION INTRAMUSCULAR; INTRAVENOUS EVERY 6 HOURS PRN
Status: DISCONTINUED | OUTPATIENT
Start: 2023-03-31 | End: 2023-04-02 | Stop reason: HOSPADM

## 2023-03-31 RX ORDER — SODIUM CHLORIDE 9 MG/ML
INJECTION, SOLUTION INTRAVENOUS PRN
Status: DISCONTINUED | OUTPATIENT
Start: 2023-03-31 | End: 2023-04-02 | Stop reason: HOSPADM

## 2023-03-31 RX ORDER — METHYLPREDNISOLONE SODIUM SUCCINATE 125 MG/2ML
60 INJECTION, POWDER, LYOPHILIZED, FOR SOLUTION INTRAMUSCULAR; INTRAVENOUS EVERY 8 HOURS
Status: DISCONTINUED | OUTPATIENT
Start: 2023-03-31 | End: 2023-04-02

## 2023-03-31 RX ORDER — POTASSIUM CHLORIDE 7.45 MG/ML
10 INJECTION INTRAVENOUS
Status: DISCONTINUED | OUTPATIENT
Start: 2023-03-31 | End: 2023-03-31

## 2023-03-31 RX ORDER — SODIUM CHLORIDE 9 MG/ML
INJECTION, SOLUTION INTRAVENOUS CONTINUOUS
Status: DISCONTINUED | OUTPATIENT
Start: 2023-03-31 | End: 2023-04-02 | Stop reason: HOSPADM

## 2023-03-31 RX ORDER — SODIUM CHLORIDE 0.9 % (FLUSH) 0.9 %
5-40 SYRINGE (ML) INJECTION EVERY 12 HOURS SCHEDULED
Status: DISCONTINUED | OUTPATIENT
Start: 2023-03-31 | End: 2023-04-02 | Stop reason: HOSPADM

## 2023-03-31 RX ORDER — METHYLPREDNISOLONE SODIUM SUCCINATE 40 MG/ML
40 INJECTION, POWDER, LYOPHILIZED, FOR SOLUTION INTRAMUSCULAR; INTRAVENOUS ONCE
Status: COMPLETED | OUTPATIENT
Start: 2023-03-31 | End: 2023-03-31

## 2023-03-31 RX ORDER — HYDROCODONE BITARTRATE AND ACETAMINOPHEN 5; 325 MG/1; MG/1
2 TABLET ORAL EVERY 6 HOURS PRN
Status: DISCONTINUED | OUTPATIENT
Start: 2023-03-31 | End: 2023-04-02 | Stop reason: HOSPADM

## 2023-03-31 RX ORDER — IPRATROPIUM BROMIDE AND ALBUTEROL SULFATE 2.5; .5 MG/3ML; MG/3ML
1 SOLUTION RESPIRATORY (INHALATION)
Status: DISCONTINUED | OUTPATIENT
Start: 2023-03-31 | End: 2023-03-31

## 2023-03-31 RX ORDER — PREGABALIN 100 MG/1
300 CAPSULE ORAL 2 TIMES DAILY
Status: DISCONTINUED | OUTPATIENT
Start: 2023-03-31 | End: 2023-04-02 | Stop reason: HOSPADM

## 2023-03-31 RX ORDER — LABETALOL HYDROCHLORIDE 5 MG/ML
10 INJECTION, SOLUTION INTRAVENOUS EVERY 6 HOURS PRN
Status: DISCONTINUED | OUTPATIENT
Start: 2023-03-31 | End: 2023-04-02 | Stop reason: HOSPADM

## 2023-03-31 RX ORDER — HYDROCODONE BITARTRATE AND ACETAMINOPHEN 5; 325 MG/1; MG/1
1 TABLET ORAL EVERY 6 HOURS PRN
Status: DISCONTINUED | OUTPATIENT
Start: 2023-03-31 | End: 2023-04-02 | Stop reason: HOSPADM

## 2023-03-31 RX ORDER — METRONIDAZOLE 500 MG/100ML
500 INJECTION, SOLUTION INTRAVENOUS EVERY 8 HOURS
Status: DISCONTINUED | OUTPATIENT
Start: 2023-03-31 | End: 2023-04-01

## 2023-03-31 RX ORDER — ENOXAPARIN SODIUM 100 MG/ML
40 INJECTION SUBCUTANEOUS DAILY
Status: DISCONTINUED | OUTPATIENT
Start: 2023-03-31 | End: 2023-04-02 | Stop reason: HOSPADM

## 2023-03-31 RX ORDER — CLONAZEPAM 0.5 MG/1
0.5 TABLET ORAL NIGHTLY PRN
Status: DISCONTINUED | OUTPATIENT
Start: 2023-03-31 | End: 2023-04-02 | Stop reason: HOSPADM

## 2023-03-31 RX ORDER — CALCIUM CARBONATE 200(500)MG
500 TABLET,CHEWABLE ORAL 3 TIMES DAILY PRN
Status: DISCONTINUED | OUTPATIENT
Start: 2023-03-31 | End: 2023-04-02 | Stop reason: HOSPADM

## 2023-03-31 RX ORDER — POTASSIUM CHLORIDE 20 MEQ/1
20 TABLET, EXTENDED RELEASE ORAL ONCE
Status: COMPLETED | OUTPATIENT
Start: 2023-03-31 | End: 2023-03-31

## 2023-03-31 RX ORDER — LEVOFLOXACIN 5 MG/ML
750 INJECTION, SOLUTION INTRAVENOUS EVERY 24 HOURS
Status: DISCONTINUED | OUTPATIENT
Start: 2023-04-01 | End: 2023-04-01

## 2023-03-31 RX ORDER — POTASSIUM CHLORIDE 20 MEQ/1
40 TABLET, EXTENDED RELEASE ORAL ONCE
Status: DISCONTINUED | OUTPATIENT
Start: 2023-03-31 | End: 2023-04-02 | Stop reason: HOSPADM

## 2023-03-31 RX ORDER — ACETAMINOPHEN 650 MG/1
650 SUPPOSITORY RECTAL EVERY 6 HOURS PRN
Status: DISCONTINUED | OUTPATIENT
Start: 2023-03-31 | End: 2023-04-02 | Stop reason: HOSPADM

## 2023-03-31 RX ORDER — FLUOXETINE HYDROCHLORIDE 20 MG/1
60 CAPSULE ORAL DAILY
Status: DISCONTINUED | OUTPATIENT
Start: 2023-03-31 | End: 2023-04-02 | Stop reason: HOSPADM

## 2023-03-31 RX ORDER — SODIUM CHLORIDE 0.9 % (FLUSH) 0.9 %
10 SYRINGE (ML) INJECTION PRN
Status: DISCONTINUED | OUTPATIENT
Start: 2023-03-31 | End: 2023-04-02 | Stop reason: HOSPADM

## 2023-03-31 RX ORDER — BENZONATATE 100 MG/1
200 CAPSULE ORAL 3 TIMES DAILY PRN
Status: DISCONTINUED | OUTPATIENT
Start: 2023-03-31 | End: 2023-04-02 | Stop reason: HOSPADM

## 2023-03-31 RX ADMIN — SODIUM CHLORIDE, PRESERVATIVE FREE 40 MG: 5 INJECTION INTRAVENOUS at 06:48

## 2023-03-31 RX ADMIN — BUDESONIDE AND FORMOTEROL FUMARATE DIHYDRATE 2 PUFF: 80; 4.5 AEROSOL RESPIRATORY (INHALATION) at 11:05

## 2023-03-31 RX ADMIN — ONDANSETRON 4 MG: 2 INJECTION INTRAMUSCULAR; INTRAVENOUS at 22:41

## 2023-03-31 RX ADMIN — BENZONATATE 200 MG: 100 CAPSULE ORAL at 21:30

## 2023-03-31 RX ADMIN — HYDROCODONE BITARTRATE AND ACETAMINOPHEN 2 TABLET: 5; 325 TABLET ORAL at 03:46

## 2023-03-31 RX ADMIN — SODIUM CHLORIDE, PRESERVATIVE FREE 10 ML: 5 INJECTION INTRAVENOUS at 19:57

## 2023-03-31 RX ADMIN — GABAPENTIN 800 MG: 400 CAPSULE ORAL at 15:24

## 2023-03-31 RX ADMIN — TRAZODONE HYDROCHLORIDE 150 MG: 50 TABLET ORAL at 19:55

## 2023-03-31 RX ADMIN — BUDESONIDE AND FORMOTEROL FUMARATE DIHYDRATE 2 PUFF: 80; 4.5 AEROSOL RESPIRATORY (INHALATION) at 19:45

## 2023-03-31 RX ADMIN — CALCIUM CARBONATE 500 MG: 500 TABLET, CHEWABLE ORAL at 16:28

## 2023-03-31 RX ADMIN — IPRATROPIUM BROMIDE AND ALBUTEROL SULFATE 1 AMPULE: 2.5; .5 SOLUTION RESPIRATORY (INHALATION) at 16:55

## 2023-03-31 RX ADMIN — METRONIDAZOLE 500 MG: 500 INJECTION, SOLUTION INTRAVENOUS at 15:30

## 2023-03-31 RX ADMIN — ONDANSETRON 4 MG: 2 INJECTION INTRAMUSCULAR; INTRAVENOUS at 03:14

## 2023-03-31 RX ADMIN — METHYLPREDNISOLONE SODIUM SUCCINATE 40 MG: 40 INJECTION, POWDER, FOR SOLUTION INTRAMUSCULAR; INTRAVENOUS at 08:57

## 2023-03-31 RX ADMIN — HYDROCODONE BITARTRATE AND ACETAMINOPHEN 2 TABLET: 5; 325 TABLET ORAL at 10:27

## 2023-03-31 RX ADMIN — SODIUM CHLORIDE, PRESERVATIVE FREE 40 MG: 5 INJECTION INTRAVENOUS at 09:01

## 2023-03-31 RX ADMIN — HYDROCODONE BITARTRATE AND ACETAMINOPHEN 2 TABLET: 5; 325 TABLET ORAL at 16:33

## 2023-03-31 RX ADMIN — CLONAZEPAM 0.5 MG: 0.5 TABLET ORAL at 20:33

## 2023-03-31 RX ADMIN — METHYLPREDNISOLONE SODIUM SUCCINATE 60 MG: 125 INJECTION, POWDER, FOR SOLUTION INTRAMUSCULAR; INTRAVENOUS at 16:37

## 2023-03-31 RX ADMIN — GABAPENTIN 800 MG: 400 CAPSULE ORAL at 20:33

## 2023-03-31 RX ADMIN — ENOXAPARIN SODIUM 40 MG: 100 INJECTION SUBCUTANEOUS at 09:04

## 2023-03-31 RX ADMIN — GABAPENTIN 800 MG: 400 CAPSULE ORAL at 11:27

## 2023-03-31 RX ADMIN — ONDANSETRON 4 MG: 2 INJECTION INTRAMUSCULAR; INTRAVENOUS at 16:34

## 2023-03-31 RX ADMIN — FLUOXETINE HYDROCHLORIDE 60 MG: 20 CAPSULE ORAL at 11:27

## 2023-03-31 RX ADMIN — POTASSIUM CHLORIDE 10 MEQ: 7.46 INJECTION, SOLUTION INTRAVENOUS at 01:26

## 2023-03-31 RX ADMIN — METRONIDAZOLE 500 MG: 500 INJECTION, SOLUTION INTRAVENOUS at 08:57

## 2023-03-31 RX ADMIN — IPRATROPIUM BROMIDE AND ALBUTEROL SULFATE 1 AMPULE: 2.5; .5 SOLUTION RESPIRATORY (INHALATION) at 11:05

## 2023-03-31 RX ADMIN — ONDANSETRON 4 MG: 2 INJECTION INTRAMUSCULAR; INTRAVENOUS at 10:27

## 2023-03-31 RX ADMIN — PREGABALIN 300 MG: 100 CAPSULE ORAL at 16:33

## 2023-03-31 RX ADMIN — IPRATROPIUM BROMIDE AND ALBUTEROL SULFATE 1 AMPULE: 2.5; .5 SOLUTION RESPIRATORY (INHALATION) at 19:44

## 2023-03-31 RX ADMIN — POTASSIUM CHLORIDE 20 MEQ: 1500 TABLET, EXTENDED RELEASE ORAL at 12:51

## 2023-03-31 ASSESSMENT — ENCOUNTER SYMPTOMS
BACK PAIN: 0
WHEEZING: 0
COLOR CHANGE: 0
EYE DISCHARGE: 0
BLOOD IN STOOL: 1
ABDOMINAL DISTENTION: 0
SORE THROAT: 0
SHORTNESS OF BREATH: 1
ABDOMINAL PAIN: 1
COUGH: 1
VOMITING: 1
STRIDOR: 0
DIARRHEA: 1

## 2023-03-31 ASSESSMENT — PAIN SCALES - GENERAL
PAINLEVEL_OUTOF10: 10
PAINLEVEL_OUTOF10: 7
PAINLEVEL_OUTOF10: 9

## 2023-03-31 ASSESSMENT — PAIN DESCRIPTION - DESCRIPTORS: DESCRIPTORS: JABBING

## 2023-03-31 ASSESSMENT — PAIN DESCRIPTION - LOCATION
LOCATION: ABDOMEN;HIP
LOCATION: HIP;PELVIS

## 2023-03-31 ASSESSMENT — PAIN DESCRIPTION - ORIENTATION: ORIENTATION: LEFT

## 2023-03-31 NOTE — ED NOTES
Pt assisted to bedside solis Garcia RN  03/31/23 3670
Pt brief and bed linens changed.  Call light in reach, all needs met at this time     Gudelia Bay, BRENDA  03/30/23 2039
Pt oxygen saturation 88% on room air.  Placed on 2L nasal cannula, increased to 93%     Christina Nowak RN  03/30/23 1954
Pt placed on [facility]  03/30/23 2058
Pt to ED for abdominal pain, nausea, vomiting, diarrhea x3 days. Hx of crohn's disease. Pt states, \"I think this is another crohn's flare up. \" Pt had a left hip replacement 1 week ago. On EMS arrival pt HR in the 180's. Pt converted to normal rhythm within a few minutes using vagal maneuvers. Pt denies chest pain. Pt reports mild SOB. Patient alert and oriented x4, talking in complete sentences. Respirations even. Patient placed on continuous cardiac monitoring, BP cuff, and pulse ox. EKG obtained, blood work obtained.  Call light in reach, all needs met at this time     Lala Anand RN  03/30/23 2561
Report given to BRENDA Swartz.  All questions answered      Minerva Beltrán RN  03/31/23 6861
Report given to [place] on 3C.       Walter Price RN  03/31/23 9251
Abnormal; Notable for the following components:    Alkaline Phosphatase 114 (*)     All other components within normal limits   LIPASE - Abnormal; Notable for the following components:    Lipase 63 (*)     All other components within normal limits   BRAIN NATRIURETIC PEPTIDE - Abnormal; Notable for the following components:    Pro-BNP 7,500 (*)     All other components within normal limits   TROPONIN - Abnormal; Notable for the following components:    Troponin, High Sensitivity 34 (*)     All other components within normal limits   TROPONIN - Abnormal; Notable for the following components:    Troponin, High Sensitivity 30 (*)     All other components within normal limits   RAPID INFLUENZA A/B ANTIGENS   COVID-19, RAPID   RESPIRATORY PANEL, MOLECULAR, WITH COVID-19       Electronically signed by Stevo Subramanian RN on 3/31/2023 at [id number] [facility] [address], RN  03/31/23 7819

## 2023-03-31 NOTE — ED PROVIDER NOTES
[address]   Emergency Department  Emergency Medicine Attending Sign-out     Care of Chang Solano was assumed from previous attending Dr. Angelica Cobb and is being seen for Abdominal Pain and Tachycardia (180's PTA, converted )  . The patient's initial evaluation and plan have been discussed with the prior provider who initially evaluated the patient. Attestation  I was available and discussed any additional care issues that arose and coordinated the management plans with the resident(s) caring for the patient during my duty period. Any areas of disagreement with resident's documentation of care or procedures are noted on the chart. I was personally present for the key portions of any/all procedures, during my duty period. I have documented in the chart those procedures where I was not present during the key portions. BRIEF PATIENT SUMMARY/MDM COURSE PER INITIAL PROVIDER:   RECENT VITALS:     Temp: 98.4 °F (36.9 °C),  Heart Rate: 72, Resp: 18, BP: 109/65, SpO2: 98 %    This patient is a 60 y.o. Female with abdominal pain. Found to have pneumonia on the CT scan. Started on biotics.       OUTSTANDING TASKS / Ronen Goodson MD  Emergency Medicine Attending  [facility]       Matias Nathan MD  03/31/23 7187
[facility] ED     Emergency Department     Faculty Attestation    I performed a history and physical examination of the patient and discussed management with the resident. I reviewed the residents note and agree with the documented findings and plan of care. Any areas of disagreement are noted on the chart. I was personally present for the key portions of any procedures. I have documented in the chart those procedures where I was not present during the key portions. I have reviewed the emergency nurses triage note. I agree with the chief complaint, past medical history, past surgical history, allergies, medications, social and family history as documented unless otherwise noted below. For Physician Assistant/ Nurse Practitioner cases/documentation I have personally evaluated this patient and have completed at least one if not all key elements of the E/M (history, physical exam, and MDM). Additional findings are as noted. Patient here with abdominal pain think she is having a Crohn's flare has had diarrhea and vomiting. Of note patient is 1 week out from a hip replacement on 324 on the left, right hip steroid injection. States that she has been getting around at home has not been sedentary. Denies any chest pain or shortness of breath but does have a new oxygen requirement here sats were 85% on room air side of the room. Lungs however clear on exam heart regular abdomen soft mild diffuse tenderness no rebound or guarding no pulsatile abdominal mass. Strong for extremity pulses. Will check labs meds, fluids, COVID and flu, reevaluate need for advanced imaging. EKG interpretation: Sinus rhythm 82 normal intervals except QTc of 502. Normal axis. There is 1 PVC. No acute ST or T changes.     Critical Care     none    Eden Harris MD, Wilder Pulido  Attending Emergency  Physician           Eden Harris MD  03/30/23 2049
eyeglasses, and Wellness examination. has a past surgical history that includes Tonsillectomy and adenoidectomy; Tubal ligation; Cholecystectomy; Bunionectomy (Left); Colonoscopy (04/30/2007); Colonoscopy (10/12/2017); Abdomen surgery; Upper gastrointestinal endoscopy (10/25/2021); Colonoscopy (10/25/2021); Colonoscopy (10/25/2021); Hip Arthroplasty (Left, 03/24/2023); and Total hip arthroplasty (Left, 3/24/2023).     Social History     Socioeconomic History    Marital status: Single     Spouse name: Not on file    Number of children: Not on file    Years of education: Not on file    Highest education level: Not on file   Occupational History    Not on file   Tobacco Use    Smoking status: Every Day     Packs/day: 0.50     Years: 37.00     Pack years: 18.50     Types: Cigarettes     Start date: 1976    Smokeless tobacco: Former     Types: Chew     Quit date: 9/3/2001   Vaping Use    Vaping Use: Former    Quit date: 9/3/2019    Substances: Nicotine   Substance and Sexual Activity    Alcohol use: Not on file     Comment: No    Drug use: Yes     Types: Marijuana Shellye Burkitt)     Comment: h/o of substance abuse - marijuana gummies daily    Sexual activity: Not on file   Other Topics Concern    Not on file   Social History Narrative    Not on file     Social Determinants of Health     Financial Resource Strain: Not on file   Food Insecurity: Not on file   Transportation Needs: Not on file   Physical Activity: Not on file   Stress: Not on file   Social Connections: Not on file   Intimate Partner Violence: Not on file   Housing Stability: Not on file       Family History   Problem Relation Age of Onset    Diabetes Father     Lung Cancer Father     Hypertension Mother     Cancer Mother     High Blood Pressure Mother     Crohn's Disease Brother     Heart Disease Brother        Allergies:  Penicillins, Methylprednisolone, Tape [adhesive tape], and Azithromycin    Home Medications:  Prior to Admission medications    Medication

## 2023-03-31 NOTE — CONSULTS
[facility] -   Infectious diseases evaluation  admission date 3/30/2023    reason for consultation:   Pneumonia    Impression :   Current:  Multifocal Pneumonia - likely aspiration  from the vomiting   Crohn's disease - poor compliance  Total Hip arthoplasty  (3/24)    Other:  COPD, asthma  HTN, hyperlipidemia,   Allergy to Zithromax and PNC  Left hip replacement surgery 1 week PTA  Discussion / summary of stay / plan of care     Recommendations   Multifocal pneumonia - Chestxray / CTPE - multifocal pneumonia   Crohn's flare up  GI on -  \C diff- GI panel   Follow up CRP though it will reflect the Chron's and not only the pneumonia   MRSA probe pending  Respiratory culture pending, adjust AB based on sensitivites  Levaquin and Flagyl for Chron's and pneumonia  -if MRSA probe is+, add zyvox    Left hip wound dressed and dry    Infection Control Recommendations   [place] Precautions  Contact Isolation       Antimicrobial Stewardship Recommendations   Simplification of therapy  Targeted therapy      History of Present Illness:   Initial history:  David Duarte is a 60y.o.-year-old female with a past medical history of asthma, COPD, Htn, hyperlipidemia, anxiety, bipolar 1 disorder, hypothyroidism, nephrolithiasis,  S/P total hip arthroplasty (3/24)    Presents 3/30 with abdominal pain vomiting and diarrhea many x per day -likely aspirated and started coughing  no fever -and tachycardia. In the ED, her HR was in the 180s. EKG was normal. Patient's stomach pain started 2-3 days before. Bloody stoolsHas not seen a doctor for her crohn's disease or taken medicine.    Afebrile, WBC 12.8, Lipase 63, pro-bnp 7,500     MRSA probe pending  Sputum stain pending  Resp culture pending  RPP negative  Flu - negative  Covid - negative     Chest xray - multifocal pneumonia    CTPE - ground glass densities throughout upper lungs - multifocal pneumonia      CT abdomen- severe wall

## 2023-03-31 NOTE — H&P
[facility]  Office: [address], DO, Cady Morales, DO, Satish Sandoval, DO, Veronica Daniels Blood, DO, Lew Espinal MD, Cha Cid MD, Rhianna Renee MD, Cory Fortune MD,  Gui Spears MD, Paul Vaughn MD, Pelon Hopkins, DO, Evens Gant MD,  Richard Joy MD, Domingo Hayden MD, El Rodriguez, DO, Jeane Stone MD, Carmela Fleming MD, Elver Cloud, DO, Robina Streeter MD, Delfin Munoz MD, Marleen Harada, MD, Jaxson Esqueda MD,  Johnny Holstein, DO, Naresh Ha MD,  Elenita Leonard, CNP,  Rachell Suero, CNP, Jolly Little, CNP, Joana Angelo, CNP,  Druscilla Severe, [facility], Consuelo Becker, [facility], Timur Burk, [facility], Salazar Wright, CNP, Lanita Cogan, CNP, Ying Stevenson, CNP, Deion Lemus PA-C, Allyssa Mathias, CNS, Cristina Pryor, CNP, Mariajose Wyatt, [facility]    HISTORY AND PHYSICAL EXAMINATION            Date:   3/31/2023  Patient name:  Daily Billingsley  Date of admission:  3/30/2023  7:48 PM  MRN:   [id number]  Account:  [de-identified]  YOB: 1965  PCP:    Benay Angelucci, DO  Room:   0327/0327-02  Code Status:    Full Code    Chief Complaint:     Chief Complaint   Patient presents with    Abdominal Pain    Tachycardia     180's PTA, converted        History Obtained From:     patient, electronic medical record    History of Present Illness:     Daily Billingsley is a 60 y.o. Non- / non  female who presents with Abdominal Pain and Tachycardia (180's PTA, converted )   and is admitted to the hospital for the management of Multifocal pneumonia. 60 yrs old female with past medical history of GERD, arthritis, bipolar disorder, COPD, hypertension, Crohn's disease, not on maintenance therapy presents to the hospital with nausea, diarrhea and vomiting. He also reports cough and shortness of breath. Patient had hip replacement surgery on 3/24.   Patient

## 2023-04-01 PROBLEM — Z22.322 MRSA CARRIER: Status: ACTIVE | Noted: 2023-04-01

## 2023-04-01 PROBLEM — K50.919 ACUTE CROHN'S DISEASE WITH COMPLICATION (HCC): Status: ACTIVE | Noted: 2023-04-01

## 2023-04-01 LAB
ABSOLUTE EOS #: 0 K/UL (ref 0–0.4)
ABSOLUTE IMMATURE GRANULOCYTE: 0.04 K/UL (ref 0–0.3)
ABSOLUTE LYMPH #: 0.42 K/UL (ref 1–4.8)
ABSOLUTE MONO #: 0.11 K/UL (ref 0.1–0.8)
ANION GAP SERPL CALCULATED.3IONS-SCNC: 13 MMOL/L (ref 9–17)
BASOPHILS # BLD: 0 % (ref 0–2)
BASOPHILS ABSOLUTE: 0 K/UL (ref 0–0.2)
BUN SERPL-MCNC: 17 MG/DL (ref 6–20)
CALCIUM SERPL-MCNC: 8.3 MG/DL (ref 8.6–10.4)
CHLORIDE SERPL-SCNC: 105 MMOL/L (ref 98–107)
CO2 SERPL-SCNC: 22 MMOL/L (ref 20–31)
CREAT SERPL-MCNC: 0.74 MG/DL (ref 0.5–0.9)
EOSINOPHILS RELATIVE PERCENT: 0 % (ref 1–4)
GFR SERPL CREATININE-BSD FRML MDRD: >60 ML/MIN/1.73M2
GLUCOSE SERPL-MCNC: 142 MG/DL (ref 70–99)
HCT VFR BLD AUTO: 26.8 % (ref 36.3–47.1)
HCT VFR BLD AUTO: 27.4 % (ref 36.3–47.1)
HGB BLD-MCNC: 8.3 G/DL (ref 11.9–15.1)
HGB BLD-MCNC: 8.5 G/DL (ref 11.9–15.1)
IMMATURE GRANULOCYTES: 1 %
INR PPP: 1.1
LYMPHOCYTES # BLD: 11 % (ref 24–44)
MCH RBC QN AUTO: 28.5 PG (ref 25.2–33.5)
MCHC RBC AUTO-ENTMCNC: 31 G/DL (ref 28.4–34.8)
MCV RBC AUTO: 91.9 FL (ref 82.6–102.9)
MICROORGANISM/AGENT SPEC: NORMAL
MONOCYTES # BLD: 3 % (ref 1–7)
MORPHOLOGY: ABNORMAL
MRSA, DNA, NASAL: ABNORMAL
NRBC AUTOMATED: 0 PER 100 WBC
PDW BLD-RTO: 16.8 % (ref 11.8–14.4)
PLATELET # BLD AUTO: 169 K/UL (ref 138–453)
PMV BLD AUTO: 12.3 FL (ref 8.1–13.5)
POTASSIUM SERPL-SCNC: 3.7 MMOL/L (ref 3.7–5.3)
PROTHROMBIN TIME: 14.1 SEC (ref 11.7–14.9)
RBC # BLD: 2.98 M/UL (ref 3.95–5.11)
SEG NEUTROPHILS: 85 % (ref 36–66)
SEGMENTED NEUTROPHILS ABSOLUTE COUNT: 3.23 K/UL (ref 1.8–7.7)
SODIUM SERPL-SCNC: 140 MMOL/L (ref 135–144)
SPECIMEN DESCRIPTION: ABNORMAL
SPECIMEN DESCRIPTION: NORMAL
WBC # BLD AUTO: 3.8 K/UL (ref 3.5–11.3)

## 2023-04-01 PROCEDURE — 87205 SMEAR GRAM STAIN: CPT

## 2023-04-01 PROCEDURE — 6370000000 HC RX 637 (ALT 250 FOR IP): Performed by: STUDENT IN AN ORGANIZED HEALTH CARE EDUCATION/TRAINING PROGRAM

## 2023-04-01 PROCEDURE — 99232 SBSQ HOSP IP/OBS MODERATE 35: CPT | Performed by: STUDENT IN AN ORGANIZED HEALTH CARE EDUCATION/TRAINING PROGRAM

## 2023-04-01 PROCEDURE — 6360000002 HC RX W HCPCS: Performed by: STUDENT IN AN ORGANIZED HEALTH CARE EDUCATION/TRAINING PROGRAM

## 2023-04-01 PROCEDURE — 1200000000 HC SEMI PRIVATE

## 2023-04-01 PROCEDURE — 99232 SBSQ HOSP IP/OBS MODERATE 35: CPT | Performed by: INTERNAL MEDICINE

## 2023-04-01 PROCEDURE — 2580000003 HC RX 258: Performed by: NURSE PRACTITIONER

## 2023-04-01 PROCEDURE — 85014 HEMATOCRIT: CPT

## 2023-04-01 PROCEDURE — 6360000002 HC RX W HCPCS: Performed by: NURSE PRACTITIONER

## 2023-04-01 PROCEDURE — 94640 AIRWAY INHALATION TREATMENT: CPT

## 2023-04-01 PROCEDURE — 85610 PROTHROMBIN TIME: CPT

## 2023-04-01 PROCEDURE — 6370000000 HC RX 637 (ALT 250 FOR IP): Performed by: NURSE PRACTITIONER

## 2023-04-01 PROCEDURE — 2500000003 HC RX 250 WO HCPCS: Performed by: STUDENT IN AN ORGANIZED HEALTH CARE EDUCATION/TRAINING PROGRAM

## 2023-04-01 PROCEDURE — 6370000000 HC RX 637 (ALT 250 FOR IP): Performed by: INTERNAL MEDICINE

## 2023-04-01 PROCEDURE — 80048 BASIC METABOLIC PNL TOTAL CA: CPT

## 2023-04-01 PROCEDURE — 2700000000 HC OXYGEN THERAPY PER DAY

## 2023-04-01 PROCEDURE — 94760 N-INVAS EAR/PLS OXIMETRY 1: CPT

## 2023-04-01 PROCEDURE — 87070 CULTURE OTHR SPECIMN AEROBIC: CPT

## 2023-04-01 PROCEDURE — 85018 HEMOGLOBIN: CPT

## 2023-04-01 PROCEDURE — 2580000003 HC RX 258: Performed by: STUDENT IN AN ORGANIZED HEALTH CARE EDUCATION/TRAINING PROGRAM

## 2023-04-01 PROCEDURE — 36415 COLL VENOUS BLD VENIPUNCTURE: CPT

## 2023-04-01 PROCEDURE — C9113 INJ PANTOPRAZOLE SODIUM, VIA: HCPCS | Performed by: NURSE PRACTITIONER

## 2023-04-01 PROCEDURE — 85025 COMPLETE CBC W/AUTO DIFF WBC: CPT

## 2023-04-01 RX ORDER — MOXIFLOXACIN HYDROCHLORIDE 400 MG/1
400 TABLET ORAL DAILY
Status: DISCONTINUED | OUTPATIENT
Start: 2023-04-01 | End: 2023-04-02 | Stop reason: HOSPADM

## 2023-04-01 RX ORDER — PROCHLORPERAZINE EDISYLATE 5 MG/ML
10 INJECTION INTRAMUSCULAR; INTRAVENOUS EVERY 6 HOURS PRN
Status: DISCONTINUED | OUTPATIENT
Start: 2023-04-01 | End: 2023-04-02 | Stop reason: HOSPADM

## 2023-04-01 RX ORDER — LINEZOLID 600 MG/1
600 TABLET, FILM COATED ORAL EVERY 12 HOURS SCHEDULED
Status: DISCONTINUED | OUTPATIENT
Start: 2023-04-01 | End: 2023-04-01

## 2023-04-01 RX ADMIN — SODIUM CHLORIDE: 9 INJECTION, SOLUTION INTRAVENOUS at 00:24

## 2023-04-01 RX ADMIN — GABAPENTIN 800 MG: 400 CAPSULE ORAL at 09:04

## 2023-04-01 RX ADMIN — CALCIUM CARBONATE 500 MG: 500 TABLET, CHEWABLE ORAL at 04:51

## 2023-04-01 RX ADMIN — HYDROCODONE BITARTRATE AND ACETAMINOPHEN 2 TABLET: 5; 325 TABLET ORAL at 17:19

## 2023-04-01 RX ADMIN — IPRATROPIUM BROMIDE AND ALBUTEROL SULFATE 1 AMPULE: 2.5; .5 SOLUTION RESPIRATORY (INHALATION) at 20:00

## 2023-04-01 RX ADMIN — PREGABALIN 300 MG: 100 CAPSULE ORAL at 15:38

## 2023-04-01 RX ADMIN — METHYLPREDNISOLONE SODIUM SUCCINATE 60 MG: 125 INJECTION, POWDER, FOR SOLUTION INTRAMUSCULAR; INTRAVENOUS at 00:22

## 2023-04-01 RX ADMIN — MOXIFLOXACIN 400 MG: 400 TABLET, FILM COATED ORAL at 18:04

## 2023-04-01 RX ADMIN — SODIUM CHLORIDE: 9 INJECTION, SOLUTION INTRAVENOUS at 20:07

## 2023-04-01 RX ADMIN — ENOXAPARIN SODIUM 40 MG: 100 INJECTION SUBCUTANEOUS at 09:03

## 2023-04-01 RX ADMIN — METRONIDAZOLE 500 MG: 500 INJECTION, SOLUTION INTRAVENOUS at 00:24

## 2023-04-01 RX ADMIN — PROCHLORPERAZINE EDISYLATE 10 MG: 5 INJECTION INTRAMUSCULAR; INTRAVENOUS at 10:06

## 2023-04-01 RX ADMIN — IPRATROPIUM BROMIDE AND ALBUTEROL SULFATE 1 AMPULE: 2.5; .5 SOLUTION RESPIRATORY (INHALATION) at 09:15

## 2023-04-01 RX ADMIN — ONDANSETRON 4 MG: 2 INJECTION INTRAMUSCULAR; INTRAVENOUS at 04:51

## 2023-04-01 RX ADMIN — GABAPENTIN 800 MG: 400 CAPSULE ORAL at 20:37

## 2023-04-01 RX ADMIN — METRONIDAZOLE 500 MG: 500 INJECTION, SOLUTION INTRAVENOUS at 15:38

## 2023-04-01 RX ADMIN — HYDROCODONE BITARTRATE AND ACETAMINOPHEN 2 TABLET: 5; 325 TABLET ORAL at 04:03

## 2023-04-01 RX ADMIN — IPRATROPIUM BROMIDE AND ALBUTEROL SULFATE 1 AMPULE: 2.5; .5 SOLUTION RESPIRATORY (INHALATION) at 17:02

## 2023-04-01 RX ADMIN — CLONAZEPAM 0.5 MG: 0.5 TABLET ORAL at 20:37

## 2023-04-01 RX ADMIN — TRAZODONE HYDROCHLORIDE 150 MG: 50 TABLET ORAL at 19:52

## 2023-04-01 RX ADMIN — SODIUM CHLORIDE, PRESERVATIVE FREE 40 MG: 5 INJECTION INTRAVENOUS at 09:03

## 2023-04-01 RX ADMIN — BUDESONIDE AND FORMOTEROL FUMARATE DIHYDRATE 2 PUFF: 80; 4.5 AEROSOL RESPIRATORY (INHALATION) at 20:00

## 2023-04-01 RX ADMIN — BENZONATATE 200 MG: 100 CAPSULE ORAL at 20:37

## 2023-04-01 RX ADMIN — HYDROCODONE BITARTRATE AND ACETAMINOPHEN 2 TABLET: 5; 325 TABLET ORAL at 10:27

## 2023-04-01 RX ADMIN — BENZONATATE 200 MG: 100 CAPSULE ORAL at 04:03

## 2023-04-01 RX ADMIN — METHYLPREDNISOLONE SODIUM SUCCINATE 60 MG: 125 INJECTION, POWDER, FOR SOLUTION INTRAMUSCULAR; INTRAVENOUS at 20:04

## 2023-04-01 RX ADMIN — METHYLPREDNISOLONE SODIUM SUCCINATE 60 MG: 125 INJECTION, POWDER, FOR SOLUTION INTRAMUSCULAR; INTRAVENOUS at 09:03

## 2023-04-01 RX ADMIN — CALCIUM CARBONATE 500 MG: 500 TABLET, CHEWABLE ORAL at 20:37

## 2023-04-01 RX ADMIN — SODIUM CHLORIDE, PRESERVATIVE FREE 10 ML: 5 INJECTION INTRAVENOUS at 20:02

## 2023-04-01 RX ADMIN — FLUOXETINE HYDROCHLORIDE 60 MG: 20 CAPSULE ORAL at 09:03

## 2023-04-01 RX ADMIN — METRONIDAZOLE 500 MG: 500 INJECTION, SOLUTION INTRAVENOUS at 09:04

## 2023-04-01 RX ADMIN — BUDESONIDE AND FORMOTEROL FUMARATE DIHYDRATE 2 PUFF: 80; 4.5 AEROSOL RESPIRATORY (INHALATION) at 09:15

## 2023-04-01 RX ADMIN — PREGABALIN 300 MG: 100 CAPSULE ORAL at 09:04

## 2023-04-01 RX ADMIN — GABAPENTIN 800 MG: 400 CAPSULE ORAL at 14:29

## 2023-04-01 RX ADMIN — IRON SUCROSE 200 MG: 20 INJECTION, SOLUTION INTRAVENOUS at 08:21

## 2023-04-01 RX ADMIN — IPRATROPIUM BROMIDE AND ALBUTEROL SULFATE 1 AMPULE: 2.5; .5 SOLUTION RESPIRATORY (INHALATION) at 12:14

## 2023-04-01 ASSESSMENT — ENCOUNTER SYMPTOMS
EYE DISCHARGE: 0
STRIDOR: 0
WHEEZING: 0
BACK PAIN: 0
ABDOMINAL PAIN: 1
ABDOMINAL DISTENTION: 0
VOMITING: 1
SHORTNESS OF BREATH: 1
COLOR CHANGE: 0
DIARRHEA: 1
SORE THROAT: 0
BLOOD IN STOOL: 1
NAUSEA: 1
COUGH: 1

## 2023-04-01 ASSESSMENT — PAIN DESCRIPTION - PAIN TYPE: TYPE: ACUTE PAIN;SURGICAL PAIN

## 2023-04-01 ASSESSMENT — PAIN DESCRIPTION - ORIENTATION
ORIENTATION: LEFT
ORIENTATION: LEFT

## 2023-04-01 ASSESSMENT — PAIN DESCRIPTION - DESCRIPTORS
DESCRIPTORS: ACHING
DESCRIPTORS: ACHING

## 2023-04-01 ASSESSMENT — PAIN SCALES - GENERAL
PAINLEVEL_OUTOF10: 7
PAINLEVEL_OUTOF10: 8
PAINLEVEL_OUTOF10: 7

## 2023-04-01 ASSESSMENT — PAIN DESCRIPTION - LOCATION
LOCATION: HIP;GENERALIZED
LOCATION: HIP

## 2023-04-01 NOTE — DISCHARGE INSTR - COC
myelopathy M47.817    Hypercholesterolemia E78.00    Osteoarthritis of knee M17.9    Crohn's colitis, without complications ([facility] O55.86    Anemia D64.9    Liver cyst K76.89    Hyperkalemia E87.5    Primary osteoarthritis of both hips M16.0    Hypertensive urgency I16.0    Unspecified mood (affective) disorder (HCC) F39    S/P total left hip arthroplasty H80.764    Multifocal pneumonia J18.9    Crohn's disease of small intestine with other complication ([facility] [address] K99.218       Isolation/Infection:   Isolation            C Diff Contact          Patient Infection Status       Infection Onset Added Last Indicated Last Indicated By Review Planned Expiration Resolved Resolved By    MRSA 03/31/23 04/01/23 03/31/23 MRSA DNA Probe, Nasal        C-diff Rule Out 03/31/23 03/31/23 03/31/23 C DIFF TOXIN/ANTIGEN (Ordered) 04/07/23 04/10/23      Resolved    COVID-19 (Rule Out) 03/31/23 03/31/23 03/31/23 Respiratory Panel, Molecular, with COVID-19 (Restricted: peds pts or suitable admitted adults) (Ordered)   03/31/23 Rule-Out Test Resulted    COVID-19 (Rule Out) 03/30/23 03/30/23 03/30/23 COVID-19, Rapid (Ordered)   03/30/23 Rule-Out Test Resulted    COVID-19 (Rule Out) 11/04/22 11/04/22 11/04/22 COVID-19, Rapid (Ordered)   11/05/22 Rule-Out Test Resulted    C-diff Rule Out 10/23/22 10/23/22 10/23/22 Gastrointestinal Panel, Molecular (Ordered)   10/25/22 Sharry Eisenmenger, RN    Order d/c'd    C-diff Rule Out 09/16/22 09/16/22 09/16/22 C DIFF TOXIN/ANTIGEN (Ordered)   09/16/22 Rule-Out Test Resulted    COVID-19 (Rule Out) 09/15/22 09/15/22 09/15/22 COVID-19 & Influenza Combo (Ordered)   09/15/22 Rule-Out Test Resulted    COVID-19 (Rule Out) 08/20/22 08/20/22 08/20/22 Respiratory Panel, Molecular, with COVID-19 (Restricted: peds pts or suitable admitted adults) (Ordered)   08/22/22 June Nieves RN    Q-LDAV Rule Out 08/20/22 08/20/22 08/21/22 Gastrointestinal Panel, Molecular (Ordered)   08/22/22 Rule-Out Test Resulted

## 2023-04-01 NOTE — CARE COORDINATION
Transitional  Planning  Spoke with patient, wanting home care skilled nursing, hha, PT and OT. Post Acute Facility/Agency List     Provided patient with the following list, the list includes the overall star ratings obtained from CMS per the Medicare Web site (www.Medicare.gov):     [] [address]  [] Acute Inpatient Rehabilitation Facilities  [] Skilled Nursing Facilities  [x] Home Care    Provided verbal instructions on how to utilize the QR Code to obtain additional detailed star ratings from www. Medicare. gov     offered to print and provide the detailed list:    []Accepted   [x]Declined    The following printed detailed lists were provided:     [address]     Referrals sent. 1043 am Call from Hima Leon at [facility], unable to accept patient. 1128 am Call from Stewart from [facility], unable to accept patient. 1141 am Referral to [address] home care    1150 am Call from Efe at [facility], wanting to know services needed. 130 pm Call from Efe at [address] home care, able to accept patient. Patient and RN updated.

## 2023-04-01 NOTE — PLAN OF CARE
Problem: Pain  Goal: Verbalizes/displays adequate comfort level or baseline comfort level  Outcome: Progressing     Problem: Safety - Adult  Goal: Free from fall injury  Outcome: Progressing     Problem: Respiratory - Adult  Goal: Achieves optimal ventilation and oxygenation  Outcome: Progressing

## 2023-04-01 NOTE — PLAN OF CARE
Problem: Respiratory - Adult  Goal: Achieves optimal ventilation and oxygenation  4/1/2023 0922 by Dat Brown RCP  Outcome: Progressing   BRONCHOSPASM/BRONCHOCONSTRICTION   [x]  IMPROVE AERATION/BREATH SOUNDS  [x]   ADMINISTER BRONCHODILATOR THERAPY AS APPROPRIATE  [x]   ASSESS BREATH SOUNDS  []   IMPLEMENT AEROSOL/MDI PROTOCOL  [x]   PATIENT EDUCATION AS NEEDED   PROVIDE ADEQUATE OXYGENATION WITH ACCEPTABLE SP02/ABG'S  [x]  IDENTIFY APPROPRIATE OXYGEN THERAPY  [x]   MONITOR SP02/ABG'S AS NEEDED   [x]   PATIENT EDUCATION AS NEEDED

## 2023-04-02 VITALS
HEART RATE: 62 BPM | RESPIRATION RATE: 16 BRPM | HEIGHT: 62 IN | OXYGEN SATURATION: 98 % | WEIGHT: 171.96 LBS | SYSTOLIC BLOOD PRESSURE: 142 MMHG | BODY MASS INDEX: 31.64 KG/M2 | TEMPERATURE: 97.4 F | DIASTOLIC BLOOD PRESSURE: 86 MMHG

## 2023-04-02 LAB
ABSOLUTE EOS #: 0 K/UL (ref 0–0.4)
ABSOLUTE IMMATURE GRANULOCYTE: 0.19 K/UL (ref 0–0.3)
ABSOLUTE LYMPH #: 0.48 K/UL (ref 1–4.8)
ABSOLUTE MONO #: 0.29 K/UL (ref 0.1–0.8)
ANION GAP SERPL CALCULATED.3IONS-SCNC: 12 MMOL/L (ref 9–17)
BASOPHILS # BLD: 0 % (ref 0–2)
BASOPHILS ABSOLUTE: 0 K/UL (ref 0–0.2)
BUN SERPL-MCNC: 19 MG/DL (ref 6–20)
CALCIUM SERPL-MCNC: 8.2 MG/DL (ref 8.6–10.4)
CHLORIDE SERPL-SCNC: 103 MMOL/L (ref 98–107)
CO2 SERPL-SCNC: 23 MMOL/L (ref 20–31)
CREAT SERPL-MCNC: 0.66 MG/DL (ref 0.5–0.9)
CRP SERPL HS-MCNC: 89 MG/L (ref 0–5)
EOSINOPHILS RELATIVE PERCENT: 0 % (ref 1–4)
GFR SERPL CREATININE-BSD FRML MDRD: >60 ML/MIN/1.73M2
GLUCOSE SERPL-MCNC: 149 MG/DL (ref 70–99)
HCT VFR BLD AUTO: 25.8 % (ref 36.3–47.1)
HGB BLD-MCNC: 8.1 G/DL (ref 11.9–15.1)
IMMATURE GRANULOCYTES: 2 %
LYMPHOCYTES # BLD: 5 % (ref 24–44)
MCH RBC QN AUTO: 28.7 PG (ref 25.2–33.5)
MCHC RBC AUTO-ENTMCNC: 31.4 G/DL (ref 28.4–34.8)
MCV RBC AUTO: 91.5 FL (ref 82.6–102.9)
MICROORGANISM SPEC CULT: ABNORMAL
MICROORGANISM SPEC CULT: ABNORMAL
MICROORGANISM/AGENT SPEC: ABNORMAL
MONOCYTES # BLD: 3 % (ref 1–7)
MORPHOLOGY: ABNORMAL
NRBC AUTOMATED: 0.2 PER 100 WBC
PDW BLD-RTO: 17.6 % (ref 11.8–14.4)
PLATELET # BLD AUTO: 190 K/UL (ref 138–453)
PMV BLD AUTO: 11.9 FL (ref 8.1–13.5)
POTASSIUM SERPL-SCNC: 3.7 MMOL/L (ref 3.7–5.3)
RBC # BLD: 2.82 M/UL (ref 3.95–5.11)
SEG NEUTROPHILS: 90 % (ref 36–66)
SEGMENTED NEUTROPHILS ABSOLUTE COUNT: 8.64 K/UL (ref 1.8–7.7)
SODIUM SERPL-SCNC: 138 MMOL/L (ref 135–144)
SPECIMEN DESCRIPTION: ABNORMAL
WBC # BLD AUTO: 9.6 K/UL (ref 3.5–11.3)

## 2023-04-02 PROCEDURE — 6370000000 HC RX 637 (ALT 250 FOR IP): Performed by: NURSE PRACTITIONER

## 2023-04-02 PROCEDURE — 2700000000 HC OXYGEN THERAPY PER DAY

## 2023-04-02 PROCEDURE — 94760 N-INVAS EAR/PLS OXIMETRY 1: CPT

## 2023-04-02 PROCEDURE — 6370000000 HC RX 637 (ALT 250 FOR IP): Performed by: INTERNAL MEDICINE

## 2023-04-02 PROCEDURE — 2580000003 HC RX 258: Performed by: NURSE PRACTITIONER

## 2023-04-02 PROCEDURE — 94640 AIRWAY INHALATION TREATMENT: CPT

## 2023-04-02 PROCEDURE — 2580000003 HC RX 258: Performed by: STUDENT IN AN ORGANIZED HEALTH CARE EDUCATION/TRAINING PROGRAM

## 2023-04-02 PROCEDURE — 99232 SBSQ HOSP IP/OBS MODERATE 35: CPT | Performed by: INTERNAL MEDICINE

## 2023-04-02 PROCEDURE — 80048 BASIC METABOLIC PNL TOTAL CA: CPT

## 2023-04-02 PROCEDURE — 86140 C-REACTIVE PROTEIN: CPT

## 2023-04-02 PROCEDURE — 85025 COMPLETE CBC W/AUTO DIFF WBC: CPT

## 2023-04-02 PROCEDURE — 6360000002 HC RX W HCPCS: Performed by: NURSE PRACTITIONER

## 2023-04-02 PROCEDURE — 6370000000 HC RX 637 (ALT 250 FOR IP): Performed by: STUDENT IN AN ORGANIZED HEALTH CARE EDUCATION/TRAINING PROGRAM

## 2023-04-02 PROCEDURE — C9113 INJ PANTOPRAZOLE SODIUM, VIA: HCPCS | Performed by: NURSE PRACTITIONER

## 2023-04-02 PROCEDURE — 6360000002 HC RX W HCPCS: Performed by: STUDENT IN AN ORGANIZED HEALTH CARE EDUCATION/TRAINING PROGRAM

## 2023-04-02 PROCEDURE — 36415 COLL VENOUS BLD VENIPUNCTURE: CPT

## 2023-04-02 PROCEDURE — 99239 HOSP IP/OBS DSCHRG MGMT >30: CPT | Performed by: STUDENT IN AN ORGANIZED HEALTH CARE EDUCATION/TRAINING PROGRAM

## 2023-04-02 RX ORDER — PREDNISONE 10 MG/1
30 TABLET ORAL DAILY
Qty: 9 TABLET | Refills: 0 | Status: SHIPPED | OUTPATIENT
Start: 2023-04-05 | End: 2023-04-08

## 2023-04-02 RX ORDER — PREDNISONE 20 MG/1
20 TABLET ORAL DAILY
Qty: 3 TABLET | Refills: 0 | Status: SHIPPED | OUTPATIENT
Start: 2023-04-08 | End: 2023-04-11

## 2023-04-02 RX ORDER — GREEN TEA/HOODIA GORDONII 315-12.5MG
1 CAPSULE ORAL 2 TIMES DAILY
Qty: 60 TABLET | Refills: 0 | Status: SHIPPED | OUTPATIENT
Start: 2023-04-02 | End: 2023-05-02

## 2023-04-02 RX ORDER — PREDNISONE 20 MG/1
40 TABLET ORAL DAILY
Status: DISCONTINUED | OUTPATIENT
Start: 2023-04-02 | End: 2023-04-02 | Stop reason: HOSPADM

## 2023-04-02 RX ORDER — PREDNISONE 10 MG/1
10 TABLET ORAL DAILY
Status: DISCONTINUED | OUTPATIENT
Start: 2023-04-11 | End: 2023-04-02 | Stop reason: HOSPADM

## 2023-04-02 RX ORDER — PREDNISONE 20 MG/1
40 TABLET ORAL DAILY
Qty: 4 TABLET | Refills: 0 | Status: SHIPPED | OUTPATIENT
Start: 2023-04-03 | End: 2023-04-05

## 2023-04-02 RX ORDER — MOXIFLOXACIN HYDROCHLORIDE 400 MG/1
400 TABLET ORAL DAILY
Qty: 10 TABLET | Refills: 0 | Status: SHIPPED | OUTPATIENT
Start: 2023-04-03 | End: 2023-04-13

## 2023-04-02 RX ORDER — ASPIRIN 81 MG/1
81 TABLET ORAL 2 TIMES DAILY
Qty: 60 TABLET | Refills: 0 | Status: SHIPPED | OUTPATIENT
Start: 2023-04-02 | End: 2023-05-02

## 2023-04-02 RX ORDER — PREDNISONE 20 MG/1
20 TABLET ORAL DAILY
Status: DISCONTINUED | OUTPATIENT
Start: 2023-04-08 | End: 2023-04-02 | Stop reason: HOSPADM

## 2023-04-02 RX ORDER — TIOTROPIUM BROMIDE 18 UG/1
18 CAPSULE ORAL; RESPIRATORY (INHALATION) DAILY
Qty: 90 CAPSULE | Refills: 1 | Status: SHIPPED | OUTPATIENT
Start: 2023-04-02

## 2023-04-02 RX ORDER — PREDNISONE 10 MG/1
10 TABLET ORAL DAILY
Qty: 3 TABLET | Refills: 0 | Status: SHIPPED | OUTPATIENT
Start: 2023-04-11 | End: 2023-04-14

## 2023-04-02 RX ORDER — IPRATROPIUM BROMIDE AND ALBUTEROL SULFATE 2.5; .5 MG/3ML; MG/3ML
3 SOLUTION RESPIRATORY (INHALATION) EVERY 6 HOURS PRN
Qty: 360 ML | Refills: 1 | Status: SHIPPED | OUTPATIENT
Start: 2023-04-02

## 2023-04-02 RX ADMIN — ONDANSETRON 4 MG: 2 INJECTION INTRAMUSCULAR; INTRAVENOUS at 14:08

## 2023-04-02 RX ADMIN — ENOXAPARIN SODIUM 40 MG: 100 INJECTION SUBCUTANEOUS at 08:36

## 2023-04-02 RX ADMIN — PREGABALIN 300 MG: 100 CAPSULE ORAL at 08:36

## 2023-04-02 RX ADMIN — GABAPENTIN 800 MG: 400 CAPSULE ORAL at 08:36

## 2023-04-02 RX ADMIN — IRON SUCROSE 200 MG: 20 INJECTION, SOLUTION INTRAVENOUS at 05:45

## 2023-04-02 RX ADMIN — ONDANSETRON 4 MG: 2 INJECTION INTRAMUSCULAR; INTRAVENOUS at 03:07

## 2023-04-02 RX ADMIN — FLUOXETINE HYDROCHLORIDE 60 MG: 20 CAPSULE ORAL at 08:36

## 2023-04-02 RX ADMIN — SODIUM CHLORIDE, PRESERVATIVE FREE 40 MG: 5 INJECTION INTRAVENOUS at 09:52

## 2023-04-02 RX ADMIN — METHYLPREDNISOLONE SODIUM SUCCINATE 60 MG: 125 INJECTION, POWDER, FOR SOLUTION INTRAMUSCULAR; INTRAVENOUS at 03:09

## 2023-04-02 RX ADMIN — HYDROCODONE BITARTRATE AND ACETAMINOPHEN 2 TABLET: 5; 325 TABLET ORAL at 15:45

## 2023-04-02 RX ADMIN — PREGABALIN 300 MG: 100 CAPSULE ORAL at 15:45

## 2023-04-02 RX ADMIN — ONDANSETRON 4 MG: 2 INJECTION INTRAMUSCULAR; INTRAVENOUS at 08:15

## 2023-04-02 RX ADMIN — HYDROCODONE BITARTRATE AND ACETAMINOPHEN 2 TABLET: 5; 325 TABLET ORAL at 09:55

## 2023-04-02 RX ADMIN — PREDNISONE 40 MG: 20 TABLET ORAL at 08:36

## 2023-04-02 RX ADMIN — GABAPENTIN 800 MG: 400 CAPSULE ORAL at 13:36

## 2023-04-02 RX ADMIN — HYDROCODONE BITARTRATE AND ACETAMINOPHEN 2 TABLET: 5; 325 TABLET ORAL at 03:06

## 2023-04-02 RX ADMIN — MOXIFLOXACIN 400 MG: 400 TABLET, FILM COATED ORAL at 08:36

## 2023-04-02 RX ADMIN — IPRATROPIUM BROMIDE AND ALBUTEROL SULFATE 1 AMPULE: 2.5; .5 SOLUTION RESPIRATORY (INHALATION) at 13:03

## 2023-04-02 ASSESSMENT — PAIN DESCRIPTION - ORIENTATION
ORIENTATION: LEFT
ORIENTATION: LEFT

## 2023-04-02 ASSESSMENT — ENCOUNTER SYMPTOMS
WHEEZING: 0
VOMITING: 1
SHORTNESS OF BREATH: 1
ABDOMINAL PAIN: 1
DIARRHEA: 1
ABDOMINAL DISTENTION: 0
STRIDOR: 0
COUGH: 1
BLOOD IN STOOL: 1
SORE THROAT: 0
EYE DISCHARGE: 0
COLOR CHANGE: 0
BACK PAIN: 0
NAUSEA: 1

## 2023-04-02 ASSESSMENT — PAIN - FUNCTIONAL ASSESSMENT: PAIN_FUNCTIONAL_ASSESSMENT: ACTIVITIES ARE NOT PREVENTED

## 2023-04-02 ASSESSMENT — PAIN SCALES - GENERAL
PAINLEVEL_OUTOF10: 7

## 2023-04-02 ASSESSMENT — PAIN DESCRIPTION - LOCATION
LOCATION: HIP
LOCATION: HIP;GENERALIZED

## 2023-04-02 ASSESSMENT — PAIN DESCRIPTION - PAIN TYPE: TYPE: ACUTE PAIN;SURGICAL PAIN

## 2023-04-02 ASSESSMENT — PAIN DESCRIPTION - DESCRIPTORS
DESCRIPTORS: ACHING
DESCRIPTORS: ACHING

## 2023-04-02 NOTE — CARE COORDINATION
Transitional Planning  Patient with discharge order and qualifies for home oxygen. [address] [facility], spoke with Bev Harrington, notified of discharge. KIERSTEN and home care order faxed to [phone]    Leo Titus at [facility], notified need for home oxygen. Faxed order, face to face, h and p and demographics to [facility] [phone]    Tank delivered to room.

## 2023-04-02 NOTE — PLAN OF CARE
Problem: Respiratory - Adult  Goal: Achieves optimal ventilation and oxygenation  4/2/2023 1309 by Susy Forbes RCP  Outcome: Progressing   BRONCHOSPASM/BRONCHOCONSTRICTION     [x]         IMPROVE AERATION/BREATH SOUNDS  [x]   ADMINISTER BRONCHODILATOR THERAPY AS APPROPRIATE  [x]   ASSESS BREATH SOUNDS  []   IMPLEMENT AEROSOL/MDI PROTOCOL  [x]   PATIENT EDUCATION AS NEEDED   PROVIDE ADEQUATE OXYGENATION WITH ACCEPTABLE SP02/ABG'S    [x]  IDENTIFY APPROPRIATE OXYGEN THERAPY  [x]   MONITOR SP02/ABG'S AS NEEDED   [x]   PATIENT EDUCATION AS NEEDED

## 2023-04-02 NOTE — DISCHARGE INSTRUCTIONS
Take moxifloxacin for 10 days  Prednisone taper, prednisone 40 mg for 2 days followed by 30 mg for 3 days, 20 mg for 3 days and then 10 mg for 3 days. You need GI follow-up outpatient for Crohn's work-up.   Takes Symbicort and Spiriva daily  Take DuoNeb treatments if you feel short of breath or have significant wheezing  Encouraged to quit smoking
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
## 2023-04-02 NOTE — PLAN OF CARE
Problem: Safety - Adult  Goal: Free from fall injury  Outcome: Progressing     Problem: Pain  Goal: Verbalizes/displays adequate comfort level or baseline comfort level  Outcome: Progressing     Problem: Skin/Tissue Integrity  Goal: Absence of new skin breakdown  Description: 1. Monitor for areas of redness and/or skin breakdown  2. Assess vascular access sites hourly  3. Every 4-6 hours minimum:  Change oxygen saturation probe site  4. Every 4-6 hours:  If on nasal continuous positive airway pressure, respiratory therapy assess nares and determine need for appliance change or resting period.   Outcome: Progressing     Problem: Respiratory - Adult  Goal: Achieves optimal ventilation and oxygenation  Outcome: Progressing     Problem: Discharge Planning  Goal: Discharge to home or other facility with appropriate resources  Outcome: Progressing

## 2023-04-03 ENCOUNTER — TELEPHONE (OUTPATIENT)
Dept: ORTHOPEDIC SURGERY | Age: 58
End: 2023-04-03

## 2023-04-03 DIAGNOSIS — Z96.642 S/P TOTAL LEFT HIP ARTHROPLASTY: Primary | ICD-10-CM

## 2023-04-03 RX ORDER — HYDROCODONE BITARTRATE AND ACETAMINOPHEN 5; 325 MG/1; MG/1
1 TABLET ORAL EVERY 6 HOURS PRN
Qty: 28 TABLET | Refills: 0 | Status: SHIPPED | OUTPATIENT
Start: 2023-04-03 | End: 2023-04-10

## 2023-04-04 ENCOUNTER — OFFICE VISIT (OUTPATIENT)
Dept: ORTHOPEDIC SURGERY | Age: 58
End: 2023-04-04

## 2023-04-04 VITALS — BODY MASS INDEX: 32.2 KG/M2 | WEIGHT: 175 LBS | HEIGHT: 62 IN

## 2023-04-04 DIAGNOSIS — Z96.642 HISTORY OF TOTAL HIP ARTHROPLASTY, LEFT: Primary | ICD-10-CM

## 2023-04-04 PROCEDURE — 99024 POSTOP FOLLOW-UP VISIT: CPT | Performed by: ORTHOPAEDIC SURGERY

## 2023-04-04 NOTE — PROGRESS NOTES
This 62-year-old patient who underwent left total hip arthroplasty 3/24/2023 is quite happy with the result of the surgery. She has minimal symptoms. She was taking Percocet for pain but she says it had upset her Crohn's disease and therefore Vicodin was prescribed last night. As for the right hip at the present time the pain is controlled but if the pain comes back then she would like to have that replaced. Examination: Incision remains healed. Her gait is excellent. Diagnosis: Status post left total hip arthroplasty. Treatment: Patient says that she is going to start physical therapy on Monday. We will see her in 3 weeks.

## 2023-04-17 DIAGNOSIS — Z96.642 S/P TOTAL LEFT HIP ARTHROPLASTY: ICD-10-CM

## 2023-04-17 RX ORDER — HYDROCODONE BITARTRATE AND ACETAMINOPHEN 5; 325 MG/1; MG/1
1 TABLET ORAL EVERY 6 HOURS PRN
Qty: 28 TABLET | Refills: 0 | Status: SHIPPED | OUTPATIENT
Start: 2023-04-17 | End: 2023-04-21 | Stop reason: SDUPTHER

## 2023-04-18 ENCOUNTER — HOSPITAL ENCOUNTER (OUTPATIENT)
Dept: PHYSICAL THERAPY | Age: 58
Setting detail: THERAPIES SERIES
Discharge: HOME | End: 2023-04-18
Payer: MEDICAID

## 2023-04-18 PROCEDURE — 97016 VASOPNEUMATIC DEVICE THERAPY: CPT

## 2023-04-18 PROCEDURE — 97110 THERAPEUTIC EXERCISES: CPT

## 2023-04-18 NOTE — FLOWSHEET NOTE
[x] [facility]  [address]  [phone]  F: [phone]     Physical Therapy Daily Treatment Note    Date:  2023  Patient Name:  Shaye Yip      :  1965    MRN: [id number]  Physician: Richard Motley MD                              Insurance: [facility] Medicaid  v  Medical Diagnosis: S18.191 (ICD-10-CM) - S/P total left hip arthroplasty                Rehab Codes: Pain M25.552, stiffness M25.652, swelling M25.452, weakness M62.552, Gait R26.2  Onset date: 3-24-23                Next 's appt.: 23    Visit# / total visits: 3/24  Cancels/No Shows: 0/0    Subjective:    Pain:  [x] Yes  [] No Location: posterolateral hip   Pain Rating: (0-10 scale) 5/10  Pain altered Tx:  [x] No  [] Yes  Action:  Comments: Patient arrives to clinic with rollator and demonstrates forward flexed position but able to improve with conscious effort. Objective:  Modalities: Game ready to L hip, supine - post Tx min compression 36 deg x15 min - legs on wedge     Precautions:  Exercises:  Exercise Reps/ Time Weight/ Level Comments   Seated      Heel/Toe Raises 10x ea     March from elevated mat 10x     LAQ 10x     Hip add ball squeeze  10 x 5 \"  added   Almost sit squats 10 x  added         Supine      Hooklying Glute Isometric 10x5\"     Hip Adduction Iso w/ball 10x5\"  Did sitting   HS Iso 10x5\"     SAQ 2x10     Heel Slides 2x10     Hip Abduction Slides 10x AAROM    Quad Isometric 10x5\"     Hooklying Abduction 2x10     Manual knee ext stretch with ankle on bolster 8 x   Added bilat   Gentle bridges 10 x  added                           Other:      Treatment Charges: Mins Units   []  Modalities     [x]  Ther Exercise 40 3   []  Manual Therapy     []  Ther Activities     []  Aquatics     [x]  Vasocompression 15 1   []  Other     Total Treatment time 55 4       Assessment: [x] Progressing toward goals. Encouraged upright standing and not toeing in.

## 2023-04-20 ENCOUNTER — HOSPITAL ENCOUNTER (OUTPATIENT)
Dept: PHYSICAL THERAPY | Age: 58
Setting detail: THERAPIES SERIES
Discharge: HOME | End: 2023-04-20
Payer: MEDICAID

## 2023-04-20 NOTE — FLOWSHEET NOTE
[x] [facility] [facility]  [address]    [phone]  F: [phone]     Physical Therapy Cancel/No Show note    Date: 2023  Patient: Luana Turpin  : 1965  MRN: [id number]    Cancels/No Shows to date:     For today's appointment patient:    []  Cancelled    [] Rescheduled appointment    [x] No-show     Reason given by patient:    []  Patient ill    []  Conflicting appointment    [] No transportation      [] Conflict with work    [x] No reason given    [] Weather related    [] [id number]    [] Other:      Comments:        [x] Next appointment was confirmed Previously     Electronically signed by: Temo Duong PTA

## 2023-04-21 ENCOUNTER — HOSPITAL ENCOUNTER (INPATIENT)
Age: 58
LOS: 2 days | Discharge: HOME | DRG: 347 | End: 2023-04-25
Attending: EMERGENCY MEDICINE | Admitting: EMERGENCY MEDICINE
Payer: MEDICAID

## 2023-04-21 ENCOUNTER — APPOINTMENT (OUTPATIENT)
Dept: GENERAL RADIOLOGY | Age: 58
DRG: 347 | End: 2023-04-21
Payer: MEDICAID

## 2023-04-21 DIAGNOSIS — Z96.642 S/P TOTAL LEFT HIP ARTHROPLASTY: ICD-10-CM

## 2023-04-21 DIAGNOSIS — M47.817 LUMBOSACRAL SPONDYLOSIS WITHOUT MYELOPATHY: ICD-10-CM

## 2023-04-21 DIAGNOSIS — M25.552 LEFT HIP PAIN: Primary | ICD-10-CM

## 2023-04-21 LAB
ABSOLUTE EOS #: 0.08 K/UL (ref 0–0.44)
ABSOLUTE IMMATURE GRANULOCYTE: 0.03 K/UL (ref 0–0.3)
ABSOLUTE LYMPH #: 1.79 K/UL (ref 1.1–3.7)
ABSOLUTE MONO #: 0.61 K/UL (ref 0.1–1.2)
ANION GAP SERPL CALCULATED.3IONS-SCNC: 7 MMOL/L (ref 9–17)
BASOPHILS # BLD: 1 % (ref 0–2)
BASOPHILS ABSOLUTE: 0.04 K/UL (ref 0–0.2)
BUN SERPL-MCNC: 15 MG/DL (ref 6–20)
CALCIUM SERPL-MCNC: 9 MG/DL (ref 8.6–10.4)
CHLORIDE SERPL-SCNC: 97 MMOL/L (ref 98–107)
CO2 SERPL-SCNC: 29 MMOL/L (ref 20–31)
CREAT SERPL-MCNC: 0.75 MG/DL (ref 0.5–0.9)
CRP SERPL HS-MCNC: 27.8 MG/L (ref 0–5)
D DIMER BLD IA.RAPID-MCNC: 3.41 UG/ML FEU (ref 0–0.57)
EOSINOPHILS RELATIVE PERCENT: 1 % (ref 1–4)
ERYTHROCYTE [SEDIMENTATION RATE] IN BLOOD BY WESTERGREN METHOD: 29 MM/HR (ref 0–30)
GFR SERPL CREATININE-BSD FRML MDRD: >60 ML/MIN/1.73M2
GLUCOSE SERPL-MCNC: 100 MG/DL (ref 70–99)
HCT VFR BLD AUTO: 34.2 % (ref 36.3–47.1)
HGB BLD-MCNC: 10.7 G/DL (ref 11.9–15.1)
IMMATURE GRANULOCYTES: 0 %
LYMPHOCYTES # BLD: 25 % (ref 24–43)
MCH RBC QN AUTO: 27.9 PG (ref 25.2–33.5)
MCHC RBC AUTO-ENTMCNC: 31.3 G/DL (ref 28.4–34.8)
MCV RBC AUTO: 89.1 FL (ref 82.6–102.9)
MONOCYTES # BLD: 9 % (ref 3–12)
NRBC AUTOMATED: 0 PER 100 WBC
PDW BLD-RTO: 15.6 % (ref 11.8–14.4)
PLATELET # BLD AUTO: 354 K/UL (ref 138–453)
PMV BLD AUTO: 9.5 FL (ref 8.1–13.5)
POTASSIUM SERPL-SCNC: 4.3 MMOL/L (ref 3.7–5.3)
RBC # BLD: 3.84 M/UL (ref 3.95–5.11)
RBC # BLD: ABNORMAL 10*6/UL
SEG NEUTROPHILS: 64 % (ref 36–65)
SEGMENTED NEUTROPHILS ABSOLUTE COUNT: 4.6 K/UL (ref 1.5–8.1)
SODIUM SERPL-SCNC: 133 MMOL/L (ref 135–144)
WBC # BLD AUTO: 7.2 K/UL (ref 3.5–11.3)

## 2023-04-21 PROCEDURE — 6370000000 HC RX 637 (ALT 250 FOR IP): Performed by: STUDENT IN AN ORGANIZED HEALTH CARE EDUCATION/TRAINING PROGRAM

## 2023-04-21 PROCEDURE — G0378 HOSPITAL OBSERVATION PER HR: HCPCS

## 2023-04-21 PROCEDURE — 73502 X-RAY EXAM HIP UNI 2-3 VIEWS: CPT

## 2023-04-21 PROCEDURE — 85379 FIBRIN DEGRADATION QUANT: CPT

## 2023-04-21 PROCEDURE — 85025 COMPLETE CBC W/AUTO DIFF WBC: CPT

## 2023-04-21 PROCEDURE — 85652 RBC SED RATE AUTOMATED: CPT

## 2023-04-21 PROCEDURE — 96372 THER/PROPH/DIAG INJ SC/IM: CPT

## 2023-04-21 PROCEDURE — 80048 BASIC METABOLIC PNL TOTAL CA: CPT

## 2023-04-21 PROCEDURE — 6360000002 HC RX W HCPCS: Performed by: STUDENT IN AN ORGANIZED HEALTH CARE EDUCATION/TRAINING PROGRAM

## 2023-04-21 PROCEDURE — 99285 EMERGENCY DEPT VISIT HI MDM: CPT

## 2023-04-21 PROCEDURE — 86140 C-REACTIVE PROTEIN: CPT

## 2023-04-21 RX ORDER — ASPIRIN 81 MG/1
81 TABLET ORAL 2 TIMES DAILY
Status: DISCONTINUED | OUTPATIENT
Start: 2023-04-21 | End: 2023-04-25 | Stop reason: HOSPADM

## 2023-04-21 RX ORDER — LIDOCAINE 4 G/G
1 PATCH TOPICAL DAILY
Status: DISCONTINUED | OUTPATIENT
Start: 2023-04-21 | End: 2023-04-25 | Stop reason: HOSPADM

## 2023-04-21 RX ORDER — HYDROCODONE BITARTRATE AND ACETAMINOPHEN 5; 325 MG/1; MG/1
1 TABLET ORAL EVERY 6 HOURS PRN
Status: DISCONTINUED | OUTPATIENT
Start: 2023-04-21 | End: 2023-04-22

## 2023-04-21 RX ORDER — LISINOPRIL 5 MG/1
5 TABLET ORAL DAILY
Status: DISCONTINUED | OUTPATIENT
Start: 2023-04-22 | End: 2023-04-25 | Stop reason: HOSPADM

## 2023-04-21 RX ORDER — HYDROCODONE BITARTRATE AND ACETAMINOPHEN 5; 325 MG/1; MG/1
1 TABLET ORAL EVERY 6 HOURS PRN
Qty: 28 TABLET | Refills: 0 | Status: CANCELLED | OUTPATIENT
Start: 2023-04-21 | End: 2023-04-28

## 2023-04-21 RX ORDER — IPRATROPIUM BROMIDE AND ALBUTEROL SULFATE 2.5; .5 MG/3ML; MG/3ML
3 SOLUTION RESPIRATORY (INHALATION) EVERY 6 HOURS PRN
Status: DISCONTINUED | OUTPATIENT
Start: 2023-04-21 | End: 2023-04-25 | Stop reason: HOSPADM

## 2023-04-21 RX ORDER — KETOROLAC TROMETHAMINE 30 MG/ML
30 INJECTION, SOLUTION INTRAMUSCULAR; INTRAVENOUS ONCE
Status: COMPLETED | OUTPATIENT
Start: 2023-04-21 | End: 2023-04-21

## 2023-04-21 RX ORDER — DOCUSATE SODIUM 100 MG/1
100 CAPSULE, LIQUID FILLED ORAL 2 TIMES DAILY PRN
Status: DISCONTINUED | OUTPATIENT
Start: 2023-04-21 | End: 2023-04-25 | Stop reason: HOSPADM

## 2023-04-21 RX ORDER — ENOXAPARIN SODIUM 100 MG/ML
1 INJECTION SUBCUTANEOUS 2 TIMES DAILY
Status: DISCONTINUED | OUTPATIENT
Start: 2023-04-21 | End: 2023-04-25 | Stop reason: HOSPADM

## 2023-04-21 RX ORDER — BUDESONIDE AND FORMOTEROL FUMARATE DIHYDRATE 160; 4.5 UG/1; UG/1
2 AEROSOL RESPIRATORY (INHALATION) 2 TIMES DAILY
Status: DISCONTINUED | OUTPATIENT
Start: 2023-04-21 | End: 2023-04-25 | Stop reason: HOSPADM

## 2023-04-21 RX ORDER — HYDROCODONE BITARTRATE AND ACETAMINOPHEN 5; 325 MG/1; MG/1
1 TABLET ORAL EVERY 6 HOURS PRN
Qty: 28 TABLET | Refills: 0 | Status: ON HOLD | OUTPATIENT
Start: 2023-04-21 | End: 2023-04-28

## 2023-04-21 RX ORDER — FLUOXETINE HYDROCHLORIDE 20 MG/1
60 CAPSULE ORAL DAILY
Status: DISCONTINUED | OUTPATIENT
Start: 2023-04-22 | End: 2023-04-25 | Stop reason: HOSPADM

## 2023-04-21 RX ORDER — PANTOPRAZOLE SODIUM 40 MG/1
40 TABLET, DELAYED RELEASE ORAL DAILY
Status: DISCONTINUED | OUTPATIENT
Start: 2023-04-22 | End: 2023-04-25 | Stop reason: HOSPADM

## 2023-04-21 RX ADMIN — HYDROCODONE BITARTRATE AND ACETAMINOPHEN 1 TABLET: 5; 325 TABLET ORAL at 22:58

## 2023-04-21 RX ADMIN — ENOXAPARIN SODIUM 80 MG: 100 INJECTION SUBCUTANEOUS at 23:20

## 2023-04-21 RX ADMIN — KETOROLAC TROMETHAMINE 30 MG: 30 INJECTION, SOLUTION INTRAMUSCULAR at 18:25

## 2023-04-21 RX ADMIN — Medication 81 MG: at 23:20

## 2023-04-21 ASSESSMENT — PAIN SCALES - GENERAL: PAINLEVEL_OUTOF10: 9

## 2023-04-21 ASSESSMENT — PAIN DESCRIPTION - ORIENTATION: ORIENTATION: LEFT

## 2023-04-21 ASSESSMENT — PAIN DESCRIPTION - DESCRIPTORS: DESCRIPTORS: SHARP

## 2023-04-21 ASSESSMENT — ENCOUNTER SYMPTOMS: SHORTNESS OF BREATH: 0

## 2023-04-21 ASSESSMENT — PAIN DESCRIPTION - LOCATION: LOCATION: HIP;KNEE

## 2023-04-21 NOTE — TELEPHONE ENCOUNTER
Pt called in stating she was in a great deal of pain. She will run out of pain medication over the weekend. Medication pended for refill.

## 2023-04-21 NOTE — TELEPHONE ENCOUNTER
Pt called in stating she was in a great deal of pain and will be out of pain medication next Monday. Medication pended for refill. Please advise pt pain concern.

## 2023-04-22 ENCOUNTER — APPOINTMENT (OUTPATIENT)
Dept: ULTRASOUND IMAGING | Age: 58
DRG: 347 | End: 2023-04-22
Payer: MEDICAID

## 2023-04-22 ENCOUNTER — APPOINTMENT (OUTPATIENT)
Dept: GENERAL RADIOLOGY | Age: 58
DRG: 347 | End: 2023-04-22
Payer: MEDICAID

## 2023-04-22 PROCEDURE — 6370000000 HC RX 637 (ALT 250 FOR IP)

## 2023-04-22 PROCEDURE — G0378 HOSPITAL OBSERVATION PER HR: HCPCS

## 2023-04-22 PROCEDURE — 94760 N-INVAS EAR/PLS OXIMETRY 1: CPT

## 2023-04-22 PROCEDURE — 6360000002 HC RX W HCPCS

## 2023-04-22 PROCEDURE — 72100 X-RAY EXAM L-S SPINE 2/3 VWS: CPT

## 2023-04-22 PROCEDURE — 6370000000 HC RX 637 (ALT 250 FOR IP): Performed by: STUDENT IN AN ORGANIZED HEALTH CARE EDUCATION/TRAINING PROGRAM

## 2023-04-22 PROCEDURE — 76642 ULTRASOUND BREAST LIMITED: CPT

## 2023-04-22 PROCEDURE — 6360000002 HC RX W HCPCS: Performed by: STUDENT IN AN ORGANIZED HEALTH CARE EDUCATION/TRAINING PROGRAM

## 2023-04-22 PROCEDURE — 96372 THER/PROPH/DIAG INJ SC/IM: CPT

## 2023-04-22 PROCEDURE — 94640 AIRWAY INHALATION TREATMENT: CPT

## 2023-04-22 PROCEDURE — 96374 THER/PROPH/DIAG INJ IV PUSH: CPT

## 2023-04-22 PROCEDURE — 93970 EXTREMITY STUDY: CPT

## 2023-04-22 PROCEDURE — 96375 TX/PRO/DX INJ NEW DRUG ADDON: CPT

## 2023-04-22 PROCEDURE — 73562 X-RAY EXAM OF KNEE 3: CPT

## 2023-04-22 RX ORDER — MORPHINE SULFATE 4 MG/ML
4 INJECTION, SOLUTION INTRAMUSCULAR; INTRAVENOUS EVERY 4 HOURS PRN
Status: DISCONTINUED | OUTPATIENT
Start: 2023-04-22 | End: 2023-04-25 | Stop reason: HOSPADM

## 2023-04-22 RX ORDER — GABAPENTIN 800 MG/1
800 TABLET ORAL 2 TIMES DAILY
Status: DISCONTINUED | OUTPATIENT
Start: 2023-04-22 | End: 2023-04-25 | Stop reason: HOSPADM

## 2023-04-22 RX ORDER — ONDANSETRON 2 MG/ML
4 INJECTION INTRAMUSCULAR; INTRAVENOUS EVERY 8 HOURS PRN
Status: DISCONTINUED | OUTPATIENT
Start: 2023-04-22 | End: 2023-04-25 | Stop reason: HOSPADM

## 2023-04-22 RX ORDER — GABAPENTIN 400 MG/1
400 CAPSULE ORAL 2 TIMES DAILY
Status: DISCONTINUED | OUTPATIENT
Start: 2023-04-22 | End: 2023-04-22

## 2023-04-22 RX ORDER — PREGABALIN 100 MG/1
300 CAPSULE ORAL 2 TIMES DAILY
Status: DISCONTINUED | OUTPATIENT
Start: 2023-04-22 | End: 2023-04-25 | Stop reason: HOSPADM

## 2023-04-22 RX ORDER — HYDROCODONE BITARTRATE AND ACETAMINOPHEN 5; 325 MG/1; MG/1
2 TABLET ORAL EVERY 6 HOURS PRN
Status: DISCONTINUED | OUTPATIENT
Start: 2023-04-22 | End: 2023-04-25 | Stop reason: HOSPADM

## 2023-04-22 RX ORDER — CLONAZEPAM 0.5 MG/1
0.5 TABLET ORAL EVERY 12 HOURS PRN
Status: DISCONTINUED | OUTPATIENT
Start: 2023-04-22 | End: 2023-04-25 | Stop reason: HOSPADM

## 2023-04-22 RX ADMIN — CLONAZEPAM 0.5 MG: 0.5 TABLET ORAL at 15:46

## 2023-04-22 RX ADMIN — BUDESONIDE AND FORMOTEROL FUMARATE DIHYDRATE 2 PUFF: 160; 4.5 AEROSOL RESPIRATORY (INHALATION) at 09:14

## 2023-04-22 RX ADMIN — ENOXAPARIN SODIUM 80 MG: 100 INJECTION SUBCUTANEOUS at 21:03

## 2023-04-22 RX ADMIN — PREGABALIN 300 MG: 100 CAPSULE ORAL at 21:02

## 2023-04-22 RX ADMIN — MORPHINE SULFATE 4 MG: 4 INJECTION INTRAVENOUS at 18:03

## 2023-04-22 RX ADMIN — PANTOPRAZOLE SODIUM 40 MG: 40 TABLET, DELAYED RELEASE ORAL at 08:45

## 2023-04-22 RX ADMIN — FLUOXETINE HYDROCHLORIDE 60 MG: 20 CAPSULE ORAL at 08:45

## 2023-04-22 RX ADMIN — PREGABALIN 300 MG: 100 CAPSULE ORAL at 10:04

## 2023-04-22 RX ADMIN — ONDANSETRON 4 MG: 2 INJECTION INTRAMUSCULAR; INTRAVENOUS at 18:40

## 2023-04-22 RX ADMIN — GABAPENTIN 800 MG: 800 TABLET ORAL at 10:44

## 2023-04-22 RX ADMIN — LISINOPRIL 5 MG: 5 TABLET ORAL at 08:45

## 2023-04-22 RX ADMIN — ENOXAPARIN SODIUM 80 MG: 100 INJECTION SUBCUTANEOUS at 08:46

## 2023-04-22 RX ADMIN — HYDROCODONE BITARTRATE AND ACETAMINOPHEN 1 TABLET: 5; 325 TABLET ORAL at 05:58

## 2023-04-22 RX ADMIN — Medication 81 MG: at 08:45

## 2023-04-22 RX ADMIN — GABAPENTIN 800 MG: 800 TABLET ORAL at 21:03

## 2023-04-22 RX ADMIN — HYDROCODONE BITARTRATE AND ACETAMINOPHEN 2 TABLET: 5; 325 TABLET ORAL at 12:41

## 2023-04-22 RX ADMIN — Medication 81 MG: at 21:03

## 2023-04-22 RX ADMIN — HYDROCODONE BITARTRATE AND ACETAMINOPHEN 2 TABLET: 5; 325 TABLET ORAL at 21:03

## 2023-04-22 RX ADMIN — BUDESONIDE AND FORMOTEROL FUMARATE DIHYDRATE 2 PUFF: 160; 4.5 AEROSOL RESPIRATORY (INHALATION) at 21:26

## 2023-04-22 RX ADMIN — TRAZODONE HYDROCHLORIDE 150 MG: 100 TABLET ORAL at 21:03

## 2023-04-22 ASSESSMENT — PAIN SCALES - GENERAL
PAINLEVEL_OUTOF10: 7
PAINLEVEL_OUTOF10: 9
PAINLEVEL_OUTOF10: 9
PAINLEVEL_OUTOF10: 7
PAINLEVEL_OUTOF10: 9

## 2023-04-22 ASSESSMENT — PAIN DESCRIPTION - DESCRIPTORS: DESCRIPTORS: ACHING;SHARP

## 2023-04-22 ASSESSMENT — PAIN DESCRIPTION - ORIENTATION
ORIENTATION: LEFT
ORIENTATION: LEFT

## 2023-04-22 ASSESSMENT — PAIN DESCRIPTION - LOCATION
LOCATION: HIP
LOCATION: HIP
LOCATION: LEG;HIP;KNEE

## 2023-04-23 ENCOUNTER — APPOINTMENT (OUTPATIENT)
Dept: MRI IMAGING | Age: 58
DRG: 347 | End: 2023-04-23
Payer: MEDICAID

## 2023-04-23 PROCEDURE — 72158 MRI LUMBAR SPINE W/O & W/DYE: CPT

## 2023-04-23 PROCEDURE — 96376 TX/PRO/DX INJ SAME DRUG ADON: CPT

## 2023-04-23 PROCEDURE — 6360000004 HC RX CONTRAST MEDICATION

## 2023-04-23 PROCEDURE — 1200000000 HC SEMI PRIVATE

## 2023-04-23 PROCEDURE — 2580000003 HC RX 258

## 2023-04-23 PROCEDURE — 6360000002 HC RX W HCPCS

## 2023-04-23 PROCEDURE — 6370000000 HC RX 637 (ALT 250 FOR IP): Performed by: STUDENT IN AN ORGANIZED HEALTH CARE EDUCATION/TRAINING PROGRAM

## 2023-04-23 PROCEDURE — 6370000000 HC RX 637 (ALT 250 FOR IP)

## 2023-04-23 PROCEDURE — 94640 AIRWAY INHALATION TREATMENT: CPT

## 2023-04-23 PROCEDURE — 96372 THER/PROPH/DIAG INJ SC/IM: CPT

## 2023-04-23 PROCEDURE — A9576 INJ PROHANCE MULTIPACK: HCPCS

## 2023-04-23 PROCEDURE — 6360000002 HC RX W HCPCS: Performed by: STUDENT IN AN ORGANIZED HEALTH CARE EDUCATION/TRAINING PROGRAM

## 2023-04-23 RX ORDER — SODIUM CHLORIDE 0.9 % (FLUSH) 0.9 %
10 SYRINGE (ML) INJECTION PRN
Status: DISCONTINUED | OUTPATIENT
Start: 2023-04-23 | End: 2023-04-25 | Stop reason: HOSPADM

## 2023-04-23 RX ADMIN — TRAZODONE HYDROCHLORIDE 150 MG: 100 TABLET ORAL at 20:34

## 2023-04-23 RX ADMIN — CLONAZEPAM 0.5 MG: 0.5 TABLET ORAL at 03:06

## 2023-04-23 RX ADMIN — GABAPENTIN 800 MG: 800 TABLET ORAL at 20:34

## 2023-04-23 RX ADMIN — Medication 81 MG: at 09:17

## 2023-04-23 RX ADMIN — HYDROCODONE BITARTRATE AND ACETAMINOPHEN 2 TABLET: 5; 325 TABLET ORAL at 15:27

## 2023-04-23 RX ADMIN — PANTOPRAZOLE SODIUM 40 MG: 40 TABLET, DELAYED RELEASE ORAL at 09:18

## 2023-04-23 RX ADMIN — MORPHINE SULFATE 4 MG: 4 INJECTION INTRAVENOUS at 12:27

## 2023-04-23 RX ADMIN — MORPHINE SULFATE 4 MG: 4 INJECTION INTRAVENOUS at 23:57

## 2023-04-23 RX ADMIN — BUDESONIDE AND FORMOTEROL FUMARATE DIHYDRATE 2 PUFF: 160; 4.5 AEROSOL RESPIRATORY (INHALATION) at 08:55

## 2023-04-23 RX ADMIN — HYDROCODONE BITARTRATE AND ACETAMINOPHEN 2 TABLET: 5; 325 TABLET ORAL at 20:34

## 2023-04-23 RX ADMIN — SODIUM CHLORIDE, PRESERVATIVE FREE 10 ML: 5 INJECTION INTRAVENOUS at 23:58

## 2023-04-23 RX ADMIN — MORPHINE SULFATE 4 MG: 4 INJECTION INTRAVENOUS at 17:13

## 2023-04-23 RX ADMIN — CLONAZEPAM 0.5 MG: 0.5 TABLET ORAL at 20:34

## 2023-04-23 RX ADMIN — ENOXAPARIN SODIUM 80 MG: 100 INJECTION SUBCUTANEOUS at 09:17

## 2023-04-23 RX ADMIN — FLUOXETINE HYDROCHLORIDE 60 MG: 20 CAPSULE ORAL at 09:18

## 2023-04-23 RX ADMIN — Medication 81 MG: at 20:34

## 2023-04-23 RX ADMIN — MORPHINE SULFATE 4 MG: 4 INJECTION INTRAVENOUS at 01:27

## 2023-04-23 RX ADMIN — PREGABALIN 300 MG: 100 CAPSULE ORAL at 09:17

## 2023-04-23 RX ADMIN — GABAPENTIN 800 MG: 800 TABLET ORAL at 09:17

## 2023-04-23 RX ADMIN — BUDESONIDE AND FORMOTEROL FUMARATE DIHYDRATE 2 PUFF: 160; 4.5 AEROSOL RESPIRATORY (INHALATION) at 21:28

## 2023-04-23 RX ADMIN — ENOXAPARIN SODIUM 80 MG: 100 INJECTION SUBCUTANEOUS at 20:34

## 2023-04-23 RX ADMIN — HYDROCODONE BITARTRATE AND ACETAMINOPHEN 2 TABLET: 5; 325 TABLET ORAL at 03:06

## 2023-04-23 RX ADMIN — SODIUM CHLORIDE, PRESERVATIVE FREE 10 ML: 5 INJECTION INTRAVENOUS at 13:24

## 2023-04-23 RX ADMIN — PREGABALIN 300 MG: 100 CAPSULE ORAL at 20:33

## 2023-04-23 RX ADMIN — HYDROCODONE BITARTRATE AND ACETAMINOPHEN 2 TABLET: 5; 325 TABLET ORAL at 09:17

## 2023-04-23 RX ADMIN — GADOTERIDOL 15 ML: 279.3 INJECTION, SOLUTION INTRAVENOUS at 13:24

## 2023-04-23 ASSESSMENT — PAIN DESCRIPTION - ORIENTATION: ORIENTATION: LEFT

## 2023-04-23 ASSESSMENT — PAIN SCALES - GENERAL
PAINLEVEL_OUTOF10: 6
PAINLEVEL_OUTOF10: 7
PAINLEVEL_OUTOF10: 10
PAINLEVEL_OUTOF10: 8
PAINLEVEL_OUTOF10: 7
PAINLEVEL_OUTOF10: 10
PAINLEVEL_OUTOF10: 9

## 2023-04-23 ASSESSMENT — PAIN DESCRIPTION - LOCATION
LOCATION: BACK
LOCATION: LEG

## 2023-04-24 PROCEDURE — 97162 PT EVAL MOD COMPLEX 30 MIN: CPT

## 2023-04-24 PROCEDURE — 6370000000 HC RX 637 (ALT 250 FOR IP)

## 2023-04-24 PROCEDURE — 6370000000 HC RX 637 (ALT 250 FOR IP): Performed by: STUDENT IN AN ORGANIZED HEALTH CARE EDUCATION/TRAINING PROGRAM

## 2023-04-24 PROCEDURE — 94640 AIRWAY INHALATION TREATMENT: CPT

## 2023-04-24 PROCEDURE — 97530 THERAPEUTIC ACTIVITIES: CPT

## 2023-04-24 PROCEDURE — 6360000002 HC RX W HCPCS: Performed by: STUDENT IN AN ORGANIZED HEALTH CARE EDUCATION/TRAINING PROGRAM

## 2023-04-24 PROCEDURE — 1200000000 HC SEMI PRIVATE

## 2023-04-24 PROCEDURE — 2580000003 HC RX 258

## 2023-04-24 PROCEDURE — 97535 SELF CARE MNGMENT TRAINING: CPT

## 2023-04-24 PROCEDURE — 97166 OT EVAL MOD COMPLEX 45 MIN: CPT

## 2023-04-24 PROCEDURE — 6360000002 HC RX W HCPCS

## 2023-04-24 RX ORDER — METHOCARBAMOL 500 MG/1
500 TABLET, FILM COATED ORAL 4 TIMES DAILY
Status: DISCONTINUED | OUTPATIENT
Start: 2023-04-24 | End: 2023-04-25 | Stop reason: HOSPADM

## 2023-04-24 RX ORDER — NICOTINE 21 MG/24HR
1 PATCH, TRANSDERMAL 24 HOURS TRANSDERMAL DAILY
Status: DISCONTINUED | OUTPATIENT
Start: 2023-04-24 | End: 2023-04-25 | Stop reason: HOSPADM

## 2023-04-24 RX ADMIN — HYDROCODONE BITARTRATE AND ACETAMINOPHEN 2 TABLET: 5; 325 TABLET ORAL at 16:55

## 2023-04-24 RX ADMIN — GABAPENTIN 800 MG: 800 TABLET ORAL at 08:56

## 2023-04-24 RX ADMIN — FLUOXETINE HYDROCHLORIDE 60 MG: 20 CAPSULE ORAL at 08:56

## 2023-04-24 RX ADMIN — HYDROCODONE BITARTRATE AND ACETAMINOPHEN 2 TABLET: 5; 325 TABLET ORAL at 10:42

## 2023-04-24 RX ADMIN — METHOCARBAMOL TABLETS 500 MG: 500 TABLET, COATED ORAL at 16:55

## 2023-04-24 RX ADMIN — BUDESONIDE AND FORMOTEROL FUMARATE DIHYDRATE 2 PUFF: 160; 4.5 AEROSOL RESPIRATORY (INHALATION) at 08:34

## 2023-04-24 RX ADMIN — SODIUM CHLORIDE, PRESERVATIVE FREE 10 ML: 5 INJECTION INTRAVENOUS at 21:02

## 2023-04-24 RX ADMIN — HYDROCODONE BITARTRATE AND ACETAMINOPHEN 2 TABLET: 5; 325 TABLET ORAL at 03:38

## 2023-04-24 RX ADMIN — MORPHINE SULFATE 4 MG: 4 INJECTION INTRAVENOUS at 12:53

## 2023-04-24 RX ADMIN — HYDROCODONE BITARTRATE AND ACETAMINOPHEN 2 TABLET: 5; 325 TABLET ORAL at 23:49

## 2023-04-24 RX ADMIN — MORPHINE SULFATE 4 MG: 4 INJECTION INTRAVENOUS at 07:45

## 2023-04-24 RX ADMIN — PREGABALIN 300 MG: 100 CAPSULE ORAL at 21:01

## 2023-04-24 RX ADMIN — METHOCARBAMOL TABLETS 500 MG: 500 TABLET, COATED ORAL at 21:01

## 2023-04-24 RX ADMIN — GABAPENTIN 800 MG: 800 TABLET ORAL at 21:01

## 2023-04-24 RX ADMIN — LISINOPRIL 5 MG: 5 TABLET ORAL at 08:56

## 2023-04-24 RX ADMIN — PREGABALIN 300 MG: 100 CAPSULE ORAL at 08:56

## 2023-04-24 RX ADMIN — ENOXAPARIN SODIUM 80 MG: 100 INJECTION SUBCUTANEOUS at 08:57

## 2023-04-24 RX ADMIN — MORPHINE SULFATE 4 MG: 4 INJECTION INTRAVENOUS at 18:35

## 2023-04-24 RX ADMIN — Medication 81 MG: at 08:56

## 2023-04-24 RX ADMIN — PANTOPRAZOLE SODIUM 40 MG: 40 TABLET, DELAYED RELEASE ORAL at 08:56

## 2023-04-24 RX ADMIN — TRAZODONE HYDROCHLORIDE 150 MG: 100 TABLET ORAL at 21:01

## 2023-04-24 ASSESSMENT — PAIN DESCRIPTION - LOCATION
LOCATION: BACK;LEG
LOCATION: LEG;HIP
LOCATION: HIP;LEG
LOCATION: HIP
LOCATION: HIP
LOCATION: BACK

## 2023-04-24 ASSESSMENT — PAIN DESCRIPTION - ORIENTATION
ORIENTATION: RIGHT;LEFT;LOWER
ORIENTATION: LOWER
ORIENTATION: LEFT

## 2023-04-24 ASSESSMENT — PAIN - FUNCTIONAL ASSESSMENT: PAIN_FUNCTIONAL_ASSESSMENT: PREVENTS OR INTERFERES SOME ACTIVE ACTIVITIES AND ADLS

## 2023-04-24 ASSESSMENT — PAIN SCALES - GENERAL
PAINLEVEL_OUTOF10: 8
PAINLEVEL_OUTOF10: 10
PAINLEVEL_OUTOF10: 8
PAINLEVEL_OUTOF10: 7
PAINLEVEL_OUTOF10: 8
PAINLEVEL_OUTOF10: 8
PAINLEVEL_OUTOF10: 7
PAINLEVEL_OUTOF10: 7

## 2023-04-24 ASSESSMENT — PAIN DESCRIPTION - DESCRIPTORS: DESCRIPTORS: ACHING

## 2023-04-24 NOTE — PLAN OF CARE
Problem: Pain  Goal: Verbalizes/displays adequate comfort level or baseline comfort level  Outcome: Progressing  Flowsheets (Taken 4/24/2023 8109)  Verbalizes/displays adequate comfort level or baseline comfort level:   Encourage patient to monitor pain and request assistance   Assess pain using appropriate pain scale   Administer analgesics based on type and severity of pain and evaluate response   Implement non-pharmacological measures as appropriate and evaluate response

## 2023-04-24 NOTE — CARE COORDINATION
Unable to speak with patient re: transition plans as she is not in her room, will attempt at a later time.

## 2023-04-24 NOTE — DISCHARGE INSTR - COC
Continuity of Care Form    Patient Name: Fela May   :  1965  MRN:  [id number]    Admit date:  2023  Discharge date:  ***    Code Status Order: Prior   Advance Directives:     Admitting Physician:  Shelia Oconnor MD  PCP: Conor Parsons DO    Discharging Nurse: [facility] Unit/Room#: 8502/7412-70  Discharging Unit Phone Number: ***    Emergency Contact:   Extended Emergency Contact Information  Primary Emergency Contact: [place]a Park [address] Phone: [phone]  Relation: Child    Past Surgical History:  Past Surgical History:   Procedure Laterality Date    ABDOMEN SURGERY      bowel obstruction OR    BUNIONECTOMY Left     CHOLECYSTECTOMY      COLONOSCOPY  2007    no gross pathology seen in the colon and also 3-4 inches of the ileum    COLONOSCOPY  10/12/2017    normal    COLONOSCOPY  10/25/2021    COLONOSCOPY WITH BIOPSY performed by Diane Mary MD at [facility]    COLONOSCOPY  10/25/2021    COLONOSCOPY POLYPECTOMY REMOVAL SNARE performed by Diane Mary MD at [facility] [address] [facility] Left 2023    TOTAL HIP ARTHROPLASTY LEFT (Left: Hip)    TONSILLECTOMY AND ADENOIDECTOMY      TOTAL HIP ARTHROPLASTY Left 3/24/2023    TOTAL HIP ARTHROPLASTY LEFT performed by Víctor Nava MD at [address] [place] [address]  10/25/2021    EGD BIOPSY performed by Diane Mary MD at [facility]       Immunization History:   Immunization History   Administered Date(s) Administered    COVID-19, PFIZER Bivalent BOOSTER, DO NOT Dilute, (age 12y+), IM, 30 mcg/0.3 mL 10/11/2022    COVID-19, PFIZER GRAY top, DO NOT Dilute, (age 15 y+), IM, 30 mcg/0.3 mL 07/15/2022    COVID-19, PFIZER PURPLE top, DILUTE for use, (age 15 y+), 30mcg/0.3mL 2021, 2021, 2021    Influenza, FLUARIX, FLULAVAL, FLUZONE (age 10 mo+) AND AFLURIA, (age 1 y+), PF, 0.5mL 2017, 10/20/2021    Pneumococcal,

## 2023-04-24 NOTE — CONSULTS
substance abuse - marijuana gummies daily    Sexual activity: Not on file   Other Topics Concern    Not on file   Social History Narrative    Not on file     Social Determinants of Health     Financial Resource Strain: Not on file   Food Insecurity: Not on file   Transportation Needs: Not on file   Physical Activity: Not on file   Stress: Not on file   Social Connections: Not on file   Intimate Partner Violence: Not on file   Housing Stability: Not on file       Family History:       Problem Relation Age of Onset    Diabetes Father     Lung Cancer Father     Hypertension Mother     Cancer Mother     High Blood Pressure Mother     Crohn's Disease Brother     Heart Disease Brother        Allergies:  Penicillins, Tape [adhesive tape], and Azithromycin    Home Medications:  Prior to Admission medications    Medication Sig Start Date End Date Taking? Authorizing Provider   HYDROcodone-acetaminophen (NORCO) 5-325 MG per tablet Take 1 tablet by mouth every 6 hours as needed for Pain for up to 7 days. 4/21/23 4/28/23  Bebeto Ritter MD   aspirin EC 81 MG EC tablet Take 1 tablet by mouth 2 times daily Hip replacement dvt prop  Patient not taking: Reported on 4/21/2023 4/2/23 5/2/23  Sara Valverde MD   tiotropium (Juan Silvestre) 18 MCG inhalation capsule Inhale 1 capsule into the lungs daily 4/2/23   Sara Valverde MD   Probiotic Acidophilus [facility]) TABS Take 1 tablet by mouth 2 times daily 4/2/23 5/2/23  Sara Valverde MD   clonazePAM (KLONOPIN) 0.5 MG tablet Take 1 tablet by mouth as needed.  1/16/23   Historical Provider, MD   cyanocobalamin 1000 MCG/ML injection 1 mL every 30 days 7/30/22   Historical Provider, MD Echavarria Greek 25 MG capsule Take 1 capsule by mouth nightly as needed 1/23/23   Historical Provider, MD   ANTI-DIARRHEAL 2 MG tablet Take 1 tablet by mouth as needed 1/23/23   Historical Provider, MD   pantoprazole (PROTONIX) 40 MG tablet Take 40 mg by mouth daily 1/23/23   Historical Provider, MD   nicotine

## 2023-04-24 NOTE — PLAN OF CARE
Problem: Respiratory - Adult  Goal: Achieves optimal ventilation and oxygenation  4/24/2023 0836 by Louies Becerra RCP  Outcome: Progressing

## 2023-04-25 ENCOUNTER — APPOINTMENT (OUTPATIENT)
Dept: INTERVENTIONAL RADIOLOGY/VASCULAR | Age: 58
DRG: 347 | End: 2023-04-25
Payer: MEDICAID

## 2023-04-25 ENCOUNTER — APPOINTMENT (OUTPATIENT)
Dept: GENERAL RADIOLOGY | Age: 58
DRG: 347 | End: 2023-04-25
Payer: MEDICAID

## 2023-04-25 ENCOUNTER — HOSPITAL ENCOUNTER (OUTPATIENT)
Dept: PHYSICAL THERAPY | Age: 58
Setting detail: THERAPIES SERIES
Discharge: HOME | End: 2023-04-25
Payer: MEDICAID

## 2023-04-25 VITALS
RESPIRATION RATE: 18 BRPM | DIASTOLIC BLOOD PRESSURE: 58 MMHG | BODY MASS INDEX: 32.02 KG/M2 | WEIGHT: 174 LBS | TEMPERATURE: 98.1 F | HEART RATE: 74 BPM | OXYGEN SATURATION: 100 % | HEIGHT: 62 IN | SYSTOLIC BLOOD PRESSURE: 94 MMHG

## 2023-04-25 PROCEDURE — 2500000003 HC RX 250 WO HCPCS: Performed by: RADIOLOGY

## 2023-04-25 PROCEDURE — 3E0R33Z INTRODUCTION OF ANTI-INFLAMMATORY INTO SPINAL CANAL, PERCUTANEOUS APPROACH: ICD-10-PCS | Performed by: RADIOLOGY

## 2023-04-25 PROCEDURE — 6370000000 HC RX 637 (ALT 250 FOR IP): Performed by: STUDENT IN AN ORGANIZED HEALTH CARE EDUCATION/TRAINING PROGRAM

## 2023-04-25 PROCEDURE — 62323 NJX INTERLAMINAR LMBR/SAC: CPT

## 2023-04-25 PROCEDURE — 6360000002 HC RX W HCPCS

## 2023-04-25 PROCEDURE — 6360000002 HC RX W HCPCS: Performed by: RADIOLOGY

## 2023-04-25 PROCEDURE — 6370000000 HC RX 637 (ALT 250 FOR IP)

## 2023-04-25 PROCEDURE — A4216 STERILE WATER/SALINE, 10 ML: HCPCS | Performed by: RADIOLOGY

## 2023-04-25 PROCEDURE — 72120 X-RAY BEND ONLY L-S SPINE: CPT

## 2023-04-25 PROCEDURE — 2709999900 HC NON-CHARGEABLE SUPPLY

## 2023-04-25 PROCEDURE — 94640 AIRWAY INHALATION TREATMENT: CPT

## 2023-04-25 PROCEDURE — 2580000003 HC RX 258

## 2023-04-25 PROCEDURE — 6360000004 HC RX CONTRAST MEDICATION: Performed by: EMERGENCY MEDICINE

## 2023-04-25 PROCEDURE — APPSS15 APP SPLIT SHARED TIME 0-15 MINUTES: Performed by: NURSE PRACTITIONER

## 2023-04-25 PROCEDURE — 2580000003 HC RX 258: Performed by: RADIOLOGY

## 2023-04-25 RX ORDER — LIDOCAINE 4 G/G
1 PATCH TOPICAL DAILY
Qty: 5 EACH | Refills: 0 | Status: SHIPPED | OUTPATIENT
Start: 2023-04-26 | End: 2023-05-01

## 2023-04-25 RX ORDER — PSEUDOEPHEDRINE HCL 30 MG
100 TABLET ORAL 2 TIMES DAILY PRN
Qty: 20 CAPSULE | Refills: 0 | Status: SHIPPED | OUTPATIENT
Start: 2023-04-25 | End: 2023-05-05

## 2023-04-25 RX ORDER — HYDROCODONE BITARTRATE AND ACETAMINOPHEN 5; 325 MG/1; MG/1
2 TABLET ORAL EVERY 6 HOURS PRN
Qty: 20 TABLET | Refills: 0 | Status: SHIPPED | OUTPATIENT
Start: 2023-04-25 | End: 2023-04-28

## 2023-04-25 RX ORDER — BUDESONIDE AND FORMOTEROL FUMARATE DIHYDRATE 160; 4.5 UG/1; UG/1
2 AEROSOL RESPIRATORY (INHALATION) 2 TIMES DAILY
Qty: 10.2 G | Refills: 3 | Status: SHIPPED | OUTPATIENT
Start: 2023-04-25

## 2023-04-25 RX ORDER — METHOCARBAMOL 500 MG/1
500 TABLET, FILM COATED ORAL 4 TIMES DAILY
Qty: 40 TABLET | Refills: 0 | Status: SHIPPED | OUTPATIENT
Start: 2023-04-25 | End: 2023-05-05

## 2023-04-25 RX ORDER — BUPIVACAINE HYDROCHLORIDE 2.5 MG/ML
12 INJECTION, SOLUTION EPIDURAL; INFILTRATION; INTRACAUDAL ONCE
Status: COMPLETED | OUTPATIENT
Start: 2023-04-25 | End: 2023-04-25

## 2023-04-25 RX ADMIN — IOPAMIDOL 2 ML: 408 INJECTION, SOLUTION INTRATHECAL at 15:58

## 2023-04-25 RX ADMIN — METHOCARBAMOL TABLETS 500 MG: 500 TABLET, COATED ORAL at 12:40

## 2023-04-25 RX ADMIN — PREGABALIN 300 MG: 100 CAPSULE ORAL at 08:15

## 2023-04-25 RX ADMIN — METHOCARBAMOL TABLETS 500 MG: 500 TABLET, COATED ORAL at 08:15

## 2023-04-25 RX ADMIN — DEXAMETHASONE SODIUM PHOSPHATE 8 MG: 10 INJECTION, SOLUTION INTRAMUSCULAR; INTRAVENOUS at 15:35

## 2023-04-25 RX ADMIN — BUDESONIDE AND FORMOTEROL FUMARATE DIHYDRATE 2 PUFF: 160; 4.5 AEROSOL RESPIRATORY (INHALATION) at 07:31

## 2023-04-25 RX ADMIN — METHOCARBAMOL TABLETS 500 MG: 500 TABLET, COATED ORAL at 16:34

## 2023-04-25 RX ADMIN — PANTOPRAZOLE SODIUM 40 MG: 40 TABLET, DELAYED RELEASE ORAL at 08:15

## 2023-04-25 RX ADMIN — HYDROCODONE BITARTRATE AND ACETAMINOPHEN 2 TABLET: 5; 325 TABLET ORAL at 06:07

## 2023-04-25 RX ADMIN — GABAPENTIN 800 MG: 800 TABLET ORAL at 08:15

## 2023-04-25 RX ADMIN — SODIUM CHLORIDE, PRESERVATIVE FREE 10 ML: 5 INJECTION INTRAVENOUS at 03:53

## 2023-04-25 RX ADMIN — FLUOXETINE HYDROCHLORIDE 60 MG: 20 CAPSULE ORAL at 08:15

## 2023-04-25 RX ADMIN — MORPHINE SULFATE 4 MG: 4 INJECTION INTRAVENOUS at 03:53

## 2023-04-25 RX ADMIN — BUPIVACAINE HYDROCHLORIDE 12 MG: 2.5 INJECTION, SOLUTION EPIDURAL; INFILTRATION; INTRACAUDAL; PERINEURAL at 15:43

## 2023-04-25 RX ADMIN — HYDROCODONE BITARTRATE AND ACETAMINOPHEN 2 TABLET: 5; 325 TABLET ORAL at 12:40

## 2023-04-25 RX ADMIN — MORPHINE SULFATE 4 MG: 4 INJECTION INTRAVENOUS at 09:54

## 2023-04-25 ASSESSMENT — PAIN - FUNCTIONAL ASSESSMENT: PAIN_FUNCTIONAL_ASSESSMENT: PREVENTS OR INTERFERES SOME ACTIVE ACTIVITIES AND ADLS

## 2023-04-25 ASSESSMENT — PAIN DESCRIPTION - ORIENTATION
ORIENTATION: LEFT
ORIENTATION: LOWER;LEFT
ORIENTATION: MID

## 2023-04-25 ASSESSMENT — PAIN SCALES - GENERAL
PAINLEVEL_OUTOF10: 7
PAINLEVEL_OUTOF10: 8
PAINLEVEL_OUTOF10: 9
PAINLEVEL_OUTOF10: 7
PAINLEVEL_OUTOF10: 8
PAINLEVEL_OUTOF10: 9

## 2023-04-25 ASSESSMENT — PAIN DESCRIPTION - DESCRIPTORS
DESCRIPTORS: ACHING;DISCOMFORT;SHOOTING
DESCRIPTORS: ACHING

## 2023-04-25 ASSESSMENT — PAIN DESCRIPTION - LOCATION
LOCATION: BACK
LOCATION: HIP;BACK
LOCATION: HIP;BACK

## 2023-04-25 NOTE — PLAN OF CARE
Problem: Safety - Adult  Goal: Free from fall injury  4/25/2023 1644 by Erik Paulson RN  Outcome: Progressing     Problem: ABCDS Injury Assessment  Goal: Absence of physical injury  4/25/2023 1644 by Erik Paulson RN  Outcome: Progressing     Problem: Respiratory - Adult  Goal: Achieves optimal ventilation and oxygenation  4/25/2023 1644 by Erik Paulson RN  Outcome: Progressing

## 2023-04-25 NOTE — H&P
Measurements +---------------------------+------+------+--------------------------------+ ! Location                   ! Signal!Reflux! Reflux (msec)                   ! +---------------------------+------+------+--------------------------------+ ! Common Femoral             !Phasic!      !                                ! +---------------------------+------+------+--------------------------------+ ! Prox Femoral               !Phasic!      !                                ! +---------------------------+------+------+--------------------------------+ ! Popliteal                  !Phasic!      !                                ! +---------------------------+------+------+--------------------------------+    US BREAST LIMITED LEFT    Result Date: 4/22/2023  EXAMINATION: TARGETED ULTRASOUND OF THE LEFT BREAST 4/22/2023 COMPARISON: None. HISTORY: ORDERING SYSTEM PROVIDED HISTORY: nipple discharge, left breast pain TECHNOLOGIST PROVIDED HISTORY: nipple discharge, left breast pain FINDINGS: This exam was performed in the inpatient setting. Periareolar ultrasound was performed as well as targeted ultrasound in the region of swelling, as directed by the patient. No discrete sonographic abnormality identified. No skin thickening or fluid collection identified. No suspicious sonographic abnormality identified. No evidence for abscess or skin thickening. Follow-up at a breast center for complete evaluation ultrasound to include diagnostic mammography and possible. BI-RADS 1 BIRADS: BIRADS - CATEGORY 1 Negative. Normal interval follow-up is recommended in 12 months. OVERALL ASSESSMENT - NEGATIVE A letter of notification will be sent to the patient regarding the results. The [employer] of Radiology recommends annual mammograms for women 40 years and older. XR HIP 2-3 VW W PELVIS LEFT    Result Date: 4/21/2023  EXAMINATION: ONE XRAY VIEW OF THE PELVIS AND TWO XRAY VIEWS LEFT HIP 4/21/2023 3:26 pm COMPARISON: None.

## 2023-04-25 NOTE — BRIEF OP NOTE
Brief Postoperative Note    Audi Garay  YOB: 1965  [id number]    Pre-operative Diagnosis: Left hip pain    Post-operative Diagnosis: Same    Procedure: FREYA    Anesthesia: Local    Surgeons/Assistants: Samanta Agosto MD    Estimated Blood Loss: less than 50     Complications: None    Specimens: Was Not Obtained    Findings: Technically successful FREYA at L4-L5.      Electronically signed by ALFIE Napoles on 4/25/2023 at 3:58 PM

## 2023-04-25 NOTE — FLOWSHEET NOTE
[x] [facility]  [address]    [phone]  F: [phone]     Physical Therapy Cancel/No Show note    Date: 2023  Patient: Dayana Branham  : 1965  MRN: [id number]    Cancels/No Shows to date: 023 appt not counted against pt    For today's appointment patient:    [x]  Cancelled    [] Rescheduled appointment    [] No-show     Reason given by patient:    []  Patient ill    []  Conflicting appointment    [] No transportation      [] Conflict with work    [] No reason given    [] Weather related    [] COVID-19    [x] Other:      Comments: patient currently admitted to hospital.        [x] Next appointment was confirmed Previously     Electronically signed by: Jose J Smith PTA

## 2023-04-26 DIAGNOSIS — Z96.642 S/P TOTAL LEFT HIP ARTHROPLASTY: ICD-10-CM

## 2023-04-26 RX ORDER — HYDROCODONE BITARTRATE AND ACETAMINOPHEN 5; 325 MG/1; MG/1
1 TABLET ORAL EVERY 8 HOURS PRN
Qty: 42 TABLET | Refills: 0 | Status: SHIPPED | OUTPATIENT
Start: 2023-04-26 | End: 2023-05-10

## 2023-04-26 NOTE — PROGRESS NOTES
[facility]  CDU / OBSERVATION ENCOUNTER  ATTENDING NOTE       I performed a history and physical examination of the patient and discussed management with the resident or midlevel provider. I reviewed the resident or midlevel provider's note and agree with the documented findings and plan of care. Any areas of disagreement are noted on the chart. I was personally present for the key portions of any procedures. I have documented in the chart those procedures where I was not present during the key portions. I have reviewed the nurses notes. I agree with the chief complaint, past medical history, past surgical history, allergies, medications, social and family history as documented unless otherwise noted below. The Family history, social history, and ROS are effectively unchanged since admission unless noted elsewhere in the chart. This patient was placed in the observation unit for reevaluation for possible admission to the hospital    The patient is a 21-year-old female who presents for evaluation of low back pain and left hip pain. She was evaluated by neurosurgery and orthopedic surgery. No surgical interventions recommended. She has been referred to IR for injections and scheduled to have these done today. We will reassess after injections to determine if she can be discharged.     [address], [facility],[address]  Attending Emergency  Physician
[facility]  CDU / OBSERVATION ENCOUNTER  ATTENDING NOTE       I performed a history and physical examination of the patient and discussed management with the resident or midlevel provider. I reviewed the resident or midlevel provider's note and agree with the documented findings and plan of care. Any areas of disagreement are noted on the chart. I was personally present for the key portions of any procedures. I have documented in the chart those procedures where I was not present during the key portions. I have reviewed the nurses notes. I agree with the chief complaint, past medical history, past surgical history, allergies, medications, social and family history as documented unless otherwise noted below. The Family history, social history, and ROS are effectively unchanged since admission unless noted elsewhere in the chart. This patient was placed in the observation unit for reevaluation for possible admission to the hospital    The patient is a 62-year-old female who presents for evaluation of left hip pain. She has a history of a left hip replacement. The patient has been evaluated by orthopedic surgery and had imaging which shows no issues with her prosthesis. She does have severe osteoarthritis of the right hip. Her work-up for DVT was negative. She had an MRI of the lumbar spine which showed severe foraminal stenosis at L4/L5 and severe right foraminal stenosis of L5/S1. She also has moderate spinal canal stenosis at L4/L5. The patient has been up and ambulatory with PT and OT. We are currently waiting for neurosurgery recommendations.     [address], [address] [facility],[address]  Attending Emergency  Physician
CLINICAL PHARMACY NOTE: MEDS TO BEDS    Total # of Prescriptions Filled: 5   The following medications were delivered to the patient:  Methocarbamol  Colace  Lidocaine patches  Norco  symbicort    Additional Documentation:
Neurosurgery JOSELITO/Resident    Daily Progress Note   CC:  Chief Complaint   Patient presents with    Hip Pain     Left; sx about 1 month ago     4/25/2023  8:42 AM    Chart reviewed. No acute events overnight. No new complaints. She has chronic back pain with acute worsening, denies falls/ trauma, no fevers at home, having chills every once in a while. Patient reports pain shooting down her legs, both legs anterior and posterior aspect- new onset Friday, she uses walker x 6 years     Vitals:    04/25/23 0607 04/25/23 0637 04/25/23 0719 04/25/23 0809   BP: 109/68  (!) 94/58 (!) 94/58   Pulse: 72  69    Resp: 19 19 19    Temp: 97.3 °F (36.3 °C)  98.1 °F (36.7 °C)    TempSrc: Oral  Oral    SpO2: 93%  94%    Weight:       Height:           PE:   AOx3   PERRL, EOMI  Motor   L deltoid 5/5; R deltoid 5/5  L biceps 5/5; R biceps 5/5  L triceps 5/5; R triceps 5/5  L wrist extension 5/5; R wrist extension 5/5  L intrinsics 5/5; R intrinsics 5/5      L iliopsoas 5/5 , R iliopsoas 5/5  L quadriceps 5/5; R quadriceps 5/5  L Dorsiflexion 5/5; R dorsiflexion 5/5  L Plantarflexion 5/5; R plantarflexion 5/5  L EHL 5/5; R EHL 5/5    Sensation: intact         Lab Results   Component Value Date    WBC 7.2 04/21/2023    HGB 10.7 (L) 04/21/2023    HCT 34.2 (L) 04/21/2023     04/21/2023    ALT 8 03/31/2023    AST 11 03/31/2023     (L) 04/21/2023    K 4.3 04/21/2023    CL 97 (L) 04/21/2023    CREATININE 0.75 04/21/2023    BUN 15 04/21/2023    CO2 29 04/21/2023    TSH 3.28 03/31/2023    INR 1.1 04/01/2023    LABA1C 5.0 02/10/2023    CRP 27.8 (H) 04/21/2023       Radiology   XR LUMBAR SPINE (2-3 VIEWS)    Result Date: 4/22/2023  EXAMINATION: 3 XRAY VIEWS OF THE LUMBAR SPINE 4/22/2023 11:57 am COMPARISON: CT abdomen pelvis 03/30/2023 HISTORY: ORDERING SYSTEM PROVIDED HISTORY: lumbar tenderness TECHNOLOGIST PROVIDED HISTORY: lumbar tenderness FINDINGS: 5 lumbar type vertebral bodies are present.   There is slight mid lumbar
OBS/CDU   RESIDENT NOTE      Patients PCP is:  Juli Blanchard DO        SUBJECTIVE      No acute events overnight. Has been able to tolerate a full diet without nausea or vomiting. The patient is urinating on his own and is passing flatus. Denies fever, chills, nausea, vomiting, chest pain, shortness of breath, abdominal pain, focal weakness, numbness, tingling, urinary/bowel symptoms, vision changes, visual hallucinations, or headache. Patient has been in the patient was able to walk PT OT this morning. Ambulating well. PHYSICAL EXAM      General: NAD, AO X 3  Heent: EMOI, PERRL  Neck: SUPPLE, NO JVD  Cardiovascular: RRR, S1S2  Pulmonary: CTAB, NO SOB  Abdomen: SOFT, NTTP, ND, +BS  Extremities: +2/4 PULSES DISTAL, NO SWELLING  Neuro / Psych: NO NUMBNESS OR TINGLING, MENTATION AT BASELINE    PERTINENT TEST /EXAMS      I have reviewed all available laboratory results. MEDICATIONS CURRENT   nicotine (NICODERM CQ) 21 MG/24HR 1 patch, Daily  sodium chloride flush 0.9 % injection 10 mL, PRN  pregabalin (LYRICA) capsule 300 mg, BID  gabapentin (NEURONTIN) tablet 800 mg, BID  HYDROcodone-acetaminophen (NORCO) 5-325 MG per tablet 2 tablet, Q6H PRN  clonazePAM (KLONOPIN) tablet 0.5 mg, Q12H PRN  morphine injection 4 mg, Q4H PRN  ondansetron (ZOFRAN) injection 4 mg, Q8H PRN  lidocaine 4 % external patch 1 patch, Daily  aspirin EC tablet 81 mg, BID  budesonide-formoterol (SYMBICORT) 160-4.5 MCG/ACT inhaler 2 puff, BID  docusate sodium (COLACE) capsule 100 mg, BID PRN  FLUoxetine (PROZAC) capsule 60 mg, Daily  ipratropium-albuterol (DUONEB) nebulizer solution 3 mL, Q6H PRN  lisinopril (PRINIVIL;ZESTRIL) tablet 5 mg, Daily  pantoprazole (PROTONIX) tablet 40 mg, Daily  traZODone (DESYREL) tablet 150 mg, Nightly  enoxaparin (LOVENOX) injection 80 mg, BID        All medication charted and reviewed.     CONSULTS      IP CONSULT TO ORTHOPEDIC SURGERY  IP CONSULT TO NEUROSURGERY    ASSESSMENT/PLAN       Filippo Urban
Occupational Therapy  Facility/Department: [address] ORTHO/MED SURG  Occupational Therapy Initial Assessment    Name: Tiarra Hadley  : 1965  MRN: [id number]  Date of Service: 2023    Discharge Recommendations: No therapy recommended at discharge. Equipment recommendations listed below are based on what the patient would need if they were able to return to prior living arrangements at the time of discharge. OT Equipment Recommendations  Equipment Needed: Yes  Mobility Devices: ADL Assistive Devices  ADL Assistive Devices: Reacher;Sock-Aid Hard;Dressing Stick;Long-handled Sponge;Long-handled Shoe Horn     Patient Diagnosis(es): The encounter diagnosis was Left hip pain. Past Medical History:  has a past medical history of Acid reflux, Anxiety, Arthritis, Asthma, Bipolar 1 disorder ([address] Bowel obstruction ([address] COPD (chronic obstructive pulmonary disease) ([facility] COVID-19 vaccine administered, Crohn disease ([facility] Depression, Dry eye, Fibromyalgia, Gout, Headache, History of recent hospitalization, Hyperlipidemia, Hypertension, Hypothyroidism, Kidney stones, Muscle spasm, Neuropathy, Pain, Under care of team, Under care of team, Wears partial dentures, Wears reading eyeglasses, and Wellness examination. Past Surgical History:  has a past surgical history that includes Tonsillectomy and adenoidectomy; Tubal ligation; Cholecystectomy; Bunionectomy (Left); Colonoscopy (2007); Colonoscopy (10/12/2017); Abdomen surgery; Upper gastrointestinal endoscopy (10/25/2021); Colonoscopy (10/25/2021); Colonoscopy (10/25/2021); Hip Arthroplasty (Left, 2023); and Total hip arthroplasty (Left, 3/24/2023). Assessment   Prognosis: Good  Decision Making: Medium Complexity  No Skilled OT: At baseline function; Safe to return home  REQUIRES OT FOLLOW-UP: No  Activity Tolerance  Activity Tolerance: Patient Tolerated treatment well;Patient limited by pain
Physical Therapy  Facility/Department: [address] ORTHO/MED SURG  Physical Therapy Initial Assessment    Name: Kalpana Jimenez  : 1965  MRN: [id number]  Date of Service: 2023    Discharge Recommendations:  No therapy recommended at discharge          Patient Diagnosis(es): The encounter diagnosis was Left hip pain. Past Medical History:  has a past medical history of Acid reflux, Anxiety, Arthritis, Asthma, Bipolar 1 disorder ([address] Bowel obstruction ([address] COPD (chronic obstructive pulmonary disease) ([facility] [address] COVID-19 vaccine administered, Crohn disease ([facility] [address] Depression, Dry eye, Fibromyalgia, Gout, Headache, History of recent hospitalization, Hyperlipidemia, Hypertension, Hypothyroidism, Kidney stones, Muscle spasm, Neuropathy, Pain, Under care of team, Under care of team, Wears partial dentures, Wears reading eyeglasses, and Wellness examination. Past Surgical History:  has a past surgical history that includes Tonsillectomy and adenoidectomy; Tubal ligation; Cholecystectomy; Bunionectomy (Left); Colonoscopy (2007); Colonoscopy (10/12/2017); Abdomen surgery; Upper gastrointestinal endoscopy (10/25/2021); Colonoscopy (10/25/2021); Colonoscopy (10/25/2021); Hip Arthroplasty (Left, 2023); and Total hip arthroplasty (Left, 3/24/2023). Assessment   Assessment: The pt ambulated 300 ft with a rollator x SBA. She ambulated safely with no major c/o. No further inpatient PT is needed at this time.   Therapy Prognosis: Good  Decision Making: Medium Complexity  Requires PT Follow-Up: No  Activity Tolerance  Activity Tolerance: Patient tolerated evaluation without incident     Plan   Physcial Therapy Plan  General Plan: Discharge with evaluation only  Safety Devices  Type of Devices: Nurse notified, Left in bed, Call light within reach, Gait belt, Chair alarm in place  Restraints  Restraints Initially in Place: No     Restrictions  Restrictions/Precautions  Restrictions/Precautions: Fall Risk, General
Pt given discharge instructions, all questions answered. Pt discharged with all personal belongings and medications to home.
medication charted and reviewed. CONSULTS      IP CONSULT TO ORTHOPEDIC SURGERY  IP CONSULT TO NEUROSURGERY  IP CONSULT TO INTERVENTIONAL RADIOLOGY    ASSESSMENT/PLAN       Maribel Hayden is a 60 y.o. female who presents with  left lower extremity pain. Left lower extremity pain  Doppler studies ordered  X-ray of the foot does not show any acute fractures  X-ray lumbar spine shows degenerative changes . MRI shows severe stenosis foraminal L4-L5 5 S1. Neurosurgery consulted  Left knee x-ray was normal  Epidural steroid injection via IR    Left breast pain and discharge  Ultrasound shows no abscess     Continue home medications and pain control  Monitor vitals, labs, and imaging  DISPO: pending consults and clinical improvement      This patient was placed in observation unit for reevaluation for possible admission to the hospital.   --  Mare Schmitt,   Emergency Medicine Resident Physician     This dictation was generated by voice recognition computer software. Although all attempts are made to edit the dictation for accuracy, there may be errors in the transcription that are not intended.

## 2023-04-26 NOTE — TELEPHONE ENCOUNTER
Patient called to request refill of [place] be sent to [address] [place] [address]. Patient would like a call once sent .   [phone]

## 2023-04-27 ENCOUNTER — HOSPITAL ENCOUNTER (OUTPATIENT)
Dept: PHYSICAL THERAPY | Age: 58
Setting detail: THERAPIES SERIES
Discharge: HOME | End: 2023-04-27
Payer: MEDICAID

## 2023-04-27 PROCEDURE — 97110 THERAPEUTIC EXERCISES: CPT

## 2023-04-27 PROCEDURE — 97016 VASOPNEUMATIC DEVICE THERAPY: CPT

## 2023-04-27 NOTE — FLOWSHEET NOTE
[x] [facility]  [address]  [phone]  F: [phone]     Physical Therapy Daily Treatment Note    Date:  2023  Patient Name:  Maritza Rivera      :  1965    MRN: [id number]  Physician: Rivera Clark MD                              Insurance: [facility] Medicaid  v  Medical Diagnosis: L21.214 (ICD-10-CM) - S/P total left hip arthroplasty                Rehab Codes: Pain M25.552, stiffness M25.652, swelling M25.452, weakness M62.552, Gait R26.2  Onset date: 3-24-23                Next 's appt. : 23    Visit# / total visits:   Cancels/No Shows: 0/0    Subjective:    Pain:  [x] Yes  [] No Location: posterolateral hip   Pain Rating: (0-10 scale) 5/10  Pain altered Tx:  [x] No  [] Yes  Action:  Comments: Patient states that she received \"shots\" in her low back while just being in the hospital for her hip and back pain. States no relief with these injections. Hip is doing okay, really stiff.  Reports she has been working on her home exercises, even while in the hospital.     Objective:  Modalities: Game ready to L hip, supine - post Tx min compression 38 deg x15 min - legs on wedge     Precautions:  Exercises:  Exercise Reps/ Time Weight/ Level Comments   NuStep 10 min Level 2 Added 23  5 minutes billed as therex during collection of subjective information         Standing      Calf Stretch on Incline 3x30\"  Added 23         Seated      Heel/Toe Raises 10x ea     March from elevated mat 15x     LAQ 15x     Almost sit squats 10 x  No UE use - tactile cueing for even weight distribution          Supine      Hooklying Glute Isometric 15x5\"     Hip Adduction Iso w/ball 15x5\"  Did sitting   HS Iso 10x5\"     SAQ 2x10     Heel Slides 2x10     Hip Abduction Slides 10x AAROM    Quad Isometric 10x5\"     Hooklying Abduction 2x10     Manual knee ext stretch with ankle on bolster 8 x   Added bilat   Gentle bridges 10 x  added

## 2023-05-03 ENCOUNTER — HOSPITAL ENCOUNTER (OUTPATIENT)
Dept: PHYSICAL THERAPY | Age: 58
Setting detail: THERAPIES SERIES
Discharge: HOME | End: 2023-05-03

## 2023-05-03 NOTE — FLOWSHEET NOTE
[x] [facility]  [address]    [phone]  F: [phone]     Physical Therapy Cancel/No Show note    Date: 5/3/2023  Patient: Rod Pruitt  : 1965  MRN: [id number]    Cancels/No Shows to date:     For today's appointment patient:    [x]  Cancelled    [] Rescheduled appointment    [] No-show     Reason given by patient:    [x]  Patient ill    []  Conflicting appointment    [] No transportation      [] Conflict with work    [] No reason given    [] Weather related    [] [id number]    [] Other:      Comments:       [x] Next appointment was confirmed Previously     Electronically signed by: Pradip Sr PTA

## 2023-05-05 ENCOUNTER — HOSPITAL ENCOUNTER (OUTPATIENT)
Dept: PHYSICAL THERAPY | Age: 58
Setting detail: THERAPIES SERIES
Discharge: HOME | End: 2023-05-05

## 2023-05-09 ENCOUNTER — OFFICE VISIT (OUTPATIENT)
Dept: ORTHOPEDIC SURGERY | Age: 58
End: 2023-05-09

## 2023-05-09 VITALS — HEIGHT: 62 IN | BODY MASS INDEX: 32.01 KG/M2 | WEIGHT: 173.94 LBS

## 2023-05-09 DIAGNOSIS — Z96.642 S/P TOTAL LEFT HIP ARTHROPLASTY: ICD-10-CM

## 2023-05-09 DIAGNOSIS — M16.11 PRIMARY OSTEOARTHRITIS OF RIGHT HIP: ICD-10-CM

## 2023-05-09 DIAGNOSIS — M25.551 RIGHT HIP PAIN: Primary | ICD-10-CM

## 2023-05-09 RX ORDER — METHYLPREDNISOLONE ACETATE 40 MG/ML
40 INJECTION, SUSPENSION INTRA-ARTICULAR; INTRALESIONAL; INTRAMUSCULAR; SOFT TISSUE ONCE
Status: SHIPPED | OUTPATIENT
Start: 2023-05-09

## 2023-05-09 RX ORDER — HYDROCODONE BITARTRATE AND ACETAMINOPHEN 5; 325 MG/1; MG/1
1 TABLET ORAL EVERY 8 HOURS PRN
Qty: 42 TABLET | Refills: 0 | Status: SHIPPED | OUTPATIENT
Start: 2023-05-09 | End: 2023-05-23

## 2023-05-09 RX ORDER — LIDOCAINE HYDROCHLORIDE 10 MG/ML
7 INJECTION, SOLUTION INFILTRATION; PERINEURAL ONCE
Status: SHIPPED | OUTPATIENT
Start: 2023-05-09

## 2023-05-10 NOTE — PROGRESS NOTES
This patient who had undergone left total hip arthroplasty on 3/24/2023 is seen here in follow-up. She is quite happy with the result of the left hip but now complains of pain in the right hip. Examination: The right hip shows a flexion contracture of about 15 degrees. She has minimal rotation and that also is very painful. Diagnosis: Status post left total hip arthroplasty. #2 symptomatic primary osteoarthritis right hip. Treatment: Because she was having significant amount of pain I have injected her right hip with 40 mg Depo-Medrol and 5 cc of 1% plain lidocaine after obtaining an x-ray with markers in the front of the hip joint. She will be scheduled to have the right hip.

## 2023-05-11 ENCOUNTER — HOSPITAL ENCOUNTER (OUTPATIENT)
Dept: PHYSICAL THERAPY | Age: 58
Setting detail: THERAPIES SERIES
Discharge: HOME | End: 2023-05-11

## 2023-05-11 NOTE — FLOWSHEET NOTE
[x] [address]  [address]    [phone]  F: [phone]     Physical Therapy Cancel/No Show note    Date: 2023  Patient: Alex Gallegos  : 1965  MRN: [id number]    Cancels/No Shows to date: 2/3    For today's appointment patient:    []  Cancelled    [] Rescheduled appointment    [x] No-show     Reason given by patient:    []  Patient ill    []  Conflicting appointment    [] No transportation      [] Conflict with work    [] No reason given    [] Weather related    [] COVID-19    [x] Other:      Comments: spoke with patient this date after multiple missed appointment. Patient reports her non surgical hip (right side) has been very bothersome. Saw Dr Ashley Colin and received injection to hip and is to be scheduled for R JOSEFINA in the near future. Patient would like to be put on hold at this time until she ha scheduled her other surgery.        [] Next appointment was confirmed     Electronically signed by: Bartholomew Spatz, PTA

## 2023-05-12 ENCOUNTER — TELEPHONE (OUTPATIENT)
Dept: ORTHOPEDIC SURGERY | Age: 58
End: 2023-05-12

## 2023-05-12 DIAGNOSIS — Z01.818 PRE-OP TESTING: Primary | ICD-10-CM

## 2023-05-12 DIAGNOSIS — M16.11 PRIMARY OSTEOARTHRITIS OF RIGHT HIP: ICD-10-CM

## 2023-05-12 NOTE — TELEPHONE ENCOUNTER
Pt called stating Dr. Ifeanyi Ortega ordered a refill of her [place] on Tues to be dispensed on Sunday. Unfortunately her pharmacy, [facility] on [address] is not open on Sunday. They can dispense on Saturday if they are called by the office staff. Pt confirms she runs out of medication on Sunday. Please call [facility] to change the dispense date to Saturday.

## 2023-05-17 ENCOUNTER — TELEPHONE (OUTPATIENT)
Dept: ORTHOPEDIC SURGERY | Age: 58
End: 2023-05-17

## 2023-05-17 NOTE — TELEPHONE ENCOUNTER
Pt called stating she fell 3 days ago getting into her shower landing on her head, neck and shoulders. She did not go to and ER or her PCP. She has been taking Norco for the pain which has done nothing. Please call pt with recommendations.

## 2023-05-18 ENCOUNTER — APPOINTMENT (OUTPATIENT)
Dept: GENERAL RADIOLOGY | Age: 58
End: 2023-05-18
Payer: MEDICAID

## 2023-05-18 ENCOUNTER — HOSPITAL ENCOUNTER (INPATIENT)
Age: 58
LOS: 1 days | Discharge: LEFT BEFORE DISCHARGE | End: 2023-05-18
Attending: EMERGENCY MEDICINE | Admitting: INTERNAL MEDICINE
Payer: MEDICAID

## 2023-05-18 ENCOUNTER — APPOINTMENT (OUTPATIENT)
Dept: CT IMAGING | Age: 58
End: 2023-05-18
Payer: MEDICAID

## 2023-05-18 ENCOUNTER — TELEPHONE (OUTPATIENT)
Dept: ORTHOPEDIC SURGERY | Age: 58
End: 2023-05-18

## 2023-05-18 VITALS
OXYGEN SATURATION: 100 % | WEIGHT: 173.06 LBS | HEART RATE: 61 BPM | HEIGHT: 66 IN | DIASTOLIC BLOOD PRESSURE: 69 MMHG | TEMPERATURE: 97.5 F | SYSTOLIC BLOOD PRESSURE: 107 MMHG | BODY MASS INDEX: 27.81 KG/M2 | RESPIRATION RATE: 16 BRPM
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
## 2023-05-18 DIAGNOSIS — I95.9 HYPOTENSION, UNSPECIFIED HYPOTENSION TYPE: ICD-10-CM

## 2023-05-18 PROBLEM — F41.9 ANXIETY: Status: ACTIVE | Noted: 2017-01-31

## 2023-05-18 PROBLEM — E87.5 HYPERKALEMIA: Status: RESOLVED | Noted: 2023-01-02 | Resolved: 2023-05-18

## 2023-05-18 PROBLEM — G89.4 CHRONIC PAIN DISORDER: Status: RESOLVED | Noted: 2017-01-31 | Resolved: 2023-05-18

## 2023-05-18 PROBLEM — I16.0 HYPERTENSIVE URGENCY: Status: RESOLVED | Noted: 2023-02-04 | Resolved: 2023-05-18

## 2023-05-18 LAB
AMPHET UR QL SCN: NEGATIVE
ANION GAP SERPL CALCULATED.3IONS-SCNC: 9 MMOL/L (ref 9–17)
BARBITURATES UR QL SCN: NEGATIVE
BASOPHILS # BLD: 0.03 K/UL (ref 0–0.2)
BASOPHILS NFR BLD: 1 % (ref 0–2)
BENZODIAZ UR QL: NEGATIVE
BUN SERPL-MCNC: 12 MG/DL (ref 6–20)
CALCIUM SERPL-MCNC: 8.7 MG/DL (ref 8.6–10.4)
CANNABINOID SCREEN URINE: NEGATIVE
CHLORIDE SERPL-SCNC: 101 MMOL/L (ref 98–107)
CO2 SERPL-SCNC: 28 MMOL/L (ref 20–31)
COCAINE UR QL SCN: POSITIVE
CREAT SERPL-MCNC: 0.68 MG/DL (ref 0.5–0.9)
EOSINOPHIL # BLD: 0.15 K/UL (ref 0–0.44)
EOSINOPHILS RELATIVE PERCENT: 3 % (ref 1–4)
ERYTHROCYTE [DISTWIDTH] IN BLOOD BY AUTOMATED COUNT: 14.3 % (ref 11.8–14.4)
ETHANOL PERCENT: <0.01 %
ETHANOLAMINE SERPL-MCNC: <10 MG/DL
FENTANYL URINE: NEGATIVE
GFR SERPL CREATININE-BSD FRML MDRD: >60 ML/MIN/1.73M2
GLUCOSE SERPL-MCNC: 92 MG/DL (ref 70–99)
HCT VFR BLD AUTO: 38.4 % (ref 36.3–47.1)
HGB BLD-MCNC: 12.3 G/DL (ref 11.9–15.1)
IMM GRANULOCYTES # BLD AUTO: <0.03 K/UL (ref 0–0.3)
IMM GRANULOCYTES NFR BLD: 0 %
INR PPP: 0.9
LYMPHOCYTES # BLD: 33 % (ref 24–43)
LYMPHOCYTES NFR BLD: 1.61 K/UL (ref 1.1–3.7)
MCH RBC QN AUTO: 28.5 PG (ref 25.2–33.5)
MCHC RBC AUTO-ENTMCNC: 32 G/DL (ref 28.4–34.8)
MCV RBC AUTO: 88.9 FL (ref 82.6–102.9)
METHADONE SCREEN, URINE: NEGATIVE
MONOCYTES NFR BLD: 0.53 K/UL (ref 0.1–1.2)
MONOCYTES NFR BLD: 11 % (ref 3–12)
NEUTROPHILS NFR BLD: 52 % (ref 36–65)
NEUTS SEG NFR BLD: 2.54 K/UL (ref 1.5–8.1)
NRBC AUTOMATED: 0 PER 100 WBC
OPIATES UR QL SCN: POSITIVE
OXYCODONE SCREEN URINE: POSITIVE
PARTIAL THROMBOPLASTIN TIME: 25.2 SEC (ref 23–36.5)
PCP UR QL SCN: NEGATIVE
PLATELET # BLD AUTO: 330 K/UL (ref 138–453)
PMV BLD AUTO: 10.6 FL (ref 8.1–13.5)
POTASSIUM SERPL-SCNC: 4 MMOL/L (ref 3.7–5.3)
PROTHROMBIN TIME: 11.6 SEC (ref 11.7–14.9)
RBC # BLD AUTO: 4.32 M/UL (ref 3.95–5.11)
SODIUM SERPL-SCNC: 138 MMOL/L (ref 135–144)
TEST INFORMATION: ABNORMAL
TROPONIN I SERPL HS-MCNC: 17 NG/L (ref 0–14)
TROPONIN I SERPL HS-MCNC: 20 NG/L (ref 0–14)
WBC OTHER # BLD: 4.9 K/UL (ref 3.5–11.3)

## 2023-05-18 PROCEDURE — 70450 CT HEAD/BRAIN W/O DYE: CPT

## 2023-05-18 PROCEDURE — 85730 THROMBOPLASTIN TIME PARTIAL: CPT

## 2023-05-18 PROCEDURE — 99285 EMERGENCY DEPT VISIT HI MDM: CPT

## 2023-05-18 PROCEDURE — 2580000003 HC RX 258

## 2023-05-18 PROCEDURE — 1200000000 HC SEMI PRIVATE

## 2023-05-18 PROCEDURE — 85610 PROTHROMBIN TIME: CPT

## 2023-05-18 PROCEDURE — 80048 BASIC METABOLIC PNL TOTAL CA: CPT

## 2023-05-18 PROCEDURE — 73502 X-RAY EXAM HIP UNI 2-3 VIEWS: CPT

## 2023-05-18 PROCEDURE — G0480 DRUG TEST DEF 1-7 CLASSES: HCPCS

## 2023-05-18 PROCEDURE — 73030 X-RAY EXAM OF SHOULDER: CPT

## 2023-05-18 PROCEDURE — 96374 THER/PROPH/DIAG INJ IV PUSH: CPT

## 2023-05-18 PROCEDURE — 2580000003 HC RX 258: Performed by: STUDENT IN AN ORGANIZED HEALTH CARE EDUCATION/TRAINING PROGRAM

## 2023-05-18 PROCEDURE — 93005 ELECTROCARDIOGRAM TRACING: CPT

## 2023-05-18 PROCEDURE — 6370000000 HC RX 637 (ALT 250 FOR IP): Performed by: STUDENT IN AN ORGANIZED HEALTH CARE EDUCATION/TRAINING PROGRAM

## 2023-05-18 PROCEDURE — 85025 COMPLETE CBC W/AUTO DIFF WBC: CPT

## 2023-05-18 PROCEDURE — 84484 ASSAY OF TROPONIN QUANT: CPT

## 2023-05-18 PROCEDURE — 6360000002 HC RX W HCPCS: Performed by: EMERGENCY MEDICINE

## 2023-05-18 PROCEDURE — 80307 DRUG TEST PRSMV CHEM ANLYZR: CPT

## 2023-05-18 RX ORDER — SODIUM CHLORIDE 0.9 % (FLUSH) 0.9 %
5-40 SYRINGE (ML) INJECTION PRN
Status: DISCONTINUED | OUTPATIENT
Start: 2023-05-18 | End: 2023-05-19 | Stop reason: HOSPADM

## 2023-05-18 RX ORDER — ACETAMINOPHEN 325 MG/1
650 TABLET ORAL EVERY 6 HOURS PRN
Status: DISCONTINUED | OUTPATIENT
Start: 2023-05-18 | End: 2023-05-19 | Stop reason: HOSPADM

## 2023-05-18 RX ORDER — ONDANSETRON 2 MG/ML
4 INJECTION INTRAMUSCULAR; INTRAVENOUS ONCE
Status: COMPLETED | OUTPATIENT
Start: 2023-05-18 | End: 2023-05-18

## 2023-05-18 RX ORDER — SODIUM CHLORIDE 9 MG/ML
INJECTION, SOLUTION INTRAVENOUS CONTINUOUS
Status: DISCONTINUED | OUTPATIENT
Start: 2023-05-18 | End: 2023-05-18

## 2023-05-18 RX ORDER — 0.9 % SODIUM CHLORIDE 0.9 %
500 INTRAVENOUS SOLUTION INTRAVENOUS ONCE
Status: DISCONTINUED | OUTPATIENT
Start: 2023-05-18 | End: 2023-05-19 | Stop reason: HOSPADM

## 2023-05-18 RX ORDER — SODIUM CHLORIDE 9 MG/ML
INJECTION, SOLUTION INTRAVENOUS PRN
Status: DISCONTINUED | OUTPATIENT
Start: 2023-05-18 | End: 2023-05-19 | Stop reason: HOSPADM

## 2023-05-18 RX ORDER — 0.9 % SODIUM CHLORIDE 0.9 %
500 INTRAVENOUS SOLUTION INTRAVENOUS ONCE
Status: COMPLETED | OUTPATIENT
Start: 2023-05-18 | End: 2023-05-18

## 2023-05-18 RX ORDER — BUDESONIDE AND FORMOTEROL FUMARATE DIHYDRATE 160; 4.5 UG/1; UG/1
2 AEROSOL RESPIRATORY (INHALATION) 2 TIMES DAILY
Status: DISCONTINUED | OUTPATIENT
Start: 2023-05-18 | End: 2023-05-19 | Stop reason: HOSPADM

## 2023-05-18 RX ORDER — ONDANSETRON 4 MG/1
4 TABLET, ORALLY DISINTEGRATING ORAL EVERY 8 HOURS PRN
Status: DISCONTINUED | OUTPATIENT
Start: 2023-05-18 | End: 2023-05-19 | Stop reason: HOSPADM

## 2023-05-18 RX ORDER — ACETAMINOPHEN 650 MG/1
650 SUPPOSITORY RECTAL EVERY 6 HOURS PRN
Status: DISCONTINUED | OUTPATIENT
Start: 2023-05-18 | End: 2023-05-19 | Stop reason: HOSPADM

## 2023-05-18 RX ORDER — KETOROLAC TROMETHAMINE 15 MG/ML
15 INJECTION, SOLUTION INTRAMUSCULAR; INTRAVENOUS ONCE
Status: DISCONTINUED | OUTPATIENT
Start: 2023-05-18 | End: 2023-05-18

## 2023-05-18 RX ORDER — 0.9 % SODIUM CHLORIDE 0.9 %
1000 INTRAVENOUS SOLUTION INTRAVENOUS ONCE
Status: COMPLETED | OUTPATIENT
Start: 2023-05-18 | End: 2023-05-18

## 2023-05-18 RX ORDER — HYDROCODONE BITARTRATE AND ACETAMINOPHEN 5; 325 MG/1; MG/1
1 TABLET ORAL ONCE
Status: COMPLETED | OUTPATIENT
Start: 2023-05-18 | End: 2023-05-18

## 2023-05-18 RX ORDER — POLYETHYLENE GLYCOL 3350 17 G/17G
17 POWDER, FOR SOLUTION ORAL DAILY PRN
Status: DISCONTINUED | OUTPATIENT
Start: 2023-05-18 | End: 2023-05-19 | Stop reason: HOSPADM

## 2023-05-18 RX ORDER — MIDODRINE HYDROCHLORIDE 2.5 MG/1
2.5 TABLET ORAL
Status: DISCONTINUED | OUTPATIENT
Start: 2023-05-18 | End: 2023-05-19 | Stop reason: HOSPADM

## 2023-05-18 RX ORDER — MORPHINE SULFATE 4 MG/ML
4 INJECTION, SOLUTION INTRAMUSCULAR; INTRAVENOUS ONCE
Status: DISCONTINUED | OUTPATIENT
Start: 2023-05-18 | End: 2023-05-18

## 2023-05-18 RX ORDER — PANTOPRAZOLE SODIUM 40 MG/1
40 TABLET, DELAYED RELEASE ORAL DAILY
Status: DISCONTINUED | OUTPATIENT
Start: 2023-05-19 | End: 2023-05-19 | Stop reason: HOSPADM

## 2023-05-18 RX ORDER — FLUOXETINE HYDROCHLORIDE 20 MG/1
60 CAPSULE ORAL DAILY
Status: DISCONTINUED | OUTPATIENT
Start: 2023-05-19 | End: 2023-05-19 | Stop reason: HOSPADM

## 2023-05-18 RX ORDER — ONDANSETRON 2 MG/ML
4 INJECTION INTRAMUSCULAR; INTRAVENOUS EVERY 6 HOURS PRN
Status: DISCONTINUED | OUTPATIENT
Start: 2023-05-18 | End: 2023-05-19 | Stop reason: HOSPADM

## 2023-05-18 RX ORDER — ENOXAPARIN SODIUM 100 MG/ML
40 INJECTION SUBCUTANEOUS DAILY
Status: DISCONTINUED | OUTPATIENT
Start: 2023-05-19 | End: 2023-05-19 | Stop reason: HOSPADM

## 2023-05-18 RX ORDER — SODIUM CHLORIDE 9 MG/ML
INJECTION, SOLUTION INTRAVENOUS CONTINUOUS
Status: DISCONTINUED | OUTPATIENT
Start: 2023-05-18 | End: 2023-05-19 | Stop reason: HOSPADM

## 2023-05-18 RX ORDER — ACETAMINOPHEN 325 MG/1
650 TABLET ORAL ONCE
Status: DISCONTINUED | OUTPATIENT
Start: 2023-05-18 | End: 2023-05-18

## 2023-05-18 RX ORDER — SODIUM CHLORIDE 0.9 % (FLUSH) 0.9 %
5-40 SYRINGE (ML) INJECTION EVERY 12 HOURS SCHEDULED
Status: DISCONTINUED | OUTPATIENT
Start: 2023-05-18 | End: 2023-05-19 | Stop reason: HOSPADM

## 2023-05-18 RX ADMIN — ONDANSETRON 4 MG: 2 INJECTION INTRAMUSCULAR; INTRAVENOUS at 18:00

## 2023-05-18 RX ADMIN — SODIUM CHLORIDE: 9 INJECTION, SOLUTION INTRAVENOUS at 21:00

## 2023-05-18 RX ADMIN — SODIUM CHLORIDE 1000 ML: 9 INJECTION, SOLUTION INTRAVENOUS at 16:00

## 2023-05-18 RX ADMIN — HYDROCODONE BITARTRATE AND ACETAMINOPHEN 1 TABLET: 5; 325 TABLET ORAL at 16:00

## 2023-05-18 RX ADMIN — SODIUM CHLORIDE 500 ML: 0.9 INJECTION, SOLUTION INTRAVENOUS at 19:17

## 2023-05-18 RX ADMIN — SODIUM CHLORIDE 1000 ML: 9 INJECTION, SOLUTION INTRAVENOUS at 16:56

## 2023-05-18 ASSESSMENT — ENCOUNTER SYMPTOMS
COLOR CHANGE: 0
APNEA: 0
BACK PAIN: 1
ABDOMINAL PAIN: 0
SHORTNESS OF BREATH: 0
ABDOMINAL DISTENTION: 0

## 2023-05-18 ASSESSMENT — PAIN - FUNCTIONAL ASSESSMENT: PAIN_FUNCTIONAL_ASSESSMENT: 0-10

## 2023-05-18 ASSESSMENT — PAIN SCALES - GENERAL
PAINLEVEL_OUTOF10: 9

## 2023-05-18 ASSESSMENT — PAIN DESCRIPTION - LOCATION: LOCATION: HIP;LEG

## 2023-05-18 NOTE — PROGRESS NOTES
[address] [facility] [address]  PROGRESS NOTE    Shift date: 05/18/23  Shift day: Thursday   Shift # 2    Room # 08/76   Name: Enid Keating                [employer] day:    Place of [employer]:     Referral: Routine Visit    Admit Date & Time: 5/18/2023  2:55 PM    Assessment:  Enid Keating is a 60 y.o. female in the hospital       Intervention:  Writer introduced self and title as      Outcome:  Patient declined visit at this time. Plan:  Chaplains will remain available to offer spiritual and emotional support as needed.       Electronically signed by Adri Schofield on 5/18/2023 at 5:17 PM.  [facility]  [phone]

## 2023-05-18 NOTE — ED NOTES
The following labs were labeled with appropriate pt sticker and tubed to lab:     [] Blue     [] Lavender   [] on ice  [x] Green/yellow  [] Green/black [] on ice  [] Kori Yang  [] on ice  [] Yellow  [] Red  [] Type/ Screen  [] ABG  [] VBG    [] COVID-19 swab    [] Rapid  [] PCR  [] Flu swab  [] Peds Viral Panel     [] Urine Sample  [] Fecal Sample  [] Pelvic Cultures  [] Blood Cultures  [] X 2  [] STREP Cultures       Susanna Colbert RN  05/18/23 2386

## 2023-05-18 NOTE — ED NOTES
Internal medicine at bedside evaluating pt  BP 84/66  NS continues to infuse     Susan Delgado RN  05/18/23 3647

## 2023-05-18 NOTE — ED PROVIDER NOTES
[address]  ED  Emergency Department Encounter  Emergency Medicine Resident     Pt Mariana Nayely Schumacher  MRN: [id number]  [place] 1965  Date of evaluation: 5/18/23  PCP:  Olga Gan DO  Note Started: 4:23 PM EDT      CHIEF COMPLAINT       Chief Complaint   Patient presents with    Fall       HISTORY OF PRESENT ILLNESS  (Location/Symptom, Timing/Onset, Context/Setting, Quality, Duration, Modifying Factors, Severity.)      Enid Keating is a 60 y.o. female who presents with fall that occurred earlier today. Patient reports that she was sitting on her bed and slipped and fell onto her bottom and right hip. She has a history of left hip arthroplasty and is currently complaining of right hip pain and right shoulder pain. She called EMS to have her brought in for evaluation. Patient is a poor historian. She has pain in the right shoulder and right hip. No diminished sensation. Does not believe that she hit her head and does not take any blood thinners. Currently rating her pain as a 9 out of 10. Patient is hypotensive, reports that some days she does not take her blood pressure medication as it is already low. PAST MEDICAL / SURGICAL / SOCIAL / FAMILY HISTORY      has a past medical history of Acid reflux, Anxiety, Arthritis, Asthma, Bipolar 1 disorder ([address] Bowel obstruction ([address] COPD (chronic obstructive pulmonary disease) ([address] COVID-19 vaccine administered, Crohn disease ([address] Depression, Dry eye, Fibromyalgia, Gout, Headache, History of recent hospitalization, Hyperlipidemia, Hypertension, Hypothyroidism, Kidney stones, Muscle spasm, Neuropathy, Pain, Under care of team, Under care of team, Wears partial dentures, Wears reading eyeglasses, and Wellness examination. has a past surgical history that includes Tonsillectomy and adenoidectomy; Tubal ligation; Cholecystectomy; Bunionectomy (Left); Colonoscopy (04/30/2007);  Colonoscopy (10/12/2017);

## 2023-05-18 NOTE — ED PROVIDER NOTES
[address] ED     Emergency Department     Faculty Attestation    I performed a history and physical examination of the patient and discussed management with the resident. I reviewed the residents note and agree with the documented findings and plan of care. Any areas of disagreement are noted on the chart. I was personally present for the key portions of any procedures. I have documented in the chart those procedures where I was not present during the key portions. I have reviewed the emergency nurses triage note. I agree with the chief complaint, past medical history, past surgical history, allergies, medications, social and family history as documented unless otherwise noted below. For Physician Assistant/ Nurse Practitioner cases/documentation I have personally evaluated this patient and have completed at least one if not all key elements of the E/M (history, physical exam, and MDM). Additional findings are as noted. Note Started: 3:12 PM EDT    Patient here with fall complaining of right shoulder right hip pain. Patient has been using a walker and a scooter since left total hip done in March. Has known right hip issues states they are planning for hip replacement on that side as well when she recovers from the left. Does not think she lost consciousness unsure if she hit her head overall somewhat of a poor historian. Not anticoagulated. On exam nontoxic well-appearing. Nonfocal tenderness of the right shoulder. Significant pain with range of motion of the right hip but no shortening deformity or rotation. Distally intact pulses. No neurodeficits. Will order head CT given unsure mechanism some confusion on exam even though was alert and oriented imaging labs EKG reevaluate need for additional work-up and/or admission    EKG interpretation: Sinus bradycardia 58 normal intervals normal axis no acute ST or T changes.   No acute findings    Critical Care     none    Dalila Nichols

## 2023-05-18 NOTE — H&P
[address]     Department of Internal Medicine - Staff Internal Medicine Teaching Service          ADMISSION NOTE/HISTORY AND PHYSICAL EXAMINATION   Date: 5/18/2023  Patient Name: Tiarra Hadley  Date of admission: 5/18/2023  2:55 PM  YOB: 1965  PCP: Ivy Curry DO  History Obtained From:  patient, electronic medical record    [facility]     Chief complaint:   Chief Complaint   Patient presents with    Fall     HISTORY OF PRESENTING ILLNESS     The patient is a pleasant 60 y.o. female presents with a chief complaint of status post fall. Patient states that she was trying to sit down on the edge of her bed, however missed the edge of the bed and fell down. Patient complains of right-sided shoulder pain and right-sided hip pain. Patient denied loss of consciousness however is does not remember if she hit her head. CT head was done in the emergency department showed no acute process, x-ray of right hip and right shoulder were also completed that showed no acute fracture. Patient does have a significant past medical history for left total hip arthroplasty on 3/24/23, Crohn's disease, COPD, bipolar 1, hyperlipidemia, hypothyroidism and hypertension. Patient was set to be discharged home however an incidental finding of hypotension was a concern in the emergency department. Patient's lowest recorded blood pressure was 75/50, patient is responsive to fluids per the emergency medicine department. She is now received approximately 2 L. Patient's home medications include: Lisinopril 5 mg daily hydrocodone-acetaminophen (Norco) every 8 hours as needed for pain, Spiriva, Klonopin 0.5 mg as needed, Banophen 25 mg nightly, Neurontin 400 mg twice daily, pregabalin 300 mg twice daily, and ibuprofen 400 mg every 6 hours as needed, trazodone 150 mg nightly and fluoxetine 60 mg daily.     On my initial evaluation patient is somnolent, answering questions

## 2023-05-18 NOTE — ED NOTES
The following labs were labeled with appropriate pt sticker and tubed to lab:     [x] Blue     [x] Lavender   [] on ice  [x] Green/yellow  [] Green/black [] on ice  [] Jennifer Jamarcus  [] on ice  [] Yellow  [] Red  [] Type/ Screen  [] ABG  [] VBG    [] COVID-19 swab    [] Rapid  [] PCR  [] Flu swab  [] Peds Viral Panel     [] Urine Sample  [] Fecal Sample  [] Pelvic Cultures  [] Blood Cultures  [] X 2  [] STREP Cultures       Fe Lizama RN  05/18/23 9000

## 2023-05-18 NOTE — ED NOTES
Pt presents to the ED via EMS with c/o fall. Patient reports having a fall earlier, she reports feeling dizzy and attempted to sit on the bed when she had fallen and hit her head on a box of books. Patient states she also had another fall a few weeks ago, which reports it as mechanical. Patient c/o neck, head, and right hip pain. Patient denies use of blood thinners, reports discontinuing two months ago. Patient is alert and oriented x4, answering questions appropriately. Patient is changed into a gown, placed on cardiac monitor, EKG done, IV established, will continue to monitor.         Nafisa Garrido RN  05/18/23 1273

## 2023-05-19 LAB
EKG ATRIAL RATE: 58 BPM
EKG P AXIS: 44 DEGREES
EKG P-R INTERVAL: 152 MS
EKG Q-T INTERVAL: 488 MS
EKG QRS DURATION: 90 MS
EKG QTC CALCULATION (BAZETT): 479 MS
EKG R AXIS: 69 DEGREES
EKG T AXIS: 46 DEGREES
EKG VENTRICULAR RATE: 58 BPM

## 2023-05-19 NOTE — PLAN OF CARE
Spoke with patient at bedside due to nursing page for narcotic medication. Spoke with the patient regarding the hypotension and concern for developing worsening hypotension with narcotics use. Patient then began yelling at me wanting narcotics. On initial evaluation in the emergency department patient was very somnolent not answering questions fully however now is alert and yelling. Due to concern for narcotics abuse in the emergency department and that being the causation for her hypotension I denied the patient narcotics. Patient's talk screen was also positive for cocaine, opioids, and oxycodone. Patient's last blood pressure was 107/69. I spoke with the patient about risk of fall, stroke, hitting her head, as well as other risks of leaving that included death. Patient was understanding and stated that she would be better off at home dad then in here. Nursing staff and clinical support staff were at bedside during this discussion. Patient stated that she wanted to leave AMA and that she would sign the forms.

## 2023-05-19 NOTE — PROGRESS NOTES
Messaged doctor , pt requesting pain medications, doctor states can not give because of severe hypotension. Informed patient.   Patient wanted to see resident called resident

## 2023-05-19 NOTE — ED NOTES
The following labs were labeled with appropriate pt sticker and tubed to lab:     [] Blue     [] Lavender   [] on ice  [] Green/yellow  [] Green/black [] on ice  [] Berneda Cristal  [] on ice  [] Yellow  [] Red  [] Type/ Screen  [] ABG  [] VBG    [] COVID-19 swab    [] Rapid  [] PCR  [] Flu swab  [] Peds Viral Panel     [x] Urine Sample  [] Fecal Sample  [] Pelvic Cultures  [] Blood Cultures  [] X 2  [] STREP Cultures       Titi Stokes RN  05/18/23 0734

## 2023-05-19 NOTE — PROGRESS NOTES
Pt leaving ama after doctor won't prescribe pain medications. Iv removed. [id number] [facility] [place] papers signed.

## 2023-05-30 ENCOUNTER — OFFICE VISIT (OUTPATIENT)
Dept: ORTHOPEDIC SURGERY | Age: 58
End: 2023-05-30

## 2023-05-30 VITALS — HEIGHT: 66 IN | BODY MASS INDEX: 27.8 KG/M2 | WEIGHT: 173 LBS
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
## 2023-05-30 DIAGNOSIS — Z96.642 S/P TOTAL LEFT HIP ARTHROPLASTY: ICD-10-CM

## 2023-05-30 RX ORDER — HYDROCODONE BITARTRATE AND ACETAMINOPHEN 5; 325 MG/1; MG/1
1 TABLET ORAL EVERY 8 HOURS PRN
Qty: 42 TABLET | Refills: 0 | Status: SHIPPED | OUTPATIENT
Start: 2023-05-30 | End: 2023-06-13

## 2023-05-30 NOTE — PROGRESS NOTES
Chief Complaint   Patient presents with    Follow-up      ED : 05/18/2023, fell from bed, pain in neck and left shoulder

## 2023-05-30 NOTE — PROGRESS NOTES
This patient who had undergone left total hip arthroplasty on 3/24/2023 is seen here because she had fallen out of bed. She was seen in the emergency room and x-ray of the right shoulder was taken but she had complained of pain in the left side. She says the pain is getting better but she still has some pain in the left shoulder and left elbow. Examination: Cervical spine examination shows considerable stiffness of motion in all the direction. Her flexion extension were also limited. Left shoulder examination shows good range of motion. Pain at the extreme range. Left elbow examination shows she has limitation of extension but tenderness over the radial head and slight limitation of pronation supination. X-rays: Cervical spine x-rays show she has significant degenerative changes at C4-5-6 7 level. Most of his at C4-5 level. X-rays of the shoulder showed no abnormality. X-rays of the elbow show she had undergone previous injury to the elbow. There is calcification of the medial side as well as she had a radial head fracture with good healing. She did admit to say that this fracture was treated at [facility].  This was several years ago. Diagnosis: Cervical spondylosis. #2 contusion left shoulder. #3 contusion left elbow with old injury. Patient will continue mobilization of the left upper extremity and she is scheduled to have her right hip replaced on the 16th.

## 2023-06-14 RX ORDER — BLOOD PRESSURE TEST KIT
KIT MISCELLANEOUS
Status: ON HOLD | COMMUNITY
Start: 2023-03-03

## 2023-06-16 ENCOUNTER — HOSPITAL ENCOUNTER (OUTPATIENT)
Age: 58
Discharge: HOME | End: 2023-06-18
Attending: ORTHOPAEDIC SURGERY | Admitting: ORTHOPAEDIC SURGERY
Payer: MEDICAID

## 2023-06-16 ENCOUNTER — APPOINTMENT (OUTPATIENT)
Dept: GENERAL RADIOLOGY | Age: 58
End: 2023-06-16
Attending: ORTHOPAEDIC SURGERY
Payer: MEDICAID

## 2023-06-16 DIAGNOSIS — G89.18 POST-OP PAIN: Primary | ICD-10-CM

## 2023-06-16 DIAGNOSIS — M16.11 PRIMARY OSTEOARTHRITIS OF RIGHT HIP: ICD-10-CM

## 2023-06-16 PROBLEM — Z96.641 S/P TOTAL RIGHT HIP ARTHROPLASTY: Status: ACTIVE | Noted: 2023-06-16

## 2023-06-16 LAB
EGFR, POC: >60 ML/MIN/1.73M2
GLUCOSE BLD-MCNC: 92 MG/DL (ref 74–100)
POC BUN: 19 MG/DL (ref 8–26)
POC CHLORIDE: 103 MMOL/L (ref 98–107)
POC CREATININE: 1.04 MG/DL (ref 0.51–1.19)
POC IONIZED CALCIUM: 1.28 MMOL/L (ref 1.15–1.33)
POC SODIUM: 139 MMOL/L (ref 138–146)
POTASSIUM BLD-SCNC: 3.7 MMOL/L (ref 3.5–4.5)

## 2023-06-16 PROCEDURE — 6360000002 HC RX W HCPCS: Performed by: STUDENT IN AN ORGANIZED HEALTH CARE EDUCATION/TRAINING PROGRAM

## 2023-06-16 PROCEDURE — 6370000000 HC RX 637 (ALT 250 FOR IP): Performed by: ANESTHESIOLOGY

## 2023-06-16 PROCEDURE — 7100000001 HC PACU RECOVERY - ADDTL 15 MIN: Performed by: ORTHOPAEDIC SURGERY

## 2023-06-16 PROCEDURE — 6360000002 HC RX W HCPCS: Performed by: ANESTHESIOLOGY

## 2023-06-16 PROCEDURE — 51701 INSERT BLADDER CATHETER: CPT

## 2023-06-16 PROCEDURE — 51798 US URINE CAPACITY MEASURE: CPT

## 2023-06-16 PROCEDURE — 2580000003 HC RX 258: Performed by: STUDENT IN AN ORGANIZED HEALTH CARE EDUCATION/TRAINING PROGRAM

## 2023-06-16 PROCEDURE — 82435 ASSAY OF BLOOD CHLORIDE: CPT

## 2023-06-16 PROCEDURE — 3209999900 FLUORO FOR SURGICAL PROCEDURES

## 2023-06-16 PROCEDURE — 27130 TOTAL HIP ARTHROPLASTY: CPT | Performed by: ORTHOPAEDIC SURGERY

## 2023-06-16 PROCEDURE — 6370000000 HC RX 637 (ALT 250 FOR IP): Performed by: STUDENT IN AN ORGANIZED HEALTH CARE EDUCATION/TRAINING PROGRAM

## 2023-06-16 PROCEDURE — 94640 AIRWAY INHALATION TREATMENT: CPT

## 2023-06-16 PROCEDURE — 2580000003 HC RX 258: Performed by: ORTHOPAEDIC SURGERY

## 2023-06-16 PROCEDURE — C1776 JOINT DEVICE (IMPLANTABLE): HCPCS | Performed by: ORTHOPAEDIC SURGERY

## 2023-06-16 PROCEDURE — 73502 X-RAY EXAM HIP UNI 2-3 VIEWS: CPT

## 2023-06-16 PROCEDURE — 82330 ASSAY OF CALCIUM: CPT

## 2023-06-16 PROCEDURE — 84132 ASSAY OF SERUM POTASSIUM: CPT

## 2023-06-16 PROCEDURE — 3700000000 HC ANESTHESIA ATTENDED CARE: Performed by: ORTHOPAEDIC SURGERY

## 2023-06-16 PROCEDURE — 94761 N-INVAS EAR/PLS OXIMETRY MLT: CPT

## 2023-06-16 PROCEDURE — 2500000003 HC RX 250 WO HCPCS: Performed by: ANESTHESIOLOGY

## 2023-06-16 PROCEDURE — 2709999900 HC NON-CHARGEABLE SUPPLY: Performed by: ORTHOPAEDIC SURGERY

## 2023-06-16 PROCEDURE — 82565 ASSAY OF CREATININE: CPT

## 2023-06-16 PROCEDURE — 3600000004 HC SURGERY LEVEL 4 BASE: Performed by: ORTHOPAEDIC SURGERY

## 2023-06-16 PROCEDURE — 6370000000 HC RX 637 (ALT 250 FOR IP)

## 2023-06-16 PROCEDURE — 3600000014 HC SURGERY LEVEL 4 ADDTL 15MIN: Performed by: ORTHOPAEDIC SURGERY

## 2023-06-16 PROCEDURE — 84520 ASSAY OF UREA NITROGEN: CPT

## 2023-06-16 PROCEDURE — 84295 ASSAY OF SERUM SODIUM: CPT

## 2023-06-16 PROCEDURE — 2500000003 HC RX 250 WO HCPCS: Performed by: ORTHOPAEDIC SURGERY

## 2023-06-16 PROCEDURE — 82947 ASSAY GLUCOSE BLOOD QUANT: CPT

## 2023-06-16 PROCEDURE — 3700000001 HC ADD 15 MINUTES (ANESTHESIA): Performed by: ORTHOPAEDIC SURGERY

## 2023-06-16 PROCEDURE — 7100000000 HC PACU RECOVERY - FIRST 15 MIN: Performed by: ORTHOPAEDIC SURGERY

## 2023-06-16 DEVICE — STEM FEM SZ 4 L103MM 12/14 TAPR STD OFFSET HIP DUOFIX CLLRD: Type: IMPLANTABLE DEVICE | Site: HIP | Status: FUNCTIONAL

## 2023-06-16 DEVICE — HIP H3 TOT ADV DUAL MOB IMPL CAPPED H3: Type: IMPLANTABLE DEVICE | Site: HIP | Status: FUNCTIONAL

## 2023-06-16 DEVICE — CUP ACET DIA54MM HIP GRIPTION PRI CEMENTLESS FIX SECT SER: Type: IMPLANTABLE DEVICE | Site: HIP | Status: FUNCTIONAL

## 2023-06-16 DEVICE — LINER ACETABULAR DM PINNACLE: Type: IMPLANTABLE DEVICE | Site: HIP | Status: FUNCTIONAL

## 2023-06-16 DEVICE — BI MENTUM PFR PE LINER 47MM28MM: Type: IMPLANTABLE DEVICE | Site: HIP | Status: FUNCTIONAL

## 2023-06-16 DEVICE — HEAD FEM DIA28MM NK L+1.5MM HIP MTL ON MTL 12/14 TAPR: Type: IMPLANTABLE DEVICE | Site: HIP | Status: FUNCTIONAL

## 2023-06-16 DEVICE — ELIMINATOR H APEX FOR 48-60MM PINN HIP SHELL: Type: IMPLANTABLE DEVICE | Site: HIP | Status: FUNCTIONAL

## 2023-06-16 DEVICE — SCREW BNE L30MM DIA6.5MM CANC HIP S STL GRIPTION FULL THRD: Type: IMPLANTABLE DEVICE | Site: HIP | Status: FUNCTIONAL

## 2023-06-16 RX ORDER — LIDOCAINE HYDROCHLORIDE 10 MG/ML
1 INJECTION, SOLUTION EPIDURAL; INFILTRATION; INTRACAUDAL; PERINEURAL
Status: ACTIVE | OUTPATIENT
Start: 2023-06-16 | End: 2023-06-17

## 2023-06-16 RX ORDER — CLINDAMYCIN PHOSPHATE 600 MG/50ML
600 INJECTION INTRAVENOUS EVERY 8 HOURS
Status: DISCONTINUED | OUTPATIENT
Start: 2023-06-16 | End: 2023-06-16 | Stop reason: RX

## 2023-06-16 RX ORDER — ACETAMINOPHEN 325 MG/1
650 TABLET ORAL ONCE
Status: DISCONTINUED | OUTPATIENT
Start: 2023-06-16 | End: 2023-06-16 | Stop reason: ALTCHOICE

## 2023-06-16 RX ORDER — CALCIUM CARBONATE 500 MG/1
500 TABLET, CHEWABLE ORAL 3 TIMES DAILY PRN
Status: DISCONTINUED | OUTPATIENT
Start: 2023-06-16 | End: 2023-06-18 | Stop reason: HOSPADM

## 2023-06-16 RX ORDER — NAPROXEN 250 MG/1
500 TABLET ORAL 2 TIMES DAILY WITH MEALS
Status: DISCONTINUED | OUTPATIENT
Start: 2023-06-16 | End: 2023-06-16

## 2023-06-16 RX ORDER — MIDAZOLAM HYDROCHLORIDE 2 MG/2ML
2 INJECTION, SOLUTION INTRAMUSCULAR; INTRAVENOUS ONCE
Status: COMPLETED | OUTPATIENT
Start: 2023-06-16 | End: 2023-06-16

## 2023-06-16 RX ORDER — LISINOPRIL 5 MG/1
5 TABLET ORAL DAILY
Status: DISCONTINUED | OUTPATIENT
Start: 2023-06-16 | End: 2023-06-18 | Stop reason: HOSPADM

## 2023-06-16 RX ORDER — NAPROXEN 250 MG/1
500 TABLET ORAL 2 TIMES DAILY PRN
Status: DISCONTINUED | OUTPATIENT
Start: 2023-06-16 | End: 2023-06-18 | Stop reason: HOSPADM

## 2023-06-16 RX ORDER — SODIUM CHLORIDE 9 MG/ML
INJECTION, SOLUTION INTRAVENOUS PRN
Status: DISCONTINUED | OUTPATIENT
Start: 2023-06-16 | End: 2023-06-16 | Stop reason: HOSPADM

## 2023-06-16 RX ORDER — SODIUM CHLORIDE 9 MG/ML
INJECTION, SOLUTION INTRAVENOUS CONTINUOUS
Status: DISCONTINUED | OUTPATIENT
Start: 2023-06-16 | End: 2023-06-18 | Stop reason: HOSPADM

## 2023-06-16 RX ORDER — LANOLIN ALCOHOL/MO/W.PET/CERES
6 CREAM (GRAM) TOPICAL NIGHTLY PRN
Status: DISCONTINUED | OUTPATIENT
Start: 2023-06-16 | End: 2023-06-18 | Stop reason: HOSPADM

## 2023-06-16 RX ORDER — CYCLOBENZAPRINE HCL 5 MG
5 TABLET ORAL EVERY 6 HOURS
Status: DISCONTINUED | OUTPATIENT
Start: 2023-06-16 | End: 2023-06-16

## 2023-06-16 RX ORDER — MAGNESIUM HYDROXIDE 1200 MG/15ML
LIQUID ORAL CONTINUOUS PRN
Status: COMPLETED | OUTPATIENT
Start: 2023-06-16 | End: 2023-06-16

## 2023-06-16 RX ORDER — LOPERAMIDE HYDROCHLORIDE 2 MG/1
2 CAPSULE ORAL PRN
Status: DISCONTINUED | OUTPATIENT
Start: 2023-06-16 | End: 2023-06-18 | Stop reason: HOSPADM

## 2023-06-16 RX ORDER — DOCUSATE SODIUM 100 MG/1
100 CAPSULE, LIQUID FILLED ORAL 2 TIMES DAILY PRN
Qty: 60 CAPSULE | Refills: 0 | Status: SHIPPED | OUTPATIENT
Start: 2023-06-16 | End: 2023-06-18 | Stop reason: SDUPTHER

## 2023-06-16 RX ORDER — 0.9 % SODIUM CHLORIDE 0.9 %
500 INTRAVENOUS SOLUTION INTRAVENOUS ONCE
Status: COMPLETED | OUTPATIENT
Start: 2023-06-16 | End: 2023-06-16

## 2023-06-16 RX ORDER — SODIUM CHLORIDE 0.9 % (FLUSH) 0.9 %
5-40 SYRINGE (ML) INJECTION PRN
Status: DISCONTINUED | OUTPATIENT
Start: 2023-06-16 | End: 2023-06-16

## 2023-06-16 RX ORDER — SODIUM CHLORIDE 0.9 % (FLUSH) 0.9 %
5-40 SYRINGE (ML) INJECTION EVERY 12 HOURS SCHEDULED
Status: DISCONTINUED | OUTPATIENT
Start: 2023-06-16 | End: 2023-06-16

## 2023-06-16 RX ORDER — ASPIRIN 81 MG/1
81 TABLET, CHEWABLE ORAL DAILY
Status: DISCONTINUED | OUTPATIENT
Start: 2023-06-17 | End: 2023-06-18 | Stop reason: HOSPADM

## 2023-06-16 RX ORDER — SODIUM CHLORIDE 0.9 % (FLUSH) 0.9 %
5-40 SYRINGE (ML) INJECTION PRN
Status: DISCONTINUED | OUTPATIENT
Start: 2023-06-16 | End: 2023-06-16 | Stop reason: HOSPADM

## 2023-06-16 RX ORDER — PREGABALIN 100 MG/1
300 CAPSULE ORAL 2 TIMES DAILY
Status: DISCONTINUED | OUTPATIENT
Start: 2023-06-16 | End: 2023-06-17

## 2023-06-16 RX ORDER — OXYCODONE HYDROCHLORIDE 5 MG/1
10 TABLET ORAL EVERY 4 HOURS PRN
Status: DISCONTINUED | OUTPATIENT
Start: 2023-06-16 | End: 2023-06-18 | Stop reason: HOSPADM

## 2023-06-16 RX ORDER — FLUOXETINE HYDROCHLORIDE 20 MG/1
60 CAPSULE ORAL DAILY
Status: DISCONTINUED | OUTPATIENT
Start: 2023-06-16 | End: 2023-06-18 | Stop reason: HOSPADM

## 2023-06-16 RX ORDER — ASPIRIN 81 MG/1
81 TABLET, CHEWABLE ORAL 2 TIMES DAILY
Qty: 90 TABLET | Refills: 0 | Status: SHIPPED | OUTPATIENT
Start: 2023-06-16 | End: 2023-06-18 | Stop reason: SDUPTHER

## 2023-06-16 RX ORDER — OXYCODONE HYDROCHLORIDE AND ACETAMINOPHEN 5; 325 MG/1; MG/1
1 TABLET ORAL EVERY 6 HOURS PRN
Qty: 28 TABLET | Refills: 0 | Status: SHIPPED | OUTPATIENT
Start: 2023-06-16 | End: 2023-06-18 | Stop reason: SDUPTHER

## 2023-06-16 RX ORDER — CYCLOBENZAPRINE HCL 10 MG
5 TABLET ORAL EVERY 6 HOURS PRN
Status: DISCONTINUED | OUTPATIENT
Start: 2023-06-16 | End: 2023-06-18 | Stop reason: HOSPADM

## 2023-06-16 RX ORDER — GABAPENTIN 400 MG/1
400 CAPSULE ORAL 2 TIMES DAILY
Status: DISCONTINUED | OUTPATIENT
Start: 2023-06-16 | End: 2023-06-18 | Stop reason: HOSPADM

## 2023-06-16 RX ORDER — 0.9 % SODIUM CHLORIDE 0.9 %
500 INTRAVENOUS SOLUTION INTRAVENOUS ONCE
Status: COMPLETED | OUTPATIENT
Start: 2023-06-17 | End: 2023-06-17

## 2023-06-16 RX ORDER — METHYLPREDNISOLONE ACETATE 40 MG/ML
40 INJECTION, SUSPENSION INTRA-ARTICULAR; INTRALESIONAL; INTRAMUSCULAR; SOFT TISSUE ONCE
Status: DISCONTINUED | OUTPATIENT
Start: 2023-06-16 | End: 2023-06-18

## 2023-06-16 RX ORDER — SODIUM CHLORIDE 0.9 % (FLUSH) 0.9 %
5-40 SYRINGE (ML) INJECTION EVERY 12 HOURS SCHEDULED
Status: DISCONTINUED | OUTPATIENT
Start: 2023-06-16 | End: 2023-06-16 | Stop reason: HOSPADM

## 2023-06-16 RX ORDER — BENZONATATE 100 MG/1
100 CAPSULE ORAL 3 TIMES DAILY PRN
Status: DISCONTINUED | OUTPATIENT
Start: 2023-06-16 | End: 2023-06-18 | Stop reason: HOSPADM

## 2023-06-16 RX ORDER — POLYVINYL ALCOHOL 14 MG/ML
1 SOLUTION/ DROPS OPHTHALMIC PRN
Status: DISCONTINUED | OUTPATIENT
Start: 2023-06-16 | End: 2023-06-18 | Stop reason: HOSPADM

## 2023-06-16 RX ORDER — ONDANSETRON 2 MG/ML
4 INJECTION INTRAMUSCULAR; INTRAVENOUS
Status: DISCONTINUED | OUTPATIENT
Start: 2023-06-16 | End: 2023-06-16 | Stop reason: HOSPADM

## 2023-06-16 RX ORDER — ACETAMINOPHEN 500 MG
500 TABLET ORAL EVERY 6 HOURS PRN
Status: DISCONTINUED | OUTPATIENT
Start: 2023-06-16 | End: 2023-06-17

## 2023-06-16 RX ORDER — GABAPENTIN 800 MG/1
800 TABLET ORAL ONCE
Status: DISCONTINUED | OUTPATIENT
Start: 2023-06-16 | End: 2023-06-16 | Stop reason: ALTCHOICE

## 2023-06-16 RX ORDER — MIDAZOLAM HYDROCHLORIDE 2 MG/2ML
1 INJECTION, SOLUTION INTRAMUSCULAR; INTRAVENOUS EVERY 10 MIN PRN
Status: DISCONTINUED | OUTPATIENT
Start: 2023-06-16 | End: 2023-06-18 | Stop reason: HOSPADM

## 2023-06-16 RX ORDER — SODIUM CHLORIDE 9 MG/ML
INJECTION, SOLUTION INTRAVENOUS PRN
Status: DISCONTINUED | OUTPATIENT
Start: 2023-06-16 | End: 2023-06-16

## 2023-06-16 RX ORDER — CLINDAMYCIN PHOSPHATE 900 MG/50ML
900 INJECTION INTRAVENOUS
Status: DISPENSED | OUTPATIENT
Start: 2023-06-16 | End: 2023-06-16

## 2023-06-16 RX ORDER — ONDANSETRON 2 MG/ML
4 INJECTION INTRAMUSCULAR; INTRAVENOUS EVERY 6 HOURS PRN
Status: DISCONTINUED | OUTPATIENT
Start: 2023-06-16 | End: 2023-06-18 | Stop reason: HOSPADM

## 2023-06-16 RX ORDER — BUPIVACAINE HYDROCHLORIDE AND EPINEPHRINE 5; 5 MG/ML; UG/ML
INJECTION, SOLUTION PERINEURAL PRN
Status: DISCONTINUED | OUTPATIENT
Start: 2023-06-16 | End: 2023-06-16 | Stop reason: ALTCHOICE

## 2023-06-16 RX ORDER — OXYMETAZOLINE HYDROCHLORIDE 0.05 G/100ML
2 SPRAY NASAL 2 TIMES DAILY PRN
Status: DISCONTINUED | OUTPATIENT
Start: 2023-06-16 | End: 2023-06-18 | Stop reason: HOSPADM

## 2023-06-16 RX ORDER — DOCUSATE SODIUM 100 MG/1
100 CAPSULE, LIQUID FILLED ORAL 2 TIMES DAILY
Status: DISCONTINUED | OUTPATIENT
Start: 2023-06-16 | End: 2023-06-18 | Stop reason: HOSPADM

## 2023-06-16 RX ORDER — BUDESONIDE AND FORMOTEROL FUMARATE DIHYDRATE 160; 4.5 UG/1; UG/1
2 AEROSOL RESPIRATORY (INHALATION) 2 TIMES DAILY
Status: DISCONTINUED | OUTPATIENT
Start: 2023-06-16 | End: 2023-06-18 | Stop reason: HOSPADM

## 2023-06-16 RX ORDER — LIDOCAINE HYDROCHLORIDE 10 MG/ML
7 INJECTION, SOLUTION INFILTRATION; PERINEURAL ONCE
Status: DISCONTINUED | OUTPATIENT
Start: 2023-06-16 | End: 2023-06-18 | Stop reason: HOSPADM

## 2023-06-16 RX ORDER — FENTANYL CITRATE 50 UG/ML
100 INJECTION, SOLUTION INTRAMUSCULAR; INTRAVENOUS ONCE
Status: COMPLETED | OUTPATIENT
Start: 2023-06-16 | End: 2023-06-16

## 2023-06-16 RX ORDER — IPRATROPIUM BROMIDE AND ALBUTEROL SULFATE 2.5; .5 MG/3ML; MG/3ML
1 SOLUTION RESPIRATORY (INHALATION) ONCE
Status: COMPLETED | OUTPATIENT
Start: 2023-06-16 | End: 2023-06-16

## 2023-06-16 RX ORDER — OXYCODONE HYDROCHLORIDE 5 MG/1
5 TABLET ORAL EVERY 4 HOURS PRN
Status: DISCONTINUED | OUTPATIENT
Start: 2023-06-16 | End: 2023-06-18 | Stop reason: HOSPADM

## 2023-06-16 RX ADMIN — BUDESONIDE AND FORMOTEROL FUMARATE DIHYDRATE 2 PUFF: 160; 4.5 AEROSOL RESPIRATORY (INHALATION) at 19:33

## 2023-06-16 RX ADMIN — Medication 40 ML: at 07:22

## 2023-06-16 RX ADMIN — OXYCODONE HYDROCHLORIDE 10 MG: 5 TABLET ORAL at 16:13

## 2023-06-16 RX ADMIN — SODIUM CHLORIDE: 9 INJECTION, SOLUTION INTRAVENOUS at 18:07

## 2023-06-16 RX ADMIN — ACETAMINOPHEN 500 MG: 500 TABLET ORAL at 16:14

## 2023-06-16 RX ADMIN — IPRATROPIUM BROMIDE AND ALBUTEROL SULFATE 1 DOSE: .5; 3 SOLUTION RESPIRATORY (INHALATION) at 06:50

## 2023-06-16 RX ADMIN — PREGABALIN 300 MG: 100 CAPSULE ORAL at 15:52

## 2023-06-16 RX ADMIN — Medication 2000 MG: at 15:53

## 2023-06-16 RX ADMIN — CYCLOBENZAPRINE 5 MG: 10 TABLET, FILM COATED ORAL at 18:24

## 2023-06-16 RX ADMIN — GABAPENTIN 400 MG: 400 CAPSULE ORAL at 15:52

## 2023-06-16 RX ADMIN — FLUOXETINE HYDROCHLORIDE 60 MG: 20 CAPSULE ORAL at 15:52

## 2023-06-16 RX ADMIN — HYDROMORPHONE HYDROCHLORIDE 0.5 MG: 1 INJECTION, SOLUTION INTRAMUSCULAR; INTRAVENOUS; SUBCUTANEOUS at 11:11

## 2023-06-16 RX ADMIN — SODIUM CHLORIDE 500 ML: 9 INJECTION, SOLUTION INTRAVENOUS at 21:15

## 2023-06-16 RX ADMIN — MIDAZOLAM HYDROCHLORIDE 1 MG: 1 INJECTION, SOLUTION INTRAMUSCULAR; INTRAVENOUS at 07:22

## 2023-06-16 RX ADMIN — LISINOPRIL 5 MG: 5 TABLET ORAL at 15:52

## 2023-06-16 RX ADMIN — FENTANYL CITRATE 50 MCG: 50 INJECTION, SOLUTION INTRAMUSCULAR; INTRAVENOUS at 07:22

## 2023-06-16 RX ADMIN — SODIUM CHLORIDE 500 ML: 9 INJECTION, SOLUTION INTRAVENOUS at 23:59

## 2023-06-16 ASSESSMENT — PAIN SCALES - GENERAL
PAINLEVEL_OUTOF10: 10
PAINLEVEL_OUTOF10: 7
PAINLEVEL_OUTOF10: 7

## 2023-06-16 ASSESSMENT — PAIN SCALES - WONG BAKER: WONGBAKER_NUMERICALRESPONSE: 0

## 2023-06-16 ASSESSMENT — PAIN DESCRIPTION - DESCRIPTORS: DESCRIPTORS: ACHING;THROBBING;SHARP

## 2023-06-16 ASSESSMENT — PAIN - FUNCTIONAL ASSESSMENT: PAIN_FUNCTIONAL_ASSESSMENT: 0-10

## 2023-06-17 LAB
ANION GAP SERPL CALCULATED.3IONS-SCNC: 10 MMOL/L (ref 9–17)
BASOPHILS # BLD: <0.03 K/UL (ref 0–0.2)
BASOPHILS NFR BLD: 0 % (ref 0–2)
BUN SERPL-MCNC: 13 MG/DL (ref 6–20)
CALCIUM SERPL-MCNC: 8.5 MG/DL (ref 8.6–10.4)
CHLORIDE SERPL-SCNC: 107 MMOL/L (ref 98–107)
CO2 SERPL-SCNC: 21 MMOL/L (ref 20–31)
CORTISOL: 0.6 UG/DL (ref 2.7–18.4)
CREAT SERPL-MCNC: 0.66 MG/DL (ref 0.5–0.9)
EOSINOPHIL # BLD: <0.03 K/UL (ref 0–0.44)
EOSINOPHILS RELATIVE PERCENT: 0 % (ref 1–4)
ERYTHROCYTE [DISTWIDTH] IN BLOOD BY AUTOMATED COUNT: 13.9 % (ref 11.8–14.4)
GFR SERPL CREATININE-BSD FRML MDRD: >60 ML/MIN/1.73M2
GLUCOSE SERPL-MCNC: 110 MG/DL (ref 70–99)
HCT VFR BLD AUTO: 33.1 % (ref 36.3–47.1)
HGB BLD-MCNC: 9.8 G/DL (ref 11.9–15.1)
IMM GRANULOCYTES # BLD AUTO: 0.06 K/UL (ref 0–0.3)
IMM GRANULOCYTES NFR BLD: 1 %
LYMPHOCYTES # BLD: 8 % (ref 24–43)
LYMPHOCYTES NFR BLD: 0.98 K/UL (ref 1.1–3.7)
MCH RBC QN AUTO: 27 PG (ref 25.2–33.5)
MCHC RBC AUTO-ENTMCNC: 29.6 G/DL (ref 28.4–34.8)
MCV RBC AUTO: 91.2 FL (ref 82.6–102.9)
MONOCYTES NFR BLD: 1.06 K/UL (ref 0.1–1.2)
MONOCYTES NFR BLD: 8 % (ref 3–12)
NEUTROPHILS NFR BLD: 83 % (ref 36–65)
NEUTS SEG NFR BLD: 10.77 K/UL (ref 1.5–8.1)
NRBC AUTOMATED: 0 PER 100 WBC
PLATELET # BLD AUTO: 207 K/UL (ref 138–453)
PMV BLD AUTO: 11.4 FL (ref 8.1–13.5)
POTASSIUM SERPL-SCNC: 4.4 MMOL/L (ref 3.7–5.3)
RBC # BLD AUTO: 3.63 M/UL (ref 3.95–5.11)
SODIUM SERPL-SCNC: 138 MMOL/L (ref 135–144)
WBC OTHER # BLD: 12.9 K/UL (ref 3.5–11.3)

## 2023-06-17 PROCEDURE — 97530 THERAPEUTIC ACTIVITIES: CPT

## 2023-06-17 PROCEDURE — 97166 OT EVAL MOD COMPLEX 45 MIN: CPT

## 2023-06-17 PROCEDURE — 51701 INSERT BLADDER CATHETER: CPT

## 2023-06-17 PROCEDURE — 36415 COLL VENOUS BLD VENIPUNCTURE: CPT

## 2023-06-17 PROCEDURE — 51798 US URINE CAPACITY MEASURE: CPT

## 2023-06-17 PROCEDURE — 6360000002 HC RX W HCPCS: Performed by: CHIROPRACTOR

## 2023-06-17 PROCEDURE — 97162 PT EVAL MOD COMPLEX 30 MIN: CPT

## 2023-06-17 PROCEDURE — 85027 COMPLETE CBC AUTOMATED: CPT

## 2023-06-17 PROCEDURE — 6370000000 HC RX 637 (ALT 250 FOR IP): Performed by: STUDENT IN AN ORGANIZED HEALTH CARE EDUCATION/TRAINING PROGRAM

## 2023-06-17 PROCEDURE — 6370000000 HC RX 637 (ALT 250 FOR IP): Performed by: CHIROPRACTOR

## 2023-06-17 PROCEDURE — 6370000000 HC RX 637 (ALT 250 FOR IP)

## 2023-06-17 PROCEDURE — 6360000002 HC RX W HCPCS: Performed by: STUDENT IN AN ORGANIZED HEALTH CARE EDUCATION/TRAINING PROGRAM

## 2023-06-17 PROCEDURE — 82533 TOTAL CORTISOL: CPT

## 2023-06-17 PROCEDURE — 6360000002 HC RX W HCPCS

## 2023-06-17 PROCEDURE — 2580000003 HC RX 258: Performed by: INTERNAL MEDICINE

## 2023-06-17 PROCEDURE — 80048 BASIC METABOLIC PNL TOTAL CA: CPT

## 2023-06-17 PROCEDURE — 97535 SELF CARE MNGMENT TRAINING: CPT

## 2023-06-17 RX ORDER — MIDODRINE HYDROCHLORIDE 2.5 MG/1
2.5 TABLET ORAL 2 TIMES DAILY PRN
Status: DISCONTINUED | OUTPATIENT
Start: 2023-06-17 | End: 2023-06-18 | Stop reason: HOSPADM

## 2023-06-17 RX ORDER — 0.9 % SODIUM CHLORIDE 0.9 %
500 INTRAVENOUS SOLUTION INTRAVENOUS ONCE
Status: COMPLETED | OUTPATIENT
Start: 2023-06-17 | End: 2023-06-17

## 2023-06-17 RX ORDER — ACETAMINOPHEN 500 MG
1000 TABLET ORAL EVERY 6 HOURS SCHEDULED
Status: DISCONTINUED | OUTPATIENT
Start: 2023-06-17 | End: 2023-06-18 | Stop reason: HOSPADM

## 2023-06-17 RX ORDER — KETOROLAC TROMETHAMINE 30 MG/ML
30 INJECTION, SOLUTION INTRAMUSCULAR; INTRAVENOUS EVERY 6 HOURS
Status: DISCONTINUED | OUTPATIENT
Start: 2023-06-17 | End: 2023-06-18 | Stop reason: HOSPADM

## 2023-06-17 RX ORDER — KETOROLAC TROMETHAMINE 30 MG/ML
30 INJECTION, SOLUTION INTRAMUSCULAR; INTRAVENOUS EVERY 6 HOURS PRN
Status: DISCONTINUED | OUTPATIENT
Start: 2023-06-17 | End: 2023-06-18 | Stop reason: HOSPADM

## 2023-06-17 RX ADMIN — SODIUM CHLORIDE 500 ML: 9 INJECTION, SOLUTION INTRAVENOUS at 16:19

## 2023-06-17 RX ADMIN — KETOROLAC TROMETHAMINE 30 MG: 30 INJECTION, SOLUTION INTRAMUSCULAR; INTRAVENOUS at 01:52

## 2023-06-17 RX ADMIN — Medication 2000 MG: at 01:10

## 2023-06-17 RX ADMIN — KETOROLAC TROMETHAMINE 30 MG: 30 INJECTION, SOLUTION INTRAMUSCULAR at 14:33

## 2023-06-17 RX ADMIN — TRAZODONE HYDROCHLORIDE 150 MG: 100 TABLET ORAL at 20:30

## 2023-06-17 RX ADMIN — ACETAMINOPHEN 1000 MG: 500 TABLET ORAL at 14:32

## 2023-06-17 RX ADMIN — MIDODRINE HYDROCHLORIDE 2.5 MG: 2.5 TABLET ORAL at 01:05

## 2023-06-17 RX ADMIN — ASPIRIN 81 MG: 81 TABLET, CHEWABLE ORAL at 08:07

## 2023-06-17 RX ADMIN — ACETAMINOPHEN 1000 MG: 500 TABLET ORAL at 20:31

## 2023-06-17 RX ADMIN — DOCUSATE SODIUM 100 MG: 100 CAPSULE ORAL at 20:31

## 2023-06-17 RX ADMIN — GABAPENTIN 400 MG: 400 CAPSULE ORAL at 20:31

## 2023-06-17 RX ADMIN — OXYCODONE HYDROCHLORIDE 5 MG: 5 TABLET ORAL at 19:24

## 2023-06-17 RX ADMIN — KETOROLAC TROMETHAMINE 30 MG: 30 INJECTION, SOLUTION INTRAMUSCULAR; INTRAVENOUS at 08:06

## 2023-06-17 RX ADMIN — OXYCODONE HYDROCHLORIDE 5 MG: 5 TABLET ORAL at 12:29

## 2023-06-17 RX ADMIN — ACETAMINOPHEN 500 MG: 500 TABLET ORAL at 08:06

## 2023-06-17 RX ADMIN — ACETAMINOPHEN 500 MG: 500 TABLET ORAL at 01:06

## 2023-06-17 RX ADMIN — KETOROLAC TROMETHAMINE 30 MG: 30 INJECTION, SOLUTION INTRAMUSCULAR at 20:31

## 2023-06-17 RX ADMIN — MIDODRINE HYDROCHLORIDE 2.5 MG: 2.5 TABLET ORAL at 11:53

## 2023-06-17 RX ADMIN — ONDANSETRON 4 MG: 2 INJECTION INTRAMUSCULAR; INTRAVENOUS at 08:10

## 2023-06-17 RX ADMIN — NAPROXEN 500 MG: 250 TABLET ORAL at 11:53

## 2023-06-17 ASSESSMENT — PAIN SCALES - GENERAL
PAINLEVEL_OUTOF10: 6
PAINLEVEL_OUTOF10: 3
PAINLEVEL_OUTOF10: 7
PAINLEVEL_OUTOF10: 10
PAINLEVEL_OUTOF10: 6
PAINLEVEL_OUTOF10: 7
PAINLEVEL_OUTOF10: 3
PAINLEVEL_OUTOF10: 7

## 2023-06-17 ASSESSMENT — PAIN DESCRIPTION - ORIENTATION: ORIENTATION: RIGHT

## 2023-06-17 ASSESSMENT — PAIN DESCRIPTION - LOCATION: LOCATION: HIP

## 2023-06-17 ASSESSMENT — PAIN DESCRIPTION - DESCRIPTORS: DESCRIPTORS: ACHING;DISCOMFORT

## 2023-06-18 VITALS
TEMPERATURE: 97.6 F | HEIGHT: 67 IN | SYSTOLIC BLOOD PRESSURE: 119 MMHG | OXYGEN SATURATION: 96 % | HEART RATE: 67 BPM | DIASTOLIC BLOOD PRESSURE: 76 MMHG | RESPIRATION RATE: 16 BRPM | WEIGHT: 150 LBS | BODY MASS INDEX: 23.54 KG/M2

## 2023-06-18 PROBLEM — E27.40 ADRENAL INSUFFICIENCY (HCC): Status: ACTIVE | Noted: 2023-06-18

## 2023-06-18 LAB
ERYTHROCYTE [DISTWIDTH] IN BLOOD BY AUTOMATED COUNT: 14.6 % (ref 11.8–14.4)
HCT VFR BLD AUTO: 26.9 % (ref 36.3–47.1)
HGB BLD-MCNC: 8.3 G/DL (ref 11.9–15.1)
MCH RBC QN AUTO: 27.1 PG (ref 25.2–33.5)
MCHC RBC AUTO-ENTMCNC: 30.9 G/DL (ref 28.4–34.8)
MCV RBC AUTO: 87.9 FL (ref 82.6–102.9)
NRBC AUTOMATED: 0 PER 100 WBC
PLATELET # BLD AUTO: 216 K/UL (ref 138–453)
PMV BLD AUTO: 11.2 FL (ref 8.1–13.5)
RBC # BLD AUTO: 3.06 M/UL (ref 3.95–5.11)
WBC OTHER # BLD: 8.4 K/UL (ref 3.5–11.3)

## 2023-06-18 PROCEDURE — 97116 GAIT TRAINING THERAPY: CPT

## 2023-06-18 PROCEDURE — 6370000000 HC RX 637 (ALT 250 FOR IP): Performed by: STUDENT IN AN ORGANIZED HEALTH CARE EDUCATION/TRAINING PROGRAM

## 2023-06-18 PROCEDURE — 36415 COLL VENOUS BLD VENIPUNCTURE: CPT

## 2023-06-18 PROCEDURE — 6360000002 HC RX W HCPCS: Performed by: CHIROPRACTOR

## 2023-06-18 PROCEDURE — 6370000000 HC RX 637 (ALT 250 FOR IP): Performed by: CHIROPRACTOR

## 2023-06-18 PROCEDURE — 2580000003 HC RX 258: Performed by: STUDENT IN AN ORGANIZED HEALTH CARE EDUCATION/TRAINING PROGRAM

## 2023-06-18 PROCEDURE — 85027 COMPLETE CBC AUTOMATED: CPT

## 2023-06-18 PROCEDURE — 97110 THERAPEUTIC EXERCISES: CPT

## 2023-06-18 PROCEDURE — 6370000000 HC RX 637 (ALT 250 FOR IP)

## 2023-06-18 PROCEDURE — 94640 AIRWAY INHALATION TREATMENT: CPT

## 2023-06-18 PROCEDURE — 6370000000 HC RX 637 (ALT 250 FOR IP): Performed by: INTERNAL MEDICINE

## 2023-06-18 RX ORDER — HYDROCORTISONE 10 MG/1
10 TABLET ORAL
Qty: 14 TABLET | Refills: 0 | Status: SHIPPED | OUTPATIENT
Start: 2023-06-19 | End: 2023-06-18 | Stop reason: SDUPTHER

## 2023-06-18 RX ORDER — HYDROCORTISONE 10 MG/1
10 TABLET ORAL
Qty: 14 TABLET | Refills: 0 | Status: SHIPPED | OUTPATIENT
Start: 2023-06-19 | End: 2023-06-23

## 2023-06-18 RX ORDER — OXYCODONE HYDROCHLORIDE AND ACETAMINOPHEN 5; 325 MG/1; MG/1
1 TABLET ORAL EVERY 6 HOURS PRN
Qty: 28 TABLET | Refills: 0 | Status: SHIPPED | OUTPATIENT
Start: 2023-06-18 | End: 2023-06-23

## 2023-06-18 RX ORDER — DOCUSATE SODIUM 100 MG/1
100 CAPSULE, LIQUID FILLED ORAL 2 TIMES DAILY PRN
Qty: 60 CAPSULE | Refills: 0 | Status: SHIPPED | OUTPATIENT
Start: 2023-06-18 | End: 2023-06-23

## 2023-06-18 RX ORDER — HYDROCORTISONE 10 MG/1
10 TABLET ORAL
Status: DISCONTINUED | OUTPATIENT
Start: 2023-06-18 | End: 2023-06-18 | Stop reason: HOSPADM

## 2023-06-18 RX ORDER — ASPIRIN 81 MG/1
81 TABLET, CHEWABLE ORAL 2 TIMES DAILY
Qty: 90 TABLET | Refills: 0 | Status: ON HOLD | OUTPATIENT
Start: 2023-06-18

## 2023-06-18 RX ADMIN — ACETAMINOPHEN 1000 MG: 500 TABLET ORAL at 08:32

## 2023-06-18 RX ADMIN — OXYCODONE HYDROCHLORIDE 10 MG: 5 TABLET ORAL at 00:43

## 2023-06-18 RX ADMIN — FLUOXETINE HYDROCHLORIDE 60 MG: 20 CAPSULE ORAL at 08:32

## 2023-06-18 RX ADMIN — HYDROCORTISONE 10 MG: 10 TABLET ORAL at 08:42

## 2023-06-18 RX ADMIN — TIOTROPIUM BROMIDE INHALATION SPRAY 2 PUFF: 3.12 SPRAY, METERED RESPIRATORY (INHALATION) at 07:38

## 2023-06-18 RX ADMIN — KETOROLAC TROMETHAMINE 30 MG: 30 INJECTION, SOLUTION INTRAMUSCULAR at 08:31

## 2023-06-18 RX ADMIN — OXYCODONE HYDROCHLORIDE 5 MG: 5 TABLET ORAL at 14:29

## 2023-06-18 RX ADMIN — HYDROCORTISONE 10 MG: 10 TABLET ORAL at 12:25

## 2023-06-18 RX ADMIN — KETOROLAC TROMETHAMINE 30 MG: 30 INJECTION, SOLUTION INTRAMUSCULAR at 02:41

## 2023-06-18 RX ADMIN — ACETAMINOPHEN 1000 MG: 500 TABLET ORAL at 02:41

## 2023-06-18 RX ADMIN — SODIUM CHLORIDE: 9 INJECTION, SOLUTION INTRAVENOUS at 02:47

## 2023-06-18 RX ADMIN — GABAPENTIN 400 MG: 400 CAPSULE ORAL at 08:32

## 2023-06-18 RX ADMIN — BUDESONIDE AND FORMOTEROL FUMARATE DIHYDRATE 2 PUFF: 160; 4.5 AEROSOL RESPIRATORY (INHALATION) at 07:38

## 2023-06-18 RX ADMIN — NAPROXEN 500 MG: 250 TABLET ORAL at 08:32

## 2023-06-18 RX ADMIN — ASPIRIN 81 MG: 81 TABLET, CHEWABLE ORAL at 08:32

## 2023-06-18 RX ADMIN — OXYCODONE HYDROCHLORIDE 5 MG: 5 TABLET ORAL at 08:31

## 2023-06-18 ASSESSMENT — PAIN SCALES - GENERAL
PAINLEVEL_OUTOF10: 6
PAINLEVEL_OUTOF10: 7
PAINLEVEL_OUTOF10: 7
PAINLEVEL_OUTOF10: 6

## 2023-06-21 ENCOUNTER — HOSPITAL ENCOUNTER (EMERGENCY)
Age: 58
Discharge: HOME | DRG: 422 | End: 2023-06-21
Attending: EMERGENCY MEDICINE
Payer: MEDICAID

## 2023-06-21 ENCOUNTER — APPOINTMENT (OUTPATIENT)
Dept: GENERAL RADIOLOGY | Age: 58
DRG: 422 | End: 2023-06-21
Payer: MEDICAID

## 2023-06-21 VITALS
SYSTOLIC BLOOD PRESSURE: 112 MMHG | DIASTOLIC BLOOD PRESSURE: 68 MMHG | WEIGHT: 172.18 LBS | TEMPERATURE: 98.6 F | BODY MASS INDEX: 27.17 KG/M2 | RESPIRATION RATE: 16 BRPM | HEART RATE: 87 BPM | OXYGEN SATURATION: 96 %

## 2023-06-21 DIAGNOSIS — L08.9 SKIN INFECTION: ICD-10-CM

## 2023-06-21 DIAGNOSIS — G89.18 POST-OP PAIN: Primary | ICD-10-CM

## 2023-06-21 LAB
ANION GAP SERPL CALCULATED.3IONS-SCNC: 10 MMOL/L (ref 9–17)
BASOPHILS # BLD: 0 K/UL (ref 0–0.2)
BASOPHILS NFR BLD: 0 % (ref 0–2)
BUN SERPL-MCNC: 11 MG/DL (ref 6–20)
CALCIUM SERPL-MCNC: 8.2 MG/DL (ref 8.6–10.4)
CHLORIDE SERPL-SCNC: 106 MMOL/L (ref 98–107)
CO2 SERPL-SCNC: 23 MMOL/L (ref 20–31)
CREAT SERPL-MCNC: 0.63 MG/DL (ref 0.5–0.9)
CRP SERPL HS-MCNC: 112.6 MG/L (ref 0–5)
EOSINOPHIL # BLD: 0.07 K/UL (ref 0–0.4)
EOSINOPHILS RELATIVE PERCENT: 1 % (ref 1–4)
ERYTHROCYTE [DISTWIDTH] IN BLOOD BY AUTOMATED COUNT: 14.2 % (ref 11.8–14.4)
ERYTHROCYTE [SEDIMENTATION RATE] IN BLOOD BY WESTERGREN METHOD: 35 MM/HR (ref 0–30)
GFR SERPL CREATININE-BSD FRML MDRD: >60 ML/MIN/1.73M2
GLUCOSE SERPL-MCNC: 142 MG/DL (ref 70–99)
HCT VFR BLD AUTO: 28.1 % (ref 36.3–47.1)
HGB BLD-MCNC: 9.2 G/DL (ref 11.9–15.1)
IMM GRANULOCYTES # BLD AUTO: 0 K/UL (ref 0–0.3)
IMM GRANULOCYTES NFR BLD: 0 %
LYMPHOCYTES # BLD: 10 % (ref 24–44)
LYMPHOCYTES NFR BLD: 0.68 K/UL (ref 1–4.8)
MCH RBC QN AUTO: 27.6 PG (ref 25.2–33.5)
MCHC RBC AUTO-ENTMCNC: 32.7 G/DL (ref 28.4–34.8)
MCV RBC AUTO: 84.4 FL (ref 82.6–102.9)
MONOCYTES NFR BLD: 0.48 K/UL (ref 0.1–0.8)
MONOCYTES NFR BLD: 7 % (ref 1–7)
MORPHOLOGY: NORMAL
NEUTROPHILS NFR BLD: 82 % (ref 36–66)
NEUTS SEG NFR BLD: 5.57 K/UL (ref 1.8–7.7)
NRBC AUTOMATED: 0 PER 100 WBC
PLATELET # BLD AUTO: 313 K/UL (ref 138–453)
PMV BLD AUTO: 10.1 FL (ref 8.1–13.5)
POTASSIUM SERPL-SCNC: 3.5 MMOL/L (ref 3.7–5.3)
RBC # BLD AUTO: 3.33 M/UL (ref 3.95–5.11)
SODIUM SERPL-SCNC: 139 MMOL/L (ref 135–144)
WBC OTHER # BLD: 6.8 K/UL (ref 3.5–11.3)

## 2023-06-21 PROCEDURE — 6360000002 HC RX W HCPCS: Performed by: EMERGENCY MEDICINE

## 2023-06-21 PROCEDURE — 99284 EMERGENCY DEPT VISIT MOD MDM: CPT

## 2023-06-21 PROCEDURE — 80048 BASIC METABOLIC PNL TOTAL CA: CPT

## 2023-06-21 PROCEDURE — 73502 X-RAY EXAM HIP UNI 2-3 VIEWS: CPT

## 2023-06-21 PROCEDURE — 96375 TX/PRO/DX INJ NEW DRUG ADDON: CPT

## 2023-06-21 PROCEDURE — 96374 THER/PROPH/DIAG INJ IV PUSH: CPT

## 2023-06-21 PROCEDURE — 93971 EXTREMITY STUDY: CPT

## 2023-06-21 PROCEDURE — 85027 COMPLETE CBC AUTOMATED: CPT

## 2023-06-21 PROCEDURE — 6360000002 HC RX W HCPCS: Performed by: STUDENT IN AN ORGANIZED HEALTH CARE EDUCATION/TRAINING PROGRAM

## 2023-06-21 PROCEDURE — 85652 RBC SED RATE AUTOMATED: CPT

## 2023-06-21 PROCEDURE — 6370000000 HC RX 637 (ALT 250 FOR IP): Performed by: STUDENT IN AN ORGANIZED HEALTH CARE EDUCATION/TRAINING PROGRAM

## 2023-06-21 PROCEDURE — 86140 C-REACTIVE PROTEIN: CPT

## 2023-06-21 RX ORDER — MORPHINE SULFATE 4 MG/ML
2 INJECTION, SOLUTION INTRAMUSCULAR; INTRAVENOUS ONCE
Status: COMPLETED | OUTPATIENT
Start: 2023-06-21 | End: 2023-06-21

## 2023-06-21 RX ORDER — KETOROLAC TROMETHAMINE 30 MG/ML
30 INJECTION, SOLUTION INTRAMUSCULAR; INTRAVENOUS ONCE
Status: COMPLETED | OUTPATIENT
Start: 2023-06-21 | End: 2023-06-21

## 2023-06-21 RX ORDER — CEPHALEXIN 500 MG/1
500 CAPSULE ORAL 4 TIMES DAILY
Qty: 28 CAPSULE | Refills: 0 | Status: ON HOLD | OUTPATIENT
Start: 2023-06-21 | End: 2023-06-26 | Stop reason: HOSPADM

## 2023-06-21 RX ORDER — CEPHALEXIN 500 MG/1
500 CAPSULE ORAL ONCE
Status: COMPLETED | OUTPATIENT
Start: 2023-06-21 | End: 2023-06-21

## 2023-06-21 RX ADMIN — CEPHALEXIN 500 MG: 500 CAPSULE ORAL at 16:15

## 2023-06-21 RX ADMIN — MORPHINE SULFATE 2 MG: 4 INJECTION INTRAVENOUS at 12:13

## 2023-06-21 RX ADMIN — KETOROLAC TROMETHAMINE 30 MG: 30 INJECTION, SOLUTION INTRAMUSCULAR; INTRAVENOUS at 16:16

## 2023-06-21 ASSESSMENT — PAIN SCALES - GENERAL
PAINLEVEL_OUTOF10: 9
PAINLEVEL_OUTOF10: 7
PAINLEVEL_OUTOF10: 8

## 2023-06-21 ASSESSMENT — PAIN - FUNCTIONAL ASSESSMENT: PAIN_FUNCTIONAL_ASSESSMENT: 0-10

## 2023-06-21 NOTE — DISCHARGE INSTRUCTIONS
Orthopaedic Instructions:  -Weight bearing status: Weight bearing as tolerated with the right leg  -Do not remove Optifoam bandage (the large sealed \"Band-aid\"-like dressing) until your follow-up date in clinic. It is okay to shower with the Optifoam bandage. Should it fall off, replace with band-aids or gauze & tape until dry. It is still okay to shower if it falls off, but avoid baths and underwater submersion.  -Always work on toe motion (to non-injured toes) to decrease swelling.  -Ice (20 minutes on and off 1 hour) and elevate above the level of the heart to reduce swelling and throbbing pain.  -Call the office or come to Emergency Room if signs of infection appear (hot, swollen, red, draining pus, fever)  -Take medications as prescribed.  -Wean off narcotics (percocet/norco) as soon as possible. Do not take tylenol if still taking narcotics.  -Follow up with Dr. Oscar Wilkinson in his office after discharge from the emergency department. Call [phone] to schedule/confirm. Dominick Zavala!!!    From [facility] Emergency Department    On behalf of the Emergency Department staff at [facility]. [facility] Emergency Department, I would like to thank you for giving [facility] the opportunity to address your health care needs and concerns. We hope that during your visit, our service was delivered in a professional and caring manner. Please keep [facility] in mind as we walk with you down the path to your own personal wellness. Please expect an automated phone call from [phone] so we can ask a few questions about your health and progress. Based on your answers, a clinician may call you back to offer help and instructions. If you notice any concerning symptoms please return to the ER immediately. These can include but are not limited to: fevers, chills, shortness of breath, vomiting, weakness of the extremities, changes in your mental status, numbness, pale extremities, or chest pain.

## 2023-06-21 NOTE — CONSULTS
Provider, MD Arnoldo Marie MISC by Does not apply route Duration 5 years 5/17/22   Ran Jones MD   Pregabalin (LYRICA PO) Take 300 mg by mouth 2 times daily    Historical Provider, MD   ibuprofen (IBU) 400 MG tablet Take 1 tablet by mouth every 6 hours as needed for Pain 5/15/22 9/18/22  Vinnie Bryson DO   Polyvinyl Alcohol-Povidone (REFRESH OP) Place 1 drop into both eyes 3 times daily  Patient not taking: Reported on 11/5/2022    Historical Provider, MD     Allergies:    Penicillins, Tape [adhesive tape], and Azithromycin  Social History:   Social History     Socioeconomic History    Marital status: Single   Tobacco Use    Smoking status: Every Day     Packs/day: 0.50     Years: 37.00     Pack years: 18.50     Types: Cigarettes     Start date: 1976    Smokeless tobacco: Former     Types: Chew     Quit date: 9/3/2001   Vaping Use    Vaping Use: Former    Quit date: 9/3/2019    Substances: Nicotine   Substance and Sexual Activity    Drug use: Yes     Types: Marijuana (Alex Canny), Cocaine     Comment: last gummie- 6/13/23, h/o of substance abuse - marijuana gummies daily     Family History:  Family History   Problem Relation Age of Onset    Diabetes Father     Lung Cancer Father     Hypertension Mother     Cancer Mother     High Blood Pressure Mother     Crohn's Disease Brother     Heart Disease Brother        ROS:   Constitutional: Negative for fever and chills. Respiratory: Negative for cough. Cardiovascular: Negative for chest pain. Musculoskeletal: Positive for arthralgias myalgias the right lower. Skin: Negative for itching and rash. Neurological: Negative for numbness, tingling, weakness. PE:  Blood pressure 123/78, pulse 87, temperature 98.6 °F (37 °C), temperature source Oral, resp. rate 16, weight 172 lb 2.9 oz (78.1 kg), SpO2 95 %. Gen: Alert and oriented, NAD, cooperative. Head: Normocephalic, atraumatic. Cardiovascular: Rate rate.     Respiratory: Chest symmetric, no accessory

## 2023-06-21 NOTE — ED PROVIDER NOTES
ATTENDING  ADDENDUM     Care of this patient was assumed from Dr. Samuel Martinez. The patient was seen for Leg Pain (Right leg pain and swelling post hip ORIF)  . The patient's initial evaluation and plan have been discussed with the prior provider who initially evaluated the patient. Nursing Notes, Past Medical Hx, Past Surgical Hx, Social Hx, Allergies, and Family Hx were all reviewed. ED COURSE      The patient was given the following medications:  Orders Placed This Encounter   Medications    morphine injection 2 mg       RECENT VITALS:   Temp: 98.6 °F (37 °C), Pulse: 87, Respirations: 16, BP: 118/69    MEDICAL DECISION MAKING       69-year-old female her with right hip pain s/p right anterior total hip arthroplasty on 6/16/23. Erythema noted to the incision. Also with right leg swelling concerning for DVT. Plan for labs, RLQ doppler, right hip x-ray, orthopedics consult    XR HIP RIGHT (2-3 VIEWS)    Result Date: 6/21/2023  EXAMINATION: TWO XRAY VIEWS OF THE RIGHT HIP 6/21/2023 11:54 am COMPARISON: 9 May 2023 HISTORY: ORDERING SYSTEM PROVIDED HISTORY: pain TECHNOLOGIST PROVIDED HISTORY: pain Reason for Exam: recent surgery, new pain today. no injury FINDINGS: A right total hip arthroplasty is noted in situ in satisfactory alignment position without evidence of hardware complication. Acetabular cup is affixed to the pelvis spine orthopedic screw. Soft tissue swelling lateral to the hip is noted consistent with expected postoperative change. Interval performance of a right total hip arthroplasty. No hardware complication. No Racquel implant fracture. Expected postoperative soft tissue changes are noted.                Makenzie Wallace MD  Emergency Medicine Attending       Makenzie Wallace MD  06/21/23 4661
OUTSTANDING TASKS / RECOMMENDATIONS:    F/u ortho     FINAL IMPRESSION:     1. Post-op pain    2.  Skin infection        DISPOSITION:         DISPOSITION:  [x]  Discharge   []  Transfer -    []  Admission -     []  Against Medical Advice   []  Eloped   FOLLOW-UP: Kash Stovall, [address] [facility]  [phone]    In 1 week      [facility] ED  [address] [address]  [phone]  Go to   If symptoms worsen    Viviana Knapp MD  [address] [place] [address]  [phone]     DISCHARGE MEDICATIONS: Discharge Medication List as of 6/21/2023  4:15 PM        START taking these medications    Details   cephALEXin (KEFLEX) 500 MG capsule Take 1 capsule by mouth 4 times daily for 7 days, Disp-28 capsule, R-0Normal                Annika Floyd MD  Emergency Medicine Resident  [facility]       Annika Floyd MD  Resident  06/22/23 2352
weight is 172 lb 2.9 oz (78.1 kg). Her oral temperature is 98.6 °F (37 °C). Her blood pressure is 112/68 and her pulse is 87. Her respiration is 16 and oxygen saturation is 96%. Physical Exam  Constitutional:       Appearance: Normal appearance. HENT:      Head: Normocephalic. Musculoskeletal:      Comments: There is +2 pitting edema noted of the right lower extremity which extends down through the thigh and lower extremity. Compared to left this is certainly asymmetrical.  In addition there is noted to have erythema at the area of the right hip postsurgical site. Neurological:      Mental Status: She is alert.         DIFFERENTIAL DIAGNOSIS/ MDM:     Deep venous thrombosis postsurgical infection inflammatory nonspecific    DIAGNOSTIC RESULTS     EKG: All EKG's are interpreted by the Emergency Department Physician who either signs or Co-signs this chart in the absence of a cardiologist.        RADIOLOGY:   I directly visualized the following  images and reviewed the radiologist interpretations:  No acute process is noted Dopplers are negative      ED BEDSIDE ULTRASOUND:       LABS:  Labs Reviewed   CBC WITH AUTO DIFFERENTIAL - Abnormal; Notable for the following components:       Result Value    RBC 3.33 (*)     Hemoglobin 9.2 (*)     Hematocrit 28.1 (*)     Seg Neutrophils 82 (*)     Lymphocytes 10 (*)     Absolute Lymph # 0.68 (*)     All other components within normal limits   BASIC METABOLIC PANEL - Abnormal; Notable for the following components:    Glucose 142 (*)     Calcium 8.2 (*)     Potassium 3.5 (*)     All other components within normal limits   C-REACTIVE PROTEIN - Abnormal; Notable for the following components:    .6 (*)     All other components within normal limits   SEDIMENTATION RATE - Abnormal; Notable for the following components:    Sed Rate 35 (*)     All other components within normal limits           EMERGENCY DEPARTMENT COURSE:   Vitals:    Vitals:    06/21/23 1520 06/21/23

## 2023-06-22 ASSESSMENT — ENCOUNTER SYMPTOMS
ABDOMINAL DISTENTION: 0
CHEST TIGHTNESS: 0
SHORTNESS OF BREATH: 0

## 2023-06-23 ENCOUNTER — APPOINTMENT (OUTPATIENT)
Dept: CT IMAGING | Age: 58
DRG: 422 | End: 2023-06-23
Payer: MEDICAID

## 2023-06-23 ENCOUNTER — HOSPITAL ENCOUNTER (INPATIENT)
Age: 58
LOS: 4 days | Discharge: HOME | DRG: 422 | End: 2023-06-27
Attending: EMERGENCY MEDICINE | Admitting: HOSPITALIST
Payer: MEDICAID

## 2023-06-23 DIAGNOSIS — R19.7 NAUSEA VOMITING AND DIARRHEA: Primary | ICD-10-CM

## 2023-06-23 DIAGNOSIS — R10.9 ABDOMINAL PAIN, UNSPECIFIED ABDOMINAL LOCATION: ICD-10-CM

## 2023-06-23 DIAGNOSIS — E87.6 HYPOKALEMIA: ICD-10-CM

## 2023-06-23 DIAGNOSIS — R11.2 NAUSEA VOMITING AND DIARRHEA: Primary | ICD-10-CM

## 2023-06-23 LAB
ALBUMIN SERPL-MCNC: 3.9 G/DL (ref 3.5–5.2)
ALBUMIN/GLOB SERPL: 1.1 {RATIO} (ref 1–2.5)
ALP SERPL-CCNC: 162 U/L (ref 35–104)
ALT SERPL-CCNC: 37 U/L (ref 5–33)
ANION GAP SERPL CALCULATED.3IONS-SCNC: 15 MMOL/L (ref 9–17)
ANION GAP SERPL CALCULATED.3IONS-SCNC: 16 MMOL/L (ref 9–17)
AST SERPL-CCNC: 26 U/L
BASOPHILS # BLD: <0.03 K/UL (ref 0–0.2)
BASOPHILS NFR BLD: 0 % (ref 0–2)
BILIRUB SERPL-MCNC: 0.8 MG/DL (ref 0.3–1.2)
BUN SERPL-MCNC: 6 MG/DL (ref 6–20)
CALCIUM SERPL-MCNC: 9.4 MG/DL (ref 8.6–10.4)
CHLORIDE SERPL-SCNC: 91 MMOL/L (ref 98–107)
CHLORIDE SERPL-SCNC: 93 MMOL/L (ref 98–107)
CO2 SERPL-SCNC: 27 MMOL/L (ref 20–31)
CO2 SERPL-SCNC: 27 MMOL/L (ref 20–31)
CREAT SERPL-MCNC: 0.55 MG/DL (ref 0.5–0.9)
EOSINOPHIL # BLD: <0.03 K/UL (ref 0–0.44)
EOSINOPHILS RELATIVE PERCENT: 0 % (ref 1–4)
ERYTHROCYTE [DISTWIDTH] IN BLOOD BY AUTOMATED COUNT: 14.6 % (ref 11.8–14.4)
GFR SERPL CREATININE-BSD FRML MDRD: >60 ML/MIN/1.73M2
GLUCOSE SERPL-MCNC: 141 MG/DL (ref 70–99)
HCT VFR BLD AUTO: 34 % (ref 36.3–47.1)
HGB BLD-MCNC: 11.4 G/DL (ref 11.9–15.1)
IMM GRANULOCYTES # BLD AUTO: 0.05 K/UL (ref 0–0.3)
IMM GRANULOCYTES NFR BLD: 0 %
LIPASE SERPL-CCNC: 37 U/L (ref 13–60)
LYMPHOCYTES # BLD: 10 % (ref 24–43)
LYMPHOCYTES NFR BLD: 1.11 K/UL (ref 1.1–3.7)
MAGNESIUM SERPL-MCNC: 1.7 MG/DL (ref 1.6–2.6)
MAGNESIUM SERPL-MCNC: 2.7 MG/DL (ref 1.6–2.6)
MCH RBC QN AUTO: 26.8 PG (ref 25.2–33.5)
MCHC RBC AUTO-ENTMCNC: 33.5 G/DL (ref 28.4–34.8)
MCV RBC AUTO: 80 FL (ref 82.6–102.9)
MONOCYTES NFR BLD: 1.09 K/UL (ref 0.1–1.2)
MONOCYTES NFR BLD: 10 % (ref 3–12)
NEUTROPHILS NFR BLD: 80 % (ref 36–65)
NEUTS SEG NFR BLD: 9.13 K/UL (ref 1.5–8.1)
NRBC AUTOMATED: 0 PER 100 WBC
PLATELET # BLD AUTO: 504 K/UL (ref 138–453)
PMV BLD AUTO: 10 FL (ref 8.1–13.5)
POTASSIUM SERPL-SCNC: 2.5 MMOL/L (ref 3.7–5.3)
POTASSIUM SERPL-SCNC: 2.8 MMOL/L (ref 3.7–5.3)
PROT SERPL-MCNC: 7.5 G/DL (ref 6.4–8.3)
RBC # BLD AUTO: 4.25 M/UL (ref 3.95–5.11)
RBC # BLD: ABNORMAL 10*6/UL
SODIUM SERPL-SCNC: 134 MMOL/L (ref 135–144)
SODIUM SERPL-SCNC: 135 MMOL/L (ref 135–144)
TSH SERPL DL<=0.05 MIU/L-ACNC: 0.47 UIU/ML (ref 0.3–5)
WBC OTHER # BLD: 11.4 K/UL (ref 3.5–11.3)

## 2023-06-23 PROCEDURE — 85027 COMPLETE CBC AUTOMATED: CPT

## 2023-06-23 PROCEDURE — 72125 CT NECK SPINE W/O DYE: CPT

## 2023-06-23 PROCEDURE — A4216 STERILE WATER/SALINE, 10 ML: HCPCS

## 2023-06-23 PROCEDURE — 2580000003 HC RX 258

## 2023-06-23 PROCEDURE — 96365 THER/PROPH/DIAG IV INF INIT: CPT

## 2023-06-23 PROCEDURE — 6360000004 HC RX CONTRAST MEDICATION

## 2023-06-23 PROCEDURE — 96366 THER/PROPH/DIAG IV INF ADDON: CPT

## 2023-06-23 PROCEDURE — 74177 CT ABD & PELVIS W/CONTRAST: CPT

## 2023-06-23 PROCEDURE — 6360000002 HC RX W HCPCS

## 2023-06-23 PROCEDURE — 83735 ASSAY OF MAGNESIUM: CPT

## 2023-06-23 PROCEDURE — 1200000000 HC SEMI PRIVATE

## 2023-06-23 PROCEDURE — 96375 TX/PRO/DX INJ NEW DRUG ADDON: CPT

## 2023-06-23 PROCEDURE — 96361 HYDRATE IV INFUSION ADD-ON: CPT

## 2023-06-23 PROCEDURE — 83690 ASSAY OF LIPASE: CPT

## 2023-06-23 PROCEDURE — 84443 ASSAY THYROID STIM HORMONE: CPT

## 2023-06-23 PROCEDURE — 80051 ELECTROLYTE PANEL: CPT

## 2023-06-23 PROCEDURE — 99285 EMERGENCY DEPT VISIT HI MDM: CPT

## 2023-06-23 PROCEDURE — 93005 ELECTROCARDIOGRAM TRACING: CPT

## 2023-06-23 PROCEDURE — 80053 COMPREHEN METABOLIC PANEL: CPT

## 2023-06-23 PROCEDURE — 99223 1ST HOSP IP/OBS HIGH 75: CPT | Performed by: HOSPITALIST

## 2023-06-23 PROCEDURE — 96376 TX/PRO/DX INJ SAME DRUG ADON: CPT

## 2023-06-23 PROCEDURE — 2500000003 HC RX 250 WO HCPCS

## 2023-06-23 PROCEDURE — 36415 COLL VENOUS BLD VENIPUNCTURE: CPT

## 2023-06-23 RX ORDER — SODIUM CHLORIDE 0.9 % (FLUSH) 0.9 %
5-40 SYRINGE (ML) INJECTION EVERY 12 HOURS SCHEDULED
Status: DISCONTINUED | OUTPATIENT
Start: 2023-06-23 | End: 2023-06-27 | Stop reason: HOSPADM

## 2023-06-23 RX ORDER — POTASSIUM CHLORIDE 7.45 MG/ML
10 INJECTION INTRAVENOUS
Status: COMPLETED | OUTPATIENT
Start: 2023-06-23 | End: 2023-06-24

## 2023-06-23 RX ORDER — PROMETHAZINE HYDROCHLORIDE 25 MG/ML
6.25 INJECTION, SOLUTION INTRAMUSCULAR; INTRAVENOUS EVERY 6 HOURS PRN
Status: DISCONTINUED | OUTPATIENT
Start: 2023-06-23 | End: 2023-06-27 | Stop reason: HOSPADM

## 2023-06-23 RX ORDER — POTASSIUM CHLORIDE 7.45 MG/ML
10 INJECTION INTRAVENOUS ONCE
Status: COMPLETED | OUTPATIENT
Start: 2023-06-23 | End: 2023-06-23

## 2023-06-23 RX ORDER — SODIUM CHLORIDE 9 MG/ML
INJECTION, SOLUTION INTRAVENOUS PRN
Status: DISCONTINUED | OUTPATIENT
Start: 2023-06-23 | End: 2023-06-27 | Stop reason: HOSPADM

## 2023-06-23 RX ORDER — MORPHINE SULFATE 4 MG/ML
2 INJECTION, SOLUTION INTRAMUSCULAR; INTRAVENOUS ONCE
Status: COMPLETED | OUTPATIENT
Start: 2023-06-23 | End: 2023-06-23

## 2023-06-23 RX ORDER — MAGNESIUM SULFATE IN WATER 40 MG/ML
2000 INJECTION, SOLUTION INTRAVENOUS ONCE
Status: COMPLETED | OUTPATIENT
Start: 2023-06-23 | End: 2023-06-23

## 2023-06-23 RX ORDER — MORPHINE SULFATE 4 MG/ML
4 INJECTION, SOLUTION INTRAMUSCULAR; INTRAVENOUS ONCE
Status: COMPLETED | OUTPATIENT
Start: 2023-06-23 | End: 2023-06-23

## 2023-06-23 RX ORDER — ENOXAPARIN SODIUM 100 MG/ML
40 INJECTION SUBCUTANEOUS DAILY
Status: DISCONTINUED | OUTPATIENT
Start: 2023-06-23 | End: 2023-06-27 | Stop reason: HOSPADM

## 2023-06-23 RX ORDER — ASPIRIN 81 MG/1
81 TABLET, CHEWABLE ORAL DAILY
Status: DISCONTINUED | OUTPATIENT
Start: 2023-06-24 | End: 2023-06-27 | Stop reason: HOSPADM

## 2023-06-23 RX ORDER — SODIUM CHLORIDE 0.9 % (FLUSH) 0.9 %
5-40 SYRINGE (ML) INJECTION PRN
Status: DISCONTINUED | OUTPATIENT
Start: 2023-06-23 | End: 2023-06-27 | Stop reason: HOSPADM

## 2023-06-23 RX ORDER — ONDANSETRON 2 MG/ML
4 INJECTION INTRAMUSCULAR; INTRAVENOUS EVERY 6 HOURS PRN
Status: DISCONTINUED | OUTPATIENT
Start: 2023-06-23 | End: 2023-06-27 | Stop reason: HOSPADM

## 2023-06-23 RX ORDER — MORPHINE SULFATE 2 MG/ML
1 INJECTION, SOLUTION INTRAMUSCULAR; INTRAVENOUS ONCE
Status: COMPLETED | OUTPATIENT
Start: 2023-06-23 | End: 2023-06-23

## 2023-06-23 RX ORDER — SODIUM CHLORIDE, SODIUM LACTATE, POTASSIUM CHLORIDE, AND CALCIUM CHLORIDE .6; .31; .03; .02 G/100ML; G/100ML; G/100ML; G/100ML
1000 INJECTION, SOLUTION INTRAVENOUS ONCE
Status: COMPLETED | OUTPATIENT
Start: 2023-06-23 | End: 2023-06-23

## 2023-06-23 RX ORDER — ONDANSETRON 2 MG/ML
4 INJECTION INTRAMUSCULAR; INTRAVENOUS ONCE
Status: COMPLETED | OUTPATIENT
Start: 2023-06-23 | End: 2023-06-23

## 2023-06-23 RX ORDER — POLYETHYLENE GLYCOL 3350 17 G/17G
17 POWDER, FOR SOLUTION ORAL DAILY PRN
Status: DISCONTINUED | OUTPATIENT
Start: 2023-06-23 | End: 2023-06-27 | Stop reason: HOSPADM

## 2023-06-23 RX ORDER — ACETAMINOPHEN 325 MG/1
650 TABLET ORAL EVERY 6 HOURS PRN
Status: DISCONTINUED | OUTPATIENT
Start: 2023-06-23 | End: 2023-06-27 | Stop reason: HOSPADM

## 2023-06-23 RX ORDER — DIPHENHYDRAMINE HYDROCHLORIDE 50 MG/ML
25 INJECTION INTRAMUSCULAR; INTRAVENOUS ONCE
Status: COMPLETED | OUTPATIENT
Start: 2023-06-23 | End: 2023-06-23

## 2023-06-23 RX ORDER — PANTOPRAZOLE SODIUM 40 MG/1
40 TABLET, DELAYED RELEASE ORAL
Status: DISCONTINUED | OUTPATIENT
Start: 2023-06-24 | End: 2023-06-26

## 2023-06-23 RX ORDER — ONDANSETRON 4 MG/1
4 TABLET, ORALLY DISINTEGRATING ORAL EVERY 8 HOURS PRN
Status: DISCONTINUED | OUTPATIENT
Start: 2023-06-23 | End: 2023-06-27 | Stop reason: HOSPADM

## 2023-06-23 RX ORDER — SODIUM CHLORIDE, SODIUM LACTATE, POTASSIUM CHLORIDE, CALCIUM CHLORIDE 600; 310; 30; 20 MG/100ML; MG/100ML; MG/100ML; MG/100ML
INJECTION, SOLUTION INTRAVENOUS CONTINUOUS
Status: DISCONTINUED | OUTPATIENT
Start: 2023-06-23 | End: 2023-06-25

## 2023-06-23 RX ORDER — LISINOPRIL 5 MG/1
5 TABLET ORAL DAILY
Status: DISCONTINUED | OUTPATIENT
Start: 2023-06-24 | End: 2023-06-25

## 2023-06-23 RX ORDER — POTASSIUM CHLORIDE 20 MEQ/1
40 TABLET, EXTENDED RELEASE ORAL ONCE
Status: DISCONTINUED | OUTPATIENT
Start: 2023-06-23 | End: 2023-06-23

## 2023-06-23 RX ORDER — ACETAMINOPHEN 650 MG/1
650 SUPPOSITORY RECTAL EVERY 6 HOURS PRN
Status: DISCONTINUED | OUTPATIENT
Start: 2023-06-23 | End: 2023-06-27 | Stop reason: HOSPADM

## 2023-06-23 RX ORDER — HYDROCODONE BITARTRATE AND ACETAMINOPHEN 5; 325 MG/1; MG/1
1 TABLET ORAL EVERY 8 HOURS PRN
Status: DISCONTINUED | OUTPATIENT
Start: 2023-06-23 | End: 2023-06-27 | Stop reason: HOSPADM

## 2023-06-23 RX ORDER — PROCHLORPERAZINE EDISYLATE 5 MG/ML
10 INJECTION INTRAMUSCULAR; INTRAVENOUS ONCE
Status: COMPLETED | OUTPATIENT
Start: 2023-06-23 | End: 2023-06-23

## 2023-06-23 RX ADMIN — POTASSIUM CHLORIDE 10 MEQ: 10 INJECTION, SOLUTION INTRAVENOUS at 22:32

## 2023-06-23 RX ADMIN — MORPHINE SULFATE 1 MG: 2 INJECTION, SOLUTION INTRAMUSCULAR; INTRAVENOUS at 23:57

## 2023-06-23 RX ADMIN — MORPHINE SULFATE 4 MG: 4 INJECTION, SOLUTION INTRAMUSCULAR; INTRAVENOUS at 10:27

## 2023-06-23 RX ADMIN — ONDANSETRON 4 MG: 2 INJECTION INTRAMUSCULAR; INTRAVENOUS at 10:27

## 2023-06-23 RX ADMIN — MAGNESIUM SULFATE HEPTAHYDRATE 2000 MG: 40 INJECTION, SOLUTION INTRAVENOUS at 17:55

## 2023-06-23 RX ADMIN — SODIUM CHLORIDE, POTASSIUM CHLORIDE, SODIUM LACTATE AND CALCIUM CHLORIDE: 600; 310; 30; 20 INJECTION, SOLUTION INTRAVENOUS at 21:44

## 2023-06-23 RX ADMIN — MORPHINE SULFATE 2 MG: 4 INJECTION INTRAVENOUS at 15:30

## 2023-06-23 RX ADMIN — POTASSIUM CHLORIDE 10 MEQ: 7.46 INJECTION, SOLUTION INTRAVENOUS at 15:22

## 2023-06-23 RX ADMIN — SODIUM CHLORIDE, POTASSIUM CHLORIDE, SODIUM LACTATE AND CALCIUM CHLORIDE 1000 ML: 600; 310; 30; 20 INJECTION, SOLUTION INTRAVENOUS at 10:28

## 2023-06-23 RX ADMIN — ONDANSETRON 4 MG: 2 INJECTION INTRAMUSCULAR; INTRAVENOUS at 19:26

## 2023-06-23 RX ADMIN — IOPAMIDOL 75 ML: 755 INJECTION, SOLUTION INTRAVENOUS at 13:52

## 2023-06-23 RX ADMIN — FAMOTIDINE 20 MG: 10 INJECTION, SOLUTION INTRAVENOUS at 15:13

## 2023-06-23 RX ADMIN — PROCHLORPERAZINE EDISYLATE 10 MG: 5 INJECTION INTRAMUSCULAR; INTRAVENOUS at 12:00

## 2023-06-23 RX ADMIN — POTASSIUM CHLORIDE 10 MEQ: 7.46 INJECTION, SOLUTION INTRAVENOUS at 11:18

## 2023-06-23 RX ADMIN — ONDANSETRON 4 MG: 2 INJECTION INTRAMUSCULAR; INTRAVENOUS at 13:41

## 2023-06-23 RX ADMIN — DIPHENHYDRAMINE HYDROCHLORIDE 25 MG: 50 INJECTION, SOLUTION INTRAMUSCULAR; INTRAVENOUS at 12:00

## 2023-06-23 ASSESSMENT — ENCOUNTER SYMPTOMS
APNEA: 0
CONSTIPATION: 0
BLOOD IN STOOL: 1
NAUSEA: 1
ABDOMINAL PAIN: 1
ANAL BLEEDING: 0
CHEST TIGHTNESS: 0
BACK PAIN: 0
SHORTNESS OF BREATH: 0
VOMITING: 1
RECTAL PAIN: 1
DIARRHEA: 1
EYE DISCHARGE: 0

## 2023-06-23 ASSESSMENT — PAIN SCALES - GENERAL
PAINLEVEL_OUTOF10: 7
PAINLEVEL_OUTOF10: 6

## 2023-06-23 ASSESSMENT — PAIN DESCRIPTION - ORIENTATION: ORIENTATION: RIGHT

## 2023-06-23 ASSESSMENT — PAIN DESCRIPTION - LOCATION: LOCATION: ABDOMEN;HIP

## 2023-06-23 ASSESSMENT — PAIN - FUNCTIONAL ASSESSMENT: PAIN_FUNCTIONAL_ASSESSMENT: 0-10

## 2023-06-24 PROBLEM — E87.1 HYPONATREMIA: Status: ACTIVE | Noted: 2023-06-24

## 2023-06-24 PROBLEM — D75.839 THROMBOCYTOSIS: Status: ACTIVE | Noted: 2023-06-24

## 2023-06-24 PROBLEM — E87.8 HYPOCHLOREMIA: Status: ACTIVE | Noted: 2023-06-24

## 2023-06-24 LAB
ANION GAP SERPL CALCULATED.3IONS-SCNC: 14 MMOL/L (ref 9–17)
BASOPHILS # BLD: <0.03 K/UL (ref 0–0.2)
BASOPHILS NFR BLD: 0 % (ref 0–2)
BUN SERPL-MCNC: 7 MG/DL (ref 6–20)
CALCIUM SERPL-MCNC: 8.6 MG/DL (ref 8.6–10.4)
CHLORIDE SERPL-SCNC: 94 MMOL/L (ref 98–107)
CO2 SERPL-SCNC: 26 MMOL/L (ref 20–31)
CREAT SERPL-MCNC: 0.53 MG/DL (ref 0.5–0.9)
CRP SERPL HS-MCNC: 32 MG/L (ref 0–5)
DATE, STOOL #1: ABNORMAL
EOSINOPHIL # BLD: 0.07 K/UL (ref 0–0.44)
EOSINOPHILS RELATIVE PERCENT: 1 % (ref 1–4)
ERYTHROCYTE [DISTWIDTH] IN BLOOD BY AUTOMATED COUNT: 14.6 % (ref 11.8–14.4)
ERYTHROCYTE [SEDIMENTATION RATE] IN BLOOD BY WESTERGREN METHOD: 22 MM/HR (ref 0–30)
GFR SERPL CREATININE-BSD FRML MDRD: >60 ML/MIN/1.73M2
GLUCOSE SERPL-MCNC: 110 MG/DL (ref 70–99)
HCT VFR BLD AUTO: 33.2 % (ref 36.3–47.1)
HEMOCCULT SP1 STL QL: POSITIVE
HGB BLD-MCNC: 10.8 G/DL (ref 11.9–15.1)
IMM GRANULOCYTES # BLD AUTO: 0.04 K/UL (ref 0–0.3)
IMM GRANULOCYTES NFR BLD: 1 %
LYMPHOCYTES # BLD: 16 % (ref 24–43)
LYMPHOCYTES NFR BLD: 1.34 K/UL (ref 1.1–3.7)
MAGNESIUM SERPL-MCNC: 2.2 MG/DL (ref 1.6–2.6)
MCH RBC QN AUTO: 26.7 PG (ref 25.2–33.5)
MCHC RBC AUTO-ENTMCNC: 32.5 G/DL (ref 28.4–34.8)
MCV RBC AUTO: 82 FL (ref 82.6–102.9)
MONOCYTES NFR BLD: 0.92 K/UL (ref 0.1–1.2)
MONOCYTES NFR BLD: 11 % (ref 3–12)
NEUTROPHILS NFR BLD: 71 % (ref 36–65)
NEUTS SEG NFR BLD: 6.25 K/UL (ref 1.5–8.1)
NRBC BLD-RTO: 0 PER 100 WBC
PLATELET # BLD AUTO: 411 K/UL (ref 138–453)
PMV BLD AUTO: 9.8 FL (ref 8.1–13.5)
POTASSIUM SERPL-SCNC: 3 MMOL/L (ref 3.7–5.3)
RBC # BLD AUTO: 4.05 M/UL (ref 3.95–5.11)
RBC # BLD: ABNORMAL 10*6/UL
SODIUM SERPL-SCNC: 134 MMOL/L (ref 135–144)
TIME, STOOL #1: ABNORMAL
WBC OTHER # BLD: 8.6 K/UL (ref 3.5–11.3)

## 2023-06-24 PROCEDURE — 97535 SELF CARE MNGMENT TRAINING: CPT

## 2023-06-24 PROCEDURE — 80048 BASIC METABOLIC PNL TOTAL CA: CPT

## 2023-06-24 PROCEDURE — 1200000000 HC SEMI PRIVATE

## 2023-06-24 PROCEDURE — 6370000000 HC RX 637 (ALT 250 FOR IP)

## 2023-06-24 PROCEDURE — 97166 OT EVAL MOD COMPLEX 45 MIN: CPT

## 2023-06-24 PROCEDURE — 2580000003 HC RX 258

## 2023-06-24 PROCEDURE — 82270 OCCULT BLOOD FECES: CPT

## 2023-06-24 PROCEDURE — 36415 COLL VENOUS BLD VENIPUNCTURE: CPT

## 2023-06-24 PROCEDURE — 86140 C-REACTIVE PROTEIN: CPT

## 2023-06-24 PROCEDURE — 85027 COMPLETE CBC AUTOMATED: CPT

## 2023-06-24 PROCEDURE — 6360000002 HC RX W HCPCS: Performed by: STUDENT IN AN ORGANIZED HEALTH CARE EDUCATION/TRAINING PROGRAM

## 2023-06-24 PROCEDURE — 99233 SBSQ HOSP IP/OBS HIGH 50: CPT | Performed by: HOSPITALIST

## 2023-06-24 PROCEDURE — 83735 ASSAY OF MAGNESIUM: CPT

## 2023-06-24 PROCEDURE — 6360000002 HC RX W HCPCS

## 2023-06-24 PROCEDURE — 85652 RBC SED RATE AUTOMATED: CPT

## 2023-06-24 PROCEDURE — 97530 THERAPEUTIC ACTIVITIES: CPT

## 2023-06-24 RX ORDER — FENTANYL CITRATE 50 UG/ML
25 INJECTION, SOLUTION INTRAMUSCULAR; INTRAVENOUS ONCE
Status: COMPLETED | OUTPATIENT
Start: 2023-06-24 | End: 2023-06-24

## 2023-06-24 RX ORDER — POTASSIUM CHLORIDE 7.45 MG/ML
10 INJECTION INTRAVENOUS
Status: DISCONTINUED | OUTPATIENT
Start: 2023-06-24 | End: 2023-06-24

## 2023-06-24 RX ORDER — POTASSIUM CHLORIDE 20 MEQ/1
40 TABLET, EXTENDED RELEASE ORAL ONCE
Status: COMPLETED | OUTPATIENT
Start: 2023-06-24 | End: 2023-06-24

## 2023-06-24 RX ADMIN — HYDROCODONE BITARTRATE AND ACETAMINOPHEN 1 TABLET: 5; 325 TABLET ORAL at 12:32

## 2023-06-24 RX ADMIN — HYDROCODONE BITARTRATE AND ACETAMINOPHEN 1 TABLET: 5; 325 TABLET ORAL at 03:54

## 2023-06-24 RX ADMIN — POTASSIUM CHLORIDE 10 MEQ: 10 INJECTION, SOLUTION INTRAVENOUS at 00:00

## 2023-06-24 RX ADMIN — POTASSIUM CHLORIDE 10 MEQ: 10 INJECTION, SOLUTION INTRAVENOUS at 01:12

## 2023-06-24 RX ADMIN — PROMETHAZINE HYDROCHLORIDE 6.25 MG: 25 INJECTION INTRAMUSCULAR; INTRAVENOUS at 09:10

## 2023-06-24 RX ADMIN — ONDANSETRON 4 MG: 2 INJECTION INTRAMUSCULAR; INTRAVENOUS at 21:10

## 2023-06-24 RX ADMIN — LISINOPRIL 5 MG: 5 TABLET ORAL at 08:59

## 2023-06-24 RX ADMIN — SODIUM CHLORIDE, POTASSIUM CHLORIDE, SODIUM LACTATE AND CALCIUM CHLORIDE: 600; 310; 30; 20 INJECTION, SOLUTION INTRAVENOUS at 23:23

## 2023-06-24 RX ADMIN — ASPIRIN 81 MG: 81 TABLET, CHEWABLE ORAL at 08:59

## 2023-06-24 RX ADMIN — POTASSIUM CHLORIDE 10 MEQ: 10 INJECTION, SOLUTION INTRAVENOUS at 02:25

## 2023-06-24 RX ADMIN — PROMETHAZINE HYDROCHLORIDE 6.25 MG: 25 INJECTION INTRAMUSCULAR; INTRAVENOUS at 17:10

## 2023-06-24 RX ADMIN — ONDANSETRON 4 MG: 2 INJECTION INTRAMUSCULAR; INTRAVENOUS at 03:47

## 2023-06-24 RX ADMIN — ENOXAPARIN SODIUM 40 MG: 40 INJECTION SUBCUTANEOUS at 08:59

## 2023-06-24 RX ADMIN — FENTANYL CITRATE 25 MCG: 50 INJECTION, SOLUTION INTRAMUSCULAR; INTRAVENOUS at 11:22

## 2023-06-24 RX ADMIN — ONDANSETRON 4 MG: 2 INJECTION INTRAMUSCULAR; INTRAVENOUS at 12:25

## 2023-06-24 RX ADMIN — HYDROCODONE BITARTRATE AND ACETAMINOPHEN 1 TABLET: 5; 325 TABLET ORAL at 18:48

## 2023-06-24 RX ADMIN — POTASSIUM CHLORIDE 40 MEQ: 1500 TABLET, EXTENDED RELEASE ORAL at 13:36

## 2023-06-24 RX ADMIN — POTASSIUM CHLORIDE 10 MEQ: 7.46 INJECTION, SOLUTION INTRAVENOUS at 12:36

## 2023-06-24 RX ADMIN — PANTOPRAZOLE SODIUM 40 MG: 40 TABLET, DELAYED RELEASE ORAL at 08:59

## 2023-06-24 ASSESSMENT — PAIN SCALES - GENERAL
PAINLEVEL_OUTOF10: 8
PAINLEVEL_OUTOF10: 8

## 2023-06-24 ASSESSMENT — PAIN SCALES - WONG BAKER
WONGBAKER_NUMERICALRESPONSE: 0

## 2023-06-25 PROBLEM — D72.829 LEUKOCYTOSIS: Status: ACTIVE | Noted: 2023-06-25

## 2023-06-25 LAB
ANION GAP SERPL CALCULATED.3IONS-SCNC: 16 MMOL/L (ref 9–17)
BASOPHILS # BLD: <0.03 K/UL (ref 0–0.2)
BASOPHILS NFR BLD: 0 % (ref 0–2)
BUN SERPL-MCNC: 7 MG/DL (ref 6–20)
CALCIUM SERPL-MCNC: 8.6 MG/DL (ref 8.6–10.4)
CHLORIDE SERPL-SCNC: 94 MMOL/L (ref 98–107)
CO2 SERPL-SCNC: 25 MMOL/L (ref 20–31)
CREAT SERPL-MCNC: 0.47 MG/DL (ref 0.5–0.9)
EOSINOPHIL # BLD: 0.1 K/UL (ref 0–0.44)
EOSINOPHILS RELATIVE PERCENT: 1 % (ref 1–4)
ERYTHROCYTE [DISTWIDTH] IN BLOOD BY AUTOMATED COUNT: 14.2 % (ref 11.8–14.4)
GFR SERPL CREATININE-BSD FRML MDRD: >60 ML/MIN/1.73M2
GLUCOSE SERPL-MCNC: 105 MG/DL (ref 70–99)
HCT VFR BLD AUTO: 35.4 % (ref 36.3–47.1)
HGB BLD-MCNC: 11.3 G/DL (ref 11.9–15.1)
IMM GRANULOCYTES # BLD AUTO: 0.05 K/UL (ref 0–0.3)
IMM GRANULOCYTES NFR BLD: 1 %
LACTOFERRIN STL QL: NORMAL
LYMPHOCYTES # BLD: 18 % (ref 24–43)
LYMPHOCYTES NFR BLD: 1.61 K/UL (ref 1.1–3.7)
MAGNESIUM SERPL-MCNC: 1.9 MG/DL (ref 1.6–2.6)
MCH RBC QN AUTO: 26.8 PG (ref 25.2–33.5)
MCHC RBC AUTO-ENTMCNC: 31.9 G/DL (ref 28.4–34.8)
MCV RBC AUTO: 84.1 FL (ref 82.6–102.9)
MICROORGANISM/AGENT SPEC: NORMAL
MONOCYTES NFR BLD: 0.83 K/UL (ref 0.1–1.2)
MONOCYTES NFR BLD: 9 % (ref 3–12)
NEUTROPHILS NFR BLD: 71 % (ref 36–65)
NEUTS SEG NFR BLD: 6.59 K/UL (ref 1.5–8.1)
NRBC BLD-RTO: 0 PER 100 WBC
O+P STL CONC: NORMAL
PLATELET # BLD AUTO: 377 K/UL (ref 138–453)
PMV BLD AUTO: 9.4 FL (ref 8.1–13.5)
POTASSIUM SERPL-SCNC: 2.8 MMOL/L (ref 3.7–5.3)
RBC # BLD AUTO: 4.21 M/UL (ref 3.95–5.11)
SODIUM SERPL-SCNC: 135 MMOL/L (ref 135–144)
SPECIMEN DESCRIPTION: NORMAL
SPECIMEN DESCRIPTION: NORMAL
WBC OTHER # BLD: 9.2 K/UL (ref 3.5–11.3)

## 2023-06-25 PROCEDURE — 6370000000 HC RX 637 (ALT 250 FOR IP)

## 2023-06-25 PROCEDURE — 80048 BASIC METABOLIC PNL TOTAL CA: CPT

## 2023-06-25 PROCEDURE — 1200000000 HC SEMI PRIVATE

## 2023-06-25 PROCEDURE — 6370000000 HC RX 637 (ALT 250 FOR IP): Performed by: INTERNAL MEDICINE

## 2023-06-25 PROCEDURE — 99254 IP/OBS CNSLTJ NEW/EST MOD 60: CPT | Performed by: INTERNAL MEDICINE

## 2023-06-25 PROCEDURE — 87329 GIARDIA AG IA: CPT

## 2023-06-25 PROCEDURE — 87324 CLOSTRIDIUM AG IA: CPT

## 2023-06-25 PROCEDURE — 87015 SPECIMEN INFECT AGNT CONCNTJ: CPT

## 2023-06-25 PROCEDURE — 87210 SMEAR WET MOUNT SALINE/INK: CPT

## 2023-06-25 PROCEDURE — 6360000002 HC RX W HCPCS

## 2023-06-25 PROCEDURE — 2580000003 HC RX 258

## 2023-06-25 PROCEDURE — 85027 COMPLETE CBC AUTOMATED: CPT

## 2023-06-25 PROCEDURE — 99232 SBSQ HOSP IP/OBS MODERATE 35: CPT | Performed by: HOSPITALIST

## 2023-06-25 PROCEDURE — 83735 ASSAY OF MAGNESIUM: CPT

## 2023-06-25 PROCEDURE — 87177 OVA AND PARASITES SMEARS: CPT

## 2023-06-25 PROCEDURE — 6370000000 HC RX 637 (ALT 250 FOR IP): Performed by: NURSE PRACTITIONER

## 2023-06-25 PROCEDURE — 87425 ROTAVIRUS AG IA: CPT

## 2023-06-25 PROCEDURE — 87506 IADNA-DNA/RNA PROBE TQ 6-11: CPT

## 2023-06-25 PROCEDURE — 36415 COLL VENOUS BLD VENIPUNCTURE: CPT

## 2023-06-25 PROCEDURE — 83630 LACTOFERRIN FECAL (QUAL): CPT

## 2023-06-25 PROCEDURE — 6360000002 HC RX W HCPCS: Performed by: NURSE PRACTITIONER

## 2023-06-25 PROCEDURE — 87449 NOS EACH ORGANISM AG IA: CPT

## 2023-06-25 PROCEDURE — 87328 CRYPTOSPORIDIUM AG IA: CPT

## 2023-06-25 PROCEDURE — 87209 SMEAR COMPLEX STAIN: CPT

## 2023-06-25 RX ORDER — MORPHINE SULFATE 2 MG/ML
2 INJECTION, SOLUTION INTRAMUSCULAR; INTRAVENOUS ONCE
Status: COMPLETED | OUTPATIENT
Start: 2023-06-25 | End: 2023-06-25

## 2023-06-25 RX ORDER — DEXAMETHASONE SODIUM PHOSPHATE 10 MG/ML
10 INJECTION INTRAMUSCULAR; INTRAVENOUS ONCE
Status: COMPLETED | OUTPATIENT
Start: 2023-06-25 | End: 2023-06-25

## 2023-06-25 RX ORDER — LISINOPRIL 10 MG/1
10 TABLET ORAL DAILY
Status: DISCONTINUED | OUTPATIENT
Start: 2023-06-25 | End: 2023-06-27 | Stop reason: HOSPADM

## 2023-06-25 RX ORDER — CHOLESTYRAMINE LIGHT 4 G/5.7G
4 POWDER, FOR SUSPENSION ORAL 2 TIMES DAILY
Status: DISCONTINUED | OUTPATIENT
Start: 2023-06-25 | End: 2023-06-27 | Stop reason: HOSPADM

## 2023-06-25 RX ORDER — POTASSIUM CHLORIDE 7.45 MG/ML
10 INJECTION INTRAVENOUS
Status: DISCONTINUED | OUTPATIENT
Start: 2023-06-25 | End: 2023-06-25

## 2023-06-25 RX ORDER — GABAPENTIN 400 MG/1
400 CAPSULE ORAL 2 TIMES DAILY
Status: DISCONTINUED | OUTPATIENT
Start: 2023-06-25 | End: 2023-06-26

## 2023-06-25 RX ORDER — LIDOCAINE HYDROCHLORIDE 20 MG/ML
JELLY TOPICAL PRN
Status: DISCONTINUED | OUTPATIENT
Start: 2023-06-25 | End: 2023-06-27 | Stop reason: HOSPADM

## 2023-06-25 RX ORDER — LOPERAMIDE HYDROCHLORIDE 2 MG/1
2 CAPSULE ORAL 4 TIMES DAILY PRN
Status: DISCONTINUED | OUTPATIENT
Start: 2023-06-25 | End: 2023-06-27 | Stop reason: HOSPADM

## 2023-06-25 RX ADMIN — SODIUM CHLORIDE, PRESERVATIVE FREE 10 ML: 5 INJECTION INTRAVENOUS at 08:38

## 2023-06-25 RX ADMIN — RIFAXIMIN 550 MG: 550 TABLET ORAL at 14:40

## 2023-06-25 RX ADMIN — DEXAMETHASONE SODIUM PHOSPHATE 10 MG: 10 INJECTION INTRAMUSCULAR; INTRAVENOUS at 10:35

## 2023-06-25 RX ADMIN — HYDROCODONE BITARTRATE AND ACETAMINOPHEN 1 TABLET: 5; 325 TABLET ORAL at 10:35

## 2023-06-25 RX ADMIN — RIFAXIMIN 550 MG: 550 TABLET ORAL at 20:51

## 2023-06-25 RX ADMIN — LOPERAMIDE HYDROCHLORIDE 2 MG: 2 CAPSULE ORAL at 08:03

## 2023-06-25 RX ADMIN — ONDANSETRON 4 MG: 2 INJECTION INTRAMUSCULAR; INTRAVENOUS at 16:28

## 2023-06-25 RX ADMIN — LOPERAMIDE HYDROCHLORIDE 2 MG: 2 CAPSULE ORAL at 00:18

## 2023-06-25 RX ADMIN — HYDROCODONE BITARTRATE AND ACETAMINOPHEN 1 TABLET: 5; 325 TABLET ORAL at 18:19

## 2023-06-25 RX ADMIN — ONDANSETRON 4 MG: 2 INJECTION INTRAMUSCULAR; INTRAVENOUS at 08:03

## 2023-06-25 RX ADMIN — MORPHINE SULFATE 2 MG: 2 INJECTION, SOLUTION INTRAMUSCULAR; INTRAVENOUS at 00:18

## 2023-06-25 RX ADMIN — POTASSIUM CHLORIDE: 2 INJECTION, SOLUTION, CONCENTRATE INTRAVENOUS at 18:12

## 2023-06-25 RX ADMIN — ENOXAPARIN SODIUM 40 MG: 40 INJECTION SUBCUTANEOUS at 08:38

## 2023-06-25 RX ADMIN — PANTOPRAZOLE SODIUM 40 MG: 40 TABLET, DELAYED RELEASE ORAL at 08:03

## 2023-06-25 RX ADMIN — ASPIRIN 81 MG: 81 TABLET, CHEWABLE ORAL at 08:38

## 2023-06-25 RX ADMIN — HYDROCODONE BITARTRATE AND ACETAMINOPHEN 1 TABLET: 5; 325 TABLET ORAL at 02:52

## 2023-06-25 RX ADMIN — CHOLESTYRAMINE 4 G: 4 POWDER, FOR SUSPENSION ORAL at 12:42

## 2023-06-25 RX ADMIN — GABAPENTIN 400 MG: 400 CAPSULE ORAL at 20:51

## 2023-06-25 RX ADMIN — POTASSIUM BICARBONATE 40 MEQ: 782 TABLET, EFFERVESCENT ORAL at 08:38

## 2023-06-25 RX ADMIN — CHOLESTYRAMINE 4 G: 4 POWDER, FOR SUSPENSION ORAL at 20:53

## 2023-06-25 RX ADMIN — LISINOPRIL 10 MG: 10 TABLET ORAL at 08:38

## 2023-06-25 RX ADMIN — POTASSIUM CHLORIDE: 2 INJECTION, SOLUTION, CONCENTRATE INTRAVENOUS at 08:54

## 2023-06-25 ASSESSMENT — PAIN SCALES - GENERAL
PAINLEVEL_OUTOF10: 6
PAINLEVEL_OUTOF10: 10
PAINLEVEL_OUTOF10: 7
PAINLEVEL_OUTOF10: 10
PAINLEVEL_OUTOF10: 8

## 2023-06-25 ASSESSMENT — PAIN DESCRIPTION - PAIN TYPE: TYPE: ACUTE PAIN

## 2023-06-25 ASSESSMENT — PAIN DESCRIPTION - DESCRIPTORS
DESCRIPTORS: BURNING
DESCRIPTORS: BURNING

## 2023-06-25 ASSESSMENT — PAIN DESCRIPTION - ORIENTATION
ORIENTATION: MID
ORIENTATION: MID

## 2023-06-25 ASSESSMENT — PAIN DESCRIPTION - LOCATION
LOCATION: BUTTOCKS;COCCYX
LOCATION: COCCYX

## 2023-06-26 ENCOUNTER — APPOINTMENT (OUTPATIENT)
Dept: GENERAL RADIOLOGY | Age: 58
DRG: 422 | End: 2023-06-26
Payer: MEDICAID

## 2023-06-26 DIAGNOSIS — Z96.642 S/P TOTAL LEFT HIP ARTHROPLASTY: ICD-10-CM

## 2023-06-26 LAB
ANION GAP SERPL CALCULATED.3IONS-SCNC: 11 MMOL/L (ref 9–17)
BASOPHILS # BLD: <0.03 K/UL (ref 0–0.2)
BASOPHILS NFR BLD: 0 % (ref 0–2)
BUN SERPL-MCNC: 10 MG/DL (ref 6–20)
C DIFF GDH + TOXINS A+B STL QL IA.RAPID: NEGATIVE
C PARVUM AG STL QL IA: NEGATIVE
CALCIUM SERPL-MCNC: 7.9 MG/DL (ref 8.6–10.4)
CAMPYLOBACTER DNA SPEC NAA+PROBE: NORMAL
CHLORIDE SERPL-SCNC: 95 MMOL/L (ref 98–107)
CO2 SERPL-SCNC: 26 MMOL/L (ref 20–31)
CREAT SERPL-MCNC: 0.56 MG/DL (ref 0.5–0.9)
EKG ATRIAL RATE: 85 BPM
EKG P AXIS: 70 DEGREES
EKG P-R INTERVAL: 138 MS
EKG Q-T INTERVAL: 476 MS
EKG QRS DURATION: 88 MS
EKG QTC CALCULATION (BAZETT): 566 MS
EKG R AXIS: 76 DEGREES
EKG T AXIS: 51 DEGREES
EKG VENTRICULAR RATE: 85 BPM
EOSINOPHIL # BLD: <0.03 K/UL (ref 0–0.44)
EOSINOPHILS RELATIVE PERCENT: 0 % (ref 1–4)
ERYTHROCYTE [DISTWIDTH] IN BLOOD BY AUTOMATED COUNT: 14.7 % (ref 11.8–14.4)
ETEC ELTA+ESTB GENES STL QL NAA+PROBE: NORMAL
G LAMBLIA AG STL QL IA: NEGATIVE
GFR SERPL CREATININE-BSD FRML MDRD: >60 ML/MIN/1.73M2
GLUCOSE SERPL-MCNC: 113 MG/DL (ref 70–99)
HCT VFR BLD AUTO: 25.1 % (ref 36.3–47.1)
HGB BLD-MCNC: 9.1 G/DL (ref 11.9–15.1)
IMM GRANULOCYTES # BLD AUTO: <0.03 K/UL (ref 0–0.3)
IMM GRANULOCYTES NFR BLD: 0 %
LYMPHOCYTES # BLD: 14 % (ref 24–43)
LYMPHOCYTES NFR BLD: 0.99 K/UL (ref 1.1–3.7)
MAGNESIUM SERPL-MCNC: 1.8 MG/DL (ref 1.6–2.6)
MCH RBC QN AUTO: 29.9 PG (ref 25.2–33.5)
MCHC RBC AUTO-ENTMCNC: 36.3 G/DL (ref 28.4–34.8)
MCV RBC AUTO: 82.6 FL (ref 82.6–102.9)
MONOCYTES NFR BLD: 0.5 K/UL (ref 0.1–1.2)
MONOCYTES NFR BLD: 7 % (ref 3–12)
NEUTROPHILS NFR BLD: 78 % (ref 36–65)
NEUTS SEG NFR BLD: 5.36 K/UL (ref 1.5–8.1)
NRBC BLD-RTO: 0 PER 100 WBC
O+P STL CONC: NORMAL
P SHIGELLOIDES DNA STL QL NAA+PROBE: NORMAL
PLATELET # BLD AUTO: 790 K/UL (ref 138–453)
PMV BLD AUTO: 10.5 FL (ref 8.1–13.5)
POTASSIUM SERPL-SCNC: 3.4 MMOL/L (ref 3.7–5.3)
RBC # BLD AUTO: 3.04 M/UL (ref 3.95–5.11)
RBC # BLD: ABNORMAL 10*6/UL
SALMONELLA DNA SPEC QL NAA+PROBE: NORMAL
SHIGA TOXIN STX GENE SPEC NAA+PROBE: NORMAL
SHIGELLA DNA SPEC QL NAA+PROBE: NORMAL
SODIUM SERPL-SCNC: 132 MMOL/L (ref 135–144)
SOURCE: NORMAL
SPECIMEN DESCRIPTION: NORMAL
V CHOL+PARA RFBL+TRKH+TNAA STL QL NAA+PR: NORMAL
WBC OTHER # BLD: 6.9 K/UL (ref 3.5–11.3)
Y ENTERO RECN STL QL NAA+PROBE: NORMAL

## 2023-06-26 PROCEDURE — 6360000002 HC RX W HCPCS

## 2023-06-26 PROCEDURE — 83735 ASSAY OF MAGNESIUM: CPT

## 2023-06-26 PROCEDURE — 6370000000 HC RX 637 (ALT 250 FOR IP): Performed by: INTERNAL MEDICINE

## 2023-06-26 PROCEDURE — 85027 COMPLETE CBC AUTOMATED: CPT

## 2023-06-26 PROCEDURE — 80048 BASIC METABOLIC PNL TOTAL CA: CPT

## 2023-06-26 PROCEDURE — 2580000003 HC RX 258

## 2023-06-26 PROCEDURE — 99232 SBSQ HOSP IP/OBS MODERATE 35: CPT | Performed by: INTERNAL MEDICINE

## 2023-06-26 PROCEDURE — 6370000000 HC RX 637 (ALT 250 FOR IP)

## 2023-06-26 PROCEDURE — 97530 THERAPEUTIC ACTIVITIES: CPT

## 2023-06-26 PROCEDURE — 36415 COLL VENOUS BLD VENIPUNCTURE: CPT

## 2023-06-26 PROCEDURE — C9113 INJ PANTOPRAZOLE SODIUM, VIA: HCPCS | Performed by: INTERNAL MEDICINE

## 2023-06-26 PROCEDURE — 6370000000 HC RX 637 (ALT 250 FOR IP): Performed by: NURSE PRACTITIONER

## 2023-06-26 PROCEDURE — 73502 X-RAY EXAM HIP UNI 2-3 VIEWS: CPT

## 2023-06-26 PROCEDURE — 6360000002 HC RX W HCPCS: Performed by: INTERNAL MEDICINE

## 2023-06-26 PROCEDURE — 1200000000 HC SEMI PRIVATE

## 2023-06-26 PROCEDURE — 2580000003 HC RX 258: Performed by: INTERNAL MEDICINE

## 2023-06-26 PROCEDURE — 97161 PT EVAL LOW COMPLEX 20 MIN: CPT

## 2023-06-26 RX ORDER — GABAPENTIN 400 MG/1
800 CAPSULE ORAL 2 TIMES DAILY
Status: DISCONTINUED | OUTPATIENT
Start: 2023-06-26 | End: 2023-06-26

## 2023-06-26 RX ORDER — MORPHINE SULFATE 2 MG/ML
1 INJECTION, SOLUTION INTRAMUSCULAR; INTRAVENOUS EVERY 6 HOURS PRN
Status: DISCONTINUED | OUTPATIENT
Start: 2023-06-26 | End: 2023-06-27 | Stop reason: HOSPADM

## 2023-06-26 RX ORDER — GABAPENTIN 400 MG/1
800 CAPSULE ORAL 3 TIMES DAILY
Status: DISCONTINUED | OUTPATIENT
Start: 2023-06-26 | End: 2023-06-27 | Stop reason: HOSPADM

## 2023-06-26 RX ORDER — LOPERAMIDE HYDROCHLORIDE 2 MG/1
2 CAPSULE ORAL 4 TIMES DAILY PRN
Qty: 40 CAPSULE | Refills: 0 | Status: SHIPPED | OUTPATIENT
Start: 2023-06-26 | End: 2023-07-06

## 2023-06-26 RX ORDER — POTASSIUM CHLORIDE 20 MEQ/1
20 TABLET, EXTENDED RELEASE ORAL 2 TIMES DAILY
Qty: 40 TABLET | Refills: 0 | Status: SHIPPED | OUTPATIENT
Start: 2023-06-26 | End: 2023-07-16

## 2023-06-26 RX ORDER — MULTIVIT-MIN/IRON FUM/FOLIC AC 7.5 MG-4
1 TABLET ORAL DAILY
Qty: 30 TABLET | Refills: 1 | Status: SHIPPED | OUTPATIENT
Start: 2023-06-26

## 2023-06-26 RX ORDER — PREGABALIN 100 MG/1
300 CAPSULE ORAL 2 TIMES DAILY
Status: DISCONTINUED | OUTPATIENT
Start: 2023-06-26 | End: 2023-06-26

## 2023-06-26 RX ORDER — ASPIRIN 81 MG/1
81 TABLET, CHEWABLE ORAL DAILY
Qty: 90 TABLET | Refills: 0 | Status: SHIPPED | OUTPATIENT
Start: 2023-06-26

## 2023-06-26 RX ORDER — CHOLESTYRAMINE LIGHT 4 G/5.7G
4 POWDER, FOR SUSPENSION ORAL 2 TIMES DAILY
Qty: 60 PACKET | Refills: 3 | Status: SHIPPED | OUTPATIENT
Start: 2023-06-26

## 2023-06-26 RX ORDER — DIPHENHYDRAMINE HYDROCHLORIDE 50 MG/ML
25 INJECTION INTRAMUSCULAR; INTRAVENOUS ONCE
Status: COMPLETED | OUTPATIENT
Start: 2023-06-26 | End: 2023-06-26

## 2023-06-26 RX ADMIN — RIFAXIMIN 550 MG: 550 TABLET ORAL at 14:50

## 2023-06-26 RX ADMIN — ENOXAPARIN SODIUM 40 MG: 40 INJECTION SUBCUTANEOUS at 08:04

## 2023-06-26 RX ADMIN — GABAPENTIN 800 MG: 400 CAPSULE ORAL at 20:48

## 2023-06-26 RX ADMIN — ONDANSETRON 4 MG: 2 INJECTION INTRAMUSCULAR; INTRAVENOUS at 00:10

## 2023-06-26 RX ADMIN — ASPIRIN 81 MG: 81 TABLET, CHEWABLE ORAL at 08:04

## 2023-06-26 RX ADMIN — POTASSIUM CHLORIDE: 2 INJECTION, SOLUTION, CONCENTRATE INTRAVENOUS at 03:47

## 2023-06-26 RX ADMIN — HYDROCODONE BITARTRATE AND ACETAMINOPHEN 1 TABLET: 5; 325 TABLET ORAL at 10:42

## 2023-06-26 RX ADMIN — CHOLESTYRAMINE 4 G: 4 POWDER, FOR SUSPENSION ORAL at 08:04

## 2023-06-26 RX ADMIN — LISINOPRIL 10 MG: 10 TABLET ORAL at 08:04

## 2023-06-26 RX ADMIN — LOPERAMIDE HYDROCHLORIDE 2 MG: 2 CAPSULE ORAL at 10:50

## 2023-06-26 RX ADMIN — MORPHINE SULFATE 1 MG: 2 INJECTION, SOLUTION INTRAMUSCULAR; INTRAVENOUS at 06:51

## 2023-06-26 RX ADMIN — PANTOPRAZOLE SODIUM 40 MG: 40 TABLET, DELAYED RELEASE ORAL at 06:55

## 2023-06-26 RX ADMIN — RIFAXIMIN 550 MG: 550 TABLET ORAL at 20:48

## 2023-06-26 RX ADMIN — HYDROCODONE BITARTRATE AND ACETAMINOPHEN 1 TABLET: 5; 325 TABLET ORAL at 18:39

## 2023-06-26 RX ADMIN — GABAPENTIN 400 MG: 400 CAPSULE ORAL at 08:04

## 2023-06-26 RX ADMIN — CHOLESTYRAMINE 4 G: 4 POWDER, FOR SUSPENSION ORAL at 20:48

## 2023-06-26 RX ADMIN — MORPHINE SULFATE 1 MG: 2 INJECTION, SOLUTION INTRAMUSCULAR; INTRAVENOUS at 20:35

## 2023-06-26 RX ADMIN — RIFAXIMIN 550 MG: 550 TABLET ORAL at 08:04

## 2023-06-26 RX ADMIN — POTASSIUM CHLORIDE: 2 INJECTION, SOLUTION, CONCENTRATE INTRAVENOUS at 14:51

## 2023-06-26 RX ADMIN — HYDROCODONE BITARTRATE AND ACETAMINOPHEN 1 TABLET: 5; 325 TABLET ORAL at 02:28

## 2023-06-26 RX ADMIN — GABAPENTIN 800 MG: 400 CAPSULE ORAL at 14:50

## 2023-06-26 RX ADMIN — SODIUM CHLORIDE, PRESERVATIVE FREE 40 MG: 5 INJECTION INTRAVENOUS at 18:40

## 2023-06-26 RX ADMIN — LOPERAMIDE HYDROCHLORIDE 2 MG: 2 CAPSULE ORAL at 18:39

## 2023-06-26 RX ADMIN — DIPHENHYDRAMINE HYDROCHLORIDE 25 MG: 50 INJECTION, SOLUTION INTRAMUSCULAR; INTRAVENOUS at 04:42

## 2023-06-26 ASSESSMENT — PAIN SCALES - GENERAL
PAINLEVEL_OUTOF10: 4
PAINLEVEL_OUTOF10: 7
PAINLEVEL_OUTOF10: 0
PAINLEVEL_OUTOF10: 7
PAINLEVEL_OUTOF10: 9
PAINLEVEL_OUTOF10: 9

## 2023-06-26 ASSESSMENT — PAIN DESCRIPTION - LOCATION
LOCATION: GENERALIZED
LOCATION: GENERALIZED

## 2023-06-27 VITALS
WEIGHT: 172.18 LBS | HEART RATE: 70 BPM | DIASTOLIC BLOOD PRESSURE: 84 MMHG | BODY MASS INDEX: 27.02 KG/M2 | TEMPERATURE: 98 F | SYSTOLIC BLOOD PRESSURE: 134 MMHG | HEIGHT: 67 IN | OXYGEN SATURATION: 96 % | RESPIRATION RATE: 16 BRPM

## 2023-06-27 LAB
ANION GAP SERPL CALCULATED.3IONS-SCNC: 10 MMOL/L (ref 9–17)
BASOPHILS # BLD: <0.03 K/UL (ref 0–0.2)
BASOPHILS NFR BLD: 0 % (ref 0–2)
BUN SERPL-MCNC: 12 MG/DL (ref 6–20)
CALCIUM SERPL-MCNC: 8.1 MG/DL (ref 8.6–10.4)
CHLORIDE SERPL-SCNC: 103 MMOL/L (ref 98–107)
CO2 SERPL-SCNC: 26 MMOL/L (ref 20–31)
CREAT SERPL-MCNC: 0.62 MG/DL (ref 0.5–0.9)
EOSINOPHIL # BLD: 0.09 K/UL (ref 0–0.44)
EOSINOPHILS RELATIVE PERCENT: 1 % (ref 1–4)
ERYTHROCYTE [DISTWIDTH] IN BLOOD BY AUTOMATED COUNT: 14.9 % (ref 11.8–14.4)
GFR SERPL CREATININE-BSD FRML MDRD: >60 ML/MIN/1.73M2
GLUCOSE SERPL-MCNC: 88 MG/DL (ref 70–99)
HCT VFR BLD AUTO: 27.7 % (ref 36.3–47.1)
HGB BLD-MCNC: 8.5 G/DL (ref 11.9–15.1)
IMM GRANULOCYTES # BLD AUTO: 0.06 K/UL (ref 0–0.3)
IMM GRANULOCYTES NFR BLD: 1 %
LYMPHOCYTES # BLD: 21 % (ref 24–43)
LYMPHOCYTES NFR BLD: 2.49 K/UL (ref 1.1–3.7)
MAGNESIUM SERPL-MCNC: 1.7 MG/DL (ref 1.6–2.6)
MCH RBC QN AUTO: 27 PG (ref 25.2–33.5)
MCHC RBC AUTO-ENTMCNC: 30.7 G/DL (ref 28.4–34.8)
MCV RBC AUTO: 87.9 FL (ref 82.6–102.9)
MONOCYTES NFR BLD: 0.67 K/UL (ref 0.1–1.2)
MONOCYTES NFR BLD: 6 % (ref 3–12)
NEUTROPHILS NFR BLD: 72 % (ref 36–65)
NEUTS SEG NFR BLD: 8.47 K/UL (ref 1.5–8.1)
NRBC BLD-RTO: 0 PER 100 WBC
PLATELET # BLD AUTO: 340 K/UL (ref 138–453)
PMV BLD AUTO: 9.4 FL (ref 8.1–13.5)
POTASSIUM SERPL-SCNC: 3.5 MMOL/L (ref 3.7–5.3)
RBC # BLD AUTO: 3.15 M/UL (ref 3.95–5.11)
RBC # BLD: ABNORMAL 10*6/UL
SODIUM SERPL-SCNC: 139 MMOL/L (ref 135–144)
WBC OTHER # BLD: 11.8 K/UL (ref 3.5–11.3)

## 2023-06-27 PROCEDURE — 2580000003 HC RX 258: Performed by: INTERNAL MEDICINE

## 2023-06-27 PROCEDURE — 6360000002 HC RX W HCPCS: Performed by: INTERNAL MEDICINE

## 2023-06-27 PROCEDURE — 2580000003 HC RX 258

## 2023-06-27 PROCEDURE — 83735 ASSAY OF MAGNESIUM: CPT

## 2023-06-27 PROCEDURE — 6360000002 HC RX W HCPCS

## 2023-06-27 PROCEDURE — 6370000000 HC RX 637 (ALT 250 FOR IP): Performed by: NURSE PRACTITIONER

## 2023-06-27 PROCEDURE — 36415 COLL VENOUS BLD VENIPUNCTURE: CPT

## 2023-06-27 PROCEDURE — 6370000000 HC RX 637 (ALT 250 FOR IP): Performed by: INTERNAL MEDICINE

## 2023-06-27 PROCEDURE — 6370000000 HC RX 637 (ALT 250 FOR IP)

## 2023-06-27 PROCEDURE — C9113 INJ PANTOPRAZOLE SODIUM, VIA: HCPCS | Performed by: INTERNAL MEDICINE

## 2023-06-27 PROCEDURE — 80048 BASIC METABOLIC PNL TOTAL CA: CPT

## 2023-06-27 PROCEDURE — 85027 COMPLETE CBC AUTOMATED: CPT

## 2023-06-27 RX ORDER — HYDROCODONE BITARTRATE AND ACETAMINOPHEN 5; 325 MG/1; MG/1
1 TABLET ORAL EVERY 8 HOURS PRN
Qty: 42 TABLET | Refills: 0 | Status: SHIPPED | OUTPATIENT
Start: 2023-06-27 | End: 2023-07-11

## 2023-06-27 RX ORDER — HYDROCODONE BITARTRATE AND ACETAMINOPHEN 5; 325 MG/1; MG/1
1 TABLET ORAL EVERY 8 HOURS PRN
Qty: 42 TABLET | Refills: 0 | OUTPATIENT
Start: 2023-06-27 | End: 2023-07-11

## 2023-06-27 RX ADMIN — LOPERAMIDE HYDROCHLORIDE 2 MG: 2 CAPSULE ORAL at 08:00

## 2023-06-27 RX ADMIN — MORPHINE SULFATE 1 MG: 2 INJECTION, SOLUTION INTRAMUSCULAR; INTRAVENOUS at 04:58

## 2023-06-27 RX ADMIN — RIFAXIMIN 550 MG: 550 TABLET ORAL at 08:43

## 2023-06-27 RX ADMIN — ENOXAPARIN SODIUM 40 MG: 40 INJECTION SUBCUTANEOUS at 08:44

## 2023-06-27 RX ADMIN — RIFAXIMIN 550 MG: 550 TABLET ORAL at 14:17

## 2023-06-27 RX ADMIN — HYDROCODONE BITARTRATE AND ACETAMINOPHEN 1 TABLET: 5; 325 TABLET ORAL at 02:30

## 2023-06-27 RX ADMIN — LISINOPRIL 10 MG: 10 TABLET ORAL at 08:44

## 2023-06-27 RX ADMIN — SODIUM CHLORIDE, PRESERVATIVE FREE 40 MG: 5 INJECTION INTRAVENOUS at 06:43

## 2023-06-27 RX ADMIN — GABAPENTIN 800 MG: 400 CAPSULE ORAL at 14:17

## 2023-06-27 RX ADMIN — HYDROCODONE BITARTRATE AND ACETAMINOPHEN 1 TABLET: 5; 325 TABLET ORAL at 10:43

## 2023-06-27 RX ADMIN — CHOLESTYRAMINE 4 G: 4 POWDER, FOR SUSPENSION ORAL at 08:44

## 2023-06-27 RX ADMIN — POTASSIUM CHLORIDE: 2 INJECTION, SOLUTION, CONCENTRATE INTRAVENOUS at 00:52

## 2023-06-27 RX ADMIN — GABAPENTIN 800 MG: 400 CAPSULE ORAL at 08:44

## 2023-06-27 RX ADMIN — MORPHINE SULFATE 1 MG: 2 INJECTION, SOLUTION INTRAMUSCULAR; INTRAVENOUS at 11:41

## 2023-06-27 RX ADMIN — ASPIRIN 81 MG: 81 TABLET, CHEWABLE ORAL at 08:44

## 2023-06-27 ASSESSMENT — PAIN SCALES - GENERAL
PAINLEVEL_OUTOF10: 10
PAINLEVEL_OUTOF10: 8
PAINLEVEL_OUTOF10: 10
PAINLEVEL_OUTOF10: 7

## 2023-07-03 ENCOUNTER — HOSPITAL ENCOUNTER (EMERGENCY)
Age: 58
Discharge: HOME | End: 2023-07-03
Attending: EMERGENCY MEDICINE
Payer: MEDICAID

## 2023-07-03 ENCOUNTER — TELEPHONE (OUTPATIENT)
Dept: ORTHOPEDIC SURGERY | Age: 58
End: 2023-07-03

## 2023-07-03 ENCOUNTER — APPOINTMENT (OUTPATIENT)
Dept: GENERAL RADIOLOGY | Age: 58
End: 2023-07-03
Payer: MEDICAID

## 2023-07-03 VITALS
TEMPERATURE: 99 F | RESPIRATION RATE: 14 BRPM | DIASTOLIC BLOOD PRESSURE: 49 MMHG | SYSTOLIC BLOOD PRESSURE: 114 MMHG | HEART RATE: 81 BPM | WEIGHT: 172 LBS | OXYGEN SATURATION: 96 % | BODY MASS INDEX: 27.14 KG/M2

## 2023-07-03 DIAGNOSIS — L03.115 CELLULITIS OF RIGHT LOWER EXTREMITY: Primary | ICD-10-CM

## 2023-07-03 DIAGNOSIS — N30.00 ACUTE CYSTITIS WITHOUT HEMATURIA: ICD-10-CM

## 2023-07-03 LAB
ANION GAP SERPL CALCULATED.3IONS-SCNC: 10 MMOL/L (ref 9–17)
BACTERIA URNS QL MICRO: ABNORMAL
BASOPHILS # BLD: 0 K/UL (ref 0–0.2)
BASOPHILS NFR BLD: 1 % (ref 0–2)
BILIRUB UR QL STRIP: NEGATIVE
BUN SERPL-MCNC: 13 MG/DL (ref 6–20)
CALCIUM SERPL-MCNC: 8.6 MG/DL (ref 8.6–10.4)
CASTS #/AREA URNS LPF: ABNORMAL /LPF
CHLORIDE SERPL-SCNC: 101 MMOL/L (ref 98–107)
CLARITY UR: ABNORMAL
CO2 SERPL-SCNC: 28 MMOL/L (ref 20–31)
COLOR UR: YELLOW
CREAT SERPL-MCNC: 0.77 MG/DL (ref 0.5–0.9)
CRP SERPL HS-MCNC: 79.3 MG/L (ref 0–5)
EOSINOPHIL # BLD: 0.1 K/UL (ref 0–0.4)
EOSINOPHILS RELATIVE PERCENT: 2 % (ref 0–4)
EPI CELLS #/AREA URNS HPF: ABNORMAL /HPF
ERYTHROCYTE [DISTWIDTH] IN BLOOD BY AUTOMATED COUNT: 16.2 % (ref 11.5–14.9)
ERYTHROCYTE [SEDIMENTATION RATE] IN BLOOD BY WESTERGREN METHOD: 50 MM/HR (ref 0–30)
GFR SERPL CREATININE-BSD FRML MDRD: >60 ML/MIN/1.73M2
GLUCOSE SERPL-MCNC: 109 MG/DL (ref 70–99)
GLUCOSE UR STRIP-MCNC: NEGATIVE MG/DL
HCT VFR BLD AUTO: 26.7 % (ref 36–46)
HGB BLD-MCNC: 8.6 G/DL (ref 12–16)
HGB UR QL STRIP.AUTO: NEGATIVE
KETONES UR STRIP-MCNC: ABNORMAL MG/DL
LEUKOCYTE ESTERASE UR QL STRIP: ABNORMAL
LYMPHOCYTES # BLD: 19 % (ref 24–44)
LYMPHOCYTES NFR BLD: 1.2 K/UL (ref 1–4.8)
MCH RBC QN AUTO: 26.1 PG (ref 26–34)
MCHC RBC AUTO-ENTMCNC: 32.3 G/DL (ref 31–37)
MCV RBC AUTO: 81 FL (ref 80–100)
MONOCYTES NFR BLD: 0.7 K/UL (ref 0.1–1.3)
MONOCYTES NFR BLD: 12 % (ref 1–7)
NEUTROPHILS NFR BLD: 66 % (ref 36–66)
NEUTS SEG NFR BLD: 3.9 K/UL (ref 1.3–9.1)
NITRITE UR QL STRIP: POSITIVE
PH UR STRIP: 6.5 [PH] (ref 5–8)
PLATELET # BLD AUTO: 382 K/UL (ref 150–450)
PMV BLD AUTO: 8.1 FL (ref 6–12)
POTASSIUM SERPL-SCNC: 3.7 MMOL/L (ref 3.7–5.3)
PROT UR STRIP-MCNC: ABNORMAL MG/DL
RBC # BLD AUTO: 3.3 M/UL (ref 4–5.2)
RBC #/AREA URNS HPF: ABNORMAL /HPF
SODIUM SERPL-SCNC: 139 MMOL/L (ref 135–144)
SP GR UR STRIP: 1.04 (ref 1–1.03)
UROBILINOGEN UR STRIP-ACNC: NORMAL
WBC #/AREA URNS HPF: ABNORMAL /HPF
WBC OTHER # BLD: 6 K/UL (ref 3.5–11)

## 2023-07-03 PROCEDURE — 86140 C-REACTIVE PROTEIN: CPT

## 2023-07-03 PROCEDURE — 73502 X-RAY EXAM HIP UNI 2-3 VIEWS: CPT

## 2023-07-03 PROCEDURE — 80048 BASIC METABOLIC PNL TOTAL CA: CPT

## 2023-07-03 PROCEDURE — 6370000000 HC RX 637 (ALT 250 FOR IP): Performed by: EMERGENCY MEDICINE

## 2023-07-03 PROCEDURE — 81001 URINALYSIS AUTO W/SCOPE: CPT

## 2023-07-03 PROCEDURE — 85652 RBC SED RATE AUTOMATED: CPT

## 2023-07-03 PROCEDURE — 99284 EMERGENCY DEPT VISIT MOD MDM: CPT

## 2023-07-03 PROCEDURE — 87088 URINE BACTERIA CULTURE: CPT

## 2023-07-03 PROCEDURE — 87086 URINE CULTURE/COLONY COUNT: CPT

## 2023-07-03 PROCEDURE — 85027 COMPLETE CBC AUTOMATED: CPT

## 2023-07-03 PROCEDURE — 87186 SC STD MICRODIL/AGAR DIL: CPT

## 2023-07-03 RX ORDER — OXYCODONE HYDROCHLORIDE 5 MG/1
5 TABLET ORAL ONCE
Status: COMPLETED | OUTPATIENT
Start: 2023-07-03 | End: 2023-07-03

## 2023-07-03 RX ORDER — CEPHALEXIN 250 MG/1
500 CAPSULE ORAL ONCE
Status: COMPLETED | OUTPATIENT
Start: 2023-07-03 | End: 2023-07-03

## 2023-07-03 RX ORDER — ACETAMINOPHEN 500 MG
1000 TABLET ORAL ONCE
Status: COMPLETED | OUTPATIENT
Start: 2023-07-03 | End: 2023-07-03

## 2023-07-03 RX ORDER — CEPHALEXIN 500 MG/1
500 CAPSULE ORAL 2 TIMES DAILY
Qty: 14 CAPSULE | Refills: 0 | Status: SHIPPED | OUTPATIENT
Start: 2023-07-03 | End: 2023-07-10

## 2023-07-03 RX ADMIN — OXYCODONE HYDROCHLORIDE 5 MG: 5 TABLET ORAL at 21:41

## 2023-07-03 RX ADMIN — ACETAMINOPHEN 1000 MG: 500 TABLET ORAL at 21:41

## 2023-07-03 RX ADMIN — APIXABAN 10 MG: 5 TABLET, FILM COATED ORAL at 22:38

## 2023-07-03 RX ADMIN — CEPHALEXIN 500 MG: 250 CAPSULE ORAL at 22:38

## 2023-07-03 ASSESSMENT — PAIN DESCRIPTION - DESCRIPTORS: DESCRIPTORS: SHOOTING;SHARP

## 2023-07-03 ASSESSMENT — PAIN DESCRIPTION - LOCATION: LOCATION: LEG

## 2023-07-03 ASSESSMENT — PAIN DESCRIPTION - ORIENTATION: ORIENTATION: RIGHT

## 2023-07-03 ASSESSMENT — PAIN - FUNCTIONAL ASSESSMENT: PAIN_FUNCTIONAL_ASSESSMENT: 0-10

## 2023-07-03 ASSESSMENT — PAIN SCALES - GENERAL: PAINLEVEL_OUTOF10: 9

## 2023-07-03 ASSESSMENT — PAIN DESCRIPTION - PAIN TYPE: TYPE: ACUTE PAIN

## 2023-07-03 NOTE — TELEPHONE ENCOUNTER
Patient called in stating that she was having increased pain to right hip that was surgically repaired. Informed patient that Dr. Amy Jose was not in office this week to see her and that she could go to the ER for treatment if needed. Patient  stated that she did not want to go the ER, she would continue to take her pain medications.

## 2023-07-04 ASSESSMENT — ENCOUNTER SYMPTOMS
SHORTNESS OF BREATH: 0
COLOR CHANGE: 1
ABDOMINAL PAIN: 0

## 2023-07-04 NOTE — ED PROVIDER NOTES
[address] [facility],6Th Floor ED  Emergency Department Encounter  Emergency Medicine Resident     Pt Magda Sams  MRN: [id number]  [id number] [facility] [place] 1965  Date of evaluation: 7/4/23  PCP:  Benitez Paris DO  Note Started: 2:53 PM EDT      CHIEF COMPLAINT       Chief Complaint   Patient presents with    Leg Pain     Pain has been on going since the 16th of June states that she had a hip replacement        HISTORY OF PRESENT ILLNESS  (Location/Symptom, Timing/Onset, Context/Setting, Quality, Duration, Modifying Factors, Severity.)      Navneet Whatley is a 60 y.o. female who presents with R leg pain and swelling. Patient had R hip replacement with Dr. Peggy Santizo on 6/16 and states for the last couple of days she has had increased pain and swelling at the incision site and now her entire right leg is swollen. She states she has been feeling subjective fever/chills however has not taken her temp at home. She states she called Dr. Amelia Tam office and he is out of town until the 10th. Her pain has increased despite taking her prescribed Norco and she could not wait until the 10th. Also complains of dysuria. PAST MEDICAL / SURGICAL / SOCIAL / FAMILY HISTORY      has a past medical history of Acid reflux, Anxiety, Arthritis, Asthma, Bipolar 1 disorder ([address]), Bowel obstruction ([address]), COPD (chronic obstructive pulmonary disease) ([address]), COVID-19 vaccine administered, Crohn disease ([address]), Depression, Dry eye, Fibromyalgia, Gout, Headache, History of recent hospitalization, Hyperlipidemia, Hypertension, Hypothyroidism, Kidney stones, Malignant hyperthermia, Muscle spasm, Neuropathy, Pain, Under care of team, Under care of team, Wears partial dentures, Wears reading eyeglasses, and Wellness examination. has a past surgical history that includes Tonsillectomy and adenoidectomy; Tubal ligation; Cholecystectomy; Bunionectomy (Left); Colonoscopy (04/30/2007); Colonoscopy (10/12/2017); Abdomen surgery;  Upper

## 2023-07-04 NOTE — DISCHARGE INSTRUCTIONS
Were seen today for right lower extremity swelling, redness, subjective fevers and chills urinary symptoms. Your imaging does not show any abscess or collection. You do appear to have cellulitis which is a superficial skin infection. We discussed this with Dr. Mare Marie who is covering for Dr. Marco Antonio Cunningham and he recommends that you follow-up with him in the office on Thursday. We did give you a dose of Eliquis, a blood thinner here today and you will need to have a ultrasound of your right leg done tomorrow to rule out a blood clot. You also have a urinary tract infection so we will discharge you with antibiotics that will cover both the cellulitis and urinary tract infection. If you notice any concerning symptoms please return to the ER immediately. These can include but are not limited to: fevers, chills, shortness of breath, vomiting, weakness of the extremities, changes in your mental status, numbness, pale extremities, or chest pain. Medications: Continue taking your home medications as previously directed. For pain You may take tylenol 1,000mg by mouth every 6 hours as needed for pain. Do not exceed 4,000mg per day. If you have liver disease don't take tylenol. You may also take ibuprofen 600mg every 6-8 hours as needed for pain. Do not exceed 2,400 mg per day. If you experience stomach pain or you have a history of kidney disease stop taking ibuprofen. You may alternate application of ice and heat 20 minutes at a time as desired. Please take antibiotics as prescribed for cellulitis and urinary tract infection    Follow up: Please follow up with your primary care doctor within one week Keegan tract infection. Please have lower extremity ultrasound performed tomorrow to rule out blood clot. Please call and schedule appointment with Dr. Mare Marie for this Thursday.

## 2023-07-05 LAB
MICROORGANISM SPEC CULT: ABNORMAL
SPECIMEN DESCRIPTION: ABNORMAL

## 2023-07-10 ENCOUNTER — OFFICE VISIT (OUTPATIENT)
Dept: ORTHOPEDIC SURGERY | Age: 58
End: 2023-07-10

## 2023-07-10 VITALS — HEIGHT: 66 IN | BODY MASS INDEX: 27.64 KG/M2 | WEIGHT: 172 LBS

## 2023-07-10 DIAGNOSIS — Z96.641 STATUS POST TOTAL REPLACEMENT OF RIGHT HIP: Primary | ICD-10-CM

## 2023-07-10 PROCEDURE — 99024 POSTOP FOLLOW-UP VISIT: CPT | Performed by: ORTHOPAEDIC SURGERY

## 2023-07-10 RX ORDER — OXYCODONE HYDROCHLORIDE AND ACETAMINOPHEN 5; 325 MG/1; MG/1
1 TABLET ORAL
Qty: 42 TABLET | Refills: 0 | Status: SHIPPED | OUTPATIENT
Start: 2023-07-10 | End: 2023-07-14 | Stop reason: SDUPTHER

## 2023-07-10 NOTE — PROGRESS NOTES
This patient who underwent a right total hip arthroplasty on 6/26/2023 is seen here in follow-up. States that she is having more trouble with this hip than the left side. She has pain starting from the iliac crest level across the lateral aspect of the thigh up to the just beyond the knee. She also stated that she has had a couple of times clicking in the right hip. Examination: The incision is healed. There is no evidence of infection. Her motions were definitely limited. She is ambulating with a walker. Neurologically she is intact. There is no calf tenderness. Diagnosis: Right total hip arthroplasty. Treatment: He will start physical therapy prescribed further pain medication we will see her again in 2 weeks.

## 2023-07-14 DIAGNOSIS — Z96.641 STATUS POST TOTAL REPLACEMENT OF RIGHT HIP: ICD-10-CM

## 2023-07-14 RX ORDER — OXYCODONE HYDROCHLORIDE AND ACETAMINOPHEN 5; 325 MG/1; MG/1
1 TABLET ORAL
Qty: 42 TABLET | Refills: 0 | Status: SHIPPED | OUTPATIENT
Start: 2023-07-14 | End: 2023-07-21

## 2023-07-21 DIAGNOSIS — Z96.641 STATUS POST TOTAL REPLACEMENT OF RIGHT HIP: ICD-10-CM

## 2023-07-22 RX ORDER — OXYCODONE HYDROCHLORIDE AND ACETAMINOPHEN 5; 325 MG/1; MG/1
1 TABLET ORAL
Qty: 42 TABLET | Refills: 0 | Status: SHIPPED | OUTPATIENT
Start: 2023-07-22 | End: 2023-07-31

## 2023-07-24 ENCOUNTER — TELEPHONE (OUTPATIENT)
Dept: GASTROENTEROLOGY | Age: 58
End: 2023-07-24

## 2023-07-24 NOTE — TELEPHONE ENCOUNTER
Writer called pt and pt has [employer] and does not want to switch ins.  Writer advised pt to get a ref to a marianela [facility] dr.

## 2023-07-24 NOTE — TELEPHONE ENCOUNTER
----- Message from SHRADDHA Wall CNP sent at 6/26/2023  2:45 PM EDT -----  Regarding: Hospital follow-up for diarrhea  This is a patient of Dr Jennifer Sandoval and was seen at [facility]. [facility] for persistent diarrhea in a Crohn's patient. Patient was started on Xifaxan and cholestyramine with significant improvement.   Recommend office follow-up    ThanksRussell

## 2023-07-28 DIAGNOSIS — Z96.641 STATUS POST TOTAL REPLACEMENT OF RIGHT HIP: ICD-10-CM

## 2023-07-31 RX ORDER — OXYCODONE HYDROCHLORIDE AND ACETAMINOPHEN 5; 325 MG/1; MG/1
1 TABLET ORAL EVERY 6 HOURS PRN
Qty: 28 TABLET | Refills: 0 | Status: SHIPPED | OUTPATIENT
Start: 2023-07-31 | End: 2023-09-11

## 2023-07-31 NOTE — TELEPHONE ENCOUNTER
Received call from patient requesting status update of her medication refill. Pt stated she took her last pain pill this morning. Please call into [facility] MEDICAL [facility].

## 2023-07-31 NOTE — TELEPHONE ENCOUNTER
Patient requesting refill on pain medication. Medication pended.  Please advise    Right total hip arthroplasty

## 2023-08-03 ENCOUNTER — TELEPHONE (OUTPATIENT)
Dept: ORTHOPEDIC SURGERY | Age: 58
End: 2023-08-03

## 2023-08-03 DIAGNOSIS — Z96.642 S/P TOTAL LEFT HIP ARTHROPLASTY: ICD-10-CM

## 2023-08-03 RX ORDER — HYDROCODONE BITARTRATE AND ACETAMINOPHEN 5; 325 MG/1; MG/1
1 TABLET ORAL EVERY 8 HOURS PRN
Qty: 42 TABLET | Refills: 0 | Status: SHIPPED | OUTPATIENT
Start: 2023-08-03 | End: 2023-08-17

## 2023-08-03 NOTE — TELEPHONE ENCOUNTER
Patient called in requesting refill of pain medication. States the percocet is causing her stomach to get upset, even after she has tried taking it with food. Okay to put her back on norco for pain? She also states she was unable to start PT, as [facility] is no longer taking her insurance, so I informed patient to reach out to other facilities that are not [facility] to get started asap. She has been rescheduled from her most recent missed appointment. Please advise?

## 2023-08-11 DIAGNOSIS — Z96.641 STATUS POST TOTAL REPLACEMENT OF RIGHT HIP: ICD-10-CM

## 2023-08-11 RX ORDER — OXYCODONE HYDROCHLORIDE AND ACETAMINOPHEN 5; 325 MG/1; MG/1
1 TABLET ORAL EVERY 6 HOURS PRN
Qty: 28 TABLET | Refills: 0 | OUTPATIENT
Start: 2023-08-11 | End: 2023-09-22

## 2023-08-11 NOTE — TELEPHONE ENCOUNTER
Patient is requesting a refill on her pain medication to be sent to [facility] MEDICAL [facility]. Please advise if any question/concerns. Next appointment - 8/14. Thank you.

## 2023-08-16 DIAGNOSIS — Z96.642 S/P TOTAL LEFT HIP ARTHROPLASTY: ICD-10-CM

## 2023-08-16 RX ORDER — HYDROCODONE BITARTRATE AND ACETAMINOPHEN 5; 325 MG/1; MG/1
1 TABLET ORAL EVERY 8 HOURS PRN
Qty: 42 TABLET | Refills: 0 | Status: SHIPPED | OUTPATIENT
Start: 2023-08-16 | End: 2023-08-30

## 2023-08-21 ENCOUNTER — APPOINTMENT (OUTPATIENT)
Dept: CT IMAGING | Age: 58
DRG: 245 | End: 2023-08-21
Payer: MEDICAID

## 2023-08-21 ENCOUNTER — HOSPITAL ENCOUNTER (INPATIENT)
Age: 58
LOS: 2 days | Discharge: HOME | DRG: 245 | End: 2023-08-23
Attending: EMERGENCY MEDICINE | Admitting: STUDENT IN AN ORGANIZED HEALTH CARE EDUCATION/TRAINING PROGRAM
Payer: MEDICAID

## 2023-08-21 ENCOUNTER — TELEPHONE (OUTPATIENT)
Dept: ORTHOPEDIC SURGERY | Facility: CLINIC | Age: 58
End: 2023-08-21

## 2023-08-21 DIAGNOSIS — N30.00 ACUTE CYSTITIS WITHOUT HEMATURIA: ICD-10-CM

## 2023-08-21 DIAGNOSIS — K52.9 PROCTOCOLITIS: Primary | ICD-10-CM

## 2023-08-21 LAB
ABO + RH BLD: NORMAL
ALBUMIN SERPL-MCNC: 3.5 G/DL (ref 3.5–5.2)
ALBUMIN/GLOB SERPL: 1.3 {RATIO} (ref 1–2.5)
ALP SERPL-CCNC: 97 U/L (ref 35–104)
ALT SERPL-CCNC: 7 U/L (ref 5–33)
ANION GAP SERPL CALCULATED.3IONS-SCNC: 14 MMOL/L (ref 9–17)
ARM BAND NUMBER: NORMAL
AST SERPL-CCNC: 14 U/L
BASOPHILS # BLD: 0.05 K/UL (ref 0–0.2)
BASOPHILS NFR BLD: 1 % (ref 0–2)
BILIRUB SERPL-MCNC: 0.4 MG/DL (ref 0.3–1.2)
BILIRUB UR QL STRIP: NEGATIVE
BLOOD BANK SAMPLE EXPIRATION: NORMAL
BLOOD GROUP ANTIBODIES SERPL: NEGATIVE
BNP SERPL-MCNC: 1702 PG/ML
BUN SERPL-MCNC: 8 MG/DL (ref 6–20)
CALCIUM SERPL-MCNC: 8.4 MG/DL (ref 8.6–10.4)
CHLORIDE SERPL-SCNC: 102 MMOL/L (ref 98–107)
CLARITY UR: ABNORMAL
CO2 SERPL-SCNC: 20 MMOL/L (ref 20–31)
COLOR UR: ABNORMAL
CREAT SERPL-MCNC: 0.6 MG/DL (ref 0.5–0.9)
EOSINOPHIL # BLD: 0.07 K/UL (ref 0–0.44)
EOSINOPHILS RELATIVE PERCENT: 1 % (ref 1–4)
EPI CELLS #/AREA URNS HPF: NORMAL /HPF (ref 0–5)
ERYTHROCYTE [DISTWIDTH] IN BLOOD BY AUTOMATED COUNT: 16 % (ref 11.8–14.4)
GFR SERPL CREATININE-BSD FRML MDRD: >60 ML/MIN/1.73M2
GLUCOSE SERPL-MCNC: 100 MG/DL (ref 70–99)
GLUCOSE UR STRIP-MCNC: NEGATIVE MG/DL
HCT VFR BLD AUTO: 32.5 % (ref 36.3–47.1)
HGB BLD-MCNC: 10 G/DL (ref 11.9–15.1)
HGB UR QL STRIP.AUTO: ABNORMAL
IMM GRANULOCYTES # BLD AUTO: 0.03 K/UL (ref 0–0.3)
IMM GRANULOCYTES NFR BLD: 0 %
INR PPP: 1.1
KETONES UR STRIP-MCNC: NEGATIVE MG/DL
LACTIC ACID, WHOLE BLOOD: 1.7 MMOL/L (ref 0.7–2.1)
LEUKOCYTE ESTERASE UR QL STRIP: ABNORMAL
LIPASE SERPL-CCNC: 31 U/L (ref 13–60)
LYMPHOCYTES NFR BLD: 1.67 K/UL (ref 1.1–3.7)
LYMPHOCYTES RELATIVE PERCENT: 16 % (ref 24–43)
MAGNESIUM SERPL-MCNC: 1.8 MG/DL (ref 1.6–2.6)
MCH RBC QN AUTO: 23.8 PG (ref 25.2–33.5)
MCHC RBC AUTO-ENTMCNC: 30.8 G/DL (ref 28.4–34.8)
MCV RBC AUTO: 77.4 FL (ref 82.6–102.9)
MONOCYTES NFR BLD: 0.85 K/UL (ref 0.1–1.2)
MONOCYTES NFR BLD: 8 % (ref 3–12)
MUCOUS THREADS URNS QL MICRO: NORMAL
NEUTROPHILS NFR BLD: 74 % (ref 36–65)
NEUTS SEG NFR BLD: 7.56 K/UL (ref 1.5–8.1)
NITRITE UR QL STRIP: NEGATIVE
NRBC BLD-RTO: 0 PER 100 WBC
PARTIAL THROMBOPLASTIN TIME: 27.6 SEC (ref 23–36.5)
PH UR STRIP: 6.5 [PH] (ref 5–8)
PHOSPHATE SERPL-MCNC: 3.2 MG/DL (ref 2.6–4.5)
PLATELET # BLD AUTO: 355 K/UL (ref 138–453)
PMV BLD AUTO: 11.8 FL (ref 8.1–13.5)
POTASSIUM SERPL-SCNC: 3.1 MMOL/L (ref 3.7–5.3)
PROT SERPL-MCNC: 6.1 G/DL (ref 6.4–8.3)
PROT UR STRIP-MCNC: ABNORMAL MG/DL
PROTHROMBIN TIME: 13.8 SEC (ref 11.7–14.9)
RBC # BLD AUTO: 4.2 M/UL (ref 3.95–5.11)
RBC # BLD: ABNORMAL 10*6/UL
RBC #/AREA URNS HPF: NORMAL /HPF (ref 0–2)
SODIUM SERPL-SCNC: 136 MMOL/L (ref 135–144)
SP GR UR STRIP: 1.02 (ref 1–1.03)
TROPONIN I SERPL HS-MCNC: 18 NG/L (ref 0–14)
TROPONIN I SERPL HS-MCNC: 20 NG/L (ref 0–14)
UROBILINOGEN UR STRIP-ACNC: NORMAL EU/DL (ref 0–1)
WBC #/AREA URNS HPF: NORMAL /HPF (ref 0–5)
WBC OTHER # BLD: 10.2 K/UL (ref 3.5–11.3)

## 2023-08-21 PROCEDURE — 87040 BLOOD CULTURE FOR BACTERIA: CPT

## 2023-08-21 PROCEDURE — 93005 ELECTROCARDIOGRAM TRACING: CPT | Performed by: STUDENT IN AN ORGANIZED HEALTH CARE EDUCATION/TRAINING PROGRAM

## 2023-08-21 PROCEDURE — 87186 SC STD MICRODIL/AGAR DIL: CPT

## 2023-08-21 PROCEDURE — 6370000000 HC RX 637 (ALT 250 FOR IP): Performed by: STUDENT IN AN ORGANIZED HEALTH CARE EDUCATION/TRAINING PROGRAM

## 2023-08-21 PROCEDURE — 80053 COMPREHEN METABOLIC PANEL: CPT

## 2023-08-21 PROCEDURE — 83880 ASSAY OF NATRIURETIC PEPTIDE: CPT

## 2023-08-21 PROCEDURE — 87088 URINE BACTERIA CULTURE: CPT

## 2023-08-21 PROCEDURE — 87086 URINE CULTURE/COLONY COUNT: CPT

## 2023-08-21 PROCEDURE — 74174 CTA ABD&PLVS W/CONTRAST: CPT

## 2023-08-21 PROCEDURE — 83690 ASSAY OF LIPASE: CPT

## 2023-08-21 PROCEDURE — 99285 EMERGENCY DEPT VISIT HI MDM: CPT

## 2023-08-21 PROCEDURE — 36415 COLL VENOUS BLD VENIPUNCTURE: CPT

## 2023-08-21 PROCEDURE — 2580000003 HC RX 258: Performed by: STUDENT IN AN ORGANIZED HEALTH CARE EDUCATION/TRAINING PROGRAM

## 2023-08-21 PROCEDURE — 86850 RBC ANTIBODY SCREEN: CPT

## 2023-08-21 PROCEDURE — 83735 ASSAY OF MAGNESIUM: CPT

## 2023-08-21 PROCEDURE — 84484 ASSAY OF TROPONIN QUANT: CPT

## 2023-08-21 PROCEDURE — 2580000003 HC RX 258

## 2023-08-21 PROCEDURE — 80307 DRUG TEST PRSMV CHEM ANLYZR: CPT

## 2023-08-21 PROCEDURE — 85025 COMPLETE CBC W/AUTO DIFF WBC: CPT

## 2023-08-21 PROCEDURE — 86900 BLOOD TYPING SEROLOGIC ABO: CPT

## 2023-08-21 PROCEDURE — 81001 URINALYSIS AUTO W/SCOPE: CPT

## 2023-08-21 PROCEDURE — 85730 THROMBOPLASTIN TIME PARTIAL: CPT

## 2023-08-21 PROCEDURE — 6360000002 HC RX W HCPCS

## 2023-08-21 PROCEDURE — 85610 PROTHROMBIN TIME: CPT

## 2023-08-21 PROCEDURE — 6360000002 HC RX W HCPCS: Performed by: STUDENT IN AN ORGANIZED HEALTH CARE EDUCATION/TRAINING PROGRAM

## 2023-08-21 PROCEDURE — 84100 ASSAY OF PHOSPHORUS: CPT

## 2023-08-21 PROCEDURE — 83605 ASSAY OF LACTIC ACID: CPT

## 2023-08-21 PROCEDURE — 6360000004 HC RX CONTRAST MEDICATION

## 2023-08-21 PROCEDURE — 2060000000 HC ICU INTERMEDIATE R&B

## 2023-08-21 PROCEDURE — 86901 BLOOD TYPING SEROLOGIC RH(D): CPT

## 2023-08-21 RX ORDER — OXYCODONE HYDROCHLORIDE AND ACETAMINOPHEN 5; 325 MG/1; MG/1
1 TABLET ORAL ONCE
Status: COMPLETED | OUTPATIENT
Start: 2023-08-21 | End: 2023-08-21

## 2023-08-21 RX ORDER — METRONIDAZOLE 500 MG/100ML
500 INJECTION, SOLUTION INTRAVENOUS ONCE
Status: COMPLETED | OUTPATIENT
Start: 2023-08-21 | End: 2023-08-22

## 2023-08-21 RX ORDER — METOCLOPRAMIDE HYDROCHLORIDE 5 MG/ML
10 INJECTION INTRAMUSCULAR; INTRAVENOUS ONCE
Status: COMPLETED | OUTPATIENT
Start: 2023-08-21 | End: 2023-08-21

## 2023-08-21 RX ORDER — FENTANYL CITRATE 50 UG/ML
50 INJECTION, SOLUTION INTRAMUSCULAR; INTRAVENOUS ONCE
Status: COMPLETED | OUTPATIENT
Start: 2023-08-21 | End: 2023-08-21

## 2023-08-21 RX ORDER — MAGNESIUM SULFATE IN WATER 40 MG/ML
2000 INJECTION, SOLUTION INTRAVENOUS ONCE
Status: COMPLETED | OUTPATIENT
Start: 2023-08-21 | End: 2023-08-21

## 2023-08-21 RX ORDER — SODIUM CHLORIDE, SODIUM LACTATE, POTASSIUM CHLORIDE, AND CALCIUM CHLORIDE .6; .31; .03; .02 G/100ML; G/100ML; G/100ML; G/100ML
1000 INJECTION, SOLUTION INTRAVENOUS ONCE
Status: COMPLETED | OUTPATIENT
Start: 2023-08-21 | End: 2023-08-21

## 2023-08-21 RX ORDER — POTASSIUM CHLORIDE 20 MEQ/1
40 TABLET, EXTENDED RELEASE ORAL ONCE
Status: COMPLETED | OUTPATIENT
Start: 2023-08-21 | End: 2023-08-21

## 2023-08-21 RX ADMIN — FENTANYL CITRATE 50 MCG: 50 INJECTION, SOLUTION INTRAMUSCULAR; INTRAVENOUS at 18:38

## 2023-08-21 RX ADMIN — POTASSIUM CHLORIDE 40 MEQ: 1500 TABLET, EXTENDED RELEASE ORAL at 22:57

## 2023-08-21 RX ADMIN — FENTANYL CITRATE 50 MCG: 50 INJECTION, SOLUTION INTRAMUSCULAR; INTRAVENOUS at 23:22

## 2023-08-21 RX ADMIN — OXYCODONE HYDROCHLORIDE AND ACETAMINOPHEN 1 TABLET: 5; 325 TABLET ORAL at 21:51

## 2023-08-21 RX ADMIN — METOCLOPRAMIDE 10 MG: 5 INJECTION, SOLUTION INTRAMUSCULAR; INTRAVENOUS at 17:54

## 2023-08-21 RX ADMIN — FENTANYL CITRATE 50 MCG: 50 INJECTION, SOLUTION INTRAMUSCULAR; INTRAVENOUS at 17:54

## 2023-08-21 RX ADMIN — CEFTRIAXONE SODIUM 1000 MG: 1 INJECTION, POWDER, FOR SOLUTION INTRAMUSCULAR; INTRAVENOUS at 21:09

## 2023-08-21 RX ADMIN — METRONIDAZOLE 500 MG: 500 INJECTION, SOLUTION INTRAVENOUS at 22:57

## 2023-08-21 RX ADMIN — MAGNESIUM SULFATE HEPTAHYDRATE 2000 MG: 40 INJECTION, SOLUTION INTRAVENOUS at 18:02

## 2023-08-21 RX ADMIN — IOPAMIDOL 100 ML: 755 INJECTION, SOLUTION INTRAVENOUS at 20:11

## 2023-08-21 RX ADMIN — SODIUM CHLORIDE, POTASSIUM CHLORIDE, SODIUM LACTATE AND CALCIUM CHLORIDE 1000 ML: 600; 310; 30; 20 INJECTION, SOLUTION INTRAVENOUS at 17:54

## 2023-08-21 ASSESSMENT — ENCOUNTER SYMPTOMS
COUGH: 0
CONSTIPATION: 0
SHORTNESS OF BREATH: 0
VOMITING: 1
BLOOD IN STOOL: 1
DIARRHEA: 1
RHINORRHEA: 0
EYE PAIN: 0
EYE REDNESS: 0
NAUSEA: 1
BACK PAIN: 0
SORE THROAT: 0
ABDOMINAL PAIN: 1

## 2023-08-21 ASSESSMENT — PAIN SCALES - GENERAL
PAINLEVEL_OUTOF10: 7
PAINLEVEL_OUTOF10: 9

## 2023-08-21 ASSESSMENT — PAIN DESCRIPTION - LOCATION: LOCATION: ABDOMEN

## 2023-08-21 ASSESSMENT — PAIN - FUNCTIONAL ASSESSMENT: PAIN_FUNCTIONAL_ASSESSMENT: 0-10

## 2023-08-21 NOTE — TELEPHONE ENCOUNTER
Patient called to request refill on [place] sent to [address] [place] [address]

## 2023-08-22 LAB
ALBUMIN SERPL-MCNC: 2.7 G/DL (ref 3.5–5.2)
ALBUMIN/GLOB SERPL: 1.2 {RATIO} (ref 1–2.5)
ALP SERPL-CCNC: 78 U/L (ref 35–104)
ALT SERPL-CCNC: <5 U/L (ref 5–33)
AMPHET UR QL SCN: NEGATIVE
ANION GAP SERPL CALCULATED.3IONS-SCNC: 8 MMOL/L (ref 9–17)
AST SERPL-CCNC: 13 U/L
BARBITURATES UR QL SCN: NEGATIVE
BASOPHILS # BLD: <0.03 K/UL (ref 0–0.2)
BASOPHILS NFR BLD: 0 % (ref 0–2)
BENZODIAZ UR QL: POSITIVE
BILIRUB SERPL-MCNC: 0.3 MG/DL (ref 0.3–1.2)
BUN SERPL-MCNC: 6 MG/DL (ref 6–20)
CALCIUM SERPL-MCNC: 8 MG/DL (ref 8.6–10.4)
CANNABINOIDS UR QL SCN: NEGATIVE
CHLORIDE SERPL-SCNC: 110 MMOL/L (ref 98–107)
CO2 SERPL-SCNC: 23 MMOL/L (ref 20–31)
COCAINE UR QL SCN: NEGATIVE
CREAT SERPL-MCNC: 0.5 MG/DL (ref 0.5–0.9)
CRP SERPL HS-MCNC: 102.8 MG/L (ref 0–5)
DATE, STOOL #1: NORMAL
EOSINOPHIL # BLD: 0.15 K/UL (ref 0–0.44)
EOSINOPHILS RELATIVE PERCENT: 3 % (ref 1–4)
ERYTHROCYTE [DISTWIDTH] IN BLOOD BY AUTOMATED COUNT: 16.3 % (ref 11.8–14.4)
ERYTHROCYTE [SEDIMENTATION RATE] IN BLOOD BY PHOTOMETRIC METHOD: 7 MM/HR (ref 0–30)
FENTANYL UR QL: NEGATIVE
FERRITIN SERPL-MCNC: 43 NG/ML (ref 13–150)
FOLATE SERPL-MCNC: 5.4 NG/ML
GFR SERPL CREATININE-BSD FRML MDRD: >60 ML/MIN/1.73M2
GLUCOSE SERPL-MCNC: 94 MG/DL (ref 70–99)
HCT VFR BLD AUTO: 28.2 % (ref 36.3–47.1)
HEMOCCULT SP1 STL QL: NEGATIVE
HGB BLD-MCNC: 8.5 G/DL (ref 11.9–15.1)
IMM GRANULOCYTES # BLD AUTO: <0.03 K/UL (ref 0–0.3)
IMM GRANULOCYTES NFR BLD: 0 %
INR PPP: 1.1
IRON SATN MFR SERPL: 10 % (ref 20–55)
IRON SERPL-MCNC: 25 UG/DL (ref 37–145)
LACTOFERRIN STL QL: ABNORMAL
LDH SERPL-CCNC: 138 U/L (ref 135–214)
LIPASE SERPL-CCNC: 22 U/L (ref 13–60)
LYMPHOCYTES NFR BLD: 1.43 K/UL (ref 1.1–3.7)
LYMPHOCYTES RELATIVE PERCENT: 29 % (ref 24–43)
MAGNESIUM SERPL-MCNC: 2.2 MG/DL (ref 1.6–2.6)
MCH RBC QN AUTO: 24.1 PG (ref 25.2–33.5)
MCHC RBC AUTO-ENTMCNC: 30.1 G/DL (ref 28.4–34.8)
MCV RBC AUTO: 79.9 FL (ref 82.6–102.9)
METHADONE UR QL: NEGATIVE
MICROORGANISM SPEC CULT: ABNORMAL
MONOCYTES NFR BLD: 0.63 K/UL (ref 0.1–1.2)
MONOCYTES NFR BLD: 13 % (ref 3–12)
NEUTROPHILS NFR BLD: 55 % (ref 36–65)
NEUTS SEG NFR BLD: 2.71 K/UL (ref 1.5–8.1)
NRBC BLD-RTO: 0 PER 100 WBC
OPIATES UR QL SCN: POSITIVE
OXYCODONE UR QL SCN: POSITIVE
PCP UR QL SCN: NEGATIVE
PHOSPHATE SERPL-MCNC: 3.1 MG/DL (ref 2.6–4.5)
PLATELET # BLD AUTO: 246 K/UL (ref 138–453)
PMV BLD AUTO: 10.8 FL (ref 8.1–13.5)
POTASSIUM SERPL-SCNC: 3.4 MMOL/L (ref 3.7–5.3)
PROCALCITONIN SERPL-MCNC: 0.19 NG/ML
PROT SERPL-MCNC: 4.9 G/DL (ref 6.4–8.3)
PROTHROMBIN TIME: 14.1 SEC (ref 11.7–14.9)
RBC # BLD AUTO: 3.53 M/UL (ref 3.95–5.11)
RBC # BLD: ABNORMAL 10*6/UL
SODIUM SERPL-SCNC: 141 MMOL/L (ref 135–144)
SPECIMEN DESCRIPTION: ABNORMAL
TEST INFORMATION: ABNORMAL
TIBC SERPL-MCNC: 243 UG/DL (ref 250–450)
TIME, STOOL #1: 1106
UNSATURATED IRON BINDING CAPACITY: 218 UG/DL (ref 112–347)
VIT B12 SERPL-MCNC: 635 PG/ML (ref 232–1245)
WBC OTHER # BLD: 5 K/UL (ref 3.5–11.3)

## 2023-08-22 PROCEDURE — 2580000003 HC RX 258: Performed by: NURSE PRACTITIONER

## 2023-08-22 PROCEDURE — 82728 ASSAY OF FERRITIN: CPT

## 2023-08-22 PROCEDURE — 99223 1ST HOSP IP/OBS HIGH 75: CPT | Performed by: INTERNAL MEDICINE

## 2023-08-22 PROCEDURE — 6370000000 HC RX 637 (ALT 250 FOR IP): Performed by: FAMILY MEDICINE

## 2023-08-22 PROCEDURE — 86140 C-REACTIVE PROTEIN: CPT

## 2023-08-22 PROCEDURE — 6370000000 HC RX 637 (ALT 250 FOR IP): Performed by: NURSE PRACTITIONER

## 2023-08-22 PROCEDURE — 83630 LACTOFERRIN FECAL (QUAL): CPT

## 2023-08-22 PROCEDURE — 6370000000 HC RX 637 (ALT 250 FOR IP): Performed by: INTERNAL MEDICINE

## 2023-08-22 PROCEDURE — 85652 RBC SED RATE AUTOMATED: CPT

## 2023-08-22 PROCEDURE — 84145 PROCALCITONIN (PCT): CPT

## 2023-08-22 PROCEDURE — 85025 COMPLETE CBC W/AUTO DIFF WBC: CPT

## 2023-08-22 PROCEDURE — 87449 NOS EACH ORGANISM AG IA: CPT

## 2023-08-22 PROCEDURE — 83550 IRON BINDING TEST: CPT

## 2023-08-22 PROCEDURE — 6360000002 HC RX W HCPCS: Performed by: NURSE PRACTITIONER

## 2023-08-22 PROCEDURE — 83540 ASSAY OF IRON: CPT

## 2023-08-22 PROCEDURE — 83615 LACTATE (LD) (LDH) ENZYME: CPT

## 2023-08-22 PROCEDURE — 2060000000 HC ICU INTERMEDIATE R&B

## 2023-08-22 PROCEDURE — 84100 ASSAY OF PHOSPHORUS: CPT

## 2023-08-22 PROCEDURE — 82607 VITAMIN B-12: CPT

## 2023-08-22 PROCEDURE — 83735 ASSAY OF MAGNESIUM: CPT

## 2023-08-22 PROCEDURE — 2T015 HOSPITALIST 2ND TOUCH: CPT | Performed by: FAMILY MEDICINE

## 2023-08-22 PROCEDURE — 80053 COMPREHEN METABOLIC PANEL: CPT

## 2023-08-22 PROCEDURE — 85610 PROTHROMBIN TIME: CPT

## 2023-08-22 PROCEDURE — 83690 ASSAY OF LIPASE: CPT

## 2023-08-22 PROCEDURE — 99254 IP/OBS CNSLTJ NEW/EST MOD 60: CPT | Performed by: INTERNAL MEDICINE

## 2023-08-22 PROCEDURE — 82270 OCCULT BLOOD FECES: CPT

## 2023-08-22 PROCEDURE — 94761 N-INVAS EAR/PLS OXIMETRY MLT: CPT

## 2023-08-22 PROCEDURE — 82746 ASSAY OF FOLIC ACID SERUM: CPT

## 2023-08-22 PROCEDURE — 94640 AIRWAY INHALATION TREATMENT: CPT

## 2023-08-22 PROCEDURE — 87324 CLOSTRIDIUM AG IA: CPT

## 2023-08-22 PROCEDURE — 87506 IADNA-DNA/RNA PROBE TQ 6-11: CPT

## 2023-08-22 PROCEDURE — 83993 ASSAY FOR CALPROTECTIN FECAL: CPT

## 2023-08-22 PROCEDURE — 36415 COLL VENOUS BLD VENIPUNCTURE: CPT

## 2023-08-22 RX ORDER — CHOLESTYRAMINE LIGHT 4 G/5.7G
4 POWDER, FOR SUSPENSION ORAL 2 TIMES DAILY
Status: DISCONTINUED | OUTPATIENT
Start: 2023-08-22 | End: 2023-08-23 | Stop reason: HOSPADM

## 2023-08-22 RX ORDER — ONDANSETRON 4 MG/1
4 TABLET, ORALLY DISINTEGRATING ORAL EVERY 8 HOURS PRN
Status: DISCONTINUED | OUTPATIENT
Start: 2023-08-22 | End: 2023-08-23 | Stop reason: HOSPADM

## 2023-08-22 RX ORDER — ONDANSETRON 2 MG/ML
4 INJECTION INTRAMUSCULAR; INTRAVENOUS EVERY 6 HOURS PRN
Status: DISCONTINUED | OUTPATIENT
Start: 2023-08-22 | End: 2023-08-23 | Stop reason: HOSPADM

## 2023-08-22 RX ORDER — SODIUM CHLORIDE 9 MG/ML
INJECTION, SOLUTION INTRAVENOUS PRN
Status: DISCONTINUED | OUTPATIENT
Start: 2023-08-22 | End: 2023-08-23 | Stop reason: HOSPADM

## 2023-08-22 RX ORDER — ACETAMINOPHEN 650 MG/1
650 SUPPOSITORY RECTAL EVERY 6 HOURS PRN
Status: DISCONTINUED | OUTPATIENT
Start: 2023-08-22 | End: 2023-08-23 | Stop reason: HOSPADM

## 2023-08-22 RX ORDER — SODIUM CHLORIDE 0.9 % (FLUSH) 0.9 %
5-40 SYRINGE (ML) INJECTION EVERY 12 HOURS SCHEDULED
Status: DISCONTINUED | OUTPATIENT
Start: 2023-08-22 | End: 2023-08-23 | Stop reason: HOSPADM

## 2023-08-22 RX ORDER — SODIUM CHLORIDE 0.9 % (FLUSH) 0.9 %
10 SYRINGE (ML) INJECTION PRN
Status: DISCONTINUED | OUTPATIENT
Start: 2023-08-22 | End: 2023-08-23 | Stop reason: HOSPADM

## 2023-08-22 RX ORDER — FLUOXETINE HYDROCHLORIDE 40 MG/1
CAPSULE ORAL
COMMUNITY
Start: 2023-08-11

## 2023-08-22 RX ORDER — GABAPENTIN 800 MG/1
800 TABLET ORAL 3 TIMES DAILY
COMMUNITY
Start: 2023-08-18

## 2023-08-22 RX ORDER — SODIUM CHLORIDE 9 MG/ML
INJECTION, SOLUTION INTRAVENOUS CONTINUOUS
Status: DISCONTINUED | OUTPATIENT
Start: 2023-08-22 | End: 2023-08-23

## 2023-08-22 RX ORDER — PREGABALIN 300 MG/1
300 CAPSULE ORAL 2 TIMES DAILY
COMMUNITY
Start: 2023-07-27

## 2023-08-22 RX ORDER — POLYETHYLENE GLYCOL 3350 17 G/17G
17 POWDER, FOR SOLUTION ORAL DAILY PRN
Status: DISCONTINUED | OUTPATIENT
Start: 2023-08-22 | End: 2023-08-23 | Stop reason: HOSPADM

## 2023-08-22 RX ORDER — METRONIDAZOLE 500 MG/100ML
500 INJECTION, SOLUTION INTRAVENOUS EVERY 8 HOURS
Status: DISCONTINUED | OUTPATIENT
Start: 2023-08-22 | End: 2023-08-23 | Stop reason: HOSPADM

## 2023-08-22 RX ORDER — MAGNESIUM SULFATE 1 G/100ML
1000 INJECTION INTRAVENOUS PRN
Status: DISCONTINUED | OUTPATIENT
Start: 2023-08-22 | End: 2023-08-23 | Stop reason: HOSPADM

## 2023-08-22 RX ORDER — OXYCODONE HYDROCHLORIDE AND ACETAMINOPHEN 5; 325 MG/1; MG/1
1 TABLET ORAL EVERY 4 HOURS PRN
Status: DISCONTINUED | OUTPATIENT
Start: 2023-08-22 | End: 2023-08-23 | Stop reason: HOSPADM

## 2023-08-22 RX ORDER — POTASSIUM CHLORIDE 20 MEQ/1
40 TABLET, EXTENDED RELEASE ORAL PRN
Status: DISCONTINUED | OUTPATIENT
Start: 2023-08-22 | End: 2023-08-23 | Stop reason: HOSPADM

## 2023-08-22 RX ORDER — ASPIRIN 81 MG/1
81 TABLET, CHEWABLE ORAL DAILY
Status: DISCONTINUED | OUTPATIENT
Start: 2023-08-22 | End: 2023-08-23 | Stop reason: HOSPADM

## 2023-08-22 RX ORDER — GABAPENTIN 400 MG/1
400 CAPSULE ORAL 2 TIMES DAILY
Status: DISCONTINUED | OUTPATIENT
Start: 2023-08-22 | End: 2023-08-22

## 2023-08-22 RX ORDER — ENOXAPARIN SODIUM 100 MG/ML
40 INJECTION SUBCUTANEOUS DAILY
Status: DISCONTINUED | OUTPATIENT
Start: 2023-08-22 | End: 2023-08-23 | Stop reason: HOSPADM

## 2023-08-22 RX ORDER — PANTOPRAZOLE SODIUM 40 MG/1
40 TABLET, DELAYED RELEASE ORAL DAILY
Status: DISCONTINUED | OUTPATIENT
Start: 2023-08-22 | End: 2023-08-23 | Stop reason: HOSPADM

## 2023-08-22 RX ORDER — NICOTINE 21 MG/24HR
1 PATCH, TRANSDERMAL 24 HOURS TRANSDERMAL DAILY
Status: DISCONTINUED | OUTPATIENT
Start: 2023-08-22 | End: 2023-08-23 | Stop reason: HOSPADM

## 2023-08-22 RX ORDER — MIDODRINE HYDROCHLORIDE 2.5 MG/1
2.5 TABLET ORAL
Status: DISCONTINUED | OUTPATIENT
Start: 2023-08-22 | End: 2023-08-23 | Stop reason: HOSPADM

## 2023-08-22 RX ORDER — GABAPENTIN 400 MG/1
800 CAPSULE ORAL 3 TIMES DAILY
Status: DISCONTINUED | OUTPATIENT
Start: 2023-08-22 | End: 2023-08-23 | Stop reason: HOSPADM

## 2023-08-22 RX ORDER — OXYCODONE HYDROCHLORIDE AND ACETAMINOPHEN 5; 325 MG/1; MG/1
2 TABLET ORAL EVERY 4 HOURS PRN
Status: DISCONTINUED | OUTPATIENT
Start: 2023-08-22 | End: 2023-08-23 | Stop reason: HOSPADM

## 2023-08-22 RX ORDER — ACETAMINOPHEN 325 MG/1
650 TABLET ORAL EVERY 6 HOURS PRN
Status: DISCONTINUED | OUTPATIENT
Start: 2023-08-22 | End: 2023-08-23 | Stop reason: HOSPADM

## 2023-08-22 RX ORDER — FLUOXETINE HYDROCHLORIDE 20 MG/1
CAPSULE ORAL
COMMUNITY
Start: 2023-08-11

## 2023-08-22 RX ORDER — LISINOPRIL 5 MG/1
5 TABLET ORAL DAILY
Status: DISCONTINUED | OUTPATIENT
Start: 2023-08-22 | End: 2023-08-23 | Stop reason: HOSPADM

## 2023-08-22 RX ORDER — BUDESONIDE AND FORMOTEROL FUMARATE DIHYDRATE 160; 4.5 UG/1; UG/1
2 AEROSOL RESPIRATORY (INHALATION) 2 TIMES DAILY
Status: DISCONTINUED | OUTPATIENT
Start: 2023-08-22 | End: 2023-08-23 | Stop reason: HOSPADM

## 2023-08-22 RX ORDER — POTASSIUM CHLORIDE 7.45 MG/ML
10 INJECTION INTRAVENOUS PRN
Status: DISCONTINUED | OUTPATIENT
Start: 2023-08-22 | End: 2023-08-23 | Stop reason: HOSPADM

## 2023-08-22 RX ORDER — LOPERAMIDE HYDROCHLORIDE 2 MG/1
2 CAPSULE ORAL 4 TIMES DAILY PRN
Status: DISCONTINUED | OUTPATIENT
Start: 2023-08-22 | End: 2023-08-23 | Stop reason: HOSPADM

## 2023-08-22 RX ADMIN — OXYCODONE AND ACETAMINOPHEN 2 TABLET: 5; 325 TABLET ORAL at 01:50

## 2023-08-22 RX ADMIN — SODIUM CHLORIDE, PRESERVATIVE FREE 10 ML: 5 INJECTION INTRAVENOUS at 07:54

## 2023-08-22 RX ADMIN — METRONIDAZOLE 500 MG: 500 INJECTION, SOLUTION INTRAVENOUS at 04:28

## 2023-08-22 RX ADMIN — ENOXAPARIN SODIUM 40 MG: 100 INJECTION SUBCUTANEOUS at 07:54

## 2023-08-22 RX ADMIN — CEFTRIAXONE SODIUM 1000 MG: 10 INJECTION, POWDER, FOR SOLUTION INTRAVENOUS at 19:56

## 2023-08-22 RX ADMIN — POTASSIUM CHLORIDE 40 MEQ: 1500 TABLET, EXTENDED RELEASE ORAL at 07:58

## 2023-08-22 RX ADMIN — SODIUM CHLORIDE: 9 INJECTION, SOLUTION INTRAVENOUS at 00:40

## 2023-08-22 RX ADMIN — BUDESONIDE AND FORMOTEROL FUMARATE DIHYDRATE 2 PUFF: 160; 4.5 AEROSOL RESPIRATORY (INHALATION) at 20:07

## 2023-08-22 RX ADMIN — BUDESONIDE AND FORMOTEROL FUMARATE DIHYDRATE 2 PUFF: 160; 4.5 AEROSOL RESPIRATORY (INHALATION) at 08:25

## 2023-08-22 RX ADMIN — GABAPENTIN 400 MG: 400 CAPSULE ORAL at 01:50

## 2023-08-22 RX ADMIN — OXYCODONE AND ACETAMINOPHEN 2 TABLET: 5; 325 TABLET ORAL at 07:54

## 2023-08-22 RX ADMIN — METRONIDAZOLE 500 MG: 500 INJECTION, SOLUTION INTRAVENOUS at 20:03

## 2023-08-22 RX ADMIN — OXYCODONE AND ACETAMINOPHEN 2 TABLET: 5; 325 TABLET ORAL at 13:23

## 2023-08-22 RX ADMIN — ASPIRIN 81 MG: 81 TABLET, CHEWABLE ORAL at 07:54

## 2023-08-22 RX ADMIN — TRAZODONE HYDROCHLORIDE 150 MG: 50 TABLET ORAL at 20:00

## 2023-08-22 RX ADMIN — GABAPENTIN 800 MG: 400 CAPSULE ORAL at 20:00

## 2023-08-22 RX ADMIN — MIDODRINE HYDROCHLORIDE 2.5 MG: 2.5 TABLET ORAL at 18:17

## 2023-08-22 RX ADMIN — METRONIDAZOLE 500 MG: 500 INJECTION, SOLUTION INTRAVENOUS at 13:26

## 2023-08-22 RX ADMIN — ONDANSETRON 4 MG: 2 INJECTION INTRAMUSCULAR; INTRAVENOUS at 01:51

## 2023-08-22 RX ADMIN — OXYCODONE AND ACETAMINOPHEN 2 TABLET: 5; 325 TABLET ORAL at 19:58

## 2023-08-22 RX ADMIN — TRAZODONE HYDROCHLORIDE 150 MG: 50 TABLET ORAL at 01:50

## 2023-08-22 RX ADMIN — GABAPENTIN 400 MG: 400 CAPSULE ORAL at 07:54

## 2023-08-22 RX ADMIN — PANTOPRAZOLE SODIUM 40 MG: 40 TABLET, DELAYED RELEASE ORAL at 07:54

## 2023-08-22 ASSESSMENT — PAIN SCALES - GENERAL
PAINLEVEL_OUTOF10: 10
PAINLEVEL_OUTOF10: 10
PAINLEVEL_OUTOF10: 8
PAINLEVEL_OUTOF10: 8

## 2023-08-22 ASSESSMENT — PAIN DESCRIPTION - DESCRIPTORS: DESCRIPTORS: ACHING

## 2023-08-22 ASSESSMENT — PAIN DESCRIPTION - LOCATION
LOCATION: HEAD
LOCATION: ABDOMEN

## 2023-08-22 NOTE — ED NOTES
Pt assisted to use bedside comode by martha Terrazas RN  08/21/23 1920
Pt assisted to use bedside solis Polanco RN  08/21/23 4724
Pt requesting something more for pain.  Dr. Maryann Hernandez notified      Lori Hernandez RN  08/21/23 4963
Pt to [address], RN  08/21/23 2021
Pt to ED for generalized abdominal pain, nausea, vomiting, and diarrhea x2 days. Pt reports red blood in diarrhea. Hx of chron's disease. Pt states this feels like a typical chron's flare-up. Pt denies chest pain but reports some SOB and weakness x2 days. Hx of COPD, states she wears oxygen at home when needed. Pt also states she is having symptoms of dysuria and urinary frequency. Patient alert and oriented x4, talking in complete sentences. Respirations even and unlabored. Patient placed on continuous cardiac monitoring, BP cuff, and pulse ox. EKG obtained, blood work obtained.  Call light in reach, all needs met at this time     Susana Linares RN  08/21/23 7104
Pt's blood pressure 79/51 on right arm and repeat on left arm  75/49. Pt remains alert and oriented, states her BP is normally on the low side.  Dr. Art Nurse and Dr. Prem Crandall notified, are at bedside to evaluate patient       Svetlana Terrazas RN  08/21/23 1926
Resident updated on pts map      Juan Villavicencio, [place]  08/21/23 9032
The following labs were obtained, labeled with appropriate pt sticker by other and tubed to lab: by me    [] Blue     [] Lavender   [] on ice  [] Green/yellow  [] Green/black [] on ice  [] Duane Natasha  [] on ice  [] Yellow  [] Red  [] Type/ Screen  [] ABG  [] VBG    [] COVID-19 swab    [] Rapid  [] PCR  [] Flu swab  [] Peds Viral Panel     [x] Urine Sample  [] Fecal Sample  [] Pelvic Cultures  [] Blood Cultures  [] X 2  [] STREP Cultures       Janki Ochoa RN  08/21/23 4447
Writer called lab to see why labs have not resulted.  Lab tech states he was unsure but he would look into it     Allison Pulido  08/21/23 3674
CULTURE, BLOOD 2   CULTURE, BLOOD 1   CULTURE, URINE   MAGNESIUM   LIPASE   PHOSPHORUS   PROTIME-INR   APTT   MICROSCOPIC URINALYSIS   LACTIC ACID   LACTATE, SEPSIS   TYPE AND SCREEN       Electronically signed by Genaro Maddox RN on 8/21/2023 at 11:07 PM       Genaro Maddox, [address] [place] [address]  08/21/23 9324

## 2023-08-22 NOTE — CASE COMMUNICATION
Spoke with lab, they are able to add on Urine drug screen to urine obtained in ED yesterday. Label sent for processing.

## 2023-08-22 NOTE — CONSULTS
results found for: CERULOPLSM    Celiac panel  No results found for: TISSTRNTIIGG, TTGIGA, IGA    IgG  No results found for: IGG    IgM  No results found for: IGM    GGT   No results found for: LABGGT    PT/INR  Recent Labs     08/21/23  1739 08/22/23  0400   PROTIME 13.8 14.1   INR 1.1 1.1       Cancer Markers:  CEA:  No results found for: CEA  Ca 125:  No results found for:   Ca 19-9:   No results found for:   AFP:   Lab Results   Component Value Date/Time    AFP 4.0 10/24/2022 08:09 PM       Lactic acid:  Recent Labs     08/21/23  1805   LACTACIDWB 1.7           IMAGING  CTA ABDOMEN PELVIS W CONTRAST    Result Date: 8/21/2023  EXAMINATION: CTA OF THE ABDOMEN AND PELVIS WITH CONTRAST 8/21/2023 8:04 pm: TECHNIQUE: CTA of the abdomen and pelvis was performed with the administration of intravenous contrast. Multiplanar reformatted images are provided for review. MIP images are provided for review. Automated exposure control, iterative reconstruction, and/or weight based adjustment of the mA/kV was utilized to reduce the radiation dose to as low as reasonably achievable. COMPARISON: 06/23/2023 HISTORY: ORDERING SYSTEM PROVIDED HISTORY: abdominal pain, NVD 2 days TECHNOLOGIST PROVIDED HISTORY: abdominal pain, NVD 2 days Decision Support Exception - unselect if not a suspected or confirmed emergency medical condition->Emergency Medical Condition (MA) FINDINGS: CTA ABDOMEN: Lung bases: Visualized portion of the lower chest demonstrates no acute abnormality. Organs: The liver, spleen, pancreas, adrenal glands and kidneys demonstrate no acute abnormality. Status post cholecystectomy. GI/bowel: Circumferential wall thickening is present in the rectum similar to prior exam concerning for proctitis. No acutely dilated or inflamed loops of small or colon. Normal appendix. Aorta: Abdominal aorta is normal caliber. Celiac trunk, SMA, renal arteries and ELIZ are patent.  Peritoneum/retroperitoneum: No abdominal aortic

## 2023-08-22 NOTE — ED PROVIDER NOTES
[address] [address] [facility]  FACULTY HANDOFF       Handoff taken on the following patient from prior Attending Physician: Dr. Sruthi Ballard  Pt Name: Elio Jaramillo  PCP:  Yehuda Magana DO    Attestation  I was available and discussed any additional care issues that arose and coordinated the management plans with the resident(s) caring for the patient during my duty period. Any areas of disagreement with resident's documentation of care or procedures are noted on the chart. I was personally present for the key portions of any/all procedures during my duty period. I have documented in the chart those procedures where I was not present during the key portions. CHIEF COMPLAINT       Chief Complaint   Patient presents with    Abdominal Pain         CURRENT MEDICATIONS     Previous Medications  Previous Medications    ACETAMINOPHEN PO    Take 650 mg by mouth as needed    ANTI-DIARRHEAL 2 MG TABLET    Take 1 tablet by mouth as needed    ASPIRIN (ASPIRIN CHILDRENS) 81 MG CHEWABLE TABLET    Take 1 tablet by mouth daily    BLOOD PRESSURE MONITOR KIT    Please use to check blood pressure once daily    BUDESONIDE-FORMOTEROL (SYMBICORT) 160-4.5 MCG/ACT AERO    Inhale 2 puffs into the lungs 2 times daily    CHOLESTYRAMINE LIGHT 4 G PACKET    Take 1 packet by mouth 2 times daily    CLONAZEPAM (KLONOPIN) 0.5 MG TABLET    Take 1 tablet by mouth as needed. DIPHENHYDRAMINE HCL (BENADRYL PO)    Take 25 mg by mouth nightly as needed    FLUOXETINE HCL 60 MG TABS    Take 60 mg by mouth daily    GABAPENTIN (NEURONTIN) 400 MG CAPSULE    Take 1 capsule by mouth 2 times daily for 30 days. HANDICAP PLACARD MISC    by Does not apply route Duration 5 years    HANDICAP PLACARD MISC    by NOT APPLICABLE route    HYDROCODONE-ACETAMINOPHEN (NORCO) 5-325 MG PER TABLET    Take 1 tablet by mouth every 8 hours as needed for Pain for up to 14 days.  Max Daily Amount: 3 tablets    LISINOPRIL (PRINIVIL;ZESTRIL) 10 MG TABLET    Take
STVZ 4B [facility]  Emergency Department  Emergency Medicine Resident Sign-out     Care of Jayne Knapp was assumed from Dr. Dash Nieves and is being seen for Abdominal Pain  . The patient's initial evaluation and plan have been discussed with the prior provider who initially evaluated the patient.      EMERGENCY DEPARTMENT COURSE / MEDICAL DECISION MAKING:       MEDICATIONS GIVEN:  Orders Placed This Encounter   Medications    lactated ringers bolus bolus 1,000 mL    fentaNYL (SUBLIMAZE) injection 50 mcg    metoclopramide (REGLAN) injection 10 mg    magnesium sulfate 2000 mg in 50 mL IVPB premix    fentaNYL (SUBLIMAZE) injection 50 mcg    cefTRIAXone (ROCEPHIN) 1,000 mg in sodium chloride 0.9 % 50 mL IVPB (mini-bag)     Order Specific Question:   Antimicrobial Indications     Answer:   Urinary Tract Infection    iopamidol (ISOVUE-370) 76 % injection 100 mL    metronidazole (FLAGYL) 500 mg in 0.9% NaCl 100 mL IVPB premix     Order Specific Question:   Antimicrobial Indications     Answer:   Infectious Diarrhea    oxyCODONE-acetaminophen (PERCOCET) 5-325 MG per tablet 1 tablet    potassium chloride (KLOR-CON M) extended release tablet 40 mEq    fentaNYL (SUBLIMAZE) injection 50 mcg    sodium chloride flush 0.9 % injection 5-40 mL    sodium chloride flush 0.9 % injection 10 mL    0.9 % sodium chloride infusion    OR Linked Order Group     potassium chloride (KLOR-CON M) extended release tablet 40 mEq     potassium bicarb-citric acid (EFFER-K) effervescent tablet 40 mEq     potassium chloride 10 mEq/100 mL IVPB (Peripheral Line)    magnesium sulfate 1000 mg in dextrose 5% 100 mL IVPB    enoxaparin (LOVENOX) injection 40 mg     Order Specific Question:   Indication of Use     Answer:   Prophylaxis-DVT/PE    OR Linked Order Group     ondansetron (ZOFRAN-ODT) disintegrating tablet 4 mg     ondansetron (ZOFRAN) injection 4 mg    OR Linked Order Group     acetaminophen (TYLENOL) tablet 650 mg     acetaminophen
by mouth every 8 hours as needed for Pain for up to 14 days. Max Daily Amount: 3 tablets 8/16/23 8/30/23  Sobeida Norton MD   oxyCODONE-acetaminophen (PERCOCET) 5-325 MG per tablet Take 1 tablet by mouth every 6 hours as needed for Pain for up to 42 days. Max Daily Amount: 4 tablets 7/31/23 9/11/23  Sobeida Norton MD   cholestyramine light 4 g packet Take 1 packet by mouth 2 times daily 6/26/23   Wali Samuel MD   potassium chloride (KLOR-CON M) 20 MEQ extended release tablet Take 1 tablet by mouth 2 times daily for 20 days 6/26/23 7/16/23  Wali Samuel MD   Multiple Vitamins-Minerals (MULTIVITAMIN WITH MINERALS) tablet Take 1 tablet by mouth daily 6/26/23   Wali Samuel MD   aspirin (ASPIRIN CHILDRENS) 81 MG chewable tablet Take 1 tablet by mouth daily 6/26/23   Wali Samuel MD   diphenhydrAMINE HCl (BENADRYL PO) Take 25 mg by mouth nightly as needed    Historical Provider, MD   ACETAMINOPHEN PO Take 650 mg by mouth as needed 3/31/23   Historical Provider, MD   Blood Pressure Monitor KIT Please use to check blood pressure once daily 3/3/23   Historical Provider, MD   Handicap Placard MIS by NOT APPLICABLE route 1/13/73   Historical Provider, MD   budesonide-formoterol (SYMBICORT) 160-4.5 MCG/ACT AERO Inhale 2 puffs into the lungs 2 times daily 4/25/23   Ivonne Randolphing,    tiotropium (SPIRIVA HANDIHALER) 18 MCG inhalation capsule Inhale 1 capsule into the lungs daily 4/2/23   Esvin Shaver MD   clonazePAM (KLONOPIN) 0.5 MG tablet Take 1 tablet by mouth as needed.  1/16/23   Historical Provider, MD   ANTI-DIARRHEAL 2 MG tablet Take 1 tablet by mouth as needed 1/23/23   Historical Provider, MD   pantoprazole (PROTONIX) 40 MG tablet Take 1 tablet by mouth daily 1/23/23   Historical Provider, MD   nicotine (NICODERM CQ) 21 MG/24HR Place 1 patch onto the skin daily 2/8/23 3/10/23  Rodrigue Gonzalez MD   lisinopril (PRINIVIL;ZESTRIL) 10 MG tablet Take 0.5 tablets
2134 Continued proctitis from June, noted on CTA, radiologist note concerning for possible malignancy. [HO]   1691 Recent potassium, Intermed to admit [HO]      ED Course User Index  [HO] Melvin Jones MD  [KM] Gale Gamble MD  [WK] DO Suri Nicole DO,, DO, RDMS.   Attending Emergency Physician         Suri Henriquez DO  08/21/23 5118

## 2023-08-22 NOTE — PLAN OF CARE
Problem: Discharge Planning  Goal: Discharge to home or other facility with appropriate resources  8/22/2023 1119 by Alma Finn RN  Outcome: Progressing  8/22/2023 0219 by Abhijeet Naqvi RN  Outcome: Progressing     Problem: Safety - Adult  Goal: Free from fall injury  8/22/2023 1119 by Alma Finn RN  Outcome: Aroldo Dayhoff  8/22/2023 0219 by Abhijeet Naqvi RN  Outcome: Progressing     Problem: Pain  Goal: Verbalizes/displays adequate comfort level or baseline comfort level  8/22/2023 1119 by Alma Finn RN  Outcome: Progressing  8/22/2023 0219 by Abhijeet Navqi RN  Outcome: Progressing  Flowsheets (Taken 8/22/2023 0130)  Verbalizes/displays adequate comfort level or baseline comfort level: Encourage patient to monitor pain and request assistance

## 2023-08-22 NOTE — H&P
mmol/L    Alkaline Phosphatase 78 35 - 104 U/L    ALT <5 (L) 5 - 33 U/L    AST 13 <32 U/L    Total Bilirubin 0.3 0.3 - 1.2 mg/dL    Total Protein 4.9 (L) 6.4 - 8.3 g/dL    Albumin 2.7 (L) 3.5 - 5.2 g/dL    Albumin/Globulin Ratio 1.2 1.0 - 2.5   Lipase    Collection Time: 08/22/23  4:00 AM   Result Value Ref Range    Lipase 22 13 - 60 U/L   Magnesium    Collection Time: 08/22/23  4:00 AM   Result Value Ref Range    Magnesium 2.2 1.6 - 2.6 mg/dL   Phosphorus    Collection Time: 08/22/23  4:00 AM   Result Value Ref Range    Phosphorus 3.1 2.6 - 4.5 mg/dL   Protime-INR    Collection Time: 08/22/23  4:00 AM   Result Value Ref Range    Protime 14.1 11.7 - 14.9 sec    INR 1.1       Latest Reference Range & Units 06/26/23 04:13 06/27/23 05:36 07/03/23 21:00 08/21/23 17:39   Hemoglobin Quant 11.9 - 15.1 g/dL 9.1 (L) 8.5 (L) 8.6 (L) 10.0 (L)   Hematocrit 36.3 - 47.1 % 25.1 (L) 27.7 (L) 26.7 (L) 32.5 (L)   MCV 82.6 - 102.9 fL 82.6 87.9 81.0 77.4 (L)   MCH 25.2 - 33.5 pg 29.9 27.0 26.1 23.8 (L)   MCHC 28.4 - 34.8 g/dL 36.3 (H) 30.7 32.3 30.8   MPV 8.1 - 13.5 fL 10.5 9.4 8.1 11.8   RDW 11.8 - 14.4 % 14.7 (H) 14.9 (H) 16.2 (H) 16.0 (H)   Platelet Count 581 - 453 k/uL 790 (H) 340 382 355   (L): Data is abnormally low  (H): Data is abnormally high   Latest Reference Range & Units 10/24/22 20:09 03/31/23 14:00   Ferritin 13 - 150 ng/mL  363 (H)   Iron 37 - 145 ug/dL 28 (L) 32 (L)   Iron Saturation 20 - 55 % 9 (L) 15 (L)   UIBC 112 - 347 ug/dL 277 179   TIBC 250 - 450 ug/dL 305 211 (L)   FOLATE, FOLAT >4.8 ng/mL  12.2   Vitamin B-12 232 - 1245 pg/mL  275   (H): Data is abnormally high  (L): Data is abnormally low  Imaging/Diagnostics:  CTA ABDOMEN PELVIS W CONTRAST    Result Date: 8/21/2023  1. Circumferential wall thickening remains throughout the rectum compatible with proctitis. Given the persistence since June 2023 correlate for underlying neoplasm. 2. Otherwise no acute abnormality in the abdomen or pelvis.  3. Abdominal aorta

## 2023-08-23 VITALS
TEMPERATURE: 98.6 F | DIASTOLIC BLOOD PRESSURE: 70 MMHG | SYSTOLIC BLOOD PRESSURE: 114 MMHG | HEART RATE: 60 BPM | WEIGHT: 167.55 LBS | BODY MASS INDEX: 26.93 KG/M2 | OXYGEN SATURATION: 91 % | RESPIRATION RATE: 14 BRPM | HEIGHT: 66 IN

## 2023-08-23 LAB
ANION GAP SERPL CALCULATED.3IONS-SCNC: 11 MMOL/L (ref 9–17)
BASOPHILS # BLD: 0.03 K/UL (ref 0–0.2)
BASOPHILS NFR BLD: 1 % (ref 0–2)
BUN SERPL-MCNC: 4 MG/DL (ref 6–20)
C DIFF GDH + TOXINS A+B STL QL IA.RAPID: NEGATIVE
CALCIUM SERPL-MCNC: 8.2 MG/DL (ref 8.6–10.4)
CAMPYLOBACTER DNA SPEC NAA+PROBE: NORMAL
CHLORIDE SERPL-SCNC: 111 MMOL/L (ref 98–107)
CO2 SERPL-SCNC: 20 MMOL/L (ref 20–31)
CREAT SERPL-MCNC: 0.6 MG/DL (ref 0.5–0.9)
CRP SERPL HS-MCNC: 65.8 MG/L (ref 0–5)
EKG ATRIAL RATE: 73 BPM
EKG P AXIS: 63 DEGREES
EKG P-R INTERVAL: 142 MS
EKG Q-T INTERVAL: 550 MS
EKG QRS DURATION: 84 MS
EKG QTC CALCULATION (BAZETT): 605 MS
EKG R AXIS: 64 DEGREES
EKG T AXIS: 58 DEGREES
EKG VENTRICULAR RATE: 73 BPM
EOSINOPHIL # BLD: 0.1 K/UL (ref 0–0.44)
EOSINOPHILS RELATIVE PERCENT: 3 % (ref 1–4)
ERYTHROCYTE [DISTWIDTH] IN BLOOD BY AUTOMATED COUNT: 16.5 % (ref 11.8–14.4)
ETEC ELTA+ESTB GENES STL QL NAA+PROBE: NORMAL
GFR SERPL CREATININE-BSD FRML MDRD: >60 ML/MIN/1.73M2
GLUCOSE SERPL-MCNC: 89 MG/DL (ref 70–99)
HCT VFR BLD AUTO: 30.3 % (ref 36.3–47.1)
HGB BLD-MCNC: 9.2 G/DL (ref 11.9–15.1)
IMM GRANULOCYTES # BLD AUTO: <0.03 K/UL (ref 0–0.3)
IMM GRANULOCYTES NFR BLD: 0 %
LYMPHOCYTES NFR BLD: 1.19 K/UL (ref 1.1–3.7)
LYMPHOCYTES RELATIVE PERCENT: 40 % (ref 24–43)
MCH RBC QN AUTO: 24.8 PG (ref 25.2–33.5)
MCHC RBC AUTO-ENTMCNC: 30.4 G/DL (ref 28.4–34.8)
MCV RBC AUTO: 81.7 FL (ref 82.6–102.9)
MONOCYTES NFR BLD: 0.33 K/UL (ref 0.1–1.2)
MONOCYTES NFR BLD: 11 % (ref 3–12)
NEUTROPHILS NFR BLD: 45 % (ref 36–65)
NEUTS SEG NFR BLD: 1.34 K/UL (ref 1.5–8.1)
NRBC BLD-RTO: 0 PER 100 WBC
P SHIGELLOIDES DNA STL QL NAA+PROBE: NORMAL
PLATELET # BLD AUTO: 223 K/UL (ref 138–453)
PMV BLD AUTO: 11.7 FL (ref 8.1–13.5)
POTASSIUM SERPL-SCNC: 3.9 MMOL/L (ref 3.7–5.3)
RBC # BLD AUTO: 3.71 M/UL (ref 3.95–5.11)
RBC # BLD: ABNORMAL 10*6/UL
SALMONELLA DNA SPEC QL NAA+PROBE: NORMAL
SHIGA TOXIN STX GENE SPEC NAA+PROBE: NORMAL
SHIGELLA DNA SPEC QL NAA+PROBE: NORMAL
SODIUM SERPL-SCNC: 142 MMOL/L (ref 135–144)
SPECIMEN DESCRIPTION: NORMAL
SPECIMEN DESCRIPTION: NORMAL
V CHOL+PARA RFBL+TRKH+TNAA STL QL NAA+PR: NORMAL
WBC OTHER # BLD: 3 K/UL (ref 3.5–11.3)
Y ENTERO RECN STL QL NAA+PROBE: NORMAL

## 2023-08-23 PROCEDURE — 2580000003 HC RX 258: Performed by: STUDENT IN AN ORGANIZED HEALTH CARE EDUCATION/TRAINING PROGRAM

## 2023-08-23 PROCEDURE — 6370000000 HC RX 637 (ALT 250 FOR IP): Performed by: INTERNAL MEDICINE

## 2023-08-23 PROCEDURE — 80048 BASIC METABOLIC PNL TOTAL CA: CPT

## 2023-08-23 PROCEDURE — 2580000003 HC RX 258: Performed by: NURSE PRACTITIONER

## 2023-08-23 PROCEDURE — 85025 COMPLETE CBC W/AUTO DIFF WBC: CPT

## 2023-08-23 PROCEDURE — 99239 HOSP IP/OBS DSCHRG MGMT >30: CPT | Performed by: STUDENT IN AN ORGANIZED HEALTH CARE EDUCATION/TRAINING PROGRAM

## 2023-08-23 PROCEDURE — 86140 C-REACTIVE PROTEIN: CPT

## 2023-08-23 PROCEDURE — 94640 AIRWAY INHALATION TREATMENT: CPT

## 2023-08-23 PROCEDURE — 6370000000 HC RX 637 (ALT 250 FOR IP): Performed by: FAMILY MEDICINE

## 2023-08-23 PROCEDURE — 99232 SBSQ HOSP IP/OBS MODERATE 35: CPT | Performed by: INTERNAL MEDICINE

## 2023-08-23 PROCEDURE — 36415 COLL VENOUS BLD VENIPUNCTURE: CPT

## 2023-08-23 PROCEDURE — 93010 ELECTROCARDIOGRAM REPORT: CPT | Performed by: INTERNAL MEDICINE

## 2023-08-23 PROCEDURE — 6360000002 HC RX W HCPCS: Performed by: NURSE PRACTITIONER

## 2023-08-23 PROCEDURE — 6360000002 HC RX W HCPCS: Performed by: STUDENT IN AN ORGANIZED HEALTH CARE EDUCATION/TRAINING PROGRAM

## 2023-08-23 RX ORDER — LEVOFLOXACIN 500 MG/1
500 TABLET, FILM COATED ORAL DAILY
Qty: 5 TABLET | Refills: 0 | Status: SHIPPED | OUTPATIENT
Start: 2023-08-23 | End: 2023-08-28

## 2023-08-23 RX ORDER — BISMUTH SUBSALICYLATE 525 MG/15ML
2 SUSPENSION ORAL PRN
Qty: 30 TABLET | Refills: 1 | Status: SHIPPED | OUTPATIENT
Start: 2023-08-23

## 2023-08-23 RX ORDER — FERROUS SULFATE 325(65) MG
325 TABLET ORAL
Qty: 30 TABLET | Refills: 5 | Status: SHIPPED | OUTPATIENT
Start: 2023-08-23

## 2023-08-23 RX ORDER — METRONIDAZOLE 500 MG/1
500 TABLET ORAL 3 TIMES DAILY
Qty: 15 TABLET | Refills: 0 | Status: SHIPPED | OUTPATIENT
Start: 2023-08-23 | End: 2023-08-28

## 2023-08-23 RX ADMIN — BUDESONIDE AND FORMOTEROL FUMARATE DIHYDRATE 2 PUFF: 160; 4.5 AEROSOL RESPIRATORY (INHALATION) at 11:08

## 2023-08-23 RX ADMIN — PANTOPRAZOLE SODIUM 40 MG: 40 TABLET, DELAYED RELEASE ORAL at 08:28

## 2023-08-23 RX ADMIN — ASPIRIN 81 MG: 81 TABLET, CHEWABLE ORAL at 08:28

## 2023-08-23 RX ADMIN — OXYCODONE AND ACETAMINOPHEN 2 TABLET: 5; 325 TABLET ORAL at 11:08

## 2023-08-23 RX ADMIN — MIDODRINE HYDROCHLORIDE 2.5 MG: 2.5 TABLET ORAL at 08:28

## 2023-08-23 RX ADMIN — LOPERAMIDE HYDROCHLORIDE 2 MG: 2 CAPSULE ORAL at 09:33

## 2023-08-23 RX ADMIN — IRON SUCROSE 300 MG: 20 INJECTION, SOLUTION INTRAVENOUS at 11:11

## 2023-08-23 RX ADMIN — OXYCODONE AND ACETAMINOPHEN 2 TABLET: 5; 325 TABLET ORAL at 01:11

## 2023-08-23 RX ADMIN — SODIUM CHLORIDE: 9 INJECTION, SOLUTION INTRAVENOUS at 01:13

## 2023-08-23 RX ADMIN — OXYCODONE AND ACETAMINOPHEN 2 TABLET: 5; 325 TABLET ORAL at 06:08

## 2023-08-23 RX ADMIN — GABAPENTIN 800 MG: 400 CAPSULE ORAL at 08:28

## 2023-08-23 RX ADMIN — SODIUM CHLORIDE, PRESERVATIVE FREE 10 ML: 5 INJECTION INTRAVENOUS at 08:28

## 2023-08-23 RX ADMIN — METRONIDAZOLE 500 MG: 500 INJECTION, SOLUTION INTRAVENOUS at 05:57

## 2023-08-23 RX ADMIN — CHOLESTYRAMINE 4 G: 4 POWDER, FOR SUSPENSION ORAL at 08:27

## 2023-08-23 RX ADMIN — ENOXAPARIN SODIUM 40 MG: 100 INJECTION SUBCUTANEOUS at 08:27

## 2023-08-23 ASSESSMENT — PAIN SCALES - GENERAL
PAINLEVEL_OUTOF10: 8
PAINLEVEL_OUTOF10: 7

## 2023-08-23 ASSESSMENT — PAIN DESCRIPTION - LOCATION
LOCATION: ABDOMEN;LEG
LOCATION: LEG;FOOT

## 2023-08-23 ASSESSMENT — PAIN DESCRIPTION - DESCRIPTORS
DESCRIPTORS: ACHING
DESCRIPTORS: ACHING

## 2023-08-23 ASSESSMENT — PAIN DESCRIPTION - ORIENTATION
ORIENTATION: RIGHT;LEFT
ORIENTATION: RIGHT;LEFT

## 2023-08-23 NOTE — PROGRESS NOTES
CLINICAL PHARMACY NOTE: MEDS TO BEDS    Total # of Prescriptions Filled: 4   The following medications were delivered to the patient:  LOPERAMIDE 2MG CAPS  LEVOFLOXACIN 500MG TABS  FEROSUL 325MG TABS  METRONIDAZOLE 500MG TABS    Additional Documentation:  Delivered to pt in room 417 on 8/23 at 12P. No copay.
Discharge instructions reviewed with pt, verbalized understanding. No further needs at this time. Writer stressed to pt to finish all of the antibiotics as ordered and to follow up with physicians as written.  Electronically signed by Destin Lincoln RN on 8/23/2023 at 12:39 PM
[facility]  Office: [address], DO, Rafael Otero, DO, Camryn Bustillo, DO, Markel Ricardo Blood, DO, Diego Jean-Baptiste MD, Naldo Rogers MD, Mendez Brown MD, Tracey Byrd MD,  Robel Traylor MD, Andrew Gerard MD, Chet Quigley, DO, Kathya Enciso MD,  Elfego Mars, DO, Dustin Weber MD, Billy Oliver MD, Abdirashid Wood, DO, Carolyn Cunha MD,  Devin Harrison, DO, Estuardo Cordon MD, Cassidy Locke MD, Dewey Vallecillo MD, Eddi Angulo MD,  Maury Jacobo MD, Diamante Denney MD, Asad Barone MD, Connie Barrera, DO, Donte Luke MD,  Mason Mehta MD, Jodeen Kussmaul, CNP,  Alyx Engel, CNP, Batsheva Villavicencio, CNP, Molina Zendejas, CNP,  Oksana Devine, DNP, Morris Neigh, CNP, Jessica Good, CNP, Martin Yifan, CNP, Tee Fox, CNP, Maryam Mendez, CNP, Rachel Rogers PA-C, Gardenia Noe, CNS, Flor Lang, CNP, Karley Regalado, [address]    Second Visit Note  For more detailed information please refer to the progress note of the day      8/22/2023    4:29 PM    Name:   Javier Maxwell  MRN:     [id number]     Acct:      [de-identified]   Room:   [address] [place] [address] Day:  1  Admit Date:  8/21/2023  4:54 PM    PCP:   Mannie Stapleton DO  Code Status:  Full Code      Pt vitals were reviewed   New labs were reviewed   Patient was seen feeling about the same , GI evaluating , no BM yet    Updated plan :      Add inflammatory markers  Discussed with GI  Restart her gabapentin  Add Midodrine  Issues with insurance, currently not out of network  Rest of management as per orders  Discussed with the patient and the nurse        Tracey Byrd MD  8/22/2023  4:29 PM
Nurse said vitals aren't needed at the moment
Occupational [address]  Occupational Therapy Not Seen Note    DATE: 2023    NAME: Will Reynolds  MRN: [id number]   : 1965      Patient not seen this date for Occupational Therapy due to:    Patient at baseline functional level with no acute OT needs. Will defer OT evaluation at this time. Please reorder OT if future needs arise.      Next Scheduled Treatment: N/A    Electronically signed by SINAN Arreola/CASE on 2023 at 9:21 AM
Physical Therapy        Physical Therapy Cancel Note      DATE: 2023    NAME: Jon Williamson  MRN: [id number]   : 1965      Patient not seen this date for Physical Therapy due to:    Patient independent with functional mobility. Will defer PT evaluation at this time. Pt independently ambulating off unit. Please reorder PT if future needs arise.        Electronically signed by Yvrose Sarah, PT on 2023 at 10:00 AM
Pt a/o denies pain requesting to go out and smoke, patient release signed
1 antitrypsin   No results found for: A1A    Anti - Liver/Kidney Ab  No results found for: LIVER-KIDNEYMICROSOMALAB    GILBERT  Lab Results   Component Value Date/Time    GILBERT NEGATIVE 10/24/2022 08:09 PM       AMA  Lab Results   Component Value Date/Time    MITOAB 0.7 10/24/2022 08:09 PM       ASMA  Lab Results   Component Value Date/Time    SMOOTHMUSCAB 7 10/24/2022 08:09 PM       Ceruloplasmin  No results found for: CERULOPLSM    Celiac panel  No results found for: TISSTRNTIIGG, TTGIGA, IGA    IgG  No results found for: IGG    IgM  No results found for: IGM    GGT   No results found for: LABGGT    PT/INR  Recent Labs     08/21/23  1739 08/22/23  0400   PROTIME 13.8 14.1   INR 1.1 1.1       Cancer Markers:  CEA:  No results found for: CEA  Ca 125:  No results found for:   Ca 19-9:   No results found for:   AFP:   Lab Results   Component Value Date/Time    AFP 4.0 10/24/2022 08:09 PM       Lactic acid:  Recent Labs     08/21/23  1805   LACTACIDWB 1.7         Radiology Review:    . Kimberly Marks CTA ABDOMEN PELVIS W CONTRAST   Preliminary Result   1. Circumferential wall thickening remains throughout the rectum compatible   with proctitis. Given the persistence since June 2023 correlate for   underlying neoplasm. 2. Otherwise no acute abnormality in the abdomen or pelvis. 3. Abdominal aorta is normal caliber. Branch vessels of the abdominal aorta   are patent. ENDOSCOPY     Principal Problem:    Proctocolitis  Active Problems:    Asthma    Crohn disease ([address])    Bipolar disorder, unspecified ([address])    Cocaine abuse (HCC)    Chronic bronchitis (HCC)    Chronic diarrhea    Nausea and vomiting    Acute hypokalemia    Hypotension  Resolved Problems:    * No resolved hospital problems. *       GI Assessment:    1. CT imaging suggestive of proctitis  2.   History of Crohn's disease with bowel resection in 2011 when Crohn's disease was diagnosed -most recent colonoscopy 10/2021 with polypectomy and random

## 2023-08-23 NOTE — DISCHARGE SUMMARY
Symbicort  Inhale 2 puffs into the lungs 2 times daily     cholestyramine light 4 g packet  Take 1 packet by mouth 2 times daily     clonazePAM 0.5 MG tablet  Commonly known as: KLONOPIN     Handicap Placard Misc  by Does not apply route Duration 5 years     Handicap Placard Misc     HYDROcodone-acetaminophen 5-325 MG per tablet  Commonly known as: NORCO  Take 1 tablet by mouth every 8 hours as needed for Pain for up to 14 days. Max Daily Amount: 3 tablets     * LYRICA PO     * pregabalin 300 MG capsule  Commonly known as: LYRICA     multivitamin with minerals tablet  Take 1 tablet by mouth daily     nicotine 21 MG/24HR  Commonly known as: NICODERM CQ  Place 1 patch onto the skin daily     oxyCODONE-acetaminophen 5-325 MG per tablet  Commonly known as: PERCOCET  Take 1 tablet by mouth every 6 hours as needed for Pain for up to 42 days. Max Daily Amount: 4 tablets     pantoprazole 40 MG tablet  Commonly known as: PROTONIX     potassium chloride 20 MEQ extended release tablet  Commonly known as: KLOR-CON M  Take 1 tablet by mouth 2 times daily for 20 days     Spiriva HandiHaler 18 MCG inhalation capsule  Generic drug: tiotropium  Inhale 1 capsule into the lungs daily     traZODone 150 MG tablet  Commonly known as: DESYREL  Take 1 tablet by mouth nightly           * This list has 2 medication(s) that are the same as other medications prescribed for you. Read the directions carefully, and ask your doctor or other care provider to review them with you.                 STOP taking these medications      ACETAMINOPHEN PO     ibuprofen 400 MG tablet  Commonly known as: IBU     lisinopril 10 MG tablet  Commonly known as: PRINIVIL;ZESTRIL     REFRESH OP               Where to Get Your Medications        These medications were sent to [facility] [address], [address] [address]      Phone: [phone]   Anti-Diarrheal 2 MG tablet  ferrous

## 2023-08-23 NOTE — PLAN OF CARE
Problem: Discharge Planning  Goal: Discharge to home or other facility with appropriate resources  8/22/2023 2136 by Nyasia Heard RN  Outcome: Progressing  8/22/2023 1119 by Elsy Milan RN  Outcome: Progressing     Problem: Safety - Adult  Goal: Free from fall injury  8/22/2023 2136 by Nyasia Heard RN  Outcome: Progressing  8/22/2023 1119 by Elsy Milan RN  Outcome: Progressing     Problem: Pain  Goal: Verbalizes/displays adequate comfort level or baseline comfort level  8/22/2023 2136 by Nyasia Heard RN  Outcome: Progressing  8/22/2023 1119 by Elsy Milan RN  Outcome: Progressing     Problem: Respiratory - Adult  Goal: Achieves optimal ventilation and oxygenation  8/22/2023 2136 by Nyasia Heard RN  Outcome: Progressing  8/22/2023 2012 by Alfreda Catherine RCP  Outcome: Progressing  Flowsheets (Taken 8/22/2023 2012)  Achieves optimal ventilation and oxygenation:   Assess for changes in respiratory status   Assess for changes in mentation and behavior   Assess and instruct to report shortness of breath or any respiratory difficulty

## 2023-08-23 NOTE — PLAN OF CARE
Problem: Respiratory - Adult  Goal: Achieves optimal ventilation and oxygenation  Outcome: Progressing  Flowsheets (Taken 8/22/2023 2012)  Achieves optimal ventilation and oxygenation:   Assess for changes in respiratory status   Assess for changes in mentation and behavior   Assess and instruct to report shortness of breath or any respiratory difficulty

## 2023-08-23 NOTE — CARE COORDINATION
Discharge [address] Case Management Department  Written by: Edgardo Albright RN    Patient Name: Dejan Cordova  Attending Provider: Zbigniew Henley MD  Admit Date: 2023  4:54 PM  MRN: [id number]  Account: [de-identified]                     : 1965  Discharge Date: 23      Disposition: home    Met with patient to discuss transitional planning. Plan is home independently. Patient will need cab ride home. Patient agreeable to plan.      Edgardo Albright RN
Potential DME:    Patient expects to discharge to: [address] [place] [address] Road for transportation at discharge: [facility]    Financial    Payor: [address] / Plan: [address] / Product Type: *No Product type* /     Does insurance require precert for SNF: Yes    Potential assistance Purchasing Medications: No  Meds-to-Beds request: Yes      [address], [address] [facility],[address] [facility] [address] [facility] [phone]  [facility] [place] [address]  Phone: [phone] Fax: [place], [address] [facility],[address] [facility]  [address] [facility] [address]  Phone: [phone] Fax: [phone]      Notes:    Factors facilitating achievement of predicted outcomes: Family support, Friend support, Motivated, Cooperative, Pleasant, Sense of humor, Good insight into deficits, and Has needed Durable Medical Equipment at home    Barriers to discharge: Medical complications    Additional Case Management Notes: met with patient to discuss transitional planning. Plan is to return home independently. Patient has shower bench, rollator, cane, and motorized scooter at home. Patient will need cab ride home. Provided patient with [facility] information regarding open enrollment period to change medicaid managed plans as well as contact information to change plans prior to open enrollment period under just cause as patient has [employer] which is no longer in network with [employer] and all of patient's medical providers are [employer] providers. Patient would like [facility]. Patient agreeable to plan. The Plan for Transition of Care is related to the following treatment goals of Proctocolitis [K52.9]  Acute cystitis without hematuria [L49.98]    IF APPLICABLE: The Patient and/or patient representative Wayne Brady and her family were provided with a choice of provider and agrees with the discharge plan.  Freedom of choice list with

## 2023-08-23 NOTE — PLAN OF CARE
Problem: Respiratory - Adult  Goal: Achieves optimal ventilation and oxygenation  8/23/2023 1118 by Rhonda Najjar, RCP  Outcome: Progressing   BRONCHOSPASM/BRONCHOCONSTRICTION     [x]         IMPROVE AERATION/BREATH SOUNDS  [x]   ADMINISTER BRONCHODILATOR THERAPY AS APPROPRIATE  [x]   ASSESS BREATH SOUNDS  []   IMPLEMENT AEROSOL/MDI PROTOCOL  [x]   PATIENT EDUCATION AS NEEDED

## 2023-08-24 DIAGNOSIS — Z96.642 S/P TOTAL LEFT HIP ARTHROPLASTY: ICD-10-CM

## 2023-08-24 RX ORDER — HYDROCODONE BITARTRATE AND ACETAMINOPHEN 5; 325 MG/1; MG/1
1 TABLET ORAL EVERY 8 HOURS PRN
Qty: 42 TABLET | Refills: 0 | Status: SHIPPED | OUTPATIENT
Start: 2023-08-24 | End: 2023-09-07

## 2023-08-24 NOTE — TELEPHONE ENCOUNTER
Pt would like a call once medication is approved from Dr Crystal Barrett- call pt at [phone]- thank you

## 2023-08-25 LAB
REASON FOR REJECTION: NORMAL
SPECIMEN SOURCE: NORMAL
ZZ NTE CLEAN UP: ORDERED TEST: NORMAL

## 2023-08-26 LAB
MICROORGANISM SPEC CULT: NORMAL
MICROORGANISM SPEC CULT: NORMAL
SERVICE CMNT-IMP: NORMAL
SERVICE CMNT-IMP: NORMAL
SPECIMEN DESCRIPTION: NORMAL
SPECIMEN DESCRIPTION: NORMAL

## 2023-08-28 ENCOUNTER — OFFICE VISIT (OUTPATIENT)
Dept: ORTHOPEDIC SURGERY | Age: 58
End: 2023-08-28

## 2023-08-28 VITALS — BODY MASS INDEX: 26.84 KG/M2 | WEIGHT: 167 LBS | HEIGHT: 66 IN

## 2023-08-28 DIAGNOSIS — Z96.641 STATUS POST TOTAL REPLACEMENT OF RIGHT HIP: Primary | ICD-10-CM

## 2023-08-28 DIAGNOSIS — Z96.642 S/P TOTAL LEFT HIP ARTHROPLASTY: ICD-10-CM

## 2023-08-28 PROCEDURE — 99024 POSTOP FOLLOW-UP VISIT: CPT | Performed by: ORTHOPAEDIC SURGERY

## 2023-08-28 NOTE — PROGRESS NOTES
This patient who has undergone bilateral total hip arthroplasty is seen here in follow-up. Her right hip was a replaced on 6/16/2023. Patient has been having a lot of problems with her flareup of Crohn's disease. It runs in the family. At the present time she is on Flagyl she has lost considerable amount of weight and feels very weak. She continues to walk with a walker. She is not able to go for physical therapy at this stage. She does because she cannot at home. She says the pain is definitely better and it is still present but tolerable. I asked the patient to call me when she is ready to resume physical therapy and we will order it at that time. Otherwise I will see her in 4 weeks.

## 2023-09-11 DIAGNOSIS — Z96.642 S/P TOTAL LEFT HIP ARTHROPLASTY: ICD-10-CM

## 2023-09-11 RX ORDER — HYDROCODONE BITARTRATE AND ACETAMINOPHEN 5; 325 MG/1; MG/1
1 TABLET ORAL EVERY 8 HOURS PRN
Qty: 42 TABLET | Refills: 0 | Status: SHIPPED | OUTPATIENT
Start: 2023-09-11 | End: 2023-09-25

## 2023-09-11 NOTE — TELEPHONE ENCOUNTER
Patient called to request refill on [place] to be sent to [address] [place] [address].

## 2023-09-21 ENCOUNTER — APPOINTMENT (OUTPATIENT)
Dept: GENERAL RADIOLOGY | Age: 58
DRG: 351 | End: 2023-09-21
Payer: MEDICAID

## 2023-09-21 ENCOUNTER — APPOINTMENT (OUTPATIENT)
Dept: CT IMAGING | Age: 58
DRG: 351 | End: 2023-09-21
Payer: MEDICAID

## 2023-09-21 ENCOUNTER — HOSPITAL ENCOUNTER (INPATIENT)
Age: 58
LOS: 4 days | Discharge: HOME | DRG: 351 | End: 2023-09-25
Attending: EMERGENCY MEDICINE | Admitting: INTERNAL MEDICINE
Payer: MEDICAID

## 2023-09-21 DIAGNOSIS — Z96.642 S/P TOTAL LEFT HIP ARTHROPLASTY: ICD-10-CM

## 2023-09-21 DIAGNOSIS — N17.9 AKI (ACUTE KIDNEY INJURY) (HCC): ICD-10-CM

## 2023-09-21 DIAGNOSIS — K92.0 GASTROINTESTINAL HEMORRHAGE WITH HEMATEMESIS: ICD-10-CM

## 2023-09-21 DIAGNOSIS — R55 SYNCOPE AND COLLAPSE: ICD-10-CM

## 2023-09-21 DIAGNOSIS — M62.82 NON-TRAUMATIC RHABDOMYOLYSIS: ICD-10-CM

## 2023-09-21 DIAGNOSIS — R79.89 ELEVATED TROPONIN: Primary | ICD-10-CM

## 2023-09-21 PROBLEM — Z79.899 POLYPHARMACY: Status: ACTIVE | Noted: 2023-09-21

## 2023-09-21 PROBLEM — R77.8 ELEVATED TROPONIN: Status: ACTIVE | Noted: 2023-09-21

## 2023-09-21 LAB
ALBUMIN SERPL-MCNC: 3.6 G/DL (ref 3.5–5.2)
ALBUMIN/GLOB SERPL: 1.5 {RATIO} (ref 1–2.5)
ALP SERPL-CCNC: 93 U/L (ref 35–104)
ALT SERPL-CCNC: 21 U/L (ref 5–33)
AMORPH SED URNS QL MICRO: ABNORMAL
AMPHET UR QL SCN: POSITIVE
ANION GAP SERPL CALCULATED.3IONS-SCNC: 12 MMOL/L (ref 9–17)
AST SERPL-CCNC: 52 U/L
BACTERIA URNS QL MICRO: ABNORMAL
BARBITURATES UR QL SCN: NEGATIVE
BASOPHILS # BLD: 0 K/UL (ref 0–0.2)
BASOPHILS NFR BLD: 0 % (ref 0–2)
BENZODIAZ UR QL: POSITIVE
BILIRUB SERPL-MCNC: 0.2 MG/DL (ref 0.3–1.2)
BILIRUB UR QL STRIP: NEGATIVE
BNP SERPL-MCNC: 593 PG/ML
BUN BLD-MCNC: 21 MG/DL (ref 8–26)
BUN SERPL-MCNC: 17 MG/DL (ref 6–20)
CA-I BLD-SCNC: 1.12 MMOL/L (ref 1.15–1.33)
CALCIUM SERPL-MCNC: 7.7 MG/DL (ref 8.6–10.4)
CANNABINOIDS UR QL SCN: NEGATIVE
CASTS #/AREA URNS LPF: ABNORMAL /LPF (ref 0–2)
CASTS #/AREA URNS LPF: ABNORMAL /LPF (ref 0–2)
CHLORIDE BLD-SCNC: 105 MMOL/L (ref 98–107)
CHLORIDE SERPL-SCNC: 104 MMOL/L (ref 98–107)
CK SERPL-CCNC: 9133 U/L (ref 26–192)
CLARITY UR: ABNORMAL
CO2 BLD CALC-SCNC: 28 MMOL/L (ref 22–30)
CO2 SERPL-SCNC: 22 MMOL/L (ref 20–31)
COCAINE UR QL SCN: NEGATIVE
COLOR UR: YELLOW
CREAT SERPL-MCNC: 1.4 MG/DL (ref 0.5–0.9)
CRYSTALS URNS MICRO: ABNORMAL /HPF
CRYSTALS URNS MICRO: ABNORMAL /HPF
EGFR, POC: 28 ML/MIN/1.73M2
EOSINOPHIL # BLD: 0 K/UL (ref 0–0.44)
EOSINOPHILS RELATIVE PERCENT: 0 % (ref 1–4)
EPI CELLS #/AREA URNS HPF: ABNORMAL /HPF (ref 0–5)
ERYTHROCYTE [DISTWIDTH] IN BLOOD BY AUTOMATED COUNT: 18 % (ref 11.8–14.4)
FENTANYL UR QL: POSITIVE
GFR SERPL CREATININE-BSD FRML MDRD: 43 ML/MIN/1.73M2
GLUCOSE BLD-MCNC: 120 MG/DL (ref 74–100)
GLUCOSE SERPL-MCNC: 113 MG/DL (ref 70–99)
GLUCOSE UR STRIP-MCNC: NEGATIVE MG/DL
HCO3 VENOUS: 27.5 MMOL/L (ref 22–29)
HCT VFR BLD AUTO: 40.1 % (ref 36.3–47.1)
HCT VFR BLD AUTO: 43.5 % (ref 36.3–47.1)
HCT VFR BLD AUTO: 48 % (ref 36–46)
HGB BLD-MCNC: 11.4 G/DL (ref 11.9–15.1)
HGB BLD-MCNC: 12.7 G/DL (ref 11.9–15.1)
HGB UR QL STRIP.AUTO: ABNORMAL
IMM GRANULOCYTES # BLD AUTO: 0.15 K/UL (ref 0–0.3)
IMM GRANULOCYTES NFR BLD: 1 %
INR PPP: 1.1
KETONES UR STRIP-MCNC: NEGATIVE MG/DL
LACTIC ACID, WHOLE BLOOD: 1.9 MMOL/L (ref 0.7–2.1)
LEUKOCYTE ESTERASE UR QL STRIP: NEGATIVE
LYMPHOCYTES NFR BLD: 1.39 K/UL (ref 1.1–3.7)
LYMPHOCYTES RELATIVE PERCENT: 9 % (ref 24–43)
MCH RBC QN AUTO: 23.5 PG (ref 25.2–33.5)
MCHC RBC AUTO-ENTMCNC: 29.2 G/DL (ref 28.4–34.8)
MCV RBC AUTO: 80.6 FL (ref 82.6–102.9)
METHADONE UR QL: NEGATIVE
MONOCYTES NFR BLD: 1.23 K/UL (ref 0.1–1.2)
MONOCYTES NFR BLD: 8 % (ref 3–12)
MORPHOLOGY: ABNORMAL
MORPHOLOGY: ABNORMAL
MUCOUS THREADS URNS QL MICRO: ABNORMAL
MYOGLOBIN SERPL-MCNC: 8310 NG/ML (ref 25–58)
NEGATIVE BASE EXCESS, VEN: 1 MMOL/L (ref 0–2)
NEUTROPHILS NFR BLD: 82 % (ref 36–65)
NEUTS SEG NFR BLD: 12.63 K/UL (ref 1.5–8.1)
NITRITE UR QL STRIP: NEGATIVE
NRBC BLD-RTO: 0 PER 100 WBC
O2 SAT, VEN: 49.1 % (ref 60–85)
OPIATES UR QL SCN: POSITIVE
OXYCODONE UR QL SCN: POSITIVE
PCO2, VEN: 59.9 MM HG (ref 41–51)
PCP UR QL SCN: NEGATIVE
PH UR STRIP: 5.5 [PH] (ref 5–8)
PH VENOUS: 7.27 (ref 7.32–7.43)
PLATELET # BLD AUTO: ABNORMAL K/UL (ref 138–453)
PLATELET, FLUORESCENCE: ABNORMAL K/UL (ref 138–453)
PO2, VEN: 30.8 MM HG (ref 30–50)
POC ANION GAP: 5 MMOL/L (ref 7–16)
POC CREATININE: 2 MG/DL (ref 0.51–1.19)
POC HEMOGLOBIN (CALC): 16.3 G/DL (ref 12–16)
POC LACTIC ACID: 4.2 MMOL/L (ref 0.56–1.39)
POTASSIUM BLD-SCNC: 5.7 MMOL/L (ref 3.5–4.5)
POTASSIUM SERPL-SCNC: 4.4 MMOL/L (ref 3.7–5.3)
PROT SERPL-MCNC: 6 G/DL (ref 6.4–8.3)
PROT UR STRIP-MCNC: ABNORMAL MG/DL
PROTHROMBIN TIME: 13.8 SEC (ref 11.7–14.9)
RBC # BLD AUTO: 5.4 M/UL (ref 3.95–5.11)
RBC # BLD: ABNORMAL 10*6/UL
RBC #/AREA URNS HPF: ABNORMAL /HPF (ref 0–2)
REASON FOR REJECTION: NORMAL
SODIUM BLD-SCNC: 137 MMOL/L (ref 138–146)
SODIUM SERPL-SCNC: 138 MMOL/L (ref 135–144)
SP GR UR STRIP: 1.05 (ref 1–1.03)
SPECIMEN SOURCE: NORMAL
TEST INFORMATION: ABNORMAL
TROPONIN I SERPL HS-MCNC: 79 NG/L (ref 0–14)
TROPONIN I SERPL HS-MCNC: 85 NG/L (ref 0–14)
TROPONIN I SERPL HS-MCNC: 87 NG/L (ref 0–14)
TSH SERPL DL<=0.05 MIU/L-ACNC: 1.15 UIU/ML (ref 0.3–5)
UROBILINOGEN UR STRIP-ACNC: NORMAL EU/DL (ref 0–1)
WBC #/AREA URNS HPF: ABNORMAL /HPF (ref 0–5)
WBC OTHER # BLD: 15.4 K/UL (ref 3.5–11.3)
ZZ NTE CLEAN UP: ORDERED TEST: NORMAL

## 2023-09-21 PROCEDURE — 6360000002 HC RX W HCPCS: Performed by: EMERGENCY MEDICINE

## 2023-09-21 PROCEDURE — 74177 CT ABD & PELVIS W/CONTRAST: CPT

## 2023-09-21 PROCEDURE — 94640 AIRWAY INHALATION TREATMENT: CPT

## 2023-09-21 PROCEDURE — 80053 COMPREHEN METABOLIC PANEL: CPT

## 2023-09-21 PROCEDURE — 71045 X-RAY EXAM CHEST 1 VIEW: CPT

## 2023-09-21 PROCEDURE — 84443 ASSAY THYROID STIM HORMONE: CPT

## 2023-09-21 PROCEDURE — 83874 ASSAY OF MYOGLOBIN: CPT

## 2023-09-21 PROCEDURE — 6360000002 HC RX W HCPCS

## 2023-09-21 PROCEDURE — 73110 X-RAY EXAM OF WRIST: CPT

## 2023-09-21 PROCEDURE — 82803 BLOOD GASES ANY COMBINATION: CPT

## 2023-09-21 PROCEDURE — 85014 HEMATOCRIT: CPT

## 2023-09-21 PROCEDURE — 73030 X-RAY EXAM OF SHOULDER: CPT

## 2023-09-21 PROCEDURE — 85018 HEMOGLOBIN: CPT

## 2023-09-21 PROCEDURE — 82565 ASSAY OF CREATININE: CPT

## 2023-09-21 PROCEDURE — 2700000000 HC OXYGEN THERAPY PER DAY

## 2023-09-21 PROCEDURE — 71260 CT THORAX DX C+: CPT

## 2023-09-21 PROCEDURE — 93005 ELECTROCARDIOGRAM TRACING: CPT | Performed by: EMERGENCY MEDICINE

## 2023-09-21 PROCEDURE — 96365 THER/PROPH/DIAG IV INF INIT: CPT

## 2023-09-21 PROCEDURE — 2580000003 HC RX 258

## 2023-09-21 PROCEDURE — 2580000003 HC RX 258: Performed by: STUDENT IN AN ORGANIZED HEALTH CARE EDUCATION/TRAINING PROGRAM

## 2023-09-21 PROCEDURE — 2060000000 HC ICU INTERMEDIATE R&B

## 2023-09-21 PROCEDURE — 96375 TX/PRO/DX INJ NEW DRUG ADDON: CPT

## 2023-09-21 PROCEDURE — 85610 PROTHROMBIN TIME: CPT

## 2023-09-21 PROCEDURE — 72125 CT NECK SPINE W/O DYE: CPT

## 2023-09-21 PROCEDURE — 82947 ASSAY GLUCOSE BLOOD QUANT: CPT

## 2023-09-21 PROCEDURE — 84520 ASSAY OF UREA NITROGEN: CPT

## 2023-09-21 PROCEDURE — 80051 ELECTROLYTE PANEL: CPT

## 2023-09-21 PROCEDURE — 70450 CT HEAD/BRAIN W/O DYE: CPT

## 2023-09-21 PROCEDURE — A4216 STERILE WATER/SALINE, 10 ML: HCPCS | Performed by: EMERGENCY MEDICINE

## 2023-09-21 PROCEDURE — 83880 ASSAY OF NATRIURETIC PEPTIDE: CPT

## 2023-09-21 PROCEDURE — 6370000000 HC RX 637 (ALT 250 FOR IP)

## 2023-09-21 PROCEDURE — C9113 INJ PANTOPRAZOLE SODIUM, VIA: HCPCS | Performed by: EMERGENCY MEDICINE

## 2023-09-21 PROCEDURE — 73562 X-RAY EXAM OF KNEE 3: CPT

## 2023-09-21 PROCEDURE — 81015 MICROSCOPIC EXAM OF URINE: CPT

## 2023-09-21 PROCEDURE — 2580000003 HC RX 258: Performed by: EMERGENCY MEDICINE

## 2023-09-21 PROCEDURE — 87040 BLOOD CULTURE FOR BACTERIA: CPT

## 2023-09-21 PROCEDURE — 85025 COMPLETE CBC W/AUTO DIFF WBC: CPT

## 2023-09-21 PROCEDURE — C9113 INJ PANTOPRAZOLE SODIUM, VIA: HCPCS

## 2023-09-21 PROCEDURE — 83605 ASSAY OF LACTIC ACID: CPT

## 2023-09-21 PROCEDURE — 85055 RETICULATED PLATELET ASSAY: CPT

## 2023-09-21 PROCEDURE — 84484 ASSAY OF TROPONIN QUANT: CPT

## 2023-09-21 PROCEDURE — 73080 X-RAY EXAM OF ELBOW: CPT

## 2023-09-21 PROCEDURE — 80307 DRUG TEST PRSMV CHEM ANLYZR: CPT

## 2023-09-21 PROCEDURE — 73502 X-RAY EXAM HIP UNI 2-3 VIEWS: CPT

## 2023-09-21 PROCEDURE — 36415 COLL VENOUS BLD VENIPUNCTURE: CPT

## 2023-09-21 PROCEDURE — 6360000004 HC RX CONTRAST MEDICATION: Performed by: EMERGENCY MEDICINE

## 2023-09-21 PROCEDURE — 82330 ASSAY OF CALCIUM: CPT

## 2023-09-21 PROCEDURE — 82550 ASSAY OF CK (CPK): CPT

## 2023-09-21 PROCEDURE — 99285 EMERGENCY DEPT VISIT HI MDM: CPT

## 2023-09-21 RX ORDER — SODIUM CHLORIDE 0.9 % (FLUSH) 0.9 %
5-40 SYRINGE (ML) INJECTION EVERY 12 HOURS SCHEDULED
Status: DISCONTINUED | OUTPATIENT
Start: 2023-09-21 | End: 2023-09-25 | Stop reason: HOSPADM

## 2023-09-21 RX ORDER — ONDANSETRON 2 MG/ML
4 INJECTION INTRAMUSCULAR; INTRAVENOUS EVERY 6 HOURS PRN
Status: CANCELLED | OUTPATIENT
Start: 2023-09-21

## 2023-09-21 RX ORDER — ACETAMINOPHEN 325 MG/1
650 TABLET ORAL EVERY 6 HOURS PRN
Status: DISCONTINUED | OUTPATIENT
Start: 2023-09-21 | End: 2023-09-25 | Stop reason: HOSPADM

## 2023-09-21 RX ORDER — SODIUM CHLORIDE 0.9 % (FLUSH) 0.9 %
5-40 SYRINGE (ML) INJECTION PRN
Status: DISCONTINUED | OUTPATIENT
Start: 2023-09-21 | End: 2023-09-25 | Stop reason: HOSPADM

## 2023-09-21 RX ORDER — POLYETHYLENE GLYCOL 3350 17 G/17G
17 POWDER, FOR SOLUTION ORAL DAILY PRN
Status: DISCONTINUED | OUTPATIENT
Start: 2023-09-21 | End: 2023-09-25 | Stop reason: HOSPADM

## 2023-09-21 RX ORDER — SODIUM CHLORIDE 9 MG/ML
INJECTION, SOLUTION INTRAVENOUS CONTINUOUS
Status: DISCONTINUED | OUTPATIENT
Start: 2023-09-21 | End: 2023-09-24

## 2023-09-21 RX ORDER — SODIUM CHLORIDE 9 MG/ML
INJECTION, SOLUTION INTRAVENOUS CONTINUOUS
Status: DISCONTINUED | OUTPATIENT
Start: 2023-09-21 | End: 2023-09-21

## 2023-09-21 RX ORDER — ONDANSETRON 4 MG/1
4 TABLET, ORALLY DISINTEGRATING ORAL EVERY 8 HOURS PRN
Status: CANCELLED | OUTPATIENT
Start: 2023-09-21

## 2023-09-21 RX ORDER — FENTANYL CITRATE 50 UG/ML
25 INJECTION, SOLUTION INTRAMUSCULAR; INTRAVENOUS ONCE
Status: COMPLETED | OUTPATIENT
Start: 2023-09-21 | End: 2023-09-21

## 2023-09-21 RX ORDER — ACETAMINOPHEN 650 MG/1
650 SUPPOSITORY RECTAL EVERY 6 HOURS PRN
Status: DISCONTINUED | OUTPATIENT
Start: 2023-09-21 | End: 2023-09-25 | Stop reason: HOSPADM

## 2023-09-21 RX ORDER — BUDESONIDE AND FORMOTEROL FUMARATE DIHYDRATE 160; 4.5 UG/1; UG/1
2 AEROSOL RESPIRATORY (INHALATION) 2 TIMES DAILY
Status: DISCONTINUED | OUTPATIENT
Start: 2023-09-21 | End: 2023-09-25 | Stop reason: HOSPADM

## 2023-09-21 RX ORDER — SODIUM CHLORIDE 9 MG/ML
INJECTION, SOLUTION INTRAVENOUS PRN
Status: DISCONTINUED | OUTPATIENT
Start: 2023-09-21 | End: 2023-09-25 | Stop reason: HOSPADM

## 2023-09-21 RX ORDER — FENTANYL CITRATE 50 UG/ML
50 INJECTION, SOLUTION INTRAMUSCULAR; INTRAVENOUS ONCE
Status: COMPLETED | OUTPATIENT
Start: 2023-09-21 | End: 2023-09-21

## 2023-09-21 RX ORDER — FENTANYL CITRATE 50 UG/ML
25 INJECTION, SOLUTION INTRAMUSCULAR; INTRAVENOUS EVERY 4 HOURS PRN
Status: DISCONTINUED | OUTPATIENT
Start: 2023-09-21 | End: 2023-09-25 | Stop reason: HOSPADM

## 2023-09-21 RX ORDER — 0.9 % SODIUM CHLORIDE 0.9 %
1000 INTRAVENOUS SOLUTION INTRAVENOUS ONCE
Status: COMPLETED | OUTPATIENT
Start: 2023-09-21 | End: 2023-09-21

## 2023-09-21 RX ORDER — PROCHLORPERAZINE EDISYLATE 5 MG/ML
10 INJECTION INTRAMUSCULAR; INTRAVENOUS EVERY 6 HOURS PRN
Status: DISCONTINUED | OUTPATIENT
Start: 2023-09-21 | End: 2023-09-25 | Stop reason: HOSPADM

## 2023-09-21 RX ORDER — HEPARIN SODIUM 5000 [USP'U]/ML
5000 INJECTION, SOLUTION INTRAVENOUS; SUBCUTANEOUS EVERY 8 HOURS SCHEDULED
Status: CANCELLED | OUTPATIENT
Start: 2023-09-21

## 2023-09-21 RX ADMIN — SODIUM CHLORIDE 1000 ML: 9 INJECTION, SOLUTION INTRAVENOUS at 14:00

## 2023-09-21 RX ADMIN — FENTANYL CITRATE 25 MCG: 50 INJECTION, SOLUTION INTRAMUSCULAR; INTRAVENOUS at 19:06

## 2023-09-21 RX ADMIN — CEFTRIAXONE SODIUM 1000 MG: 1 INJECTION, POWDER, FOR SOLUTION INTRAMUSCULAR; INTRAVENOUS at 16:23

## 2023-09-21 RX ADMIN — SODIUM CHLORIDE 8 MG/HR: 9 INJECTION, SOLUTION INTRAVENOUS at 23:53

## 2023-09-21 RX ADMIN — VANCOMYCIN HYDROCHLORIDE 1000 MG: 1 INJECTION, POWDER, LYOPHILIZED, FOR SOLUTION INTRAVENOUS at 17:22

## 2023-09-21 RX ADMIN — SODIUM CHLORIDE 80 MG: 9 INJECTION INTRAMUSCULAR; INTRAVENOUS; SUBCUTANEOUS at 14:24

## 2023-09-21 RX ADMIN — SODIUM CHLORIDE: 9 INJECTION, SOLUTION INTRAVENOUS at 21:23

## 2023-09-21 RX ADMIN — IOPAMIDOL 75 ML: 755 INJECTION, SOLUTION INTRAVENOUS at 14:52

## 2023-09-21 RX ADMIN — SODIUM CHLORIDE 8 MG/HR: 9 INJECTION, SOLUTION INTRAVENOUS at 14:24

## 2023-09-21 RX ADMIN — FENTANYL CITRATE 50 MCG: 50 INJECTION, SOLUTION INTRAMUSCULAR; INTRAVENOUS at 13:50

## 2023-09-21 RX ADMIN — SODIUM CHLORIDE, PRESERVATIVE FREE 10 ML: 5 INJECTION INTRAVENOUS at 21:20

## 2023-09-21 RX ADMIN — SODIUM CHLORIDE 1000 ML: 9 INJECTION, SOLUTION INTRAVENOUS at 17:53

## 2023-09-21 RX ADMIN — BUDESONIDE AND FORMOTEROL FUMARATE DIHYDRATE 2 PUFF: 160; 4.5 AEROSOL RESPIRATORY (INHALATION) at 21:52

## 2023-09-21 ASSESSMENT — ENCOUNTER SYMPTOMS
SHORTNESS OF BREATH: 0
DIARRHEA: 0
ABDOMINAL PAIN: 1
BACK PAIN: 1
NAUSEA: 1
VOMITING: 1

## 2023-09-21 ASSESSMENT — PAIN DESCRIPTION - ORIENTATION: ORIENTATION: LEFT

## 2023-09-21 ASSESSMENT — PAIN DESCRIPTION - LOCATION: LOCATION: SHOULDER

## 2023-09-21 ASSESSMENT — PAIN SCALES - GENERAL
PAINLEVEL_OUTOF10: 3
PAINLEVEL_OUTOF10: 10

## 2023-09-21 NOTE — ED PROVIDER NOTES
[address]  Emergency Department  Emergency Medicine Resident Sign-out     Care of Darío Smyth was assumed from Dr. Jamal Chen and is being seen for Fall (Found down by neighbor, unknown LOC)  . The patient's initial evaluation and plan have been discussed with the prior provider who initially evaluated the patient. EMERGENCY DEPARTMENT COURSE / MEDICAL DECISION MAKING:       MEDICATIONS GIVEN:  Orders Placed This Encounter   Medications    sodium chloride 0.9 % bolus 1,000 mL    pantoprazole (PROTONIX) 80 mg in sodium chloride (PF) 0.9 % 20 mL injection    pantoprazole (PROTONIX) 80 mg in sodium chloride 0.9 % 100 mL infusion    fentaNYL (SUBLIMAZE) injection 50 mcg    cefTRIAXone (ROCEPHIN) 1,000 mg in sodium chloride 0.9 % 50 mL IVPB (mini-bag)     Order Specific Question:   Antimicrobial Indications     Answer:   Sepsis of Unknown Etiology     Order Specific Question:   Sepsis duration of therapy     Answer:   7 days    iopamidol (ISOVUE-370) 76 % injection 75 mL    vancomycin (VANCOCIN) 1,000 mg in sodium chloride 0.9 % 250 mL IVPB ([id number])     Order Specific Question:   Antimicrobial Indications     Answer:   Sepsis of Unknown Etiology     Order Specific Question:   Sepsis duration of therapy     Answer:   7 days    vancomycin (VANCOCIN) intermittent dosing (placeholder)     Order Specific Question:   Antimicrobial Indications     Answer:   Sepsis of Unknown Etiology     Order Specific Question:   Sepsis duration of therapy     Answer:   7 days       LABS / RADIOLOGY:     Results for orders placed or performed during the hospital encounter of 09/21/23   Blood Culture 1    Specimen: Blood   Result Value Ref Range    Specimen Description . BLOOD     Special Requests LEFT AC 1 ML     Culture NO GROWTH <24 HRS    Culture, Blood 2    Specimen: Blood   Result Value Ref Range    Specimen Description . BLOOD     Special Requests R HAND 7 ML     Culture NO GROWTH <24 HRS    CBC with

## 2023-09-21 NOTE — H&P
Absolute PENDING k/uL    Basophils Absolute PENDING 0.0 - 0.2 k/uL    Absolute Immature Granulocyte PENDING 0.00 - 0.30 k/uL    RBC Morphology ANISOCYTOSIS PRESENT MICROCYTOSIS PRESENT    SPECIMEN REJECTION    Collection Time: 09/21/23  1:13 PM   Result Value Ref Range    Specimen Source . BLOOD     Ordered Test LACTIC, BNP, CK, CP, DERRICK, TROPI     Reason for Rejection Unable to perform testing: Specimen clotted. Culture, Blood 2    Collection Time: 09/21/23  1:15 PM    Specimen: Blood   Result Value Ref Range    Specimen Description . BLOOD     Special Requests R HAND 7 ML     Culture NO GROWTH <24 HRS    Lactic Acid    Collection Time: 09/21/23  2:25 PM   Result Value Ref Range    Lactic Acid, Whole Blood 1.9 0.7 - 2.1 mmol/L   Brain Natriuretic Peptide    Collection Time: 09/21/23  4:20 PM   Result Value Ref Range    Pro- (H) <300 pg/mL   CK    Collection Time: 09/21/23  4:20 PM   Result Value Ref Range    Total CK 9,133 (H) 26 - 192 U/L   Comprehensive Metabolic Panel    Collection Time: 09/21/23  4:20 PM   Result Value Ref Range    Sodium 138 135 - 144 mmol/L    Potassium 4.4 3.7 - 5.3 mmol/L    Chloride 104 98 - 107 mmol/L    CO2 22 20 - 31 mmol/L    Anion Gap 12 9 - 17 mmol/L    Glucose 113 (H) 70 - 99 mg/dL    BUN 17 6 - 20 mg/dL    Creatinine 1.4 (H) 0.5 - 0.9 mg/dL    Est, Glom Filt Rate 43 (L) >60 mL/min/1.73m2    Calcium 7.7 (L) 8.6 - 10.4 mg/dL    Total Protein 6.0 (L) 6.4 - 8.3 g/dL    Albumin 3.6 3.5 - 5.2 g/dL    Albumin/Globulin Ratio 1.5 1.0 - 2.5    Total Bilirubin 0.2 (L) 0.3 - 1.2 mg/dL    Alkaline Phosphatase 93 35 - 104 U/L    ALT 21 5 - 33 U/L    AST 52 (H) <32 U/L   Troponin    Collection Time: 09/21/23  4:20 PM   Result Value Ref Range    Troponin, High Sensitivity 87 (HH) 0 - 14 ng/L   TSH with Reflex    Collection Time: 09/21/23  4:20 PM   Result Value Ref Range    TSH 1.15 0.30 - 5.00 uIU/mL       Imaging:   XR WRIST LEFT (MIN 3 VIEWS)    Result Date: 9/21/2023  Normal wrist.

## 2023-09-21 NOTE — ED PROVIDER NOTES
[address] HANDOFF       Handoff taken on the following patient from prior Attending Physician:  Pt Name: Javier Donisignacio  PCP:  Mannie Stapleton DO    Attestation  I was available and discussed any additional care issues that arose and coordinated the management plans with the resident(s) caring for the patient during my duty period. Any areas of disagreement with resident's documentation of care or procedures are noted on the chart. I was personally present for the key portions of any/all procedures during my duty period. I have documented in the chart those procedures where I was not present during the key portions.           Jeannette Jordan MD  09/21/23 5876

## 2023-09-21 NOTE — ED PROVIDER NOTES
[facility]  Emergency Department Encounter  Emergency Medicine Resident     Pt Name:Nguyen Ashley  MRN: [id number]  [address] [facility] [place] 1965  Date of evaluation: 9/21/23  PCP:  Demetrio Evans DO  Note Started: 5:58 PM EDT      CHIEF COMPLAINT       Chief Complaint   Patient presents with    Fall     Found down by neighbor, unknown LOC       HISTORY OF PRESENT ILLNESS  (Location/Symptom, Timing/Onset, Context/Setting, Quality, Duration, Modifying Factors, Severity.)      Karie Corley is a 60 y.o. female who presents with found down in her bathroom, unknown downtime. Patient states that she remembers hitting her head and that was it. She is brought in by EMS after being found by a neighbor. She was given 2 mg of Hyalgan and Zofran in the field due to pinpoint pupils. EMS does state she was alert and interactive on arrival.  Patient is complaining of diffuse left-sided pain. She is also complaining of abdominal pain. On arrival patient has black coffee-ground emesis. Patient denies blood thinners. PAST MEDICAL / SURGICAL / SOCIAL / FAMILY HISTORY      has a past medical history of Acid reflux, Anxiety, Arthritis, Asthma, Bipolar 1 disorder ([address]), Bowel obstruction ([address]), COPD (chronic obstructive pulmonary disease) ([address]), COVID-19 vaccine administered, Crohn disease ([address]), Depression, Dry eye, Fibromyalgia, Gout, Headache, History of recent hospitalization, Hyperlipidemia, Hypertension, Hypothyroidism, Kidney stones, Malignant hyperthermia, Muscle spasm, Neuropathy, Pain, Under care of team, Under care of team, Wears partial dentures, Wears reading eyeglasses, and Wellness examination. has a past surgical history that includes Tonsillectomy and adenoidectomy; Tubal ligation; Cholecystectomy; Bunionectomy (Left); Colonoscopy (04/30/2007); Colonoscopy (10/12/2017); Abdomen surgery; Upper gastrointestinal endoscopy (10/25/2021); Colonoscopy (10/25/2021);  Colonoscopy

## 2023-09-22 ENCOUNTER — APPOINTMENT (OUTPATIENT)
Age: 58
DRG: 351 | End: 2023-09-22
Payer: MEDICAID

## 2023-09-22 PROBLEM — I21.4 NSTEMI (NON-ST ELEVATED MYOCARDIAL INFARCTION) (HCC): Status: ACTIVE | Noted: 2023-09-22

## 2023-09-22 LAB
ALBUMIN SERPL-MCNC: 3.3 G/DL (ref 3.5–5.2)
ALBUMIN/GLOB SERPL: 1.3 {RATIO} (ref 1–2.5)
ALP SERPL-CCNC: 88 U/L (ref 35–104)
ALT SERPL-CCNC: 22 U/L (ref 5–33)
ANION GAP SERPL CALCULATED.3IONS-SCNC: 9 MMOL/L (ref 9–17)
AST SERPL-CCNC: 60 U/L
BASOPHILS # BLD: 0.03 K/UL (ref 0–0.2)
BASOPHILS NFR BLD: 0 % (ref 0–2)
BILIRUB SERPL-MCNC: 0.2 MG/DL (ref 0.3–1.2)
BUN SERPL-MCNC: 12 MG/DL (ref 6–20)
CALCIUM SERPL-MCNC: 7.8 MG/DL (ref 8.6–10.4)
CHLORIDE SERPL-SCNC: 107 MMOL/L (ref 98–107)
CK SERPL-CCNC: 9045 U/L (ref 26–192)
CO2 SERPL-SCNC: 23 MMOL/L (ref 20–31)
CREAT SERPL-MCNC: 1 MG/DL (ref 0.5–0.9)
EKG ATRIAL RATE: 75 BPM
EKG ATRIAL RATE: 82 BPM
EKG P AXIS: -28 DEGREES
EKG P-R INTERVAL: 128 MS
EKG Q-T INTERVAL: 412 MS
EKG Q-T INTERVAL: 448 MS
EKG QRS DURATION: 84 MS
EKG QRS DURATION: 90 MS
EKG QTC CALCULATION (BAZETT): 472 MS
EKG QTC CALCULATION (BAZETT): 500 MS
EKG R AXIS: 81 DEGREES
EKG R AXIS: 84 DEGREES
EKG T AXIS: 44 DEGREES
EKG T AXIS: 61 DEGREES
EKG VENTRICULAR RATE: 75 BPM
EKG VENTRICULAR RATE: 79 BPM
EOSINOPHIL # BLD: 0.04 K/UL (ref 0–0.44)
EOSINOPHILS RELATIVE PERCENT: 1 % (ref 1–4)
ERYTHROCYTE [DISTWIDTH] IN BLOOD BY AUTOMATED COUNT: 17.8 % (ref 11.8–14.4)
ETHANOL PERCENT: <0.01 %
ETHANOLAMINE SERPL-MCNC: <10 MG/DL
GFR SERPL CREATININE-BSD FRML MDRD: >60 ML/MIN/1.73M2
GLUCOSE SERPL-MCNC: 119 MG/DL (ref 70–99)
HCT VFR BLD AUTO: 36.4 % (ref 36.3–47.1)
HGB BLD-MCNC: 10.5 G/DL (ref 11.9–15.1)
IMM GRANULOCYTES # BLD AUTO: 0.06 K/UL (ref 0–0.3)
IMM GRANULOCYTES NFR BLD: 1 %
LYMPHOCYTES NFR BLD: 0.89 K/UL (ref 1.1–3.7)
LYMPHOCYTES RELATIVE PERCENT: 10 % (ref 24–43)
MCH RBC QN AUTO: 24 PG (ref 25.2–33.5)
MCHC RBC AUTO-ENTMCNC: 28.8 G/DL (ref 28.4–34.8)
MCV RBC AUTO: 83.3 FL (ref 82.6–102.9)
MONOCYTES NFR BLD: 0.98 K/UL (ref 0.1–1.2)
MONOCYTES NFR BLD: 11 % (ref 3–12)
MYOGLOBIN SERPL-MCNC: 2818 NG/ML (ref 25–58)
NEUTROPHILS NFR BLD: 78 % (ref 36–65)
NEUTS SEG NFR BLD: 6.88 K/UL (ref 1.5–8.1)
NRBC BLD-RTO: 0 PER 100 WBC
PLATELET # BLD AUTO: 240 K/UL (ref 138–453)
PMV BLD AUTO: 10.8 FL (ref 8.1–13.5)
POTASSIUM SERPL-SCNC: 4.6 MMOL/L (ref 3.7–5.3)
PROT SERPL-MCNC: 5.8 G/DL (ref 6.4–8.3)
RBC # BLD AUTO: 4.37 M/UL (ref 3.95–5.11)
RBC # BLD: ABNORMAL 10*6/UL
SODIUM SERPL-SCNC: 139 MMOL/L (ref 135–144)
TROPONIN I SERPL HS-MCNC: 60 NG/L (ref 0–14)
TROPONIN I SERPL HS-MCNC: 65 NG/L (ref 0–14)
WBC OTHER # BLD: 8.9 K/UL (ref 3.5–11.3)

## 2023-09-22 PROCEDURE — 6370000000 HC RX 637 (ALT 250 FOR IP)

## 2023-09-22 PROCEDURE — 2580000003 HC RX 258

## 2023-09-22 PROCEDURE — 2060000000 HC ICU INTERMEDIATE R&B

## 2023-09-22 PROCEDURE — 85025 COMPLETE CBC W/AUTO DIFF WBC: CPT

## 2023-09-22 PROCEDURE — 99223 1ST HOSP IP/OBS HIGH 75: CPT | Performed by: INTERNAL MEDICINE

## 2023-09-22 PROCEDURE — 87493 C DIFF AMPLIFIED PROBE: CPT

## 2023-09-22 PROCEDURE — 6370000000 HC RX 637 (ALT 250 FOR IP): Performed by: INTERNAL MEDICINE

## 2023-09-22 PROCEDURE — 6360000002 HC RX W HCPCS

## 2023-09-22 PROCEDURE — 97530 THERAPEUTIC ACTIVITIES: CPT

## 2023-09-22 PROCEDURE — 99254 IP/OBS CNSLTJ NEW/EST MOD 60: CPT | Performed by: INTERNAL MEDICINE

## 2023-09-22 PROCEDURE — 97116 GAIT TRAINING THERAPY: CPT

## 2023-09-22 PROCEDURE — 82550 ASSAY OF CK (CPK): CPT

## 2023-09-22 PROCEDURE — 2700000000 HC OXYGEN THERAPY PER DAY

## 2023-09-22 PROCEDURE — 94640 AIRWAY INHALATION TREATMENT: CPT

## 2023-09-22 PROCEDURE — 51798 US URINE CAPACITY MEASURE: CPT

## 2023-09-22 PROCEDURE — 80053 COMPREHEN METABOLIC PANEL: CPT

## 2023-09-22 PROCEDURE — G0480 DRUG TEST DEF 1-7 CLASSES: HCPCS

## 2023-09-22 PROCEDURE — 87324 CLOSTRIDIUM AG IA: CPT

## 2023-09-22 PROCEDURE — 87449 NOS EACH ORGANISM AG IA: CPT

## 2023-09-22 PROCEDURE — 97162 PT EVAL MOD COMPLEX 30 MIN: CPT

## 2023-09-22 PROCEDURE — 36415 COLL VENOUS BLD VENIPUNCTURE: CPT

## 2023-09-22 PROCEDURE — 83874 ASSAY OF MYOGLOBIN: CPT

## 2023-09-22 PROCEDURE — 84484 ASSAY OF TROPONIN QUANT: CPT

## 2023-09-22 RX ORDER — MAGNESIUM SULFATE 1 G/100ML
1000 INJECTION INTRAVENOUS ONCE
Status: COMPLETED | OUTPATIENT
Start: 2023-09-22 | End: 2023-09-22

## 2023-09-22 RX ORDER — PANTOPRAZOLE SODIUM 40 MG/1
40 TABLET, DELAYED RELEASE ORAL
Status: DISCONTINUED | OUTPATIENT
Start: 2023-09-22 | End: 2023-09-25 | Stop reason: HOSPADM

## 2023-09-22 RX ORDER — ENOXAPARIN SODIUM 100 MG/ML
40 INJECTION SUBCUTANEOUS DAILY
Status: DISCONTINUED | OUTPATIENT
Start: 2023-09-22 | End: 2023-09-25 | Stop reason: HOSPADM

## 2023-09-22 RX ADMIN — FENTANYL CITRATE 25 MCG: 50 INJECTION, SOLUTION INTRAMUSCULAR; INTRAVENOUS at 01:56

## 2023-09-22 RX ADMIN — SODIUM CHLORIDE: 9 INJECTION, SOLUTION INTRAVENOUS at 21:52

## 2023-09-22 RX ADMIN — TIOTROPIUM BROMIDE INHALATION SPRAY 2 PUFF: 3.12 SPRAY, METERED RESPIRATORY (INHALATION) at 07:44

## 2023-09-22 RX ADMIN — FENTANYL CITRATE 25 MCG: 50 INJECTION, SOLUTION INTRAMUSCULAR; INTRAVENOUS at 10:42

## 2023-09-22 RX ADMIN — BUDESONIDE AND FORMOTEROL FUMARATE DIHYDRATE 2 PUFF: 160; 4.5 AEROSOL RESPIRATORY (INHALATION) at 07:44

## 2023-09-22 RX ADMIN — PROCHLORPERAZINE EDISYLATE 10 MG: 5 INJECTION INTRAMUSCULAR; INTRAVENOUS at 14:44

## 2023-09-22 RX ADMIN — MAGNESIUM SULFATE HEPTAHYDRATE 1000 MG: 1 INJECTION, SOLUTION INTRAVENOUS at 13:58

## 2023-09-22 RX ADMIN — BUDESONIDE AND FORMOTEROL FUMARATE DIHYDRATE 2 PUFF: 160; 4.5 AEROSOL RESPIRATORY (INHALATION) at 19:40

## 2023-09-22 RX ADMIN — ENOXAPARIN SODIUM 40 MG: 100 INJECTION SUBCUTANEOUS at 13:58

## 2023-09-22 RX ADMIN — SODIUM CHLORIDE, PRESERVATIVE FREE 10 ML: 5 INJECTION INTRAVENOUS at 07:57

## 2023-09-22 RX ADMIN — FENTANYL CITRATE 25 MCG: 50 INJECTION, SOLUTION INTRAMUSCULAR; INTRAVENOUS at 06:45

## 2023-09-22 RX ADMIN — PANTOPRAZOLE SODIUM 40 MG: 40 TABLET, DELAYED RELEASE ORAL at 14:47

## 2023-09-22 RX ADMIN — FENTANYL CITRATE 25 MCG: 50 INJECTION, SOLUTION INTRAMUSCULAR; INTRAVENOUS at 14:43

## 2023-09-22 RX ADMIN — SODIUM CHLORIDE: 9 INJECTION, SOLUTION INTRAVENOUS at 05:44

## 2023-09-22 ASSESSMENT — PAIN DESCRIPTION - LOCATION
LOCATION: ARM
LOCATION: ARM;LEG
LOCATION: ARM;LEG

## 2023-09-22 ASSESSMENT — ENCOUNTER SYMPTOMS
VOMITING: 1
EYES NEGATIVE: 1
RESPIRATORY NEGATIVE: 1
ABDOMINAL PAIN: 1

## 2023-09-22 ASSESSMENT — PAIN SCALES - GENERAL
PAINLEVEL_OUTOF10: 7
PAINLEVEL_OUTOF10: 7
PAINLEVEL_OUTOF10: 10
PAINLEVEL_OUTOF10: 7
PAINLEVEL_OUTOF10: 7

## 2023-09-22 ASSESSMENT — PAIN DESCRIPTION - ORIENTATION: ORIENTATION: LEFT

## 2023-09-22 NOTE — PLAN OF CARE
Problem: Discharge Planning  Goal: Discharge to home or other facility with appropriate resources  Outcome: Progressing  Flowsheets (Taken 9/21/2023 2152)  Discharge to home or other facility with appropriate resources: Identify barriers to discharge with patient and caregiver     Problem: Pain  Goal: Verbalizes/displays adequate comfort level or baseline comfort level  Outcome: Progressing  Flowsheets (Taken 9/21/2023 2152)  Verbalizes/displays adequate comfort level or baseline comfort level:   Encourage patient to monitor pain and request assistance   Assess pain using appropriate pain scale   Administer analgesics based on type and severity of pain and evaluate response     Problem: Skin/Tissue Integrity  Goal: Absence of new skin breakdown  Description: 1. Monitor for areas of redness and/or skin breakdown  2. Assess vascular access sites hourly  3. Every 4-6 hours minimum:  Change oxygen saturation probe site  4. Every 4-6 hours:  If on nasal continuous positive airway pressure, respiratory therapy assess nares and determine need for appliance change or resting period.   Outcome: Progressing     Problem: Safety - Adult  Goal: Free from fall injury  Outcome: Progressing  Flowsheets (Taken 9/21/2023 2152)  Free From Fall Injury: Instruct family/caregiver on patient safety

## 2023-09-22 NOTE — ED NOTES
Admitting service perfect served to notify of bladder scan.  Awaiting response     Mark Adam RN  09/21/23 2034
Critical troponin of 87 noted, resident made aware     Dariana Christy RN  09/21/23 2181
Pt presents to ED via EMS stretcher c/o left hip pain due to fall. Pt was found down at home by neighbor. LOC unknown. Pt reports falling last night. Per EMS, upon arrival to home pt pupils were pinpoint, 2 mg of narcan IV given and 4 mg zofran given via IV. EMS states she was Aox3 upon arrival. Upon arrival to ED pt is vomiting. Pt placed on full cardiac monitor, IV access established.   Dr. Bc Shaver at bedside upon arrival     Evy Newman, [address]  09/21/23 4841
Pt states she has to urinate but is unable to.  No urine output at this time     Magnus Mcgrath RN  09/21/23 8850
Pt to [address] [place] [address] Zeyad Dangelo RN  09/21/23 1785
Report received from Mckenna GUTIERREZ  All questions answered. Pt resting in bed, states she is uncomfortable. Pt repositioned and sat up. Purewick changed.   Call light in reach     Greg Greenfield RN  09/21/23 1927
T2R printed, signed and sent with martha Sands RN  09/21/23 2042
T2R printed, signed, and sent with tech to floor. Pt on 2L NC, continuous Protonix infusion infusing. 4A Floor charge notified via ED charge RN. All belongings with patient.       Haylee Sapp RN  09/21/23 2050       Haylee Sapp RN  09/21/23 2051
Xray at bedside     Beto Cuevas, 100 [address]  09/21/23 6658
Consults:  []  Hospitalist  Completed  [] yes [] no  []  Medicine  Completed  [] yes [] No  []  Cardiology  Completed  [] yes [] No  []  GI   Completed  [] yes [] No  []  Neurology  Completed  [] yes [] No  []  Nephrology Completed  [] yes [] No  []  Vascular  Completed  [] yes [] No   []  Surgery  Completed  [] yes [] No   []  Urology  Completed  [] yes [] No   []  Plastics  Completed  [] yes [] No   []  ENT  Completed  [] yes [] No   []  Other:  Completed  [] yes [] No    Consults:  PHARMACY TO DOSE VANCOMYCIN     Treatment Team:   Treatment Team: Attending Provider: Vicente Verma MD; Resident: Reza Felipe DO; Registered Nurse: Ge Garcia RN    Treatment:  ED Course as of 09/21/23 1533   Thu Sep 21, 2023   1357 WBC(!): 15.4 [SK]   1446 Lactic Acid, Whole Blood: 1.9 [SK]   1447 WBC(!): 15.4 [SK]      ED Course User Index  [SK] Reza Felipe DO              Skin Assessment:        Pain Score:         SOCIAL HISTORY       Social History     Socioeconomic History    Marital status: Single     Spouse name: None    Number of children: None    Years of education: None    Highest education level: None   Tobacco Use    Smoking status: Every Day     Packs/day: 0.50     Years: 37.00     Additional pack years: 0.00     Total pack years: 18.50     Types: Cigarettes     Start date: 1976    Smokeless tobacco: Former     Types: Chew     Quit date: 9/3/2001   Vaping Use    Vaping Use: Former    Quit date: 9/3/2019    Substances: Nicotine   Substance and Sexual Activity    Drug use: Yes     Types: Marijuana Zeferino Pat), Cocaine     Comment: last gummie- 6/13/23, h/o of substance abuse - marijuana gummies daily       FAMILY HISTORY       Family History   Problem Relation Age of Onset    Diabetes Father     Lung Cancer Father     Hypertension Mother     Cancer Mother     High Blood Pressure Mother     Crohn's Disease Brother     Heart Disease Brother        ALLERGIES     Penicillins, Tape [adhesive tape], and

## 2023-09-22 NOTE — CARE COORDINATION
Attempted to meet with patient to complete initial case management assessment. Patient lethargic/ sleepy and falling asleep during questions and unable to complete initial assessment at this time. No family at bedside.

## 2023-09-22 NOTE — FLOWSHEET NOTE
Writer messaged resident on call regarding patients mental status. Patient oriented to place and year, but disoriented to situation and continues to state things that do not make any sense. Writer reoriented patient several times as well.

## 2023-09-22 NOTE — PLAN OF CARE
Problem: Discharge Planning  Goal: Discharge to home or other facility with appropriate resources  9/22/2023 0831 by Manjula Hadley RN  Outcome: Progressing  9/21/2023 2152 by Catarina Momin RN  Outcome: Progressing  Flowsheets (Taken 9/21/2023 2152)  Discharge to home or other facility with appropriate resources: Identify barriers to discharge with patient and caregiver     Problem: Pain  Goal: Verbalizes/displays adequate comfort level or baseline comfort level  9/22/2023 0831 by Manjula Hadley RN  Outcome: Progressing  9/21/2023 2152 by Catarina Momin RN  Outcome: Progressing  Flowsheets (Taken 9/21/2023 2152)  Verbalizes/displays adequate comfort level or baseline comfort level:   Encourage patient to monitor pain and request assistance   Assess pain using appropriate pain scale   Administer analgesics based on type and severity of pain and evaluate response     Problem: Skin/Tissue Integrity  Goal: Absence of new skin breakdown  Description: 1. Monitor for areas of redness and/or skin breakdown  2. Assess vascular access sites hourly  3. Every 4-6 hours minimum:  Change oxygen saturation probe site  4. Every 4-6 hours:  If on nasal continuous positive airway pressure, respiratory therapy assess nares and determine need for appliance change or resting period.   9/22/2023 0831 by Manjula Hadley RN  Outcome: Progressing  9/21/2023 2152 by aCtarina Momin RN  Outcome: Progressing     Problem: Safety - Adult  Goal: Free from fall injury  9/21/2023 2152 by Catarina Momin RN  Outcome: Progressing  Flowsheets (Taken 9/21/2023 2152)  Free From Fall Injury: Instruct family/caregiver on patient safety     Problem: Respiratory - Adult  Goal: Achieves optimal ventilation and oxygenation  9/21/2023 2155 by Oly Lewis RCP  Outcome: Progressing

## 2023-09-23 ENCOUNTER — APPOINTMENT (OUTPATIENT)
Age: 58
DRG: 351 | End: 2023-09-23
Payer: MEDICAID

## 2023-09-23 PROBLEM — N17.9 AKI (ACUTE KIDNEY INJURY) (HCC): Status: RESOLVED | Noted: 2023-09-21 | Resolved: 2023-09-23

## 2023-09-23 LAB
ALBUMIN SERPL-MCNC: 3.3 G/DL (ref 3.5–5.2)
ALBUMIN/GLOB SERPL: 1.7 {RATIO} (ref 1–2.5)
ALP SERPL-CCNC: 72 U/L (ref 35–104)
ALT SERPL-CCNC: 21 U/L (ref 5–33)
ANION GAP SERPL CALCULATED.3IONS-SCNC: 9 MMOL/L (ref 9–17)
AST SERPL-CCNC: 57 U/L
BASOPHILS # BLD: 0 K/UL (ref 0–0.2)
BASOPHILS NFR BLD: 0 % (ref 0–2)
BILIRUB SERPL-MCNC: 0.3 MG/DL (ref 0.3–1.2)
BUN SERPL-MCNC: 4 MG/DL (ref 6–20)
CALCIUM SERPL-MCNC: 7.9 MG/DL (ref 8.6–10.4)
CHLORIDE SERPL-SCNC: 108 MMOL/L (ref 98–107)
CK SERPL-CCNC: 6597 U/L (ref 26–192)
CO2 SERPL-SCNC: 24 MMOL/L (ref 20–31)
CREAT SERPL-MCNC: 0.4 MG/DL (ref 0.5–0.9)
EOSINOPHIL # BLD: 0.05 K/UL (ref 0–0.44)
EOSINOPHILS RELATIVE PERCENT: 1 % (ref 1–4)
ERYTHROCYTE [DISTWIDTH] IN BLOOD BY AUTOMATED COUNT: 17.6 % (ref 11.8–14.4)
GFR SERPL CREATININE-BSD FRML MDRD: >60 ML/MIN/1.73M2
GLUCOSE SERPL-MCNC: 111 MG/DL (ref 70–99)
HCT VFR BLD AUTO: 32.9 % (ref 36.3–47.1)
HGB BLD-MCNC: 9.3 G/DL (ref 11.9–15.1)
IMM GRANULOCYTES # BLD AUTO: 0 K/UL (ref 0–0.3)
IMM GRANULOCYTES NFR BLD: 0 %
LYMPHOCYTES NFR BLD: 0.88 K/UL (ref 1.1–3.7)
LYMPHOCYTES RELATIVE PERCENT: 17 % (ref 24–43)
MCH RBC QN AUTO: 23.7 PG (ref 25.2–33.5)
MCHC RBC AUTO-ENTMCNC: 28.3 G/DL (ref 28.4–34.8)
MCV RBC AUTO: 83.9 FL (ref 82.6–102.9)
MONOCYTES NFR BLD: 0.52 K/UL (ref 0.1–1.2)
MONOCYTES NFR BLD: 10 % (ref 3–12)
MORPHOLOGY: ABNORMAL
MORPHOLOGY: ABNORMAL
NEUTROPHILS NFR BLD: 72 % (ref 36–65)
NEUTS SEG NFR BLD: 3.75 K/UL (ref 1.5–8.1)
NRBC BLD-RTO: 0 PER 100 WBC
PLATELET # BLD AUTO: 168 K/UL (ref 138–453)
PMV BLD AUTO: 10.6 FL (ref 8.1–13.5)
POTASSIUM SERPL-SCNC: 4.2 MMOL/L (ref 3.7–5.3)
PROT SERPL-MCNC: 5.3 G/DL (ref 6.4–8.3)
RBC # BLD AUTO: 3.92 M/UL (ref 3.95–5.11)
SODIUM SERPL-SCNC: 141 MMOL/L (ref 135–144)
WBC OTHER # BLD: 5.2 K/UL (ref 3.5–11.3)

## 2023-09-23 PROCEDURE — 6370000000 HC RX 637 (ALT 250 FOR IP)

## 2023-09-23 PROCEDURE — 6370000000 HC RX 637 (ALT 250 FOR IP): Performed by: INTERNAL MEDICINE

## 2023-09-23 PROCEDURE — 97110 THERAPEUTIC EXERCISES: CPT

## 2023-09-23 PROCEDURE — 93306 TTE W/DOPPLER COMPLETE: CPT

## 2023-09-23 PROCEDURE — 99233 SBSQ HOSP IP/OBS HIGH 50: CPT

## 2023-09-23 PROCEDURE — 36415 COLL VENOUS BLD VENIPUNCTURE: CPT

## 2023-09-23 PROCEDURE — 99233 SBSQ HOSP IP/OBS HIGH 50: CPT | Performed by: INTERNAL MEDICINE

## 2023-09-23 PROCEDURE — 94761 N-INVAS EAR/PLS OXIMETRY MLT: CPT

## 2023-09-23 PROCEDURE — 85025 COMPLETE CBC W/AUTO DIFF WBC: CPT

## 2023-09-23 PROCEDURE — 82550 ASSAY OF CK (CPK): CPT

## 2023-09-23 PROCEDURE — 80053 COMPREHEN METABOLIC PANEL: CPT

## 2023-09-23 PROCEDURE — 2580000003 HC RX 258

## 2023-09-23 PROCEDURE — 6360000002 HC RX W HCPCS

## 2023-09-23 PROCEDURE — 94640 AIRWAY INHALATION TREATMENT: CPT

## 2023-09-23 PROCEDURE — 2700000000 HC OXYGEN THERAPY PER DAY

## 2023-09-23 PROCEDURE — 93306 TTE W/DOPPLER COMPLETE: CPT | Performed by: INTERNAL MEDICINE

## 2023-09-23 PROCEDURE — 97530 THERAPEUTIC ACTIVITIES: CPT

## 2023-09-23 PROCEDURE — 2060000000 HC ICU INTERMEDIATE R&B

## 2023-09-23 RX ORDER — HYDRALAZINE HYDROCHLORIDE 20 MG/ML
5 INJECTION INTRAMUSCULAR; INTRAVENOUS EVERY 6 HOURS PRN
Status: DISCONTINUED | OUTPATIENT
Start: 2023-09-23 | End: 2023-09-25 | Stop reason: HOSPADM

## 2023-09-23 RX ORDER — HYDRALAZINE HYDROCHLORIDE 20 MG/ML
5 INJECTION INTRAMUSCULAR; INTRAVENOUS ONCE
Status: COMPLETED | OUTPATIENT
Start: 2023-09-23 | End: 2023-09-23

## 2023-09-23 RX ORDER — HYDROCODONE BITARTRATE AND ACETAMINOPHEN 5; 325 MG/1; MG/1
1 TABLET ORAL 2 TIMES DAILY
Status: DISCONTINUED | OUTPATIENT
Start: 2023-09-23 | End: 2023-09-25 | Stop reason: HOSPADM

## 2023-09-23 RX ORDER — GABAPENTIN 600 MG/1
300 TABLET ORAL 3 TIMES DAILY
Qty: 45 TABLET | Refills: 0 | Status: CANCELLED | OUTPATIENT
Start: 2023-09-23 | End: 2023-10-23

## 2023-09-23 RX ORDER — TRAZODONE HYDROCHLORIDE 150 MG/1
75 TABLET ORAL NIGHTLY
Qty: 30 TABLET | Refills: 0 | Status: CANCELLED | OUTPATIENT
Start: 2023-09-23

## 2023-09-23 RX ORDER — AMLODIPINE BESYLATE 5 MG/1
5 TABLET ORAL DAILY
Status: DISCONTINUED | OUTPATIENT
Start: 2023-09-23 | End: 2023-09-24

## 2023-09-23 RX ORDER — GABAPENTIN 600 MG/1
300 TABLET ORAL 3 TIMES DAILY
Status: DISCONTINUED | OUTPATIENT
Start: 2023-09-23 | End: 2023-09-24

## 2023-09-23 RX ADMIN — HYDROCODONE BITARTRATE AND ACETAMINOPHEN 1 TABLET: 5; 325 TABLET ORAL at 08:58

## 2023-09-23 RX ADMIN — SODIUM CHLORIDE: 9 INJECTION, SOLUTION INTRAVENOUS at 05:54

## 2023-09-23 RX ADMIN — GABAPENTIN 300 MG: 600 TABLET ORAL at 13:29

## 2023-09-23 RX ADMIN — HYDROCODONE BITARTRATE AND ACETAMINOPHEN 1 TABLET: 5; 325 TABLET ORAL at 20:23

## 2023-09-23 RX ADMIN — GABAPENTIN 300 MG: 600 TABLET ORAL at 20:23

## 2023-09-23 RX ADMIN — BUDESONIDE AND FORMOTEROL FUMARATE DIHYDRATE 2 PUFF: 160; 4.5 AEROSOL RESPIRATORY (INHALATION) at 12:47

## 2023-09-23 RX ADMIN — FENTANYL CITRATE 25 MCG: 50 INJECTION, SOLUTION INTRAMUSCULAR; INTRAVENOUS at 10:05

## 2023-09-23 RX ADMIN — AMLODIPINE BESYLATE 5 MG: 5 TABLET ORAL at 13:29

## 2023-09-23 RX ADMIN — SODIUM CHLORIDE, PRESERVATIVE FREE 10 ML: 5 INJECTION INTRAVENOUS at 07:34

## 2023-09-23 RX ADMIN — BUDESONIDE AND FORMOTEROL FUMARATE DIHYDRATE 2 PUFF: 160; 4.5 AEROSOL RESPIRATORY (INHALATION) at 19:50

## 2023-09-23 RX ADMIN — FENTANYL CITRATE 25 MCG: 50 INJECTION, SOLUTION INTRAMUSCULAR; INTRAVENOUS at 05:51

## 2023-09-23 RX ADMIN — GABAPENTIN 300 MG: 600 TABLET ORAL at 08:58

## 2023-09-23 RX ADMIN — TIOTROPIUM BROMIDE INHALATION SPRAY 2 PUFF: 3.12 SPRAY, METERED RESPIRATORY (INHALATION) at 12:47

## 2023-09-23 RX ADMIN — SODIUM CHLORIDE, PRESERVATIVE FREE 10 ML: 5 INJECTION INTRAVENOUS at 20:23

## 2023-09-23 RX ADMIN — PANTOPRAZOLE SODIUM 40 MG: 40 TABLET, DELAYED RELEASE ORAL at 15:47

## 2023-09-23 RX ADMIN — HYDRALAZINE HYDROCHLORIDE 5 MG: 20 INJECTION INTRAMUSCULAR; INTRAVENOUS at 04:34

## 2023-09-23 RX ADMIN — PROCHLORPERAZINE EDISYLATE 10 MG: 5 INJECTION INTRAMUSCULAR; INTRAVENOUS at 23:58

## 2023-09-23 RX ADMIN — ENOXAPARIN SODIUM 40 MG: 100 INJECTION SUBCUTANEOUS at 07:34

## 2023-09-23 RX ADMIN — PANTOPRAZOLE SODIUM 40 MG: 40 TABLET, DELAYED RELEASE ORAL at 06:55

## 2023-09-23 ASSESSMENT — PAIN SCALES - GENERAL
PAINLEVEL_OUTOF10: 7
PAINLEVEL_OUTOF10: 5
PAINLEVEL_OUTOF10: 7

## 2023-09-23 ASSESSMENT — PAIN DESCRIPTION - ORIENTATION
ORIENTATION: LEFT
ORIENTATION: LEFT

## 2023-09-23 ASSESSMENT — PAIN DESCRIPTION - DESCRIPTORS
DESCRIPTORS: ACHING;TENDER
DESCRIPTORS: ACHING;DISCOMFORT

## 2023-09-23 ASSESSMENT — PAIN DESCRIPTION - LOCATION
LOCATION: ARM;LEG;SHOULDER
LOCATION: ARM;LEG;SHOULDER

## 2023-09-23 ASSESSMENT — PAIN - FUNCTIONAL ASSESSMENT
PAIN_FUNCTIONAL_ASSESSMENT: PREVENTS OR INTERFERES WITH MANY ACTIVE NOT PASSIVE ACTIVITIES
PAIN_FUNCTIONAL_ASSESSMENT: PREVENTS OR INTERFERES WITH MANY ACTIVE NOT PASSIVE ACTIVITIES

## 2023-09-23 ASSESSMENT — ENCOUNTER SYMPTOMS
RESPIRATORY NEGATIVE: 1
EYES NEGATIVE: 1

## 2023-09-23 ASSESSMENT — PAIN DESCRIPTION - PAIN TYPE: TYPE: ACUTE PAIN

## 2023-09-23 NOTE — PLAN OF CARE
Problem: Respiratory - Adult  Goal: Achieves optimal ventilation and oxygenation  9/23/2023 1957 by Carmina Barrera RCP  Outcome: Progressing   BRONCHOSPASM/BRONCHOCONSTRICTION     [x]         IMPROVE AERATION/BREATH SOUNDS  [x]   ADMINISTER BRONCHODILATOR THERAPY AS APPROPRIATE  [x]   ASSESS BREATH SOUNDS  []   IMPLEMENT AEROSOL/MDI PROTOCOL  [x]   PATIENT EDUCATION AS NEEDED

## 2023-09-23 NOTE — DISCHARGE INSTRUCTIONS
You were admitted due to being found unresponsive, It is likely due to multiple medications that cause mentation issues. Please stop taking lyrica, klonopin, benadryl. Decrease gabapentin to 300 mg three times and decrease your norco to only as needed basis (less than twice daily), then taper off and do not take anymore. If you start experiencing increase sleepiness - skip your doses of gabapentin and norco.     You also tested positive for C diff, please finish course of vancomycin as prescribed. Stop taking imodium. Narcan kit is prescribed in case of norco overdose. Be sure you know how to recognize the signs of an opioid overdose in the person you are caring for. Overdose symptoms may include:   slowed breathing, or no breathing;  very small or pinpoint pupils in the eyes;  slow heartbeats; or  extreme drowsiness, especially if you are unable to wake the person from sleep. Even if you are not sure an opioid overdose has occurred, if the person is not breathing or is unresponsive, give naloxone nasal right away and then seek emergency medical care. Do not assume that an overdose episode has ended if symptoms improve. You must get emergency help after giving naloxone nasal. You may need to perform CPR (cardiopulmonary resuscitation) on the person while you are waiting for emergency help to arrive. Stay with the person and watch for continued signs of overdose. You may need to give another dose every 2 to 3 minutes until emergency help arrives. Follow all medication instructions carefully. Please make sure keep yourself hydrated.      F/u with GI  And f/u with your PCP

## 2023-09-23 NOTE — PLAN OF CARE
Problem: Discharge Planning  Goal: Discharge to home or other facility with appropriate resources  Outcome: Progressing     Problem: Pain  Goal: Verbalizes/displays adequate comfort level or baseline comfort level  Outcome: Progressing     Problem: Skin/Tissue Integrity  Goal: Absence of new skin breakdown  Description: 1. Monitor for areas of redness and/or skin breakdown  2. Assess vascular access sites hourly  3. Every 4-6 hours minimum:  Change oxygen saturation probe site  4. Every 4-6 hours:  If on nasal continuous positive airway pressure, respiratory therapy assess nares and determine need for appliance change or resting period.   9/23/2023 0836 by Gina Henry RN  Outcome: Progressing  9/23/2023 0547 by Matthias Hopper RN  Outcome: Progressing     Problem: Safety - Adult  Goal: Free from fall injury  9/23/2023 0836 by Gian Henry RN  Outcome: Progressing  9/23/2023 0547 by Matthias Hopper RN  Outcome: Progressing     Problem: Respiratory - Adult  Goal: Achieves optimal ventilation and oxygenation  9/23/2023 0836 by Gina Henry RN  Outcome: Progressing  9/22/2023 1947 by Marcia Andrade RCP  Outcome: Progressing     Problem: Nutrition Deficit:  Goal: Optimize nutritional status  Outcome: Progressing

## 2023-09-24 PROBLEM — A04.72 C. DIFFICILE COLITIS: Status: ACTIVE | Noted: 2023-09-24

## 2023-09-24 PROBLEM — F11.93 OPIATE WITHDRAWAL (HCC): Status: ACTIVE | Noted: 2023-09-24

## 2023-09-24 LAB
ANION GAP SERPL CALCULATED.3IONS-SCNC: 9 MMOL/L (ref 9–17)
BASOPHILS # BLD: <0.03 K/UL (ref 0–0.2)
BASOPHILS NFR BLD: 1 % (ref 0–2)
BUN SERPL-MCNC: 2 MG/DL (ref 6–20)
C DIFF GDH + TOXINS A+B STL QL IA.RAPID: ABNORMAL
C DIFFICILE TOXINS, PCR: ABNORMAL
CALCIUM SERPL-MCNC: 8.3 MG/DL (ref 8.6–10.4)
CHLORIDE SERPL-SCNC: 104 MMOL/L (ref 98–107)
CK SERPL-CCNC: 4112 U/L (ref 26–192)
CO2 SERPL-SCNC: 29 MMOL/L (ref 20–31)
CREAT SERPL-MCNC: 0.3 MG/DL (ref 0.5–0.9)
ECHO AO ROOT DIAM: 2.8 CM
ECHO AO ROOT INDEX: 1.58 CM/M2
ECHO AV AREA PEAK VELOCITY: 2.3 CM2
ECHO AV AREA VTI: 2.2 CM2
ECHO AV AREA/BSA PEAK VELOCITY: 1.3 CM2/M2
ECHO AV AREA/BSA VTI: 1.2 CM2/M2
ECHO AV MEAN GRADIENT: 6 MMHG
ECHO AV MEAN VELOCITY: 1.1 M/S
ECHO AV PEAK GRADIENT: 11 MMHG
ECHO AV PEAK VELOCITY: 1.7 M/S
ECHO AV VELOCITY RATIO: 0.88
ECHO AV VTI: 34.5 CM
ECHO BSA: 1.78 M2
ECHO LA AREA 2C: 22.1 CM2
ECHO LA AREA 4C: 21.7 CM2
ECHO LA DIAMETER INDEX: 1.64 CM/M2
ECHO LA DIAMETER: 2.9 CM
ECHO LA MAJOR AXIS: 6.3 CM
ECHO LA MINOR AXIS: 5.9 CM
ECHO LA TO AORTIC ROOT RATIO: 1.04
ECHO LA VOL 2C: 68 ML (ref 22–52)
ECHO LA VOL 4C: 60 ML (ref 22–52)
ECHO LA VOL BP: 65 ML (ref 22–52)
ECHO LA VOL/BSA BIPLANE: 37 ML/M2 (ref 16–34)
ECHO LA VOLUME INDEX A2C: 38 ML/M2 (ref 16–34)
ECHO LA VOLUME INDEX A4C: 34 ML/M2 (ref 16–34)
ECHO LV E' LATERAL VELOCITY: 10 CM/S
ECHO LV E' SEPTAL VELOCITY: 10 CM/S
ECHO LV EDV 3D: 132 ML
ECHO LV EDV INDEX 3D: 75 ML/M2
ECHO LV EJECTION FRACTION 3D: 61 %
ECHO LV ESV 3D: 51 ML
ECHO LV ESV INDEX 3D: 29 ML/M2
ECHO LV FRACTIONAL SHORTENING: 36 % (ref 28–44)
ECHO LV INTERNAL DIMENSION DIASTOLE INDEX: 2.37 CM/M2
ECHO LV INTERNAL DIMENSION DIASTOLIC: 4.2 CM (ref 3.9–5.3)
ECHO LV INTERNAL DIMENSION SYSTOLIC INDEX: 1.53 CM/M2
ECHO LV INTERNAL DIMENSION SYSTOLIC: 2.7 CM
ECHO LV IVSD: 0.8 CM (ref 0.6–0.9)
ECHO LV MASS 2D: 101.3 G (ref 67–162)
ECHO LV MASS 3D INDEX: 111.9 G/M2
ECHO LV MASS 3D: 198 G
ECHO LV MASS INDEX 2D: 57.2 G/M2 (ref 43–95)
ECHO LV POSTERIOR WALL DIASTOLIC: 0.8 CM (ref 0.6–0.9)
ECHO LV RELATIVE WALL THICKNESS RATIO: 0.38
ECHO LVOT AREA: 2.5 CM2
ECHO LVOT AV VTI INDEX: 0.85
ECHO LVOT DIAM: 1.8 CM
ECHO LVOT MEAN GRADIENT: 4 MMHG
ECHO LVOT PEAK GRADIENT: 9 MMHG
ECHO LVOT PEAK VELOCITY: 1.5 M/S
ECHO LVOT STROKE VOLUME INDEX: 42 ML/M2
ECHO LVOT SV: 74.3 ML
ECHO LVOT VTI: 29.2 CM
ECHO MV A VELOCITY: 0.8 M/S
ECHO MV AREA VTI: 2.5 CM2
ECHO MV E DECELERATION TIME (DT): 297 MS
ECHO MV E VELOCITY: 0.94 M/S
ECHO MV E/A RATIO: 1.18
ECHO MV E/E' LATERAL: 9.4
ECHO MV E/E' RATIO (AVERAGED): 9.4
ECHO MV E/E' SEPTAL: 9.4
ECHO MV LVOT VTI INDEX: 1
ECHO MV MAX VELOCITY: 1 M/S
ECHO MV MEAN GRADIENT: 2 MMHG
ECHO MV MEAN VELOCITY: 0.6 M/S
ECHO MV PEAK GRADIENT: 4 MMHG
ECHO MV VTI: 29.3 CM
ECHO PV MAX VELOCITY: 1 M/S
ECHO PV PEAK GRADIENT: 4 MMHG
ECHO RV FREE WALL PEAK S': 11 CM/S
ECHO RV INTERNAL DIMENSION: 3.7 CM
ECHO RV TAPSE: 2.4 CM (ref 1.7–?)
ECHO TV REGURGITANT MAX VELOCITY: 2.55 M/S
ECHO TV REGURGITANT PEAK GRADIENT: 26 MMHG
EOSINOPHIL # BLD: 0.1 K/UL (ref 0–0.44)
EOSINOPHILS RELATIVE PERCENT: 2 % (ref 1–4)
ERYTHROCYTE [DISTWIDTH] IN BLOOD BY AUTOMATED COUNT: 17.5 % (ref 11.8–14.4)
GFR SERPL CREATININE-BSD FRML MDRD: >60 ML/MIN/1.73M2
GLUCOSE SERPL-MCNC: 109 MG/DL (ref 70–99)
HCT VFR BLD AUTO: 36.9 % (ref 36.3–47.1)
HGB BLD-MCNC: 11.1 G/DL (ref 11.9–15.1)
IMM GRANULOCYTES # BLD AUTO: <0.03 K/UL (ref 0–0.3)
IMM GRANULOCYTES NFR BLD: 1 %
LYMPHOCYTES NFR BLD: 0.89 K/UL (ref 1.1–3.7)
LYMPHOCYTES RELATIVE PERCENT: 20 % (ref 24–43)
MAGNESIUM SERPL-MCNC: 1.8 MG/DL (ref 1.6–2.6)
MCH RBC QN AUTO: 23.5 PG (ref 25.2–33.5)
MCHC RBC AUTO-ENTMCNC: 30.1 G/DL (ref 28.4–34.8)
MCV RBC AUTO: 78 FL (ref 82.6–102.9)
MONOCYTES NFR BLD: 0.38 K/UL (ref 0.1–1.2)
MONOCYTES NFR BLD: 9 % (ref 3–12)
NEUTROPHILS NFR BLD: 68 % (ref 36–65)
NEUTS SEG NFR BLD: 2.96 K/UL (ref 1.5–8.1)
NRBC BLD-RTO: 0 PER 100 WBC
PLATELET # BLD AUTO: 214 K/UL (ref 138–453)
PMV BLD AUTO: 10.3 FL (ref 8.1–13.5)
POTASSIUM SERPL-SCNC: 2.9 MMOL/L (ref 3.7–5.3)
POTASSIUM SERPL-SCNC: 3.4 MMOL/L (ref 3.7–5.3)
RBC # BLD AUTO: 4.73 M/UL (ref 3.95–5.11)
RBC # BLD: ABNORMAL 10*6/UL
REASON FOR REJECTION: NORMAL
SODIUM SERPL-SCNC: 142 MMOL/L (ref 135–144)
SPECIMEN DESCRIPTION: ABNORMAL
SPECIMEN DESCRIPTION: ABNORMAL
SPECIMEN SOURCE: NORMAL
WBC OTHER # BLD: 4.4 K/UL (ref 3.5–11.3)
ZZ NTE CLEAN UP: ORDERED TEST: NORMAL

## 2023-09-24 PROCEDURE — 99233 SBSQ HOSP IP/OBS HIGH 50: CPT

## 2023-09-24 PROCEDURE — 2580000003 HC RX 258

## 2023-09-24 PROCEDURE — 83735 ASSAY OF MAGNESIUM: CPT

## 2023-09-24 PROCEDURE — 6370000000 HC RX 637 (ALT 250 FOR IP)

## 2023-09-24 PROCEDURE — 84132 ASSAY OF SERUM POTASSIUM: CPT

## 2023-09-24 PROCEDURE — 6360000002 HC RX W HCPCS

## 2023-09-24 PROCEDURE — 82550 ASSAY OF CK (CPK): CPT

## 2023-09-24 PROCEDURE — 36415 COLL VENOUS BLD VENIPUNCTURE: CPT

## 2023-09-24 PROCEDURE — 99233 SBSQ HOSP IP/OBS HIGH 50: CPT | Performed by: INTERNAL MEDICINE

## 2023-09-24 PROCEDURE — 2060000000 HC ICU INTERMEDIATE R&B

## 2023-09-24 PROCEDURE — 94761 N-INVAS EAR/PLS OXIMETRY MLT: CPT

## 2023-09-24 PROCEDURE — 94640 AIRWAY INHALATION TREATMENT: CPT

## 2023-09-24 PROCEDURE — 80048 BASIC METABOLIC PNL TOTAL CA: CPT

## 2023-09-24 PROCEDURE — 6370000000 HC RX 637 (ALT 250 FOR IP): Performed by: INTERNAL MEDICINE

## 2023-09-24 PROCEDURE — 85025 COMPLETE CBC W/AUTO DIFF WBC: CPT

## 2023-09-24 RX ORDER — POTASSIUM CHLORIDE 20 MEQ/1
40 TABLET, EXTENDED RELEASE ORAL ONCE
Status: COMPLETED | OUTPATIENT
Start: 2023-09-24 | End: 2023-09-24

## 2023-09-24 RX ORDER — AMLODIPINE BESYLATE 10 MG/1
10 TABLET ORAL DAILY
Status: DISCONTINUED | OUTPATIENT
Start: 2023-09-24 | End: 2023-09-25 | Stop reason: HOSPADM

## 2023-09-24 RX ORDER — POTASSIUM CHLORIDE 7.45 MG/ML
10 INJECTION INTRAVENOUS
Status: DISPENSED | OUTPATIENT
Start: 2023-09-24 | End: 2023-09-24

## 2023-09-24 RX ORDER — POTASSIUM CHLORIDE 20 MEQ/1
20 TABLET, EXTENDED RELEASE ORAL ONCE
Status: COMPLETED | OUTPATIENT
Start: 2023-09-24 | End: 2023-09-24

## 2023-09-24 RX ORDER — TRAZODONE HYDROCHLORIDE 50 MG/1
75 TABLET ORAL NIGHTLY
Status: DISCONTINUED | OUTPATIENT
Start: 2023-09-24 | End: 2023-09-25 | Stop reason: HOSPADM

## 2023-09-24 RX ORDER — VANCOMYCIN HYDROCHLORIDE 125 MG/1
125 CAPSULE ORAL 4 TIMES DAILY
Status: DISCONTINUED | OUTPATIENT
Start: 2023-09-24 | End: 2023-09-25 | Stop reason: HOSPADM

## 2023-09-24 RX ORDER — GABAPENTIN 400 MG/1
400 CAPSULE ORAL 3 TIMES DAILY
Status: DISCONTINUED | OUTPATIENT
Start: 2023-09-24 | End: 2023-09-25 | Stop reason: HOSPADM

## 2023-09-24 RX ADMIN — VANCOMYCIN HYDROCHLORIDE 125 MG: 125 CAPSULE ORAL at 14:05

## 2023-09-24 RX ADMIN — SODIUM CHLORIDE, PRESERVATIVE FREE 10 ML: 5 INJECTION INTRAVENOUS at 17:48

## 2023-09-24 RX ADMIN — POTASSIUM CHLORIDE 40 MEQ: 1500 TABLET, EXTENDED RELEASE ORAL at 11:19

## 2023-09-24 RX ADMIN — TRAZODONE HYDROCHLORIDE 75 MG: 50 TABLET ORAL at 20:25

## 2023-09-24 RX ADMIN — AMLODIPINE BESYLATE 10 MG: 5 TABLET ORAL at 08:07

## 2023-09-24 RX ADMIN — ENOXAPARIN SODIUM 40 MG: 100 INJECTION SUBCUTANEOUS at 08:07

## 2023-09-24 RX ADMIN — TIOTROPIUM BROMIDE INHALATION SPRAY 2 PUFF: 3.12 SPRAY, METERED RESPIRATORY (INHALATION) at 08:08

## 2023-09-24 RX ADMIN — SODIUM CHLORIDE, PRESERVATIVE FREE 10 ML: 5 INJECTION INTRAVENOUS at 20:26

## 2023-09-24 RX ADMIN — HYDROCODONE BITARTRATE AND ACETAMINOPHEN 1 TABLET: 5; 325 TABLET ORAL at 08:29

## 2023-09-24 RX ADMIN — PANTOPRAZOLE SODIUM 40 MG: 40 TABLET, DELAYED RELEASE ORAL at 17:41

## 2023-09-24 RX ADMIN — POTASSIUM CHLORIDE 20 MEQ: 1500 TABLET, EXTENDED RELEASE ORAL at 15:40

## 2023-09-24 RX ADMIN — HYDROCODONE BITARTRATE AND ACETAMINOPHEN 1 TABLET: 5; 325 TABLET ORAL at 20:25

## 2023-09-24 RX ADMIN — BUDESONIDE AND FORMOTEROL FUMARATE DIHYDRATE 2 PUFF: 160; 4.5 AEROSOL RESPIRATORY (INHALATION) at 08:08

## 2023-09-24 RX ADMIN — PANTOPRAZOLE SODIUM 40 MG: 40 TABLET, DELAYED RELEASE ORAL at 08:05

## 2023-09-24 RX ADMIN — POTASSIUM CHLORIDE 40 MEQ: 1500 TABLET, EXTENDED RELEASE ORAL at 14:05

## 2023-09-24 RX ADMIN — VANCOMYCIN HYDROCHLORIDE 125 MG: 125 CAPSULE ORAL at 20:25

## 2023-09-24 RX ADMIN — FENTANYL CITRATE 25 MCG: 50 INJECTION, SOLUTION INTRAMUSCULAR; INTRAVENOUS at 17:46

## 2023-09-24 RX ADMIN — GABAPENTIN 300 MG: 600 TABLET ORAL at 08:05

## 2023-09-24 RX ADMIN — FENTANYL CITRATE 25 MCG: 50 INJECTION, SOLUTION INTRAMUSCULAR; INTRAVENOUS at 12:26

## 2023-09-24 RX ADMIN — VANCOMYCIN HYDROCHLORIDE 125 MG: 125 CAPSULE ORAL at 17:42

## 2023-09-24 RX ADMIN — GABAPENTIN 400 MG: 400 CAPSULE ORAL at 20:25

## 2023-09-24 RX ADMIN — GABAPENTIN 400 MG: 400 CAPSULE ORAL at 14:09

## 2023-09-24 RX ADMIN — POTASSIUM CHLORIDE 40 MEQ: 1500 TABLET, EXTENDED RELEASE ORAL at 08:06

## 2023-09-24 RX ADMIN — VANCOMYCIN HYDROCHLORIDE 125 MG: 125 CAPSULE ORAL at 09:03

## 2023-09-24 ASSESSMENT — PAIN SCALES - GENERAL
PAINLEVEL_OUTOF10: 6
PAINLEVEL_OUTOF10: 7
PAINLEVEL_OUTOF10: 7
PAINLEVEL_OUTOF10: 6
PAINLEVEL_OUTOF10: 7
PAINLEVEL_OUTOF10: 4
PAINLEVEL_OUTOF10: 9

## 2023-09-24 ASSESSMENT — PAIN - FUNCTIONAL ASSESSMENT
PAIN_FUNCTIONAL_ASSESSMENT: PREVENTS OR INTERFERES SOME ACTIVE ACTIVITIES AND ADLS
PAIN_FUNCTIONAL_ASSESSMENT: PREVENTS OR INTERFERES SOME ACTIVE ACTIVITIES AND ADLS
PAIN_FUNCTIONAL_ASSESSMENT: ACTIVITIES ARE NOT PREVENTED

## 2023-09-24 ASSESSMENT — PAIN DESCRIPTION - LOCATION
LOCATION: HIP;LEG
LOCATION: HEAD;JAW

## 2023-09-24 ASSESSMENT — PAIN DESCRIPTION - DESCRIPTORS
DESCRIPTORS: ACHING;DISCOMFORT;SHOOTING
DESCRIPTORS: ACHING;DISCOMFORT;SORE;THROBBING
DESCRIPTORS: ACHING;DISCOMFORT;NAGGING;TENDER

## 2023-09-24 ASSESSMENT — PAIN DESCRIPTION - PAIN TYPE: TYPE: ACUTE PAIN

## 2023-09-24 ASSESSMENT — PAIN DESCRIPTION - ORIENTATION: ORIENTATION: LEFT

## 2023-09-24 NOTE — PLAN OF CARE
Problem: Discharge Planning  Goal: Discharge to home or other facility with appropriate resources  Outcome: [facility] Progressing  Flowsheets  Taken 9/24/2023 1600  Discharge to home or other facility with appropriate resources:   Identify barriers to discharge with patient and caregiver   Arrange for needed discharge resources and transportation as appropriate   Identify discharge learning needs (meds, wound care, etc)  Taken 9/24/2023 1200  Discharge to home or other facility with appropriate resources:   Identify barriers to discharge with patient and caregiver   Arrange for needed discharge resources and transportation as appropriate   Identify discharge learning needs (meds, wound care, etc)  Taken 9/24/2023 0800  Discharge to home or other facility with appropriate resources:   Identify barriers to discharge with patient and caregiver   Arrange for needed discharge resources and transportation as appropriate     Problem: Pain  Goal: Verbalizes/displays adequate comfort level or baseline comfort level  Outcome: /[facility] Progressing     Problem: Skin/Tissue Integrity  Goal: Absence of new skin breakdown  Description: 1. Monitor for areas of redness and/or skin breakdown  2. Assess vascular access sites hourly  3. Every 4-6 hours minimum:  Change oxygen saturation probe site  4. Every 4-6 hours:  If on nasal continuous positive airway pressure, respiratory therapy assess nares and determine need for appliance change or resting period.   Outcome: [address] [place] [address] Progressing     Problem: Safety - Adult  Goal: Free from fall injury  Outcome: [address] [place] [address] Progressing     Problem: Respiratory - Adult  Goal: Achieves optimal ventilation and oxygenation  9/24/2023 1657 by Farhan Christian RN  Outcome: [facility] 114 Progressing  Flowsheets  Taken 9/24/2023 1600  Achieves optimal ventilation and oxygenation:   Assess for changes in respiratory status   Assess for changes in mentation and behavior   Position to facilitate oxygenation and

## 2023-09-24 NOTE — PLAN OF CARE
Problem: Discharge Planning  Goal: Discharge to home or other facility with appropriate resources  9/23/2023 2125 by Stacy Salter RN  Outcome: Progressing  Flowsheets (Taken 9/23/2023 2000)  Discharge to home or other facility with appropriate resources:   Identify barriers to discharge with patient and caregiver   Arrange for needed discharge resources and transportation as appropriate   Identify discharge learning needs (meds, wound care, etc)  9/23/2023 0836 by Matthias Pollard RN  Outcome: Progressing     Problem: Pain  Goal: Verbalizes/displays adequate comfort level or baseline comfort level  9/23/2023 2125 by Stacy Salter RN  Outcome: Progressing  Flowsheets (Taken 9/23/2023 2030)  Verbalizes/displays adequate comfort level or baseline comfort level:   Encourage patient to monitor pain and request assistance   Assess pain using appropriate pain scale   Administer analgesics based on type and severity of pain and evaluate response   Implement non-pharmacological measures as appropriate and evaluate response   Consider cultural and social influences on pain and pain management  9/23/2023 0836 by Matthias Pollard RN  Outcome: Progressing     Problem: Skin/Tissue Integrity  Goal: Absence of new skin breakdown  Description: 1. Monitor for areas of redness and/or skin breakdown  2. Assess vascular access sites hourly  3. Every 4-6 hours minimum:  Change oxygen saturation probe site  4. Every 4-6 hours:  If on nasal continuous positive airway pressure, respiratory therapy assess nares and determine need for appliance change or resting period.   9/23/2023 2125 by Stacy Salter RN  Outcome: Progressing  9/23/2023 0836 by Matthias Pollard RN  Outcome: Progressing     Problem: Safety - Adult  Goal: Free from fall injury  9/23/2023 2125 by Stacy Salter RN  Outcome: Aiyana Mendez (Taken 9/23/2023 2000)  Free From Fall Injury: Instruct family/caregiver on patient safety  9/23/2023 0836 by Case Macedo

## 2023-09-24 NOTE — CARE COORDINATION
Case Management Assessment  Initial Evaluation    Date/Time of Evaluation: 9/24/2023 10:30 AM  Assessment Completed by: Marshal Delgado RN    If patient is discharged prior to next notation, then this note serves as note for discharge by case management. Patient Name: Kalen Maddox                   YOB: 1965  Diagnosis: Syncope and collapse [R55]  Rhabdomyolysis [M62.82]  Elevated troponin [R77.8]  MERARY (acute kidney injury) ([address]) [N17.9]  Non-traumatic rhabdomyolysis [M62.82]  Gastrointestinal hemorrhage with hematemesis [K92.0]                   Date / Time: 9/21/2023 12:46 PM    Patient Admission Status: Inpatient   Readmission Risk (Low < 19, Mod (19-27), High > 27): Readmission Risk Score: 32.4    Current PCP: Carmita Chris, DO  PCP verified by ? Yes    Chart Reviewed: Yes      History Provided by: Patient  Patient Orientation: Alert and Oriented    Patient Cognition: Alert    Hospitalization in the last 30 days (Readmission):  No    If yes, Readmission Assessment in CM Navigator will be completed. Advance Directives:      Code Status: Full Code   Patient's Primary Decision Maker is: Legal Next of Kin      Discharge Planning:    Patient lives with: Alone Type of Home: Apartment  Primary Care Giver: Self  Patient Support Systems include: Friends/Neighbors   Current Financial resources: Medicaid  Current community resources:    Current services prior to admission: Durable Medical Equipment            Current DME: Serena Caldwell            Type of Home Care services:  None    ADLS  Prior functional level: Independent in ADLs/IADLs  Current functional level: Independent in ADLs/IADLs    PT AM-PAC: 12 /24  OT AM-PAC:   /24    Family can provide assistance at DC: Other (comment) (friends)  Would you like Case Management to discuss the discharge plan with any other family members/significant others, and if so, who?     Plans to Return to Present Housing: Yes  Other Identified

## 2023-09-24 NOTE — PLAN OF CARE
Problem: Respiratory - Adult  Goal: Achieves optimal ventilation and oxygenation  9/24/2023 0812 by Warner Steinberg RCP  Flowsheets (Taken 9/24/2023 1716)  Achieves optimal ventilation and oxygenation: Respiratory therapy support as indicated

## 2023-09-25 VITALS
BODY MASS INDEX: 24.11 KG/M2 | HEART RATE: 81 BPM | SYSTOLIC BLOOD PRESSURE: 130 MMHG | WEIGHT: 150 LBS | DIASTOLIC BLOOD PRESSURE: 69 MMHG | OXYGEN SATURATION: 95 % | RESPIRATION RATE: 16 BRPM | HEIGHT: 66 IN | TEMPERATURE: 98.2 F

## 2023-09-25 PROBLEM — E87.6 HYPOKALEMIA DUE TO EXCESSIVE GASTROINTESTINAL LOSS OF POTASSIUM: Status: RESOLVED | Noted: 2017-02-05 | Resolved: 2023-09-25

## 2023-09-25 PROBLEM — R55 SYNCOPE: Status: RESOLVED | Noted: 2022-10-22 | Resolved: 2023-09-25

## 2023-09-25 PROBLEM — F11.93 OPIATE WITHDRAWAL (HCC): Status: RESOLVED | Noted: 2023-09-24 | Resolved: 2023-09-25

## 2023-09-25 PROBLEM — M62.82 RHABDOMYOLYSIS: Status: RESOLVED | Noted: 2022-10-22 | Resolved: 2023-09-25

## 2023-09-25 PROBLEM — M62.82 NON-TRAUMATIC RHABDOMYOLYSIS: Status: ACTIVE | Noted: 2023-09-25

## 2023-09-25 PROBLEM — K92.0 GASTROINTESTINAL HEMORRHAGE WITH HEMATEMESIS: Status: ACTIVE | Noted: 2023-09-25

## 2023-09-25 PROBLEM — I21.4 NSTEMI (NON-ST ELEVATED MYOCARDIAL INFARCTION) (HCC): Status: RESOLVED | Noted: 2023-09-22 | Resolved: 2023-09-25

## 2023-09-25 LAB
ANION GAP SERPL CALCULATED.3IONS-SCNC: 8 MMOL/L (ref 9–17)
BUN SERPL-MCNC: 5 MG/DL (ref 6–20)
CALCIUM SERPL-MCNC: 8.8 MG/DL (ref 8.6–10.4)
CHLORIDE SERPL-SCNC: 106 MMOL/L (ref 98–107)
CO2 SERPL-SCNC: 26 MMOL/L (ref 20–31)
CREAT SERPL-MCNC: 0.5 MG/DL (ref 0.5–0.9)
GFR SERPL CREATININE-BSD FRML MDRD: >60 ML/MIN/1.73M2
GLUCOSE SERPL-MCNC: 91 MG/DL (ref 70–99)
POTASSIUM SERPL-SCNC: 3.9 MMOL/L (ref 3.7–5.3)
SODIUM SERPL-SCNC: 140 MMOL/L (ref 135–144)

## 2023-09-25 PROCEDURE — 94640 AIRWAY INHALATION TREATMENT: CPT

## 2023-09-25 PROCEDURE — 6370000000 HC RX 637 (ALT 250 FOR IP)

## 2023-09-25 PROCEDURE — 6360000002 HC RX W HCPCS

## 2023-09-25 PROCEDURE — 99254 IP/OBS CNSLTJ NEW/EST MOD 60: CPT | Performed by: INTERNAL MEDICINE

## 2023-09-25 PROCEDURE — 94760 N-INVAS EAR/PLS OXIMETRY 1: CPT

## 2023-09-25 PROCEDURE — 2580000003 HC RX 258

## 2023-09-25 PROCEDURE — 36415 COLL VENOUS BLD VENIPUNCTURE: CPT

## 2023-09-25 PROCEDURE — 99232 SBSQ HOSP IP/OBS MODERATE 35: CPT | Performed by: INTERNAL MEDICINE

## 2023-09-25 PROCEDURE — 6370000000 HC RX 637 (ALT 250 FOR IP): Performed by: INTERNAL MEDICINE

## 2023-09-25 PROCEDURE — 80048 BASIC METABOLIC PNL TOTAL CA: CPT

## 2023-09-25 RX ORDER — TRAZODONE HYDROCHLORIDE 150 MG/1
75 TABLET ORAL NIGHTLY
Qty: 15 TABLET | Refills: 0 | Status: SHIPPED | OUTPATIENT
Start: 2023-09-25 | End: 2023-10-25

## 2023-09-25 RX ORDER — VANCOMYCIN HYDROCHLORIDE 125 MG/1
125 CAPSULE ORAL 4 TIMES DAILY
Qty: 52 CAPSULE | Refills: 0 | Status: CANCELLED | OUTPATIENT
Start: 2023-09-25 | End: 2023-10-08

## 2023-09-25 RX ORDER — VANCOMYCIN HYDROCHLORIDE 125 MG/1
125 CAPSULE ORAL 4 TIMES DAILY
Qty: 56 CAPSULE | Refills: 0 | Status: SHIPPED | OUTPATIENT
Start: 2023-09-25 | End: 2023-10-09

## 2023-09-25 RX ORDER — PANTOPRAZOLE SODIUM 40 MG/1
40 TABLET, DELAYED RELEASE ORAL
Qty: 30 TABLET | Refills: 3 | Status: CANCELLED | OUTPATIENT
Start: 2023-09-25

## 2023-09-25 RX ORDER — GABAPENTIN 600 MG/1
300 TABLET ORAL 3 TIMES DAILY
Status: DISCONTINUED | OUTPATIENT
Start: 2023-09-25 | End: 2023-09-25

## 2023-09-25 RX ORDER — HYDROCODONE BITARTRATE AND ACETAMINOPHEN 5; 325 MG/1; MG/1
1 TABLET ORAL 2 TIMES DAILY PRN
Qty: 6 TABLET | Refills: 0 | Status: CANCELLED | OUTPATIENT
Start: 2023-09-25 | End: 2023-09-28

## 2023-09-25 RX ORDER — AMLODIPINE BESYLATE 10 MG/1
10 TABLET ORAL DAILY
Qty: 30 TABLET | Refills: 3 | Status: SHIPPED | OUTPATIENT
Start: 2023-09-25

## 2023-09-25 RX ORDER — POTASSIUM CHLORIDE 20 MEQ/1
20 TABLET, EXTENDED RELEASE ORAL ONCE
Status: COMPLETED | OUTPATIENT
Start: 2023-09-25 | End: 2023-09-25

## 2023-09-25 RX ORDER — GABAPENTIN 400 MG/1
400 CAPSULE ORAL 3 TIMES DAILY
Qty: 90 CAPSULE | Refills: 0 | Status: SHIPPED | OUTPATIENT
Start: 2023-09-25 | End: 2023-10-25

## 2023-09-25 RX ADMIN — VANCOMYCIN HYDROCHLORIDE 125 MG: 125 CAPSULE ORAL at 13:23

## 2023-09-25 RX ADMIN — SODIUM CHLORIDE, PRESERVATIVE FREE 10 ML: 5 INJECTION INTRAVENOUS at 08:10

## 2023-09-25 RX ADMIN — GABAPENTIN 400 MG: 400 CAPSULE ORAL at 13:23

## 2023-09-25 RX ADMIN — GABAPENTIN 400 MG: 400 CAPSULE ORAL at 08:09

## 2023-09-25 RX ADMIN — AMLODIPINE BESYLATE 10 MG: 5 TABLET ORAL at 08:09

## 2023-09-25 RX ADMIN — BUDESONIDE AND FORMOTEROL FUMARATE DIHYDRATE 2 PUFF: 160; 4.5 AEROSOL RESPIRATORY (INHALATION) at 08:08

## 2023-09-25 RX ADMIN — POTASSIUM CHLORIDE 20 MEQ: 1500 TABLET, EXTENDED RELEASE ORAL at 08:09

## 2023-09-25 RX ADMIN — TIOTROPIUM BROMIDE INHALATION SPRAY 2 PUFF: 3.12 SPRAY, METERED RESPIRATORY (INHALATION) at 08:08

## 2023-09-25 RX ADMIN — PROCHLORPERAZINE EDISYLATE 10 MG: 5 INJECTION INTRAMUSCULAR; INTRAVENOUS at 11:37

## 2023-09-25 RX ADMIN — FENTANYL CITRATE 25 MCG: 50 INJECTION, SOLUTION INTRAMUSCULAR; INTRAVENOUS at 11:37

## 2023-09-25 RX ADMIN — ENOXAPARIN SODIUM 40 MG: 100 INJECTION SUBCUTANEOUS at 08:09

## 2023-09-25 RX ADMIN — PANTOPRAZOLE SODIUM 40 MG: 40 TABLET, DELAYED RELEASE ORAL at 08:09

## 2023-09-25 RX ADMIN — HYDROCODONE BITARTRATE AND ACETAMINOPHEN 1 TABLET: 5; 325 TABLET ORAL at 08:09

## 2023-09-25 RX ADMIN — VANCOMYCIN HYDROCHLORIDE 125 MG: 125 CAPSULE ORAL at 08:50

## 2023-09-25 RX ADMIN — FENTANYL CITRATE 25 MCG: 50 INJECTION, SOLUTION INTRAMUSCULAR; INTRAVENOUS at 04:32

## 2023-09-25 ASSESSMENT — PAIN DESCRIPTION - LOCATION: LOCATION: HIP;LEG

## 2023-09-25 ASSESSMENT — ENCOUNTER SYMPTOMS
EYES NEGATIVE: 1
COLOR CHANGE: 0
APNEA: 0
EYE DISCHARGE: 0
RESPIRATORY NEGATIVE: 1
ABDOMINAL PAIN: 1
DIARRHEA: 1

## 2023-09-25 ASSESSMENT — PAIN DESCRIPTION - DESCRIPTORS: DESCRIPTORS: SHARP

## 2023-09-25 ASSESSMENT — PAIN DESCRIPTION - ORIENTATION: ORIENTATION: LEFT

## 2023-09-25 ASSESSMENT — PAIN SCALES - GENERAL
PAINLEVEL_OUTOF10: 8
PAINLEVEL_OUTOF10: 8

## 2023-09-25 NOTE — PLAN OF CARE
Problem: Safety - Adult  Goal: Free from fall injury  9/25/2023 1329 by Tracy Meraz RN  Outcome: Adequate for Discharge  9/25/2023 0806 by Tracy Meraz RN  Outcome: Progressing  9/25/2023 0038 by Kalpana Zapata RN  Outcome: Progressing     Problem: Pain  Goal: Verbalizes/displays adequate comfort level or baseline comfort level  9/25/2023 1329 by Tracy Meraz RN  Outcome: Adequate for Discharge  9/25/2023 0806 by Tracy Meraz RN  Outcome: Progressing  9/25/2023 0038 by Kalpana Zapata RN  Outcome: Progressing

## 2023-09-25 NOTE — CONSULTS
[facility] CONSULT    Patient:   Alena Mckenna   :    1965   Facility:   [facility]   Date:    2023  Admission Dx:  Syncope and collapse [R55]  Rhabdomyolysis [M62.82]  Elevated troponin [R77.8]  MERARY (acute kidney injury) ([address]) [N17.9]  Non-traumatic rhabdomyolysis [M62.82]  Gastrointestinal hemorrhage with hematemesis [K92.0]  Requesting physician: Francesca Ravi MD  Reason for consult:  Coffee-ground emesis  CC : Altered mental status-found down at home    [address] [facility]  This is a 60 y.o.  female who was admitted 2023 with Syncope and collapse [R55]  Rhabdomyolysis [M62.82]  Elevated troponin [R77.8]  MERARY (acute kidney injury) ([address]) [N17.9]  Non-traumatic rhabdomyolysis [M62.82]  Gastrointestinal hemorrhage with hematemesis [K92.0]. We have been asked to see the patient in consultation by Francesca Ravi MD for coffee ground emesis    79-year-old female who presented to the ED after being found down at home in her bathroom by a neighbor. Downtime unknown. P patient was reported to have bloody vomitus around her lips upon admission prompting GI consult. Labs revealed POC lactic acid 4.2, myoglobin 8310, CK 9133, Hgb 12.5 g/dL (base line ~ 9), troponin 87. Drug tox was positive for amphetamines, benzodiazepine, opiates, oxycodone, and fentanyl (patient did receive Fentanyl prior to collection of drug screen). Patient reports she does not recall what happened. She does not recall having any emesis in the ED. She denies having any issues with nausea or vomiting prior to her fall. She denies any melena at home. She reports her bowel movements have been formed and no recent diarrhea since she was last seen by GI in mid to late July. She is currently complaining of left arm and left hip pain.       PMH of anxiety, bipolar disorder, polysubstance abuse, HTN,
[facility] -   Infectious diseases evaluation  admission date 9/21/2023    reason for consultation:   C diff     Impression :   Current:  C diff diarrhea  Chron's  Prolonged QTC  NSTEMI II  Urine retention - resolved  Syncope possibly polypharmacy  RUL infiltrate, resolving groundglass opacity    Other:    Discussion / summary of stay / plan of care     Recommendations   I will treat this as a C. difficile, 14 days of p.o. vancomycin  Unfortunately it will be difficult to go a short response watching her diarrhea due to the underlying Crohn's  Chart reconciled, she is okay for discharge from infectious disease standpoint    Infection Control Recommendations   Cassel Precautions  Contact Isolation       Antimicrobial Stewardship Recommendations   Simplification of therapy  Targeted therapy      History of Present Illness:   Initial history:  Karie Corley is a 60y.o.-year-old female initially on 9/29 by EMS after found down on the floor, ultimately and was complaining of a left-sided pain and abdominal pain  Found to have an elevated CPK, CT chest showed right upper lobe  resolving  groundglass opacity  GI evaluated the patient for coffee-ground emesis, nothing to be done  Cardiology evaluate the patient for arrhythmia,/A-fib,    She developed diarrhea, and has a history of Crohn's disease, tested for C. difficile intermittent, started on p.o. Vanco and infectious disease was consulted. Past history of asthma, COPD Crohn's  . Crohn's has been diagnosed in the past and I told her she does not and she is still in the process of finalizing her diagnosis as she says.   She has not received any treatment for Crohn's so far    The patient tells me she has had diarrhea, but noticed that over the last few weeks it has been worse than before, there was also associated increase in the abdominal pain, all of that she attributed to her usual diarrhea and her usual
09/21/23  1313 09/21/23  2144 09/22/23  0153   WBC 15.4*  --  8.9   HGB 12.7 11.4* 10.5*   HCT 43.5 40.1 36.4   PLT See Reflexed IPF Result  --  240     BMP:   Recent Labs     09/21/23  1620 09/22/23  0153    139   K 4.4 4.6   CO2 22 23   BUN 17 12   CREATININE 1.4* 1.0*   LABGLOM 43* >60   GLUCOSE 113* 119*     BNP: No results for input(s): \"BNP\" in the last 72 hours. PT/INR:   Recent Labs     09/21/23  2144   PROTIME 13.8   INR 1.1     APTT:No results for input(s): \"APTT\" in the last 72 hours.   CARDIAC ENZYMES:  Recent Labs     09/21/23  1620 09/22/23  0153   CKTOTAL 9,133* 9,045*     FASTING LIPID PANEL:No results found for: \"HDL\", \"LDLDIRECT\", \"LDLCALC\", \"TRIG\"  LIVER PROFILE:  Recent Labs     09/21/23  1620 09/22/23  0153   AST 52* 60*   ALT 21 22   LABALBU 3.6 3.3*       IMPRESSION:    Patient Active Problem List   Diagnosis    Gastroesophageal reflux disease without esophagitis    Gastroenteritis    Drug abuse ([address] [facility])    COPD (chronic obstructive pulmonary disease) ([address] [facility])    Hypertension    Hypokalemia    Hypothyroidism due to acquired atrophy of thyroid    Crohn disease ([address] )    Acute cystitis without hematuria    Accidental drug overdose    Prolonged Q-T interval on ECG    Bipolar disorder, unspecified ([facility])    Polysubstance abuse ([address])    Alcohol intoxication delirium ([address] [facility])    Cocaine abuse ([address])    Acute respiratory failure with hypoxia ([address] [facility])    Bipolar disorder with psychotic features (HCC)    Diarrhea    Chronic bronchitis (HCC)    Chronic renal insufficiency, stage III (moderate) (HCC)    Chronic diarrhea    Anxiety    Osteoarthritis resulting from left hip dysplasia    Shortness of breath    Elevated brain natriuretic peptide (BNP) level    Pneumonia of both lower lobes due to infectious organism    Nausea and vomiting    Acute hypokalemia    Hypomagnesemia    Intractable vomiting    Abdominal pain    Nausea vomiting and diarrhea    Falls frequently    Orthostatic hypotension    Hypotension

## 2023-09-25 NOTE — CARE COORDINATION
Discharge [address] Case Management Department  Written by: French Antoine RN    Patient Name: Bandar Ray  Attending Provider: Marge Valverde MD  Admit Date: 2023 12:46 PM  MRN: [id number]  Account: [de-identified]                     : 1965  Discharge Date: 23      Disposition: home    Met with patient to discuss transitional planning. Plan is home independently. Patient needs w/c ambulette home. Patient usually uses electric scooter at home but patient does not have it at bedside and not able to get in and out of cab without assistance. Patient lives in an apartment with elevator access. [facility]. Patient agreeable to plan.      French Antoine RN

## 2023-09-25 NOTE — PLAN OF CARE
Problem: Safety - Adult  Goal: Free from fall injury  9/25/2023 0806 by Avinash Pierce RN  Outcome: Progressing  9/25/2023 0038 by Jacy Finley RN  Outcome: Progressing     Problem: Pain  Goal: Verbalizes/displays adequate comfort level or baseline comfort level  9/25/2023 0806 by Avinash Pierce RN  Outcome: Progressing  9/25/2023 0038 by Jacy Finley RN  Outcome: Progressing

## 2023-09-29 DIAGNOSIS — Z96.642 S/P TOTAL LEFT HIP ARTHROPLASTY: ICD-10-CM

## 2023-09-29 RX ORDER — HYDROCODONE BITARTRATE AND ACETAMINOPHEN 5; 325 MG/1; MG/1
1 TABLET ORAL EVERY 8 HOURS PRN
Qty: 42 TABLET | Refills: 0 | Status: SHIPPED | OUTPATIENT
Start: 2023-09-29 | End: 2023-10-13

## 2023-10-21 PROBLEM — R79.89 ELEVATED TROPONIN: Status: RESOLVED | Noted: 2023-09-21 | Resolved: 2023-10-21

## 2024-05-06 NOTE — PLAN OF CARE
Hold methotrexate and xeljanz   Problem: Skin Integrity:  Goal: Will show no infection signs and symptoms  Description: Will show no infection signs and symptoms  10/20/2021 0535 by Pauline Maradiaga RN  Outcome: Met This Shift     Problem: Skin Integrity:  Goal: Absence of new skin breakdown  Description: Absence of new skin breakdown  10/20/2021 0535 by Pauline Maradiaga RN  Outcome: Met This Shift     Problem: Anxiety:  Goal: Level of anxiety will decrease  Description: Level of anxiety will decrease  10/20/2021 0535 by Pauline Maradiaga RN  Outcome: Ongoing     Problem: Activity:  Goal: Risk for activity intolerance will decrease  Description: Risk for activity intolerance will decrease  10/20/2021 0535 by Pauline Maradiaga RN  Outcome: Ongoing     Problem:  Bowel/Gastric:  Goal: Bowel function will improve  Description: Bowel function will improve  10/20/2021 0535 by Pauline Maradiaga RN  Outcome: Ongoing     Problem: Fluid Volume:  Goal: Maintenance of adequate hydration will improve  Description: Maintenance of adequate hydration will improve  10/20/2021 0535 by Pauline Maradiaga RN  Outcome: Ongoing     Problem: Health Behavior:  Goal: Ability to state signs and symptoms to report to health care provider will improve  Description: Ability to state signs and symptoms to report to health care provider will improve  10/20/2021 0535 by Pauline Maradiaga RN  Outcome: Ongoing     Problem: Sensory:  Goal: Pain level will decrease  Description: Pain level will decrease  10/20/2021 0535 by Pauline Maradiaga RN  Outcome: Ongoing     Problem: Mental Status - Impaired:  Goal: Mental status will improve  Description: Mental status will improve  10/20/2021 0535 by Pauline Maradiaga RN  Outcome: Ongoing     Problem: Pain:  Goal: Pain level will decrease  Description: Pain level will decrease  10/20/2021 0535 by Pauline Maradiaga RN  Outcome: Ongoing     Problem: Pain:  Goal: Control of acute pain  Description: Control of acute pain  10/20/2021 0535 by Sandra Flynn, RN  Outcome: Ongoing

## 2024-07-01 NOTE — TELEPHONE ENCOUNTER
Asking for refill on [place]. Scheduled hip surgery for 9/17/2021 was canceled. Patient had pneumonia bilat lower lobes. Last appointment 7/20/2021. No follow up in office has been made. Please Advise. electronic

## 2024-10-28 NOTE — PROGRESS NOTES
CLINICAL PHARMACY NOTE: MEDS TO BEDS    Total # of Prescriptions Filled: 2   The following medications were delivered to the patient:  Prednisone 10mg  Gabapentin 400mg    Additional Documentation:  Delivered Medication to Patient in [facility] + Transferred Rx to [id number]    - Cholestyramine Packets    Refill(s) Too Soon + Profiled Rx(s)   - Trazodone: 11/02 show

## 2024-11-01 NOTE — BRIEF OP NOTE
Implanted         Drains: * No LDAs found *    Findings: See op note for details.     Electronically signed by Jean Lovell DO on 3/24/2023 at 11:34 AM Yes

## (undated) DEVICE — BANDAGE COBAN 6 IN WND 6INX5YD FOAM

## (undated) DEVICE — WAX SURG 2.5GM HEMSTAT BNE BEESWAX PARAFFIN ISO PALMITATE

## (undated) DEVICE — DRAPE,REIN 53X77,STERILE: Brand: MEDLINE

## (undated) DEVICE — GLOVE ORANGE PI 8 1/2   MSG9085

## (undated) DEVICE — GAUZE,SPONGE,4"X4",16PLY,XRAY,STRL,LF: Brand: MEDLINE

## (undated) DEVICE — TOTAL TRAY, 16FR 10ML SIL FOLEY, URN: Brand: MEDLINE

## (undated) DEVICE — Device

## (undated) DEVICE — ADHESIVE SKIN CLOSURE TOP 36 CC HI VISC DERMBND MINI

## (undated) DEVICE — SUTURE ETHBND EXCEL SZ 5 L30IN NONABSORBABLE GRN L48MM CCS MB47G

## (undated) DEVICE — STOCKINETTE,IMPERVIOUS,12X48,STERILE: Brand: MEDLINE

## (undated) DEVICE — DRESSING FOAM W4XL10IN AG SIL ADH ANTIMIC POSTOP OPTIFOAM

## (undated) DEVICE — GLOVE ORANGE PI 8   MSG9080

## (undated) DEVICE — SUTURE VIC + ABS BR UD X1 2-0 27IN VCP459H

## (undated) DEVICE — SUTURE STRATAFIX SPRL SZ 2-0 L9IN ABSRB VLT MH L36MM 1/2 SXPD2B408

## (undated) DEVICE — HYPODERMIC SAFETY NEEDLE: Brand: MAGELLAN

## (undated) DEVICE — BLADE, TONGUE, 6", STERILE: Brand: MEDLINE

## (undated) DEVICE — SUTURE STRATAFIX SPRL SZ 1 L14IN ABSRB VLT L48CM CTX 1/2 SXPD2B405

## (undated) DEVICE — 1LYRTR 16FR10ML100%SIL UMS SNP: Brand: MEDLINE INDUSTRIES, INC.

## (undated) DEVICE — TOWEL,OR,DSP,ST,NATURAL,DLX,4/PK,20PK/CS: Brand: MEDLINE

## (undated) DEVICE — BLADE,CARBON-STEEL,11,STRL,DISPOSABLE,TB: Brand: MEDLINE

## (undated) DEVICE — HANDPIECE SET WITH COAXIAL HIGH FLOW TIP AND SUCTION TUBE: Brand: INTERPULSE

## (undated) DEVICE — PROTECTOR ULN NRV PUR FOAM HK LOOP STRP ANATOMICALLY

## (undated) DEVICE — DRAPE,TOWEL,LARGE,INVISISHIELD: Brand: MEDLINE

## (undated) DEVICE — NEPTUNE E-SEP 165MM SUCTION SLEEVE: Brand: NEPTUNE E-SEP

## (undated) DEVICE — GOWN,AURORA,NONREINFORCED,LARGE: Brand: MEDLINE

## (undated) DEVICE — DRESSING GZ W3XL16IN CELOS ACETT OIL EMUL N ADH

## (undated) DEVICE — BLADE CLIPPER GEN PURP NS

## (undated) DEVICE — COVER,MAYO STAND,STERILE: Brand: MEDLINE

## (undated) DEVICE — TRAP SPEC RETRV CLR PLAS POLYP IN LN SUCT QUIK CTCH

## (undated) DEVICE — ELECTRODE PT RET AD L9FT HI MOIST COND ADH HYDRGEL CORDED

## (undated) DEVICE — YANKAUER,FLEXIBLE HANDLE,REGLR CAPACITY: Brand: MEDLINE INDUSTRIES, INC.

## (undated) DEVICE — HEWSON SUTURE RETRIEVER: Brand: HEWSON SUTURE RETRIEVER

## (undated) DEVICE — NEEDLE, QUINCKE, 18GX3.5": Brand: MEDLINE

## (undated) DEVICE — SUTURE STRATAFIX SPRL SZ 3-0 L5IN ABSRB UD FS L26MM 3/8 CIR SXMP2B411

## (undated) DEVICE — GLOVE ORANGE PI 7 1/2   MSG9075

## (undated) DEVICE — LIQUIBAND RAPID ADHESIVE 36/CS 0.8ML: Brand: MEDLINE

## (undated) DEVICE — SNARE ENDOSCP M L240CM LOOP W27MM SHTH DIA2.4MM OVL FLX

## (undated) DEVICE — SCREW BNE L35MM DIA6.5MM CANC HIP DOME PINN: Type: IMPLANTABLE DEVICE | Site: HIP | Status: NON-FUNCTIONAL

## (undated) DEVICE — ZIPPERED TOGA, X-LARGE: Brand: FLYTE

## (undated) DEVICE — GLOVE ORTHO 8   MSG9480

## (undated) DEVICE — GARMENT,MEDLINE,DVT,INT,CALF,MED, GEN2: Brand: MEDLINE

## (undated) DEVICE — STRYKER PERFORMANCE SERIES SAGITTAL BLADE: Brand: STRYKER PERFORMANCE SERIES

## (undated) DEVICE — NEEDLE, QUINCKE, 22GX3.5": Brand: MEDLINE

## (undated) DEVICE — APPLICATOR MEDICATED 26 CC SOLUTION HI LT ORNG CHLORAPREP

## (undated) DEVICE — SOLUTION IRRIG 3000ML 0.9% SOD CHL USP UROMATIC PLAS CONT

## (undated) DEVICE — 3M™ IOBAN™ 2 ANTIMICROBIAL INCISE DRAPE 6650EZ: Brand: IOBAN™ 2

## (undated) DEVICE — FORCEPS BX L240CM WRK CHN 2.8MM STD CAP W/ NDL MIC MESH

## (undated) DEVICE — 6619 2 PTNT ISO SYS INCISE AREA&LT;(&GT;&&LT;)&GT;P: Brand: STERI-DRAPE™ IOBAN™ 2

## (undated) DEVICE — C-ARM: Brand: UNBRANDED